# Patient Record
Sex: MALE | Race: WHITE | NOT HISPANIC OR LATINO | Employment: UNEMPLOYED | URBAN - NONMETROPOLITAN AREA
[De-identification: names, ages, dates, MRNs, and addresses within clinical notes are randomized per-mention and may not be internally consistent; named-entity substitution may affect disease eponyms.]

---

## 2020-10-13 ENCOUNTER — CONSULT (OUTPATIENT)
Dept: ONCOLOGY | Facility: CLINIC | Age: 65
End: 2020-10-13

## 2020-10-13 VITALS
HEIGHT: 69 IN | HEART RATE: 67 BPM | SYSTOLIC BLOOD PRESSURE: 127 MMHG | DIASTOLIC BLOOD PRESSURE: 62 MMHG | RESPIRATION RATE: 20 BRPM | TEMPERATURE: 98.3 F | BODY MASS INDEX: 28.88 KG/M2 | WEIGHT: 195 LBS

## 2020-10-13 DIAGNOSIS — C79.51 PROSTATE CANCER METASTATIC TO BONE (HCC): Primary | ICD-10-CM

## 2020-10-13 DIAGNOSIS — C61 PROSTATE CANCER METASTATIC TO BONE (HCC): Primary | ICD-10-CM

## 2020-10-13 PROCEDURE — 99205 OFFICE O/P NEW HI 60 MIN: CPT | Performed by: INTERNAL MEDICINE

## 2020-10-13 RX ORDER — SODIUM CHLORIDE 9 MG/ML
250 INJECTION, SOLUTION INTRAVENOUS ONCE
Status: CANCELLED | OUTPATIENT
Start: 2020-10-28

## 2020-10-13 RX ORDER — DIPHENHYDRAMINE HYDROCHLORIDE 50 MG/ML
50 INJECTION INTRAMUSCULAR; INTRAVENOUS AS NEEDED
Status: CANCELLED | OUTPATIENT
Start: 2020-11-04

## 2020-10-13 RX ORDER — FAMOTIDINE 10 MG/ML
20 INJECTION, SOLUTION INTRAVENOUS AS NEEDED
Status: CANCELLED | OUTPATIENT
Start: 2020-11-04

## 2020-10-13 RX ORDER — BICALUTAMIDE 50 MG/1
TABLET, FILM COATED ORAL
COMMUNITY
Start: 2020-09-30 | End: 2020-11-04

## 2020-10-13 RX ORDER — SODIUM CHLORIDE 9 MG/ML
250 INJECTION, SOLUTION INTRAVENOUS ONCE
Status: CANCELLED | OUTPATIENT
Start: 2020-11-04

## 2020-10-13 RX ORDER — TAMSULOSIN HYDROCHLORIDE 0.4 MG/1
CAPSULE ORAL
COMMUNITY
Start: 2020-10-01 | End: 2021-03-23

## 2020-10-13 NOTE — PROGRESS NOTES
CHIEF COMPLAINT: Metastatic prostate cancer to the bone    REASON FOR REFERRAL: Same      RECORDS OBTAINED  Records of the patients history including those obtained from Dr. Reynaga were reviewed and summarized in detail.    Oncology/Hematology History Overview Note   1.  Stage IVb grade group 4 metastatic prostate cancer with sclerotic bone lesions on CT and bone scan and history of prostatic hypertrophy  2.  Kidney stone  3.  Polyps    -9/30/2020 office note from Dr. Ronen Reynaga indicates patient with PSA 10.8.  Underwent TRUS prostate biopsy 9/15/2020.  Adenocarcinoma the prostate [place] 4+4 = 8 in 1 out of 12 cores and 4+3 = 7 and 10 out of 12 cores and 3+4 = 7 in 1 out of 12 cores.  Perineural invasion.  Extraprostatic extension into the fat seen on 2 biopsies of the right lateral mid and right apex.  9/24/2020 CT chest and whole-body bone scan showed T12, L1, left acetabular, right medial acetabular metastases with no lung metastases.  He started androgen deprivation therapy with Casodex with plans for Lupron to start in 1 to 2 weeks for clinical T3 N0 M1 metastatic prostate cancer.  Review of official report from [facility] 9/24/2020 bone scan mentions T12, L1, roof of left acetabulum and medial right acetabular osteoblastic metastases.  CT chest with contrast that same day showed mild splenomegaly 14.2 cm with stable periaortic nodes compared to CT December 2015 with no lung metastases.  Sclerotic abnormality within the T12 vertebral body, L1 vertebral body extending into the pedicle unchanged.  8/28/2020 CT abdomen pelvis report from [facility] reviewed and mentions normal spleen size.  Punctate nonobstructing kidney stone, no paulino enlargement, diffuse bladder wall thickening with mild perivesicular fat stranding, 2.1 cm sclerotic left iliac bone above the left acetabulum new compared to 2015 as well as 1.5 cm faintly sclerotic focus in the right posterior acetabulum stable compared  to prior CT 2015 as well as a 1.6 cm T12 and inferior vertebral body sclerotic lesion and a 1.9 cm left posterior lateral L1 vertebral body abnormality extending to the pedicle.  -10/13/2020 initial [facility] medical oncology consultation: With 1 [place] score 8, 4+4 lesion that would make him a grade group 4 with stage IVb disease given radiographic evidence of sclerotic bone metastasis on bone scan and CT.  Needs genetic testing given metastatic prostate cancer and this is also important as, were he to have mismatch repair mutation then we would have options for PDL 1 inhibitors and were he BRCA mutated would have options for PARP inhibitors in the future.  Systemic therapy for castration naïve prostate cancer or N1 disease should be with apalutamide or Abiraterone or enzalutamide all of which are category 1.  He does not have any visceral metastases.  According to his imaging he has T12, L1, left acetabular, right acetabular, and left iliac bony involvement.  That means he would have 4 or more bone metastases with at least one metastasis (i.e. the acetabular lesions bilaterally) beyond the pelvis/vertebral, for which this would be considered high-volume disease for which 6 cycles of docetaxel 75 mg/m² x 6 cycles followed by Zytiga and prednisone would be reasonable along with Zometa every 6 weeks for bone stabilization.  He will do chemo preparation visit with my nurse practitioner prior to start chemotherapy with Taxotere and Zometa in 2 weeks and he is already received Casodex in preparation for Lupron shot he received on 10/12/2020 with Dr. Reynaga.  We will get him to Dr. Alexander Gomez who has done his polypectomies in the past for port placement and we will get genetic counseling started.  Following the 6 courses of Taxotere per the Chaarted trial criteria, I would then give him an indefinite Zytiga and prednisone and Zometa until progression or toxicity dictates.     Prostate cancer metastatic to bone  (CMS/[facility])   10/13/2020 Initial Diagnosis    Prostate cancer metastatic to bone (CMS/[facility])     10/13/2020 Cancer Staged    Staging form: Bone - Appendicular Skeleton, Trunk, Skull, And Facial Bones, AJCC 8th Edition  - Clinical: Stage IVB (cT3, cN0, cM1b, G3) - Signed by Ishmael Rashid MD on 10/13/2020     10/28/2020 -  Chemotherapy    OP PROSTATE DOCEtaxel     10/28/2020 -  Chemotherapy    OP SUPPORTIVE Zoledronic Acid Q42d         HISTORY OF PRESENT ILLNESS:  The patient is a 65 y.o.  male, referred for metastatic prostate cancer to the bone.  Presenting with hematuria but now has bilateral hip pain.  Status post bicalutamide to prevent tumor flare for Lupron shot he received yesterday    REVIEW OF SYSTEMS:  A 14 point review of systems was performed and is negative except as noted above.    History reviewed. No pertinent past medical history.  Past Surgical History:   Procedure Laterality Date   • CHOLECYSTECTOMY     • CORONARY STENT PLACEMENT  206 & 2011   • HERNIA REPAIR  1986   • THORACIC DISCECTOMY  1994       Current Outpatient Medications on File Prior to Visit   Medication Sig Dispense Refill   • bicalutamide (CASODEX) 50 MG chemo tablet      • Pseudoephedrine-Naproxen Na (ALEVE-D SINUS & COLD PO)      • tamsulosin (FLOMAX) 0.4 MG capsule 24 hr capsule        No current facility-administered medications on file prior to visit.        No Known Allergies    Social History     Socioeconomic History   • Marital status:      Spouse name: Not on file   • Number of children: Not on file   • Years of education: Not on file   • Highest education level: Not on file   Tobacco Use   • Smoking status: Current Every Day Smoker     Packs/day: 1.00     Years: 50.00     Pack years: 50.00     Types: Cigarettes   Substance and Sexual Activity   • Alcohol use: Not Currently   • Drug use: Never       Family History   Problem Relation Age of Onset   • Ovarian cancer Sister    • Diabetes Brother    • Heart disease Brother   "  • Prostate cancer Brother        PHYSICAL EXAM:    /62   Pulse 67   Temp 98.3 °F (36.8 °C)   Resp 20   Ht 175.3 cm (69\")   Wt 88.5 kg (195 lb)   BMI 28.80 kg/m²     ECOG: (1) Restricted in Physically Strenuous Activity, Ambulatory & Able to Do Work of Light Nature  General: well appearing male in no acute distress  HEENT: sclera anicteric, oropharynx clear  Lymphatics: no cervical, supraclavicular, inguinal, or axillary adenopathy  Cardiovascular: regular rate and rhythm, no murmurs  Neck: Supple; No thyromegaly  Lungs: clear to auscultation bilaterally. No respiratory distress.   Abdomen: soft, nontender, nondistended.  No palpable organomegaly  Extremities: no cyanosis, clubbing, edema, or cords  Skin: no rashes, lesions, bruising, or petechiae  Neuro: Alert and oriented x 4; Moving all extremities.  Psych: No anxiety or depression    No results found for: HGB, HCT, MCV, PLT, WBC, NEUTROABS, LYMPHSABS, MONOSABS, EOSABS, BASOSABS  No results found for: GLUCOSE, BUN, CREATININE, NA, K, CL, CO2, CALCIUM, PROTEINTOT, ALBUMIN, BILITOT, ALKPHOS, AST, ALT        Assessment/Plan     1.  Stage IVb grade group 4 metastatic prostate cancer with sclerotic bone lesions on CT and bone scan and history of prostatic hypertrophy  2.  Kidney stone  3.  Polyps    -9/30/2020 office note from Dr. Ronen Reynaga indicates patient with PSA 10.8.  Underwent TRUS prostate biopsy 9/15/2020.  Adenocarcinoma the prostate [place] 4+4 = 8 in 1 out of 12 cores and 4+3 = 7 and 10 out of 12 cores and 3+4 = 7 in 1 out of 12 cores.  Perineural invasion.  Extraprostatic extension into the fat seen on 2 biopsies of the right lateral mid and right apex.  9/24/2020 CT chest and whole-body bone scan showed T12, L1, left acetabular, right medial acetabular metastases with no lung metastases.  He started androgen deprivation therapy with Casodex with plans for Lupron to start in 1 to 2 weeks for clinical T3 N0 M1 metastatic prostate " cancer.  Review of official report from [facility] 9/24/2020 bone scan mentions T12, L1, roof of left acetabulum and medial right acetabular osteoblastic metastases.  CT chest with contrast that same day showed mild splenomegaly 14.2 cm with stable periaortic nodes compared to CT December 2015 with no lung metastases.  Sclerotic abnormality within the T12 vertebral body, L1 vertebral body extending into the pedicle unchanged.  8/28/2020 CT abdomen pelvis report from [facility] reviewed and mentions normal spleen size.  Punctate nonobstructing kidney stone, no paulino enlargement, diffuse bladder wall thickening with mild perivesicular fat stranding, 2.1 cm sclerotic left iliac bone above the left acetabulum new compared to 2015 as well as 1.5 cm faintly sclerotic focus in the right posterior acetabulum stable compared to prior CT 2015 as well as a 1.6 cm T12 and inferior vertebral body sclerotic lesion and a 1.9 cm left posterior lateral L1 vertebral body abnormality extending to the pedicle.  -10/13/2020 initial [facility] medical oncology consultation: With 1 [place] score 8, 4+4 lesion that would make him a grade group 4 with stage IVb disease given radiographic evidence of sclerotic bone metastasis on bone scan and CT.  Needs genetic testing given metastatic prostate cancer and this is also important as, were he to have mismatch repair mutation then we would have options for PDL 1 inhibitors and were he BRCA mutated would have options for PARP inhibitors in the future.  Systemic therapy for castration naïve prostate cancer or N1 disease should be with apalutamide or Abiraterone or enzalutamide all of which are category 1.  He does not have any visceral metastases.  According to his imaging he has T12, L1, left acetabular, right acetabular, and left iliac bony involvement.  That means he would have 4 or more bone metastases with at least one metastasis (i.e. the acetabular lesions bilaterally) beyond  the pelvis/vertebral, for which this would be considered high-volume disease for which 6 cycles of docetaxel 75 mg/m² x 6 cycles followed by Zytiga and prednisone would be reasonable along with Zometa every 6 weeks for bone stabilization.  He will do chemo preparation visit with my nurse practitioner prior to start chemotherapy with Taxotere and Zometa in 2 weeks and he is already received Casodex in preparation for Lupron shot he received on 10/12/2020 with Dr. Reynaga.  We will get him to Dr. Alexander Gomez who has done his polypectomies in the past for port placement and we will get genetic counseling started.  Following the 6 courses of Taxotere per the Chaarted trial criteria, I would then give him an indefinite Zytiga and prednisone and Zometa until progression or toxicity dictates.  Discussed with patient face-to-face 85 minutes greater than 50% spent counseling regarding this complex plan        Ishmael Rashid MD    10/13/2020

## 2020-10-19 DIAGNOSIS — C61 PROSTATE CANCER METASTATIC TO BONE (HCC): Primary | ICD-10-CM

## 2020-10-19 DIAGNOSIS — C79.51 PROSTATE CANCER METASTATIC TO BONE (HCC): Primary | ICD-10-CM

## 2020-10-19 RX ORDER — DEXAMETHASONE 4 MG/1
TABLET ORAL
Qty: 12 TABLET | Refills: 5 | Status: SHIPPED | OUTPATIENT
Start: 2020-10-19 | End: 2021-01-05 | Stop reason: SDUPTHER

## 2020-10-19 RX ORDER — ONDANSETRON HYDROCHLORIDE 8 MG/1
8 TABLET, FILM COATED ORAL 3 TIMES DAILY PRN
Qty: 30 TABLET | Refills: 5 | Status: SHIPPED | OUTPATIENT
Start: 2020-10-19 | End: 2022-11-09

## 2020-10-22 ENCOUNTER — OFFICE VISIT (OUTPATIENT)
Dept: ONCOLOGY | Facility: CLINIC | Age: 65
End: 2020-10-22

## 2020-10-22 VITALS
DIASTOLIC BLOOD PRESSURE: 64 MMHG | SYSTOLIC BLOOD PRESSURE: 128 MMHG | HEART RATE: 74 BPM | RESPIRATION RATE: 18 BRPM | WEIGHT: 197 LBS | BODY MASS INDEX: 29.18 KG/M2 | TEMPERATURE: 98.7 F | HEIGHT: 69 IN

## 2020-10-22 DIAGNOSIS — C61 PROSTATE CANCER METASTATIC TO BONE (HCC): Primary | ICD-10-CM

## 2020-10-22 DIAGNOSIS — C79.51 PROSTATE CANCER METASTATIC TO BONE (HCC): Primary | ICD-10-CM

## 2020-10-22 PROCEDURE — 99215 OFFICE O/P EST HI 40 MIN: CPT | Performed by: NURSE PRACTITIONER

## 2020-10-22 RX ORDER — HYDROCODONE BITARTRATE AND ACETAMINOPHEN 5; 325 MG/1; MG/1
1 TABLET ORAL EVERY 6 HOURS PRN
Qty: 120 TABLET | Refills: 0 | Status: SHIPPED | OUTPATIENT
Start: 2020-10-22 | End: 2020-11-25

## 2020-10-22 RX ORDER — LIDOCAINE AND PRILOCAINE 25; 25 MG/G; MG/G
CREAM TOPICAL AS NEEDED
Qty: 30 G | Refills: 3 | Status: SHIPPED | OUTPATIENT
Start: 2020-10-22 | End: 2022-11-09

## 2020-10-22 NOTE — PROGRESS NOTES
CHEMOTHERAPY PREPARATION    Naveen Lugo  [id number]  1955    Chief Complaint: chemo education     History of present illness:  Naveen Lugo is a 65 y.o. year old male who is here today for chemotherapy preparation and needs assessment. The patient has been diagnosed with metastatic prostate cancer and is scheduled to begin treatment with DOCEtaxel.     Oncology History:    Oncology/Hematology History Overview Note   1.  Stage IVb grade group 4 metastatic prostate cancer with sclerotic bone lesions on CT and bone scan and history of prostatic hypertrophy  2.  Kidney stone  3.  Polyps    -9/30/2020 office note from Dr. Ronen Reynaga indicates patient with PSA 10.8.  Underwent TRUS prostate biopsy 9/15/2020.  Adenocarcinoma the prostate [place] 4+4 = 8 in 1 out of 12 cores and 4+3 = 7 and 10 out of 12 cores and 3+4 = 7 in 1 out of 12 cores.  Perineural invasion.  Extraprostatic extension into the fat seen on 2 biopsies of the right lateral mid and right apex.  9/24/2020 CT chest and whole-body bone scan showed T12, L1, left acetabular, right medial acetabular metastases with no lung metastases.  He started androgen deprivation therapy with Casodex with plans for Lupron to start in 1 to 2 weeks for clinical T3 N0 M1 metastatic prostate cancer.  Review of official report from [facility] 9/24/2020 bone scan mentions T12, L1, roof of left acetabulum and medial right acetabular osteoblastic metastases.  CT chest with contrast that same day showed mild splenomegaly 14.2 cm with stable periaortic nodes compared to CT December 2015 with no lung metastases.  Sclerotic abnormality within the T12 vertebral body, L1 vertebral body extending into the pedicle unchanged.  8/28/2020 CT abdomen pelvis report from [facility] reviewed and mentions normal spleen size.  Punctate nonobstructing kidney stone, no paulino enlargement, diffuse bladder wall thickening with mild perivesicular fat stranding, 2.1 cm sclerotic  left iliac bone above the left acetabulum new compared to 2015 as well as 1.5 cm faintly sclerotic focus in the right posterior acetabulum stable compared to prior CT 2015 as well as a 1.6 cm T12 and inferior vertebral body sclerotic lesion and a 1.9 cm left posterior lateral L1 vertebral body abnormality extending to the pedicle.  -10/13/2020 initial [facility] onc[facility] consultation: With 1 Sarika score 8, 4+4 lesion that would make him a grade group 4 with stage IVb disease given radiographic evidence of sclerotic bone metastasis on bone scan and CT.  Needs genetic testing given metastatic prostate cancer and this is also important as, were he to have mismatch repair mutation then we would have options for PDL 1 inhibitors and were he BRCA mutated would have options for PARP inhibitors in the future.  Systemic therapy for castration naïve prostate cancer or N1 disease should be with apalutamide or Abiraterone or enzalutamide all of which are category 1.  He does not have any visceral metastases.  According to his imaging he has T12, L1, left acetabular, right acetabular, and left iliac bony involvement.  That means he would have 4 or more bone metastases with at least one metastasis (i.e. the acetabular lesions bilaterally) beyond the pelvis/vertebral, for which this would be considered high-volume disease for which 6 cycles of docetaxel 75 mg/m² x 6 cycles followed by Zytiga and prednisone would be reasonable along with Zometa every 6 weeks for bone stabilization.  He will do chemo preparation visit with my nurse practitioner prior to start chemotherapy with Taxotere and Zometa in 2 weeks and he is already received Casodex in preparation for Lupron shot he received on 10/12/2020 with Dr. Ryenaga.  We will get him to Dr. Alexander Gomez who has done his polypectomies in the past for port placement and we will get genetic counseling started.  Following the 6 courses of Taxotere per the [place] trial criteria,  I would then give him an indefinite Zytiga and prednisone and Zometa until progression or toxicity dictates.     Prostate cancer metastatic to bone (CMS/HCC)   10/13/2020 Initial Diagnosis    Prostate cancer metastatic to bone (CMS/HCC)     10/13/2020 Cancer Staged    Staging form: Bone - Appendicular Skeleton, Trunk, Skull, And Facial Bones, AJCC 8th Edition  - Clinical: Stage IVB (cT3, cN0, cM1b, G3) - Signed by Ishmael Rashid MD on 10/13/2020     10/28/2020 -  Chemotherapy    OP SUPPORTIVE Zoledronic Acid Q42d     11/4/2020 -  Chemotherapy    OP PROSTATE DOCEtaxel         History reviewed. No pertinent past medical history.    Past Surgical History:   Procedure Laterality Date   • CHOLECYSTECTOMY     • CORONARY STENT PLACEMENT  206 & 2011   • HERNIA REPAIR  1986   • THORACIC DISCECTOMY  1994       Family History   Problem Relation Age of Onset   • Ovarian cancer Sister    • Diabetes Brother    • Heart disease Brother    • Prostate cancer Brother        Medications: The current medication list was reviewed and reconciled.     Allergies:  has No Known Allergies.    Review of Systems:    Review of Systems   Constitutional: Negative for appetite change, fatigue, fever and unexpected weight change.   HENT: Negative for mouth sores, sore throat and trouble swallowing.    Respiratory: Negative for cough, shortness of breath and wheezing.    Cardiovascular: Negative for chest pain, palpitations and leg swelling.   Gastrointestinal: Negative for abdominal distention, abdominal pain, constipation, diarrhea, nausea and vomiting.   Genitourinary: Negative for difficulty urinating, dysuria and frequency.   Musculoskeletal: Negative for arthralgias.        Bilateral hip pain   Skin: Negative for pallor, rash and wound.   Neurological: Negative for dizziness and weakness.   Hematological: Does not bruise/bleed easily.   Psychiatric/Behavioral: Negative for confusion and sleep disturbance. The patient is not nervous/anxious.         Physical Exam:    Vitals:    10/22/20 1049   BP: 128/64   Pulse: 74   Resp: 18   Temp: 98.7 °F (37.1 °C)     Vitals:    10/22/20 1049   PainSc:   8   PainLoc: Hip  Comment: right hip   [place] SHAN Lugo reports a pain score of 8.  Given his pain assessment as noted, treatment options were discussed and the following options were decided upon as a follow-up plan to address the patient's pain: prescription for opiod analgesics.         ECOG: (0) Fully Active - Able to Carry On All Pre-disease Performance Without Restriction    General: well appearing, in no acute distress  HEENT: sclera anicteric, oropharynx clear, neck is supple  Lymphatics: no cervical, supraclavicular, or axillary adenopathy  Cardiovascular: regular rate and rhythm, no murmurs, rubs or gallops  Lungs: clear to auscultation bilaterally  Abdomen: soft, nontender, nondistended.  No palpable organomegaly  Extremities: no lower extremity edema  Skin: no rashes, lesions, bruising, or petechiae  Msk:  Shows no weakness of the large muscle groups  Psych: Mood is stable            NEEDS ASSESSMENTS    Genetics  The patient's new diagnosis and family history have been reviewed for genetic counseling needs. A genetic referral is recommended.  Patient has appointment already scheduled.      Psychosocial  The patient has completed a PHQ-9 Depression Screening and the Distress Thermometer (DT) today.   PHQ-9 results show 1-4 (Minimal Depression). The patient scored their distress today as 2 on a scale of 0-10 with 0 being no distress and 10 being extreme distress.   Problems marked by the patient as being an issue for them within the last week include physical problems.   Results were reviewed along with psychosocial resources offered by our cancer center. Our oncology social worker will be flagged for a DT score of 4 or above, and a same day call will be made for a score of 9 or 10. A mental health referral is not recommended at this time. The patient is not  "accepting of a referral to [facility].   Copies of patient's questionnaires will be scanned into EMR for details and further reference.    Barriers to care  A barriers form was also completed by the patient today. We discussed services offered by our facility to help him have adequate access to care. The patient was given the name and card for our Oncology Social Worker, Dina Delaney. Based upon barriers assessment today, the patient will require a follow-up call from the  to further discuss needs.   A copy of the barriers form will also be scanned into EMR for details and further reference.     VAD Assessment  The patient and I discussed planned intervenous chemotherapy as well as other IV treatments that are often needed throughout the course of treatment. These may include, but are not limited to blood transfusions, antibiotics, and IV hydration. The vasculature does appear to be adequate for multiple peripheral IVs throughout their treatment course. Discussed risks and benefits of VADs. The patient would like to pursue Port-A-Cath insertion prior to initiation of treatment and is scheduled for placement on 11/2/2020.    Advanced Care Planning  The patient and I discussed advanced care planning, \"Conversations that Matter\".   This service was offered, free of charge, for development of advance directives with a certified ACP facilitator.  The patient does not have an up-to-date advanced directive.  The patient is not interested in an appointment with one of our facilitators to create or update their advanced directives.      Palliative Care  The patient and I discussed palliative care services. Palliative care is not the same as Hospice care. This is specialized medical care for people living with serious illness with the goal of improving quality of life for the patient and their family. [facility] has partnered with [facility] to offer our patients outpatient palliative care " early along with their treatment to assist in coordination of care, symptom management, pain management, and medical decision making.  Oncology criteria for palliative care referral is not met at this time. The patient is not interested in a palliative care consultation.     Additional Referral needs  none      CHEMOTHERAPY EDUCATION    Booklets Given: Chemotherapy and You [x]  Eating Hints [x]    Sexuality/Fertility Books []      Chemotherapy/Biotherapy Education Sheets: (list all that apply)  nausea management, acid reflux management, diarrhea management, Cancer resourse contacts information, skin and mouth care and vaccination information                                                                                                                                                                 Chemotherapy Regimen:   Treatment Plans     Name Type Plan Dates Plan Provider         Active    OP SUPPORTIVE Zoledronic Acid Q42d ONCOLOGY SUPPORTIVE CARE 1  10/28/2020 - Present Ishmael Rashid MD     OP PROSTATE DOCEtaxel ONCOLOGY TREATMENT  10/27/2020 - Present Ishmael Rashid MD                    TOPICS EDUCATION PROVIDED COMMENTS   ANEMIA:  role of RBC, cause, s/s, ways to manage, role of transfusion [x]    THROMBOCYTOPENIA:  role of platelet, cause, s/s, ways to prevent bleeding, things to avoid, when to seek help [x]    NEUTROPENIA:  role of WBC, cause, infection precautions, s/s of infection, when to call MD [x]    NUTRITION & APPETITE CHANGES:  importance of maintaining healthy diet & weight, ways to manage to improve intake, dietary consult, exercise regimen [x]    DIARRHEA:  causes, s/s of dehydration, ways to manage, dietary changes, when to call MD [x]    CONSTIPATION:  causes, ways to manage, dietary changes, when to call MD [x]    NAUSEA & VOMITING:  cause, use of antiemetics, dietary changes, when to call MD [x]    MOUTH SORES:  causes, oral care, ways to manage [x]    ALOPECIA:  cause, ways to manage,  resources [x]    INFERTILITY & SEXUALITY:  causes, fertility preservation options, sexuality changes, ways to manage, importance of birth control [x]    NERVOUS SYSTEM CHANGES:  causes, s/s, neuropathies, cognitive changes, ways to manage [x]    PAIN:  causes, ways to manage [x]    SKIN & NAIL CHANGES:  cause, s/s, ways to manage [x]    ORGAN TOXICITIES:  cause, s/s, need for diagnostic tests, labs, when to notify MD [x]    SURVIVORSHIP:  distress, distress assessment, secondary malignancies, early/late effects, follow-up, social issues, social support [x]    HOME CARE:  use of spill kits, storing of PO chemo, how to manage bodily fluids [x]    MISCELLANEOUS:  drug interactions, administration, vesicant, et [x]        Assessment and Plan:    Diagnoses and all orders for this visit:    1. Prostate cancer metastatic to bone (CMS/HCC) (Primary)        This was a 50 minute face-to-face visit with 45 minutes spent in  counseling and coordination of care as documented above.   The patient and I have reviewed their new cancer diagnosis and scheduled treatment plan. Needs assessment was completed including genetics, psychosocial needs, barriers to care, VAD evaluation, advanced care planning, and palliative care services. Referrals have been ordered as appropriate based upon our evaluation and patient desires.     Chemotherapy teaching was also completed today as documented above. Adequate time was given to answer all questions to his satisfaction. Patient and family are aware of their care team members and contact information if they have questions or problems throughout the treatment course. Needs assessments and education has been completed. The patient is adequately prepared to begin treatment as scheduled.     Reviewed with patient education regarding EMLA cream, dexamethasone and Zofran prescriptions sent to pharmacy.       I advised the patient that she can take Tylenol or Ibuprofen as needed for aches/pains related  to cancer/treatment. I also advised patient she could use Senakot or Miralax as needed for constipation or Imodium as needed for diarrhea.       I reviewed with the patient the care team members. I also reviewed the option of the urgent care clinic through our oncology office for evaluation and management of symptoms related to treatment.    Patient having significant bilateral hip pain secondary to bone metastasis.  He is rating his pain 8/10.  We will send in a prescription for [place] and may need referral to palliative care in the future.        Mirella Mccarty, APRN  10/22/2020

## 2020-10-29 ENCOUNTER — APPOINTMENT (OUTPATIENT)
Dept: GENETICS | Facility: HOSPITAL | Age: 65
End: 2020-10-29

## 2020-10-29 ENCOUNTER — LAB (OUTPATIENT)
Dept: ONCOLOGY | Facility: HOSPITAL | Age: 65
End: 2020-10-29

## 2020-10-29 VITALS
DIASTOLIC BLOOD PRESSURE: 79 MMHG | WEIGHT: 193 LBS | HEART RATE: 75 BPM | SYSTOLIC BLOOD PRESSURE: 169 MMHG | RESPIRATION RATE: 18 BRPM | BODY MASS INDEX: 28.5 KG/M2 | TEMPERATURE: 98.3 F

## 2020-10-29 DIAGNOSIS — C79.51 PROSTATE CANCER METASTATIC TO BONE (HCC): ICD-10-CM

## 2020-10-29 DIAGNOSIS — C61 PROSTATE CANCER METASTATIC TO BONE (HCC): ICD-10-CM

## 2020-10-29 PROCEDURE — G0463 HOSPITAL OUTPT CLINIC VISIT: HCPCS

## 2020-10-29 PROCEDURE — 36415 COLL VENOUS BLD VENIPUNCTURE: CPT

## 2020-10-30 LAB
ALBUMIN SERPL-MCNC: 4.5 G/DL (ref 3.5–5.2)
ALBUMIN/GLOB SERPL: 2 G/DL
ALP SERPL-CCNC: 113 U/L (ref 39–117)
ALT SERPL-CCNC: 16 U/L (ref 1–41)
AST SERPL-CCNC: 19 U/L (ref 1–40)
BASOPHILS # BLD AUTO: NORMAL 10*3/UL
BASOPHILS # BLD MANUAL: 0.12 10*3/MM3 (ref 0–0.2)
BASOPHILS NFR BLD MANUAL: 2 % (ref 0–1.5)
BILIRUB SERPL-MCNC: 0.2 MG/DL (ref 0–1.2)
BUN SERPL-MCNC: 6 MG/DL (ref 8–23)
BUN/CREAT SERPL: 7.2 (ref 7–25)
CALCIUM SERPL-MCNC: 9.2 MG/DL (ref 8.6–10.5)
CHLORIDE SERPL-SCNC: 106 MMOL/L (ref 98–107)
CO2 SERPL-SCNC: 26.7 MMOL/L (ref 22–29)
CREAT SERPL-MCNC: 0.83 MG/DL (ref 0.76–1.27)
DIFFERENTIAL COMMENT: ABNORMAL
EOSINOPHIL # BLD AUTO: NORMAL 10*3/UL
EOSINOPHIL # BLD MANUAL: 0.19 10*3/MM3 (ref 0–0.4)
EOSINOPHIL NFR BLD AUTO: NORMAL %
EOSINOPHIL NFR BLD MANUAL: 3 % (ref 0.3–6.2)
ERYTHROCYTE [DISTWIDTH] IN BLOOD BY AUTOMATED COUNT: 13.8 % (ref 12.3–15.4)
GLOBULIN SER CALC-MCNC: 2.3 GM/DL
GLUCOSE SERPL-MCNC: 107 MG/DL (ref 65–99)
HCT VFR BLD AUTO: 46.3 % (ref 37.5–51)
HGB BLD-MCNC: 15.3 G/DL (ref 13–17.7)
LYMPHOCYTES # BLD AUTO: NORMAL 10*3/UL
LYMPHOCYTES # BLD MANUAL: 2.11 10*3/MM3 (ref 0.7–3.1)
LYMPHOCYTES NFR BLD AUTO: NORMAL %
LYMPHOCYTES NFR BLD MANUAL: 34 % (ref 19.6–45.3)
MAGNESIUM SERPL-MCNC: 2 MG/DL (ref 1.6–2.4)
MCH RBC QN AUTO: 27.3 PG (ref 26.6–33)
MCHC RBC AUTO-ENTMCNC: 33 G/DL (ref 31.5–35.7)
MCV RBC AUTO: 82.7 FL (ref 79–97)
MONOCYTES # BLD MANUAL: 0.19 10*3/MM3 (ref 0.1–0.9)
MONOCYTES NFR BLD AUTO: NORMAL %
MONOCYTES NFR BLD MANUAL: 3 % (ref 5–12)
NEUTROPHILS # BLD MANUAL: 3.61 10*3/MM3 (ref 1.7–7)
NEUTROPHILS NFR BLD AUTO: NORMAL %
NEUTROPHILS NFR BLD MANUAL: 58 % (ref 42.7–76)
PHOSPHATE SERPL-MCNC: 2.6 MG/DL (ref 2.5–4.5)
PLATELET # BLD AUTO: 152 10*3/MM3 (ref 140–450)
PLATELET BLD QL SMEAR: ABNORMAL
POTASSIUM SERPL-SCNC: 3.8 MMOL/L (ref 3.5–5.2)
PROT SERPL-MCNC: 6.8 G/DL (ref 6–8.5)
PSA SERPL-MCNC: 1.37 NG/ML (ref 0–4)
RBC # BLD AUTO: 5.6 10*6/MM3 (ref 4.14–5.8)
RBC MORPH BLD: ABNORMAL
SODIUM SERPL-SCNC: 142 MMOL/L (ref 136–145)
WBC # BLD AUTO: 6.22 10*3/MM3 (ref 3.4–10.8)

## 2020-11-03 RX ORDER — SODIUM CHLORIDE 9 MG/ML
250 INJECTION, SOLUTION INTRAVENOUS ONCE
Status: COMPLETED | OUTPATIENT
Start: 2020-11-04 | End: 2020-11-04

## 2020-11-04 ENCOUNTER — INFUSION (OUTPATIENT)
Dept: ONCOLOGY | Facility: HOSPITAL | Age: 65
End: 2020-11-04

## 2020-11-04 ENCOUNTER — TELEPHONE (OUTPATIENT)
Dept: ONCOLOGY | Facility: CLINIC | Age: 65
End: 2020-11-04

## 2020-11-04 VITALS
DIASTOLIC BLOOD PRESSURE: 65 MMHG | RESPIRATION RATE: 18 BRPM | TEMPERATURE: 98 F | SYSTOLIC BLOOD PRESSURE: 147 MMHG | WEIGHT: 196.4 LBS | HEART RATE: 72 BPM | BODY MASS INDEX: 29 KG/M2

## 2020-11-04 DIAGNOSIS — Z45.2 ENCOUNTER FOR CARE RELATED TO PORT-A-CATH: ICD-10-CM

## 2020-11-04 DIAGNOSIS — C79.51 PROSTATE CANCER METASTATIC TO BONE (HCC): Primary | ICD-10-CM

## 2020-11-04 DIAGNOSIS — C61 PROSTATE CANCER METASTATIC TO BONE (HCC): Primary | ICD-10-CM

## 2020-11-04 PROCEDURE — 25010000002 DOCETAXEL 160 MG/16ML SOLUTION 16 ML VIAL: Performed by: INTERNAL MEDICINE

## 2020-11-04 PROCEDURE — 96375 TX/PRO/DX INJ NEW DRUG ADDON: CPT

## 2020-11-04 PROCEDURE — 25010000003 HEPARIN LOCK FLUSH PER 10 UNITS: Performed by: INTERNAL MEDICINE

## 2020-11-04 PROCEDURE — 96413 CHEMO IV INFUSION 1 HR: CPT

## 2020-11-04 PROCEDURE — 96417 CHEMO IV INFUS EACH ADDL SEQ: CPT

## 2020-11-04 PROCEDURE — 25010000002 ZOLEDRONIC ACID PER 1 MG: Performed by: INTERNAL MEDICINE

## 2020-11-04 RX ORDER — HEPARIN SODIUM (PORCINE) LOCK FLUSH IV SOLN 100 UNIT/ML 100 UNIT/ML
500 SOLUTION INTRAVENOUS AS NEEDED
Status: DISCONTINUED | OUTPATIENT
Start: 2020-11-04 | End: 2020-11-04 | Stop reason: HOSPADM

## 2020-11-04 RX ORDER — SODIUM CHLORIDE 9 MG/ML
250 INJECTION, SOLUTION INTRAVENOUS ONCE
Status: DISCONTINUED | OUTPATIENT
Start: 2020-11-04 | End: 2020-11-04 | Stop reason: HOSPADM

## 2020-11-04 RX ORDER — HEPARIN SODIUM (PORCINE) LOCK FLUSH IV SOLN 100 UNIT/ML 100 UNIT/ML
500 SOLUTION INTRAVENOUS AS NEEDED
Status: CANCELLED | OUTPATIENT
Start: 2020-11-04

## 2020-11-04 RX ADMIN — SODIUM CHLORIDE 250 ML: 9 INJECTION, SOLUTION INTRAVENOUS at 09:05

## 2020-11-04 RX ADMIN — DOCETAXEL ANHYDROUS 155 MG: 10 INJECTION, SOLUTION INTRAVENOUS at 09:21

## 2020-11-04 RX ADMIN — HEPARIN 500 UNITS: 100 SYRINGE at 10:22

## 2020-11-04 RX ADMIN — ZOLEDRONIC ACID 4 MG: 4 INJECTION, SOLUTION, CONCENTRATE INTRAVENOUS at 09:05

## 2020-11-04 NOTE — TELEPHONE ENCOUNTER
----- Message from Shantel Murray RN sent at 11/4/2020 11:17 AM EST -----  Regarding: Dr. Rashid - Medication clarification  Patient seen today for cycle 1 Taxotere. Casodex is listed in patient's medication list. Please call patient to clarify when he is to start this drug. Thanks, Shantel JOY ([place]).

## 2020-11-04 NOTE — TELEPHONE ENCOUNTER
Called patient to see if he had questions.  He states he doesn't know if he is on casodex.  He states he did take a medication for 1 month, but does not know the name.  I called Dr. Reynaga's office.  Patient was on casodex for one month with initiation of lupron and has completed course.  Patient notified and I will remove from his list.

## 2020-11-04 NOTE — PATIENT INSTRUCTIONS
Zoledronic Acid injection (Hypercalcemia, Oncology)  What is this medicine?  ZOLEDRONIC ACID (JOHNSON le gaspern ik AS id) lowers the amount of calcium loss from bone. It is used to treat too much calcium in your blood from cancer. It is also used to prevent complications of cancer that has spread to the bone.  This medicine may be used for other purposes; ask your health care provider or pharmacist if you have questions.  COMMON BRAND NAME(S): Zometa  What should I tell my health care provider before I take this medicine?  They need to know if you have any of these conditions:  · aspirin-sensitive asthma  · cancer, especially if you are receiving medicines used to treat cancer  · dental disease or wear dentures  · infection  · kidney disease  · receiving corticosteroids like dexamethasone or prednisone  · an unusual or allergic reaction to zoledronic acid, other medicines, foods, dyes, or preservatives  · pregnant or trying to get pregnant  · breast-feeding  How should I use this medicine?  This medicine is for infusion into a vein. It is given by a health care professional in a hospital or clinic setting.  Talk to your pediatrician regarding the use of this medicine in children. Special care may be needed.  Overdosage: If you think you have taken too much of this medicine contact a poison control center or emergency room at once.  NOTE: This medicine is only for you. Do not share this medicine with others.  What if I miss a dose?  It is important not to miss your dose. Call your doctor or health care professional if you are unable to keep an appointment.  What may interact with this medicine?  · certain antibiotics given by injection  · NSAIDs, medicines for pain and inflammation, like ibuprofen or naproxen  · some diuretics like bumetanide, furosemide  · teriparatide  · thalidomide  This list may not describe all possible interactions. Give your health care provider a list of all the medicines, herbs, non-prescription  drugs, or dietary supplements you use. Also tell them if you smoke, drink alcohol, or use illegal drugs. Some items may interact with your medicine.  What should I watch for while using this medicine?  Visit your doctor or health care professional for regular checkups. It may be some time before you see the benefit from this medicine. Do not stop taking your medicine unless your doctor tells you to. Your doctor may order blood tests or other tests to see how you are doing.  Women should inform their doctor if they wish to become pregnant or think they might be pregnant. There is a potential for serious side effects to an unborn child. Talk to your health care professional or pharmacist for more information.  You should make sure that you get enough calcium and vitamin D while you are taking this medicine. Discuss the foods you eat and the vitamins you take with your health care professional.  Some people who take this medicine have severe bone, joint, and/or muscle pain. This medicine may also increase your risk for jaw problems or a broken thigh bone. Tell your doctor right away if you have severe pain in your jaw, bones, joints, or muscles. Tell your doctor if you have any pain that does not go away or that gets worse.  Tell your dentist and dental surgeon that you are taking this medicine. You should not have major dental surgery while on this medicine. See your dentist to have a dental exam and fix any dental problems before starting this medicine. Take good care of your teeth while on this medicine. Make sure you see your dentist for regular follow-up appointments.  What side effects may I notice from receiving this medicine?  Side effects that you should report to your doctor or health care professional as soon as possible:  · allergic reactions like skin rash, itching or hives, swelling of the face, lips, or tongue  · anxiety, confusion, or depression  · breathing problems  · changes in vision  · eye  pain  · feeling faint or lightheaded, falls  · jaw pain, especially after dental work  · mouth sores  · muscle cramps, stiffness, or weakness  · redness, blistering, peeling or loosening of the skin, including inside the mouth  · trouble passing urine or change in the amount of urine  Side effects that usually do not require medical attention (report to your doctor or health care professional if they continue or are bothersome):  · bone, joint, or muscle pain  · constipation  · diarrhea  · fever  · hair loss  · irritation at site where injected  · loss of appetite  · nausea, vomiting  · stomach upset  · trouble sleeping  · trouble swallowing  · weak or tired  This list may not describe all possible side effects. Call your doctor for medical advice about side effects. You may report side effects to FDA at 2-858-HTT-4516.  Where should I keep my medicine?  This drug is given in a hospital or clinic and will not be stored at home.  NOTE: This sheet is a summary. It may not cover all possible information. If you have questions about this medicine, talk to your doctor, pharmacist, or health care provider.  © 2020 Elsevier/Gold Standard (2015-05-16 14:19:39)  Docetaxel injection  What is this medicine?  DOCETAXEL (youssef se TAX el) is a chemotherapy drug. It targets fast dividing cells, like cancer cells, and causes these cells to die. This medicine is used to treat many types of cancers like breast cancer, certain stomach cancers, head and neck cancer, lung cancer, and prostate cancer.  This medicine may be used for other purposes; ask your health care provider or pharmacist if you have questions.  COMMON BRAND NAME(S): Corey Hahn  What should I tell my health care provider before I take this medicine?  They need to know if you have any of these conditions:  · infection (especially a virus infection such as chickenpox, cold sores, or herpes)  · liver disease  · low blood counts, like low white cell, platelet, or red  cell counts  · an unusual or allergic reaction to docetaxel, polysorbate 80, other chemotherapy agents, other medicines, foods, dyes, or preservatives  · pregnant or trying to get pregnant  · breast-feeding  How should I use this medicine?  This drug is given as an infusion into a vein. It is administered in a hospital or clinic by a specially trained health care professional.  Talk to your pediatrician regarding the use of this medicine in children. Special care may be needed.  Overdosage: If you think you have taken too much of this medicine contact a poison control center or emergency room at once.  NOTE: This medicine is only for you. Do not share this medicine with others.  What if I miss a dose?  It is important not to miss your dose. Call your doctor or health care professional if you are unable to keep an appointment.  What may interact with this medicine?  · aprepitant  · certain antibiotics like erythromycin or clarithromycin  · certain antivirals for HIV or hepatitis  · certain medicines for fungal infections like fluconazole, itraconazole, ketoconazole, posaconazole, or voriconazole  · cimetidine  · ciprofloxacin  · conivaptan  · cyclosporine  · dronedarone  · fluvoxamine  · grapefruit juice  · imatinib  · verapamil  This list may not describe all possible interactions. Give your health care provider a list of all the medicines, herbs, non-prescription drugs, or dietary supplements you use. Also tell them if you smoke, drink alcohol, or use illegal drugs. Some items may interact with your medicine.  What should I watch for while using this medicine?  Your condition will be monitored carefully while you are receiving this medicine. You will need important blood work done while you are taking this medicine.  Call your doctor or health care professional for advice if you get a fever, chills or sore throat, or other symptoms of a cold or flu. Do not treat yourself. This drug decreases your body's ability to  fight infections. Try to avoid being around people who are sick.  Some products may contain alcohol. Ask your health care professional if this medicine contains alcohol. Be sure to tell all health care professionals you are taking this medicine. Certain medicines, like metronidazole and disulfiram, can cause an unpleasant reaction when taken with alcohol. The reaction includes flushing, headache, nausea, vomiting, sweating, and increased thirst. The reaction can last from 30 minutes to several hours.  You may get drowsy or dizzy. Do not drive, use machinery, or do anything that needs mental alertness until you know how this medicine affects you. Do not stand or sit up quickly, especially if you are an older patient. This reduces the risk of dizzy or fainting spells. Alcohol may interfere with the effect of this medicine.  Talk to your health care professional about your risk of cancer. You may be more at risk for certain types of cancer if you take this medicine.  Do not become pregnant while taking this medicine or for 6 months after stopping it. Women should inform their doctor if they wish to become pregnant or think they might be pregnant. There is a potential for serious side effects to an unborn child. Talk to your health care professional or pharmacist for more information. Do not breast-feed an infant while taking this medicine or for 1 week after stopping it.  Males who get this medicine must use a condom during sex with females who can get pregnant. If you get a woman pregnant, the baby could have birth defects. The baby could die before they are born. You will need to continue wearing a condom for 3 months after stopping the medicine. Tell your health care provider right away if your partner becomes pregnant while you are taking this medicine.  This may interfere with the ability to father a child. You should talk to your doctor or health care professional if you are concerned about your fertility.  What  side effects may I notice from receiving this medicine?  Side effects that you should report to your doctor or health care professional as soon as possible:  · allergic reactions like skin rash, itching or hives, swelling of the face, lips, or tongue  · blurred vision  · breathing problems  · changes in vision  · low blood counts - This drug may decrease the number of white blood cells, red blood cells and platelets. You may be at increased risk for infections and bleeding.  · nausea and vomiting  · pain, redness or irritation at site where injected  · pain, tingling, numbness in the hands or feet  · redness, blistering, peeling, or loosening of the skin, including inside the mouth  · signs of decreased platelets or bleeding - bruising, pinpoint red spots on the skin, black, tarry stools, nosebleeds  · signs of decreased red blood cells - unusually weak or tired, fainting spells, lightheadedness  · signs of infection - fever or chills, cough, sore throat, pain or difficulty passing urine  · swelling of the ankle, feet, hands  Side effects that usually do not require medical attention (report to your doctor or health care professional if they continue or are bothersome):  · constipation  · diarrhea  · fingernail or toenail changes  · hair loss  · loss of appetite  · mouth sores  · muscle pain  This list may not describe all possible side effects. Call your doctor for medical advice about side effects. You may report side effects to FDA at 2-334-FDA-0220.  Where should I keep my medicine?  This drug is given in a hospital or clinic and will not be stored at home.  NOTE: This sheet is a summary. It may not cover all possible information. If you have questions about this medicine, talk to your doctor, pharmacist, or health care provider.  © 2020 Elsevier/Gold Standard (2020-08-13 10:19:06)    Managing Chemotherapy Side Effects, Adult  Chemotherapy is a treatment that uses medicine to kill cancer cells. Chemotherapy  causes side effects. The specific side effects depend on the specific medicines used.  Most of the side effects of chemotherapy go away once treatment is finished. Until then, work closely with your health care providers and take an active role in managing your side effects.  What are common side effects?  · Tiredness (fatigue).  · Increased risk of infections, bruising, or bleeding.  · Nausea and vomiting.  · Constipation or diarrhea.  · Appetite loss.  · Hair loss.  · Mouth or throat sores.  · Tingling, pain, or numbness in the hands and feet.  · Dry, sensitive, itchy, or sore skin.  · Confusion, anxiety, or mood swings.  · Memory changes.  How can I help manage my side effects?  Medicines  · Take over-the-counter and prescription medicines only as told by your health care provider.  · Talk with your health care provider before taking vitamins, supplements, and over-the-counter medicines. Some of these can interfere with chemotherapy.  Activity    · Get plenty of rest.  · Get regular exercise by doing activities such as walking, gentle yoga, or byalee chi.  · Return to your normal activities as told by your health care provider. Ask your health care provider what activities are safe for you.  Eating and drinking  · Talk to a dietitian about what you should eat and drink during cancer treatment.  · Drink enough fluid to keep your urine pale yellow.  · If you have side effects that affect eating, these tips may help:  ? Eat smaller meals and snacks often.  ? Drink high-nutrition and high-calorie shakes or supplements.  ? Eat bland and soft foods that are easy to eat.  ? Do not eat foods that are hot, spicy, or hard to swallow.  · Do not eat raw or undercooked meat, eggs, or seafood.  · Always wash fresh fruits and vegetables well before eating them.  General instructions  · Learn as much as you can about your condition.  · Keep all follow-up visits as told by your health care provider. This is important.  · Use mouth  rinse only as told by your health care provider.  · Protect your skin from the sun by using sunscreen or wearing protective clothing and a hat.  · If you have sore or itchy skin:  ? Wear soft, comfortable clothing.  ? Apply creams and ointments to your skin as told by your health care provider.  · If you lose your hair, wear a wig, hat, or scarf to cover your head. You may want to have someone shave your head as you start to lose hair.  · Meet with a hair and  for makeup and skin care tips.  How can I prevent infection and bleeding?  Chemotherapy may lower your blood counts and put you at risk for infection and bleeding. Here are some ways to help prevent problems.  Vaccines  · Talk to your health care provider about vaccines. You should not get any live vaccines, such as the polio, MMR, chicken pox, and shingles vaccines. Do not be around people who have had live vaccines.  · Make sure you get a yearly flu shot. People who will be near you should also get a yearly flu shot.  Social activity  · Stay away from crowded places where you could be exposed to germs.  · Do not be around people who may be sick.  · Do not share food or utensils with other people.  · Wear a mask when outside the home if your blood counts are low.  Cleanliness    · Wash your hands often. Also make sure that other members of your household wash their hands often.  · Brush your teeth daily using a soft toothbrush.  General tips  · Take your temperature regularly, especially if you have chills or feel warm.  · Check with your health care provider before you:  ? Travel.  ? Have a dental procedure.  · If you get chemotherapy through an IV or port, check the site every day for signs of infection. Check for redness, swelling, pain, fluid, and warmth.  · Avoid activities that put you at risk for injury.  · Use an electric razor to shave instead of a blade.  Questions to ask your health care provider  · What are the most common side  effects of my treatment?  · How will they affect my daily life?  · What can I do to manage them?  · When can I expect them to end?  · What are some possible long-term side effects?  · What are possible complications?  · What support services are available?  · When should I contact my cancer care provider?  · What number can I call with questions or concerns?  Where to find support  Cancer affects the entire family. Find out what family support resources are available from your cancer treatment center. For more support, turn to:  · Your cancer care team.  · Friends and family.  · Your Christianity community.  · Other people with cancer.  · Online support groups.  Where to find more information  · [place] Cancer [place]: www.cancer.gov  · American Cancer Society: www.cancer.org  Contact a health care provider if:  · You bleed or bruise often.  · You have:  ? A skin rash or dry or itchy skin.  ? A headache or stiff neck.  ? Cold or flu symptoms.  ? A cough.  ? Nausea or vomiting.  ? Diarrhea.  ? Frequent urination, burning when passing urine, or foul-smelling urine.  ? Blood in your urine or stool.  · You cannot eat because of mouth or throat pain.  · You are sad, confused, anxious, or depressed.  Get help right away if:  · You have:  ? A fever.  ? Redness, swelling, pain, fluid, or warmth near an IV site.  ? Bleeding that you cannot stop.  ? A seizure.  · You cannot swallow.  · You have chest pain.  · You have trouble breathing.  Summary  · Chemotherapy is a treatment that uses medicine to kill cancer cells. Chemotherapy causes side effects. The specific side effects depend on the specific medicines used.  · Learn as much as you can about your condition. Ask about side effects to watch for and how to treat them.  · Seek out support and resources from others. Find out what family support resources are available from your cancer treatment center.  · Let your health care provider know if you notice any new or unusual  symptoms.  This information is not intended to replace advice given to you by your health care provider. Make sure you discuss any questions you have with your health care provider.  Document Released: 03/14/2019 Document Revised: 03/14/2019 Document Reviewed: 03/14/2019  Elsevier Patient Education © 2020 [employer] Inc[employer]

## 2020-11-09 ENCOUNTER — TELEPHONE (OUTPATIENT)
Dept: ONCOLOGY | Facility: CLINIC | Age: 65
End: 2020-11-09

## 2020-11-09 DIAGNOSIS — C79.51 PROSTATE CANCER METASTATIC TO BONE (HCC): Primary | ICD-10-CM

## 2020-11-09 DIAGNOSIS — C61 PROSTATE CANCER METASTATIC TO BONE (HCC): Primary | ICD-10-CM

## 2020-11-09 RX ORDER — HYDROCODONE BITARTRATE AND ACETAMINOPHEN 10; 325 MG/1; MG/1
1 TABLET ORAL EVERY 6 HOURS PRN
Qty: 120 TABLET | Refills: 0 | Status: SHIPPED | OUTPATIENT
Start: 2020-11-09 | End: 2020-11-30 | Stop reason: SDUPTHER

## 2020-11-09 NOTE — TELEPHONE ENCOUNTER
Patient wife calling to report that the patient is having lots of pain in his bones since his infusion last week.  She said it has gotten worse and feels like his bones are almost ready to break as they ache.  He is using norco 5/325 every 6 hours and ibuprofen 800mg in between norco dosing.  He states it has gotten worse over the past couple of days.  I talked with Dr. Rashid and Dr. Rashid had actually spoken with patient last evening and suggested an emergency room visit.  Dr. Rashid stated we could increase the norco to 10 ever 6 hours and I will send a rx for Rachid to sign off on.  I called the wife back and told her we were sending a new script in for norco 10/325 but she could go ahead and let her  take 2 of the 5mg tablets now to see if he can get any relief.  I educated about the need for stool softeners laxatives to keep bowels moving with the increase in narcotic pain meds and wife verbalized understanding.  I advised for them to call back if pain persists despite increase in pain med.

## 2020-11-10 ENCOUNTER — TELEPHONE (OUTPATIENT)
Dept: NUTRITION | Facility: HOSPITAL | Age: 65
End: 2020-11-10

## 2020-11-10 NOTE — PROGRESS NOTES
Oncology Nutrition Screening    Patient Name:  Naveen Lugo  YOB: 1955  MRN: [id number]  Date:  11/10/20  Physician:  Dr. Rashid    Type of Cancer Treatment:   Chemotherapy:  Taxotere - every 21 days x 6 + Zometa - every 42 days     Patient Active Problem List   Diagnosis   • Prostate cancer metastatic to bone (CMS/HCC)   • Encounter for care related to Port-a-Cath       Current Outpatient Medications   Medication Sig Dispense Refill   • dexamethasone (DECADRON) 4 MG tablet Take 2 tablets oral twice a day for 3 consecutive days beginning the day before chemotherapy and continue for 6 doses. 12 tablet 5   • HYDROcodone-acetaminophen (NORCO)  MG per tablet Take 1 tablet by mouth Every 6 (Six) Hours As Needed for Moderate Pain . 120 tablet 0   • HYDROcodone-acetaminophen (NORCO) 5-325 MG per tablet Take 1 tablet by mouth Every 6 (Six) Hours As Needed for Moderate Pain . 120 tablet 0   • lidocaine-prilocaine (EMLA) 2.5-2.5 % cream Apply  topically to the appropriate area as directed As Needed (45-60 minutes prior to port access.  Cover with saran/plastic wrap.). 30 g 3   • ondansetron (ZOFRAN) 8 MG tablet Take 1 tablet by mouth 3 (Three) Times a Day As Needed for Nausea or Vomiting. 30 tablet 5   • tamsulosin (FLOMAX) 0.4 MG capsule 24 hr capsule        No current facility-administered medications for this visit.        Glycemic Risk:   Steriods    Weight:   Height: 69 inches  Weight: 196 lbs. (11/4/2020)   BMI: 29  Overweight  Weight - no recent weight changes reported    Oral Food Intake:  Regular Diet - No Restrictions    Hydration Status:   How many 8 ounce glass of water of fluid do you drink per day?  Will assess at time of consult.    Enteral Feeding:   n/a    Nutrition Symptoms:   No nutritional impact symptoms reported    Activity:   Normal with no limitations     reports that he has been smoking cigarettes. He has a 50.00 pack-year smoking history. He does not have any smokeless tobacco  history on file. He reports previous alcohol use. He reports that he does not use drugs.    Evaluation of Nutritional Risk:   Patient is not at nutritional risk at time of screening.  Contacted patient via phone call for nutritional assessment / education, however he was busy at the time of the call and asked if I could call back.  Will attempt to contact patient to discuss nutrition during his treatment course.  RD available to assist prn.    Electronically signed by:  Cely Santiago RD  14:14 EST

## 2020-11-12 ENCOUNTER — TELEPHONE (OUTPATIENT)
Dept: SOCIAL WORK | Facility: HOSPITAL | Age: 65
End: 2020-11-12

## 2020-11-12 DIAGNOSIS — C61 PROSTATE CANCER METASTATIC TO BONE (HCC): Primary | ICD-10-CM

## 2020-11-12 DIAGNOSIS — K12.31 MUCOSITIS DUE TO ANTINEOPLASTIC THERAPY: ICD-10-CM

## 2020-11-12 DIAGNOSIS — C79.51 PROSTATE CANCER METASTATIC TO BONE (HCC): Primary | ICD-10-CM

## 2020-11-12 RX ORDER — DIPHENHYDRAMINE, LIDOCAINE, NYSTATIN
KIT ORAL
Qty: 240 ML | Refills: 3 | Status: SHIPPED | OUTPATIENT
Start: 2020-11-12 | End: 2022-11-09

## 2020-11-12 NOTE — TELEPHONE ENCOUNTER
SW called pt to address his recent distress screening.  LVM requesting a return phone call.  SW available for ongoing support and resource needs.

## 2020-11-12 NOTE — TELEPHONE ENCOUNTER
Patient called in to report that he has developed several mouth sores, denies any fevers.  He advised that he is not having any trouble eating or drinking yet.  Spoke with DHARMESH Deshpande who advised to call in MMW.  I did advised patient to watch for fevers and call us if they get worse and it does not help.  Patient verbalized understanding.

## 2020-11-17 ENCOUNTER — TELEPHONE (OUTPATIENT)
Dept: NUTRITION | Facility: HOSPITAL | Age: 65
End: 2020-11-17

## 2020-11-17 NOTE — PROGRESS NOTES
Onc Nutrition    Patient: Naveen Lugo  YOB: 1955    Diagnosis: metastatic prostate cancer  Chemotherapy: Taxotere - every 21 days (1/6 cycles) + Zometa - every 42 days     Attempted to contact patient for nutrition education, however he reports now is not a good time for him to talk on the phone.  Will try to reach patient at another time.  RD available to assist prn.    Cely Santiago RD  11/17/20

## 2020-11-19 ENCOUNTER — TELEPHONE (OUTPATIENT)
Dept: NUTRITION | Facility: HOSPITAL | Age: 65
End: 2020-11-19

## 2020-11-19 ENCOUNTER — TELEPHONE (OUTPATIENT)
Dept: ONCOLOGY | Facility: CLINIC | Age: 65
End: 2020-11-19

## 2020-11-19 ENCOUNTER — INFUSION (OUTPATIENT)
Dept: ONCOLOGY | Facility: HOSPITAL | Age: 65
End: 2020-11-19

## 2020-11-19 VITALS
HEART RATE: 91 BPM | RESPIRATION RATE: 18 BRPM | WEIGHT: 195.8 LBS | DIASTOLIC BLOOD PRESSURE: 76 MMHG | TEMPERATURE: 98.3 F | SYSTOLIC BLOOD PRESSURE: 156 MMHG | BODY MASS INDEX: 28.91 KG/M2

## 2020-11-19 DIAGNOSIS — C79.51 PROSTATE CANCER METASTATIC TO BONE (HCC): Primary | ICD-10-CM

## 2020-11-19 DIAGNOSIS — C61 PROSTATE CANCER METASTATIC TO BONE (HCC): Primary | ICD-10-CM

## 2020-11-19 DIAGNOSIS — Z45.2 ENCOUNTER FOR CARE RELATED TO PORT-A-CATH: ICD-10-CM

## 2020-11-19 PROCEDURE — 25010000003 HEPARIN LOCK FLUSH PER 10 UNITS: Performed by: INTERNAL MEDICINE

## 2020-11-19 PROCEDURE — 36591 DRAW BLOOD OFF VENOUS DEVICE: CPT

## 2020-11-19 RX ORDER — HEPARIN SODIUM (PORCINE) LOCK FLUSH IV SOLN 100 UNIT/ML 100 UNIT/ML
500 SOLUTION INTRAVENOUS AS NEEDED
Status: DISCONTINUED | OUTPATIENT
Start: 2020-11-19 | End: 2020-11-19 | Stop reason: HOSPADM

## 2020-11-19 RX ORDER — HEPARIN SODIUM (PORCINE) LOCK FLUSH IV SOLN 100 UNIT/ML 100 UNIT/ML
500 SOLUTION INTRAVENOUS AS NEEDED
Status: CANCELLED | OUTPATIENT
Start: 2020-11-19

## 2020-11-19 RX ORDER — GABAPENTIN 300 MG/1
300 CAPSULE ORAL 3 TIMES DAILY
Qty: 90 CAPSULE | Refills: 6 | Status: SHIPPED | OUTPATIENT
Start: 2020-11-19 | End: 2020-11-25

## 2020-11-19 RX ADMIN — HEPARIN 500 UNITS: 100 SYRINGE at 09:50

## 2020-11-19 NOTE — TELEPHONE ENCOUNTER
Patient notified.  States he is unable to take gabapentin.  He makes him very nauseated.  Dr. Rashid notified.  He is currently taking norco 10/325 mg 1 tab PO Q6 hrs prn.  Patient instructed to increase to 1-2 tabs Q4 hrs prn pain and let us know if not effective.  Verbalized understanding.

## 2020-11-19 NOTE — PROGRESS NOTES
Onc Nutrition    Patient: Naveen Lugo  YOB: 1955    Diagnosis: metastatic prostate cancer  Chemotherapy: Taxotere - every 21 days (1/6 cycles) + Zometa - every 42 days     3rd attempt to speak with patient regarding nutrition during his treatment course.  Each time patient has declined nutrition education saying that it was not a good time to talk.  Will mail written diet materials with RD's contact information.  Advised patient to call RD with nutritional questions or concerns at his convenience.  He voiced understanding and is in agreement with the above plan.  Will follow up as indicated.  RD available to assist prn.    Cely Santiago RD  11/19/20

## 2020-11-19 NOTE — TELEPHONE ENCOUNTER
----- Message from Ishmael Rashid MD sent at 11/19/2020  2:24 PM EST -----  Regarding: RE: pain management  Tell him I sent in gabapentin 300 mg 3 times a day to add to the Norco  ----- Message -----  From: Ciera Deluna, RN  Sent: 11/19/2020   1:37 PM EST  To: Ishmael Rashid MD  Subject: FW: pain management                                ----- Message -----  From: Lucy Pretty RN  Sent: 11/19/2020  12:16 PM EST  To: Ciera Deluna RN  Subject: pain management                                  Hey Mr. Lugo was here earlier today for blood work.  He sees Abena and has treatment next Wednesday.  He is still complaining of pain since he took the first tx of Taxotere and Zometa.  He said at one point it was so bad he couldn hardly walk and couldn't work.  He rated it an 8 in his back today and hips. There is a note that he had called about the pain and Dr. Rashid increased his Norco.  The patient said it doesn't help and wanted to know if there was something else.  I told him I would relay his message.    Thanks!    Fabiola

## 2020-11-20 LAB
ALBUMIN SERPL-MCNC: 3.7 G/DL (ref 3.8–4.8)
ALBUMIN/GLOB SERPL: 1.4 {RATIO} (ref 1.2–2.2)
ALP SERPL-CCNC: 103 IU/L (ref 39–117)
ALT SERPL-CCNC: 15 IU/L (ref 0–44)
AST SERPL-CCNC: 17 IU/L (ref 0–40)
BASOPHILS # BLD AUTO: 0.4 X10E3/UL (ref 0–0.2)
BASOPHILS NFR BLD AUTO: 3 %
BILIRUB SERPL-MCNC: <0.2 MG/DL (ref 0–1.2)
BUN SERPL-MCNC: 9 MG/DL (ref 8–27)
BUN/CREAT SERPL: 13 (ref 10–24)
CALCIUM SERPL-MCNC: 9 MG/DL (ref 8.6–10.2)
CHLORIDE SERPL-SCNC: 104 MMOL/L (ref 96–106)
CO2 SERPL-SCNC: 22 MMOL/L (ref 20–29)
CREAT SERPL-MCNC: 0.67 MG/DL (ref 0.76–1.27)
EOSINOPHIL # BLD AUTO: 0 X10E3/UL (ref 0–0.4)
EOSINOPHIL NFR BLD AUTO: 0 %
ERYTHROCYTE [DISTWIDTH] IN BLOOD BY AUTOMATED COUNT: 14 % (ref 11.6–15.4)
GLOBULIN SER CALC-MCNC: 2.6 G/DL (ref 1.5–4.5)
GLUCOSE SERPL-MCNC: 119 MG/DL (ref 65–99)
HCT VFR BLD AUTO: 40.4 % (ref 37.5–51)
HGB BLD-MCNC: 13.9 G/DL (ref 13–17.7)
IMMATURE CELLS: ABNORMAL
LYMPHOCYTES # BLD AUTO: 2.4 X10E3/UL (ref 0.7–3.1)
LYMPHOCYTES NFR BLD AUTO: 20 %
MCH RBC QN AUTO: 28.4 PG (ref 26.6–33)
MCHC RBC AUTO-ENTMCNC: 34.4 G/DL (ref 31.5–35.7)
MCV RBC AUTO: 83 FL (ref 79–97)
METAMYELOCYTES NFR BLD: 2 % (ref 0–0)
MONOCYTES # BLD AUTO: 0.4 X10E3/UL (ref 0.1–0.9)
MONOCYTES NFR BLD AUTO: 3 %
MORPHOLOGY BLD-IMP: ABNORMAL
MYELOCYTES NFR BLD: 6 % (ref 0–0)
NEUTROPHILS # BLD AUTO: 8 X10E3/UL (ref 1.4–7)
NEUTROPHILS NFR BLD AUTO: 66 %
PLATELET # BLD AUTO: 274 X10E3/UL (ref 150–450)
POTASSIUM SERPL-SCNC: 3.7 MMOL/L (ref 3.5–5.2)
PROT SERPL-MCNC: 6.3 G/DL (ref 6–8.5)
PSA SERPL-MCNC: 1.2 NG/ML (ref 0–4)
RBC # BLD AUTO: 4.89 X10E6/UL (ref 4.14–5.8)
SODIUM SERPL-SCNC: 140 MMOL/L (ref 134–144)
WBC # BLD AUTO: 12.1 X10E3/UL (ref 3.4–10.8)

## 2020-11-24 RX ORDER — SODIUM CHLORIDE 9 MG/ML
250 INJECTION, SOLUTION INTRAVENOUS ONCE
Status: COMPLETED | OUTPATIENT
Start: 2020-11-25 | End: 2020-11-25

## 2020-11-24 RX ORDER — SODIUM CHLORIDE 9 MG/ML
250 INJECTION, SOLUTION INTRAVENOUS ONCE
Status: CANCELLED | OUTPATIENT
Start: 2020-11-25

## 2020-11-24 RX ORDER — FAMOTIDINE 10 MG/ML
20 INJECTION, SOLUTION INTRAVENOUS AS NEEDED
Status: CANCELLED | OUTPATIENT
Start: 2020-11-25

## 2020-11-24 RX ORDER — DIPHENHYDRAMINE HYDROCHLORIDE 50 MG/ML
50 INJECTION INTRAMUSCULAR; INTRAVENOUS AS NEEDED
Status: CANCELLED | OUTPATIENT
Start: 2020-11-25

## 2020-11-25 ENCOUNTER — OFFICE VISIT (OUTPATIENT)
Dept: ONCOLOGY | Facility: CLINIC | Age: 65
End: 2020-11-25

## 2020-11-25 ENCOUNTER — INFUSION (OUTPATIENT)
Dept: ONCOLOGY | Facility: HOSPITAL | Age: 65
End: 2020-11-25

## 2020-11-25 VITALS
BODY MASS INDEX: 28.88 KG/M2 | DIASTOLIC BLOOD PRESSURE: 72 MMHG | WEIGHT: 195 LBS | TEMPERATURE: 98.9 F | HEART RATE: 106 BPM | SYSTOLIC BLOOD PRESSURE: 163 MMHG | RESPIRATION RATE: 18 BRPM | HEIGHT: 69 IN | OXYGEN SATURATION: 98 %

## 2020-11-25 DIAGNOSIS — Z45.2 ENCOUNTER FOR CARE RELATED TO PORT-A-CATH: ICD-10-CM

## 2020-11-25 DIAGNOSIS — C79.51 PROSTATE CANCER METASTATIC TO BONE (HCC): Primary | ICD-10-CM

## 2020-11-25 DIAGNOSIS — C61 PROSTATE CANCER METASTATIC TO BONE (HCC): Primary | ICD-10-CM

## 2020-11-25 PROCEDURE — 99213 OFFICE O/P EST LOW 20 MIN: CPT | Performed by: NURSE PRACTITIONER

## 2020-11-25 PROCEDURE — 25010000003 HEPARIN LOCK FLUSH PER 10 UNITS: Performed by: INTERNAL MEDICINE

## 2020-11-25 PROCEDURE — 96413 CHEMO IV INFUSION 1 HR: CPT

## 2020-11-25 PROCEDURE — 25010000002 DOCETAXEL 160 MG/16ML SOLUTION 16 ML VIAL: Performed by: NURSE PRACTITIONER

## 2020-11-25 RX ORDER — OMEPRAZOLE 20 MG/1
20 CAPSULE, DELAYED RELEASE ORAL DAILY
COMMUNITY
Start: 2020-09-12 | End: 2021-11-17 | Stop reason: DRUGHIGH

## 2020-11-25 RX ORDER — DIPHENHYDRAMINE HYDROCHLORIDE 50 MG/ML
50 INJECTION INTRAMUSCULAR; INTRAVENOUS AS NEEDED
Status: CANCELLED | OUTPATIENT
Start: 2020-12-16

## 2020-11-25 RX ORDER — SODIUM CHLORIDE 9 MG/ML
250 INJECTION, SOLUTION INTRAVENOUS ONCE
Status: CANCELLED | OUTPATIENT
Start: 2020-12-16

## 2020-11-25 RX ORDER — FAMOTIDINE 10 MG/ML
20 INJECTION, SOLUTION INTRAVENOUS AS NEEDED
Status: CANCELLED | OUTPATIENT
Start: 2020-12-16

## 2020-11-25 RX ORDER — SODIUM CHLORIDE 9 MG/ML
250 INJECTION, SOLUTION INTRAVENOUS ONCE
Status: CANCELLED | OUTPATIENT
Start: 2020-12-09

## 2020-11-25 RX ORDER — IBUPROFEN 800 MG/1
800 TABLET ORAL 3 TIMES DAILY
COMMUNITY
Start: 2020-10-13 | End: 2021-06-29

## 2020-11-25 RX ORDER — MELOXICAM 15 MG/1
TABLET ORAL
COMMUNITY
Start: 2020-09-23 | End: 2020-12-29

## 2020-11-25 RX ORDER — HEPARIN SODIUM (PORCINE) LOCK FLUSH IV SOLN 100 UNIT/ML 100 UNIT/ML
500 SOLUTION INTRAVENOUS AS NEEDED
Status: CANCELLED | OUTPATIENT
Start: 2020-11-25

## 2020-11-25 RX ORDER — HEPARIN SODIUM (PORCINE) LOCK FLUSH IV SOLN 100 UNIT/ML 100 UNIT/ML
500 SOLUTION INTRAVENOUS AS NEEDED
Status: DISCONTINUED | OUTPATIENT
Start: 2020-11-25 | End: 2020-11-25 | Stop reason: HOSPADM

## 2020-11-25 RX ADMIN — DOCETAXEL ANHYDROUS 155 MG: 10 INJECTION, SOLUTION INTRAVENOUS at 10:50

## 2020-11-25 RX ADMIN — SODIUM CHLORIDE 250 ML: 9 INJECTION, SOLUTION INTRAVENOUS at 10:50

## 2020-11-25 RX ADMIN — HEPARIN 500 UNITS: 100 SYRINGE at 11:50

## 2020-11-25 NOTE — PROGRESS NOTES
CHIEF COMPLAINT: 1.  Bone pain, worsened after last treatment 2.  Fatigue 3.  Metastatic prostate cancer    Problem List:  Oncology/Hematology History Overview Note   1.  Stage IVb grade group 4 metastatic prostate cancer with sclerotic bone lesions on CT and bone scan and history of prostatic hypertrophy  2.  Kidney stone  3.  Polyps    -9/30/2020 office note from Dr. Ronen Reynaga indicates patient with PSA 10.8.  Underwent TRUS prostate biopsy 9/15/2020.  Adenocarcinoma the prostate [place] 4+4 = 8 in 1 out of 12 cores and 4+3 = 7 and 10 out of 12 cores and 3+4 = 7 in 1 out of 12 cores.  Perineural invasion.  Extraprostatic extension into the fat seen on 2 biopsies of the right lateral mid and right apex.  9/24/2020 CT chest and whole-body bone scan showed T12, L1, left acetabular, right medial acetabular metastases with no lung metastases.  He started androgen deprivation therapy with Casodex with plans for Lupron to start in 1 to 2 weeks for clinical T3 N0 M1 metastatic prostate cancer.  Review of official report from [facility] 9/24/2020 bone scan mentions T12, L1, roof of left acetabulum and medial right acetabular osteoblastic metastases.  CT chest with contrast that same day showed mild splenomegaly 14.2 cm with stable periaortic nodes compared to CT December 2015 with no lung metastases.  Sclerotic abnormality within the T12 vertebral body, L1 vertebral body extending into the pedicle unchanged.  8/28/2020 CT abdomen pelvis report from [facility] reviewed and mentions normal spleen size.  Punctate nonobstructing kidney stone, no paulino enlargement, diffuse bladder wall thickening with mild perivesicular fat stranding, 2.1 cm sclerotic left iliac bone above the left acetabulum new compared to 2015 as well as 1.5 cm faintly sclerotic focus in the right posterior acetabulum stable compared to prior CT 2015 as well as a 1.6 cm T12 and inferior vertebral body sclerotic lesion and a 1.9 cm left  posterior lateral L1 vertebral body abnormality extending to the pedicle.  -10/13/2020 initial [facility] medical oncology consultation: With 1 [place] score 8, 4+4 lesion that would make him a grade group 4 with stage IVb disease given radiographic evidence of sclerotic bone metastasis on bone scan and CT.  Needs genetic testing given metastatic prostate cancer and this is also important as, were he to have mismatch repair mutation then we would have options for PDL 1 inhibitors and were he BRCA mutated would have options for PARP inhibitors in the future.  Systemic therapy for castration naïve prostate cancer or N1 disease should be with apalutamide or Abiraterone or enzalutamide all of which are category 1.  He does not have any visceral metastases.  According to his imaging he has T12, L1, left acetabular, right acetabular, and left iliac bony involvement.  That means he would have 4 or more bone metastases with at least one metastasis (i.e. the acetabular lesions bilaterally) beyond the pelvis/vertebral, for which this would be considered high-volume disease for which 6 cycles of docetaxel 75 mg/m² x 6 cycles followed by Zytiga and prednisone would be reasonable along with Zometa every 6 weeks for bone stabilization.  He will do chemo preparation visit with my nurse practitioner prior to start chemotherapy with Taxotere and Zometa in 2 weeks and he is already received Casodex in preparation for Lupron shot he received on 10/12/2020 with Dr. Reynaga.  We will get him to Dr. Alexander Gomez who has done his polypectomies in the past for port placement and we will get genetic counseling started.  Following the 6 courses of Taxotere per the Chaarted trial criteria, I would then give him an indefinite Zytiga and prednisone and Zometa until progression or toxicity dictates.     Prostate cancer metastatic to bone (CMS/HCC)   10/13/2020 Initial Diagnosis    Prostate cancer metastatic to bone (CMS/HCC)     10/13/2020 Cancer  Staged    Staging form: Bone - Appendicular Skeleton, Trunk, Skull, And Facial Bones, AJCC 8th Edition  - Clinical: Stage IVB (cT3, cN0, cM1b, G3) - Signed by Ishmael Rashid MD on 10/13/2020     11/3/2020 -  Chemotherapy    OP SUPPORTIVE Zoledronic Acid Q42d     11/3/2020 -  Chemotherapy    OP PROSTATE DOCEtaxel     11/4/2020 -  Chemotherapy    OP CENTRAL VENOUS ACCESS DEVICE ACCESS, CARE, AND MAINTENANCE (CVAD)         HISTORY OF PRESENT ILLNESS:  The patient is a 65 y.o. male, here for follow up on management of metastatic prostate cancer.  The patient reports after his first treatment with Taxotere and Zometa he had significant increase in his bone pain and fatigue that was almost debilitating, this lasted for up to 1-1/2 weeks.  Currently he is feeling much better.  Taking ibuprofen along with hydrocodone intermittently for his bone pain which has helped.  Denies constipation, is taking stool softener.  No fevers or chills, no new respiratory concerns.      History reviewed. No pertinent past medical history.  Past Surgical History:   Procedure Laterality Date   • CHOLECYSTECTOMY     • CORONARY STENT PLACEMENT  206 & 2011   • HERNIA REPAIR  1986   • THORACIC DISCECTOMY  1994       No Known Allergies    Family History and Social History reviewed and changed as necessary      REVIEW OF SYSTEM:   Review of Systems   Constitutional: Negative for appetite change, chills, diaphoresis, fever and unexpected weight change.  Positive for fatigue.  HENT:   Negative for mouth sores, sore throat and trouble swallowing.    Eyes: Negative for icterus.   Respiratory: Negative for cough, hemoptysis and shortness of breath.    Cardiovascular: Negative for chest pain, leg swelling and palpitations.   Gastrointestinal: Negative for abdominal distention, abdominal pain, blood in stool, constipation, diarrhea, nausea and vomiting.   Endocrine: Negative for hot flashes.   Genitourinary: Negative for bladder incontinence, difficulty  "urinating, dysuria, frequency and hematuria.    Musculoskeletal: Negative for gait problem, neck pain and neck stiffness.  Positive for bone pain, worse after last treatment.  Skin: Negative for rash.   Neurological: Negative for dizziness, gait problem, headaches, light-headedness and numbness.   Hematological: Negative for adenopathy. Does not bruise/bleed easily.   Psychiatric/Behavioral: Negative for depression. The patient is not nervous/anxious.    All other systems reviewed and are negative.       PHYSICAL EXAM    Vitals:    11/25/20 1002   BP: 163/72   Pulse: 106   Resp: 18   Temp: 98.9 °F (37.2 °C)   SpO2: 98%   Weight: 88.5 kg (195 lb)   Height: 175.3 cm (69\")     Vitals:    11/25/20 1002   PainSc: 0-No pain  Comment: No new pain. Still having old back pain.        Constitutional: Appears well-developed and well-nourished. No distress.   ECOG: (1) Restricted in Physically Strenuous Activity, Ambulatory & Able to Do Work of Light Nature  HENT:   Head: Normocephalic.   Mouth/Throat: Oropharynx is clear and moist.   Eyes: Conjunctivae are normal. Pupils are equal, round, and reactive to light. No scleral icterus.   Neck: Neck supple. No JVD present.   Cardiovascular: Normal rate, regular rhythm and normal heart sounds.    Pulmonary/Chest: Breath sounds normal. No respiratory distress.   Abdominal: Soft. Exhibits no distension. There is no tenderness.   Musculoskeletal:Exhibits no edema, tenderness or deformity.   Neurological: Alert and oriented to person, place, and time. Exhibits normal muscle tone.   Skin: No ecchymosis, no petechiae and no rash noted. Not diaphoretic. No cyanosis. Nails show no clubbing.   Psychiatric: Normal mood and affect.   Vitals reviewed.  Labs reviewed.    Lab Results   Component Value Date    HGB 13.9 11/19/2020    HCT 40.4 11/19/2020    MCV 83 11/19/2020     11/19/2020    WBC 12.1 (H) 11/19/2020    NEUTROABS 8.0 (H) 11/19/2020    LYMPHSABS 2.4 11/19/2020    MONOSABS 0.4 " 11/19/2020    EOSABS 0.0 11/19/2020    BASOSABS 0.4 (H) 11/19/2020       Lab Results   Component Value Date    BUN 9 11/19/2020    CREATININE 0.67 (L) 11/19/2020     11/19/2020    K 3.7 11/19/2020     11/19/2020    CO2 22 11/19/2020    CALCIUM 9.0 11/19/2020    ALBUMIN 3.7 (L) 11/19/2020    BILITOT <0.2 11/19/2020    ALKPHOS 103 11/19/2020    AST 17 11/19/2020    ALT 15 11/19/2020         ASSESSMENT & PLAN:    1.  Stage IVb grade group 4, metastatic prostate cancer with sclerotic bone lesions on CT and bone scan  2.  Cancer related pain    Discussion: After his first treatment with Taxotere and Zometa he had increase in his bone pain.  Finally got this under control with alternating ibuprofen and Norco 10/325.  Also had a fair amount of fatigue but that is improving.  We will continue with therapy unchanged today for cycle #2 of a planned 6 cycles of Taxotere.  He is receiving Zometa every 6 weeks, next dose due mid December.  I will have him try Claritin to see if this helps with the bone pain.  He receives Lupron with Dr. Reynaga.  Once he completes 6 courses of therapy plan would be to then go to Zytiga and prednisone along with Zometa every 6 weeks, Lupron indefinitely.  I will see him back in 3 weeks for follow-up for course #3, PSA 1.2 on 11/19/2020.    I spent a total of 15 minutes in direct patient care, greater than 11  minutes face to face, time was spent in coordination of care, and counseling the patient regarding symptom assessment, lab review and discussion of plan of care going forward.  Answered any questions patient had regarding medications and plan of care.     Abena Velasquez, APRN    11/25/2020

## 2020-11-30 DIAGNOSIS — C61 PROSTATE CANCER METASTATIC TO BONE (HCC): ICD-10-CM

## 2020-11-30 DIAGNOSIS — C79.51 PROSTATE CANCER METASTATIC TO BONE (HCC): ICD-10-CM

## 2020-11-30 RX ORDER — HYDROCODONE BITARTRATE AND ACETAMINOPHEN 10; 325 MG/1; MG/1
1 TABLET ORAL EVERY 4 HOURS PRN
Qty: 180 TABLET | Refills: 0 | Status: SHIPPED | OUTPATIENT
Start: 2020-11-30 | End: 2021-01-14

## 2020-11-30 NOTE — TELEPHONE ENCOUNTER
Patient's wife called and said this refill should have been called in, but the pharmacy doesn't have it and she said it should state every 4 hours instead of 6 hours. He needs this today.

## 2020-11-30 NOTE — TELEPHONE ENCOUNTER
PT notified and verbalized understanding that 1 month will be sent in and he will see Dr. Cary on [id number] @ 1300 for Pain Management.  [phone] [id number]  ~Shell

## 2020-12-10 ENCOUNTER — INFUSION (OUTPATIENT)
Dept: ONCOLOGY | Facility: HOSPITAL | Age: 65
End: 2020-12-10

## 2020-12-10 VITALS
SYSTOLIC BLOOD PRESSURE: 130 MMHG | TEMPERATURE: 97.7 F | DIASTOLIC BLOOD PRESSURE: 83 MMHG | BODY MASS INDEX: 29.21 KG/M2 | HEART RATE: 79 BPM | WEIGHT: 197.8 LBS | RESPIRATION RATE: 18 BRPM

## 2020-12-10 DIAGNOSIS — Z45.2 ENCOUNTER FOR CARE RELATED TO PORT-A-CATH: ICD-10-CM

## 2020-12-10 DIAGNOSIS — C79.51 PROSTATE CANCER METASTATIC TO BONE (HCC): Primary | ICD-10-CM

## 2020-12-10 DIAGNOSIS — C61 PROSTATE CANCER METASTATIC TO BONE (HCC): Primary | ICD-10-CM

## 2020-12-10 PROCEDURE — 36591 DRAW BLOOD OFF VENOUS DEVICE: CPT

## 2020-12-10 PROCEDURE — 25010000003 HEPARIN LOCK FLUSH PER 10 UNITS: Performed by: INTERNAL MEDICINE

## 2020-12-10 RX ORDER — HEPARIN SODIUM (PORCINE) LOCK FLUSH IV SOLN 100 UNIT/ML 100 UNIT/ML
500 SOLUTION INTRAVENOUS AS NEEDED
Status: DISCONTINUED | OUTPATIENT
Start: 2020-12-10 | End: 2020-12-10 | Stop reason: HOSPADM

## 2020-12-10 RX ORDER — HEPARIN SODIUM (PORCINE) LOCK FLUSH IV SOLN 100 UNIT/ML 100 UNIT/ML
500 SOLUTION INTRAVENOUS AS NEEDED
Status: CANCELLED | OUTPATIENT
Start: 2020-12-10

## 2020-12-10 RX ADMIN — HEPARIN 500 UNITS: 100 SYRINGE at 11:04

## 2020-12-11 LAB
ALBUMIN SERPL-MCNC: 3.9 G/DL (ref 3.8–4.8)
ALBUMIN/GLOB SERPL: 1.8 {RATIO} (ref 1.2–2.2)
ALP SERPL-CCNC: 91 IU/L (ref 39–117)
ALT SERPL-CCNC: 14 IU/L (ref 0–44)
AST SERPL-CCNC: 17 IU/L (ref 0–40)
BASOPHILS # BLD AUTO: 0.1 X10E3/UL (ref 0–0.2)
BASOPHILS NFR BLD AUTO: 2 %
BILIRUB SERPL-MCNC: 0.3 MG/DL (ref 0–1.2)
BUN SERPL-MCNC: 6 MG/DL (ref 8–27)
BUN/CREAT SERPL: 8 (ref 10–24)
CALCIUM SERPL-MCNC: 8.6 MG/DL (ref 8.6–10.2)
CHLORIDE SERPL-SCNC: 104 MMOL/L (ref 96–106)
CO2 SERPL-SCNC: 24 MMOL/L (ref 20–29)
CREAT SERPL-MCNC: 0.78 MG/DL (ref 0.76–1.27)
EOSINOPHIL # BLD AUTO: 0.1 X10E3/UL (ref 0–0.4)
EOSINOPHIL NFR BLD AUTO: 1 %
ERYTHROCYTE [DISTWIDTH] IN BLOOD BY AUTOMATED COUNT: 14.5 % (ref 11.6–15.4)
GLOBULIN SER CALC-MCNC: 2.2 G/DL (ref 1.5–4.5)
GLUCOSE SERPL-MCNC: 125 MG/DL (ref 65–99)
HCT VFR BLD AUTO: 40.7 % (ref 37.5–51)
HGB BLD-MCNC: 13.6 G/DL (ref 13–17.7)
IMM GRANULOCYTES # BLD AUTO: 0 X10E3/UL (ref 0–0.1)
IMM GRANULOCYTES NFR BLD AUTO: 1 %
LYMPHOCYTES # BLD AUTO: 2.3 X10E3/UL (ref 0.7–3.1)
LYMPHOCYTES NFR BLD AUTO: 56 %
MAGNESIUM SERPL-MCNC: 1.9 MG/DL (ref 1.6–2.3)
MCH RBC QN AUTO: 27.1 PG (ref 26.6–33)
MCHC RBC AUTO-ENTMCNC: 33.4 G/DL (ref 31.5–35.7)
MCV RBC AUTO: 81 FL (ref 79–97)
MONOCYTES # BLD AUTO: 0.5 X10E3/UL (ref 0.1–0.9)
MONOCYTES NFR BLD AUTO: 13 %
NEUTROPHILS # BLD AUTO: 1.1 X10E3/UL (ref 1.4–7)
NEUTROPHILS NFR BLD AUTO: 27 %
PHOSPHATE SERPL-MCNC: 2.8 MG/DL (ref 2.8–4.1)
PLATELET # BLD AUTO: 149 X10E3/UL (ref 150–450)
POTASSIUM SERPL-SCNC: 3.9 MMOL/L (ref 3.5–5.2)
PROT SERPL-MCNC: 6.1 G/DL (ref 6–8.5)
RBC # BLD AUTO: 5.01 X10E6/UL (ref 4.14–5.8)
SODIUM SERPL-SCNC: 141 MMOL/L (ref 134–144)
WBC # BLD AUTO: 4.1 X10E3/UL (ref 3.4–10.8)

## 2020-12-15 RX ORDER — SODIUM CHLORIDE 9 MG/ML
250 INJECTION, SOLUTION INTRAVENOUS ONCE
Status: DISCONTINUED | OUTPATIENT
Start: 2020-12-16 | End: 2021-04-29 | Stop reason: HOSPADM

## 2020-12-16 ENCOUNTER — INFUSION (OUTPATIENT)
Dept: ONCOLOGY | Facility: HOSPITAL | Age: 65
End: 2020-12-16

## 2020-12-16 ENCOUNTER — OFFICE VISIT (OUTPATIENT)
Dept: ONCOLOGY | Facility: CLINIC | Age: 65
End: 2020-12-16

## 2020-12-16 VITALS
HEART RATE: 102 BPM | WEIGHT: 203 LBS | BODY MASS INDEX: 30.07 KG/M2 | SYSTOLIC BLOOD PRESSURE: 142 MMHG | RESPIRATION RATE: 20 BRPM | HEIGHT: 69 IN | TEMPERATURE: 99 F | DIASTOLIC BLOOD PRESSURE: 67 MMHG

## 2020-12-16 DIAGNOSIS — C79.51 PROSTATE CANCER METASTATIC TO BONE (HCC): Primary | ICD-10-CM

## 2020-12-16 DIAGNOSIS — C61 PROSTATE CANCER METASTATIC TO BONE (HCC): Primary | ICD-10-CM

## 2020-12-16 LAB — SARS-COV-2 RNA RESP QL NAA+PROBE: NOT DETECTED

## 2020-12-16 PROCEDURE — 99215 OFFICE O/P EST HI 40 MIN: CPT | Performed by: NURSE PRACTITIONER

## 2020-12-16 RX ORDER — FAMOTIDINE 10 MG/ML
20 INJECTION, SOLUTION INTRAVENOUS AS NEEDED
Status: CANCELLED | OUTPATIENT
Start: 2021-01-06

## 2020-12-16 RX ORDER — DIPHENHYDRAMINE HYDROCHLORIDE 50 MG/ML
50 INJECTION INTRAMUSCULAR; INTRAVENOUS AS NEEDED
Status: CANCELLED | OUTPATIENT
Start: 2021-01-06

## 2020-12-16 RX ORDER — SODIUM CHLORIDE 9 MG/ML
250 INJECTION, SOLUTION INTRAVENOUS ONCE
Status: CANCELLED | OUTPATIENT
Start: 2021-01-06

## 2020-12-16 RX ORDER — AMOXICILLIN AND CLAVULANATE POTASSIUM 875; 125 MG/1; MG/1
1 TABLET, FILM COATED ORAL 2 TIMES DAILY
Qty: 20 TABLET | Refills: 0 | Status: SHIPPED | OUTPATIENT
Start: 2020-12-16 | End: 2020-12-26

## 2020-12-16 RX ORDER — SODIUM CHLORIDE 9 MG/ML
250 INJECTION, SOLUTION INTRAVENOUS ONCE
Status: CANCELLED | OUTPATIENT
Start: 2021-01-27

## 2020-12-16 NOTE — PROGRESS NOTES
CHIEF COMPLAINT: 1.  Cough and shortness of breath   2.  Metastatic prostate cancer    Problem List:  Oncology/Hematology History Overview Note   1.  Stage IVb grade group 4 metastatic prostate cancer with sclerotic bone lesions on CT and bone scan and history of prostatic hypertrophy  2.  Kidney stone  3.  Polyps    -9/30/2020 office note from Dr. Ronen Reynaga indicates patient with PSA 10.8.  Underwent TRUS prostate biopsy 9/15/2020.  Adenocarcinoma the prostate [place] 4+4 = 8 in 1 out of 12 cores and 4+3 = 7 and 10 out of 12 cores and 3+4 = 7 in 1 out of 12 cores.  Perineural invasion.  Extraprostatic extension into the fat seen on 2 biopsies of the right lateral mid and right apex.  9/24/2020 CT chest and whole-body bone scan showed T12, L1, left acetabular, right medial acetabular metastases with no lung metastases.  He started androgen deprivation therapy with Casodex with plans for Lupron to start in 1 to 2 weeks for clinical T3 N0 M1 metastatic prostate cancer.  Review of official report from [facility] 9/24/2020 bone scan mentions T12, L1, roof of left acetabulum and medial right acetabular osteoblastic metastases.  CT chest with contrast that same day showed mild splenomegaly 14.2 cm with stable periaortic nodes compared to CT December 2015 with no lung metastases.  Sclerotic abnormality within the T12 vertebral body, L1 vertebral body extending into the pedicle unchanged.  8/28/2020 CT abdomen pelvis report from [facility] reviewed and mentions normal spleen size.  Punctate nonobstructing kidney stone, no paulino enlargement, diffuse bladder wall thickening with mild perivesicular fat stranding, 2.1 cm sclerotic left iliac bone above the left acetabulum new compared to 2015 as well as 1.5 cm faintly sclerotic focus in the right posterior acetabulum stable compared to prior CT 2015 as well as a 1.6 cm T12 and inferior vertebral body sclerotic lesion and a 1.9 cm left posterior lateral L1  vertebral body abnormality extending to the pedicle.  -10/13/2020 initial [facility] oncology consultation: With 1 [place] score 8, 4+4 lesion that would make him a grade group 4 with stage IVb disease given radiographic evidence of sclerotic bone metastasis on bone scan and CT.  Needs genetic testing given metastatic prostate cancer and this is also important as, were he to have mismatch repair mutation then we would have options for PDL 1 inhibitors and were he BRCA mutated would have options for PARP inhibitors in the future.  Systemic therapy for castration naïve prostate cancer or N1 disease should be with apalutamide or Abiraterone or enzalutamide all of which are category 1.  He does not have any visceral metastases.  According to his imaging he has T12, L1, left acetabular, right acetabular, and left iliac bony involvement.  That means he would have 4 or more bone metastases with at least one metastasis (i.e. the acetabular lesions bilaterally) beyond the pelvis/vertebral, for which this would be considered high-volume disease for which 6 cycles of docetaxel 75 mg/m² x 6 cycles followed by Zytiga and prednisone would be reasonable along with Zometa every 6 weeks for bone stabilization.  He will do chemo preparation visit with my nurse practitioner prior to start chemotherapy with Taxotere and Zometa in 2 weeks and he is already received Casodex in preparation for Lupron shot he received on 10/12/2020 with Dr. Reynaga.  We will get him to Dr. Alexander Gomez who has done his polypectomies in the past for port placement and we will get genetic counseling started.  Following the 6 courses of Taxotere per the Chaarted trial criteria, I would then give him an indefinite Zytiga and prednisone and Zometa until progression or toxicity dictates.     Prostate cancer metastatic to bone (CMS/HCC)   8/30/2020 -  Other Event    PSA 10.8     9/15/2020 Initial Diagnosis    Prostate cancer metastatic to bone (CMS/HCC)    "  9/15/2020 Biopsy    Prostate needle core biopsy     9/24/2020 Imaging    Total body bone scan: 4 abnormal areas of increased activity consistent with osteoblastic metastasis, abnormal foci of activity seen in the T12 vertebral body, L1 vertebral body, roof of the left acetabulum, and acetabulum medially on the right.  CT chest: Stable sclerotic metastatic lesions within the T12 and L1 vertebrae.  No other evidence of metastatic disease to the chest.  Stable mild splenomegaly and subcentimeter periaortic retroperitoneal lymph nodes.  CT abdomen and pelvis: Diffuse bladder wall thickening with mild perivesicular fat stranding.  Interval development of several sclerotic lesions in the spine and left iliac bone.     10/13/2020 Cancer Staged    Staging form: Bone - Appendicular Skeleton, Trunk, Skull, And Facial Bones, AJCC 8th Edition  - Clinical: Stage IVB (cT3, cN0, cM1b, G3) - Signed by Ishmael Rashid MD on 10/13/2020     11/3/2020 -  Chemotherapy    OP SUPPORTIVE Zoledronic Acid Q42d     11/3/2020 -  Chemotherapy    OP PROSTATE DOCEtaxel         HISTORY OF PRESENT ILLNESS:  The patient is a 65 y.o. male, here for follow up on management of metastatic prostate cancer.  The patient reports he tolerated his last treatment without any significant side effects.  Seem to do better as he did not receive Zometa with his last treatment so therefore had less bone pains.  Reports having recent video visit with his primary care provider for upper respiratory concerns, states he has \"bronchitis\".  Was placed on amoxicillin that he has completed but is requesting another antibiotic as he feels he is not quite recovered.  Still has productive cough and shortness of breath.  Denies any fevers or chills.  Denies any loss of taste or smell.  No headaches.  Denies any exposure to COVID-19 positive persons.  States that he does not go out basically other than for doctor visits.  He does use an inhaler, has COPD.  Continues to have " "back and leg pain, fairly well controlled with ibuprofen and hydrocodone.  Has an appointment with palliative care later this month.      History reviewed. No pertinent past medical history.  Past Surgical History:   Procedure Laterality Date   • CHOLECYSTECTOMY     • CORONARY STENT PLACEMENT  206 & 2011   • HERNIA REPAIR  1986   • THORACIC DISCECTOMY  1994       No Known Allergies    Family History and Social History reviewed and changed as necessary      REVIEW OF SYSTEM:   Review of Systems   Constitutional: Negative for appetite change, chills, diaphoresis, fever and unexpected weight change.  Positive for fatigue.  HENT:   Negative for mouth sores, sore throat and trouble swallowing.    Eyes: Negative for icterus.   Respiratory: Positive for cough, hemoptysis and shortness of breath.    Cardiovascular: Negative for chest pain, leg swelling and palpitations.   Gastrointestinal: Negative for abdominal distention, abdominal pain, blood in stool, constipation, diarrhea, nausea and vomiting.   Endocrine: Negative for hot flashes.   Genitourinary: Negative for bladder incontinence, difficulty urinating, dysuria, frequency and hematuria.    Musculoskeletal: Negative for gait problem, neck pain and neck stiffness.  Positive for bone pain, worse after last treatment.  Skin: Negative for rash.   Neurological: Negative for dizziness, gait problem, headaches, light-headedness and numbness.   Hematological: Negative for adenopathy. Does not bruise/bleed easily.   Psychiatric/Behavioral: Negative for depression. The patient is not nervous/anxious.    All other systems reviewed and are negative.       PHYSICAL EXAM    Vitals:    12/16/20 0949   BP: 142/67   Pulse: 102   Resp: 20   Temp: 99 °F (37.2 °C)   Weight: 92.1 kg (203 lb)   Height: 175.3 cm (69\")     Vitals:    12/16/20 0949   PainSc:   6   PainLoc: Back  Comment: back and legs         Naveen Lugo reports a pain score of 6.  Given his pain assessment as noted, " treatment options were discussed and the following options were decided upon as a follow-up plan to address the patient's pain: continuation of current treatment plan for pain.      Constitutional: Appears well-developed and well-nourished. No distress.   ECOG: (1) Restricted in Physically Strenuous Activity, Ambulatory & Able to Do Work of Light Nature  HENT:   Head: Normocephalic.   Mouth/Throat: Oropharynx is clear and moist.   Eyes: Conjunctivae are normal. Pupils are equal, round, and reactive to light. No scleral icterus.   Neck: Neck supple. No JVD present.   Nodes: No palpable lymphadenopathy.  Cardiovascular: Normal rate, regular rhythm and normal heart sounds.    Pulmonary/Chest: Breath sounds decreased, mild expiratory wheeze throughout. No respiratory distress.   Abdominal: Soft. Exhibits no distension. There is no tenderness.   Musculoskeletal:Exhibits no edema, tenderness or deformity.   Neurological: Alert and oriented to person, place, and time. Exhibits normal muscle tone.   Skin: No ecchymosis, no petechiae and no rash noted. Not diaphoretic. No cyanosis. Nails show no clubbing.   Psychiatric: Normal mood and affect.   Vitals reviewed.  Labs reviewed.    Lab Results   Component Value Date    HGB 13.6 12/10/2020    HCT 40.7 12/10/2020    MCV 81 12/10/2020     (L) 12/10/2020    WBC 4.1 12/10/2020    NEUTROABS 1.1 (L) 12/10/2020    LYMPHSABS 2.3 12/10/2020    MONOSABS 0.5 12/10/2020    EOSABS 0.1 12/10/2020    BASOSABS 0.1 12/10/2020       Lab Results   Component Value Date    BUN 6 (L) 12/10/2020    CREATININE 0.78 12/10/2020     12/10/2020    K 3.9 12/10/2020     12/10/2020    CO2 24 12/10/2020    CALCIUM 8.6 12/10/2020    ALBUMIN 3.9 12/10/2020    BILITOT 0.3 12/10/2020    ALKPHOS 91 12/10/2020    AST 17 12/10/2020    ALT 14 12/10/2020         ASSESSMENT & PLAN:    1.  Stage IVb grade group 4, metastatic prostate cancer with sclerotic bone lesions on CT and bone scan  2.  Cancer  "related pain  3.  Recent antibiotic treatment for Bronchitis  4.  COPD  5.  Shortness of breath and cough    Discussion: Overall tolerating treatment with Taxotere and Zometa.  I am concerned due to his request for extended antibiotics for \"bronchitis\", he was recently treated by his PCP with amoxicillin.  He does not feel like he is fully recovered.  He has had no fevers, no loss of taste or smell or other symptoms worrisome for Covid however with his shortness of breath and cough I do want to have him tested for Covid before proceeding with therapy to be on the safe side.  I discussed this at length with the patient, he became quite angry and stated that he was not going to be Covid tested.  He states he was tested for Covid in November and it was negative.  He states that there is no way he could have Covid as he has not been around anyone.  I explained to the patient that we are just trying to be cautious and safe especially with our immunocompromise population including the patient himself and that it was in the best interest of all of us to make sure.  I also explained that testing negative in November does not impact anything other than that day he tested negative.  The patient then stated that he was going home, once again refusing to go and be tested, requesting care from another provider and he left our office.  Of note I did send in a prescription for Augmentin 875-125, 1 tablet twice daily for 10 days.    The patient called back an hour or so after leaving once again reiterating that there was no way he could have Covid.  I again respectfully explained the reasoning behind my request for testing.  He again stated that he would like to see another provider, I gave him the names of other oncologists in [place] and he requested referral to Dr. Maciel and I put in a referral.    I spent a total of 45 minutes in direct patient care, greater than 30  minutes face to face, time was spent in coordination of " care, and counseling the patient regarding symptom assessment, need for Covid testing in light of current symptoms, lab review and discussion of plan of care going forward.  Answered any questions patient had regarding medications and plan of care.     Abena Velasquez, APRN    12/16/2020      Addendum: The patient returned to our office during lunch stating that after speaking with his wife and son he had made the decision to go and have testing for Covid.  He did go for testing and called us back and states the results were negative, they are also going to fax us a copy of the results.  He requests to stay with Dr. Rashid and [facility] for ongoing cancer care.  We will get him rescheduled for treatment tomorrow.    He will follow-up with Dr. Rashid in 3 weeks with repeat restaging scans with CT chest, abdomen and pelvis along with total body bone scan and I have ordered those today.

## 2020-12-17 ENCOUNTER — INFUSION (OUTPATIENT)
Dept: ONCOLOGY | Facility: HOSPITAL | Age: 65
End: 2020-12-17

## 2020-12-17 VITALS
SYSTOLIC BLOOD PRESSURE: 140 MMHG | RESPIRATION RATE: 20 BRPM | WEIGHT: 202 LBS | BODY MASS INDEX: 29.83 KG/M2 | HEART RATE: 91 BPM | TEMPERATURE: 99.2 F | DIASTOLIC BLOOD PRESSURE: 54 MMHG

## 2020-12-17 DIAGNOSIS — Z45.2 ENCOUNTER FOR CARE RELATED TO PORT-A-CATH: ICD-10-CM

## 2020-12-17 DIAGNOSIS — C61 PROSTATE CANCER METASTATIC TO BONE (HCC): Primary | ICD-10-CM

## 2020-12-17 DIAGNOSIS — C79.51 PROSTATE CANCER METASTATIC TO BONE (HCC): Primary | ICD-10-CM

## 2020-12-17 PROCEDURE — 25010000002 DOCETAXEL 20 MG/ML SOLUTION 4 ML VIAL: Performed by: NURSE PRACTITIONER

## 2020-12-17 PROCEDURE — 25010000003 HEPARIN LOCK FLUSH PER 10 UNITS: Performed by: INTERNAL MEDICINE

## 2020-12-17 PROCEDURE — 25010000002 ZOLEDRONIC ACID PER 1 MG: Performed by: NURSE PRACTITIONER

## 2020-12-17 PROCEDURE — 96413 CHEMO IV INFUSION 1 HR: CPT

## 2020-12-17 PROCEDURE — 96375 TX/PRO/DX INJ NEW DRUG ADDON: CPT

## 2020-12-17 RX ORDER — HEPARIN SODIUM (PORCINE) LOCK FLUSH IV SOLN 100 UNIT/ML 100 UNIT/ML
500 SOLUTION INTRAVENOUS AS NEEDED
Status: CANCELLED | OUTPATIENT
Start: 2020-12-17

## 2020-12-17 RX ORDER — HEPARIN SODIUM (PORCINE) LOCK FLUSH IV SOLN 100 UNIT/ML 100 UNIT/ML
500 SOLUTION INTRAVENOUS AS NEEDED
Status: DISCONTINUED | OUTPATIENT
Start: 2020-12-17 | End: 2020-12-17 | Stop reason: HOSPADM

## 2020-12-17 RX ADMIN — ZOLEDRONIC ACID 4 MG: 4 INJECTION, SOLUTION, CONCENTRATE INTRAVENOUS at 10:48

## 2020-12-17 RX ADMIN — SODIUM CHLORIDE 155 MG: 9 INJECTION, SOLUTION INTRAVENOUS at 09:29

## 2020-12-17 RX ADMIN — HEPARIN 500 UNITS: 100 SYRINGE at 11:05

## 2020-12-28 DIAGNOSIS — Z51.81 THERAPEUTIC DRUG MONITORING: Primary | ICD-10-CM

## 2020-12-29 ENCOUNTER — LAB (OUTPATIENT)
Dept: LAB | Facility: HOSPITAL | Age: 65
End: 2020-12-29

## 2020-12-29 ENCOUNTER — OFFICE VISIT (OUTPATIENT)
Dept: PALLIATIVE CARE | Facility: CLINIC | Age: 65
End: 2020-12-29

## 2020-12-29 VITALS
WEIGHT: 202.3 LBS | BODY MASS INDEX: 29.87 KG/M2 | OXYGEN SATURATION: 96 % | SYSTOLIC BLOOD PRESSURE: 129 MMHG | DIASTOLIC BLOOD PRESSURE: 72 MMHG | HEART RATE: 106 BPM

## 2020-12-29 DIAGNOSIS — C79.51 PROSTATE CANCER METASTATIC TO BONE (HCC): ICD-10-CM

## 2020-12-29 DIAGNOSIS — F11.90 OPIOID USE DISORDER: ICD-10-CM

## 2020-12-29 DIAGNOSIS — C61 PROSTATE CANCER METASTATIC TO BONE (HCC): ICD-10-CM

## 2020-12-29 DIAGNOSIS — G89.3 PAIN, CANCER: Primary | ICD-10-CM

## 2020-12-29 DIAGNOSIS — Z51.81 THERAPEUTIC DRUG MONITORING: ICD-10-CM

## 2020-12-29 LAB
ALBUMIN SERPL-MCNC: 4 G/DL (ref 3.5–5.2)
ALBUMIN/GLOB SERPL: 1.4 G/DL
ALP SERPL-CCNC: 81 U/L (ref 39–117)
ALT SERPL W P-5'-P-CCNC: 19 U/L (ref 1–41)
AMPHET+METHAMPHET UR QL: NEGATIVE
AMPHETAMINES UR QL: NEGATIVE
ANION GAP SERPL CALCULATED.3IONS-SCNC: 9 MMOL/L (ref 5–15)
AST SERPL-CCNC: 21 U/L (ref 1–40)
BARBITURATES UR QL SCN: NEGATIVE
BENZODIAZ UR QL SCN: NEGATIVE
BILIRUB SERPL-MCNC: 0.3 MG/DL (ref 0–1.2)
BUN SERPL-MCNC: 7 MG/DL (ref 8–23)
BUN/CREAT SERPL: 9.2 (ref 7–25)
BUPRENORPHINE SERPL-MCNC: POSITIVE NG/ML
CALCIUM SPEC-SCNC: 8.7 MG/DL (ref 8.6–10.5)
CANNABINOIDS SERPL QL: NEGATIVE
CHLORIDE SERPL-SCNC: 104 MMOL/L (ref 98–107)
CO2 SERPL-SCNC: 28 MMOL/L (ref 22–29)
COCAINE UR QL: NEGATIVE
CREAT SERPL-MCNC: 0.76 MG/DL (ref 0.76–1.27)
ERYTHROCYTE [DISTWIDTH] IN BLOOD BY AUTOMATED COUNT: 16.8 % (ref 12.3–15.4)
GFR SERPL CREATININE-BSD FRML MDRD: 103 ML/MIN/1.73
GLOBULIN UR ELPH-MCNC: 2.9 GM/DL
GLUCOSE SERPL-MCNC: 123 MG/DL (ref 65–99)
HCT VFR BLD AUTO: 43.3 % (ref 37.5–51)
HGB BLD-MCNC: 14.2 G/DL (ref 13–17.7)
LYMPHOCYTES # BLD AUTO: 2.2 10*3/MM3 (ref 0.7–3.1)
LYMPHOCYTES NFR BLD AUTO: 69.7 % (ref 19.6–45.3)
MAGNESIUM SERPL-MCNC: 1.9 MG/DL (ref 1.6–2.4)
MCH RBC QN AUTO: 27.6 PG (ref 26.6–33)
MCHC RBC AUTO-ENTMCNC: 32.9 G/DL (ref 31.5–35.7)
MCV RBC AUTO: 83.9 FL (ref 79–97)
METHADONE UR QL SCN: NEGATIVE
MONOCYTES # BLD AUTO: 0.3 10*3/MM3 (ref 0.1–0.9)
MONOCYTES NFR BLD AUTO: 8.5 % (ref 5–12)
NEUTROPHILS NFR BLD AUTO: 0.7 10*3/MM3 (ref 1.7–7)
NEUTROPHILS NFR BLD AUTO: 21.8 % (ref 42.7–76)
OPIATES UR QL: POSITIVE
OXYCODONE UR QL SCN: NEGATIVE
PCP UR QL SCN: NEGATIVE
PHOSPHATE SERPL-MCNC: 2.8 MG/DL (ref 2.5–4.5)
PLATELET # BLD AUTO: 227 10*3/MM3 (ref 140–450)
PMV BLD AUTO: 7.1 FL (ref 6–12)
POTASSIUM SERPL-SCNC: 3.5 MMOL/L (ref 3.5–5.2)
PROPOXYPH UR QL: NEGATIVE
PROT SERPL-MCNC: 6.9 G/DL (ref 6–8.5)
PSA SERPL-MCNC: 0.28 NG/ML (ref 0–4)
RBC # BLD AUTO: 5.16 10*6/MM3 (ref 4.14–5.8)
SODIUM SERPL-SCNC: 141 MMOL/L (ref 136–145)
TRICYCLICS UR QL SCN: NEGATIVE
WBC # BLD AUTO: 3.1 10*3/MM3 (ref 3.4–10.8)

## 2020-12-29 PROCEDURE — 80053 COMPREHEN METABOLIC PANEL: CPT

## 2020-12-29 PROCEDURE — 80306 DRUG TEST PRSMV INSTRMNT: CPT

## 2020-12-29 PROCEDURE — 36415 COLL VENOUS BLD VENIPUNCTURE: CPT

## 2020-12-29 PROCEDURE — 85025 COMPLETE CBC W/AUTO DIFF WBC: CPT

## 2020-12-29 PROCEDURE — 99205 OFFICE O/P NEW HI 60 MIN: CPT | Performed by: INTERNAL MEDICINE

## 2020-12-29 PROCEDURE — 84100 ASSAY OF PHOSPHORUS: CPT

## 2020-12-29 PROCEDURE — 84153 ASSAY OF PSA TOTAL: CPT

## 2020-12-29 PROCEDURE — 83735 ASSAY OF MAGNESIUM: CPT

## 2020-12-29 RX ORDER — HYDROCODONE BITARTRATE AND ACETAMINOPHEN 10; 325 MG/1; MG/1
TABLET ORAL
Qty: 60 TABLET | Refills: 0 | Status: SHIPPED | OUTPATIENT
Start: 2020-12-29 | End: 2021-01-14

## 2020-12-29 RX ORDER — FENTANYL 25 UG/H
1 PATCH TRANSDERMAL
Qty: 5 PATCH | Refills: 0 | Status: SHIPPED | OUTPATIENT
Start: 2020-12-29 | End: 2021-01-07 | Stop reason: DRUGHIGH

## 2020-12-29 RX ORDER — AMOXICILLIN 250 MG
1 CAPSULE ORAL DAILY
Qty: 30 TABLET | Refills: 0 | Status: SHIPPED | OUTPATIENT
Start: 2020-12-29 | End: 2021-01-26

## 2020-12-29 RX ORDER — NALOXONE HYDROCHLORIDE 4 MG/.1ML
1 SPRAY NASAL AS NEEDED
Start: 2020-12-29

## 2020-12-31 RX ORDER — CLONIDINE HYDROCHLORIDE 0.1 MG/1
0.1 TABLET ORAL 2 TIMES DAILY PRN
Qty: 20 TABLET | Refills: 0 | Status: SHIPPED | OUTPATIENT
Start: 2020-12-31 | End: 2022-05-04 | Stop reason: SDUPTHER

## 2021-01-05 ENCOUNTER — OFFICE VISIT (OUTPATIENT)
Dept: ONCOLOGY | Facility: CLINIC | Age: 66
End: 2021-01-05

## 2021-01-05 ENCOUNTER — INFUSION (OUTPATIENT)
Dept: ONCOLOGY | Facility: HOSPITAL | Age: 66
End: 2021-01-05

## 2021-01-05 ENCOUNTER — TELEPHONE (OUTPATIENT)
Dept: PALLIATIVE CARE | Facility: CLINIC | Age: 66
End: 2021-01-05

## 2021-01-05 VITALS
RESPIRATION RATE: 20 BRPM | TEMPERATURE: 98.1 F | BODY MASS INDEX: 30.96 KG/M2 | SYSTOLIC BLOOD PRESSURE: 147 MMHG | HEART RATE: 90 BPM | DIASTOLIC BLOOD PRESSURE: 74 MMHG | WEIGHT: 209 LBS | HEIGHT: 69 IN

## 2021-01-05 DIAGNOSIS — Z45.2 ENCOUNTER FOR CARE RELATED TO PORT-A-CATH: Primary | ICD-10-CM

## 2021-01-05 DIAGNOSIS — C79.51 PROSTATE CANCER METASTATIC TO BONE (HCC): ICD-10-CM

## 2021-01-05 DIAGNOSIS — C61 PROSTATE CANCER METASTATIC TO BONE (HCC): ICD-10-CM

## 2021-01-05 DIAGNOSIS — C61 PROSTATE CANCER METASTATIC TO BONE (HCC): Primary | ICD-10-CM

## 2021-01-05 DIAGNOSIS — G89.3 PAIN, CANCER: Primary | ICD-10-CM

## 2021-01-05 DIAGNOSIS — C79.51 PROSTATE CANCER METASTATIC TO BONE (HCC): Primary | ICD-10-CM

## 2021-01-05 PROCEDURE — 36591 DRAW BLOOD OFF VENOUS DEVICE: CPT

## 2021-01-05 PROCEDURE — 25010000003 HEPARIN LOCK FLUSH PER 10 UNITS: Performed by: INTERNAL MEDICINE

## 2021-01-05 PROCEDURE — 99215 OFFICE O/P EST HI 40 MIN: CPT | Performed by: INTERNAL MEDICINE

## 2021-01-05 RX ORDER — HEPARIN SODIUM (PORCINE) LOCK FLUSH IV SOLN 100 UNIT/ML 100 UNIT/ML
500 SOLUTION INTRAVENOUS AS NEEDED
Status: CANCELLED | OUTPATIENT
Start: 2021-01-07

## 2021-01-05 RX ORDER — DIPHENHYDRAMINE HYDROCHLORIDE 50 MG/ML
50 INJECTION INTRAMUSCULAR; INTRAVENOUS AS NEEDED
Status: CANCELLED | OUTPATIENT
Start: 2021-01-27

## 2021-01-05 RX ORDER — HEPARIN SODIUM (PORCINE) LOCK FLUSH IV SOLN 100 UNIT/ML 100 UNIT/ML
500 SOLUTION INTRAVENOUS AS NEEDED
Status: DISCONTINUED | OUTPATIENT
Start: 2021-01-05 | End: 2021-01-05 | Stop reason: HOSPADM

## 2021-01-05 RX ORDER — FAMOTIDINE 10 MG/ML
20 INJECTION, SOLUTION INTRAVENOUS AS NEEDED
Status: CANCELLED | OUTPATIENT
Start: 2021-01-27

## 2021-01-05 RX ORDER — SODIUM CHLORIDE 9 MG/ML
250 INJECTION, SOLUTION INTRAVENOUS ONCE
Status: CANCELLED | OUTPATIENT
Start: 2021-01-27

## 2021-01-05 RX ORDER — DEXAMETHASONE 4 MG/1
TABLET ORAL
Qty: 12 TABLET | Refills: 5 | Status: SHIPPED | OUTPATIENT
Start: 2021-01-05

## 2021-01-05 RX ADMIN — HEPARIN 500 UNITS: 100 SYRINGE at 11:15

## 2021-01-05 NOTE — PROGRESS NOTES
CHIEF COMPLAINT: Metastatic prostate cancer to the bone    Problem List:  Oncology/Hematology History Overview Note   1.  Stage IVb grade group 4 metastatic prostate cancer with sclerotic bone lesions on CT and bone scan and history of prostatic hypertrophy  2.  Kidney stone  3.  Polyps    -9/30/2020 office note from Dr. Ronen Reynaga indicates patient with PSA 10.8.  Underwent TRUS prostate biopsy 9/15/2020.  Adenocarcinoma the prostate Sarika 4+4 = 8 in 1 out of 12 cores and 4+3 = 7 and 10 out of 12 cores and 3+4 = 7 in 1 out of 12 cores.  Perineural invasion.  Extraprostatic extension into the fat seen on 2 biopsies of the right lateral mid and right apex.  9/24/2020 CT chest and whole-body bone scan showed T12, L1, left acetabular, right medial acetabular metastases with no lung metastases.  He started androgen deprivation therapy with Casodex with plans for Lupron to start in 1 to 2 weeks for clinical T3 N0 M1 metastatic prostate cancer.  Review of official report from [facility] 9/24/2020 bone scan mentions T12, L1, roof of left acetabulum and medial right acetabular osteoblastic metastases.  CT chest with contrast that same day showed mild splenomegaly 14.2 cm with stable periaortic nodes compared to CT December 2015 with no lung metastases.  Sclerotic abnormality within the T12 vertebral body, L1 vertebral body extending into the pedicle unchanged.  8/28/2020 CT abdomen pelvis report from [facility] reviewed and mentions normal spleen size.  Punctate nonobstructing kidney stone, no paulino enlargement, diffuse bladder wall thickening with mild perivesicular fat stranding, 2.1 cm sclerotic left iliac bone above the left acetabulum new compared to 2015 as well as 1.5 cm faintly sclerotic focus in the right posterior acetabulum stable compared to prior CT 2015 as well as a 1.6 cm T12 and inferior vertebral body sclerotic lesion and a 1.9 cm left posterior lateral L1 vertebral body abnormality  extending to the pedicle.  -10/13/2020 initial [facility] medical oncology consultation: With 1 Sarika score 8, 4+4 lesion that would make him a grade group 4 with stage IVb disease given radiographic evidence of sclerotic bone metastasis on bone scan and CT.  Needs genetic testing given metastatic prostate cancer and this is also important as, were he to have mismatch repair mutation then we would have options for PDL 1 inhibitors and were he BRCA mutated would have options for PARP inhibitors in the future.  Systemic therapy for castration naïve prostate cancer or N1 disease should be with apalutamide or Abiraterone or enzalutamide all of which are category 1.  He does not have any visceral metastases.  According to his imaging he has T12, L1, left acetabular, right acetabular, and left iliac bony involvement.  That means he would have 4 or more bone metastases with at least one metastasis (i.e. the acetabular lesions bilaterally) beyond the pelvis/vertebral, for which this would be considered high-volume disease for which 6 cycles of docetaxel 75 mg/m² x 6 cycles followed by Zytiga and prednisone would be reasonable along with Zometa every 6 weeks for bone stabilization.  He will do chemo preparation visit with my nurse practitioner prior to start chemotherapy with Taxotere and Zometa in 2 weeks and he is already received Casodex in preparation for Lupron shot he received on 10/12/2020 with Dr. Reynaga.  We will get him to Dr. Alexander Gomez who has done his polypectomies in the past for port placement and we will get genetic counseling started.  Following the 6 courses of Taxotere per the Chaarted trial criteria, I would then give him an indefinite Zytiga and prednisone and Zometa until progression or toxicity dictates.    -12/16/2019 COVID-19 antigen test negative    -12/29/2020 PSA 0.275 with absolute neutrophil count 700 and creatinine 0.76 with normal liver enzymes and electrolytes.  Normal magnesium and  phosphorus and urine drug screen positive for buprenorphine and opiates follow-up with palliative care.    -1/4/2021 CT chest abdomen pelvis with contrast and whole-body bone scan shows improving less metabolically active bone metastases.  Stable sclerotic T12, L1, and L2 vertebral bodies and bilateral pelvic bones on bone windows with stable subcentimeter para-aortic lymph nodes and no definite progression in the chest abdomen or pelvis.     Prostate cancer metastatic to bone (CMS/HCC)   8/30/2020 -  Other Event    PSA 10.8     9/15/2020 Initial Diagnosis    Prostate cancer metastatic to bone (CMS/HCC)     9/15/2020 Biopsy    Prostate needle core biopsy     9/24/2020 Imaging    Total body bone scan: 4 abnormal areas of increased activity consistent with osteoblastic metastasis, abnormal foci of activity seen in the T12 vertebral body, L1 vertebral body, roof of the left acetabulum, and acetabulum medially on the right.  CT chest: Stable sclerotic metastatic lesions within the T12 and L1 vertebrae.  No other evidence of metastatic disease to the chest.  Stable mild splenomegaly and subcentimeter periaortic retroperitoneal lymph nodes.  CT abdomen and pelvis: Diffuse bladder wall thickening with mild perivesicular fat stranding.  Interval development of several sclerotic lesions in the spine and left iliac bone.     10/13/2020 Cancer Staged    Staging form: Bone - Appendicular Skeleton, Trunk, Skull, And Facial Bones, AJCC 8th Edition  - Clinical: Stage IVB (cT3, cN0, cM1b, G3) - Signed by Ishmael Rashid MD on 10/13/2020     11/3/2020 -  Chemotherapy    OP SUPPORTIVE Zoledronic Acid Q42d     11/3/2020 -  Chemotherapy    OP PROSTATE DOCEtaxel     1/4/2021 Imaging    CT chest abdomen pelvis with contrast and whole-body bone scan shows improving less metabolically active bone metastases.  Stable sclerotic T12, L1, and L2 vertebral bodies and bilateral pelvic bones on bone windows with stable subcentimeter para-aortic  "lymph nodes and no definite progression in the chest abdomen or pelvis.  PSA down to 0.275 after 3 courses of Taxotere.         HISTORY OF PRESENT ILLNESS:  The patient is a 65 y.o. male, here for follow up on management of metastatic prostate cancer to the bone.  No cough fever chills or other symptoms.  Tolerating Taxotere.      Past Medical History:   Diagnosis Date   • Nephrolithiasis    • Opioid use disorder, mild, on maintenance therapy (CMS/HCC)      Past Surgical History:   Procedure Laterality Date   • CHOLECYSTECTOMY     • CORONARY STENT PLACEMENT  206 & 2011   • HERNIA REPAIR  1986   • THORACIC DISCECTOMY  1994       Allergies   Allergen Reactions   • Gabapentin Nausea Only       Family History and Social History reviewed and changed as necessary      REVIEW OF SYSTEM:   Review of Systems   Constitutional: Negative for appetite change, chills, diaphoresis, fatigue, fever and unexpected weight change.   HENT:   Negative for mouth sores, sore throat and trouble swallowing.    Eyes: Negative for icterus.   Respiratory: Negative for cough, hemoptysis and shortness of breath.    Cardiovascular: Negative for chest pain, leg swelling and palpitations.   Gastrointestinal: Negative for abdominal distention, abdominal pain, blood in stool, constipation, diarrhea, nausea and vomiting.          PHYSICAL EXAM    Vitals:    01/05/21 1009   BP: 147/74   Pulse: 90   Resp: 20   Temp: 98.1 °F (36.7 °C)   Weight: 94.8 kg (209 lb)   Height: 175.3 cm (69\")     Vitals:    01/05/21 1009   PainSc:   8   PainLoc: Hip  Comment: BILATERAL HIPS AND BACK        Constitutional: Appears well-developed and well-nourished. No distress.   ECOG: (0) Fully Active - Able to Carry On All Pre-disease Performance Without Restriction  Lungs clear to auscultation  Vitals reviewed.      Lab Results   Component Value Date    HGB 14.2 12/29/2020    HCT 43.3 12/29/2020    MCV 83.9 12/29/2020     12/29/2020    WBC 3.10 (L) 12/29/2020    " NEUTROABS 0.70 (L) 12/29/2020    LYMPHSABS 2.20 12/29/2020    MONOSABS 0.30 12/29/2020    EOSABS 0.1 12/10/2020    BASOSABS 0.1 12/10/2020       Lab Results   Component Value Date    GLUCOSE 123 (H) 12/29/2020    BUN 7 (L) 12/29/2020    CREATININE 0.76 12/29/2020     12/29/2020    K 3.5 12/29/2020     12/29/2020    CO2 28.0 12/29/2020    CALCIUM 8.7 12/29/2020    PROTEINTOT 6.9 12/29/2020    ALBUMIN 4.00 12/29/2020    BILITOT 0.3 12/29/2020    ALKPHOS 81 12/29/2020    AST 21 12/29/2020    ALT 19 12/29/2020                   ASSESSMENT & PLAN:    1.  Stage IVb grade group 4 metastatic prostate cancer with sclerotic bone lesions on CT and bone scan and history of prostatic hypertrophy  2.  Kidney stone  3.  Polyps    -9/30/2020 office note from Dr. Ronen Reynaga indicates patient with PSA 10.8.  Underwent TRUS prostate biopsy 9/15/2020.  Adenocarcinoma the prostate Sarika 4+4 = 8 in 1 out of 12 cores and 4+3 = 7 and 10 out of 12 cores and 3+4 = 7 in 1 out of 12 cores.  Perineural invasion.  Extraprostatic extension into the fat seen on 2 biopsies of the right lateral mid and right apex.  9/24/2020 CT chest and whole-body bone scan showed T12, L1, left acetabular, right medial acetabular metastases with no lung metastases.  He started androgen deprivation therapy with Casodex with plans for Lupron to start in 1 to 2 weeks for clinical T3 N0 M1 metastatic prostate cancer.  Review of official report from [facility] 9/24/2020 bone scan mentions T12, L1, roof of left acetabulum and medial right acetabular osteoblastic metastases.  CT chest with contrast that same day showed mild splenomegaly 14.2 cm with stable periaortic nodes compared to CT December 2015 with no lung metastases.  Sclerotic abnormality within the T12 vertebral body, L1 vertebral body extending into the pedicle unchanged.  8/28/2020 CT abdomen pelvis report from [facility] reviewed and mentions normal spleen size.  Punctate  nonobstructing kidney stone, no paulino enlargement, diffuse bladder wall thickening with mild perivesicular fat stranding, 2.1 cm sclerotic left iliac bone above the left acetabulum new compared to 2015 as well as 1.5 cm faintly sclerotic focus in the right posterior acetabulum stable compared to prior CT 2015 as well as a 1.6 cm T12 and inferior vertebral body sclerotic lesion and a 1.9 cm left posterior lateral L1 vertebral body abnormality extending to the pedicle.  -10/13/2020 initial [facility], operated by [facility] medical oncology consultation: With 1 Sarika score 8, 4+4 lesion that would make him a grade group 4 with stage IVb disease given radiographic evidence of sclerotic bone metastasis on bone scan and CT.  Needs genetic testing given metastatic prostate cancer and this is also important as, were he to have mismatch repair mutation then we would have options for PDL 1 inhibitors and were he BRCA mutated would have options for PARP inhibitors in the future.  Systemic therapy for castration naïve prostate cancer or N1 disease should be with apalutamide or Abiraterone or enzalutamide all of which are category 1.  He does not have any visceral metastases.  According to his imaging he has T12, L1, left acetabular, right acetabular, and left iliac bony involvement.  That means he would have 4 or more bone metastases with at least one metastasis (i.e. the acetabular lesions bilaterally) beyond the pelvis/vertebral, for which this would be considered high-volume disease for which 6 cycles of docetaxel 75 mg/m² x 6 cycles followed by Zytiga and prednisone would be reasonable along with Zometa every 6 weeks for bone stabilization.  He will do chemo preparation visit with my nurse practitioner prior to start chemotherapy with Taxotere and Zometa in 2 weeks and he is already received Casodex in preparation for Lupron shot he received on 10/12/2020 with Dr. Reynaga.  We will get him to Dr. Alexander Gomez who has done his polypectomies in the  past for port placement and we will get genetic counseling started.  Following the 6 courses of Taxotere per the Chaarted trial criteria, I would then give him an indefinite Zytiga and prednisone and Zometa until progression or toxicity dictates.    -12/16/2019 COVID-19 antigen test negative    -12/29/2020 PSA 0.275 with absolute neutrophil count 700 and creatinine 0.76 with normal liver enzymes and electrolytes.  Normal magnesium and phosphorus and urine drug screen positive for buprenorphine and opiates follow-up with palliative care.    -1/4/2021 CT chest abdomen pelvis with contrast and whole-body bone scan shows improving less metabolically active bone metastases.  Stable sclerotic T12, L1, and L2 vertebral bodies and bilateral pelvic bones on bone windows with stable subcentimeter para-aortic lymph nodes and no definite progression in the chest abdomen or pelvis.    -1/5/2021 [facility] medical oncology follow-up visit: I reviewed the images and reports thereof of the above CT and bone scan which shows good response to Taxotere x3 courses with a PSA down to 0.275 from a baseline of 10.  Get another 3 courses of Taxotere, continue his Xgeva, and then following that we will switch to Zytiga and prednisone.  He is working with palliative care on his bone pain but on his current dose of fentanyl patch he says his pain is still not adequately controlled he will contact them.  Dexamethasone prescription was refilled.  We will see my nurse practitioner back in 3 weeks for course #5 of 6 total courses and then we will reimage with CT chest abdomen pelvis and bone scan before going to the Zytiga prednisone.    This is a chronic illness that poses a threat to his life and is on drug therapy requires intensive monitoring for toxicity and therefore warrants a level 5 MDM      Ishmael Rashid MD    01/05/2021

## 2021-01-06 LAB
ALBUMIN SERPL-MCNC: NORMAL G/DL
ALP SERPL-CCNC: NORMAL U/L
ALT SERPL-CCNC: NORMAL U/L
AST SERPL-CCNC: NORMAL U/L
BASOPHILS # BLD AUTO: 0.1 X10E3/UL (ref 0–0.2)
BASOPHILS NFR BLD AUTO: 1 %
BILIRUB SERPL-MCNC: NORMAL MG/DL
BUN SERPL-MCNC: NORMAL MG/DL
CALCIUM SERPL-MCNC: NORMAL MG/DL
CHLORIDE SERPL-SCNC: NORMAL MMOL/L
CO2 SERPL-SCNC: NORMAL MMOL/L
CREAT SERPL-MCNC: NORMAL MG/DL
EOSINOPHIL # BLD AUTO: 0 X10E3/UL (ref 0–0.4)
EOSINOPHIL NFR BLD AUTO: 1 %
ERYTHROCYTE [DISTWIDTH] IN BLOOD BY AUTOMATED COUNT: 15.7 % (ref 11.6–15.4)
GLUCOSE SERPL-MCNC: NORMAL MG/DL
HCT VFR BLD AUTO: 39.3 % (ref 37.5–51)
HGB BLD-MCNC: 13.1 G/DL (ref 13–17.7)
IMM GRANULOCYTES # BLD AUTO: 0 X10E3/UL (ref 0–0.1)
IMM GRANULOCYTES NFR BLD AUTO: 0 %
LYMPHOCYTES # BLD AUTO: 1.7 X10E3/UL (ref 0.7–3.1)
LYMPHOCYTES NFR BLD AUTO: 35 %
MCH RBC QN AUTO: 27.7 PG (ref 26.6–33)
MCHC RBC AUTO-ENTMCNC: 33.3 G/DL (ref 31.5–35.7)
MCV RBC AUTO: 83 FL (ref 79–97)
MONOCYTES # BLD AUTO: 0.4 X10E3/UL (ref 0.1–0.9)
MONOCYTES NFR BLD AUTO: 9 %
NEUTROPHILS # BLD AUTO: 2.7 X10E3/UL (ref 1.4–7)
NEUTROPHILS NFR BLD AUTO: 54 %
PLATELET # BLD AUTO: 150 X10E3/UL (ref 150–450)
POTASSIUM SERPL-SCNC: NORMAL MMOL/L
PROT SERPL-MCNC: NORMAL G/DL
PSA SERPL-MCNC: NORMAL NG/ML
RBC # BLD AUTO: 4.73 X10E6/UL (ref 4.14–5.8)
SODIUM SERPL-SCNC: NORMAL MMOL/L
SPECIMEN STATUS: NORMAL
WBC # BLD AUTO: 4.9 X10E3/UL (ref 3.4–10.8)

## 2021-01-06 RX ORDER — HEPARIN SODIUM (PORCINE) LOCK FLUSH IV SOLN 100 UNIT/ML 100 UNIT/ML
500 SOLUTION INTRAVENOUS AS NEEDED
Status: DISCONTINUED | OUTPATIENT
Start: 2021-01-07 | End: 2021-01-07 | Stop reason: HOSPADM

## 2021-01-06 RX ORDER — SODIUM CHLORIDE 9 MG/ML
250 INJECTION, SOLUTION INTRAVENOUS ONCE
Status: COMPLETED | OUTPATIENT
Start: 2021-01-07 | End: 2021-01-07

## 2021-01-06 RX ORDER — HEPARIN SODIUM (PORCINE) LOCK FLUSH IV SOLN 100 UNIT/ML 100 UNIT/ML
500 SOLUTION INTRAVENOUS AS NEEDED
Status: CANCELLED | OUTPATIENT
Start: 2021-01-06

## 2021-01-07 ENCOUNTER — INFUSION (OUTPATIENT)
Dept: ONCOLOGY | Facility: HOSPITAL | Age: 66
End: 2021-01-07

## 2021-01-07 VITALS
WEIGHT: 205.6 LBS | SYSTOLIC BLOOD PRESSURE: 179 MMHG | BODY MASS INDEX: 30.36 KG/M2 | TEMPERATURE: 98.6 F | HEART RATE: 106 BPM | DIASTOLIC BLOOD PRESSURE: 83 MMHG | RESPIRATION RATE: 18 BRPM

## 2021-01-07 DIAGNOSIS — C79.51 PROSTATE CANCER METASTATIC TO BONE (HCC): Primary | ICD-10-CM

## 2021-01-07 DIAGNOSIS — C61 PROSTATE CANCER METASTATIC TO BONE (HCC): Primary | ICD-10-CM

## 2021-01-07 DIAGNOSIS — Z45.2 ENCOUNTER FOR CARE RELATED TO PORT-A-CATH: ICD-10-CM

## 2021-01-07 PROCEDURE — 25010000003 HEPARIN LOCK FLUSH PER 10 UNITS: Performed by: INTERNAL MEDICINE

## 2021-01-07 PROCEDURE — 96413 CHEMO IV INFUSION 1 HR: CPT

## 2021-01-07 PROCEDURE — 25010000002 DOCETAXEL 20 MG/ML SOLUTION 8 ML VIAL: Performed by: NURSE PRACTITIONER

## 2021-01-07 RX ORDER — HYDROMORPHONE HYDROCHLORIDE 4 MG/1
4 TABLET ORAL EVERY 6 HOURS PRN
Qty: 30 TABLET | Refills: 0 | Status: SHIPPED | OUTPATIENT
Start: 2021-01-07 | End: 2021-01-14

## 2021-01-07 RX ORDER — FENTANYL 50 UG/H
1 PATCH TRANSDERMAL
Qty: 5 PATCH | Refills: 0 | Status: SHIPPED | OUTPATIENT
Start: 2021-01-08 | End: 2021-01-23

## 2021-01-07 RX ADMIN — SODIUM CHLORIDE 250 ML: 9 INJECTION, SOLUTION INTRAVENOUS at 10:38

## 2021-01-07 RX ADMIN — HEPARIN 500 UNITS: 100 SYRINGE at 11:43

## 2021-01-07 RX ADMIN — DOCETAXEL 155 MG: 20 INJECTION, SOLUTION, CONCENTRATE INTRAVENOUS at 10:38

## 2021-01-13 DIAGNOSIS — Z51.81 THERAPEUTIC DRUG MONITORING: Primary | ICD-10-CM

## 2021-01-14 ENCOUNTER — LAB (OUTPATIENT)
Dept: LAB | Facility: HOSPITAL | Age: 66
End: 2021-01-14

## 2021-01-14 ENCOUNTER — OFFICE VISIT (OUTPATIENT)
Dept: PALLIATIVE CARE | Facility: CLINIC | Age: 66
End: 2021-01-14

## 2021-01-14 VITALS
SYSTOLIC BLOOD PRESSURE: 152 MMHG | WEIGHT: 204.3 LBS | DIASTOLIC BLOOD PRESSURE: 75 MMHG | HEART RATE: 116 BPM | OXYGEN SATURATION: 96 % | TEMPERATURE: 97.1 F | BODY MASS INDEX: 30.17 KG/M2

## 2021-01-14 DIAGNOSIS — Z51.81 THERAPEUTIC DRUG MONITORING: ICD-10-CM

## 2021-01-14 DIAGNOSIS — G89.3 PAIN, CANCER: Primary | ICD-10-CM

## 2021-01-14 LAB
AMPHET+METHAMPHET UR QL: NEGATIVE
AMPHETAMINES UR QL: NEGATIVE
BARBITURATES UR QL SCN: NEGATIVE
BENZODIAZ UR QL SCN: NEGATIVE
BUPRENORPHINE SERPL-MCNC: NEGATIVE NG/ML
CANNABINOIDS SERPL QL: NEGATIVE
COCAINE UR QL: NEGATIVE
METHADONE UR QL SCN: NEGATIVE
OPIATES UR QL: POSITIVE
OXYCODONE UR QL SCN: NEGATIVE
PCP UR QL SCN: NEGATIVE
PROPOXYPH UR QL: NEGATIVE
TRICYCLICS UR QL SCN: NEGATIVE

## 2021-01-14 PROCEDURE — 99214 OFFICE O/P EST MOD 30 MIN: CPT | Performed by: INTERNAL MEDICINE

## 2021-01-14 PROCEDURE — 80306 DRUG TEST PRSMV INSTRMNT: CPT

## 2021-01-14 RX ORDER — FENTANYL 50 UG/H
1 PATCH TRANSDERMAL
Qty: 5 PATCH | Refills: 0 | Status: SHIPPED | OUTPATIENT
Start: 2021-01-18 | End: 2021-02-02

## 2021-01-14 RX ORDER — AMOXICILLIN 250 MG
2 CAPSULE ORAL DAILY
Qty: 180 TABLET | Refills: 0 | Status: SHIPPED | OUTPATIENT
Start: 2021-01-14 | End: 2021-04-08 | Stop reason: SDUPTHER

## 2021-01-14 RX ORDER — HYDROMORPHONE HYDROCHLORIDE 4 MG/1
4 TABLET ORAL EVERY 4 HOURS PRN
Qty: 70 TABLET | Refills: 0 | Status: SHIPPED | OUTPATIENT
Start: 2021-01-14 | End: 2021-01-28

## 2021-01-14 RX ORDER — DULOXETIN HYDROCHLORIDE 30 MG/1
CAPSULE, DELAYED RELEASE ORAL
Qty: 53 CAPSULE | Refills: 0 | Status: SHIPPED | OUTPATIENT
Start: 2021-01-14 | End: 2021-01-26 | Stop reason: DRUGHIGH

## 2021-01-14 NOTE — PATIENT INSTRUCTIONS
1.  Over the counter SalonPas Lidocaine and Menthol patches - place 3 along back and hips daily.  Follow the instructions on the box.    2.  Start Duloxetine for the chronic pain    ALWAYS bring ALL of your medications prescribed by this clinic to EVERY appointment.  If telemedicine appt, be prepared to give and show medication counts.  This assists us with managing your refill needs.  If you fail to bring in any remaining controlled medication (usually a pain or anxiety medication), you may not receive a refill or replacement prescription at that appointment.      Call [phone] Palliative RN triage line for questions regarding medications, refills, or palliative plan of care on Mondays - Fridays 9am to 4pm.  You may also send [employer] messages to Palliative, but NOT directly to Dr. Cary.  (Dr. Cary will NOT review these messages nor be signing scripts outside of clinic hours Tuesdays 9-4pm and on Thursdays 830-midday).      If you require same day communication (such as concern of your health status or new onset symptoms), please CALL your primary care office or appropriate specialist office, not palliative clinic.    You must notify us AT LEAST 5-7 BUSINESS DAYS in advance for any refill requests. Clinic days are Tuesday and Thursdays at this time.  Prescriptions for controlled medications will be signed on clinic days only, by the end of the clinic day.  Please be aware of additional insurance prior authorization processing time required for many of those medications.  Please do not call more than once during clinic days to check on status of your refill request, as this does negatively impact care for scheduled patients.  Please try to communicate with your pharmacy first to look at your profile for scripts signed in advance.    Call after hours and weekends only for new or acute (not chronic) symptom issues to speak to on-call physician or nurse practitioner.  Be advised that any requests for  prescriptions for controlled substances can NOT be honored after hours, including refill requests.    Call [phone] only for scheduling issues for the palliative clinic.

## 2021-01-14 NOTE — PROGRESS NOTES
Med Counts  Medication Filled # Filled Count Used  # days ROSE MARIE   Fentanyl 50 1/8/21 5 1 4 6 ?   Dilaudid 4mg 1/7/21 30 5 25 7 3.8
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
## 2021-01-26 ENCOUNTER — INFUSION (OUTPATIENT)
Dept: ONCOLOGY | Facility: HOSPITAL | Age: 66
End: 2021-01-26

## 2021-01-26 ENCOUNTER — OFFICE VISIT (OUTPATIENT)
Dept: PALLIATIVE CARE | Facility: CLINIC | Age: 66
End: 2021-01-26

## 2021-01-26 VITALS
WEIGHT: 198.2 LBS | HEART RATE: 98 BPM | TEMPERATURE: 97 F | DIASTOLIC BLOOD PRESSURE: 66 MMHG | BODY MASS INDEX: 29.27 KG/M2 | SYSTOLIC BLOOD PRESSURE: 125 MMHG | RESPIRATION RATE: 18 BRPM

## 2021-01-26 VITALS
WEIGHT: 200 LBS | SYSTOLIC BLOOD PRESSURE: 119 MMHG | BODY MASS INDEX: 29.53 KG/M2 | OXYGEN SATURATION: 98 % | DIASTOLIC BLOOD PRESSURE: 68 MMHG | HEART RATE: 90 BPM

## 2021-01-26 DIAGNOSIS — C61 PROSTATE CANCER METASTATIC TO BONE (HCC): ICD-10-CM

## 2021-01-26 DIAGNOSIS — G89.3 PAIN, CANCER: Primary | ICD-10-CM

## 2021-01-26 DIAGNOSIS — Z45.2 ENCOUNTER FOR CARE RELATED TO PORT-A-CATH: Primary | ICD-10-CM

## 2021-01-26 DIAGNOSIS — C79.51 PROSTATE CANCER METASTATIC TO BONE (HCC): ICD-10-CM

## 2021-01-26 DIAGNOSIS — F11.90 OPIOID USE DISORDER: ICD-10-CM

## 2021-01-26 PROCEDURE — 36591 DRAW BLOOD OFF VENOUS DEVICE: CPT

## 2021-01-26 PROCEDURE — 99214 OFFICE O/P EST MOD 30 MIN: CPT | Performed by: INTERNAL MEDICINE

## 2021-01-26 PROCEDURE — 25010000003 HEPARIN LOCK FLUSH PER 10 UNITS: Performed by: INTERNAL MEDICINE

## 2021-01-26 RX ORDER — FENTANYL 50 UG/H
1 PATCH TRANSDERMAL
Qty: 5 PATCH | Refills: 0 | Status: SHIPPED | OUTPATIENT
Start: 2021-02-03 | End: 2021-02-04 | Stop reason: SDUPTHER

## 2021-01-26 RX ORDER — DULOXETIN HYDROCHLORIDE 60 MG/1
60 CAPSULE, DELAYED RELEASE ORAL DAILY
Qty: 90 CAPSULE | Refills: 0 | Status: SHIPPED | OUTPATIENT
Start: 2021-01-26 | End: 2021-04-08 | Stop reason: SINTOL

## 2021-01-26 RX ORDER — HYDROMORPHONE HYDROCHLORIDE 4 MG/1
4 TABLET ORAL EVERY 6 HOURS PRN
Qty: 70 TABLET | Refills: 0 | Status: SHIPPED | OUTPATIENT
Start: 2021-01-29 | End: 2021-02-04 | Stop reason: SDUPTHER

## 2021-01-26 RX ORDER — HEPARIN SODIUM (PORCINE) LOCK FLUSH IV SOLN 100 UNIT/ML 100 UNIT/ML
500 SOLUTION INTRAVENOUS AS NEEDED
Status: CANCELLED | OUTPATIENT
Start: 2021-01-26

## 2021-01-26 RX ORDER — HEPARIN SODIUM (PORCINE) LOCK FLUSH IV SOLN 100 UNIT/ML 100 UNIT/ML
500 SOLUTION INTRAVENOUS AS NEEDED
Status: DISCONTINUED | OUTPATIENT
Start: 2021-01-26 | End: 2021-01-26 | Stop reason: HOSPADM

## 2021-01-26 RX ADMIN — HEPARIN SODIUM (PORCINE) LOCK FLUSH IV SOLN 100 UNIT/ML 500 UNITS: 100 SOLUTION at 09:50
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
## 2021-01-26 NOTE — PROGRESS NOTES
Med Counts  Medication Filled # Filled Count Used  # days ROSE MARIE   Dilaudid 4mg 1/14/21 70 20 50 12 4.2     Fentanyl 50 1/19/21 5 2 3 7 1/3 days                                                                    Office Visit Date Fill Date Fill Amt  # disposed # New Patch on pt Total patches   1/26/21 1/19/21 5 2 2 1 5                ** disposed of 4 used Fentanyl Patches. #3 - 50 mcg, #1 - 25mcg

## 2021-01-27 LAB
ALBUMIN SERPL-MCNC: 3.6 G/DL (ref 3.5–5.2)
ALBUMIN/GLOB SERPL: 1.5 G/DL
ALP SERPL-CCNC: 71 U/L (ref 39–117)
ALT SERPL-CCNC: 10 U/L (ref 1–41)
AST SERPL-CCNC: 17 U/L (ref 1–40)
BASOPHILS # BLD AUTO: 0.06 10*3/MM3 (ref 0–0.2)
BASOPHILS NFR BLD AUTO: 0.9 % (ref 0–1.5)
BILIRUB SERPL-MCNC: 0.4 MG/DL (ref 0–1.2)
BUN SERPL-MCNC: 5 MG/DL (ref 8–23)
BUN/CREAT SERPL: 7.8 (ref 7–25)
CALCIUM SERPL-MCNC: 8.2 MG/DL (ref 8.6–10.5)
CHLORIDE SERPL-SCNC: 102 MMOL/L (ref 98–107)
CO2 SERPL-SCNC: 25.7 MMOL/L (ref 22–29)
CREAT SERPL-MCNC: 0.64 MG/DL (ref 0.76–1.27)
EOSINOPHIL # BLD AUTO: 0.01 10*3/MM3 (ref 0–0.4)
EOSINOPHIL NFR BLD AUTO: 0.1 % (ref 0.3–6.2)
ERYTHROCYTE [DISTWIDTH] IN BLOOD BY AUTOMATED COUNT: 15.7 % (ref 12.3–15.4)
GLOBULIN SER CALC-MCNC: 2.4 GM/DL
GLUCOSE SERPL-MCNC: 103 MG/DL (ref 65–99)
HCT VFR BLD AUTO: 38.7 % (ref 37.5–51)
HGB BLD-MCNC: 12.5 G/DL (ref 13–17.7)
IMM GRANULOCYTES # BLD AUTO: 0.02 10*3/MM3 (ref 0–0.05)
IMM GRANULOCYTES NFR BLD AUTO: 0.3 % (ref 0–0.5)
LYMPHOCYTES # BLD AUTO: 1.43 10*3/MM3 (ref 0.7–3.1)
LYMPHOCYTES NFR BLD AUTO: 20.5 % (ref 19.6–45.3)
MCH RBC QN AUTO: 26.6 PG (ref 26.6–33)
MCHC RBC AUTO-ENTMCNC: 32.3 G/DL (ref 31.5–35.7)
MCV RBC AUTO: 82.3 FL (ref 79–97)
MONOCYTES # BLD AUTO: 0.8 10*3/MM3 (ref 0.1–0.9)
MONOCYTES NFR BLD AUTO: 11.5 % (ref 5–12)
NEUTROPHILS # BLD AUTO: 4.66 10*3/MM3 (ref 1.7–7)
NEUTROPHILS NFR BLD AUTO: 66.7 % (ref 42.7–76)
NRBC BLD AUTO-RTO: 0 /100 WBC (ref 0–0.2)
PLATELET # BLD AUTO: 211 10*3/MM3 (ref 140–450)
POTASSIUM SERPL-SCNC: 3.7 MMOL/L (ref 3.5–5.2)
PROT SERPL-MCNC: 6 G/DL (ref 6–8.5)
PSA SERPL-MCNC: 0.12 NG/ML (ref 0–4)
RBC # BLD AUTO: 4.7 10*6/MM3 (ref 4.14–5.8)
SODIUM SERPL-SCNC: 137 MMOL/L (ref 136–145)
WBC # BLD AUTO: 6.98 10*3/MM3 (ref 3.4–10.8)

## 2021-01-28 ENCOUNTER — INFUSION (OUTPATIENT)
Dept: ONCOLOGY | Facility: HOSPITAL | Age: 66
End: 2021-01-28

## 2021-01-28 ENCOUNTER — OFFICE VISIT (OUTPATIENT)
Dept: ONCOLOGY | Facility: CLINIC | Age: 66
End: 2021-01-28

## 2021-01-28 VITALS
HEART RATE: 92 BPM | WEIGHT: 199 LBS | TEMPERATURE: 97.3 F | RESPIRATION RATE: 18 BRPM | BODY MASS INDEX: 29.39 KG/M2 | SYSTOLIC BLOOD PRESSURE: 114 MMHG | DIASTOLIC BLOOD PRESSURE: 64 MMHG

## 2021-01-28 DIAGNOSIS — Z45.2 ENCOUNTER FOR CARE RELATED TO PORT-A-CATH: ICD-10-CM

## 2021-01-28 DIAGNOSIS — C79.51 PROSTATE CANCER METASTATIC TO BONE (HCC): Primary | ICD-10-CM

## 2021-01-28 DIAGNOSIS — C61 PROSTATE CANCER METASTATIC TO BONE (HCC): Primary | ICD-10-CM

## 2021-01-28 PROCEDURE — 25010000002 ZOLEDRONIC ACID PER 1 MG: Performed by: NURSE PRACTITIONER

## 2021-01-28 PROCEDURE — 96375 TX/PRO/DX INJ NEW DRUG ADDON: CPT

## 2021-01-28 PROCEDURE — 25010000002 DOCETAXEL 20 MG/ML SOLUTION 4 ML VIAL: Performed by: INTERNAL MEDICINE

## 2021-01-28 PROCEDURE — 96413 CHEMO IV INFUSION 1 HR: CPT

## 2021-01-28 PROCEDURE — 99214 OFFICE O/P EST MOD 30 MIN: CPT | Performed by: NURSE PRACTITIONER

## 2021-01-28 RX ORDER — PHENOL 1.4 %
1200 AEROSOL, SPRAY (ML) MUCOUS MEMBRANE DAILY
COMMUNITY
Start: 2021-01-28

## 2021-01-28 RX ORDER — SODIUM CHLORIDE 9 MG/ML
250 INJECTION, SOLUTION INTRAVENOUS ONCE
Status: DISCONTINUED | OUTPATIENT
Start: 2021-01-28 | End: 2021-01-28 | Stop reason: HOSPADM

## 2021-01-28 RX ORDER — SODIUM CHLORIDE 9 MG/ML
250 INJECTION, SOLUTION INTRAVENOUS ONCE
Status: COMPLETED | OUTPATIENT
Start: 2021-01-28 | End: 2021-01-28

## 2021-01-28 RX ORDER — HEPARIN SODIUM (PORCINE) LOCK FLUSH IV SOLN 100 UNIT/ML 100 UNIT/ML
500 SOLUTION INTRAVENOUS AS NEEDED
Status: DISCONTINUED | OUTPATIENT
Start: 2021-01-28 | End: 2021-01-28 | Stop reason: HOSPADM

## 2021-01-28 RX ORDER — HEPARIN SODIUM (PORCINE) LOCK FLUSH IV SOLN 100 UNIT/ML 100 UNIT/ML
500 SOLUTION INTRAVENOUS AS NEEDED
Status: CANCELLED | OUTPATIENT
Start: 2021-01-28

## 2021-01-28 RX ADMIN — DOCETAXEL 155 MG: 80 INJECTION, SOLUTION, CONCENTRATE INTRAVENOUS at 10:38

## 2021-01-28 RX ADMIN — ZOLEDRONIC ACID 4 MG: 4 INJECTION, SOLUTION, CONCENTRATE INTRAVENOUS at 11:47

## 2021-01-28 RX ADMIN — SODIUM CHLORIDE 250 ML: 9 INJECTION, SOLUTION INTRAVENOUS at 10:38

## 2021-01-28 NOTE — PROGRESS NOTES
CHIEF COMPLAINT: Metastatic prostate cancer    Problem List:  Oncology/Hematology History Overview Note   1.  Stage IVb grade group 4 metastatic prostate cancer with sclerotic bone lesions on CT and bone scan and history of prostatic hypertrophy  2.  Kidney stone  3.  Polyps    -9/30/2020 office note from Dr. Ronen Reynaga indicates patient with PSA 10.8.  Underwent TRUS prostate biopsy 9/15/2020.  Adenocarcinoma the prostate Sarika 4+4 = 8 in 1 out of 12 cores and 4+3 = 7 and 10 out of 12 cores and 3+4 = 7 in 1 out of 12 cores.  Perineural invasion.  Extraprostatic extension into the fat seen on 2 biopsies of the right lateral mid and right apex.  9/24/2020 CT chest and whole-body bone scan showed T12, L1, left acetabular, right medial acetabular metastases with no lung metastases.  He started androgen deprivation therapy with Casodex with plans for Lupron to start in 1 to 2 weeks for clinical T3 N0 M1 metastatic prostate cancer.  Review of official report from [facility] 9/24/2020 bone scan mentions T12, L1, roof of left acetabulum and medial right acetabular osteoblastic metastases.  CT chest with contrast that same day showed mild splenomegaly 14.2 cm with stable periaortic nodes compared to CT December 2015 with no lung metastases.  Sclerotic abnormality within the T12 vertebral body, L1 vertebral body extending into the pedicle unchanged.  8/28/2020 CT abdomen pelvis report from [facility] reviewed and mentions normal spleen size.  Punctate nonobstructing kidney stone, no paulino enlargement, diffuse bladder wall thickening with mild perivesicular fat stranding, 2.1 cm sclerotic left iliac bone above the left acetabulum new compared to 2015 as well as 1.5 cm faintly sclerotic focus in the right posterior acetabulum stable compared to prior CT 2015 as well as a 1.6 cm T12 and inferior vertebral body sclerotic lesion and a 1.9 cm left posterior lateral L1 vertebral body abnormality extending to  the pedicle.  -10/13/2020 initial [facility] medical oncology consultation: With 1 Sarika score 8, 4+4 lesion that would make him a grade group 4 with stage IVb disease given radiographic evidence of sclerotic bone metastasis on bone scan and CT.  Needs genetic testing given metastatic prostate cancer and this is also important as, were he to have mismatch repair mutation then we would have options for PDL 1 inhibitors and were he BRCA mutated would have options for PARP inhibitors in the future.  Systemic therapy for castration naïve prostate cancer or N1 disease should be with apalutamide or Abiraterone or enzalutamide all of which are category 1.  He does not have any visceral metastases.  According to his imaging he has T12, L1, left acetabular, right acetabular, and left iliac bony involvement.  That means he would have 4 or more bone metastases with at least one metastasis (i.e. the acetabular lesions bilaterally) beyond the pelvis/vertebral, for which this would be considered high-volume disease for which 6 cycles of docetaxel 75 mg/m² x 6 cycles followed by Zytiga and prednisone would be reasonable along with Zometa every 6 weeks for bone stabilization.  He will do chemo preparation visit with my nurse practitioner prior to start chemotherapy with Taxotere and Zometa in 2 weeks and he is already received Casodex in preparation for Lupron shot he received on 10/12/2020 with Dr. Reynaga.  We will get him to Dr. Alexander Gomez who has done his polypectomies in the past for port placement and we will get genetic counseling started.  Following the 6 courses of Taxotere per the Chaarted trial criteria, I would then give him an indefinite Zytiga and prednisone and Zometa until progression or toxicity dictates.    -12/16/2019 COVID-19 antigen test negative    -12/29/2020 PSA 0.275 with absolute neutrophil count 700 and creatinine 0.76 with normal liver enzymes and electrolytes.  Normal magnesium and phosphorus and  urine drug screen positive for buprenorphine and opiates follow-up with palliative care.    -1/4/2021 CT chest abdomen pelvis with contrast and whole-body bone scan shows improving less metabolically active bone metastases.  Stable sclerotic T12, L1, and L2 vertebral bodies and bilateral pelvic bones on bone windows with stable subcentimeter para-aortic lymph nodes and no definite progression in the chest abdomen or pelvis.    -1/5/2021 [facility] medical oncology follow-up visit: I reviewed the images and reports thereof of the above CT and bone scan which shows good response to Taxotere x3 courses with a PSA down to 0.275 from a baseline of 10.  Get another 3 courses of Taxotere, continue his Xgeva, and then following that we will switch to Zytiga and prednisone.  He is working with palliative care on his bone pain but on his current dose of fentanyl patch he says his pain is still not adequately controlled he will contact them.  Dexamethasone prescription was refilled.  We will see my nurse practitioner back in 3 weeks for course #5 of 6 total courses and then we will reimage with CT chest abdomen pelvis and bone scan before going to the Zytiga prednisone.     Prostate cancer metastatic to bone (CMS/HCC)   8/30/2020 -  Other Event    PSA 10.8     9/15/2020 Initial Diagnosis    Prostate cancer metastatic to bone (CMS/HCC)     9/15/2020 Biopsy    Prostate needle core biopsy     9/24/2020 Imaging    Total body bone scan: 4 abnormal areas of increased activity consistent with osteoblastic metastasis, abnormal foci of activity seen in the T12 vertebral body, L1 vertebral body, roof of the left acetabulum, and acetabulum medially on the right.  CT chest: Stable sclerotic metastatic lesions within the T12 and L1 vertebrae.  No other evidence of metastatic disease to the chest.  Stable mild splenomegaly and subcentimeter periaortic retroperitoneal lymph nodes.  CT abdomen and pelvis: Diffuse bladder wall thickening with  mild perivesicular fat stranding.  Interval development of several sclerotic lesions in the spine and left iliac bone.     10/13/2020 Cancer Staged    Staging form: Bone - Appendicular Skeleton, Trunk, Skull, And Facial Bones, AJCC 8th Edition  - Clinical: Stage IVB (cT3, cN0, cM1b, G3) - Signed by Ishmael Rashid MD on 10/13/2020     11/3/2020 -  Chemotherapy    OP SUPPORTIVE Zoledronic Acid Q42d     11/3/2020 -  Chemotherapy    OP PROSTATE DOCEtaxel     1/4/2021 Imaging    CT chest abdomen pelvis with contrast and whole-body bone scan shows improving less metabolically active bone metastases.  Stable sclerotic T12, L1, and L2 vertebral bodies and bilateral pelvic bones on bone windows with stable subcentimeter para-aortic lymph nodes and no definite progression in the chest abdomen or pelvis.  PSA down to 0.275 after 3 courses of Taxotere.         HISTORY OF PRESENT ILLNESS:  The patient is a 65 y.o. male, here for follow up on management of metastatic prostate cancer.  Mr. Lugo is doing well, tolerating therapy with Taxotere with no significant side effects.  He has seen palliative care and reports they are doing a good job in getting his pain under control.  He has no new pain.  No new respiratory concerns.  Has had no fevers or chills.  Denies neuropathy.    Past Medical History:   Diagnosis Date   • Nephrolithiasis    • Opioid use disorder, mild, on maintenance therapy (CMS/HCC)      Past Surgical History:   Procedure Laterality Date   • CHOLECYSTECTOMY     • CORONARY STENT PLACEMENT  206 & 2011   • HERNIA REPAIR  1986   • THORACIC DISCECTOMY  1994       Allergies   Allergen Reactions   • Gabapentin Nausea Only       Family History and Social History reviewed and changed as necessary    REVIEW OF SYSTEM:   Positive for mild fatigue.    PHYSICAL EXAM:  General: Well-developed, well-nourished gentleman in no distress.  Respiratory: Respirations are regular and unlabored, mild wheeze on the right otherwise lungs  are clear to auscultation.    Vitals:    01/28/21 0958   BP: 114/64   Pulse: 92   Resp: 18   Temp: 97.3 °F (36.3 °C)   Weight: 90.3 kg (199 lb)     Vitals:    01/28/21 0958   PainSc:   6   PainLoc: Hip  Comment: bilateral hips     Naveen SHAN Lugo reports a pain score of 6.  Given his pain assessment as noted, treatment options were discussed and the following options were decided upon as a follow-up plan to address the patient's pain: continuation of current treatment plan for pain and Medicines for cancer related pain are being managed by palliative care.    Labs reviewed.  Note from palliative care addressing management of symptoms and adjustment of pain medications from office visit dated 1/26/2021 also reviewed at time of visit.  Vitals reviewed.    ECOG: (1) Restricted in Physically Strenuous Activity, Ambulatory & Able to Do Work of Light Nature    Lab Results   Component Value Date    HGB 12.5 (L) 01/26/2021    HCT 38.7 01/26/2021    MCV 82.3 01/26/2021     01/26/2021    WBC 6.98 01/26/2021    NEUTROABS 4.66 01/26/2021    LYMPHSABS 1.43 01/26/2021    MONOSABS 0.80 01/26/2021    EOSABS 0.01 01/26/2021    BASOSABS 0.06 01/26/2021       Lab Results   Component Value Date    GLUCOSE 123 (H) 12/29/2020    BUN 5 (L) 01/26/2021    CREATININE 0.64 (L) 01/26/2021     01/26/2021    K 3.7 01/26/2021     01/26/2021    CO2 25.7 01/26/2021    CALCIUM 8.2 (L) 01/26/2021    PROTEINTOT 6.9 12/29/2020    ALBUMIN 3.60 01/26/2021    BILITOT 0.4 01/26/2021    ALKPHOS 71 01/26/2021    AST 17 01/26/2021    ALT 10 01/26/2021     PSA 1/26/2021 0.118        ASSESSMENT & PLAN:    1.  Stage IVb grade group 4 metastatic prostate cancer with sclerotic bone lesions on CT and bone scan and history of prostatic hypertrophy  2.  Cancer related pain    Discussion: Patient overall continues to do well with no significant side effects from Taxotere.  His PSA on 1/26/2021 is down to 0.118.  CBC was unremarkable, ANC 4.66, platelet  count normal at 211,000, WBC 6.98, mild anemia of 12.5 with normal hematocrit of 38.7%.  CMP with creatinine 0.64, liver enzymes are normal.  Serum calcium a little low at 8.2.  I reminded the patient to be sure and take calcium and vitamin D supplement while on Zometa.  He is due for Zometa today, we are doing this every 6 weeks.  We will continue with treatment today, today is cycle #5 of a planned 6 courses of Taxotere.  We will repeat restaging scans with CT chest, abdomen and pelvis and total body bone scan after his 6th course and then will switch to Zytiga and prednisone.  He will continue to follow with palliative for pain and symptom management, he has been pleased with their management and care.    Return to clinic in 3 weeks.    This was a level 4 MDM moderate visit with management of drug therapy requiring intensive monitoring for toxicity on chemotherapy with Taxotere, review of laboratory data to assess for ability to continue treatment and ordering of future labs.  Abena Velasquez, APRN    01/28/2021

## 2021-01-28 NOTE — PROGRESS NOTES
Educated patient on instructions per Abena BARROSO. Patient is to begin calcium supplement 1200 mg by mouth daily over the counter. Explained symptoms of hypocalcemia and significance of taking supplement to replace calcium. Educated patient on Zometa process. Patient expressed understanding of necessity in taking supplement daily. Previously discussed this with patient at prior visit when reviewing medication list. Patient stated he was picking up calcium supplement today at pharmacy. No further concerns. TERI CAMPBELL RN

## 2021-02-04 ENCOUNTER — TELEPHONE (OUTPATIENT)
Dept: ONCOLOGY | Facility: CLINIC | Age: 66
End: 2021-02-04

## 2021-02-04 DIAGNOSIS — G89.3 PAIN, CANCER: ICD-10-CM

## 2021-02-04 NOTE — TELEPHONE ENCOUNTER
Pt did not receive follow up after last visit.  Open follow up spots conflict with pts oncology apts. Apt given for 2/23 (due on 2/16) and new prescriptions sent in for medications to last til apt only.

## 2021-02-04 NOTE — TELEPHONE ENCOUNTER
Pt couldn't get a follow up appointment on 2/16/2021 so I had to schedule him on 2/23/2021  Pt will need refills before his appointment

## 2021-02-05 RX ORDER — HYDROMORPHONE HYDROCHLORIDE 4 MG/1
4 TABLET ORAL EVERY 6 HOURS PRN
Qty: 32 TABLET | Refills: 0 | Status: SHIPPED | OUTPATIENT
Start: 2021-02-16 | End: 2021-02-24

## 2021-02-05 RX ORDER — FENTANYL 50 UG/H
1 PATCH TRANSDERMAL
Qty: 5 PATCH | Refills: 0 | Status: SHIPPED | OUTPATIENT
Start: 2021-02-18 | End: 2021-03-05

## 2021-02-11 ENCOUNTER — TELEPHONE (OUTPATIENT)
Dept: PALLIATIVE CARE | Facility: CLINIC | Age: 66
End: 2021-02-11

## 2021-02-11 NOTE — TELEPHONE ENCOUNTER
----- Message from María Stewart sent at 2/11/2021  2:43 PM EST -----  Regarding: MED REFILL  PT called asking for RX REFILL. Did not state what the med name was.

## 2021-02-11 NOTE — TELEPHONE ENCOUNTER
"Pt calling for refills but they have already been sent to pharmacy when his apt was rescheduled. Pt stating that insurance is still not covering fentanyl patches. PA was completed at beginning of month and it was rejected \"due to approval already on file.\" Will follow up with insurance company about any issues.   "

## 2021-02-17 ENCOUNTER — INFUSION (OUTPATIENT)
Dept: ONCOLOGY | Facility: HOSPITAL | Age: 66
End: 2021-02-17

## 2021-02-17 ENCOUNTER — TELEPHONE (OUTPATIENT)
Dept: ONCOLOGY | Facility: CLINIC | Age: 66
End: 2021-02-17

## 2021-02-17 ENCOUNTER — OFFICE VISIT (OUTPATIENT)
Dept: ONCOLOGY | Facility: CLINIC | Age: 66
End: 2021-02-17

## 2021-02-17 VITALS
SYSTOLIC BLOOD PRESSURE: 123 MMHG | RESPIRATION RATE: 18 BRPM | TEMPERATURE: 97.7 F | BODY MASS INDEX: 30.13 KG/M2 | HEART RATE: 105 BPM | WEIGHT: 204 LBS | DIASTOLIC BLOOD PRESSURE: 66 MMHG

## 2021-02-17 DIAGNOSIS — Z45.2 ENCOUNTER FOR CARE RELATED TO PORT-A-CATH: ICD-10-CM

## 2021-02-17 DIAGNOSIS — C79.51 PROSTATE CANCER METASTATIC TO BONE (HCC): Primary | ICD-10-CM

## 2021-02-17 DIAGNOSIS — C61 PROSTATE CANCER METASTATIC TO BONE (HCC): Primary | ICD-10-CM

## 2021-02-17 PROCEDURE — 25010000003 HEPARIN LOCK FLUSH PER 10 UNITS: Performed by: INTERNAL MEDICINE

## 2021-02-17 PROCEDURE — 99214 OFFICE O/P EST MOD 30 MIN: CPT | Performed by: NURSE PRACTITIONER

## 2021-02-17 PROCEDURE — 36591 DRAW BLOOD OFF VENOUS DEVICE: CPT

## 2021-02-17 RX ORDER — HEPARIN SODIUM (PORCINE) LOCK FLUSH IV SOLN 100 UNIT/ML 100 UNIT/ML
500 SOLUTION INTRAVENOUS AS NEEDED
Status: DISCONTINUED | OUTPATIENT
Start: 2021-02-17 | End: 2021-02-17 | Stop reason: HOSPADM

## 2021-02-17 RX ORDER — SODIUM CHLORIDE 9 MG/ML
250 INJECTION, SOLUTION INTRAVENOUS ONCE
Status: CANCELLED | OUTPATIENT
Start: 2021-02-17

## 2021-02-17 RX ORDER — SODIUM CHLORIDE 9 MG/ML
250 INJECTION, SOLUTION INTRAVENOUS ONCE
Status: CANCELLED | OUTPATIENT
Start: 2021-03-09

## 2021-02-17 RX ORDER — DIPHENHYDRAMINE HYDROCHLORIDE 50 MG/ML
50 INJECTION INTRAMUSCULAR; INTRAVENOUS AS NEEDED
Status: CANCELLED | OUTPATIENT
Start: 2021-02-17

## 2021-02-17 RX ORDER — FAMOTIDINE 10 MG/ML
20 INJECTION, SOLUTION INTRAVENOUS AS NEEDED
Status: CANCELLED | OUTPATIENT
Start: 2021-02-17

## 2021-02-17 RX ORDER — HEPARIN SODIUM (PORCINE) LOCK FLUSH IV SOLN 100 UNIT/ML 100 UNIT/ML
500 SOLUTION INTRAVENOUS AS NEEDED
Status: CANCELLED | OUTPATIENT
Start: 2021-02-17

## 2021-02-17 RX ADMIN — HEPARIN SODIUM (PORCINE) LOCK FLUSH IV SOLN 100 UNIT/ML 500 UNITS: 100 SOLUTION at 11:50

## 2021-02-17 NOTE — PROGRESS NOTES
CHIEF COMPLAINT: Metastatic prostate cancer    Problem List:  Oncology/Hematology History Overview Note   1.  Stage IVb grade group 4 metastatic prostate cancer with sclerotic bone lesions on CT and bone scan and history of prostatic hypertrophy  2.  Kidney stone  3.  Polyps    -9/30/2020 office note from Dr. Ronen Reynaga indicates patient with PSA 10.8.  Underwent TRUS prostate biopsy 9/15/2020.  Adenocarcinoma the prostate Sarika 4+4 = 8 in 1 out of 12 cores and 4+3 = 7 and 10 out of 12 cores and 3+4 = 7 in 1 out of 12 cores.  Perineural invasion.  Extraprostatic extension into the fat seen on 2 biopsies of the right lateral mid and right apex.  9/24/2020 CT chest and whole-body bone scan showed T12, L1, left acetabular, right medial acetabular metastases with no lung metastases.  He started androgen deprivation therapy with Casodex with plans for Lupron to start in 1 to 2 weeks for clinical T3 N0 M1 metastatic prostate cancer.  Review of official report from [facility] 9/24/2020 bone scan mentions T12, L1, roof of left acetabulum and medial right acetabular osteoblastic metastases.  CT chest with contrast that same day showed mild splenomegaly 14.2 cm with stable periaortic nodes compared to CT December 2015 with no lung metastases.  Sclerotic abnormality within the T12 vertebral body, L1 vertebral body extending into the pedicle unchanged.  8/28/2020 CT abdomen pelvis report from [facility] reviewed and mentions normal spleen size.  Punctate nonobstructing kidney stone, no paulino enlargement, diffuse bladder wall thickening with mild perivesicular fat stranding, 2.1 cm sclerotic left iliac bone above the left acetabulum new compared to 2015 as well as 1.5 cm faintly sclerotic focus in the right posterior acetabulum stable compared to prior CT 2015 as well as a 1.6 cm T12 and inferior vertebral body sclerotic lesion and a 1.9 cm left posterior lateral L1 vertebral body abnormality extending to  the pedicle.  -10/13/2020 initial [facility], [place], operated by [facility] medical oncology consultation: With 1 Sarika score 8, 4+4 lesion that would make him a grade group 4 with stage IVb disease given radiographic evidence of sclerotic bone metastasis on bone scan and CT.  Needs genetic testing given metastatic prostate cancer and this is also important as, were he to have mismatch repair mutation then we would have options for PDL 1 inhibitors and were he BRCA mutated would have options for PARP inhibitors in the future.  Systemic therapy for castration naïve prostate cancer or N1 disease should be with apalutamide or Abiraterone or enzalutamide all of which are category 1.  He does not have any visceral metastases.  According to his imaging he has T12, L1, left acetabular, right acetabular, and left iliac bony involvement.  That means he would have 4 or more bone metastases with at least one metastasis (i.e. the acetabular lesions bilaterally) beyond the pelvis/vertebral, for which this would be considered high-volume disease for which 6 cycles of docetaxel 75 mg/m² x 6 cycles followed by Zytiga and prednisone would be reasonable along with Zometa every 6 weeks for bone stabilization.  He will do chemo preparation visit with my nurse practitioner prior to start chemotherapy with Taxotere and Zometa in 2 weeks and he is already received Casodex in preparation for Lupron shot he received on 10/12/2020 with Dr. Reynaga.  We will get him to Dr. Alexander Gomez who has done his polypectomies in the past for port placement and we will get genetic counseling started.  Following the 6 courses of Taxotere per the Chaarted trial criteria, I would then give him an indefinite Zytiga and prednisone and Zometa until progression or toxicity dictates.    -12/16/2019 COVID-19 antigen test negative    -12/29/2020 PSA 0.275 with absolute neutrophil count 700 and creatinine 0.76 with normal liver enzymes and electrolytes.  Normal magnesium and phosphorus and  urine drug screen positive for buprenorphine and opiates follow-up with palliative care.    -1/4/2021 CT chest abdomen pelvis with contrast and whole-body bone scan shows improving less metabolically active bone metastases.  Stable sclerotic T12, L1, and L2 vertebral bodies and bilateral pelvic bones on bone windows with stable subcentimeter para-aortic lymph nodes and no definite progression in the chest abdomen or pelvis.    -1/5/2021 [facility] medical oncology follow-up visit: I reviewed the images and reports thereof of the above CT and bone scan which shows good response to Taxotere x3 courses with a PSA down to 0.275 from a baseline of 10.  Get another 3 courses of Taxotere, continue his Xgeva, and then following that we will switch to Zytiga and prednisone.  He is working with palliative care on his bone pain but on his current dose of fentanyl patch he says his pain is still not adequately controlled he will contact them.  Dexamethasone prescription was refilled.  We will see my nurse practitioner back in 3 weeks for course #5 of 6 total courses and then we will reimage with CT chest abdomen pelvis and bone scan before going to the Zytiga prednisone.     Prostate cancer metastatic to bone (CMS/HCC)   8/30/2020 -  Other Event    PSA 10.8     9/15/2020 Initial Diagnosis    Prostate cancer metastatic to bone (CMS/HCC)     9/15/2020 Biopsy    Prostate needle core biopsy     9/24/2020 Imaging    Total body bone scan: 4 abnormal areas of increased activity consistent with osteoblastic metastasis, abnormal foci of activity seen in the T12 vertebral body, L1 vertebral body, roof of the left acetabulum, and acetabulum medially on the right.  CT chest: Stable sclerotic metastatic lesions within the T12 and L1 vertebrae.  No other evidence of metastatic disease to the chest.  Stable mild splenomegaly and subcentimeter periaortic retroperitoneal lymph nodes.  CT abdomen and pelvis: Diffuse bladder wall thickening with  mild perivesicular fat stranding.  Interval development of several sclerotic lesions in the spine and left iliac bone.     10/13/2020 Cancer Staged    Staging form: Bone - Appendicular Skeleton, Trunk, Skull, And Facial Bones, AJCC 8th Edition  - Clinical: Stage IVB (cT3, cN0, cM1b, G3) - Signed by Ishmael Rashid MD on 10/13/2020     11/3/2020 -  Chemotherapy    OP SUPPORTIVE Zoledronic Acid Q42d     11/3/2020 -  Chemotherapy    OP PROSTATE DOCEtaxel     1/4/2021 Imaging    CT chest abdomen pelvis with contrast and whole-body bone scan shows improving less metabolically active bone metastases.  Stable sclerotic T12, L1, and L2 vertebral bodies and bilateral pelvic bones on bone windows with stable subcentimeter para-aortic lymph nodes and no definite progression in the chest abdomen or pelvis.  PSA down to 0.275 after 3 courses of Taxotere.     3/9/2021 - 3/9/2021 Chemotherapy    OP SUPPORTIVE PROSTATE Relugolix         HISTORY OF PRESENT ILLNESS:  The patient is a 65 y.o. male, here for follow up on management of metastatic prostate cancer.  Mr. Lugo is doing well, tolerating therapy with Taxotere with no significant side effects.  He has no new pain.  No new respiratory concerns.  Has had no fevers or chills.  Denies neuropathy.  Reports that there apparently was some issue with his urologist office and his insurance and he may have offended one of the office personnel during his last visit therefore he did not get his Lupron shot, states it is due.  He is asking to receive Lupron here.    Past Medical History:   Diagnosis Date   • Nephrolithiasis    • Opioid use disorder, mild, on maintenance therapy (CMS/HCC)      Past Surgical History:   Procedure Laterality Date   • CHOLECYSTECTOMY     • CORONARY STENT PLACEMENT  206 & 2011   • HERNIA REPAIR  1986   • THORACIC DISCECTOMY  1994       Allergies   Allergen Reactions   • Gabapentin Nausea Only       Family History and Social History reviewed and changed as  necessary    REVIEW OF SYSTEM:   Positive for mild fatigue.    PHYSICAL EXAM:  General: Well-developed, well-nourished gentleman in no distress.  Respiratory: Respirations are regular and unlabored, mild expiratory wheeze in the bases otherwise lungs are clear to auscultation.    Vitals:    02/17/21 1123   BP: 123/66   Pulse: 105   Resp: 18   Temp: 97.7 °F (36.5 °C)   Weight: 92.5 kg (204 lb)     Vitals:    02/17/21 1123   PainSc:   6   PainLoc: Back  Comment: back, hips and neck     Calhoun SHAN Lugo reports a pain score of 6.  Given his pain assessment as noted, treatment options were discussed and the following options were decided upon as a follow-up plan to address the patient's pain: continuation of current treatment plan for pain and Medicines for cancer related pain are being managed by palliative care.    Labs reviewed.    Vitals reviewed.    ECOG: (1) Restricted in Physically Strenuous Activity, Ambulatory & Able to Do Work of Light Nature    Lab Results   Component Value Date    HGB 12.5 (L) 01/26/2021    HCT 38.7 01/26/2021    MCV 82.3 01/26/2021     01/26/2021    WBC 6.98 01/26/2021    NEUTROABS 4.66 01/26/2021    LYMPHSABS 1.43 01/26/2021    MONOSABS 0.80 01/26/2021    EOSABS 0.01 01/26/2021    BASOSABS 0.06 01/26/2021       Lab Results   Component Value Date    GLUCOSE 123 (H) 12/29/2020    BUN 5 (L) 01/26/2021    CREATININE 0.64 (L) 01/26/2021     01/26/2021    K 3.7 01/26/2021     01/26/2021    CO2 25.7 01/26/2021    CALCIUM 8.2 (L) 01/26/2021    PROTEINTOT 6.9 12/29/2020    ALBUMIN 3.60 01/26/2021    BILITOT 0.4 01/26/2021    ALKPHOS 71 01/26/2021    AST 17 01/26/2021    ALT 10 01/26/2021     PSA 1/26/2021 0.118        ASSESSMENT & PLAN:    1.  Stage IVb grade group 4 metastatic prostate cancer with sclerotic bone lesions on CT and bone scan and history of prostatic hypertrophy  2.  Need for Lupron    Discussion: Patient continues to do well on Taxotere with no significant side  effects.  His most recent PSA on 1/26/2021 was 0.118.  He is also on Zometa every 6 weeks, last received 1/26/2021.  We will get labs today and treat tomorrow if counts allow and weather permits.  This will be cycle #6 of a planned 6 courses of Taxotere.  We will repeat restaging scans with CT chest, abdomen and pelvis and total body bone scan after his 6th course (I have ordered those today) and then will switch to Zytiga and prednisone.  The patient reports today that he would like to receive his Lupron here.  I discussed with him there is an oral medication now available, Relugolix.  I will check with Dr. Rashid to see if he has a preference.  He is following with palliative for pain and symptom management, he has been pleased with their management and care.    Return to clinic in 3 weeks.    This was a level 4 MDM moderate visit with management of drug therapy requiring intensive monitoring for toxicity on chemotherapy with Taxotere, review of laboratory data to assess for ability to continue treatment and ordering of future labs and restaging CT chest, abdomen pelvis and total body bone scan, also in consultation with Dr. Rashid regarding medication change.  Abena Velasquez, APRN    02/17/2021    Addendum: I spoke with Dr. Rashid, we will try and get Relugolix for the patient to use, this is an oral nonpeptide gonadotropin releasing hormone receptor antagonist now approved for use in advanced prostate cancer.

## 2021-02-17 NOTE — TELEPHONE ENCOUNTER
Called patient and tt him about new oral medication for his prostate cancer per DHARMESH Mccormick. Told patient our pharmacist will be in contact with him soon concerning the medication. Patient voiced understanding of conversation.

## 2021-02-18 ENCOUNTER — INFUSION (OUTPATIENT)
Dept: ONCOLOGY | Facility: HOSPITAL | Age: 66
End: 2021-02-18

## 2021-02-18 ENCOUNTER — SPECIALTY PHARMACY (OUTPATIENT)
Dept: ONCOLOGY | Facility: HOSPITAL | Age: 66
End: 2021-02-18

## 2021-02-18 VITALS
SYSTOLIC BLOOD PRESSURE: 150 MMHG | BODY MASS INDEX: 29.92 KG/M2 | TEMPERATURE: 98.3 F | WEIGHT: 202.6 LBS | RESPIRATION RATE: 18 BRPM | DIASTOLIC BLOOD PRESSURE: 73 MMHG | HEART RATE: 82 BPM

## 2021-02-18 DIAGNOSIS — C79.51 PROSTATE CANCER METASTATIC TO BONE (HCC): Primary | ICD-10-CM

## 2021-02-18 DIAGNOSIS — C61 PROSTATE CANCER METASTATIC TO BONE (HCC): Primary | ICD-10-CM

## 2021-02-18 DIAGNOSIS — Z45.2 ENCOUNTER FOR CARE RELATED TO PORT-A-CATH: ICD-10-CM

## 2021-02-18 LAB
ALBUMIN SERPL-MCNC: 4 G/DL (ref 3.8–4.8)
ALBUMIN/GLOB SERPL: 2.1 {RATIO} (ref 1.2–2.2)
ALP SERPL-CCNC: 74 IU/L (ref 39–117)
ALT SERPL-CCNC: 10 IU/L (ref 0–44)
AST SERPL-CCNC: 16 IU/L (ref 0–40)
BASOPHILS # BLD AUTO: 0 X10E3/UL (ref 0–0.2)
BASOPHILS NFR BLD AUTO: 0 %
BILIRUB SERPL-MCNC: 0.2 MG/DL (ref 0–1.2)
BUN SERPL-MCNC: 5 MG/DL (ref 8–27)
BUN/CREAT SERPL: 6 (ref 10–24)
CALCIUM SERPL-MCNC: 8.6 MG/DL (ref 8.6–10.2)
CHLORIDE SERPL-SCNC: 108 MMOL/L (ref 96–106)
CO2 SERPL-SCNC: 20 MMOL/L (ref 20–29)
CREAT SERPL-MCNC: 0.83 MG/DL (ref 0.76–1.27)
EOSINOPHIL # BLD AUTO: 0 X10E3/UL (ref 0–0.4)
EOSINOPHIL NFR BLD AUTO: 0 %
ERYTHROCYTE [DISTWIDTH] IN BLOOD BY AUTOMATED COUNT: 15.7 % (ref 11.6–15.4)
GLOBULIN SER CALC-MCNC: 1.9 G/DL (ref 1.5–4.5)
GLUCOSE SERPL-MCNC: 194 MG/DL (ref 65–99)
HCT VFR BLD AUTO: 41.6 % (ref 37.5–51)
HGB BLD-MCNC: 13.7 G/DL (ref 13–17.7)
IMM GRANULOCYTES # BLD AUTO: 0 X10E3/UL (ref 0–0.1)
IMM GRANULOCYTES NFR BLD AUTO: 1 %
LYMPHOCYTES # BLD AUTO: 0.6 X10E3/UL (ref 0.7–3.1)
LYMPHOCYTES NFR BLD AUTO: 9 %
MCH RBC QN AUTO: 27.1 PG (ref 26.6–33)
MCHC RBC AUTO-ENTMCNC: 32.9 G/DL (ref 31.5–35.7)
MCV RBC AUTO: 82 FL (ref 79–97)
MONOCYTES # BLD AUTO: 0.1 X10E3/UL (ref 0.1–0.9)
MONOCYTES NFR BLD AUTO: 1 %
NEUTROPHILS # BLD AUTO: 6.2 X10E3/UL (ref 1.4–7)
NEUTROPHILS NFR BLD AUTO: 89 %
PLATELET # BLD AUTO: 164 X10E3/UL (ref 150–450)
POTASSIUM SERPL-SCNC: 4 MMOL/L (ref 3.5–5.2)
PROT SERPL-MCNC: 5.9 G/DL (ref 6–8.5)
PSA SERPL-MCNC: 0.1 NG/ML (ref 0–4)
RBC # BLD AUTO: 5.05 X10E6/UL (ref 4.14–5.8)
SODIUM SERPL-SCNC: 141 MMOL/L (ref 134–144)
WBC # BLD AUTO: 7 X10E3/UL (ref 3.4–10.8)

## 2021-02-18 PROCEDURE — 96413 CHEMO IV INFUSION 1 HR: CPT

## 2021-02-18 PROCEDURE — 25010000003 HEPARIN LOCK FLUSH PER 10 UNITS: Performed by: INTERNAL MEDICINE

## 2021-02-18 PROCEDURE — 25010000002 DOCETAXEL 20 MG/ML SOLUTION 8 ML VIAL: Performed by: NURSE PRACTITIONER

## 2021-02-18 RX ORDER — SODIUM CHLORIDE 9 MG/ML
250 INJECTION, SOLUTION INTRAVENOUS ONCE
Status: COMPLETED | OUTPATIENT
Start: 2021-02-18 | End: 2021-02-18

## 2021-02-18 RX ORDER — HEPARIN SODIUM (PORCINE) LOCK FLUSH IV SOLN 100 UNIT/ML 100 UNIT/ML
500 SOLUTION INTRAVENOUS AS NEEDED
Status: CANCELLED | OUTPATIENT
Start: 2021-02-18

## 2021-02-18 RX ORDER — HEPARIN SODIUM (PORCINE) LOCK FLUSH IV SOLN 100 UNIT/ML 100 UNIT/ML
500 SOLUTION INTRAVENOUS AS NEEDED
Status: DISCONTINUED | OUTPATIENT
Start: 2021-02-18 | End: 2021-02-18 | Stop reason: HOSPADM

## 2021-02-18 RX ADMIN — DOCETAXEL 155 MG: 20 INJECTION, SOLUTION, CONCENTRATE INTRAVENOUS at 09:48

## 2021-02-18 RX ADMIN — HEPARIN 500 UNITS: 100 SYRINGE at 10:52

## 2021-02-18 RX ADMIN — SODIUM CHLORIDE 250 ML: 9 INJECTION, SOLUTION INTRAVENOUS at 09:48

## 2021-02-18 NOTE — PROGRESS NOTES
Drug: Relugolix  Strength: 120mg tablet  Directions: Take 3 tablets (360mg) on Day 1 of Cycle 1 ONLY. Then take one tablet (120mg) PO once daily thereafter  QTY: 30  RF:3    Released to pharmacy: [facility]     Completed independent double check on medication order/RX.

## 2021-02-18 NOTE — PLAN OF CARE
Oral Chemotherapy Teaching      Patient Name/:  Naveen Lugo   1955  Oral Chemotherapy Regimen:  Orgovyx (Relugolix) 360mg loading dose on day one, followed by 120mg daily  Date Started Medication: Pending approval from insurance and medication mailed to patient    Initial Teaching Follow Up Comments      Safety      Storage instructions (away from children; away from heat/cold, sunlight, or moisture), handling - use of gloves (caregivers), washing hands after touching pills, managing waste       “How are you storing your medications?”, reminders on storage, proper handling (caregivers using gloves, washing hands, away from children, managing waste, etc.), disposal of medication with D/C or dosage change     Store medication safe away from children and pets, at room temp away from light.      Adherence       patient and/or caregiver on how to take medication, take with/without food, assess their adherence potential, stress importance of adherence, ways to manage adherence (pill boxes, phone reminders, calendars), what to do if miss a dose    “How are you taking your medication?” “How are you remembering to take your medication?”, “How many doses have you missed?”, determine reasons for non-adherence (not remembering, side effects, etc), ways to improve, overadherence? Remind patient of ways to improve/maintain adherence    Provided calendar to help improve adherence. Instructed patient on loading dose of 3, 120mg, tablets on the first day followed by one tablet every day, taking medication at the same time each day. Discussed patient can take within 12 hours if a dose is forgotten, but don't double up on doses. Will be mailing written materials and packet on medication to patient at verified address.       Side Effects/Adverse Reactions       patient on potential side effects, s/s, ways to manage, when to call MD/seek help       Determine if patient experiencing side effects, ways to manage   Counseled on home management and when to call MD on ADRs.  Discussed most common ADRs including but not limited to constipation/diarrhea, muscoskeletal pain, flushing, fatigue, chest pain, cardiac dysrythmias, CVA, MI      Miscellaneous      Food interactions, DDIs, financial issues Determine if patient started any new medications since being placed on oral chemo (analyze for DDI) Reviewed patient medication list for potential drug drug interactions. Instructed patient to discuss with pharmacist or physician prior to starting any new medications.      Additional Notes:  Discussed aforementioned material with patient over the phone. All questions and concerns addressed.Will provide pharmacist's contact information in mail with instructions to call should additional questions arise. Will mail personalized treatment calendar and written educational material to patient at verified address.     Thanks,     Miguel Hamilton, PharmD, [facility]   Pharmacy Resident   2/18/2021 10:48 EST

## 2021-02-18 NOTE — PROGRESS NOTES
Oral chemotherapy clinic received referral from Dr. Rashid regarding the initiation of relugolix. Reviewed patient chart. Released script for relugolix 120mg take 3 tablets PO for Cycle 1 Day 1 only then take 1 tablet PO daily thereafter, #30 with 3 RF to [facility] to begin processing. Pt will be educated on the phone for oral chemotherapy education and financial navigation  Will obtain CCA at that time.

## 2021-02-23 ENCOUNTER — TELEPHONE (OUTPATIENT)
Dept: ONCOLOGY | Facility: CLINIC | Age: 66
End: 2021-02-23

## 2021-02-23 ENCOUNTER — OFFICE VISIT (OUTPATIENT)
Dept: PALLIATIVE CARE | Facility: CLINIC | Age: 66
End: 2021-02-23

## 2021-02-23 VITALS
BODY MASS INDEX: 28.99 KG/M2 | SYSTOLIC BLOOD PRESSURE: 132 MMHG | HEART RATE: 109 BPM | OXYGEN SATURATION: 98 % | DIASTOLIC BLOOD PRESSURE: 72 MMHG | WEIGHT: 196.3 LBS

## 2021-02-23 DIAGNOSIS — F11.90 OPIOID USE DISORDER: ICD-10-CM

## 2021-02-23 DIAGNOSIS — B37.2 YEAST DERMATITIS: ICD-10-CM

## 2021-02-23 DIAGNOSIS — G89.3 PAIN, CANCER: Primary | ICD-10-CM

## 2021-02-23 PROCEDURE — 99214 OFFICE O/P EST MOD 30 MIN: CPT | Performed by: INTERNAL MEDICINE

## 2021-02-23 RX ORDER — FENTANYL 50 UG/H
1 PATCH TRANSDERMAL
Qty: 10 PATCH | Refills: 0 | Status: SHIPPED | OUTPATIENT
Start: 2021-03-05 | End: 2021-03-24

## 2021-02-23 RX ORDER — AMOXICILLIN AND CLAVULANATE POTASSIUM 875; 125 MG/1; MG/1
1 TABLET, FILM COATED ORAL 2 TIMES DAILY
COMMUNITY
Start: 2021-02-19 | End: 2021-03-23

## 2021-02-23 RX ORDER — NYSTATIN 100000 U/G
CREAM TOPICAL AS NEEDED
Qty: 30 G | Refills: 2 | Status: SHIPPED | OUTPATIENT
Start: 2021-02-23 | End: 2021-03-09

## 2021-02-23 RX ORDER — HYDROMORPHONE HYDROCHLORIDE 4 MG/1
4 TABLET ORAL EVERY 4 HOURS PRN
Qty: 112 TABLET | Refills: 0 | Status: SHIPPED | OUTPATIENT
Start: 2021-02-23 | End: 2021-03-23

## 2021-02-23 NOTE — PROGRESS NOTES
Med Counts  Medication Filled # Filled Count Used  # days ROSE MARIE   Fentanyl 50 2/22/21 5 4 1 1 1/3 days?   Dilaudid 4mg 2/16/21 32 0 32 7 4.6                                                                    Office Visit Date Fill Date Fill Amt  # disposed # New Patch on pt Total patches   2/23/21 2/6/21 5 5 0 0 5   2/23/21 2/22/21 5 0 4 1 5     *Disposed of 8 used fentanyl patches. 3 from previous fill.

## 2021-02-23 NOTE — TELEPHONE ENCOUNTER
Caller: cover my meds    Best call back number: [phone]     [employer] Med[employer] states that an error has come thru for a prior auth for the med- relugolix (ORGOVYX) 120 MG tablet tablet- and would like to speak with someone who can help clarify the error.
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
## 2021-02-25 PROBLEM — B37.2 YEAST DERMATITIS: Status: ACTIVE | Noted: 2021-02-25

## 2021-03-04 ENCOUNTER — TELEPHONE (OUTPATIENT)
Dept: ONCOLOGY | Facility: CLINIC | Age: 66
End: 2021-03-04

## 2021-03-04 NOTE — TELEPHONE ENCOUNTER
PT: AYDE URENA FROM [facility]   CALLING TO GET PSA FROM 1/26/21.     PHONE #: [phone]

## 2021-03-09 ENCOUNTER — OFFICE VISIT (OUTPATIENT)
Dept: ONCOLOGY | Facility: CLINIC | Age: 66
End: 2021-03-09

## 2021-03-09 ENCOUNTER — TELEPHONE (OUTPATIENT)
Dept: PALLIATIVE CARE | Facility: CLINIC | Age: 66
End: 2021-03-09

## 2021-03-09 ENCOUNTER — SPECIALTY PHARMACY (OUTPATIENT)
Dept: ONCOLOGY | Facility: HOSPITAL | Age: 66
End: 2021-03-09

## 2021-03-09 ENCOUNTER — INFUSION (OUTPATIENT)
Dept: ONCOLOGY | Facility: HOSPITAL | Age: 66
End: 2021-03-09

## 2021-03-09 VITALS
BODY MASS INDEX: 29.39 KG/M2 | RESPIRATION RATE: 18 BRPM | TEMPERATURE: 98 F | DIASTOLIC BLOOD PRESSURE: 66 MMHG | WEIGHT: 199 LBS | SYSTOLIC BLOOD PRESSURE: 127 MMHG | HEART RATE: 109 BPM

## 2021-03-09 DIAGNOSIS — C61 PROSTATE CANCER METASTATIC TO BONE (HCC): Primary | ICD-10-CM

## 2021-03-09 DIAGNOSIS — C79.51 PROSTATE CANCER METASTATIC TO BONE (HCC): Primary | ICD-10-CM

## 2021-03-09 DIAGNOSIS — G89.3 CANCER RELATED PAIN: Primary | ICD-10-CM

## 2021-03-09 DIAGNOSIS — C61 PROSTATE CANCER METASTATIC TO BONE (HCC): ICD-10-CM

## 2021-03-09 DIAGNOSIS — Z45.2 ENCOUNTER FOR CARE RELATED TO PORT-A-CATH: Primary | ICD-10-CM

## 2021-03-09 DIAGNOSIS — C79.51 PROSTATE CANCER METASTATIC TO BONE (HCC): ICD-10-CM

## 2021-03-09 PROCEDURE — 25010000003 HEPARIN LOCK FLUSH PER 10 UNITS: Performed by: INTERNAL MEDICINE

## 2021-03-09 PROCEDURE — 36591 DRAW BLOOD OFF VENOUS DEVICE: CPT

## 2021-03-09 PROCEDURE — 99215 OFFICE O/P EST HI 40 MIN: CPT | Performed by: INTERNAL MEDICINE

## 2021-03-09 RX ORDER — PREDNISONE 1 MG/1
5 TABLET ORAL DAILY
Qty: 30 TABLET | Refills: 11 | Status: SHIPPED | OUTPATIENT
Start: 2021-03-09 | End: 2022-02-11 | Stop reason: SDUPTHER

## 2021-03-09 RX ORDER — FENTANYL 12 UG/H
1 PATCH TRANSDERMAL
Qty: 5 PATCH | Refills: 0 | Status: SHIPPED | OUTPATIENT
Start: 2021-03-09 | End: 2021-03-24

## 2021-03-09 RX ORDER — HEPARIN SODIUM (PORCINE) LOCK FLUSH IV SOLN 100 UNIT/ML 100 UNIT/ML
500 SOLUTION INTRAVENOUS AS NEEDED
Status: CANCELLED | OUTPATIENT
Start: 2021-03-09

## 2021-03-09 RX ORDER — ABIRATERONE 500 MG/1
1000 TABLET ORAL DAILY
Qty: 60 TABLET | Refills: 3 | Status: SHIPPED | OUTPATIENT
Start: 2021-03-09 | End: 2021-06-11 | Stop reason: SDUPTHER

## 2021-03-09 RX ORDER — HEPARIN SODIUM (PORCINE) LOCK FLUSH IV SOLN 100 UNIT/ML 100 UNIT/ML
500 SOLUTION INTRAVENOUS AS NEEDED
Status: DISCONTINUED | OUTPATIENT
Start: 2021-03-09 | End: 2021-03-09 | Stop reason: HOSPADM

## 2021-03-09 RX ORDER — SODIUM CHLORIDE 9 MG/ML
250 INJECTION, SOLUTION INTRAVENOUS ONCE
Status: CANCELLED | OUTPATIENT
Start: 2021-04-20

## 2021-03-09 RX ADMIN — HEPARIN 500 UNITS: 100 SYRINGE at 10:05

## 2021-03-09 NOTE — PROGRESS NOTES
Drug: Zytiga  Strength: 500mg tablet  Directions: Take two tablets by mouth once daily  QTY: 60  RF:3    Released to pharmacy: [facility]     Completed independent double check on medication order/RX.

## 2021-03-09 NOTE — TELEPHONE ENCOUNTER
Called and relayed instructions to pt to add new patch and not to increase dilaudid. Pt v/u. Also verified with pharmacy that PA was not needed and medication was approved

## 2021-03-09 NOTE — TELEPHONE ENCOUNTER
Called pt to see if he could move his apt due to other pt needs and he is agreeable as it moves it up. Pt asked if he could get more pain medicine. Pt stated that he started new oral hormone pill for prostate cancer and it is making his whole body hurt. He also has his Zometa infusion next week that causes him lots of pain. Pt states he had a scan last week but do not see any reports in chart. Will request from [facility] as he does imaging there. Pt is taking fent 50, dailudid 4 q6, APAP 500mg q6hr, and ibuprofren 800 q6.

## 2021-03-09 NOTE — TELEPHONE ENCOUNTER
Reviewed last scan results - bone scan 3/4/21:  No new suspicious foci.  Pt c/o generalized pain.  Noted possible muscle pain side effect with Zytiga, a new medication for him.     He has f/u in 2 week scheduled.   Pt was e-scribed 2/23/21 for #112 Dilaudid 4mg tabs, max 4/day, or OME 64mg.  Unknown patient's current medication count.  Self escalation of dose is unsafe, and RN will provide re-education for patient to not do this.    Will add Fentanyl 12mcg patch in addition to Fentanyl 50mcg he currently has, to account for his current 4/day Dilaudid 4mg.      No increase in Dilaudid 4mg 4/day PRN regimen.  No early refill for Dilaudid.    Will plan for patient to f/u in palliative clinic every 2 weeks for a few visits given report of unmanaged pain.

## 2021-03-09 NOTE — PROGRESS NOTES
CHIEF COMPLAINT: Metastatic prostate cancer to the bone    Problem List:  Oncology/Hematology History Overview Note   1.  Stage IVb grade group 4 metastatic prostate cancer with sclerotic bone lesions on CT and bone scan and history of prostatic hypertrophy  2.  Kidney stone  3.  Polyps    -9/30/2020 office note from Dr. Ronen Reynaga indicates patient with PSA 10.8.  Underwent TRUS prostate biopsy 9/15/2020.  Adenocarcinoma the prostate Sarika 4+4 = 8 in 1 out of 12 cores and 4+3 = 7 and 10 out of 12 cores and 3+4 = 7 in 1 out of 12 cores.  Perineural invasion.  Extraprostatic extension into the fat seen on 2 biopsies of the right lateral mid and right apex.  9/24/2020 CT chest and whole-body bone scan showed T12, L1, left acetabular, right medial acetabular metastases with no lung metastases.  He started androgen deprivation therapy with Casodex with plans for Lupron to start in 1 to 2 weeks for clinical T3 N0 M1 metastatic prostate cancer.  Review of official report from [facility] 9/24/2020 bone scan mentions T12, L1, roof of left acetabulum and medial right acetabular osteoblastic metastases.  CT chest with contrast that same day showed mild splenomegaly 14.2 cm with stable periaortic nodes compared to CT December 2015 with no lung metastases.  Sclerotic abnormality within the T12 vertebral body, L1 vertebral body extending into the pedicle unchanged.  8/28/2020 CT abdomen pelvis report from [facility] reviewed and mentions normal spleen size.  Punctate nonobstructing kidney stone, no paulino enlargement, diffuse bladder wall thickening with mild perivesicular fat stranding, 2.1 cm sclerotic left iliac bone above the left acetabulum new compared to 2015 as well as 1.5 cm faintly sclerotic focus in the right posterior acetabulum stable compared to prior CT 2015 as well as a 1.6 cm T12 and inferior vertebral body sclerotic lesion and a 1.9 cm left posterior lateral L1 vertebral body abnormality  extending to the pedicle.  -10/13/2020 initial [facility], [place], operated by [facility] medical oncology consultation: With 1 Sarika score 8, 4+4 lesion that would make him a grade group 4 with stage IVb disease given radiographic evidence of sclerotic bone metastasis on bone scan and CT.  Needs genetic testing given metastatic prostate cancer and this is also important as, were he to have mismatch repair mutation then we would have options for PDL 1 inhibitors and were he BRCA mutated would have options for PARP inhibitors in the future.  Systemic therapy for castration naïve prostate cancer or N1 disease should be with apalutamide or Abiraterone or enzalutamide all of which are category 1.  He does not have any visceral metastases.  According to his imaging he has T12, L1, left acetabular, right acetabular, and left iliac bony involvement.  That means he would have 4 or more bone metastases with at least one metastasis (i.e. the acetabular lesions bilaterally) beyond the pelvis/vertebral, for which this would be considered high-volume disease for which 6 cycles of docetaxel 75 mg/m² x 6 cycles followed by Zytiga and prednisone would be reasonable along with Zometa every 6 weeks for bone stabilization.  He will do chemo preparation visit with my nurse practitioner prior to start chemotherapy with Taxotere and Zometa in 2 weeks and he is already received Casodex in preparation for Lupron shot he received on 10/12/2020 with Dr. Reynaga.  We will get him to Dr. Alexander Gomez who has done his polypectomies in the past for port placement and we will get genetic counseling started.  Following the 6 courses of Taxotere per the Chaarted trial criteria, I would then give him an indefinite Zytiga and prednisone and Zometa until progression or toxicity dictates.    -12/16/2019 COVID-19 antigen test negative    -12/29/2020 PSA 0.275 with absolute neutrophil count 700 and creatinine 0.76 with normal liver enzymes and electrolytes.  Normal magnesium and  phosphorus and urine drug screen positive for buprenorphine and opiates follow-up with palliative care.    -1/4/2021 CT chest abdomen pelvis with contrast and whole-body bone scan shows improving less metabolically active bone metastases.  Stable sclerotic T12, L1, and L2 vertebral bodies and bilateral pelvic bones on bone windows with stable subcentimeter para-aortic lymph nodes and no definite progression in the chest abdomen or pelvis.    -1/5/2021 [facility] medical oncology follow-up visit: I reviewed the images and reports thereof of the above CT and bone scan which shows good response to Taxotere x3 courses with a PSA down to 0.275 from a baseline of 10.  Get another 3 courses of Taxotere, continue his Xgeva, and then following that we will switch to Zytiga and prednisone.  He is working with palliative care on his bone pain but on his current dose of fentanyl patch he says his pain is still not adequately controlled he will contact them.  Dexamethasone prescription was refilled.  We will see my nurse practitioner back in 3 weeks for course #5 of 6 total courses and then we will reimage with CT chest abdomen pelvis and bone scan before going to the Zytiga prednisone.    -3/4/2021 CT chest abdomen pelvis with contrast is negative save for stable sclerotic bone lesions and the bone scan shows near resolution of prior bony abnormalities associated with the known sclerotic lesions in the thoracolumbar spine and bony pelvis.  2/17/2021 PSA down to 0.1 from 1.37 October 2020.     Prostate cancer metastatic to bone (CMS/HCC)   8/30/2020 -  Other Event    PSA 10.8     9/15/2020 Initial Diagnosis    Prostate cancer metastatic to bone (CMS/HCC)     9/15/2020 Biopsy    Prostate needle core biopsy     9/24/2020 Imaging    Total body bone scan: 4 abnormal areas of increased activity consistent with osteoblastic metastasis, abnormal foci of activity seen in the T12 vertebral body, L1 vertebral body, roof of the left  acetabulum, and acetabulum medially on the right.  CT chest: Stable sclerotic metastatic lesions within the T12 and L1 vertebrae.  No other evidence of metastatic disease to the chest.  Stable mild splenomegaly and subcentimeter periaortic retroperitoneal lymph nodes.  CT abdomen and pelvis: Diffuse bladder wall thickening with mild perivesicular fat stranding.  Interval development of several sclerotic lesions in the spine and left iliac bone.     10/13/2020 Cancer Staged    Staging form: Bone - Appendicular Skeleton, Trunk, Skull, And Facial Bones, AJCC 8th Edition  - Clinical: Stage IVB (cT3, cN0, cM1b, G3) - Signed by Ishmael Rashid MD on 10/13/2020     11/3/2020 -  Chemotherapy    OP SUPPORTIVE Zoledronic Acid Q42d     11/3/2020 -  Chemotherapy    OP PROSTATE DOCEtaxel     1/4/2021 Imaging    CT chest abdomen pelvis with contrast and whole-body bone scan shows improving less metabolically active bone metastases.  Stable sclerotic T12, L1, and L2 vertebral bodies and bilateral pelvic bones on bone windows with stable subcentimeter para-aortic lymph nodes and no definite progression in the chest abdomen or pelvis.  PSA down to 0.275 after 3 courses of Taxotere.     3/4/2021 Imaging    CT chest, abdomen and pelvis stable, no evidence of disease progression. Total body bone scan with decreased/near resolution of abnormal increased radiotracer uptake associated with the known sclerotic lesions in the thoracolumbar spine and bony pelvis.  No evidence of new osteoblastic metastases.     3/4/2021 Imaging    CT chest abdomen pelvis with contrast is negative save for stable sclerotic bone lesions and the bone scan shows near resolution of prior bony abnormalities associated with the known sclerotic lesions in the thoracolumbar spine and bony pelvis.  2/17/2021 PSA down to 0.1 from 1.37 October 2020.     3/9/2021 - 3/9/2021 Chemotherapy    OP SUPPORTIVE PROSTATE Relugolix     3/9/2021 -  Chemotherapy    OP PROSTATE  Abiraterone / PredniSONE           HISTORY OF PRESENT ILLNESS:  The patient is a 65 y.o. male, here for follow up on management of metastatic prostate cancer to the bone with bone pain exacerbated by Relugolix temporally.    Past Medical History:   Diagnosis Date   • Nephrolithiasis    • Opioid use disorder, mild, on maintenance therapy (CMS/HCC)      Past Surgical History:   Procedure Laterality Date   • CHOLECYSTECTOMY     • CORONARY STENT PLACEMENT  206 & 2011   • HERNIA REPAIR  1986   • THORACIC DISCECTOMY  1994       Allergies   Allergen Reactions   • Gabapentin Nausea Only       Family History and Social History reviewed and changed as necessary    REVIEW OF SYSTEM:   Other than for bone pain for which palliative care is helping, he has no new complaints        PHYSICAL EXAM:  Lungs clear heart regular rate rhythm.  Abdomen no mass.  Extremities no cords.  Neurologically no focal motor or sensory deficits      Vitals:    03/09/21 0904   BP: 127/66   Pulse: 109   Resp: 18   Temp: 98 °F (36.7 °C)   Weight: 90.3 kg (199 lb)     Vitals:    03/09/21 0904   PainSc:   8   PainLoc: Hip  Comment: bilateral hips and legs                      Vitals reviewed.    ECOG: (1) Restricted in Physically Strenuous Activity, Ambulatory & Able to Do Work of Light Nature    Lab Results   Component Value Date    HGB 13.7 02/17/2021    HCT 41.6 02/17/2021    MCV 82 02/17/2021     02/17/2021    WBC 7.0 02/17/2021    NEUTROABS 6.2 02/17/2021    LYMPHSABS 0.6 (L) 02/17/2021    MONOSABS 0.1 02/17/2021    EOSABS 0.0 02/17/2021    BASOSABS 0.0 02/17/2021       Lab Results   Component Value Date    GLUCOSE 123 (H) 12/29/2020    BUN 5 (L) 02/17/2021    CREATININE 0.83 02/17/2021     02/17/2021    K 4.0 02/17/2021     (H) 02/17/2021    CO2 20 02/17/2021    CALCIUM 8.6 02/17/2021    PROTEINTOT 6.9 12/29/2020    ALBUMIN 4.0 02/17/2021    BILITOT 0.2 02/17/2021    ALKPHOS 74 02/17/2021    AST 16 02/17/2021    ALT 10 02/17/2021              ASSESSMENT & PLAN:  1.  Prostate cancer metastatic to bone  2.  Bone pain    Discussion: I reviewed images and reports of the following:  CT chest abdomen pelvis with contrast is negative save for stable sclerotic bone lesions and the bone scan shows near resolution of prior bony abnormalities associated with the known sclerotic lesions in the thoracolumbar spine and bony pelvis.  2/17/2021 PSA down to 0.1 from 1.37 October 2020.    Hence, he has had an excellent response to the Taxotere.  Bone pain is exacerbatedand Zometa.  We will continue this and he will work with palliative care I will start his Zytiga prednisone until progression.  He will follow up with my nurse practitioner back to make sure he is tolerating this his pain is being managed by palliative care and we will reimage him with CT chest abdomen pelvis and bone scan in about 3 months.    This visit addresses a chronic illness that poses a threat to life on drug therapy requiring intensive monitoring for toxicity and warrants a level 5 MDM.  For the purposes of planning a coding, this is a metastatic prostate cancer which by definition is a chronic life-threatening illness drug therapy including chemotherapy requiring intensive monitoring of toxicities including marrow suppression and Zytiga prednisone will require intensive laboratory following at least monthly if not more often for adrenal insufficiency and Zometa for which we watch his calcium and other parameters each month.  Hopefully that will clarify for the coders the abundantly obvious nature of his level 5 MDM.    Ishmael Rashid MD    03/09/2021

## 2021-03-09 NOTE — PROGRESS NOTES
Specialty Pharmacy Assessment    Abiraterone (Zytiga)  Dose: 1000 mg  Frequency: Once daily  Indication: Prostate cancer  Goals of Therapy: Palliative  New Start: Yes  Prescription submitted to: [facility]  Counseling: Prednisone is recommended to be used in combination with abiraterone. Recommend taking corticosteroid with a meal.  Additional notes: Oral chemotherapy clinic received referral from Dr. Rashid regarding the initiation of abiraterone and prednisone. Reviewed patient chart. Released script for abiraterone 1000mg PO daily, #60 with 3 RF and prednisone 5mg PO daily, #30 with 11 RF to [facility] to begin processing. Pt scheduled for oral chemotherapy education and financial navigation appt on 3/10/21.  Will obtain consent and CCA at that time.      Patient has metastatic castration sensitive prostate cancer.

## 2021-03-10 ENCOUNTER — INFUSION (OUTPATIENT)
Dept: ONCOLOGY | Facility: HOSPITAL | Age: 66
End: 2021-03-10

## 2021-03-10 ENCOUNTER — SPECIALTY PHARMACY (OUTPATIENT)
Dept: ONCOLOGY | Facility: HOSPITAL | Age: 66
End: 2021-03-10

## 2021-03-10 VITALS
HEART RATE: 95 BPM | DIASTOLIC BLOOD PRESSURE: 67 MMHG | RESPIRATION RATE: 18 BRPM | BODY MASS INDEX: 29.53 KG/M2 | WEIGHT: 200 LBS | TEMPERATURE: 98.4 F | SYSTOLIC BLOOD PRESSURE: 134 MMHG

## 2021-03-10 DIAGNOSIS — Z45.2 ENCOUNTER FOR CARE RELATED TO PORT-A-CATH: Primary | ICD-10-CM

## 2021-03-10 DIAGNOSIS — C61 PROSTATE CANCER METASTATIC TO BONE (HCC): ICD-10-CM

## 2021-03-10 DIAGNOSIS — C79.51 PROSTATE CANCER METASTATIC TO BONE (HCC): ICD-10-CM

## 2021-03-10 LAB
ALBUMIN SERPL-MCNC: 3.6 G/DL (ref 3.8–4.8)
ALBUMIN/GLOB SERPL: 1.3 {RATIO} (ref 1.2–2.2)
ALP SERPL-CCNC: 88 IU/L (ref 39–117)
ALT SERPL-CCNC: 11 IU/L (ref 0–44)
AST SERPL-CCNC: 18 IU/L (ref 0–40)
BASOPHILS # BLD AUTO: 0.1 X10E3/UL (ref 0–0.2)
BASOPHILS NFR BLD AUTO: 1 %
BILIRUB SERPL-MCNC: <0.2 MG/DL (ref 0–1.2)
BUN SERPL-MCNC: 8 MG/DL (ref 8–27)
BUN/CREAT SERPL: 11 (ref 10–24)
CALCIUM SERPL-MCNC: 8.9 MG/DL (ref 8.6–10.2)
CHLORIDE SERPL-SCNC: 105 MMOL/L (ref 96–106)
CO2 SERPL-SCNC: 22 MMOL/L (ref 20–29)
CREAT SERPL-MCNC: 0.76 MG/DL (ref 0.76–1.27)
EOSINOPHIL # BLD AUTO: 0 X10E3/UL (ref 0–0.4)
EOSINOPHIL NFR BLD AUTO: 0 %
ERYTHROCYTE [DISTWIDTH] IN BLOOD BY AUTOMATED COUNT: 15.6 % (ref 11.6–15.4)
GLOBULIN SER CALC-MCNC: 2.7 G/DL (ref 1.5–4.5)
GLUCOSE SERPL-MCNC: 116 MG/DL (ref 65–99)
HCT VFR BLD AUTO: 45.1 % (ref 37.5–51)
HGB BLD-MCNC: 14.4 G/DL (ref 13–17.7)
IMM GRANULOCYTES # BLD AUTO: 0 X10E3/UL (ref 0–0.1)
IMM GRANULOCYTES NFR BLD AUTO: 0 %
LYMPHOCYTES # BLD AUTO: 1.9 X10E3/UL (ref 0.7–3.1)
LYMPHOCYTES NFR BLD AUTO: 20 %
MAGNESIUM SERPL-MCNC: 2.1 MG/DL (ref 1.6–2.3)
MCH RBC QN AUTO: 26 PG (ref 26.6–33)
MCHC RBC AUTO-ENTMCNC: 31.9 G/DL (ref 31.5–35.7)
MCV RBC AUTO: 82 FL (ref 79–97)
MONOCYTES # BLD AUTO: 0.7 X10E3/UL (ref 0.1–0.9)
MONOCYTES NFR BLD AUTO: 7 %
NEUTROPHILS # BLD AUTO: 6.8 X10E3/UL (ref 1.4–7)
NEUTROPHILS NFR BLD AUTO: 72 %
PHOSPHATE SERPL-MCNC: 2.1 MG/DL (ref 2.8–4.1)
PLATELET # BLD AUTO: 289 X10E3/UL (ref 150–450)
POTASSIUM SERPL-SCNC: 3.7 MMOL/L (ref 3.5–5.2)
PROT SERPL-MCNC: 6.3 G/DL (ref 6–8.5)
PSA SERPL-MCNC: 0.1 NG/ML (ref 0–4)
RBC # BLD AUTO: 5.53 X10E6/UL (ref 4.14–5.8)
SODIUM SERPL-SCNC: 142 MMOL/L (ref 134–144)
WBC # BLD AUTO: 9.5 X10E3/UL (ref 3.4–10.8)

## 2021-03-10 PROCEDURE — 25010000003 HEPARIN LOCK FLUSH PER 10 UNITS: Performed by: INTERNAL MEDICINE

## 2021-03-10 PROCEDURE — 96374 THER/PROPH/DIAG INJ IV PUSH: CPT

## 2021-03-10 PROCEDURE — 25010000002 ZOLEDRONIC ACID PER 1 MG: Performed by: NURSE PRACTITIONER

## 2021-03-10 RX ORDER — HEPARIN SODIUM (PORCINE) LOCK FLUSH IV SOLN 100 UNIT/ML 100 UNIT/ML
500 SOLUTION INTRAVENOUS AS NEEDED
Status: DISCONTINUED | OUTPATIENT
Start: 2021-03-10 | End: 2021-03-10 | Stop reason: HOSPADM

## 2021-03-10 RX ORDER — HEPARIN SODIUM (PORCINE) LOCK FLUSH IV SOLN 100 UNIT/ML 100 UNIT/ML
500 SOLUTION INTRAVENOUS AS NEEDED
Status: CANCELLED | OUTPATIENT
Start: 2021-03-10

## 2021-03-10 RX ORDER — SODIUM CHLORIDE 9 MG/ML
250 INJECTION, SOLUTION INTRAVENOUS ONCE
Status: DISCONTINUED | OUTPATIENT
Start: 2021-03-10 | End: 2021-03-10 | Stop reason: HOSPADM

## 2021-03-10 RX ADMIN — HEPARIN 500 UNITS: 100 SYRINGE at 11:37

## 2021-03-10 RX ADMIN — ZOLEDRONIC ACID 4 MG: 4 INJECTION, SOLUTION, CONCENTRATE INTRAVENOUS at 11:16

## 2021-03-10 NOTE — PROGRESS NOTES
Specialty Pharmacy Assessment    Abiraterone (Zytiga)  Dose: 1000 mg  Frequency: Once daily  Indication: Prostate cancer  Goals of Therapy: Palliative  New Start: Yes  Prescription submitted to: [facility]  Supportive Care Medication - Corticosteroid: Yes  Counseling: Take orally once daily at the same time each day, Take on an empty stomach, at least one hour before or at least two hours after a meal., Swallow capsules whole. Do not chew, dissolve, or open the capsules, Prednisone is recommended to be used in combination with abiraterone. Recommend taking corticosteroid with a meal., It should not be handled by people other than patient or caregivers, and especially not by females who are or may become pregnant or are breastfeeding, Store medication at room temperature in a tightly closed container out of the reach of children, Most common side effects - weakness or feeling more tired than usual, increased blood sugar, increased sodium levels, increased triglycerides, changes in liver function, and low blood counts, Serious side effects - Fluid retention, Fractures, Irregular heartbeat, and High blood pressure  Additional notes: Spoke with patient via phone on 3/10/21 to provide initial counseling for abiraterone and prednisone therapy. Discussed indication, dose, frequency, and specific administration instructions as above. Reviewed the importance of adherence and aids that may be helpful in remembering to take medications. Reviewed safe handling of oral medications and how to dispose if therapy is changed in the future. Reviewed avoidance of grapefruit and grapefruit juice. Discussed importance of labs in order to monitor for organ toxicities and lab abnormalities, reviewed frequency as day 1 and 15 for 3 cycles and monthly thereafter. Discussed possible adverse reactions and appropriate prevention and management strategies as appropriate. Patient does report persistent bone pain and is followed by palliative  medicine for management. Answered all of the patients questions and addressed any concerns. Will mail packet containing CCA and consent to obtain signature, as well as educational materials for relugolix, abiraterone, prednisone, Zometa, and adherence guide. Patient requested that this information as well as his abiraterone and prednisone be mailed to him, verified address [facility]: [address], [place] [facility] [address].          Drug Interactions    Drug interactions evaluated: yes  Clinically relevant drug interactions identified: no  Provided the patient with educational material regarding drug interactions: not applicable        Patient Counseling    Counseled the patient on the following: doses and administration discussed, safe handling, storage, and disposal discussed, possible adverse effects and management discussed, possible drug and OTC drug and food interactions discussed, lab monitoring and follow-up discussed, therapeutic rationale discussed, adherence and missed doses discussed, pharmacy contact information discussed        Discussed aforementioned material with patient via phone 3/10/21. Answered all of the patients questions and addressed any concerns. Will mail pharmacist contact information and instructed patient to call should additional questions arise. CCA and consent forms will be mailed to patient who is aware to mail back once complete. Patient to speak with financial navigator. Patient requested that information as well as his abiraterone and prednisone be mailed to him, verified address in [facility]: [address], [place] [address].    Travis Wan, Niko  Pharmacy Resident  3/10/2021  08:50 EST

## 2021-03-12 ENCOUNTER — TELEPHONE (OUTPATIENT)
Dept: ONCOLOGY | Facility: CLINIC | Age: 66
End: 2021-03-12

## 2021-03-12 NOTE — TELEPHONE ENCOUNTER
Called patient back and gave him an appt for 3/25 to have Day 15 labs checked. He verbalized understanding of appt date and time.

## 2021-03-12 NOTE — TELEPHONE ENCOUNTER
PT: AYDE STOCKTON    PT IS CALLING TO LET ELIAS KNOW THAT HE STARTED TAKING HIS ZYTIGA 500MG 3/12/21 FIRST THING THIS MORNING.    PT #: [phone]

## 2021-03-22 DIAGNOSIS — Z51.81 THERAPEUTIC DRUG MONITORING: Primary | ICD-10-CM

## 2021-03-23 ENCOUNTER — OFFICE VISIT (OUTPATIENT)
Dept: PALLIATIVE CARE | Facility: CLINIC | Age: 66
End: 2021-03-23

## 2021-03-23 ENCOUNTER — LAB (OUTPATIENT)
Dept: LAB | Facility: HOSPITAL | Age: 66
End: 2021-03-23

## 2021-03-23 ENCOUNTER — SPECIALTY PHARMACY (OUTPATIENT)
Dept: ONCOLOGY | Facility: HOSPITAL | Age: 66
End: 2021-03-23

## 2021-03-23 VITALS
BODY MASS INDEX: 30.08 KG/M2 | HEART RATE: 114 BPM | WEIGHT: 203.7 LBS | SYSTOLIC BLOOD PRESSURE: 111 MMHG | OXYGEN SATURATION: 96 % | DIASTOLIC BLOOD PRESSURE: 71 MMHG

## 2021-03-23 DIAGNOSIS — Z51.81 THERAPEUTIC DRUG MONITORING: ICD-10-CM

## 2021-03-23 DIAGNOSIS — G89.3 PAIN, CANCER: ICD-10-CM

## 2021-03-23 DIAGNOSIS — C79.51 PROSTATE CANCER METASTATIC TO BONE (HCC): Primary | ICD-10-CM

## 2021-03-23 DIAGNOSIS — C61 PROSTATE CANCER METASTATIC TO BONE (HCC): Primary | ICD-10-CM

## 2021-03-23 PROCEDURE — 80306 DRUG TEST PRSMV INSTRMNT: CPT

## 2021-03-23 PROCEDURE — 99215 OFFICE O/P EST HI 40 MIN: CPT | Performed by: INTERNAL MEDICINE

## 2021-03-23 RX ORDER — METHADONE HYDROCHLORIDE 5 MG/1
5 TABLET ORAL 2 TIMES DAILY
Qty: 16 TABLET | Refills: 0 | Status: CANCELLED | OUTPATIENT
Start: 2021-03-31 | End: 2021-04-08

## 2021-03-23 RX ORDER — METHADONE HYDROCHLORIDE 5 MG/1
5 TABLET ORAL DAILY
Qty: 5 TABLET | Refills: 0 | Status: CANCELLED | OUTPATIENT
Start: 2021-03-26 | End: 2021-03-31

## 2021-03-23 NOTE — PROGRESS NOTES
Walked with pt to [facility] and witnessed pt wast 7# Fentanyl 50mcg patches and 1# Fentanyl 12mcg patch in green disposal box in pharmacy.

## 2021-03-23 NOTE — PROGRESS NOTES
Reached out to patient for refill assessment/toxicity check. Pt confirms compliance to relugolix. Reports tolerating treatment with no new concerns reported during encounter. Requesting refill of relugolix to be processed at [facility]. Patient would like this mailed- confirmed address of [address]. Will alert [facility] to prep for dispense.

## 2021-03-23 NOTE — PROGRESS NOTES
Med Counts  Medication Filled # Filled Count Used  # days ROSE MARIE   Dilaudid 4mg 2/23/21 112 6 106 28 3.8   Fentanyl 50 3/8/21 10 7 3 15 1/5   Fentanyl 12 3/9/21 5 1 4 14 1/3                                                                            **Fentanyl 50mcg patch   Office Visit Date Fill Date Fill Amt  # disposed # disposed previously # New Patch on pt Total patches   2/23/21 2/6/21 5 5  0 0 5   2/23/21 2/22/21 5 0  4 1 5   3/23/21 2/22/21 5 5 0   5   3/23/21 3/8/21 10 2  7 1 10     **Fentanyl 12mcg patch   Office Visit Date Fill Date Fill Amt  # disposed # New Patch on pt Total patches   3/23/21 3/9/21 5 3 1 1 5
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
## 2021-03-23 NOTE — PATIENT INSTRUCTIONS
1.  EKG at [facility] tomorrow.  If safe, we will start methadone for your pain.  If not safe, then we will e-scribe Fentanyl 75mcg patches and refill the Hydromorphone.    2.  Keep up with your laxatives - goal is to have large BM daily    ALWAYS bring ALL of your medications prescribed by this clinic to EVERY appointment.  If telemedicine appt, be prepared to give and show medication counts.  This assists us with managing your refill needs.  If you fail to bring in any remaining controlled medication (usually a pain or anxiety medication), you may not receive a refill or replacement prescription at that appointment.      Call [phone] Palliative RN triage line for questions regarding medications, refills, or palliative plan of care on Mondays - Fridays 9am to 4pm.  You may also send Zazom messages to Palliative, but NOT directly to Dr. Cary.  (Dr. Cary will NOT review these messages nor be signing scripts outside of clinic hours Tuesdays 9-4pm and on Thursdays 830-midday).      If you require same day communication (such as concern of your health status or new onset symptoms), please CALL your primary care office or appropriate specialist office, not palliative clinic.    You must notify us AT LEAST 5-7 BUSINESS DAYS in advance for any refill requests. Clinic days are Tuesday and Thursdays at this time.  Prescriptions for controlled medications will be signed on clinic days only, by the end of the clinic day.  Please be aware of additional insurance prior authorization processing time required for many of those medications.  Please do not call more than once during clinic days to check on status of your refill request, as this does negatively impact care for scheduled patients.  Please try to communicate with your pharmacy first to look at your profile for scripts signed in advance.    Call after hours and weekends only for new or acute (not chronic) symptom issues to speak to on-call physician or  nurse practitioner.  Be advised that any requests for prescriptions for controlled substances can NOT be honored after hours, including refill requests.    Call [phone] only for scheduling issues for the palliative clinic.

## 2021-03-24 ENCOUNTER — TELEPHONE (OUTPATIENT)
Dept: CARDIOLOGY | Facility: CLINIC | Age: 66
End: 2021-03-24

## 2021-03-24 ENCOUNTER — LAB (OUTPATIENT)
Dept: ONCOLOGY | Facility: HOSPITAL | Age: 66
End: 2021-03-24

## 2021-03-24 VITALS
TEMPERATURE: 98.9 F | HEART RATE: 91 BPM | WEIGHT: 202 LBS | SYSTOLIC BLOOD PRESSURE: 151 MMHG | DIASTOLIC BLOOD PRESSURE: 77 MMHG | RESPIRATION RATE: 18 BRPM | BODY MASS INDEX: 29.83 KG/M2

## 2021-03-24 DIAGNOSIS — G89.3 PAIN, CANCER: Primary | ICD-10-CM

## 2021-03-24 DIAGNOSIS — Z51.81 THERAPEUTIC DRUG MONITORING: Primary | ICD-10-CM

## 2021-03-24 DIAGNOSIS — C61 PROSTATE CANCER METASTATIC TO BONE (HCC): Primary | ICD-10-CM

## 2021-03-24 DIAGNOSIS — C79.51 PROSTATE CANCER METASTATIC TO BONE (HCC): Primary | ICD-10-CM

## 2021-03-24 DIAGNOSIS — Z45.2 ENCOUNTER FOR CARE RELATED TO PORT-A-CATH: ICD-10-CM

## 2021-03-24 PROCEDURE — 25010000003 HEPARIN LOCK FLUSH PER 10 UNITS: Performed by: INTERNAL MEDICINE

## 2021-03-24 PROCEDURE — 36591 DRAW BLOOD OFF VENOUS DEVICE: CPT

## 2021-03-24 PROCEDURE — 93000 ELECTROCARDIOGRAM COMPLETE: CPT | Performed by: INTERNAL MEDICINE

## 2021-03-24 RX ORDER — HEPARIN SODIUM (PORCINE) LOCK FLUSH IV SOLN 100 UNIT/ML 100 UNIT/ML
500 SOLUTION INTRAVENOUS AS NEEDED
Status: DISCONTINUED | OUTPATIENT
Start: 2021-03-24 | End: 2021-03-24 | Stop reason: HOSPADM

## 2021-03-24 RX ORDER — HYDROMORPHONE HYDROCHLORIDE 4 MG/1
4 TABLET ORAL EVERY 4 HOURS PRN
Qty: 60 TABLET | Refills: 0 | Status: SHIPPED | OUTPATIENT
Start: 2021-03-24 | End: 2021-04-08

## 2021-03-24 RX ORDER — HEPARIN SODIUM (PORCINE) LOCK FLUSH IV SOLN 100 UNIT/ML 100 UNIT/ML
500 SOLUTION INTRAVENOUS AS NEEDED
Status: CANCELLED | OUTPATIENT
Start: 2021-03-24

## 2021-03-24 RX ORDER — FENTANYL 75 UG/H
1 PATCH TRANSDERMAL
Qty: 5 PATCH | Refills: 0 | Status: SHIPPED | OUTPATIENT
Start: 2021-03-26 | End: 2021-04-10

## 2021-03-24 RX ADMIN — HEPARIN SODIUM (PORCINE) LOCK FLUSH IV SOLN 100 UNIT/ML 500 UNITS: 100 SOLUTION at 10:55

## 2021-03-24 NOTE — TELEPHONE ENCOUNTER
Called and notified pt that he was not a candiate for Methadone due to his qtc. Pt v/u. Instructed script has been sent in for increased fentanyl and to continue Dilaudid as needed with it. Pt has f/u in two weeks but if pain is not tolerable in the interim for him to reach out.

## 2021-03-24 NOTE — TELEPHONE ENCOUNTER
ECG reviewed:  QTc 462msec and incomplete RBBB.  This would require very close ECG monitoring if methadone initiated.    Will therefore proceed with increase of Fentanyl dose.  Will consider methadone only as adjuvant in future and to decrease need to increase opioid otherwise.      Current opioid:  Fentanyl 62mcg/hr, due to change Friday 3/26  Dilaudid 4mg four times daily  OME = 188mg    Plan:  Increase Fentanyl patch to 75mcg/hr = 150mg OME (25% reduction for cross tolerance in context of severe pain).    Continue Dilaudid 4mg four times daily    Reassess in 2 weeks.

## 2021-03-24 NOTE — TELEPHONE ENCOUNTER
I did an ekg on pt per order. Thank you for the chance to do it. Pt knows that some one from your office will contact him.

## 2021-03-25 ENCOUNTER — APPOINTMENT (OUTPATIENT)
Dept: ONCOLOGY | Facility: HOSPITAL | Age: 66
End: 2021-03-25

## 2021-03-25 LAB
ALBUMIN SERPL-MCNC: 3.7 G/DL (ref 3.8–4.8)
ALBUMIN/GLOB SERPL: 1.5 {RATIO} (ref 1.2–2.2)
ALP SERPL-CCNC: 74 IU/L (ref 39–117)
ALT SERPL-CCNC: 9 IU/L (ref 0–44)
AST SERPL-CCNC: 18 IU/L (ref 0–40)
BASOPHILS # BLD AUTO: 0.1 X10E3/UL (ref 0–0.2)
BASOPHILS NFR BLD AUTO: 1 %
BILIRUB SERPL-MCNC: <0.2 MG/DL (ref 0–1.2)
BUN SERPL-MCNC: 9 MG/DL (ref 8–27)
BUN/CREAT SERPL: 13 (ref 10–24)
CALCIUM SERPL-MCNC: 8.9 MG/DL (ref 8.6–10.2)
CHLORIDE SERPL-SCNC: 104 MMOL/L (ref 96–106)
CO2 SERPL-SCNC: 22 MMOL/L (ref 20–29)
CREAT SERPL-MCNC: 0.7 MG/DL (ref 0.76–1.27)
EOSINOPHIL # BLD AUTO: 0.2 X10E3/UL (ref 0–0.4)
EOSINOPHIL NFR BLD AUTO: 4 %
ERYTHROCYTE [DISTWIDTH] IN BLOOD BY AUTOMATED COUNT: 17.4 % (ref 11.6–15.4)
GLOBULIN SER CALC-MCNC: 2.4 G/DL (ref 1.5–4.5)
GLUCOSE SERPL-MCNC: 102 MG/DL (ref 65–99)
HCT VFR BLD AUTO: 42.2 % (ref 37.5–51)
HGB BLD-MCNC: 13.5 G/DL (ref 13–17.7)
IMM GRANULOCYTES # BLD AUTO: 0 X10E3/UL (ref 0–0.1)
IMM GRANULOCYTES NFR BLD AUTO: 0 %
LYMPHOCYTES # BLD AUTO: 1.8 X10E3/UL (ref 0.7–3.1)
LYMPHOCYTES NFR BLD AUTO: 30 %
MCH RBC QN AUTO: 26.6 PG (ref 26.6–33)
MCHC RBC AUTO-ENTMCNC: 32 G/DL (ref 31.5–35.7)
MCV RBC AUTO: 83 FL (ref 79–97)
MONOCYTES # BLD AUTO: 0.4 X10E3/UL (ref 0.1–0.9)
MONOCYTES NFR BLD AUTO: 7 %
NEUTROPHILS # BLD AUTO: 3.5 X10E3/UL (ref 1.4–7)
NEUTROPHILS NFR BLD AUTO: 58 %
PLATELET # BLD AUTO: 146 X10E3/UL (ref 150–450)
POTASSIUM SERPL-SCNC: 3.9 MMOL/L (ref 3.5–5.2)
PROT SERPL-MCNC: 6.1 G/DL (ref 6–8.5)
RBC # BLD AUTO: 5.08 X10E6/UL (ref 4.14–5.8)
SODIUM SERPL-SCNC: 142 MMOL/L (ref 134–144)
WBC # BLD AUTO: 6 X10E3/UL (ref 3.4–10.8)

## 2021-04-01 ENCOUNTER — SPECIALTY PHARMACY (OUTPATIENT)
Dept: ONCOLOGY | Facility: HOSPITAL | Age: 66
End: 2021-04-01

## 2021-04-01 NOTE — PLAN OF CARE
Specialty Pharmacy Assessment    Abiraterone (Zytiga)  Dose: 1000 mg  Indication: Prostate cancer  Follow-Up: Yes  Dispensing pharmacy: [facility]  Refill status: Current  Dispense status: Mail  Side effect assessment: Other  Other side effects: Muscle aches  Intervention: No  Compliance: Patient reports taking capsules whole once daily, Patient reports taking around the same time every day  Additional notes: Last labs: 3/9   Next appt: 4/8  Prednisone: patient reports face swelling, would like a refill of this medication mailed with Abiraterone        Medication Adherence    Any gaps in refill history greater than 2 weeks in the last 3 months: no  Demonstrates understanding of importance of adherence: yes  Reliability of informant: reliable  Estimated medication adherence level: %  Reasons for non-adherence: no problems identified  Adherence tools used: watch   Other adherence tools: Clock - takes at same time each day, 7:45am        Drug Interactions    Drug interactions evaluated: yes  Clinically relevant drug interactions identified: no  Provided the patient with educational material regarding drug interactions: not applicable       Patient Counseling    Counseled the patient on the following: doses and administration discussed, possible adverse effects and management discussed, lab monitoring and follow-up discussed, adherence and missed doses discussed, pharmacy contact information discussed       Reached out to patient for refill assessment/toxicity check. Pt confirms compliance to Abiraterone and Prednisone.Reports tolerating treatment with no new concerns reported during encounter. Requesting refill of Abiraterone and Prednisone to be processed at [facility] retail. Patient would like medications mailed to address on file at: [address] [place] [id number]. Will alert [facility] to [facility] for dispense and mail.    Discussed aforementioned material with patient in clinic/over the phone. All questions and  concerns addressed. Provided patient with my contact information and instructions to call should additional questions arise.    Thank you,  Rylee Raya  PharmD Candidate 2021 4/1/2021 13:52 EDT

## 2021-04-07 DIAGNOSIS — Z51.81 THERAPEUTIC DRUG MONITORING: Primary | ICD-10-CM

## 2021-04-08 ENCOUNTER — OFFICE VISIT (OUTPATIENT)
Dept: PALLIATIVE CARE | Facility: CLINIC | Age: 66
End: 2021-04-08

## 2021-04-08 VITALS
BODY MASS INDEX: 29.98 KG/M2 | SYSTOLIC BLOOD PRESSURE: 128 MMHG | TEMPERATURE: 97.5 F | HEART RATE: 96 BPM | WEIGHT: 203 LBS | DIASTOLIC BLOOD PRESSURE: 67 MMHG

## 2021-04-08 DIAGNOSIS — Z51.81 THERAPEUTIC DRUG MONITORING: ICD-10-CM

## 2021-04-08 DIAGNOSIS — C79.51 PROSTATE CANCER METASTATIC TO BONE (HCC): Primary | ICD-10-CM

## 2021-04-08 DIAGNOSIS — G89.3 PAIN, CANCER: ICD-10-CM

## 2021-04-08 DIAGNOSIS — C61 PROSTATE CANCER METASTATIC TO BONE (HCC): Primary | ICD-10-CM

## 2021-04-08 PROCEDURE — 99214 OFFICE O/P EST MOD 30 MIN: CPT | Performed by: INTERNAL MEDICINE

## 2021-04-08 RX ORDER — HYDROMORPHONE HYDROCHLORIDE 4 MG/1
4 TABLET ORAL EVERY 4 HOURS PRN
Qty: 60 TABLET | Refills: 0 | Status: SHIPPED | OUTPATIENT
Start: 2021-04-08 | End: 2021-04-23

## 2021-04-08 RX ORDER — FENTANYL 75 UG/H
1 PATCH TRANSDERMAL
Qty: 5 PATCH | Refills: 0 | Status: SHIPPED | OUTPATIENT
Start: 2021-04-08 | End: 2021-04-23

## 2021-04-08 RX ORDER — METHADONE HYDROCHLORIDE 5 MG/1
5 TABLET ORAL DAILY
Qty: 10 TABLET | Refills: 0 | Status: SHIPPED | OUTPATIENT
Start: 2021-04-08 | End: 2021-04-15 | Stop reason: SDUPTHER

## 2021-04-08 RX ORDER — AMOXICILLIN 250 MG
2 CAPSULE ORAL 2 TIMES DAILY
Qty: 360 TABLET | Refills: 0 | Status: SHIPPED | OUTPATIENT
Start: 2021-04-08 | End: 2021-07-07
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
## 2021-04-08 NOTE — PROGRESS NOTES
medication Date Filled #filled Count used #days ROSE MARIE   Dilaudid 4mg 3/24 60 1 59 15 3.9   Fentanyl 75 3/25 5 1 4 16                                 Fentanyl 75mcg used aptch x 3 disposed  Fentanyl 50 and 12.5 disposed.    Follow up appointment today for medication monitoring and symptom control.  Reports little improvement with increased Fentanyl dosing.   Reports tires easily with mild dyspnea.  No new symptoms reported.

## 2021-04-08 NOTE — PATIENT INSTRUCTIONS
ALWAYS bring ALL of your medications prescribed by this clinic to EVERY appointment.  If telemedicine appt, be prepared to give and show medication counts.  This assists us with managing your refill needs.  If you fail to bring in any remaining controlled medication (usually a pain or anxiety medication), you may not receive a refill or replacement prescription at that appointment.      Call [phone] Palliative RN triage line for questions regarding medications, refills, or palliative plan of care on Mondays - Fridays 9am to 4pm.  You may also send Zumobi messages to Palliative, but NOT directly to Dr. Cary.  (Dr. Cary will NOT review these messages nor be signing scripts outside of clinic hours Tuesdays 9-4pm and on Thursdays 830-midday).      If you require same day communication (such as concern of your health status or new onset symptoms), please CALL your primary care office or appropriate specialist office, not palliative clinic.    You must notify us AT LEAST 5-7 BUSINESS DAYS in advance for any refill requests. Clinic days are Tuesday and Thursdays at this time.  Prescriptions for controlled medications will be signed on clinic days only, by the end of the clinic day.  Please be aware of additional insurance prior authorization processing time required for many of those medications.  Please do not call more than once during clinic days to check on status of your refill request, as this does negatively impact care for scheduled patients.  Please try to communicate with your pharmacy first to look at your profile for scripts signed in advance.    Call after hours and weekends only for new or acute (not chronic) symptom issues to speak to on-call physician or nurse practitioner.  Be advised that any requests for prescriptions for controlled substances can NOT be honored after hours, including refill requests.    Call [phone] only for scheduling issues for the palliative clinic.

## 2021-04-15 ENCOUNTER — SPECIALTY PHARMACY (OUTPATIENT)
Dept: ONCOLOGY | Facility: HOSPITAL | Age: 66
End: 2021-04-15

## 2021-04-15 DIAGNOSIS — G89.3 PAIN, CANCER: ICD-10-CM

## 2021-04-15 RX ORDER — METHADONE HYDROCHLORIDE 5 MG/1
5 TABLET ORAL DAILY
Qty: 4 TABLET | Refills: 0 | Status: SHIPPED | OUTPATIENT
Start: 2021-04-18 | End: 2021-04-22

## 2021-04-15 RX ORDER — METHADONE HYDROCHLORIDE 5 MG/1
5 TABLET ORAL DAILY
Qty: 10 TABLET | Refills: 0 | Status: SHIPPED | OUTPATIENT
Start: 2021-04-18 | End: 2021-04-15 | Stop reason: SDUPTHER

## 2021-04-15 NOTE — PLAN OF CARE
Specialty Pharmacy Assessment    Abiraterone (Zytiga)  Dose: 1000 mg  Frequency: Once daily  Indication: Prostate cancer  Follow-Up: Yes  Dispensing pharmacy: [facility]  Refill status: Current  Dispense status: Mail  Side effect assessment: Other  Other side effects: Muscle aches  Intervention: Yes  Interventions: Talking to provider at follow up on 04/21/2021 (Tito), sees pain management and says it helps   Compliance: Patient reports taking capsules whole once daily, Patient reports taking around the same time every day  Follow-up: 4/21/21  Additional notes: Relugolix 120mg once daily: patient reports adherence and side effects of hot flashes and muscle pain. Patient will discuss side effects with provider at 04/21/2021 follow up.   Prednisone 5mg once daily: patient reports adherence and denies side effects.   Patient would like a refill of all 3 medications.   Last labs: 03/24/2021       Medication Adherence    Any gaps in refill history greater than 2 weeks in the last 3 months: no  Demonstrates understanding of importance of adherence: yes  Reliability of informant: reliable  Estimated medication adherence level: %  Reasons for non-adherence: no problems identified  Adherence tools used: watch   Other adherence tools: Clock - takes at same time each day, 7:45am   Support network for adherence: family member       Drug Interactions    Drug interactions evaluated: yes  Clinically relevant drug interactions identified: no  Provided the patient with educational material regarding drug interactions: not applicable       Patient Counseling    Counseled the patient on the following: doses and administration discussed, possible adverse effects and management discussed, possible drug and prescription drug interactions discussed, lab monitoring and follow-up discussed, adherence and missed doses discussed, pharmacy contact information discussed       Reached out to patient for refill assessment/toxicity  check. Pt confirms compliance to Regugolix, Abiraterone, and Prednisone. Reports tolerating treatment with no new concerns reported during encounter, but still reports muscle ache side effect. Patient follows with pain management provider and says that this helps. Requesting refills of all 3 medications to be processed at [facility] retail. Patient would like medications mailed to address on file at: [address] [facility] [place] [address]. Will alert [facility] to [facility] for dispense and mail.     Discussed aforementioned material with patient over the phone. All questions and concerns addressed. Provided patient with my contact information and instructions to call should additional questions arise.     Thank you,  Rylee Raya  PharmD Candidate 2021  4/15/2021 09:40 EDT

## 2021-04-15 NOTE — TELEPHONE ENCOUNTER
Pt called this morning to report that Methadone addition has helped manage his pain. Pt wishing to continue. Asked pt if he was still taking dilaudid every 4 hours  And he said he was. Pt routine was to take methadone and dilaudid at 6am then take a dilaudid at 10, 2, and 6.  Pt then did not take anything else til next morning. Stated he was having pain throughout the night and only sleeping about 3-4 hours.  Instructed the pt to try and spread out the dialudid to every 6 hours or to save the fourth dose to take at bedtime to see if that would help his pain at night. Pt agreeable and stated that he would try it. Pt has f/u apt next Tuesday at 9:30. Worked with onc and scheduling to get his labs/port flush completed on [facility] for his convenience.

## 2021-04-20 ENCOUNTER — OFFICE VISIT (OUTPATIENT)
Dept: PALLIATIVE CARE | Facility: CLINIC | Age: 66
End: 2021-04-20

## 2021-04-20 ENCOUNTER — HOSPITAL ENCOUNTER (OUTPATIENT)
Dept: ONCOLOGY | Facility: HOSPITAL | Age: 66
Setting detail: INFUSION SERIES
Discharge: HOME | End: 2021-04-20

## 2021-04-20 VITALS
BODY MASS INDEX: 29.52 KG/M2 | WEIGHT: 199.9 LBS | HEART RATE: 87 BPM | SYSTOLIC BLOOD PRESSURE: 125 MMHG | OXYGEN SATURATION: 96 % | DIASTOLIC BLOOD PRESSURE: 73 MMHG

## 2021-04-20 DIAGNOSIS — Z51.81 THERAPEUTIC DRUG MONITORING: ICD-10-CM

## 2021-04-20 DIAGNOSIS — Z45.2 ENCOUNTER FOR CARE RELATED TO PORT-A-CATH: Primary | ICD-10-CM

## 2021-04-20 DIAGNOSIS — C79.51 PROSTATE CANCER METASTATIC TO BONE (HCC): ICD-10-CM

## 2021-04-20 DIAGNOSIS — C61 PROSTATE CANCER METASTATIC TO BONE (HCC): ICD-10-CM

## 2021-04-20 DIAGNOSIS — G89.3 PAIN, CANCER: Primary | ICD-10-CM

## 2021-04-20 LAB
ALBUMIN SERPL-MCNC: 4.1 G/DL (ref 3.5–5.2)
ALBUMIN/GLOB SERPL: 1.6 G/DL
ALP SERPL-CCNC: 104 U/L (ref 39–117)
ALT SERPL W P-5'-P-CCNC: 18 U/L (ref 1–41)
ANION GAP SERPL CALCULATED.3IONS-SCNC: 5 MMOL/L (ref 5–15)
AST SERPL-CCNC: 25 U/L (ref 1–40)
BILIRUB SERPL-MCNC: 0.3 MG/DL (ref 0–1.2)
BUN SERPL-MCNC: 11 MG/DL (ref 8–23)
BUN/CREAT SERPL: 15.7 (ref 7–25)
CALCIUM SPEC-SCNC: 9 MG/DL (ref 8.6–10.5)
CHLORIDE SERPL-SCNC: 104 MMOL/L (ref 98–107)
CO2 SERPL-SCNC: 30 MMOL/L (ref 22–29)
CREAT SERPL-MCNC: 0.7 MG/DL (ref 0.76–1.27)
ERYTHROCYTE [DISTWIDTH] IN BLOOD BY AUTOMATED COUNT: 18.8 % (ref 12.3–15.4)
GFR SERPL CREATININE-BSD FRML MDRD: 113 ML/MIN/1.73
GLOBULIN UR ELPH-MCNC: 2.5 GM/DL
GLUCOSE SERPL-MCNC: 102 MG/DL (ref 65–99)
HCT VFR BLD AUTO: 40.3 % (ref 37.5–51)
HGB BLD-MCNC: 13.2 G/DL (ref 13–17.7)
LYMPHOCYTES # BLD AUTO: 1.3 10*3/MM3 (ref 0.7–3.1)
LYMPHOCYTES NFR BLD AUTO: 18 % (ref 19.6–45.3)
MAGNESIUM SERPL-MCNC: 2 MG/DL (ref 1.6–2.4)
MCH RBC QN AUTO: 26.8 PG (ref 26.6–33)
MCHC RBC AUTO-ENTMCNC: 32.8 G/DL (ref 31.5–35.7)
MCV RBC AUTO: 81.7 FL (ref 79–97)
MONOCYTES # BLD AUTO: 0.3 10*3/MM3 (ref 0.1–0.9)
MONOCYTES NFR BLD AUTO: 4.6 % (ref 5–12)
NEUTROPHILS NFR BLD AUTO: 5.7 10*3/MM3 (ref 1.7–7)
NEUTROPHILS NFR BLD AUTO: 77.4 % (ref 42.7–76)
PHOSPHATE SERPL-MCNC: 2.6 MG/DL (ref 2.5–4.5)
PLATELET # BLD AUTO: 151 10*3/MM3 (ref 140–450)
PMV BLD AUTO: 8.2 FL (ref 6–12)
POTASSIUM SERPL-SCNC: 4 MMOL/L (ref 3.5–5.2)
PROT SERPL-MCNC: 6.6 G/DL (ref 6–8.5)
RBC # BLD AUTO: 4.93 10*6/MM3 (ref 4.14–5.8)
SODIUM SERPL-SCNC: 139 MMOL/L (ref 136–145)
WBC # BLD AUTO: 7.4 10*3/MM3 (ref 3.4–10.8)

## 2021-04-20 PROCEDURE — 85025 COMPLETE CBC W/AUTO DIFF WBC: CPT | Performed by: INTERNAL MEDICINE

## 2021-04-20 PROCEDURE — 36591 DRAW BLOOD OFF VENOUS DEVICE: CPT

## 2021-04-20 PROCEDURE — 99214 OFFICE O/P EST MOD 30 MIN: CPT | Performed by: INTERNAL MEDICINE

## 2021-04-20 PROCEDURE — 83735 ASSAY OF MAGNESIUM: CPT | Performed by: INTERNAL MEDICINE

## 2021-04-20 PROCEDURE — 84100 ASSAY OF PHOSPHORUS: CPT | Performed by: INTERNAL MEDICINE

## 2021-04-20 PROCEDURE — 25010000002 HEPARIN LOCK FLUSH PER 10 UNITS: Performed by: INTERNAL MEDICINE

## 2021-04-20 PROCEDURE — 84153 ASSAY OF PSA TOTAL: CPT | Performed by: NURSE PRACTITIONER

## 2021-04-20 PROCEDURE — 80053 COMPREHEN METABOLIC PANEL: CPT | Performed by: INTERNAL MEDICINE

## 2021-04-20 RX ORDER — HYDROMORPHONE HYDROCHLORIDE 4 MG/1
4 TABLET ORAL EVERY 4 HOURS PRN
Qty: 21 TABLET | Refills: 0 | Status: SHIPPED | OUTPATIENT
Start: 2021-04-20 | End: 2021-04-27

## 2021-04-20 RX ORDER — HEPARIN SODIUM (PORCINE) LOCK FLUSH IV SOLN 100 UNIT/ML 100 UNIT/ML
500 SOLUTION INTRAVENOUS AS NEEDED
Status: DISCONTINUED | OUTPATIENT
Start: 2021-04-20 | End: 2021-04-21 | Stop reason: HOSPADM

## 2021-04-20 RX ORDER — METHADONE HYDROCHLORIDE 5 MG/1
5 TABLET ORAL 2 TIMES DAILY
Qty: 10 TABLET | Refills: 0 | Status: SHIPPED | OUTPATIENT
Start: 2021-04-24 | End: 2021-04-29

## 2021-04-20 RX ORDER — METHADONE HYDROCHLORIDE 5 MG/1
5 TABLET ORAL 3 TIMES DAILY
Qty: 21 TABLET | Refills: 0 | Status: SHIPPED | OUTPATIENT
Start: 2021-04-29 | End: 2021-05-06

## 2021-04-20 RX ORDER — HEPARIN SODIUM (PORCINE) LOCK FLUSH IV SOLN 100 UNIT/ML 100 UNIT/ML
500 SOLUTION INTRAVENOUS AS NEEDED
Status: CANCELLED | OUTPATIENT
Start: 2021-04-20

## 2021-04-20 RX ADMIN — HEPARIN 500 UNITS: 100 SYRINGE at 10:23

## 2021-04-20 NOTE — PROGRESS NOTES
Med Counts  Medication Filled # Filled Count Used  # days ROSE MARIE   Fentanyl 75mcg 4/14/21 5 3 2 12 1/3 days   Dilaudid 4mg 4/8/21 60 9 51 12 4.25   Methadone 5mg 4/19/21 10 7.5 2.5 2 1.3   Pt fentanyl patch box states fill date of 4/8 however pt states they had to order and he did not  til 4/12 and started 4/14. At last visit on 4/8 pt had 1 patch on and 1 new patch that would have lasted til start date of 4/14. Counts are accurate.     Office Visit Date Fill Date Fill Amt  # disposed today # disposed prev. # New Patch on pt Total patches   4/8/21 3/25/21 5 3 0 1 1 5   4/20/21 3/25/21 5 2 3 0 0 5   4/2021 4/8/21 5 1 0 3 1 5
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
## 2021-04-20 NOTE — PATIENT INSTRUCTIONS
1.  Methadone - take 1 full tablet twice a day  2.  Use hydromorphone sparingly.  Will refill 1 week at a time  3.  Will not refill Fentanyl patch - you will take off last patch on 4/29/21.  4.  On 4/29/21, will start Methadone 1 full tablet three times a day (morning, midday, and evening).  5.  ECG at next appt in 2 weeks.    If you require same day communication (such as concern of your health status, new onset symptoms, etc.), please CALL your primary care office or appropriate specialist office, do NOT call the palliative clinic.    The [facility] days are Tuesday and Thursday only.  The [facility] is staffed in collaboration with [facility].    IMPORTANT REFILL REQUEST UPDATE:  Call [phone] to leave a message for refill request Mondays - Fridays 9am to 4pm.  You may also send Samares messages to Palliative Pool, but NOT directly to Dr. Cary.  You will NOT be routed to speak directly to the palliative nurse nor physician during clinic hours, so that we may provide the best care and attention to patients scheduled that day.      You must notify us AT LEAST 3-5 BUSINESS DAYS in advance for any routine refill requests.  Prescriptions for controlled medications will be signed on clinic days only, by the end of the next clinic day.  Please be aware of additional insurance prior authorization processing time required for many medications.  Please do not call more than once during clinic days to check on status of your refill request, as this does negatively impact care for scheduled patients.  Please try to communicate with your pharmacy first to look at your profile for scripts signed in advance.    Patient Refill Visit Expectations:  ALWAYS bring ALL of your medications prescribed by this clinic to EVERY appointment.  If telemedicine appt, be prepared to give and show medication counts.  This assists us with managing your  refill needs.  If you fail to bring in any remaining controlled medication (usually a pain or anxiety medication), you may not receive a refill or replacement prescription at that appointment.      After Hours and Weekends:  Call after hours and weekends only for new or acute (not chronic) symptom issues ONLY AFTER you have spoken to your primary care provider or appropriate specialist and they have instructed you to call palliative on-call physician or nurse practitioner.  Be advised that any requests for prescriptions for controlled substances can NOT be honored after hours or on non-clinic days, including refill requests.  [facility] providers can be reached at [phone].      Advance Care Planning:  If you would like assistance with Living Will Directive, please contact the [facility] [place] Social Worker (if you are a patient of the [facility] [place]) or request a referral for the [facility] [place] Advance Care Planning facilitators to contact you.    Scheduling:  Call [phone] for clinic appointments

## 2021-04-21 ENCOUNTER — INFUSION (OUTPATIENT)
Dept: ONCOLOGY | Facility: HOSPITAL | Age: 66
End: 2021-04-21

## 2021-04-21 ENCOUNTER — OFFICE VISIT (OUTPATIENT)
Dept: ONCOLOGY | Facility: CLINIC | Age: 66
End: 2021-04-21

## 2021-04-21 VITALS
SYSTOLIC BLOOD PRESSURE: 134 MMHG | BODY MASS INDEX: 29.83 KG/M2 | RESPIRATION RATE: 18 BRPM | TEMPERATURE: 97.3 F | HEART RATE: 80 BPM | DIASTOLIC BLOOD PRESSURE: 63 MMHG | WEIGHT: 202 LBS

## 2021-04-21 DIAGNOSIS — C79.51 PROSTATE CANCER METASTATIC TO BONE (HCC): Primary | ICD-10-CM

## 2021-04-21 DIAGNOSIS — C61 PROSTATE CANCER METASTATIC TO BONE (HCC): Primary | ICD-10-CM

## 2021-04-21 DIAGNOSIS — Z45.2 ENCOUNTER FOR CARE RELATED TO PORT-A-CATH: ICD-10-CM

## 2021-04-21 LAB — PSA SERPL-MCNC: 0.03 NG/ML (ref 0–4)

## 2021-04-21 PROCEDURE — 25010000002 ZOLEDRONIC ACID PER 1 MG: Performed by: NURSE PRACTITIONER

## 2021-04-21 PROCEDURE — 96374 THER/PROPH/DIAG INJ IV PUSH: CPT

## 2021-04-21 PROCEDURE — 99214 OFFICE O/P EST MOD 30 MIN: CPT | Performed by: NURSE PRACTITIONER

## 2021-04-21 PROCEDURE — 25010000002 HEPARIN LOCK FLUSH PER 10 UNITS: Performed by: INTERNAL MEDICINE

## 2021-04-21 RX ORDER — HEPARIN SODIUM (PORCINE) LOCK FLUSH IV SOLN 100 UNIT/ML 100 UNIT/ML
500 SOLUTION INTRAVENOUS AS NEEDED
Status: DISCONTINUED | OUTPATIENT
Start: 2021-04-21 | End: 2021-04-21 | Stop reason: HOSPADM

## 2021-04-21 RX ORDER — HEPARIN SODIUM (PORCINE) LOCK FLUSH IV SOLN 100 UNIT/ML 100 UNIT/ML
500 SOLUTION INTRAVENOUS AS NEEDED
Status: CANCELLED | OUTPATIENT
Start: 2021-04-21

## 2021-04-21 RX ORDER — SODIUM CHLORIDE 9 MG/ML
250 INJECTION, SOLUTION INTRAVENOUS ONCE
Status: CANCELLED | OUTPATIENT
Start: 2021-04-21

## 2021-04-21 RX ADMIN — HEPARIN 500 UNITS: 100 SYRINGE at 11:18

## 2021-04-21 RX ADMIN — ZOLEDRONIC ACID 4 MG: 4 INJECTION, SOLUTION, CONCENTRATE INTRAVENOUS at 10:54

## 2021-04-21 NOTE — ADDENDUM NOTE
Encounter addended by: Libby Rose on: 4/21/2021 10:58 AM   Actions taken: Child order released for a procedure order
lab results/radiology results/return to ED if symptoms worsen, persist or questions arise/need for outpatient follow-up

## 2021-04-21 NOTE — PROGRESS NOTES
CHIEF COMPLAINT:   Hot flashes   2.  Metastatic prostate cancer to the bone    Problem List:  Oncology/Hematology History Overview Note   1.  Stage IVb grade group 4 metastatic prostate cancer with sclerotic bone lesions on CT and bone scan and history of prostatic hypertrophy  2.  Kidney stone  3.  Polyps    -9/30/2020 office note from Dr. Ronen Reynaga indicates patient with PSA 10.8.  Underwent TRUS prostate biopsy 9/15/2020.  Adenocarcinoma the prostate Sarika 4+4 = 8 in 1 out of 12 cores and 4+3 = 7 and 10 out of 12 cores and 3+4 = 7 in 1 out of 12 cores.  Perineural invasion.  Extraprostatic extension into the fat seen on 2 biopsies of the right lateral mid and right apex.  9/24/2020 CT chest and whole-body bone scan showed T12, L1, left acetabular, right medial acetabular metastases with no lung metastases.  He started androgen deprivation therapy with Casodex with plans for Lupron to start in 1 to 2 weeks for clinical T3 N0 M1 metastatic prostate cancer.  Review of official report from [facility] 9/24/2020 bone scan mentions T12, L1, roof of left acetabulum and medial right acetabular osteoblastic metastases.  CT chest with contrast that same day showed mild splenomegaly 14.2 cm with stable periaortic nodes compared to CT December 2015 with no lung metastases.  Sclerotic abnormality within the T12 vertebral body, L1 vertebral body extending into the pedicle unchanged.  8/28/2020 CT abdomen pelvis report from [facility] reviewed and mentions normal spleen size.  Punctate nonobstructing kidney stone, no paulino enlargement, diffuse bladder wall thickening with mild perivesicular fat stranding, 2.1 cm sclerotic left iliac bone above the left acetabulum new compared to 2015 as well as 1.5 cm faintly sclerotic focus in the right posterior acetabulum stable compared to prior CT 2015 as well as a 1.6 cm T12 and inferior vertebral body sclerotic lesion and a 1.9 cm left posterior lateral L1  vertebral body abnormality extending to the pedicle.  -10/13/2020 initial [facility] medical oncology consultation: With 1 Concord score 8, 4+4 lesion that would make him a grade group 4 with stage IVb disease given radiographic evidence of sclerotic bone metastasis on bone scan and CT.  Needs genetic testing given metastatic prostate cancer and this is also important as, were he to have mismatch repair mutation then we would have options for PDL 1 inhibitors and were he BRCA mutated would have options for PARP inhibitors in the future.  Systemic therapy for castration naïve prostate cancer or N1 disease should be with apalutamide or Abiraterone or enzalutamide all of which are category 1.  He does not have any visceral metastases.  According to his imaging he has T12, L1, left acetabular, right acetabular, and left iliac bony involvement.  That means he would have 4 or more bone metastases with at least one metastasis (i.e. the acetabular lesions bilaterally) beyond the pelvis/vertebral, for which this would be considered high-volume disease for which 6 cycles of docetaxel 75 mg/m² x 6 cycles followed by Zytiga and prednisone would be reasonable along with Zometa every 6 weeks for bone stabilization.  He will do chemo preparation visit with my nurse practitioner prior to start chemotherapy with Taxotere and Zometa in 2 weeks and he is already received Casodex in preparation for Lupron shot he received on 10/12/2020 with Dr. Reynaga.  We will get him to Dr. Alexander Gomez who has done his polypectomies in the past for port placement and we will get genetic counseling started.  Following the 6 courses of Taxotere per the Chaarted trial criteria, I would then give him an indefinite Zytiga and prednisone and Zometa until progression or toxicity dictates.    -12/16/2019 COVID-19 antigen test negative    -12/29/2020 PSA 0.275 with absolute neutrophil count 700 and creatinine 0.76 with normal liver enzymes and electrolytes.   Normal magnesium and phosphorus and urine drug screen positive for buprenorphine and opiates follow-up with palliative care.    -1/4/2021 CT chest abdomen pelvis with contrast and whole-body bone scan shows improving less metabolically active bone metastases.  Stable sclerotic T12, L1, and L2 vertebral bodies and bilateral pelvic bones on bone windows with stable subcentimeter para-aortic lymph nodes and no definite progression in the chest abdomen or pelvis.    -1/5/2021 [facility] medical oncology follow-up visit: I reviewed the images and reports thereof of the above CT and bone scan which shows good response to Taxotere x3 courses with a PSA down to 0.275 from a baseline of 10.  Get another 3 courses of Taxotere, continue his Xgeva, and then following that we will switch to Zytiga and prednisone.  He is working with palliative care on his bone pain but on his current dose of fentanyl patch he says his pain is still not adequately controlled he will contact them.  Dexamethasone prescription was refilled.  We will see my nurse practitioner back in 3 weeks for course #5 of 6 total courses and then we will reimage with CT chest abdomen pelvis and bone scan before going to the Zytiga prednisone.    -3/4/2021 CT chest abdomen pelvis with contrast is negative save for stable sclerotic bone lesions and the bone scan shows near resolution of prior bony abnormalities associated with the known sclerotic lesions in the thoracolumbar spine and bony pelvis.    2/17/2021 PSA down to 0.1 from 1.37 October 2020.  3/9/2021 PSA 0.1     Prostate cancer metastatic to bone (CMS/HCC)   8/30/2020 -  Other Event    PSA 10.8     9/15/2020 Initial Diagnosis    Prostate cancer metastatic to bone (CMS/HCC)     9/15/2020 Biopsy    Prostate needle core biopsy     9/24/2020 Imaging    Total body bone scan: 4 abnormal areas of increased activity consistent with osteoblastic metastasis, abnormal foci of activity seen in the T12 vertebral body, L1  vertebral body, roof of the left acetabulum, and acetabulum medially on the right.  CT chest: Stable sclerotic metastatic lesions within the T12 and L1 vertebrae.  No other evidence of metastatic disease to the chest.  Stable mild splenomegaly and subcentimeter periaortic retroperitoneal lymph nodes.  CT abdomen and pelvis: Diffuse bladder wall thickening with mild perivesicular fat stranding.  Interval development of several sclerotic lesions in the spine and left iliac bone.     10/13/2020 Cancer Staged    Staging form: Bone - Appendicular Skeleton, Trunk, Skull, And Facial Bones, AJCC 8th Edition  - Clinical: Stage IVB (cT3, cN0, cM1b, G3) - Signed by Ishmael Rashid MD on 10/13/2020     11/3/2020 -  Chemotherapy    OP SUPPORTIVE Zoledronic Acid Q42d     11/3/2020 - 3/9/2021 Chemotherapy    OP PROSTATE DOCEtaxel     1/4/2021 Imaging    CT chest abdomen pelvis with contrast and whole-body bone scan shows improving less metabolically active bone metastases.  Stable sclerotic T12, L1, and L2 vertebral bodies and bilateral pelvic bones on bone windows with stable subcentimeter para-aortic lymph nodes and no definite progression in the chest abdomen or pelvis.  PSA down to 0.275 after 3 courses of Taxotere.     3/4/2021 Imaging    CT chest, abdomen and pelvis stable, no evidence of disease progression. Total body bone scan with decreased/near resolution of abnormal increased radiotracer uptake associated with the known sclerotic lesions in the thoracolumbar spine and bony pelvis.  No evidence of new osteoblastic metastases.     3/4/2021 Imaging    CT chest abdomen pelvis with contrast is negative save for stable sclerotic bone lesions and the bone scan shows near resolution of prior bony abnormalities associated with the known sclerotic lesions in the thoracolumbar spine and bony pelvis.  2/17/2021 PSA down to 0.1 from 1.37 October 2020.     3/9/2021 - 3/9/2021 Chemotherapy    OP SUPPORTIVE PROSTATE Relugolix      3/9/2021 -  Chemotherapy    OP PROSTATE Abiraterone / PredniSONE       3/10/2021 -  Chemotherapy    OP PROSTATE Abiraterone / PredniSONE         HISTORY OF PRESENT ILLNESS:  The patient is a 65 y.o. male, here for follow up on management of metastatic prostate cancer to the bone currently on therapy with Zytiga, prednisone, Zometa and Relugolix.  Overall tolerating therapy quite well.  Does have hot flashes, worse at night.  Pain in both hips seems a little worse, working with palliative care and has been very pleased with their services.      Past Medical History:   Diagnosis Date   • Nephrolithiasis    • Opioid use disorder, mild, on maintenance therapy (CMS/HCC)      Past Surgical History:   Procedure Laterality Date   • CHOLECYSTECTOMY     • CORONARY STENT PLACEMENT  206 & 2011   • HERNIA REPAIR  1986   • THORACIC DISCECTOMY  1994       Allergies   Allergen Reactions   • Cymbalta [Duloxetine Hcl] Dizziness   • Gabapentin Nausea Only   • Lyrica [Pregabalin] Nausea And Vomiting and Dizziness       Family History and Social History reviewed and changed as necessary    REVIEW OF SYSTEM:   Other than for bone pain for which palliative care is helping, he has no new complaints    PHYSICAL EXAM:  Lungs clear   Heart regular rate rhythm.    Abdomen soft.        Vitals:    04/21/21 0957   BP: 134/63   Pulse: 80   Resp: 18   Temp: 97.3 °F (36.3 °C)   Weight: 91.6 kg (202 lb)     Vitals:    04/21/21 0957   PainSc:   6   PainLoc: Hip  Comment: bilateral hips and lower back       Labs reviewed.  Vitals reviewed.    ECOG: (1) Restricted in Physically Strenuous Activity, Ambulatory & Able to Do Work of Light Nature    Lab Results   Component Value Date    HGB 13.2 04/20/2021    HCT 40.3 04/20/2021    MCV 81.7 04/20/2021     04/20/2021    WBC 7.40 04/20/2021    NEUTROABS 5.70 04/20/2021    LYMPHSABS 1.30 04/20/2021    MONOSABS 0.30 04/20/2021    EOSABS 0.2 03/24/2021    BASOSABS 0.1 03/24/2021       Lab Results    Component Value Date    GLUCOSE 102 (H) 04/20/2021    BUN 11 04/20/2021    CREATININE 0.70 (L) 04/20/2021     04/20/2021    K 4.0 04/20/2021     04/20/2021    CO2 30.0 (H) 04/20/2021    CALCIUM 9.0 04/20/2021    PROTEINTOT 6.6 04/20/2021    ALBUMIN 4.10 04/20/2021    BILITOT 0.3 04/20/2021    ALKPHOS 104 04/20/2021    AST 25 04/20/2021    ALT 18 04/20/2021             ASSESSMENT & PLAN:  1.  Prostate cancer metastatic to bone  2.  Bone pain    Discussion: Overall continues to tolerate therapy with Zytiga, prednisone, Zometa and Relugolix.  Has bone pain that is getting a little worse in his hips, he continues to work with palliative care for his pain and symptom management.  He is quite pleased with their services.  Labs have been unremarkable, PSA last checked in March stable at 0.1.  PSA drawn yesterday pending.  Zometa is scheduled for every 6 weeks, will get dose today.    He will continue therapy unchanged for now, we will repeat CT chest abdomen pelvis and bone scan late May for a 3 month follow up and I have ordered those today.    This was a level 4, moderate MDM visit with 1 or more chronic illnesses, management of side effects of treatment, drug therapy requiring intensive monitoring for toxicity, review of labs and ordering of future labs and restaging scans.  Abena Velasquez, APRN    04/21/2021

## 2021-04-29 DIAGNOSIS — G89.3 PAIN, CANCER: Primary | ICD-10-CM

## 2021-04-29 RX ORDER — HYDROMORPHONE HYDROCHLORIDE 4 MG/1
4 TABLET ORAL EVERY 4 HOURS PRN
COMMUNITY
End: 2021-04-29 | Stop reason: SDUPTHER

## 2021-04-29 RX ORDER — HYDROMORPHONE HYDROCHLORIDE 4 MG/1
4 TABLET ORAL EVERY 4 HOURS PRN
Qty: 15 TABLET | Refills: 0 | Status: SHIPPED | OUTPATIENT
Start: 2021-04-29 | End: 2021-05-04

## 2021-04-29 NOTE — TELEPHONE ENCOUNTER
Pt called asking if he was going to get any refills of Dilaudid stating that he has not gotten any since his apt. Pt states he took his last on on the 9th. Reviewed office note and it shows that dilaudid should be used sparingly and will be refilled weekly. Again clarified with pt that he had 9 tablets at his apt and a script was sent into pharmacy that day on 4/20 for #21 tabs (1 week worth). Pt stating that he did not get that he has only been getting the methadone at the pharmacy.      Called pharmacy and spoke with Duane, pharmacist. He stated that their records show that he picked up the Dilaudid prescription on 4/20/21 @ 11:59 by the pt himself.      Pt did seem to have some confusion when discussing the situation about when he ran out and how many tablets he had used, etc. Refill was due on Tuesday 4/27 with prescription picked up.  Will route refill request to MD with enough to get to f/u on 5/4.

## 2021-05-04 ENCOUNTER — HOSPITAL ENCOUNTER (OUTPATIENT)
Dept: CARDIOLOGY | Facility: HOSPITAL | Age: 66
Discharge: HOME | End: 2021-05-04

## 2021-05-04 ENCOUNTER — LAB (OUTPATIENT)
Dept: LAB | Facility: HOSPITAL | Age: 66
End: 2021-05-04

## 2021-05-04 ENCOUNTER — OFFICE VISIT (OUTPATIENT)
Dept: PALLIATIVE CARE | Facility: CLINIC | Age: 66
End: 2021-05-04

## 2021-05-04 VITALS
DIASTOLIC BLOOD PRESSURE: 71 MMHG | TEMPERATURE: 96.8 F | BODY MASS INDEX: 29.09 KG/M2 | HEART RATE: 87 BPM | WEIGHT: 197 LBS | SYSTOLIC BLOOD PRESSURE: 135 MMHG

## 2021-05-04 DIAGNOSIS — G89.3 PAIN, CANCER: Primary | ICD-10-CM

## 2021-05-04 DIAGNOSIS — Z51.81 THERAPEUTIC DRUG MONITORING: ICD-10-CM

## 2021-05-04 LAB
AMPHET+METHAMPHET UR QL: NEGATIVE
AMPHETAMINES UR QL: NEGATIVE
BARBITURATES UR QL SCN: NEGATIVE
BENZODIAZ UR QL SCN: NEGATIVE
BUPRENORPHINE SERPL-MCNC: NEGATIVE NG/ML
CANNABINOIDS SERPL QL: NEGATIVE
COCAINE UR QL: NEGATIVE
METHADONE UR QL SCN: POSITIVE
OPIATES UR QL: POSITIVE
OXYCODONE UR QL SCN: NEGATIVE
PCP UR QL SCN: NEGATIVE
PROPOXYPH UR QL: NEGATIVE
QT INTERVAL: 404 MS
QTC INTERVAL: 436 MS
TRICYCLICS UR QL SCN: NEGATIVE

## 2021-05-04 PROCEDURE — 99214 OFFICE O/P EST MOD 30 MIN: CPT | Performed by: INTERNAL MEDICINE

## 2021-05-04 PROCEDURE — 93005 ELECTROCARDIOGRAM TRACING: CPT | Performed by: INTERNAL MEDICINE

## 2021-05-04 PROCEDURE — 93010 ELECTROCARDIOGRAM REPORT: CPT | Performed by: INTERNAL MEDICINE

## 2021-05-04 PROCEDURE — 80306 DRUG TEST PRSMV INSTRMNT: CPT

## 2021-05-04 RX ORDER — HYDROMORPHONE HYDROCHLORIDE 4 MG/1
4 TABLET ORAL EVERY 4 HOURS PRN
Qty: 21 TABLET | Refills: 0 | Status: SHIPPED | OUTPATIENT
Start: 2021-05-19 | End: 2021-05-26

## 2021-05-04 RX ORDER — HYDROMORPHONE HYDROCHLORIDE 4 MG/1
4 TABLET ORAL EVERY 4 HOURS PRN
Qty: 21 TABLET | Refills: 0 | Status: SHIPPED | OUTPATIENT
Start: 2021-05-12 | End: 2021-05-19

## 2021-05-04 RX ORDER — HYDROMORPHONE HYDROCHLORIDE 4 MG/1
4 TABLET ORAL EVERY 4 HOURS PRN
Qty: 21 TABLET | Refills: 0 | Status: SHIPPED | OUTPATIENT
Start: 2021-05-26 | End: 2021-06-02

## 2021-05-04 RX ORDER — METHADONE HYDROCHLORIDE 5 MG/1
5 TABLET ORAL 2 TIMES DAILY
Qty: 14 TABLET | Refills: 0 | Status: SHIPPED | OUTPATIENT
Start: 2021-05-26 | End: 2021-06-01 | Stop reason: DRUGHIGH

## 2021-05-04 RX ORDER — HYDROMORPHONE HYDROCHLORIDE 4 MG/1
4 TABLET ORAL EVERY 4 HOURS PRN
Qty: 24 TABLET | Refills: 0 | Status: SHIPPED | OUTPATIENT
Start: 2021-05-04 | End: 2021-05-12

## 2021-05-04 RX ORDER — METHADONE HYDROCHLORIDE 5 MG/1
5 TABLET ORAL 2 TIMES DAILY
Qty: 14 TABLET | Refills: 0 | Status: SHIPPED | OUTPATIENT
Start: 2021-05-19 | End: 2021-05-26

## 2021-05-04 RX ORDER — METHADONE HYDROCHLORIDE 5 MG/1
5 TABLET ORAL 2 TIMES DAILY
Qty: 14 TABLET | Refills: 0 | Status: SHIPPED | OUTPATIENT
Start: 2021-05-12 | End: 2021-05-19

## 2021-05-04 RX ORDER — METHADONE HYDROCHLORIDE 5 MG/1
5 TABLET ORAL 2 TIMES DAILY
Qty: 6 TABLET | Refills: 0 | Status: SHIPPED | OUTPATIENT
Start: 2021-05-09 | End: 2021-05-12

## 2021-05-04 NOTE — PROGRESS NOTES
Med Counts  Medication Filled # Filled Count Used  # days ROSE MARIE   Methadone 5 4/29/21 21 9 12 6 2   Dilaudid 4mg 4/29/21 15 3 12 6 2
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
## 2021-05-04 NOTE — PATIENT INSTRUCTIONS
If you require same day communication (such as concern of your health status, new onset symptoms, etc.), please CALL your primary care office or appropriate specialist office, do NOT call the palliative clinic.    The [facility] days are Tuesday and Thursday only.  The [facility] is staffed in collaboration with [facility].    IMPORTANT REFILL REQUEST UPDATE:  Call [phone] to leave a message for refill request Mondays - Fridays 9am to 4pm.  You may also send [employer] messages to Palliative Pool, but NOT directly to Dr. Cary.  You will NOT be routed to speak directly to the palliative nurse nor physician during clinic hours, so that we may provide the best care and attention to patients scheduled that day.      You must notify us AT LEAST 3-5 BUSINESS DAYS in advance for any routine refill requests.  Prescriptions for controlled medications will be signed on clinic days only, by the end of the next clinic day.  Please be aware of additional insurance prior authorization processing time required for many medications.  Please do not call more than once during clinic days to check on status of your refill request, as this does negatively impact care for scheduled patients.  Please try to communicate with your pharmacy first to look at your profile for scripts signed in advance.    Patient Refill Visit Expectations:  ALWAYS bring ALL of your medications prescribed by this clinic to EVERY appointment.  If telemedicine appt, be prepared to give and show medication counts.  This assists us with managing your refill needs.  If you fail to bring in any remaining controlled medication (usually a pain or anxiety medication), you may not receive a refill or replacement prescription at that appointment.      After Hours and Weekends:  Call after hours and weekends only for new or acute (not chronic) symptom issues ONLY AFTER you have spoken  to your primary care provider or appropriate specialist and they have instructed you to call palliative on-call physician or nurse practitioner.  Be advised that any requests for prescriptions for controlled substances can NOT be honored after hours or on non-clinic days, including refill requests.  [facility] Nav[facility]ators Palliative Care providers can be reached at [phone].      Advance Care Planning:  If you would like assistance with Living Will Directive, please contact the [facility] Oncology Social Worker (if you are a patient of the [facility]) or request a referral for the [facility] Advance Care Planning facilitators to contact you.    Scheduling:  Call [phone] for clinic appointments

## 2021-05-18 ENCOUNTER — SPECIALTY PHARMACY (OUTPATIENT)
Dept: ONCOLOGY | Facility: HOSPITAL | Age: 66
End: 2021-05-18

## 2021-05-18 NOTE — PLAN OF CARE
Specialty Pharmacy Assessment    Dose: 1000 mg  Frequency: Once daily  Indication: Prostate cancer  Follow-Up: Yes  Dispensing pharmacy: [facility]  Refill status: Current  Dispense status: Mail  Side effect assessment: No or Minimal Side Effects  Intervention: No  Compliance: Patient reports taking capsules whole once daily, Patient reports taking around the same time every day  Additional notes: Reached out to patient for refill assessment/toxicity check. Pt confirms compliance to Relugolix 120mg PO daily. Reports tolerating treatment with no new concerns reported during encounter. Patient received his Abiraterone and Prednisone on 5/4 and does not need a refill at this time. Patient reports taking his medication each day as prescribed, no side effects and no missed doses. Requesting refill of Relugolix to be processed at [facility]. Patient would like his medication to be delivered on 5/20/21 to [address] [facility] [address]. Will alert [facility] to prep for dispense.          Medication Adherence    Any gaps in refill history greater than 2 weeks in the last 3 months: no  Demonstrates understanding of importance of adherence: yes  Informant: patient  Estimated medication adherence level: %  Reasons for non-adherence: no problems identified  Adherence tools used: watch   Other adherence tools: Clock - takes at same time each day, 7:45am   Support network for adherence: family member           Drug Interactions    Clinically relevant drug interactions identified: no           Patient stated he is scheduled to have labs drawn on 5/26. Requesting refill of Relugolix to be processed at [facility]. Patient would like his medication to be delivered on 5/20/21 to [address] [facility] [address]. Will alert [facility] to prep for dispense.      Thank you,   Sheri Maxwell  PharmD Candidate 2022  09:16 EDT  5/18/2021

## 2021-05-19 ENCOUNTER — TELEPHONE (OUTPATIENT)
Dept: PALLIATIVE CARE | Facility: CLINIC | Age: 66
End: 2021-05-19

## 2021-05-19 DIAGNOSIS — G89.3 PAIN, CANCER: Primary | ICD-10-CM

## 2021-05-19 NOTE — TELEPHONE ENCOUNTER
"Placed call to pt to see how new pain regimen was working for patient and if he had any refills picking up his medicine today. Pt stated he was able to get it today but the problem is that he runs out on Tuesday night and gets up at 5 in the morning but can't pickup his medicine from pharmacy til after 10am. So it leaves him with a long period of pain.      Secondly, pt states his pain is not as controlled with the decrease in Dilaudid for BTP to 3/day from the 4/day. He states by the end of the day his pain is \"pretty bad.\" Pt reports being compliant on current regimen of Methadone bid and Dilaudid tid.     Confirmed apt for June 1st (2 weeks). Informed would relay this information to MD and call back by end of week if she decided to make any changes. Pt v/u.   "

## 2021-05-20 RX ORDER — METHADONE HYDROCHLORIDE 5 MG/1
5 TABLET ORAL 3 TIMES DAILY
Qty: 27 TABLET | Refills: 0 | Status: SHIPPED | OUTPATIENT
Start: 2021-05-24 | End: 2021-06-02

## 2021-05-20 NOTE — TELEPHONE ENCOUNTER
Refill encounter reviewed as well as EHR.  Noted pt's last ECG was 4/19/21, QTc 415msec.  Pt did not titrate methadone to 5mg TID from BID as instructed but reported pain was managed at appt 5/4/21 on this regimen.    Plan:  1.  Increase methadone from 5mg BID to 5mg TID. He should have #11 tabs left after this morning dose, so will take last dose evening 5/23/21.  Will send refill for 9 days, to start 5/24/21 and can fill 5/23/21.  Next refill start date 6/2/21   2.  No change to Hydromorphone 4mg TID PRN.  He should have #17 tabs left after yesterday and this morning's doses.  3.  Will send next refills to be picked up 5/25/21 x 7 days, so that next refill start date 6/2/21    Pt has f/u appt 6/1/21.

## 2021-05-20 NOTE — TELEPHONE ENCOUNTER
Called pt and reviewed medication adjustments with pt. Pt v/u to all instructions. Will f/u next week on med adjustments.

## 2021-05-28 ENCOUNTER — SPECIALTY PHARMACY (OUTPATIENT)
Dept: ONCOLOGY | Facility: HOSPITAL | Age: 66
End: 2021-05-28

## 2021-05-28 NOTE — PROGRESS NOTES
Reviewed hepatic dose adjustments with DHARMESH Andino.  Patient's AST and ALT have slightly increased and bili remains the same.    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

## 2021-05-31 DIAGNOSIS — Z51.81 THERAPEUTIC DRUG MONITORING: Primary | ICD-10-CM

## 2021-06-01 ENCOUNTER — LAB (OUTPATIENT)
Dept: LAB | Facility: HOSPITAL | Age: 66
End: 2021-06-01

## 2021-06-01 ENCOUNTER — HOSPITAL ENCOUNTER (OUTPATIENT)
Dept: CARDIOLOGY | Facility: HOSPITAL | Age: 66
Discharge: HOME | End: 2021-06-01

## 2021-06-01 ENCOUNTER — APPOINTMENT (OUTPATIENT)
Dept: ONCOLOGY | Facility: HOSPITAL | Age: 66
End: 2021-06-01

## 2021-06-01 ENCOUNTER — OFFICE VISIT (OUTPATIENT)
Dept: ONCOLOGY | Facility: CLINIC | Age: 66
End: 2021-06-01

## 2021-06-01 ENCOUNTER — OFFICE VISIT (OUTPATIENT)
Dept: PALLIATIVE CARE | Facility: CLINIC | Age: 66
End: 2021-06-01

## 2021-06-01 VITALS
BODY MASS INDEX: 28.5 KG/M2 | SYSTOLIC BLOOD PRESSURE: 146 MMHG | WEIGHT: 193 LBS | DIASTOLIC BLOOD PRESSURE: 78 MMHG | HEART RATE: 87 BPM

## 2021-06-01 VITALS
HEIGHT: 69 IN | RESPIRATION RATE: 18 BRPM | SYSTOLIC BLOOD PRESSURE: 144 MMHG | BODY MASS INDEX: 28.73 KG/M2 | HEART RATE: 81 BPM | WEIGHT: 194 LBS | TEMPERATURE: 97.5 F | DIASTOLIC BLOOD PRESSURE: 67 MMHG

## 2021-06-01 DIAGNOSIS — C79.51 PROSTATE CANCER METASTATIC TO BONE (HCC): Primary | ICD-10-CM

## 2021-06-01 DIAGNOSIS — G89.3 CANCER RELATED PAIN: ICD-10-CM

## 2021-06-01 DIAGNOSIS — Z51.81 THERAPEUTIC DRUG MONITORING: Primary | ICD-10-CM

## 2021-06-01 DIAGNOSIS — C61 PROSTATE CANCER METASTATIC TO BONE (HCC): Primary | ICD-10-CM

## 2021-06-01 DIAGNOSIS — Z51.81 THERAPEUTIC DRUG MONITORING: ICD-10-CM

## 2021-06-01 DIAGNOSIS — G89.3 CHRONIC PAIN DUE TO MALIGNANT NEOPLASTIC DISEASE: ICD-10-CM

## 2021-06-01 LAB
AMPHET+METHAMPHET UR QL: NEGATIVE
AMPHETAMINES UR QL: NEGATIVE
BARBITURATES UR QL SCN: NEGATIVE
BENZODIAZ UR QL SCN: NEGATIVE
BUPRENORPHINE SERPL-MCNC: NEGATIVE NG/ML
CANNABINOIDS SERPL QL: NEGATIVE
COCAINE UR QL: NEGATIVE
METHADONE UR QL SCN: POSITIVE
OPIATES UR QL: POSITIVE
OXYCODONE UR QL SCN: NEGATIVE
PCP UR QL SCN: NEGATIVE
PROPOXYPH UR QL: NEGATIVE
QT INTERVAL: 394 MS
QTC INTERVAL: 468 MS
TRICYCLICS UR QL SCN: NEGATIVE

## 2021-06-01 PROCEDURE — 93005 ELECTROCARDIOGRAM TRACING: CPT | Performed by: INTERNAL MEDICINE

## 2021-06-01 PROCEDURE — 99215 OFFICE O/P EST HI 40 MIN: CPT | Performed by: INTERNAL MEDICINE

## 2021-06-01 PROCEDURE — 93010 ELECTROCARDIOGRAM REPORT: CPT | Performed by: INTERNAL MEDICINE

## 2021-06-01 PROCEDURE — 80306 DRUG TEST PRSMV INSTRMNT: CPT

## 2021-06-01 PROCEDURE — 99213 OFFICE O/P EST LOW 20 MIN: CPT | Performed by: INTERNAL MEDICINE

## 2021-06-01 RX ORDER — METHADONE HYDROCHLORIDE 5 MG/1
5 TABLET ORAL 3 TIMES DAILY
Qty: 21 TABLET | Refills: 0 | Status: SHIPPED | OUTPATIENT
Start: 2021-06-09 | End: 2021-06-16

## 2021-06-01 RX ORDER — METHADONE HYDROCHLORIDE 5 MG/1
5 TABLET ORAL 3 TIMES DAILY
Qty: 21 TABLET | Refills: 0 | Status: SHIPPED | OUTPATIENT
Start: 2021-06-23 | End: 2021-06-29 | Stop reason: SDUPTHER

## 2021-06-01 RX ORDER — HYDROMORPHONE HYDROCHLORIDE 4 MG/1
4 TABLET ORAL EVERY 4 HOURS PRN
Qty: 21 TABLET | Refills: 0 | Status: SHIPPED | OUTPATIENT
Start: 2021-06-09 | End: 2021-06-16

## 2021-06-01 RX ORDER — HYDROMORPHONE HYDROCHLORIDE 4 MG/1
4 TABLET ORAL EVERY 4 HOURS PRN
Qty: 21 TABLET | Refills: 0 | Status: SHIPPED | OUTPATIENT
Start: 2021-06-16 | End: 2021-06-23

## 2021-06-01 RX ORDER — METHADONE HYDROCHLORIDE 5 MG/1
5 TABLET ORAL 3 TIMES DAILY
Qty: 21 TABLET | Refills: 0 | Status: SHIPPED | OUTPATIENT
Start: 2021-06-16 | End: 2021-06-23

## 2021-06-01 RX ORDER — METHADONE HYDROCHLORIDE 5 MG/1
5 TABLET ORAL 3 TIMES DAILY
Qty: 21 TABLET | Refills: 0 | Status: SHIPPED | OUTPATIENT
Start: 2021-06-02 | End: 2021-06-09

## 2021-06-01 RX ORDER — HYDROMORPHONE HYDROCHLORIDE 4 MG/1
4 TABLET ORAL EVERY 4 HOURS PRN
Qty: 21 TABLET | Refills: 0 | Status: SHIPPED | OUTPATIENT
Start: 2021-06-23 | End: 2021-06-29 | Stop reason: SDUPTHER

## 2021-06-01 RX ORDER — SODIUM CHLORIDE 9 MG/ML
250 INJECTION, SOLUTION INTRAVENOUS ONCE
Status: CANCELLED | OUTPATIENT
Start: 2021-06-02

## 2021-06-01 RX ORDER — HYDROMORPHONE HYDROCHLORIDE 4 MG/1
4 TABLET ORAL EVERY 4 HOURS PRN
Qty: 21 TABLET | Refills: 0 | Status: SHIPPED | OUTPATIENT
Start: 2021-06-02 | End: 2021-06-09
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
## 2021-06-01 NOTE — PROGRESS NOTES
Med Counts  Medication Filled # Filled Count Used  # days ROSE MARIE   Methadone 5 5/24/21 27 4 23 8 2.9   Dilaudid 4mg 5/26/21 21 2 19 6 3.2

## 2021-06-01 NOTE — PATIENT INSTRUCTIONS
If you require same day communication (such as concern of your health status, new onset symptoms, etc.), please CALL your primary care office or appropriate specialist office, do NOT call the palliative clinic.    The [facility] days are Tuesday and Thursday only.  The [facility] is staffed in collaboration with [facility] until August 31, 2021.  Thereafter, the [facility] will be staffed by a [facility] employed provider.      IMPORTANT REFILL REQUEST UPDATE:  Call [phone] to leave a message for refill request Mondays - Fridays 9am to 4pm.  You may also send [employer] messages to Palliative Pool, but NOT directly to Dr. Cary.  You will NOT be routed to speak directly to the palliative nurse nor physician during clinic hours, so that we may provide the best care and attention to patients scheduled that day.      You must notify us AT LEAST 3-5 BUSINESS DAYS in advance for any routine refill requests.  Prescriptions for controlled medications will be signed on clinic days only, by the end of the next clinic day.  Please be aware of additional insurance prior authorization processing time required for many medications.  Please do not call more than once during clinic days to check on status of your refill request, as this does negatively impact care for scheduled patients.  Please try to communicate with your pharmacy first to look at your profile for scripts signed in advance.    Patient Refill Visit Expectations:  ALWAYS bring ALL of your medications prescribed by this clinic to EVERY appointment.  If telemedicine appt, be prepared to give and show medication counts.  This assists us with managing your refill needs.  If you fail to bring in any remaining controlled medication (usually a pain or anxiety medication), you may not receive a refill or replacement prescription at that appointment.      After Hours  and Weekends:  Call after hours and weekends only for new or acute (not chronic) symptom issues ONLY AFTER you have spoken to your primary care provider or appropriate specialist and they have instructed you to call palliative on-call physician or nurse practitioner.  Be advised that any requests for prescriptions for controlled substances can NOT be honored after hours or on non-clinic days, including refill requests.  [facility] Navigators Palliative Care providers can be reached at [phone].  Please note that after August 31, 2021 all palliative clinical questions are to be directed to [facility] medical group.    Advance Care Planning:  If you would like assistance with Living Will Directive, please contact the [facility] Oncology Social Worker (if you are a patient of the [facility]) or request a referral for the [facility] Advance Care Planning facilitators to contact you.    Scheduling:  Call [phone] for clinic appointments

## 2021-06-01 NOTE — PROGRESS NOTES
CHIEF COMPLAINT: Stage IV metastatic prostate cancer    Problem List:  Oncology/Hematology History Overview Note   1.  Stage IVb grade group 4 metastatic prostate cancer with sclerotic bone lesions on CT and bone scan and history of prostatic hypertrophy  2.  Kidney stone  3.  Polyps  4.  Elevated liver enzymes    -9/30/2020 office note from Dr. Ronen Reynaga indicates patient with PSA 10.8.  Underwent TRUS prostate biopsy 9/15/2020.  Adenocarcinoma the prostate [place] 4+4 = 8 in 1 out of 12 cores and 4+3 = 7 and 10 out of 12 cores and 3+4 = 7 in 1 out of 12 cores.  Perineural invasion.  Extraprostatic extension into the fat seen on 2 biopsies of the right lateral mid and right apex.  9/24/2020 CT chest and whole-body bone scan showed T12, L1, left acetabular, right medial acetabular metastases with no lung metastases.  He started androgen deprivation therapy with Casodex with plans for Lupron to start in 1 to 2 weeks for clinical T3 N0 M1 metastatic prostate cancer.  Review of official report from [facility] 9/24/2020 bone scan mentions T12, L1, roof of left acetabulum and medial right acetabular osteoblastic metastases.  CT chest with contrast that same day showed mild splenomegaly 14.2 cm with stable periaortic nodes compared to CT December 2015 with no lung metastases.  Sclerotic abnormality within the T12 vertebral body, L1 vertebral body extending into the pedicle unchanged.  8/28/2020 CT abdomen pelvis report from [facility] reviewed and mentions normal spleen size.  Punctate nonobstructing kidney stone, no paulino enlargement, diffuse bladder wall thickening with mild perivesicular fat stranding, 2.1 cm sclerotic left iliac bone above the left acetabulum new compared to 2015 as well as 1.5 cm faintly sclerotic focus in the right posterior acetabulum stable compared to prior CT 2015 as well as a 1.6 cm T12 and inferior vertebral body sclerotic lesion and a 1.9 cm left posterior lateral L1  vertebral body abnormality extending to the pedicle.  -10/13/2020 initial [facility] medical oncology consultation: With 1 Lincoln score 8, 4+4 lesion that would make him a grade group 4 with stage IVb disease given radiographic evidence of sclerotic bone metastasis on bone scan and CT.  Needs genetic testing given metastatic prostate cancer and this is also important as, were he to have mismatch repair mutation then we would have options for PDL 1 inhibitors and were he BRCA mutated would have options for PARP inhibitors in the future.  Systemic therapy for castration naïve prostate cancer or N1 disease should be with apalutamide or Abiraterone or enzalutamide all of which are category 1.  He does not have any visceral metastases.  According to his imaging he has T12, L1, left acetabular, right acetabular, and left iliac bony involvement.  That means he would have 4 or more bone metastases with at least one metastasis (i.e. the acetabular lesions bilaterally) beyond the pelvis/vertebral, for which this would be considered high-volume disease for which 6 cycles of docetaxel 75 mg/m² x 6 cycles followed by Zytiga and prednisone would be reasonable along with Zometa every 6 weeks for bone stabilization.  He will do chemo preparation visit with my nurse practitioner prior to start chemotherapy with Taxotere and Zometa in 2 weeks and he is already received Casodex in preparation for Lupron shot he received on 10/12/2020 with Dr. Reynaga.  We will get him to Dr. Alexander Gomez who has done his polypectomies in the past for port placement and we will get genetic counseling started.  Following the 6 courses of Taxotere per the Chaarted trial criteria, I would then give him an indefinite Zytiga and prednisone and Zometa until progression or toxicity dictates.    -12/16/2019 COVID-19 antigen test negative    -12/29/2020 PSA 0.275 with absolute neutrophil count 700 and creatinine 0.76 with normal liver enzymes and electrolytes.   Normal magnesium and phosphorus and urine drug screen positive for buprenorphine and opiates follow-up with palliative care.    -1/4/2021 CT chest abdomen pelvis with contrast and whole-body bone scan shows improving less metabolically active bone metastases.  Stable sclerotic T12, L1, and L2 vertebral bodies and bilateral pelvic bones on bone windows with stable subcentimeter para-aortic lymph nodes and no definite progression in the chest abdomen or pelvis.    -1/5/2021 [facility] medical oncology follow-up visit: I reviewed the images and reports thereof of the above CT and bone scan which shows good response to Taxotere x3 courses with a PSA down to 0.275 from a baseline of 10.  Get another 3 courses of Taxotere, continue his Xgeva, and then following that we will switch to Zytiga and prednisone.  He is working with palliative care on his bone pain but on his current dose of fentanyl patch he says his pain is still not adequately controlled he will contact them.  Dexamethasone prescription was refilled.  We will see my nurse practitioner back in 3 weeks for course #5 of 6 total courses and then we will reimage with CT chest abdomen pelvis and bone scan before going to the Zytiga prednisone.    -3/4/2021 CT chest abdomen pelvis with contrast is negative save for stable sclerotic bone lesions and the bone scan shows near resolution of prior bony abnormalities associated with the known sclerotic lesions in the thoracolumbar spine and bony pelvis.    2/17/2021 PSA down to 0.1 from 1.37 October 2020.  3/9/2021 PSA 0.1    -5/26/2021 CT chest abdomen pelvis with contrast shows stable to slightly underlying increase low-density left para-aortic node.  Bone lesions stable.  Whole-body bone scan stable to decrease activity of known thoracolumbar and bony pelvic involvement.  PSA less than 0.1  , AST 74, bilirubin 0.5.       -6/1/2021 [facility] medical oncology follow-up visit: I reviewed the above data with him and  images thereof.  Bones are stable.  No significant adenopathy.  PSA immeasurably low.     upper limit of normal 69 and AST 74 upper limit of normal 46.  Hence, neither is more than double the upper limit of normal with no ascites and no encephalopathy and normal albumin and bilirubin, despite the elevation of liver enzymes, he has child Abrams score A.  Package insert for Abiraterone says that for ALT and/or AST greater than 5 times upper limit of normal or total bilirubin greater than 3 times the upper limit of normal to withhold treatment until liver function tests returned to baseline or ALT and AST less than or equal to 2.5 times the upper limit of normal and total bilirubin less than or equal to 1.5 times the upper limit of normal and then reinitiate at 750 mg daily dose of Zytiga.  For recurrent hepatotoxicity on 750 mg daily Zytiga, withhold treatment until liver functions returned to baseline or ALT and AST less than 3-2.5 times upper limit of normal and total bilirubin less than or equal to 1.5 times the upper limit of normal then reinitiate at 500 mg daily Zytiga dose.  If has recurrent hepatotoxicity on 500 mg dose, then discontinue treatment.  If has ALT greater than 3 times the upper limit of normal along with total bilirubin greater than 2 times the upper limit of normal in the absence of other contributing causes or biliary obstruction, permanently discontinue Zytiga.  Based on these recommendations, I would continue current doses but we will watch with serial labs monthly and repeat his imaging again in 3 months but clinically he is doing wonderfully with excellent response to Taxotere and now on Zytiga prednisone plus Zometa along with Relugolix.     Prostate cancer metastatic to bone (CMS/HCC)   8/30/2020 -  Other Event    PSA 10.8     9/15/2020 Initial Diagnosis    Prostate cancer metastatic to bone (CMS/HCC)     9/15/2020 Biopsy    Prostate needle core biopsy     9/24/2020 Imaging    Total  body bone scan: 4 abnormal areas of increased activity consistent with osteoblastic metastasis, abnormal foci of activity seen in the T12 vertebral body, L1 vertebral body, roof of the left acetabulum, and acetabulum medially on the right.  CT chest: Stable sclerotic metastatic lesions within the T12 and L1 vertebrae.  No other evidence of metastatic disease to the chest.  Stable mild splenomegaly and subcentimeter periaortic retroperitoneal lymph nodes.  CT abdomen and pelvis: Diffuse bladder wall thickening with mild perivesicular fat stranding.  Interval development of several sclerotic lesions in the spine and left iliac bone.     10/13/2020 Cancer Staged    Staging form: Bone - Appendicular Skeleton, Trunk, Skull, And Facial Bones, AJCC 8th Edition  - Clinical: Stage IVB (cT3, cN0, cM1b, G3) - Signed by Ishmael Rashid MD on 10/13/2020     11/3/2020 -  Chemotherapy    OP SUPPORTIVE Zoledronic Acid Q42d     11/3/2020 - 3/9/2021 Chemotherapy    OP PROSTATE DOCEtaxel     1/4/2021 Imaging    CT chest abdomen pelvis with contrast and whole-body bone scan shows improving less metabolically active bone metastases.  Stable sclerotic T12, L1, and L2 vertebral bodies and bilateral pelvic bones on bone windows with stable subcentimeter para-aortic lymph nodes and no definite progression in the chest abdomen or pelvis.  PSA down to 0.275 after 3 courses of Taxotere.     3/4/2021 Imaging    CT chest, abdomen and pelvis stable, no evidence of disease progression. Total body bone scan with decreased/near resolution of abnormal increased radiotracer uptake associated with the known sclerotic lesions in the thoracolumbar spine and bony pelvis.  No evidence of new osteoblastic metastases.     3/4/2021 Imaging    CT chest abdomen pelvis with contrast is negative save for stable sclerotic bone lesions and the bone scan shows near resolution of prior bony abnormalities associated with the known sclerotic lesions in the thoracolumbar  "spine and bony pelvis.  2/17/2021 PSA down to 0.1 from 1.37 October 2020.     3/9/2021 - 3/9/2021 Chemotherapy    OP SUPPORTIVE PROSTATE Relugolix     3/9/2021 -  Chemotherapy    OP PROSTATE Abiraterone / PredniSONE       3/10/2021 -  Chemotherapy    OP PROSTATE Abiraterone / PredniSONE         HISTORY OF PRESENT ILLNESS:  The patient is a 66 y.o. male, here for follow up on management of stage IV metastatic prostate cancer now with slight rise in liver enzymes    Past Medical History:   Diagnosis Date   • Nephrolithiasis    • Opioid use disorder, mild, on maintenance therapy (CMS/HCC)      Past Surgical History:   Procedure Laterality Date   • CHOLECYSTECTOMY     • CORONARY STENT PLACEMENT  206 & 2011   • HERNIA REPAIR  1986   • THORACIC DISCECTOMY  1994       Allergies   Allergen Reactions   • Cymbalta [Duloxetine Hcl] Dizziness   • Gabapentin Nausea Only   • Lyrica [Pregabalin] Nausea And Vomiting and Dizziness       Family History and Social History reviewed and changed as necessary    REVIEW OF SYSTEM:   No somatic complaints    PHYSICAL EXAM:  Lungs clear  Heart regular rate and rhythm      Vitals:    06/01/21 0749   BP: 144/67   Pulse: 81   Resp: 18   Temp: 97.5 °F (36.4 °C)   Weight: 88 kg (194 lb)   Height: 175.3 cm (69\")     Vitals:    06/01/21 0749   PainSc:   6   PainLoc: Hip  Comment: bilateral          ECOG score: 1     Karnofsky score: 90     Vitals reviewed.        Lab Results   Component Value Date    HGB 13.2 04/20/2021    HCT 40.3 04/20/2021    MCV 81.7 04/20/2021     04/20/2021    WBC 7.40 04/20/2021    NEUTROABS 5.70 04/20/2021    LYMPHSABS 1.30 04/20/2021    MONOSABS 0.30 04/20/2021    EOSABS 0.2 03/24/2021    BASOSABS 0.1 03/24/2021       Lab Results   Component Value Date    GLUCOSE 102 (H) 04/20/2021    BUN 11 04/20/2021    CREATININE 0.70 (L) 04/20/2021     04/20/2021    K 4.0 04/20/2021     04/20/2021    CO2 30.0 (H) 04/20/2021    CALCIUM 9.0 04/20/2021    PROTEINTOT 6.6 " 04/20/2021    ALBUMIN 4.10 04/20/2021    BILITOT 0.3 04/20/2021    ALKPHOS 104 04/20/2021    AST 25 04/20/2021    ALT 18 04/20/2021             ASSESSMENT & PLAN:  1.  Stage IVb grade group 4 metastatic prostate cancer with sclerotic bone lesions on CT and bone scan and history of prostatic hypertrophy  2.  Kidney stone  3.  Polyps  4.  Elevated liver enzymes    -9/30/2020 office note from Dr. Ronen Reynaga indicates patient with PSA 10.8.  Underwent TRUS prostate biopsy 9/15/2020.  Adenocarcinoma the prostate [place] 4+4 = 8 in 1 out of 12 cores and 4+3 = 7 and 10 out of 12 cores and 3+4 = 7 in 1 out of 12 cores.  Perineural invasion.  Extraprostatic extension into the fat seen on 2 biopsies of the right lateral mid and right apex.  9/24/2020 CT chest and whole-body bone scan showed T12, L1, left acetabular, right medial acetabular metastases with no lung metastases.  He started androgen deprivation therapy with Casodex with plans for Lupron to start in 1 to 2 weeks for clinical T3 N0 M1 metastatic prostate cancer.  Review of official report from [facility] 9/24/2020 bone scan mentions T12, L1, roof of left acetabulum and medial right acetabular osteoblastic metastases.  CT chest with contrast that same day showed mild splenomegaly 14.2 cm with stable periaortic nodes compared to CT December 2015 with no lung metastases.  Sclerotic abnormality within the T12 vertebral body, L1 vertebral body extending into the pedicle unchanged.  8/28/2020 CT abdomen pelvis report from [facility] reviewed and mentions normal spleen size.  Punctate nonobstructing kidney stone, no paulino enlargement, diffuse bladder wall thickening with mild perivesicular fat stranding, 2.1 cm sclerotic left iliac bone above the left acetabulum new compared to 2015 as well as 1.5 cm faintly sclerotic focus in the right posterior acetabulum stable compared to prior CT 2015 as well as a 1.6 cm T12 and inferior vertebral body sclerotic  lesion and a 1.9 cm left posterior lateral L1 vertebral body abnormality extending to the pedicle.  -10/13/2020 initial [facility] medical oncology consultation: With 1 [place] score 8, 4+4 lesion that would make him a grade group 4 with stage IVb disease given radiographic evidence of sclerotic bone metastasis on bone scan and CT.  Needs genetic testing given metastatic prostate cancer and this is also important as, were he to have mismatch repair mutation then we would have options for PDL 1 inhibitors and were he BRCA mutated would have options for PARP inhibitors in the future.  Systemic therapy for castration naïve prostate cancer or N1 disease should be with apalutamide or Abiraterone or enzalutamide all of which are category 1.  He does not have any visceral metastases.  According to his imaging he has T12, L1, left acetabular, right acetabular, and left iliac bony involvement.  That means he would have 4 or more bone metastases with at least one metastasis (i.e. the acetabular lesions bilaterally) beyond the pelvis/vertebral, for which this would be considered high-volume disease for which 6 cycles of docetaxel 75 mg/m² x 6 cycles followed by Zytiga and prednisone would be reasonable along with Zometa every 6 weeks for bone stabilization.  He will do chemo preparation visit with my nurse practitioner prior to start chemotherapy with Taxotere and Zometa in 2 weeks and he is already received Casodex in preparation for Lupron shot he received on 10/12/2020 with Dr. Reynaga.  We will get him to Dr. Alexander Gomez who has done his polypectomies in the past for port placement and we will get genetic counseling started.  Following the 6 courses of Taxotere per the Chaarted trial criteria, I would then give him an indefinite Zytiga and prednisone and Zometa until progression or toxicity dictates.    -12/16/2019 COVID-19 antigen test negative    -12/29/2020 PSA 0.275 with absolute neutrophil count 700 and creatinine 0.76  with normal liver enzymes and electrolytes.  Normal magnesium and phosphorus and urine drug screen positive for buprenorphine and opiates follow-up with palliative care.    -1/4/2021 CT chest abdomen pelvis with contrast and whole-body bone scan shows improving less metabolically active bone metastases.  Stable sclerotic T12, L1, and L2 vertebral bodies and bilateral pelvic bones on bone windows with stable subcentimeter para-aortic lymph nodes and no definite progression in the chest abdomen or pelvis.    -1/5/2021 [facility] [place], operated by [facility] medical oncology follow-up visit: I reviewed the images and reports thereof of the above CT and bone scan which shows good response to Taxotere x3 courses with a PSA down to 0.275 from a baseline of 10.  Get another 3 courses of Taxotere, continue his Xgeva, and then following that we will switch to Zytiga and prednisone.  He is working with palliative care on his bone pain but on his current dose of fentanyl patch he says his pain is still not adequately controlled he will contact them.  Dexamethasone prescription was refilled.  We will see my nurse practitioner back in 3 weeks for course #5 of 6 total courses and then we will reimage with CT chest abdomen pelvis and bone scan before going to the Zytiga prednisone.    -3/4/2021 CT chest abdomen pelvis with contrast is negative save for stable sclerotic bone lesions and the bone scan shows near resolution of prior bony abnormalities associated with the known sclerotic lesions in the thoracolumbar spine and bony pelvis.    2/17/2021 PSA down to 0.1 from 1.37 October 2020.  3/9/2021 PSA 0.1    -5/26/2021 CT chest abdomen pelvis with contrast shows stable to slightly underlying increase low-density left para-aortic node.  Bone lesions stable.  Whole-body bone scan stable to decrease activity of known thoracolumbar and bony pelvic involvement.  PSA less than 0.1  , AST 74, bilirubin 0.5.       -6/1/2021 [facility] [place], operated by [facility] medical oncology follow-up  visit: I reviewed the above data with him and images thereof.  Bones are stable.  No significant adenopathy.  PSA immeasurably low.     upper limit of normal 69 and AST 74 upper limit of normal 46.  Hence, neither is more than double the upper limit of normal with no ascites and no encephalopathy and normal albumin and bilirubin, despite the elevation of liver enzymes, he has child Abrams score A.  Package insert for Abiraterone says that for ALT and/or AST greater than 5 times upper limit of normal or total bilirubin greater than 3 times the upper limit of normal to withhold treatment until liver function tests returned to baseline or ALT and AST less than or equal to 2.5 times the upper limit of normal and total bilirubin less than or equal to 1.5 times the upper limit of normal and then reinitiate at 750 mg daily dose of Zytiga.  For recurrent hepatotoxicity on 750 mg daily Zytiga, withhold treatment until liver functions returned to baseline or ALT and AST less than 3-2.5 times upper limit of normal and total bilirubin less than or equal to 1.5 times the upper limit of normal then reinitiate at 500 mg daily Zytiga dose.  If has recurrent hepatotoxicity on 500 mg dose, then discontinue treatment.  If has ALT greater than 3 times the upper limit of normal along with total bilirubin greater than 2 times the upper limit of normal in the absence of other contributing causes or biliary obstruction, permanently discontinue Zytiga.  Based on these recommendations, I would continue current doses but we will watch with serial labs monthly and repeat his imaging again in 3 months but clinically he is doing wonderfully with excellent response to Taxotere and now on Zytiga prednisone plus Zometa along with Relugolix.    Total time of care today inclusive of time spent prior to visit reviewing the above scan results and images and labs and during visit  Translating this information to him and after visit executing the  plan as outlined above took 45 minutes of total patient care time throughout the day today  06/01/2021

## 2021-06-02 ENCOUNTER — INFUSION (OUTPATIENT)
Dept: ONCOLOGY | Facility: HOSPITAL | Age: 66
End: 2021-06-02

## 2021-06-02 VITALS
RESPIRATION RATE: 16 BRPM | BODY MASS INDEX: 28.53 KG/M2 | WEIGHT: 193.2 LBS | TEMPERATURE: 97.8 F | HEART RATE: 85 BPM | SYSTOLIC BLOOD PRESSURE: 143 MMHG | DIASTOLIC BLOOD PRESSURE: 63 MMHG

## 2021-06-02 DIAGNOSIS — C61 PROSTATE CANCER METASTATIC TO BONE (HCC): Primary | ICD-10-CM

## 2021-06-02 DIAGNOSIS — Z45.2 ENCOUNTER FOR CARE RELATED TO PORT-A-CATH: ICD-10-CM

## 2021-06-02 DIAGNOSIS — C79.51 PROSTATE CANCER METASTATIC TO BONE (HCC): Primary | ICD-10-CM

## 2021-06-02 PROCEDURE — 25010000002 HEPARIN LOCK FLUSH PER 10 UNITS: Performed by: INTERNAL MEDICINE

## 2021-06-02 PROCEDURE — 96375 TX/PRO/DX INJ NEW DRUG ADDON: CPT

## 2021-06-02 PROCEDURE — 25010000002 ZOLEDRONIC ACID PER 1 MG: Performed by: INTERNAL MEDICINE

## 2021-06-02 PROCEDURE — 96374 THER/PROPH/DIAG INJ IV PUSH: CPT

## 2021-06-02 RX ORDER — HEPARIN SODIUM (PORCINE) LOCK FLUSH IV SOLN 100 UNIT/ML 100 UNIT/ML
500 SOLUTION INTRAVENOUS AS NEEDED
Status: DISPENSED | OUTPATIENT
Start: 2021-06-02

## 2021-06-02 RX ORDER — HEPARIN SODIUM (PORCINE) LOCK FLUSH IV SOLN 100 UNIT/ML 100 UNIT/ML
500 SOLUTION INTRAVENOUS AS NEEDED
Status: CANCELLED | OUTPATIENT
Start: 2021-06-02

## 2021-06-02 RX ADMIN — HEPARIN 500 UNITS: 100 SYRINGE at 09:00

## 2021-06-02 RX ADMIN — ZOLEDRONIC ACID 4 MG: 4 INJECTION INTRAVENOUS at 08:39

## 2021-06-11 ENCOUNTER — SPECIALTY PHARMACY (OUTPATIENT)
Dept: ONCOLOGY | Facility: HOSPITAL | Age: 66
End: 2021-06-11

## 2021-06-11 DIAGNOSIS — C79.51 PROSTATE CANCER METASTATIC TO BONE (HCC): ICD-10-CM

## 2021-06-11 DIAGNOSIS — C61 PROSTATE CANCER METASTATIC TO BONE (HCC): ICD-10-CM

## 2021-06-11 RX ORDER — ABIRATERONE 500 MG/1
1000 TABLET ORAL DAILY
Qty: 60 TABLET | Refills: 3 | Status: SHIPPED | OUTPATIENT
Start: 2021-06-11 | End: 2021-10-25 | Stop reason: SDUPTHER

## 2021-06-11 NOTE — PROGRESS NOTES
Specialty Pharmacy Assessment    Abiraterone (Zytiga)  Dose: 1000 mg  Frequency: Once daily  Indication: Prostate cancer  Follow-Up: Yes  Dispensing pharmacy: [facility]  Refill status: Current  Dispense status: Mail  Side effect assessment: No or Minimal Side Effects  Intervention: No  Compliance: Patient reports taking capsules whole once daily, Patient reports taking around the same time every day  Additional notes: Reached out to patient for refill assessment/toxicity check. Patient confirms compliance to Abiraterone and Relugolix. Reports tolerating treatment with no new concerns reported during encounter. Patient last had labs drawn on 6/1. Patient is requesting refill of Relugolix and Abiraterone to be processed at [facility] retail. Patient would like medication mailed to address on file at: [address] [place] [id number]. Please alert [facility] to [facility] for dispense and mail.         Medication Adherence    Any gaps in refill history greater than 2 weeks in the last 3 months: no  Demonstrates understanding of importance of adherence: yes  Informant: patient  Reliability of informant: reliable  Estimated medication adherence level: %  Adherence tools used: watch   Other adherence tools: Clock - takes at same time each day, 7:45am   Support network for adherence: family member           Drug Interactions    Clinically relevant drug interactions identified: no           Thank you,   Sheri Maxwell  PharmD Candidate 2022  14:40 EDT  6/11/2021

## 2021-06-18 ENCOUNTER — TELEPHONE (OUTPATIENT)
Dept: ONCOLOGY | Facility: CLINIC | Age: 66
End: 2021-06-18

## 2021-06-18 NOTE — TELEPHONE ENCOUNTER
Caller: AYDE STOCKTON    Relationship: SELF    Best call back number: [phone]    PT IS CALLING TO GET THE NAME OF THE CHEMO HE WAS RECEIVING DURING INFUSIONS. PLEASE CALL BACK WHEN POSSIBLE

## 2021-06-22 ENCOUNTER — INFUSION (OUTPATIENT)
Dept: ONCOLOGY | Facility: HOSPITAL | Age: 66
End: 2021-06-22

## 2021-06-22 VITALS
WEIGHT: 196.4 LBS | BODY MASS INDEX: 29 KG/M2 | RESPIRATION RATE: 16 BRPM | SYSTOLIC BLOOD PRESSURE: 154 MMHG | TEMPERATURE: 96.4 F | DIASTOLIC BLOOD PRESSURE: 76 MMHG | HEART RATE: 83 BPM

## 2021-06-22 DIAGNOSIS — Z45.2 ENCOUNTER FOR CARE RELATED TO PORT-A-CATH: ICD-10-CM

## 2021-06-22 DIAGNOSIS — C61 PROSTATE CANCER METASTATIC TO BONE (HCC): Primary | ICD-10-CM

## 2021-06-22 DIAGNOSIS — C79.51 PROSTATE CANCER METASTATIC TO BONE (HCC): Primary | ICD-10-CM

## 2021-06-22 PROCEDURE — 36591 DRAW BLOOD OFF VENOUS DEVICE: CPT

## 2021-06-22 PROCEDURE — 25010000002 HEPARIN LOCK FLUSH PER 10 UNITS: Performed by: INTERNAL MEDICINE

## 2021-06-22 RX ORDER — HEPARIN SODIUM (PORCINE) LOCK FLUSH IV SOLN 100 UNIT/ML 100 UNIT/ML
500 SOLUTION INTRAVENOUS AS NEEDED
Status: DISCONTINUED | OUTPATIENT
Start: 2021-06-22 | End: 2021-06-22 | Stop reason: HOSPADM

## 2021-06-22 RX ORDER — HEPARIN SODIUM (PORCINE) LOCK FLUSH IV SOLN 100 UNIT/ML 100 UNIT/ML
500 SOLUTION INTRAVENOUS AS NEEDED
Status: CANCELLED | OUTPATIENT
Start: 2021-06-22

## 2021-06-22 RX ADMIN — HEPARIN 500 UNITS: 100 SYRINGE at 12:50

## 2021-06-23 ENCOUNTER — OFFICE VISIT (OUTPATIENT)
Dept: ONCOLOGY | Facility: CLINIC | Age: 66
End: 2021-06-23

## 2021-06-23 VITALS
HEART RATE: 78 BPM | WEIGHT: 196 LBS | DIASTOLIC BLOOD PRESSURE: 62 MMHG | TEMPERATURE: 97.1 F | RESPIRATION RATE: 18 BRPM | BODY MASS INDEX: 29.03 KG/M2 | HEIGHT: 69 IN | SYSTOLIC BLOOD PRESSURE: 129 MMHG

## 2021-06-23 DIAGNOSIS — M25.551 ACUTE RIGHT HIP PAIN: ICD-10-CM

## 2021-06-23 DIAGNOSIS — C61 PROSTATE CANCER METASTATIC TO BONE (HCC): Primary | ICD-10-CM

## 2021-06-23 DIAGNOSIS — C79.51 PROSTATE CANCER METASTATIC TO BONE (HCC): Primary | ICD-10-CM

## 2021-06-23 LAB
ALBUMIN SERPL-MCNC: 4.1 G/DL (ref 3.8–4.8)
ALBUMIN/GLOB SERPL: 1.5 {RATIO} (ref 1.2–2.2)
ALP SERPL-CCNC: 83 IU/L (ref 48–121)
ALT SERPL-CCNC: 87 IU/L (ref 0–44)
AST SERPL-CCNC: 55 IU/L (ref 0–40)
BASOPHILS # BLD AUTO: 0 X10E3/UL (ref 0–0.2)
BASOPHILS NFR BLD AUTO: 1 %
BILIRUB SERPL-MCNC: 0.3 MG/DL (ref 0–1.2)
BUN SERPL-MCNC: 10 MG/DL (ref 8–27)
BUN/CREAT SERPL: 13 (ref 10–24)
CALCIUM SERPL-MCNC: 9.2 MG/DL (ref 8.6–10.2)
CHLORIDE SERPL-SCNC: 105 MMOL/L (ref 96–106)
CO2 SERPL-SCNC: 20 MMOL/L (ref 20–29)
CREAT SERPL-MCNC: 0.8 MG/DL (ref 0.76–1.27)
EOSINOPHIL # BLD AUTO: 0.1 X10E3/UL (ref 0–0.4)
EOSINOPHIL NFR BLD AUTO: 2 %
ERYTHROCYTE [DISTWIDTH] IN BLOOD BY AUTOMATED COUNT: 15.5 % (ref 11.6–15.4)
GLOBULIN SER CALC-MCNC: 2.7 G/DL (ref 1.5–4.5)
GLUCOSE SERPL-MCNC: ABNORMAL MG/DL
HCT VFR BLD AUTO: 45 % (ref 37.5–51)
HGB BLD-MCNC: 14.9 G/DL (ref 13–17.7)
IMM GRANULOCYTES # BLD AUTO: 0 X10E3/UL (ref 0–0.1)
IMM GRANULOCYTES NFR BLD AUTO: 0 %
LYMPHOCYTES # BLD AUTO: 1.6 X10E3/UL (ref 0.7–3.1)
LYMPHOCYTES NFR BLD AUTO: 21 %
MAGNESIUM SERPL-MCNC: 2.1 MG/DL (ref 1.6–2.3)
MCH RBC QN AUTO: 27.7 PG (ref 26.6–33)
MCHC RBC AUTO-ENTMCNC: 33.1 G/DL (ref 31.5–35.7)
MCV RBC AUTO: 84 FL (ref 79–97)
MONOCYTES # BLD AUTO: 0.5 X10E3/UL (ref 0.1–0.9)
MONOCYTES NFR BLD AUTO: 6 %
NEUTROPHILS # BLD AUTO: 5.4 X10E3/UL (ref 1.4–7)
NEUTROPHILS NFR BLD AUTO: 70 %
PHOSPHATE SERPL-MCNC: NORMAL MG/DL
PLATELET # BLD AUTO: 126 X10E3/UL (ref 150–450)
POTASSIUM SERPL-SCNC: ABNORMAL MMOL/L
PROT SERPL-MCNC: 6.8 G/DL (ref 6–8.5)
PSA SERPL-MCNC: <0.1 NG/ML (ref 0–4)
RBC # BLD AUTO: 5.37 X10E6/UL (ref 4.14–5.8)
SODIUM SERPL-SCNC: 144 MMOL/L (ref 134–144)
WBC # BLD AUTO: 7.6 X10E3/UL (ref 3.4–10.8)

## 2021-06-23 PROCEDURE — 99214 OFFICE O/P EST MOD 30 MIN: CPT | Performed by: NURSE PRACTITIONER

## 2021-06-23 RX ORDER — SODIUM CHLORIDE 9 MG/ML
250 INJECTION, SOLUTION INTRAVENOUS ONCE
Status: CANCELLED | OUTPATIENT
Start: 2021-07-21

## 2021-06-24 NOTE — PROGRESS NOTES
CHIEF COMPLAINT: 1.  Stage IV metastatic prostate cancer  2.  Left hip pain    Problem List:  Oncology/Hematology History Overview Note   1.  Stage IVb grade group 4 metastatic prostate cancer with sclerotic bone lesions on CT and bone scan and history of prostatic hypertrophy  2.  Kidney stone  3.  Polyps  4.  Elevated liver enzymes    -9/30/2020 office note from Dr. Ronen Reynaga indicates patient with PSA 10.8.  Underwent TRUS prostate biopsy 9/15/2020.  Adenocarcinoma the prostate Sarika 4+4 = 8 in 1 out of 12 cores and 4+3 = 7 and 10 out of 12 cores and 3+4 = 7 in 1 out of 12 cores.  Perineural invasion.  Extraprostatic extension into the fat seen on 2 biopsies of the right lateral mid and right apex.  9/24/2020 CT chest and whole-body bone scan showed T12, L1, left acetabular, right medial acetabular metastases with no lung metastases.  He started androgen deprivation therapy with Casodex with plans for Lupron to start in 1 to 2 weeks for clinical T3 N0 M1 metastatic prostate cancer.  Review of official report from [facility] 9/24/2020 bone scan mentions T12, L1, roof of left acetabulum and medial right acetabular osteoblastic metastases.  CT chest with contrast that same day showed mild splenomegaly 14.2 cm with stable periaortic nodes compared to CT December 2015 with no lung metastases.  Sclerotic abnormality within the T12 vertebral body, L1 vertebral body extending into the pedicle unchanged.  8/28/2020 CT abdomen pelvis report from [facility] reviewed and mentions normal spleen size.  Punctate nonobstructing kidney stone, no paulino enlargement, diffuse bladder wall thickening with mild perivesicular fat stranding, 2.1 cm sclerotic left iliac bone above the left acetabulum new compared to 2015 as well as 1.5 cm faintly sclerotic focus in the right posterior acetabulum stable compared to prior CT 2015 as well as a 1.6 cm T12 and inferior vertebral body sclerotic lesion and a 1.9 cm left  posterior lateral L1 vertebral body abnormality extending to the pedicle.  -10/13/2020 initial [facility] medical oncology consultation: With 1 [place] score 8, 4+4 lesion that would make him a grade group 4 with stage IVb disease given radiographic evidence of sclerotic bone metastasis on bone scan and CT.  Needs genetic testing given metastatic prostate cancer and this is also important as, were he to have mismatch repair mutation then we would have options for PDL 1 inhibitors and were he BRCA mutated would have options for PARP inhibitors in the future.  Systemic therapy for castration naïve prostate cancer or N1 disease should be with apalutamide or Abiraterone or enzalutamide all of which are category 1.  He does not have any visceral metastases.  According to his imaging he has T12, L1, left acetabular, right acetabular, and left iliac bony involvement.  That means he would have 4 or more bone metastases with at least one metastasis (i.e. the acetabular lesions bilaterally) beyond the pelvis/vertebral, for which this would be considered high-volume disease for which 6 cycles of docetaxel 75 mg/m² x 6 cycles followed by Zytiga and prednisone would be reasonable along with Zometa every 6 weeks for bone stabilization.  He will do chemo preparation visit with my nurse practitioner prior to start chemotherapy with Taxotere and Zometa in 2 weeks and he is already received Casodex in preparation for Lupron shot he received on 10/12/2020 with Dr. Reynaga.  We will get him to Dr. Alexander Gomez who has done his polypectomies in the past for port placement and we will get genetic counseling started.  Following the 6 courses of Taxotere per the Chaarted trial criteria, I would then give him an indefinite Zytiga and prednisone and Zometa until progression or toxicity dictates.    -12/16/2019 COVID-19 antigen test negative    -12/29/2020 PSA 0.275 with absolute neutrophil count 700 and creatinine 0.76 with normal liver  enzymes and electrolytes.  Normal magnesium and phosphorus and urine drug screen positive for buprenorphine and opiates follow-up with palliative care.    -1/4/2021 CT chest abdomen pelvis with contrast and whole-body bone scan shows improving less metabolically active bone metastases.  Stable sclerotic T12, L1, and L2 vertebral bodies and bilateral pelvic bones on bone windows with stable subcentimeter para-aortic lymph nodes and no definite progression in the chest abdomen or pelvis.    -1/5/2021 [facility] medical oncology follow-up visit: I reviewed the images and reports thereof of the above CT and bone scan which shows good response to Taxotere x3 courses with a PSA down to 0.275 from a baseline of 10.  Get another 3 courses of Taxotere, continue his Xgeva, and then following that we will switch to Zytiga and prednisone.  He is working with palliative care on his bone pain but on his current dose of fentanyl patch he says his pain is still not adequately controlled he will contact them.  Dexamethasone prescription was refilled.  We will see my nurse practitioner back in 3 weeks for course #5 of 6 total courses and then we will reimage with CT chest abdomen pelvis and bone scan before going to the Zytiga prednisone.    -3/4/2021 CT chest abdomen pelvis with contrast is negative save for stable sclerotic bone lesions and the bone scan shows near resolution of prior bony abnormalities associated with the known sclerotic lesions in the thoracolumbar spine and bony pelvis.    2/17/2021 PSA down to 0.1 from 1.37 October 2020.  3/9/2021 PSA 0.1    -5/26/2021 CT chest abdomen pelvis with contrast shows stable to slightly underlying increase low-density left para-aortic node.  Bone lesions stable.  Whole-body bone scan stable to decrease activity of known thoracolumbar and bony pelvic involvement.  PSA less than 0.1  , AST 74, bilirubin 0.5.       -6/1/2021 [facility] medical oncology follow-up visit: I reviewed the  above data with him and images thereof.  Bones are stable.  No significant adenopathy.  PSA immeasurably low.     upper limit of normal 69 and AST 74 upper limit of normal 46.  Hence, neither is more than double the upper limit of normal with no ascites and no encephalopathy and normal albumin and bilirubin, despite the elevation of liver enzymes, he has child Abrams score A.  Package insert for Abiraterone says that for ALT and/or AST greater than 5 times upper limit of normal or total bilirubin greater than 3 times the upper limit of normal to withhold treatment until liver function tests returned to baseline or ALT and AST less than or equal to 2.5 times the upper limit of normal and total bilirubin less than or equal to 1.5 times the upper limit of normal and then reinitiate at 750 mg daily dose of Zytiga.  For recurrent hepatotoxicity on 750 mg daily Zytiga, withhold treatment until liver functions returned to baseline or ALT and AST less than 3-2.5 times upper limit of normal and total bilirubin less than or equal to 1.5 times the upper limit of normal then reinitiate at 500 mg daily Zytiga dose.  If has recurrent hepatotoxicity on 500 mg dose, then discontinue treatment.  If has ALT greater than 3 times the upper limit of normal along with total bilirubin greater than 2 times the upper limit of normal in the absence of other contributing causes or biliary obstruction, permanently discontinue Zytiga.  Based on these recommendations, I would continue current doses but we will watch with serial labs monthly and repeat his imaging again in 3 months but clinically he is doing wonderfully with excellent response to Taxotere and now on Zytiga prednisone plus Zometa along with Relugolix.     Prostate cancer metastatic to bone (CMS/HCC)   8/30/2020 -  Other Event    PSA 10.8     9/15/2020 Initial Diagnosis    Prostate cancer metastatic to bone (CMS/HCC)     9/15/2020 Biopsy    Prostate needle core biopsy      9/24/2020 Imaging    Total body bone scan: 4 abnormal areas of increased activity consistent with osteoblastic metastasis, abnormal foci of activity seen in the T12 vertebral body, L1 vertebral body, roof of the left acetabulum, and acetabulum medially on the right.  CT chest: Stable sclerotic metastatic lesions within the T12 and L1 vertebrae.  No other evidence of metastatic disease to the chest.  Stable mild splenomegaly and subcentimeter periaortic retroperitoneal lymph nodes.  CT abdomen and pelvis: Diffuse bladder wall thickening with mild perivesicular fat stranding.  Interval development of several sclerotic lesions in the spine and left iliac bone.     10/13/2020 Cancer Staged    Staging form: Bone - Appendicular Skeleton, Trunk, Skull, And Facial Bones, AJCC 8th Edition  - Clinical: Stage IVB (cT3, cN0, cM1b, G3) - Signed by Ishmael Rashid MD on 10/13/2020     11/3/2020 -  Chemotherapy    OP SUPPORTIVE Zoledronic Acid Q42d     11/3/2020 - 3/9/2021 Chemotherapy    OP PROSTATE DOCEtaxel     1/4/2021 Imaging    CT chest abdomen pelvis with contrast and whole-body bone scan shows improving less metabolically active bone metastases.  Stable sclerotic T12, L1, and L2 vertebral bodies and bilateral pelvic bones on bone windows with stable subcentimeter para-aortic lymph nodes and no definite progression in the chest abdomen or pelvis.  PSA down to 0.275 after 3 courses of Taxotere.     3/4/2021 Imaging    CT chest, abdomen and pelvis stable, no evidence of disease progression. Total body bone scan with decreased/near resolution of abnormal increased radiotracer uptake associated with the known sclerotic lesions in the thoracolumbar spine and bony pelvis.  No evidence of new osteoblastic metastases.     3/4/2021 Imaging    CT chest abdomen pelvis with contrast is negative save for stable sclerotic bone lesions and the bone scan shows near resolution of prior bony abnormalities associated with the known sclerotic  "lesions in the thoracolumbar spine and bony pelvis.  2/17/2021 PSA down to 0.1 from 1.37 October 2020.     3/9/2021 - 3/9/2021 Chemotherapy    OP SUPPORTIVE PROSTATE Relugolix     3/9/2021 -  Chemotherapy    OP PROSTATE Abiraterone / PredniSONE       3/10/2021 -  Chemotherapy    OP PROSTATE Abiraterone / PredniSONE         HISTORY OF PRESENT ILLNESS:  The patient is a 66 y.o. male, here for follow up on management of stage IV metastatic prostate cancer.  Mr. Lugo reports that he has been having persistent and increasing pain above his left hip.  It is worse with certain positions, also worse when he walks any distance.  The pain does not radiate.  It is a sharp/burning pain above his left hip.  His pain medication only gives him minimal relief.    Past Medical History:   Diagnosis Date   • Nephrolithiasis    • Opioid use disorder, mild, on maintenance therapy (CMS/HCC)      Past Surgical History:   Procedure Laterality Date   • CHOLECYSTECTOMY     • CORONARY STENT PLACEMENT  206 & 2011   • HERNIA REPAIR  1986   • THORACIC DISCECTOMY  1994       Allergies   Allergen Reactions   • Cymbalta [Duloxetine Hcl] Dizziness   • Gabapentin Nausea Only   • Lyrica [Pregabalin] Nausea And Vomiting and Dizziness       Family History and Social History reviewed and changed as necessary    REVIEW OF SYSTEM:   Positive for pain above the left hip    PHYSICAL EXAM:  Lungs clear  Heart regular rate and rhythm  Gait steady but he does favor that left hip when he walks, no palpable abnormalities on exam.      Vitals:    06/23/21 0923   BP: 129/62   Pulse: 78   Resp: 18   Temp: 97.1 °F (36.2 °C)   Weight: 88.9 kg (196 lb)   Height: 175.3 cm (69\")     Vitals:    06/23/21 0923   PainSc:   6   PainLoc: Back  Comment: back and both hips          ECOG score: 1       Vitals reviewed.  Labs reviewed.      Lab Results   Component Value Date    HGB 14.9 06/22/2021    HCT 45.0 06/22/2021    MCV 84 06/22/2021     (L) 06/22/2021    WBC 7.6 " 06/22/2021    NEUTROABS 5.4 06/22/2021    LYMPHSABS 1.6 06/22/2021    MONOSABS 0.5 06/22/2021    EOSABS 0.1 06/22/2021    BASOSABS 0.0 06/22/2021       Lab Results   Component Value Date    GLUCOSE 102 (H) 04/20/2021    BUN 10 06/22/2021    CREATININE 0.80 06/22/2021     06/22/2021    K CANCELED 06/22/2021     06/22/2021    CO2 20 06/22/2021    CALCIUM 9.2 06/22/2021    PROTEINTOT 6.6 04/20/2021    ALBUMIN 4.1 06/22/2021    BILITOT 0.3 06/22/2021    ALKPHOS 83 06/22/2021    AST 55 (H) 06/22/2021    ALT 87 (H) 06/22/2021 6/22/2021 PSA <0.1        ASSESSMENT & PLAN:  1.  Stage IVb grade group 4 metastatic prostate cancer with sclerotic bone lesions on CT and bone scan and history of prostatic hypertrophy  2.  Kidney stone  3.  Polyps  4.  Elevated liver enzymes  5.  Left hip pain    -9/30/2020 office note from Dr. Ronen Reynaga indicates patient with PSA 10.8.  Underwent TRUS prostate biopsy 9/15/2020.  Adenocarcinoma the prostate Sarika 4+4 = 8 in 1 out of 12 cores and 4+3 = 7 and 10 out of 12 cores and 3+4 = 7 in 1 out of 12 cores.  Perineural invasion.  Extraprostatic extension into the fat seen on 2 biopsies of the right lateral mid and right apex.  9/24/2020 CT chest and whole-body bone scan showed T12, L1, left acetabular, right medial acetabular metastases with no lung metastases.  He started androgen deprivation therapy with Casodex with plans for Lupron to start in 1 to 2 weeks for clinical T3 N0 M1 metastatic prostate cancer.  Review of official report from [facility] 9/24/2020 bone scan mentions T12, L1, roof of left acetabulum and medial right acetabular osteoblastic metastases.  CT chest with contrast that same day showed mild splenomegaly 14.2 cm with stable periaortic nodes compared to CT December 2015 with no lung metastases.  Sclerotic abnormality within the T12 vertebral body, L1 vertebral body extending into the pedicle unchanged.  8/28/2020 CT abdomen pelvis report from  [facility] reviewed and mentions normal spleen size.  Punctate nonobstructing kidney stone, no paulino enlargement, diffuse bladder wall thickening with mild perivesicular fat stranding, 2.1 cm sclerotic left iliac bone above the left acetabulum new compared to 2015 as well as 1.5 cm faintly sclerotic focus in the right posterior acetabulum stable compared to prior CT 2015 as well as a 1.6 cm T12 and inferior vertebral body sclerotic lesion and a 1.9 cm left posterior lateral L1 vertebral body abnormality extending to the pedicle.  -10/13/2020 initial [facility] medical oncology consultation: With 1 [place] score 8, 4+4 lesion that would make him a grade group 4 with stage IVb disease given radiographic evidence of sclerotic bone metastasis on bone scan and CT.  Needs genetic testing given metastatic prostate cancer and this is also important as, were he to have mismatch repair mutation then we would have options for PDL 1 inhibitors and were he BRCA mutated would have options for PARP inhibitors in the future.  Systemic therapy for castration naïve prostate cancer or N1 disease should be with apalutamide or Abiraterone or enzalutamide all of which are category 1.  He does not have any visceral metastases.  According to his imaging he has T12, L1, left acetabular, right acetabular, and left iliac bony involvement.  That means he would have 4 or more bone metastases with at least one metastasis (i.e. the acetabular lesions bilaterally) beyond the pelvis/vertebral, for which this would be considered high-volume disease for which 6 cycles of docetaxel 75 mg/m² x 6 cycles followed by Zytiga and prednisone would be reasonable along with Zometa every 6 weeks for bone stabilization.  He will do chemo preparation visit with my nurse practitioner prior to start chemotherapy with Taxotere and Zometa in 2 weeks and he is already received Casodex in preparation for Lupron shot he received on 10/12/2020 with Dr. Reynaga.  We  will get him to Dr. Alexander Gomez who has done his polypectomies in the past for port placement and we will get genetic counseling started.  Following the 6 courses of Taxotere per the Chaarted trial criteria, I would then give him an indefinite Zytiga and prednisone and Zometa until progression or toxicity dictates.    -12/16/2019 COVID-19 antigen test negative    -12/29/2020 PSA 0.275 with absolute neutrophil count 700 and creatinine 0.76 with normal liver enzymes and electrolytes.  Normal magnesium and phosphorus and urine drug screen positive for buprenorphine and opiates follow-up with palliative care.    -1/4/2021 CT chest abdomen pelvis with contrast and whole-body bone scan shows improving less metabolically active bone metastases.  Stable sclerotic T12, L1, and L2 vertebral bodies and bilateral pelvic bones on bone windows with stable subcentimeter para-aortic lymph nodes and no definite progression in the chest abdomen or pelvis.    -1/5/2021 [facility] medical oncology follow-up visit: I reviewed the images and reports thereof of the above CT and bone scan which shows good response to Taxotere x3 courses with a PSA down to 0.275 from a baseline of 10.  Get another 3 courses of Taxotere, continue his Xgeva, and then following that we will switch to Zytiga and prednisone.  He is working with palliative care on his bone pain but on his current dose of fentanyl patch he says his pain is still not adequately controlled he will contact them.  Dexamethasone prescription was refilled.  We will see my nurse practitioner back in 3 weeks for course #5 of 6 total courses and then we will reimage with CT chest abdomen pelvis and bone scan before going to the Zytiga prednisone.    -3/4/2021 CT chest abdomen pelvis with contrast is negative save for stable sclerotic bone lesions and the bone scan shows near resolution of prior bony abnormalities associated with the known sclerotic lesions in the thoracolumbar spine and bony  pelvis.    2/17/2021 PSA down to 0.1 from 1.37 October 2020.  3/9/2021 PSA 0.1    -5/26/2021 CT chest abdomen pelvis with contrast shows stable to slightly underlying increase low-density left para-aortic node.  Bone lesions stable.  Whole-body bone scan stable to decrease activity of known thoracolumbar and bony pelvic involvement.  PSA less than 0.1  , AST 74, bilirubin 0.5.       -6/1/2021 [facility] oncology follow-up visit: I reviewed the above data with him and images thereof.  Bones are stable.  No significant adenopathy.  PSA immeasurably low.     upper limit of normal 69 and AST 74 upper limit of normal 46.  Hence, neither is more than double the upper limit of normal with no ascites and no encephalopathy and normal albumin and bilirubin, despite the elevation of liver enzymes, he has child [place] score A.  Package insert for Abiraterone says that for ALT and/or AST greater than 5 times upper limit of normal or total bilirubin greater than 3 times the upper limit of normal to withhold treatment until liver function tests returned to baseline or ALT and AST less than or equal to 2.5 times the upper limit of normal and total bilirubin less than or equal to 1.5 times the upper limit of normal and then reinitiate at 750 mg daily dose of Zytiga.  For recurrent hepatotoxicity on 750 mg daily Zytiga, withhold treatment until liver functions returned to baseline or ALT and AST less than 3-2.5 times upper limit of normal and total bilirubin less than or equal to 1.5 times the upper limit of normal then reinitiate at 500 mg daily Zytiga dose.  If has recurrent hepatotoxicity on 500 mg dose, then discontinue treatment.  If has ALT greater than 3 times the upper limit of normal along with total bilirubin greater than 2 times the upper limit of normal in the absence of other contributing causes or biliary obstruction, permanently discontinue Zytiga.  Based on these recommendations, I would continue  current doses but we will watch with serial labs monthly and repeat his imaging again in 3 months but clinically he is doing wonderfully with excellent response to Taxotere and now on Zytiga prednisone plus Zometa along with Relugolix.    -6/23/2020 for [facility] [place] oncology clinic follow-up: Having persistent and worsening pain above his left hip.  I will get an MRI of the left hip for further evaluation.  Otherwise we will continue therapy unchanged with Zytiga, prednisone and Zometa along with Relugolix.  PSA remains immeasurably low at <0.1.  His liver enzymes are mildly elevated, no indication to hold or adjust treatment currently, will continue to monitor.  See note dated 6/1/2021 for parameters.  I will see him back in 1 month for follow-up.  I will call him with the results of the MRI.  He continues to follow along with palliative care for pain management.    This was a level 4, moderate MDM visit with chronic illness with management of side effects of treatment with elevated liver enzymes, 1 undiagnosed new problem with uncertain prognosis with left hip pain and ordering of MRI, management of drug therapy requiring intensive monitoring for toxicity, review of labs and ordering of future labs.  Abena Velasquez, APRN  06/23/2021

## 2021-06-28 DIAGNOSIS — Z51.81 THERAPEUTIC DRUG MONITORING: Primary | ICD-10-CM

## 2021-06-29 ENCOUNTER — LAB (OUTPATIENT)
Dept: LAB | Facility: HOSPITAL | Age: 66
End: 2021-06-29

## 2021-06-29 ENCOUNTER — TELEPHONE (OUTPATIENT)
Dept: ONCOLOGY | Facility: HOSPITAL | Age: 66
End: 2021-06-29

## 2021-06-29 ENCOUNTER — OFFICE VISIT (OUTPATIENT)
Dept: PALLIATIVE CARE | Facility: CLINIC | Age: 66
End: 2021-06-29

## 2021-06-29 VITALS
DIASTOLIC BLOOD PRESSURE: 68 MMHG | SYSTOLIC BLOOD PRESSURE: 119 MMHG | TEMPERATURE: 97.8 F | WEIGHT: 194.4 LBS | OXYGEN SATURATION: 97 % | BODY MASS INDEX: 28.71 KG/M2 | HEART RATE: 84 BPM

## 2021-06-29 DIAGNOSIS — C61 PROSTATE CANCER METASTATIC TO BONE (HCC): Primary | ICD-10-CM

## 2021-06-29 DIAGNOSIS — Z51.81 THERAPEUTIC DRUG MONITORING: ICD-10-CM

## 2021-06-29 DIAGNOSIS — G89.3 CHRONIC PAIN DUE TO MALIGNANT NEOPLASTIC DISEASE: ICD-10-CM

## 2021-06-29 DIAGNOSIS — F11.90 OPIOID USE DISORDER: ICD-10-CM

## 2021-06-29 DIAGNOSIS — C79.51 PROSTATE CANCER METASTATIC TO BONE (HCC): Primary | ICD-10-CM

## 2021-06-29 LAB
AMPHET+METHAMPHET UR QL: NEGATIVE
AMPHETAMINES UR QL: NEGATIVE
BARBITURATES UR QL SCN: NEGATIVE
BENZODIAZ UR QL SCN: NEGATIVE
BUPRENORPHINE SERPL-MCNC: NEGATIVE NG/ML
CANNABINOIDS SERPL QL: NEGATIVE
COCAINE UR QL: NEGATIVE
METHADONE UR QL SCN: POSITIVE
OPIATES UR QL: POSITIVE
OXYCODONE UR QL SCN: NEGATIVE
PCP UR QL SCN: NEGATIVE
PROPOXYPH UR QL: NEGATIVE
TRICYCLICS UR QL SCN: NEGATIVE

## 2021-06-29 PROCEDURE — 80306 DRUG TEST PRSMV INSTRMNT: CPT

## 2021-06-29 PROCEDURE — 99214 OFFICE O/P EST MOD 30 MIN: CPT | Performed by: INTERNAL MEDICINE

## 2021-06-29 RX ORDER — METHADONE HYDROCHLORIDE 5 MG/1
5 TABLET ORAL 3 TIMES DAILY
Qty: 21 TABLET | Refills: 0 | Status: SHIPPED | OUTPATIENT
Start: 2021-06-30 | End: 2021-07-07

## 2021-06-29 RX ORDER — HYDROMORPHONE HYDROCHLORIDE 4 MG/1
4 TABLET ORAL EVERY 4 HOURS PRN
Qty: 21 TABLET | Refills: 0 | Status: SHIPPED | OUTPATIENT
Start: 2021-07-07 | End: 2021-07-08 | Stop reason: DRUGHIGH

## 2021-06-29 RX ORDER — METHADONE HYDROCHLORIDE 5 MG/1
5 TABLET ORAL EVERY 8 HOURS
Qty: 21 TABLET | Refills: 0 | Status: SHIPPED | OUTPATIENT
Start: 2021-07-07 | End: 2021-07-14

## 2021-06-29 RX ORDER — HYDROMORPHONE HYDROCHLORIDE 4 MG/1
4 TABLET ORAL EVERY 4 HOURS PRN
Qty: 21 TABLET | Refills: 0 | Status: SHIPPED | OUTPATIENT
Start: 2021-07-14 | End: 2021-07-08 | Stop reason: DRUGHIGH

## 2021-06-29 RX ORDER — METHADONE HYDROCHLORIDE 5 MG/1
5 TABLET ORAL EVERY 8 HOURS
Qty: 21 TABLET | Refills: 0 | Status: SHIPPED | OUTPATIENT
Start: 2021-07-21 | End: 2021-07-28 | Stop reason: SDUPTHER

## 2021-06-29 RX ORDER — HYDROMORPHONE HYDROCHLORIDE 4 MG/1
4 TABLET ORAL EVERY 4 HOURS PRN
Qty: 21 TABLET | Refills: 0 | Status: SHIPPED | OUTPATIENT
Start: 2021-06-30 | End: 2021-07-07

## 2021-06-29 RX ORDER — METHADONE HYDROCHLORIDE 5 MG/1
5 TABLET ORAL EVERY 8 HOURS
Qty: 21 TABLET | Refills: 0 | Status: SHIPPED | OUTPATIENT
Start: 2021-07-14 | End: 2021-07-21

## 2021-06-29 RX ORDER — HYDROMORPHONE HYDROCHLORIDE 4 MG/1
4 TABLET ORAL EVERY 4 HOURS PRN
Qty: 21 TABLET | Refills: 0 | Status: SHIPPED | OUTPATIENT
Start: 2021-07-21 | End: 2021-07-08 | Stop reason: DRUGHIGH
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
## 2021-06-29 NOTE — PROGRESS NOTES
Med Counts  Medication Filled # Filled Count Used  # days ROSE MARIE   Dilaudid 4mg 6/22/21 21 1 20 7 2.9   Methadone 5mg 6/22/21 21 1 20 7 2.9

## 2021-06-29 NOTE — TELEPHONE ENCOUNTER
[facility] Refill Outreach    Called regarding refill of medication: Zytiga 1000mg po QD and predisone 5mg po QD    Spoke with patient.    Assessment  • It looks like it is almost time for your refill, how many doses do you have left? 10 days remaining  • How are you doing on the medication, are you experiencing any side effects? Patient denies side effects   • How many doses have you missed since you last filled your prescription? 0 doses missed.    Shipment   • We can go ahead and coordinate your next refill, would you like to pick this up at the pharmacy/[facility], or have this delivered to you vs. the clinic? Patient plans to  at [facility] today.   • Do you have any questions for the pharmacist? No.  • Ensured patient has clinic contact information for questions. .    Follow up: Plan to reach out to patient on/around 7/27/2021 for zytiga and prednisone  Follow up: Plan to reach out to patient on 7/6/21 for refill on orgovyx    Sushma Muro, Pharmacy Technician  6/29/2021  09:21 EDT

## 2021-07-02 DIAGNOSIS — M25.552 ACUTE HIP PAIN, LEFT: ICD-10-CM

## 2021-07-02 DIAGNOSIS — R93.7 ABNORMAL MRI, LUMBAR SPINE: ICD-10-CM

## 2021-07-02 DIAGNOSIS — C61 PROSTATE CANCER METASTATIC TO BONE (HCC): Primary | ICD-10-CM

## 2021-07-02 DIAGNOSIS — C79.51 PROSTATE CANCER METASTATIC TO BONE (HCC): Primary | ICD-10-CM

## 2021-07-02 NOTE — PROGRESS NOTES
MRI of the hips reviewed with Dr. Rashid, patient with 2.3 cm heterogenous mass in the left aspect of the L5 vertebral body, also left supra-acetabular sclerotic lesion.  We will get him to Dr. North, neurosurgery, for evaluation to see if the mass in the left aspect of the L5 vertebral body could be causing him this pain.  I called Mr. Lugo and discussed the results with him and also asked him to get a copy of his disc of the MRI from [facility] to take with him to his appointment and he stated understanding and agreement with plan.

## 2021-07-06 ENCOUNTER — TELEPHONE (OUTPATIENT)
Dept: ONCOLOGY | Facility: CLINIC | Age: 66
End: 2021-07-06

## 2021-07-06 ENCOUNTER — TELEPHONE (OUTPATIENT)
Dept: ONCOLOGY | Facility: HOSPITAL | Age: 66
End: 2021-07-06

## 2021-07-06 NOTE — TELEPHONE ENCOUNTER
[facility] Refill Outreach    Called regarding refill of medication: Relugolix 120mg PO QD  Spoke with patient.    Assessment  • It looks like it is almost time for your refill, how many doses do you have left? 6 tablets left  • How are you doing on the medication, are you experiencing any side effects? Patient denies side effects   • How many doses have you missed since you last filled your prescription? 0 doses missed.    Shipment   • We can go ahead and coordinate your next refill, [facility], or have this delivered to you vs. the clinic?   o If government plan (Medicaid and Medicare B), delivery will require signature -> identify a day the patient will be home for delivery set up (note the package should arrive prior to noon as pharmacy will ship priority overnight)  • Patient was advised medication will be mailed to home address on 7/8/2021 for delivery via fedex on 7/9/21.   • Shipping address confirmed:[address] [place] [id number]  • Patient is aware and willing to pay copay of $0, CCOF.   • Do you have any questions for the pharmacist? No.  • Ensured patient has clinic contact information for questions.     Follow up: plan to follow up with patient on/around 8/3/2021      Sushma Muro, Pharmacy Technician  7/6/2021  08:33 EDT

## 2021-07-06 NOTE — TELEPHONE ENCOUNTER
Called Rosaura back and she questions regarding if the spot that was found on the MRI was cancer and other questions that she needs to ask Abena.  Will ask Abena to call her back at [phone].

## 2021-07-06 NOTE — TELEPHONE ENCOUNTER
Caller: HIRAL STOCKTON    Relationship: WIFE    Best call back number: [phone]    What is the best time to reach you:  ANY    Who are you requesting to speak with: SANDRA YAÑEZ    Do you know the name of the person who called: HIRAL    What was the call regarding: STATED THEY HAVE QUESTIONS FOR SANDRA.    Do you require a callback: YES, PLEASE

## 2021-07-07 NOTE — TELEPHONE ENCOUNTER
I called and spoke with Rosaura, I answered her questions regarding his MRI. We are still waiting on an appointment with Dr. North.

## 2021-07-08 DIAGNOSIS — C79.51 PROSTATE CANCER METASTATIC TO BONE (HCC): ICD-10-CM

## 2021-07-08 DIAGNOSIS — C61 PROSTATE CANCER METASTATIC TO BONE (HCC): ICD-10-CM

## 2021-07-08 RX ORDER — HYDROMORPHONE HYDROCHLORIDE 4 MG/1
4 TABLET ORAL EVERY 4 HOURS PRN
Qty: 72 TABLET | Refills: 0 | Status: SHIPPED | OUTPATIENT
Start: 2021-07-09 | End: 2021-07-21

## 2021-07-08 RX ORDER — HYDROMORPHONE HYDROCHLORIDE 4 MG/1
4 TABLET ORAL EVERY 4 HOURS PRN
Qty: 42 TABLET | Refills: 0 | Status: SHIPPED | OUTPATIENT
Start: 2021-07-21 | End: 2021-07-27 | Stop reason: SDUPTHER

## 2021-07-08 NOTE — TELEPHONE ENCOUNTER
Returned call to pt and he stated that he is having increasing pain. MRI was completed 6/23 and multiple bony mets. States he is waiting to hear about an apt with neurosurgery to see if surgery vs radiation is best option. Pt c/o increasing pain in back and around to hip.  Currently taking methadone 5 tid and dilaudid 4mg q4hr prn mdd3. Did clarify with pt that dilaudid does help the pain it just doesn't last which is causing periods of uncontrolled pain.

## 2021-07-08 NOTE — TELEPHONE ENCOUNTER
Called and spoke with pharmacy and had them void all dilaudid scripts on file as dose will be increased.

## 2021-07-19 ENCOUNTER — TELEPHONE (OUTPATIENT)
Dept: ONCOLOGY | Facility: CLINIC | Age: 66
End: 2021-07-19

## 2021-07-19 NOTE — TELEPHONE ENCOUNTER
Caller: HIRAL STOCKTON    Relationship to patient: Emergency Contact    Best call back number: [phone]    Chief complaint: PATIENT HAS PORT FLUSH ON 7/20/21 AND FU/INFUSION ON 7/21/21 AND NEEDS TO RESCHEDULE    Requested date: NEXT WEEK. PATIENT WILL BE OUT OF TOWN THIS WEEK.    PLEASE CONTACT SPOUSE TO UPDATE APPT. DATE/TIMES    THANKS

## 2021-07-20 ENCOUNTER — APPOINTMENT (OUTPATIENT)
Dept: ONCOLOGY | Facility: HOSPITAL | Age: 66
End: 2021-07-20

## 2021-07-21 ENCOUNTER — APPOINTMENT (OUTPATIENT)
Dept: ONCOLOGY | Facility: HOSPITAL | Age: 66
End: 2021-07-21

## 2021-07-26 ENCOUNTER — TELEPHONE (OUTPATIENT)
Dept: PALLIATIVE CARE | Facility: CLINIC | Age: 66
End: 2021-07-26

## 2021-07-26 NOTE — TELEPHONE ENCOUNTER
Pt called and stated that he was in terrible pain and asking if we can add anything else to his regimen. Asked pt to confirm what all he was taking currently and stated he was just doing Dilaudid q4hr prn. Pt went on to explain Methadone had not been filled by pharmacy since 7/7 script.  Reviewed chart and scripts were confirmed by pharmacy for 7/14 and 7/21. Pt stated he has not received anything except two dilaudid fills.  Called pharmacy and spoke with Shani. She confirmed they had scripts on profile but could not give any reasoning for why they were not filled. Instructed her to fill script today and void the second one,     Called MD and confirmed ok for pt to restart dose after being off for two weeks. Pt Last EKG was on 6/1 on current dose of Methadone 5mg tid. MD ok to restart. Called and instructed pt to go and pickup prescription to start today. Instructed pt should he ever not be given all his medication from pharmacy and we had not informed him of it stopping, then to please call for clarification. Pt v/u. Pt has f/u apt Thursday morning at 8:30.

## 2021-07-27 ENCOUNTER — INFUSION (OUTPATIENT)
Dept: ONCOLOGY | Facility: HOSPITAL | Age: 66
End: 2021-07-27

## 2021-07-27 VITALS
RESPIRATION RATE: 18 BRPM | HEART RATE: 89 BPM | SYSTOLIC BLOOD PRESSURE: 162 MMHG | TEMPERATURE: 97.7 F | DIASTOLIC BLOOD PRESSURE: 83 MMHG | WEIGHT: 193.4 LBS | BODY MASS INDEX: 28.56 KG/M2

## 2021-07-27 DIAGNOSIS — Z45.2 ENCOUNTER FOR CARE RELATED TO PORT-A-CATH: ICD-10-CM

## 2021-07-27 DIAGNOSIS — C61 PROSTATE CANCER METASTATIC TO BONE (HCC): Primary | ICD-10-CM

## 2021-07-27 DIAGNOSIS — C79.51 PROSTATE CANCER METASTATIC TO BONE (HCC): Primary | ICD-10-CM

## 2021-07-27 DIAGNOSIS — C79.51 PROSTATE CANCER METASTATIC TO BONE (HCC): ICD-10-CM

## 2021-07-27 DIAGNOSIS — C61 PROSTATE CANCER METASTATIC TO BONE (HCC): ICD-10-CM

## 2021-07-27 PROCEDURE — 36591 DRAW BLOOD OFF VENOUS DEVICE: CPT

## 2021-07-27 PROCEDURE — 25010000002 HEPARIN LOCK FLUSH PER 10 UNITS: Performed by: INTERNAL MEDICINE

## 2021-07-27 RX ORDER — HEPARIN SODIUM (PORCINE) LOCK FLUSH IV SOLN 100 UNIT/ML 100 UNIT/ML
500 SOLUTION INTRAVENOUS AS NEEDED
Status: CANCELLED | OUTPATIENT
Start: 2021-07-27

## 2021-07-27 RX ORDER — HYDROMORPHONE HYDROCHLORIDE 4 MG/1
4 TABLET ORAL EVERY 4 HOURS PRN
Qty: 42 TABLET | Refills: 0 | Status: SHIPPED | OUTPATIENT
Start: 2021-07-28 | End: 2021-08-03 | Stop reason: SDUPTHER

## 2021-07-27 RX ORDER — HEPARIN SODIUM (PORCINE) LOCK FLUSH IV SOLN 100 UNIT/ML 100 UNIT/ML
500 SOLUTION INTRAVENOUS AS NEEDED
Status: DISCONTINUED | OUTPATIENT
Start: 2021-07-27 | End: 2021-07-27 | Stop reason: HOSPADM

## 2021-07-27 RX ADMIN — HEPARIN 500 UNITS: 100 SYRINGE at 09:35

## 2021-07-28 ENCOUNTER — TELEPHONE (OUTPATIENT)
Dept: ONCOLOGY | Facility: CLINIC | Age: 66
End: 2021-07-28

## 2021-07-28 ENCOUNTER — OFFICE VISIT (OUTPATIENT)
Dept: ONCOLOGY | Facility: CLINIC | Age: 66
End: 2021-07-28

## 2021-07-28 ENCOUNTER — INFUSION (OUTPATIENT)
Dept: ONCOLOGY | Facility: HOSPITAL | Age: 66
End: 2021-07-28

## 2021-07-28 VITALS
OXYGEN SATURATION: 98 % | HEART RATE: 87 BPM | TEMPERATURE: 97.3 F | DIASTOLIC BLOOD PRESSURE: 69 MMHG | WEIGHT: 195 LBS | RESPIRATION RATE: 20 BRPM | SYSTOLIC BLOOD PRESSURE: 123 MMHG | BODY MASS INDEX: 28.88 KG/M2 | HEIGHT: 69 IN

## 2021-07-28 DIAGNOSIS — R41.3 MEMORY DEFICIT: ICD-10-CM

## 2021-07-28 DIAGNOSIS — C61 PROSTATE CANCER METASTATIC TO BONE (HCC): Primary | ICD-10-CM

## 2021-07-28 DIAGNOSIS — Z95.5 HISTORY OF HEART ARTERY STENT: ICD-10-CM

## 2021-07-28 DIAGNOSIS — R13.19 ESOPHAGEAL DYSPHAGIA: ICD-10-CM

## 2021-07-28 DIAGNOSIS — C61 PROSTATE CANCER METASTATIC TO BONE (HCC): ICD-10-CM

## 2021-07-28 DIAGNOSIS — E83.39 HYPOPHOSPHATEMIA: ICD-10-CM

## 2021-07-28 DIAGNOSIS — R07.9 CHEST PAIN AT REST: ICD-10-CM

## 2021-07-28 DIAGNOSIS — C79.51 PROSTATE CANCER METASTATIC TO BONE (HCC): Primary | ICD-10-CM

## 2021-07-28 DIAGNOSIS — I25.119 CORONARY ARTERY DISEASE INVOLVING NATIVE HEART WITH ANGINA PECTORIS, UNSPECIFIED VESSEL OR LESION TYPE (HCC): ICD-10-CM

## 2021-07-28 DIAGNOSIS — Z45.2 ENCOUNTER FOR CARE RELATED TO PORT-A-CATH: ICD-10-CM

## 2021-07-28 DIAGNOSIS — C79.51 PROSTATE CANCER METASTATIC TO BONE (HCC): ICD-10-CM

## 2021-07-28 DIAGNOSIS — R42 DIZZINESS: ICD-10-CM

## 2021-07-28 LAB
ALBUMIN SERPL-MCNC: 3.9 G/DL (ref 3.5–5.2)
ALBUMIN/GLOB SERPL: 1.8 G/DL
ALP SERPL-CCNC: 79 U/L (ref 39–117)
ALT SERPL-CCNC: 70 U/L (ref 1–41)
AST SERPL-CCNC: 48 U/L (ref 1–40)
BASOPHILS # BLD AUTO: 0.06 10*3/MM3 (ref 0–0.2)
BASOPHILS NFR BLD AUTO: 0.8 % (ref 0–1.5)
BILIRUB SERPL-MCNC: 0.5 MG/DL (ref 0–1.2)
BUN SERPL-MCNC: 11 MG/DL (ref 8–23)
BUN/CREAT SERPL: 15.3 (ref 7–25)
CALCIUM SERPL-MCNC: 9.3 MG/DL (ref 8.6–10.5)
CHLORIDE SERPL-SCNC: 104 MMOL/L (ref 98–107)
CO2 SERPL-SCNC: 24.9 MMOL/L (ref 22–29)
CREAT SERPL-MCNC: 0.72 MG/DL (ref 0.76–1.27)
EOSINOPHIL # BLD AUTO: 0.15 10*3/MM3 (ref 0–0.4)
EOSINOPHIL NFR BLD AUTO: 2 % (ref 0.3–6.2)
ERYTHROCYTE [DISTWIDTH] IN BLOOD BY AUTOMATED COUNT: 13.9 % (ref 12.3–15.4)
GLOBULIN SER CALC-MCNC: 2.2 GM/DL
GLUCOSE SERPL-MCNC: 102 MG/DL (ref 65–99)
HCT VFR BLD AUTO: 44.8 % (ref 37.5–51)
HGB BLD-MCNC: 15 G/DL (ref 13–17.7)
IMM GRANULOCYTES # BLD AUTO: 0.02 10*3/MM3 (ref 0–0.05)
IMM GRANULOCYTES NFR BLD AUTO: 0.3 % (ref 0–0.5)
LYMPHOCYTES # BLD AUTO: 1.39 10*3/MM3 (ref 0.7–3.1)
LYMPHOCYTES NFR BLD AUTO: 19 % (ref 19.6–45.3)
MAGNESIUM SERPL-MCNC: 1.7 MG/DL (ref 1.6–2.4)
MCH RBC QN AUTO: 28.7 PG (ref 26.6–33)
MCHC RBC AUTO-ENTMCNC: 33.5 G/DL (ref 31.5–35.7)
MCV RBC AUTO: 85.7 FL (ref 79–97)
MONOCYTES # BLD AUTO: 0.36 10*3/MM3 (ref 0.1–0.9)
MONOCYTES NFR BLD AUTO: 4.9 % (ref 5–12)
NEUTROPHILS # BLD AUTO: 5.35 10*3/MM3 (ref 1.7–7)
NEUTROPHILS NFR BLD AUTO: 73 % (ref 42.7–76)
NRBC BLD AUTO-RTO: 0 /100 WBC (ref 0–0.2)
PHOSPHATE SERPL-MCNC: 1.5 MG/DL (ref 2.5–4.5)
PLATELET # BLD AUTO: 130 10*3/MM3 (ref 140–450)
POTASSIUM SERPL-SCNC: 3.5 MMOL/L (ref 3.5–5.2)
PROT SERPL-MCNC: 6.1 G/DL (ref 6–8.5)
PSA SERPL-MCNC: <0.014 NG/ML (ref 0–4)
RBC # BLD AUTO: 5.23 10*6/MM3 (ref 4.14–5.8)
SODIUM SERPL-SCNC: 142 MMOL/L (ref 136–145)
WBC # BLD AUTO: 7.33 10*3/MM3 (ref 3.4–10.8)

## 2021-07-28 PROCEDURE — 99215 OFFICE O/P EST HI 40 MIN: CPT | Performed by: NURSE PRACTITIONER

## 2021-07-28 PROCEDURE — 25010000002 HEPARIN LOCK FLUSH PER 10 UNITS: Performed by: INTERNAL MEDICINE

## 2021-07-28 PROCEDURE — 36591 DRAW BLOOD OFF VENOUS DEVICE: CPT

## 2021-07-28 RX ORDER — HEPARIN SODIUM (PORCINE) LOCK FLUSH IV SOLN 100 UNIT/ML 100 UNIT/ML
500 SOLUTION INTRAVENOUS AS NEEDED
Status: CANCELLED | OUTPATIENT
Start: 2021-07-28

## 2021-07-28 RX ORDER — HEPARIN SODIUM (PORCINE) LOCK FLUSH IV SOLN 100 UNIT/ML 100 UNIT/ML
500 SOLUTION INTRAVENOUS AS NEEDED
Status: DISCONTINUED | OUTPATIENT
Start: 2021-07-28 | End: 2021-07-28 | Stop reason: HOSPADM

## 2021-07-28 RX ADMIN — HEPARIN 500 UNITS: 100 SYRINGE at 12:10

## 2021-07-28 NOTE — PROGRESS NOTES
CHIEF COMPLAINT: 1.  Stage IV metastatic prostate cancer  2.  Left hip pain    Problem List:  Oncology/Hematology History Overview Note   1.  Stage IVb grade group 4 metastatic prostate cancer with sclerotic bone lesions on CT and bone scan and history of prostatic hypertrophy  2.  Kidney stone  3.  Polyps  4.  Elevated liver enzymes    -9/30/2020 office note from Dr. Ronen Reynaga indicates patient with PSA 10.8.  Underwent TRUS prostate biopsy 9/15/2020.  Adenocarcinoma the prostate [place] 4+4 = 8 in 1 out of 12 cores and 4+3 = 7 and 10 out of 12 cores and 3+4 = 7 in 1 out of 12 cores.  Perineural invasion.  Extraprostatic extension into the fat seen on 2 biopsies of the right lateral mid and right apex.  9/24/2020 CT chest and whole-body bone scan showed T12, L1, left acetabular, right medial acetabular metastases with no lung metastases.  He started androgen deprivation therapy with Casodex with plans for Lupron to start in 1 to 2 weeks for clinical T3 N0 M1 metastatic prostate cancer.  Review of official report from [facility] 9/24/2020 bone scan mentions T12, L1, roof of left acetabulum and medial right acetabular osteoblastic metastases.  CT chest with contrast that same day showed mild splenomegaly 14.2 cm with stable periaortic nodes compared to CT December 2015 with no lung metastases.  Sclerotic abnormality within the T12 vertebral body, L1 vertebral body extending into the pedicle unchanged.  8/28/2020 CT abdomen pelvis report from [facility] reviewed and mentions normal spleen size.  Punctate nonobstructing kidney stone, no paulino enlargement, diffuse bladder wall thickening with mild perivesicular fat stranding, 2.1 cm sclerotic left iliac bone above the left acetabulum new compared to 2015 as well as 1.5 cm faintly sclerotic focus in the right posterior acetabulum stable compared to prior CT 2015 as well as a 1.6 cm T12 and inferior vertebral body sclerotic lesion and a 1.9 cm left  posterior lateral L1 vertebral body abnormality extending to the pedicle.  -10/13/2020 initial [facility] medical oncology consultation: With 1 Edmond score 8, 4+4 lesion that would make him a grade group 4 with stage IVb disease given radiographic evidence of sclerotic bone metastasis on bone scan and CT.  Needs genetic testing given metastatic prostate cancer and this is also important as, were he to have mismatch repair mutation then we would have options for PDL 1 inhibitors and were he BRCA mutated would have options for PARP inhibitors in the future.  Systemic therapy for castration naïve prostate cancer or N1 disease should be with apalutamide or Abiraterone or enzalutamide all of which are category 1.  He does not have any visceral metastases.  According to his imaging he has T12, L1, left acetabular, right acetabular, and left iliac bony involvement.  That means he would have 4 or more bone metastases with at least one metastasis (i.e. the acetabular lesions bilaterally) beyond the pelvis/vertebral, for which this would be considered high-volume disease for which 6 cycles of docetaxel 75 mg/m² x 6 cycles followed by Zytiga and prednisone would be reasonable along with Zometa every 6 weeks for bone stabilization.  He will do chemo preparation visit with my nurse practitioner prior to start chemotherapy with Taxotere and Zometa in 2 weeks and he is already received Casodex in preparation for Lupron shot he received on 10/12/2020 with Dr. Reynaga.  We will get him to Dr. Alexander Gomez who has done his polypectomies in the past for port placement and we will get genetic counseling started.  Following the 6 courses of Taxotere per the Chaarted trial criteria, I would then give him an indefinite Zytiga and prednisone and Zometa until progression or toxicity dictates.    -12/16/2019 COVID-19 antigen test negative    -12/29/2020 PSA 0.275 with absolute neutrophil count 700 and creatinine 0.76 with normal liver  enzymes and electrolytes.  Normal magnesium and phosphorus and urine drug screen positive for buprenorphine and opiates follow-up with palliative care.    -1/4/2021 CT chest abdomen pelvis with contrast and whole-body bone scan shows improving less metabolically active bone metastases.  Stable sclerotic T12, L1, and L2 vertebral bodies and bilateral pelvic bones on bone windows with stable subcentimeter para-aortic lymph nodes and no definite progression in the chest abdomen or pelvis.    -1/5/2021 [facility] medical oncology follow-up visit: I reviewed the images and reports thereof of the above CT and bone scan which shows good response to Taxotere x3 courses with a PSA down to 0.275 from a baseline of 10.  Get another 3 courses of Taxotere, continue his Xgeva, and then following that we will switch to Zytiga and prednisone.  He is working with palliative care on his bone pain but on his current dose of fentanyl patch he says his pain is still not adequately controlled he will contact them.  Dexamethasone prescription was refilled.  We will see my nurse practitioner back in 3 weeks for course #5 of 6 total courses and then we will reimage with CT chest abdomen pelvis and bone scan before going to the Zytiga prednisone.    -3/4/2021 CT chest abdomen pelvis with contrast is negative save for stable sclerotic bone lesions and the bone scan shows near resolution of prior bony abnormalities associated with the known sclerotic lesions in the thoracolumbar spine and bony pelvis.    2/17/2021 PSA down to 0.1 from 1.37 October 2020.  3/9/2021 PSA 0.1    -5/26/2021 CT chest abdomen pelvis with contrast shows stable to slightly underlying increase low-density left para-aortic node.  Bone lesions stable.  Whole-body bone scan stable to decrease activity of known thoracolumbar and bony pelvic involvement.  PSA less than 0.1  , AST 74, bilirubin 0.5.       -6/1/2021 [facility] medical oncology follow-up visit: I reviewed the  above data with him and images thereof.  Bones are stable.  No significant adenopathy.  PSA immeasurably low.     upper limit of normal 69 and AST 74 upper limit of normal 46.  Hence, neither is more than double the upper limit of normal with no ascites and no encephalopathy and normal albumin and bilirubin, despite the elevation of liver enzymes, he has child Abrams score A.  Package insert for Abiraterone says that for ALT and/or AST greater than 5 times upper limit of normal or total bilirubin greater than 3 times the upper limit of normal to withhold treatment until liver function tests returned to baseline or ALT and AST less than or equal to 2.5 times the upper limit of normal and total bilirubin less than or equal to 1.5 times the upper limit of normal and then reinitiate at 750 mg daily dose of Zytiga.  For recurrent hepatotoxicity on 750 mg daily Zytiga, withhold treatment until liver functions returned to baseline or ALT and AST less than 3-2.5 times upper limit of normal and total bilirubin less than or equal to 1.5 times the upper limit of normal then reinitiate at 500 mg daily Zytiga dose.  If has recurrent hepatotoxicity on 500 mg dose, then discontinue treatment.  If has ALT greater than 3 times the upper limit of normal along with total bilirubin greater than 2 times the upper limit of normal in the absence of other contributing causes or biliary obstruction, permanently discontinue Zytiga.  Based on these recommendations, I would continue current doses but we will watch with serial labs monthly and repeat his imaging again in 3 months but clinically he is doing wonderfully with excellent response to Taxotere and now on Zytiga prednisone plus Zometa along with Relugolix.    -6/23/2020 for [facility] clinic follow-up: Having persistent and worsening pain above his left hip.  I will get an MRI of the left hip for further evaluation.  Otherwise we will continue therapy unchanged with Zytiga,  prednisone and Zometa along with Relugolix.    -7/28/2021 [facility] oncology clinic follow-up: Patient with multiple somatic complaints including episodes of confusion, dizziness, decreased memory, agitation.  He reports ongoing episodes with difficulty swallowing.  Also most concerning for episodes of angina and chest pain for which he basically refused to seek emergency medical attention.  He has a history of coronary artery disease and stent placement.  He has not followed up with cardiology for many years.  I will get an MRI of the brain and light of his confusion, dizziness and agitation.  I will get him to gastroenterology for EGD in light of his dysphagia.  I have also put in a referral for cardiology.  I reemphasized to the patient, his wife who is with him today and his son who was on the telephone during our visit the importance of seeking out prompt medical attention when he is having chest pain or neurological concerns so that he can be evaluated.  The patient states that he basically refuses to go to the emergency room and if his family calls an ambulance he would not agree to go to the hospital.  For the time being, I have asked him to hold his Zytiga and prednisone until we can sort this out as Zytiga can cause some cardiovascular risk.  There is likely no treatment for prostate cancer that does not carry some form of cardiovascular risk.  His PSA remains low.  He will continue relugolix for now.    Of note, some of these symptoms could also be due to his hypophosphatemia, phosphate level from yesterday was 1.5, I have sent in a prescription for K-Phos 3 times a day before meals for 10 days.  We will hold further Zometa until this is corrected.  I have also put in an order to check his vitamin D level.  He takes calcium and vitamin D daily.  Some of his symptoms also could be due to drug withdrawal as there was some confusion and difficulty getting his last prescription for methadone therefore he was  "without methadone for several days, he now has his prescription and is back on his pain medication.  He has an appointment with neurosurgery tomorrow in light of his ongoing back pain, MRI of the hip recently showed multiple bony lesions largest at the L5 vertebral body and left supra-acetabular region.  If no neurosurgical intervention recommended he may benefit from spot radiation as this is where a lot of his pain is coming from.  His wife had questions today regarding his staging and extent of disease.  We reviewed previous scans.  I did clarify with her as she used the words \"cancer free\" as she thought that is what she was told after his CT scans once he completed Taxotere in February.  I explained to her that even though his scans showed he had an excellent response with no clear areas of metastasis that did not mean he was cancer free, I explained to her that when you have metastatic cancer the treatment intent is palliative in nature and to control the disease but not to cure the disease.  She stated understanding.  We will see him back in 2 weeks for follow-up to sort through this and make further plan of care, again, I have asked him to hold Zytiga and prednisone until he sees us back, he will continue on his other medications including relugolix.     Prostate cancer metastatic to bone (CMS/HCC)   8/30/2020 -  Other Event    PSA 10.8     9/15/2020 Initial Diagnosis    Prostate cancer metastatic to bone (CMS/HCC)     9/15/2020 Biopsy    Prostate needle core biopsy     9/24/2020 Imaging    Total body bone scan: 4 abnormal areas of increased activity consistent with osteoblastic metastasis, abnormal foci of activity seen in the T12 vertebral body, L1 vertebral body, roof of the left acetabulum, and acetabulum medially on the right.  CT chest: Stable sclerotic metastatic lesions within the T12 and L1 vertebrae.  No other evidence of metastatic disease to the chest.  Stable mild splenomegaly and subcentimeter " periaortic retroperitoneal lymph nodes.  CT abdomen and pelvis: Diffuse bladder wall thickening with mild perivesicular fat stranding.  Interval development of several sclerotic lesions in the spine and left iliac bone.     10/13/2020 Cancer Staged    Staging form: Bone - Appendicular Skeleton, Trunk, Skull, And Facial Bones, AJCC 8th Edition  - Clinical: Stage IVB (cT3, cN0, cM1b, G3) - Signed by Ishmael Rashid MD on 10/13/2020     11/3/2020 -  Chemotherapy    OP SUPPORTIVE Zoledronic Acid Q42d     11/3/2020 - 2/18/2021 Chemotherapy    OP PROSTATE DOCEtaxel       1/4/2021 Imaging    CT chest abdomen pelvis with contrast and whole-body bone scan shows improving less metabolically active bone metastases.  Stable sclerotic T12, L1, and L2 vertebral bodies and bilateral pelvic bones on bone windows with stable subcentimeter para-aortic lymph nodes and no definite progression in the chest abdomen or pelvis.  PSA down to 0.275 after 3 courses of Taxotere.     3/4/2021 Imaging    CT chest, abdomen and pelvis stable, no evidence of disease progression. Total body bone scan with decreased/near resolution of abnormal increased radiotracer uptake associated with the known sclerotic lesions in the thoracolumbar spine and bony pelvis.  No evidence of new osteoblastic metastases.     3/4/2021 Imaging    CT chest abdomen pelvis with contrast is negative save for stable sclerotic bone lesions and the bone scan shows near resolution of prior bony abnormalities associated with the known sclerotic lesions in the thoracolumbar spine and bony pelvis.  2/17/2021 PSA down to 0.1 from 1.37 October 2020.     3/9/2021 - 3/9/2021 Chemotherapy    OP SUPPORTIVE PROSTATE Relugolix     3/9/2021 -  Chemotherapy    OP PROSTATE Abiraterone / PredniSONE       3/10/2021 -  Chemotherapy    OP PROSTATE Abiraterone / PredniSONE         HISTORY OF PRESENT ILLNESS:  The patient is a 66 y.o. male, here for follow up on management of stage IV metastatic  "prostate cancer.  Mr. Lugo reports that he has been having episodes of feeling confused, he feels his memory and concentration have diminished, he also reports agitation at times.  He also reports episodes of chest pain, he reports that on Saturday he had left-sided chest pain that lasted for longer than an hour and \"was pretty bad\".  His wife and son encouraged and begged him to go to the emergency room however he refused.  He states the pain was sharp, it was nonradiating, he had no associated pressure, no nausea or vomiting.  He does have a history of coronary artery disease and has 2 stents, reports that he has not followed up with a cardiologist for several years.  He also reports trouble swallowing, he feels that food gets stuck when he is eating, this happens more often than not.  He denies any reflux, he reports taking omeprazole daily.  No abdominal pain.  No nausea or vomiting.        Past Medical History:   Diagnosis Date   • Nephrolithiasis    • Opioid use disorder, mild, on maintenance therapy (CMS/[facility])      Past Surgical History:   Procedure Laterality Date   • CHOLECYSTECTOMY     • CORONARY STENT PLACEMENT  206 & 2011   • HERNIA REPAIR  1986   • THORACIC DISCECTOMY  1994       Allergies   Allergen Reactions   • Cymbalta [Duloxetine Hcl] Dizziness   • Gabapentin Nausea Only   • Lyrica [Pregabalin] Nausea And Vomiting and Dizziness       Family History and Social History reviewed and changed as necessary    REVIEW OF SYSTEM:   Positive for sacral pain    PHYSICAL EXAM:  Respiratory: Lungs clear  Cardiovascular: Heart regular rate and rhythm  Neurologic: He is alert and oriented today, conversation is appropriate, affect is normal.      Vitals:    07/28/21 1053   BP: 123/69   Pulse: 87   Resp: 20   Temp: 97.3 °F (36.3 °C)   SpO2: 98%   Weight: 88.5 kg (195 lb)   Height: 175.3 cm (69\")     Vitals:    07/28/21 1053   PainSc:   8   PainLoc: Back  Comment: upper and lower back          ECOG score: 1 "       Vitals reviewed.  Labs reviewed.      Lab Results   Component Value Date    HGB 15.0 07/27/2021    HCT 44.8 07/27/2021    MCV 85.7 07/27/2021     (L) 07/27/2021    WBC 7.33 07/27/2021    NEUTROABS 5.35 07/27/2021    LYMPHSABS 1.39 07/27/2021    MONOSABS 0.36 07/27/2021    EOSABS 0.15 07/27/2021    BASOSABS 0.06 07/27/2021       Lab Results   Component Value Date    GLUCOSE 102 (H) 04/20/2021    BUN 11 07/27/2021    CREATININE 0.72 (L) 07/27/2021     07/27/2021    K 3.5 07/27/2021     07/27/2021    CO2 24.9 07/27/2021    CALCIUM 9.3 07/27/2021    PROTEINTOT 6.6 04/20/2021    ALBUMIN 3.90 07/27/2021    BILITOT 0.5 07/27/2021    ALKPHOS 79 07/27/2021    AST 48 (H) 07/27/2021    ALT 70 (H) 07/27/2021 6/22/2021 PSA <0.1        ASSESSMENT & PLAN:  1.  Stage IVb grade group 4 metastatic prostate cancer with sclerotic bone lesions on CT and bone scan and history of prostatic hypertrophy  2.  Angina  3.  Confusion and decreased memory  4.  Low back pain  5.  Dysphagia  6.  Hypophosphatemia    -9/30/2020 office note from Dr. Ronen Reynaga indicates patient with PSA 10.8.  Underwent TRUS prostate biopsy 9/15/2020.  Adenocarcinoma the prostate [place] 4+4 = 8 in 1 out of 12 cores and 4+3 = 7 and 10 out of 12 cores and 3+4 = 7 in 1 out of 12 cores.  Perineural invasion.  Extraprostatic extension into the fat seen on 2 biopsies of the right lateral mid and right apex.  9/24/2020 CT chest and whole-body bone scan showed T12, L1, left acetabular, right medial acetabular metastases with no lung metastases.  He started androgen deprivation therapy with Casodex with plans for Lupron to start in 1 to 2 weeks for clinical T3 N0 M1 metastatic prostate cancer.  Review of official report from [facility] 9/24/2020 bone scan mentions T12, L1, roof of left acetabulum and medial right acetabular osteoblastic metastases.  CT chest with contrast that same day showed mild splenomegaly 14.2 cm with stable  periaortic nodes compared to CT December 2015 with no lung metastases.  Sclerotic abnormality within the T12 vertebral body, L1 vertebral body extending into the pedicle unchanged.  8/28/2020 CT abdomen pelvis report from [facility] reviewed and mentions normal spleen size.  Punctate nonobstructing kidney stone, no paulino enlargement, diffuse bladder wall thickening with mild perivesicular fat stranding, 2.1 cm sclerotic left iliac bone above the left acetabulum new compared to 2015 as well as 1.5 cm faintly sclerotic focus in the right posterior acetabulum stable compared to prior CT 2015 as well as a 1.6 cm T12 and inferior vertebral body sclerotic lesion and a 1.9 cm left posterior lateral L1 vertebral body abnormality extending to the pedicle.  -10/13/2020 initial [facility] medical oncology consultation: With 1 Sarika score 8, 4+4 lesion that would make him a grade group 4 with stage IVb disease given radiographic evidence of sclerotic bone metastasis on bone scan and CT.  Needs genetic testing given metastatic prostate cancer and this is also important as, were he to have mismatch repair mutation then we would have options for PDL 1 inhibitors and were he BRCA mutated would have options for PARP inhibitors in the future.  Systemic therapy for castration naïve prostate cancer or N1 disease should be with apalutamide or Abiraterone or enzalutamide all of which are category 1.  He does not have any visceral metastases.  According to his imaging he has T12, L1, left acetabular, right acetabular, and left iliac bony involvement.  That means he would have 4 or more bone metastases with at least one metastasis (i.e. the acetabular lesions bilaterally) beyond the pelvis/vertebral, for which this would be considered high-volume disease for which 6 cycles of docetaxel 75 mg/m² x 6 cycles followed by Zytiga and prednisone would be reasonable along with Zometa every 6 weeks for bone stabilization.  He will do chemo  preparation visit with my nurse practitioner prior to start chemotherapy with Taxotere and Zometa in 2 weeks and he is already received Casodex in preparation for Lupron shot he received on 10/12/2020 with Dr. Reynaga.  We will get him to Dr. Alexander Gomez who has done his polypectomies in the past for port placement and we will get genetic counseling started.  Following the 6 courses of Taxotere per the Chaarted trial criteria, I would then give him an indefinite Zytiga and prednisone and Zometa until progression or toxicity dictates.    -12/16/2019 COVID-19 antigen test negative    -12/29/2020 PSA 0.275 with absolute neutrophil count 700 and creatinine 0.76 with normal liver enzymes and electrolytes.  Normal magnesium and phosphorus and urine drug screen positive for buprenorphine and opiates follow-up with palliative care.    -1/4/2021 CT chest abdomen pelvis with contrast and whole-body bone scan shows improving less metabolically active bone metastases.  Stable sclerotic T12, L1, and L2 vertebral bodies and bilateral pelvic bones on bone windows with stable subcentimeter para-aortic lymph nodes and no definite progression in the chest abdomen or pelvis.    -1/5/2021 [facility] medical oncology follow-up visit: I reviewed the images and reports thereof of the above CT and bone scan which shows good response to Taxotere x3 courses with a PSA down to 0.275 from a baseline of 10.  Get another 3 courses of Taxotere, continue his Xgeva, and then following that we will switch to Zytiga and prednisone.  He is working with palliative care on his bone pain but on his current dose of fentanyl patch he says his pain is still not adequately controlled he will contact them.  Dexamethasone prescription was refilled.  We will see my nurse practitioner back in 3 weeks for course #5 of 6 total courses and then we will reimage with CT chest abdomen pelvis and bone scan before going to the Zytiga prednisone.    -3/4/2021 CT chest  abdomen pelvis with contrast is negative save for stable sclerotic bone lesions and the bone scan shows near resolution of prior bony abnormalities associated with the known sclerotic lesions in the thoracolumbar spine and bony pelvis.    2/17/2021 PSA down to 0.1 from 1.37 October 2020.  3/9/2021 PSA 0.1    -5/26/2021 CT chest abdomen pelvis with contrast shows stable to slightly underlying increase low-density left para-aortic node.  Bone lesions stable.  Whole-body bone scan stable to decrease activity of known thoracolumbar and bony pelvic involvement.  PSA less than 0.1  , AST 74, bilirubin 0.5.       -6/1/2021 [facility] oncology follow-up visit: I reviewed the above data with him and images thereof.  Bones are stable.  No significant adenopathy.  PSA immeasurably low.     upper limit of normal 69 and AST 74 upper limit of normal 46.  Hence, neither is more than double the upper limit of normal with no ascites and no encephalopathy and normal albumin and bilirubin, despite the elevation of liver enzymes, he has child Abrams score A.  Package insert for Abiraterone says that for ALT and/or AST greater than 5 times upper limit of normal or total bilirubin greater than 3 times the upper limit of normal to withhold treatment until liver function tests returned to baseline or ALT and AST less than or equal to 2.5 times the upper limit of normal and total bilirubin less than or equal to 1.5 times the upper limit of normal and then reinitiate at 750 mg daily dose of Zytiga.  For recurrent hepatotoxicity on 750 mg daily Zytiga, withhold treatment until liver functions returned to baseline or ALT and AST less than 3-2.5 times upper limit of normal and total bilirubin less than or equal to 1.5 times the upper limit of normal then reinitiate at 500 mg daily Zytiga dose.  If has recurrent hepatotoxicity on 500 mg dose, then discontinue treatment.  If has ALT greater than 3 times the upper limit of normal  along with total bilirubin greater than 2 times the upper limit of normal in the absence of other contributing causes or biliary obstruction, permanently discontinue Zytiga.  Based on these recommendations, I would continue current doses but we will watch with serial labs monthly and repeat his imaging again in 3 months but clinically he is doing wonderfully with excellent response to Taxotere and now on Zytiga prednisone plus Zometa along with Relugolix.    -6/23/2020 for [facility] follow-up: Having persistent and worsening pain above his left hip.  I will get an MRI of the left hip for further evaluation.  Otherwise we will continue therapy unchanged with Zytiga, prednisone and Zometa along with Relugolix.  PSA remains immeasurably low at <0.1.  His liver enzymes are mildly elevated, no indication to hold or adjust treatment currently, will continue to monitor.  See note dated 6/1/2021 for parameters.  I will see him back in 1 month for follow-up.  I will call him with the results of the MRI.  He continues to follow along with palliative care for pain management.    -7/28/2021 [facility] follow-up: Patient with multiple somatic complaints including episodes of confusion, dizziness, decreased memory, agitation.  He reports ongoing episodes with difficulty swallowing.  Also most concerning for episodes of angina and chest pain for which he basically has refused to seek emergency medical attention.  He has a history of coronary artery disease and stent placement.  He has not followed up with cardiology for many years.  I will get an MRI of the brain in light of his confusion, dizziness and agitation.  I will get him to gastroenterology for EGD in light of his dysphagia.  I have also put in a referral for cardiology.  I reemphasized to the patient, his wife who is with him today and his son who was on the telephone during our visit the importance of seeking out prompt medical attention when  "he is having chest pain or neurological concerns so that he can be evaluated.  The patient states that he basically refuses to go to the emergency room and if his family calls an ambulance he would not agree to go to the hospital.  For the time being, I have asked him to hold his Zytiga and prednisone until we can sort this out as Zytiga does have some cardiovascular risk.  There is likely no treatment for prostate cancer that does not carry some form of cardiovascular risk.  His PSA remains low at ,0.014 .  He will continue relugolix for now.      Of note, some of these symptoms could also be due to his hypophosphatemia, phosphate level from yesterday was 1.5, I have sent in a prescription for K-Phos 3 times a day before meals for 10 days.  We will hold further Zometa until this is corrected.  I have also put in an order to check his vitamin D level.  He takes calcium and vitamin D daily.    Some of his symptoms also could be due to drug withdrawal as there was some confusion and difficulty getting his last prescription for methadone therefore he was without methadone for several days, he now has his prescription and is back on his pain medication.  He has an appointment with neurosurgery tomorrow in light of his ongoing back pain, MRI of the hip recently showed multiple bony lesions largest at the L5 vertebral body and left supra-acetabular region.  If no neurosurgical intervention recommended he may benefit from spot radiation as this is where a lot of his pain is coming from.    His wife had questions today regarding his staging and extent of disease.  We reviewed previous scans.  I did clarify with her as she used the words \"cancer free\" as she thought that is what she was told after his CT scans once he completed Taxotere in February.  I explained to her that even though his scans showed he had an excellent response with no clear areas of metastasis that did not mean he was cancer free, I explained to her that " when you have metastatic cancer the treatment intent is palliative in nature and to control the disease but not to cure the disease.  She stated understanding.    We will see him back in 2 weeks for follow-up to sort through this and make further plan of care, again, I have asked him to hold Zytiga and prednisone until he sees us back, he will continue on his other medications including relugolix.      I spent 50 minutes caring for Naveen on this date of service. This time includes time spent by me in the following activities: preparing for the visit, reviewing tests, performing a medically appropriate examination and/or evaluation, counseling and educating the patient/family/caregiver, ordering medications, tests, or procedures, referring and communicating with other health care professionals and documenting information in the medical record.  I discussed plan of care also today with Dr. Nava Gongora and Dr. Rachid gamez.    Abena Velasquez, APRN    07/28/2021

## 2021-07-28 NOTE — TELEPHONE ENCOUNTER
Critical Test Results      MD: Abena Velasquez, APRN    Date: 7/28/21     Critical test result: Phosphorus 1.5    Time results received:0972

## 2021-07-28 NOTE — TELEPHONE ENCOUNTER
Pt picked up 7 day supply of methadone on Monday. Pt asking for later apt on Thursday due to other apt times. Non available but offered to move to Tuesday.  Rescheduled pt and routing MD refill for 2 days of methadone to cover pt til apt.

## 2021-07-28 NOTE — TELEPHONE ENCOUNTER
Name of Physician notified: [id number]    Time Physician Notified: DHARMESH Andino      []  Orders received    []  Protocol/Standing orders followed    [x]  No new orders    Patient is being seen today, provider will address at appt.

## 2021-07-29 ENCOUNTER — OFFICE VISIT (OUTPATIENT)
Dept: NEUROSURGERY | Facility: CLINIC | Age: 66
End: 2021-07-29

## 2021-07-29 ENCOUNTER — TELEPHONE (OUTPATIENT)
Dept: ONCOLOGY | Facility: CLINIC | Age: 66
End: 2021-07-29

## 2021-07-29 ENCOUNTER — TELEPHONE (OUTPATIENT)
Dept: ONCOLOGY | Facility: HOSPITAL | Age: 66
End: 2021-07-29

## 2021-07-29 DIAGNOSIS — R93.7 ABNORMAL MRI, LUMBAR SPINE: Primary | ICD-10-CM

## 2021-07-29 LAB — 25(OH)D3+25(OH)D2 SERPL-MCNC: 37.8 NG/ML (ref 30–100)

## 2021-07-29 PROCEDURE — 99204 OFFICE O/P NEW MOD 45 MIN: CPT | Performed by: PHYSICIAN ASSISTANT

## 2021-07-29 RX ORDER — METHADONE HYDROCHLORIDE 5 MG/1
5 TABLET ORAL EVERY 8 HOURS
Qty: 6 TABLET | Refills: 0 | Status: SHIPPED | OUTPATIENT
Start: 2021-08-02 | End: 2021-08-03 | Stop reason: DRUGHIGH

## 2021-07-29 NOTE — PROGRESS NOTES
Patient: Naveen Lugo  : 1955  Gender: male    Primary Care Provider: Steffany Us MD    Requesting Provider:  DHARMESH Andino    Chief Complaint:     History of Present Illness:  Michi Buckner is a 66-year-old gentleman with a history of metastatic prostate cancer.  Of note he has a history of remote cervical intervention with Dr. Elizabeth.  He has a history of known metastatic lesions of T12, L1, left and right acetabulum, and left iliac bone involvement.  More recently he developed pain in his low back.  He has had some ongoing hip pain for several months.  He denies radicular symptoms or signs alteration in his lower extremities.  He denies gait disturbance, bowel or bladder dysfunction.  Recent imaging with MRI hips demonstrate involvement of the L5 vertebral body.  He was referred to our office for further evaluation.      Past Medical and Surgical History:  Past Medical History:   Diagnosis Date   • Nephrolithiasis    • Opioid use disorder, mild, on maintenance therapy (CMS/HCC)      Past Surgical History:   Procedure Laterality Date   • CHOLECYSTECTOMY     • CORONARY STENT PLACEMENT   &    • HERNIA REPAIR     • THORACIC DISCECTOMY         Current Medications:    Current Outpatient Medications:   •  abiraterone acetate (ZYTIGA) 500 MG chemo tablet, Take 2 tablets by mouth Daily., Disp: 60 tablet, Rfl: 3  •  calcium carbonate (OS-RONNY) 600 MG tablet, Take 1,200 mg by mouth Daily. Patient to take 1200 mg daily for hypocalcemia., Disp: , Rfl:   •  cloNIDine (CATAPRES) 0.1 MG tablet, Take 1 tablet by mouth 2 (Two) Times a Day As Needed (night sweats) for up to 10 days., Disp: 20 tablet, Rfl: 0  •  dexamethasone (DECADRON) 4 MG tablet, Take 2 tablets oral twice a day for 3 consecutive days beginning the day before chemotherapy and continue for 6 doses., Disp: 12 tablet, Rfl: 5  •  Fluticasone-Umeclidin-Vilant (Trelegy Ellipta) 100-62.5-25 MCG/INH inhaler, Inhale 1 puff Daily., Disp: , Rfl:   •   HYDROmorphone (DILAUDID) 4 MG tablet, Take 1 tablet by mouth Every 4 (Four) Hours As Needed for Severe Pain  for up to 7 days., Disp: 42 tablet, Rfl: 0  •  lidocaine-prilocaine (EMLA) 2.5-2.5 % cream, Apply  topically to the appropriate area as directed As Needed (45-60 minutes prior to port access.  Cover with saran/plastic wrap.)., Disp: 30 g, Rfl: 3  •  lidocaine, Apply 1 application topically to the appropriate area as directed 4 (Four) Times a Day., Disp: 1 container, Rfl: 5  •  lidocaine, Apply 1 application topically to the appropriate area as directed 4 (Four) Times a Day., Disp: 1 container, Rfl: 5  •  [START ON 8/2/2021] methadone (DOLOPHINE) 5 MG tablet, Take 1 tablet by mouth Every 8 (Eight) Hours for 2 days., Disp: 6 tablet, Rfl: 0  •  naloxone (NARCAN) 4 MG/0.1ML nasal spray, 1 spray into the nostril(s) as directed by provider As Needed (unresponsiveness)., Disp: , Rfl:   •  nystatin susp + lidocaine viscous (MAGIC MOUTHWASH) oral suspension, 5-10 ml swish and spit or swallow QID prn, Disp: 240 mL, Rfl: 3  •  omeprazole (priLOSEC) 20 MG capsule, Take 20 mg by mouth Daily., Disp: , Rfl:   •  ondansetron (ZOFRAN) 8 MG tablet, Take 1 tablet by mouth 3 (Three) Times a Day As Needed for Nausea or Vomiting., Disp: 30 tablet, Rfl: 5  •  potassium & sodium phosphates (PHOS-NAK) 280-160-250 MG pack packet, Take 1 packet by mouth 3 (Three) Times a Day Before Meals for 10 days., Disp: 30 packet, Rfl: 0  •  predniSONE (DELTASONE) 5 MG tablet, Take 1 tablet by mouth Daily., Disp: 30 tablet, Rfl: 11  •  relugolix (ORGOVYX) 120 MG tablet tablet, Take 1 tablet by mouth Daily., Disp: 30 tablet, Rfl: 3  No current facility-administered medications for this visit.    Facility-Administered Medications Ordered in Other Visits:   •  heparin injection 500 Units, 500 Units, Intravenous, PRN, Ishmael Rashid MD, 500 Units at 06/02/21 0900    Allergies:  Allergies   Allergen Reactions   • Cymbalta [Duloxetine Hcl] Dizziness   •  "Gabapentin Nausea Only   • Lyrica [Pregabalin] Nausea And Vomiting and Dizziness         Review of Systems   Constitutional: Positive for activity change, appetite change, chills and fatigue.   HENT: Positive for trouble swallowing.    Respiratory: Positive for choking.    Cardiovascular: Positive for chest pain.   Gastrointestinal: Positive for diarrhea.   Musculoskeletal: Positive for back pain, joint swelling, neck pain and neck stiffness.   Neurological: Positive for dizziness, speech difficulty, light-headedness and headache.   All other systems reviewed and are negative.        Physical Exam  Constitutional:       Appearance: Normal appearance.   Musculoskeletal:         General: Normal range of motion.      Cervical back: Normal range of motion and neck supple.      Comments: Tenderness with palpation midline lower lumbar region   Skin:     General: Skin is warm and dry.   Neurological:      Mental Status: He is alert and oriented to person, place, and time.      Sensory: Sensation is intact.      Motor: Motor function is intact.      Coordination: Coordination is intact.      Gait: Gait is intact.      Deep Tendon Reflexes:      Reflex Scores:       Bicep reflexes are 1+ on the right side and 1+ on the left side.       Brachioradialis reflexes are 1+ on the right side and 1+ on the left side.       Patellar reflexes are 1+ on the right side and 1+ on the left side.       Achilles reflexes are 1+ on the right side and 1+ on the left side.  Psychiatric:         Mood and Affect: Mood normal.         Behavior: Behavior normal.           Vitals:    07/30/21 0928   BP: 106/62   Weight: 88.9 kg (196 lb)   Height: 175.3 cm (69\")       Patient's Body mass index is 28.94 kg/m². indicating that he is overweight (BMI 25-29.9).     Independent Review of Diagnostic Imaging:  MRI of the right lower joint demonstrates multiple bony lesions involving the left iliac bone, right posterior acetabulum, posterior left acetabulum " as well as in the left aspect of the L5 vertebral body.    Assessment:  1.  Metastatic prostate cancer    Plan:  This is a 66-year-old gentleman with a history of prostate cancer with known bony metastasis who was found to have an L5 lesion on imaging of his right lower extremity.  He does note some back pain however no history of trauma.  His pain is more so in his hips bilaterally.  He denies radicular symptoms today.  I ordered a lumbar MRI with and without contrast for further evaluation of the L5 lesion.  Further recommendations pending this study.        Fabiola Cline PA-C

## 2021-07-29 NOTE — TELEPHONE ENCOUNTER
----- Message from DHARMESH Weeks sent at 7/29/2021 10:20 AM EDT -----  Regarding: call pt  Please let Mr. Lugo know his Vit D level was okay.  Thank you.  Abena

## 2021-07-29 NOTE — TELEPHONE ENCOUNTER
[facility] Refill Outreach    Called regarding refill of medication: Relugolix 120mg PO QD, Abiraterone 1000mg PO QD, Prednisone 5mg PO QD  Spoke with patient.    Assessment  • It looks like it is almost time for your refill, how many doses do you have left? 5 days left  • How are you doing on the medication, are you experiencing any side effects? Patient denies side effects   • How many doses have you missed since you last filled your prescription? 0 doses missed.    Shipment   • We can go ahead and coordinate your next refill, would you like to pick this up at the pharmacy/curbside, or have this delivered to you vs. the clinic? Patient request medications be mailed to home address. Verified in Epic  o If government plan (Medicaid and Medicare B), delivery will require signature -> identify a day the patient will be home for delivery set up (note the package should arrive prior to noon as pharmacy will ship priority overnight)  • Patient was advised medication will be shipped via [employer] (standard overnight) on 7/29/21 with expected delivery on 7/30/21. Shipping comments patient is requesting of [employer] : .. .   • Shipping address confirmed: [address] [facility] [id number]  • Patient is aware and willing to pay copay of $0.   • Do you have any questions for the pharmacist? No.  • Ensured patient has clinic contact information for questions.     Follow up: plan to reach out to patient on/around 8/26/21    Sushma Muro, Pharmacy Technician  7/29/2021  08:22 EDT

## 2021-07-30 VITALS
WEIGHT: 196 LBS | BODY MASS INDEX: 29.03 KG/M2 | SYSTOLIC BLOOD PRESSURE: 106 MMHG | HEIGHT: 69 IN | DIASTOLIC BLOOD PRESSURE: 62 MMHG

## 2021-08-02 ENCOUNTER — OFFICE VISIT (OUTPATIENT)
Dept: NEUROSURGERY | Facility: CLINIC | Age: 66
End: 2021-08-02

## 2021-08-02 ENCOUNTER — TELEPHONE (OUTPATIENT)
Dept: ONCOLOGY | Facility: CLINIC | Age: 66
End: 2021-08-02

## 2021-08-02 VITALS
TEMPERATURE: 97.3 F | HEART RATE: 69 BPM | HEIGHT: 69 IN | BODY MASS INDEX: 28.88 KG/M2 | OXYGEN SATURATION: 98 % | DIASTOLIC BLOOD PRESSURE: 70 MMHG | SYSTOLIC BLOOD PRESSURE: 130 MMHG | WEIGHT: 195 LBS

## 2021-08-02 DIAGNOSIS — C79.51 PROSTATE CANCER METASTATIC TO BONE (HCC): Primary | ICD-10-CM

## 2021-08-02 DIAGNOSIS — C61 PROSTATE CANCER METASTATIC TO BONE (HCC): Primary | ICD-10-CM

## 2021-08-02 DIAGNOSIS — Z51.81 THERAPEUTIC DRUG MONITORING: Primary | ICD-10-CM

## 2021-08-02 PROCEDURE — 99214 OFFICE O/P EST MOD 30 MIN: CPT | Performed by: PHYSICIAN ASSISTANT

## 2021-08-02 NOTE — TELEPHONE ENCOUNTER
Patient called he saw Dr. North today, and he said he didn't think he needed surgery is referring him to Dr. [facility] he wanted Dr. Rashid to be aware of this and see if this is the right plan? Please call.

## 2021-08-02 NOTE — PROGRESS NOTES
Patient: Naveen Lugo  : 1955  Gender: male    Primary Care Provider: Steffany Us MD    Chief Complaint: Back pain    History of Present Illness:  Naveen Lugo is a 66-year-old gentleman with a history of metastatic prostate cancer.  Of note he has a history of remote cervical intervention with Dr. Elizabeth.  He has a history of known metastatic lesions of T12, L1, left and right acetabulum, and left iliac bone involvement.  More recently he developed pain in his low back.  He has had some ongoing hip pain for several months.  He denies radicular symptoms or sensory alteration in his lower extremities.  He denies gait disturbance, bowel or bladder dysfunction.  Recent imaging of MRI RLE demonstrated metstatic involvement of the L5 vertebral body. He presents today with a lumbar MRI for further evaluation.         Past Medical and Surgical History:  Past Medical History:   Diagnosis Date   • Nephrolithiasis    • Opioid use disorder, mild, on maintenance therapy (CMS/HCC)      Past Surgical History:   Procedure Laterality Date   • CHOLECYSTECTOMY     • CORONARY STENT PLACEMENT   &    • HERNIA REPAIR     • THORACIC DISCECTOMY         Current Medications:    Current Outpatient Medications:   •  abiraterone acetate (ZYTIGA) 500 MG chemo tablet, Take 2 tablets by mouth Daily., Disp: 60 tablet, Rfl: 3  •  calcium carbonate (OS-RONNY) 600 MG tablet, Take 1,200 mg by mouth Daily. Patient to take 1200 mg daily for hypocalcemia., Disp: , Rfl:   •  dexamethasone (DECADRON) 4 MG tablet, Take 2 tablets oral twice a day for 3 consecutive days beginning the day before chemotherapy and continue for 6 doses., Disp: 12 tablet, Rfl: 5  •  Fluticasone-Umeclidin-Vilant (Trelegy Ellipta) 100-62.5-25 MCG/INH inhaler, Inhale 1 puff Daily., Disp: , Rfl:   •  HYDROmorphone (DILAUDID) 4 MG tablet, Take 1 tablet by mouth Every 4 (Four) Hours As Needed for Severe Pain  for up to 7 days., Disp: 42 tablet, Rfl: 0  •   lidocaine-prilocaine (EMLA) 2.5-2.5 % cream, Apply  topically to the appropriate area as directed As Needed (45-60 minutes prior to port access.  Cover with saran/plastic wrap.)., Disp: 30 g, Rfl: 3  •  lidocaine, Apply 1 application topically to the appropriate area as directed 4 (Four) Times a Day., Disp: 1 container, Rfl: 5  •  lidocaine, Apply 1 application topically to the appropriate area as directed 4 (Four) Times a Day., Disp: 1 container, Rfl: 5  •  methadone (DOLOPHINE) 5 MG tablet, Take 1 tablet by mouth Every 8 (Eight) Hours for 2 days., Disp: 6 tablet, Rfl: 0  •  naloxone (NARCAN) 4 MG/0.1ML nasal spray, 1 spray into the nostril(s) as directed by provider As Needed (unresponsiveness)., Disp: , Rfl:   •  nystatin susp + lidocaine viscous (MAGIC MOUTHWASH) oral suspension, 5-10 ml swish and spit or swallow QID prn, Disp: 240 mL, Rfl: 3  •  omeprazole (priLOSEC) 20 MG capsule, Take 20 mg by mouth Daily., Disp: , Rfl:   •  ondansetron (ZOFRAN) 8 MG tablet, Take 1 tablet by mouth 3 (Three) Times a Day As Needed for Nausea or Vomiting., Disp: 30 tablet, Rfl: 5  •  potassium & sodium phosphates (PHOS-NAK) 280-160-250 MG pack packet, Take 1 packet by mouth 3 (Three) Times a Day Before Meals for 10 days., Disp: 30 packet, Rfl: 0  •  predniSONE (DELTASONE) 5 MG tablet, Take 1 tablet by mouth Daily., Disp: 30 tablet, Rfl: 11  •  relugolix (ORGOVYX) 120 MG tablet tablet, Take 1 tablet by mouth Daily., Disp: 30 tablet, Rfl: 3  •  cloNIDine (CATAPRES) 0.1 MG tablet, Take 1 tablet by mouth 2 (Two) Times a Day As Needed (night sweats) for up to 10 days., Disp: 20 tablet, Rfl: 0  No current facility-administered medications for this visit.    Facility-Administered Medications Ordered in Other Visits:   •  heparin injection 500 Units, 500 Units, Intravenous, PRN, Ishmael Rashid MD, 500 Units at 06/02/21 0900    Allergies:  Allergies   Allergen Reactions   • Cymbalta [Duloxetine Hcl] Dizziness   • Gabapentin Nausea Only  "  • Lyrica [Pregabalin] Nausea And Vomiting and Dizziness         Review of Systems   Constitutional: Positive for activity change, appetite change, chills and fatigue.   HENT: Positive for trouble swallowing.    Respiratory: Positive for choking.    Cardiovascular: Positive for chest pain.   Gastrointestinal: Positive for diarrhea.   Musculoskeletal: Positive for back pain, joint swelling, neck pain and neck stiffness.   Neurological: Positive for dizziness, speech difficulty, light-headedness and headache.   All other systems reviewed and are negative.        Physical Exam  Constitutional:       Appearance: Normal appearance.   Musculoskeletal:         General: Normal range of motion.      Cervical back: Normal range of motion and neck supple.   Skin:     General: Skin is warm and dry.   Neurological:      Mental Status: He is alert and oriented to person, place, and time.      Sensory: Sensation is intact.      Motor: Motor function is intact.      Coordination: Coordination is intact.      Gait: Gait is intact.   Psychiatric:         Mood and Affect: Mood normal.         Behavior: Behavior normal.           Vitals:    08/02/21 1010   BP: 130/70   BP Location: Right arm   Patient Position: Sitting   Cuff Size: Adult   Pulse: 69   Temp: 97.3 °F (36.3 °C)   SpO2: 98%   Weight: 88.5 kg (195 lb)   Height: 175.3 cm (69.02\")       Patient's Body mass index is 28.78 kg/m². indicating that he is overweight (BMI 25-29.9).     Independent Review of Diagnostic Imaging:  MRI of the lumbar spine performed 8/2/2021 demonstrates known hyperdense focus in the T12 and L1 vertebral bodies consistent with known sclerotic metastatic disease.  New lesion within the left L5 vertebral body per comparison from previous MRI reports.  There is no evidence of compression deformity at any of these levels.  No evidence of instability.  MRI brain demonstrates no evidence of metastatic lesions.    Assessment:  1.  Metastatic prostate " cancer    Plan:  Mr. Lugo is seen today in follow-up with a lumbar MRI.  I reviewed with Dr. North which confirms the metastatic lesion at L5 with known metastatic involvement of the T12 and L1 vertebral bodies.  There is no evidence of compression in these locations.  Additionally there is no evidence of metastatic involvement within the joints or spinal cord. MRI brain was negative for metastatic lesions. As such no surgical intervention is recommended.  Patient is scheduled to see radiation oncology on 8/4/2021.  He may benefit from CyberKnife treatment to these lesions. We will happy to see patient back if he develops progressive back pain or lower extremity symptoms.        Fabiola Cline PA-C

## 2021-08-02 NOTE — TELEPHONE ENCOUNTER
Caller: Naveen Lugo    Relationship: Self    Best call back number: [phone]    What was the call regarding: NAVEEN WAS RETURNING A CALL. HE IS NOT SURE WHO CALLED OR WHAT IT WAS PERTAINING TO.    Do you require a callback: YES

## 2021-08-03 ENCOUNTER — OFFICE VISIT (OUTPATIENT)
Dept: PALLIATIVE CARE | Facility: CLINIC | Age: 66
End: 2021-08-03

## 2021-08-03 VITALS
WEIGHT: 193.8 LBS | HEART RATE: 91 BPM | SYSTOLIC BLOOD PRESSURE: 147 MMHG | DIASTOLIC BLOOD PRESSURE: 81 MMHG | BODY MASS INDEX: 28.61 KG/M2 | OXYGEN SATURATION: 97 %

## 2021-08-03 DIAGNOSIS — G89.3 PAIN, CANCER: ICD-10-CM

## 2021-08-03 DIAGNOSIS — R93.7 ABNORMAL MRI, LUMBAR SPINE: ICD-10-CM

## 2021-08-03 DIAGNOSIS — M25.552 ACUTE HIP PAIN, LEFT: ICD-10-CM

## 2021-08-03 DIAGNOSIS — C61 PROSTATE CANCER METASTATIC TO BONE (HCC): Primary | ICD-10-CM

## 2021-08-03 DIAGNOSIS — C79.51 PROSTATE CANCER METASTATIC TO BONE (HCC): Primary | ICD-10-CM

## 2021-08-03 DIAGNOSIS — Z51.81 THERAPEUTIC DRUG MONITORING: ICD-10-CM

## 2021-08-03 PROCEDURE — 93000 ELECTROCARDIOGRAM COMPLETE: CPT | Performed by: INTERNAL MEDICINE

## 2021-08-03 PROCEDURE — 99214 OFFICE O/P EST MOD 30 MIN: CPT | Performed by: INTERNAL MEDICINE

## 2021-08-03 RX ORDER — HYDROMORPHONE HYDROCHLORIDE 4 MG/1
4 TABLET ORAL EVERY 4 HOURS PRN
Qty: 42 TABLET | Refills: 0 | Status: SHIPPED | OUTPATIENT
Start: 2021-08-10 | End: 2021-08-11 | Stop reason: ALTCHOICE

## 2021-08-03 RX ORDER — METHADONE HYDROCHLORIDE 5 MG/1
TABLET ORAL
Qty: 28 TABLET | Refills: 0 | Status: SHIPPED | OUTPATIENT
Start: 2021-08-17 | End: 2021-08-11 | Stop reason: ALTCHOICE

## 2021-08-03 RX ORDER — METHADONE HYDROCHLORIDE 5 MG/1
TABLET ORAL
Qty: 28 TABLET | Refills: 0 | Status: SHIPPED | OUTPATIENT
Start: 2021-08-03 | End: 2021-08-24 | Stop reason: DRUGHIGH

## 2021-08-03 RX ORDER — METHADONE HYDROCHLORIDE 5 MG/1
TABLET ORAL
Qty: 28 TABLET | Refills: 0 | Status: SHIPPED | OUTPATIENT
Start: 2021-08-10 | End: 2021-08-11 | Stop reason: ALTCHOICE

## 2021-08-03 RX ORDER — HYDROMORPHONE HYDROCHLORIDE 4 MG/1
4 TABLET ORAL EVERY 4 HOURS PRN
Qty: 42 TABLET | Refills: 0 | Status: SHIPPED | OUTPATIENT
Start: 2021-08-03 | End: 2021-08-10

## 2021-08-03 RX ORDER — HYDROMORPHONE HYDROCHLORIDE 4 MG/1
4 TABLET ORAL EVERY 4 HOURS PRN
Qty: 42 TABLET | Refills: 0 | Status: SHIPPED | OUTPATIENT
Start: 2021-08-17 | End: 2021-08-24 | Stop reason: SDUPTHER

## 2021-08-03 NOTE — PROGRESS NOTES
Med Counts  Medication Filled # Filled Count Used  # days ROSE MARIE   Methadone 5 7/26/21 27 0 27 8 3.4   Dilaudid 4 7/27/21 42 0 42 7 6

## 2021-08-03 NOTE — PATIENT INSTRUCTIONS
1. Take extra dose (total of 10mg in AM), continue with 5mg for remaining two doses, spaced every 8 hours daily  2. Will see you in 3 weeks
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
## 2021-08-04 ENCOUNTER — HOSPITAL ENCOUNTER (OUTPATIENT)
Dept: RADIATION ONCOLOGY | Facility: HOSPITAL | Age: 66
Setting detail: RADIATION/ONCOLOGY SERIES
Discharge: HOME | End: 2021-08-04

## 2021-08-04 ENCOUNTER — HOSPITAL ENCOUNTER (OUTPATIENT)
Dept: RADIATION ONCOLOGY | Facility: HOSPITAL | Age: 66
Discharge: HOME | End: 2021-08-04

## 2021-08-04 ENCOUNTER — OFFICE VISIT (OUTPATIENT)
Dept: RADIATION ONCOLOGY | Facility: HOSPITAL | Age: 66
End: 2021-08-04

## 2021-08-04 VITALS
WEIGHT: 196.5 LBS | OXYGEN SATURATION: 94 % | TEMPERATURE: 97.3 F | DIASTOLIC BLOOD PRESSURE: 66 MMHG | HEART RATE: 75 BPM | BODY MASS INDEX: 29 KG/M2 | SYSTOLIC BLOOD PRESSURE: 137 MMHG

## 2021-08-04 DIAGNOSIS — C79.51 PROSTATE CANCER METASTATIC TO BONE (HCC): Primary | ICD-10-CM

## 2021-08-04 DIAGNOSIS — C61 PROSTATE CANCER METASTATIC TO BONE (HCC): Primary | ICD-10-CM

## 2021-08-04 PROCEDURE — 77290 THER RAD SIMULAJ FIELD CPLX: CPT | Performed by: RADIOLOGY

## 2021-08-04 NOTE — PROGRESS NOTES
CONSULTATION NOTE    NAME:      Naveen Lugo  :                                                          1955  DATE OF CONSULTATION:                       21  REQUESTING PHYSICIAN:                   Jon North MD  REASON FOR CONSULTATION:           Further evaluation management of the patient's painful metastatic bony lesions from prostate cancer for consideration of palliative radiotherapy  Prostate cancer metastatic to bone (CMS/HCC)  Staging form: Bone - Appendicular Skeleton, Trunk, Skull, And Facial Bones, AJCC 8th Edition  - Clinical: Stage IVB (cT3, cN0, cM1b, G3) - Signed by Ishmael Rashid MD on 10/13/2020           BRIEF HISTORY:  Naveen Lugo  is a very pleasant 66 y.o. male  with a history of metastatic prostate cancer status post chemotherapy and on second line androgen deprivation with abiraterone and now relugolix.  The patient had been doing fairly well but then began to develop increasing pain in his back and hips.  The patient was sent for an MRI scan of the brain and lumbar spine that showed worsening disease in L5 and his hips.  The patient was referred to Dr. North for evaluation.  It was felt that the patient was not a good candidate for surgery and the patient was subsequently for to my department today for consideration of indicated palliative radiotherapy treatments.    The patient reports he follows with Dr. Ángel haas for pain.  He report that he has several painful bony areas at this point including his mid thoracic spine and his lower lumbar spine and hips.  He reports that the pain does not limit him from performing some daily tasks that he would otherwise do.        BMI:  Body mass index is 29 kg/m².      Social History     Substance and Sexual Activity   Alcohol Use Not Currently       Allergies   Allergen Reactions   • Cymbalta [Duloxetine Hcl] Dizziness   • Gabapentin Nausea Only   • Lyrica [Pregabalin] Nausea And Vomiting and Dizziness     Current  Outpatient Medications:   •  abiraterone acetate (ZYTIGA) 500 MG chemo tablet, Take 2 tablets by mouth Daily., Disp: 60 tablet, Rfl: 3  •  calcium carbonate (OS-RONNY) 600 MG tablet, Take 1,200 mg by mouth Daily. Patient to take 1200 mg daily for hypocalcemia., Disp: , Rfl:   •  dexamethasone (DECADRON) 4 MG tablet, Take 2 tablets oral twice a day for 3 consecutive days beginning the day before chemotherapy and continue for 6 doses., Disp: 12 tablet, Rfl: 5  •  Fluticasone-Umeclidin-Vilant (Trelegy Ellipta) 100-62.5-25 MCG/INH inhaler, Inhale 1 puff Daily., Disp: , Rfl:   •  HYDROmorphone (DILAUDID) 4 MG tablet, Take 1 tablet by mouth Every 4 (Four) Hours As Needed for Severe Pain  for up to 7 days., Disp: 42 tablet, Rfl: 0  •  [START ON 8/10/2021] HYDROmorphone (DILAUDID) 4 MG tablet, Take 1 tablet by mouth Every 4 (Four) Hours As Needed for Severe Pain  for up to 7 days., Disp: 42 tablet, Rfl: 0  •  [START ON 8/17/2021] HYDROmorphone (DILAUDID) 4 MG tablet, Take 1 tablet by mouth Every 4 (Four) Hours As Needed for Severe Pain  for up to 7 days., Disp: 42 tablet, Rfl: 0  •  lidocaine-prilocaine (EMLA) 2.5-2.5 % cream, Apply  topically to the appropriate area as directed As Needed (45-60 minutes prior to port access.  Cover with saran/plastic wrap.)., Disp: 30 g, Rfl: 3  •  lidocaine, Apply 1 application topically to the appropriate area as directed 4 (Four) Times a Day., Disp: 1 container, Rfl: 5  •  lidocaine, Apply 1 application topically to the appropriate area as directed 4 (Four) Times a Day., Disp: 1 container, Rfl: 5  •  methadone (DOLOPHINE) 5 MG tablet, Take 2 tabs in the AM. 1 tab in afternoon and 1 tab at bedtime., Disp: 28 tablet, Rfl: 0  •  [START ON 8/10/2021] methadone (DOLOPHINE) 5 MG tablet, Take 2 tabs in the AM. 1 tab in afternoon and 1 tab at bedtime., Disp: 28 tablet, Rfl: 0  •  [START ON 8/17/2021] methadone (DOLOPHINE) 5 MG tablet, Take 2 tabs in the AM. 1 tab in afternoon and 1 tab at  bedtime., Disp: 28 tablet, Rfl: 0  •  naloxone (NARCAN) 4 MG/0.1ML nasal spray, 1 spray into the nostril(s) as directed by provider As Needed (unresponsiveness)., Disp: , Rfl:   •  nystatin susp + lidocaine viscous (MAGIC MOUTHWASH) oral suspension, 5-10 ml swish and spit or swallow QID prn, Disp: 240 mL, Rfl: 3  •  omeprazole (priLOSEC) 20 MG capsule, Take 20 mg by mouth Daily., Disp: , Rfl:   •  ondansetron (ZOFRAN) 8 MG tablet, Take 1 tablet by mouth 3 (Three) Times a Day As Needed for Nausea or Vomiting., Disp: 30 tablet, Rfl: 5  •  potassium & sodium phosphates (PHOS-NAK) 280-160-250 MG pack packet, Take 1 packet by mouth 3 (Three) Times a Day Before Meals for 10 days., Disp: 30 packet, Rfl: 0  •  predniSONE (DELTASONE) 5 MG tablet, Take 1 tablet by mouth Daily., Disp: 30 tablet, Rfl: 11  •  relugolix (ORGOVYX) 120 MG tablet tablet, Take 1 tablet by mouth Daily., Disp: 30 tablet, Rfl: 3  •  cloNIDine (CATAPRES) 0.1 MG tablet, Take 1 tablet by mouth 2 (Two) Times a Day As Needed (night sweats) for up to 10 days., Disp: 20 tablet, Rfl: 0  No current facility-administered medications for this visit.    Facility-Administered Medications Ordered in Other Visits:   •  heparin injection 500 Units, 500 Units, Intravenous, PRN, Ishmael Rashid MD, 500 Units at 06/02/21 0900    Social History     Tobacco Use   • Smoking status: Current Every Day Smoker     Packs/day: 1.00     Years: 50.00     Pack years: 50.00     Types: Cigarettes   • Smokeless tobacco: Never Used   Vaping Use   • Vaping Use: Never used   Substance Use Topics   • Alcohol use: Not Currently   • Drug use: Never         Past Medical History:   Diagnosis Date   • Bone cancer (CMS/HCC)    • Nephrolithiasis    • Opioid use disorder, mild, on maintenance therapy (CMS/HCC)    • Prostate cancer (CMS/HCC)        family history includes Diabetes in his brother; Heart disease in his brother; Ovarian cancer in his sister; Prostate cancer in his brother.     Past  Surgical History:   Procedure Laterality Date   • CHOLECYSTECTOMY     • CORONARY STENT PLACEMENT  206 & 2011   • HERNIA REPAIR  1986   • THORACIC DISCECTOMY  1994        Review of Systems   Musculoskeletal:        Both hips, back and neck   Neurological: Positive for dizziness and light-headedness.    A full 14 point review of systems was performed and was negative except as noted in the HPI.         Objective   VITAL SIGNS:   Vitals:    08/04/21 1438   BP: 137/66   Pulse: 75   Temp: 97.3 °F (36.3 °C)   SpO2: 94%   Weight: 89.1 kg (196 lb 8 oz)   PainSc:   8        KPS       80%    Physical Exam  Constitutional:       General: He is not in acute distress.     Appearance: He is well-developed.   HENT:      Head: Normocephalic and atraumatic.   Eyes:      Conjunctiva/sclera: Conjunctivae normal.      Pupils: Pupils are equal, round, and reactive to light.   Neck:      Trachea: No tracheal deviation.   Pulmonary:      Effort: Pulmonary effort is normal. No respiratory distress.      Breath sounds: No wheezing.   Abdominal:      General: There is no distension.      Palpations: Abdomen is soft.   Musculoskeletal:         General: Normal range of motion.      Cervical back: Normal range of motion.      Comments: Pain with palpation of the mid thoracic spine and lower L-spine and bilateral SI joints   Skin:     General: Skin is warm and dry.      Comments: Lipoma under right axilla   Neurological:      General: No focal deficit present.      Mental Status: He is alert.      Cranial Nerves: No cranial nerve deficit.      Coordination: Coordination normal.   Psychiatric:         Behavior: Behavior normal.         Judgment: Judgment normal.              The following portions of the patient's history were reviewed and updated as appropriate: allergies, current medications, past family history, past medical history, past social history, past surgical history and problem list.  I have personally requested reviewed and  interpreted the patient's images and radiology reports and pathology reports listed below:  The patient's MRI scan of the brain showing no evidence of disease in the calvarium or brain parenchyma was reviewed.  The patient's MRI of the L-spine was reviewed showing several cystic-appearing lesions in the upper L-spine without any evidence of contrast-enhancement.  The patient has diffuse contrast enhancement of the upper sacrum and more discrete lesion at L5 on the postcontrast enhancing series.  PSA 7/27/2021 undetectable  Assessment      IMPRESSION:     The patient is a very pleasant 66-year-old gentleman with metastatic prostate cancer status post chemotherapy and now on abiraterone and relugolix with an undetectable PSA but some painful bony lesions in the mid thoracic spine and lower L-spine/sacrum.  Seems the patient may have 2 populations of metastatic disease 1 which does not secrete PSA in 1 that is.  It does appear that the patient's disease which is not secreting PSA may be experiencing some progression at the bony sites and patient benefit from palliative radiotherapy.  This time I recommend dose between 20 Gray in 5 fractions and 30 Gray in 10 fractions.  We discussed salvage options of his pain does not respond including CyberKnife.  I discussed the case with Dr. North and Genet Velasquez today.  I offered the patient referral to [place] but he was interested in staying in [place] for these treatments.  Patient is interested in undergoing treatments and signed consents today.  In effort to expedite his care given the distance of where he lives he was taken for simulation today.    Greater than 1 hour was spent preparing for and coordinating this visit. >50% of the time was spent in direct face to face conversation with the patient teaching, answering question, and providing explanations regarding the patient's case.  The decision to treat the patient with radiation is a complex one and carries the  risk of long-term side effects and complications.  The patient's malignancy represents a complicated life threatening condition that requires complex multidisciplinary management for treatment and followup.    RECOMMENDATIONS:    Metastatic prostate cancer with painful bony lesions  -Recommend palliative radiotherapy   -Rec recommend 20 Gray in 5 fractions with 30 Maxwell in 10 fractions  -Recommend continuing on hormonal ablation  -Patient status post chemotherapy  -Recommend continue to follow with pain management/palliative and Dr. Hubert Quintanilla MD      Errors in dictation may reflect use of voice recognition software and not all errors in transcription may have been detected prior to signing.

## 2021-08-05 PROCEDURE — 77300 RADIATION THERAPY DOSE PLAN: CPT | Performed by: RADIOLOGY

## 2021-08-05 PROCEDURE — 77295 3-D RADIOTHERAPY PLAN: CPT | Performed by: RADIOLOGY

## 2021-08-05 PROCEDURE — 77334 RADIATION TREATMENT AID(S): CPT | Performed by: RADIOLOGY

## 2021-08-09 ENCOUNTER — HOSPITAL ENCOUNTER (OUTPATIENT)
Dept: RADIATION ONCOLOGY | Facility: HOSPITAL | Age: 66
Discharge: HOME | End: 2021-08-09

## 2021-08-09 PROBLEM — I25.708 CORONARY ARTERY DISEASE OF BYPASS GRAFT OF NATIVE HEART WITH STABLE ANGINA PECTORIS (HCC): Status: ACTIVE | Noted: 2021-08-09

## 2021-08-09 PROBLEM — I10 ESSENTIAL HYPERTENSION: Status: ACTIVE | Noted: 2021-08-09

## 2021-08-09 PROBLEM — R07.9 CHEST PAIN: Status: ACTIVE | Noted: 2021-08-09

## 2021-08-09 PROBLEM — E78.5 HYPERLIPIDEMIA LDL GOAL <70: Status: ACTIVE | Noted: 2021-08-09

## 2021-08-09 PROCEDURE — 77280 THER RAD SIMULAJ FIELD SMPL: CPT | Performed by: RADIOLOGY

## 2021-08-09 NOTE — PROGRESS NOTES
OFFICE VISIT  NOTE  [facility] CARDIOLOGY      Name: Naveen Lugo    Date: 2021  MRN:  [id number]  :  1955      REFERRING/PRIMARY PROVIDER:  Steffany Us MD    Chief Complaint   Patient presents with   • Chest Pain   • Coronary Artery Disease       HPI: Naveen Lugo is a 66 y.o. male who presents today for new consultation for chest pain with a history of CAD.  History of metastatic prostate cancer with metastases to the bones, new lesion in the lumbar spine started radiation treatment, history of chemotherapy as well.  History of PCI of the LAD and RCA 2018 by Dr. Winston at [facility], with known history of a  of the OM, LAD stress test 2018 showed ischemia in the inferior lateral zone consistent with the .  He had an episode of chest pain 2 weekends ago, left-sided, focal, sharp and dull, lasting 2 days constant, he did not think it was from his heart and did not seek immediate medical attention.  No chest pain since then or before.  He is under a lot of stress due to worsening cancer and new treatments.  Smokes 1 pack/day, quit in the past, he would like to quit now but is difficult.    Past Medical History:   Diagnosis Date   • Bone cancer (CMS/HCC)    • Nephrolithiasis    • Opioid use disorder, mild, on maintenance therapy (CMS/HCC)    • Prostate cancer (CMS/HCC)        Past Surgical History:   Procedure Laterality Date   • CHOLECYSTECTOMY     • CORONARY STENT PLACEMENT   &    • HERNIA REPAIR     • THORACIC DISCECTOMY         Social History     Socioeconomic History   • Marital status:      Spouse name: Not on file   • Number of children: Not on file   • Years of education: Not on file   • Highest education level: Not on file   Tobacco Use   • Smoking status: Current Every Day Smoker     Packs/day: 1.00     Years: 50.00     Pack years: 50.00     Types: Cigarettes   • Smokeless tobacco: Never Used   Vaping Use   • Vaping Use: Never used   Substance and  Sexual Activity   • Alcohol use: Not Currently   • Drug use: Never   • Sexual activity: Defer       Family History   Problem Relation Age of Onset   • Ovarian cancer Sister    • Diabetes Brother    • Heart disease Brother    • Prostate cancer Brother         ROS:   Constitutional no fever,  no weight loss   Skin no rash, no subcutaneous nodules   Otolaryngeal no difficulty swallowing   Cardiovascular See HPI   Pulmonary no cough, no sputum production   Gastrointestinal no constipation, no diarrhea   Genitourinary no dysuria, no hematuria   Hematologic no easy bruisability, no abnormal bleeding   Musculoskeletal no muscle pain   Neurologic no dizziness, no falls         Allergies   Allergen Reactions   • Cymbalta [Duloxetine Hcl] Dizziness   • Gabapentin Nausea Only   • Lyrica [Pregabalin] Nausea And Vomiting and Dizziness         Current Outpatient Medications:   •  abiraterone acetate (ZYTIGA) 500 MG chemo tablet, Take 2 tablets by mouth Daily., Disp: 60 tablet, Rfl: 3  •  calcium carbonate (OS-RONNY) 600 MG tablet, Take 1,200 mg by mouth Daily. Patient to take 1200 mg daily for hypocalcemia., Disp: , Rfl:   •  cloNIDine (CATAPRES) 0.1 MG tablet, Take 1 tablet by mouth 2 (Two) Times a Day As Needed (night sweats) for up to 10 days., Disp: 20 tablet, Rfl: 0  •  dexamethasone (DECADRON) 4 MG tablet, Take 2 tablets oral twice a day for 3 consecutive days beginning the day before chemotherapy and continue for 6 doses., Disp: 12 tablet, Rfl: 5  •  Fluticasone-Umeclidin-Vilant (Trelegy Ellipta) 100-62.5-25 MCG/INH inhaler, Inhale 1 puff Daily., Disp: , Rfl:   •  [START ON 8/17/2021] HYDROmorphone (DILAUDID) 4 MG tablet, Take 1 tablet by mouth Every 4 (Four) Hours As Needed for Severe Pain  for up to 7 days., Disp: 42 tablet, Rfl: 0  •  lidocaine-prilocaine (EMLA) 2.5-2.5 % cream, Apply  topically to the appropriate area as directed As Needed (45-60 minutes prior to port access.  Cover with saran/plastic wrap.)., Disp: 30  "g, Rfl: 3  •  lidocaine, Apply 1 application topically to the appropriate area as directed 4 (Four) Times a Day., Disp: 1 container, Rfl: 5  •  methadone (DOLOPHINE) 5 MG tablet, Take 2 tabs in the AM. 1 tab in afternoon and 1 tab at bedtime., Disp: 28 tablet, Rfl: 0  •  naloxone (NARCAN) 4 MG/0.1ML nasal spray, 1 spray into the nostril(s) as directed by provider As Needed (unresponsiveness)., Disp: , Rfl:   •  nystatin susp + lidocaine viscous (MAGIC MOUTHWASH) oral suspension, 5-10 ml swish and spit or swallow QID prn, Disp: 240 mL, Rfl: 3  •  omeprazole (priLOSEC) 20 MG capsule, Take 20 mg by mouth Daily., Disp: , Rfl:   •  ondansetron (ZOFRAN) 8 MG tablet, Take 1 tablet by mouth 3 (Three) Times a Day As Needed for Nausea or Vomiting., Disp: 30 tablet, Rfl: 5  •  predniSONE (DELTASONE) 5 MG tablet, Take 1 tablet by mouth Daily., Disp: 30 tablet, Rfl: 11  •  relugolix (ORGOVYX) 120 MG tablet tablet, Take 1 tablet by mouth Daily., Disp: 30 tablet, Rfl: 3  •  aspirin (aspirin) 81 MG EC tablet, Take 1 tablet by mouth Daily., Disp: , Rfl:   No current facility-administered medications for this visit.    Facility-Administered Medications Ordered in Other Visits:   •  heparin injection 500 Units, 500 Units, Intravenous, PRN, Ishmael Rashid MD, 500 Units at 06/02/21 0900    Vitals:    08/11/21 0957   BP: 114/62   BP Location: Left arm   Patient Position: Sitting   Pulse: 74   Weight: 87.5 kg (193 lb)   Height: 175.3 cm (69\")     Body mass index is 28.5 kg/m².    PHYSICAL EXAM:    General Appearance:   · well developed  · well nourished  HENT:   · oropharynx moist  · lips not cyanotic  Neck:  · thyroid not enlarged  · supple  Respiratory:  · no respiratory distress  · normal breath sounds  · no rales  Cardiovascular:  · no jugular venous distention  · regular rhythm  · apical impulse normal  · S1 normal, S2 normal  · no S3, no S4   · no murmur  · no rub, no thrill  · carotid pulses normal; no bruit  · pedal pulses " normal  · lower extremity edema: none    Gastrointestinal:   · bowel sounds normal  · non-tender  · no hepatomegaly, no splenomegaly  Musculoskeletal:  · no clubbing of fingers.   · normocephalic, head atraumatic  Skin:   · warm, dry  Psychiatric:  · judgement and insight appropriate  · normal mood and affect    RESULTS:     ECG 12 Lead    Date/Time: 8/11/2021 10:23 AM  Performed by: Joni Kahn MD  Authorized by: Joni Kahn MD   Comparison: compared with previous ECG from 6/1/2021  Similar to previous ECG  Rhythm: sinus rhythm  Rate: normal  Conduction: right bundle branch block    Clinical impression: abnormal EKG  Comments: Inferior infarct                Labs:  Lab Results   Component Value Date    AST 21 08/10/2021    ALT 32 08/10/2021     No results found for: HGBA1C  No components found for: CREATINININE  eGFR Non  Am   Date Value Ref Range Status   08/10/2021 94 >59 mL/min/1.73 Final   07/27/2021 109 >60 mL/min/1.73 Final     Comment:     GFR Normal >60  Chronic Kidney Disease <60  Kidney Failure <15     06/22/2021 93 >59 mL/min/1.73 Final     eGFR Non  Amer   Date Value Ref Range Status   04/20/2021 113 >60 mL/min/1.73 Final   12/29/2020 103 >60 mL/min/1.73 Final       Most recent PCP note, imaging tests, and labs reviewed.    ASSESSMENT:  Problem List Items Addressed This Visit        Cardiac and Vasculature    Coronary artery disease of native artery of native heart with stable angina pectoris (CMS/HCC) - Primary    Overview     2/2018: PCI of the LAD 3.0 x 16 mm Synergy mid LAD, 3.0 x 20 mm Synergy MITZI to the distal RCA by Dr. Navarrete at [facility] left circumflex mild luminal irregularities.  Known  of OM.         Hyperlipidemia LDL goal <70    Essential hypertension    Chest pain          PLAN:    1.  Chest pain:  History of CAD with stents of the LAD and the RCA, nuclear stress test 4/2018 showed inferolateral ischemia, likely due to OM .    Lexiscan nuclear stress test and  echo ordered for further evaluation.  Start aspirin 81 mg daily  Start sublingual nitroglycerin as needed  The patient was advised to call 911 if chest pain worsens or persist despite nitroglycerin or rest.    Patient ideally should be on a statin however he had recent increased LFTs, therefore will hold off for now.    2.  Tobacco abuse:  Discussed importance of complete tobacco cessation   Assistance offered.    3.  CAD:  Continue aspirin 81 mg daily  Ideally should be on statin therapy will wait for LFTs to be stable for starting.  Smoking cessation  Sublingual nitroglycerin      Return to clinic in 6 months, or sooner as needed.    Thank you for the opportunity to share in the care of your patient; please do not hesitate to call me with any questions.     Joni Kahn MD, [facility]  Office: [phone] [address] [place] [address]    08/11/21

## 2021-08-10 ENCOUNTER — INFUSION (OUTPATIENT)
Dept: ONCOLOGY | Facility: HOSPITAL | Age: 66
End: 2021-08-10

## 2021-08-10 ENCOUNTER — OFFICE VISIT (OUTPATIENT)
Dept: ONCOLOGY | Facility: CLINIC | Age: 66
End: 2021-08-10

## 2021-08-10 ENCOUNTER — HOSPITAL ENCOUNTER (OUTPATIENT)
Dept: RADIATION ONCOLOGY | Facility: HOSPITAL | Age: 66
Discharge: HOME | End: 2021-08-10

## 2021-08-10 VITALS
HEIGHT: 69 IN | BODY MASS INDEX: 28.58 KG/M2 | SYSTOLIC BLOOD PRESSURE: 153 MMHG | WEIGHT: 193 LBS | RESPIRATION RATE: 20 BRPM | HEART RATE: 76 BPM | DIASTOLIC BLOOD PRESSURE: 69 MMHG | TEMPERATURE: 97.7 F

## 2021-08-10 VITALS — BODY MASS INDEX: 29.06 KG/M2 | WEIGHT: 196.8 LBS

## 2021-08-10 DIAGNOSIS — C61 PROSTATE CANCER METASTATIC TO BONE (HCC): Primary | ICD-10-CM

## 2021-08-10 DIAGNOSIS — C79.51 PROSTATE CANCER METASTATIC TO BONE (HCC): Primary | ICD-10-CM

## 2021-08-10 DIAGNOSIS — Z45.2 ENCOUNTER FOR CARE RELATED TO PORT-A-CATH: ICD-10-CM

## 2021-08-10 PROCEDURE — 36591 DRAW BLOOD OFF VENOUS DEVICE: CPT

## 2021-08-10 PROCEDURE — 77412 RADIATION TX DELIVERY LVL 3: CPT | Performed by: RADIOLOGY

## 2021-08-10 PROCEDURE — 25010000002 HEPARIN LOCK FLUSH PER 10 UNITS: Performed by: INTERNAL MEDICINE

## 2021-08-10 PROCEDURE — 99215 OFFICE O/P EST HI 40 MIN: CPT | Performed by: INTERNAL MEDICINE

## 2021-08-10 RX ORDER — HEPARIN SODIUM (PORCINE) LOCK FLUSH IV SOLN 100 UNIT/ML 100 UNIT/ML
500 SOLUTION INTRAVENOUS AS NEEDED
Status: CANCELLED | OUTPATIENT
Start: 2021-08-10

## 2021-08-10 RX ORDER — HEPARIN SODIUM (PORCINE) LOCK FLUSH IV SOLN 100 UNIT/ML 100 UNIT/ML
500 SOLUTION INTRAVENOUS AS NEEDED
Status: DISCONTINUED | OUTPATIENT
Start: 2021-08-10 | End: 2021-08-10 | Stop reason: HOSPADM

## 2021-08-10 RX ADMIN — HEPARIN 500 UNITS: 100 SYRINGE at 11:10

## 2021-08-10 NOTE — PROGRESS NOTES
CHIEF COMPLAINT: Metastatic prostate cancer    Problem List:  Oncology/Hematology History Overview Note   1.  Stage IVb grade group 4 metastatic prostate cancer with sclerotic bone lesions on CT and bone scan and history of prostatic hypertrophy  2.  Kidney stone  3.  Polyps  4.  Elevated liver enzymes    -9/30/2020 office note from Dr. Ronen Reynaga indicates patient with PSA 10.8.  Underwent TRUS prostate biopsy 9/15/2020.  Adenocarcinoma the prostate Sarika 4+4 = 8 in 1 out of 12 cores and 4+3 = 7 and 10 out of 12 cores and 3+4 = 7 in 1 out of 12 cores.  Perineural invasion.  Extraprostatic extension into the fat seen on 2 biopsies of the right lateral mid and right apex.  9/24/2020 CT chest and whole-body bone scan showed T12, L1, left acetabular, right medial acetabular metastases with no lung metastases.  He started androgen deprivation therapy with Casodex with plans for Lupron to start in 1 to 2 weeks for clinical T3 N0 M1 metastatic prostate cancer.  Review of official report from [facility] 9/24/2020 bone scan mentions T12, L1, roof of left acetabulum and medial right acetabular osteoblastic metastases.  CT chest with contrast that same day showed mild splenomegaly 14.2 cm with stable periaortic nodes compared to CT December 2015 with no lung metastases.  Sclerotic abnormality within the T12 vertebral body, L1 vertebral body extending into the pedicle unchanged.  8/28/2020 CT abdomen pelvis report from [facility] reviewed and mentions normal spleen size.  Punctate nonobstructing kidney stone, no paulino enlargement, diffuse bladder wall thickening with mild perivesicular fat stranding, 2.1 cm sclerotic left iliac bone above the left acetabulum new compared to 2015 as well as 1.5 cm faintly sclerotic focus in the right posterior acetabulum stable compared to prior CT 2015 as well as a 1.6 cm T12 and inferior vertebral body sclerotic lesion and a 1.9 cm left posterior lateral L1 vertebral  body abnormality extending to the pedicle.  -10/13/2020 initial [facility] medical oncology consultation: With 1 Sarika score 8, 4+4 lesion that would make him a grade group 4 with stage IVb disease given radiographic evidence of sclerotic bone metastasis on bone scan and CT.  Needs genetic testing given metastatic prostate cancer and this is also important as, were he to have mismatch repair mutation then we would have options for PDL 1 inhibitors and were he BRCA mutated would have options for PARP inhibitors in the future.  Systemic therapy for castration naïve prostate cancer or N1 disease should be with apalutamide or Abiraterone or enzalutamide all of which are category 1.  He does not have any visceral metastases.  According to his imaging he has T12, L1, left acetabular, right acetabular, and left iliac bony involvement.  That means he would have 4 or more bone metastases with at least one metastasis (i.e. the acetabular lesions bilaterally) beyond the pelvis/vertebral, for which this would be considered high-volume disease for which 6 cycles of docetaxel 75 mg/m² x 6 cycles followed by Zytiga and prednisone would be reasonable along with Zometa every 6 weeks for bone stabilization.  He will do chemo preparation visit with my nurse practitioner prior to start chemotherapy with Taxotere and Zometa in 2 weeks and he is already received Casodex in preparation for Lupron shot he received on 10/12/2020 with Dr. Reynaga.  We will get him to Dr. Alexander Gomez who has done his polypectomies in the past for port placement and we will get genetic counseling started.  Following the 6 courses of Taxotere per the Chaarted trial criteria, I would then give him an indefinite Zytiga and prednisone and Zometa until progression or toxicity dictates.    -12/16/2019 COVID-19 antigen test negative    -12/29/2020 PSA 0.275 with absolute neutrophil count 700 and creatinine 0.76 with normal liver enzymes and electrolytes.  Normal  magnesium and phosphorus and urine drug screen positive for buprenorphine and opiates follow-up with palliative care.    -1/4/2021 CT chest abdomen pelvis with contrast and whole-body bone scan shows improving less metabolically active bone metastases.  Stable sclerotic T12, L1, and L2 vertebral bodies and bilateral pelvic bones on bone windows with stable subcentimeter para-aortic lymph nodes and no definite progression in the chest abdomen or pelvis.    -1/5/2021 [facility] medical oncology follow-up visit: I reviewed the images and reports thereof of the above CT and bone scan which shows good response to Taxotere x3 courses with a PSA down to 0.275 from a baseline of 10.  Get another 3 courses of Taxotere, continue his Xgeva, and then following that we will switch to Zytiga and prednisone.  He is working with palliative care on his bone pain but on his current dose of fentanyl patch he says his pain is still not adequately controlled he will contact them.  Dexamethasone prescription was refilled.  We will see my nurse practitioner back in 3 weeks for course #5 of 6 total courses and then we will reimage with CT chest abdomen pelvis and bone scan before going to the Zytiga prednisone.    -3/4/2021 CT chest abdomen pelvis with contrast is negative save for stable sclerotic bone lesions and the bone scan shows near resolution of prior bony abnormalities associated with the known sclerotic lesions in the thoracolumbar spine and bony pelvis.    2/17/2021 PSA down to 0.1 from 1.37 October 2020.  3/9/2021 PSA 0.1    -5/26/2021 CT chest abdomen pelvis with contrast shows stable to slightly underlying increase low-density left para-aortic node.  Bone lesions stable.  Whole-body bone scan stable to decrease activity of known thoracolumbar and bony pelvic involvement.  PSA less than 0.1  , AST 74, bilirubin 0.5.       -6/1/2021 [facility] medical oncology follow-up visit: I reviewed the above data with him and images  thereof.  Bones are stable.  No significant adenopathy.  PSA immeasurably low.     upper limit of normal 69 and AST 74 upper limit of normal 46.  Hence, neither is more than double the upper limit of normal with no ascites and no encephalopathy and normal albumin and bilirubin, despite the elevation of liver enzymes, he has child Abrams score A.  Package insert for Abiraterone says that for ALT and/or AST greater than 5 times upper limit of normal or total bilirubin greater than 3 times the upper limit of normal to withhold treatment until liver function tests returned to baseline or ALT and AST less than or equal to 2.5 times the upper limit of normal and total bilirubin less than or equal to 1.5 times the upper limit of normal and then reinitiate at 750 mg daily dose of Zytiga.  For recurrent hepatotoxicity on 750 mg daily Zytiga, withhold treatment until liver functions returned to baseline or ALT and AST less than 3-2.5 times upper limit of normal and total bilirubin less than or equal to 1.5 times the upper limit of normal then reinitiate at 500 mg daily Zytiga dose.  If has recurrent hepatotoxicity on 500 mg dose, then discontinue treatment.  If has ALT greater than 3 times the upper limit of normal along with total bilirubin greater than 2 times the upper limit of normal in the absence of other contributing causes or biliary obstruction, permanently discontinue Zytiga.  Based on these recommendations, I would continue current doses but we will watch with serial labs monthly and repeat his imaging again in 3 months but clinically he is doing wonderfully with excellent response to Taxotere and now on Zytiga prednisone plus Zometa along with Relugolix.    -6/23/2020 for [facility] oncology clinic follow-up: Having persistent and worsening pain above his left hip.  I will get an MRI of the left hip for further evaluation.  Otherwise we will continue therapy unchanged with Zytiga, prednisone and Zometa along  with Relugolix.    -7/28/2021 [facility] oncology clinic follow-up: Patient with multiple somatic complaints including episodes of confusion, dizziness, decreased memory, agitation.  He reports ongoing episodes with difficulty swallowing.  Also most concerning for episodes of angina and chest pain for which he basically refused to seek emergency medical attention.  He has a history of coronary artery disease and stent placement.  He has not followed up with cardiology for many years.  I will get an MRI of the brain in light of his confusion, dizziness and agitation.  I will get him to gastroenterology for EGD in light of his dysphagia.  I have also put in a referral for cardiology.  I reemphasized to the patient, his wife who is with him today and his son who was on the telephone during our visit the importance of seeking out prompt medical attention when he is having chest pain or neurological concerns so that he can be evaluated.  The patient states that he basically refuses to go to the emergency room and if his family calls an ambulance he would not agree to go to the hospital.  For the time being, I have asked him to hold his Zytiga and prednisone until we can sort this out as Zytiga does have some cardiovascular risk.  There is likely no treatment for prostate cancer that does not carry some form of cardiovascular risk.  His PSA remains low at 0.014 yesterday.  He will continue relugolix for now.    Of note, some of these symptoms could also be due to his hypophosphatemia, phosphate level from yesterday was 1.5, I have sent in a prescription for K-Phos 3 times a day before meals for 10 days.  We will hold further Zometa until this is corrected.  I have also put in an order to check his vitamin D level.  He takes calcium and vitamin D daily.  Some of his symptoms also could be due to drug withdrawal as there was some confusion and difficulty getting his last prescription for methadone therefore he was without  "methadone for several days, he now has his prescription and is back on his pain medication.  He has an appointment with neurosurgery tomorrow in light of his ongoing back pain, MRI of the hip recently showed multiple bony lesions largest at the L5 vertebral body and left supra-acetabular region.  If no neurosurgical intervention recommended he may benefit from spot radiation as this is where a lot of his pain is coming from.  His wife had questions today regarding his staging and extent of disease.  We reviewed previous scans.  I did clarify with her as she used the words \"cancer free\" as she thought that is what she was told after his CT scans once he completed Taxotere in February.  I explained to her that even though his scans showed he had an excellent response with no clear areas of metastasis that did not mean he was cancer free, I explained to her that when you have metastatic cancer the treatment intent is palliative in nature and to control the disease but not to cure the disease.  She stated understanding.  We will see him back in 2 weeks for follow-up to sort through this and make further plan of care, again, I have asked him to hold Zytiga and prednisone until he sees us back, he will continue on his other medications including relugolix.    -7/30/2021 neurosurgery PA consultation.  MRI with and without contrast L5 ordered.    -8/3/2021 neurosurgery follow-up showed known sclerotic disease with new L5 abnormality without compression deformity or instability.  MRI brain negative for metastasis.    -8/4/2021 office visit Dr. Fco Quintanilla radiation oncology coordinating with Dr. Can forde for palliative radiation for MRI evidence of L5 and left supra acetabular painful lesions.  States that the patient's disease which is not secreting PSA is experiencing some progression and would benefit from 20 Gy in five fractions and other lesion 30 Gy in ten fractions. Patient offered to go to [place] for " radiation but desired treatment in [place].    -8/10/2021 [facility] medical oncology follow-up visit: No chest pains at this junction.  Reviewed MRI brain and other MRIs reviewed by Dr. North and Dwight.  Due for EGD on 19th.  We will repeat his phosphorus along with his other labs continue Relugolix, Zytiga, prednisone, and he will continue radiation palliatively to the painful bony lesions with Dr. Quintanilla/Can.  He will see my nurse practitioner back in a few weeks to see how he is tolerating this and to follow-up on the phosphorus off of Zometa and she will order repeat CT chest abdomen pelvis with contrast and total body bone scan the end of September.I will see him back after that.     Prostate cancer metastatic to bone (CMS/HCC)   8/30/2020 -  Other Event    PSA 10.8     9/15/2020 Initial Diagnosis    Prostate cancer metastatic to bone (CMS/HCC)     9/15/2020 Biopsy    Prostate needle core biopsy     9/24/2020 Imaging    Total body bone scan: 4 abnormal areas of increased activity consistent with osteoblastic metastasis, abnormal foci of activity seen in the T12 vertebral body, L1 vertebral body, roof of the left acetabulum, and acetabulum medially on the right.  CT chest: Stable sclerotic metastatic lesions within the T12 and L1 vertebrae.  No other evidence of metastatic disease to the chest.  Stable mild splenomegaly and subcentimeter periaortic retroperitoneal lymph nodes.  CT abdomen and pelvis: Diffuse bladder wall thickening with mild perivesicular fat stranding.  Interval development of several sclerotic lesions in the spine and left iliac bone.     10/13/2020 Cancer Staged    Staging form: Bone - Appendicular Skeleton, Trunk, Skull, And Facial Bones, AJCC 8th Edition  - Clinical: Stage IVB (cT3, cN0, cM1b, G3) - Signed by Ishmael Rashid MD on 10/13/2020     11/3/2020 -  Chemotherapy    OP SUPPORTIVE Zoledronic Acid Q42d     11/3/2020 - 2/18/2021 Chemotherapy    OP PROSTATE DOCEtaxel       1/4/2021  Imaging    CT chest abdomen pelvis with contrast and whole-body bone scan shows improving less metabolically active bone metastases.  Stable sclerotic T12, L1, and L2 vertebral bodies and bilateral pelvic bones on bone windows with stable subcentimeter para-aortic lymph nodes and no definite progression in the chest abdomen or pelvis.  PSA down to 0.275 after 3 courses of Taxotere.     3/4/2021 Imaging    CT chest, abdomen and pelvis stable, no evidence of disease progression. Total body bone scan with decreased/near resolution of abnormal increased radiotracer uptake associated with the known sclerotic lesions in the thoracolumbar spine and bony pelvis.  No evidence of new osteoblastic metastases.     3/4/2021 Imaging    CT chest abdomen pelvis with contrast is negative save for stable sclerotic bone lesions and the bone scan shows near resolution of prior bony abnormalities associated with the known sclerotic lesions in the thoracolumbar spine and bony pelvis.  2/17/2021 PSA down to 0.1 from 1.37 October 2020.     3/9/2021 - 3/9/2021 Chemotherapy    OP SUPPORTIVE PROSTATE Relugolix     3/9/2021 -  Chemotherapy    OP PROSTATE Abiraterone / PredniSONE       3/10/2021 -  Chemotherapy    OP PROSTATE Abiraterone / PredniSONE         HISTORY OF PRESENT ILLNESS:  The patient is a 66 y.o. male, here for follow up on management of metastatic prostate cancer.  Denies chest pain presently.  Bone pain present but getting radiation and pain medications adequate    Past Medical History:   Diagnosis Date   • Bone cancer (CMS/HCC)    • Nephrolithiasis    • Opioid use disorder, mild, on maintenance therapy (CMS/HCC)    • Prostate cancer (CMS/HCC)      Past Surgical History:   Procedure Laterality Date   • CHOLECYSTECTOMY     • CORONARY STENT PLACEMENT  206 & 2011   • HERNIA REPAIR  1986   • THORACIC DISCECTOMY  1994       Allergies   Allergen Reactions   • Cymbalta [Duloxetine Hcl] Dizziness   • Gabapentin Nausea Only   • Lyrica  "[Pregabalin] Nausea And Vomiting and Dizziness       Family History and Social History reviewed and changed as necessary    REVIEW OF SYSTEM:   No new somatic complaints    PHYSICAL EXAM:  Lungs clear.  Heart regular rate and rhythm.    Vitals:    08/10/21 0918   BP: 153/69   Pulse: 76   Resp: 20   Temp: 97.7 °F (36.5 °C)   Weight: 87.5 kg (193 lb)   Height: 175.3 cm (69\")     Vitals:    08/10/21 0918   PainSc:   8   PainLoc: Back  Comment: back and hips          ECOG score: 1     Karnofsky score: 90     Vitals reviewed.      Lab Results   Component Value Date    HGB 15.0 07/27/2021    HCT 44.8 07/27/2021    MCV 85.7 07/27/2021     (L) 07/27/2021    WBC 7.33 07/27/2021    NEUTROABS 5.35 07/27/2021    LYMPHSABS 1.39 07/27/2021    MONOSABS 0.36 07/27/2021    EOSABS 0.15 07/27/2021    BASOSABS 0.06 07/27/2021       Lab Results   Component Value Date    GLUCOSE 102 (H) 04/20/2021    BUN 11 07/27/2021    CREATININE 0.72 (L) 07/27/2021     07/27/2021    K 3.5 07/27/2021     07/27/2021    CO2 24.9 07/27/2021    CALCIUM 9.3 07/27/2021    PROTEINTOT 6.6 04/20/2021    ALBUMIN 3.90 07/27/2021    BILITOT 0.5 07/27/2021    ALKPHOS 79 07/27/2021    AST 48 (H) 07/27/2021    ALT 70 (H) 07/27/2021             ASSESSMENT & PLAN:  1.  Stage IVb grade group 4 metastatic prostate cancer with sclerotic bone lesions on CT and bone scan and history of prostatic hypertrophy  2.  Kidney stone  3.  Polyps  4.  Elevated liver enzymes    -9/30/2020 office note from Dr. Ronen Reynaga indicates patient with PSA 10.8.  Underwent TRUS prostate biopsy 9/15/2020.  Adenocarcinoma the prostate Amonate 4+4 = 8 in 1 out of 12 cores and 4+3 = 7 and 10 out of 12 cores and 3+4 = 7 in 1 out of 12 cores.  Perineural invasion.  Extraprostatic extension into the fat seen on 2 biopsies of the right lateral mid and right apex.  9/24/2020 CT chest and whole-body bone scan showed T12, L1, left acetabular, right medial acetabular metastases " with no lung metastases.  He started androgen deprivation therapy with Casodex with plans for Lupron to start in 1 to 2 weeks for clinical T3 N0 M1 metastatic prostate cancer.  Review of official report from [facility] 9/24/2020 bone scan mentions T12, L1, roof of left acetabulum and medial right acetabular osteoblastic metastases.  CT chest with contrast that same day showed mild splenomegaly 14.2 cm with stable periaortic nodes compared to CT December 2015 with no lung metastases.  Sclerotic abnormality within the T12 vertebral body, L1 vertebral body extending into the pedicle unchanged.  8/28/2020 CT abdomen pelvis report from [facility] reviewed and mentions normal spleen size.  Punctate nonobstructing kidney stone, no paulino enlargement, diffuse bladder wall thickening with mild perivesicular fat stranding, 2.1 cm sclerotic left iliac bone above the left acetabulum new compared to 2015 as well as 1.5 cm faintly sclerotic focus in the right posterior acetabulum stable compared to prior CT 2015 as well as a 1.6 cm T12 and inferior vertebral body sclerotic lesion and a 1.9 cm left posterior lateral L1 vertebral body abnormality extending to the pedicle.  -10/13/2020 initial [facility] medical oncology consultation: With 1 [place] score 8, 4+4 lesion that would make him a grade group 4 with stage IVb disease given radiographic evidence of sclerotic bone metastasis on bone scan and CT.  Needs genetic testing given metastatic prostate cancer and this is also important as, were he to have mismatch repair mutation then we would have options for PDL 1 inhibitors and were he BRCA mutated would have options for PARP inhibitors in the future.  Systemic therapy for castration naïve prostate cancer or N1 disease should be with apalutamide or Abiraterone or enzalutamide all of which are category 1.  He does not have any visceral metastases.  According to his imaging he has T12, L1, left acetabular, right  acetabular, and left iliac bony involvement.  That means he would have 4 or more bone metastases with at least one metastasis (i.e. the acetabular lesions bilaterally) beyond the pelvis/vertebral, for which this would be considered high-volume disease for which 6 cycles of docetaxel 75 mg/m² x 6 cycles followed by Zytiga and prednisone would be reasonable along with Zometa every 6 weeks for bone stabilization.  He will do chemo preparation visit with my nurse practitioner prior to start chemotherapy with Taxotere and Zometa in 2 weeks and he is already received Casodex in preparation for Lupron shot he received on 10/12/2020 with Dr. Reynaga.  We will get him to Dr. Alexander Gomez who has done his polypectomies in the past for port placement and we will get genetic counseling started.  Following the 6 courses of Taxotere per the Chaarted trial criteria, I would then give him an indefinite Zytiga and prednisone and Zometa until progression or toxicity dictates.    -12/16/2019 COVID-19 antigen test negative    -12/29/2020 PSA 0.275 with absolute neutrophil count 700 and creatinine 0.76 with normal liver enzymes and electrolytes.  Normal magnesium and phosphorus and urine drug screen positive for buprenorphine and opiates follow-up with palliative care.    -1/4/2021 CT chest abdomen pelvis with contrast and whole-body bone scan shows improving less metabolically active bone metastases.  Stable sclerotic T12, L1, and L2 vertebral bodies and bilateral pelvic bones on bone windows with stable subcentimeter para-aortic lymph nodes and no definite progression in the chest abdomen or pelvis.    -1/5/2021 [facility] medical oncology follow-up visit: I reviewed the images and reports thereof of the above CT and bone scan which shows good response to Taxotere x3 courses with a PSA down to 0.275 from a baseline of 10.  Get another 3 courses of Taxotere, continue his Xgeva, and then following that we will switch to Zytiga and  prednisone.  He is working with palliative care on his bone pain but on his current dose of fentanyl patch he says his pain is still not adequately controlled he will contact them.  Dexamethasone prescription was refilled.  We will see my nurse practitioner back in 3 weeks for course #5 of 6 total courses and then we will reimage with CT chest abdomen pelvis and bone scan before going to the Zytiga prednisone.    -3/4/2021 CT chest abdomen pelvis with contrast is negative save for stable sclerotic bone lesions and the bone scan shows near resolution of prior bony abnormalities associated with the known sclerotic lesions in the thoracolumbar spine and bony pelvis.    2/17/2021 PSA down to 0.1 from 1.37 October 2020.  3/9/2021 PSA 0.1    -5/26/2021 CT chest abdomen pelvis with contrast shows stable to slightly underlying increase low-density left para-aortic node.  Bone lesions stable.  Whole-body bone scan stable to decrease activity of known thoracolumbar and bony pelvic involvement.  PSA less than 0.1  , AST 74, bilirubin 0.5.       -6/1/2021 [facility] medical oncology follow-up visit: I reviewed the above data with him and images thereof.  Bones are stable.  No significant adenopathy.  PSA immeasurably low.     upper limit of normal 69 and AST 74 upper limit of normal 46.  Hence, neither is more than double the upper limit of normal with no ascites and no encephalopathy and normal albumin and bilirubin, despite the elevation of liver enzymes, he has child Abrams score A.  Package insert for Abiraterone says that for ALT and/or AST greater than 5 times upper limit of normal or total bilirubin greater than 3 times the upper limit of normal to withhold treatment until liver function tests returned to baseline or ALT and AST less than or equal to 2.5 times the upper limit of normal and total bilirubin less than or equal to 1.5 times the upper limit of normal and then reinitiate at 750 mg daily dose of  Zytiga.  For recurrent hepatotoxicity on 750 mg daily Zytiga, withhold treatment until liver functions returned to baseline or ALT and AST less than 3-2.5 times upper limit of normal and total bilirubin less than or equal to 1.5 times the upper limit of normal then reinitiate at 500 mg daily Zytiga dose.  If has recurrent hepatotoxicity on 500 mg dose, then discontinue treatment.  If has ALT greater than 3 times the upper limit of normal along with total bilirubin greater than 2 times the upper limit of normal in the absence of other contributing causes or biliary obstruction, permanently discontinue Zytiga.  Based on these recommendations, I would continue current doses but we will watch with serial labs monthly and repeat his imaging again in 3 months but clinically he is doing wonderfully with excellent response to Taxotere and now on Zytiga prednisone plus Zometa along with Relugolix.    -[facility]/23/[facility]: Having persistent and worsening pain above his left hip.  I will get an MRI of the left hip for further evaluation.  Otherwise we will continue therapy unchanged with Zytiga, prednisone and Zometa along with Relugolix.    -7/28/2021 [facility] follow-[facility]: Patient with multiple somatic complaints including episodes of confusion, dizziness, decreased memory, agitation.  He reports ongoing episodes with difficulty swallowing.  Also most concerning for episodes of angina and chest pain for which he basically refused to seek emergency medical attention.  He has a history of coronary artery disease and stent placement.  He has not followed up with cardiology for many years.  I will get an MRI of the brain in light of his confusion, dizziness and agitation.  I will get him to gastroenterology for EGD in light of his dysphagia.  I have also put in a referral for cardiology.  I reemphasized to the patient, his wife who is with him today and his son who was on the telephone  "during our visit the importance of seeking out prompt medical attention when he is having chest pain or neurological concerns so that he can be evaluated.  The patient states that he basically refuses to go to the emergency room and if his family calls an ambulance he would not agree to go to the hospital.  For the time being, I have asked him to hold his Zytiga and prednisone until we can sort this out as Zytiga does have some cardiovascular risk.  There is likely no treatment for prostate cancer that does not carry some form of cardiovascular risk.  His PSA remains low at 0.014 yesterday.  He will continue relugolix for now.    Of note, some of these symptoms could also be due to his hypophosphatemia, phosphate level from yesterday was 1.5, I have sent in a prescription for K-Phos 3 times a day before meals for 10 days.  We will hold further Zometa until this is corrected.  I have also put in an order to check his vitamin D level.  He takes calcium and vitamin D daily.  Some of his symptoms also could be due to drug withdrawal as there was some confusion and difficulty getting his last prescription for methadone therefore he was without methadone for several days, he now has his prescription and is back on his pain medication.  He has an appointment with neurosurgery tomorrow in light of his ongoing back pain, MRI of the hip recently showed multiple bony lesions largest at the L5 vertebral body and left supra-acetabular region.  If no neurosurgical intervention recommended he may benefit from spot radiation as this is where a lot of his pain is coming from.  His wife had questions today regarding his staging and extent of disease.  We reviewed previous scans.  I did clarify with her as she used the words \"cancer free\" as she thought that is what she was told after his CT scans once he completed Taxotere in February.  I explained to her that even though his scans showed he had an excellent response with no clear " areas of metastasis that did not mean he was cancer free, I explained to her that when you have metastatic cancer the treatment intent is palliative in nature and to control the disease but not to cure the disease.  She stated understanding.  We will see him back in 2 weeks for follow-up to sort through this and make further plan of care, again, I have asked him to hold Zytiga and prednisone until he sees us back, he will continue on his other medications including relugolix.    -7/30/2021 neurosurgery PA consultation.  MRI with and without contrast L5 ordered.    -8/3/2021 neurosurgery follow-up showed known sclerotic disease with new L5 abnormality without compression deformity or instability.  MRI brain negative for metastasis.    -8/4/2021 office visit Dr. Fco Quintanilla radiation oncology coordinating with Dr. North neurosurgery for palliative radiation for MRI evidence of L5 and left supra acetabular painful lesions.  States that the patient's disease which is not secreting PSA is experiencing some progression and would benefit from 20 Gy in five fractions and other lesion 30 Gy in ten fractions. Patient offered to go to [place] for radiation but desired treatment in [place].    -8/10/2021 [facility] medical oncology follow-up visit: No chest pains at this junction.  Reviewed MRI brain and other MRIs reviewed by Dr. North and Dwight.  Due for EGD on 19th.  We will repeat his phosphorus along with his other labs continue Relugolix, Zytiga, prednisone, and he will continue radiation palliatively to the painful bony lesions with Dr. Quintanilla/Can.  He will see my nurse practitioner back in a few weeks to see how he is tolerating this and to follow-up on the phosphorus off of Zometa and she will order repeat CT chest abdomen pelvis with contrast and total body bone scan the end of September.I will see him back after that.    Total time of care today prior to his arrival reviewing notes from radiation oncology,  neurosurgery my nurse practitioner, images and reports of recent scans, and going over this complex decision treatment during his visit today translating all this information and the plan as outlined above and after visit putting forth the plan as outlined took 45 minutes of patient care time throughout the day today.  Ishmael Rashid MD    08/10/2021

## 2021-08-11 ENCOUNTER — OFFICE VISIT (OUTPATIENT)
Dept: CARDIOLOGY | Facility: CLINIC | Age: 66
End: 2021-08-11

## 2021-08-11 ENCOUNTER — HOSPITAL ENCOUNTER (OUTPATIENT)
Dept: RADIATION ONCOLOGY | Facility: HOSPITAL | Age: 66
Discharge: HOME | End: 2021-08-11

## 2021-08-11 ENCOUNTER — TELEPHONE (OUTPATIENT)
Dept: SOCIAL WORK | Facility: HOSPITAL | Age: 66
End: 2021-08-11

## 2021-08-11 VITALS
SYSTOLIC BLOOD PRESSURE: 114 MMHG | BODY MASS INDEX: 28.58 KG/M2 | DIASTOLIC BLOOD PRESSURE: 62 MMHG | HEART RATE: 74 BPM | HEIGHT: 69 IN | WEIGHT: 193 LBS

## 2021-08-11 DIAGNOSIS — R07.9 CHEST PAIN, UNSPECIFIED TYPE: ICD-10-CM

## 2021-08-11 DIAGNOSIS — I25.118 CORONARY ARTERY DISEASE OF NATIVE ARTERY OF NATIVE HEART WITH STABLE ANGINA PECTORIS (HCC): Primary | ICD-10-CM

## 2021-08-11 DIAGNOSIS — I10 ESSENTIAL HYPERTENSION: ICD-10-CM

## 2021-08-11 DIAGNOSIS — E78.5 HYPERLIPIDEMIA LDL GOAL <70: ICD-10-CM

## 2021-08-11 LAB
ALBUMIN SERPL-MCNC: 4.1 G/DL (ref 3.8–4.8)
ALBUMIN/GLOB SERPL: 1.9 {RATIO} (ref 1.2–2.2)
ALP SERPL-CCNC: 79 IU/L (ref 48–121)
ALT SERPL-CCNC: 32 IU/L (ref 0–44)
AST SERPL-CCNC: 21 IU/L (ref 0–40)
BASOPHILS # BLD AUTO: 0 X10E3/UL (ref 0–0.2)
BASOPHILS NFR BLD AUTO: 1 %
BILIRUB SERPL-MCNC: 0.4 MG/DL (ref 0–1.2)
BUN SERPL-MCNC: 6 MG/DL (ref 8–27)
BUN/CREAT SERPL: 8 (ref 10–24)
CALCIUM SERPL-MCNC: 8.8 MG/DL (ref 8.6–10.2)
CHLORIDE SERPL-SCNC: 103 MMOL/L (ref 96–106)
CO2 SERPL-SCNC: 23 MMOL/L (ref 20–29)
CREAT SERPL-MCNC: 0.78 MG/DL (ref 0.76–1.27)
EOSINOPHIL # BLD AUTO: 0.1 X10E3/UL (ref 0–0.4)
EOSINOPHIL NFR BLD AUTO: 2 %
ERYTHROCYTE [DISTWIDTH] IN BLOOD BY AUTOMATED COUNT: 14 % (ref 11.6–15.4)
GLOBULIN SER CALC-MCNC: 2.2 G/DL (ref 1.5–4.5)
GLUCOSE SERPL-MCNC: 78 MG/DL (ref 65–99)
HCT VFR BLD AUTO: 44 % (ref 37.5–51)
HGB BLD-MCNC: 15 G/DL (ref 13–17.7)
IMM GRANULOCYTES # BLD AUTO: 0 X10E3/UL (ref 0–0.1)
IMM GRANULOCYTES NFR BLD AUTO: 0 %
LYMPHOCYTES # BLD AUTO: 1.9 X10E3/UL (ref 0.7–3.1)
LYMPHOCYTES NFR BLD AUTO: 24 %
MCH RBC QN AUTO: 29.5 PG (ref 26.6–33)
MCHC RBC AUTO-ENTMCNC: 34.1 G/DL (ref 31.5–35.7)
MCV RBC AUTO: 86 FL (ref 79–97)
MONOCYTES # BLD AUTO: 0.5 X10E3/UL (ref 0.1–0.9)
MONOCYTES NFR BLD AUTO: 6 %
NEUTROPHILS # BLD AUTO: 5.3 X10E3/UL (ref 1.4–7)
NEUTROPHILS NFR BLD AUTO: 67 %
PHOSPHATE SERPL-MCNC: 2.8 MG/DL (ref 2.8–4.1)
PLATELET # BLD AUTO: 152 X10E3/UL (ref 150–450)
POTASSIUM SERPL-SCNC: 3.9 MMOL/L (ref 3.5–5.2)
PROT SERPL-MCNC: 6.3 G/DL (ref 6–8.5)
PSA SERPL-MCNC: <0.1 NG/ML (ref 0–4)
RBC # BLD AUTO: 5.09 X10E6/UL (ref 4.14–5.8)
SODIUM SERPL-SCNC: 139 MMOL/L (ref 134–144)
WBC # BLD AUTO: 7.8 X10E3/UL (ref 3.4–10.8)

## 2021-08-11 PROCEDURE — 99204 OFFICE O/P NEW MOD 45 MIN: CPT | Performed by: INTERNAL MEDICINE

## 2021-08-11 PROCEDURE — 93000 ELECTROCARDIOGRAM COMPLETE: CPT | Performed by: INTERNAL MEDICINE

## 2021-08-11 PROCEDURE — 77412 RADIATION TX DELIVERY LVL 3: CPT | Performed by: RADIOLOGY

## 2021-08-11 RX ORDER — NITROGLYCERIN 0.4 MG/1
TABLET SUBLINGUAL
Qty: 25 TABLET | Refills: 11 | Status: SHIPPED | OUTPATIENT
Start: 2021-08-11

## 2021-08-11 RX ORDER — ASPIRIN 81 MG/1
81 TABLET ORAL DAILY
Start: 2021-08-11

## 2021-08-11 NOTE — TELEPHONE ENCOUNTER
SW called pt to address his recent distress screening.  LVM requesting a call back.  SW available for ongoing support and resource needs.

## 2021-08-12 ENCOUNTER — TELEPHONE (OUTPATIENT)
Dept: PALLIATIVE CARE | Facility: CLINIC | Age: 66
End: 2021-08-12

## 2021-08-12 ENCOUNTER — HOSPITAL ENCOUNTER (OUTPATIENT)
Dept: RADIATION ONCOLOGY | Facility: HOSPITAL | Age: 66
Discharge: HOME | End: 2021-08-12

## 2021-08-12 PROCEDURE — 77412 RADIATION TX DELIVERY LVL 3: CPT | Performed by: RADIOLOGY

## 2021-08-12 NOTE — TELEPHONE ENCOUNTER
Returned call to pt regarding increased pain. Pt said that pain has increased since starting radiation and laying on the table. States since first treatment he know has increased pain that shoots down his back any time he gets up from laying position. Pt agreeable this is a new pain that he was not having before starting treatment. Current med regimen is not helping. Pt waking up in pain at night. Pt reports using ibuprofen, heat, and ice with no improvement. Went over case with MD and suggested to pt to continue to use those things but also speak with Rad Onc MD as pt may have had type of injury related to his treatment and they may need to order imaging. If that is ruled out and pt cannot tolerate pain he should go to ER to be evaluated. Finally we could move apt up to Thursday 8/19 (pt apt scheduled on 9/24) if he felt he needed it as we would not adjust regimen for a new pain without a physical evaluation. He did not at this time. Pt v/u to all instructions. Will route call to Dr. Quintanilla's RN to make aware of situation.

## 2021-08-13 ENCOUNTER — HOSPITAL ENCOUNTER (OUTPATIENT)
Dept: RADIATION ONCOLOGY | Facility: HOSPITAL | Age: 66
Discharge: HOME | End: 2021-08-13

## 2021-08-13 PROCEDURE — 77336 RADIATION PHYSICS CONSULT: CPT | Performed by: RADIOLOGY

## 2021-08-13 PROCEDURE — 77412 RADIATION TX DELIVERY LVL 3: CPT | Performed by: RADIOLOGY

## 2021-08-16 ENCOUNTER — HOSPITAL ENCOUNTER (OUTPATIENT)
Dept: RADIATION ONCOLOGY | Facility: HOSPITAL | Age: 66
Discharge: HOME | End: 2021-08-16

## 2021-08-16 PROCEDURE — 77412 RADIATION TX DELIVERY LVL 3: CPT | Performed by: RADIOLOGY

## 2021-08-17 ENCOUNTER — TELEPHONE (OUTPATIENT)
Dept: RADIATION ONCOLOGY | Facility: HOSPITAL | Age: 66
End: 2021-08-17

## 2021-08-17 RX ORDER — DEXAMETHASONE 4 MG/1
2 TABLET ORAL 2 TIMES DAILY WITH MEALS
Qty: 6 TABLET | Refills: 0 | Status: SHIPPED | OUTPATIENT
Start: 2021-08-17 | End: 2021-08-20

## 2021-08-17 NOTE — TELEPHONE ENCOUNTER
Discussed the patient's pain, and it is consistent with his presenting pain. Recommend shrt course of steroids.    Thank you for allowing me to be involved in the care of the patient. If you have any questions or concerns, please feel free to call or contact me at your earliest convenience.     Daryn Quintanilla  Radiation Oncology

## 2021-08-20 ENCOUNTER — TELEPHONE (OUTPATIENT)
Dept: CARDIOLOGY | Facility: CLINIC | Age: 66
End: 2021-08-20

## 2021-08-20 NOTE — RADIATION COMPLETION NOTES
DATE OF COMPLETION: 8/16/2021  DIAGNOSIS: Metastatic prostate cancer    REFERRING: Gen[facility] Velasquez        Dear [facility]quez[facility]ish Omer F completed radiation therapy today.      BACKGROUND:  Naveen Lugo  is a very pleasant 66 y.o. male  with a history of metastatic prostate cancer status post chemotherapy and on second line androgen deprivation with abiraterone and now relugolix.  The patient had been doing fairly well but then began to develop increasing pain in his back and hips.  The patient was sent for an MRI scan of the brain and lumbar spine that showed worsening disease in L5 and his hips.  The patient was referred to Dr. North for evaluation.  It was felt that the patient was not a good candidate for surgery and the patient was subsequently for to my department today for consideration of indicated palliative radiotherapy treatments.  He completed radiation as below    Treatment Summary     Dates of Therapy: 8/10/2021-8/16/2021  Treatment Site: L4 and sacrum, left acetabulum, right pelvis  Dose: 20 Gy in 5 fractions of 4 Gy each  Technique: The patient received individual treatments and beam configurations to each location with 3D conformal radiotherapy    Treatment Course and Tolerance: Patient tolerated his treatment well.  He did have some continued pain following his treatments.    The initial follow up visit will be in 1 month.    Naveen Lugo knows to call if any problems or concerns develop in the meantime.     Electronically signed by: Daryn Quintanilla MD                    Cc: Steffany Us MD

## 2021-08-20 NOTE — TELEPHONE ENCOUNTER
----- Message from Joni Kahn MD sent at 8/20/2021  1:12 PM EDT -----  Please inform the patient of their test results. Thank you.

## 2021-08-23 ENCOUNTER — TELEPHONE (OUTPATIENT)
Dept: CARDIOLOGY | Facility: CLINIC | Age: 66
End: 2021-08-23

## 2021-08-23 RX ORDER — RANOLAZINE 500 MG/1
500 TABLET, EXTENDED RELEASE ORAL 2 TIMES DAILY
Qty: 180 TABLET | Refills: 3 | Status: SHIPPED | OUTPATIENT
Start: 2021-08-23

## 2021-08-23 NOTE — TELEPHONE ENCOUNTER
----- Message from Joni Kahn MD sent at 8/23/2021  9:53 AM EDT -----  Please inform the patient of their test results.  If patient still having chest pain, start Ranexa 500 mg twice daily.  Findings are essentially unchanged from his previous stress test and due to a 100% old blockage that cannot be opened easily.  Thank you.

## 2021-08-23 NOTE — TELEPHONE ENCOUNTER
Spoke with pt regarding stress test and Ranexa. He is amendable to see if Ranexa is affordable. If not, can we try Imdur 30 mg? Pt reports BP is WNL and he does have PRN nitro at home

## 2021-08-24 ENCOUNTER — TELEPHONE (OUTPATIENT)
Dept: CARDIOLOGY | Facility: CLINIC | Age: 66
End: 2021-08-24

## 2021-08-24 ENCOUNTER — LAB (OUTPATIENT)
Dept: LAB | Facility: HOSPITAL | Age: 66
End: 2021-08-24

## 2021-08-24 ENCOUNTER — OFFICE VISIT (OUTPATIENT)
Dept: PALLIATIVE CARE | Facility: CLINIC | Age: 66
End: 2021-08-24

## 2021-08-24 VITALS
WEIGHT: 194.9 LBS | DIASTOLIC BLOOD PRESSURE: 77 MMHG | BODY MASS INDEX: 28.77 KG/M2 | SYSTOLIC BLOOD PRESSURE: 136 MMHG | OXYGEN SATURATION: 96 % | HEART RATE: 74 BPM

## 2021-08-24 DIAGNOSIS — C79.51 PROSTATE CANCER METASTATIC TO BONE (HCC): Primary | ICD-10-CM

## 2021-08-24 DIAGNOSIS — F11.90 OPIOID USE DISORDER: ICD-10-CM

## 2021-08-24 DIAGNOSIS — Z51.81 THERAPEUTIC DRUG MONITORING: ICD-10-CM

## 2021-08-24 DIAGNOSIS — C61 PROSTATE CANCER METASTATIC TO BONE (HCC): Primary | ICD-10-CM

## 2021-08-24 DIAGNOSIS — G89.3 PAIN, CANCER: ICD-10-CM

## 2021-08-24 PROCEDURE — 99215 OFFICE O/P EST HI 40 MIN: CPT | Performed by: INTERNAL MEDICINE

## 2021-08-24 PROCEDURE — 80306 DRUG TEST PRSMV INSTRMNT: CPT

## 2021-08-24 RX ORDER — HYDROMORPHONE HYDROCHLORIDE 4 MG/1
4 TABLET ORAL EVERY 4 HOURS PRN
Qty: 42 TABLET | Refills: 0 | Status: SHIPPED | OUTPATIENT
Start: 2021-08-31 | End: 2021-09-07 | Stop reason: SDUPTHER

## 2021-08-24 RX ORDER — METHADONE HYDROCHLORIDE 10 MG/1
10 TABLET ORAL 3 TIMES DAILY
Qty: 21 TABLET | Refills: 0 | Status: SHIPPED | OUTPATIENT
Start: 2021-08-31 | End: 2021-09-07 | Stop reason: SDUPTHER

## 2021-08-24 RX ORDER — HYDROMORPHONE HYDROCHLORIDE 4 MG/1
4 TABLET ORAL EVERY 4 HOURS PRN
Qty: 42 TABLET | Refills: 0 | Status: SHIPPED | OUTPATIENT
Start: 2021-08-24 | End: 2021-08-31

## 2021-08-24 RX ORDER — METHADONE HYDROCHLORIDE 10 MG/1
10 TABLET ORAL 3 TIMES DAILY
Qty: 21 TABLET | Refills: 0 | Status: SHIPPED | OUTPATIENT
Start: 2021-08-24 | End: 2021-08-31

## 2021-08-24 NOTE — PROGRESS NOTES
Referring provider: No ref. provider found     08/24/2021    Subjective   Naveen SHAN Lugo is a 66 y.o. male with medical history of Stage IVb grade group 4 metastatic prostate adenocarcinoma dx'ed 9/15/2020. Pt has sclerotic T12, L1, left acetabular, and right medial acetabular osseous metastases. On Abiraterone / PredniSONE.  Presents to the palliative clinic for follow up of pain and symptom mangement.     [facility] on 7/28/2021.  Because of confusion dizziness MRI brain was ordered which showed microvascular disease patient also with chest pain during visit, referral placed to cardiology -stress test was normal.  Concerned that Zometa may be causing hypophosphatemia which may be causing symptoms this is on hold at this time.       Pain: MRI hip showing new lesions in hips and L spine -this is where his pain is predominantly located. Pain has been worsening since last appointment, he has now completed radiation treatments with Dr. Quintanilla. He has been taking methadone 10 mg in the morning and 2 additional doses of 5 mg spaced every 8 hours  as prescribed he also continues with Dilaudid 4 mg which he is taking an average of 6 times a day and pain remains uncontrolled in back and hips. It is reduced his functional status this past month, he is no longer able to work works as a .  Also struggles to do recreational activities with his grandson including fishing, has to take multiple breaks while working, and has not been fishing in the last couple weeks.     Opiate-induced constipation: Well-controlled at this time on current bowel regimen    The following portions of the patient's history were reviewed and updated as appropriate: allergies, current medications, past family history, past medical history, past social history, past surgical history and problem list.    ECOG: (1) Restricted in physically strenuous activity, ambulatory and able to do work of light nature    Palliative  Performance Scale Score: 70%          Objective   /77   Pulse 74   Wt 88.4 kg (194 lb 14.4 oz)   SpO2 96%   BMI 28.77 kg/m²   Physical Exam  Constitutional:       Comments: Chronically ill-appearing elderly male sitting up on exam table in no apparent distress   HENT:      Right Ear: External ear normal.      Left Ear: External ear normal.      Nose: Nose normal.      Mouth/Throat:      Mouth: Mucous membranes are dry.   Eyes:      Extraocular Movements: Extraocular movements intact.   Cardiovascular:      Rate and Rhythm: Normal rate.      Pulses: Normal pulses.   Pulmonary:      Effort: Pulmonary effort is normal. No respiratory distress.   Musculoskeletal:      Comments: Appears to be in discomfort when walking, transitioning from sitting to standing   Skin:     General: Skin is warm and dry.   Neurological:      General: No focal deficit present.      Mental Status: He is alert and oriented to person, place, and time.   Psychiatric:         Mood and Affect: Mood normal.         Behavior: Behavior normal.         Thought Content: Thought content normal.         Judgment: Judgment normal.         Medication Counts:  Reviewed. See RN note. Brought medication.  No overuse or misuse evident.  Dilaudid 4 mg last filled 8/17 #42 filled, count 2, ROSE MARIE:5.7 Methadone 8/17 #28, count 2  ROSE MARIE 3.7  AGUSTIN:  Reviewed.  No concerns.  Consistent with history.  Prescribers identified as members of care team.   UDS: Reviewed.       Assessment/Plan   1. Prostate cancer metastatic to bone (CMS/HCC)  Patient with worsening back pain, hip pain.  Seems consistent with known lesions from prostate cancer.  Received no relief from recent radiation therapy, prednisone course.  Limited other options for adjuvants.  Plan for PET scan as well as bone scan in the coming month.    Discussed options at length for treating pain, he did have some relief from methadone 10 mg in the morning, but has 5 mg all for other 2 doses throughout the  day.  Discussed that we will increase to 10 mg 3 times daily and perform an ECG in 2 weeks.  We will hopefully have more data/information about etiology of pain with upcoming scans.  Please note that I have greatly escalated his opiates almost double what they were previously for in the past 3 months.  Rather than further escalation, may need rotation given concern for hyperalgesia if organic cause not seen on scans/no progression.  May also benefit from NSAID trial, PT trial as well as following closely with talk therapy.  Would also be helpful if we can restart Zometa at some point.    - HYDROmorphone (DILAUDID) 4 MG tablet; Take 1 tablet by mouth Every 4 (Four) Hours As Needed for Severe Pain  for up to 7 days.  Dispense: 42 tablet; Refill: 0  - HYDROmorphone (DILAUDID) 4 MG tablet; Take 1 tablet by mouth Every 4 (Four) Hours As Needed for Severe Pain  for up to 7 days.  Dispense: 42 tablet; Refill: 0  - methadone (DOLOPHINE) 10 MG tablet; Take 1 tablet by mouth 3 (Three) Times a Day for 7 days,  Dispense: 21 tablet; Refill: 0  - methadone (DOLOPHINE) 10 MG tablet; Take 1 tablet by mouth 3 (Three) Times a Day for 7 days,  Dispense: 21 tablet; Refill: 0    2. Opioid use disorder (CMS/HCC)  Seems stable, see above    3. Pain, cancer  As above  - methadone (DOLOPHINE) 10 MG tablet; Take 1 tablet by mouth 3 (Three) Times a Day for 7 days,  Dispense: 21 tablet; Refill: 0  - methadone (DOLOPHINE) 10 MG tablet; Take 1 tablet by mouth 3 (Three) Times a Day for 7 days,  Dispense: 21 tablet; Refill: 0         I spent 45 minutes caring for Gordon on this date of service. This time includes time spent by me in the following activities: preparing for the visit, reviewing tests, obtaining and/or reviewing a separately obtained history, performing a medically appropriate examination and/or evaluation , counseling and educating the patient/family/caregiver, ordering medications, tests, or procedures, referring and communicating  with other health care professionals , documenting information in the medical record, independently interpreting results and communicating that information with the patient/family/caregiver and care coordination    Return in about 2 weeks (around 9/7/2021) for Office Visit.    Medical Complexity Decision Making:    High risk prescription medication requiring monitoring for adverse effects including PDMP review, UDT trend review, medication counts  Serious illness with >2 symptom burden addressed.

## 2021-08-25 ENCOUNTER — SPECIALTY PHARMACY (OUTPATIENT)
Dept: ONCOLOGY | Facility: HOSPITAL | Age: 66
End: 2021-08-25

## 2021-08-25 NOTE — PLAN OF CARE
Specialty Pharmacy Assessment    Additional notes: Pt is taking relugolix 120mg po daily for prostate cancer, goal of treatment is palliation. Pt is not experiencing any adverse reactions, and has not missed any doses. Pt is also taking abiraterone 1000mg po daily + prednisone 5mg po daily + receiving zoledronic acid injections. Discussed aforementioned material. Reminded patient of the pharmacist's contact information and instructions to call should additional questions arise. All questions and concerns addressed. Completed a medication reconciliation. Patient to continue on treatment with no disease progression or unacceptable toxicity at this time       Medication Adherence    Any gaps in refill history greater than 2 weeks in the last 3 months: no  Demonstrates understanding of importance of adherence: yes  Informant: patient  Reliability of informant: reliable  Estimated medication adherence level: %  Reasons for non-adherence: no problems identified  Adherence tools used: watch   Other adherence tools: Clock - takes at same time each day, 7:45am   Support network for adherence: family member       Drug Interactions    Drug interactions evaluated: yes  Clinically relevant drug interactions identified: no  Provided the patient with educational material regarding drug interactions: not applicable       Patient Counseling    Counseled the patient on the following: doses and administration discussed, safe handling, storage, and disposal discussed, possible adverse effects and management discussed, lab monitoring and follow-up discussed, therapeutic rationale discussed, cost of medications and cost implications discussed, adherence and missed doses discussed, pharmacy contact information discussed         Thank you,   Lisa Maxwell  PharmD Candidate 2022 8/25/21 @ 15:12

## 2021-08-26 ENCOUNTER — SPECIALTY PHARMACY (OUTPATIENT)
Dept: ONCOLOGY | Facility: HOSPITAL | Age: 66
End: 2021-08-26

## 2021-08-26 ENCOUNTER — TELEPHONE (OUTPATIENT)
Dept: ONCOLOGY | Facility: HOSPITAL | Age: 66
End: 2021-08-26

## 2021-08-26 NOTE — TELEPHONE ENCOUNTER
[facility] Refill Outreach    Called regarding refill of medication: Abiraterone 1000mg daily, Relugolix 120mg daily, and Prednisone 5mg daily.  Spoke with patient.    Assessment  • It looks like it is almost time for your refill, how many doses do you have left? 10   • How are you doing on the medication, are you experiencing any side effects? Patient denies side effects   • How many doses have you missed since you last filled your prescription? 0 doses missed.  • Have you stopped or started any medications since we last spoke? Patient has had no medication changes since last month.     Shipment   • We can go ahead and coordinate your next refill, would you like to pick this up at the pharmacy/curbside, or have this delivered to you vs. the clinic? Patient requested medication be mailed to home address.  o If government plan (Medicaid and Medicare B), delivery will require signature -> identify a day the patient will be home for delivery set up (note the package should arrive prior to noon as pharmacy will ship priority overnight)  • Patient was advised medication will be shipped via [employer] (standard overnight) on 8/30 with expected delivery on 8/31. Shipping comments patient is requesting of [employer] : None. .   • Shipping address confirmed: [address] [place] [id number]  • Patient is aware and willing to pay copay of $0.00.   • Do you have any questions for the pharmacist? No.  • Ensured patient has clinic contact information for questions.     Follow up: Plan to reach out to patient around 9/24 for refill.    Elena Morgan, Pharmacy Technician  8/26/2021  11:09 EDT

## 2021-09-02 ENCOUNTER — INFUSION (OUTPATIENT)
Dept: ONCOLOGY | Facility: HOSPITAL | Age: 66
End: 2021-09-02

## 2021-09-02 VITALS
RESPIRATION RATE: 18 BRPM | SYSTOLIC BLOOD PRESSURE: 125 MMHG | DIASTOLIC BLOOD PRESSURE: 70 MMHG | HEART RATE: 78 BPM | TEMPERATURE: 97 F | BODY MASS INDEX: 28.9 KG/M2 | WEIGHT: 195.8 LBS

## 2021-09-02 DIAGNOSIS — C79.51 PROSTATE CANCER METASTATIC TO BONE (HCC): ICD-10-CM

## 2021-09-02 DIAGNOSIS — C61 PROSTATE CANCER METASTATIC TO BONE (HCC): ICD-10-CM

## 2021-09-02 DIAGNOSIS — Z45.2 ENCOUNTER FOR CARE RELATED TO PORT-A-CATH: Primary | ICD-10-CM

## 2021-09-02 PROCEDURE — 25010000002 HEPARIN LOCK FLUSH PER 10 UNITS: Performed by: INTERNAL MEDICINE

## 2021-09-02 PROCEDURE — 36591 DRAW BLOOD OFF VENOUS DEVICE: CPT

## 2021-09-02 RX ORDER — HEPARIN SODIUM (PORCINE) LOCK FLUSH IV SOLN 100 UNIT/ML 100 UNIT/ML
500 SOLUTION INTRAVENOUS AS NEEDED
Status: DISCONTINUED | OUTPATIENT
Start: 2021-09-02 | End: 2021-09-02 | Stop reason: HOSPADM

## 2021-09-02 RX ORDER — HEPARIN SODIUM (PORCINE) LOCK FLUSH IV SOLN 100 UNIT/ML 100 UNIT/ML
500 SOLUTION INTRAVENOUS AS NEEDED
Status: CANCELLED | OUTPATIENT
Start: 2021-09-02

## 2021-09-02 RX ADMIN — HEPARIN 500 UNITS: 100 SYRINGE at 09:55

## 2021-09-03 DIAGNOSIS — Z51.81 THERAPEUTIC DRUG MONITORING: Primary | ICD-10-CM

## 2021-09-03 LAB
ALBUMIN SERPL-MCNC: 4.1 G/DL (ref 3.8–4.8)
ALBUMIN/GLOB SERPL: 1.9 {RATIO} (ref 1.2–2.2)
ALP SERPL-CCNC: 77 IU/L (ref 48–121)
ALT SERPL-CCNC: 77 IU/L (ref 0–44)
AST SERPL-CCNC: 42 IU/L (ref 0–40)
BASOPHILS # BLD AUTO: 0 X10E3/UL (ref 0–0.2)
BASOPHILS NFR BLD AUTO: 0 %
BILIRUB SERPL-MCNC: 0.5 MG/DL (ref 0–1.2)
BUN SERPL-MCNC: 9 MG/DL (ref 8–27)
BUN/CREAT SERPL: 11 (ref 10–24)
CALCIUM SERPL-MCNC: 9.1 MG/DL (ref 8.6–10.2)
CHLORIDE SERPL-SCNC: 103 MMOL/L (ref 96–106)
CO2 SERPL-SCNC: 24 MMOL/L (ref 20–29)
CREAT SERPL-MCNC: 0.8 MG/DL (ref 0.76–1.27)
EOSINOPHIL # BLD AUTO: 0.1 X10E3/UL (ref 0–0.4)
EOSINOPHIL NFR BLD AUTO: 1 %
ERYTHROCYTE [DISTWIDTH] IN BLOOD BY AUTOMATED COUNT: 14.2 % (ref 11.6–15.4)
GLOBULIN SER CALC-MCNC: 2.2 G/DL (ref 1.5–4.5)
GLUCOSE SERPL-MCNC: 103 MG/DL (ref 65–99)
HCT VFR BLD AUTO: 42.3 % (ref 37.5–51)
HGB BLD-MCNC: 14.3 G/DL (ref 13–17.7)
IMM GRANULOCYTES # BLD AUTO: 0 X10E3/UL (ref 0–0.1)
IMM GRANULOCYTES NFR BLD AUTO: 0 %
LYMPHOCYTES # BLD AUTO: 0.6 X10E3/UL (ref 0.7–3.1)
LYMPHOCYTES NFR BLD AUTO: 14 %
MCH RBC QN AUTO: 30.2 PG (ref 26.6–33)
MCHC RBC AUTO-ENTMCNC: 33.8 G/DL (ref 31.5–35.7)
MCV RBC AUTO: 89 FL (ref 79–97)
MONOCYTES # BLD AUTO: 0.3 X10E3/UL (ref 0.1–0.9)
MONOCYTES NFR BLD AUTO: 7 %
NEUTROPHILS # BLD AUTO: 3.7 X10E3/UL (ref 1.4–7)
NEUTROPHILS NFR BLD AUTO: 78 %
PHOSPHATE SERPL-MCNC: 2.5 MG/DL (ref 2.8–4.1)
PLATELET # BLD AUTO: 105 X10E3/UL (ref 150–450)
POTASSIUM SERPL-SCNC: 3.8 MMOL/L (ref 3.5–5.2)
PROT SERPL-MCNC: 6.3 G/DL (ref 6–8.5)
PSA SERPL-MCNC: <0.1 NG/ML (ref 0–4)
RBC # BLD AUTO: 4.74 X10E6/UL (ref 4.14–5.8)
SODIUM SERPL-SCNC: 141 MMOL/L (ref 134–144)
WBC # BLD AUTO: 4.8 X10E3/UL (ref 3.4–10.8)

## 2021-09-07 ENCOUNTER — OFFICE VISIT (OUTPATIENT)
Dept: PALLIATIVE CARE | Facility: CLINIC | Age: 66
End: 2021-09-07

## 2021-09-07 VITALS
HEART RATE: 86 BPM | OXYGEN SATURATION: 96 % | DIASTOLIC BLOOD PRESSURE: 73 MMHG | BODY MASS INDEX: 28.49 KG/M2 | SYSTOLIC BLOOD PRESSURE: 134 MMHG | WEIGHT: 193 LBS

## 2021-09-07 DIAGNOSIS — F32.A DEPRESSION, UNSPECIFIED DEPRESSION TYPE: ICD-10-CM

## 2021-09-07 DIAGNOSIS — C79.51 PROSTATE CANCER METASTATIC TO BONE (HCC): Primary | ICD-10-CM

## 2021-09-07 DIAGNOSIS — Z51.81 THERAPEUTIC DRUG MONITORING: ICD-10-CM

## 2021-09-07 DIAGNOSIS — C61 PROSTATE CANCER METASTATIC TO BONE (HCC): Primary | ICD-10-CM

## 2021-09-07 DIAGNOSIS — G89.3 PAIN, CANCER: ICD-10-CM

## 2021-09-07 DIAGNOSIS — C61 PROSTATE CANCER METASTATIC TO BONE (HCC): ICD-10-CM

## 2021-09-07 DIAGNOSIS — C79.51 PROSTATE CANCER METASTATIC TO BONE (HCC): ICD-10-CM

## 2021-09-07 PROCEDURE — 99215 OFFICE O/P EST HI 40 MIN: CPT | Performed by: PHYSICIAN ASSISTANT

## 2021-09-07 RX ORDER — SENNOSIDES 8.6 MG
8.6 CAPSULE ORAL 3 TIMES DAILY
Qty: 84 EACH | Refills: 0 | Status: SHIPPED | OUTPATIENT
Start: 2021-09-07 | End: 2021-10-21 | Stop reason: SDUPTHER

## 2021-09-07 RX ORDER — IBUPROFEN 600 MG/1
600 TABLET ORAL EVERY 6 HOURS PRN
Qty: 56 TABLET | Refills: 0 | Status: SHIPPED | OUTPATIENT
Start: 2021-09-07 | End: 2021-09-21

## 2021-09-07 NOTE — PROGRESS NOTES
Palliative Clinic Note      Name: Naveen Lugo  Age: 66 y.o.  Sex: male  : 1955  MRN: [id number]  Date of Service: 21  Referring Physician: Dr. Rashid    Subjective:    Chief Complaint: Left-sided back/hip pain    History of Present Illness: Naveen Lugo is a 66 y.o. male with past medical history significant for coronary artery disease, hypertension, hyperlipidemia, opioid use disorder, and metastatic prostate cancer who presents to the palliative clinic today to follow up for pain and symptom management.     Treatment summary: Diagnosed with prostate adenocarcinoma with bone metastases on 9/15/20. Currently on Abiraterone / Prednisone. Recently completed radiation with Dr. Quintanilla and follow-up and scans scheduled for 2021.     Symptoms: Patient complains of left-sided back and hip pain that radiates down into his left knee.  He describes it as a sharp or burning sensation pain.  It is constant and he rates it an 8-10 /10.  He states the pain started shortly after radiation to the area 2 weeks ago.  The increased him to methadone 10 mg 3 times daily.  However he has not noticed any improvement in his pain.  Patient is also taking the hydromorphone 4 mg every 4 hours around-the-clock.  He admits to worsening depression in addition to the pain.  He has had a good appetite and denies nausea, vomiting, diarrhea or constipation.    Pyschosocial: Endorses strong family support.      Goals: Focused on improving pain so he can participate in recreational activities with his grandson.    The following portions of the patient's history were reviewed and updated as appropriate: allergies, current medications, past family history, past medical history, past social history, past surgical history and problem list.    Decisional capacity: Full  ECOG: (2) Ambulatory and capable of self care, unable to carry out work activity, up and about > 50% or waking hours   Palliative Performance Scale Score: 70%      Objective:    /73   Pulse 86   Wt 87.5 kg (193 lb)   SpO2 96%   BMI 28.49 kg/m²     Constitutional: Awake, alert, chronically ill-appearing, slightly restless sitting in chair  Eyes: PERRLA, EOMS intact  HENT: NCAT, face symmetric  Neck: Supple, trachea midline  Respiratory: Bilateral expiratory wheezing, nonlabored respirations  Cardiovascular: RRR, no murmurs appreciated  Gastrointestinal: Positive bowel sounds, soft, nontender, no guarding  Musculoskeletal: No bilateral ankle edema, moves all extremities   Psychiatric: Appropriate affect, cooperative  Neurologic: Oriented x 3, Cranial Nerves grossly intact to confrontation, speech clear  Skin: Cool dry, no rashes or wounds appreciated     Medication Counts: Reviewed. See bottom of note for details. Brought medication.  No overuse or misuse evident.  AGUSTIN:  #[id number]. Reviewed. All providers on care team.  UDS: Last 8/24/21. Reviewed. Appropriate.  ECG: Ordered today.    Assessment & Plan:    1. Prostate cancer metastatic to bone (CMS/HCC)  -Currently on Abiraterone / Prednisone. Follows closely with Dr. Rashid. Recently completed radiation with Dr. Quintanilla and follow-up and scans scheduled for 9/22/2021.     2. Pain, cancer  -Patient complaining of 8/10 left-sided back/hip pain.  Good kidney function on most recent labs.  Will try adding ibuprofen 600 mg every 6 hours as needed. Continue methadone 10 mg 3 times daily and hydromorphone 4 mg every 4 hours as needed.  Waiting on upcoming scans for more information.    3. Therapeutic drug monitoring  -Instructed patient to obtain an ECG at his earliest convenience due to recent methadone increase. Last ECG 8/1/2021 with QTc of 457.    4. Depression, unspecified depression type  -This is likely contributing to worsening pain.  Patient declines a referral to behavioral health and does not want to start any new medications at this time.  He denies suicidal ideation.  Will continue to follow  closely.    Code status: Full code  Medical interventions: Full  Advanced directives: Will continue to discuss    Return in about 2 weeks (around 9/21/2021).    I spent 45 minutes caring for Reji on this date of service. This time includes time spent by me in the following activities: preparing for the visit, reviewing tests, obtaining and/or reviewing a separately obtained history, performing a medically appropriate examination and/or evaluation , counseling and educating the patient/family/caregiver, ordering medications, tests, or procedures, documenting information in the medical record, independently interpreting results and communicating that information with the patient/family/caregiver and care coordination    Anna Ayers PA-C  09/07/2021    Medication Filled # Filled Count Used  # days ROSE MARIE   Dilaudid  9/1/21 42 3 39 6 6-7   Methadone  9/1/21 21 2 19 6 3-4

## 2021-09-07 NOTE — PATIENT INSTRUCTIONS
1. Continue dilaudid 4 mg ever 4 hours as needed  2. Continue methadone 10 mg three times daily   3. Start Ibuprofen 600 mg every 6 hours as needed  4. Follow up in 2 weeks.  5. Call the Palliative clinic for any questions or concerns

## 2021-09-08 ENCOUNTER — INFUSION (OUTPATIENT)
Dept: ONCOLOGY | Facility: HOSPITAL | Age: 66
End: 2021-09-08

## 2021-09-08 ENCOUNTER — OFFICE VISIT (OUTPATIENT)
Dept: ONCOLOGY | Facility: CLINIC | Age: 66
End: 2021-09-08

## 2021-09-08 VITALS
RESPIRATION RATE: 20 BRPM | BODY MASS INDEX: 28.73 KG/M2 | OXYGEN SATURATION: 95 % | HEART RATE: 84 BPM | HEIGHT: 69 IN | TEMPERATURE: 97.8 F | DIASTOLIC BLOOD PRESSURE: 59 MMHG | SYSTOLIC BLOOD PRESSURE: 103 MMHG | WEIGHT: 194 LBS

## 2021-09-08 DIAGNOSIS — C79.51 PROSTATE CANCER METASTATIC TO BONE (HCC): Primary | ICD-10-CM

## 2021-09-08 DIAGNOSIS — C61 PROSTATE CANCER METASTATIC TO BONE (HCC): Primary | ICD-10-CM

## 2021-09-08 DIAGNOSIS — Z45.2 ENCOUNTER FOR CARE RELATED TO PORT-A-CATH: ICD-10-CM

## 2021-09-08 PROCEDURE — 25010000002 ZOLEDRONIC ACID PER 1 MG: Performed by: NURSE PRACTITIONER

## 2021-09-08 PROCEDURE — 96374 THER/PROPH/DIAG INJ IV PUSH: CPT

## 2021-09-08 PROCEDURE — 25010000002 HEPARIN LOCK FLUSH PER 10 UNITS: Performed by: INTERNAL MEDICINE

## 2021-09-08 PROCEDURE — 96365 THER/PROPH/DIAG IV INF INIT: CPT

## 2021-09-08 PROCEDURE — 99214 OFFICE O/P EST MOD 30 MIN: CPT | Performed by: NURSE PRACTITIONER

## 2021-09-08 RX ORDER — HEPARIN SODIUM (PORCINE) LOCK FLUSH IV SOLN 100 UNIT/ML 100 UNIT/ML
500 SOLUTION INTRAVENOUS AS NEEDED
Status: CANCELLED | OUTPATIENT
Start: 2021-09-08

## 2021-09-08 RX ORDER — SODIUM CHLORIDE 9 MG/ML
250 INJECTION, SOLUTION INTRAVENOUS ONCE
Status: CANCELLED | OUTPATIENT
Start: 2021-09-08

## 2021-09-08 RX ORDER — HEPARIN SODIUM (PORCINE) LOCK FLUSH IV SOLN 100 UNIT/ML 100 UNIT/ML
500 SOLUTION INTRAVENOUS AS NEEDED
Status: DISCONTINUED | OUTPATIENT
Start: 2021-09-08 | End: 2021-09-08 | Stop reason: HOSPADM

## 2021-09-08 RX ORDER — HYDROMORPHONE HYDROCHLORIDE 4 MG/1
4 TABLET ORAL EVERY 4 HOURS PRN
Qty: 84 TABLET | Refills: 0 | Status: SHIPPED | OUTPATIENT
Start: 2021-09-08 | End: 2021-09-21 | Stop reason: SDUPTHER

## 2021-09-08 RX ORDER — SODIUM CHLORIDE 9 MG/ML
250 INJECTION, SOLUTION INTRAVENOUS ONCE
Status: COMPLETED | OUTPATIENT
Start: 2021-09-08 | End: 2021-09-08

## 2021-09-08 RX ORDER — METHADONE HYDROCHLORIDE 10 MG/1
10 TABLET ORAL 3 TIMES DAILY
Qty: 42 TABLET | Refills: 0 | Status: SHIPPED | OUTPATIENT
Start: 2021-09-08 | End: 2021-09-21 | Stop reason: SDUPTHER

## 2021-09-08 RX ADMIN — HEPARIN 500 UNITS: 100 SYRINGE at 12:31

## 2021-09-08 RX ADMIN — SODIUM CHLORIDE 250 ML: 9 INJECTION, SOLUTION INTRAVENOUS at 12:16

## 2021-09-08 RX ADMIN — ZOLEDRONIC ACID 4 MG: 4 INJECTION INTRAVENOUS at 12:16

## 2021-09-08 NOTE — PROGRESS NOTES
CHIEF COMPLAINT:  1.  Metastatic prostate cancer  2.  Cancer related pain    Problem List:  Oncology/Hematology History Overview Note   1.  Stage IVb grade group 4 metastatic prostate cancer with sclerotic bone lesions on CT and bone scan and history of prostatic hypertrophy  2.  Kidney stone  3.  Polyps  4.  Elevated liver enzymes    -9/30/2020 office note from Dr. Ronen Reynaga indicates patient with PSA 10.8.  Underwent TRUS prostate biopsy 9/15/2020.  Adenocarcinoma the prostate Sarika 4+4 = 8 in 1 out of 12 cores and 4+3 = 7 and 10 out of 12 cores and 3+4 = 7 in 1 out of 12 cores.  Perineural invasion.  Extraprostatic extension into the fat seen on 2 biopsies of the right lateral mid and right apex.  9/24/2020 CT chest and whole-body bone scan showed T12, L1, left acetabular, right medial acetabular metastases with no lung metastases.  He started androgen deprivation therapy with Casodex with plans for Lupron to start in 1 to 2 weeks for clinical T3 N0 M1 metastatic prostate cancer.  Review of official report from [facility] 9/24/2020 bone scan mentions T12, L1, roof of left acetabulum and medial right acetabular osteoblastic metastases.  CT chest with contrast that same day showed mild splenomegaly 14.2 cm with stable periaortic nodes compared to CT December 2015 with no lung metastases.  Sclerotic abnormality within the T12 vertebral body, L1 vertebral body extending into the pedicle unchanged.  8/28/2020 CT abdomen pelvis report from [facility] reviewed and mentions normal spleen size.  Punctate nonobstructing kidney stone, no paulino enlargement, diffuse bladder wall thickening with mild perivesicular fat stranding, 2.1 cm sclerotic left iliac bone above the left acetabulum new compared to 2015 as well as 1.5 cm faintly sclerotic focus in the right posterior acetabulum stable compared to prior CT 2015 as well as a 1.6 cm T12 and inferior vertebral body sclerotic lesion and a 1.9 cm left  posterior lateral L1 vertebral body abnormality extending to the pedicle.  -10/13/2020 initial [facility], [place], operated by [facility] medical oncology consultation: With 1 [place] score 8, 4+4 lesion that would make him a grade group 4 with stage IVb disease given radiographic evidence of sclerotic bone metastasis on bone scan and CT.  Needs genetic testing given metastatic prostate cancer and this is also important as, were he to have mismatch repair mutation then we would have options for PDL 1 inhibitors and were he BRCA mutated would have options for PARP inhibitors in the future.  Systemic therapy for castration naïve prostate cancer or N1 disease should be with apalutamide or Abiraterone or enzalutamide all of which are category 1.  He does not have any visceral metastases.  According to his imaging he has T12, L1, left acetabular, right acetabular, and left iliac bony involvement.  That means he would have 4 or more bone metastases with at least one metastasis (i.e. the acetabular lesions bilaterally) beyond the pelvis/vertebral, for which this would be considered high-volume disease for which 6 cycles of docetaxel 75 mg/m² x 6 cycles followed by Zytiga and prednisone would be reasonable along with Zometa every 6 weeks for bone stabilization.  He will do chemo preparation visit with my nurse practitioner prior to start chemotherapy with Taxotere and Zometa in 2 weeks and he is already received Casodex in preparation for Lupron shot he received on 10/12/2020 with Dr. Reynaga.  We will get him to Dr. Alexander Gomez who has done his polypectomies in the past for port placement and we will get genetic counseling started.  Following the 6 courses of Taxotere per the Chaarted trial criteria, I would then give him an indefinite Zytiga and prednisone and Zometa until progression or toxicity dictates.    -12/16/2019 COVID-19 antigen test negative    -12/29/2020 PSA 0.275 with absolute neutrophil count 700 and creatinine 0.76 with normal liver  enzymes and electrolytes.  Normal magnesium and phosphorus and urine drug screen positive for buprenorphine and opiates follow-up with palliative care.    -1/4/2021 CT chest abdomen pelvis with contrast and whole-body bone scan shows improving less metabolically active bone metastases.  Stable sclerotic T12, L1, and L2 vertebral bodies and bilateral pelvic bones on bone windows with stable subcentimeter para-aortic lymph nodes and no definite progression in the chest abdomen or pelvis.    -1/5/2021 [facility] medical oncology follow-up visit: I reviewed the images and reports thereof of the above CT and bone scan which shows good response to Taxotere x3 courses with a PSA down to 0.275 from a baseline of 10.  Get another 3 courses of Taxotere, continue his Xgeva, and then following that we will switch to Zytiga and prednisone.  He is working with palliative care on his bone pain but on his current dose of fentanyl patch he says his pain is still not adequately controlled he will contact them.  Dexamethasone prescription was refilled.  We will see my nurse practitioner back in 3 weeks for course #5 of 6 total courses and then we will reimage with CT chest abdomen pelvis and bone scan before going to the Zytiga prednisone.    -3/4/2021 CT chest abdomen pelvis with contrast is negative save for stable sclerotic bone lesions and the bone scan shows near resolution of prior bony abnormalities associated with the known sclerotic lesions in the thoracolumbar spine and bony pelvis.    2/17/2021 PSA down to 0.1 from 1.37 October 2020.  3/9/2021 PSA 0.1    -5/26/2021 CT chest abdomen pelvis with contrast shows stable to slightly underlying increase low-density left para-aortic node.  Bone lesions stable.  Whole-body bone scan stable to decrease activity of known thoracolumbar and bony pelvic involvement.  PSA less than 0.1  , AST 74, bilirubin 0.5.       -6/1/2021 [facility] medical oncology follow-up visit: I reviewed the  above data with him and images thereof.  Bones are stable.  No significant adenopathy.  PSA immeasurably low.     upper limit of normal 69 and AST 74 upper limit of normal 46.  Hence, neither is more than double the upper limit of normal with no ascites and no encephalopathy and normal albumin and bilirubin, despite the elevation of liver enzymes, he has child Abrams score A.  Package insert for Abiraterone says that for ALT and/or AST greater than 5 times upper limit of normal or total bilirubin greater than 3 times the upper limit of normal to withhold treatment until liver function tests returned to baseline or ALT and AST less than or equal to 2.5 times the upper limit of normal and total bilirubin less than or equal to 1.5 times the upper limit of normal and then reinitiate at 750 mg daily dose of Zytiga.  For recurrent hepatotoxicity on 750 mg daily Zytiga, withhold treatment until liver functions returned to baseline or ALT and AST less than 3-2.5 times upper limit of normal and total bilirubin less than or equal to 1.5 times the upper limit of normal then reinitiate at 500 mg daily Zytiga dose.  If has recurrent hepatotoxicity on 500 mg dose, then discontinue treatment.  If has ALT greater than 3 times the upper limit of normal along with total bilirubin greater than 2 times the upper limit of normal in the absence of other contributing causes or biliary obstruction, permanently discontinue Zytiga.  Based on these recommendations, I would continue current doses but we will watch with serial labs monthly and repeat his imaging again in 3 months but clinically he is doing wonderfully with excellent response to Taxotere and now on Zytiga prednisone plus Zometa along with Relugolix.    -6/23/2020 for [facility] oncology clinic follow-up: Having persistent and worsening pain above his left hip.  I will get an MRI of the left hip for further evaluation.  Otherwise we will continue therapy unchanged with Zytiga,  prednisone and Zometa along with Relugolix.    -7/28/2021 [facility] oncology clinic follow-up: Patient with multiple somatic complaints including episodes of confusion, dizziness, decreased memory, agitation.  He reports ongoing episodes with difficulty swallowing.  Also most concerning for episodes of angina and chest pain for which he basically refused to seek emergency medical attention.  He has a history of coronary artery disease and stent placement.  He has not followed up with cardiology for many years.  I will get an MRI of the brain in light of his confusion, dizziness and agitation.  I will get him to gastroenterology for EGD in light of his dysphagia.  I have also put in a referral for cardiology.  I reemphasized to the patient, his wife who is with him today and his son who was on the telephone during our visit the importance of seeking out prompt medical attention when he is having chest pain or neurological concerns so that he can be evaluated.  The patient states that he basically refuses to go to the emergency room and if his family calls an ambulance he would not agree to go to the hospital.  For the time being, I have asked him to hold his Zytiga and prednisone until we can sort this out as Zytiga does have some cardiovascular risk.  There is likely no treatment for prostate cancer that does not carry some form of cardiovascular risk.  His PSA remains low at 0.014 yesterday.  He will continue relugolix for now.    Of note, some of these symptoms could also be due to his hypophosphatemia, phosphate level from yesterday was 1.5, I have sent in a prescription for K-Phos 3 times a day before meals for 10 days.  We will hold further Zometa until this is corrected.  I have also put in an order to check his vitamin D level.  He takes calcium and vitamin D daily.  Some of his symptoms also could be due to drug withdrawal as there was some confusion and difficulty getting his last prescription for methadone  "therefore he was without methadone for several days, he now has his prescription and is back on his pain medication.  He has an appointment with neurosurgery tomorrow in light of his ongoing back pain, MRI of the hip recently showed multiple bony lesions largest at the L5 vertebral body and left supra-acetabular region.  If no neurosurgical intervention recommended he may benefit from spot radiation as this is where a lot of his pain is coming from.  His wife had questions today regarding his staging and extent of disease.  We reviewed previous scans.  I did clarify with her as she used the words \"cancer free\" as she thought that is what she was told after his CT scans once he completed Taxotere in February.  I explained to her that even though his scans showed he had an excellent response with no clear areas of metastasis that did not mean he was cancer free, I explained to her that when you have metastatic cancer the treatment intent is palliative in nature and to control the disease but not to cure the disease.  She stated understanding.  We will see him back in 2 weeks for follow-up to sort through this and make further plan of care, again, I have asked him to hold Zytiga and prednisone until he sees us back, he will continue on his other medications including relugolix.    -7/30/2021 neurosurgery PA consultation.  MRI with and without contrast L5 ordered.    -8/3/2021 neurosurgery follow-up showed known sclerotic disease with new L5 abnormality without compression deformity or instability.  MRI brain negative for metastasis.    -8/4/2021 office visit Dr. Fco Quintanilla radiation oncology coordinating with Dr. Can forde for palliative radiation for MRI evidence of L5 and left supra acetabular painful lesions.  States that the patient's disease which is not secreting PSA is experiencing some progression and would benefit from 20 Gy in five fractions and other lesion 30 Gy in ten fractions. Patient offered " to go to [place] [facility] for radiation but desired treatment in [place].    -8/10/2021 [facility] medical oncology follow-up visit: No chest pains at this junction.  Reviewed MRI brain and other MRIs reviewed by Dr. North and Dwight.  Due for EGD on 19th.  We will repeat his phosphorus along with his other labs continue Relugolix, Zytiga, prednisone, and he will continue radiation palliatively to the painful bony lesions with Dr. Quintanilla/Can.  He will see my nurse practitioner back in a few weeks to see how he is tolerating this and to follow-up on the phosphorus off of Zometa and she will order repeat CT chest abdomen pelvis with contrast and total body bone scan the end of September.I will see him back after that.    -9/8/2021 Zometa resumed.  PSA <0.10     Prostate cancer metastatic to bone (CMS/HCC)   8/30/2020 -  Other Event    PSA 10.8     9/15/2020 Initial Diagnosis    Prostate cancer metastatic to bone (CMS/HCC)     9/15/2020 Biopsy    Prostate needle core biopsy     9/24/2020 Imaging    Total body bone scan: 4 abnormal areas of increased activity consistent with osteoblastic metastasis, abnormal foci of activity seen in the T12 vertebral body, L1 vertebral body, roof of the left acetabulum, and acetabulum medially on the right.  CT chest: Stable sclerotic metastatic lesions within the T12 and L1 vertebrae.  No other evidence of metastatic disease to the chest.  Stable mild splenomegaly and subcentimeter periaortic retroperitoneal lymph nodes.  CT abdomen and pelvis: Diffuse bladder wall thickening with mild perivesicular fat stranding.  Interval development of several sclerotic lesions in the spine and left iliac bone.     10/13/2020 Cancer Staged    Staging form: Bone - Appendicular Skeleton, Trunk, Skull, And Facial Bones, AJCC 8th Edition  - Clinical: Stage IVB (cT3, cN0, cM1b, G3) - Signed by Ishmael Rashid MD on 10/13/2020     11/3/2020 -  Chemotherapy    OP SUPPORTIVE Zoledronic Acid Q42d     11/3/2020 -  2/18/2021 Chemotherapy    OP PROSTATE DOCEtaxel       1/4/2021 Imaging    CT chest abdomen pelvis with contrast and whole-body bone scan shows improving less metabolically active bone metastases.  Stable sclerotic T12, L1, and L2 vertebral bodies and bilateral pelvic bones on bone windows with stable subcentimeter para-aortic lymph nodes and no definite progression in the chest abdomen or pelvis.  PSA down to 0.275 after 3 courses of Taxotere.     3/4/2021 Imaging    CT chest, abdomen and pelvis stable, no evidence of disease progression. Total body bone scan with decreased/near resolution of abnormal increased radiotracer uptake associated with the known sclerotic lesions in the thoracolumbar spine and bony pelvis.  No evidence of new osteoblastic metastases.     3/4/2021 Imaging    CT chest abdomen pelvis with contrast is negative save for stable sclerotic bone lesions and the bone scan shows near resolution of prior bony abnormalities associated with the known sclerotic lesions in the thoracolumbar spine and bony pelvis.  2/17/2021 PSA down to 0.1 from 1.37 October 2020.     3/9/2021 - 3/9/2021 Chemotherapy    OP SUPPORTIVE PROSTATE Relugolix     3/9/2021 -  Chemotherapy    OP PROSTATE Abiraterone / PredniSONE       3/10/2021 -  Chemotherapy    OP PROSTATE Abiraterone / PredniSONE     8/10/2021 - 8/16/2021 Radiation    Radiation OncologyTreatment Course:  Naveen Lugo received 2000 cGy in 5 fractions to L4-sacrum, left acetabulum and right pelvis via External Beam Radiation - EBRT.         HISTORY OF PRESENT ILLNESS:  The patient is a 66 y.o. male, here for follow up on management of metastatic prostate cancer.  No further episodes of chest pain.  Continues to have left sided back/hip pain.  Completed radiation 8/16/2021.  Follows with palliative for pain/symptom management.      Past Medical History:   Diagnosis Date   • Bone cancer (CMS/HCC)    • Nephrolithiasis    • Opioid use disorder, mild, on maintenance  "therapy (CMS/[facility])    • Prostate cancer (CMS/[facility])      Past Surgical History:   Procedure Laterality Date   • CHOLECYSTECTOMY     • CORONARY STENT PLACEMENT  206 & 2011   • HERNIA REPAIR  1986   • THORACIC DISCECTOMY  1994       Allergies   Allergen Reactions   • Cymbalta [Duloxetine Hcl] Dizziness   • Gabapentin Nausea Only   • Lyrica [Pregabalin] Nausea And Vomiting and Dizziness       Family History and Social History reviewed and changed as necessary    REVIEW OF SYSTEM:   Left back/hip pain, no new somatic complaints    PHYSICAL EXAM:  Respiratory:  Lungs clear.    Cardiac:  Heart regular rate and rhythm.    Vitals:    09/08/21 1126   BP: 103/59   Pulse: 84   Resp: 20   Temp: 97.8 °F (36.6 °C)   SpO2: 95%   Weight: 88 kg (194 lb)   Height: 175.3 cm (69\")     Vitals:    09/08/21 1126   PainSc:   8   PainLoc: Leg  Comment: left hip and leg          ECOG score: 1     Karnofsky score: 90     Vitals reviewed.  Labs reviewed.    Recent Results (from the past 336 hour(s))   Phosphorus    Collection Time: 09/02/21 12:00 AM    Specimen: Blood    BLOOD  RELEASE TO HIREN   Result Value Ref Range    Phosphorus 2.5 (L) 2.8 - 4.1 mg/dL   PSA DIAGNOSTIC    Collection Time: 09/02/21 12:00 AM    Specimen: Blood    BLOOD  RELEASE TO HIREN   Result Value Ref Range    PSA <0.1 0.0 - 4.0 ng/mL   CBC and Differential    Collection Time: 09/02/21 12:00 AM    Specimen: Blood    BLOOD  RELEASE TO HIREN   Result Value Ref Range    WBC 4.8 3.4 - 10.8 x10E3/uL    RBC 4.74 4.14 - 5.80 x10E6/uL    Hemoglobin 14.3 13.0 - 17.7 g/dL    Hematocrit 42.3 37.5 - 51.0 %    MCV 89 79 - 97 fL    MCH 30.2 26.6 - 33.0 pg    MCHC 33.8 31.5 - 35.7 g/dL    RDW 14.2 11.6 - 15.4 %    Platelets 105 (L) 150 - 450 x10E3/uL    Neutrophil Rel % 78 Not Estab. %    Lymphocyte Rel % 14 Not Estab. %    Monocyte Rel % 7 Not Estab. %    Eosinophil Rel % 1 Not Estab. %    Basophil Rel % 0 Not Estab. %    Neutrophils Absolute 3.7 1.4 - 7.0 x10E3/uL    Lymphocytes Absolute " 0.6 (L) 0.7 - 3.1 x10E3/uL    Monocytes Absolute 0.3 0.1 - 0.9 x10E3/uL    Eosinophils Absolute 0.1 0.0 - 0.4 x10E3/uL    Basophils Absolute 0.0 0.0 - 0.2 x10E3/uL    Immature Granulocyte Rel % 0 Not Estab. %    Immature Grans Absolute 0.0 0.0 - 0.1 x10E3/uL   Comprehensive metabolic panel    Collection Time: 09/02/21 12:00 AM    Specimen: Blood    BLOOD  RELEASE TO HIREN   Result Value Ref Range    Glucose 103 (H) 65 - 99 mg/dL    BUN 9 8 - 27 mg/dL    Creatinine 0.80 0.76 - 1.27 mg/dL    eGFR Non African Am 93 >59 mL/min/1.73    eGFR African Am 108 >59 mL/min/1.73    BUN/Creatinine Ratio 11 10 - 24    Sodium 141 134 - 144 mmol/L    Potassium 3.8 3.5 - 5.2 mmol/L    Chloride 103 96 - 106 mmol/L    Total CO2 24 20 - 29 mmol/L    Calcium 9.1 8.6 - 10.2 mg/dL    Total Protein 6.3 6.0 - 8.5 g/dL    Albumin 4.1 3.8 - 4.8 g/dL    Globulin 2.2 1.5 - 4.5 g/dL    A/G Ratio 1.9 1.2 - 2.2    Total Bilirubin 0.5 0.0 - 1.2 mg/dL    Alkaline Phosphatase 77 48 - 121 IU/L    AST (SGOT) 42 (H) 0 - 40 IU/L    ALT (SGPT) 77 (H) 0 - 44 IU/L           ASSESSMENT & PLAN:  1.  Stage IVb grade group 4 metastatic prostate cancer with sclerotic bone lesions on CT and bone scan and history of prostatic hypertrophy  2.  Kidney stone  3.  Polyps  4.  Elevated liver enzymes    -9/30/2020 office note from Dr. Ronen Reynaga indicates patient with PSA 10.8.  Underwent TRUS prostate biopsy 9/15/2020.  Adenocarcinoma the prostate Sarika 4+4 = 8 in 1 out of 12 cores and 4+3 = 7 and 10 out of 12 cores and 3+4 = 7 in 1 out of 12 cores.  Perineural invasion.  Extraprostatic extension into the fat seen on 2 biopsies of the right lateral mid and right apex.  9/24/2020 CT chest and whole-body bone scan showed T12, L1, left acetabular, right medial acetabular metastases with no lung metastases.  He started androgen deprivation therapy with Casodex with plans for Lupron to start in 1 to 2 weeks for clinical T3 N0 M1 metastatic prostate cancer.  Review of  official report from [facility] 9/24/2020 bone scan mentions T12, L1, roof of left acetabulum and medial right acetabular osteoblastic metastases.  CT chest with contrast that same day showed mild splenomegaly 14.2 cm with stable periaortic nodes compared to CT December 2015 with no lung metastases.  Sclerotic abnormality within the T12 vertebral body, L1 vertebral body extending into the pedicle unchanged.  8/28/2020 CT abdomen pelvis report from [facility] reviewed and mentions normal spleen size.  Punctate nonobstructing kidney stone, no paulino enlargement, diffuse bladder wall thickening with mild perivesicular fat stranding, 2.1 cm sclerotic left iliac bone above the left acetabulum new compared to 2015 as well as 1.5 cm faintly sclerotic focus in the right posterior acetabulum stable compared to prior CT 2015 as well as a 1.6 cm T12 and inferior vertebral body sclerotic lesion and a 1.9 cm left posterior lateral L1 vertebral body abnormality extending to the pedicle.  -10/13/2020 initial [facility] medical oncology consultation: With 1 [place] score 8, 4+4 lesion that would make him a grade group 4 with stage IVb disease given radiographic evidence of sclerotic bone metastasis on bone scan and CT.  Needs genetic testing given metastatic prostate cancer and this is also important as, were he to have mismatch repair mutation then we would have options for PDL 1 inhibitors and were he BRCA mutated would have options for PARP inhibitors in the future.  Systemic therapy for castration naïve prostate cancer or N1 disease should be with apalutamide or Abiraterone or enzalutamide all of which are category 1.  He does not have any visceral metastases.  According to his imaging he has T12, L1, left acetabular, right acetabular, and left iliac bony involvement.  That means he would have 4 or more bone metastases with at least one metastasis (i.e. the acetabular lesions bilaterally) beyond the  pelvis/vertebral, for which this would be considered high-volume disease for which 6 cycles of docetaxel 75 mg/m² x 6 cycles followed by Zytiga and prednisone would be reasonable along with Zometa every 6 weeks for bone stabilization.  He will do chemo preparation visit with my nurse practitioner prior to start chemotherapy with Taxotere and Zometa in 2 weeks and he is already received Casodex in preparation for Lupron shot he received on 10/12/2020 with Dr. Reynaga.  We will get him to Dr. Alexander Gomez who has done his polypectomies in the past for port placement and we will get genetic counseling started.  Following the 6 courses of Taxotere per the Chaarted trial criteria, I would then give him an indefinite Zytiga and prednisone and Zometa until progression or toxicity dictates.    -12/16/2019 COVID-19 antigen test negative    -12/29/2020 PSA 0.275 with absolute neutrophil count 700 and creatinine 0.76 with normal liver enzymes and electrolytes.  Normal magnesium and phosphorus and urine drug screen positive for buprenorphine and opiates follow-up with palliative care.    -1/4/2021 CT chest abdomen pelvis with contrast and whole-body bone scan shows improving less metabolically active bone metastases.  Stable sclerotic T12, L1, and L2 vertebral bodies and bilateral pelvic bones on bone windows with stable subcentimeter para-aortic lymph nodes and no definite progression in the chest abdomen or pelvis.    -1/5/2021 [facility] medical oncology follow-up visit: I reviewed the images and reports thereof of the above CT and bone scan which shows good response to Taxotere x3 courses with a PSA down to 0.275 from a baseline of 10.  Get another 3 courses of Taxotere, continue his Xgeva, and then following that we will switch to Zytiga and prednisone.  He is working with palliative care on his bone pain but on his current dose of fentanyl patch he says his pain is still not adequately controlled he will contact them.   Dexamethasone prescription was refilled.  We will see my nurse practitioner back in 3 weeks for course #5 of 6 total courses and then we will reimage with CT chest abdomen pelvis and bone scan before going to the Zytiga prednisone.    -3/4/2021 CT chest abdomen pelvis with contrast is negative save for stable sclerotic bone lesions and the bone scan shows near resolution of prior bony abnormalities associated with the known sclerotic lesions in the thoracolumbar spine and bony pelvis.    2/17/2021 PSA down to 0.1 from 1.37 October 2020.  3/9/2021 PSA 0.1    -5/26/2021 CT chest abdomen pelvis with contrast shows stable to slightly underlying increase low-density left para-aortic node.  Bone lesions stable.  Whole-body bone scan stable to decrease activity of known thoracolumbar and bony pelvic involvement.  PSA less than 0.1  , AST 74, bilirubin 0.5.       -6/1/2021 [facility] medical oncology follow-up visit: I reviewed the above data with him and images thereof.  Bones are stable.  No significant adenopathy.  PSA immeasurably low.     upper limit of normal 69 and AST 74 upper limit of normal 46.  Hence, neither is more than double the upper limit of normal with no ascites and no encephalopathy and normal albumin and bilirubin, despite the elevation of liver enzymes, he has child Abrams score A.  Package insert for Abiraterone says that for ALT and/or AST greater than 5 times upper limit of normal or total bilirubin greater than 3 times the upper limit of normal to withhold treatment until liver function tests returned to baseline or ALT and AST less than or equal to 2.5 times the upper limit of normal and total bilirubin less than or equal to 1.5 times the upper limit of normal and then reinitiate at 750 mg daily dose of Zytiga.  For recurrent hepatotoxicity on 750 mg daily Zytiga, withhold treatment until liver functions returned to baseline or ALT and AST less than 3-2.5 times upper limit of normal and  total bilirubin less than or equal to 1.5 times the upper limit of normal then reinitiate at 500 mg daily Zytiga dose.  If has recurrent hepatotoxicity on 500 mg dose, then discontinue treatment.  If has ALT greater than 3 times the upper limit of normal along with total bilirubin greater than 2 times the upper limit of normal in the absence of other contributing causes or biliary obstruction, permanently discontinue Zytiga.  Based on these recommendations, I would continue current doses but we will watch with serial labs monthly and repeat his imaging again in 3 months but clinically he is doing wonderfully with excellent response to Taxotere and now on Zytiga prednisone plus Zometa along with Relugolix.    -6/23/2020 for [facility] onc[facility] clinic follow-up: Having persistent and worsening pain above his left hip.  I will get an MRI of the left hip for further evaluation.  Otherwise we will continue therapy unchanged with Zytiga, prednisone and Zometa along with Relugolix.    -7/28/2021 [facility] clinic follow-up: Patient with multiple somatic complaints including episodes of confusion, dizziness, decreased memory, agitation.  He reports ongoing episodes with difficulty swallowing.  Also most concerning for episodes of angina and chest pain for which he basically refused to seek emergency medical attention.  He has a history of coronary artery disease and stent placement.  He has not followed up with cardiology for many years.  I will get an MRI of the brain in light of his confusion, dizziness and agitation.  I will get him to gastroenterology for EGD in light of his dysphagia.  I have also put in a referral for cardiology.  I reemphasized to the patient, his wife who is with him today and his son who was on the telephone during our visit the importance of seeking out prompt medical attention when he is having chest pain or neurological concerns so that he can be evaluated.  The patient states that he  "basically refuses to go to the emergency room and if his family calls an ambulance he would not agree to go to the hospital.  For the time being, I have asked him to hold his Zytiga and prednisone until we can sort this out as Zytiga does have some cardiovascular risk.  There is likely no treatment for prostate cancer that does not carry some form of cardiovascular risk.  His PSA remains low at 0.014 yesterday.  He will continue relugolix for now.    Of note, some of these symptoms could also be due to his hypophosphatemia, phosphate level from yesterday was 1.5, I have sent in a prescription for K-Phos 3 times a day before meals for 10 days.  We will hold further Zometa until this is corrected.  I have also put in an order to check his vitamin D level.  He takes calcium and vitamin D daily.  Some of his symptoms also could be due to drug withdrawal as there was some confusion and difficulty getting his last prescription for methadone therefore he was without methadone for several days, he now has his prescription and is back on his pain medication.  He has an appointment with neurosurgery tomorrow in light of his ongoing back pain, MRI of the hip recently showed multiple bony lesions largest at the L5 vertebral body and left supra-acetabular region.  If no neurosurgical intervention recommended he may benefit from spot radiation as this is where a lot of his pain is coming from.  His wife had questions today regarding his staging and extent of disease.  We reviewed previous scans.  I did clarify with her as she used the words \"cancer free\" as she thought that is what she was told after his CT scans once he completed Taxotere in February.  I explained to her that even though his scans showed he had an excellent response with no clear areas of metastasis that did not mean he was cancer free, I explained to her that when you have metastatic cancer the treatment intent is palliative in nature and to control the disease " but not to cure the disease.  She stated understanding.  We will see him back in 2 weeks for follow-up to sort through this and make further plan of care, again, I have asked him to hold Zytiga and prednisone until he sees us back, he will continue on his other medications including relugolix.    -7/30/2021 neurosurgery PA consultation.  MRI with and without contrast L5 ordered.    -8/3/2021 neurosurgery follow-up showed known sclerotic disease with new L5 abnormality without compression deformity or instability.  MRI brain negative for metastasis.    -8/4/2021 office visit Dr. Fco Quintanilla radiation oncology coordinating with Dr. North neurosurgery for palliative radiation for MRI evidence of L5 and left supra acetabular painful lesions.  States that the patient's disease which is not secreting PSA is experiencing some progression and would benefit from 20 Gy in five fractions and other lesion 30 Gy in ten fractions. Patient offered to go to [place] for radiation but desired treatment in [place].    -8/10/2021 [facility] medical oncology follow-up visit: No chest pains at this junction.  Reviewed MRI brain and other MRIs reviewed by Dr. North and Dwight.  Due for EGD on 19th.  We will repeat his phosphorus along with his other labs continue Relugolix, Zytiga, prednisone, and he will continue radiation palliatively to the painful bony lesions with Dr. Quintanilla/Can.  He will see my nurse practitioner back in a few weeks to see how he is tolerating this and to follow-up on the phosphorus off of Zometa and she will order repeat CT chest abdomen pelvis with contrast and total body bone scan the end of September.I will see him back after that.    -9/8/2021 [facility] clinic follow-up:  Labs from 9/2/2021 reviewed, phosphorus improved.  Will resume Zometa.  PSA remains low at <0.10.  We will repeat restaging scans with CT chest, abdomen and pelvis and total body bone scan prior to return and I have ordered those  today.  He continues to see palliative for pain and symptom management.  Hasn't had any further episodes of chest pain, follows with Dr. Kahn.  Stress test results and echocardiogram from 8/20/2021 reviewed.  We will continue therapy with Zytiga, prednisone, Zometa and relugolix unchanged.  Liver enzymes slightly elevated with AST 42, ALT 77, no adjustment needed in Zytiga.  Will continue to monitor.      Return to clinic in 1 month for follow up.    This was a level 4, moderate MDM visit with 2 or more chronic illnesses and management of drug therapy requiring intensive monitoring for toxicity.    Abena Velasquez, APRN    09/08/2021

## 2021-09-20 ENCOUNTER — CLINICAL SUPPORT (OUTPATIENT)
Dept: CARDIOLOGY | Facility: CLINIC | Age: 66
End: 2021-09-20

## 2021-09-20 DIAGNOSIS — I25.118 CORONARY ARTERY DISEASE OF NATIVE ARTERY OF NATIVE HEART WITH STABLE ANGINA PECTORIS (HCC): Primary | ICD-10-CM

## 2021-09-20 PROCEDURE — 93000 ELECTROCARDIOGRAM COMPLETE: CPT | Performed by: INTERNAL MEDICINE

## 2021-09-20 NOTE — PROGRESS NOTES
Palliative Clinic Note      Name: Naveen Lugo  Age: 66 y.o.  Sex: male  : 1955  MRN: [id number]  Date of Service: 21  Referring Physician: Dr. Rashid    Subjective:    Chief Complaint: Irritability    History of Present Illness: Naveen Lugo is a 66 y.o. male with past medical history significant for coronary artery disease, hypertension, hyperlipidemia, opioid use disorder, and metastatic prostate cancer who presents to the palliative clinic today as a follow up for pain and symptom management.     Treatment summary: He was diagnosed with prostate adenocarcinoma metastasized to the bone on 9/15/20. Currently on Abiraterone / Prednisone. Recently completed radiation with Dr. Quintanilla.  Follow-up and scans with radonc scheduled for 2021.     Symptoms: The patient's main complaint today is irritability.  He states he has had a shorter fuse over the last several weeks.  His wife has complained that he is more agitated and gets upset quicker.  The patient denies feeling down or hopeless.  He denies lack of energy, poor appetite or trouble concentrating.  He does not currently take any medications for his mood.  Of note, the patient admits to cutting back on Mountain Dew consumption over the past week.  He states he is gone from drinking 8 a day to only 2-3.  The patient continues to have left-sided back and hip pain radiating down into his left knee.  However after trying the ibuprofen he reports doing a little better.  He was able to tolerate more activity.  He denies constipation, nausea, or vomiting.    Pyschosocial: Endorses strong family support.      Goals: Focused on improving pain so he can participate in recreational activities with his grandson.    The following portions of the patient's history were reviewed and updated as appropriate: allergies, current medications, past family history, past medical history, past social history, past surgical history and problem list.    Decisional  capacity: Full  ECOG: (1) Restricted in physically strenuous activity, ambulatory and able to do work of light nature   Palliative Performance Scale Score: 70%   PHQ-9: 1 Minimal depression     Objective:    /65   Pulse 83   Wt 86.6 kg (191 lb)   SpO2 97%   BMI 28.21 kg/m²     Constitutional: Awake, alert, sitting up in the exam chair  Eyes: PERRLA, EOMS intact  HENT: NCAT, face symmetric  Neck: Supple, trachea midline  Respiratory: Clear to auscultation bilaterally, nonlabored respirations  Cardiovascular: RRR, no murmurs appreciated  Gastrointestinal: Positive bowel sounds, soft, nontender, no guarding  Musculoskeletal: No bilateral ankle edema, moves all extremities   Psychiatric: Appropriate affect, cooperative  Neurologic: Oriented x 3, Cranial Nerves grossly intact to confrontation, speech clear  Skin: Cool dry, no rashes or wounds appreciated     Medication Counts: Reviewed. See bottom of note for details. Brought medication.  No overuse or misuse evident.  AGUSTIN:  #[id number]. Reviewed. All providers are part of the care team.  UDS (): Last 8/24/21. Reviewed. Appropriate.   ECG: Completed 9/20/21, results pending.    Assessment & Plan:    1. Prostate cancer metastatic to bone (CMS/HCC)  -Currently on Abiraterone / Prednisone. Follows closely with Dr. Rashid. Recently completed radiation with Dr. Quintanilla and follow-up and scans scheduled for tomorrow 9/22/21.     2. Pain, cancer  -Patient admits to some improvement in pain with the ibuprofen.  Will stop the ibuprofen for now and try 2 lidocaine patches.  Continue methadone 20 mg in the morning and 10 mg in the afternoon and night.  As well as the hydromorphone 4 mg every 4 hours as needed.  EKG completed yesterday in [place].  Waiting to be read by cardiologist.    3. Depression, unspecified depression type  -Patient scored a 1 or minimal depression on the PHQ-9. Offered referral to DHARM[facility] Martines with behavioral health but again the patient  declined.  I suspect the decrease in caffeine intake may be playing a part in his increased irritability. We will hold off on trying an antidepressant at this time, but may want to consider in the future if symptoms worsen. Suggested meditation each morning.    Code status: Full code  Medical interventions: Full  Advanced directives: Will continue to discuss    Return in about 1 month (around 10/21/2021) for Office Visit.    I spent 50 minutes caring for Reji on this date of service. This time includes time spent by me in the following activities: preparing for the visit, reviewing tests, obtaining and/or reviewing a separately obtained history, performing a medically appropriate examination and/or evaluation , counseling and educating the patient/family/caregiver, ordering medications, tests, or procedures, referring and communicating with other health care professionals , documenting information in the medical record, independently interpreting results and communicating that information with the patient/family/caregiver and care coordination    Anna Ayers PA-C  09/21/2021    Medication Date Filled # Filled Count Used # Days  ROSE MARIE   Hydromorphone 4 9/8/21 84 4 80 13 6   Methadone 10  9/8/21 42 2 40 13 3

## 2021-09-20 NOTE — PROGRESS NOTES
ECG 12 Lead    Date/Time: 9/20/2021 2:05 PM  Performed by: Pipe Faulkner MD  Authorized by: Pipe Faulkner MD   Comparison: compared with previous ECG   Rhythm: sinus rhythm  Rate: normal  BPM: 92  Conduction: right bundle branch block  QRS axis: right    Clinical impression: abnormal EKG

## 2021-09-20 NOTE — PATIENT INSTRUCTIONS
Continue hydromorphone 4 mg every 4 hours as needed for pain. Continue methadone 20 mg in the morning and 10 in the afternoon and night. Start using 2 Lidocaine patches.    Scheduled to follow up in 4 weeks.     Always bring your medications prescribed by the clinic to every appointment. If telemedicine appointment, be prepared to give and show medication counts. This assists us with managing your refill needs.     Call the Palliative Clinic for any questions or concerns at [phone]. Or reach out to us on [facility] via the Palliative Pool.     Please give us 2-3 business days in advance for routine refill requests. Prescriptions for controlled medications will take at least 24 hours to be sent to your pharmacy. Be aware of additional insurance prior authorization processing time required for some medications.

## 2021-09-21 ENCOUNTER — OFFICE VISIT (OUTPATIENT)
Dept: PALLIATIVE CARE | Facility: CLINIC | Age: 66
End: 2021-09-21

## 2021-09-21 ENCOUNTER — OFFICE VISIT (OUTPATIENT)
Dept: RADIATION ONCOLOGY | Facility: HOSPITAL | Age: 66
End: 2021-09-21

## 2021-09-21 ENCOUNTER — HOSPITAL ENCOUNTER (OUTPATIENT)
Dept: RADIATION ONCOLOGY | Facility: HOSPITAL | Age: 66
Setting detail: RADIATION/ONCOLOGY SERIES
Discharge: HOME | End: 2021-09-21

## 2021-09-21 VITALS
BODY MASS INDEX: 28.21 KG/M2 | HEART RATE: 83 BPM | SYSTOLIC BLOOD PRESSURE: 123 MMHG | OXYGEN SATURATION: 97 % | DIASTOLIC BLOOD PRESSURE: 65 MMHG | WEIGHT: 191 LBS

## 2021-09-21 VITALS
BODY MASS INDEX: 28.22 KG/M2 | TEMPERATURE: 97.8 F | SYSTOLIC BLOOD PRESSURE: 125 MMHG | HEART RATE: 81 BPM | DIASTOLIC BLOOD PRESSURE: 66 MMHG | HEIGHT: 69 IN | OXYGEN SATURATION: 97 % | WEIGHT: 190.5 LBS | RESPIRATION RATE: 16 BRPM

## 2021-09-21 DIAGNOSIS — C79.51 PROSTATE CANCER METASTATIC TO BONE (HCC): Primary | ICD-10-CM

## 2021-09-21 DIAGNOSIS — C79.51 PROSTATE CANCER METASTATIC TO BONE (HCC): ICD-10-CM

## 2021-09-21 DIAGNOSIS — G89.3 PAIN, CANCER: ICD-10-CM

## 2021-09-21 DIAGNOSIS — F32.A DEPRESSION, UNSPECIFIED DEPRESSION TYPE: ICD-10-CM

## 2021-09-21 DIAGNOSIS — C61 PROSTATE CANCER METASTATIC TO BONE (HCC): Primary | ICD-10-CM

## 2021-09-21 DIAGNOSIS — C61 PROSTATE CANCER METASTATIC TO BONE (HCC): ICD-10-CM

## 2021-09-21 PROCEDURE — G0463 HOSPITAL OUTPT CLINIC VISIT: HCPCS

## 2021-09-21 PROCEDURE — 99215 OFFICE O/P EST HI 40 MIN: CPT | Performed by: PHYSICIAN ASSISTANT

## 2021-09-21 RX ORDER — HYDROMORPHONE HYDROCHLORIDE 4 MG/1
4 TABLET ORAL EVERY 4 HOURS PRN
Qty: 84 TABLET | Refills: 0 | Status: SHIPPED | OUTPATIENT
Start: 2021-09-21 | End: 2021-10-05

## 2021-09-21 RX ORDER — HYDROMORPHONE HYDROCHLORIDE 4 MG/1
4 TABLET ORAL EVERY 4 HOURS PRN
Qty: 102 TABLET | Refills: 0 | Status: SHIPPED | OUTPATIENT
Start: 2021-10-05 | End: 2021-10-21 | Stop reason: SDUPTHER

## 2021-09-21 RX ORDER — LIDOCAINE 50 MG/G
2 PATCH TOPICAL EVERY 24 HOURS
Qty: 28 PATCH | Refills: 0 | Status: SHIPPED | OUTPATIENT
Start: 2021-09-21 | End: 2021-10-05

## 2021-09-21 RX ORDER — METHADONE HYDROCHLORIDE 10 MG/1
TABLET ORAL
Qty: 68 TABLET | Refills: 0 | Status: SHIPPED | OUTPATIENT
Start: 2021-10-05 | End: 2021-11-22

## 2021-09-21 RX ORDER — METHADONE HYDROCHLORIDE 10 MG/1
TABLET ORAL
Qty: 56 TABLET | Refills: 0 | Status: SHIPPED | OUTPATIENT
Start: 2021-09-21 | End: 2021-10-21 | Stop reason: SDUPTHER

## 2021-09-21 NOTE — TELEPHONE ENCOUNTER
Refilled Dilaudid 4 mg every 4 hours PRN and methadone 10 mg 4x daily 9/21/21-10/4/21.    And sent another prescription for 10/5/21-10/22/21.

## 2021-09-21 NOTE — PROGRESS NOTES
FOLLOW UP NOTE    PATIENT:                                                      Naveen Lugo  MEDICAL RECORD #:                        [id number]  :                                                          1955  COMPLETION DATE:   2021  DIAGNOSIS:     Prostate cancer metastatic to bone (CMS/HCC)  - Stage IVB (cT3, cN0, cM1b, G3)      BRIEF HISTORY:   Naveen Lugo is a very pleasant 66 y.o. male with a history of metastatic prostate cancer status post chemotherapy and on second line androgen deprivation with abiraterone and relugolix.  The patient had been doing fairly well but then began to develop increasing pain in his back and hips.  The patient was sent for an MRI of the lumbar spine that showed several cystic-appearing lesions in the upper lumbar spine consistent with his known sclerotic metastatic disease, as well as diffuse contrast enhancement of the upper sacrum and more discrete lesion at L5 on the postcontrast enhancing series.  The patient was referred to Dr. North for evaluation.  It was felt that the patient was not a good candidate for surgery.  The patient underwent a course of palliative radiotherapy consisting of 20 Gy in 5 fractions delivered to the left acetabulum, right pelvis, and L4 through sacrum.  He tolerated treatment well.  He did not develop significant fatigue beyond baseline.  Since completion of radiation, he reports pain within the left hip has intensified.  He reports pain is an average of 7/10 in that area despite current medication regimen of methadone 10 mg TID, and consistent dosing of prn hydromorphone and ibuprofen.  He describes pain as worse with movement and radiating medially within his left thigh.  He continues with complaints of lumbosacral pain which are effectively unchanged.  He does report pain within the right hip is about 75% improved.  He notes his pain limits his ability and enjoyment of activities and this makes him irritable.  He otherwise denies  new sites of pain, focal weakness or paresthesias, shortness of air, or additional concerns today.   Recent PSA 9/2/2021 remains undetectable.        MEDICATIONS: Medication reconciliation for the patient was reviewed and confirmed in the electronic medical record.    KPS 80%    Review of Systems   Constitutional: Positive for fatigue.   Musculoskeletal: Positive for arthralgias, back pain and neck pain.   Neurological: Positive for headaches and light-headedness.   All other systems reviewed and are negative.            Physical Exam  Vitals and nursing note reviewed.   Constitutional:       General: He is not in acute distress.     Appearance: Normal appearance. He is well-developed.   HENT:      Head: Normocephalic and atraumatic.   Eyes:      Conjunctiva/sclera: Conjunctivae normal.      Pupils: Pupils are equal, round, and reactive to light.   Cardiovascular:      Rate and Rhythm: Normal rate and regular rhythm.      Heart sounds: No murmur heard.   No friction rub.   Pulmonary:      Effort: Pulmonary effort is normal.      Breath sounds: Normal breath sounds. No wheezing.   Musculoskeletal:         General: Normal range of motion.      Cervical back: Normal range of motion and neck supple.      Comments: Pain with palpation of the mid thoracic spine, lumbosacral spine, left SI joint/acetabulum.   No pain with palpation of right SI joint.   Lymphadenopathy:      Cervical: No cervical adenopathy.   Skin:     General: Skin is warm and dry.      Comments: Lipoma under right axilla   Neurological:      General: No focal deficit present.      Mental Status: He is alert and oriented to person, place, and time.      Cranial Nerves: No cranial nerve deficit.      Sensory: No sensory deficit.      Motor: No weakness.      Gait: Gait normal.      Comments: No focal or sensory deficits   Psychiatric:         Behavior: Behavior normal.         Thought Content: Thought content normal.         Judgment: Judgment normal.  "        VITAL SIGNS:   Vitals:    09/21/21 1357   BP: 125/66   Pulse: 81   Resp: 16   Temp: 97.8 °F (36.6 °C)   TempSrc: Infrared   SpO2: 97%   Weight: 86.4 kg (190 lb 8 oz)   Height: 175.3 cm (69\")   PainSc:   7   PainLoc: Hip  Comment: Left     LABORATORY:  PSA 9/2/2021: <0.1 ng/ml    The following portions of the patient's history were reviewed and updated as appropriate: allergies, current medications, past family history, past medical history, past social history, past surgical history and problem list.         Diagnoses and all orders for this visit:    1. Prostate cancer metastatic to bone (CMS/HCC) (Primary)         IMPRESSION:   The patient is a very pleasant 66-year-old gentleman with metastatic prostate cancer status post chemotherapy and now on regimen of relugolix, abiraterone, prednisone, and zometa with an undetectable PSA but some painful bony lesions in the mid thoracic spine, lower L-spine/sacrum, and hips.  It seems the patient may have 2 populations of metastatic disease; one which does not secrete PSA in one that is.  It does appear that the patient's disease which is not secreting PSA may be experiencing some progression at the bony sites and therefore the patient was treated with a palliative course of radiotherapy administered to the L4 through sacrum, left acetabulum, and right pelvis which he completed 1 month ago.  He tolerated treatment well.  He did not develop significant acute radiation-related toxicities.  He experienced partial improvement in pain involving the right hip.  He reports lumbosacral pain is unchanged and left hip pain is in fact worse.  The patient is scheduled for repeat imaging, including bone scan and CT scans of the chest, abdomen, and pelvis on 9/29/2021.  I will have the patient return to our clinic at that time to review findings and discuss potential of additional radiation if indicated, including salvage options if his pain does not respond.  He otherwise will " continue on current medication regimen, with recent addition of lidocaine patches per palliative care recommendations.  We discussed follow-up intervals, surveillance imaging, and expectations for pain response following palliative radiotherapy.      RECOMMENDATIONS:  Metastatic prostate cancer with painful bony lesions  -Patient status post chemotherapy  -Symptomatic L5, left acetabulum, right pelvis lesions   -Status post palliative radiotherapy 20 Gy in 5 fractions completed 8/16/2021              -partial clinical response with improvement in right hip  -Recommend continuing on hormonal ablation  -PSA 9/2/2021 <0.1 ng/ml  -Recommend continuing to follow with pain management/palliative and Dr. Gaspar  -f/u Dr. Quintanilla after upcoming total body bone scan and CT chest, abdomen and pelvis      Return in about 13 days (around 10/4/2021) for Office Visit.    Sundar Piedra, APRN

## 2021-09-22 ENCOUNTER — HOSPITAL ENCOUNTER (OUTPATIENT)
Dept: RADIATION ONCOLOGY | Facility: HOSPITAL | Age: 66
Setting detail: RADIATION/ONCOLOGY SERIES
Discharge: HOME | End: 2021-09-22

## 2021-09-24 ENCOUNTER — TELEPHONE (OUTPATIENT)
Dept: ONCOLOGY | Facility: HOSPITAL | Age: 66
End: 2021-09-24

## 2021-09-24 NOTE — TELEPHONE ENCOUNTER
[facility] Refill Outreach    Called regarding refill of medication: Relugolix 120mg daily, Abiraterone 1000mg twice daily and Prednisone 5mg daily.  Spoke with patient.    Assessment  • It looks like it is almost time for your refill, how many doses do you have left? 10 days left  • How are you doing on the medication, are you experiencing any side effects? Patient denies side effects   • How many doses have you missed since you last filled your prescription? 0 doses missed.  • Have you stopped or started any medications since we last spoke? Patient has had no medication changes since last month.     Shipment   • We can go ahead and coordinate your next refill, would you like to pick this up at the pharmacy/curbside, or have this delivered to you vs. the clinic? Patient request medication be mailed to home address.  o If government plan (Medicaid and Medicare B), delivery will require signature -> identify a day the patient will be home for delivery set up (note the package should arrive prior to noon as pharmacy will ship priority overnight)  • Patient was advised medication will be shipped via [employer] (standard overnight) on 9/27 with expected delivery on 9/28. Shipping comments patient is requesting of [employer] : None. .   • Shipping address confirmed: [address] [place], [place] [address]  • Patient is aware and willing to pay copay of $0.00.   • Do you have any questions for the pharmacist? No.  • Ensured patient has clinic contact information for questions.     Follow up: Plan to reach out to patient around 10/25 for refill.    Elena Morgan, Pharmacy Technician  9/24/2021  13:35 EDT

## 2021-10-04 ENCOUNTER — HOSPITAL ENCOUNTER (OUTPATIENT)
Dept: RADIATION ONCOLOGY | Facility: HOSPITAL | Age: 66
Setting detail: RADIATION/ONCOLOGY SERIES
Discharge: HOME | End: 2021-10-04

## 2021-10-04 ENCOUNTER — OFFICE VISIT (OUTPATIENT)
Dept: RADIATION ONCOLOGY | Facility: HOSPITAL | Age: 66
End: 2021-10-04

## 2021-10-04 VITALS
RESPIRATION RATE: 18 BRPM | BODY MASS INDEX: 28.66 KG/M2 | TEMPERATURE: 97.3 F | SYSTOLIC BLOOD PRESSURE: 132 MMHG | DIASTOLIC BLOOD PRESSURE: 73 MMHG | HEART RATE: 73 BPM | WEIGHT: 194.1 LBS | OXYGEN SATURATION: 95 %

## 2021-10-04 DIAGNOSIS — C79.51 PROSTATE CANCER METASTATIC TO BONE (HCC): ICD-10-CM

## 2021-10-04 DIAGNOSIS — C61 PROSTATE CANCER METASTATIC TO BONE (HCC): ICD-10-CM

## 2021-10-04 PROCEDURE — G0463 HOSPITAL OUTPT CLINIC VISIT: HCPCS

## 2021-10-04 PROCEDURE — G0463 HOSPITAL OUTPT CLINIC VISIT: HCPCS | Performed by: RADIOLOGY

## 2021-10-04 NOTE — PROGRESS NOTES
FOLLOW UP NOTE    PATIENT:                                                      Naveen Lugo  MEDICAL RECORD #:                        [id number]  :                                                          1955  COMPLETION DATE:       2021  DIAGNOSIS:     Prostate cancer metastatic to bone (HCC)  - Stage IVB (cT3, cN0, cM1b, G3)    This visit is in addition to his regular follow-ups for his treated cancer specifically for consideration of retreatment of the hip.    BRIEF HISTORY:    Naveen Lugo is a very pleasant 66 y.o. male with a history of metastatic prostate cancer status post chemotherapy and on second line androgen deprivation with abiraterone and relugolix.  The patient had been doing fairly well but then began to develop increasing pain in his back and hips.  The patient was sent for an MRI of the lumbar spine that showed several cystic-appearing lesions in the upper lumbar spine consistent with his known sclerotic metastatic disease, as well as diffuse contrast enhancement of the upper sacrum and more discrete lesion at L5 on the postcontrast enhancing series.  The patient was referred to Dr. North for evaluation.  It was felt that the patient was not a good candidate for surgery.  The patient underwent a course of palliative radiotherapy consisting of 20 Gy in 5 fractions delivered to the left acetabulum, right pelvis, and L4 through sacrum.  He tolerated treatment well.  He did not develop significant fatigue beyond baseline.  The patient had interval restaging and returns today to discuss potential treatments.    The patient continues to complain of pain in his left hip and back.  He is treated with multiple pain medications for this.  Reports that he has received his Covid booster.      MEDICATIONS: Medication reconciliation for the patient was reviewed and confirmed in the electronic medical record.    Review of Systems:   Review of Systems   HENT:   Positive for trouble swallowing.     Musculoskeletal:        Left leg      A full 14 point review of systems was performed and was negative except as noted in the HPI.    Physical Exam:   Physical Exam  Constitutional:       General: He is not in acute distress.     Appearance: He is well-developed.   HENT:      Head: Normocephalic and atraumatic.   Eyes:      Conjunctiva/sclera: Conjunctivae normal.      Pupils: Pupils are equal, round, and reactive to light.   Neck:      Trachea: No tracheal deviation.   Pulmonary:      Effort: Pulmonary effort is normal. No respiratory distress.      Breath sounds: No wheezing.   Abdominal:      General: There is no distension.      Palpations: Abdomen is soft.   Musculoskeletal:         General: Normal range of motion.      Cervical back: Normal range of motion.   Skin:     General: Skin is warm and dry.   Neurological:      Mental Status: He is alert.      Cranial Nerves: No cranial nerve deficit.      Coordination: Coordination normal.   Psychiatric:         Behavior: Behavior normal.         Judgment: Judgment normal.         VITAL SIGNS:   Vitals:    10/04/21 1436   BP: 132/73   Pulse: 73   Resp: 18   Temp: 97.3 °F (36.3 °C)   SpO2: 95%   Weight: 88 kg (194 lb 1.6 oz)   PainSc:   8           Kps 80           The following portions of the patient's history were reviewed and updated as appropriate: allergies, current medications, past family history, past medical history, past social history, past surgical history and problem list.  I have personally requested reviewed and interpreted the patient's images and radiology reports and pathology reports listed below:  I reviewed the patient's images personally.  The patient has no obvious progression on his CT scans.  The patient has some minimal residual activity in the left bony pelvis consistent with treatment response versus minimal residual tumor.  PSA    PSA 7/27/21 8/10/21 9/2/21   PSA <0.014 <0.1 <0.1      Comments are available for some flowsheets but are  not being displayed.                     IMPRESSION:   Mr. Lugo is a very pleasant 66-year-old gentleman with metastatic prostate cancer.  He was treated with palliative radiotherapy to several potentially concerning lesions on his scans.  His PSA continues to be undetectable.  His pain is not really improved following the radiotherapy treatments.  There is a possibility that he is having some pain flare which could be associated with radiotherapy where in treated lesions have some inflammation that ultimately resolves within a couple weeks of radiotherapy treatments.  Seems unlikely at this point that that represents what is occurring.  Patient completed his radiotherapy 8/16/2021.  I would not actually recommend additional radiotherapy at this time point is is still relatively soon after completing his treatment and his uptake on the bone scan is very mild.  I would like to follow-up with the patient in 3 months to reevaluate, but potentially consider treatments if his pain does not improve in 8 weeks or so.    RECOMMENDATIONS:      Metastatic prostate cancer with painful bony lesions  -Patient status post chemotherapy  -Symptomatic L5, left acetabulum, right pelvis lesions              -Status post palliative radiotherapy 20 Gy in 5 fractions completed 8/16/2021              -partial clinical response with improvement in right hip  -Recommend continuing on hormonal ablation follows with Dr. Rashid in [place]  -PSA 9/2/2021 <0.1 ng/ml  -Minimal uptake on bone scan at this time  -Residual pain could potentially represent pain flare but this usually would have resolved by now.  Patient has very mild lesions that do not appear to be active.  His pain may be unrelated to radiotherapy treatments or even cancer.  -Would reevaluate in 12 weeks  -Could consider retreatment in 8 weeks however if he is still having considerable pain in the area.    Daryn Quintanilla MD    Errors in dictation may reflect use of voice  recognition software and not all errors in transcription may have been detected prior to signing.

## 2021-10-05 ENCOUNTER — OFFICE VISIT (OUTPATIENT)
Dept: ONCOLOGY | Facility: CLINIC | Age: 66
End: 2021-10-05

## 2021-10-05 ENCOUNTER — INFUSION (OUTPATIENT)
Dept: ONCOLOGY | Facility: HOSPITAL | Age: 66
End: 2021-10-05

## 2021-10-05 VITALS
RESPIRATION RATE: 20 BRPM | HEIGHT: 69 IN | DIASTOLIC BLOOD PRESSURE: 71 MMHG | SYSTOLIC BLOOD PRESSURE: 149 MMHG | HEART RATE: 74 BPM | BODY MASS INDEX: 28.88 KG/M2 | TEMPERATURE: 97.4 F | WEIGHT: 195 LBS | OXYGEN SATURATION: 95 %

## 2021-10-05 DIAGNOSIS — C61 PROSTATE CANCER METASTATIC TO BONE (HCC): Primary | ICD-10-CM

## 2021-10-05 DIAGNOSIS — C79.51 PROSTATE CANCER METASTATIC TO BONE (HCC): Primary | ICD-10-CM

## 2021-10-05 DIAGNOSIS — Z45.2 ENCOUNTER FOR CARE RELATED TO PORT-A-CATH: ICD-10-CM

## 2021-10-05 PROCEDURE — 25010000002 ZOLEDRONIC ACID PER 1 MG: Performed by: INTERNAL MEDICINE

## 2021-10-05 PROCEDURE — 99215 OFFICE O/P EST HI 40 MIN: CPT | Performed by: INTERNAL MEDICINE

## 2021-10-05 PROCEDURE — 25010000002 HEPARIN LOCK FLUSH PER 10 UNITS: Performed by: INTERNAL MEDICINE

## 2021-10-05 PROCEDURE — 96374 THER/PROPH/DIAG INJ IV PUSH: CPT

## 2021-10-05 RX ORDER — SODIUM CHLORIDE 9 MG/ML
250 INJECTION, SOLUTION INTRAVENOUS ONCE
Status: CANCELLED | OUTPATIENT
Start: 2021-10-05

## 2021-10-05 RX ORDER — HEPARIN SODIUM (PORCINE) LOCK FLUSH IV SOLN 100 UNIT/ML 100 UNIT/ML
500 SOLUTION INTRAVENOUS AS NEEDED
Status: DISCONTINUED | OUTPATIENT
Start: 2021-10-05 | End: 2021-10-05 | Stop reason: HOSPADM

## 2021-10-05 RX ORDER — SODIUM CHLORIDE 9 MG/ML
250 INJECTION, SOLUTION INTRAVENOUS ONCE
Status: DISCONTINUED | OUTPATIENT
Start: 2021-10-05 | End: 2021-10-05 | Stop reason: HOSPADM

## 2021-10-05 RX ORDER — HEPARIN SODIUM (PORCINE) LOCK FLUSH IV SOLN 100 UNIT/ML 100 UNIT/ML
500 SOLUTION INTRAVENOUS AS NEEDED
Status: CANCELLED | OUTPATIENT
Start: 2021-10-05

## 2021-10-05 RX ADMIN — HEPARIN 500 UNITS: 100 SYRINGE at 10:36

## 2021-10-05 RX ADMIN — ZOLEDRONIC ACID 4 MG: 4 INJECTION INTRAVENOUS at 10:20

## 2021-10-05 NOTE — PROGRESS NOTES
CHIEF COMPLAINT: Follow-up metastatic prostate cancer    Problem List:  Oncology/Hematology History Overview Note   1.  Stage IVb grade group 4 metastatic prostate cancer with sclerotic bone lesions on CT and bone scan and history of prostatic hypertrophy  2.  Kidney stone  3.  Polyps  4.  Elevated liver enzymes    -9/30/2020 office note from Dr. Ronen Reynaga indicates patient with PSA 10.8.  Underwent TRUS prostate biopsy 9/15/2020.  Adenocarcinoma the prostate [place] 4+4 = 8 in 1 out of 12 cores and 4+3 = 7 and 10 out of 12 cores and 3+4 = 7 in 1 out of 12 cores.  Perineural invasion.  Extraprostatic extension into the fat seen on 2 biopsies of the right lateral mid and right apex.  9/24/2020 CT chest and whole-body bone scan showed T12, L1, left acetabular, right medial acetabular metastases with no lung metastases.  He started androgen deprivation therapy with Casodex with plans for Lupron to start in 1 to 2 weeks for clinical T3 N0 M1 metastatic prostate cancer.  Review of official report from [facility] 9/24/2020 bone scan mentions T12, L1, roof of left acetabulum and medial right acetabular osteoblastic metastases.  CT chest with contrast that same day showed mild splenomegaly 14.2 cm with stable periaortic nodes compared to CT December 2015 with no lung metastases.  Sclerotic abnormality within the T12 vertebral body, L1 vertebral body extending into the pedicle unchanged.  8/28/2020 CT abdomen pelvis report from [facility] reviewed and mentions normal spleen size.  Punctate nonobstructing kidney stone, no paulino enlargement, diffuse bladder wall thickening with mild perivesicular fat stranding, 2.1 cm sclerotic left iliac bone above the left acetabulum new compared to 2015 as well as 1.5 cm faintly sclerotic focus in the right posterior acetabulum stable compared to prior CT 2015 as well as a 1.6 cm T12 and inferior vertebral body sclerotic lesion and a 1.9 cm left posterior lateral L1  vertebral body abnormality extending to the pedicle.  -10/13/2020 initial [facility] medical oncology consultation: With 1 Herron score 8, 4+4 lesion that would make him a grade group 4 with stage IVb disease given radiographic evidence of sclerotic bone metastasis on bone scan and CT.  Needs genetic testing given metastatic prostate cancer and this is also important as, were he to have mismatch repair mutation then we would have options for PDL 1 inhibitors and were he BRCA mutated would have options for PARP inhibitors in the future.  Systemic therapy for castration naïve prostate cancer or N1 disease should be with apalutamide or Abiraterone or enzalutamide all of which are category 1.  He does not have any visceral metastases.  According to his imaging he has T12, L1, left acetabular, right acetabular, and left iliac bony involvement.  That means he would have 4 or more bone metastases with at least one metastasis (i.e. the acetabular lesions bilaterally) beyond the pelvis/vertebral, for which this would be considered high-volume disease for which 6 cycles of docetaxel 75 mg/m² x 6 cycles followed by Zytiga and prednisone would be reasonable along with Zometa every 6 weeks for bone stabilization.  He will do chemo preparation visit with my nurse practitioner prior to start chemotherapy with Taxotere and Zometa in 2 weeks and he is already received Casodex in preparation for Lupron shot he received on 10/12/2020 with Dr. Reynaga.  We will get him to Dr. Alexander Gomez who has done his polypectomies in the past for port placement and we will get genetic counseling started.  Following the 6 courses of Taxotere per the Chaarted trial criteria, I would then give him an indefinite Zytiga and prednisone and Zometa until progression or toxicity dictates.    -12/16/2019 COVID-19 antigen test negative    -12/29/2020 PSA 0.275 with absolute neutrophil count 700 and creatinine 0.76 with normal liver enzymes and electrolytes.   Normal magnesium and phosphorus and urine drug screen positive for buprenorphine and opiates follow-up with palliative care.    -1/4/2021 CT chest abdomen pelvis with contrast and whole-body bone scan shows improving less metabolically active bone metastases.  Stable sclerotic T12, L1, and L2 vertebral bodies and bilateral pelvic bones on bone windows with stable subcentimeter para-aortic lymph nodes and no definite progression in the chest abdomen or pelvis.    -1/5/2021 [facility] medical oncology follow-up visit: I reviewed the images and reports thereof of the above CT and bone scan which shows good response to Taxotere x3 courses with a PSA down to 0.275 from a baseline of 10.  Get another 3 courses of Taxotere, continue his Xgeva, and then following that we will switch to Zytiga and prednisone.  He is working with palliative care on his bone pain but on his current dose of fentanyl patch he says his pain is still not adequately controlled he will contact them.  Dexamethasone prescription was refilled.  We will see my nurse practitioner back in 3 weeks for course #5 of 6 total courses and then we will reimage with CT chest abdomen pelvis and bone scan before going to the Zytiga prednisone.    -3/4/2021 CT chest abdomen pelvis with contrast is negative save for stable sclerotic bone lesions and the bone scan shows near resolution of prior bony abnormalities associated with the known sclerotic lesions in the thoracolumbar spine and bony pelvis.    2/17/2021 PSA down to 0.1 from 1.37 October 2020.  3/9/2021 PSA 0.1    -5/26/2021 CT chest abdomen pelvis with contrast shows stable to slightly underlying increase low-density left para-aortic node.  Bone lesions stable.  Whole-body bone scan stable to decrease activity of known thoracolumbar and bony pelvic involvement.  PSA less than 0.1  , AST 74, bilirubin 0.5.       -6/1/2021 [facility] medical oncology follow-up visit: I reviewed the above data with him and  images thereof.  Bones are stable.  No significant adenopathy.  PSA immeasurably low.     upper limit of normal 69 and AST 74 upper limit of normal 46.  Hence, neither is more than double the upper limit of normal with no ascites and no encephalopathy and normal albumin and bilirubin, despite the elevation of liver enzymes, he has child Abrams score A.  Package insert for Abiraterone says that for ALT and/or AST greater than 5 times upper limit of normal or total bilirubin greater than 3 times the upper limit of normal to withhold treatment until liver function tests returned to baseline or ALT and AST less than or equal to 2.5 times the upper limit of normal and total bilirubin less than or equal to 1.5 times the upper limit of normal and then reinitiate at 750 mg daily dose of Zytiga.  For recurrent hepatotoxicity on 750 mg daily Zytiga, withhold treatment until liver functions returned to baseline or ALT and AST less than 3-2.5 times upper limit of normal and total bilirubin less than or equal to 1.5 times the upper limit of normal then reinitiate at 500 mg daily Zytiga dose.  If has recurrent hepatotoxicity on 500 mg dose, then discontinue treatment.  If has ALT greater than 3 times the upper limit of normal along with total bilirubin greater than 2 times the upper limit of normal in the absence of other contributing causes or biliary obstruction, permanently discontinue Zytiga.  Based on these recommendations, I would continue current doses but we will watch with serial labs monthly and repeat his imaging again in 3 months but clinically he is doing wonderfully with excellent response to Taxotere and now on Zytiga prednisone plus Zometa along with Relugolix.    -6/23/2020 for [facility] onc[facility] clinic follow-up: Having persistent and worsening pain above his left hip.  I will get an MRI of the left hip for further evaluation.  Otherwise we will continue therapy unchanged with Zytiga, prednisone and Zometa  along with Relugolix.    -7/28/2021 [facility] oncology clinic follow-up: Patient with multiple somatic complaints including episodes of confusion, dizziness, decreased memory, agitation.  He reports ongoing episodes with difficulty swallowing.  Also most concerning for episodes of angina and chest pain for which he basically refused to seek emergency medical attention.  He has a history of coronary artery disease and stent placement.  He has not followed up with cardiology for many years.  I will get an MRI of the brain in light of his confusion, dizziness and agitation.  I will get him to gastroenterology for EGD in light of his dysphagia.  I have also put in a referral for cardiology.  I reemphasized to the patient, his wife who is with him today and his son who was on the telephone during our visit the importance of seeking out prompt medical attention when he is having chest pain or neurological concerns so that he can be evaluated.  The patient states that he basically refuses to go to the emergency room and if his family calls an ambulance he would not agree to go to the hospital.  For the time being, I have asked him to hold his Zytiga and prednisone until we can sort this out as Zytiga does have some cardiovascular risk.  There is likely no treatment for prostate cancer that does not carry some form of cardiovascular risk.  His PSA remains low at 0.014 yesterday.  He will continue relugolix for now.    Of note, some of these symptoms could also be due to his hypophosphatemia, phosphate level from yesterday was 1.5, I have sent in a prescription for K-Phos 3 times a day before meals for 10 days.  We will hold further Zometa until this is corrected.  I have also put in an order to check his vitamin D level.  He takes calcium and vitamin D daily.  Some of his symptoms also could be due to drug withdrawal as there was some confusion and difficulty getting his last prescription for methadone therefore he was  "without methadone for several days, he now has his prescription and is back on his pain medication.  He has an appointment with neurosurgery tomorrow in light of his ongoing back pain, MRI of the hip recently showed multiple bony lesions largest at the L5 vertebral body and left supra-acetabular region.  If no neurosurgical intervention recommended he may benefit from spot radiation as this is where a lot of his pain is coming from.  His wife had questions today regarding his staging and extent of disease.  We reviewed previous scans.  I did clarify with her as she used the words \"cancer free\" as she thought that is what she was told after his CT scans once he completed Taxotere in February.  I explained to her that even though his scans showed he had an excellent response with no clear areas of metastasis that did not mean he was cancer free, I explained to her that when you have metastatic cancer the treatment intent is palliative in nature and to control the disease but not to cure the disease.  She stated understanding.  We will see him back in 2 weeks for follow-up to sort through this and make further plan of care, again, I have asked him to hold Zytiga and prednisone until he sees us back, he will continue on his other medications including relugolix.    -7/30/2021 neurosurgery PA consultation.  MRI with and without contrast L5 ordered.    -8/3/2021 neurosurgery follow-up showed known sclerotic disease with new L5 abnormality without compression deformity or instability.  MRI brain negative for metastasis.    -8/4/2021 office visit Dr. Fco Quintanilla radiation oncology coordinating with Dr. Can forde for palliative radiation for MRI evidence of L5 and left supra acetabular painful lesions.  States that the patient's disease which is not secreting PSA is experiencing some progression and would benefit from 20 Gy in five fractions and other lesion 30 Gy in ten fractions. Patient offered to go to [place] " for radiation but desired treatment in [place].    -8/10/2021 [facility] medical oncology follow-up visit: No chest pains at this junction.  Reviewed MRI brain and other MRIs reviewed by Dr. North and Dwight.  Due for EGD on 19th.  We will repeat his phosphorus along with his other labs continue Relugolix, Zytiga, prednisone, and he will continue radiation palliatively to the painful bony lesions with Dr. Quintanilla/Can.  He will see my nurse practitioner back in a few weeks to see how he is tolerating this and to follow-up on the phosphorus off of Zometa and she will order repeat CT chest abdomen pelvis with contrast and total body bone scan the end of September.I will see him back after that.    -9/8/2021 Zometa resumed.  PSA <0.10    -10/5/2021 [facility] medical oncology follow-up visit: followed-up with radiation oncology 9/21/2021.  Status post symptomatic L5 radiation 5 fractions 20 [place] completed 8/16/2021 with improvement in right hip pain.  9/2/2021 PSA less than 0.1.  9/29/2021 total body bone scan shows increased uptake left iliac bone slightly superior group of the left acetabulum with no additional foci of suspicious abnormality is unlikely treatment related with no new sites of active osseous metastasis.  CT chest abdomen pelvis with contrast shows stable borderline enlarged left periaortic nodes and dating sclerotic bone lesions.Continue Zytiga, prednisone, Relugolix, Zometa, repeat CT chest abdomen pelvis with contrast and total body bone scan the end of the year.  We will follow PSA serially.     Prostate cancer metastatic to bone (HCC)   8/30/2020 -  Other Event    PSA 10.8     9/15/2020 Initial Diagnosis    Prostate cancer metastatic to bone (CMS/HCC)     9/15/2020 Biopsy    Prostate needle core biopsy     9/24/2020 Imaging    Total body bone scan: 4 abnormal areas of increased activity consistent with osteoblastic metastasis, abnormal foci of activity seen in the T12 vertebral body, L1 vertebral  body, roof of the left acetabulum, and acetabulum medially on the right.  CT chest: Stable sclerotic metastatic lesions within the T12 and L1 vertebrae.  No other evidence of metastatic disease to the chest.  Stable mild splenomegaly and subcentimeter periaortic retroperitoneal lymph nodes.  CT abdomen and pelvis: Diffuse bladder wall thickening with mild perivesicular fat stranding.  Interval development of several sclerotic lesions in the spine and left iliac bone.     10/13/2020 Cancer Staged    Staging form: Bone - Appendicular Skeleton, Trunk, Skull, And Facial Bones, AJCC 8th Edition  - Clinical: Stage IVB (cT3, cN0, cM1b, G3) - Signed by Ishmael Rashid MD on 10/13/2020     11/3/2020 -  Chemotherapy    OP SUPPORTIVE Zoledronic Acid Q42d     11/3/2020 - 2/18/2021 Chemotherapy    OP PROSTATE DOCEtaxel       1/4/2021 Imaging    CT chest abdomen pelvis with contrast and whole-body bone scan shows improving less metabolically active bone metastases.  Stable sclerotic T12, L1, and L2 vertebral bodies and bilateral pelvic bones on bone windows with stable subcentimeter para-aortic lymph nodes and no definite progression in the chest abdomen or pelvis.  PSA down to 0.275 after 3 courses of Taxotere.     3/4/2021 Imaging    CT chest, abdomen and pelvis stable, no evidence of disease progression. Total body bone scan with decreased/near resolution of abnormal increased radiotracer uptake associated with the known sclerotic lesions in the thoracolumbar spine and bony pelvis.  No evidence of new osteoblastic metastases.     3/4/2021 Imaging    CT chest abdomen pelvis with contrast is negative save for stable sclerotic bone lesions and the bone scan shows near resolution of prior bony abnormalities associated with the known sclerotic lesions in the thoracolumbar spine and bony pelvis.  2/17/2021 PSA down to 0.1 from 1.37 October 2020.     3/9/2021 - 3/9/2021 Chemotherapy    OP SUPPORTIVE PROSTATE Relugolix     3/9/2021 -   "Chemotherapy    OP PROSTATE Abiraterone / PredniSONE       3/10/2021 -  Chemotherapy    OP PROSTATE Abiraterone / PredniSONE     8/10/2021 - 8/16/2021 Radiation    Radiation OncologyTreatment Course:  Naveen Lugo received 2000 cGy in 5 fractions to L4-sacrum, left acetabulum and right pelvis via External Beam Radiation - EBRT.         HISTORY OF PRESENT ILLNESS:  The patient is a 66 y.o. male, here for follow up on management of metastatic prostate cancer.  No new complaints    Past Medical History:   Diagnosis Date   • Bone cancer (HCC)    • History of radiation therapy 08/16/2021    L4 through sacrum, left acetabulum, right pelvis   • Nephrolithiasis    • Opioid use disorder, mild, on maintenance therapy (HCC)    • Prostate cancer (HCC)      Past Surgical History:   Procedure Laterality Date   • CHOLECYSTECTOMY     • CORONARY STENT PLACEMENT  206 & 2011   • HERNIA REPAIR  1986   • THORACIC DISCECTOMY  1994       Allergies   Allergen Reactions   • Cymbalta [Duloxetine Hcl] Dizziness   • Gabapentin Nausea Only   • Lyrica [Pregabalin] Nausea And Vomiting and Dizziness       Family History and Social History reviewed and changed as necessary    REVIEW OF SYSTEM:   Metastatic prostate cancer.  No new complaints    PHYSICAL EXAM:  Lungs clear heart regular rate and rhythm    Vitals:    10/05/21 0927   BP: 149/71   Pulse: 74   Resp: 20   Temp: 97.4 °F (36.3 °C)   SpO2: 95%   Weight: 88.5 kg (195 lb)   Height: 175.3 cm (69\")     Vitals:    10/05/21 0927   PainSc:   8   PainLoc: Hip  Comment: BILATERAL          ECOG score: 1     Karnofsky score: 90     Vitals reviewed.        Lab Results   Component Value Date    HGB 14.3 09/02/2021    HCT 42.3 09/02/2021    MCV 89 09/02/2021     (L) 09/02/2021    WBC 4.8 09/02/2021    NEUTROABS 3.7 09/02/2021    LYMPHSABS 0.6 (L) 09/02/2021    MONOSABS 0.3 09/02/2021    EOSABS 0.1 09/02/2021    BASOSABS 0.0 09/02/2021       Lab Results   Component Value Date    GLUCOSE 102 (H) " 04/20/2021    BUN 9 09/02/2021    CREATININE 0.80 09/02/2021     09/02/2021    K 3.8 09/02/2021     09/02/2021    CO2 24 09/02/2021    CALCIUM 9.1 09/02/2021    PROTEINTOT 6.6 04/20/2021    ALBUMIN 4.1 09/02/2021    BILITOT 0.5 09/02/2021    ALKPHOS 77 09/02/2021    AST 42 (H) 09/02/2021    ALT 77 (H) 09/02/2021             ASSESSMENT & PLAN:  1.  Stage IVb grade group 4 metastatic prostate cancer with sclerotic bone lesions on CT and bone scan and history of prostatic hypertrophy  2.  Kidney stone  3.  Polyps  4.  Elevated liver enzymes    -9/30/2020 office note from Dr. Ronen Reynaga indicates patient with PSA 10.8.  Underwent TRUS prostate biopsy 9/15/2020.  Adenocarcinoma the prostate [place] 4+4 = 8 in 1 out of 12 cores and 4+3 = 7 and 10 out of 12 cores and 3+4 = 7 in 1 out of 12 cores.  Perineural invasion.  Extraprostatic extension into the fat seen on 2 biopsies of the right lateral mid and right apex.  9/24/2020 CT chest and whole-body bone scan showed T12, L1, left acetabular, right medial acetabular metastases with no lung metastases.  He started androgen deprivation therapy with Casodex with plans for Lupron to start in 1 to 2 weeks for clinical T3 N0 M1 metastatic prostate cancer.  Review of official report from [facility] 9/24/2020 bone scan mentions T12, L1, roof of left acetabulum and medial right acetabular osteoblastic metastases.  CT chest with contrast that same day showed mild splenomegaly 14.2 cm with stable periaortic nodes compared to CT December 2015 with no lung metastases.  Sclerotic abnormality within the T12 vertebral body, L1 vertebral body extending into the pedicle unchanged.  8/28/2020 CT abdomen pelvis report from [facility] reviewed and mentions normal spleen size.  Punctate nonobstructing kidney stone, no paulino enlargement, diffuse bladder wall thickening with mild perivesicular fat stranding, 2.1 cm sclerotic left iliac bone above the left  acetabulum new compared to 2015 as well as 1.5 cm faintly sclerotic focus in the right posterior acetabulum stable compared to prior CT 2015 as well as a 1.6 cm T12 and inferior vertebral body sclerotic lesion and a 1.9 cm left posterior lateral L1 vertebral body abnormality extending to the pedicle.  -10/13/2020 initial [facility] medical oncology consultation: With 1 [place] score 8, 4+4 lesion that would make him a grade group 4 with stage IVb disease given radiographic evidence of sclerotic bone metastasis on bone scan and CT.  Needs genetic testing given metastatic prostate cancer and this is also important as, were he to have mismatch repair mutation then we would have options for PDL 1 inhibitors and were he BRCA mutated would have options for PARP inhibitors in the future.  Systemic therapy for castration naïve prostate cancer or N1 disease should be with apalutamide or Abiraterone or enzalutamide all of which are category 1.  He does not have any visceral metastases.  According to his imaging he has T12, L1, left acetabular, right acetabular, and left iliac bony involvement.  That means he would have 4 or more bone metastases with at least one metastasis (i.e. the acetabular lesions bilaterally) beyond the pelvis/vertebral, for which this would be considered high-volume disease for which 6 cycles of docetaxel 75 mg/m² x 6 cycles followed by Zytiga and prednisone would be reasonable along with Zometa every 6 weeks for bone stabilization.  He will do chemo preparation visit with my nurse practitioner prior to start chemotherapy with Taxotere and Zometa in 2 weeks and he is already received Casodex in preparation for Lupron shot he received on 10/12/2020 with Dr. Reynaga.  We will get him to Dr. Alexander Gomez who has done his polypectomies in the past for port placement and we will get genetic counseling started.  Following the 6 courses of Taxotere per the Chaarted trial criteria, I would then give him an  indefinite Zytiga and prednisone and Zometa until progression or toxicity dictates.    -12/16/2019 COVID-19 antigen test negative    -12/29/2020 PSA 0.275 with absolute neutrophil count 700 and creatinine 0.76 with normal liver enzymes and electrolytes.  Normal magnesium and phosphorus and urine drug screen positive for buprenorphine and opiates follow-up with palliative care.    -1/4/2021 CT chest abdomen pelvis with contrast and whole-body bone scan shows improving less metabolically active bone metastases.  Stable sclerotic T12, L1, and L2 vertebral bodies and bilateral pelvic bones on bone windows with stable subcentimeter para-aortic lymph nodes and no definite progression in the chest abdomen or pelvis.    -1/5/2021 [facility] [place] medical oncology follow-up visit: I reviewed the images and reports thereof of the above CT and bone scan which shows good response to Taxotere x3 courses with a PSA down to 0.275 from a baseline of 10.  Get another 3 courses of Taxotere, continue his Xgeva, and then following that we will switch to Zytiga and prednisone.  He is working with palliative care on his bone pain but on his current dose of fentanyl patch he says his pain is still not adequately controlled he will contact them.  Dexamethasone prescription was refilled.  We will see my nurse practitioner back in 3 weeks for course #5 of 6 total courses and then we will reimage with CT chest abdomen pelvis and bone scan before going to the Zytiga prednisone.    -3/4/2021 CT chest abdomen pelvis with contrast is negative save for stable sclerotic bone lesions and the bone scan shows near resolution of prior bony abnormalities associated with the known sclerotic lesions in the thoracolumbar spine and bony pelvis.    2/17/2021 PSA down to 0.1 from 1.37 October 2020.  3/9/2021 PSA 0.1    -5/26/2021 CT chest abdomen pelvis with contrast shows stable to slightly underlying increase low-density left para-aortic node.  Bone lesions stable.   Whole-body bone scan stable to decrease activity of known thoracolumbar and bony pelvic involvement.  PSA less than 0.1  , AST 74, bilirubin 0.5.       -6/1/2021 [facility] oncology follow-up visit: I reviewed the above data with him and images thereof.  Bones are stable.  No significant adenopathy.  PSA immeasurably low.     upper limit of normal 69 and AST 74 upper limit of normal 46.  Hence, neither is more than double the upper limit of normal with no ascites and no encephalopathy and normal albumin and bilirubin, despite the elevation of liver enzymes, he has child Abrams score A.  Package insert for Abiraterone says that for ALT and/or AST greater than 5 times upper limit of normal or total bilirubin greater than 3 times the upper limit of normal to withhold treatment until liver function tests returned to baseline or ALT and AST less than or equal to 2.5 times the upper limit of normal and total bilirubin less than or equal to 1.5 times the upper limit of normal and then reinitiate at 750 mg daily dose of Zytiga.  For recurrent hepatotoxicity on 750 mg daily Zytiga, withhold treatment until liver functions returned to baseline or ALT and AST less than 3-2.5 times upper limit of normal and total bilirubin less than or equal to 1.5 times the upper limit of normal then reinitiate at 500 mg daily Zytiga dose.  If has recurrent hepatotoxicity on 500 mg dose, then discontinue treatment.  If has ALT greater than 3 times the upper limit of normal along with total bilirubin greater than 2 times the upper limit of normal in the absence of other contributing causes or biliary obstruction, permanently discontinue Zytiga.  Based on these recommendations, I would continue current doses but we will watch with serial labs monthly and repeat his imaging again in 3 months but clinically he is doing wonderfully with excellent response to Taxotere and now on Zytiga prednisone plus Zometa along with  Relugolix.    -6/23/2020 for [facility] follow-up: Having persistent and worsening pain above his left hip.  I will get an MRI of the left hip for further evaluation.  Otherwise we will continue therapy unchanged with Zytiga, prednisone and Zometa along with Relugolix.    -7/28/2021 [facility] follow-up: Patient with multiple somatic complaints including episodes of confusion, dizziness, decreased memory, agitation.  He reports ongoing episodes with difficulty swallowing.  Also most concerning for episodes of angina and chest pain for which he basically refused to seek emergency medical attention.  He has a history of coronary artery disease and stent placement.  He has not followed up with cardiology for many years.  I will get an MRI of the brain in light of his confusion, dizziness and agitation.  I will get him to gastroenterology for EGD in light of his dysphagia.  I have also put in a referral for cardiology.  I reemphasized to the patient, his wife who is with him today and his son who was on the telephone during our visit the importance of seeking out prompt medical attention when he is having chest pain or neurological concerns so that he can be evaluated.  The patient states that he basically refuses to go to the emergency room and if his family calls an ambulance he would not agree to go to the hospital.  For the time being, I have asked him to hold his Zytiga and prednisone until we can sort this out as Zytiga does have some cardiovascular risk.  There is likely no treatment for prostate cancer that does not carry some form of cardiovascular risk.  His PSA remains low at 0.014 yesterday.  He will continue relugolix for now.    Of note, some of these symptoms could also be due to his hypophosphatemia, phosphate level from yesterday was 1.5, I have sent in a prescription for K-Phos 3 times a day before meals for 10 days.  We will hold further Zometa until this is corrected.  I  "have also put in an order to check his vitamin D level.  He takes calcium and vitamin D daily.  Some of his symptoms also could be due to drug withdrawal as there was some confusion and difficulty getting his last prescription for methadone therefore he was without methadone for several days, he now has his prescription and is back on his pain medication.  He has an appointment with neurosurgery tomorrow in light of his ongoing back pain, MRI of the hip recently showed multiple bony lesions largest at the L5 vertebral body and left supra-acetabular region.  If no neurosurgical intervention recommended he may benefit from spot radiation as this is where a lot of his pain is coming from.  His wife had questions today regarding his staging and extent of disease.  We reviewed previous scans.  I did clarify with her as she used the words \"cancer free\" as she thought that is what she was told after his CT scans once he completed Taxotere in February.  I explained to her that even though his scans showed he had an excellent response with no clear areas of metastasis that did not mean he was cancer free, I explained to her that when you have metastatic cancer the treatment intent is palliative in nature and to control the disease but not to cure the disease.  She stated understanding.  We will see him back in 2 weeks for follow-up to sort through this and make further plan of care, again, I have asked him to hold Zytiga and prednisone until he sees us back, he will continue on his other medications including relugolix.    -7/30/2021 neurosurgery PA consultation.  MRI with and without contrast L5 ordered.    -8/3/2021 neurosurgery follow-up showed known sclerotic disease with new L5 abnormality without compression deformity or instability.  MRI brain negative for metastasis.    -8/4/2021 office visit Dr. Fco Quintanilla radiation oncology coordinating with Dr. Can forde for palliative radiation for MRI evidence of L5 " and left supra acetabular painful lesions.  States that the patient's disease which is not secreting PSA is experiencing some progression and would benefit from 20 Gy in five fractions and other lesion 30 Gy in ten fractions. Patient offered to go to [place] for radiation but desired treatment in [place].    -8/10/2021 [facility] medical oncology follow-up visit: No chest pains at this junction.  Reviewed MRI brain and other MRIs reviewed by Dr. North and Dwight.  Due for EGD on 19th.  We will repeat his phosphorus along with his other labs continue Relugolix, Zytiga, prednisone, and he will continue radiation palliatively to the painful bony lesions with Dr. Quintanilla/Can.  He will see my nurse practitioner back in a few weeks to see how he is tolerating this and to follow-up on the phosphorus off of Zometa and she will order repeat CT chest abdomen pelvis with contrast and total body bone scan the end of September.I will see him back after that.    -9/8/2021 Zometa resumed.  PSA <0.10    -10/5/2021 [facility] medical oncology follow-up visit: followed-up with radiation oncology 9/21/2021.  Status post symptomatic L5 radiation 5 fractions 20 Maxwell completed 8/16/2021 with improvement in right hip pain.  9/2/2021 PSA less than 0.1.  9/29/2021 total body bone scan shows increased uptake left iliac bone slightly superior group of the left acetabulum with no additional foci of suspicious abnormality is unlikely treatment related with no new sites of active osseous metastasis.  CT chest abdomen pelvis with contrast shows stable borderline enlarged left periaortic nodes and dating sclerotic bone lesions.Continue Zytiga, prednisone, Relugolix, Zometa, repeat CT chest abdomen pelvis with contrast and total body bone scan the end of the year.  We will follow PSA serially.    Total time of care today inclusive of time spent today prior to his arrival reviewing interval notes from radiation oncology as well as my nurse  practitioner as well as reviewing images and reports of CTs and bone scan and serial labs and during visit translating this information to him and putting forth the plan as outlined above and after visit instituting this plan took 40 minutes of patient care time throughout the day today.  Ishmael Rashid MD    10/05/2021

## 2021-10-20 NOTE — PATIENT INSTRUCTIONS
1. Continue current pain regimen.  2. Scheduled to follow up in 1 month  3. Always bring your medications prescribed by the clinic to every appointment. If telemedicine appointment, be prepared to give and show medication counts. This assists us with managing your refill needs.   4. Call the Palliative Clinic for any questions or concerns at [phone] or reach out to us on [employer] via the Palliative Pool.   5. Please give us 2-3 business days in advance for routine refill requests. Prescriptions for controlled medications will take at least 24 hours to be sent to your pharmacy. Be aware of additional insurance prior authorization processing time required for some medications.

## 2021-10-20 NOTE — PROGRESS NOTES
Palliative Clinic Note      Name: Naveen Lugo  Age: 66 y.o.  Sex: male  : 1955  MRN: [id number]  Date of Service: 10/21/21  Referring Physician: Dr. Rashid    Subjective:    Chief Complaint: Hip and back pain    History of Present Illness: Naveen Lugo is a 66 y.o. male with past medical history significant for coronary artery disease, hypertension, hyperlipidemia, opioid use disorder, and metastatic prostate cancer  who presents to the palliative clinic today as a follow up for pain and symptom management.     Treatment summary: He was diagnosed with prostate adenocarcinoma metastasized to the bone on 9/15/20. Currently on Abiraterone, prednisone, Relugolix and Zometa. Completed radiation with Dr. Quintanilla in 2021 and follow up on 10/4/21 for increased hip/back pain. Dr. Quintanilla felt his increased pain is unlikely due to past radiotherapy treatments and based on recent scans does not recommend additional radiotherapy at this time. May reconsider in 8+ weeks if pain persists. Repeat scans at the end of the year.     Symptoms: Patient reports increased pain after a colonoscopy on 10/7/2021.  He admits to taking extra hydromorphone tablets for several days after the procedure.  He continues to rate his hip and back pain an 8 out of 10.  Patient is interested in trying different pain medication.  He feels that he had better pain control prior to starting methadone.  He has been on methadone for 6-7 months.  His activities have been limited due to pain for the past 6 months.  He reports trying massage therapy and physical therapy in the past with little improvement in his pain.  He reports a good appetite.  No nausea or vomiting.  Bowel movements are regular on senna.    Pyschosocial: Endorses strong family support.  History of opioid use disorder.  Patient completed Suboxone taper in the past and was on no pain medication prior to cancer diagnosis.  Patient states his mood has improved and he has had less  irritability.    Goals: Focused on improving pain so he can participate in recreational activities with his grandson.    The following portions of the patient's history were reviewed and updated as appropriate: allergies, current medications, past family history, past medical history, past social history, past surgical history and problem list.    Decisional capacity: Full  ECOG: (2) Ambulatory and capable of self care, unable to carry out work activity, up and about > 50% or waking hours   Palliative Performance Scale Score: 70%     Objective:    /68   Pulse 87   Wt 87.5 kg (193 lb)   SpO2 98%   BMI 28.50 kg/m²     Constitutional: Awake, alert, sitting up in the exam chair  Eyes: PERRLA, EOMS intact  HENT: NCAT, face symmetric  Neck: Supple, trachea midline  Respiratory: Clear to auscultation bilaterally, nonlabored respirations  Cardiovascular: RRR, no murmurs appreciated  Gastrointestinal: Positive bowel sounds, soft, nontender, no guarding  Musculoskeletal: No bilateral ankle edema, moves all extremities   Psychiatric: Appropriate affect, cooperative  Neurologic: Oriented x 3, Cranial Nerves grossly intact to confrontation, speech clear  Skin: Cool dry, no rashes or wounds appreciated     Medication Counts: Reviewed. See bottom of note for details. Brought medication.  Overuse or misuse evident Patient reports taking extra Dilaudid due to colonoscopy.  AGUSTIN:  #[id number]. Reviewed. All providers are part of the care team.  UDS(3M): Last 8/21/21. Reviewed. Appropriate. Repeat in November.  ECG: Completed 9/20/21. QTc 418.    Assessment & Plan:    1. Prostate cancer metastatic to bone (HCC)  -Currently on Abiraterone, prednisone, Relugolix and Zometa. Follows closely with Dr. Rashid. Completed radiation with Dr. Quintanilla in 8/2021. Dr. Quintanilla felt his increased pain is unlikely due to past radiotherapy treatments and based on recent scans does not recommend additional radiotherapy at this time. Repeat  scans planned for the end of the year.     2. Pain, cancer  3. Opioid use disorder  -Patient reports completing a Suboxone taper in the past.  He reports being off opioids prior to cancer diagnosis.  Patient is currently taking hydromorphone 4 mg every 4 hours around-the-clock and methadone 10 mg 2 tablets in the morning, 1 in the afternoon and 1 at night.  He continues to rate his pain an 8/10 and reports significant activity limitations.  Patient would like to switch to a different long-acting pain medication.  We will plan for slow taper and transition at next appointment.  Will also refer patient to addiction medicine.    4. Drug-induced constipation  -Will refill senna.    Code status: Full code  Medical interventions: Full  Advanced directives: Will continue to discuss    Return in about 1 month (around 11/21/2021) for Office Visit.    I spent 45 minutes caring for Naveen Lugo on this date of service. This time includes time spent by me in the following activities: preparing for the visit, reviewing tests, obtaining and/or reviewing a separately obtained history, performing a medically appropriate examination and/or evaluation , counseling and educating the patient/family/caregiver, ordering medications, tests, or procedures, documenting information in the medical record, independently interpreting results and communicating that information with the patient/family/caregiver, and care coordination    Anna Ayers PA-C  10/21/2021    Medication Date Filled # Filled Count Used # Days  ROSE MARIE   Hydromorphone 4 10/5/21 102 1 101 16 6-7   Methadone 10 10/5/21 68 2 66 16 4

## 2021-10-21 ENCOUNTER — OFFICE VISIT (OUTPATIENT)
Dept: PALLIATIVE CARE | Facility: CLINIC | Age: 66
End: 2021-10-21

## 2021-10-21 VITALS
DIASTOLIC BLOOD PRESSURE: 68 MMHG | WEIGHT: 193 LBS | OXYGEN SATURATION: 98 % | SYSTOLIC BLOOD PRESSURE: 151 MMHG | HEART RATE: 87 BPM | BODY MASS INDEX: 28.5 KG/M2

## 2021-10-21 DIAGNOSIS — C79.51 PROSTATE CANCER METASTATIC TO BONE (HCC): Primary | ICD-10-CM

## 2021-10-21 DIAGNOSIS — G89.3 PAIN, CANCER: ICD-10-CM

## 2021-10-21 DIAGNOSIS — C61 PROSTATE CANCER METASTATIC TO BONE (HCC): Primary | ICD-10-CM

## 2021-10-21 DIAGNOSIS — F11.90 OPIOID USE DISORDER: ICD-10-CM

## 2021-10-21 DIAGNOSIS — C61 PROSTATE CANCER METASTATIC TO BONE (HCC): ICD-10-CM

## 2021-10-21 DIAGNOSIS — K59.03 DRUG-INDUCED CONSTIPATION: ICD-10-CM

## 2021-10-21 DIAGNOSIS — C79.51 PROSTATE CANCER METASTATIC TO BONE (HCC): ICD-10-CM

## 2021-10-21 PROBLEM — K59.00 CONSTIPATION: Status: ACTIVE | Noted: 2021-10-21

## 2021-10-21 PROCEDURE — 99215 OFFICE O/P EST HI 40 MIN: CPT | Performed by: PHYSICIAN ASSISTANT

## 2021-10-21 RX ORDER — HYDROMORPHONE HYDROCHLORIDE 4 MG/1
4 TABLET ORAL EVERY 4 HOURS PRN
Qty: 126 TABLET | Refills: 0 | Status: SHIPPED | OUTPATIENT
Start: 2021-11-04 | End: 2021-11-22 | Stop reason: DRUGHIGH

## 2021-10-21 RX ORDER — METHADONE HYDROCHLORIDE 10 MG/1
TABLET ORAL
Qty: 56 TABLET | Refills: 0 | Status: SHIPPED | OUTPATIENT
Start: 2021-10-21 | End: 2021-11-04

## 2021-10-21 RX ORDER — SENNOSIDES 8.6 MG
8.6 CAPSULE ORAL 3 TIMES DAILY
Qty: 84 EACH | Refills: 0 | Status: SHIPPED | OUTPATIENT
Start: 2021-10-21 | End: 2022-03-02 | Stop reason: SDUPTHER

## 2021-10-21 RX ORDER — HYDROMORPHONE HYDROCHLORIDE 4 MG/1
4 TABLET ORAL EVERY 4 HOURS PRN
Qty: 84 TABLET | Refills: 0 | Status: SHIPPED | OUTPATIENT
Start: 2021-10-21 | End: 2021-11-04

## 2021-10-21 RX ORDER — METHADONE HYDROCHLORIDE 10 MG/1
TABLET ORAL
Qty: 84 TABLET | Refills: 0 | Status: SHIPPED | OUTPATIENT
Start: 2021-11-04 | End: 2021-11-22

## 2021-10-21 NOTE — TELEPHONE ENCOUNTER
Martínez # [id number] appropriate. Patient short on several pills. He reports taking a few extra after a colonoscopy earlier this month. I will refill the hydromorphone 4 every 4 hours PRN for 14 days #84 (10/21-11/4) and a future refill for hydromorphone 4 mg q4h PRN for 21 days #126 (11/4-11/25). Also will refill methadone 10 mg 4x daily for 14 days #56 (10/21-11/4) and a future refill for methadone 10 mg 4x daily for 21 days #84 (11/4-11/25).    The patient is scheduled to follow up on 11/22/21.

## 2021-10-25 ENCOUNTER — SPECIALTY PHARMACY (OUTPATIENT)
Dept: ONCOLOGY | Facility: HOSPITAL | Age: 66
End: 2021-10-25

## 2021-10-25 DIAGNOSIS — C79.51 PROSTATE CANCER METASTATIC TO BONE (HCC): ICD-10-CM

## 2021-10-25 DIAGNOSIS — C61 PROSTATE CANCER METASTATIC TO BONE (HCC): ICD-10-CM

## 2021-10-25 RX ORDER — ABIRATERONE 500 MG/1
1000 TABLET ORAL DAILY
Qty: 60 TABLET | Refills: 3 | Status: SHIPPED | OUTPATIENT
Start: 2021-10-25 | End: 2022-02-11 | Stop reason: SDUPTHER

## 2021-11-01 ENCOUNTER — SPECIALTY PHARMACY (OUTPATIENT)
Dept: ONCOLOGY | Facility: HOSPITAL | Age: 66
End: 2021-11-01

## 2021-11-02 ENCOUNTER — SPECIALTY PHARMACY (OUTPATIENT)
Dept: ONCOLOGY | Facility: HOSPITAL | Age: 66
End: 2021-11-02

## 2021-11-02 ENCOUNTER — TELEPHONE (OUTPATIENT)
Dept: ONCOLOGY | Facility: CLINIC | Age: 66
End: 2021-11-02

## 2021-11-02 NOTE — TELEPHONE ENCOUNTER
Discussed with DHARMESH Kraft okay for patient to only come in on 11/16 and cancel the lab draw for 11/3.  Tried to call patient to notify him but got no answer and no voicemail.

## 2021-11-02 NOTE — TELEPHONE ENCOUNTER
Called patient and he wants to know if he needs to come on 11/3 for labs since he is getting labs drawn on 11/16.  Abena, is it okay for him to just come on 11/16 and get labs drawn for his Zytiga and the Zometa or does he need to come twice?

## 2021-11-02 NOTE — TELEPHONE ENCOUNTER
Caller: Naveen Lugo    Relationship: Self    Best call back number: [phone]    What is the best time to reach you: ASAP    Who are you requesting to speak with (clinical staff, provider,  specific staff member): FLACO    Do you know the name of the person who called:     What was the call regarding: PT HAS QUESTIONS ABOUT UPCOMING PORT FLUSHES    Do you require a callback: YES

## 2021-11-02 NOTE — TELEPHONE ENCOUNTER
Called patient and got no answer and he has no VM setup will cancel port flush/lab draw tomorrow.

## 2021-11-08 ENCOUNTER — SPECIALTY PHARMACY (OUTPATIENT)
Dept: ONCOLOGY | Facility: HOSPITAL | Age: 66
End: 2021-11-08

## 2021-11-08 NOTE — PROGRESS NOTES
Specialty Pharmacy Refill Coordination Note     Naveen is a 66 y.o. male contacted today regarding refills of  zytiga 1000mg PO QD specialty medication(s).    Reviewed and verified with patient:      Specialty medication(s) and dose(s) confirmed: yes    Refill Questions      Most Recent Value   Changes to allergies? No   Changes to medications? No   New conditions since last clinic visit No   Unplanned office visit, urgent care, ED, or hospital admission in the last 4 weeks  No   How does patient/caregiver feel medication is working? Very good   Financial problems or insurance changes  No   How many doses of your specialty medications were missed in the last 4 weeks? 0                Medication Adherence    Adherence tools used: watch   Other adherence tool: Clock - takes at same time each day, 7:45am   Support network for adherence: family member          Follow-up: 28 day(s)     Sushma Muro, Pharmacy Technician  Specialty Pharmacy Technician

## 2021-11-16 ENCOUNTER — INFUSION (OUTPATIENT)
Dept: ONCOLOGY | Facility: HOSPITAL | Age: 66
End: 2021-11-16

## 2021-11-16 VITALS
DIASTOLIC BLOOD PRESSURE: 68 MMHG | SYSTOLIC BLOOD PRESSURE: 144 MMHG | RESPIRATION RATE: 17 BRPM | WEIGHT: 193.8 LBS | BODY MASS INDEX: 28.62 KG/M2 | HEART RATE: 78 BPM | TEMPERATURE: 96.3 F

## 2021-11-16 DIAGNOSIS — Z45.2 ENCOUNTER FOR CARE RELATED TO PORT-A-CATH: ICD-10-CM

## 2021-11-16 DIAGNOSIS — C79.51 PROSTATE CANCER METASTATIC TO BONE (HCC): Primary | ICD-10-CM

## 2021-11-16 DIAGNOSIS — C61 PROSTATE CANCER METASTATIC TO BONE (HCC): Primary | ICD-10-CM

## 2021-11-16 PROCEDURE — 25010000002 HEPARIN LOCK FLUSH PER 10 UNITS: Performed by: INTERNAL MEDICINE

## 2021-11-16 PROCEDURE — 36591 DRAW BLOOD OFF VENOUS DEVICE: CPT

## 2021-11-16 RX ORDER — SODIUM CHLORIDE 0.9 % (FLUSH) 0.9 %
10 SYRINGE (ML) INJECTION AS NEEDED
Status: CANCELLED | OUTPATIENT
Start: 2021-11-16

## 2021-11-16 RX ORDER — HEPARIN SODIUM (PORCINE) LOCK FLUSH IV SOLN 100 UNIT/ML 100 UNIT/ML
500 SOLUTION INTRAVENOUS AS NEEDED
Status: CANCELLED | OUTPATIENT
Start: 2021-11-16

## 2021-11-16 RX ORDER — HEPARIN SODIUM (PORCINE) LOCK FLUSH IV SOLN 100 UNIT/ML 100 UNIT/ML
500 SOLUTION INTRAVENOUS AS NEEDED
Status: DISCONTINUED | OUTPATIENT
Start: 2021-11-16 | End: 2021-11-16 | Stop reason: HOSPADM

## 2021-11-16 RX ADMIN — HEPARIN 500 UNITS: 100 SYRINGE at 10:15

## 2021-11-17 ENCOUNTER — OFFICE VISIT (OUTPATIENT)
Dept: ONCOLOGY | Facility: CLINIC | Age: 66
End: 2021-11-17

## 2021-11-17 VITALS
SYSTOLIC BLOOD PRESSURE: 145 MMHG | WEIGHT: 195 LBS | RESPIRATION RATE: 18 BRPM | DIASTOLIC BLOOD PRESSURE: 67 MMHG | BODY MASS INDEX: 28.88 KG/M2 | OXYGEN SATURATION: 96 % | HEART RATE: 87 BPM | HEIGHT: 69 IN | TEMPERATURE: 98.2 F

## 2021-11-17 DIAGNOSIS — C79.51 PROSTATE CANCER METASTATIC TO BONE (HCC): Primary | ICD-10-CM

## 2021-11-17 DIAGNOSIS — E83.39 HYPOPHOSPHATEMIA: ICD-10-CM

## 2021-11-17 DIAGNOSIS — C61 PROSTATE CANCER METASTATIC TO BONE (HCC): Primary | ICD-10-CM

## 2021-11-17 LAB
ALBUMIN SERPL-MCNC: 4 G/DL (ref 3.8–4.8)
ALBUMIN/GLOB SERPL: 1.8 {RATIO} (ref 1.2–2.2)
ALP SERPL-CCNC: 69 IU/L (ref 44–121)
ALT SERPL-CCNC: 17 IU/L (ref 0–44)
AST SERPL-CCNC: 22 IU/L (ref 0–40)
BASOPHILS # BLD AUTO: 0 X10E3/UL (ref 0–0.2)
BASOPHILS NFR BLD AUTO: 0 %
BILIRUB SERPL-MCNC: 0.4 MG/DL (ref 0–1.2)
BUN SERPL-MCNC: 5 MG/DL (ref 8–27)
BUN/CREAT SERPL: 6 (ref 10–24)
CALCIUM SERPL-MCNC: 8.9 MG/DL (ref 8.6–10.2)
CHLORIDE SERPL-SCNC: 103 MMOL/L (ref 96–106)
CO2 SERPL-SCNC: 25 MMOL/L (ref 20–29)
CREAT SERPL-MCNC: 0.79 MG/DL (ref 0.76–1.27)
EOSINOPHIL # BLD AUTO: 0.1 X10E3/UL (ref 0–0.4)
EOSINOPHIL NFR BLD AUTO: 2 %
ERYTHROCYTE [DISTWIDTH] IN BLOOD BY AUTOMATED COUNT: 12.9 % (ref 11.6–15.4)
GLOBULIN SER CALC-MCNC: 2.2 G/DL (ref 1.5–4.5)
GLUCOSE SERPL-MCNC: 107 MG/DL (ref 65–99)
HCT VFR BLD AUTO: 42.9 % (ref 37.5–51)
HGB BLD-MCNC: 14.8 G/DL (ref 13–17.7)
IMM GRANULOCYTES # BLD AUTO: 0 X10E3/UL (ref 0–0.1)
IMM GRANULOCYTES NFR BLD AUTO: 0 %
LYMPHOCYTES # BLD AUTO: 0.9 X10E3/UL (ref 0.7–3.1)
LYMPHOCYTES NFR BLD AUTO: 15 %
MAGNESIUM SERPL-MCNC: 1.9 MG/DL (ref 1.6–2.3)
MCH RBC QN AUTO: 30.8 PG (ref 26.6–33)
MCHC RBC AUTO-ENTMCNC: 34.5 G/DL (ref 31.5–35.7)
MCV RBC AUTO: 89 FL (ref 79–97)
MONOCYTES # BLD AUTO: 0.4 X10E3/UL (ref 0.1–0.9)
MONOCYTES NFR BLD AUTO: 6 %
NEUTROPHILS # BLD AUTO: 4.4 X10E3/UL (ref 1.4–7)
NEUTROPHILS NFR BLD AUTO: 77 %
PHOSPHATE SERPL-MCNC: 1.7 MG/DL (ref 2.8–4.1)
PLATELET # BLD AUTO: 124 X10E3/UL (ref 150–450)
POTASSIUM SERPL-SCNC: 3.4 MMOL/L (ref 3.5–5.2)
PROT SERPL-MCNC: 6.2 G/DL (ref 6–8.5)
PSA SERPL-MCNC: <0.1 NG/ML (ref 0–4)
RBC # BLD AUTO: 4.8 X10E6/UL (ref 4.14–5.8)
SODIUM SERPL-SCNC: 141 MMOL/L (ref 134–144)
WBC # BLD AUTO: 5.8 X10E3/UL (ref 3.4–10.8)

## 2021-11-17 PROCEDURE — 99214 OFFICE O/P EST MOD 30 MIN: CPT | Performed by: NURSE PRACTITIONER

## 2021-11-17 RX ORDER — OMEPRAZOLE 40 MG/1
40 CAPSULE, DELAYED RELEASE ORAL DAILY
COMMUNITY
Start: 2021-09-10

## 2021-11-17 RX ORDER — SODIUM PHOSPHATE, DIBASIC, ANHYDROUS, POTASSIUM PHOSPHATE, MONOBASIC, AND SODIUM PHOSPHATE, MONOBASIC, MONOHYDRATE 852; 155; 130 MG/1; MG/1; MG/1
2 TABLET, COATED ORAL 4 TIMES DAILY
Qty: 8 EACH | Refills: 0 | Status: SHIPPED | OUTPATIENT
Start: 2021-11-17 | End: 2021-11-18

## 2021-11-17 NOTE — PROGRESS NOTES
CHIEF COMPLAINT: Follow-up metastatic prostate cancer    Problem List:  Oncology/Hematology History Overview Note   1.  Stage IVb grade group 4 metastatic prostate cancer with sclerotic bone lesions on CT and bone scan and history of prostatic hypertrophy  2.  Kidney stone  3.  Polyps  4.  Elevated liver enzymes    -9/30/2020 office note from Dr. Ronen Reynaga indicates patient with PSA 10.8.  Underwent TRUS prostate biopsy 9/15/2020.  Adenocarcinoma the prostate [place] 4+4 = 8 in 1 out of 12 cores and 4+3 = 7 and 10 out of 12 cores and 3+4 = 7 in 1 out of 12 cores.  Perineural invasion.  Extraprostatic extension into the fat seen on 2 biopsies of the right lateral mid and right apex.  9/24/2020 CT chest and whole-body bone scan showed T12, L1, left acetabular, right medial acetabular metastases with no lung metastases.  He started androgen deprivation therapy with Casodex with plans for Lupron to start in 1 to 2 weeks for clinical T3 N0 M1 metastatic prostate cancer.  Review of official report from [facility] 9/24/2020 bone scan mentions T12, L1, roof of left acetabulum and medial right acetabular osteoblastic metastases.  CT chest with contrast that same day showed mild splenomegaly 14.2 cm with stable periaortic nodes compared to CT December 2015 with no lung metastases.  Sclerotic abnormality within the T12 vertebral body, L1 vertebral body extending into the pedicle unchanged.  8/28/2020 CT abdomen pelvis report from [facility] reviewed and mentions normal spleen size.  Punctate nonobstructing kidney stone, no paulino enlargement, diffuse bladder wall thickening with mild perivesicular fat stranding, 2.1 cm sclerotic left iliac bone above the left acetabulum new compared to 2015 as well as 1.5 cm faintly sclerotic focus in the right posterior acetabulum stable compared to prior CT 2015 as well as a 1.6 cm T12 and inferior vertebral body sclerotic lesion and a 1.9 cm left posterior lateral L1  vertebral body abnormality extending to the pedicle.  -10/13/2020 initial [facility] medical oncology consultation: With 1 [place] score 8, 4+4 lesion that would make him a grade group 4 with stage IVb disease given radiographic evidence of sclerotic bone metastasis on bone scan and CT.  Needs genetic testing given metastatic prostate cancer and this is also important as, were he to have mismatch repair mutation then we would have options for PDL 1 inhibitors and were he BRCA mutated would have options for PARP inhibitors in the future.  Systemic therapy for castration naïve prostate cancer or N1 disease should be with apalutamide or Abiraterone or enzalutamide all of which are category 1.  He does not have any visceral metastases.  According to his imaging he has T12, L1, left acetabular, right acetabular, and left iliac bony involvement.  That means he would have 4 or more bone metastases with at least one metastasis (i.e. the acetabular lesions bilaterally) beyond the pelvis/vertebral, for which this would be considered high-volume disease for which 6 cycles of docetaxel 75 mg/m² x 6 cycles followed by Zytiga and prednisone would be reasonable along with Zometa every 6 weeks for bone stabilization.  He will do chemo preparation visit with my nurse practitioner prior to start chemotherapy with Taxotere and Zometa in 2 weeks and he is already received Casodex in preparation for Lupron shot he received on 10/12/2020 with Dr. Reynaga.  We will get him to Dr. Alexander Gomez who has done his polypectomies in the past for port placement and we will get genetic counseling started.  Following the 6 courses of Taxotere per the Chaarted trial criteria, I would then give him an indefinite Zytiga and prednisone and Zometa until progression or toxicity dictates.    -12/16/2019 COVID-19 antigen test negative    -12/29/2020 PSA 0.275 with absolute neutrophil count 700 and creatinine 0.76 with normal liver enzymes and electrolytes.   Normal magnesium and phosphorus and urine drug screen positive for buprenorphine and opiates follow-up with palliative care.    -1/4/2021 CT chest abdomen pelvis with contrast and whole-body bone scan shows improving less metabolically active bone metastases.  Stable sclerotic T12, L1, and L2 vertebral bodies and bilateral pelvic bones on bone windows with stable subcentimeter para-aortic lymph nodes and no definite progression in the chest abdomen or pelvis.    -1/5/2021 [facility] medical oncology follow-up visit: I reviewed the images and reports thereof of the above CT and bone scan which shows good response to Taxotere x3 courses with a PSA down to 0.275 from a baseline of 10.  Get another 3 courses of Taxotere, continue his Xgeva, and then following that we will switch to Zytiga and prednisone.  He is working with palliative care on his bone pain but on his current dose of fentanyl patch he says his pain is still not adequately controlled he will contact them.  Dexamethasone prescription was refilled.  We will see my nurse practitioner back in 3 weeks for course #5 of 6 total courses and then we will reimage with CT chest abdomen pelvis and bone scan before going to the Zytiga prednisone.    -3/4/2021 CT chest abdomen pelvis with contrast is negative save for stable sclerotic bone lesions and the bone scan shows near resolution of prior bony abnormalities associated with the known sclerotic lesions in the thoracolumbar spine and bony pelvis.    2/17/2021 PSA down to 0.1 from 1.37 October 2020.  3/9/2021 PSA 0.1    -5/26/2021 CT chest abdomen pelvis with contrast shows stable to slightly underlying increase low-density left para-aortic node.  Bone lesions stable.  Whole-body bone scan stable to decrease activity of known thoracolumbar and bony pelvic involvement.  PSA less than 0.1  , AST 74, bilirubin 0.5.       -6/1/2021 [facility] medical oncology follow-up visit: I reviewed the above data with him and  images thereof.  Bones are stable.  No significant adenopathy.  PSA immeasurably low.     upper limit of normal 69 and AST 74 upper limit of normal 46.  Hence, neither is more than double the upper limit of normal with no ascites and no encephalopathy and normal albumin and bilirubin, despite the elevation of liver enzymes, he has child Abrams score A.  Package insert for Abiraterone says that for ALT and/or AST greater than 5 times upper limit of normal or total bilirubin greater than 3 times the upper limit of normal to withhold treatment until liver function tests returned to baseline or ALT and AST less than or equal to 2.5 times the upper limit of normal and total bilirubin less than or equal to 1.5 times the upper limit of normal and then reinitiate at 750 mg daily dose of Zytiga.  For recurrent hepatotoxicity on 750 mg daily Zytiga, withhold treatment until liver functions returned to baseline or ALT and AST less than 3-2.5 times upper limit of normal and total bilirubin less than or equal to 1.5 times the upper limit of normal then reinitiate at 500 mg daily Zytiga dose.  If has recurrent hepatotoxicity on 500 mg dose, then discontinue treatment.  If has ALT greater than 3 times the upper limit of normal along with total bilirubin greater than 2 times the upper limit of normal in the absence of other contributing causes or biliary obstruction, permanently discontinue Zytiga.  Based on these recommendations, I would continue current doses but we will watch with serial labs monthly and repeat his imaging again in 3 months but clinically he is doing wonderfully with excellent response to Taxotere and now on Zytiga prednisone plus Zometa along with Relugolix.    -6/23/2020 for [facility] oncology clinic follow-up: Having persistent and worsening pain above his left hip.  I will get an MRI of the left hip for further evaluation.  Otherwise we will continue therapy unchanged with Zytiga, prednisone and Zometa  along with Relugolix.    -7/28/2021 [facility] oncology clinic follow-up: Patient with multiple somatic complaints including episodes of confusion, dizziness, decreased memory, agitation.  He reports ongoing episodes with difficulty swallowing.  Also most concerning for episodes of angina and chest pain for which he basically refused to seek emergency medical attention.  He has a history of coronary artery disease and stent placement.  He has not followed up with cardiology for many years.  I will get an MRI of the brain in light of his confusion, dizziness and agitation.  I will get him to gastroenterology for EGD in light of his dysphagia.  I have also put in a referral for cardiology.  I reemphasized to the patient, his wife who is with him today and his son who was on the telephone during our visit the importance of seeking out prompt medical attention when he is having chest pain or neurological concerns so that he can be evaluated.  The patient states that he basically refuses to go to the emergency room and if his family calls an ambulance he would not agree to go to the hospital.  For the time being, I have asked him to hold his Zytiga and prednisone until we can sort this out as Zytiga does have some cardiovascular risk.  There is likely no treatment for prostate cancer that does not carry some form of cardiovascular risk.  His PSA remains low at 0.014 yesterday.  He will continue relugolix for now.    Of note, some of these symptoms could also be due to his hypophosphatemia, phosphate level from yesterday was 1.5, I have sent in a prescription for K-Phos 3 times a day before meals for 10 days.  We will hold further Zometa until this is corrected.  I have also put in an order to check his vitamin D level.  He takes calcium and vitamin D daily.  Some of his symptoms also could be due to drug withdrawal as there was some confusion and difficulty getting his last prescription for methadone therefore he was  "without methadone for several days, he now has his prescription and is back on his pain medication.  He has an appointment with neurosurgery tomorrow in light of his ongoing back pain, MRI of the hip recently showed multiple bony lesions largest at the L5 vertebral body and left supra-acetabular region.  If no neurosurgical intervention recommended he may benefit from spot radiation as this is where a lot of his pain is coming from.  His wife had questions today regarding his staging and extent of disease.  We reviewed previous scans.  I did clarify with her as she used the words \"cancer free\" as she thought that is what she was told after his CT scans once he completed Taxotere in February.  I explained to her that even though his scans showed he had an excellent response with no clear areas of metastasis that did not mean he was cancer free, I explained to her that when you have metastatic cancer the treatment intent is palliative in nature and to control the disease but not to cure the disease.  She stated understanding.  We will see him back in 2 weeks for follow-up to sort through this and make further plan of care, again, I have asked him to hold Zytiga and prednisone until he sees us back, he will continue on his other medications including relugolix.    -7/30/2021 neurosurgery PA consultation.  MRI with and without contrast L5 ordered.    -8/3/2021 neurosurgery follow-up showed known sclerotic disease with new L5 abnormality without compression deformity or instability.  MRI brain negative for metastasis.    -8/4/2021 office visit Dr. Fco Quintanilla radiation oncology coordinating with Dr. Can forde for palliative radiation for MRI evidence of L5 and left supra acetabular painful lesions.  States that the patient's disease which is not secreting PSA is experiencing some progression and would benefit from 20 Gy in five fractions and other lesion 30 Gy in ten fractions. Patient offered to go to [place] " for radiation but desired treatment in [place].    -8/10/2021 [facility] medical oncology follow-up visit: No chest pains at this junction.  Reviewed MRI brain and other MRIs reviewed by Dr. North and Dwight.  Due for EGD on 19th.  We will repeat his phosphorus along with his other labs continue Relugolix, Zytiga, prednisone, and he will continue radiation palliatively to the painful bony lesions with Dr. Quintanilla/Can.  He will see my nurse practitioner back in a few weeks to see how he is tolerating this and to follow-up on the phosphorus off of Zometa and she will order repeat CT chest abdomen pelvis with contrast and total body bone scan the end of September.I will see him back after that.    -9/8/2021 Zometa resumed.  PSA <0.10    -10/5/2021 [facility] medical oncology follow-up visit: followed-up with radiation oncology 9/21/2021.  Status post symptomatic L5 radiation 5 fractions 20 [place] completed 8/16/2021 with improvement in right hip pain.  9/2/2021 PSA less than 0.1.  9/29/2021 total body bone scan shows increased uptake left iliac bone slightly superior group of the left acetabulum with no additional foci of suspicious abnormality is unlikely treatment related with no new sites of active osseous metastasis.  CT chest abdomen pelvis with contrast shows stable borderline enlarged left periaortic nodes and dating sclerotic bone lesions.Continue Zytiga, prednisone, Relugolix, Zometa, repeat CT chest abdomen pelvis with contrast and total body bone scan the end of the year.  We will follow PSA serially.     Prostate cancer metastatic to bone (HCC)   8/30/2020 -  Other Event    PSA 10.8     9/15/2020 Initial Diagnosis    Prostate cancer metastatic to bone (CMS/HCC)     9/15/2020 Biopsy    Prostate needle core biopsy     9/24/2020 Imaging    Total body bone scan: 4 abnormal areas of increased activity consistent with osteoblastic metastasis, abnormal foci of activity seen in the T12 vertebral body, L1 vertebral  body, roof of the left acetabulum, and acetabulum medially on the right.  CT chest: Stable sclerotic metastatic lesions within the T12 and L1 vertebrae.  No other evidence of metastatic disease to the chest.  Stable mild splenomegaly and subcentimeter periaortic retroperitoneal lymph nodes.  CT abdomen and pelvis: Diffuse bladder wall thickening with mild perivesicular fat stranding.  Interval development of several sclerotic lesions in the spine and left iliac bone.     10/13/2020 Cancer Staged    Staging form: Bone - Appendicular Skeleton, Trunk, Skull, And Facial Bones, AJCC 8th Edition  - Clinical: Stage IVB (cT3, cN0, cM1b, G3) - Signed by Ishmael Rashid MD on 10/13/2020     11/3/2020 -  Chemotherapy    OP SUPPORTIVE Zoledronic Acid Q42d     11/3/2020 - 2/18/2021 Chemotherapy    OP PROSTATE DOCEtaxel       1/4/2021 Imaging    CT chest abdomen pelvis with contrast and whole-body bone scan shows improving less metabolically active bone metastases.  Stable sclerotic T12, L1, and L2 vertebral bodies and bilateral pelvic bones on bone windows with stable subcentimeter para-aortic lymph nodes and no definite progression in the chest abdomen or pelvis.  PSA down to 0.275 after 3 courses of Taxotere.     3/4/2021 Imaging    CT chest, abdomen and pelvis stable, no evidence of disease progression. Total body bone scan with decreased/near resolution of abnormal increased radiotracer uptake associated with the known sclerotic lesions in the thoracolumbar spine and bony pelvis.  No evidence of new osteoblastic metastases.     3/4/2021 Imaging    CT chest abdomen pelvis with contrast is negative save for stable sclerotic bone lesions and the bone scan shows near resolution of prior bony abnormalities associated with the known sclerotic lesions in the thoracolumbar spine and bony pelvis.  2/17/2021 PSA down to 0.1 from 1.37 October 2020.     3/9/2021 - 3/9/2021 Chemotherapy    OP SUPPORTIVE PROSTATE Relugolix     3/9/2021 -   Chemotherapy    OP PROSTATE Abiraterone / PredniSONE       3/10/2021 -  Chemotherapy    OP PROSTATE Abiraterone / PredniSONE     8/10/2021 - 8/16/2021 Radiation    Radiation OncologyTreatment Course:  Naveen Lugo received 2000 cGy in 5 fractions to L4-sacrum, left acetabulum and right pelvis via External Beam Radiation - EBRT.     11/16/2021 -  Chemotherapy    OP CENTRAL VENOUS ACCESS DEVICE ACCESS, CARE, AND MAINTENANCE (CVAD)         HISTORY OF PRESENT ILLNESS:  The patient is a 66 y.o. male, here for follow up on management of metastatic prostate cancer.  No new complaints.  He reports that he still has pain in his hips, follows with palliative care.  Also has occasional pain in his upper back and stiffness in his neck at times.  He feels his current pain regimen is working well.  He did have some palliative radiation to L5, left acetabulum and right pelvis lesions and completed radiation in August but unfortunately he still has residual pain.  He has no musculoskeletal weakness.  No change in his bowel or bladder habits.  Appetite is normal.  He actually remains quite active.      Past Medical History:   Diagnosis Date   • Bone cancer (HCC)    • History of radiation therapy 08/16/2021    L4 through sacrum, left acetabulum, right pelvis   • Nephrolithiasis    • Opioid use disorder, mild, on maintenance therapy (HCC)    • Prostate cancer (HCC)      Past Surgical History:   Procedure Laterality Date   • CHOLECYSTECTOMY     • CORONARY STENT PLACEMENT  206 & 2011   • HERNIA REPAIR  1986   • THORACIC DISCECTOMY  1994       Allergies   Allergen Reactions   • Cymbalta [Duloxetine Hcl] Dizziness   • Gabapentin Nausea Only   • Lyrica [Pregabalin] Nausea And Vomiting and Dizziness       Family History and Social History reviewed and changed as necessary    REVIEW OF SYSTEM:   Metastatic prostate cancer.  No new complaints    PHYSICAL EXAM:  Lungs clear heart regular rate and rhythm    Vitals:    11/17/21 0816   BP: 145/67  "  Pulse: 87   Resp: 18   Temp: 98.2 °F (36.8 °C)   SpO2: 96%   Weight: 88.5 kg (195 lb)   Height: 175.3 cm (69\")     Vitals:    11/17/21 0816   PainSc: 0-No pain          ECOG score: 1           Vitals reviewed.  Labs reviewed.    Recent Results (from the past 168 hour(s))   CBC and Differential    Collection Time: 11/16/21 10:15 AM    Specimen: Blood    Blood  Manual Differen   Result Value Ref Range    WBC 5.8 3.4 - 10.8 x10E3/uL    RBC 4.80 4.14 - 5.80 x10E6/uL    Hemoglobin 14.8 13.0 - 17.7 g/dL    Hematocrit 42.9 37.5 - 51.0 %    MCV 89 79 - 97 fL    MCH 30.8 26.6 - 33.0 pg    MCHC 34.5 31.5 - 35.7 g/dL    RDW 12.9 11.6 - 15.4 %    Platelets 124 (L) 150 - 450 x10E3/uL    Neutrophil Rel % 77 Not Estab. %    Lymphocyte Rel % 15 Not Estab. %    Monocyte Rel % 6 Not Estab. %    Eosinophil Rel % 2 Not Estab. %    Basophil Rel % 0 Not Estab. %    Neutrophils Absolute 4.4 1.4 - 7.0 x10E3/uL    Lymphocytes Absolute 0.9 0.7 - 3.1 x10E3/uL    Monocytes Absolute 0.4 0.1 - 0.9 x10E3/uL    Eosinophils Absolute 0.1 0.0 - 0.4 x10E3/uL    Basophils Absolute 0.0 0.0 - 0.2 x10E3/uL    Immature Granulocyte Rel % 0 Not Estab. %    Immature Grans Absolute 0.0 0.0 - 0.1 x10E3/uL   Comprehensive metabolic panel    Collection Time: 11/16/21 10:15 AM    Specimen: Blood    Blood  Manual Differen   Result Value Ref Range    Glucose 107 (H) 65 - 99 mg/dL    BUN 5 (L) 8 - 27 mg/dL    Creatinine 0.79 0.76 - 1.27 mg/dL    eGFR Non African Am 94 >59 mL/min/1.73    eGFR African Am 108 >59 mL/min/1.73    BUN/Creatinine Ratio 6 (L) 10 - 24    Sodium 141 134 - 144 mmol/L    Potassium 3.4 (L) 3.5 - 5.2 mmol/L    Chloride 103 96 - 106 mmol/L    Total CO2 25 20 - 29 mmol/L    Calcium 8.9 8.6 - 10.2 mg/dL    Total Protein 6.2 6.0 - 8.5 g/dL    Albumin 4.0 3.8 - 4.8 g/dL    Globulin 2.2 1.5 - 4.5 g/dL    A/G Ratio 1.8 1.2 - 2.2    Total Bilirubin 0.4 0.0 - 1.2 mg/dL    Alkaline Phosphatase 69 44 - 121 IU/L    AST (SGOT) 22 0 - 40 IU/L    ALT (SGPT) 17 " 0 - 44 IU/L   Magnesium    Collection Time: 11/16/21 10:15 AM    Specimen: Blood    Blood  Manual Differen   Result Value Ref Range    Magnesium 1.9 1.6 - 2.3 mg/dL   Phosphorus    Collection Time: 11/16/21 10:15 AM    Specimen: Blood    Blood  Manual Differen   Result Value Ref Range    Phosphorus 1.7 (L) 2.8 - 4.1 mg/dL   PSA DIAGNOSTIC    Collection Time: 11/16/21 10:15 AM    Blood  Manual Differen   Result Value Ref Range    PSA <0.1 0.0 - 4.0 ng/mL           ASSESSMENT & PLAN:  1.  Stage IVb grade group 4 metastatic prostate cancer with sclerotic bone lesions on CT and bone scan and history of prostatic hypertrophy  2.  Kidney stone  3.  Polyps  4.  Elevated liver enzymes, resolved  5.  Hypophosphatemia    -9/30/2020 office note from Dr. Ronen Reynaga indicates patient with PSA 10.8.  Underwent TRUS prostate biopsy 9/15/2020.  Adenocarcinoma the prostate [place] 4+4 = 8 in 1 out of 12 cores and 4+3 = 7 and 10 out of 12 cores and 3+4 = 7 in 1 out of 12 cores.  Perineural invasion.  Extraprostatic extension into the fat seen on 2 biopsies of the right lateral mid and right apex.  9/24/2020 CT chest and whole-body bone scan showed T12, L1, left acetabular, right medial acetabular metastases with no lung metastases.  He started androgen deprivation therapy with Casodex with plans for Lupron to start in 1 to 2 weeks for clinical T3 N0 M1 metastatic prostate cancer.  Review of official report from [facility] 9/24/2020 bone scan mentions T12, L1, roof of left acetabulum and medial right acetabular osteoblastic metastases.  CT chest with contrast that same day showed mild splenomegaly 14.2 cm with stable periaortic nodes compared to CT December 2015 with no lung metastases.  Sclerotic abnormality within the T12 vertebral body, L1 vertebral body extending into the pedicle unchanged.  8/28/2020 CT abdomen pelvis report from [facility] reviewed and mentions normal spleen size.  Punctate nonobstructing  kidney stone, no paulino enlargement, diffuse bladder wall thickening with mild perivesicular fat stranding, 2.1 cm sclerotic left iliac bone above the left acetabulum new compared to 2015 as well as 1.5 cm faintly sclerotic focus in the right posterior acetabulum stable compared to prior CT 2015 as well as a 1.6 cm T12 and inferior vertebral body sclerotic lesion and a 1.9 cm left posterior lateral L1 vertebral body abnormality extending to the pedicle.  -10/13/2020 initial [facility] medical oncology consultation: With 1 [place] score 8, 4+4 lesion that would make him a grade group 4 with stage IVb disease given radiographic evidence of sclerotic bone metastasis on bone scan and CT.  Needs genetic testing given metastatic prostate cancer and this is also important as, were he to have mismatch repair mutation then we would have options for PDL 1 inhibitors and were he BRCA mutated would have options for PARP inhibitors in the future.  Systemic therapy for castration naïve prostate cancer or N1 disease should be with apalutamide or Abiraterone or enzalutamide all of which are category 1.  He does not have any visceral metastases.  According to his imaging he has T12, L1, left acetabular, right acetabular, and left iliac bony involvement.  That means he would have 4 or more bone metastases with at least one metastasis (i.e. the acetabular lesions bilaterally) beyond the pelvis/vertebral, for which this would be considered high-volume disease for which 6 cycles of docetaxel 75 mg/m² x 6 cycles followed by Zytiga and prednisone would be reasonable along with Zometa every 6 weeks for bone stabilization.  He will do chemo preparation visit with my nurse practitioner prior to start chemotherapy with Taxotere and Zometa in 2 weeks and he is already received Casodex in preparation for Lupron shot he received on 10/12/2020 with Dr. Reynaga.  We will get him to Dr. Alexander Gomez who has done his polypectomies in the past for port  placement and we will get genetic counseling started.  Following the 6 courses of Taxotere per the Chaarted trial criteria, I would then give him an indefinite Zytiga and prednisone and Zometa until progression or toxicity dictates.    -12/16/2019 COVID-19 antigen test negative    -12/29/2020 PSA 0.275 with absolute neutrophil count 700 and creatinine 0.76 with normal liver enzymes and electrolytes.  Normal magnesium and phosphorus and urine drug screen positive for buprenorphine and opiates follow-up with palliative care.    -1/4/2021 CT chest abdomen pelvis with contrast and whole-body bone scan shows improving less metabolically active bone metastases.  Stable sclerotic T12, L1, and L2 vertebral bodies and bilateral pelvic bones on bone windows with stable subcentimeter para-aortic lymph nodes and no definite progression in the chest abdomen or pelvis.    -1/5/2021 [facility] medical oncology follow-up visit: I reviewed the images and reports thereof of the above CT and bone scan which shows good response to Taxotere x3 courses with a PSA down to 0.275 from a baseline of 10.  Get another 3 courses of Taxotere, continue his Xgeva, and then following that we will switch to Zytiga and prednisone.  He is working with palliative care on his bone pain but on his current dose of fentanyl patch he says his pain is still not adequately controlled he will contact them.  Dexamethasone prescription was refilled.  We will see my nurse practitioner back in 3 weeks for course #5 of 6 total courses and then we will reimage with CT chest abdomen pelvis and bone scan before going to the Zytiga prednisone.    -3/4/2021 CT chest abdomen pelvis with contrast is negative save for stable sclerotic bone lesions and the bone scan shows near resolution of prior bony abnormalities associated with the known sclerotic lesions in the thoracolumbar spine and bony pelvis.    2/17/2021 PSA down to 0.1 from 1.37 October 2020.  3/9/2021 PSA  0.1    -5/26/2021 CT chest abdomen pelvis with contrast shows stable to slightly underlying increase low-density left para-aortic node.  Bone lesions stable.  Whole-body bone scan stable to decrease activity of known thoracolumbar and bony pelvic involvement.  PSA less than 0.1  , AST 74, bilirubin 0.5.       -6/1/2021 [facility] oncology follow-up visit: I reviewed the above data with him and images thereof.  Bones are stable.  No significant adenopathy.  PSA immeasurably low.     upper limit of normal 69 and AST 74 upper limit of normal 46.  Hence, neither is more than double the upper limit of normal with no ascites and no encephalopathy and normal albumin and bilirubin, despite the elevation of liver enzymes, he has child Abrams score A.  Package insert for Abiraterone says that for ALT and/or AST greater than 5 times upper limit of normal or total bilirubin greater than 3 times the upper limit of normal to withhold treatment until liver function tests returned to baseline or ALT and AST less than or equal to 2.5 times the upper limit of normal and total bilirubin less than or equal to 1.5 times the upper limit of normal and then reinitiate at 750 mg daily dose of Zytiga.  For recurrent hepatotoxicity on 750 mg daily Zytiga, withhold treatment until liver functions returned to baseline or ALT and AST less than 3-2.5 times upper limit of normal and total bilirubin less than or equal to 1.5 times the upper limit of normal then reinitiate at 500 mg daily Zytiga dose.  If has recurrent hepatotoxicity on 500 mg dose, then discontinue treatment.  If has ALT greater than 3 times the upper limit of normal along with total bilirubin greater than 2 times the upper limit of normal in the absence of other contributing causes or biliary obstruction, permanently discontinue Zytiga.  Based on these recommendations, I would continue current doses but we will watch with serial labs monthly and repeat his imaging  again in 3 months but clinically he is doing wonderfully with excellent response to Taxotere and now on Zytiga prednisone plus Zometa along with Relugolix.    -6/23/2020 for [facility] follow-up: Having persistent and worsening pain above his left hip.  I will get an MRI of the left hip for further evaluation.  Otherwise we will continue therapy unchanged with Zytiga, prednisone and Zometa along with Relugolix.    -7/28/2021 [facility] clinic follow-up: Patient with multiple somatic complaints including episodes of confusion, dizziness, decreased memory, agitation.  He reports ongoing episodes with difficulty swallowing.  Also most concerning for episodes of angina and chest pain for which he basically refused to seek emergency medical attention.  He has a history of coronary artery disease and stent placement.  He has not followed up with cardiology for many years.  I will get an MRI of the brain in light of his confusion, dizziness and agitation.  I will get him to gastroenterology for EGD in light of his dysphagia.  I have also put in a referral for cardiology.  I reemphasized to the patient, his wife who is with him today and his son who was on the telephone during our visit the importance of seeking out prompt medical attention when he is having chest pain or neurological concerns so that he can be evaluated.  The patient states that he basically refuses to go to the emergency room and if his family calls an ambulance he would not agree to go to the hospital.  For the time being, I have asked him to hold his Zytiga and prednisone until we can sort this out as Zytiga does have some cardiovascular risk.  There is likely no treatment for prostate cancer that does not carry some form of cardiovascular risk.  His PSA remains low at 0.014 yesterday.  He will continue relugolix for now.    Of note, some of these symptoms could also be due to his hypophosphatemia, phosphate level from yesterday was  "1.5, I have sent in a prescription for K-Phos 3 times a day before meals for 10 days.  We will hold further Zometa until this is corrected.  I have also put in an order to check his vitamin D level.  He takes calcium and vitamin D daily.  Some of his symptoms also could be due to drug withdrawal as there was some confusion and difficulty getting his last prescription for methadone therefore he was without methadone for several days, he now has his prescription and is back on his pain medication.  He has an appointment with neurosurgery tomorrow in light of his ongoing back pain, MRI of the hip recently showed multiple bony lesions largest at the L5 vertebral body and left supra-acetabular region.  If no neurosurgical intervention recommended he may benefit from spot radiation as this is where a lot of his pain is coming from.  His wife had questions today regarding his staging and extent of disease.  We reviewed previous scans.  I did clarify with her as she used the words \"cancer free\" as she thought that is what she was told after his CT scans once he completed Taxotere in February.  I explained to her that even though his scans showed he had an excellent response with no clear areas of metastasis that did not mean he was cancer free, I explained to her that when you have metastatic cancer the treatment intent is palliative in nature and to control the disease but not to cure the disease.  She stated understanding.  We will see him back in 2 weeks for follow-up to sort through this and make further plan of care, again, I have asked him to hold Zytiga and prednisone until he sees us back, he will continue on his other medications including relugolix.    -7/30/2021 neurosurgery PA consultation.  MRI with and without contrast L5 ordered.    -8/3/2021 neurosurgery follow-up showed known sclerotic disease with new L5 abnormality without compression deformity or instability.  MRI brain negative for " metastasis.    -8/4/2021 office visit Dr. Fco Quintanilla radiation oncology coordinating with Dr. North neurosurgery for palliative radiation for MRI evidence of L5 and left supra acetabular painful lesions.  States that the patient's disease which is not secreting PSA is experiencing some progression and would benefit from 20 Gy in five fractions and other lesion 30 Gy in ten fractions. Patient offered to go to [place] for radiation but desired treatment in [place].    -8/10/2021 [facility] medical oncology follow-up visit: No chest pains at this junction.  Reviewed MRI brain and other MRIs reviewed by Dr. North and Dwight.  Due for EGD on 19th.  We will repeat his phosphorus along with his other labs continue Relugolix, Zytiga, prednisone, and he will continue radiation palliatively to the painful bony lesions with Dr. Quintanilla/Can.  He will see my nurse practitioner back in a few weeks to see how he is tolerating this and to follow-up on the phosphorus off of Zometa and she will order repeat CT chest abdomen pelvis with contrast and total body bone scan the end of September.I will see him back after that.    -9/8/2021 Zometa resumed.  PSA <0.10    -10/5/2021 [facility] medical oncology follow-up visit: followed-up with radiation oncology 9/21/2021.  Status post symptomatic L5 radiation 5 fractions 20 Maxwell completed 8/16/2021 with improvement in right hip pain.  9/2/2021 PSA less than 0.1.  9/29/2021 total body bone scan shows increased uptake left iliac bone slightly superior group of the left acetabulum with no additional foci of suspicious abnormality is unlikely treatment related with no new sites of active osseous metastasis.  CT chest abdomen pelvis with contrast shows stable borderline enlarged left periaortic nodes and dating sclerotic bone lesions.Continue Zytiga, prednisone, Relugolix, Zometa, repeat CT chest abdomen pelvis with contrast and total body bone scan the end of the year.  We will follow PSA  serially.     -11/17/2021 [facility] Oncology clinic follow-up:  Mr. Lugo overall is doing well.  He is tolerating therapy with Zytiga, prednisone and Relugolix along with Zometa which is given every 6 weeks.  PSA remains low at <0.1.  Has occasional low phosphorus, currently low at 1.7.  Serum calcium is normal at 8.9, normal creatinine 0.79 and normal CBC other than for platelets of 124.  I will hold Zometa for 1 month, I did send in a prescription for phosphorus replacement today.  He takes calcium and vitamin D.  I will see him back in 1 month for follow-up with repeat labs and will give Zometa that day if labs allow.  We will repeat restaging scans after that visit.    This was a level 4, moderate MDM visit with management of sided fax of therapy, management of drug therapy requiring intensive monitoring for toxicity and review of labs.    Abena Velasquez, APRN    11/17/2021

## 2021-11-18 ENCOUNTER — SPECIALTY PHARMACY (OUTPATIENT)
Dept: ONCOLOGY | Facility: HOSPITAL | Age: 66
End: 2021-11-18

## 2021-11-22 ENCOUNTER — DOCUMENTATION (OUTPATIENT)
Dept: SOCIAL WORK | Facility: HOSPITAL | Age: 66
End: 2021-11-22

## 2021-11-22 ENCOUNTER — LAB (OUTPATIENT)
Dept: LAB | Facility: HOSPITAL | Age: 66
End: 2021-11-22

## 2021-11-22 ENCOUNTER — OFFICE VISIT (OUTPATIENT)
Dept: PALLIATIVE CARE | Facility: CLINIC | Age: 66
End: 2021-11-22

## 2021-11-22 VITALS
WEIGHT: 196 LBS | SYSTOLIC BLOOD PRESSURE: 148 MMHG | HEART RATE: 106 BPM | OXYGEN SATURATION: 97 % | DIASTOLIC BLOOD PRESSURE: 84 MMHG | BODY MASS INDEX: 28.94 KG/M2

## 2021-11-22 DIAGNOSIS — Z51.81 THERAPEUTIC DRUG MONITORING: Primary | ICD-10-CM

## 2021-11-22 DIAGNOSIS — C61 PROSTATE CANCER METASTATIC TO BONE (HCC): ICD-10-CM

## 2021-11-22 DIAGNOSIS — G89.3 PAIN, CANCER: ICD-10-CM

## 2021-11-22 DIAGNOSIS — C79.51 PROSTATE CANCER METASTATIC TO BONE (HCC): ICD-10-CM

## 2021-11-22 DIAGNOSIS — F11.90 OPIOID USE DISORDER: ICD-10-CM

## 2021-11-22 DIAGNOSIS — G89.3 PAIN, CANCER: Primary | ICD-10-CM

## 2021-11-22 PROCEDURE — 99215 OFFICE O/P EST HI 40 MIN: CPT | Performed by: PHYSICIAN ASSISTANT

## 2021-11-22 PROCEDURE — 80306 DRUG TEST PRSMV INSTRMNT: CPT | Performed by: PHYSICIAN ASSISTANT

## 2021-11-22 RX ORDER — FENTANYL 12 UG/H
1 PATCH TRANSDERMAL
Qty: 5 PATCH | Refills: 0 | Status: CANCELLED | OUTPATIENT
Start: 2021-11-26 | End: 2021-12-11

## 2021-11-22 RX ORDER — HYDROMORPHONE HYDROCHLORIDE 4 MG/1
6 TABLET ORAL EVERY 4 HOURS PRN
Qty: 126 TABLET | Refills: 0 | Status: SHIPPED | OUTPATIENT
Start: 2021-11-22 | End: 2021-12-06

## 2021-11-22 RX ORDER — HYDROMORPHONE HYDROCHLORIDE 4 MG/1
6 TABLET ORAL EVERY 4 HOURS PRN
Qty: 126 TABLET | Refills: 0 | Status: SHIPPED | OUTPATIENT
Start: 2021-12-06 | End: 2021-12-15 | Stop reason: DRUGHIGH

## 2021-11-22 RX ORDER — MORPHINE SULFATE 15 MG/1
15 TABLET, FILM COATED, EXTENDED RELEASE ORAL 2 TIMES DAILY
Qty: 60 TABLET | Refills: 0 | Status: SHIPPED | OUTPATIENT
Start: 2021-11-25 | End: 2021-12-15 | Stop reason: DRUGHIGH

## 2021-11-22 NOTE — PROGRESS NOTES
Palliative Clinic Note      Name: Naveen Lugo  Age: 66 y.o.  Sex: male  : 1955  MRN: [id number]  Date of Service: 21  Referring Physician: Dr. Rashid    Subjective:    Chief Complaint: Hip and back pain    History of Present Illness: Naveen Lugo is a 66 y.o. male with past medical history significant for artery disease, hypertension, hyperlipidemia, opioid use disorder, and metastatic prostate cancer who presents to the palliative clinic today as a follow up for pain and symptom management.     Treatment summary: He was diagnosed with prostate adenocarcinoma metastasized to the bone on 9/15/20. Currently on Abiraterone, prednisone, Relugolix and Zometa. Completed radiation with Dr. Quintanilla in 2021 and follow up on 10/4/21 for increased hip/back pain. Dr. Quintanilla felt his increased pain is unlikely due to past radiotherapy treatments and based on recent scans does not recommend additional radiotherapy at this time. May reconsider in the near future. Repeat scans at the end of the year. Patient reports stopping treatments temporarily due to hypophosphatemia.     Symptoms: The patient continues to suffer from severe lower back and bilateral hip pain. He rates his pain an 8/10. He expresses concerns that methadone is not helping his pain.  And he has stopped taking the 20 mg of methadone in the morning and is taking 10 mg due to significant drowsiness. He does get some relief from Dilaudid. He admits to taking an extra dose of Dilaudid for a total of 8 mg before bed. The patient's activity is significantly limited by the pain. He is getting about 5-6 consecutive hours of sleep but sometimes wakes up in the middle of the night in pain and takes a dose of dilaudid.  His appetite has been good.  No nausea or vomiting.  Bowel movements every other day.    Pyschosocial: History of opioid use disorder.  Patient completed Suboxone taper in the past and was on no pain medication prior to cancer diagnosis.  Endorses strong family support. atient states his mood has improved and he has had less irritability.     Goals: Focused on improving pain so he can participate in recreational activities with his grandson.    The following portions of the patient's history were reviewed and updated as appropriate: allergies, current medications, past family history, past medical history, past social history, past surgical history and problem list.    ORT-R: High Risk   Decisional capacity: Full  ECOG: (2) Ambulatory and capable of self care, unable to carry out work activity, up and about > 50% or waking hours   Palliative Performance Scale Score: 60%     Objective:    /84   Pulse 106   Wt 88.9 kg (196 lb)   SpO2 97%   BMI 28.94 kg/m²     Constitutional: Awake, alert, sitting up in the exam chair, analgesic gait  Eyes: PERRLA, EOMS intact  HENT: NCAT, face symmetric  Neck: Supple, trachea midline  Respiratory: Clear to auscultation bilaterally, nonlabored respirations  Cardiovascular: RRR, no murmurs appreciated  Gastrointestinal: Positive bowel sounds, soft, nontender, no guarding  Musculoskeletal: No bilateral ankle edema, moves all extremities   Psychiatric: Appropriate affect, cooperative  Neurologic: Oriented x 3, Cranial Nerves grossly intact to confrontation, speech clear  Skin: Cool dry, no rashes or wounds appreciated     Medication Counts: Reviewed. See bottom of note for details. Brought medication.  Overuse or misuse evident Patient decreased the methadone and took more of the Dilaudid.   AGUSTIN:  #[id number]. Reviewed. All providers are part of the care team.  UDS(3M): Last 8/21/21. Reviewed. Appropriate. Repeat today.  ECG (3M): Completed 9/20/21. QTc 418.    Assessment & Plan:    1. Prostate cancer metastatic to bone (HCC)  -Treatment on hold due to low phosphorous. Follows closely with Dr. Rashid. Completed radiation with Dr. Quintanilla in 8/2021 and additional radiotherapy not recommended at this time.  Repeat  scans planned for the end of the year.     2. Pain, cancer  4. Opioid use disorder  -Discussed patient with addiction specialist, Marian Hampton and pain specialist, Dr. Danny Avalos.  Inadequate pain control on current regimen of methadone 10 mg 3x daily and hydromorphone 4 mg every 4 hours. History is complicated by OUD, several failed therapies and medication intolerances. Plan to discontinue the methadone. Will increase the Dilaudid to 6 mg every 4 hours as needed and start him on fentanyl 12 mcg/hr in 72 hours. I am starting him at the lowest dose of fentanyl in order to account for methadone's long half-life and  incomplete cross tolerance.     -Dr. Avalos has agreed to see the patient in the office next week. They will explore adjuvant therapies to address his pain.     4. Therapeutic drug monitoring  -ORT-R high risk. Urine drug screen every 3 months. Will collect at next appointment.      Code status: Full code  Medical interventions: Full  Advanced directives: Will continue to discuss    Return in about 1 month (around 12/22/2021) for Office Visit.    I spent 50 minutes caring for Naveen Lugo on this date of service. This time includes time spent by me in the following activities: preparing for the visit, reviewing tests, obtaining and/or reviewing a separately obtained history, performing a medically appropriate examination and/or evaluation , counseling and educating the patient/family/caregiver, ordering medications, tests, or procedures, documenting information in the medical record, independently interpreting results and communicating that information with the patient/family/caregiver, and care coordination    nAna Ayers PA-C  11/22/2021    Medication Date Filled # Filled Count Used # Days  ROSE MARIE   Hydromorphone 4 11/4/21 126 0 126 18 7   Methadone 10 11/4/21 84 16.5 67.5 18 3-4

## 2021-11-22 NOTE — PROGRESS NOTES
Met with patient today in Palliative Care Clinic.  Pt has been taking his pain medications as prescribed, with the exception of the Methadone, he has been taking less of this medication, due to reports of increasing somnolence.    He also reports near constant pain in his back, and hips.  He states that the pain is severe and unrelenting, and he c/o intense burning in his hips, bilaterally.  The pain is significantly impacting his motor function and quality of life.  He has a previous dx of OUD, but per report from medical team, he is not exhibiting aberrant behaviors.  SABA Ayers PA-C, asked me to weigh in on this case.  After much discussion with the patient and SABA Ayers PA-C, I felt it appropriate that the patient be referred to pain management.  With the patient's permission, SABA Ayers PA-C and I had a phone consultation with Dr. BARB Avalos.  It has been decided that SABA Ayers PA-C will be making some medication adjustments and that I will be referring the patient Dr. BARB Avalos for pain management services.    A referral has been faxed to Dr. Avalos' office, and they informed me that Mirella (from his office) will be reaching out the patient to setup an appointment soon.    The patient has been notified and will be awaiting a call from Dr. Avalos' office.    Thank you very much for involving addiction services in the care of this pleasant gentleman.     Dr. BARB Avalos-  Address:[address] [place] [address]  Phone Number:  [phone]

## 2021-11-22 NOTE — TELEPHONE ENCOUNTER
Martínez #[id number] appropriate. See progress note from today for more details. Discontinued methadone. Will increase hydromorphone to 6 mg every 4 hours as needed. Plan to start morphine 15 mg twice daily on Thursday 11/25/21 morning. The patient will establish care with Dr. Avalos next week and see me in the office on 12/22/21. Spoke with the patient over the phone and discussed the plan. He verbalized understanding. Confirmed prescription of Narcan at home.

## 2021-11-22 NOTE — PATIENT INSTRUCTIONS
1. Decrease Methadone to 10 mg three times daily. Take 6 mg of Dilaudid in the morning.   2. Scheduled to follow up in 1 month.   3. Always bring your medications prescribed by the clinic to every appointment. If telemedicine appointment, be prepared to give and show medication counts. This assists us with managing your refill needs.   4. Call the Palliative Clinic for any questions or concerns at [phone] or reach out to us on [facility] via the Palliative Pool.   5. Please give us 2-3 business days in advance for routine refill requests. Prescriptions for controlled medications will take at least 24 hours to be sent to your pharmacy. Be aware of additional insurance prior authorization processing time required for some medications.

## 2021-11-23 ENCOUNTER — TELEPHONE (OUTPATIENT)
Dept: PALLIATIVE CARE | Facility: CLINIC | Age: 66
End: 2021-11-23

## 2021-11-23 NOTE — TELEPHONE ENCOUNTER
PATIENT CALLED AND STATED THAT HE SPOKE W/ HIS PHARMACY AND THEY WILL BE CLOSED 11/25 THE DAY HIS MORPHINE SCRIPT IS DUE TO BE FILLED. PATIENT WANTED TO KNOW IF THIS COULD BE CALLED IN 11/24 INSTEAD OR IF THERE IS ANOTHER OPTION FOR HIM TO GET THE SCRIPT WHEN IT'S DUE. PLEASE ADVISE.

## 2021-11-24 NOTE — TELEPHONE ENCOUNTER
Spoke with [facility] pharmacy and they have agree to fill the prescription early due to the holidays.     Called the patient and let him know it will be ready today. He verbalized understanding.

## 2021-12-06 ENCOUNTER — SPECIALTY PHARMACY (OUTPATIENT)
Dept: ONCOLOGY | Facility: HOSPITAL | Age: 66
End: 2021-12-06

## 2021-12-06 NOTE — PROGRESS NOTES
Specialty Pharmacy Refill Coordination Note     Naveen is a 66 y.o. male contacted today regarding refills of  abiraterone 1000mg PO  QD, relugolix 120mg PO QD  and prednisone 5mg PO QD specialty medication(s).    Reviewed and verified with patient:      Specialty medication(s) and dose(s) confirmed: yes    Refill Questions      Most Recent Value   Changes to allergies? No   Changes to medications? No   New conditions since last clinic visit No   Unplanned office visit, urgent care, ED, or hospital admission in the last 4 weeks  No   How does patient/caregiver feel medication is working? Very good   Financial problems or insurance changes  No   How many doses of your specialty medications were missed in the last 4 weeks? 0          Delivery Questions      Most Recent Value   Delivery method [employer]   Delivery address correct? Yes   Delivery phone number [phone]   Preferred delivery time? Anytime   Number of medications in delivery 2   Medication being filled and delivered zytiga and prednisone   Doses left of specialty medications 10 days left   Is there any medication that is due not being filled? No   Cooler needed? No   Do any medications need mixed or dated? No   Additional comments no copay   Questions or concerns for the pharmacist? No   Are any medications first time fills? No            Medication Adherence    Adherence tools used: watch   Other adherence tool: Clock - takes at same time each day, 7:45am   Support network for adherence: family member          Follow-up: 28 day(s)     Susham Muro, Pharmacy Technician  Specialty Pharmacy Technician

## 2021-12-14 ENCOUNTER — INFUSION (OUTPATIENT)
Dept: ONCOLOGY | Facility: HOSPITAL | Age: 66
End: 2021-12-14

## 2021-12-14 VITALS
HEART RATE: 82 BPM | WEIGHT: 193 LBS | BODY MASS INDEX: 28.5 KG/M2 | TEMPERATURE: 98.5 F | SYSTOLIC BLOOD PRESSURE: 151 MMHG | DIASTOLIC BLOOD PRESSURE: 80 MMHG | RESPIRATION RATE: 18 BRPM

## 2021-12-14 DIAGNOSIS — Z45.2 ENCOUNTER FOR CARE RELATED TO PORT-A-CATH: ICD-10-CM

## 2021-12-14 DIAGNOSIS — C79.51 PROSTATE CANCER METASTATIC TO BONE (HCC): Primary | ICD-10-CM

## 2021-12-14 DIAGNOSIS — C61 PROSTATE CANCER METASTATIC TO BONE (HCC): Primary | ICD-10-CM

## 2021-12-14 PROCEDURE — 25010000002 HEPARIN LOCK FLUSH PER 10 UNITS: Performed by: INTERNAL MEDICINE

## 2021-12-14 PROCEDURE — 36591 DRAW BLOOD OFF VENOUS DEVICE: CPT

## 2021-12-14 RX ORDER — HEPARIN SODIUM (PORCINE) LOCK FLUSH IV SOLN 100 UNIT/ML 100 UNIT/ML
500 SOLUTION INTRAVENOUS AS NEEDED
Status: DISCONTINUED | OUTPATIENT
Start: 2021-12-14 | End: 2021-12-14 | Stop reason: HOSPADM

## 2021-12-14 RX ORDER — SODIUM CHLORIDE 0.9 % (FLUSH) 0.9 %
10 SYRINGE (ML) INJECTION AS NEEDED
Status: DISCONTINUED | OUTPATIENT
Start: 2021-12-14 | End: 2021-12-14 | Stop reason: HOSPADM

## 2021-12-14 RX ORDER — HEPARIN SODIUM (PORCINE) LOCK FLUSH IV SOLN 100 UNIT/ML 100 UNIT/ML
500 SOLUTION INTRAVENOUS AS NEEDED
Status: CANCELLED | OUTPATIENT
Start: 2021-12-14

## 2021-12-14 RX ORDER — SODIUM CHLORIDE 0.9 % (FLUSH) 0.9 %
10 SYRINGE (ML) INJECTION AS NEEDED
Status: CANCELLED | OUTPATIENT
Start: 2021-12-14

## 2021-12-14 RX ADMIN — HEPARIN 500 UNITS: 100 SYRINGE at 08:05

## 2021-12-15 ENCOUNTER — OFFICE VISIT (OUTPATIENT)
Dept: ONCOLOGY | Facility: CLINIC | Age: 66
End: 2021-12-15

## 2021-12-15 VITALS
BODY MASS INDEX: 28.58 KG/M2 | SYSTOLIC BLOOD PRESSURE: 153 MMHG | DIASTOLIC BLOOD PRESSURE: 79 MMHG | HEIGHT: 69 IN | RESPIRATION RATE: 20 BRPM | HEART RATE: 93 BPM | OXYGEN SATURATION: 92 % | WEIGHT: 193 LBS | TEMPERATURE: 96.9 F

## 2021-12-15 DIAGNOSIS — C79.51 PROSTATE CANCER METASTATIC TO BONE (HCC): Primary | ICD-10-CM

## 2021-12-15 DIAGNOSIS — C61 PROSTATE CANCER METASTATIC TO BONE (HCC): Primary | ICD-10-CM

## 2021-12-15 LAB
ALBUMIN SERPL-MCNC: 3.8 G/DL (ref 3.8–4.8)
ALBUMIN/GLOB SERPL: 1.8 {RATIO} (ref 1.2–2.2)
ALP SERPL-CCNC: 71 IU/L (ref 44–121)
ALT SERPL-CCNC: 16 IU/L (ref 0–44)
AST SERPL-CCNC: 21 IU/L (ref 0–40)
BASOPHILS # BLD AUTO: 0 X10E3/UL (ref 0–0.2)
BASOPHILS NFR BLD AUTO: 1 %
BILIRUB SERPL-MCNC: 0.3 MG/DL (ref 0–1.2)
BUN SERPL-MCNC: 7 MG/DL (ref 8–27)
BUN/CREAT SERPL: 8 (ref 10–24)
CALCIUM SERPL-MCNC: 8.8 MG/DL (ref 8.6–10.2)
CHLORIDE SERPL-SCNC: 104 MMOL/L (ref 96–106)
CO2 SERPL-SCNC: 25 MMOL/L (ref 20–29)
CREAT SERPL-MCNC: 0.83 MG/DL (ref 0.76–1.27)
EOSINOPHIL # BLD AUTO: 0.1 X10E3/UL (ref 0–0.4)
EOSINOPHIL NFR BLD AUTO: 2 %
ERYTHROCYTE [DISTWIDTH] IN BLOOD BY AUTOMATED COUNT: 12.6 % (ref 11.6–15.4)
GLOBULIN SER CALC-MCNC: 2.1 G/DL (ref 1.5–4.5)
GLUCOSE SERPL-MCNC: 129 MG/DL (ref 65–99)
HCT VFR BLD AUTO: 41.8 % (ref 37.5–51)
HGB BLD-MCNC: 14.2 G/DL (ref 13–17.7)
IMM GRANULOCYTES # BLD AUTO: 0 X10E3/UL (ref 0–0.1)
IMM GRANULOCYTES NFR BLD AUTO: 0 %
LYMPHOCYTES # BLD AUTO: 1.5 X10E3/UL (ref 0.7–3.1)
LYMPHOCYTES NFR BLD AUTO: 28 %
MAGNESIUM SERPL-MCNC: 2.1 MG/DL (ref 1.6–2.3)
MCH RBC QN AUTO: 30.2 PG (ref 26.6–33)
MCHC RBC AUTO-ENTMCNC: 34 G/DL (ref 31.5–35.7)
MCV RBC AUTO: 89 FL (ref 79–97)
MONOCYTES # BLD AUTO: 0.3 X10E3/UL (ref 0.1–0.9)
MONOCYTES NFR BLD AUTO: 6 %
NEUTROPHILS # BLD AUTO: 3.4 X10E3/UL (ref 1.4–7)
NEUTROPHILS NFR BLD AUTO: 63 %
PHOSPHATE SERPL-MCNC: 2.2 MG/DL (ref 2.8–4.1)
PLATELET # BLD AUTO: 137 X10E3/UL (ref 150–450)
POTASSIUM SERPL-SCNC: 3.8 MMOL/L (ref 3.5–5.2)
PROT SERPL-MCNC: 5.9 G/DL (ref 6–8.5)
PSA SERPL-MCNC: <0.1 NG/ML (ref 0–4)
RBC # BLD AUTO: 4.7 X10E6/UL (ref 4.14–5.8)
SODIUM SERPL-SCNC: 142 MMOL/L (ref 134–144)
WBC # BLD AUTO: 5.3 X10E3/UL (ref 3.4–10.8)

## 2021-12-15 PROCEDURE — 99214 OFFICE O/P EST MOD 30 MIN: CPT | Performed by: NURSE PRACTITIONER

## 2021-12-15 RX ORDER — HYDROMORPHONE HYDROCHLORIDE 8 MG/1
TABLET ORAL
COMMUNITY
Start: 2021-12-07 | End: 2022-08-10 | Stop reason: SDUPTHER

## 2021-12-15 RX ORDER — MORPHINE SULFATE 30 MG/1
TABLET, FILM COATED, EXTENDED RELEASE ORAL
COMMUNITY
Start: 2021-12-07 | End: 2022-08-10

## 2021-12-15 NOTE — PROGRESS NOTES
CHIEF COMPLAINT: Follow-up metastatic prostate cancer    Problem List:  Oncology/Hematology History Overview Note   1.  Stage IVb grade group 4 metastatic prostate cancer with sclerotic bone lesions on CT and bone scan and history of prostatic hypertrophy  2.  Kidney stone  3.  Polyps      -9/30/2020 office note from Dr. Ronen Reynaga indicates patient with PSA 10.8.  Underwent TRUS prostate biopsy 9/15/2020.  Adenocarcinoma the prostate Sarika 4+4 = 8 in 1 out of 12 cores and 4+3 = 7 and 10 out of 12 cores and 3+4 = 7 in 1 out of 12 cores.  Perineural invasion.  Extraprostatic extension into the fat seen on 2 biopsies of the right lateral mid and right apex.  9/24/2020 CT chest and whole-body bone scan showed T12, L1, left acetabular, right medial acetabular metastases with no lung metastases.  He started androgen deprivation therapy with Casodex with plans for Lupron to start in 1 to 2 weeks for clinical T3 N0 M1 metastatic prostate cancer.  Review of official report from [facility] 9/24/2020 bone scan mentions T12, L1, roof of left acetabulum and medial right acetabular osteoblastic metastases.  CT chest with contrast that same day showed mild splenomegaly 14.2 cm with stable periaortic nodes compared to CT December 2015 with no lung metastases.  Sclerotic abnormality within the T12 vertebral body, L1 vertebral body extending into the pedicle unchanged.  8/28/2020 CT abdomen pelvis report from [facility] reviewed and mentions normal spleen size.  Punctate nonobstructing kidney stone, no paulino enlargement, diffuse bladder wall thickening with mild perivesicular fat stranding, 2.1 cm sclerotic left iliac bone above the left acetabulum new compared to 2015 as well as 1.5 cm faintly sclerotic focus in the right posterior acetabulum stable compared to prior CT 2015 as well as a 1.6 cm T12 and inferior vertebral body sclerotic lesion and a 1.9 cm left posterior lateral L1 vertebral body abnormality  extending to the pedicle.  -10/13/2020 initial [facility] medical oncology consultation: With 1 Sarika score 8, 4+4 lesion that would make him a grade group 4 with stage IVb disease given radiographic evidence of sclerotic bone metastasis on bone scan and CT.  Needs genetic testing given metastatic prostate cancer and this is also important as, were he to have mismatch repair mutation then we would have options for PDL 1 inhibitors and were he BRCA mutated would have options for PARP inhibitors in the future.  Systemic therapy for castration naïve prostate cancer or N1 disease should be with apalutamide or Abiraterone or enzalutamide all of which are category 1.  He does not have any visceral metastases.  According to his imaging he has T12, L1, left acetabular, right acetabular, and left iliac bony involvement.  That means he would have 4 or more bone metastases with at least one metastasis (i.e. the acetabular lesions bilaterally) beyond the pelvis/vertebral, for which this would be considered high-volume disease for which 6 cycles of docetaxel 75 mg/m² x 6 cycles followed by Zytiga and prednisone would be reasonable along with Zometa every 6 weeks for bone stabilization.  He will do chemo preparation visit with my nurse practitioner prior to start chemotherapy with Taxotere and Zometa in 2 weeks and he is already received Casodex in preparation for Lupron shot he received on 10/12/2020 with Dr. Reynaga.  We will get him to Dr. Alexander Gomez who has done his polypectomies in the past for port placement and we will get genetic counseling started.  Following the 6 courses of Taxotere per the Chaarted trial criteria, I would then give him an indefinite Zytiga and prednisone and Zometa until progression or toxicity dictates.    -12/16/2019 COVID-19 antigen test negative    -12/29/2020 PSA 0.275 with absolute neutrophil count 700 and creatinine 0.76 with normal liver enzymes and electrolytes.  Normal magnesium and  phosphorus and urine drug screen positive for buprenorphine and opiates follow-up with palliative care.    -1/4/2021 CT chest abdomen pelvis with contrast and whole-body bone scan shows improving less metabolically active bone metastases.  Stable sclerotic T12, L1, and L2 vertebral bodies and bilateral pelvic bones on bone windows with stable subcentimeter para-aortic lymph nodes and no definite progression in the chest abdomen or pelvis.    -1/5/2021 [facility] medical oncology follow-up visit: I reviewed the images and reports thereof of the above CT and bone scan which shows good response to Taxotere x3 courses with a PSA down to 0.275 from a baseline of 10.  Get another 3 courses of Taxotere, continue his Xgeva, and then following that we will switch to Zytiga and prednisone.  He is working with palliative care on his bone pain but on his current dose of fentanyl patch he says his pain is still not adequately controlled he will contact them.  Dexamethasone prescription was refilled.  We will see my nurse practitioner back in 3 weeks for course #5 of 6 total courses and then we will reimage with CT chest abdomen pelvis and bone scan before going to the Zytiga prednisone.    -3/4/2021 CT chest abdomen pelvis with contrast is negative save for stable sclerotic bone lesions and the bone scan shows near resolution of prior bony abnormalities associated with the known sclerotic lesions in the thoracolumbar spine and bony pelvis.    2/17/2021 PSA down to 0.1 from 1.37 October 2020.  3/9/2021 PSA 0.1    -5/26/2021 CT chest abdomen pelvis with contrast shows stable to slightly underlying increase low-density left para-aortic node.  Bone lesions stable.  Whole-body bone scan stable to decrease activity of known thoracolumbar and bony pelvic involvement.  PSA less than 0.1  , AST 74, bilirubin 0.5.       -6/1/2021 [facility] medical oncology follow-up visit: I reviewed the above data with him and images thereof.  Bones  are stable.  No significant adenopathy.  PSA immeasurably low.     upper limit of normal 69 and AST 74 upper limit of normal 46.  Hence, neither is more than double the upper limit of normal with no ascites and no encephalopathy and normal albumin and bilirubin, despite the elevation of liver enzymes, he has child Abrams score A.  Package insert for Abiraterone says that for ALT and/or AST greater than 5 times upper limit of normal or total bilirubin greater than 3 times the upper limit of normal to withhold treatment until liver function tests returned to baseline or ALT and AST less than or equal to 2.5 times the upper limit of normal and total bilirubin less than or equal to 1.5 times the upper limit of normal and then reinitiate at 750 mg daily dose of Zytiga.  For recurrent hepatotoxicity on 750 mg daily Zytiga, withhold treatment until liver functions returned to baseline or ALT and AST less than 3-2.5 times upper limit of normal and total bilirubin less than or equal to 1.5 times the upper limit of normal then reinitiate at 500 mg daily Zytiga dose.  If has recurrent hepatotoxicity on 500 mg dose, then discontinue treatment.  If has ALT greater than 3 times the upper limit of normal along with total bilirubin greater than 2 times the upper limit of normal in the absence of other contributing causes or biliary obstruction, permanently discontinue Zytiga.  Based on these recommendations, I would continue current doses but we will watch with serial labs monthly and repeat his imaging again in 3 months but clinically he is doing wonderfully with excellent response to Taxotere and now on Zytiga prednisone plus Zometa along with Relugolix.    -6/23/2020 for [facility] follow[facility]: Having persistent and worsening pain above his left hip.  I will get an MRI of the left hip for further evaluation.  Otherwise we will continue therapy unchanged with Zytiga, prednisone and Zometa along with  Relugolix.    -7/28/2021 [facility] oncology clinic follow-up: Patient with multiple somatic complaints including episodes of confusion, dizziness, decreased memory, agitation.  He reports ongoing episodes with difficulty swallowing.  Also most concerning for episodes of angina and chest pain for which he basically refused to seek emergency medical attention.  He has a history of coronary artery disease and stent placement.  He has not followed up with cardiology for many years.  I will get an MRI of the brain in light of his confusion, dizziness and agitation.  I will get him to gastroenterology for EGD in light of his dysphagia.  I have also put in a referral for cardiology.  I reemphasized to the patient, his wife who is with him today and his son who was on the telephone during our visit the importance of seeking out prompt medical attention when he is having chest pain or neurological concerns so that he can be evaluated.  The patient states that he basically refuses to go to the emergency room and if his family calls an ambulance he would not agree to go to the hospital.  For the time being, I have asked him to hold his Zytiga and prednisone until we can sort this out as Zytiga does have some cardiovascular risk.  There is likely no treatment for prostate cancer that does not carry some form of cardiovascular risk.  His PSA remains low at 0.014 yesterday.  He will continue relugolix for now.    Of note, some of these symptoms could also be due to his hypophosphatemia, phosphate level from yesterday was 1.5, I have sent in a prescription for K-Phos 3 times a day before meals for 10 days.  We will hold further Zometa until this is corrected.  I have also put in an order to check his vitamin D level.  He takes calcium and vitamin D daily.  Some of his symptoms also could be due to drug withdrawal as there was some confusion and difficulty getting his last prescription for methadone therefore he was without methadone  "for several days, he now has his prescription and is back on his pain medication.  He has an appointment with neurosurgery tomorrow in light of his ongoing back pain, MRI of the hip recently showed multiple bony lesions largest at the L5 vertebral body and left supra-acetabular region.  If no neurosurgical intervention recommended he may benefit from spot radiation as this is where a lot of his pain is coming from.  His wife had questions today regarding his staging and extent of disease.  We reviewed previous scans.  I did clarify with her as she used the words \"cancer free\" as she thought that is what she was told after his CT scans once he completed Taxotere in February.  I explained to her that even though his scans showed he had an excellent response with no clear areas of metastasis that did not mean he was cancer free, I explained to her that when you have metastatic cancer the treatment intent is palliative in nature and to control the disease but not to cure the disease.  She stated understanding.  We will see him back in 2 weeks for follow-up to sort through this and make further plan of care, again, I have asked him to hold Zytiga and prednisone until he sees us back, he will continue on his other medications including relugolix.    -7/30/2021 neurosurgery PA consultation.  MRI with and without contrast L5 ordered.    -8/3/2021 neurosurgery follow-up showed known sclerotic disease with new L5 abnormality without compression deformity or instability.  MRI brain negative for metastasis.    -8/4/2021 office visit Dr. Fco Quintanilla radiation oncology coordinating with Dr. Can forde for palliative radiation for MRI evidence of L5 and left supra acetabular painful lesions.  States that the patient's disease which is not secreting PSA is experiencing some progression and would benefit from 20 Gy in five fractions and other lesion 30 Gy in ten fractions. Patient offered to go to [place] for radiation but " desired treatment in [place].    -8/10/2021 [facility] follow-up visit: No chest pains at this junction.  Reviewed MRI brain and other MRIs reviewed by Dr. North and Dwight.  Due for EGD on 19th.  We will repeat his phosphorus along with his other labs continue Relugolix, Zytiga, prednisone, and he will continue radiation palliatively to the painful bony lesions with Dr. Quintanilla/Can.  He will see my nurse practitioner back in a few weeks to see how he is tolerating this and to follow-up on the phosphorus off of Zometa and she will order repeat CT chest abdomen pelvis with contrast and total body bone scan the end of September.I will see him back after that.    -9/8/2021 Zometa resumed.  PSA <0.10    -10/5/2021 [facility] follow-up visit: followed-up with radiation oncology 9/21/2021.  Status post symptomatic L5 radiation 5 fractions 20 Maxwell completed 8/16/2021 with improvement in right hip pain.  9/2/2021 PSA less than 0.1.  9/29/2021 total body bone scan shows increased uptake left iliac bone slightly superior group of the left acetabulum with no additional foci of suspicious abnormality is unlikely treatment related with no new sites of active osseous metastasis.  CT chest abdomen pelvis with contrast shows stable borderline enlarged left periaortic nodes and dating sclerotic bone lesions.Continue Zytiga, prednisone, Relugolix, Zometa, repeat CT chest abdomen pelvis with contrast and total body bone scan the end of the year.  We will follow PSA serially.    -11/17/2021 [facility] follow-up:  Mr. Lugo overall is doing well.  He is tolerating therapy with Zytiga, prednisone and Relugolix along with Zometa which is given every 6 weeks.  PSA remains low at <0.1.  Has occasional low phosphorus, currently low at 1.7.  Serum calcium is normal at 8.9, normal creatinine 0.79 and normal CBC other than for platelets of 124.  I will hold Zometa for 1 month, I did send in a  prescription for phosphorus replacement today.  He takes calcium and vitamin D.  I will see him back in 1 month for follow-up.  We will repeat restaging scans after that visit.    -12/15/2021 [facility] Oncology clinic follow-up: Mr. Lugo continues to do well on therapy with Zytiga, prednisone and Relugolix, his PSA remains low at <0.1.  He is continuing to have left lower back pain, he is working with palliative care and they have referred him also to pain management.  Pain management has talked to him about putting in a pain pump however he is leery of this, I told him that this was a safe and effective pain management technique and to certainly give consideration to their recommendations.  His phosphorus is improving however is still a little low, I will hold off on Zometa until we see him back in 1 month, we may end up only giving it every 12 weeks.  We will repeat restaging scans with CT chest, abdomen and pelvis along with total body bone scan prior to return and I have ordered those today.     Prostate cancer metastatic to bone (HCC)   8/30/2020 -  Other Event    PSA 10.8     9/15/2020 Initial Diagnosis    Prostate cancer metastatic to bone (CMS/HCC)     9/15/2020 Biopsy    Prostate needle core biopsy     9/24/2020 Imaging    Total body bone scan: 4 abnormal areas of increased activity consistent with osteoblastic metastasis, abnormal foci of activity seen in the T12 vertebral body, L1 vertebral body, roof of the left acetabulum, and acetabulum medially on the right.  CT chest: Stable sclerotic metastatic lesions within the T12 and L1 vertebrae.  No other evidence of metastatic disease to the chest.  Stable mild splenomegaly and subcentimeter periaortic retroperitoneal lymph nodes.  CT abdomen and pelvis: Diffuse bladder wall thickening with mild perivesicular fat stranding.  Interval development of several sclerotic lesions in the spine and left iliac bone.     10/13/2020 Cancer Staged    Staging form: Bone -  Appendicular Skeleton, Trunk, Skull, And Facial Bones, AJCC 8th Edition  - Clinical: Stage IVB (cT3, cN0, cM1b, G3) - Signed by Ishmael Rashid MD on 10/13/2020     11/3/2020 -  Chemotherapy    OP SUPPORTIVE Zoledronic Acid Q42d     11/3/2020 - 2/18/2021 Chemotherapy    OP PROSTATE DOCEtaxel       1/4/2021 Imaging    CT chest abdomen pelvis with contrast and whole-body bone scan shows improving less metabolically active bone metastases.  Stable sclerotic T12, L1, and L2 vertebral bodies and bilateral pelvic bones on bone windows with stable subcentimeter para-aortic lymph nodes and no definite progression in the chest abdomen or pelvis.  PSA down to 0.275 after 3 courses of Taxotere.     3/4/2021 Imaging    CT chest, abdomen and pelvis stable, no evidence of disease progression. Total body bone scan with decreased/near resolution of abnormal increased radiotracer uptake associated with the known sclerotic lesions in the thoracolumbar spine and bony pelvis.  No evidence of new osteoblastic metastases.     3/4/2021 Imaging    CT chest abdomen pelvis with contrast is negative save for stable sclerotic bone lesions and the bone scan shows near resolution of prior bony abnormalities associated with the known sclerotic lesions in the thoracolumbar spine and bony pelvis.  2/17/2021 PSA down to 0.1 from 1.37 October 2020.     3/9/2021 - 3/9/2021 Chemotherapy    OP SUPPORTIVE PROSTATE Relugolix     3/9/2021 -  Chemotherapy    OP PROSTATE Abiraterone / PredniSONE       3/10/2021 -  Chemotherapy    OP PROSTATE Abiraterone / PredniSONE     8/10/2021 - 8/16/2021 Radiation    Radiation OncologyTreatment Course:  Naveen Lugo received 2000 cGy in 5 fractions to L4-sacrum, left acetabulum and right pelvis via External Beam Radiation - EBRT.     11/16/2021 -  Chemotherapy    OP CENTRAL VENOUS ACCESS DEVICE ACCESS, CARE, AND MAINTENANCE (CVAD)         HISTORY OF PRESENT ILLNESS:  The patient is a 66 y.o. male, here for follow up on  "management of metastatic prostate cancer.  No new complaints but he continues to have pain in his left hip/low back.  He reports that palliative care has referred him to pain management to see if they could help get his pain under control.  They want to put in a pain pump by so far he has declined as he reports he \"is afraid of those\".   He had palliative radiation to L5, left acetabulum and right pelvis lesions and completed radiation in August but unfortunately he still has residual pain.  He has no musculoskeletal weakness.  No change in his bowel or bladder habits.  Appetite is normal.  He remains quite active.      Past Medical History:   Diagnosis Date   • Bone cancer (HCC)    • History of radiation therapy 08/16/2021    L4 through sacrum, left acetabulum, right pelvis   • Nephrolithiasis    • Opioid use disorder, mild, on maintenance therapy (HCC)    • Prostate cancer (HCC)      Past Surgical History:   Procedure Laterality Date   • CHOLECYSTECTOMY     • CORONARY STENT PLACEMENT  206 & 2011   • HERNIA REPAIR  1986   • THORACIC DISCECTOMY  1994       Allergies   Allergen Reactions   • Cymbalta [Duloxetine Hcl] Dizziness   • Gabapentin Nausea Only   • Lyrica [Pregabalin] Nausea And Vomiting and Dizziness       Family History and Social History reviewed and changed as necessary    REVIEW OF SYSTEM:   Low back/left hip pain    PHYSICAL EXAM:  Lungs clear heart regular rate and rhythm    Vitals:    12/15/21 1050   BP: 153/79   Pulse: 93   Resp: 20   Temp: 96.9 °F (36.1 °C)   SpO2: 92%   Weight: 87.5 kg (193 lb)   Height: 175.3 cm (69\")     Vitals:    12/15/21 1050   PainSc:   8   PainLoc: Back  Comment: all over          ECOG score: 1           Vitals reviewed.  Labs reviewed.    Recent Results (from the past 168 hour(s))   Phosphorus    Collection Time: 12/14/21  8:15 AM    Specimen: Blood    Blood  Release to celestino   Result Value Ref Range    Phosphorus 2.2 (L) 2.8 - 4.1 mg/dL   PSA DIAGNOSTIC    Collection Time: " 12/14/21  8:15 AM    Specimen: Blood    Blood  Release to celestino   Result Value Ref Range    PSA <0.1 0.0 - 4.0 ng/mL   CBC and Differential    Collection Time: 12/14/21  8:15 AM    Specimen: Blood    Blood  Release to celestino   Result Value Ref Range    WBC 5.3 3.4 - 10.8 x10E3/uL    RBC 4.70 4.14 - 5.80 x10E6/uL    Hemoglobin 14.2 13.0 - 17.7 g/dL    Hematocrit 41.8 37.5 - 51.0 %    MCV 89 79 - 97 fL    MCH 30.2 26.6 - 33.0 pg    MCHC 34.0 31.5 - 35.7 g/dL    RDW 12.6 11.6 - 15.4 %    Platelets 137 (L) 150 - 450 x10E3/uL    Neutrophil Rel % 63 Not Estab. %    Lymphocyte Rel % 28 Not Estab. %    Monocyte Rel % 6 Not Estab. %    Eosinophil Rel % 2 Not Estab. %    Basophil Rel % 1 Not Estab. %    Neutrophils Absolute 3.4 1.4 - 7.0 x10E3/uL    Lymphocytes Absolute 1.5 0.7 - 3.1 x10E3/uL    Monocytes Absolute 0.3 0.1 - 0.9 x10E3/uL    Eosinophils Absolute 0.1 0.0 - 0.4 x10E3/uL    Basophils Absolute 0.0 0.0 - 0.2 x10E3/uL    Immature Granulocyte Rel % 0 Not Estab. %    Immature Grans Absolute 0.0 0.0 - 0.1 x10E3/uL   Comprehensive metabolic panel    Collection Time: 12/14/21  8:15 AM    Specimen: Blood    Blood  Release to celestino   Result Value Ref Range    Glucose 129 (H) 65 - 99 mg/dL    BUN 7 (L) 8 - 27 mg/dL    Creatinine 0.83 0.76 - 1.27 mg/dL    eGFR Non African Am 92 >59 mL/min/1.73    eGFR African Am 106 >59 mL/min/1.73    BUN/Creatinine Ratio 8 (L) 10 - 24    Sodium 142 134 - 144 mmol/L    Potassium 3.8 3.5 - 5.2 mmol/L    Chloride 104 96 - 106 mmol/L    Total CO2 25 20 - 29 mmol/L    Calcium 8.8 8.6 - 10.2 mg/dL    Total Protein 5.9 (L) 6.0 - 8.5 g/dL    Albumin 3.8 3.8 - 4.8 g/dL    Globulin 2.1 1.5 - 4.5 g/dL    A/G Ratio 1.8 1.2 - 2.2    Total Bilirubin 0.3 0.0 - 1.2 mg/dL    Alkaline Phosphatase 71 44 - 121 IU/L    AST (SGOT) 21 0 - 40 IU/L    ALT (SGPT) 16 0 - 44 IU/L   Magnesium    Collection Time: 12/14/21  8:15 AM    Blood  Release to celestino   Result Value Ref Range    Magnesium 2.1 1.6 - 2.3 mg/dL            ASSESSMENT & PLAN:  1.  Stage IVb grade group 4 metastatic prostate cancer with sclerotic bone lesions on CT and bone scan and history of prostatic hypertrophy  2.  Kidney stone  3.  Polyps  4.  Elevated liver enzymes, resolved  5.  Hypophosphatemia  6.  Cancer related pain    -9/30/2020 office note from Dr. Ronen Reynaga indicates patient with PSA 10.8.  Underwent TRUS prostate biopsy 9/15/2020.  Adenocarcinoma the prostate Sarika 4+4 = 8 in 1 out of 12 cores and 4+3 = 7 and 10 out of 12 cores and 3+4 = 7 in 1 out of 12 cores.  Perineural invasion.  Extraprostatic extension into the fat seen on 2 biopsies of the right lateral mid and right apex.  9/24/2020 CT chest and whole-body bone scan showed T12, L1, left acetabular, right medial acetabular metastases with no lung metastases.  He started androgen deprivation therapy with Casodex with plans for Lupron to start in 1 to 2 weeks for clinical T3 N0 M1 metastatic prostate cancer.  Review of official report from [facility] 9/24/2020 bone scan mentions T12, L1, roof of left acetabulum and medial right acetabular osteoblastic metastases.  CT chest with contrast that same day showed mild splenomegaly 14.2 cm with stable periaortic nodes compared to CT December 2015 with no lung metastases.  Sclerotic abnormality within the T12 vertebral body, L1 vertebral body extending into the pedicle unchanged.  8/28/2020 CT abdomen pelvis report from [facility] reviewed and mentions normal spleen size.  Punctate nonobstructing kidney stone, no paulino enlargement, diffuse bladder wall thickening with mild perivesicular fat stranding, 2.1 cm sclerotic left iliac bone above the left acetabulum new compared to 2015 as well as 1.5 cm faintly sclerotic focus in the right posterior acetabulum stable compared to prior CT 2015 as well as a 1.6 cm T12 and inferior vertebral body sclerotic lesion and a 1.9 cm left posterior lateral L1 vertebral body abnormality  extending to the pedicle.  -10/13/2020 initial [facility] medical oncology consultation: With 1 Sarika score 8, 4+4 lesion that would make him a grade group 4 with stage IVb disease given radiographic evidence of sclerotic bone metastasis on bone scan and CT.  Needs genetic testing given metastatic prostate cancer and this is also important as, were he to have mismatch repair mutation then we would have options for PDL 1 inhibitors and were he BRCA mutated would have options for PARP inhibitors in the future.  Systemic therapy for castration naïve prostate cancer or N1 disease should be with apalutamide or Abiraterone or enzalutamide all of which are category 1.  He does not have any visceral metastases.  According to his imaging he has T12, L1, left acetabular, right acetabular, and left iliac bony involvement.  That means he would have 4 or more bone metastases with at least one metastasis (i.e. the acetabular lesions bilaterally) beyond the pelvis/vertebral, for which this would be considered high-volume disease for which 6 cycles of docetaxel 75 mg/m² x 6 cycles followed by Zytiga and prednisone would be reasonable along with Zometa every 6 weeks for bone stabilization.  He will do chemo preparation visit with my nurse practitioner prior to start chemotherapy with Taxotere and Zometa in 2 weeks and he is already received Casodex in preparation for Lupron shot he received on 10/12/2020 with Dr. Reynaga.  We will get him to Dr. Alexander Gomez who has done his polypectomies in the past for port placement and we will get genetic counseling started.  Following the 6 courses of Taxotere per the [place]arted trial criteria, I would then give him an indefinite Zytiga and prednisone and Zometa until progression or toxicity dictates.    -12/16/2019 COVID-19 antigen test negative    -12/29/2020 PSA 0.275 with absolute neutrophil count 700 and creatinine 0.76 with normal liver enzymes and electrolytes.  Normal magnesium and  phosphorus and urine drug screen positive for buprenorphine and opiates follow-up with palliative care.    -1/4/2021 CT chest abdomen pelvis with contrast and whole-body bone scan shows improving less metabolically active bone metastases.  Stable sclerotic T12, L1, and L2 vertebral bodies and bilateral pelvic bones on bone windows with stable subcentimeter para-aortic lymph nodes and no definite progression in the chest abdomen or pelvis.    -1/5/2021 [facility] medical oncology follow-up visit: I reviewed the images and reports thereof of the above CT and bone scan which shows good response to Taxotere x3 courses with a PSA down to 0.275 from a baseline of 10.  Get another 3 courses of Taxotere, continue his Xgeva, and then following that we will switch to Zytiga and prednisone.  He is working with palliative care on his bone pain but on his current dose of fentanyl patch he says his pain is still not adequately controlled he will contact them.  Dexamethasone prescription was refilled.  We will see my nurse practitioner back in 3 weeks for course #5 of 6 total courses and then we will reimage with CT chest abdomen pelvis and bone scan before going to the Zytiga prednisone.    -3/4/2021 CT chest abdomen pelvis with contrast is negative save for stable sclerotic bone lesions and the bone scan shows near resolution of prior bony abnormalities associated with the known sclerotic lesions in the thoracolumbar spine and bony pelvis.    2/17/2021 PSA down to 0.1 from 1.37 October 2020.  3/9/2021 PSA 0.1    -5/26/2021 CT chest abdomen pelvis with contrast shows stable to slightly underlying increase low-density left para-aortic node.  Bone lesions stable.  Whole-body bone scan stable to decrease activity of known thoracolumbar and bony pelvic involvement.  PSA less than 0.1  , AST 74, bilirubin 0.5.       -6/1/2021 [facility] medical oncology follow-up visit: I reviewed the above data with him and images thereof.  Bones  are stable.  No significant adenopathy.  PSA immeasurably low.     upper limit of normal 69 and AST 74 upper limit of normal 46.  Hence, neither is more than double the upper limit of normal with no ascites and no encephalopathy and normal albumin and bilirubin, despite the elevation of liver enzymes, he has child Abrams score A.  Package insert for Abiraterone says that for ALT and/or AST greater than 5 times upper limit of normal or total bilirubin greater than 3 times the upper limit of normal to withhold treatment until liver function tests returned to baseline or ALT and AST less than or equal to 2.5 times the upper limit of normal and total bilirubin less than or equal to 1.5 times the upper limit of normal and then reinitiate at 750 mg daily dose of Zytiga.  For recurrent hepatotoxicity on 750 mg daily Zytiga, withhold treatment until liver functions returned to baseline or ALT and AST less than 3-2.5 times upper limit of normal and total bilirubin less than or equal to 1.5 times the upper limit of normal then reinitiate at 500 mg daily Zytiga dose.  If has recurrent hepatotoxicity on 500 mg dose, then discontinue treatment.  If has ALT greater than 3 times the upper limit of normal along with total bilirubin greater than 2 times the upper limit of normal in the absence of other contributing causes or biliary obstruction, permanently discontinue Zytiga.  Based on these recommendations, I would continue current doses but we will watch with serial labs monthly and repeat his imaging again in 3 months but clinically he is doing wonderfully with excellent response to Taxotere and now on Zytiga prednisone plus Zometa along with Relugolix.    -6/23/2020 for [facility] clinic follow-up: Having persistent and worsening pain above his left hip.  I will get an MRI of the left hip for further evaluation.  Otherwise we will continue therapy unchanged with Zytiga, prednisone and Zometa along with  Relugolix.    -7/28/2021 [facility] oncology clinic follow-up: Patient with multiple somatic complaints including episodes of confusion, dizziness, decreased memory, agitation.  He reports ongoing episodes with difficulty swallowing.  Also most concerning for episodes of angina and chest pain for which he basically refused to seek emergency medical attention.  He has a history of coronary artery disease and stent placement.  He has not followed up with cardiology for many years.  I will get an MRI of the brain in light of his confusion, dizziness and agitation.  I will get him to gastroenterology for EGD in light of his dysphagia.  I have also put in a referral for cardiology.  I reemphasized to the patient, his wife who is with him today and his son who was on the telephone during our visit the importance of seeking out prompt medical attention when he is having chest pain or neurological concerns so that he can be evaluated.  The patient states that he basically refuses to go to the emergency room and if his family calls an ambulance he would not agree to go to the hospital.  For the time being, I have asked him to hold his Zytiga and prednisone until we can sort this out as Zytiga does have some cardiovascular risk.  There is likely no treatment for prostate cancer that does not carry some form of cardiovascular risk.  His PSA remains low at 0.014 yesterday.  He will continue relugolix for now.    Of note, some of these symptoms could also be due to his hypophosphatemia, phosphate level from yesterday was 1.5, I have sent in a prescription for K-Phos 3 times a day before meals for 10 days.  We will hold further Zometa until this is corrected.  I have also put in an order to check his vitamin D level.  He takes calcium and vitamin D daily.  Some of his symptoms also could be due to drug withdrawal as there was some confusion and difficulty getting his last prescription for methadone therefore he was without methadone  "for several days, he now has his prescription and is back on his pain medication.  He has an appointment with neurosurgery tomorrow in light of his ongoing back pain, MRI of the hip recently showed multiple bony lesions largest at the L5 vertebral body and left supra-acetabular region.  If no neurosurgical intervention recommended he may benefit from spot radiation as this is where a lot of his pain is coming from.  His wife had questions today regarding his staging and extent of disease.  We reviewed previous scans.  I did clarify with her as she used the words \"cancer free\" as she thought that is what she was told after his CT scans once he completed Taxotere in February.  I explained to her that even though his scans showed he had an excellent response with no clear areas of metastasis that did not mean he was cancer free, I explained to her that when you have metastatic cancer the treatment intent is palliative in nature and to control the disease but not to cure the disease.  She stated understanding.  We will see him back in 2 weeks for follow-up to sort through this and make further plan of care, again, I have asked him to hold Zytiga and prednisone until he sees us back, he will continue on his other medications including relugolix.    -7/30/2021 neurosurgery PA consultation.  MRI with and without contrast L5 ordered.    -8/3/2021 neurosurgery follow-up showed known sclerotic disease with new L5 abnormality without compression deformity or instability.  MRI brain negative for metastasis.    -8/4/2021 office visit Dr. Fco Quintanilla radiation oncology coordinating with Dr. Can forde for palliative radiation for MRI evidence of L5 and left supra acetabular painful lesions.  States that the patient's disease which is not secreting PSA is experiencing some progression and would benefit from 20 Gy in five fractions and other lesion 30 Gy in ten fractions. Patient offered to go to [place] for radiation but " desired treatment in [place].    -8/10/2021 [facility]ology follow-up visit: No chest pains at this junction.  Reviewed MRI brain and other MRIs reviewed by Dr. North and Dwight.  Due for EGD on 19th.  We will repeat his phosphorus along with his other labs continue Relugolix, Zytiga, prednisone, and he will continue radiation palliatively to the painful bony lesions with Dr. Quintanilla/Can.  He will see my nurse practitioner back in a few weeks to see how he is tolerating this and to follow-up on the phosphorus off of Zometa and she will order repeat CT chest abdomen pelvis with contrast and total body bone scan the end of September.I will see him back after that.    -9/8/2021 Zometa resumed.  PSA <0.10    -10/5/2021 [facility] medical oncology follow-up visit: followed-up with radiation oncology 9/21/2021.  Status post symptomatic L5 radiation 5 fractions 20 Maxwell completed 8/16/2021 with improvement in right hip pain.  9/2/2021 PSA less than 0.1.  9/29/2021 total body bone scan shows increased uptake left iliac bone slightly superior group of the left acetabulum with no additional foci of suspicious abnormality is unlikely treatment related with no new sites of active osseous metastasis.  CT chest abdomen pelvis with contrast shows stable borderline enlarged left periaortic nodes and dating sclerotic bone lesions.Continue Zytiga, prednisone, Relugolix, Zometa, repeat CT chest abdomen pelvis with contrast and total body bone scan the end of the year.  We will follow PSA serially.     -11/17/2021 [facility] follow-up:  Mr. Lugo overall is doing well.  He is tolerating therapy with Zytiga, prednisone and Relugolix along with Zometa which is given every 6 weeks.  PSA remains low at <0.1.  Has occasional low phosphorus, currently low at 1.7.  Serum calcium is normal at 8.9, normal creatinine 0.79 and normal CBC other than for platelets of 124.  I will hold Zometa for 1 month, I did send in a  prescription for phosphorus replacement today.  He takes calcium and vitamin D.  I will see him back in 1 month for follow-up with repeat labs and will give Zometa that day if labs allow.  We will repeat restaging scans after that visit.    -12/15/2021 [facility] Oncology clinic follow-up: Mr. Lugo continues to do well on therapy with Zytiga, prednisone and Relugolix, his PSA remains low at <0.1.  He is continuing to have left lower back pain, he is working with palliative care and they have referred him also to pain management.  Pain management has talked to him about putting in a pain pump however he is leery of this, I told him that this was a safe and effective pain management technique and to certainly give consideration to their recommendations.  His phosphorus is improving however is still a little low, I will hold off on Zometa until we see him back in 1 month, we may end up only giving it every 12 weeks.  We will repeat restaging scans with CT chest, abdomen and pelvis along with total body bone scan prior to return and I have ordered those today.    This was a level 4, moderate MDM visit with management of side effects of therapy, management of drug therapy requiring intensive monitoring for toxicity and review of labs, treatment decisions and ordering of restaging scans.    Abena Velasquez, APRN    12/15/2021           in a private house with 5 entry steps equipped with bilateral hand rail . Once inside, pt has to negotiate a flight of stairs to access her apartment on the second floor The bathroom has a tub/shower combination and standard toilet./spouse

## 2021-12-21 NOTE — PATIENT INSTRUCTIONS
1. Continue current regimen. Changes per pain management.   2. Scheduled to follow up in 1 month.  3. Always bring your medications prescribed by the clinic to every appointment. If telemedicine appointment, be prepared to give and show medication counts. This assists us with managing your refill needs.   4. Call the Palliative Clinic for any questions or concerns at [phone] or reach out to us on [facility] via the Palliative Pool.   5. Please give us 3-4 business days in advance for routine refill requests. Prescriptions for controlled medications will take at least 24 hours to be sent to your pharmacy. Be aware of additional insurance prior authorization processing time required for some medications.

## 2021-12-21 NOTE — PROGRESS NOTES
Palliative Clinic Note      Name: Naveen Lugo  Age: 66 y.o.  Sex: male  : 1955  MRN: [id number]  Date of Service: 21  Referring Physician: Dr. Rashid    Subjective:    Chief Complaint: Back/hip pain    History of Present Illness: Naveen Lugo is a 66 y.o. male with past medical history significant for artery disease, hypertension, hyperlipidemia, opioid use disorder, and metastatic prostate cancer who presents to the palliative clinic today as a follow up for pain and symptom management.     Treatment summary: He was diagnosed with prostate adenocarcinoma metastasized to the bone on 9/15/20.  Completed radiation with Dr. Quintanilla in 2021 and follow up on 10/4/21 for increased hip/back pain. Dr. Quintanilla felt his increased pain is unlikely due to past radiotherapy treatments and based on recent scans does not recommend additional radiotherapy at this time. May reconsider in the future. Currently on Zytiga, prednisone, and Relugolix. Zometa held due to low phosphorous. Upcoming repeat scans.     History of opioid use disorder.  Patient completed Suboxone taper in the past and was on no pain medication prior to cancer diagnosis. He was start on Methadone by the previous palliative provider. He began complaining of increased somnolence and inadequate pain control on this regimen. We switched him to MS Contin and referred the patient to pain specialist, Dr. BARB Avalos.     Symptoms: The patient complains of significant lower back and hip pain. He established care with pain specialist Dr. BARB Avalos 1 month ago. Unfortunately, the patient reports no improvement in his pain. He shares an unpleasant experience he had with a practitioner at his last visit. He was decreased to MS Contin 15 mg twice daily with dilaudid 8 mg every 6 hours as needed. Dr. Avalos recommended a pain pump at his initial visit however the patient is reluctant due to risks of surgery. The pain is limiting his daily activities and  "disrupting his sleep. Aside from the pain, he is doing well and tolerating the chemotherapy. No nausea or vomiting. Regular bowel movements every 2 days.     Pyschosocial:  Endorses strong family support. Mood affected by pain.      Goals: Focused on improving pain so he can participate in recreational activities with his grandson.    The following portions of the patient's history were reviewed and updated as appropriate: allergies, current medications, past family history, past medical history, past social history, past surgical history and problem list.    ORT-R: High risk  Decisional capacity: Full  ECOG: (2) Ambulatory and capable of self care, unable to carry out work activity, up and about > 50% or waking hours   Palliative Performance Scale Score: 60%     Objective:    /65   Pulse 73   Temp 97.1 °F (36.2 °C) (Temporal)   Resp 16   Ht 175.3 cm (69.02\")   Wt 89.2 kg (196 lb 9.6 oz)   SpO2 95%   BMI 29.02 kg/m²     Constitutional: Awake, alert, sitting up in the exam chair, analgesic gait  Eyes: PERRLA, EOMS intact  HENT: NCAT, face symmetric  Neck: Supple, trachea midline  Respiratory: Clear to auscultation bilaterally, nonlabored respirations  Cardiovascular: RRR, no murmurs appreciated  Gastrointestinal: Positive bowel sounds, soft, nontender, no guarding  Musculoskeletal: No bilateral ankle edema, moves all extremities   Psychiatric: Appropriate affect, cooperative  Neurologic: Oriented x 3, Cranial Nerves grossly intact to confrontation, speech clear  Skin: Cool dry, no rashes or wounds appreciated     Medication Counts: Reviewed. See bottom of note for details. Did not bring medication to appointment  AGUSTIN:  #[id number]. Reviewed. All providers are part of the care team.  UDS(3M): Last 11/22/21. Defer future UDS as patient follows with pain management.    Assessment & Plan:    1. Prostate cancer metastatic to bone (HCC)  -- Currently on Zytiga, prednisone, and Relugolix. Zometa held due to " low phosphorous. Upcoming repeat scans. Follows with Dr. Rashid and Abena Velasquez. Radiation oncology appointment coming up the first of the year.    2. Pain, cancer  -- Pain medication per pain management. Encouraged patient to consider a pain pump. Explained that escalating the oral opioid therapy may not improve his pain and we should explore other options. Patient verbalized understanding. Discussed the patient's uncontrolled pain with Dr. BARB Avalos over the telephone after his appointment. Dr. Avalos will have his office call to schedule the patient a follow up as soon as possible for medication adjustments. Patient is aware of this and will reach out to us if he has any issues.     Code status: Full code  Medical interventions: Full  Advanced directives: None  Healthcare surrogate: Rosaura Lugo    Follow up in the office in 1 month.     I spent 45 minutes caring for Naveen Lugo on this date of service. This time includes time spent by me in the following activities: preparing for the visit, reviewing tests, obtaining and/or reviewing a separately obtained history, performing a medically appropriate examination and/or evaluation , counseling and educating the patient/family/caregiver, ordering medications, tests, or procedures, documenting information in the medical record, independently interpreting results and communicating that information with the patient/family/caregiver, and care coordination    Anna Ayers PA-C  12/22/2021    Medication Date Filled # Filled Count Used # Days  ROSE MARIE   Hydromorphone 8 12/7/21 60 NA NA NA 4   Morphine 15 11/24/21 60 NA NA NA 2

## 2021-12-22 ENCOUNTER — OFFICE VISIT (OUTPATIENT)
Dept: PALLIATIVE CARE | Facility: CLINIC | Age: 66
End: 2021-12-22

## 2021-12-22 VITALS
SYSTOLIC BLOOD PRESSURE: 119 MMHG | HEIGHT: 69 IN | RESPIRATION RATE: 16 BRPM | DIASTOLIC BLOOD PRESSURE: 65 MMHG | HEART RATE: 73 BPM | WEIGHT: 196.6 LBS | BODY MASS INDEX: 29.12 KG/M2 | TEMPERATURE: 97.1 F | OXYGEN SATURATION: 95 %

## 2021-12-22 DIAGNOSIS — G89.3 PAIN, CANCER: ICD-10-CM

## 2021-12-22 DIAGNOSIS — C79.51 PROSTATE CANCER METASTATIC TO BONE (HCC): Primary | ICD-10-CM

## 2021-12-22 DIAGNOSIS — C61 PROSTATE CANCER METASTATIC TO BONE (HCC): Primary | ICD-10-CM

## 2021-12-22 PROCEDURE — 99215 OFFICE O/P EST HI 40 MIN: CPT | Performed by: PHYSICIAN ASSISTANT

## 2021-12-22 RX ORDER — MORPHINE SULFATE 15 MG/1
TABLET, FILM COATED, EXTENDED RELEASE ORAL
COMMUNITY
Start: 2021-12-20 | End: 2022-08-10

## 2022-01-07 ENCOUNTER — HOSPITAL ENCOUNTER (OUTPATIENT)
Dept: RADIATION ONCOLOGY | Facility: HOSPITAL | Age: 67
Setting detail: RADIATION/ONCOLOGY SERIES
Discharge: HOME | End: 2022-01-07

## 2022-01-11 ENCOUNTER — INFUSION (OUTPATIENT)
Dept: ONCOLOGY | Facility: HOSPITAL | Age: 67
End: 2022-01-11

## 2022-01-11 ENCOUNTER — OFFICE VISIT (OUTPATIENT)
Dept: ONCOLOGY | Facility: CLINIC | Age: 67
End: 2022-01-11

## 2022-01-11 VITALS
OXYGEN SATURATION: 93 % | SYSTOLIC BLOOD PRESSURE: 160 MMHG | TEMPERATURE: 96.8 F | HEART RATE: 87 BPM | HEIGHT: 69 IN | BODY MASS INDEX: 29.03 KG/M2 | WEIGHT: 196 LBS | RESPIRATION RATE: 20 BRPM | DIASTOLIC BLOOD PRESSURE: 81 MMHG

## 2022-01-11 DIAGNOSIS — C79.51 PROSTATE CANCER METASTATIC TO BONE: Primary | ICD-10-CM

## 2022-01-11 DIAGNOSIS — Z45.2 ENCOUNTER FOR CARE RELATED TO PORT-A-CATH: ICD-10-CM

## 2022-01-11 DIAGNOSIS — C61 PROSTATE CANCER METASTATIC TO BONE: Primary | ICD-10-CM

## 2022-01-11 PROCEDURE — 36591 DRAW BLOOD OFF VENOUS DEVICE: CPT

## 2022-01-11 PROCEDURE — 99215 OFFICE O/P EST HI 40 MIN: CPT | Performed by: INTERNAL MEDICINE

## 2022-01-11 PROCEDURE — 25010000002 HEPARIN LOCK FLUSH PER 10 UNITS: Performed by: INTERNAL MEDICINE

## 2022-01-11 RX ORDER — SODIUM CHLORIDE 9 MG/ML
250 INJECTION, SOLUTION INTRAVENOUS ONCE
Status: CANCELLED | OUTPATIENT
Start: 2022-01-13

## 2022-01-11 RX ORDER — HEPARIN SODIUM (PORCINE) LOCK FLUSH IV SOLN 100 UNIT/ML 100 UNIT/ML
500 SOLUTION INTRAVENOUS AS NEEDED
Status: DISCONTINUED | OUTPATIENT
Start: 2022-01-11 | End: 2022-01-11 | Stop reason: HOSPADM

## 2022-01-11 RX ORDER — HEPARIN SODIUM (PORCINE) LOCK FLUSH IV SOLN 100 UNIT/ML 100 UNIT/ML
500 SOLUTION INTRAVENOUS AS NEEDED
Status: CANCELLED | OUTPATIENT
Start: 2022-01-11

## 2022-01-11 RX ORDER — SODIUM CHLORIDE 0.9 % (FLUSH) 0.9 %
10 SYRINGE (ML) INJECTION AS NEEDED
Status: CANCELLED | OUTPATIENT
Start: 2022-01-11

## 2022-01-11 RX ADMIN — HEPARIN 500 UNITS: 100 SYRINGE at 09:30

## 2022-01-11 NOTE — PROGRESS NOTES
CHIEF COMPLAINT: Generalized bone aches and severe fatigue disproportional to his prostate cancer or blood counts    Problem List:  Oncology/Hematology History Overview Note   1.  Stage IVb grade group 4 metastatic prostate cancer with sclerotic bone lesions on CT and bone scan and history of prostatic hypertrophy  2.  Kidney stone  3.  Polyps  4.  Probable drug-induced hypophosphatemia from Zometa      -9/30/2020 office note from Dr. Ronen Reynaga indicates patient with PSA 10.8.  Underwent TRUS prostate biopsy 9/15/2020.  Adenocarcinoma the prostate Sarika 4+4 = 8 in 1 out of 12 cores and 4+3 = 7 and 10 out of 12 cores and 3+4 = 7 in 1 out of 12 cores.  Perineural invasion.  Extraprostatic extension into the fat seen on 2 biopsies of the right lateral mid and right apex.  9/24/2020 CT chest and whole-body bone scan showed T12, L1, left acetabular, right medial acetabular metastases with no lung metastases.  He started androgen deprivation therapy with Casodex with plans for Lupron to start in 1 to 2 weeks for clinical T3 N0 M1 metastatic prostate cancer.  Review of official report from [facility] 9/24/2020 bone scan mentions T12, L1, roof of left acetabulum and medial right acetabular osteoblastic metastases.  CT chest with contrast that same day showed mild splenomegaly 14.2 cm with stable periaortic nodes compared to CT December 2015 with no lung metastases.  Sclerotic abnormality within the T12 vertebral body, L1 vertebral body extending into the pedicle unchanged.  8/28/2020 CT abdomen pelvis report from [facility] reviewed and mentions normal spleen size.  Punctate nonobstructing kidney stone, no paulino enlargement, diffuse bladder wall thickening with mild perivesicular fat stranding, 2.1 cm sclerotic left iliac bone above the left acetabulum new compared to 2015 as well as 1.5 cm faintly sclerotic focus in the right posterior acetabulum stable compared to prior CT 2015 as well as a 1.6 cm  T12 and inferior vertebral body sclerotic lesion and a 1.9 cm left posterior lateral L1 vertebral body abnormality extending to the pedicle.  -10/13/2020 initial [facility] medical oncology consultation: With 1 Sarika score 8, 4+4 lesion that would make him a grade group 4 with stage IVb disease given radiographic evidence of sclerotic bone metastasis on bone scan and CT.  Needs genetic testing given metastatic prostate cancer and this is also important as, were he to have mismatch repair mutation then we would have options for PDL 1 inhibitors and were he BRCA mutated would have options for PARP inhibitors in the future.  Systemic therapy for castration naïve prostate cancer or N1 disease should be with apalutamide or Abiraterone or enzalutamide all of which are category 1.  He does not have any visceral metastases.  According to his imaging he has T12, L1, left acetabular, right acetabular, and left iliac bony involvement.  That means he would have 4 or more bone metastases with at least one metastasis (i.e. the acetabular lesions bilaterally) beyond the pelvis/vertebral, for which this would be considered high-volume disease for which 6 cycles of docetaxel 75 mg/m² x 6 cycles followed by Zytiga and prednisone would be reasonable along with Zometa every 6 weeks for bone stabilization.  He will do chemo preparation visit with my nurse practitioner prior to start chemotherapy with Taxotere and Zometa in 2 weeks and he is already received Casodex in preparation for Lupron shot he received on 10/12/2020 with Dr. Reynaga.  We will get him to Dr. Alexander Gomez who has done his polypectomies in the past for port placement and we will get genetic counseling started.  Following the 6 courses of Taxotere per the [place]arted trial criteria, I would then give him an indefinite Zytiga and prednisone and Zometa until progression or toxicity dictates.    -12/16/2019 COVID-19 antigen test negative    -12/29/2020 PSA 0.275 with  absolute neutrophil count 700 and creatinine 0.76 with normal liver enzymes and electrolytes.  Normal magnesium and phosphorus and urine drug screen positive for buprenorphine and opiates follow-up with palliative care.    -1/4/2021 CT chest abdomen pelvis with contrast and whole-body bone scan shows improving less metabolically active bone metastases.  Stable sclerotic T12, L1, and L2 vertebral bodies and bilateral pelvic bones on bone windows with stable subcentimeter para-aortic lymph nodes and no definite progression in the chest abdomen or pelvis.    -1/5/2021 [facility] medical oncology follow-up visit: I reviewed the images and reports thereof of the above CT and bone scan which shows good response to Taxotere x3 courses with a PSA down to 0.275 from a baseline of 10.  Get another 3 courses of Taxotere, continue his Xgeva, and then following that we will switch to Zytiga and prednisone.  He is working with palliative care on his bone pain but on his current dose of fentanyl patch he says his pain is still not adequately controlled he will contact them.  Dexamethasone prescription was refilled.  We will see my nurse practitioner back in 3 weeks for course #5 of 6 total courses and then we will reimage with CT chest abdomen pelvis and bone scan before going to the Zytiga prednisone.    -3/4/2021 CT chest abdomen pelvis with contrast is negative save for stable sclerotic bone lesions and the bone scan shows near resolution of prior bony abnormalities associated with the known sclerotic lesions in the thoracolumbar spine and bony pelvis.    2/17/2021 PSA down to 0.1 from 1.37 October 2020.  3/9/2021 PSA 0.1    -5/26/2021 CT chest abdomen pelvis with contrast shows stable to slightly underlying increase low-density left para-aortic node.  Bone lesions stable.  Whole-body bone scan stable to decrease activity of known thoracolumbar and bony pelvic involvement.  PSA less than 0.1  , AST 74, bilirubin 0.5.        -6/1/2021 [facility] medical oncology follow-up visit: I reviewed the above data with him and images thereof.  Bones are stable.  No significant adenopathy.  PSA immeasurably low.     upper limit of normal 69 and AST 74 upper limit of normal 46.  Hence, neither is more than double the upper limit of normal with no ascites and no encephalopathy and normal albumin and bilirubin, despite the elevation of liver enzymes, he has child Abrams score A.  Package insert for Abiraterone says that for ALT and/or AST greater than 5 times upper limit of normal or total bilirubin greater than 3 times the upper limit of normal to withhold treatment until liver function tests returned to baseline or ALT and AST less than or equal to 2.5 times the upper limit of normal and total bilirubin less than or equal to 1.5 times the upper limit of normal and then reinitiate at 750 mg daily dose of Zytiga.  For recurrent hepatotoxicity on 750 mg daily Zytiga, withhold treatment until liver functions returned to baseline or ALT and AST less than 3-2.5 times upper limit of normal and total bilirubin less than or equal to 1.5 times the upper limit of normal then reinitiate at 500 mg daily Zytiga dose.  If has recurrent hepatotoxicity on 500 mg dose, then discontinue treatment.  If has ALT greater than 3 times the upper limit of normal along with total bilirubin greater than 2 times the upper limit of normal in the absence of other contributing causes or biliary obstruction, permanently discontinue Zytiga.  Based on these recommendations, I would continue current doses but we will watch with serial labs monthly and repeat his imaging again in 3 months but clinically he is doing wonderfully with excellent response to Taxotere and now on Zytiga prednisone plus Zometa along with Relugolix.    -6/23/2020 for [facility] oncology clinic follow-up: Having persistent and worsening pain above his left hip.  I will get an MRI of the left hip for  further evaluation.  Otherwise we will continue therapy unchanged with Zytiga, prednisone and Zometa along with Relugolix.    -7/28/2021 [facility] oncology clinic follow-up: Patient with multiple somatic complaints including episodes of confusion, dizziness, decreased memory, agitation.  He reports ongoing episodes with difficulty swallowing.  Also most concerning for episodes of angina and chest pain for which he basically refused to seek emergency medical attention.  He has a history of coronary artery disease and stent placement.  He has not followed up with cardiology for many years.  I will get an MRI of the brain in light of his confusion, dizziness and agitation.  I will get him to gastroenterology for EGD in light of his dysphagia.  I have also put in a referral for cardiology.  I reemphasized to the patient, his wife who is with him today and his son who was on the telephone during our visit the importance of seeking out prompt medical attention when he is having chest pain or neurological concerns so that he can be evaluated.  The patient states that he basically refuses to go to the emergency room and if his family calls an ambulance he would not agree to go to the hospital.  For the time being, I have asked him to hold his Zytiga and prednisone until we can sort this out as Zytiga does have some cardiovascular risk.  There is likely no treatment for prostate cancer that does not carry some form of cardiovascular risk.  His PSA remains low at 0.014 yesterday.  He will continue relugolix for now.    Of note, some of these symptoms could also be due to his hypophosphatemia, phosphate level from yesterday was 1.5, I have sent in a prescription for K-Phos 3 times a day before meals for 10 days.  We will hold further Zometa until this is corrected.  I have also put in an order to check his vitamin D level.  He takes calcium and vitamin D daily.  Some of his symptoms also could be due to drug withdrawal as there  "was some confusion and difficulty getting his last prescription for methadone therefore he was without methadone for several days, he now has his prescription and is back on his pain medication.  He has an appointment with neurosurgery tomorrow in light of his ongoing back pain, MRI of the hip recently showed multiple bony lesions largest at the L5 vertebral body and left supra-acetabular region.  If no neurosurgical intervention recommended he may benefit from spot radiation as this is where a lot of his pain is coming from.  His wife had questions today regarding his staging and extent of disease.  We reviewed previous scans.  I did clarify with her as she used the words \"cancer free\" as she thought that is what she was told after his CT scans once he completed Taxotere in February.  I explained to her that even though his scans showed he had an excellent response with no clear areas of metastasis that did not mean he was cancer free, I explained to her that when you have metastatic cancer the treatment intent is palliative in nature and to control the disease but not to cure the disease.  She stated understanding.  We will see him back in 2 weeks for follow-up to sort through this and make further plan of care, again, I have asked him to hold Zytiga and prednisone until he sees us back, he will continue on his other medications including relugolix.    -7/30/2021 neurosurgery PA consultation.  MRI with and without contrast L5 ordered.    -8/3/2021 neurosurgery follow-up showed known sclerotic disease with new L5 abnormality without compression deformity or instability.  MRI brain negative for metastasis.    -8/4/2021 office visit Dr. Fco Quintanilla radiation oncology coordinating with Dr. Can forde for palliative radiation for MRI evidence of L5 and left supra acetabular painful lesions.  States that the patient's disease which is not secreting PSA is experiencing some progression and would benefit from 20 " Gy in five fractions and other lesion 30 Gy in ten fractions. Patient offered to go to [place] for radiation but desired treatment in [place].    -8/10/2021 [facility] medical oncology follow-up visit: No chest pains at this junction.  Reviewed MRI brain and other MRIs reviewed by Dr. North and Dwight.  Due for EGD on 19th.  We will repeat his phosphorus along with his other labs continue Relugolix, Zytiga, prednisone, and he will continue radiation palliatively to the painful bony lesions with Dr. Quintanilla/Can.  He will see my nurse practitioner back in a few weeks to see how he is tolerating this and to follow-up on the phosphorus off of Zometa and she will order repeat CT chest abdomen pelvis with contrast and total body bone scan the end of September.I will see him back after that.    -9/8/2021 Zometa resumed.  PSA <0.10    -10/5/2021 [facility] medical oncology follow-up visit: followed-up with radiation oncology 9/21/2021.  Status post symptomatic L5 radiation 5 fractions 20 [place] completed 8/16/2021 with improvement in right hip pain.  9/2/2021 PSA less than 0.1.  9/29/2021 total body bone scan shows increased uptake left iliac bone slightly superior group of the left acetabulum with no additional foci of suspicious abnormality is unlikely treatment related with no new sites of active osseous metastasis.  CT chest abdomen pelvis with contrast shows stable borderline enlarged left periaortic nodes and dating sclerotic bone lesions.Continue Zytiga, prednisone, Relugolix, Zometa, repeat CT chest abdomen pelvis with contrast and total body bone scan the end of the year.  We will follow PSA serially.    -11/17/2021 [facility] Oncology clinic follow-up:  Mr. Lugo overall is doing well.  He is tolerating therapy with Zytiga, prednisone and Relugolix along with Zometa which is given every 6 weeks.  PSA remains low at <0.1.  Has occasional low phosphorus, currently low at 1.7.  Serum calcium is normal at 8.9, normal  creatinine 0.79 and normal CBC other than for platelets of 124.  I will hold Zometa for 1 month, I did send in a prescription for phosphorus replacement today.  He takes calcium and vitamin D.  I will see him back in 1 month for follow-up.  We will repeat restaging scans after that visit.    -12/15/2021 [facility] Oncology clinic follow-up: Mr. Lugo continues to do well on therapy with Zytiga, prednisone and Relugolix, his PSA remains low at <0.1.  He is continuing to have left lower back pain, he is working with palliative care and they have referred him also to pain management.  Pain management has talked to him about putting in a pain pump however he is leery of this, I told him that this was a safe and effective pain management technique and to certainly give consideration to their recommendations.  His phosphorus is improving however is still a little low, I will hold off on Zometa until we see him back in 1 month, we may end up only giving it every 12 weeks.  We will repeat restaging scans with CT chest, abdomen and pelvis along with total body bone scan prior to return and I have ordered those today.      -1/5/2022 CT chest abdomen pelvis with contrast [facility] showing stable left periaortic adenopathy and unchanged sclerotic thoracic, lumbar spine and pelvis lesions with no new or progressive disease in the chest abdomen or pelvis.  Total body bone scan shows no significant change and no new suspicious foci.  Stable posterior right acetabulum and left superior acetabulum and degenerative changes in the cervical spine, shoulders, acromioclavicular joints, sternoclavicular joints, elbows, wrists, hands, thoracic spine, lumbosacral spine, sacroiliac joints, hips, knees, ankles, feet.    -1/11/2022 [facility] medical oncology hematology follow-up visit: I reviewed images and reports from his 1/5/2022 scans.  No active disease and PSA immmeasurably low on Zytiga, prednisone, Relugolix.  However, he has  struggled with hypophosphatemia and is significantly fatigued with this.  Given that the bones are doing well and given that his phosphorus continues to remain consistently low at 2.2 in mid December, 1.7 mid November and 2.5 mid-September and as low as 1.5 back to July and the likelihood that this is related to his Zometa, given that his bones are stable overall, he will increase from 1200 mg up to 1600 mg of calcium and phosphate a day and we will hold his Zometa for the foreseeable future.  He will see my nurse practitioner back in a month to continue to monitor his phosphorus along with his calcium and other labs and will repeat his scans again in 3 months.  In addition, given his fatigue we will check his ACTH and cortisol though he is taking his prednisone with his Zytiga.  Though his electrolytes are normal, I will keep an eye on the cortisol and ACTH and have these labs not only drawn today but again just prior to return to my nurse practitioner on February 10.     Prostate cancer metastatic to bone (HCC)   8/30/2020 -  Other Event    PSA 10.8     9/15/2020 Initial Diagnosis    Prostate cancer metastatic to bone (CMS/HCC)     9/15/2020 Biopsy    Prostate needle core biopsy     9/24/2020 Imaging    Total body bone scan: 4 abnormal areas of increased activity consistent with osteoblastic metastasis, abnormal foci of activity seen in the T12 vertebral body, L1 vertebral body, roof of the left acetabulum, and acetabulum medially on the right.  CT chest: Stable sclerotic metastatic lesions within the T12 and L1 vertebrae.  No other evidence of metastatic disease to the chest.  Stable mild splenomegaly and subcentimeter periaortic retroperitoneal lymph nodes.  CT abdomen and pelvis: Diffuse bladder wall thickening with mild perivesicular fat stranding.  Interval development of several sclerotic lesions in the spine and left iliac bone.     10/13/2020 Cancer Staged    Staging form: Bone - Appendicular Skeleton,  Trunk, Skull, And Facial Bones, AJCC 8th Edition  - Clinical: Stage IVB (cT3, cN0, cM1b, G3) - Signed by Ishmael Rashid MD on 10/13/2020     11/3/2020 -  Chemotherapy    OP SUPPORTIVE Zoledronic Acid Q42d     11/3/2020 - 2/18/2021 Chemotherapy    OP PROSTATE DOCEtaxel       1/4/2021 Imaging    CT chest abdomen pelvis with contrast and whole-body bone scan shows improving less metabolically active bone metastases.  Stable sclerotic T12, L1, and L2 vertebral bodies and bilateral pelvic bones on bone windows with stable subcentimeter para-aortic lymph nodes and no definite progression in the chest abdomen or pelvis.  PSA down to 0.275 after 3 courses of Taxotere.     3/4/2021 Imaging    CT chest, abdomen and pelvis stable, no evidence of disease progression. Total body bone scan with decreased/near resolution of abnormal increased radiotracer uptake associated with the known sclerotic lesions in the thoracolumbar spine and bony pelvis.  No evidence of new osteoblastic metastases.     3/4/2021 Imaging    CT chest abdomen pelvis with contrast is negative save for stable sclerotic bone lesions and the bone scan shows near resolution of prior bony abnormalities associated with the known sclerotic lesions in the thoracolumbar spine and bony pelvis.  2/17/2021 PSA down to 0.1 from 1.37 October 2020.     3/9/2021 - 3/9/2021 Chemotherapy    OP SUPPORTIVE PROSTATE Relugolix     3/9/2021 -  Chemotherapy    OP PROSTATE Abiraterone / PredniSONE       3/10/2021 -  Chemotherapy    OP PROSTATE Abiraterone / PredniSONE     8/10/2021 - 8/16/2021 Radiation    Radiation OncologyTreatment Course:  Naveen Lugo received 2000 cGy in 5 fractions to L4-sacrum, left acetabulum and right pelvis via External Beam Radiation - EBRT.     11/16/2021 -  Chemotherapy    OP CENTRAL VENOUS ACCESS DEVICE ACCESS, CARE, AND MAINTENANCE (CVAD)     1/5/2022 Imaging    -1/5/2022 CT chest abdomen pelvis with contrast [facility] showing stable left  "periaortic adenopathy and unchanged sclerotic thoracic, lumbar spine and pelvis lesions with no new or progressive disease in the chest abdomen or pelvis.  Total body bone scan shows no significant change and no new suspicious foci.  Stable posterior right acetabulum and left superior acetabulum and degenerative changes in the cervical spine, shoulders, acromioclavicular joints, sternoclavicular joints, elbows, wrists, hands, thoracic spine, lumbosacral spine, sacroiliac joints, hips, knees, ankles, feet.         HISTORY OF PRESENT ILLNESS:  The patient is a 66 y.o. male, here for follow up on management of generalized bone aches with fatigue that is disproportional to his blood counts or degree of prostate cancer presently    Past Medical History:   Diagnosis Date   • Bone cancer (HCC)    • History of radiation therapy 08/16/2021    L4 through sacrum, left acetabulum, right pelvis   • Nephrolithiasis    • Opioid use disorder, mild, on maintenance therapy (HCC)    • Prostate cancer (HCC)      Past Surgical History:   Procedure Laterality Date   • CHOLECYSTECTOMY     • CORONARY STENT PLACEMENT  206 & 2011   • HERNIA REPAIR  1986   • THORACIC DISCECTOMY  1994       Allergies   Allergen Reactions   • Cymbalta [Duloxetine Hcl] Dizziness   • Gabapentin Nausea Only   • Lyrica [Pregabalin] Nausea And Vomiting and Dizziness       Family History and Social History reviewed and changed as necessary    REVIEW OF SYSTEM:   Significant chronic low back pain not new being managed by pain management but also significant fatigue    PHYSICAL EXAM:  No orthopnea.  No orthostasis.  Lungs clear heart regular rate and rhythm.    Vitals:    01/11/22 0846   BP: 160/81   Pulse: 87   Resp: 20   Temp: 96.8 °F (36 °C)   SpO2: 93%   Weight: 88.9 kg (196 lb)   Height: 175.3 cm (69\")     Vitals:    01/11/22 0846   PainSc:   6   PainLoc: Hip  Comment: hips and lower back          ECOG score: 0           Vitals reviewed.      Lab Results "   Component Value Date    HGB 14.2 12/14/2021    HCT 41.8 12/14/2021    MCV 89 12/14/2021     (L) 12/14/2021    WBC 5.3 12/14/2021    NEUTROABS 3.4 12/14/2021    LYMPHSABS 1.5 12/14/2021    MONOSABS 0.3 12/14/2021    EOSABS 0.1 12/14/2021    BASOSABS 0.0 12/14/2021       Lab Results   Component Value Date    GLUCOSE 129 (H) 12/14/2021    BUN 7 (L) 12/14/2021    CREATININE 0.83 12/14/2021     12/14/2021    K 3.8 12/14/2021     12/14/2021    CO2 25 12/14/2021    CALCIUM 8.8 12/14/2021    PROTEINTOT 6.6 04/20/2021    ALBUMIN 3.8 12/14/2021    BILITOT 0.3 12/14/2021    ALKPHOS 71 12/14/2021    AST 21 12/14/2021    ALT 16 12/14/2021             ASSESSMENT & PLAN:  1.  Stage IVb grade group 4 metastatic prostate cancer with sclerotic bone lesions on CT and bone scan and history of prostatic hypertrophy  2.  Kidney stone  3.  Polyps  4.  Probable drug-induced hypophosphatemia from Zometa      -9/30/2020 office note from Dr. Ronen Reynaga indicates patient with PSA 10.8.  Underwent TRUS prostate biopsy 9/15/2020.  Adenocarcinoma the prostate [place] 4+4 = 8 in 1 out of 12 cores and 4+3 = 7 and 10 out of 12 cores and 3+4 = 7 in 1 out of 12 cores.  Perineural invasion.  Extraprostatic extension into the fat seen on 2 biopsies of the right lateral mid and right apex.  9/24/2020 CT chest and whole-body bone scan showed T12, L1, left acetabular, right medial acetabular metastases with no lung metastases.  He started androgen deprivation therapy with Casodex with plans for Lupron to start in 1 to 2 weeks for clinical T3 N0 M1 metastatic prostate cancer.  Review of official report from [facility] 9/24/2020 bone scan mentions T12, L1, roof of left acetabulum and medial right acetabular osteoblastic metastases.  CT chest with contrast that same day showed mild splenomegaly 14.2 cm with stable periaortic nodes compared to CT December 2015 with no lung metastases.  Sclerotic abnormality within the T12  vertebral body, L1 vertebral body extending into the pedicle unchanged.  8/28/2020 CT abdomen pelvis report from [facility] reviewed and mentions normal spleen size.  Punctate nonobstructing kidney stone, no paulino enlargement, diffuse bladder wall thickening with mild perivesicular fat stranding, 2.1 cm sclerotic left iliac bone above the left acetabulum new compared to 2015 as well as 1.5 cm faintly sclerotic focus in the right posterior acetabulum stable compared to prior CT 2015 as well as a 1.6 cm T12 and inferior vertebral body sclerotic lesion and a 1.9 cm left posterior lateral L1 vertebral body abnormality extending to the pedicle.  -10/13/2020 initial [facility] medical oncology consultation: With 1 Arcadia score 8, 4+4 lesion that would make him a grade group 4 with stage IVb disease given radiographic evidence of sclerotic bone metastasis on bone scan and CT.  Needs genetic testing given metastatic prostate cancer and this is also important as, were he to have mismatch repair mutation then we would have options for PDL 1 inhibitors and were he BRCA mutated would have options for PARP inhibitors in the future.  Systemic therapy for castration naïve prostate cancer or N1 disease should be with apalutamide or Abiraterone or enzalutamide all of which are category 1.  He does not have any visceral metastases.  According to his imaging he has T12, L1, left acetabular, right acetabular, and left iliac bony involvement.  That means he would have 4 or more bone metastases with at least one metastasis (i.e. the acetabular lesions bilaterally) beyond the pelvis/vertebral, for which this would be considered high-volume disease for which 6 cycles of docetaxel 75 mg/m² x 6 cycles followed by Zytiga and prednisone would be reasonable along with Zometa every 6 weeks for bone stabilization.  He will do chemo preparation visit with my nurse practitioner prior to start chemotherapy with Taxotere and Zometa in 2 weeks  and he is already received Casodex in preparation for Lupron shot he received on 10/12/2020 with Dr. Reynaga.  We will get him to Dr. Alexander Gomez who has done his polypectomies in the past for port placement and we will get genetic counseling started.  Following the 6 courses of Taxotere per the [place]arted trial criteria, I would then give him an indefinite Zytiga and prednisone and Zometa until progression or toxicity dictates.    -12/16/2019 COVID-19 antigen test negative    -12/29/2020 PSA 0.275 with absolute neutrophil count 700 and creatinine 0.76 with normal liver enzymes and electrolytes.  Normal magnesium and phosphorus and urine drug screen positive for buprenorphine and opiates follow-up with palliative care.    -1/4/2021 CT chest abdomen pelvis with contrast and whole-body bone scan shows improving less metabolically active bone metastases.  Stable sclerotic T12, L1, and L2 vertebral bodies and bilateral pelvic bones on bone windows with stable subcentimeter para-aortic lymph nodes and no definite progression in the chest abdomen or pelvis.    -1/5/2021 [facility] medical oncology follow-up visit: I reviewed the images and reports thereof of the above CT and bone scan which shows good response to Taxotere x3 courses with a PSA down to 0.275 from a baseline of 10.  Get another 3 courses of Taxotere, continue his Xgeva, and then following that we will switch to Zytiga and prednisone.  He is working with palliative care on his bone pain but on his current dose of fentanyl patch he says his pain is still not adequately controlled he will contact them.  Dexamethasone prescription was refilled.  We will see my nurse practitioner back in 3 weeks for course #5 of 6 total courses and then we will reimage with CT chest abdomen pelvis and bone scan before going to the Zytiga prednisone.    -3/4/2021 CT chest abdomen pelvis with contrast is negative save for stable sclerotic bone lesions and the bone scan shows near  resolution of prior bony abnormalities associated with the known sclerotic lesions in the thoracolumbar spine and bony pelvis.    2/17/2021 PSA down to 0.1 from 1.37 October 2020.  3/9/2021 PSA 0.1    -5/26/2021 CT chest abdomen pelvis with contrast shows stable to slightly underlying increase low-density left para-aortic node.  Bone lesions stable.  Whole-body bone scan stable to decrease activity of known thoracolumbar and bony pelvic involvement.  PSA less than 0.1  , AST 74, bilirubin 0.5.       -6/1/2021 [facility] oncology follow-up visit: I reviewed the above data with him and images thereof.  Bones are stable.  No significant adenopathy.  PSA immeasurably low.     upper limit of normal 69 and AST 74 upper limit of normal 46.  Hence, neither is more than double the upper limit of normal with no ascites and no encephalopathy and normal albumin and bilirubin, despite the elevation of liver enzymes, he has child Abrams score A.  Package insert for Abiraterone says that for ALT and/or AST greater than 5 times upper limit of normal or total bilirubin greater than 3 times the upper limit of normal to withhold treatment until liver function tests returned to baseline or ALT and AST less than or equal to 2.5 times the upper limit of normal and total bilirubin less than or equal to 1.5 times the upper limit of normal and then reinitiate at 750 mg daily dose of Zytiga.  For recurrent hepatotoxicity on 750 mg daily Zytiga, withhold treatment until liver functions returned to baseline or ALT and AST less than 3-2.5 times upper limit of normal and total bilirubin less than or equal to 1.5 times the upper limit of normal then reinitiate at 500 mg daily Zytiga dose.  If has recurrent hepatotoxicity on 500 mg dose, then discontinue treatment.  If has ALT greater than 3 times the upper limit of normal along with total bilirubin greater than 2 times the upper limit of normal in the absence of other  contributing causes or biliary obstruction, permanently discontinue Zytiga.  Based on these recommendations, I would continue current doses but we will watch with serial labs monthly and repeat his imaging again in 3 months but clinically he is doing wonderfully with excellent response to Taxotere and now on Zytiga prednisone plus Zometa along with Relugolix.    -[facility]23/[facility]: Having persistent and worsening pain above his left hip.  I will get an MRI of the left hip for further evaluation.  Otherwise we will continue therapy unchanged with Zytiga, prednisone and Zometa along with Relugolix.    -7/28/2021 [facility] follow-up: Patient with multiple somatic complaints including episodes of confusion, dizziness, decreased memory, agitation.  He reports ongoing episodes with difficulty swallowing.  Also most concerning for episodes of angina and chest pain for which he basically refused to seek emergency medical attention.  He has a history of coronary artery disease and stent placement.  He has not followed up with cardiology for many years.  I will get an MRI of the brain in light of his confusion, dizziness and agitation.  I will get him to gastroenterology for EGD in light of his dysphagia.  I have also put in a referral for cardiology.  I reemphasized to the patient, his wife who is with him today and his son who was on the telephone during our visit the importance of seeking out prompt medical attention when he is having chest pain or neurological concerns so that he can be evaluated.  The patient states that he basically refuses to go to the emergency room and if his family calls an ambulance he would not agree to go to the hospital.  For the time being, I have asked him to hold his Zytiga and prednisone until we can sort this out as Zytiga does have some cardiovascular risk.  There is likely no treatment for prostate cancer that does not carry some form of  "cardiovascular risk.  His PSA remains low at 0.014 yesterday.  He will continue relugolix for now.    Of note, some of these symptoms could also be due to his hypophosphatemia, phosphate level from yesterday was 1.5, I have sent in a prescription for K-Phos 3 times a day before meals for 10 days.  We will hold further Zometa until this is corrected.  I have also put in an order to check his vitamin D level.  He takes calcium and vitamin D daily.  Some of his symptoms also could be due to drug withdrawal as there was some confusion and difficulty getting his last prescription for methadone therefore he was without methadone for several days, he now has his prescription and is back on his pain medication.  He has an appointment with neurosurgery tomorrow in light of his ongoing back pain, MRI of the hip recently showed multiple bony lesions largest at the L5 vertebral body and left supra-acetabular region.  If no neurosurgical intervention recommended he may benefit from spot radiation as this is where a lot of his pain is coming from.  His wife had questions today regarding his staging and extent of disease.  We reviewed previous scans.  I did clarify with her as she used the words \"cancer free\" as she thought that is what she was told after his CT scans once he completed Taxotere in February.  I explained to her that even though his scans showed he had an excellent response with no clear areas of metastasis that did not mean he was cancer free, I explained to her that when you have metastatic cancer the treatment intent is palliative in nature and to control the disease but not to cure the disease.  She stated understanding.  We will see him back in 2 weeks for follow-up to sort through this and make further plan of care, again, I have asked him to hold Zytiga and prednisone until he sees us back, he will continue on his other medications including relugolix.    -7/30/2021 neurosurgery PA consultation.  MRI with and " without contrast L5 ordered.    -8/3/2021 neurosurgery follow-up showed known sclerotic disease with new L5 abnormality without compression deformity or instability.  MRI brain negative for metastasis.    -8/4/2021 office visit Dr. Fco Quintanilla radiation oncology coordinating with Dr. North neurosurgery for palliative radiation for MRI evidence of L5 and left supra acetabular painful lesions.  States that the patient's disease which is not secreting PSA is experiencing some progression and would benefit from 20 Gy in five fractions and other lesion 30 Gy in ten fractions. Patient offered to go to [place] for radiation but desired treatment in [place].    -8/10/2021 [facility] medical oncology follow-up visit: No chest pains at this junction.  Reviewed MRI brain and other MRIs reviewed by Dr. North and Dwight.  Due for EGD on 19th.  We will repeat his phosphorus along with his other labs continue Relugolix, Zytiga, prednisone, and he will continue radiation palliatively to the painful bony lesions with Dr. Quintanilla/Can.  He will see my nurse practitioner back in a few weeks to see how he is tolerating this and to follow-up on the phosphorus off of Zometa and she will order repeat CT chest abdomen pelvis with contrast and total body bone scan the end of September.I will see him back after that.    -9/8/2021 Zometa resumed.  PSA <0.10    -10/5/2021 [facility] medical oncology follow-up visit: followed-up with radiation oncology 9/21/2021.  Status post symptomatic L5 radiation 5 fractions 20 [place] completed 8/16/2021 with improvement in right hip pain.  9/2/2021 PSA less than 0.1.  9/29/2021 total body bone scan shows increased uptake left iliac bone slightly superior group of the left acetabulum with no additional foci of suspicious abnormality is unlikely treatment related with no new sites of active osseous metastasis.  CT chest abdomen pelvis with contrast shows stable borderline enlarged left periaortic nodes and  dating sclerotic bone lesions.Continue Zytiga, prednisone, Relugolix, Zometa, repeat CT chest abdomen pelvis with contrast and total body bone scan the end of the year.  We will follow PSA serially.    -11/17/2021 [facility] Oncology clinic follow-up:  Mr. Lugo overall is doing well.  He is tolerating therapy with Zytiga, prednisone and Relugolix along with Zometa which is given every 6 weeks.  PSA remains low at <0.1.  Has occasional low phosphorus, currently low at 1.7.  Serum calcium is normal at 8.9, normal creatinine 0.79 and normal CBC other than for platelets of 124.  I will hold Zometa for 1 month, I did send in a prescription for phosphorus replacement today.  He takes calcium and vitamin D.  I will see him back in 1 month for follow-up.  We will repeat restaging scans after that visit.    -12/15/2021 [facility] Oncology clinic follow-up: Mr. Lugo continues to do well on therapy with Zytiga, prednisone and Relugolix, his PSA remains low at <0.1.  He is continuing to have left lower back pain, he is working with palliative care and they have referred him also to pain management.  Pain management has talked to him about putting in a pain pump however he is leery of this, I told him that this was a safe and effective pain management technique and to certainly give consideration to their recommendations.  His phosphorus is improving however is still a little low, I will hold off on Zometa until we see him back in 1 month, we may end up only giving it every 12 weeks.  We will repeat restaging scans with CT chest, abdomen and pelvis along with total body bone scan prior to return and I have ordered those today.      -1/5/2022 CT chest abdomen pelvis with contrast [facility] showing stable left periaortic adenopathy and unchanged sclerotic thoracic, lumbar spine and pelvis lesions with no new or progressive disease in the chest abdomen or pelvis.  Total body bone scan shows no significant change and no new  suspicious foci.  Stable posterior right acetabulum and left superior acetabulum and degenerative changes in the cervical spine, shoulders, acromioclavicular joints, sternoclavicular joints, elbows, wrists, hands, thoracic spine, lumbosacral spine, sacroiliac joints, hips, knees, ankles, feet.    -1/11/2022 [facility] oncology hematology follow-up visit: I reviewed images and reports from his 1/5/2022 scans.  No active disease and PSA immmeasurably low on Zytiga, prednisone, Relugolix.  However, he has struggled with hypophosphatemia and is significantly fatigued with this.  Given that the bones are doing well and given that his phosphorus continues to remain consistently low at 2.2 in mid December, 1.7 mid November and 2.5 mid-September and as low as 1.5 back to July and the likelihood that this is related to his Zometa, given that his bones are stable overall, he will increase from 1200 mg up to 1600 mg of calcium and phosphate a day and we will hold his Zometa for the foreseeable future.  He will see my nurse practitioner back in a month to continue to monitor his phosphorus along with his calcium and other labs and will repeat his scans again in 3 months.  In addition, given his fatigue we will check his ACTH and cortisol though he is taking his prednisone with his Zytiga.  Though his electrolytes are normal, I will keep an eye on the cortisol and ACTH and have these labs not only drawn today but again just prior to return to my nurse practitioner on February 10.      Total time of care today inclusive of time spent today prior to patient's arrival reviewing interval data for my nurse practitioner as well as images and reports of recent scans and during visit translating that information to him and after visit putting forth a plan including cessation of Zometa and to follow-up with my nurse practitioner on February 9 for next set of labs and to make sure he is tolerating this and to set up next set of scans  of CT chest abdomen pelvis and bone scan in approximately 3 months and after visit instituting this plan and altering the Zometa treatments accordingly took 40 minutes of patient care time throughout the day today  Ishmael Rashid MD    01/11/2022

## 2022-01-12 LAB
ALBUMIN SERPL-MCNC: 3.9 G/DL (ref 3.8–4.8)
ALBUMIN/GLOB SERPL: 1.8 {RATIO} (ref 1.2–2.2)
ALP SERPL-CCNC: 72 IU/L (ref 44–121)
ALT SERPL-CCNC: 17 IU/L (ref 0–44)
AST SERPL-CCNC: 22 IU/L (ref 0–40)
BASOPHILS # BLD AUTO: 0 X10E3/UL (ref 0–0.2)
BASOPHILS NFR BLD AUTO: 1 %
BILIRUB SERPL-MCNC: 0.2 MG/DL (ref 0–1.2)
BUN SERPL-MCNC: 5 MG/DL (ref 8–27)
BUN/CREAT SERPL: 6 (ref 10–24)
CALCIUM SERPL-MCNC: 9 MG/DL (ref 8.6–10.2)
CHLORIDE SERPL-SCNC: 103 MMOL/L (ref 96–106)
CO2 SERPL-SCNC: 24 MMOL/L (ref 20–29)
CORTIS AM PEAK SERPL-MCNC: 1.7 UG/DL (ref 6.2–19.4)
CREAT SERPL-MCNC: 0.78 MG/DL (ref 0.76–1.27)
EOSINOPHIL # BLD AUTO: 0.2 X10E3/UL (ref 0–0.4)
EOSINOPHIL NFR BLD AUTO: 3 %
ERYTHROCYTE [DISTWIDTH] IN BLOOD BY AUTOMATED COUNT: 12.7 % (ref 11.6–15.4)
GLOBULIN SER CALC-MCNC: 2.2 G/DL (ref 1.5–4.5)
GLUCOSE SERPL-MCNC: 152 MG/DL (ref 65–99)
HCT VFR BLD AUTO: 40.6 % (ref 37.5–51)
HGB BLD-MCNC: 13.7 G/DL (ref 13–17.7)
IMM GRANULOCYTES # BLD AUTO: 0 X10E3/UL (ref 0–0.1)
IMM GRANULOCYTES NFR BLD AUTO: 0 %
LYMPHOCYTES # BLD AUTO: 1.3 X10E3/UL (ref 0.7–3.1)
LYMPHOCYTES NFR BLD AUTO: 24 %
MCH RBC QN AUTO: 29.8 PG (ref 26.6–33)
MCHC RBC AUTO-ENTMCNC: 33.7 G/DL (ref 31.5–35.7)
MCV RBC AUTO: 89 FL (ref 79–97)
MONOCYTES # BLD AUTO: 0.4 X10E3/UL (ref 0.1–0.9)
MONOCYTES NFR BLD AUTO: 7 %
NEUTROPHILS # BLD AUTO: 3.3 X10E3/UL (ref 1.4–7)
NEUTROPHILS NFR BLD AUTO: 65 %
PHOSPHATE SERPL-MCNC: 2.9 MG/DL (ref 2.8–4.1)
PLATELET # BLD AUTO: 129 X10E3/UL (ref 150–450)
POTASSIUM SERPL-SCNC: 3.2 MMOL/L (ref 3.5–5.2)
PROT SERPL-MCNC: 6.1 G/DL (ref 6–8.5)
PSA SERPL-MCNC: <0.1 NG/ML (ref 0–4)
RBC # BLD AUTO: 4.59 X10E6/UL (ref 4.14–5.8)
SODIUM SERPL-SCNC: 142 MMOL/L (ref 134–144)
WBC # BLD AUTO: 5.2 X10E3/UL (ref 3.4–10.8)

## 2022-01-18 ENCOUNTER — SPECIALTY PHARMACY (OUTPATIENT)
Dept: ONCOLOGY | Facility: HOSPITAL | Age: 67
End: 2022-01-18

## 2022-01-18 NOTE — PROGRESS NOTES
Specialty Pharmacy Refill Coordination Note     Naveen is a 66 y.o. male contacted today regarding refills of  zytiga 1000mg PO QD, prednisone 5mg PO QD and orgpvyx 120mg PO QD  specialty medication(s).      Specialty medication(s) and dose(s) confirmed: yes    Refill Questions      Most Recent Value   Changes to allergies? No   Changes to medications? No   New conditions since last clinic visit No   Unplanned office visit, urgent care, ED, or hospital admission in the last 4 weeks  No   How does patient/caregiver feel medication is working? Fair   Financial problems or insurance changes  No   If yes, describe changes in insurance or financial issues. n/a   How many doses of your specialty medications were missed in the last 4 weeks? 0   Why were doses missed? n/a   Does this patient require a clinical escalation to a pharmacist? No          Delivery Questions      Most Recent Value   Delivery method [employer]   Delivery address correct? Yes   Delivery phone number [phone]   Preferred delivery time? Anytime   Number of medications in delivery 3   Medication being filled and delivered zytiga, prednison, orgovyx   Doses left of specialty medications 5 days left of zytiga and orgovyx,  out of prednisone   Is there any medication that is due not being filled? No   Supplies needed? No supplies needed   Cooler needed? No   Do any medications need mixed or dated? No   Copay form of payment Credit card on file   Additional comments 0.95   Questions or concerns for the pharmacist? No   Explain any questions or concerns for the pharmacist n/a   Are any medications first time fills? No            Medication Adherence    Adherence tools used: watch   Other adherence tool: Clock - takes at same time each day, 7:45am   Support network for adherence: family member          Follow-up: 28 day(s)     Sushma Muro, Pharmacy Technician  Specialty Pharmacy Technician

## 2022-01-24 ENCOUNTER — TELEPHONE (OUTPATIENT)
Dept: RADIATION ONCOLOGY | Facility: HOSPITAL | Age: 67
End: 2022-01-24

## 2022-01-24 NOTE — TELEPHONE ENCOUNTER
Called and reminded pt to bring copy of discs when he comes to his appointment on Fri.  Pt verbalized understanding.

## 2022-02-10 ENCOUNTER — INFUSION (OUTPATIENT)
Dept: ONCOLOGY | Facility: HOSPITAL | Age: 67
End: 2022-02-10

## 2022-02-10 ENCOUNTER — OFFICE VISIT (OUTPATIENT)
Dept: ONCOLOGY | Facility: CLINIC | Age: 67
End: 2022-02-10

## 2022-02-10 VITALS
SYSTOLIC BLOOD PRESSURE: 131 MMHG | DIASTOLIC BLOOD PRESSURE: 68 MMHG | BODY MASS INDEX: 28.73 KG/M2 | HEIGHT: 69 IN | RESPIRATION RATE: 20 BRPM | HEART RATE: 114 BPM | WEIGHT: 194 LBS | TEMPERATURE: 98.6 F | OXYGEN SATURATION: 97 %

## 2022-02-10 DIAGNOSIS — C61 PROSTATE CANCER METASTATIC TO BONE: ICD-10-CM

## 2022-02-10 DIAGNOSIS — C79.51 PROSTATE CANCER METASTATIC TO BONE: ICD-10-CM

## 2022-02-10 DIAGNOSIS — C61 PROSTATE CANCER METASTATIC TO BONE: Primary | ICD-10-CM

## 2022-02-10 DIAGNOSIS — R53.83 FATIGUE, UNSPECIFIED TYPE: ICD-10-CM

## 2022-02-10 DIAGNOSIS — Z45.2 ENCOUNTER FOR CARE RELATED TO PORT-A-CATH: Primary | ICD-10-CM

## 2022-02-10 DIAGNOSIS — C79.51 PROSTATE CANCER METASTATIC TO BONE: Primary | ICD-10-CM

## 2022-02-10 DIAGNOSIS — R79.89 LOW SERUM CORTISOL LEVEL: ICD-10-CM

## 2022-02-10 PROCEDURE — 25010000002 HEPARIN LOCK FLUSH PER 10 UNITS: Performed by: INTERNAL MEDICINE

## 2022-02-10 PROCEDURE — 36591 DRAW BLOOD OFF VENOUS DEVICE: CPT

## 2022-02-10 PROCEDURE — 99214 OFFICE O/P EST MOD 30 MIN: CPT | Performed by: NURSE PRACTITIONER

## 2022-02-10 PROCEDURE — 96523 IRRIG DRUG DELIVERY DEVICE: CPT

## 2022-02-10 RX ORDER — HEPARIN SODIUM (PORCINE) LOCK FLUSH IV SOLN 100 UNIT/ML 100 UNIT/ML
500 SOLUTION INTRAVENOUS AS NEEDED
Status: DISCONTINUED | OUTPATIENT
Start: 2022-02-10 | End: 2022-02-10 | Stop reason: HOSPADM

## 2022-02-10 RX ORDER — SODIUM CHLORIDE 0.9 % (FLUSH) 0.9 %
10 SYRINGE (ML) INJECTION AS NEEDED
Status: CANCELLED | OUTPATIENT
Start: 2022-02-10

## 2022-02-10 RX ORDER — HEPARIN SODIUM (PORCINE) LOCK FLUSH IV SOLN 100 UNIT/ML 100 UNIT/ML
500 SOLUTION INTRAVENOUS AS NEEDED
Status: CANCELLED | OUTPATIENT
Start: 2022-02-10

## 2022-02-10 RX ADMIN — HEPARIN SODIUM (PORCINE) LOCK FLUSH IV SOLN 100 UNIT/ML 500 UNITS: 100 SOLUTION at 11:45

## 2022-02-10 NOTE — PROGRESS NOTES
CHIEF COMPLAINT:   1.  Fatigue disproportional to his prostate cancer or blood counts   2.  Metastatic prostate cancer      Problem List:  Oncology/Hematology History Overview Note   1.  Stage IVb grade group 4 metastatic prostate cancer with sclerotic bone lesions on CT and bone scan and history of prostatic hypertrophy  2.  Kidney stone  3.  Polyps  4.  Probable drug-induced hypophosphatemia from Zometa      -9/30/2020 office note from Dr. Ronen Reynaga indicates patient with PSA 10.8.  Underwent TRUS prostate biopsy 9/15/2020.  Adenocarcinoma the prostate Sarika 4+4 = 8 in 1 out of 12 cores and 4+3 = 7 and 10 out of 12 cores and 3+4 = 7 in 1 out of 12 cores.  Perineural invasion.  Extraprostatic extension into the fat seen on 2 biopsies of the right lateral mid and right apex.  9/24/2020 CT chest and whole-body bone scan showed T12, L1, left acetabular, right medial acetabular metastases with no lung metastases.  He started androgen deprivation therapy with Casodex with plans for Lupron to start in 1 to 2 weeks for clinical T3 N0 M1 metastatic prostate cancer.  Review of official report from [facility] 9/24/2020 bone scan mentions T12, L1, roof of left acetabulum and medial right acetabular osteoblastic metastases.  CT chest with contrast that same day showed mild splenomegaly 14.2 cm with stable periaortic nodes compared to CT December 2015 with no lung metastases.  Sclerotic abnormality within the T12 vertebral body, L1 vertebral body extending into the pedicle unchanged.  8/28/2020 CT abdomen pelvis report from [facility] reviewed and mentions normal spleen size.  Punctate nonobstructing kidney stone, no paulino enlargement, diffuse bladder wall thickening with mild perivesicular fat stranding, 2.1 cm sclerotic left iliac bone above the left acetabulum new compared to 2015 as well as 1.5 cm faintly sclerotic focus in the right posterior acetabulum stable compared to prior CT 2015 as well as a  1.6 cm T12 and inferior vertebral body sclerotic lesion and a 1.9 cm left posterior lateral L1 vertebral body abnormality extending to the pedicle.  -10/13/2020 initial [facility] medical oncology consultation: With 1 O[place]on score 8, 4+4 lesion that would make him a grade group 4 with stage IVb disease given radiographic evidence of sclerotic bone metastasis on bone scan and CT.  Needs genetic testing given metastatic prostate cancer and this is also important as, were he to have mismatch repair mutation then we would have options for PDL 1 inhibitors and were he BRCA mutated would have options for PARP inhibitors in the future.  Systemic therapy for castration naïve prostate cancer or N1 disease should be with apalutamide or Abiraterone or enzalutamide all of which are category 1.  He does not have any visceral metastases.  According to his imaging he has T12, L1, left acetabular, right acetabular, and left iliac bony involvement.  That means he would have 4 or more bone metastases with at least one metastasis (i.e. the acetabular lesions bilaterally) beyond the pelvis/vertebral, for which this would be considered high-volume disease for which 6 cycles of docetaxel 75 mg/m² x 6 cycles followed by Zytiga and prednisone would be reasonable along with Zometa every 6 weeks for bone stabilization.  He will do chemo preparation visit with my nurse practitioner prior to start chemotherapy with Taxotere and Zometa in 2 weeks and he is already received Casodex in preparation for Lupron shot he received on 10/12/2020 with Dr. Reynaga.  We will get him to Dr. Alexander Gomez who has done his polypectomies in the past for port placement and we will get genetic counseling started.  Following the 6 courses of Taxotere per the Chaarted trial criteria, I would then give him an indefinite Zytiga and prednisone and Zometa until progression or toxicity dictates.    -12/16/2019 COVID-19 antigen test negative    -12/29/2020 PSA 0.275 with  absolute neutrophil count 700 and creatinine 0.76 with normal liver enzymes and electrolytes.  Normal magnesium and phosphorus and urine drug screen positive for buprenorphine and opiates follow-up with palliative care.    -1/4/2021 CT chest abdomen pelvis with contrast and whole-body bone scan shows improving less metabolically active bone metastases.  Stable sclerotic T12, L1, and L2 vertebral bodies and bilateral pelvic bones on bone windows with stable subcentimeter para-aortic lymph nodes and no definite progression in the chest abdomen or pelvis.    -1/5/2021 [facility] medical oncology follow-up visit: I reviewed the images and reports thereof of the above CT and bone scan which shows good response to Taxotere x3 courses with a PSA down to 0.275 from a baseline of 10.  Get another 3 courses of Taxotere, continue his Xgeva, and then following that we will switch to Zytiga and prednisone.  He is working with palliative care on his bone pain but on his current dose of fentanyl patch he says his pain is still not adequately controlled he will contact them.  Dexamethasone prescription was refilled.  We will see my nurse practitioner back in 3 weeks for course #5 of 6 total courses and then we will reimage with CT chest abdomen pelvis and bone scan before going to the Zytiga prednisone.    -3/4/2021 CT chest abdomen pelvis with contrast is negative save for stable sclerotic bone lesions and the bone scan shows near resolution of prior bony abnormalities associated with the known sclerotic lesions in the thoracolumbar spine and bony pelvis.    2/17/2021 PSA down to 0.1 from 1.37 October 2020.  3/9/2021 PSA 0.1    -5/26/2021 CT chest abdomen pelvis with contrast shows stable to slightly underlying increase low-density left para-aortic node.  Bone lesions stable.  Whole-body bone scan stable to decrease activity of known thoracolumbar and bony pelvic involvement.  PSA less than 0.1  , AST 74, bilirubin 0.5.        -6/1/2021 [facility] oncology follow-up visit: I reviewed the above data with him and images thereof.  Bones are stable.  No significant adenopathy.  PSA immeasurably low.     upper limit of normal 69 and AST 74 upper limit of normal 46.  Hence, neither is more than double the upper limit of normal with no ascites and no encephalopathy and normal albumin and bilirubin, despite the elevation of liver enzymes, he has child Abrams score A.  Package insert for Abiraterone says that for ALT and/or AST greater than 5 times upper limit of normal or total bilirubin greater than 3 times the upper limit of normal to withhold treatment until liver function tests returned to baseline or ALT and AST less than or equal to 2.5 times the upper limit of normal and total bilirubin less than or equal to 1.5 times the upper limit of normal and then reinitiate at 750 mg daily dose of Zytiga.  For recurrent hepatotoxicity on 750 mg daily Zytiga, withhold treatment until liver functions returned to baseline or ALT and AST less than 3-2.5 times upper limit of normal and total bilirubin less than or equal to 1.5 times the upper limit of normal then reinitiate at 500 mg daily Zytiga dose.  If has recurrent hepatotoxicity on 500 mg dose, then discontinue treatment.  If has ALT greater than 3 times the upper limit of normal along with total bilirubin greater than 2 times the upper limit of normal in the absence of other contributing causes or biliary obstruction, permanently discontinue Zytiga.  Based on these recommendations, I would continue current doses but we will watch with serial labs monthly and repeat his imaging again in 3 months but clinically he is doing wonderfully with excellent response to Taxotere and now on Zytiga prednisone plus Zometa along with Relugolix.    -6/23/2020 for [facility] clinic follow-up: Having persistent and worsening pain above his left hip.  I will get an MRI of the left hip for  further evaluation.  Otherwise we will continue therapy unchanged with Zytiga, prednisone and Zometa along with Relugolix.    -7/28/2021 [facility] oncology clinic follow-up: Patient with multiple somatic complaints including episodes of confusion, dizziness, decreased memory, agitation.  He reports ongoing episodes with difficulty swallowing.  Also most concerning for episodes of angina and chest pain for which he basically refused to seek emergency medical attention.  He has a history of coronary artery disease and stent placement.  He has not followed up with cardiology for many years.  I will get an MRI of the brain in light of his confusion, dizziness and agitation.  I will get him to gastroenterology for EGD in light of his dysphagia.  I have also put in a referral for cardiology.  I reemphasized to the patient, his wife who is with him today and his son who was on the telephone during our visit the importance of seeking out prompt medical attention when he is having chest pain or neurological concerns so that he can be evaluated.  The patient states that he basically refuses to go to the emergency room and if his family calls an ambulance he would not agree to go to the hospital.  For the time being, I have asked him to hold his Zytiga and prednisone until we can sort this out as Zytiga does have some cardiovascular risk.  There is likely no treatment for prostate cancer that does not carry some form of cardiovascular risk.  His PSA remains low at 0.014 yesterday.  He will continue relugolix for now.    Of note, some of these symptoms could also be due to his hypophosphatemia, phosphate level from yesterday was 1.5, I have sent in a prescription for K-Phos 3 times a day before meals for 10 days.  We will hold further Zometa until this is corrected.  I have also put in an order to check his vitamin D level.  He takes calcium and vitamin D daily.  Some of his symptoms also could be due to drug withdrawal as there  "was some confusion and difficulty getting his last prescription for methadone therefore he was without methadone for several days, he now has his prescription and is back on his pain medication.  He has an appointment with neurosurgery tomorrow in light of his ongoing back pain, MRI of the hip recently showed multiple bony lesions largest at the L5 vertebral body and left supra-acetabular region.  If no neurosurgical intervention recommended he may benefit from spot radiation as this is where a lot of his pain is coming from.  His wife had questions today regarding his staging and extent of disease.  We reviewed previous scans.  I did clarify with her as she used the words \"cancer free\" as she thought that is what she was told after his CT scans once he completed Taxotere in February.  I explained to her that even though his scans showed he had an excellent response with no clear areas of metastasis that did not mean he was cancer free, I explained to her that when you have metastatic cancer the treatment intent is palliative in nature and to control the disease but not to cure the disease.  She stated understanding.  We will see him back in 2 weeks for follow-up to sort through this and make further plan of care, again, I have asked him to hold Zytiga and prednisone until he sees us back, he will continue on his other medications including relugolix.    -7/30/2021 neurosurgery PA consultation.  MRI with and without contrast L5 ordered.    -8/3/2021 neurosurgery follow-up showed known sclerotic disease with new L5 abnormality without compression deformity or instability.  MRI brain negative for metastasis.    -8/4/2021 office visit Dr. Fco Quintanilla radiation oncology coordinating with Dr. Can forde for palliative radiation for MRI evidence of L5 and left supra acetabular painful lesions.  States that the patient's disease which is not secreting PSA is experiencing some progression and would benefit from 20 " Gy in five fractions and other lesion 30 Gy in ten fractions. Patient offered to go to [place] for radiation but desired treatment in [place].    -8/10/2021 [facility] follow-up visit: No chest pains at this junction.  Reviewed MRI brain and other MRIs reviewed by Dr. North and Dwight.  Due for EGD on 19th.  We will repeat his phosphorus along with his other labs continue Relugolix, Zytiga, prednisone, and he will continue radiation palliatively to the painful bony lesions with Dr. Quintanilla/Can.  He will see my nurse practitioner back in a few weeks to see how he is tolerating this and to follow-up on the phosphorus off of Zometa and she will order repeat CT chest abdomen pelvis with contrast and total body bone scan the end of September.I will see him back after that.    -9/8/2021 Zometa resumed.  PSA <0.10    -10/5/2021 [facility] follow-up visit: followed-up with radiation oncology 9/21/2021.  Status post symptomatic L5 radiation 5 fractions 20 [place] completed 8/16/2021 with improvement in right hip pain.  9/2/2021 PSA less than 0.1.  9/29/2021 total body bone scan shows increased uptake left iliac bone slightly superior group of the left acetabulum with no additional foci of suspicious abnormality is unlikely treatment related with no new sites of active osseous metastasis.  CT chest abdomen pelvis with contrast shows stable borderline enlarged left periaortic nodes and dating sclerotic bone lesions.Continue Zytiga, prednisone, Relugolix, Zometa, repeat CT chest abdomen pelvis with contrast and total body bone scan the end of the year.  We will follow PSA serially.    -11/17/2021 [facility] follow-up:  Mr. Lugo overall is doing well.  He is tolerating therapy with Zytiga, prednisone and Relugolix along with Zometa which is given every 6 weeks.  PSA remains low at <0.1.  Has occasional low phosphorus, currently low at 1.7.  Serum calcium is normal at 8.9, normal  creatinine 0.79 and normal CBC other than for platelets of 124.  I will hold Zometa for 1 month, I did send in a prescription for phosphorus replacement today.  He takes calcium and vitamin D.  I will see him back in 1 month for follow-up.  We will repeat restaging scans after that visit.    -12/15/2021 [facility] [place] Oncology clinic follow-up: Mr. Lugo continues to do well on therapy with Zytiga, prednisone and Relugolix, his PSA remains low at <0.1.  He is continuing to have left lower back pain, he is working with palliative care and they have referred him also to pain management.  Pain management has talked to him about putting in a pain pump however he is leery of this, I told him that this was a safe and effective pain management technique and to certainly give consideration to their recommendations.  His phosphorus is improving however is still a little low, I will hold off on Zometa until we see him back in 1 month, we may end up only giving it every 12 weeks.  We will repeat restaging scans with CT chest, abdomen and pelvis along with total body bone scan prior to return and I have ordered those today.      -1/5/2022 CT chest abdomen pelvis with contrast [facility] showing stable left periaortic adenopathy and unchanged sclerotic thoracic, lumbar spine and pelvis lesions with no new or progressive disease in the chest abdomen or pelvis.  Total body bone scan shows no significant change and no new suspicious foci.  Stable posterior right acetabulum and left superior acetabulum and degenerative changes in the cervical spine, shoulders, acromioclavicular joints, sternoclavicular joints, elbows, wrists, hands, thoracic spine, lumbosacral spine, sacroiliac joints, hips, knees, ankles, feet.    -1/11/2022 [facility] [place] medical oncology hematology follow-up visit: I reviewed images and reports from his 1/5/2022 scans.  No active disease and PSA immmeasurably low on Zytiga, prednisone, Relugolix.  However, he has  struggled with hypophosphatemia and is significantly fatigued with this.  Given that the bones are doing well and given that his phosphorus continues to remain consistently low at 2.2 in mid December, 1.7 mid November and 2.5 mid-September and as low as 1.5 back to July and the likelihood that this is related to his Zometa, given that his bones are stable overall, he will increase from 1200 mg up to 1600 mg of calcium and phosphate a day and we will hold his Zometa for the foreseeable future.  He will see my nurse practitioner back in a month to continue to monitor his phosphorus along with his calcium and other labs and will repeat his scans again in 3 months.  In addition, given his fatigue we will check his ACTH and cortisol though he is taking his prednisone with his Zytiga.  Though his electrolytes are normal, I will keep an eye on the cortisol and ACTH and have these labs not only drawn today but again just prior to return to my nurse practitioner on February 10.     Prostate cancer metastatic to bone (HCC)   8/30/2020 -  Other Event    PSA 10.8     9/15/2020 Initial Diagnosis    Prostate cancer metastatic to bone (CMS/HCC)     9/15/2020 Biopsy    Prostate needle core biopsy     9/24/2020 Imaging    Total body bone scan: 4 abnormal areas of increased activity consistent with osteoblastic metastasis, abnormal foci of activity seen in the T12 vertebral body, L1 vertebral body, roof of the left acetabulum, and acetabulum medially on the right.  CT chest: Stable sclerotic metastatic lesions within the T12 and L1 vertebrae.  No other evidence of metastatic disease to the chest.  Stable mild splenomegaly and subcentimeter periaortic retroperitoneal lymph nodes.  CT abdomen and pelvis: Diffuse bladder wall thickening with mild perivesicular fat stranding.  Interval development of several sclerotic lesions in the spine and left iliac bone.     10/13/2020 Cancer Staged    Staging form: Bone - Appendicular Skeleton,  Trunk, Skull, And Facial Bones, AJCC 8th Edition  - Clinical: Stage IVB (cT3, cN0, cM1b, G3) - Signed by Ishmael Rashid MD on 10/13/2020     11/3/2020 -  Chemotherapy    OP SUPPORTIVE Zoledronic Acid Q42d     11/3/2020 - 2/18/2021 Chemotherapy    OP PROSTATE DOCEtaxel       1/4/2021 Imaging    CT chest abdomen pelvis with contrast and whole-body bone scan shows improving less metabolically active bone metastases.  Stable sclerotic T12, L1, and L2 vertebral bodies and bilateral pelvic bones on bone windows with stable subcentimeter para-aortic lymph nodes and no definite progression in the chest abdomen or pelvis.  PSA down to 0.275 after 3 courses of Taxotere.     3/4/2021 Imaging    CT chest, abdomen and pelvis stable, no evidence of disease progression. Total body bone scan with decreased/near resolution of abnormal increased radiotracer uptake associated with the known sclerotic lesions in the thoracolumbar spine and bony pelvis.  No evidence of new osteoblastic metastases.     3/4/2021 Imaging    CT chest abdomen pelvis with contrast is negative save for stable sclerotic bone lesions and the bone scan shows near resolution of prior bony abnormalities associated with the known sclerotic lesions in the thoracolumbar spine and bony pelvis.  2/17/2021 PSA down to 0.1 from 1.37 October 2020.     3/9/2021 - 3/9/2021 Chemotherapy    OP SUPPORTIVE PROSTATE Relugolix     3/9/2021 -  Chemotherapy    OP PROSTATE Abiraterone / PredniSONE       3/10/2021 -  Chemotherapy    OP PROSTATE Abiraterone / PredniSONE     8/10/2021 - 8/16/2021 Radiation    Radiation OncologyTreatment Course:  Naveen Lugo received 2000 cGy in 5 fractions to L4-sacrum, left acetabulum and right pelvis via External Beam Radiation - EBRT.     11/16/2021 -  Chemotherapy    OP CENTRAL VENOUS ACCESS DEVICE ACCESS, CARE, AND MAINTENANCE (CVAD)     1/5/2022 Imaging    -1/5/2022 CT chest abdomen pelvis with contrast [facility] showing stable left  periaortic adenopathy and unchanged sclerotic thoracic, lumbar spine and pelvis lesions with no new or progressive disease in the chest abdomen or pelvis.  Total body bone scan shows no significant change and no new suspicious foci.  Stable posterior right acetabulum and left superior acetabulum and degenerative changes in the cervical spine, shoulders, acromioclavicular joints, sternoclavicular joints, elbows, wrists, hands, thoracic spine, lumbosacral spine, sacroiliac joints, hips, knees, ankles, feet.         HISTORY OF PRESENT ILLNESS:  The patient is a 66 y.o. male, here for follow up on management of metastatic prostate cancer.  Mr. Lugo reports that he feels about the same as he did 1 month ago.  Continues to have fatigue.  Continues to have lower back pain related to his cancer, he is being followed by palliative care and now seeing pain management.  He continues on Zytiga, prednisone and relugolix.  We have been holding his Zometa due to previous hypophosphatemia.      Past Medical History:   Diagnosis Date   • Bone cancer (HCC)    • History of radiation therapy 08/16/2021    L4 through sacrum, left acetabulum, right pelvis   • Nephrolithiasis    • Opioid use disorder, mild, on maintenance therapy (HCC)    • Prostate cancer (HCC)      Past Surgical History:   Procedure Laterality Date   • CHOLECYSTECTOMY     • CORONARY STENT PLACEMENT  206 & 2011   • HERNIA REPAIR  1986   • THORACIC DISCECTOMY  1994       Allergies   Allergen Reactions   • Cymbalta [Duloxetine Hcl] Dizziness   • Gabapentin Nausea Only   • Lyrica [Pregabalin] Nausea And Vomiting and Dizziness       Family History and Social History reviewed and changed as necessary    REVIEW OF SYSTEM:   Significant chronic low back pain not new being managed by pain management but also significant fatigue    PHYSICAL EXAM:  No orthopnea.  No orthostasis.  Lungs clear heart regular rate and rhythm.    Vitals:    02/10/22 1112   BP: 131/68   Pulse: 114  "  Resp: 20   Temp: 98.6 °F (37 °C)   SpO2: 97%   Weight: 88 kg (194 lb)   Height: 175.3 cm (69\")     Vitals:    02/10/22 1112   PainSc:   6   PainLoc: Hip  Comment: bilat hips and lower back          ECOG score: 0           Vitals reviewed.  Labs reviewed.  2/8/2022 CMP normal with creatinine 0.7, BUN 11, calcium 10.1, sodium 140, glucose 98, potassium 4.0, AST 29, ALT 19, total protein 7.3, albumin 4.4, alkaline phosphatase 68.  Phosphorus 3.2.  PSA <0.1.  Cortisol 3.08, ACTH 2.8.  CBC within normal limits, WBC 5.7, hemoglobin 16.3, hematocrit 47%, platelet count 125,000.      ASSESSMENT & PLAN:  1.  Stage IVb grade group 4 metastatic prostate cancer with sclerotic bone lesions on CT and bone scan and history of prostatic hypertrophy  2.  Kidney stone  3.  Polyps  4.  Probable drug-induced hypophosphatemia from Zometa  5.  Cancer related pain  6.  Fatigue      -9/30/2020 office note from Dr. Ronen Reynaga indicates patient with PSA 10.8.  Underwent TRUS prostate biopsy 9/15/2020.  Adenocarcinoma the prostate Sarika 4+4 = 8 in 1 out of 12 cores and 4+3 = 7 and 10 out of 12 cores and 3+4 = 7 in 1 out of 12 cores.  Perineural invasion.  Extraprostatic extension into the fat seen on 2 biopsies of the right lateral mid and right apex.  9/24/2020 CT chest and whole-body bone scan showed T12, L1, left acetabular, right medial acetabular metastases with no lung metastases.  He started androgen deprivation therapy with Casodex with plans for Lupron to start in 1 to 2 weeks for clinical T3 N0 M1 metastatic prostate cancer.  Review of official report from [facility] 9/24/2020 bone scan mentions T12, L1, roof of left acetabulum and medial right acetabular osteoblastic metastases.  CT chest with contrast that same day showed mild splenomegaly 14.2 cm with stable periaortic nodes compared to CT December 2015 with no lung metastases.  Sclerotic abnormality within the T12 vertebral body, L1 vertebral body extending " into the pedicle unchanged.  8/28/2020 CT abdomen pelvis report from [facility] reviewed and mentions normal spleen size.  Punctate nonobstructing kidney stone, no paulino enlargement, diffuse bladder wall thickening with mild perivesicular fat stranding, 2.1 cm sclerotic left iliac bone above the left acetabulum new compared to 2015 as well as 1.5 cm faintly sclerotic focus in the right posterior acetabulum stable compared to prior CT 2015 as well as a 1.6 cm T12 and inferior vertebral body sclerotic lesion and a 1.9 cm left posterior lateral L1 vertebral body abnormality extending to the pedicle.  -10/13/2020 initial [facility] medical oncology consultation: With 1 Sarika score 8, 4+4 lesion that would make him a grade group 4 with stage IVb disease given radiographic evidence of sclerotic bone metastasis on bone scan and CT.  Needs genetic testing given metastatic prostate cancer and this is also important as, were he to have mismatch repair mutation then we would have options for PDL 1 inhibitors and were he BRCA mutated would have options for PARP inhibitors in the future.  Systemic therapy for castration naïve prostate cancer or N1 disease should be with apalutamide or Abiraterone or enzalutamide all of which are category 1.  He does not have any visceral metastases.  According to his imaging he has T12, L1, left acetabular, right acetabular, and left iliac bony involvement.  That means he would have 4 or more bone metastases with at least one metastasis (i.e. the acetabular lesions bilaterally) beyond the pelvis/vertebral, for which this would be considered high-volume disease for which 6 cycles of docetaxel 75 mg/m² x 6 cycles followed by Zytiga and prednisone would be reasonable along with Zometa every 6 weeks for bone stabilization.  He will do chemo preparation visit with my nurse practitioner prior to start chemotherapy with Taxotere and Zometa in 2 weeks and he is already received Casodex in  preparation for Lupron shot he received on 10/12/2020 with Dr. Reynaga.  We will get him to Dr. Alexander Gomez who has done his polypectomies in the past for port placement and we will get genetic counseling started.  Following the 6 courses of Taxotere per the [place]arted trial criteria, I would then give him an indefinite Zytiga and prednisone and Zometa until progression or toxicity dictates.    -12/16/2019 COVID-19 antigen test negative    -12/29/2020 PSA 0.275 with absolute neutrophil count 700 and creatinine 0.76 with normal liver enzymes and electrolytes.  Normal magnesium and phosphorus and urine drug screen positive for buprenorphine and opiates follow-up with palliative care.    -1/4/2021 CT chest abdomen pelvis with contrast and whole-body bone scan shows improving less metabolically active bone metastases.  Stable sclerotic T12, L1, and L2 vertebral bodies and bilateral pelvic bones on bone windows with stable subcentimeter para-aortic lymph nodes and no definite progression in the chest abdomen or pelvis.    -1/5/2021 [facility] medical oncology follow-up visit: I reviewed the images and reports thereof of the above CT and bone scan which shows good response to Taxotere x3 courses with a PSA down to 0.275 from a baseline of 10.  Get another 3 courses of Taxotere, continue his Xgeva, and then following that we will switch to Zytiga and prednisone.  He is working with palliative care on his bone pain but on his current dose of fentanyl patch he says his pain is still not adequately controlled he will contact them.  Dexamethasone prescription was refilled.  We will see my nurse practitioner back in 3 weeks for course #5 of 6 total courses and then we will reimage with CT chest abdomen pelvis and bone scan before going to the Zytiga prednisone.    -3/4/2021 CT chest abdomen pelvis with contrast is negative save for stable sclerotic bone lesions and the bone scan shows near resolution of prior bony abnormalities  associated with the known sclerotic lesions in the thoracolumbar spine and bony pelvis.    2/17/2021 PSA down to 0.1 from 1.37 October 2020.  3/9/2021 PSA 0.1    -5/26/2021 CT chest abdomen pelvis with contrast shows stable to slightly underlying increase low-density left para-aortic node.  Bone lesions stable.  Whole-body bone scan stable to decrease activity of known thoracolumbar and bony pelvic involvement.  PSA less than 0.1  , AST 74, bilirubin 0.5.       -6/1/2021 [facility] medical oncology follow-up visit: I reviewed the above data with him and images thereof.  Bones are stable.  No significant adenopathy.  PSA immeasurably low.     upper limit of normal 69 and AST 74 upper limit of normal 46.  Hence, neither is more than double the upper limit of normal with no ascites and no encephalopathy and normal albumin and bilirubin, despite the elevation of liver enzymes, he has child Abrams score A.  Package insert for Abiraterone says that for ALT and/or AST greater than 5 times upper limit of normal or total bilirubin greater than 3 times the upper limit of normal to withhold treatment until liver function tests returned to baseline or ALT and AST less than or equal to 2.5 times the upper limit of normal and total bilirubin less than or equal to 1.5 times the upper limit of normal and then reinitiate at 750 mg daily dose of Zytiga.  For recurrent hepatotoxicity on 750 mg daily Zytiga, withhold treatment until liver functions returned to baseline or ALT and AST less than 3-2.5 times upper limit of normal and total bilirubin less than or equal to 1.5 times the upper limit of normal then reinitiate at 500 mg daily Zytiga dose.  If has recurrent hepatotoxicity on 500 mg dose, then discontinue treatment.  If has ALT greater than 3 times the upper limit of normal along with total bilirubin greater than 2 times the upper limit of normal in the absence of other contributing causes or biliary obstruction,  permanently discontinue Zytiga.  Based on these recommendations, I would continue current doses but we will watch with serial labs monthly and repeat his imaging again in 3 months but clinically he is doing wonderfully with excellent response to Taxotere and now on Zytiga prednisone plus Zometa along with Relugolix.    -6/23/2020 for [facility] oncology clinic follow-up: Having persistent and worsening pain above his left hip.  I will get an MRI of the left hip for further evaluation.  Otherwise we will continue therapy unchanged with Zytiga, prednisone and Zometa along with Relugolix.    -7/28/2021 [facility] oncology clinic follow-up: Patient with multiple somatic complaints including episodes of confusion, dizziness, decreased memory, agitation.  He reports ongoing episodes with difficulty swallowing.  Also most concerning for episodes of angina and chest pain for which he basically refused to seek emergency medical attention.  He has a history of coronary artery disease and stent placement.  He has not followed up with cardiology for many years.  I will get an MRI of the brain in light of his confusion, dizziness and agitation.  I will get him to gastroenterology for EGD in light of his dysphagia.  I have also put in a referral for cardiology.  I reemphasized to the patient, his wife who is with him today and his son who was on the telephone during our visit the importance of seeking out prompt medical attention when he is having chest pain or neurological concerns so that he can be evaluated.  The patient states that he basically refuses to go to the emergency room and if his family calls an ambulance he would not agree to go to the hospital.  For the time being, I have asked him to hold his Zytiga and prednisone until we can sort this out as Zytiga does have some cardiovascular risk.  There is likely no treatment for prostate cancer that does not carry some form of cardiovascular risk.  His PSA remains low at 0.014  "yesterday.  He will continue relugolix for now.    Of note, some of these symptoms could also be due to his hypophosphatemia, phosphate level from yesterday was 1.5, I have sent in a prescription for K-Phos 3 times a day before meals for 10 days.  We will hold further Zometa until this is corrected.  I have also put in an order to check his vitamin D level.  He takes calcium and vitamin D daily.  Some of his symptoms also could be due to drug withdrawal as there was some confusion and difficulty getting his last prescription for methadone therefore he was without methadone for several days, he now has his prescription and is back on his pain medication.  He has an appointment with neurosurgery tomorrow in light of his ongoing back pain, MRI of the hip recently showed multiple bony lesions largest at the L5 vertebral body and left supra-acetabular region.  If no neurosurgical intervention recommended he may benefit from spot radiation as this is where a lot of his pain is coming from.  His wife had questions today regarding his staging and extent of disease.  We reviewed previous scans.  I did clarify with her as she used the words \"cancer free\" as she thought that is what she was told after his CT scans once he completed Taxotere in February.  I explained to her that even though his scans showed he had an excellent response with no clear areas of metastasis that did not mean he was cancer free, I explained to her that when you have metastatic cancer the treatment intent is palliative in nature and to control the disease but not to cure the disease.  She stated understanding.  We will see him back in 2 weeks for follow-up to sort through this and make further plan of care, again, I have asked him to hold Zytiga and prednisone until he sees us back, he will continue on his other medications including relugolix.    -7/30/2021 neurosurgery PA consultation.  MRI with and without contrast L5 ordered.    -8/3/2021 " neurosurgery follow-up showed known sclerotic disease with new L5 abnormality without compression deformity or instability.  MRI brain negative for metastasis.    -8/4/2021 office visit Dr. Fco Quintanilla radiation oncology coordinating with Dr. North neurosurgery for palliative radiation for MRI evidence of L5 and left supra acetabular painful lesions.  States that the patient's disease which is not secreting PSA is experiencing some progression and would benefit from 20 Gy in five fractions and other lesion 30 Gy in ten fractions. Patient offered to go to [place] for radiation but desired treatment in [place].    -8/10/2021 [facility] medical oncology follow-up visit: No chest pains at this junction.  Reviewed MRI brain and other MRIs reviewed by Dr. North and Dwight.  Due for EGD on 19th.  We will repeat his phosphorus along with his other labs continue Relugolix, Zytiga, prednisone, and he will continue radiation palliatively to the painful bony lesions with Dr. Quintanilla/Can.  He will see my nurse practitioner back in a few weeks to see how he is tolerating this and to follow-up on the phosphorus off of Zometa and she will order repeat CT chest abdomen pelvis with contrast and total body bone scan the end of September.I will see him back after that.    -9/8/2021 Zometa resumed.  PSA <0.10    -10/5/2021 [facility] medical oncology follow-up visit: followed-up with radiation oncology 9/21/2021.  Status post symptomatic L5 radiation 5 fractions 20 [place] completed 8/16/2021 with improvement in right hip pain.  9/2/2021 PSA less than 0.1.  9/29/2021 total body bone scan shows increased uptake left iliac bone slightly superior group of the left acetabulum with no additional foci of suspicious abnormality is unlikely treatment related with no new sites of active osseous metastasis.  CT chest abdomen pelvis with contrast shows stable borderline enlarged left periaortic nodes and dating sclerotic bone lesions.Continue  Zytiga, prednisone, Relugolix, Zometa, repeat CT chest abdomen pelvis with contrast and total body bone scan the end of the year.  We will follow PSA serially.    -11/17/2021 [facility] Oncology clinic follow-up:  Mr. Lugo overall is doing well.  He is tolerating therapy with Zytiga, prednisone and Relugolix along with Zometa which is given every 6 weeks.  PSA remains low at <0.1.  Has occasional low phosphorus, currently low at 1.7.  Serum calcium is normal at 8.9, normal creatinine 0.79 and normal CBC other than for platelets of 124.  I will hold Zometa for 1 month, I did send in a prescription for phosphorus replacement today.  He takes calcium and vitamin D.  I will see him back in 1 month for follow-up.  We will repeat restaging scans after that visit.    -12/15/2021 [facility] Oncology clinic follow-up: Mr. Lugo continues to do well on therapy with Zytiga, prednisone and Relugolix, his PSA remains low at <0.1.  He is continuing to have left lower back pain, he is working with palliative care and they have referred him also to pain management.  Pain management has talked to him about putting in a pain pump however he is leery of this, I told him that this was a safe and effective pain management technique and to certainly give consideration to their recommendations.  His phosphorus is improving however is still a little low, I will hold off on Zometa until we see him back in 1 month, we may end up only giving it every 12 weeks.  We will repeat restaging scans with CT chest, abdomen and pelvis along with total body bone scan prior to return and I have ordered those today.      -1/5/2022 CT chest abdomen pelvis with contrast [place] [facility] showing stable left periaortic adenopathy and unchanged sclerotic thoracic, lumbar spine and pelvis lesions with no new or progressive disease in the chest abdomen or pelvis.  Total body bone scan shows no significant change and no new suspicious foci.  Stable posterior right  acetabulum and left superior acetabulum and degenerative changes in the cervical spine, shoulders, acromioclavicular joints, sternoclavicular joints, elbows, wrists, hands, thoracic spine, lumbosacral spine, sacroiliac joints, hips, knees, ankles, feet.    -1/11/2022 [facility] medical oncology hematology follow-up visit: I reviewed images and reports from his 1/5/2022 scans.  No active disease and PSA immmeasurably low on Zytiga, prednisone, Relugolix.  However, he has struggled with hypophosphatemia and is significantly fatigued with this.  Given that the bones are doing well and given that his phosphorus continues to remain consistently low at 2.2 in mid December, 1.7 mid November and 2.5 mid-September and as low as 1.5 back to July and the likelihood that this is related to his Zometa, given that his bones are stable overall, he will increase from 1200 mg up to 1600 mg of calcium and phosphate a day and we will hold his Zometa for the foreseeable future.  He will see my nurse practitioner back in a month to continue to monitor his phosphorus along with his calcium and other labs and will repeat his scans again in 3 months.  In addition, given his fatigue we will check his ACTH and cortisol though he is taking his prednisone with his Zytiga.  Though his electrolytes are normal, I will keep an eye on the cortisol and ACTH and have these labs not only drawn today but again just prior to return to my nurse practitioner on February 10.    -2/10/2022 [facility] Oncology clinic follow-up: Mr. Lugo overall is stable, no change in his health this past month.  I have reviewed his labs from 2/8/2022 and they are unremarkable, his phosphorus is now normal at 3.2. We are continuing to hold his Zometa, he last received Zometa on 10/5/2021.  He continues therapy with Zytiga, prednisone and Relugolix.  Dr. Rashid had ordered cortisol level and ACTH which are low, currently his ACTH is 2.8 and cortisol 3.08 however these are both done  in the face of ongoing prednisone that he takes with his Zytiga.  We will get him to endocrinology for further evaluation for possible adrenal insufficiency but will not interrupt his treatment in the interim.  He will need restaging scans again in April for a 3-month follow-up.  He will continue to follow with palliative care and pain management for his cancer related pain.  His PSA remains immeasurably low at <0.1 on 2/8/22.    Return to clinic in 1 month.    This was a level 4, moderate MDM visit with at least 2 stable chronic illnesses, management of drug therapy requiring intensive monitoring for toxicity, endocrinology referral and review of labs.    Abena Velasquez, APRN    02/10/2022

## 2022-02-11 ENCOUNTER — SPECIALTY PHARMACY (OUTPATIENT)
Dept: ONCOLOGY | Facility: HOSPITAL | Age: 67
End: 2022-02-11

## 2022-02-11 DIAGNOSIS — C79.51 PROSTATE CANCER METASTATIC TO BONE: ICD-10-CM

## 2022-02-11 DIAGNOSIS — C61 PROSTATE CANCER METASTATIC TO BONE: ICD-10-CM

## 2022-02-11 RX ORDER — ABIRATERONE 500 MG/1
1000 TABLET ORAL DAILY
Qty: 60 TABLET | Refills: 5 | Status: SHIPPED | OUTPATIENT
Start: 2022-02-11 | End: 2022-07-22 | Stop reason: SDUPTHER

## 2022-02-11 RX ORDER — PREDNISONE 1 MG/1
5 TABLET ORAL DAILY
Qty: 30 TABLET | Refills: 11 | Status: SHIPPED | OUTPATIENT
Start: 2022-02-11 | End: 2022-12-22 | Stop reason: SDUPTHER

## 2022-02-11 NOTE — PROGRESS NOTES
Specialty Pharmacy Refill Coordination Note     Naveen is a 66 y.o. male contacted today regarding refills of  zytiga 1000mg PO QD, prednisone 5mg PO QD and orgovyx 120mg PO QD specialty medication(s).      Specialty medication(s) and dose(s) confirmed: yes    Refill Questions      Most Recent Value   Changes to allergies? No   Changes to medications? No   New conditions since last clinic visit No   Unplanned office visit, urgent care, ED, or hospital admission in the last 4 weeks  No   How does patient/caregiver feel medication is working? Good   Financial problems or insurance changes  No   If yes, describe changes in insurance or financial issues. n/a   How many doses of your specialty medications were missed in the last 4 weeks? 0   Why were doses missed? n/a   Does this patient require a clinical escalation to a pharmacist? No          Delivery Questions      Most Recent Value   Delivery method [employer]   Delivery address correct? Yes   Delivery phone number [phone]   Preferred delivery time? Anytime   Number of medications in delivery 3   Medication being filled and delivered zytiga, prednisone, orgovyx   Doses left of specialty medications 6 days left   Is there any medication that is due not being filled? No   Supplies needed? No supplies needed   Cooler needed? No   Do any medications need mixed or dated? No   Additional comments no copay   Questions or concerns for the pharmacist? No   Explain any questions or concerns for the pharmacist n/a   Are any medications first time fills? No            Medication Adherence    Adherence tools used: watch   Other adherence tool: Clock - takes at same time each day, 7:45am   Support network for adherence: family member          Follow-up: 28 day(s)     Sushma Muro, Pharmacy Technician  Specialty Pharmacy Technician

## 2022-02-14 ENCOUNTER — APPOINTMENT (OUTPATIENT)
Dept: GENERAL RADIOLOGY | Facility: HOSPITAL | Age: 67
End: 2022-02-14

## 2022-02-14 ENCOUNTER — HOSPITAL ENCOUNTER (EMERGENCY)
Facility: HOSPITAL | Age: 67
Discharge: HOME | End: 2022-02-15
Attending: STUDENT IN AN ORGANIZED HEALTH CARE EDUCATION/TRAINING PROGRAM | Admitting: STUDENT IN AN ORGANIZED HEALTH CARE EDUCATION/TRAINING PROGRAM

## 2022-02-14 DIAGNOSIS — R53.1 GENERALIZED WEAKNESS: ICD-10-CM

## 2022-02-14 DIAGNOSIS — U07.1 COVID-19: Primary | ICD-10-CM

## 2022-02-14 DIAGNOSIS — E86.0 DEHYDRATION: ICD-10-CM

## 2022-02-14 LAB
ALBUMIN SERPL-MCNC: 4.2 G/DL (ref 3.5–5.2)
ALBUMIN/GLOB SERPL: 1.5 G/DL
ALP SERPL-CCNC: 81 U/L (ref 39–117)
ALT SERPL W P-5'-P-CCNC: 15 U/L (ref 1–41)
ANION GAP SERPL CALCULATED.3IONS-SCNC: 10 MMOL/L (ref 5–15)
AST SERPL-CCNC: 18 U/L (ref 1–40)
BASOPHILS # BLD AUTO: 0.02 10*3/MM3 (ref 0–0.2)
BASOPHILS NFR BLD AUTO: 0.3 % (ref 0–1.5)
BILIRUB SERPL-MCNC: 0.3 MG/DL (ref 0–1.2)
BUN SERPL-MCNC: 6 MG/DL (ref 8–23)
BUN/CREAT SERPL: 7.6 (ref 7–25)
CALCIUM SPEC-SCNC: 9.5 MG/DL (ref 8.6–10.5)
CHLORIDE SERPL-SCNC: 102 MMOL/L (ref 98–107)
CO2 SERPL-SCNC: 27 MMOL/L (ref 22–29)
CREAT SERPL-MCNC: 0.79 MG/DL (ref 0.76–1.27)
DEPRECATED RDW RBC AUTO: 41.2 FL (ref 37–54)
EOSINOPHIL # BLD AUTO: 0.06 10*3/MM3 (ref 0–0.4)
EOSINOPHIL NFR BLD AUTO: 0.9 % (ref 0.3–6.2)
ERYTHROCYTE [DISTWIDTH] IN BLOOD BY AUTOMATED COUNT: 13.1 % (ref 12.3–15.4)
FLUAV RNA RESP QL NAA+PROBE: NOT DETECTED
FLUBV RNA RESP QL NAA+PROBE: NOT DETECTED
GFR SERPL CREATININE-BSD FRML MDRD: 98 ML/MIN/1.73
GLOBULIN UR ELPH-MCNC: 2.8 GM/DL
GLUCOSE SERPL-MCNC: 119 MG/DL (ref 65–99)
HCT VFR BLD AUTO: 47.5 % (ref 37.5–51)
HGB BLD-MCNC: 16.2 G/DL (ref 13–17.7)
HOLD SPECIMEN: NORMAL
HOLD SPECIMEN: NORMAL
IMM GRANULOCYTES # BLD AUTO: 0.02 10*3/MM3 (ref 0–0.05)
IMM GRANULOCYTES NFR BLD AUTO: 0.3 % (ref 0–0.5)
LYMPHOCYTES # BLD AUTO: 1.6 10*3/MM3 (ref 0.7–3.1)
LYMPHOCYTES NFR BLD AUTO: 23.8 % (ref 19.6–45.3)
MAGNESIUM SERPL-MCNC: 1.8 MG/DL (ref 1.6–2.4)
MCH RBC QN AUTO: 29.8 PG (ref 26.6–33)
MCHC RBC AUTO-ENTMCNC: 34.1 G/DL (ref 31.5–35.7)
MCV RBC AUTO: 87.3 FL (ref 79–97)
MONOCYTES # BLD AUTO: 0.3 10*3/MM3 (ref 0.1–0.9)
MONOCYTES NFR BLD AUTO: 4.5 % (ref 5–12)
NEUTROPHILS NFR BLD AUTO: 4.71 10*3/MM3 (ref 1.7–7)
NEUTROPHILS NFR BLD AUTO: 70.2 % (ref 42.7–76)
NRBC BLD AUTO-RTO: 0 /100 WBC (ref 0–0.2)
PHOSPHATE SERPL-MCNC: 3.3 MG/DL (ref 2.5–4.5)
PLATELET # BLD AUTO: 136 10*3/MM3 (ref 140–450)
PMV BLD AUTO: 10.5 FL (ref 6–12)
POTASSIUM SERPL-SCNC: 3.7 MMOL/L (ref 3.5–5.2)
PROT SERPL-MCNC: 7 G/DL (ref 6–8.5)
RBC # BLD AUTO: 5.44 10*6/MM3 (ref 4.14–5.8)
SARS-COV-2 RNA RESP QL NAA+PROBE: DETECTED
SODIUM SERPL-SCNC: 139 MMOL/L (ref 136–145)
TROPONIN T SERPL-MCNC: <0.01 NG/ML (ref 0–0.03)
WBC NRBC COR # BLD: 6.71 10*3/MM3 (ref 3.4–10.8)
WHOLE BLOOD HOLD SPECIMEN: NORMAL
WHOLE BLOOD HOLD SPECIMEN: NORMAL

## 2022-02-14 PROCEDURE — 25010000002 ONDANSETRON PER 1 MG: Performed by: STUDENT IN AN ORGANIZED HEALTH CARE EDUCATION/TRAINING PROGRAM

## 2022-02-14 PROCEDURE — 93005 ELECTROCARDIOGRAM TRACING: CPT

## 2022-02-14 PROCEDURE — 80053 COMPREHEN METABOLIC PANEL: CPT

## 2022-02-14 PROCEDURE — 85025 COMPLETE CBC W/AUTO DIFF WBC: CPT

## 2022-02-14 PROCEDURE — 71045 X-RAY EXAM CHEST 1 VIEW: CPT

## 2022-02-14 PROCEDURE — 96374 THER/PROPH/DIAG INJ IV PUSH: CPT

## 2022-02-14 PROCEDURE — 96375 TX/PRO/DX INJ NEW DRUG ADDON: CPT

## 2022-02-14 PROCEDURE — 99283 EMERGENCY DEPT VISIT LOW MDM: CPT

## 2022-02-14 PROCEDURE — 87636 SARSCOV2 & INF A&B AMP PRB: CPT

## 2022-02-14 PROCEDURE — 84100 ASSAY OF PHOSPHORUS: CPT | Performed by: STUDENT IN AN ORGANIZED HEALTH CARE EDUCATION/TRAINING PROGRAM

## 2022-02-14 PROCEDURE — 93005 ELECTROCARDIOGRAM TRACING: CPT | Performed by: STUDENT IN AN ORGANIZED HEALTH CARE EDUCATION/TRAINING PROGRAM

## 2022-02-14 PROCEDURE — 84484 ASSAY OF TROPONIN QUANT: CPT

## 2022-02-14 PROCEDURE — 83735 ASSAY OF MAGNESIUM: CPT

## 2022-02-14 RX ORDER — ONDANSETRON 2 MG/ML
4 INJECTION INTRAMUSCULAR; INTRAVENOUS ONCE
Status: COMPLETED | OUTPATIENT
Start: 2022-02-14 | End: 2022-02-14

## 2022-02-14 RX ORDER — SODIUM CHLORIDE 0.9 % (FLUSH) 0.9 %
10 SYRINGE (ML) INJECTION AS NEEDED
Status: DISCONTINUED | OUTPATIENT
Start: 2022-02-14 | End: 2022-02-15 | Stop reason: HOSPADM

## 2022-02-14 RX ORDER — LABETALOL HYDROCHLORIDE 5 MG/ML
5 INJECTION, SOLUTION INTRAVENOUS ONCE
Status: COMPLETED | OUTPATIENT
Start: 2022-02-14 | End: 2022-02-14

## 2022-02-14 RX ADMIN — ONDANSETRON 4 MG: 2 INJECTION INTRAMUSCULAR; INTRAVENOUS at 23:47

## 2022-02-14 RX ADMIN — SODIUM CHLORIDE, POTASSIUM CHLORIDE, SODIUM LACTATE AND CALCIUM CHLORIDE 500 ML: 600; 310; 30; 20 INJECTION, SOLUTION INTRAVENOUS at 23:48

## 2022-02-14 RX ADMIN — LABETALOL 20 MG/4 ML (5 MG/ML) INTRAVENOUS SYRINGE 5 MG: at 23:48

## 2022-02-15 VITALS
BODY MASS INDEX: 29.4 KG/M2 | OXYGEN SATURATION: 97 % | SYSTOLIC BLOOD PRESSURE: 151 MMHG | TEMPERATURE: 97.6 F | WEIGHT: 194 LBS | HEIGHT: 68 IN | HEART RATE: 86 BPM | RESPIRATION RATE: 18 BRPM | DIASTOLIC BLOOD PRESSURE: 94 MMHG

## 2022-02-15 LAB
BILIRUB UR QL STRIP: NEGATIVE
CLARITY UR: CLEAR
COLOR UR: YELLOW
GLUCOSE UR STRIP-MCNC: NEGATIVE MG/DL
HGB UR QL STRIP.AUTO: NEGATIVE
HOLD SPECIMEN: NORMAL
KETONES UR QL STRIP: NEGATIVE
LEUKOCYTE ESTERASE UR QL STRIP.AUTO: NEGATIVE
NITRITE UR QL STRIP: NEGATIVE
PH UR STRIP.AUTO: 6 [PH] (ref 5–8)
PROT UR QL STRIP: NEGATIVE
SP GR UR STRIP: 1.01 (ref 1–1.03)
UROBILINOGEN UR QL STRIP: NORMAL

## 2022-02-15 PROCEDURE — 96361 HYDRATE IV INFUSION ADD-ON: CPT

## 2022-02-15 PROCEDURE — 81003 URINALYSIS AUTO W/O SCOPE: CPT | Performed by: STUDENT IN AN ORGANIZED HEALTH CARE EDUCATION/TRAINING PROGRAM

## 2022-02-15 RX ORDER — ONDANSETRON 4 MG/1
4 TABLET, ORALLY DISINTEGRATING ORAL 4 TIMES DAILY PRN
Qty: 12 TABLET | Refills: 0 | Status: SHIPPED | OUTPATIENT
Start: 2022-02-15 | End: 2022-11-09 | Stop reason: SDUPTHER

## 2022-02-15 NOTE — DISCHARGE INSTRUCTIONS
Use the provided nausea medicine to help with maintaining good oral hydration and should your symptoms worsen in any way please return to the ED or seek other medical care.  Continue to monitor your home pulse ox and if it is consistently 94 or less at rest you should return to the ED or seek other medical care.

## 2022-02-15 NOTE — ED PROVIDER NOTES
EMERGENCY DEPARTMENT ENCOUNTER    Pt Name: Naveen Lugo  MRN: [id number]  Pt :   1955  Room Number:    Date of encounter:  2022  PCP: Steffany Us MD  ED Provider: Teddy Lisa MD    Historian: Patient      HPI:  Chief Complaint: COVID-19, weakness, myalgias        Context: Naveen Lugo is a 66-year-old man who presented the emergency department for evaluation of generalized weakness, poor appetite, diffuse myalgias in the setting of known COVID-19 infection.  He says he tested positive last Friday and does not think he was sick much before that.  He has been complaining of generalized malaise, and does not think he has been eating and drinking as well.  He has history of prostate cancer and history of hypophosphatemia and thinks his phosphorus may be low.  He denies any other complaints at this time.    PAST MEDICAL HISTORY  Past Medical History:   Diagnosis Date   • Bone cancer (HCC)    • History of radiation therapy 2021    L4 through sacrum, left acetabulum, right pelvis   • Nephrolithiasis    • Opioid use disorder, mild, on maintenance therapy (HCC)    • Prostate cancer (HCC)          PAST SURGICAL HISTORY  Past Surgical History:   Procedure Laterality Date   • CHOLECYSTECTOMY     • CORONARY STENT PLACEMENT   &    • HERNIA REPAIR     • THORACIC DISCECTOMY           FAMILY HISTORY  Family History   Problem Relation Age of Onset   • Ovarian cancer Sister    • Diabetes Brother    • Heart disease Brother    • Prostate cancer Brother          SOCIAL HISTORY  Social History     Socioeconomic History   • Marital status:    Tobacco Use   • Smoking status: Current Every Day Smoker     Packs/day: 1.00     Years: 50.00     Pack years: 50.00     Types: Cigarettes   • Smokeless tobacco: Never Used   Vaping Use   • Vaping Use: Never used   Substance and Sexual Activity   • Alcohol use: Not Currently   • Drug use: Never   • Sexual activity: Defer          ALLERGIES  Cymbalta [duloxetine hcl], Gabapentin, and Lyrica [pregabalin]        REVIEW OF SYSTEMS  Review of Systems       All systems reviewed and negative except for those discussed in HPI.       PHYSICAL EXAM    I have reviewed the triage vital signs and nursing notes.    ED Triage Vitals [02/14/22 2055]   Temp Heart Rate Resp BP SpO2   97.6 °F (36.4 °C) 91 18 (!) 155/101 100 %      Temp src Heart Rate Source Patient Position BP Location FiO2 (%)   Oral Monitor Sitting Left arm --       Physical Exam  GENERAL:   Appears ill but in no acute distress  HENT: Nares patent.  Dry mucous membranes  EYES: No scleral icterus.  Extraocular movements intact  CV: Regular rhythm, regular rate.  RESPIRATORY: Normal effort.  No audible wheezes, rales or rhonchi.  ABDOMEN: Soft, nontender  MUSCULOSKELETAL: No deformities.   NEURO: Alert, moves all extremities, follows commands.  SKIN: Warm, dry, no rash visualized.        LAB RESULTS  Recent Results (from the past 24 hour(s))   Gray Top    Collection Time: 02/14/22  9:05 PM   Result Value Ref Range    Extra Tube Hold for add-ons.    COVID-19 and FLU A/B PCR - Swab, Nasopharynx    Collection Time: 02/14/22  9:05 PM    Specimen: Nasopharynx; Swab   Result Value Ref Range    COVID19 Detected (C) Not Detected - Ref. Range    Influenza A PCR Not Detected Not Detected    Influenza B PCR Not Detected Not Detected   Comprehensive Metabolic Panel    Collection Time: 02/14/22  9:09 PM    Specimen: Blood   Result Value Ref Range    Glucose 119 (H) 65 - 99 mg/dL    BUN 6 (L) 8 - 23 mg/dL    Creatinine 0.79 0.76 - 1.27 mg/dL    Sodium 139 136 - 145 mmol/L    Potassium 3.7 3.5 - 5.2 mmol/L    Chloride 102 98 - 107 mmol/L    CO2 27.0 22.0 - 29.0 mmol/L    Calcium 9.5 8.6 - 10.5 mg/dL    Total Protein 7.0 6.0 - 8.5 g/dL    Albumin 4.20 3.50 - 5.20 g/dL    ALT (SGPT) 15 1 - 41 U/L    AST (SGOT) 18 1 - 40 U/L    Alkaline Phosphatase 81 39 - 117 U/L    Total Bilirubin 0.3 0.0 - 1.2 mg/dL     eGFR Non African Amer 98 >60 mL/min/1.73    Globulin 2.8 gm/dL    A/G Ratio 1.5 g/dL    BUN/Creatinine Ratio 7.6 7.0 - 25.0    Anion Gap 10.0 5.0 - 15.0 mmol/L   Troponin    Collection Time: 02/14/22  9:09 PM    Specimen: Blood   Result Value Ref Range    Troponin T <0.010 0.000 - 0.030 ng/mL   Magnesium    Collection Time: 02/14/22  9:09 PM    Specimen: Blood   Result Value Ref Range    Magnesium 1.8 1.6 - 2.4 mg/dL   Green Top (Gel)    Collection Time: 02/14/22  9:09 PM   Result Value Ref Range    Extra Tube Hold for add-ons.    Lavender Top    Collection Time: 02/14/22  9:09 PM   Result Value Ref Range    Extra Tube hold for add-on    Gold Top - SST    Collection Time: 02/14/22  9:09 PM   Result Value Ref Range    Extra Tube Hold for add-ons.    Light Blue Top    Collection Time: 02/14/22  9:09 PM   Result Value Ref Range    Extra Tube hold for add-on    CBC Auto Differential    Collection Time: 02/14/22  9:09 PM    Specimen: Blood   Result Value Ref Range    WBC 6.71 3.40 - 10.80 10*3/mm3    RBC 5.44 4.14 - 5.80 10*6/mm3    Hemoglobin 16.2 13.0 - 17.7 g/dL    Hematocrit 47.5 37.5 - 51.0 %    MCV 87.3 79.0 - 97.0 fL    MCH 29.8 26.6 - 33.0 pg    MCHC 34.1 31.5 - 35.7 g/dL    RDW 13.1 12.3 - 15.4 %    RDW-SD 41.2 37.0 - 54.0 fl    MPV 10.5 6.0 - 12.0 fL    Platelets 136 (L) 140 - 450 10*3/mm3    Neutrophil % 70.2 42.7 - 76.0 %    Lymphocyte % 23.8 19.6 - 45.3 %    Monocyte % 4.5 (L) 5.0 - 12.0 %    Eosinophil % 0.9 0.3 - 6.2 %    Basophil % 0.3 0.0 - 1.5 %    Immature Grans % 0.3 0.0 - 0.5 %    Neutrophils, Absolute 4.71 1.70 - 7.00 10*3/mm3    Lymphocytes, Absolute 1.60 0.70 - 3.10 10*3/mm3    Monocytes, Absolute 0.30 0.10 - 0.90 10*3/mm3    Eosinophils, Absolute 0.06 0.00 - 0.40 10*3/mm3    Basophils, Absolute 0.02 0.00 - 0.20 10*3/mm3    Immature Grans, Absolute 0.02 0.00 - 0.05 10*3/mm3    nRBC 0.0 0.0 - 0.2 /100 WBC   Phosphorus    Collection Time: 02/14/22  9:09 PM    Specimen: Blood   Result Value Ref  Range    Phosphorus 3.3 2.5 - 4.5 mg/dL   Urinalysis With Microscopic If Indicated (No Culture) - Urine, Clean Catch    Collection Time: 02/15/22 12:56 AM    Specimen: Urine, Clean Catch   Result Value Ref Range    Color, UA Yellow Yellow, Straw    Appearance, UA Clear Clear    pH, UA 6.0 5.0 - 8.0    Specific Gravity, UA 1.013 1.001 - 1.030    Glucose, UA Negative Negative    Ketones, UA Negative Negative    Bilirubin, UA Negative Negative    Blood, UA Negative Negative    Protein, UA Negative Negative    Leuk Esterase, UA Negative Negative    Nitrite, UA Negative Negative    Urobilinogen, UA 0.2 E.U./dL 0.2 - 1.0 E.U./dL       If labs were ordered, I independently reviewed the results.        RADIOLOGY  XR Chest 1 View    Result Date: 2/14/2022  XR CHEST 1 VW-  Date of Exam: 2/14/2022 9:42 PM  Indication: Weak/Dizzy/AMS triage protocol.  Comparison:?None available.  Technique:?A single view of the chest was obtained.  FINDINGS:  ?There is a left subclavian Port-A-Cath in place the tip projecting over the superior vena cava.  Heart size and pulmonary vessels are within normal limits.  Lungs are clear bilaterally.  No pleural effusions are seen.         1. No acute cardiopulmonary disease.   This report was finalized on 2/14/2022 10:12 PM by Dony López MD.        I ordered and reviewed the above noted radiographic studies.      I viewed images of chest x-ray which did not reveal any acute cardiopulmonary findings that I can appreciate.    See radiologist's dictation for official interpretation.        PROCEDURES    Procedures    ECG 12 Lead             MEDICATIONS GIVEN IN ER    Medications   labetalol (NORMODYNE,TRANDATE) injection 5 mg (5 mg Intravenous Given 2/14/22 9290)   lactated ringers bolus 500 mL (0 mL Intravenous Stopped 2/15/22 0055)   ondansetron (ZOFRAN) injection 4 mg (4 mg Intravenous Given 2/14/22 8857)         PROGRESS, DATA ANALYSIS, CONSULTS, AND MEDICAL DECISION MAKING    All labs have been  independently reviewed by me.  All radiology studies have been reviewed by me and the radiologist dictating the report.   EKG's have been independently viewed and interpreted by me.      Differential diagnoses: COVID-19, dehydration, bacterial pneumonia, pneumothorax, MI, anemia, electrolyte abnormality, urinary tract infection      ED Course as of 02/15/22 1724   Tue Feb 15, 2022   0153 In summary this is a 66-year-old man who presented the emergency department for evaluation of generalized weakness, poor appetite, diffuse myalgias in the setting of known COVID-19 infection.  He says he tested positive last Friday and does not think he was sick much before that.  He has been complaining of generalized malaise, and does not think he has been eating and drinking as well.  He has history of prostate cancer and history of hypophosphatemia and thinks his phosphorus may be low.  He denies any other complaints at this time. [CC]      ED Course User Index  [CC] Teddy Lisa MD       Patient arrives satting well on room air mildly hypertensive but otherwise vitals within normal limits.  He has dry mucous membranes so in the setting of known COVID-19 historian judicious crystalloid resuscitation with 500 cc bolus.  Chest x-ray is clear.  EKG reveals incomplete right bundle.  Covid test is positive.  Very mild thrombocytopenia at 136 but otherwise no actionable items on CBC.  No elevation in troponin.  Very mild hyperglycemia but otherwise electrolytes including phosphorus are within normal limits.  No abnormal findings on urinalysis.  After several hours in the emergency department continues to sat in the high 90s on room air.  Discussed the findings with him and think he can safely discharge but offered hospital admission in the setting of his complex medical history.  He prefers to discharge with strict return precautions.  Discharged in stable condition.  AS OF 17:24 EST VITALS:    BP - 151/94  HR - 86  TEMP -  97.6 °F (36.4 °C) (Oral)  O2 SATS - 97%                  DIAGNOSIS  Final diagnoses:   COVID-19   Dehydration   Generalized weakness         DISPOSITION  DISCHARGE    Patient discharged in stable condition.    Reviewed implications of results, diagnosis, meds, responsibility to follow up, warning signs and symptoms of possible worsening, potential complications and reasons to return to ER.    Patient/Family voiced understanding of above instructions.    Discussed plan for discharge, as there is no emergent indication for admission.  Pt/family is agreeable and understands need for follow up and possible repeat testing.  Pt/family is aware that discharge does not mean that nothing is wrong but that it indicates no emergency is currently present that requires admission and they must continue care with follow-up as given below or with a physician of their choice.     FOLLOW-UP  Steffany Us MD  [address] [facility]   [place] [id number] [phone]    Call            Medication List      New Prescriptions    ondansetron ODT 4 MG disintegrating tablet  Commonly known as: ZOFRAN-ODT  Place 1 tablet on the tongue 4 (Four) Times a Day As Needed for Nausea or Vomiting.           Where to Get Your Medications      These medications were sent to [facility] [address]  [facility] - [phone] [facility] [phone]   [address]   [place] [id number]    Phone: [phone]   · ondansetron ODT 4 MG disintegrating tablet                    Teddy Lisa MD  02/15/22 6615

## 2022-02-16 ENCOUNTER — READMISSION MANAGEMENT (OUTPATIENT)
Dept: CALL CENTER | Facility: HOSPITAL | Age: 67
End: 2022-02-16

## 2022-02-16 LAB
QT INTERVAL: 384 MS
QTC INTERVAL: 448 MS

## 2022-02-16 NOTE — OUTREACH NOTE
COVID-19 Week 1 Survey      Responses   [facility] patient discharged from? [place]   Does the patient have one of the following disease processes/diagnoses(primary or secondary)? COVID-19   COVID-19 underlying condition? None   Call Number Call 1   Week 1 Call successful? Yes   Call start time [phone]   Call end time 1335   General alerts for this patient ED visit-sent home with pulse oximeter   Discharge diagnosis COVID-19   Meds reviewed with patient/caregiver? Yes   Comments regarding appointments rescheduled for after quarantine    What DME was ordered? home with pulse oximeter   What is the patient's perception of their health status since discharge? Improving  [weak and cough]   Does the patient have any of the following symptoms? Cough   Pulse Ox monitoring Intermittent   Pulse Ox device source Patient   O2 Sat comments 95-98% RA   O2 Sat: education provided Sat levels,  Monitoring frequency,  When to seek care   Is the patient/caregiver able to teach back steps to recovery at home? Rest and rebuild strength, gradually increase activity,  Set small, achievable goals for return to baseline health   COVID-19 call completed? Yes          Roya Arreola RN

## 2022-02-16 NOTE — OUTREACH NOTE
Prep Survey      Responses   [facility] facility patient discharged from? [place]   Is LACE score < 7 ? No   Emergency Room discharge w/ pulse ox? Yes   Eligibility Readm Mgmt   Discharge diagnosis COVID-19   Does the patient have one of the following disease processes/diagnoses(primary or secondary)? COVID-19   Does the patient have Home health ordered? No   Is there a DME ordered? Yes   What DME was ordered? home with pulse oximeter   General alerts for this patient ED visit-sent home with pulse oximeter   Prep survey completed? Yes          Nora Douglass RN

## 2022-02-17 ENCOUNTER — READMISSION MANAGEMENT (OUTPATIENT)
Dept: CALL CENTER | Facility: HOSPITAL | Age: 67
End: 2022-02-17

## 2022-02-17 ENCOUNTER — PATIENT OUTREACH (OUTPATIENT)
Dept: CASE MANAGEMENT | Facility: OTHER | Age: 67
End: 2022-02-17

## 2022-02-17 NOTE — OUTREACH NOTE
Ambulatory Case Management Note    Patient Outreach    RN-ACM patient outreach.  Role of ACM explained to patient, agreeable to service.  [facility] ED visit 2/14/2022. Impression: Covid 19, Dehydration, Generalized weakness.  Patient states he is feeling some better.  Denies shortness of breath, has productive cough with greenish/yellow sputum.  Denies fever.  Oxygen saturations ranging for 94-98%.  Patient states he is eating and hydrating adequately.  Patient states he is steady on his feet.  Patient reports continued back and hip pain, administering pain medications as prescribed, states he has appointment with pain management and will discuss increasing medication at that time.  Pain management plan of care implemented, education provided.    Care Evaluation    Questions/Answers      Most Recent Value   Other Patient Education/Resources  24/7 [facility] Nurse Call Line   24/7 Nurse Call Line Education Method Verbal   Medication Adherence Medications understood   Healthy Lifestyle (Self-Efficacy) recognizes when to stop activity,  recognizes when to contact medical assistance,  self-reports important symptoms to medical professional        General & Health Literacy Assessment    Questions/Answers      Most Recent Value   Assessment Completed With Patient   Living Arrangement Spouse   Type of Residence Private Residence   Home Care Services No   Communication Device Yes   Health Literacy Good        Care Plan: Optimal cancer pain management with minimal interference with ADLs   Updates made since 2/17/2022 12:00 AM      Problem: Rate and track pain       Goal: Quantify pain       Task: Track and record pain incidence using scale of 1-10    Responsible User: Jaylene Pelaez RN      Task: Note time when pain is worst/best and activities involved    Responsible User: Jaylene Pelaez RN      Task: Note any activities that increase pain    Responsible User: Jaylene Pelaez RN      Task: Rate pain at regular  intervals    Responsible User: Jaylene Pelaez RN      Goal: Adhere to pain medication plan       Task: Administer long acting pain medications at regular timed intervals    Responsible User: Jaylnee Pelaez RN      Task: Note time and dose of breakthrough medication administration    Responsible User: Jaylene Pelaez RN      Task: Proactivly manager side effect of pain medication (constipation, nausea..)    Responsible User: Jaylene Pelaez RN      Task: Comply with safety instructions and limitations related to pain medication administration    Responsible User: Jaylene Pelaez RN Cathy Ollom, RN  Ambulatory Case Management    2/17/2022, 11:21 EST

## 2022-02-17 NOTE — OUTREACH NOTE
COVID-19 Week 1 Survey      Responses   [facility] patient discharged from? [place]   Does the patient have one of the following disease processes/diagnoses(primary or secondary)? COVID-19   COVID-19 underlying condition? None   Call Number Call 2   Week 1 Call successful? Yes   Call start time 1127   Call end time 1130   General alerts for this patient ED visit-sent home with pulse oximeter   Discharge diagnosis COVID-19   Meds reviewed with patient/caregiver? Yes   Is the patient having any side effects they believe may be caused by any medication additions or changes? No   Does the patient have all medications ordered at discharge? Yes   Is the patient taking all medications as directed (includes completed medication regime)? Yes   Does the patient have a primary care provider?  Yes   Does the patient have an appointment with their PCP or specialist within 7 days of discharge? No   What is preventing the patient from scheduling follow up appointments within 7 days of discharge? Haven't had time   Nursing Interventions Advised patient to make appointment   Has the patient kept scheduled appointments due by today? N/A   What DME was ordered? home with pulse oximeter   Has all DME been delivered? Yes   Psychosocial issues? No   Did the patient receive a copy of their discharge instructions? Yes   Did the patient receive a copy of COVID-19 specific instructions? Yes   Nursing interventions Reviewed instructions with patient   What is the patient's perception of their health status since discharge? Improving   Does the patient have any of the following symptoms? Cough,  Shortness of breath  [weak]   Nursing Interventions Nurse provided patient education   Pulse Ox monitoring Intermittent   O2 Sat comments 97% on RA    Is the patient/caregiver able to teach back steps to recovery at home? Rest and rebuild strength, gradually increase activity,  Eat a well-balance diet   If the patient is a current smoker, are  they able to teach back resources for cessation? Smoking cessation medications   Is the patient/caregiver able to teach back the hierarchy of who to call/visit for symptoms/problems? PCP, Specialist, Home health nurse, Urgent Care, ED, 911 Yes   COVID-19 call completed? Yes          Linnette Tomlin RN

## 2022-02-18 ENCOUNTER — READMISSION MANAGEMENT (OUTPATIENT)
Dept: CALL CENTER | Facility: HOSPITAL | Age: 67
End: 2022-02-18

## 2022-02-18 NOTE — OUTREACH NOTE
COVID-19 Week 1 Survey      Responses   [facility] patient discharged from? [place]   Does the patient have one of the following disease processes/diagnoses(primary or secondary)? COVID-19   COVID-19 underlying condition? None   Call Number Call 3   Week 1 Call successful? Yes   Call start time 1135   Call end time 1138   General alerts for this patient ED visit-sent home with pulse oximeter   Discharge diagnosis COVID-19   Is patient permission given to speak with other caregiver? Yes   List who call center can speak with wife   Does the patient have an appointment with their PCP or specialist within 7 days of discharge? Greater than 7 days   What is preventing the patient from scheduling follow up appointments within 7 days of discharge? Earlier appointment not available   Psychosocial issues? No   Comments Pt at baseline has body aches r/t his bone cancer.   What is the patient's perception of their health status since discharge? Same   Does the patient have any of the following symptoms? Cough,  Shortness of breath  [+diarrhea]   Nursing Interventions Nurse provided patient education   Pulse Ox monitoring Intermittent   Pulse Ox device source Patient   O2 Sat comments 97% on RA    Is the patient/caregiver able to teach back steps to recovery at home? Set small, achievable goals for return to baseline health   Is the patient/caregiver able to teach back the hierarchy of who to call/visit for symptoms/problems? PCP, Specialist, Home health nurse, Urgent Care, ED, 911 Yes   COVID-19 call completed? Yes   Revoked No further contact(revokes)-requires comment   Is the patient interested in additional calls from an ambulatory ?  NOTE:  applies to high risk patients requiring additional follow-up. No   Graduated/Revoked comments 3rd ER call          Ruthie Martin RN

## 2022-03-01 NOTE — PROGRESS NOTES
Palliative Clinic Note      Name: Naveen Lugo  Age: 66 y.o.  Sex: male  : 1955  MRN: [id number]  Date of Service: 22  Medical Oncologist: Dr. Rashid    Subjective:    Chief Complaint: Fatigue    History of Present Illness: Naveen Lugo is a 66 y.o. male with past medical history significant for coronary artery disease, hypertension, hyperlipidemia, opioid use disorder, and metastatic prostate cancer who presents to the palliative clinic today as a follow up for pain and symptom management.     Treatment summary: He was diagnosed with prostate adenocarcinoma metastasized to the bone on 9/15/20.  Completed radiation with Dr. Quintanilla in 2021 and follow up on 10/4/21 for increased hip/back pain. Dr. Quintanilla felt his increased pain is unlikely due to past radiotherapy treatments and based on recent scans does not recommend additional radiotherapy at this time. May reconsider in the future. Currently on Zytiga, prednisone, and Relugolix. Zometa held due to low phosphorous. Imaging in January showed no active disease. PSA low. Low ACTH and cortisol. Patient referred to endocrinology for possible adrenal insufficiency. Next scans in April. ED visit on 2/15/22 for weakness and myalgias due to COVID-10 infection. Provided symptomatic treatment and sent home.     Pain history: History of opioid use disorder. Patient completed Suboxone taper in the past and was on no pain medication prior to cancer diagnosis.  Inadequate pain control and increased somnolence on methadone 10 mg 3x daily. History complicated by OUD, several failed therapies and medication intolerances. Patient referred to pain specialist, Dr. BARB Avalos.      Symptoms: The patient admits to persistent fatigue. He is currently getting worked up for adrenal insufficiency and is scheduled to see an endocrinologist on 22. Overall, the patient states he is doing well. He has a few complaints about the pain clinic he follows with but reports  "improvement in his pain on current regimen. He is taking hydromorphone 8 mg 4x a day and morphine 15 mg extended release 4 x a day. The patient recently recovered from COVID pneumonia. He denies dyspnea or chest pain. He denies nausea or vomiting. He requests a refill for Senna. He takes 2 per day for regular bowel movements. He is sleeping through the night.      Pyschosocial: Endorses strong family support. Patient states his mood has improved and he has had less irritability. Patient had a good weekend with his grandson.      Goals: Focused on improving pain so he can participate in recreational activities with his grandson.    The following portions of the patient's history were reviewed and updated as appropriate: allergies, current medications, past family history, past medical history, past social history, past surgical history and problem list.    ORT-R: High risk   Aberrant behavior: None  Decisional capacity: Full  ECOG: (1) Restricted in physically strenuous activity, ambulatory and able to do work of light nature   Palliative Performance Scale Score: 60%     Objective:    /89   Pulse 98   Temp 97.4 °F (36.3 °C) (Temporal)   Resp 14   Ht 172.7 cm (67.99\")   Wt 90.5 kg (199 lb 9.6 oz)   SpO2 100%   BMI 30.36 kg/m²     Constitutional: Awake, alert, sitting up in the exam chair, analgesic gait  Eyes: PERRLA, EOMS intact  HENT: NCAT, face symmetric  Neck: Supple, trachea midline  Respiratory: Clear to auscultation bilaterally, nonlabored respirations  Cardiovascular: RRR, no murmurs appreciated  Gastrointestinal: Positive bowel sounds, soft, nontender, no guarding  Musculoskeletal: No bilateral ankle edema, moves all extremities   Psychiatric: Appropriate affect, cooperative  Neurologic: Oriented x 3, Cranial Nerves grossly intact to confrontation, speech clear  Skin: Cool dry, no rashes or wounds appreciated     [facility]:  #[id number]. Reviewed. All providers are part of the care " team.    Assessment & Plan:    1. Prostate cancer metastatic to bone (HCC)  -- Currently on Zytiga, prednisone, and Relugolix. Zometa held due to low phosphorous. Imaging in January showed no active disease. PSA low. Next scans in April. Patient follows closely with Dr. Rashid and Abena Velasquez.     2. Pain, cancer  -- Pain management per Dr. BARB Avalos. Patient reports adequate pain control on current regimen. He would like to follow-up with palliative care on an as-needed basis.     3. Drug-induced constipation  -- Refill for Sennosides (Senna) 8.6 MG capsule per patient request.     4. Other fatigue  -- Patient undergoing work up for adrenal insufficiency. Scheduled to see endocrinologist next month.     Code status: Full code  Medical interventions: Full  Advanced directives: None  Healthcare surrogate: Rosaura Parish    No follow-ups on file.    I spent 30 minutes caring for Naveen Lugo on this date of service. This time includes time spent by me in the following activities: preparing for the visit, reviewing tests, obtaining and/or reviewing a separately obtained history, performing a medically appropriate examination and/or evaluation , counseling and educating the patient/family/caregiver, ordering medications, tests, or procedures, documenting information in the medical record, independently interpreting results and communicating that information with the patient/family/caregiver, and care coordination    Anna Ayers PA-C  03/02/2022    Medication Date Filled # Filled Count Used # Days  ROSE MARIE   Hydromorphone 8         Morphine 15

## 2022-03-01 NOTE — PATIENT INSTRUCTIONS
Call and schedule a follow-up as needed in the future. [phone].     Refill for Senna sent to pharmacy today.

## 2022-03-02 ENCOUNTER — OFFICE VISIT (OUTPATIENT)
Dept: PALLIATIVE CARE | Facility: CLINIC | Age: 67
End: 2022-03-02

## 2022-03-02 VITALS
TEMPERATURE: 97.4 F | HEART RATE: 98 BPM | SYSTOLIC BLOOD PRESSURE: 146 MMHG | BODY MASS INDEX: 30.25 KG/M2 | WEIGHT: 199.6 LBS | OXYGEN SATURATION: 100 % | RESPIRATION RATE: 14 BRPM | DIASTOLIC BLOOD PRESSURE: 89 MMHG | HEIGHT: 68 IN

## 2022-03-02 DIAGNOSIS — C61 PROSTATE CANCER METASTATIC TO BONE: Primary | ICD-10-CM

## 2022-03-02 DIAGNOSIS — K59.03 DRUG-INDUCED CONSTIPATION: ICD-10-CM

## 2022-03-02 DIAGNOSIS — G89.3 PAIN, CANCER: ICD-10-CM

## 2022-03-02 DIAGNOSIS — C79.51 PROSTATE CANCER METASTATIC TO BONE: Primary | ICD-10-CM

## 2022-03-02 DIAGNOSIS — R53.83 OTHER FATIGUE: ICD-10-CM

## 2022-03-02 PROCEDURE — 99214 OFFICE O/P EST MOD 30 MIN: CPT | Performed by: PHYSICIAN ASSISTANT

## 2022-03-02 RX ORDER — SENNOSIDES 8.6 MG
8.6 CAPSULE ORAL 3 TIMES DAILY
Qty: 84 EACH | Refills: 2 | Status: SHIPPED | OUTPATIENT
Start: 2022-03-02 | End: 2022-11-28 | Stop reason: SDUPTHER

## 2022-03-07 NOTE — PROGRESS NOTES
Nela Mares was seen in the Allergy Clinic at [facility]. The following are my recommendations regarding her Allergic Rhinitis Due to Animals, Allergic Rhinitis Due to Pollen, Allergic Rhinitis Due to Dust Mites and Allergic Rhinitis Due to Mold    1. Continue cluster immunotherapy per protocol  2. Pre-medicate with antihistamines as directed prior to injection appointments      Nela Mares is a 58 year old American female who is seen today for cluster immunotherapy. She did well after last week's visit and has not had any recent illness. Sanam pre-medicated with antihistamines as directed prior to today's visit.      Past Medical History:   Diagnosis Date     Allergic rhinitis, cause unspecified      Cervical high risk HPV (human papillomavirus) test positive 03/21/2017    3/21/17 NIL pap/+ HR HPV (not 16 or 18).      Depressive disorder      Depressive disorder, not elsewhere classified     seasonal     Hypertension      Mild persistent asthma      Obstructive sleep apnea (adult) (pediatric)     uses CPAP     Scalp mass 7/2014       REVIEW OF SYSTEMS:  General: negative for weight gain. negative for weight loss. negative for changes in sleep.   Eyes: negative for itching. negative for redness. negative for tearing/watering. negative for vision changes  Ears: negative for fullness. negative for hearing loss. negative for dizziness.   Nose: negative for snoring.negative for changes in smell. negative for drainage.   Throat: negative for hoarseness. negative for sore throat. negative for trouble swallowing.   Lungs: negative for cough. negative for shortness of breath.negative for wheezing. negative for sputum production.   Cardiovascular: negative for chest pain. negative for swelling of ankles. negative for fast or irregular heartbeat.   Gastrointestinal: negative for nausea. negative for heartburn. negative for acid reflux.   Musculoskeletal: negative for joint pain. negative for joint  OFFICE VISIT  NOTE  [facility] CARDIOLOGY      Name: Naveen Lugo    Date: 3/9/2022  MRN:  [id number]  :  1955      REFERRING/PRIMARY PROVIDER:  Steffany Us MD    Chief Complaint   Patient presents with   • Coronary Artery Disease       HPI: Naveen Lugo is a 66 y.o. male who presents today for follow-up for CAD.  History of metastatic prostate cancer with metastases to the bones, new lesion in the lumbar spine started radiation treatment, history of chemotherapy as well.  History of PCI of the LAD and RCA 2018 by Dr. Winston at [facility], with known history of a  of the OM, LAD stress test 2018 showed ischemia in the inferior lateral zone consistent with the .  Doing well recently, no chest pain, Ranexa helped tremendously.  Doing well on aspirin as well.  LFTs normal 2022, prostate cancer stable, now on oral therapy followed by Dr. Rashid.    Past Medical History:   Diagnosis Date   • Bone cancer (HCC)    • History of radiation therapy 2021    L4 through sacrum, left acetabulum, right pelvis   • Nephrolithiasis    • Opioid use disorder, mild, on maintenance therapy (HCC)    • Prostate cancer (HCC)        Past Surgical History:   Procedure Laterality Date   • CHOLECYSTECTOMY     • CORONARY STENT PLACEMENT   &    • HERNIA REPAIR     • THORACIC DISCECTOMY         Social History     Socioeconomic History   • Marital status:    Tobacco Use   • Smoking status: Current Every Day Smoker     Packs/day: 1.00     Years: 50.00     Pack years: 50.00     Types: Cigarettes   • Smokeless tobacco: Never Used   Vaping Use   • Vaping Use: Never used   Substance and Sexual Activity   • Alcohol use: Not Currently   • Drug use: Never   • Sexual activity: Defer       Family History   Problem Relation Age of Onset   • Ovarian cancer Sister    • Diabetes Brother    • Heart disease Brother    • Prostate cancer Brother         ROS:   Constitutional no fever,  no weight  loss   Skin no rash, no subcutaneous nodules   Otolaryngeal no difficulty swallowing   Cardiovascular See HPI   Pulmonary no cough, no sputum production   Gastrointestinal no constipation, no diarrhea   Genitourinary no dysuria, no hematuria   Hematologic no easy bruisability, no abnormal bleeding   Musculoskeletal no muscle pain   Neurologic no dizziness, no falls         Allergies   Allergen Reactions   • Cymbalta [Duloxetine Hcl] Dizziness   • Gabapentin Nausea Only   • Lyrica [Pregabalin] Nausea And Vomiting and Dizziness         Current Outpatient Medications:   •  abiraterone acetate (ZYTIGA) 500 MG chemo tablet, Take 2 tablets by mouth Daily., Disp: 60 tablet, Rfl: 5  •  aspirin (aspirin) 81 MG EC tablet, Take 1 tablet by mouth Daily., Disp: , Rfl:   •  calcium carbonate (OS-RONNY) 600 MG tablet, Take 1,200 mg by mouth Daily. Patient to take 1200 mg daily for hypocalcemia., Disp: , Rfl:   •  cloNIDine (CATAPRES) 0.1 MG tablet, Take 1 tablet by mouth 2 (Two) Times a Day As Needed (night sweats) for up to 10 days., Disp: 20 tablet, Rfl: 0  •  dexamethasone (DECADRON) 4 MG tablet, Take 2 tablets oral twice a day for 3 consecutive days beginning the day before chemotherapy and continue for 6 doses., Disp: 12 tablet, Rfl: 5  •  Fluticasone-Umeclidin-Vilant (Trelegy Ellipta) 100-62.5-25 MCG/INH inhaler, Inhale 1 puff Daily., Disp: , Rfl:   •  HYDROmorphone (DILAUDID) 8 MG tablet, , Disp: , Rfl:   •  lidocaine-prilocaine (EMLA) 2.5-2.5 % cream, Apply  topically to the appropriate area as directed As Needed (45-60 minutes prior to port access.  Cover with saran/plastic wrap.)., Disp: 30 g, Rfl: 3  •  lidocaine, Apply 1 application topically to the appropriate area as directed 4 (Four) Times a Day., Disp: 1 container, Rfl: 5  •  Morphine (MS CONTIN) 15 MG 12 hr tablet, , Disp: , Rfl:   •  Morphine (MS CONTIN) 30 MG 12 hr tablet, , Disp: , Rfl:   •  naloxone (NARCAN) 4 MG/0.1ML nasal spray, 1 spray into the nostril(s) as  stiffness. negative for joint swelling.   Neurologic: negative for seizures. negative for fainting. negative for weakness.   Psychiatric: negative for changes in mood. negative for anxiety.   Endocrine: negative for cold intolerance. negative for heat intolerance. negative for tremors.   Hematologic: negative for easy bruising. negative for easy bleeding.  Integumentary: negative for rash. negative for scaling. negative for nail changes.       Current Outpatient Prescriptions:      cetirizine (ZYRTEC) 10 MG tablet, Take 10 mg by mouth daily, Disp: , Rfl:      fluticasone (FLONASE) 50 MCG/ACT spray, Spray 1-2 sprays into both nostrils daily, Disp: 32 g, Rfl: 3     fluticasone-salmeterol (ADVAIR DISKUS) 500-50 MCG/DOSE diskus inhaler, Inhale 1 puff into the lungs 2 times daily (Patient taking differently: Inhale 1 puff into the lungs daily ), Disp: 3 Inhaler, Rfl: 3     hydrochlorothiazide (MICROZIDE) 12.5 MG capsule, Take 1 capsule (12.5 mg) by mouth daily, Disp: 90 capsule, Rfl: 3     lisinopril (PRINIVIL/ZESTRIL) 40 MG tablet, Take 1 tablet (40 mg) by mouth daily, Disp: 90 tablet, Rfl: 3     montelukast (SINGULAIR) 10 MG tablet, Take 1 tablet (10 mg) by mouth At Bedtime, Disp: 30 tablet, Rfl: 1     ORDER FOR ALLERGEN IMMUNOTHERAPY, Name of Mix: Mix #1  Dust Mite, Cat, Dog Cat Hair, Standardized 10,000 BAU/mL, ALK  2.0 ml Dog Hair-Dander, A. P.  1:100 w/v, HS  1.0 ml Dust Mites DP. 30,000 AU/mL, HS  0.6 ml  Diluent: HSA qs to 5ml, Disp: 5 mL, Rfl: PRN     ORDER FOR ALLERGEN IMMUNOTHERAPY, Name of Mix: Mix #2  Tree , Weeds Birch Mix PRW 1:20 w/v, HS  0.5 ml Hickory, Shagbark 1:20 w/v, HS  0.5 ml [place] Mix RW 1:20 w/v, HS 0.5 ml Oak Mix RVW 1:20 w/v, HS 0.5 ml Kochia 1:20 w/v, HS 0.5 ml Nettle 1:20 w/v, HS 0.5 ml Ragweed Mixed 1:20 w/v ALK  0.5 ml Sagebrush, Mugwort 1:20 w/v, HS 0.5 ml Diluent: HSA qs to 5ml, Disp: 5 mL, Rfl: PRN     ORDER FOR ALLERGEN IMMUNOTHERAPY, Name of Mix: Mix #3  Mold Alternaria Tenuis 1:10  "directed by provider As Needed (unresponsiveness)., Disp: , Rfl:   •  nitroglycerin (NITROSTAT) 0.4 MG SL tablet, 1 under the tongue as needed for angina, may repeat q5mins for up three doses, Disp: 25 tablet, Rfl: 11  •  nystatin susp + lidocaine viscous (MAGIC MOUTHWASH) oral suspension, 5-10 ml swish and spit or swallow QID prn, Disp: 240 mL, Rfl: 3  •  omeprazole (priLOSEC) 40 MG capsule, , Disp: , Rfl:   •  ondansetron (ZOFRAN) 8 MG tablet, Take 1 tablet by mouth 3 (Three) Times a Day As Needed for Nausea or Vomiting., Disp: 30 tablet, Rfl: 5  •  ondansetron ODT (ZOFRAN-ODT) 4 MG disintegrating tablet, Place 1 tablet on the tongue 4 (Four) Times a Day As Needed for Nausea or Vomiting., Disp: 12 tablet, Rfl: 0  •  predniSONE (DELTASONE) 5 MG tablet, Take 1 tablet by mouth Daily., Disp: 30 tablet, Rfl: 11  •  ranolazine (Ranexa) 500 MG 12 hr tablet, Take 1 tablet by mouth 2 (Two) Times a Day., Disp: 180 tablet, Rfl: 3  •  relugolix (ORGOVYX) 120 MG tablet tablet, Take 1 tablet by mouth Daily., Disp: 30 tablet, Rfl: 5  •  Sennosides (Senna) 8.6 MG capsule, Take 8.6 mg by mouth 3 (Three) Times a Day., Disp: 84 each, Rfl: 2  •  Trelegy Ellipta 200-62.5-25 MCG/INH inhaler, , Disp: , Rfl:   •  atorvastatin (LIPITOR) 20 MG tablet, Take 1 tablet by mouth Every Night., Disp: 90 tablet, Rfl: 3  No current facility-administered medications for this visit.    Facility-Administered Medications Ordered in Other Visits:   •  heparin injection 500 Units, 500 Units, Intravenous, PRN, Ishmael Rashid MD, 500 Units at 06/02/21 0900    Vitals:    03/09/22 0926   BP: 140/78   BP Location: Left arm   Patient Position: Sitting   Pulse: 88   SpO2: 92%   Weight: 90.9 kg (200 lb 6.4 oz)   Height: 175.3 cm (69\")     Body mass index is 29.59 kg/m².    PHYSICAL EXAM:    General Appearance:   · well developed  · well nourished  HENT:   · oropharynx moist  · lips not cyanotic  Neck:  · thyroid not enlarged  · supple  Respiratory:  · no " w/v, HS  0.5 ml Aspergillus Fumigatus 1:10 w/v, HS  0.5 ml Curvularia Spicifera 1:10 w/v, HS  0.5 ml Epicoccum Nigrum 1:10 w/v, HS 0.5 ml Hormodendrum Cladosporioides 1:10 w/v, HS 0.5 ml Penicillium Mix 1:10 w/v, HS  0.5 ml Phoma Herbarum 1:10 w/v, HS  0.5 ml Diluent: HSA qs to 5ml, Disp: 5 mL, Rfl: PRN     venlafaxine (EFFEXOR-XR) 75 MG 24 hr capsule, Take 1 capsule (75 mg) by mouth daily, Disp: 90 capsule, Rfl: 3     albuterol (PROAIR HFA/PROVENTIL HFA/VENTOLIN HFA) 108 (90 Base) MCG/ACT Inhaler, Inhale 2 puffs into the lungs every 4 hours as needed for shortness of breath / dyspnea or wheezing (Patient not taking: Reported on 11/13/2018), Disp: 3 Inhaler, Rfl: 3     EPINEPHrine (AUVI-Q) 0.3 MG/0.3ML injection 2-pack, Inject 0.3 mLs (0.3 mg) into the muscle as needed for anaphylaxis (Patient not taking: Reported on 11/13/2018), Disp: 2 mL, Rfl: 2    EXAM:   /88 (BP Location: Left arm, Patient Position: Sitting, Cuff Size: Adult Large)  Pulse 61  SpO2 97%  GENERAL APPEARANCE: alert, cooperative and not in distress  SKIN: no rashes, no lesions  HEAD: atraumatic, normocephalic  ENT: no scars or lesions, tongue midline and normal, soft palate, uvula, and tonsils normal  NECK: no asymmetry, masses, or scars, supple without significant adenopathy  LUNGS: unlabored respirations, no intercostal retractions or accessory muscle use, clear to auscultation without rales or wheezes  HEART: regular rate and rhythm without murmurs and normal S1 and S2  MUSCULOSKELETAL: no musculoskeletal defects are noted  NEURO: no focal deficits noted  PSYCH: does not appear depressed or anxious      WORKUP:  Cluster Immunotherapy    Cluster Allergen Immunotherapy:    After explaining risks and benefits and obtaining consent, we proceeded with cluster immunotherapy.      Injection given: 14:10  Blue 1:100   Cat, Dog, Dust Mite    0.2 mL  Blue 1:100   Trees, Weeds     0.2 mL  Blue 1:100   Molds      0.2 mL    Injection given:  14:45  Blue 1:100   Cat, Dog, Dust Mite    0.4 mL  Blue 1:100   Trees, Weeds     0.4 mL  Blue 1:100   Molds      0.4 mL    Injection given: 15:25  Yellow 1:10   Cat, Dog, Dust Mite    0.05 mL  Yellow 1:10   Trees, Weeds     0.05 mL  Yellow 1:10   Molds      0.05 mL      Start Time: 14:10  End Time: 15:55      ASSESSMENT/PLAN:  Nela Mares is a 58 year old female here for cluster immunotherapy. She tolerated the procedure well without developing any signs or symptoms of an allergic reaction.     1. Continue cluster immunotherapy per protocol  2. Pre-medicate with antihistamines as directed prior to injection appointments      Karen Clark MD  Allergy/Immunology  [facility] and [place]      Chart documentation done in part with Dragon Voice Recognition Software. Although reviewed after completion, some word and grammatical errors may remain.   respiratory distress  · normal breath sounds  · no rales  Cardiovascular:  · no jugular venous distention  · regular rhythm  · apical impulse normal  · S1 normal, S2 normal  · no S3, no S4   · no murmur  · no rub, no thrill  · carotid pulses normal; no bruit  · pedal pulses normal  · lower extremity edema: none    Gastrointestinal:   · bowel sounds normal  · non-tender  · no hepatomegaly, no splenomegaly  Musculoskeletal:  · no clubbing of fingers.   · normocephalic, head atraumatic  Skin:   · warm, dry  Psychiatric:  · judgement and insight appropriate  · normal mood and affect    RESULTS:   Procedures    Results for orders placed in visit on 08/20/21    Adult Transthoracic Echo Complete W/ Cont if Necessary Per Protocol    Interpretation Summary  · Left ventricular ejection fraction appears to be 61 - 65%. Left ventricular systolic function is normal.  · Left ventricular wall thickness is consistent with mild concentric hypertrophy.  · Estimated right ventricular systolic pressure from tricuspid regurgitation is normal (<35 mmHg).  · Left ventricular diastolic function was normal.      Labs:  Lab Results   Component Value Date    AST 18 02/14/2022    ALT 15 02/14/2022     No results found for: HGBA1C  No components found for: CREATINININE  eGFR Non  Am   Date Value Ref Range Status   01/11/2022 94 >59 mL/min/1.73 Final   12/14/2021 92 >59 mL/min/1.73 Final   11/16/2021 94 >59 mL/min/1.73 Final     eGFR Non  Amer   Date Value Ref Range Status   02/14/2022 98 >60 mL/min/1.73 Final   04/20/2021 113 >60 mL/min/1.73 Final   12/29/2020 103 >60 mL/min/1.73 Final       Most recent PCP note, imaging tests, and labs reviewed.    ASSESSMENT:  Problem List Items Addressed This Visit        Cardiac and Vasculature    Coronary artery disease of native artery of native heart with stable angina pectoris (HCC) - Primary    Overview     8/20/21 Stress test: Myocardial perfusion imaging indicates a medium-sized infarct  located in the lateral wall and inferior wall with mild-to-moderate nolberto-infarct ischemia. Findings consistent with previously known  of the OM. No new areas of ischemia noted from previous stress test in 2019.  8/20/21 Echo: EF 61-65%, structurally normal  2/2018: PCI of the LAD 3.0 x 16 mm Synergy mid LAD, 3.0 x 20 mm Synergy MITZI to the distal RCA by Dr. Navarrete at [facility] left circumflex mild luminal irregularities.  Known  of OM.           Relevant Orders    Comprehensive Metabolic Panel    Lipid Panel    Hyperlipidemia LDL goal <70    Relevant Medications    atorvastatin (LIPITOR) 20 MG tablet       Symptoms and Signs    Fatigue          PLAN:    1.  Stable angina:  History of CAD with stents of the LAD and the RCA, nuclear stress test 4/2018 showed inferolateral ischemia, likely due to OM .    Lexiscan nuclear stress test 8/20/21 consistent with previously known  of OM, no new areas of ischemia.   Echo 8/2021 EF 61-65%, structurally normal  Continue aspirin 81 mg daily  Continue sublingual nitroglycerin as needed  Continue Ranexa for stable angina    The patient was advised to call 911 if chest pain worsens or persist despite nitroglycerin or rest.    Start atorvastatin 20 mg daily with CMP and lipids in 6 weeks    2.  Tobacco abuse:  Discussed importance of complete tobacco cessation   Assistance offered.  He smokes 1 pack/day    3.  CAD:  Continue aspirin 81 mg daily  Start atorvastatin 20 mg daily  Smoking cessation  Sublingual nitroglycerin      Return to clinic in 9 months, or sooner as needed.    Thank you for the opportunity to share in the care of your patient; please do not hesitate to call me with any questions.     Joni Kahn MD, [facility]  Office: [phone] [address] [place] [address]    03/09/22

## 2022-03-08 ENCOUNTER — SPECIALTY PHARMACY (OUTPATIENT)
Dept: PHARMACY | Facility: HOSPITAL | Age: 67
End: 2022-03-08

## 2022-03-08 ENCOUNTER — INFUSION (OUTPATIENT)
Dept: ONCOLOGY | Facility: HOSPITAL | Age: 67
End: 2022-03-08

## 2022-03-08 ENCOUNTER — OFFICE VISIT (OUTPATIENT)
Dept: ONCOLOGY | Facility: CLINIC | Age: 67
End: 2022-03-08

## 2022-03-08 VITALS
HEART RATE: 90 BPM | TEMPERATURE: 97.4 F | OXYGEN SATURATION: 96 % | WEIGHT: 193 LBS | RESPIRATION RATE: 20 BRPM | SYSTOLIC BLOOD PRESSURE: 130 MMHG | HEIGHT: 68 IN | BODY MASS INDEX: 29.25 KG/M2 | DIASTOLIC BLOOD PRESSURE: 76 MMHG

## 2022-03-08 DIAGNOSIS — C79.51 PROSTATE CANCER METASTATIC TO BONE: ICD-10-CM

## 2022-03-08 DIAGNOSIS — C79.51 PROSTATE CANCER METASTATIC TO BONE: Primary | ICD-10-CM

## 2022-03-08 DIAGNOSIS — Z45.2 ENCOUNTER FOR CARE RELATED TO PORT-A-CATH: Primary | ICD-10-CM

## 2022-03-08 DIAGNOSIS — C61 PROSTATE CANCER METASTATIC TO BONE: ICD-10-CM

## 2022-03-08 DIAGNOSIS — C61 PROSTATE CANCER METASTATIC TO BONE: Primary | ICD-10-CM

## 2022-03-08 PROCEDURE — 25010000002 HEPARIN LOCK FLUSH PER 10 UNITS: Performed by: INTERNAL MEDICINE

## 2022-03-08 PROCEDURE — 36591 DRAW BLOOD OFF VENOUS DEVICE: CPT

## 2022-03-08 PROCEDURE — 99215 OFFICE O/P EST HI 40 MIN: CPT | Performed by: INTERNAL MEDICINE

## 2022-03-08 RX ORDER — SODIUM CHLORIDE 0.9 % (FLUSH) 0.9 %
10 SYRINGE (ML) INJECTION AS NEEDED
Status: CANCELLED | OUTPATIENT
Start: 2022-03-08

## 2022-03-08 RX ORDER — HEPARIN SODIUM (PORCINE) LOCK FLUSH IV SOLN 100 UNIT/ML 100 UNIT/ML
500 SOLUTION INTRAVENOUS AS NEEDED
Status: CANCELLED | OUTPATIENT
Start: 2022-03-08

## 2022-03-08 RX ORDER — HEPARIN SODIUM (PORCINE) LOCK FLUSH IV SOLN 100 UNIT/ML 100 UNIT/ML
500 SOLUTION INTRAVENOUS AS NEEDED
Status: DISCONTINUED | OUTPATIENT
Start: 2022-03-08 | End: 2022-03-08 | Stop reason: HOSPADM

## 2022-03-08 RX ADMIN — HEPARIN 500 UNITS: 100 SYRINGE at 10:25

## 2022-03-08 NOTE — PROGRESS NOTES
CHIEF COMPLAINT: General fatigue    Problem List:  Oncology/Hematology History Overview Note   1.  Stage IVb grade group 4 metastatic prostate cancer with sclerotic bone lesions on CT and bone scan and history of prostatic hypertrophy  2.  Kidney stone  3.  Polyps  4.  Probable drug-induced hypophosphatemia from Zometa, drug stopped after 10/5/2021 dose  5.  Cancer related pain  6.  Fatigue  7.  Covid 2/2022    -9/30/2020 office note from Dr. Ronen Reynaga indicates patient with PSA 10.8.  Underwent TRUS prostate biopsy 9/15/2020.  Adenocarcinoma the prostate Sarika 4+4 = 8 in 1 out of 12 cores and 4+3 = 7 and 10 out of 12 cores and 3+4 = 7 in 1 out of 12 cores.  Perineural invasion.  Extraprostatic extension into the fat seen on 2 biopsies of the right lateral mid and right apex.  9/24/2020 CT chest and whole-body bone scan showed T12, L1, left acetabular, right medial acetabular metastases with no lung metastases.  He started androgen deprivation therapy with Casodex with plans for Lupron to start in 1 to 2 weeks for clinical T3 N0 M1 metastatic prostate cancer.  Review of official report from [facility] 9/24/2020 bone scan mentions T12, L1, roof of left acetabulum and medial right acetabular osteoblastic metastases.  CT chest with contrast that same day showed mild splenomegaly 14.2 cm with stable periaortic nodes compared to CT December 2015 with no lung metastases.  Sclerotic abnormality within the T12 vertebral body, L1 vertebral body extending into the pedicle unchanged.  8/28/2020 CT abdomen pelvis report from [facility] reviewed and mentions normal spleen size.  Punctate nonobstructing kidney stone, no paulino enlargement, diffuse bladder wall thickening with mild perivesicular fat stranding, 2.1 cm sclerotic left iliac bone above the left acetabulum new compared to 2015 as well as 1.5 cm faintly sclerotic focus in the right posterior acetabulum stable compared to prior CT 2015 as well as  a 1.6 cm T12 and inferior vertebral body sclerotic lesion and a 1.9 cm left posterior lateral L1 vertebral body abnormality extending to the pedicle.  -10/13/2020 initial [facility], [place], operated by [facility] medical oncology consultation: With 1 Sarika score 8, 4+4 lesion that would make him a grade group 4 with stage IVb disease given radiographic evidence of sclerotic bone metastasis on bone scan and CT.  Needs genetic testing given metastatic prostate cancer and this is also important as, were he to have mismatch repair mutation then we would have options for PDL 1 inhibitors and were he BRCA mutated would have options for PARP inhibitors in the future.  Systemic therapy for castration naïve prostate cancer or N1 disease should be with apalutamide or Abiraterone or enzalutamide all of which are category 1.  He does not have any visceral metastases.  According to his imaging he has T12, L1, left acetabular, right acetabular, and left iliac bony involvement.  That means he would have 4 or more bone metastases with at least one metastasis (i.e. the acetabular lesions bilaterally) beyond the pelvis/vertebral, for which this would be considered high-volume disease for which 6 cycles of docetaxel 75 mg/m² x 6 cycles followed by Zytiga and prednisone would be reasonable along with Zometa every 6 weeks for bone stabilization.  He will do chemo preparation visit with my nurse practitioner prior to start chemotherapy with Taxotere and Zometa in 2 weeks and he is already received Casodex in preparation for Lupron shot he received on 10/12/2020 with Dr. Reynaga.  We will get him to Dr. Alexander Gomez who has done his polypectomies in the past for port placement and we will get genetic counseling started.  Following the 6 courses of Taxotere per the Chaarted trial criteria, I would then give him an indefinite Zytiga and prednisone and Zometa until progression or toxicity dictates.    -12/16/2019 COVID-19 antigen test negative    -12/29/2020 PSA 0.275  with absolute neutrophil count 700 and creatinine 0.76 with normal liver enzymes and electrolytes.  Normal magnesium and phosphorus and urine drug screen positive for buprenorphine and opiates follow-up with palliative care.    -1/4/2021 CT chest abdomen pelvis with contrast and whole-body bone scan shows improving less metabolically active bone metastases.  Stable sclerotic T12, L1, and L2 vertebral bodies and bilateral pelvic bones on bone windows with stable subcentimeter para-aortic lymph nodes and no definite progression in the chest abdomen or pelvis.    -1/5/2021 [facility] medical oncology follow-up visit: I reviewed the images and reports thereof of the above CT and bone scan which shows good response to Taxotere x3 courses with a PSA down to 0.275 from a baseline of 10.  Get another 3 courses of Taxotere, continue his Xgeva, and then following that we will switch to Zytiga and prednisone.  He is working with palliative care on his bone pain but on his current dose of fentanyl patch he says his pain is still not adequately controlled he will contact them.  Dexamethasone prescription was refilled.  We will see my nurse practitioner back in 3 weeks for course #5 of 6 total courses and then we will reimage with CT chest abdomen pelvis and bone scan before going to the Zytiga prednisone.    -3/4/2021 CT chest abdomen pelvis with contrast is negative save for stable sclerotic bone lesions and the bone scan shows near resolution of prior bony abnormalities associated with the known sclerotic lesions in the thoracolumbar spine and bony pelvis.    2/17/2021 PSA down to 0.1 from 1.37 October 2020.  3/9/2021 PSA 0.1    -5/26/2021 CT chest abdomen pelvis with contrast shows stable to slightly underlying increase low-density left para-aortic node.  Bone lesions stable.  Whole-body bone scan stable to decrease activity of known thoracolumbar and bony pelvic involvement.  PSA less than 0.1  , AST 74, bilirubin 0.5.        -6/1/2021 [facility] medical oncology follow-up visit: I reviewed the above data with him and images thereof.  Bones are stable.  No significant adenopathy.  PSA immeasurably low.     upper limit of normal 69 and AST 74 upper limit of normal 46.  Hence, neither is more than double the upper limit of normal with no ascites and no encephalopathy and normal albumin and bilirubin, despite the elevation of liver enzymes, he has child Abrams score A.  Package insert for Abiraterone says that for ALT and/or AST greater than 5 times upper limit of normal or total bilirubin greater than 3 times the upper limit of normal to withhold treatment until liver function tests returned to baseline or ALT and AST less than or equal to 2.5 times the upper limit of normal and total bilirubin less than or equal to 1.5 times the upper limit of normal and then reinitiate at 750 mg daily dose of Zytiga.  For recurrent hepatotoxicity on 750 mg daily Zytiga, withhold treatment until liver functions returned to baseline or ALT and AST less than 3-2.5 times upper limit of normal and total bilirubin less than or equal to 1.5 times the upper limit of normal then reinitiate at 500 mg daily Zytiga dose.  If has recurrent hepatotoxicity on 500 mg dose, then discontinue treatment.  If has ALT greater than 3 times the upper limit of normal along with total bilirubin greater than 2 times the upper limit of normal in the absence of other contributing causes or biliary obstruction, permanently discontinue Zytiga.  Based on these recommendations, I would continue current doses but we will watch with serial labs monthly and repeat his imaging again in 3 months but clinically he is doing wonderfully with excellent response to Taxotere and now on Zytiga prednisone plus Zometa along with Relugolix.    -6/23/2020 for [facility] oncology clinic follow-up: Having persistent and worsening pain above his left hip.  I will get an MRI of the left hip for  further evaluation.  Otherwise we will continue therapy unchanged with Zytiga, prednisone and Zometa along with Relugolix.    -7/28/2021 [facility] oncology clinic follow-up: Patient with multiple somatic complaints including episodes of confusion, dizziness, decreased memory, agitation.  He reports ongoing episodes with difficulty swallowing.  Also most concerning for episodes of angina and chest pain for which he basically refused to seek emergency medical attention.  He has a history of coronary artery disease and stent placement.  He has not followed up with cardiology for many years.  I will get an MRI of the brain in light of his confusion, dizziness and agitation.  I will get him to gastroenterology for EGD in light of his dysphagia.  I have also put in a referral for cardiology.  I reemphasized to the patient, his wife who is with him today and his son who was on the telephone during our visit the importance of seeking out prompt medical attention when he is having chest pain or neurological concerns so that he can be evaluated.  The patient states that he basically refuses to go to the emergency room and if his family calls an ambulance he would not agree to go to the hospital.  For the time being, I have asked him to hold his Zytiga and prednisone until we can sort this out as Zytiga does have some cardiovascular risk.  There is likely no treatment for prostate cancer that does not carry some form of cardiovascular risk.  His PSA remains low at 0.014 yesterday.  He will continue relugolix for now.    Of note, some of these symptoms could also be due to his hypophosphatemia, phosphate level from yesterday was 1.5, I have sent in a prescription for K-Phos 3 times a day before meals for 10 days.  We will hold further Zometa until this is corrected.  I have also put in an order to check his vitamin D level.  He takes calcium and vitamin D daily.  Some of his symptoms also could be due to drug withdrawal as there  "was some confusion and difficulty getting his last prescription for methadone therefore he was without methadone for several days, he now has his prescription and is back on his pain medication.  He has an appointment with neurosurgery tomorrow in light of his ongoing back pain, MRI of the hip recently showed multiple bony lesions largest at the L5 vertebral body and left supra-acetabular region.  If no neurosurgical intervention recommended he may benefit from spot radiation as this is where a lot of his pain is coming from.  His wife had questions today regarding his staging and extent of disease.  We reviewed previous scans.  I did clarify with her as she used the words \"cancer free\" as she thought that is what she was told after his CT scans once he completed Taxotere in February.  I explained to her that even though his scans showed he had an excellent response with no clear areas of metastasis that did not mean he was cancer free, I explained to her that when you have metastatic cancer the treatment intent is palliative in nature and to control the disease but not to cure the disease.  She stated understanding.  We will see him back in 2 weeks for follow-up to sort through this and make further plan of care, again, I have asked him to hold Zytiga and prednisone until he sees us back, he will continue on his other medications including relugolix.    -7/30/2021 neurosurgery PA consultation.  MRI with and without contrast L5 ordered.    -8/3/2021 neurosurgery follow-up showed known sclerotic disease with new L5 abnormality without compression deformity or instability.  MRI brain negative for metastasis.    -8/4/2021 office visit Dr. Fco Quintanilla radiation oncology coordinating with Dr. Can forde for palliative radiation for MRI evidence of L5 and left supra acetabular painful lesions.  States that the patient's disease which is not secreting PSA is experiencing some progression and would benefit from 20 " Gy in five fractions and other lesion 30 Gy in ten fractions. Patient offered to go to [place] for radiation but desired treatment in [place].    -8/10/2021 [facility] follow-up visit: No chest pains at this junction.  Reviewed MRI brain and other MRIs reviewed by Dr. North and Dwight.  Due for EGD on 19th.  We will repeat his phosphorus along with his other labs continue Relugolix, Zytiga, prednisone, and he will continue radiation palliatively to the painful bony lesions with Dr. Quintanilla/Can.  He will see my nurse practitioner back in a few weeks to see how he is tolerating this and to follow-up on the phosphorus off of Zometa and she will order repeat CT chest abdomen pelvis with contrast and total body bone scan the end of September.I will see him back after that.    -9/8/2021 Zometa resumed.  PSA <0.10    -10/5/2021 [facility] follow-up visit: followed-up with radiation oncology 9/21/2021.  Status post symptomatic L5 radiation 5 fractions 20 [place] completed 8/16/2021 with improvement in right hip pain.  9/2/2021 PSA less than 0.1.  9/29/2021 total body bone scan shows increased uptake left iliac bone slightly superior group of the left acetabulum with no additional foci of suspicious abnormality is unlikely treatment related with no new sites of active osseous metastasis.  CT chest abdomen pelvis with contrast shows stable borderline enlarged left periaortic nodes and dating sclerotic bone lesions.Continue Zytiga, prednisone, Relugolix, Zometa, repeat CT chest abdomen pelvis with contrast and total body bone scan the end of the year.  We will follow PSA serially.    -11/17/2021 [facility] follow-up:  Mr. Lugo overall is doing well.  He is tolerating therapy with Zytiga, prednisone and Relugolix along with Zometa which is given every 6 weeks.  PSA remains low at <0.1.  Has occasional low phosphorus, currently low at 1.7.  Serum calcium is normal at 8.9, normal  creatinine 0.79 and normal CBC other than for platelets of 124.  I will hold Zometa for 1 month, I did send in a prescription for phosphorus replacement today.  He takes calcium and vitamin D.  I will see him back in 1 month for follow-up.  We will repeat restaging scans after that visit.    -12/15/2021 [facility] Oncology clinic follow-up: Mr. Lugo continues to do well on therapy with Zytiga, prednisone and Relugolix, his PSA remains low at <0.1.  He is continuing to have left lower back pain, he is working with palliative care and they have referred him also to pain management.  Pain management has talked to him about putting in a pain pump however he is leery of this, I told him that this was a safe and effective pain management technique and to certainly give consideration to their recommendations.  His phosphorus is improving however is still a little low, I will hold off on Zometa until we see him back in 1 month, we may end up only giving it every 12 weeks.  We will repeat restaging scans with CT chest, abdomen and pelvis along with total body bone scan prior to return and I have ordered those today.      -1/5/2022 CT chest abdomen pelvis with contrast [facility] showing stable left periaortic adenopathy and unchanged sclerotic thoracic, lumbar spine and pelvis lesions with no new or progressive disease in the chest abdomen or pelvis.  Total body bone scan shows no significant change and no new suspicious foci.  Stable posterior right acetabulum and left superior acetabulum and degenerative changes in the cervical spine, shoulders, acromioclavicular joints, sternoclavicular joints, elbows, wrists, hands, thoracic spine, lumbosacral spine, sacroiliac joints, hips, knees, ankles, feet.    -1/11/2022 [facility] medical oncology hematology follow-up visit: I reviewed images and reports from his 1/5/2022 scans.  No active disease and PSA immmeasurably low on Zytiga, prednisone, Relugolix.  However, he has  struggled with hypophosphatemia and is significantly fatigued with this.  Given that the bones are doing well and given that his phosphorus continues to remain consistently low at 2.2 in mid December, 1.7 mid November and 2.5 mid-September and as low as 1.5 back to July and the likelihood that this is related to his Zometa, given that his bones are stable overall, he will increase from 1200 mg up to 1600 mg of calcium and phosphate a day and we will hold his Zometa for the foreseeable future.  He will see my nurse practitioner back in a month to continue to monitor his phosphorus along with his calcium and other labs and will repeat his scans again in 3 months.  In addition, given his fatigue we will check his ACTH and cortisol though he is taking his prednisone with his Zytiga.  Though his electrolytes are normal, I will keep an eye on the cortisol and ACTH and have these labs not only drawn today but again just prior to return to my nurse practitioner on February 10.    -2/10/2022 [facility] Oncology clinic follow-up: Mr. Lugo overall is stable, no change in his health this past month.  I have reviewed his labs from 2/8/2022 and they are unremarkable, his phosphorus is now normal at 3.2. We are continuing to hold his Zometa, he last received Zometa on 10/5/2021.  He continues therapy with Zytiga, prednisone and Relugolix.  Dr. Rashid had ordered cortisol level and ACTH which are low, currently his ACTH is 2.8 and cortisol 3.08 however these are both done in the face of ongoing prednisone that he takes with his Zytiga.  We will get him to endocrinology for further evaluation for possible adrenal insufficiency but will not interrupt his treatment in the interim.  He will need restaging scans again in April for a 3-month follow-up.  He will continue to follow with palliative care and pain management for his cancer related pain.  His PSA remains immeasurably low at <0.1 on 2/8/22.    -2/15/2022 went to emergency room with  weakness, decreased appetite, myalgias in the setting of known COVID-19 testing positive a few days prior to this visit.  Phosphorus had been low in the past but was normal in the emergency room with unremarkable CBC and CMP.  Chest x-ray unremarkable saturating well on room air mildly hypertensive with dry mucosa.  Given 500 cc crystalloid.  Covid test positive.  Mild thrombocytopenia 136,000 and otherwise unremarkable.  Discharged home.    -3/3/2022 data:  PSA less than 0.1  CMP unremarkable  Cortisol 3.96 normal  ACTH 2.8 lower limit of normal 7.2  Phosphorus normal 2.6    -3/8/2022 [facility], [place], operated by [facility] medical oncology follow-up: Suspect big chunk of his fatigue was Covid.  Another part of the fatigue likely related to lack of testosterone.  Not hypophosphatemic off of Zometa.  ACTH running low while on prednisone not surprising but with normal cortisol and I doubt adrenal insufficient which is why he is on prednisone to avoid such while taking Zytiga.  Overall doing fairly well and with immeasurably low PSA, I will have labs done on him early April, see my nurse practitioner early May, and she will order repeat bone scan and CTs the end of May and I will see him back in June.  We will continue to hold the Zometa unless bone lesions progress given symptomatic prior hypophosphatemia on Zometa.  He will keep his appointment April 28 with Dr. Mir Duffy just to be sure that my take on his pituitary/adrenal axis assessment is accurate.     Prostate cancer metastatic to bone (HCC)   8/30/2020 -  Other Event    PSA 10.8     9/15/2020 Initial Diagnosis    Prostate cancer metastatic to bone (CMS/HCC)     9/15/2020 Biopsy    Prostate needle core biopsy     9/24/2020 Imaging    Total body bone scan: 4 abnormal areas of increased activity consistent with osteoblastic metastasis, abnormal foci of activity seen in the T12 vertebral body, L1 vertebral body, roof of the left acetabulum, and acetabulum medially on the right.  CT chest:  Stable sclerotic metastatic lesions within the T12 and L1 vertebrae.  No other evidence of metastatic disease to the chest.  Stable mild splenomegaly and subcentimeter periaortic retroperitoneal lymph nodes.  CT abdomen and pelvis: Diffuse bladder wall thickening with mild perivesicular fat stranding.  Interval development of several sclerotic lesions in the spine and left iliac bone.     10/13/2020 Cancer Staged    Staging form: Bone - Appendicular Skeleton, Trunk, Skull, And Facial Bones, AJCC 8th Edition  - Clinical: Stage IVB (cT3, cN0, cM1b, G3) - Signed by Ishmael Rashid MD on 10/13/2020     11/3/2020 -  Chemotherapy    OP SUPPORTIVE Zoledronic Acid Q42d     11/3/2020 - 2/18/2021 Chemotherapy    OP PROSTATE DOCEtaxel       1/4/2021 Imaging    CT chest abdomen pelvis with contrast and whole-body bone scan shows improving less metabolically active bone metastases.  Stable sclerotic T12, L1, and L2 vertebral bodies and bilateral pelvic bones on bone windows with stable subcentimeter para-aortic lymph nodes and no definite progression in the chest abdomen or pelvis.  PSA down to 0.275 after 3 courses of Taxotere.     3/4/2021 Imaging    CT chest, abdomen and pelvis stable, no evidence of disease progression. Total body bone scan with decreased/near resolution of abnormal increased radiotracer uptake associated with the known sclerotic lesions in the thoracolumbar spine and bony pelvis.  No evidence of new osteoblastic metastases.     3/4/2021 Imaging    CT chest abdomen pelvis with contrast is negative save for stable sclerotic bone lesions and the bone scan shows near resolution of prior bony abnormalities associated with the known sclerotic lesions in the thoracolumbar spine and bony pelvis.  2/17/2021 PSA down to 0.1 from 1.37 October 2020.     3/9/2021 - 3/9/2021 Chemotherapy    OP SUPPORTIVE PROSTATE Relugolix     3/9/2021 -  Chemotherapy    OP PROSTATE Abiraterone / PredniSONE       3/10/2021 -  Chemotherapy     OP PROSTATE Abiraterone / PredniSONE     8/10/2021 - 8/16/2021 Radiation    Radiation OncologyTreatment Course:  Naveen Lugo received 2000 cGy in 5 fractions to L4-sacrum, left acetabulum and right pelvis via External Beam Radiation - EBRT.     11/16/2021 -  Chemotherapy    OP CENTRAL VENOUS ACCESS DEVICE ACCESS, CARE, AND MAINTENANCE (CVAD)     1/5/2022 Imaging    -1/5/2022 CT chest abdomen pelvis with contrast [facility] showing stable left periaortic adenopathy and unchanged sclerotic thoracic, lumbar spine and pelvis lesions with no new or progressive disease in the chest abdomen or pelvis.  Total body bone scan shows no significant change and no new suspicious foci.  Stable posterior right acetabulum and left superior acetabulum and degenerative changes in the cervical spine, shoulders, acromioclavicular joints, sternoclavicular joints, elbows, wrists, hands, thoracic spine, lumbosacral spine, sacroiliac joints, hips, knees, ankles, feet.         HISTORY OF PRESENT ILLNESS:  The patient is a 66 y.o. male, here for follow up on management of metastatic prostate cancer with general fatigue and Covid 19 February 2022    Past Medical History:   Diagnosis Date   • Bone cancer (HCC)    • History of radiation therapy 08/16/2021    L4 through sacrum, left acetabulum, right pelvis   • Nephrolithiasis    • Opioid use disorder, mild, on maintenance therapy (HCC)    • Prostate cancer (HCC)      Past Surgical History:   Procedure Laterality Date   • CHOLECYSTECTOMY     • CORONARY STENT PLACEMENT  206 & 2011   • HERNIA REPAIR  1986   • THORACIC DISCECTOMY  1994       Allergies   Allergen Reactions   • Cymbalta [Duloxetine Hcl] Dizziness   • Gabapentin Nausea Only   • Lyrica [Pregabalin] Nausea And Vomiting and Dizziness       Family History and Social History reviewed and changed as necessary    REVIEW OF SYSTEM:   Generalized fatigue    PHYSICAL EXAM:  No respiratory distress.    Vitals:    03/08/22 0935   BP:  "130/76   Pulse: 90   Resp: 20   Temp: 97.4 °F (36.3 °C)   SpO2: 96%   Weight: 87.5 kg (193 lb)   Height: 172.7 cm (68\")     Vitals:    03/08/22 0935   PainSc:   6   PainLoc: Hip  Comment: hips and back          ECOG score: 1           Vitals reviewed.        Lab Results   Component Value Date    HGB 16.2 02/14/2022    HCT 47.5 02/14/2022    MCV 87.3 02/14/2022     (L) 02/14/2022    WBC 6.71 02/14/2022    NEUTROABS 4.71 02/14/2022    LYMPHSABS 1.60 02/14/2022    MONOSABS 0.30 02/14/2022    EOSABS 0.06 02/14/2022    BASOSABS 0.02 02/14/2022       Lab Results   Component Value Date    GLUCOSE 119 (H) 02/14/2022    BUN 6 (L) 02/14/2022    CREATININE 0.79 02/14/2022     02/14/2022    K 3.7 02/14/2022     02/14/2022    CO2 27.0 02/14/2022    CALCIUM 9.5 02/14/2022    PROTEINTOT 7.0 02/14/2022    ALBUMIN 4.20 02/14/2022    BILITOT 0.3 02/14/2022    ALKPHOS 81 02/14/2022    AST 18 02/14/2022    ALT 15 02/14/2022             ASSESSMENT & PLAN:  1.  Stage IVb grade group 4 metastatic prostate cancer with sclerotic bone lesions on CT and bone scan and history of prostatic hypertrophy  2.  Kidney stone  3.  Polyps  4.  Probable drug-induced hypophosphatemia from Zometa, drug stopped after 10/5/2021 dose  5.  Cancer related pain  6.  Fatigue  7.  Covid 2/2022    -9/30/2020 office note from Dr. Ronen Reynaga indicates patient with PSA 10.8.  Underwent TRUS prostate biopsy 9/15/2020.  Adenocarcinoma the prostate [place] 4+4 = 8 in 1 out of 12 cores and 4+3 = 7 and 10 out of 12 cores and 3+4 = 7 in 1 out of 12 cores.  Perineural invasion.  Extraprostatic extension into the fat seen on 2 biopsies of the right lateral mid and right apex.  9/24/2020 CT chest and whole-body bone scan showed T12, L1, left acetabular, right medial acetabular metastases with no lung metastases.  He started androgen deprivation therapy with Casodex with plans for Lupron to start in 1 to 2 weeks for clinical T3 N0 M1 metastatic " prostate cancer.  Review of official report from [facility] 9/24/2020 bone scan mentions T12, L1, roof of left acetabulum and medial right acetabular osteoblastic metastases.  CT chest with contrast that same day showed mild splenomegaly 14.2 cm with stable periaortic nodes compared to CT December 2015 with no lung metastases.  Sclerotic abnormality within the T12 vertebral body, L1 vertebral body extending into the pedicle unchanged.  8/28/2020 CT abdomen pelvis report from [facility] reviewed and mentions normal spleen size.  Punctate nonobstructing kidney stone, no paulino enlargement, diffuse bladder wall thickening with mild perivesicular fat stranding, 2.1 cm sclerotic left iliac bone above the left acetabulum new compared to 2015 as well as 1.5 cm faintly sclerotic focus in the right posterior acetabulum stable compared to prior CT 2015 as well as a 1.6 cm T12 and inferior vertebral body sclerotic lesion and a 1.9 cm left posterior lateral L1 vertebral body abnormality extending to the pedicle.  -10/13/2020 initial [facility] medical oncology consultation: With 1 Sarika score 8, 4+4 lesion that would make him a grade group 4 with stage IVb disease given radiographic evidence of sclerotic bone metastasis on bone scan and CT.  Needs genetic testing given metastatic prostate cancer and this is also important as, were he to have mismatch repair mutation then we would have options for PDL 1 inhibitors and were he BRCA mutated would have options for PARP inhibitors in the future.  Systemic therapy for castration naïve prostate cancer or N1 disease should be with apalutamide or Abiraterone or enzalutamide all of which are category 1.  He does not have any visceral metastases.  According to his imaging he has T12, L1, left acetabular, right acetabular, and left iliac bony involvement.  That means he would have 4 or more bone metastases with at least one metastasis (i.e. the acetabular lesions bilaterally)  beyond the pelvis/vertebral, for which this would be considered high-volume disease for which 6 cycles of docetaxel 75 mg/m² x 6 cycles followed by Zytiga and prednisone would be reasonable along with Zometa every 6 weeks for bone stabilization.  He will do chemo preparation visit with my nurse practitioner prior to start chemotherapy with Taxotere and Zometa in 2 weeks and he is already received Casodex in preparation for Lupron shot he received on 10/12/2020 with Dr. Reynaga.  We will get him to Dr. Alexander Gomez who has done his polypectomies in the past for port placement and we will get genetic counseling started.  Following the 6 courses of Taxotere per the [place]arted trial criteria, I would then give him an indefinite Zytiga and prednisone and Zometa until progression or toxicity dictates.    -12/16/2019 COVID-19 antigen test negative    -12/29/2020 PSA 0.275 with absolute neutrophil count 700 and creatinine 0.76 with normal liver enzymes and electrolytes.  Normal magnesium and phosphorus and urine drug screen positive for buprenorphine and opiates follow-up with palliative care.    -1/4/2021 CT chest abdomen pelvis with contrast and whole-body bone scan shows improving less metabolically active bone metastases.  Stable sclerotic T12, L1, and L2 vertebral bodies and bilateral pelvic bones on bone windows with stable subcentimeter para-aortic lymph nodes and no definite progression in the chest abdomen or pelvis.    -1/5/2021 [facility] medical oncology follow-up visit: I reviewed the images and reports thereof of the above CT and bone scan which shows good response to Taxotere x3 courses with a PSA down to 0.275 from a baseline of 10.  Get another 3 courses of Taxotere, continue his Xgeva, and then following that we will switch to Zytiga and prednisone.  He is working with palliative care on his bone pain but on his current dose of fentanyl patch he says his pain is still not adequately controlled he will contact  them.  Dexamethasone prescription was refilled.  We will see my nurse practitioner back in 3 weeks for course #5 of 6 total courses and then we will reimage with CT chest abdomen pelvis and bone scan before going to the Zytiga prednisone.    -3/4/2021 CT chest abdomen pelvis with contrast is negative save for stable sclerotic bone lesions and the bone scan shows near resolution of prior bony abnormalities associated with the known sclerotic lesions in the thoracolumbar spine and bony pelvis.    2/17/2021 PSA down to 0.1 from 1.37 October 2020.  3/9/2021 PSA 0.1    -5/26/2021 CT chest abdomen pelvis with contrast shows stable to slightly underlying increase low-density left para-aortic node.  Bone lesions stable.  Whole-body bone scan stable to decrease activity of known thoracolumbar and bony pelvic involvement.  PSA less than 0.1  , AST 74, bilirubin 0.5.       -6/1/2021 [facility] medical oncology follow-up visit: I reviewed the above data with him and images thereof.  Bones are stable.  No significant adenopathy.  PSA immeasurably low.     upper limit of normal 69 and AST 74 upper limit of normal 46.  Hence, neither is more than double the upper limit of normal with no ascites and no encephalopathy and normal albumin and bilirubin, despite the elevation of liver enzymes, he has child Abrams score A.  Package insert for Abiraterone says that for ALT and/or AST greater than 5 times upper limit of normal or total bilirubin greater than 3 times the upper limit of normal to withhold treatment until liver function tests returned to baseline or ALT and AST less than or equal to 2.5 times the upper limit of normal and total bilirubin less than or equal to 1.5 times the upper limit of normal and then reinitiate at 750 mg daily dose of Zytiga.  For recurrent hepatotoxicity on 750 mg daily Zytiga, withhold treatment until liver functions returned to baseline or ALT and AST less than 3-2.5 times upper limit of  normal and total bilirubin less than or equal to 1.5 times the upper limit of normal then reinitiate at 500 mg daily Zytiga dose.  If has recurrent hepatotoxicity on 500 mg dose, then discontinue treatment.  If has ALT greater than 3 times the upper limit of normal along with total bilirubin greater than 2 times the upper limit of normal in the absence of other contributing causes or biliary obstruction, permanently discontinue Zytiga.  Based on these recommendations, I would continue current doses but we will watch with serial labs monthly and repeat his imaging again in 3 months but clinically he is doing wonderfully with excellent response to Taxotere and now on Zytiga prednisone plus Zometa along with Relugolix.    -6/23/2020 for [facility] oncology clinic follow-up: Having persistent and worsening pain above his left hip.  I will get an MRI of the left hip for further evaluation.  Otherwise we will continue therapy unchanged with Zytiga, prednisone and Zometa along with Relugolix.    -7/28/2021 [facility] oncology clinic follow-up: Patient with multiple somatic complaints including episodes of confusion, dizziness, decreased memory, agitation.  He reports ongoing episodes with difficulty swallowing.  Also most concerning for episodes of angina and chest pain for which he basically refused to seek emergency medical attention.  He has a history of coronary artery disease and stent placement.  He has not followed up with cardiology for many years.  I will get an MRI of the brain in light of his confusion, dizziness and agitation.  I will get him to gastroenterology for EGD in light of his dysphagia.  I have also put in a referral for cardiology.  I reemphasized to the patient, his wife who is with him today and his son who was on the telephone during our visit the importance of seeking out prompt medical attention when he is having chest pain or neurological concerns so that he can be evaluated.  The patient states that  "he basically refuses to go to the emergency room and if his family calls an ambulance he would not agree to go to the hospital.  For the time being, I have asked him to hold his Zytiga and prednisone until we can sort this out as Zytiga does have some cardiovascular risk.  There is likely no treatment for prostate cancer that does not carry some form of cardiovascular risk.  His PSA remains low at 0.014 yesterday.  He will continue relugolix for now.    Of note, some of these symptoms could also be due to his hypophosphatemia, phosphate level from yesterday was 1.5, I have sent in a prescription for K-Phos 3 times a day before meals for 10 days.  We will hold further Zometa until this is corrected.  I have also put in an order to check his vitamin D level.  He takes calcium and vitamin D daily.  Some of his symptoms also could be due to drug withdrawal as there was some confusion and difficulty getting his last prescription for methadone therefore he was without methadone for several days, he now has his prescription and is back on his pain medication.  He has an appointment with neurosurgery tomorrow in light of his ongoing back pain, MRI of the hip recently showed multiple bony lesions largest at the L5 vertebral body and left supra-acetabular region.  If no neurosurgical intervention recommended he may benefit from spot radiation as this is where a lot of his pain is coming from.  His wife had questions today regarding his staging and extent of disease.  We reviewed previous scans.  I did clarify with her as she used the words \"cancer free\" as she thought that is what she was told after his CT scans once he completed Taxotere in February.  I explained to her that even though his scans showed he had an excellent response with no clear areas of metastasis that did not mean he was cancer free, I explained to her that when you have metastatic cancer the treatment intent is palliative in nature and to control the " disease but not to cure the disease.  She stated understanding.  We will see him back in 2 weeks for follow-up to sort through this and make further plan of care, again, I have asked him to hold Zytiga and prednisone until he sees us back, he will continue on his other medications including relugolix.    -7/30/2021 neurosurgery PA consultation.  MRI with and without contrast L5 ordered.    -8/3/2021 neurosurgery follow-up showed known sclerotic disease with new L5 abnormality without compression deformity or instability.  MRI brain negative for metastasis.    -8/4/2021 office visit Dr. Fco Quintanilla radiation oncology coordinating with Dr. North neurosurgery for palliative radiation for MRI evidence of L5 and left supra acetabular painful lesions.  States that the patient's disease which is not secreting PSA is experiencing some progression and would benefit from 20 Gy in five fractions and other lesion 30 Gy in ten fractions. Patient offered to go to [place] for radiation but desired treatment in [place].    -8/10/2021 [facility] medical oncology follow-up visit: No chest pains at this junction.  Reviewed MRI brain and other MRIs reviewed by Dr. North and Dwight.  Due for EGD on 19th.  We will repeat his phosphorus along with his other labs continue Relugolix, Zytiga, prednisone, and he will continue radiation palliatively to the painful bony lesions with Dr. Quintanilla/Can.  He will see my nurse practitioner back in a few weeks to see how he is tolerating this and to follow-up on the phosphorus off of Zometa and she will order repeat CT chest abdomen pelvis with contrast and total body bone scan the end of September.I will see him back after that.    -9/8/2021 Zometa resumed.  PSA <0.10    -10/5/2021 [facility] medical oncology follow-up visit: followed-up with radiation oncology 9/21/2021.  Status post symptomatic L5 radiation 5 fractions 20 [place] completed 8/16/2021 with improvement in right hip pain.  9/2/2021 PSA  less than 0.1.  9/29/2021 total body bone scan shows increased uptake left iliac bone slightly superior group of the left acetabulum with no additional foci of suspicious abnormality is unlikely treatment related with no new sites of active osseous metastasis.  CT chest abdomen pelvis with contrast shows stable borderline enlarged left periaortic nodes and dating sclerotic bone lesions.Continue Zytiga, prednisone, Relugolix, Zometa, repeat CT chest abdomen pelvis with contrast and total body bone scan the end of the year.  We will follow PSA serially.    -11/17/2021 [facility] Oncology clinic follow-up:  Mr. Lugo overall is doing well.  He is tolerating therapy with Zytiga, prednisone and Relugolix along with Zometa which is given every 6 weeks.  PSA remains low at <0.1.  Has occasional low phosphorus, currently low at 1.7.  Serum calcium is normal at 8.9, normal creatinine 0.79 and normal CBC other than for platelets of 124.  I will hold Zometa for 1 month, I did send in a prescription for phosphorus replacement today.  He takes calcium and vitamin D.  I will see him back in 1 month for follow-up.  We will repeat restaging scans after that visit.    -12/15/2021 [facility] Oncology clinic follow-up: Mr. Lugo continues to do well on therapy with Zytiga, prednisone and Relugolix, his PSA remains low at <0.1.  He is continuing to have left lower back pain, he is working with palliative care and they have referred him also to pain management.  Pain management has talked to him about putting in a pain pump however he is leery of this, I told him that this was a safe and effective pain management technique and to certainly give consideration to their recommendations.  His phosphorus is improving however is still a little low, I will hold off on Zometa until we see him back in 1 month, we may end up only giving it every 12 weeks.  We will repeat restaging scans with CT chest, abdomen and pelvis along with total body bone scan  prior to return and I have ordered those today.      -1/5/2022 CT chest abdomen pelvis with contrast [facility] showing stable left periaortic adenopathy and unchanged sclerotic thoracic, lumbar spine and pelvis lesions with no new or progressive disease in the chest abdomen or pelvis.  Total body bone scan shows no significant change and no new suspicious foci.  Stable posterior right acetabulum and left superior acetabulum and degenerative changes in the cervical spine, shoulders, acromioclavicular joints, sternoclavicular joints, elbows, wrists, hands, thoracic spine, lumbosacral spine, sacroiliac joints, hips, knees, ankles, feet.    -1/11/2022 [facility] medical oncology hematology follow-up visit: I reviewed images and reports from his 1/5/2022 scans.  No active disease and PSA immmeasurably low on Zytiga, prednisone, Relugolix.  However, he has struggled with hypophosphatemia and is significantly fatigued with this.  Given that the bones are doing well and given that his phosphorus continues to remain consistently low at 2.2 in mid December, 1.7 mid November and 2.5 mid-September and as low as 1.5 back to July and the likelihood that this is related to his Zometa, given that his bones are stable overall, he will increase from 1200 mg up to 1600 mg of calcium and phosphate a day and we will hold his Zometa for the foreseeable future.  He will see my nurse practitioner back in a month to continue to monitor his phosphorus along with his calcium and other labs and will repeat his scans again in 3 months.  In addition, given his fatigue we will check his ACTH and cortisol though he is taking his prednisone with his Zytiga.  Though his electrolytes are normal, I will keep an eye on the cortisol and ACTH and have these labs not only drawn today but again just prior to return to my nurse practitioner on February 10.    -2/10/2022 [facility]cology clinic follow-up: Mr. Lugo overall is stable, no change in his  health this past month.  I have reviewed his labs from 2/8/2022 and they are unremarkable, his phosphorus is now normal at 3.2. We are continuing to hold his Zometa, he last received Zometa on 10/5/2021.  He continues therapy with Zytiga, prednisone and Relugolix.  Dr. Rashid had ordered cortisol level and ACTH which are low, currently his ACTH is 2.8 and cortisol 3.08 however these are both done in the face of ongoing prednisone that he takes with his Zytiga.  We will get him to endocrinology for further evaluation for possible adrenal insufficiency but will not interrupt his treatment in the interim.  He will need restaging scans again in April for a 3-month follow-up.  He will continue to follow with palliative care and pain management for his cancer related pain.  His PSA remains immeasurably low at <0.1 on 2/8/22.    -2/15/2022 went to emergency room with weakness, decreased appetite, myalgias in the setting of known COVID-19 testing positive a few days prior to this visit.  Phosphorus had been low in the past but was normal in the emergency room with unremarkable CBC and CMP.  Chest x-ray unremarkable saturating well on room air mildly hypertensive with dry mucosa.  Given 500 cc crystalloid.  Covid test positive.  Mild thrombocytopenia 136,000 and otherwise unremarkable.  Discharged home.    -3/3/2022 data:  PSA less than 0.1  CMP unremarkable  Cortisol 3.96 normal  ACTH 2.8 lower limit of normal 7.2  Phosphorus normal 2.6    -3/8/2022 [facility] medical oncology follow-up: Suspect big chunk of his fatigue was Covid.  Another part of the fatigue likely related to lack of testosterone.  Not hypophosphatemic off of Zometa.  ACTH running low while on prednisone not surprising but with normal cortisol and I doubt adrenal insufficient which is why he is on prednisone to avoid such while taking Zytiga.  Overall doing fairly well and with immeasurably low PSA, I will have labs done on him early April, see my nurse  practitioner early May, and she will order repeat bone scan and CTs the end of May and I will see him back in June.  We will continue to hold the Zometa unless bone lesions progress given symptomatic prior hypophosphatemia on Zometa.  He will keep his appointment April 28 with Dr. Mir Duffy just to be sure that my take on his pituitary/adrenal axis assessment is accurate.    Total time of care today inclusive of time spent today prior to his arrival reviewing interval notes from the emergency room and multiple data sets as outlined above and the Covid 19 and during visit translating the above information and the plan as outlined and putting into force the plan as outlined took 40 minutes of patient care time throughout the day today.  Ishmael Rashid MD    03/08/2022

## 2022-03-08 NOTE — PROGRESS NOTES
Specialty Pharmacy Refill Coordination Note     Naveen is a 66 y.o. male contacted today regarding refills of Abiraterone 1000mg PO QD, Relugolix 120mg PO QD and Prednisone 5mg PO QD specialty medication(s).    Specialty medication(s) and dose(s) confirmed: yes    Refill Questions    Flowsheet Row Most Recent Value   Changes to allergies? No   Changes to medications? No   New conditions since last clinic visit No   Unplanned office visit, urgent care, ED, or hospital admission in the last 4 weeks  No   How does patient/caregiver feel medication is working? Very good   Financial problems or insurance changes  No   If yes, describe changes in insurance or financial issues. n/a   How many doses of your specialty medications were missed in the last 4 weeks? 0   Why were doses missed? n/a   Does this patient require a clinical escalation to a pharmacist? No          Delivery Questions    Flowsheet Row Most Recent Value   Delivery method [employer]   Delivery address correct? Yes   Delivery phone number [phone]   Preferred delivery time? Anytime   Number of medications in delivery 3   Medication being filled and delivered zytiga, relugolix and prednisone   Doses left of specialty medications 10 days left   Is there any medication that is due not being filled? No   Supplies needed? No supplies needed   Cooler needed? No   Do any medications need mixed or dated? No   Additional comments no copay   Questions or concerns for the pharmacist? No   Explain any questions or concerns for the pharmacist n/a   Are any medications first time fills? No            Medication Adherence    Adherence tools used: watch   Other adherence tool: Clock - takes at same time each day, 7:45am   Support network for adherence: family member          Follow-up: 28 day(s)     Sushma Muro, Pharmacy Technician  Specialty Pharmacy Technician

## 2022-03-09 ENCOUNTER — OFFICE VISIT (OUTPATIENT)
Dept: CARDIOLOGY | Facility: CLINIC | Age: 67
End: 2022-03-09

## 2022-03-09 VITALS
DIASTOLIC BLOOD PRESSURE: 78 MMHG | SYSTOLIC BLOOD PRESSURE: 140 MMHG | HEART RATE: 88 BPM | HEIGHT: 69 IN | OXYGEN SATURATION: 92 % | WEIGHT: 200.4 LBS | BODY MASS INDEX: 29.68 KG/M2

## 2022-03-09 DIAGNOSIS — I25.118 CORONARY ARTERY DISEASE OF NATIVE ARTERY OF NATIVE HEART WITH STABLE ANGINA PECTORIS: Primary | ICD-10-CM

## 2022-03-09 DIAGNOSIS — R53.83 OTHER FATIGUE: ICD-10-CM

## 2022-03-09 DIAGNOSIS — E78.5 HYPERLIPIDEMIA LDL GOAL <70: ICD-10-CM

## 2022-03-09 PROCEDURE — 99214 OFFICE O/P EST MOD 30 MIN: CPT | Performed by: INTERNAL MEDICINE

## 2022-03-09 RX ORDER — ATORVASTATIN CALCIUM 20 MG/1
20 TABLET, FILM COATED ORAL NIGHTLY
Qty: 90 TABLET | Refills: 3 | Status: SHIPPED | OUTPATIENT
Start: 2022-03-09 | End: 2023-02-24

## 2022-03-15 ENCOUNTER — PATIENT OUTREACH (OUTPATIENT)
Dept: CASE MANAGEMENT | Facility: OTHER | Age: 67
End: 2022-03-15

## 2022-03-15 NOTE — OUTREACH NOTE
AMBULATORY CASE MANAGEMENT NOTE    Name and Relationship of Patient/Support Person: Naveen Lugo F -     Patient Outreach    Patient reports pain status at 5 or 6 with reduction of 50% with administration of  MS Contin 15 mg tablets every six hours and hydromorphone 8 mg every six for breakthrough. Constipation managed with senna.  Reports fatigue improved, eating and hydrating well. Denies shortness of breath or cough. Pain status message left with to Dr Delgado's nurse line.    JUHI GRIFFIN  Ambulatory Case Management    3/15/2022, 14:31 EDT

## 2022-04-04 ENCOUNTER — SPECIALTY PHARMACY (OUTPATIENT)
Dept: ONCOLOGY | Facility: HOSPITAL | Age: 67
End: 2022-04-04

## 2022-04-04 NOTE — PROGRESS NOTES
Specialty Pharmacy Refill Coordination Note     Naveen is a 66 y.o. male contacted today regarding refills of  Zytiga 1000mg PO QD, relugolix 120mg PO QD and prednisone 5mg PO QD  specialty medication(s).      Specialty medication(s) and dose(s) confirmed: yes    Refill Questions    Flowsheet Row Most Recent Value   Changes to allergies? No   Changes to medications? No   New conditions since last clinic visit No   Unplanned office visit, urgent care, ED, or hospital admission in the last 4 weeks  No   How does patient/caregiver feel medication is working? Good   Financial problems or insurance changes  No   If yes, describe changes in insurance or financial issues. n/a   How many doses of your specialty medications were missed in the last 4 weeks? 0   Why were doses missed? n/a   Does this patient require a clinical escalation to a pharmacist? No          Delivery Questions    Flowsheet Row Most Recent Value   Delivery method [employer]   Delivery address correct? Yes   Delivery phone number [phone]   Preferred delivery time? Anytime   Number of medications in delivery 3   Medication being filled and delivered zytiga, prednisone and orgovyx   Doses left of specialty medications 7 days left   Is there any medication that is due not being filled? No   Supplies needed? No supplies needed   Cooler needed? No   Do any medications need mixed or dated? No   Additional comments no copay   Questions or concerns for the pharmacist? No   Explain any questions or concerns for the pharmacist n/a   Are any medications first time fills? No            Medication Adherence    Adherence tools used: watch   Other adherence tool: Clock - takes at same time each day, 7:45am   Support network for adherence: family member          Follow-up: 28 day(s)     Sushma Muro, Pharmacy Technician  Specialty Pharmacy Technician

## 2022-04-19 ENCOUNTER — PATIENT OUTREACH (OUTPATIENT)
Dept: CASE MANAGEMENT | Facility: OTHER | Age: 67
End: 2022-04-19

## 2022-04-19 NOTE — OUTREACH NOTE
AMBULATORY CASE MANAGEMENT NOTE    Name and Relationship of Patient/Support Person: Naveen Lugo F - Self    Patient Outreach    Patient reports pain in back and hips unchanged, taking pain medications as prescribed.  Patient also reports that he remains fatigued, taking frequent rest periods.  States he is eating and hydrating well. Denies SDOH.      JUHI GRIFFIN  Ambulatory Case Management    4/19/2022, 11:17 EDT

## 2022-04-28 ENCOUNTER — OFFICE VISIT (OUTPATIENT)
Dept: ENDOCRINOLOGY | Facility: CLINIC | Age: 67
End: 2022-04-28

## 2022-04-28 VITALS
DIASTOLIC BLOOD PRESSURE: 66 MMHG | BODY MASS INDEX: 29.7 KG/M2 | SYSTOLIC BLOOD PRESSURE: 138 MMHG | WEIGHT: 196 LBS | HEART RATE: 81 BPM | HEIGHT: 68 IN

## 2022-04-28 DIAGNOSIS — E27.40 ADRENAL INSUFFICIENCY: Primary | ICD-10-CM

## 2022-04-28 PROCEDURE — 99203 OFFICE O/P NEW LOW 30 MIN: CPT | Performed by: INTERNAL MEDICINE

## 2022-04-28 RX ORDER — POTASSIUM CHLORIDE 750 MG/1
10 TABLET, EXTENDED RELEASE ORAL DAILY
Qty: 30 TABLET | Refills: 11 | Status: SHIPPED | OUTPATIENT
Start: 2022-04-28 | End: 2022-05-31

## 2022-04-28 NOTE — ASSESSMENT & PLAN NOTE
Caused by zytiga and treated with prednisone. The low acth and low serum cortisol would be typical for this situation.   The adequacy of the prednisone dose cannot be assessed by measuring acth or cortisol but is rather assessed by the overall gestalt- weight change, bp, blood sugar, potassium, overall sense of how the patient is doing etc. As his bs tends to run a little high and K a little low, I don't think we can increase his prednisone dose. I think we ought to put him on a little potassium - it might help him feel better to have a consistently normal potassium level.

## 2022-04-28 NOTE — PROGRESS NOTES
"     Office Note      Date: 2022  Patient Name: Naveen Lugo  MRN: [id number]  : 1955    Chief Complaint   Patient presents with   • Abnormal Lab       History of Present Illness:   Naveen Lugo is a 66 y.o. male who presents for Abnormal Lab  He is seen as a new patient today  -------------------------------------------  This man is undergoing treatment for metastatic prostate cancer. The treatment includes ZYTIGA and daily prednisone (5 mg per day)  He was referred due to low ACTH and low CORTISOL LEVELS.   Primary symptom is FATIGUE. WEIGHT IS STABLE. HE NOTES FACE IS MORE ROUND. BP IS MORE LABILE. SKIN IS MORE FRAGILE.   review of labs show periodica hypokalemia and mild hyperglycemia.  ------------------------------------------     Subjective          Review of Systems:   Review of Systems   Constitutional: Positive for activity change, appetite change, chills, diaphoresis and fatigue.   Musculoskeletal: Positive for arthralgias and back pain.   Neurological: Positive for weakness.   Hematological: Bruises/bleeds easily.       The following portions of the patient's history were reviewed and updated as appropriate: allergies, current medications, past family history, past medical history, past social history, past surgical history and problem list.    Objective     Visit Vitals  /66   Pulse 81   Ht 172.7 cm (68\")   Wt 88.9 kg (196 lb)   BMI 29.80 kg/m²       Labs:    CBC w/DIFF  Lab Results   Component Value Date    WBC 6.71 2022    RBC 5.44 2022    HGB 16.2 2022    HCT 47.5 2022    MCV 87.3 2022    MCH 29.8 2022    MCHC 34.1 2022    RDW 13.1 2022    RDWSD 41.2 2022    MPV 10.5 2022     (L) 2022    NEUTRORELPCT 70.2 2022    LYMPHORELPCT 23.8 2022    MONORELPCT 4.5 (L) 2022    EOSRELPCT 0.9 2022    BASORELPCT 0.3 2022    AUTOIGPER 0.3 2022    NEUTROABS 4.71 2022    LYMPHSABS 1.60 " 02/14/2022    MONOSABS 0.30 02/14/2022    EOSABS 0.06 02/14/2022    BASOSABS 0.02 02/14/2022    AUTOIGNUM 0.02 02/14/2022    NRBC 0.0 02/14/2022       T4  No results found for: FREET4    TSH  No results found for: TSHBASE     Physical Exam:  Physical Exam  Vitals reviewed.   Constitutional:       General: He is not in acute distress.     Appearance: Normal appearance. He is not ill-appearing, toxic-appearing or diaphoretic.      Comments: Cushingoid face   HENT:      Head: Normocephalic and atraumatic.   Cardiovascular:      Rate and Rhythm: Normal rate and regular rhythm.   Pulmonary:      Effort: Pulmonary effort is normal. No respiratory distress.   Neurological:      Mental Status: He is alert.   Psychiatric:         Mood and Affect: Mood normal.         Behavior: Behavior normal.         Thought Content: Thought content normal.         Judgment: Judgment normal.         Assessment / Plan      Assessment & Plan:  Problem List Items Addressed This Visit        Other    Adrenal insufficiency (HCC) - Primary    Current Assessment & Plan     Caused by zytiga and treated with prednisone. The low acth and low serum cortisol would be typical for this situation.   The adequacy of the prednisone dose cannot be assessed by measuring acth or cortisol but is rather assessed by the overall gestalt- weight change, bp, blood sugar, potassium, overall sense of how the patient is doing etc. As his bs tends to run a little high and K a little low, I don't think we can increase his prednisone dose. I think we ought to put him on a little potassium - it might help him feel better to have a consistently normal potassium level.                   Mir Duffy MD   04/28/2022

## 2022-05-04 ENCOUNTER — INFUSION (OUTPATIENT)
Dept: ONCOLOGY | Facility: HOSPITAL | Age: 67
End: 2022-05-04

## 2022-05-04 ENCOUNTER — SPECIALTY PHARMACY (OUTPATIENT)
Dept: ONCOLOGY | Facility: HOSPITAL | Age: 67
End: 2022-05-04

## 2022-05-04 ENCOUNTER — OFFICE VISIT (OUTPATIENT)
Dept: ONCOLOGY | Facility: CLINIC | Age: 67
End: 2022-05-04

## 2022-05-04 VITALS
SYSTOLIC BLOOD PRESSURE: 117 MMHG | OXYGEN SATURATION: 95 % | HEIGHT: 69 IN | TEMPERATURE: 97.3 F | HEART RATE: 80 BPM | WEIGHT: 195 LBS | DIASTOLIC BLOOD PRESSURE: 71 MMHG | BODY MASS INDEX: 28.88 KG/M2 | RESPIRATION RATE: 20 BRPM

## 2022-05-04 DIAGNOSIS — K30 INDIGESTION: ICD-10-CM

## 2022-05-04 DIAGNOSIS — C79.51 PROSTATE CANCER METASTATIC TO BONE: ICD-10-CM

## 2022-05-04 DIAGNOSIS — C79.51 PROSTATE CANCER METASTATIC TO BONE: Primary | ICD-10-CM

## 2022-05-04 DIAGNOSIS — C61 PROSTATE CANCER METASTATIC TO BONE: Primary | ICD-10-CM

## 2022-05-04 DIAGNOSIS — Z45.2 ENCOUNTER FOR CARE RELATED TO PORT-A-CATH: Primary | ICD-10-CM

## 2022-05-04 DIAGNOSIS — R61 NIGHT SWEATS: ICD-10-CM

## 2022-05-04 DIAGNOSIS — C61 PROSTATE CANCER METASTATIC TO BONE: ICD-10-CM

## 2022-05-04 PROCEDURE — 99214 OFFICE O/P EST MOD 30 MIN: CPT | Performed by: NURSE PRACTITIONER

## 2022-05-04 PROCEDURE — 25010000002 HEPARIN LOCK FLUSH PER 10 UNITS: Performed by: INTERNAL MEDICINE

## 2022-05-04 PROCEDURE — 96523 IRRIG DRUG DELIVERY DEVICE: CPT

## 2022-05-04 RX ORDER — HEPARIN SODIUM (PORCINE) LOCK FLUSH IV SOLN 100 UNIT/ML 100 UNIT/ML
500 SOLUTION INTRAVENOUS AS NEEDED
Status: CANCELLED | OUTPATIENT
Start: 2022-05-04

## 2022-05-04 RX ORDER — CLONIDINE HYDROCHLORIDE 0.1 MG/1
0.1 TABLET ORAL 2 TIMES DAILY PRN
Qty: 20 TABLET | Refills: 0 | Status: SHIPPED | OUTPATIENT
Start: 2022-05-04 | End: 2022-12-19

## 2022-05-04 RX ORDER — FAMOTIDINE 20 MG/1
20 TABLET, FILM COATED ORAL DAILY PRN
Qty: 30 TABLET | Refills: 0 | Status: SHIPPED | OUTPATIENT
Start: 2022-05-04 | End: 2022-06-03

## 2022-05-04 RX ORDER — SODIUM CHLORIDE 0.9 % (FLUSH) 0.9 %
10 SYRINGE (ML) INJECTION AS NEEDED
Status: CANCELLED | OUTPATIENT
Start: 2022-05-04

## 2022-05-04 RX ORDER — HEPARIN SODIUM (PORCINE) LOCK FLUSH IV SOLN 100 UNIT/ML 100 UNIT/ML
500 SOLUTION INTRAVENOUS AS NEEDED
Status: DISCONTINUED | OUTPATIENT
Start: 2022-05-04 | End: 2022-05-04 | Stop reason: HOSPADM

## 2022-05-04 RX ADMIN — Medication 500 UNITS: at 11:00

## 2022-05-04 NOTE — PROGRESS NOTES
CHIEF COMPLAINT:    1.  Night sweats    2.  Indigestion   3.  Metastatic prostate cancer    Problem List:  Oncology/Hematology History Overview Note   1.  Stage IVb grade group 4 metastatic prostate cancer with sclerotic bone lesions on CT and bone scan and history of prostatic hypertrophy  2.  Kidney stone  3.  Polyps  4.  Probable drug-induced hypophosphatemia from Zometa, drug stopped after 10/5/2021 dose  5.  Cancer related pain  6.  Fatigue  7.  Covid 2/2022    -9/30/2020 office note from Dr. Ronen Reynaga indicates patient with PSA 10.8.  Underwent TRUS prostate biopsy 9/15/2020.  Adenocarcinoma the prostate [place] 4+4 = 8 in 1 out of 12 cores and 4+3 = 7 and 10 out of 12 cores and 3+4 = 7 in 1 out of 12 cores.  Perineural invasion.  Extraprostatic extension into the fat seen on 2 biopsies of the right lateral mid and right apex.  9/24/2020 CT chest and whole-body bone scan showed T12, L1, left acetabular, right medial acetabular metastases with no lung metastases.  He started androgen deprivation therapy with Casodex with plans for Lupron to start in 1 to 2 weeks for clinical T3 N0 M1 metastatic prostate cancer.  Review of official report from [facility] 9/24/2020 bone scan mentions T12, L1, roof of left acetabulum and medial right acetabular osteoblastic metastases.  CT chest with contrast that same day showed mild splenomegaly 14.2 cm with stable periaortic nodes compared to CT December 2015 with no lung metastases.  Sclerotic abnormality within the T12 vertebral body, L1 vertebral body extending into the pedicle unchanged.  8/28/2020 CT abdomen pelvis report from [facility] reviewed and mentions normal spleen size.  Punctate nonobstructing kidney stone, no paulino enlargement, diffuse bladder wall thickening with mild perivesicular fat stranding, 2.1 cm sclerotic left iliac bone above the left acetabulum new compared to 2015 as well as 1.5 cm faintly sclerotic focus in the right posterior  acetabulum stable compared to prior CT 2015 as well as a 1.6 cm T12 and inferior vertebral body sclerotic lesion and a 1.9 cm left posterior lateral L1 vertebral body abnormality extending to the pedicle.  -10/13/2020 initial [facility] medical oncology consultation: With 1 Sarika score 8, 4+4 lesion that would make him a grade group 4 with stage IVb disease given radiographic evidence of sclerotic bone metastasis on bone scan and CT.  Needs genetic testing given metastatic prostate cancer and this is also important as, were he to have mismatch repair mutation then we would have options for PDL 1 inhibitors and were he BRCA mutated would have options for PARP inhibitors in the future.  Systemic therapy for castration naïve prostate cancer or N1 disease should be with apalutamide or Abiraterone or enzalutamide all of which are category 1.  He does not have any visceral metastases.  According to his imaging he has T12, L1, left acetabular, right acetabular, and left iliac bony involvement.  That means he would have 4 or more bone metastases with at least one metastasis (i.e. the acetabular lesions bilaterally) beyond the pelvis/vertebral, for which this would be considered high-volume disease for which 6 cycles of docetaxel 75 mg/m² x 6 cycles followed by Zytiga and prednisone would be reasonable along with Zometa every 6 weeks for bone stabilization.  He will do chemo preparation visit with my nurse practitioner prior to start chemotherapy with Taxotere and Zometa in 2 weeks and he is already received Casodex in preparation for Lupron shot he received on 10/12/2020 with Dr. Reynaga.  We will get him to Dr. Alexander Gomez who has done his polypectomies in the past for port placement and we will get genetic counseling started.  Following the 6 courses of Taxotere per the [place]arted trial criteria, I would then give him an indefinite Zytiga and prednisone and Zometa until progression or toxicity dictates.    -12/16/2019  COVID-19 antigen test negative    -12/29/2020 PSA 0.275 with absolute neutrophil count 700 and creatinine 0.76 with normal liver enzymes and electrolytes.  Normal magnesium and phosphorus and urine drug screen positive for buprenorphine and opiates follow-up with palliative care.    -1/4/2021 CT chest abdomen pelvis with contrast and whole-body bone scan shows improving less metabolically active bone metastases.  Stable sclerotic T12, L1, and L2 vertebral bodies and bilateral pelvic bones on bone windows with stable subcentimeter para-aortic lymph nodes and no definite progression in the chest abdomen or pelvis.    -1/5/2021 [facility] medical oncology follow-up visit: I reviewed the images and reports thereof of the above CT and bone scan which shows good response to Taxotere x3 courses with a PSA down to 0.275 from a baseline of 10.  Get another 3 courses of Taxotere, continue his Xgeva, and then following that we will switch to Zytiga and prednisone.  He is working with palliative care on his bone pain but on his current dose of fentanyl patch he says his pain is still not adequately controlled he will contact them.  Dexamethasone prescription was refilled.  We will see my nurse practitioner back in 3 weeks for course #5 of 6 total courses and then we will reimage with CT chest abdomen pelvis and bone scan before going to the Zytiga prednisone.    -3/4/2021 CT chest abdomen pelvis with contrast is negative save for stable sclerotic bone lesions and the bone scan shows near resolution of prior bony abnormalities associated with the known sclerotic lesions in the thoracolumbar spine and bony pelvis.    2/17/2021 PSA down to 0.1 from 1.37 October 2020.  3/9/2021 PSA 0.1    -5/26/2021 CT chest abdomen pelvis with contrast shows stable to slightly underlying increase low-density left para-aortic node.  Bone lesions stable.  Whole-body bone scan stable to decrease activity of known thoracolumbar and bony pelvic  involvement.  PSA less than 0.1  , AST 74, bilirubin 0.5.       -6/1/2021 [facility] medical oncology follow-up visit: I reviewed the above data with him and images thereof.  Bones are stable.  No significant adenopathy.  PSA immeasurably low.     upper limit of normal 69 and AST 74 upper limit of normal 46.  Hence, neither is more than double the upper limit of normal with no ascites and no encephalopathy and normal albumin and bilirubin, despite the elevation of liver enzymes, he has child Abrams score A.  Package insert for Abiraterone says that for ALT and/or AST greater than 5 times upper limit of normal or total bilirubin greater than 3 times the upper limit of normal to withhold treatment until liver function tests returned to baseline or ALT and AST less than or equal to 2.5 times the upper limit of normal and total bilirubin less than or equal to 1.5 times the upper limit of normal and then reinitiate at 750 mg daily dose of Zytiga.  For recurrent hepatotoxicity on 750 mg daily Zytiga, withhold treatment until liver functions returned to baseline or ALT and AST less than 3-2.5 times upper limit of normal and total bilirubin less than or equal to 1.5 times the upper limit of normal then reinitiate at 500 mg daily Zytiga dose.  If has recurrent hepatotoxicity on 500 mg dose, then discontinue treatment.  If has ALT greater than 3 times the upper limit of normal along with total bilirubin greater than 2 times the upper limit of normal in the absence of other contributing causes or biliary obstruction, permanently discontinue Zytiga.  Based on these recommendations, I would continue current doses but we will watch with serial labs monthly and repeat his imaging again in 3 months but clinically he is doing wonderfully with excellent response to Taxotere and now on Zytiga prednisone plus Zometa along with Relugolix.    -6/23/2020 for [facility] oncology clinic follow-up: Having persistent and worsening pain  above his left hip.  I will get an MRI of the left hip for further evaluation.  Otherwise we will continue therapy unchanged with Zytiga, prednisone and Zometa along with Relugolix.    -7/28/2021 [facility] oncology clinic follow-up: Patient with multiple somatic complaints including episodes of confusion, dizziness, decreased memory, agitation.  He reports ongoing episodes with difficulty swallowing.  Also most concerning for episodes of angina and chest pain for which he basically refused to seek emergency medical attention.  He has a history of coronary artery disease and stent placement.  He has not followed up with cardiology for many years.  I will get an MRI of the brain in light of his confusion, dizziness and agitation.  I will get him to gastroenterology for EGD in light of his dysphagia.  I have also put in a referral for cardiology.  I reemphasized to the patient, his wife who is with him today and his son who was on the telephone during our visit the importance of seeking out prompt medical attention when he is having chest pain or neurological concerns so that he can be evaluated.  The patient states that he basically refuses to go to the emergency room and if his family calls an ambulance he would not agree to go to the hospital.  For the time being, I have asked him to hold his Zytiga and prednisone until we can sort this out as Zytiga does have some cardiovascular risk.  There is likely no treatment for prostate cancer that does not carry some form of cardiovascular risk.  His PSA remains low at 0.014 yesterday.  He will continue relugolix for now.    Of note, some of these symptoms could also be due to his hypophosphatemia, phosphate level from yesterday was 1.5, I have sent in a prescription for K-Phos 3 times a day before meals for 10 days.  We will hold further Zometa until this is corrected.  I have also put in an order to check his vitamin D level.  He takes calcium and vitamin D daily.  Some of  "his symptoms also could be due to drug withdrawal as there was some confusion and difficulty getting his last prescription for methadone therefore he was without methadone for several days, he now has his prescription and is back on his pain medication.  He has an appointment with neurosurgery tomorrow in light of his ongoing back pain, MRI of the hip recently showed multiple bony lesions largest at the L5 vertebral body and left supra-acetabular region.  If no neurosurgical intervention recommended he may benefit from spot radiation as this is where a lot of his pain is coming from.  His wife had questions today regarding his staging and extent of disease.  We reviewed previous scans.  I did clarify with her as she used the words \"cancer free\" as she thought that is what she was told after his CT scans once he completed Taxotere in February.  I explained to her that even though his scans showed he had an excellent response with no clear areas of metastasis that did not mean he was cancer free, I explained to her that when you have metastatic cancer the treatment intent is palliative in nature and to control the disease but not to cure the disease.  She stated understanding.  We will see him back in 2 weeks for follow-up to sort through this and make further plan of care, again, I have asked him to hold Zytiga and prednisone until he sees us back, he will continue on his other medications including relugolix.    -7/30/2021 neurosurgery PA consultation.  MRI with and without contrast L5 ordered.    -8/3/2021 neurosurgery follow-up showed known sclerotic disease with new L5 abnormality without compression deformity or instability.  MRI brain negative for metastasis.    -8/4/2021 office visit Dr. Fco Quintanilla radiation oncology coordinating with Dr. Can forde for palliative radiation for MRI evidence of L5 and left supra acetabular painful lesions.  States that the patient's disease which is not secreting PSA " is experiencing some progression and would benefit from 20 Gy in five fractions and other lesion 30 Gy in ten fractions. Patient offered to go to [place] for radiation but desired treatment in [place].    -8/10/2021 [facility] medical oncology follow-up visit: No chest pains at this junction.  Reviewed MRI brain and other MRIs reviewed by Dr. North and Dwight.  Due for EGD on 19th.  We will repeat his phosphorus along with his other labs continue Relugolix, Zytiga, prednisone, and he will continue radiation palliatively to the painful bony lesions with Dr. Quintanilla/Can.  He will see my nurse practitioner back in a few weeks to see how he is tolerating this and to follow-up on the phosphorus off of Zometa and she will order repeat CT chest abdomen pelvis with contrast and total body bone scan the end of September.I will see him back after that.    -9/8/2021 Zometa resumed.  PSA <0.10    -10/5/2021 [facility] medical oncology follow-up visit: followed-up with radiation oncology 9/21/2021.  Status post symptomatic L5 radiation 5 fractions 20 [place] completed 8/16/2021 with improvement in right hip pain.  9/2/2021 PSA less than 0.1.  9/29/2021 total body bone scan shows increased uptake left iliac bone slightly superior group of the left acetabulum with no additional foci of suspicious abnormality is unlikely treatment related with no new sites of active osseous metastasis.  CT chest abdomen pelvis with contrast shows stable borderline enlarged left periaortic nodes and dating sclerotic bone lesions.Continue Zytiga, prednisone, Relugolix, Zometa, repeat CT chest abdomen pelvis with contrast and total body bone scan the end of the year.  We will follow PSA serially.    -11/17/2021 [facility] clinic follow-up:  Mr. Lugo overall is doing well.  He is tolerating therapy with Zytiga, prednisone and Relugolix along with Zometa which is given every 6 weeks.  PSA remains low at <0.1.  Has occasional low phosphorus,  currently low at 1.7.  Serum calcium is normal at 8.9, normal creatinine 0.79 and normal CBC other than for platelets of 124.  I will hold Zometa for 1 month, I did send in a prescription for phosphorus replacement today.  He takes calcium and vitamin D.  I will see him back in 1 month for follow-up.  We will repeat restaging scans after that visit.    -12/15/2021 [facility] Oncology clinic follow-up: Mr. Lugo continues to do well on therapy with Zytiga, prednisone and Relugolix, his PSA remains low at <0.1.  He is continuing to have left lower back pain, he is working with palliative care and they have referred him also to pain management.  Pain management has talked to him about putting in a pain pump however he is leery of this, I told him that this was a safe and effective pain management technique and to certainly give consideration to their recommendations.  His phosphorus is improving however is still a little low, I will hold off on Zometa until we see him back in 1 month, we may end up only giving it every 12 weeks.  We will repeat restaging scans with CT chest, abdomen and pelvis along with total body bone scan prior to return and I have ordered those today.      -1/5/2022 CT chest abdomen pelvis with contrast [place] [facility] showing stable left periaortic adenopathy and unchanged sclerotic thoracic, lumbar spine and pelvis lesions with no new or progressive disease in the chest abdomen or pelvis.  Total body bone scan shows no significant change and no new suspicious foci.  Stable posterior right acetabulum and left superior acetabulum and degenerative changes in the cervical spine, shoulders, acromioclavicular joints, sternoclavicular joints, elbows, wrists, hands, thoracic spine, lumbosacral spine, sacroiliac joints, hips, knees, ankles, feet.    -1/11/2022 [facility] medical oncology hematology follow-up visit: I reviewed images and reports from his 1/5/2022 scans.  No active disease and PSA immmeasurably  low on Zytiga, prednisone, Relugolix.  However, he has struggled with hypophosphatemia and is significantly fatigued with this.  Given that the bones are doing well and given that his phosphorus continues to remain consistently low at 2.2 in mid December, 1.7 mid November and 2.5 mid-September and as low as 1.5 back to July and the likelihood that this is related to his Zometa, given that his bones are stable overall, he will increase from 1200 mg up to 1600 mg of calcium and phosphate a day and we will hold his Zometa for the foreseeable future.  He will see my nurse practitioner back in a month to continue to monitor his phosphorus along with his calcium and other labs and will repeat his scans again in 3 months.  In addition, given his fatigue we will check his ACTH and cortisol though he is taking his prednisone with his Zytiga.  Though his electrolytes are normal, I will keep an eye on the cortisol and ACTH and have these labs not only drawn today but again just prior to return to my nurse practitioner on February 10.    -2/10/2022 [facility] follow-up: Mr. Lugo overall is stable, no change in his health this past month.  I have reviewed his labs from 2/8/2022 and they are unremarkable, his phosphorus is now normal at 3.2. We are continuing to hold his Zometa, he last received Zometa on 10/5/2021.  He continues therapy with Zytiga, prednisone and Relugolix.  Dr. Rashid had ordered cortisol level and ACTH which are low, currently his ACTH is 2.8 and cortisol 3.08 however these are both done in the face of ongoing prednisone that he takes with his Zytiga.  We will get him to endocrinology for further evaluation for possible adrenal insufficiency but will not interrupt his treatment in the interim.  He will need restaging scans again in April for a 3-month follow-up.  He will continue to follow with palliative care and pain management for his cancer related pain.  His PSA remains immeasurably low at  <0.1 on 2/8/22.    -2/15/2022 went to emergency room with weakness, decreased appetite, myalgias in the setting of known COVID-19 testing positive a few days prior to this visit.  Phosphorus had been low in the past but was normal in the emergency room with unremarkable CBC and CMP.  Chest x-ray unremarkable saturating well on room air mildly hypertensive with dry mucosa.  Given 500 cc crystalloid.  Covid test positive.  Mild thrombocytopenia 136,000 and otherwise unremarkable.  Discharged home.    -3/3/2022 data:  PSA less than 0.1  CMP unremarkable  Cortisol 3.96 normal  ACTH 2.8 lower limit of normal 7.2  Phosphorus normal 2.6    -3/8/2022 [facility] medical oncology follow-up: Suspect big chunk of his fatigue was Covid.  Another part of the fatigue likely related to lack of testosterone.  Not hypophosphatemic off of Zometa.  ACTH running low while on prednisone not surprising but with normal cortisol and I doubt adrenal insufficient which is why he is on prednisone to avoid such while taking Zytiga.  Overall doing fairly well and with immeasurably low PSA, I will have labs done on him early April, see my nurse practitioner early May, and she will order repeat bone scan and CTs the end of May and I will see him back in June.  We will continue to hold the Zometa unless bone lesions progress given symptomatic prior hypophosphatemia on Zometa.  He will keep his appointment April 28 with Dr. Mir Duffy just to be sure that my take on his pituitary/adrenal axis assessment is accurate.     Prostate cancer metastatic to bone (HCC)   8/30/2020 -  Other Event    PSA 10.8     9/15/2020 Initial Diagnosis    Prostate cancer metastatic to bone (CMS/HCC)     9/15/2020 Biopsy    Prostate needle core biopsy     9/24/2020 Imaging    Total body bone scan: 4 abnormal areas of increased activity consistent with osteoblastic metastasis, abnormal foci of activity seen in the T12 vertebral body, L1 vertebral body, roof of the left  acetabulum, and acetabulum medially on the right.  CT chest: Stable sclerotic metastatic lesions within the T12 and L1 vertebrae.  No other evidence of metastatic disease to the chest.  Stable mild splenomegaly and subcentimeter periaortic retroperitoneal lymph nodes.  CT abdomen and pelvis: Diffuse bladder wall thickening with mild perivesicular fat stranding.  Interval development of several sclerotic lesions in the spine and left iliac bone.     10/13/2020 Cancer Staged    Staging form: Bone - Appendicular Skeleton, Trunk, Skull, And Facial Bones, AJCC 8th Edition  - Clinical: Stage IVB (cT3, cN0, cM1b, G3) - Signed by Ishmael Rashid MD on 10/13/2020     11/3/2020 -  Chemotherapy    OP SUPPORTIVE Zoledronic Acid Q42d     11/3/2020 - 2/18/2021 Chemotherapy    OP PROSTATE DOCEtaxel       1/4/2021 Imaging    CT chest abdomen pelvis with contrast and whole-body bone scan shows improving less metabolically active bone metastases.  Stable sclerotic T12, L1, and L2 vertebral bodies and bilateral pelvic bones on bone windows with stable subcentimeter para-aortic lymph nodes and no definite progression in the chest abdomen or pelvis.  PSA down to 0.275 after 3 courses of Taxotere.     3/4/2021 Imaging    CT chest, abdomen and pelvis stable, no evidence of disease progression. Total body bone scan with decreased/near resolution of abnormal increased radiotracer uptake associated with the known sclerotic lesions in the thoracolumbar spine and bony pelvis.  No evidence of new osteoblastic metastases.     3/4/2021 Imaging    CT chest abdomen pelvis with contrast is negative save for stable sclerotic bone lesions and the bone scan shows near resolution of prior bony abnormalities associated with the known sclerotic lesions in the thoracolumbar spine and bony pelvis.  2/17/2021 PSA down to 0.1 from 1.37 October 2020.     3/9/2021 - 3/9/2021 Chemotherapy    OP SUPPORTIVE PROSTATE Relugolix     3/9/2021 -  Chemotherapy    OP  PROSTATE Abiraterone / PredniSONE       3/10/2021 -  Chemotherapy    OP PROSTATE Abiraterone / PredniSONE     8/10/2021 - 8/16/2021 Radiation    Radiation OncologyTreatment Course:  Naveen Lugo received 2000 cGy in 5 fractions to L4-sacrum, left acetabulum and right pelvis via External Beam Radiation - EBRT.     11/16/2021 -  Chemotherapy    OP CENTRAL VENOUS ACCESS DEVICE ACCESS, CARE, AND MAINTENANCE (CVAD)     1/5/2022 Imaging    -1/5/2022 CT chest abdomen pelvis with contrast [place] [facility] showing stable left periaortic adenopathy and unchanged sclerotic thoracic, lumbar spine and pelvis lesions with no new or progressive disease in the chest abdomen or pelvis.  Total body bone scan shows no significant change and no new suspicious foci.  Stable posterior right acetabulum and left superior acetabulum and degenerative changes in the cervical spine, shoulders, acromioclavicular joints, sternoclavicular joints, elbows, wrists, hands, thoracic spine, lumbosacral spine, sacroiliac joints, hips, knees, ankles, feet.         HISTORY OF PRESENT ILLNESS:  The patient is a 66 y.o. male, here for follow up on management of metastatic prostate cancer.  Mr. Lugo reports that he has been having increasing night sweats these past few weeks.  He had taken clonidine previously and that seemed to help.  He also reports more indigestion.  He takes omeprazole twice daily.  No new pain, he has chronic pain in the left hip and low back.  Following with pain management.  He saw Dr. Mir Duffy for evaluation of adrenal insufficiency on 4/28/2022.    Past Medical History:   Diagnosis Date   • Bone cancer (HCC)    • History of radiation therapy 08/16/2021    L4 through sacrum, left acetabulum, right pelvis   • Nephrolithiasis    • Opioid use disorder, mild, on maintenance therapy (HCC)    • Prostate cancer (HCC)      Past Surgical History:   Procedure Laterality Date   • CHOLECYSTECTOMY     • CORONARY STENT PLACEMENT  206 & 2011   •  "HERNIA REPAIR  1986   • THORACIC DISCECTOMY  1994       Allergies   Allergen Reactions   • Cymbalta [Duloxetine Hcl] Dizziness   • Gabapentin Nausea Only   • Lyrica [Pregabalin] Nausea And Vomiting and Dizziness       Family History and Social History reviewed and changed as necessary    REVIEW OF SYSTEM:   Positive for night sweats  Positive for indigestion    PHYSICAL EXAM:  Well-developed, well-nourished male in no distress  Respirations regular and unlabored, lungs clear    Vitals:    05/04/22 1024   BP: 117/71   Pulse: 80   Resp: 20   Temp: 97.3 °F (36.3 °C)   SpO2: 95%   Weight: 88.5 kg (195 lb)   Height: 175.3 cm (69\")     Vitals:    05/04/22 1024   PainSc:   6   PainLoc: Hip  Comment: bilateral hips and back     Naveen Lugo reports a pain score of 6.  Given his pain assessment as noted, treatment options were discussed and the following options were decided upon as a follow-up plan to address the patient's pain: continuation of current treatment plan for pain and managed by pain management.       Vitals reviewed.  Labs reviewed.  Labs from earlier today with CBC, WBC 5500, hemoglobin 14.5, hematocrit 43.6%, platelet count 125,000.  CMP sodium 144, potassium 4.2, chloride 105, BUN 7, creatinine 0.7, glucose 107, calcium 9.2, phosphorus 3.2, total bilirubin 0.3, AST 25, ALT 17, albumin 3.9, alkaline phosphatase 68.  PSA pending.  ACTH pending.  Cortisol 5.61.        ASSESSMENT & PLAN:  1.  Stage IVb grade group 4 metastatic prostate cancer with sclerotic bone lesions on CT and bone scan and history of prostatic hypertrophy  2.  Kidney stone  3.  Polyps  4.  Probable drug-induced hypophosphatemia from Zometa, drug stopped after 10/5/2021 dose  5.  Cancer related pain  6.  Fatigue  7.  Covid 2/2022  8.  Night sweats  9.  Indigestion    -9/30/2020 office note from Dr. Ronen Reynaga indicates patient with PSA 10.8.  Underwent TRUS prostate biopsy 9/15/2020.  Adenocarcinoma the prostate Sarika 4+4 = 8 in 1 out " of 12 cores and 4+3 = 7 and 10 out of 12 cores and 3+4 = 7 in 1 out of 12 cores.  Perineural invasion.  Extraprostatic extension into the fat seen on 2 biopsies of the right lateral mid and right apex.  9/24/2020 CT chest and whole-body bone scan showed T12, L1, left acetabular, right medial acetabular metastases with no lung metastases.  He started androgen deprivation therapy with Casodex with plans for Lupron to start in 1 to 2 weeks for clinical T3 N0 M1 metastatic prostate cancer.  Review of official report from [facility] 9/24/2020 bone scan mentions T12, L1, roof of left acetabulum and medial right acetabular osteoblastic metastases.  CT chest with contrast that same day showed mild splenomegaly 14.2 cm with stable periaortic nodes compared to CT December 2015 with no lung metastases.  Sclerotic abnormality within the T12 vertebral body, L1 vertebral body extending into the pedicle unchanged.  8/28/2020 CT abdomen pelvis report from [facility] reviewed and mentions normal spleen size.  Punctate nonobstructing kidney stone, no paulino enlargement, diffuse bladder wall thickening with mild perivesicular fat stranding, 2.1 cm sclerotic left iliac bone above the left acetabulum new compared to 2015 as well as 1.5 cm faintly sclerotic focus in the right posterior acetabulum stable compared to prior CT 2015 as well as a 1.6 cm T12 and inferior vertebral body sclerotic lesion and a 1.9 cm left posterior lateral L1 vertebral body abnormality extending to the pedicle.  -10/13/2020 initial [facility] consultation: With 1 Sarika score 8, 4+4 lesion that would make him a grade group 4 with stage IVb disease given radiographic evidence of sclerotic bone metastasis on bone scan and CT.  Needs genetic testing given metastatic prostate cancer and this is also important as, were he to have mismatch repair mutation then we would have options for PDL 1 inhibitors and were he BRCA mutated would have  options for PARP inhibitors in the future.  Systemic therapy for castration naïve prostate cancer or N1 disease should be with apalutamide or Abiraterone or enzalutamide all of which are category 1.  He does not have any visceral metastases.  According to his imaging he has T12, L1, left acetabular, right acetabular, and left iliac bony involvement.  That means he would have 4 or more bone metastases with at least one metastasis (i.e. the acetabular lesions bilaterally) beyond the pelvis/vertebral, for which this would be considered high-volume disease for which 6 cycles of docetaxel 75 mg/m² x 6 cycles followed by Zytiga and prednisone would be reasonable along with Zometa every 6 weeks for bone stabilization.  He will do chemo preparation visit with my nurse practitioner prior to start chemotherapy with Taxotere and Zometa in 2 weeks and he is already received Casodex in preparation for Lupron shot he received on 10/12/2020 with Dr. Reynaga.  We will get him to Dr. Alexander Gomez who has done his polypectomies in the past for port placement and we will get genetic counseling started.  Following the 6 courses of Taxotere per the Chaarted trial criteria, I would then give him an indefinite Zytiga and prednisone and Zometa until progression or toxicity dictates.    -12/16/2019 COVID-19 antigen test negative    -12/29/2020 PSA 0.275 with absolute neutrophil count 700 and creatinine 0.76 with normal liver enzymes and electrolytes.  Normal magnesium and phosphorus and urine drug screen positive for buprenorphine and opiates follow-up with palliative care.    -1/4/2021 CT chest abdomen pelvis with contrast and whole-body bone scan shows improving less metabolically active bone metastases.  Stable sclerotic T12, L1, and L2 vertebral bodies and bilateral pelvic bones on bone windows with stable subcentimeter para-aortic lymph nodes and no definite progression in the chest abdomen or pelvis.    -1/5/2021 [facility]  oncology follow-up visit: I reviewed the images and reports thereof of the above CT and bone scan which shows good response to Taxotere x3 courses with a PSA down to 0.275 from a baseline of 10.  Get another 3 courses of Taxotere, continue his Xgeva, and then following that we will switch to Zytiga and prednisone.  He is working with palliative care on his bone pain but on his current dose of fentanyl patch he says his pain is still not adequately controlled he will contact them.  Dexamethasone prescription was refilled.  We will see my nurse practitioner back in 3 weeks for course #5 of 6 total courses and then we will reimage with CT chest abdomen pelvis and bone scan before going to the Zytiga prednisone.    -3/4/2021 CT chest abdomen pelvis with contrast is negative save for stable sclerotic bone lesions and the bone scan shows near resolution of prior bony abnormalities associated with the known sclerotic lesions in the thoracolumbar spine and bony pelvis.    2/17/2021 PSA down to 0.1 from 1.37 October 2020.  3/9/2021 PSA 0.1    -5/26/2021 CT chest abdomen pelvis with contrast shows stable to slightly underlying increase low-density left para-aortic node.  Bone lesions stable.  Whole-body bone scan stable to decrease activity of known thoracolumbar and bony pelvic involvement.  PSA less than 0.1  , AST 74, bilirubin 0.5.       -6/1/2021 [facility] medical oncology follow-up visit: I reviewed the above data with him and images thereof.  Bones are stable.  No significant adenopathy.  PSA immeasurably low.     upper limit of normal 69 and AST 74 upper limit of normal 46.  Hence, neither is more than double the upper limit of normal with no ascites and no encephalopathy and normal albumin and bilirubin, despite the elevation of liver enzymes, he has child Abrams score A.  Package insert for Abiraterone says that for ALT and/or AST greater than 5 times upper limit of normal or total bilirubin greater than 3  times the upper limit of normal to withhold treatment until liver function tests returned to baseline or ALT and AST less than or equal to 2.5 times the upper limit of normal and total bilirubin less than or equal to 1.5 times the upper limit of normal and then reinitiate at 750 mg daily dose of Zytiga.  For recurrent hepatotoxicity on 750 mg daily Zytiga, withhold treatment until liver functions returned to baseline or ALT and AST less than 3-2.5 times upper limit of normal and total bilirubin less than or equal to 1.5 times the upper limit of normal then reinitiate at 500 mg daily Zytiga dose.  If has recurrent hepatotoxicity on 500 mg dose, then discontinue treatment.  If has ALT greater than 3 times the upper limit of normal along with total bilirubin greater than 2 times the upper limit of normal in the absence of other contributing causes or biliary obstruction, permanently discontinue Zytiga.  Based on these recommendations, I would continue current doses but we will watch with serial labs monthly and repeat his imaging again in 3 months but clinically he is doing wonderfully with excellent response to Taxotere and now on Zytiga prednisone plus Zometa along with Relugolix.    -6/23/2020 for [facility] follow-up: Having persistent and worsening pain above his left hip.  I will get an MRI of the left hip for further evaluation.  Otherwise we will continue therapy unchanged with Zytiga, prednisone and Zometa along with Relugolix.    -7/28/2021 [facility] follow-up: Patient with multiple somatic complaints including episodes of confusion, dizziness, decreased memory, agitation.  He reports ongoing episodes with difficulty swallowing.  Also most concerning for episodes of angina and chest pain for which he basically refused to seek emergency medical attention.  He has a history of coronary artery disease and stent placement.  He has not followed up with cardiology for many years.  I will  get an MRI of the brain in light of his confusion, dizziness and agitation.  I will get him to gastroenterology for EGD in light of his dysphagia.  I have also put in a referral for cardiology.  I reemphasized to the patient, his wife who is with him today and his son who was on the telephone during our visit the importance of seeking out prompt medical attention when he is having chest pain or neurological concerns so that he can be evaluated.  The patient states that he basically refuses to go to the emergency room and if his family calls an ambulance he would not agree to go to the hospital.  For the time being, I have asked him to hold his Zytiga and prednisone until we can sort this out as Zytiga does have some cardiovascular risk.  There is likely no treatment for prostate cancer that does not carry some form of cardiovascular risk.  His PSA remains low at 0.014 yesterday.  He will continue relugolix for now.    Of note, some of these symptoms could also be due to his hypophosphatemia, phosphate level from yesterday was 1.5, I have sent in a prescription for K-Phos 3 times a day before meals for 10 days.  We will hold further Zometa until this is corrected.  I have also put in an order to check his vitamin D level.  He takes calcium and vitamin D daily.  Some of his symptoms also could be due to drug withdrawal as there was some confusion and difficulty getting his last prescription for methadone therefore he was without methadone for several days, he now has his prescription and is back on his pain medication.  He has an appointment with neurosurgery tomorrow in light of his ongoing back pain, MRI of the hip recently showed multiple bony lesions largest at the L5 vertebral body and left supra-acetabular region.  If no neurosurgical intervention recommended he may benefit from spot radiation as this is where a lot of his pain is coming from.  His wife had questions today regarding his staging and extent of  "disease.  We reviewed previous scans.  I did clarify with her as she used the words \"cancer free\" as she thought that is what she was told after his CT scans once he completed Taxotere in February.  I explained to her that even though his scans showed he had an excellent response with no clear areas of metastasis that did not mean he was cancer free, I explained to her that when you have metastatic cancer the treatment intent is palliative in nature and to control the disease but not to cure the disease.  She stated understanding.  We will see him back in 2 weeks for follow-up to sort through this and make further plan of care, again, I have asked him to hold Zytiga and prednisone until he sees us back, he will continue on his other medications including relugolix.    -7/30/2021 neurosurgery PA consultation.  MRI with and without contrast L5 ordered.    -8/3/2021 neurosurgery follow-up showed known sclerotic disease with new L5 abnormality without compression deformity or instability.  MRI brain negative for metastasis.    -8/4/2021 office visit Dr. Fco Quintanilla radiation oncology coordinating with Dr. North neurosurgery for palliative radiation for MRI evidence of L5 and left supra acetabular painful lesions.  States that the patient's disease which is not secreting PSA is experiencing some progression and would benefit from 20 Gy in five fractions and other lesion 30 Gy in ten fractions. Patient offered to go to [place] for radiation but desired treatment in [place].    -8/10/2021 [facility] medical oncology follow-up visit: No chest pains at this junction.  Reviewed MRI brain and other MRIs reviewed by Dr. North and Dwight.  Due for EGD on 19th.  We will repeat his phosphorus along with his other labs continue Relugolix, Zytiga, prednisone, and he will continue radiation palliatively to the painful bony lesions with Dr. Quintanilla/Can.  He will see my nurse practitioner back in a few weeks to see how he is " tolerating this and to follow-up on the phosphorus off of Zometa and she will order repeat CT chest abdomen pelvis with contrast and total body bone scan the end of September.I will see him back after that.    -9/8/2021 Zometa resumed.  PSA <0.10    -10/5/2021 [facility] medical oncology follow-up visit: followed-up with radiation oncology 9/21/2021.  Status post symptomatic L5 radiation 5 fractions 20 [place] completed 8/16/2021 with improvement in right hip pain.  9/2/2021 PSA less than 0.1.  9/29/2021 total body bone scan shows increased uptake left iliac bone slightly superior group of the left acetabulum with no additional foci of suspicious abnormality is unlikely treatment related with no new sites of active osseous metastasis.  CT chest abdomen pelvis with contrast shows stable borderline enlarged left periaortic nodes and dating sclerotic bone lesions.Continue Zytiga, prednisone, Relugolix, Zometa, repeat CT chest abdomen pelvis with contrast and total body bone scan the end of the year.  We will follow PSA serially.    -11/17/2021 [facility] Oncology clinic follow-up:  Mr. Lugo overall is doing well.  He is tolerating therapy with Zytiga, prednisone and Relugolix along with Zometa which is given every 6 weeks.  PSA remains low at <0.1.  Has occasional low phosphorus, currently low at 1.7.  Serum calcium is normal at 8.9, normal creatinine 0.79 and normal CBC other than for platelets of 124.  I will hold Zometa for 1 month, I did send in a prescription for phosphorus replacement today.  He takes calcium and vitamin D.  I will see him back in 1 month for follow-up.  We will repeat restaging scans after that visit.    -12/15/2021 [facility] Oncology clinic follow-up: Mr. Lugo continues to do well on therapy with Zytiga, prednisone and Relugolix, his PSA remains low at <0.1.  He is continuing to have left lower back pain, he is working with palliative care and they have referred him also to pain management.  Pain  management has talked to him about putting in a pain pump however he is leery of this, I told him that this was a safe and effective pain management technique and to certainly give consideration to their recommendations.  His phosphorus is improving however is still a little low, I will hold off on Zometa until we see him back in 1 month, we may end up only giving it every 12 weeks.  We will repeat restaging scans with CT chest, abdomen and pelvis along with total body bone scan prior to return and I have ordered those today.      -1/5/2022 CT chest abdomen pelvis with contrast [facility] showing stable left periaortic adenopathy and unchanged sclerotic thoracic, lumbar spine and pelvis lesions with no new or progressive disease in the chest abdomen or pelvis.  Total body bone scan shows no significant change and no new suspicious foci.  Stable posterior right acetabulum and left superior acetabulum and degenerative changes in the cervical spine, shoulders, acromioclavicular joints, sternoclavicular joints, elbows, wrists, hands, thoracic spine, lumbosacral spine, sacroiliac joints, hips, knees, ankles, feet.    -1/11/2022 [facility] oncology hematology follow-up visit: I reviewed images and reports from his 1/5/2022 scans.  No active disease and PSA immmeasurably low on Zytiga, prednisone, Relugolix.  However, he has struggled with hypophosphatemia and is significantly fatigued with this.  Given that the bones are doing well and given that his phosphorus continues to remain consistently low at 2.2 in mid December, 1.7 mid November and 2.5 mid-September and as low as 1.5 back to July and the likelihood that this is related to his Zometa, given that his bones are stable overall, he will increase from 1200 mg up to 1600 mg of calcium and phosphate a day and we will hold his Zometa for the foreseeable future.  He will see my nurse practitioner back in a month to continue to monitor his phosphorus along  with his calcium and other labs and will repeat his scans again in 3 months.  In addition, given his fatigue we will check his ACTH and cortisol though he is taking his prednisone with his Zytiga.  Though his electrolytes are normal, I will keep an eye on the cortisol and ACTH and have these labs not only drawn today but again just prior to return to my nurse practitioner on February 10.    -2/10/2022 [facility] Oncology clinic follow-up: Mr. Lugo overall is stable, no change in his health this past month.  I have reviewed his labs from 2/8/2022 and they are unremarkable, his phosphorus is now normal at 3.2. We are continuing to hold his Zometa, he last received Zometa on 10/5/2021.  He continues therapy with Zytiga, prednisone and Relugolix.  Dr. Rashid had ordered cortisol level and ACTH which are low, currently his ACTH is 2.8 and cortisol 3.08 however these are both done in the face of ongoing prednisone that he takes with his Zytiga.  We will get him to endocrinology for further evaluation for possible adrenal insufficiency but will not interrupt his treatment in the interim.  He will need restaging scans again in April for a 3-month follow-up.  He will continue to follow with palliative care and pain management for his cancer related pain.  His PSA remains immeasurably low at <0.1 on 2/8/22.    -2/15/2022 went to emergency room with weakness, decreased appetite, myalgias in the setting of known COVID-19 testing positive a few days prior to this visit.  Phosphorus had been low in the past but was normal in the emergency room with unremarkable CBC and CMP.  Chest x-ray unremarkable saturating well on room air mildly hypertensive with dry mucosa.  Given 500 cc crystalloid.  Covid test positive.  Mild thrombocytopenia 136,000 and otherwise unremarkable.  Discharged home.    -3/3/2022 data:  PSA less than 0.1  CMP unremarkable  Cortisol 3.96 normal  ACTH 2.8 lower limit of normal 7.2  Phosphorus normal 2.6    -3/8/2022  [facility] follow-up: Suspect big chunk of his fatigue was Covid.  Another part of the fatigue likely related to lack of testosterone.  Not hypophosphatemic off of Zometa.  ACTH running low while on prednisone not surprising but with normal cortisol and I doubt adrenal insufficient which is why he is on prednisone to avoid such while taking Zytiga.  Overall doing fairly well and with immeasurably low PSA, I will have labs done on him early April, see my nurse practitioner early May, and she will order repeat bone scan and CTs the end of May and I will see him back in June.  We will continue to hold the Zometa unless bone lesions progress given symptomatic prior hypophosphatemia on Zometa.  He will keep his appointment April 28 with Dr. Mir Duffy just to be sure that my take on his pituitary/adrenal axis assessment is accurate.    -5/4/2022 [facility] clinic follow-up: Mr. Lugo continues on therapy with Zytiga and prednisone along with Relugolix.  His Zometa has been on hold due to previous hypophosphatemia.  There is some concern about potential adrenal insufficiency. He saw endocrinology, Dr. Mir Duffy on 4/28/2022.  I did review his office note and he stated that the low ACTH and low serum cortisol would be typical for Mr. Lugo's situation.  He further stated that the adequacy of prednisone dose cannot be assessed by measuring ACTH or cortisol but is rather assessed by the overall just altered how the patient is feeling-weight change, blood pressure, blood sugar, potassium, overall sense of wellbeing.  His potassium had been running a little low therefore they put him on potassium 10 mill equivalents daily. Of note, I do not see a future follow-up scheduled with endocrinology.  He is having night sweats, I am not sure if this is related.  His glucose was normal this morning at 107.  His weight is stable.  He will continue current treatment for his prostate cancer unchanged, we will continue  to hold his Zometa.  Current phosphorus and magnesium are normal.  CBC and CMP from today are unremarkable, cortisol is normal.  PSA and ACTH are pending  In regards to his night sweats, he had taken clonidine previously 0.1 mg twice daily as needed, I did send in a short supply again to try and see if this helps.  He also is having indigestion despite being on omeprazole twice daily, I sent a prescription for Pepcid.  He had an EGD August 2021.  We will repeat restaging scans prior to return and I have ordered those today.     Return to clinic in 1 month for follow-up    This was a level 4 moderate MDM visit with management of side effects of therapy, drug therapy requiring intensive monitoring for toxicity, review of outside MD note, review of labs and ordering of restaging scans.    Abena Velasquez, APRN    05/04/2022

## 2022-05-04 NOTE — PROGRESS NOTES
Specialty Pharmacy Refill Coordination Note     Naveen is a 66 y.o. male contacted today regarding refills of abiraterone 1000mg PO QD,   relugolix 120mg PO QD and prednisone 5mg PO QD specialty medication(s).        Specialty medication(s) and dose(s) confirmed: yes    Refill Questions    Flowsheet Row Most Recent Value   Changes to allergies? No   Changes to medications? No   New conditions since last clinic visit No   Unplanned office visit, urgent care, ED, or hospital admission in the last 4 weeks  No   How does patient/caregiver feel medication is working? Good   Financial problems or insurance changes  No   If yes, describe changes in insurance or financial issues. n/a   How many doses of your specialty medications were missed in the last 4 weeks? 0   Why were doses missed? n/a   Does this patient require a clinical escalation to a pharmacist? No          Delivery Questions    Flowsheet Row Most Recent Value   Delivery method [employer]   Delivery address correct? Yes   Delivery phone number [phone]   Preferred delivery time? Anytime   Number of medications in delivery 2   Medication being filled and delivered orgovyx and prednisone   Doses left of specialty medications 15 days left   Is there any medication that is due not being filled? No   Supplies needed? No supplies needed   Cooler needed? No   Do any medications need mixed or dated? No   Copay form of payment Credit card on file   Additional comments $6.31   Questions or concerns for the pharmacist? No   Explain any questions or concerns for the pharmacist n/a   Are any medications first time fills? No            Medication Adherence    Adherence tools used: watch   Other adherence tool: Clock - takes at same time each day, 7:45am   Support network for adherence: family member          Follow-up: 28 day(s)     Sushma Muro, Pharmacy Technician  Specialty Pharmacy Technician

## 2022-05-25 ENCOUNTER — INFUSION (OUTPATIENT)
Dept: ONCOLOGY | Facility: HOSPITAL | Age: 67
End: 2022-05-25

## 2022-05-25 VITALS
WEIGHT: 196.2 LBS | HEART RATE: 88 BPM | DIASTOLIC BLOOD PRESSURE: 88 MMHG | SYSTOLIC BLOOD PRESSURE: 122 MMHG | TEMPERATURE: 98 F | BODY MASS INDEX: 28.97 KG/M2 | RESPIRATION RATE: 18 BRPM

## 2022-05-25 DIAGNOSIS — C79.51 PROSTATE CANCER METASTATIC TO BONE: ICD-10-CM

## 2022-05-25 DIAGNOSIS — C61 PROSTATE CANCER METASTATIC TO BONE: ICD-10-CM

## 2022-05-25 DIAGNOSIS — Z45.2 ENCOUNTER FOR CARE RELATED TO PORT-A-CATH: Primary | ICD-10-CM

## 2022-05-25 PROCEDURE — 25010000002 HEPARIN LOCK FLUSH PER 10 UNITS: Performed by: INTERNAL MEDICINE

## 2022-05-25 PROCEDURE — 36591 DRAW BLOOD OFF VENOUS DEVICE: CPT

## 2022-05-25 RX ORDER — SODIUM CHLORIDE 0.9 % (FLUSH) 0.9 %
10 SYRINGE (ML) INJECTION AS NEEDED
Status: CANCELLED | OUTPATIENT
Start: 2022-05-25

## 2022-05-25 RX ORDER — HEPARIN SODIUM (PORCINE) LOCK FLUSH IV SOLN 100 UNIT/ML 100 UNIT/ML
500 SOLUTION INTRAVENOUS AS NEEDED
Status: DISCONTINUED | OUTPATIENT
Start: 2022-05-25 | End: 2022-05-25 | Stop reason: HOSPADM

## 2022-05-25 RX ORDER — HEPARIN SODIUM (PORCINE) LOCK FLUSH IV SOLN 100 UNIT/ML 100 UNIT/ML
500 SOLUTION INTRAVENOUS AS NEEDED
Status: CANCELLED | OUTPATIENT
Start: 2022-05-25

## 2022-05-25 RX ADMIN — Medication 500 UNITS: at 09:20

## 2022-05-26 LAB
ALBUMIN SERPL-MCNC: 3.9 G/DL (ref 3.8–4.8)
ALBUMIN/GLOB SERPL: 2 {RATIO} (ref 1.2–2.2)
ALP SERPL-CCNC: 85 IU/L (ref 44–121)
ALT SERPL-CCNC: 15 IU/L (ref 0–44)
AST SERPL-CCNC: 16 IU/L (ref 0–40)
BASOPHILS # BLD AUTO: 0 X10E3/UL (ref 0–0.2)
BASOPHILS NFR BLD AUTO: 1 %
BILIRUB SERPL-MCNC: 0.3 MG/DL (ref 0–1.2)
BUN SERPL-MCNC: 6 MG/DL (ref 8–27)
BUN/CREAT SERPL: 8 (ref 10–24)
CALCIUM SERPL-MCNC: 8.9 MG/DL (ref 8.6–10.2)
CHLORIDE SERPL-SCNC: 104 MMOL/L (ref 96–106)
CO2 SERPL-SCNC: 24 MMOL/L (ref 20–29)
CORTIS AM PEAK SERPL-MCNC: 1.2 UG/DL (ref 6.2–19.4)
CREAT SERPL-MCNC: 0.78 MG/DL (ref 0.76–1.27)
EGFRCR SERPLBLD CKD-EPI 2021: 98 ML/MIN/1.73
EOSINOPHIL # BLD AUTO: 0.1 X10E3/UL (ref 0–0.4)
EOSINOPHIL NFR BLD AUTO: 2 %
ERYTHROCYTE [DISTWIDTH] IN BLOOD BY AUTOMATED COUNT: 12.6 % (ref 11.6–15.4)
GLOBULIN SER CALC-MCNC: 2 G/DL (ref 1.5–4.5)
GLUCOSE SERPL-MCNC: 111 MG/DL (ref 65–99)
HCT VFR BLD AUTO: 41.7 % (ref 37.5–51)
HGB BLD-MCNC: 14 G/DL (ref 13–17.7)
IMM GRANULOCYTES # BLD AUTO: 0 X10E3/UL (ref 0–0.1)
IMM GRANULOCYTES NFR BLD AUTO: 0 %
LYMPHOCYTES # BLD AUTO: 1 X10E3/UL (ref 0.7–3.1)
LYMPHOCYTES NFR BLD AUTO: 15 %
MCH RBC QN AUTO: 30.2 PG (ref 26.6–33)
MCHC RBC AUTO-ENTMCNC: 33.6 G/DL (ref 31.5–35.7)
MCV RBC AUTO: 90 FL (ref 79–97)
MONOCYTES # BLD AUTO: 0.4 X10E3/UL (ref 0.1–0.9)
MONOCYTES NFR BLD AUTO: 6 %
NEUTROPHILS # BLD AUTO: 5.2 X10E3/UL (ref 1.4–7)
NEUTROPHILS NFR BLD AUTO: 76 %
PHOSPHATE SERPL-MCNC: 2.3 MG/DL (ref 2.8–4.1)
PLATELET # BLD AUTO: 130 X10E3/UL (ref 150–450)
POTASSIUM SERPL-SCNC: 3.6 MMOL/L (ref 3.5–5.2)
PROT SERPL-MCNC: 5.9 G/DL (ref 6–8.5)
PSA SERPL-MCNC: <0.1 NG/ML (ref 0–4)
RBC # BLD AUTO: 4.64 X10E6/UL (ref 4.14–5.8)
SODIUM SERPL-SCNC: 143 MMOL/L (ref 134–144)
WBC # BLD AUTO: 6.8 X10E3/UL (ref 3.4–10.8)

## 2022-05-31 ENCOUNTER — TELEPHONE (OUTPATIENT)
Dept: ONCOLOGY | Facility: CLINIC | Age: 67
End: 2022-05-31

## 2022-05-31 ENCOUNTER — OFFICE VISIT (OUTPATIENT)
Dept: ONCOLOGY | Facility: CLINIC | Age: 67
End: 2022-05-31

## 2022-05-31 ENCOUNTER — SPECIALTY PHARMACY (OUTPATIENT)
Dept: ONCOLOGY | Facility: HOSPITAL | Age: 67
End: 2022-05-31

## 2022-05-31 VITALS
HEART RATE: 81 BPM | RESPIRATION RATE: 18 BRPM | TEMPERATURE: 98 F | SYSTOLIC BLOOD PRESSURE: 142 MMHG | HEIGHT: 69 IN | WEIGHT: 193 LBS | DIASTOLIC BLOOD PRESSURE: 71 MMHG | BODY MASS INDEX: 28.58 KG/M2 | OXYGEN SATURATION: 93 %

## 2022-05-31 DIAGNOSIS — C79.51 PROSTATE CANCER METASTATIC TO BONE: Primary | ICD-10-CM

## 2022-05-31 DIAGNOSIS — C61 PROSTATE CANCER METASTATIC TO BONE: Primary | ICD-10-CM

## 2022-05-31 DIAGNOSIS — D69.6 PLATELETS DECREASED: ICD-10-CM

## 2022-05-31 DIAGNOSIS — F11.99 OPIOID USE, UNSPECIFIED WITH UNSPECIFIED OPIOID-INDUCED DISORDER: ICD-10-CM

## 2022-05-31 PROCEDURE — 99215 OFFICE O/P EST HI 40 MIN: CPT | Performed by: INTERNAL MEDICINE

## 2022-05-31 RX ORDER — POTASSIUM PHOSPHATE, MONOBASIC 500 MG/1
500 TABLET, SOLUBLE ORAL 2 TIMES DAILY
Qty: 120 TABLET | Refills: 11 | Status: SHIPPED | OUTPATIENT
Start: 2022-05-31 | End: 2022-05-31

## 2022-05-31 RX ORDER — POTASSIUM PHOSPHATE, MONOBASIC 500 MG/1
TABLET, SOLUBLE ORAL
Qty: 120 TABLET | Refills: 11 | Status: SHIPPED | OUTPATIENT
Start: 2022-05-31

## 2022-05-31 NOTE — PROGRESS NOTES
CHIEF COMPLAINT: Follow-up prostate cancer metastasis with drug-induced hypophosphatemia and possible drug-induced adrenal insufficiency    Problem List:  Oncology/Hematology History Overview Note   1.  Stage IVb grade group 4 metastatic prostate cancer with sclerotic bone lesions on CT and bone scan and history of prostatic hypertrophy  2.  Kidney stone  3.  Polyps  4.  Probable drug-induced hypophosphatemia from Zometa, drug stopped after 10/5/2021 dose  5.  Cancer related pain  6.  Fatigue  7.  Covid 2/2022    -9/30/2020 office note from Dr. Ronen Reynaga indicates patient with PSA 10.8.  Underwent TRUS prostate biopsy 9/15/2020.  Adenocarcinoma the prostate Sarika 4+4 = 8 in 1 out of 12 cores and 4+3 = 7 and 10 out of 12 cores and 3+4 = 7 in 1 out of 12 cores.  Perineural invasion.  Extraprostatic extension into the fat seen on 2 biopsies of the right lateral mid and right apex.  9/24/2020 CT chest and whole-body bone scan showed T12, L1, left acetabular, right medial acetabular metastases with no lung metastases.  He started androgen deprivation therapy with Casodex with plans for Lupron to start in 1 to 2 weeks for clinical T3 N0 M1 metastatic prostate cancer.  Review of official report from [facility] 9/24/2020 bone scan mentions T12, L1, roof of left acetabulum and medial right acetabular osteoblastic metastases.  CT chest with contrast that same day showed mild splenomegaly 14.2 cm with stable periaortic nodes compared to CT December 2015 with no lung metastases.  Sclerotic abnormality within the T12 vertebral body, L1 vertebral body extending into the pedicle unchanged.  8/28/2020 CT abdomen pelvis report from [facility] reviewed and mentions normal spleen size.  Punctate nonobstructing kidney stone, no paulino enlargement, diffuse bladder wall thickening with mild perivesicular fat stranding, 2.1 cm sclerotic left iliac bone above the left acetabulum new compared to 2015 as well as 1.5  cm faintly sclerotic focus in the right posterior acetabulum stable compared to prior CT 2015 as well as a 1.6 cm T12 and inferior vertebral body sclerotic lesion and a 1.9 cm left posterior lateral L1 vertebral body abnormality extending to the pedicle.  -10/13/2020 initial [facility] medical oncology consultation: With 1 Pablo score 8, 4+4 lesion that would make him a grade group 4 with stage IVb disease given radiographic evidence of sclerotic bone metastasis on bone scan and CT.  Needs genetic testing given metastatic prostate cancer and this is also important as, were he to have mismatch repair mutation then we would have options for PDL 1 inhibitors and were he BRCA mutated would have options for PARP inhibitors in the future.  Systemic therapy for castration naïve prostate cancer or N1 disease should be with apalutamide or Abiraterone or enzalutamide all of which are category 1.  He does not have any visceral metastases.  According to his imaging he has T12, L1, left acetabular, right acetabular, and left iliac bony involvement.  That means he would have 4 or more bone metastases with at least one metastasis (i.e. the acetabular lesions bilaterally) beyond the pelvis/vertebral, for which this would be considered high-volume disease for which 6 cycles of docetaxel 75 mg/m² x 6 cycles followed by Zytiga and prednisone would be reasonable along with Zometa every 6 weeks for bone stabilization.  He will do chemo preparation visit with my nurse practitioner prior to start chemotherapy with Taxotere and Zometa in 2 weeks and he is already received Casodex in preparation for Lupron shot he received on 10/12/2020 with Dr. Reynaga.  We will get him to Dr. Alexander Gomez who has done his polypectomies in the past for port placement and we will get genetic counseling started.  Following the 6 courses of Taxotere per the Chaarted trial criteria, I would then give him an indefinite Zytiga and prednisone and Zometa until  progression or toxicity dictates.    -12/16/2019 COVID-19 antigen test negative    -12/29/2020 PSA 0.275 with absolute neutrophil count 700 and creatinine 0.76 with normal liver enzymes and electrolytes.  Normal magnesium and phosphorus and urine drug screen positive for buprenorphine and opiates follow-up with palliative care.    -1/4/2021 CT chest abdomen pelvis with contrast and whole-body bone scan shows improving less metabolically active bone metastases.  Stable sclerotic T12, L1, and L2 vertebral bodies and bilateral pelvic bones on bone windows with stable subcentimeter para-aortic lymph nodes and no definite progression in the chest abdomen or pelvis.    -1/5/2021 [facility] medical oncology follow-up visit: I reviewed the images and reports thereof of the above CT and bone scan which shows good response to Taxotere x3 courses with a PSA down to 0.275 from a baseline of 10.  Get another 3 courses of Taxotere, continue his Xgeva, and then following that we will switch to Zytiga and prednisone.  He is working with palliative care on his bone pain but on his current dose of fentanyl patch he says his pain is still not adequately controlled he will contact them.  Dexamethasone prescription was refilled.  We will see my nurse practitioner back in 3 weeks for course #5 of 6 total courses and then we will reimage with CT chest abdomen pelvis and bone scan before going to the Zytiga prednisone.    -3/4/2021 CT chest abdomen pelvis with contrast is negative save for stable sclerotic bone lesions and the bone scan shows near resolution of prior bony abnormalities associated with the known sclerotic lesions in the thoracolumbar spine and bony pelvis.    2/17/2021 PSA down to 0.1 from 1.37 October 2020.  3/9/2021 PSA 0.1    -5/26/2021 CT chest abdomen pelvis with contrast shows stable to slightly underlying increase low-density left para-aortic node.  Bone lesions stable.  Whole-body bone scan stable to decrease activity  of known thoracolumbar and bony pelvic involvement.  PSA less than 0.1  , AST 74, bilirubin 0.5.       -6/1/2021 [facility] medical oncology follow-up visit: I reviewed the above data with him and images thereof.  Bones are stable.  No significant adenopathy.  PSA immeasurably low.     upper limit of normal 69 and AST 74 upper limit of normal 46.  Hence, neither is more than double the upper limit of normal with no ascites and no encephalopathy and normal albumin and bilirubin, despite the elevation of liver enzymes, he has child Abrams score A.  Package insert for Abiraterone says that for ALT and/or AST greater than 5 times upper limit of normal or total bilirubin greater than 3 times the upper limit of normal to withhold treatment until liver function tests returned to baseline or ALT and AST less than or equal to 2.5 times the upper limit of normal and total bilirubin less than or equal to 1.5 times the upper limit of normal and then reinitiate at 750 mg daily dose of Zytiga.  For recurrent hepatotoxicity on 750 mg daily Zytiga, withhold treatment until liver functions returned to baseline or ALT and AST less than 3-2.5 times upper limit of normal and total bilirubin less than or equal to 1.5 times the upper limit of normal then reinitiate at 500 mg daily Zytiga dose.  If has recurrent hepatotoxicity on 500 mg dose, then discontinue treatment.  If has ALT greater than 3 times the upper limit of normal along with total bilirubin greater than 2 times the upper limit of normal in the absence of other contributing causes or biliary obstruction, permanently discontinue Zytiga.  Based on these recommendations, I would continue current doses but we will watch with serial labs monthly and repeat his imaging again in 3 months but clinically he is doing wonderfully with excellent response to Taxotere and now on Zytiga prednisone plus Zometa along with Relugolix.    -6/23/2020 for [facility] oncology clinic  follow-up: Having persistent and worsening pain above his left hip.  I will get an MRI of the left hip for further evaluation.  Otherwise we will continue therapy unchanged with Zytiga, prednisone and Zometa along with Relugolix.    -7/28/2021 [facility] oncology clinic follow-up: Patient with multiple somatic complaints including episodes of confusion, dizziness, decreased memory, agitation.  He reports ongoing episodes with difficulty swallowing.  Also most concerning for episodes of angina and chest pain for which he basically refused to seek emergency medical attention.  He has a history of coronary artery disease and stent placement.  He has not followed up with cardiology for many years.  I will get an MRI of the brain in light of his confusion, dizziness and agitation.  I will get him to gastroenterology for EGD in light of his dysphagia.  I have also put in a referral for cardiology.  I reemphasized to the patient, his wife who is with him today and his son who was on the telephone during our visit the importance of seeking out prompt medical attention when he is having chest pain or neurological concerns so that he can be evaluated.  The patient states that he basically refuses to go to the emergency room and if his family calls an ambulance he would not agree to go to the hospital.  For the time being, I have asked him to hold his Zytiga and prednisone until we can sort this out as Zytiga does have some cardiovascular risk.  There is likely no treatment for prostate cancer that does not carry some form of cardiovascular risk.  His PSA remains low at 0.014 yesterday.  He will continue relugolix for now.    Of note, some of these symptoms could also be due to his hypophosphatemia, phosphate level from yesterday was 1.5, I have sent in a prescription for K-Phos 3 times a day before meals for 10 days.  We will hold further Zometa until this is corrected.  I have also put in an order to check his vitamin D level.   "He takes calcium and vitamin D daily.  Some of his symptoms also could be due to drug withdrawal as there was some confusion and difficulty getting his last prescription for methadone therefore he was without methadone for several days, he now has his prescription and is back on his pain medication.  He has an appointment with neurosurgery tomorrow in light of his ongoing back pain, MRI of the hip recently showed multiple bony lesions largest at the L5 vertebral body and left supra-acetabular region.  If no neurosurgical intervention recommended he may benefit from spot radiation as this is where a lot of his pain is coming from.  His wife had questions today regarding his staging and extent of disease.  We reviewed previous scans.  I did clarify with her as she used the words \"cancer free\" as she thought that is what she was told after his CT scans once he completed Taxotere in February.  I explained to her that even though his scans showed he had an excellent response with no clear areas of metastasis that did not mean he was cancer free, I explained to her that when you have metastatic cancer the treatment intent is palliative in nature and to control the disease but not to cure the disease.  She stated understanding.  We will see him back in 2 weeks for follow-up to sort through this and make further plan of care, again, I have asked him to hold Zytiga and prednisone until he sees us back, he will continue on his other medications including relugolix.    -7/30/2021 neurosurgery PA consultation.  MRI with and without contrast L5 ordered.    -8/3/2021 neurosurgery follow-up showed known sclerotic disease with new L5 abnormality without compression deformity or instability.  MRI brain negative for metastasis.    -8/4/2021 office visit Dr. Fco Quintanilla radiation oncology coordinating with Dr. Can forde for palliative radiation for MRI evidence of L5 and left supra acetabular painful lesions.  States that the " patient's disease which is not secreting PSA is experiencing some progression and would benefit from 20 Gy in five fractions and other lesion 30 Gy in ten fractions. Patient offered to go to [place] for radiation but desired treatment in [place].    -8/10/2021 [facility] medical oncology follow-up visit: No chest pains at this junction.  Reviewed MRI brain and other MRIs reviewed by Dr. North and Dwight.  Due for EGD on 19th.  We will repeat his phosphorus along with his other labs continue Relugolix, Zytiga, prednisone, and he will continue radiation palliatively to the painful bony lesions with Dr. Quintanilla/Can.  He will see my nurse practitioner back in a few weeks to see how he is tolerating this and to follow-up on the phosphorus off of Zometa and she will order repeat CT chest abdomen pelvis with contrast and total body bone scan the end of September.I will see him back after that.    -9/8/2021 Zometa resumed.  PSA <0.10    -10/5/2021 [facility] medical oncology follow-up visit: followed-up with radiation oncology 9/21/2021.  Status post symptomatic L5 radiation 5 fractions 20 Maxwell completed 8/16/2021 with improvement in right hip pain.  9/2/2021 PSA less than 0.1.  9/29/2021 total body bone scan shows increased uptake left iliac bone slightly superior group of the left acetabulum with no additional foci of suspicious abnormality is unlikely treatment related with no new sites of active osseous metastasis.  CT chest abdomen pelvis with contrast shows stable borderline enlarged left periaortic nodes and dating sclerotic bone lesions.Continue Zytiga, prednisone, Relugolix, Zometa, repeat CT chest abdomen pelvis with contrast and total body bone scan the end of the year.  We will follow PSA serially.    -11/17/2021 [facility] follow-up:  Mr. Lugo overall is doing well.  He is tolerating therapy with Zytiga, prednisone and Relugolix along with Zometa which is given every 6 weeks.  PSA remains low at  <0.1.  Has occasional low phosphorus, currently low at 1.7.  Serum calcium is normal at 8.9, normal creatinine 0.79 and normal CBC other than for platelets of 124.  I will hold Zometa for 1 month, I did send in a prescription for phosphorus replacement today.  He takes calcium and vitamin D.  I will see him back in 1 month for follow-up.  We will repeat restaging scans after that visit.    -12/15/2021 [facility] Oncology clinic follow-up: Mr. Lugo continues to do well on therapy with Zytiga, prednisone and Relugolix, his PSA remains low at <0.1.  He is continuing to have left lower back pain, he is working with palliative care and they have referred him also to pain management.  Pain management has talked to him about putting in a pain pump however he is leery of this, I told him that this was a safe and effective pain management technique and to certainly give consideration to their recommendations.  His phosphorus is improving however is still a little low, I will hold off on Zometa until we see him back in 1 month, we may end up only giving it every 12 weeks.  We will repeat restaging scans with CT chest, abdomen and pelvis along with total body bone scan prior to return and I have ordered those today.      -1/5/2022 CT chest abdomen pelvis with contrast [facility] showing stable left periaortic adenopathy and unchanged sclerotic thoracic, lumbar spine and pelvis lesions with no new or progressive disease in the chest abdomen or pelvis.  Total body bone scan shows no significant change and no new suspicious foci.  Stable posterior right acetabulum and left superior acetabulum and degenerative changes in the cervical spine, shoulders, acromioclavicular joints, sternoclavicular joints, elbows, wrists, hands, thoracic spine, lumbosacral spine, sacroiliac joints, hips, knees, ankles, feet.    -1/11/2022 [facility] medical oncology hematology follow-up visit: I reviewed images and reports from his 1/5/2022 scans.   No active disease and PSA immmeasurably low on Zytiga, prednisone, Relugolix.  However, he has struggled with hypophosphatemia and is significantly fatigued with this.  Given that the bones are doing well and given that his phosphorus continues to remain consistently low at 2.2 in mid December, 1.7 mid November and 2.5 mid-September and as low as 1.5 back to July and the likelihood that this is related to his Zometa, given that his bones are stable overall, he will increase from 1200 mg up to 1600 mg of calcium and phosphate a day and we will hold his Zometa for the foreseeable future.  He will see my nurse practitioner back in a month to continue to monitor his phosphorus along with his calcium and other labs and will repeat his scans again in 3 months.  In addition, given his fatigue we will check his ACTH and cortisol though he is taking his prednisone with his Zytiga.  Though his electrolytes are normal, I will keep an eye on the cortisol and ACTH and have these labs not only drawn today but again just prior to return to my nurse practitioner on February 10.    -2/10/2022 [facility] follow-up: Mr. Lugo overall is stable, no change in his health this past month.  I have reviewed his labs from 2/8/2022 and they are unremarkable, his phosphorus is now normal at 3.2. We are continuing to hold his Zometa, he last received Zometa on 10/5/2021.  He continues therapy with Zytiga, prednisone and Relugolix.  Dr. Rashid had ordered cortisol level and ACTH which are low, currently his ACTH is 2.8 and cortisol 3.08 however these are both done in the face of ongoing prednisone that he takes with his Zytiga.  We will get him to endocrinology for further evaluation for possible adrenal insufficiency but will not interrupt his treatment in the interim.  He will need restaging scans again in April for a 3-month follow-up.  He will continue to follow with palliative care and pain management for his cancer related pain.   His PSA remains immeasurably low at <0.1 on 2/8/22.    -2/15/2022 went to emergency room with weakness, decreased appetite, myalgias in the setting of known COVID-19 testing positive a few days prior to this visit.  Phosphorus had been low in the past but was normal in the emergency room with unremarkable CBC and CMP.  Chest x-ray unremarkable saturating well on room air mildly hypertensive with dry mucosa.  Given 500 cc crystalloid.  Covid test positive.  Mild thrombocytopenia 136,000 and otherwise unremarkable.  Discharged home.    -3/3/2022 data:  PSA less than 0.1  CMP unremarkable  Cortisol 3.96 normal  ACTH 2.8 lower limit of normal 7.2  Phosphorus normal 2.6    -3/8/2022 [facility] medical oncology follow-up: Suspect big chunk of his fatigue was Covid.  Another part of the fatigue likely related to lack of testosterone.  Not hypophosphatemic off of Zometa.  ACTH running low while on prednisone not surprising but with normal cortisol and I doubt adrenal insufficient which is why he is on prednisone to avoid such while taking Zytiga.  Overall doing fairly well and with immeasurably low PSA, I will have labs done on him early April, see my nurse practitioner early May, and she will order repeat bone scan and CTs the end of May and I will see him back in June.  We will continue to hold the Zometa unless bone lesions progress given symptomatic prior hypophosphatemia on Zometa.  He will keep his appointment April 28 with Dr. Mir Duffy just to be sure that my take on his pituitary/adrenal axis assessment is accurate.    -4/28/2022 endocrinology consult Dr. Mir Duffy.  With low ACTH and cortisol on prednisone with Zytiga, the adequacy of prednisone cannot be assessed by measuring ACTH or cortisol but is assessed by weight changes, blood pressure, blood sugar, potassium, overall sense of how the patient is doing.  Primary adrenal insufficiency with low ACTH and low cortisol would be typical for the situation as his  blood sugar tends to run a little high and potassium a little low, he did not think increasing prednisone was necessary.  He put him on some potassium.  -5/4/2022 [facility] Oncology clinic follow-up: Mr. Lugo continues on therapy with Zytiga and prednisone along with Relugolix.  His Zometa has been on hold due to previous hypophosphatemia.  There is some concern about potential adrenal insufficiency. He saw endocrinology, Dr. Mir Duffy on 4/28/2022.  I did review his office note and he stated that the low ACTH and low serum cortisol would be typical for Mr. Lugo's situation.  He further stated that the adequacy of prednisone dose cannot be assessed by measuring ACTH or cortisol but is rather assessed by the overall just altered how the patient is feeling-weight change, blood pressure, blood sugar, potassium, overall sense of wellbeing.  His potassium had been running a little low therefore they put him on potassium 10 mill equivalents daily. Of note, I do not see a future follow-up scheduled with endocrinology.  He is having night sweats, I am not sure if this is related.  His glucose was normal this morning at 107.  His weight is stable.  He will continue current treatment for his prostate cancer unchanged, we will continue to hold his Zometa.  Current phosphorus and magnesium are normal.  CBC and CMP from today are unremarkable, cortisol is normal.  PSA and ACTH are pending  In regards to his night sweats, he had taken clonidine previously 0.1 mg twice daily as needed, I did send in a short supply again to try and see if this helps.  He also is having indigestion despite being on omeprazole twice daily, I sent a prescription for Pepcid.  He had an EGD August 2021.  We will repeat restaging scans prior to return and I have ordered those today.     -5/24/2022 CT chest abdomen pelvis with contrast[place] negative.  Bone scan shows stable bone metastases.    - 5/25/2022 PSA less than 0.1, platelets 130,000, otherwise  unremarkable CBC and CMP.  A.m. cortisol running low 1.2 with phosphorus low 2.3.    -5/31/2022 [facility] medical oncology follow-up: On Zytiga, prednisone, Relugolix, PSA remaining immeasurably low with stable bone scan and no visceral/paulino metastases.  Phosphorus still running low.  I have discontinued his potassium chloride and started potassium phosphate 500 mg 4 times a day.  Unless PSA rising, we will plan on repeating CTs and bone scan in 6 months and will follow with my nurse practitioner in the interim and if symptoms dictate or labs, will get back to Dr. Duffy for management of potential adrenal insufficiency but presently we will just see how he does with potassium phosphate and hopeful improvement in the phosphorus level with time.  We will continue to follow with palliative care for pain management       Prostate cancer metastatic to bone (HCC)   8/30/2020 -  Other Event    PSA 10.8     9/15/2020 Initial Diagnosis    Prostate cancer metastatic to bone (CMS/HCC)     9/15/2020 Biopsy    Prostate needle core biopsy     9/24/2020 Imaging    Total body bone scan: 4 abnormal areas of increased activity consistent with osteoblastic metastasis, abnormal foci of activity seen in the T12 vertebral body, L1 vertebral body, roof of the left acetabulum, and acetabulum medially on the right.  CT chest: Stable sclerotic metastatic lesions within the T12 and L1 vertebrae.  No other evidence of metastatic disease to the chest.  Stable mild splenomegaly and subcentimeter periaortic retroperitoneal lymph nodes.  CT abdomen and pelvis: Diffuse bladder wall thickening with mild perivesicular fat stranding.  Interval development of several sclerotic lesions in the spine and left iliac bone.     10/13/2020 Cancer Staged    Staging form: Bone - Appendicular Skeleton, Trunk, Skull, And Facial Bones, AJCC 8th Edition  - Clinical: Stage IVB (cT3, cN0, cM1b, G3) - Signed by Ishmale Rashid MD on 10/13/2020     11/3/2020 -   Chemotherapy    OP SUPPORTIVE Zoledronic Acid Q42d     11/3/2020 - 2/18/2021 Chemotherapy    OP PROSTATE DOCEtaxel       1/4/2021 Imaging    CT chest abdomen pelvis with contrast and whole-body bone scan shows improving less metabolically active bone metastases.  Stable sclerotic T12, L1, and L2 vertebral bodies and bilateral pelvic bones on bone windows with stable subcentimeter para-aortic lymph nodes and no definite progression in the chest abdomen or pelvis.  PSA down to 0.275 after 3 courses of Taxotere.     3/4/2021 Imaging    CT chest, abdomen and pelvis stable, no evidence of disease progression. Total body bone scan with decreased/near resolution of abnormal increased radiotracer uptake associated with the known sclerotic lesions in the thoracolumbar spine and bony pelvis.  No evidence of new osteoblastic metastases.     3/4/2021 Imaging    CT chest abdomen pelvis with contrast is negative save for stable sclerotic bone lesions and the bone scan shows near resolution of prior bony abnormalities associated with the known sclerotic lesions in the thoracolumbar spine and bony pelvis.  2/17/2021 PSA down to 0.1 from 1.37 October 2020.     3/9/2021 - 3/9/2021 Chemotherapy    OP SUPPORTIVE PROSTATE Relugolix     3/9/2021 -  Chemotherapy    OP PROSTATE Abiraterone / PredniSONE       3/10/2021 -  Chemotherapy    OP PROSTATE Abiraterone / PredniSONE     8/10/2021 - 8/16/2021 Radiation    Radiation OncologyTreatment Course:  Naveen Lugo received 2000 cGy in 5 fractions to L4-sacrum, left acetabulum and right pelvis via External Beam Radiation - EBRT.     11/16/2021 -  Chemotherapy    OP CENTRAL VENOUS ACCESS DEVICE ACCESS, CARE, AND MAINTENANCE (CVAD)     1/5/2022 Imaging    -1/5/2022 CT chest abdomen pelvis with contrast [place] [facility] showing stable left periaortic adenopathy and unchanged sclerotic thoracic, lumbar spine and pelvis lesions with no new or progressive disease in the chest abdomen or pelvis.   "Total body bone scan shows no significant change and no new suspicious foci.  Stable posterior right acetabulum and left superior acetabulum and degenerative changes in the cervical spine, shoulders, acromioclavicular joints, sternoclavicular joints, elbows, wrists, hands, thoracic spine, lumbosacral spine, sacroiliac joints, hips, knees, ankles, feet.     5/24/2022 Imaging    CT chest abdomen pelvis with contrast[place] negative.  Bone scan shows stable bone metastases.         HISTORY OF PRESENT ILLNESS:  The patient is a 67 y.o. male, here for follow up on management of  Follow-up prostate cancer metastasis with drug-induced hypophosphatemia and possible drug-induced adrenal insufficiency.  Hot flashes.    Past Medical History:   Diagnosis Date   • Bone cancer (HCC)    • History of radiation therapy 08/16/2021    L4 through sacrum, left acetabulum, right pelvis   • Nephrolithiasis    • Opioid use disorder, mild, on maintenance therapy (HCC)    • Prostate cancer (HCC)      Past Surgical History:   Procedure Laterality Date   • CHOLECYSTECTOMY     • CORONARY STENT PLACEMENT  206 & 2011   • HERNIA REPAIR  1986   • THORACIC DISCECTOMY  1994       Allergies   Allergen Reactions   • Cymbalta [Duloxetine Hcl] Dizziness   • Gabapentin Nausea Only   • Lyrica [Pregabalin] Nausea And Vomiting and Dizziness       Family History and Social History reviewed and changed as necessary    REVIEW OF SYSTEM:   Some fatigue.  Chronic bone pain stable.    PHYSICAL EXAM:  Lungs clear.  Heart regular rate and rhythm.    Vitals:    05/31/22 0723   BP: 142/71   Pulse: 81   Resp: 18   Temp: 98 °F (36.7 °C)   SpO2: 93%   Weight: 87.5 kg (193 lb)   Height: 175.3 cm (69\")     Vitals:    05/31/22 0723   PainSc:   6   PainLoc: Back  Comment: lower back, bilat hips and neck          ECOG score: 0           Vitals reviewed.    ECOG: (0) Fully Active - Able to Carry On All Pre-disease Performance Without Restriction    Lab Results   Component " Value Date    HGB 14.0 05/25/2022    HCT 41.7 05/25/2022    MCV 90 05/25/2022     (L) 05/25/2022    WBC 6.8 05/25/2022    NEUTROABS 5.2 05/25/2022    LYMPHSABS 1.0 05/25/2022    MONOSABS 0.4 05/25/2022    EOSABS 0.1 05/25/2022    BASOSABS 0.0 05/25/2022       Lab Results   Component Value Date    GLUCOSE 111 (H) 05/25/2022    BUN 6 (L) 05/25/2022    CREATININE 0.78 05/25/2022     05/25/2022    K 3.6 05/25/2022     05/25/2022    CO2 24 05/25/2022    CALCIUM 8.9 05/25/2022    PROTEINTOT 7.0 02/14/2022    ALBUMIN 3.9 05/25/2022    BILITOT 0.3 05/25/2022    ALKPHOS 85 05/25/2022    AST 16 05/25/2022    ALT 15 05/25/2022             ASSESSMENT & PLAN:  1.  Stage IVb grade group 4 metastatic prostate cancer with sclerotic bone lesions on CT and bone scan and history of prostatic hypertrophy  2.  Kidney stone  3.  Polyps  4.  Probable drug-induced hypophosphatemia from Zometa, drug stopped after 10/5/2021 dose  5.  Cancer related pain  6.  Fatigue  7.  Covid 2/2022    -9/30/2020 office note from Dr. Ronen Reynaga indicates patient with PSA 10.8.  Underwent TRUS prostate biopsy 9/15/2020.  Adenocarcinoma the prostate [place] 4+4 = 8 in 1 out of 12 cores and 4+3 = 7 and 10 out of 12 cores and 3+4 = 7 in 1 out of 12 cores.  Perineural invasion.  Extraprostatic extension into the fat seen on 2 biopsies of the right lateral mid and right apex.  9/24/2020 CT chest and whole-body bone scan showed T12, L1, left acetabular, right medial acetabular metastases with no lung metastases.  He started androgen deprivation therapy with Casodex with plans for Lupron to start in 1 to 2 weeks for clinical T3 N0 M1 metastatic prostate cancer.  Review of official report from [facility] 9/24/2020 bone scan mentions T12, L1, roof of left acetabulum and medial right acetabular osteoblastic metastases.  CT chest with contrast that same day showed mild splenomegaly 14.2 cm with stable periaortic nodes compared to CT  December 2015 with no lung metastases.  Sclerotic abnormality within the T12 vertebral body, L1 vertebral body extending into the pedicle unchanged.  8/28/2020 CT abdomen pelvis report from [facility] reviewed and mentions normal spleen size.  Punctate nonobstructing kidney stone, no paulino enlargement, diffuse bladder wall thickening with mild perivesicular fat stranding, 2.1 cm sclerotic left iliac bone above the left acetabulum new compared to 2015 as well as 1.5 cm faintly sclerotic focus in the right posterior acetabulum stable compared to prior CT 2015 as well as a 1.6 cm T12 and inferior vertebral body sclerotic lesion and a 1.9 cm left posterior lateral L1 vertebral body abnormality extending to the pedicle.  -10/13/2020 initial [facility] medical oncology consultation: With 1 Sarika score 8, 4+4 lesion that would make him a grade group 4 with stage IVb disease given radiographic evidence of sclerotic bone metastasis on bone scan and CT.  Needs genetic testing given metastatic prostate cancer and this is also important as, were he to have mismatch repair mutation then we would have options for PDL 1 inhibitors and were he BRCA mutated would have options for PARP inhibitors in the future.  Systemic therapy for castration naïve prostate cancer or N1 disease should be with apalutamide or Abiraterone or enzalutamide all of which are category 1.  He does not have any visceral metastases.  According to his imaging he has T12, L1, left acetabular, right acetabular, and left iliac bony involvement.  That means he would have 4 or more bone metastases with at least one metastasis (i.e. the acetabular lesions bilaterally) beyond the pelvis/vertebral, for which this would be considered high-volume disease for which 6 cycles of docetaxel 75 mg/m² x 6 cycles followed by Zytiga and prednisone would be reasonable along with Zometa every 6 weeks for bone stabilization.  He will do chemo preparation visit with my nurse  practitioner prior to start chemotherapy with Taxotere and Zometa in 2 weeks and he is already received Casodex in preparation for Lupron shot he received on 10/12/2020 with Dr. Reynaga.  We will get him to Dr. Alexander Gomez who has done his polypectomies in the past for port placement and we will get genetic counseling started.  Following the 6 courses of Taxotere per the [place] trial criteria, I would then give him an indefinite Zytiga and prednisone and Zometa until progression or toxicity dictates.    -12/16/2019 COVID-19 antigen test negative    -12/29/2020 PSA 0.275 with absolute neutrophil count 700 and creatinine 0.76 with normal liver enzymes and electrolytes.  Normal magnesium and phosphorus and urine drug screen positive for buprenorphine and opiates follow-up with palliative care.    -1/4/2021 CT chest abdomen pelvis with contrast and whole-body bone scan shows improving less metabolically active bone metastases.  Stable sclerotic T12, L1, and L2 vertebral bodies and bilateral pelvic bones on bone windows with stable subcentimeter para-aortic lymph nodes and no definite progression in the chest abdomen or pelvis.    -1/5/2021 [facility] medical oncology follow-up visit: I reviewed the images and reports thereof of the above CT and bone scan which shows good response to Taxotere x3 courses with a PSA down to 0.275 from a baseline of 10.  Get another 3 courses of Taxotere, continue his Xgeva, and then following that we will switch to Zytiga and prednisone.  He is working with palliative care on his bone pain but on his current dose of fentanyl patch he says his pain is still not adequately controlled he will contact them.  Dexamethasone prescription was refilled.  We will see my nurse practitioner back in 3 weeks for course #5 of 6 total courses and then we will reimage with CT chest abdomen pelvis and bone scan before going to the Zytiga prednisone.    -3/4/2021 CT chest abdomen pelvis with contrast is  negative save for stable sclerotic bone lesions and the bone scan shows near resolution of prior bony abnormalities associated with the known sclerotic lesions in the thoracolumbar spine and bony pelvis.    2/17/2021 PSA down to 0.1 from 1.37 October 2020.  3/9/2021 PSA 0.1    -5/26/2021 CT chest abdomen pelvis with contrast shows stable to slightly underlying increase low-density left para-aortic node.  Bone lesions stable.  Whole-body bone scan stable to decrease activity of known thoracolumbar and bony pelvic involvement.  PSA less than 0.1  , AST 74, bilirubin 0.5.       -6/1/2021 [facility] oncology follow-up visit: I reviewed the above data with him and images thereof.  Bones are stable.  No significant adenopathy.  PSA immeasurably low.     upper limit of normal 69 and AST 74 upper limit of normal 46.  Hence, neither is more than double the upper limit of normal with no ascites and no encephalopathy and normal albumin and bilirubin, despite the elevation of liver enzymes, he has child Abrams score A.  Package insert for Abiraterone says that for ALT and/or AST greater than 5 times upper limit of normal or total bilirubin greater than 3 times the upper limit of normal to withhold treatment until liver function tests returned to baseline or ALT and AST less than or equal to 2.5 times the upper limit of normal and total bilirubin less than or equal to 1.5 times the upper limit of normal and then reinitiate at 750 mg daily dose of Zytiga.  For recurrent hepatotoxicity on 750 mg daily Zytiga, withhold treatment until liver functions returned to baseline or ALT and AST less than 3-2.5 times upper limit of normal and total bilirubin less than or equal to 1.5 times the upper limit of normal then reinitiate at 500 mg daily Zytiga dose.  If has recurrent hepatotoxicity on 500 mg dose, then discontinue treatment.  If has ALT greater than 3 times the upper limit of normal along with total bilirubin  greater than 2 times the upper limit of normal in the absence of other contributing causes or biliary obstruction, permanently discontinue Zytiga.  Based on these recommendations, I would continue current doses but we will watch with serial labs monthly and repeat his imaging again in 3 months but clinically he is doing wonderfully with excellent response to Taxotere and now on Zytiga prednisone plus Zometa along with Relugolix.    -6/23/2020 for [facility] oncology clinic follow-up: Having persistent and worsening pain above his left hip.  I will get an MRI of the left hip for further evaluation.  Otherwise we will continue therapy unchanged with Zytiga, prednisone and Zometa along with Relugolix.    -7/28/2021 [facility] oncology clinic follow-up: Patient with multiple somatic complaints including episodes of confusion, dizziness, decreased memory, agitation.  He reports ongoing episodes with difficulty swallowing.  Also most concerning for episodes of angina and chest pain for which he basically refused to seek emergency medical attention.  He has a history of coronary artery disease and stent placement.  He has not followed up with cardiology for many years.  I will get an MRI of the brain in light of his confusion, dizziness and agitation.  I will get him to gastroenterology for EGD in light of his dysphagia.  I have also put in a referral for cardiology.  I reemphasized to the patient, his wife who is with him today and his son who was on the telephone during our visit the importance of seeking out prompt medical attention when he is having chest pain or neurological concerns so that he can be evaluated.  The patient states that he basically refuses to go to the emergency room and if his family calls an ambulance he would not agree to go to the hospital.  For the time being, I have asked him to hold his Zytiga and prednisone until we can sort this out as Zytiga does have some cardiovascular risk.  There is likely no  "treatment for prostate cancer that does not carry some form of cardiovascular risk.  His PSA remains low at 0.014 yesterday.  He will continue relugolix for now.    Of note, some of these symptoms could also be due to his hypophosphatemia, phosphate level from yesterday was 1.5, I have sent in a prescription for K-Phos 3 times a day before meals for 10 days.  We will hold further Zometa until this is corrected.  I have also put in an order to check his vitamin D level.  He takes calcium and vitamin D daily.  Some of his symptoms also could be due to drug withdrawal as there was some confusion and difficulty getting his last prescription for methadone therefore he was without methadone for several days, he now has his prescription and is back on his pain medication.  He has an appointment with neurosurgery tomorrow in light of his ongoing back pain, MRI of the hip recently showed multiple bony lesions largest at the L5 vertebral body and left supra-acetabular region.  If no neurosurgical intervention recommended he may benefit from spot radiation as this is where a lot of his pain is coming from.  His wife had questions today regarding his staging and extent of disease.  We reviewed previous scans.  I did clarify with her as she used the words \"cancer free\" as she thought that is what she was told after his CT scans once he completed Taxotere in February.  I explained to her that even though his scans showed he had an excellent response with no clear areas of metastasis that did not mean he was cancer free, I explained to her that when you have metastatic cancer the treatment intent is palliative in nature and to control the disease but not to cure the disease.  She stated understanding.  We will see him back in 2 weeks for follow-up to sort through this and make further plan of care, again, I have asked him to hold Zytiga and prednisone until he sees us back, he will continue on his other medications including " relugolix.    -7/30/2021 neurosurgery PA consultation.  MRI with and without contrast L5 ordered.    -8/3/2021 neurosurgery follow-up showed known sclerotic disease with new L5 abnormality without compression deformity or instability.  MRI brain negative for metastasis.    -8/4/2021 office visit Dr. Fco Quintanilla radiation oncology coordinating with Dr. North neurosurgery for palliative radiation for MRI evidence of L5 and left supra acetabular painful lesions.  States that the patient's disease which is not secreting PSA is experiencing some progression and would benefit from 20 Gy in five fractions and other lesion 30 Gy in ten fractions. Patient offered to go to [place] for radiation but desired treatment in [place].    -8/10/2021 [facility] medical oncology follow-up visit: No chest pains at this junction.  Reviewed MRI brain and other MRIs reviewed by Dr. North and Dwight.  Due for EGD on 19th.  We will repeat his phosphorus along with his other labs continue Relugolix, Zytiga, prednisone, and he will continue radiation palliatively to the painful bony lesions with Dr. Quintanilla/Can.  He will see my nurse practitioner back in a few weeks to see how he is tolerating this and to follow-up on the phosphorus off of Zometa and she will order repeat CT chest abdomen pelvis with contrast and total body bone scan the end of September.I will see him back after that.    -9/8/2021 Zometa resumed.  PSA <0.10    -10/5/2021 [facility] medical oncology follow-up visit: followed-up with radiation oncology 9/21/2021.  Status post symptomatic L5 radiation 5 fractions 20 [place] completed 8/16/2021 with improvement in right hip pain.  9/2/2021 PSA less than 0.1.  9/29/2021 total body bone scan shows increased uptake left iliac bone slightly superior group of the left acetabulum with no additional foci of suspicious abnormality is unlikely treatment related with no new sites of active osseous metastasis.  CT chest abdomen pelvis with  contrast shows stable borderline enlarged left periaortic nodes and dating sclerotic bone lesions.Continue Zytiga, prednisone, Relugolix, Zometa, repeat CT chest abdomen pelvis with contrast and total body bone scan the end of the year.  We will follow PSA serially.    -11/17/2021 [facility] Oncology clinic follow-up:  Mr. Lugo overall is doing well.  He is tolerating therapy with Zytiga, prednisone and Relugolix along with Zometa which is given every 6 weeks.  PSA remains low at <0.1.  Has occasional low phosphorus, currently low at 1.7.  Serum calcium is normal at 8.9, normal creatinine 0.79 and normal CBC other than for platelets of 124.  I will hold Zometa for 1 month, I did send in a prescription for phosphorus replacement today.  He takes calcium and vitamin D.  I will see him back in 1 month for follow-up.  We will repeat restaging scans after that visit.    -12/15/2021 [facility] Oncology clinic follow-up: Mr. Lugo continues to do well on therapy with Zytiga, prednisone and Relugolix, his PSA remains low at <0.1.  He is continuing to have left lower back pain, he is working with palliative care and they have referred him also to pain management.  Pain management has talked to him about putting in a pain pump however he is leery of this, I told him that this was a safe and effective pain management technique and to certainly give consideration to their recommendations.  His phosphorus is improving however is still a little low, I will hold off on Zometa until we see him back in 1 month, we may end up only giving it every 12 weeks.  We will repeat restaging scans with CT chest, abdomen and pelvis along with total body bone scan prior to return and I have ordered those today.      -1/5/2022 CT chest abdomen pelvis with contrast [place] [facility] showing stable left periaortic adenopathy and unchanged sclerotic thoracic, lumbar spine and pelvis lesions with no new or progressive disease in the chest abdomen or  pelvis.  Total body bone scan shows no significant change and no new suspicious foci.  Stable posterior right acetabulum and left superior acetabulum and degenerative changes in the cervical spine, shoulders, acromioclavicular joints, sternoclavicular joints, elbows, wrists, hands, thoracic spine, lumbosacral spine, sacroiliac joints, hips, knees, ankles, feet.    -1/11/2022 [facility] medical oncology hematology follow-up visit: I reviewed images and reports from his 1/5/2022 scans.  No active disease and PSA immmeasurably low on Zytiga, prednisone, Relugolix.  However, he has struggled with hypophosphatemia and is significantly fatigued with this.  Given that the bones are doing well and given that his phosphorus continues to remain consistently low at 2.2 in mid December, 1.7 mid November and 2.5 mid-September and as low as 1.5 back to July and the likelihood that this is related to his Zometa, given that his bones are stable overall, he will increase from 1200 mg up to 1600 mg of calcium and phosphate a day and we will hold his Zometa for the foreseeable future.  He will see my nurse practitioner back in a month to continue to monitor his phosphorus along with his calcium and other labs and will repeat his scans again in 3 months.  In addition, given his fatigue we will check his ACTH and cortisol though he is taking his prednisone with his Zytiga.  Though his electrolytes are normal, I will keep an eye on the cortisol and ACTH and have these labs not only drawn today but again just prior to return to my nurse practitioner on February 10.    -2/10/2022 [facility] Oncology clinic follow-up: Mr. Lugo overall is stable, no change in his health this past month.  I have reviewed his labs from 2/8/2022 and they are unremarkable, his phosphorus is now normal at 3.2. We are continuing to hold his Zometa, he last received Zometa on 10/5/2021.  He continues therapy with Zytiga, prednisone and Relugolix.  Dr. Rashid had ordered  cortisol level and ACTH which are low, currently his ACTH is 2.8 and cortisol 3.08 however these are both done in the face of ongoing prednisone that he takes with his Zytiga.  We will get him to endocrinology for further evaluation for possible adrenal insufficiency but will not interrupt his treatment in the interim.  He will need restaging scans again in April for a 3-month follow-up.  He will continue to follow with palliative care and pain management for his cancer related pain.  His PSA remains immeasurably low at <0.1 on 2/8/22.    -2/15/2022 went to emergency room with weakness, decreased appetite, myalgias in the setting of known COVID-19 testing positive a few days prior to this visit.  Phosphorus had been low in the past but was normal in the emergency room with unremarkable CBC and CMP.  Chest x-ray unremarkable saturating well on room air mildly hypertensive with dry mucosa.  Given 500 cc crystalloid.  Covid test positive.  Mild thrombocytopenia 136,000 and otherwise unremarkable.  Discharged home.    -3/3/2022 data:  PSA less than 0.1  CMP unremarkable  Cortisol 3.96 normal  ACTH 2.8 lower limit of normal 7.2  Phosphorus normal 2.6    -3/8/2022 [facility] medical oncology follow-up: Suspect big chunk of his fatigue was Covid.  Another part of the fatigue likely related to lack of testosterone.  Not hypophosphatemic off of Zometa.  ACTH running low while on prednisone not surprising but with normal cortisol and I doubt adrenal insufficient which is why he is on prednisone to avoid such while taking Zytiga.  Overall doing fairly well and with immeasurably low PSA, I will have labs done on him early April, see my nurse practitioner early May, and she will order repeat bone scan and CTs the end of May and I will see him back in June.  We will continue to hold the Zometa unless bone lesions progress given symptomatic prior hypophosphatemia on Zometa.  He will keep his appointment April 28 with Dr. Mir Duffy  just to be sure that my take on his pituitary/adrenal axis assessment is accurate.    -4/28/2022 endocrinology consult Dr. Mir Duffy.  With low ACTH and cortisol on prednisone with Zytiga, the adequacy of prednisone cannot be assessed by measuring ACTH or cortisol but is assessed by weight changes, blood pressure, blood sugar, potassium, overall sense of how the patient is doing.  Primary adrenal insufficiency with low ACTH and low cortisol would be typical for the situation as his blood sugar tends to run a little high and potassium a little low, he did not think increasing prednisone was necessary.  He put him on some potassium.    -5/4/2022 [facility] Oncology clinic follow-up: Mr. Lugo continues on therapy with Zytiga and prednisone along with Relugolix.  His Zometa has been on hold due to previous hypophosphatemia.  There is some concern about potential adrenal insufficiency. He saw endocrinology, Dr. Mir Duffy on 4/28/2022.  I did review his office note and he stated that the low ACTH and low serum cortisol would be typical for Mr. Lugo's situation.  He further stated that the adequacy of prednisone dose cannot be assessed by measuring ACTH or cortisol but is rather assessed by the overall just altered how the patient is feeling-weight change, blood pressure, blood sugar, potassium, overall sense of wellbeing.  His potassium had been running a little low therefore they put him on potassium 10 mill equivalents daily. Of note, I do not see a future follow-up scheduled with endocrinology.  He is having night sweats, I am not sure if this is related.  His glucose was normal this morning at 107.  His weight is stable.  He will continue current treatment for his prostate cancer unchanged, we will continue to hold his Zometa.  Current phosphorus and magnesium are normal.  CBC and CMP from today are unremarkable, cortisol is normal.  PSA and ACTH are pending  In regards to his night sweats, he had taken clonidine  previously 0.1 mg twice daily as needed, I did send in a short supply again to try and see if this helps.  He also is having indigestion despite being on omeprazole twice daily, I sent a prescription for Pepcid.  He had an EGD August 2021.  We will repeat restaging scans prior to return and I have ordered those today.     -5/24/2022 CT chest abdomen pelvis with contrastFrankfort negative.  Bone scan shows stable bone metastases.    - 5/25/2022 PSA less than 0.1, platelets 130,000, otherwise unremarkable CBC and CMP.  A.m. cortisol running low 1.2 with phosphorus low 2.3.    -5/31/2022 [facility] oncology follow-up: On Zytiga, prednisone, Relugolix, PSA remaining immeasurably low with stable bone scan and no visceral/paulino metastases.  Phosphorus still running low.  I have discontinued his potassium chloride and started potassium phosphate 500 mg 4 times a day.  Unless PSA rising, we will plan on repeating CTs and bone scan in 6 months and will follow with my nurse practitioner in the interim and if symptoms dictate or labs, will get back to Dr. Duffy for management of potential adrenal insufficiency but presently we will just see how he does with potassium phosphate and hopeful improvement in the phosphorus level with time.  We will continue to follow with palliative care for pain management    Total time of care today inclusive of time spent today prior to patient's arrival reviewing interval notes from endocrinology, my nurse practitioner's notes, CT and bone scans and images thereof and interval data as outlined above and during visit translating all this to the patient and putting forth the plan as outlined above and communicating the need to continue current therapy to our team and after visit signing orders for such took 40 minutes of patient care time throughout the day today.      Ishmael Rashid MD    05/31/2022

## 2022-05-31 NOTE — TELEPHONE ENCOUNTER
Caller: [facility] - [facility] [id number]   [facility] [phone] [facility] [phone] FX    Relationship: Pharmacy    Best call back number: [phone]      Who are you requesting to speak with (clinical staff, provider,  specific staff member): CLINICAL    PHARMACY CALLED TO ADVISE THE POTASSIUM PHOSPHATE IS NOT COVERED WITH PATIENTS INSURANCE, PHARMACY WANTED TO KNOW IF THE POTASSIUM CHLORIDE INSTEAD      Do you require a callback: YES

## 2022-05-31 NOTE — TELEPHONE ENCOUNTER
Returned call to patient's pharmacy. At this time discussing with pharmacist that per Dr. Rashid patient need Potassium phospho related to patient's phosphorus being low. Pharmacy stating patient will need to have PA completed. Asking that pharmacy fax over.

## 2022-06-02 ENCOUNTER — SPECIALTY PHARMACY (OUTPATIENT)
Dept: ONCOLOGY | Facility: HOSPITAL | Age: 67
End: 2022-06-02

## 2022-06-02 NOTE — PROGRESS NOTES
Specialty Pharmacy Refill Coordination Note     Naveen is a 67 y.o. male contacted today regarding refills of  abiraterone 1000mg PO QD, prednisone 5mg PO QD and relugolix 120mg PO QD  specialty medication(s).      Specialty medication(s) and dose(s) confirmed: yes    Refill Questions    Flowsheet Row Most Recent Value   Changes to allergies? No   Changes to medications? No   New conditions since last clinic visit No   Unplanned office visit, urgent care, ED, or hospital admission in the last 4 weeks  No   How does patient/caregiver feel medication is working? Very good   Financial problems or insurance changes  No   Since the previous refill, were any specialty medication doses or scheduled injections missed or delayed?  No   Does this patient require a clinical escalation to a pharmacist? No          Delivery Questions    Flowsheet Row Most Recent Value   Delivery method [employer]   Delivery phone number [phone]   Preferred delivery time? Anytime   Number of medications in delivery 3   Medication being filled and delivered relugolix, abiraterone and prednisone   Doses left of specialty medications 10 days left   Is there any medication that is due not being filled? No   Supplies needed? No supplies needed   Cooler needed? No   Do any medications need mixed or dated? No   Additional comments no copays   Questions or concerns for the pharmacist? No   Explain any questions or concerns for the pharmacist n/a   Are any medications first time fills? No            Medication Adherence    Adherence tools used: watch   Other adherence tool: Clock - takes at same time each day, 7:45am   Support network for adherence: family member          Follow-up: 28 day(s)     Sushma Muro, Pharmacy Technician  Specialty Pharmacy Technician

## 2022-06-03 ENCOUNTER — SPECIALTY PHARMACY (OUTPATIENT)
Dept: ONCOLOGY | Facility: HOSPITAL | Age: 67
End: 2022-06-03

## 2022-06-03 DIAGNOSIS — K30 INDIGESTION: ICD-10-CM

## 2022-06-03 RX ORDER — FAMOTIDINE 20 MG/1
TABLET, FILM COATED ORAL
Qty: 30 TABLET | Refills: 3 | Status: SHIPPED | OUTPATIENT
Start: 2022-06-03

## 2022-06-03 NOTE — PROGRESS NOTES
Specialty Pharmacy Patient Management Program  Oncology 6-Month Clinical Assessment       Naveen Lugo is a 67 y.o. male with prostate cancer seen today to assess adherence and side effects.    Regimen: abiraterone 1000mg PO daily + prednisone 5mg PO daily + relugolix 120mg PO daily    Reason for Outreach: Routine medication check-in .       Problem list reviewed by Maria Esther Le PharmD on 6/3/2022 at  1:02 PM  Medicines reviewed by Maria Esther Le PharmD on 6/3/2022 at  1:02 PM  Allergies reviewed by Maria Esther Le PharmD on 6/3/2022 at  1:02 PM    Quality of Life Assessment  Quality of Life Improvement Scale: Somewhat better     Goals     •  Keep Pain Under Control     •  Specialty Pharmacy General Goal (pt-stated)       Patient-identified goal of therapy: Patient will adhere to medication regimen  Clinical goal/therapeutic target: Patient will adhere to medication regimen                Medication Adherence    Patient reported X missed doses in the last 7 days: 0  Any gaps in refill history greater than 2 weeks in the last 3 months: no  Demonstrates understanding of importance of adherence: yes  Informant: patient  Reliability of informant: reliable  Estimated medication adherence level: %  Adherence estimation source: claims history  Reasons for non-adherence: no problems identified  Adherence tools used: watch   Other adherence tools: Clock - takes at same time each day, 7:45am   Support network for adherence: family member         Assessments:  • Medication Assessment  o Medication Tolerability: Patient denies side effects. .  o Therapeutically Appropriate: Yes  o Administration: Patient is taking every day at the same time  and as prescribed .   o Patient can self administer medications: yes  o Medication Follow-Up Plan: routine follow-up  • Drug-drug interactions  o Completed medication reconciliation today to assess for drug interactions. Patient denies starting or stopping any medications.    o Assessed  medication list for interactions, no significant drug interactions noted.   o Advised patient to call the clinic if any new medications are started so we can assess for drug-drug interactions.  • Vaccines are coordinated by the patient's oncologist and primary care provider.  • Quality of Life: 7/10  • Adherence:  o Missed doses: Patient reports missing 0 doses in the last 30 days..  o Reasons for missed doses: no barriers  o Compliance with refill history: 6/2, 5/10, 4/4, 3/8  • Medication availability/affordability: Patient has had no issues obtaining medication from pharmacy.  • Questions/concerns about medications: no further concerns    All questions addressed and patient had no additional concerns. Patient has pharmacy contact information    Maria Esther Le PharmD, [facility]  Oncology Clinical Pharmacist   [phone]

## 2022-06-27 ENCOUNTER — SPECIALTY PHARMACY (OUTPATIENT)
Dept: ONCOLOGY | Facility: HOSPITAL | Age: 67
End: 2022-06-27

## 2022-06-27 NOTE — PLAN OF CARE
Bladder scan done showed >657. Straight cath attempted, was unsuccessful. Report passed on to midnight nurse to attempt again to straight cath her. Patient got up on the bed side commode and voided 500 mL, she states she feels so much relief after voiding. Physician notified.    Specialty Pharmacy Assessment    Abiraterone (Zytiga)  Dose: 1000 mg  Frequency: Once daily  Indication: Prostate cancer  Goals of Therapy: Palliative  Follow-Up: Yes  Dispensing pharmacy: [facility]  Side effect assessment: No or Minimal Side Effects  Compliance: Patient reports taking capsules whole once daily, Patient reports taking around the same time every day  Additional notes: Pt is also taking relugolix 120mg po daily and prednisone 5mg po daily and is receiving zoledronic acid injections. Discussed aforementioned material. Reminded patient of the pharmacist's contact information and instructions to call should additional questions arise. All questions and concerns addressed. Completed a medication reconciliation. Patient to continue on treatment with no disease progression or unacceptable toxicity at this time.        Medication Adherence    Any gaps in refill history greater than 2 weeks in the last 3 months: no  Demonstrates understanding of importance of adherence: yes  Informant: patient  Reliability of informant: reliable  Estimated medication adherence level: %  Reasons for non-adherence: no problems identified  Adherence tools used: watch   Other adherence tools: Clock - takes at same time each day, 7:45am   Support network for adherence: family member       Drug Interactions    Drug interactions evaluated: yes  Clinically relevant drug interactions identified: no  Provided the patient with educational material regarding drug interactions: not applicable       Patient Counseling    Counseled the patient on the following: doses and administration discussed, safe handling, storage, and disposal discussed, possible adverse effects and management discussed, lab monitoring and follow-up discussed, therapeutic rationale discussed, cost of medications and cost implications discussed, adherence and missed doses discussed, pharmacy contact information discussed         Thank you,   Lisa  Hans  PharmD Candidate 2022 8/25/21 @ 15:12

## 2022-06-27 NOTE — PROGRESS NOTES
Specialty Pharmacy Refill Coordination Note     Naveen is a 67 y.o. male contacted today regarding refills of  abiraterone 1000mg PO QD, prednisone 5mg PO QD and relugolix 120mg PO QD  specialty medication(s).      Specialty medication(s) and dose(s) confirmed: yes    Refill Questions    Flowsheet Row Most Recent Value   Changes to allergies? No   Changes to medications? No   New conditions since last clinic visit No   Unplanned office visit, urgent care, ED, or hospital admission in the last 4 weeks  No   How does patient/caregiver feel medication is working? Good   Financial problems or insurance changes  No   Since the previous refill, were any specialty medication doses or scheduled injections missed or delayed?  No   Does this patient require a clinical escalation to a pharmacist? No          Delivery Questions    Flowsheet Row Most Recent Value   Delivery method [employer]   Delivery address correct? Yes   Delivery phone number [phone]   Preferred delivery time? Anytime   Number of medications in delivery 3   Medication being filled and delivered relugolix, abiraterone and prednisone   Doses left of specialty medications 10 days left   Is there any medication that is due not being filled? No   Supplies needed? No supplies needed   Do any medications need mixed or dated? No   Additional comments no copay   Questions or concerns for the pharmacist? No   Explain any questions or concerns for the pharmacist n/a   Are any medications first time fills? No            Medication Adherence    Adherence tools used: watch   Other adherence tool: Clock - takes at same time each day, 7:45am   Support network for adherence: family member          Follow-up: 28 day(s)     Sushma Muro, Pharmacy Technician  Specialty Pharmacy Technician

## 2022-07-06 ENCOUNTER — INFUSION (OUTPATIENT)
Dept: ONCOLOGY | Facility: HOSPITAL | Age: 67
End: 2022-07-06

## 2022-07-06 ENCOUNTER — OFFICE VISIT (OUTPATIENT)
Dept: ONCOLOGY | Facility: CLINIC | Age: 67
End: 2022-07-06

## 2022-07-06 VITALS
OXYGEN SATURATION: 96 % | HEIGHT: 69 IN | SYSTOLIC BLOOD PRESSURE: 148 MMHG | WEIGHT: 197 LBS | DIASTOLIC BLOOD PRESSURE: 61 MMHG | TEMPERATURE: 97.7 F | BODY MASS INDEX: 29.18 KG/M2 | HEART RATE: 90 BPM | RESPIRATION RATE: 20 BRPM

## 2022-07-06 DIAGNOSIS — C61 PROSTATE CANCER METASTATIC TO BONE: Primary | ICD-10-CM

## 2022-07-06 DIAGNOSIS — Z51.81 THERAPEUTIC DRUG MONITORING: ICD-10-CM

## 2022-07-06 DIAGNOSIS — C79.51 PROSTATE CANCER METASTATIC TO BONE: Primary | ICD-10-CM

## 2022-07-06 DIAGNOSIS — Z45.2 ENCOUNTER FOR CARE RELATED TO PORT-A-CATH: ICD-10-CM

## 2022-07-06 PROCEDURE — 96523 IRRIG DRUG DELIVERY DEVICE: CPT

## 2022-07-06 PROCEDURE — 99215 OFFICE O/P EST HI 40 MIN: CPT | Performed by: NURSE PRACTITIONER

## 2022-07-06 PROCEDURE — 25010000002 HEPARIN LOCK FLUSH PER 10 UNITS: Performed by: INTERNAL MEDICINE

## 2022-07-06 RX ORDER — HEPARIN SODIUM (PORCINE) LOCK FLUSH IV SOLN 100 UNIT/ML 100 UNIT/ML
500 SOLUTION INTRAVENOUS AS NEEDED
Status: DISCONTINUED | OUTPATIENT
Start: 2022-07-06 | End: 2022-07-06 | Stop reason: HOSPADM

## 2022-07-06 RX ORDER — SODIUM CHLORIDE 0.9 % (FLUSH) 0.9 %
10 SYRINGE (ML) INJECTION AS NEEDED
Status: CANCELLED | OUTPATIENT
Start: 2022-07-06

## 2022-07-06 RX ORDER — HEPARIN SODIUM (PORCINE) LOCK FLUSH IV SOLN 100 UNIT/ML 100 UNIT/ML
500 SOLUTION INTRAVENOUS AS NEEDED
Status: CANCELLED | OUTPATIENT
Start: 2022-07-06

## 2022-07-06 RX ADMIN — HEPARIN 500 UNITS: 100 SYRINGE at 11:00

## 2022-07-06 NOTE — PROGRESS NOTES
CHIEF COMPLAINT:  1. Metastatic prostate cancer with drug-induced hypophosphatemia and possible drug-induced adrenal insufficiency  2. Fatigue    Problem List:  Oncology/Hematology History Overview Note   1.  Stage IVb grade group 4 metastatic prostate cancer with sclerotic bone lesions on CT and bone scan and history of prostatic hypertrophy  2.  Kidney stone  3.  Polyps  4.  Probable drug-induced hypophosphatemia from Zometa, drug stopped after 10/5/2021 dose  5.  Cancer related pain  6.  Fatigue  7.  Covid 2/2022    -9/30/2020 office note from Dr. Ronen Reynaga indicates patient with PSA 10.8.  Underwent TRUS prostate biopsy 9/15/2020.  Adenocarcinoma the prostate [place] 4+4 = 8 in 1 out of 12 cores and 4+3 = 7 and 10 out of 12 cores and 3+4 = 7 in 1 out of 12 cores.  Perineural invasion.  Extraprostatic extension into the fat seen on 2 biopsies of the right lateral mid and right apex.  9/24/2020 CT chest and whole-body bone scan showed T12, L1, left acetabular, right medial acetabular metastases with no lung metastases.  He started androgen deprivation therapy with Casodex with plans for Lupron to start in 1 to 2 weeks for clinical T3 N0 M1 metastatic prostate cancer.  Review of official report from [facility] 9/24/2020 bone scan mentions T12, L1, roof of left acetabulum and medial right acetabular osteoblastic metastases.  CT chest with contrast that same day showed mild splenomegaly 14.2 cm with stable periaortic nodes compared to CT December 2015 with no lung metastases.  Sclerotic abnormality within the T12 vertebral body, L1 vertebral body extending into the pedicle unchanged.  8/28/2020 CT abdomen pelvis report from [facility] reviewed and mentions normal spleen size.  Punctate nonobstructing kidney stone, no paulino enlargement, diffuse bladder wall thickening with mild perivesicular fat stranding, 2.1 cm sclerotic left iliac bone above the left acetabulum new compared to 2015 as well as  1.5 cm faintly sclerotic focus in the right posterior acetabulum stable compared to prior CT 2015 as well as a 1.6 cm T12 and inferior vertebral body sclerotic lesion and a 1.9 cm left posterior lateral L1 vertebral body abnormality extending to the pedicle.  -10/13/2020 initial [facility] medical oncology consultation: With 1 Sarika score 8, 4+4 lesion that would make him a grade group 4 with stage IVb disease given radiographic evidence of sclerotic bone metastasis on bone scan and CT.  Needs genetic testing given metastatic prostate cancer and this is also important as, were he to have mismatch repair mutation then we would have options for PDL 1 inhibitors and were he BRCA mutated would have options for PARP inhibitors in the future.  Systemic therapy for castration naïve prostate cancer or N1 disease should be with apalutamide or Abiraterone or enzalutamide all of which are category 1.  He does not have any visceral metastases.  According to his imaging he has T12, L1, left acetabular, right acetabular, and left iliac bony involvement.  That means he would have 4 or more bone metastases with at least one metastasis (i.e. the acetabular lesions bilaterally) beyond the pelvis/vertebral, for which this would be considered high-volume disease for which 6 cycles of docetaxel 75 mg/m² x 6 cycles followed by Zytiga and prednisone would be reasonable along with Zometa every 6 weeks for bone stabilization.  He will do chemo preparation visit with my nurse practitioner prior to start chemotherapy with Taxotere and Zometa in 2 weeks and he is already received Casodex in preparation for Lupron shot he received on 10/12/2020 with Dr. Reynaga.  We will get him to Dr. Alexander Gomez who has done his polypectomies in the past for port placement and we will get genetic counseling started.  Following the 6 courses of Taxotere per the [place]ed trial criteria, I would then give him an indefinite Zytiga and prednisone and Zometa until  progression or toxicity dictates.    -12/16/2019 COVID-19 antigen test negative    -12/29/2020 PSA 0.275 with absolute neutrophil count 700 and creatinine 0.76 with normal liver enzymes and electrolytes.  Normal magnesium and phosphorus and urine drug screen positive for buprenorphine and opiates follow-up with palliative care.    -1/4/2021 CT chest abdomen pelvis with contrast and whole-body bone scan shows improving less metabolically active bone metastases.  Stable sclerotic T12, L1, and L2 vertebral bodies and bilateral pelvic bones on bone windows with stable subcentimeter para-aortic lymph nodes and no definite progression in the chest abdomen or pelvis.    -1/5/2021 [facility] medical oncology follow-up visit: I reviewed the images and reports thereof of the above CT and bone scan which shows good response to Taxotere x3 courses with a PSA down to 0.275 from a baseline of 10.  Get another 3 courses of Taxotere, continue his Xgeva, and then following that we will switch to Zytiga and prednisone.  He is working with palliative care on his bone pain but on his current dose of fentanyl patch he says his pain is still not adequately controlled he will contact them.  Dexamethasone prescription was refilled.  We will see my nurse practitioner back in 3 weeks for course #5 of 6 total courses and then we will reimage with CT chest abdomen pelvis and bone scan before going to the Zytiga prednisone.    -3/4/2021 CT chest abdomen pelvis with contrast is negative save for stable sclerotic bone lesions and the bone scan shows near resolution of prior bony abnormalities associated with the known sclerotic lesions in the thoracolumbar spine and bony pelvis.    2/17/2021 PSA down to 0.1 from 1.37 October 2020.  3/9/2021 PSA 0.1    -5/26/2021 CT chest abdomen pelvis with contrast shows stable to slightly underlying increase low-density left para-aortic node.  Bone lesions stable.  Whole-body bone scan stable to decrease activity  of known thoracolumbar and bony pelvic involvement.  PSA less than 0.1  , AST 74, bilirubin 0.5.       -6/1/2021 [facility] medical oncology follow-up visit: I reviewed the above data with him and images thereof.  Bones are stable.  No significant adenopathy.  PSA immeasurably low.     upper limit of normal 69 and AST 74 upper limit of normal 46.  Hence, neither is more than double the upper limit of normal with no ascites and no encephalopathy and normal albumin and bilirubin, despite the elevation of liver enzymes, he has child Abrams score A.  Package insert for Abiraterone says that for ALT and/or AST greater than 5 times upper limit of normal or total bilirubin greater than 3 times the upper limit of normal to withhold treatment until liver function tests returned to baseline or ALT and AST less than or equal to 2.5 times the upper limit of normal and total bilirubin less than or equal to 1.5 times the upper limit of normal and then reinitiate at 750 mg daily dose of Zytiga.  For recurrent hepatotoxicity on 750 mg daily Zytiga, withhold treatment until liver functions returned to baseline or ALT and AST less than 3-2.5 times upper limit of normal and total bilirubin less than or equal to 1.5 times the upper limit of normal then reinitiate at 500 mg daily Zytiga dose.  If has recurrent hepatotoxicity on 500 mg dose, then discontinue treatment.  If has ALT greater than 3 times the upper limit of normal along with total bilirubin greater than 2 times the upper limit of normal in the absence of other contributing causes or biliary obstruction, permanently discontinue Zytiga.  Based on these recommendations, I would continue current doses but we will watch with serial labs monthly and repeat his imaging again in 3 months but clinically he is doing wonderfully with excellent response to Taxotere and now on Zytiga prednisone plus Zometa along with Relugolix.    -6/23/2020 for [facility] oncology clinic  follow-up: Having persistent and worsening pain above his left hip.  I will get an MRI of the left hip for further evaluation.  Otherwise we will continue therapy unchanged with Zytiga, prednisone and Zometa along with Relugolix.    -7/28/2021 [facility] oncology clinic follow-up: Patient with multiple somatic complaints including episodes of confusion, dizziness, decreased memory, agitation.  He reports ongoing episodes with difficulty swallowing.  Also most concerning for episodes of angina and chest pain for which he basically refused to seek emergency medical attention.  He has a history of coronary artery disease and stent placement.  He has not followed up with cardiology for many years.  I will get an MRI of the brain in light of his confusion, dizziness and agitation.  I will get him to gastroenterology for EGD in light of his dysphagia.  I have also put in a referral for cardiology.  I reemphasized to the patient, his wife who is with him today and his son who was on the telephone during our visit the importance of seeking out prompt medical attention when he is having chest pain or neurological concerns so that he can be evaluated.  The patient states that he basically refuses to go to the emergency room and if his family calls an ambulance he would not agree to go to the hospital.  For the time being, I have asked him to hold his Zytiga and prednisone until we can sort this out as Zytiga does have some cardiovascular risk.  There is likely no treatment for prostate cancer that does not carry some form of cardiovascular risk.  His PSA remains low at 0.014 yesterday.  He will continue relugolix for now.    Of note, some of these symptoms could also be due to his hypophosphatemia, phosphate level from yesterday was 1.5, I have sent in a prescription for K-Phos 3 times a day before meals for 10 days.  We will hold further Zometa until this is corrected.  I have also put in an order to check his vitamin D level.   "He takes calcium and vitamin D daily.  Some of his symptoms also could be due to drug withdrawal as there was some confusion and difficulty getting his last prescription for methadone therefore he was without methadone for several days, he now has his prescription and is back on his pain medication.  He has an appointment with neurosurgery tomorrow in light of his ongoing back pain, MRI of the hip recently showed multiple bony lesions largest at the L5 vertebral body and left supra-acetabular region.  If no neurosurgical intervention recommended he may benefit from spot radiation as this is where a lot of his pain is coming from.  His wife had questions today regarding his staging and extent of disease.  We reviewed previous scans.  I did clarify with her as she used the words \"cancer free\" as she thought that is what she was told after his CT scans once he completed Taxotere in February.  I explained to her that even though his scans showed he had an excellent response with no clear areas of metastasis that did not mean he was cancer free, I explained to her that when you have metastatic cancer the treatment intent is palliative in nature and to control the disease but not to cure the disease.  She stated understanding.  We will see him back in 2 weeks for follow-up to sort through this and make further plan of care, again, I have asked him to hold Zytiga and prednisone until he sees us back, he will continue on his other medications including relugolix.    -7/30/2021 neurosurgery PA consultation.  MRI with and without contrast L5 ordered.    -8/3/2021 neurosurgery follow-up showed known sclerotic disease with new L5 abnormality without compression deformity or instability.  MRI brain negative for metastasis.    -8/4/2021 office visit Dr. Fco Quintanilla radiation oncology coordinating with Dr. Can forde for palliative radiation for MRI evidence of L5 and left supra acetabular painful lesions.  States that the " patient's disease which is not secreting PSA is experiencing some progression and would benefit from 20 Gy in five fractions and other lesion 30 Gy in ten fractions. Patient offered to go to [place] for radiation but desired treatment in [place].    -8/10/2021 [facility] follow-up visit: No chest pains at this junction.  Reviewed MRI brain and other MRIs reviewed by Dr. North and Dwight.  Due for EGD on 19th.  We will repeat his phosphorus along with his other labs continue Relugolix, Zytiga, prednisone, and he will continue radiation palliatively to the painful bony lesions with Dr. Quintanilla/Can.  He will see my nurse practitioner back in a few weeks to see how he is tolerating this and to follow-up on the phosphorus off of Zometa and she will order repeat CT chest abdomen pelvis with contrast and total body bone scan the end of September.I will see him back after that.    -9/8/2021 Zometa resumed.  PSA <0.10    -10/5/2021 [facility] follow-up visit: followed-up with radiation oncology 9/21/2021.  Status post symptomatic L5 radiation 5 fractions 20 [place] completed 8/16/2021 with improvement in right hip pain.  9/2/2021 PSA less than 0.1.  9/29/2021 total body bone scan shows increased uptake left iliac bone slightly superior group of the left acetabulum with no additional foci of suspicious abnormality is unlikely treatment related with no new sites of active osseous metastasis.  CT chest abdomen pelvis with contrast shows stable borderline enlarged left periaortic nodes and dating sclerotic bone lesions.Continue Zytiga, prednisone, Relugolix, Zometa, repeat CT chest abdomen pelvis with contrast and total body bone scan the end of the year.  We will follow PSA serially.    -11/17/2021 [facility] follow-up:  Mr. Lugo overall is doing well.  He is tolerating therapy with Zytiga, prednisone and Relugolix along with Zometa which is given every 6 weeks.  PSA remains low at  <0.1.  Has occasional low phosphorus, currently low at 1.7.  Serum calcium is normal at 8.9, normal creatinine 0.79 and normal CBC other than for platelets of 124.  I will hold Zometa for 1 month, I did send in a prescription for phosphorus replacement today.  He takes calcium and vitamin D.  I will see him back in 1 month for follow-up.  We will repeat restaging scans after that visit.    -12/15/2021 [facility] Oncology clinic follow-up: Mr. Lugo continues to do well on therapy with Zytiga, prednisone and Relugolix, his PSA remains low at <0.1.  He is continuing to have left lower back pain, he is working with palliative care and they have referred him also to pain management.  Pain management has talked to him about putting in a pain pump however he is leery of this, I told him that this was a safe and effective pain management technique and to certainly give consideration to their recommendations.  His phosphorus is improving however is still a little low, I will hold off on Zometa until we see him back in 1 month, we may end up only giving it every 12 weeks.  We will repeat restaging scans with CT chest, abdomen and pelvis along with total body bone scan prior to return and I have ordered those today.      -1/5/2022 CT chest abdomen pelvis with contrast [facility] showing stable left periaortic adenopathy and unchanged sclerotic thoracic, lumbar spine and pelvis lesions with no new or progressive disease in the chest abdomen or pelvis.  Total body bone scan shows no significant change and no new suspicious foci.  Stable posterior right acetabulum and left superior acetabulum and degenerative changes in the cervical spine, shoulders, acromioclavicular joints, sternoclavicular joints, elbows, wrists, hands, thoracic spine, lumbosacral spine, sacroiliac joints, hips, knees, ankles, feet.    -1/11/2022 [facility] medical oncology hematology follow-up visit: I reviewed images and reports from his 1/5/2022 scans.   No active disease and PSA immmeasurably low on Zytiga, prednisone, Relugolix.  However, he has struggled with hypophosphatemia and is significantly fatigued with this.  Given that the bones are doing well and given that his phosphorus continues to remain consistently low at 2.2 in mid December, 1.7 mid November and 2.5 mid-September and as low as 1.5 back to July and the likelihood that this is related to his Zometa, given that his bones are stable overall, he will increase from 1200 mg up to 1600 mg of calcium and phosphate a day and we will hold his Zometa for the foreseeable future.  He will see my nurse practitioner back in a month to continue to monitor his phosphorus along with his calcium and other labs and will repeat his scans again in 3 months.  In addition, given his fatigue we will check his ACTH and cortisol though he is taking his prednisone with his Zytiga.  Though his electrolytes are normal, I will keep an eye on the cortisol and ACTH and have these labs not only drawn today but again just prior to return to my nurse practitioner on February 10.    -2/10/2022 [facility] follow-up: Mr. Lugo overall is stable, no change in his health this past month.  I have reviewed his labs from 2/8/2022 and they are unremarkable, his phosphorus is now normal at 3.2. We are continuing to hold his Zometa, he last received Zometa on 10/5/2021.  He continues therapy with Zytiga, prednisone and Relugolix.  Dr. Rashid had ordered cortisol level and ACTH which are low, currently his ACTH is 2.8 and cortisol 3.08 however these are both done in the face of ongoing prednisone that he takes with his Zytiga.  We will get him to endocrinology for further evaluation for possible adrenal insufficiency but will not interrupt his treatment in the interim.  He will need restaging scans again in April for a 3-month follow-up.  He will continue to follow with palliative care and pain management for his cancer related pain.   His PSA remains immeasurably low at <0.1 on 2/8/22.    -2/15/2022 went to emergency room with weakness, decreased appetite, myalgias in the setting of known COVID-19 testing positive a few days prior to this visit.  Phosphorus had been low in the past but was normal in the emergency room with unremarkable CBC and CMP.  Chest x-ray unremarkable saturating well on room air mildly hypertensive with dry mucosa.  Given 500 cc crystalloid.  Covid test positive.  Mild thrombocytopenia 136,000 and otherwise unremarkable.  Discharged home.    -3/3/2022 data:  PSA less than 0.1  CMP unremarkable  Cortisol 3.96 normal  ACTH 2.8 lower limit of normal 7.2  Phosphorus normal 2.6    -3/8/2022 [facility] medical oncology follow-up: Suspect big chunk of his fatigue was Covid.  Another part of the fatigue likely related to lack of testosterone.  Not hypophosphatemic off of Zometa.  ACTH running low while on prednisone not surprising but with normal cortisol and I doubt adrenal insufficient which is why he is on prednisone to avoid such while taking Zytiga.  Overall doing fairly well and with immeasurably low PSA, I will have labs done on him early April, see my nurse practitioner early May, and she will order repeat bone scan and CTs the end of May and I will see him back in June.  We will continue to hold the Zometa unless bone lesions progress given symptomatic prior hypophosphatemia on Zometa.  He will keep his appointment April 28 with Dr. Mir Duffy just to be sure that my take on his pituitary/adrenal axis assessment is accurate.    -4/28/2022 endocrinology consult Dr. Mir Duffy.  With low ACTH and cortisol on prednisone with Zytiga, the adequacy of prednisone cannot be assessed by measuring ACTH or cortisol but is assessed by weight changes, blood pressure, blood sugar, potassium, overall sense of how the patient is doing.  Primary adrenal insufficiency with low ACTH and low cortisol would be typical for the situation as his  blood sugar tends to run a little high and potassium a little low, he did not think increasing prednisone was necessary.  He put him on some potassium.  -5/4/2022 [facility] Oncology clinic follow-up: Mr. Lugo continues on therapy with Zytiga and prednisone along with Relugolix.  His Zometa has been on hold due to previous hypophosphatemia.  There is some concern about potential adrenal insufficiency. He saw endocrinology, Dr. Mir Duffy on 4/28/2022.  I did review his office note and he stated that the low ACTH and low serum cortisol would be typical for Mr. Lugo's situation.  He further stated that the adequacy of prednisone dose cannot be assessed by measuring ACTH or cortisol but is rather assessed by the overall just altered how the patient is feeling-weight change, blood pressure, blood sugar, potassium, overall sense of wellbeing.  His potassium had been running a little low therefore they put him on potassium 10 mill equivalents daily. Of note, I do not see a future follow-up scheduled with endocrinology.  He is having night sweats, I am not sure if this is related.  His glucose was normal this morning at 107.  His weight is stable.  He will continue current treatment for his prostate cancer unchanged, we will continue to hold his Zometa.  Current phosphorus and magnesium are normal.  CBC and CMP from today are unremarkable, cortisol is normal.  PSA and ACTH are pending  In regards to his night sweats, he had taken clonidine previously 0.1 mg twice daily as needed, I did send in a short supply again to try and see if this helps.  He also is having indigestion despite being on omeprazole twice daily, I sent a prescription for Pepcid.  He had an EGD August 2021.  We will repeat restaging scans prior to return and I have ordered those today.     -5/24/2022 CT chest abdomen pelvis with contrast[place] negative.  Bone scan shows stable bone metastases.    - 5/25/2022 PSA less than 0.1, platelets 130,000, otherwise  unremarkable CBC and CMP.  A.m. cortisol running low 1.2 with phosphorus low 2.3.    -5/31/2022 [facility] medical oncology follow-up: On Zytiga, prednisone, Relugolix, PSA remaining immeasurably low with stable bone scan and no visceral/paulino metastases.  Phosphorus still running low.  I have discontinued his potassium chloride and started potassium phosphate 500 mg 4 times a day.  Unless PSA rising, we will plan on repeating CTs and bone scan in 6 months and will follow with my nurse practitioner in the interim and if symptoms dictate or labs, will get back to Dr. Duffy for management of potential adrenal insufficiency but presently we will just see how he does with potassium phosphate and hopeful improvement in the phosphorus level with time.  We will continue to follow with palliative care for pain management       Prostate cancer metastatic to bone (HCC)   8/30/2020 -  Other Event    PSA 10.8     9/15/2020 Initial Diagnosis    Prostate cancer metastatic to bone (CMS/HCC)     9/15/2020 Biopsy    Prostate needle core biopsy     9/24/2020 Imaging    Total body bone scan: 4 abnormal areas of increased activity consistent with osteoblastic metastasis, abnormal foci of activity seen in the T12 vertebral body, L1 vertebral body, roof of the left acetabulum, and acetabulum medially on the right.  CT chest: Stable sclerotic metastatic lesions within the T12 and L1 vertebrae.  No other evidence of metastatic disease to the chest.  Stable mild splenomegaly and subcentimeter periaortic retroperitoneal lymph nodes.  CT abdomen and pelvis: Diffuse bladder wall thickening with mild perivesicular fat stranding.  Interval development of several sclerotic lesions in the spine and left iliac bone.     10/13/2020 Cancer Staged    Staging form: Bone - Appendicular Skeleton, Trunk, Skull, And Facial Bones, AJCC 8th Edition  - Clinical: Stage IVB (cT3, cN0, cM1b, G3) - Signed by Ishmael Rashid MD on 10/13/2020     11/3/2020 -   Chemotherapy    OP SUPPORTIVE Zoledronic Acid Q42d     11/3/2020 - 2/18/2021 Chemotherapy    OP PROSTATE DOCEtaxel       1/4/2021 Imaging    CT chest abdomen pelvis with contrast and whole-body bone scan shows improving less metabolically active bone metastases.  Stable sclerotic T12, L1, and L2 vertebral bodies and bilateral pelvic bones on bone windows with stable subcentimeter para-aortic lymph nodes and no definite progression in the chest abdomen or pelvis.  PSA down to 0.275 after 3 courses of Taxotere.     3/4/2021 Imaging    CT chest, abdomen and pelvis stable, no evidence of disease progression. Total body bone scan with decreased/near resolution of abnormal increased radiotracer uptake associated with the known sclerotic lesions in the thoracolumbar spine and bony pelvis.  No evidence of new osteoblastic metastases.     3/4/2021 Imaging    CT chest abdomen pelvis with contrast is negative save for stable sclerotic bone lesions and the bone scan shows near resolution of prior bony abnormalities associated with the known sclerotic lesions in the thoracolumbar spine and bony pelvis.  2/17/2021 PSA down to 0.1 from 1.37 October 2020.     3/9/2021 - 3/9/2021 Chemotherapy    OP SUPPORTIVE PROSTATE Relugolix     3/9/2021 -  Chemotherapy    OP PROSTATE Abiraterone / PredniSONE       3/10/2021 -  Chemotherapy    OP PROSTATE Abiraterone / PredniSONE     8/10/2021 - 8/16/2021 Radiation    Radiation OncologyTreatment Course:  Naveen Lugo received 2000 cGy in 5 fractions to L4-sacrum, left acetabulum and right pelvis via External Beam Radiation - EBRT.     11/16/2021 -  Chemotherapy    OP CENTRAL VENOUS ACCESS DEVICE ACCESS, CARE, AND MAINTENANCE (CVAD)     1/5/2022 Imaging    -1/5/2022 CT chest abdomen pelvis with contrast [facility] showing stable left periaortic adenopathy and unchanged sclerotic thoracic, lumbar spine and pelvis lesions with no new or progressive disease in the chest abdomen or pelvis.   Total body bone scan shows no significant change and no new suspicious foci.  Stable posterior right acetabulum and left superior acetabulum and degenerative changes in the cervical spine, shoulders, acromioclavicular joints, sternoclavicular joints, elbows, wrists, hands, thoracic spine, lumbosacral spine, sacroiliac joints, hips, knees, ankles, feet.     5/24/2022 Imaging    CT chest abdomen pelvis with contrast[place] negative.  Bone scan shows stable bone metastases.         HISTORY OF PRESENT ILLNESS:  The patient is a 67 y.o. male, here for follow up on management of metastatic prostate cancer with drug-induced hypophosphatemia and possible drug-induced adrenal insufficiency.  Mr. Lugo reports ongoing fatigue and hot flashes.  He has been back on phosphorus for over a month now and feels it has added to his symptoms feeling a little more fatigue on it.  Otherwise no change in his health or new concerns since we saw him last.  His wife is with him at today's appointment and they are asking if any of his medications can be stopped or adjusted to help him feel better.  Pain under fairly good control, follows with Dr. Danny Avalos.    Past Medical History:   Diagnosis Date   • Bone cancer (HCC)    • History of radiation therapy 08/16/2021    L4 through sacrum, left acetabulum, right pelvis   • Nephrolithiasis    • Opioid use disorder, mild, on maintenance therapy (HCC)    • Prostate cancer (HCC)      Past Surgical History:   Procedure Laterality Date   • CHOLECYSTECTOMY     • CORONARY STENT PLACEMENT  206 & 2011   • HERNIA REPAIR  1986   • THORACIC DISCECTOMY  1994       Allergies   Allergen Reactions   • Cymbalta [Duloxetine Hcl] Dizziness   • Gabapentin Nausea Only   • Lyrica [Pregabalin] Nausea And Vomiting and Dizziness       Family History and Social History reviewed and changed as necessary    REVIEW OF SYSTEM:   Fatigue  Hot flashes  Chronic bone (low back and hips) pain stable.    PHYSICAL EXAM:  Well  "developed, well nourished, healthy appearing male in no distress, here with his wife today.  Lungs clear.    Heart regular rate and rhythm.    Vitals:    07/06/22 0956   BP: 148/61   Pulse: 90   Resp: 20   Temp: 97.7 °F (36.5 °C)   SpO2: 96%   Weight: 89.4 kg (197 lb)   Height: 175.3 cm (69\")     Vitals:    07/06/22 0956   PainSc:   8   PainLoc: Back  Comment: back and hips          ECOG score: 0           Vitals reviewed.  Labs reviewed. 6/27/2022 labs from [facility] reviewed at time of visit, CMP unremarkable glucose 136, sodium 138, potassium 3.5, BUN 6, creatinine 0.6, calcium 9.1, total bilirubin 0.5, AST 23, ALT 17, alkaline phosphatase 84.  Phosphorus normal at 2.7.  CBC normal with WBC 6.9, hemoglobin 15.2, hematocrit 44.6%, platelet count 123,000.  PSA was not drawn.    ECOG: (0) Fully Active - Able to Carry On All Pre-disease Performance Without Restriction      ASSESSMENT & PLAN:  1.  Stage IVb grade group 4 metastatic prostate cancer with sclerotic bone lesions on CT and bone scan and history of prostatic hypertrophy  2.  Kidney stone  3.  Polyps  4.  Probable drug-induced hypophosphatemia from Zometa, drug stopped after 10/5/2021 dose  5.  Cancer related pain  6.  Fatigue  7.  Covid 2/2022  8.  Potential adrenal insufficiency secondary to Zytiga and prednisone    -9/30/2020 office note from Dr. Ronen Reynaga indicates patient with PSA 10.8.  Underwent TRUS prostate biopsy 9/15/2020.  Adenocarcinoma the prostate [place] 4+4 = 8 in 1 out of 12 cores and 4+3 = 7 and 10 out of 12 cores and 3+4 = 7 in 1 out of 12 cores.  Perineural invasion.  Extraprostatic extension into the fat seen on 2 biopsies of the right lateral mid and right apex.  9/24/2020 CT chest and whole-body bone scan showed T12, L1, left acetabular, right medial acetabular metastases with no lung metastases.  He started androgen deprivation therapy with Casodex with plans for Lupron to start in 1 to 2 weeks for " clinical T3 N0 M1 metastatic prostate cancer.  Review of official report from [facility] 9/24/2020 bone scan mentions T12, L1, roof of left acetabulum and medial right acetabular osteoblastic metastases.  CT chest with contrast that same day showed mild splenomegaly 14.2 cm with stable periaortic nodes compared to CT December 2015 with no lung metastases.  Sclerotic abnormality within the T12 vertebral body, L1 vertebral body extending into the pedicle unchanged.  8/28/2020 CT abdomen pelvis report from [facility] reviewed and mentions normal spleen size.  Punctate nonobstructing kidney stone, no paulino enlargement, diffuse bladder wall thickening with mild perivesicular fat stranding, 2.1 cm sclerotic left iliac bone above the left acetabulum new compared to 2015 as well as 1.5 cm faintly sclerotic focus in the right posterior acetabulum stable compared to prior CT 2015 as well as a 1.6 cm T12 and inferior vertebral body sclerotic lesion and a 1.9 cm left posterior lateral L1 vertebral body abnormality extending to the pedicle.  -10/13/2020 initial [facility] medical oncology consultation: With 1 [place] score 8, 4+4 lesion that would make him a grade group 4 with stage IVb disease given radiographic evidence of sclerotic bone metastasis on bone scan and CT.  Needs genetic testing given metastatic prostate cancer and this is also important as, were he to have mismatch repair mutation then we would have options for PDL 1 inhibitors and were he BRCA mutated would have options for PARP inhibitors in the future.  Systemic therapy for castration naïve prostate cancer or N1 disease should be with apalutamide or Abiraterone or enzalutamide all of which are category 1.  He does not have any visceral metastases.  According to his imaging he has T12, L1, left acetabular, right acetabular, and left iliac bony involvement.  That means he would have 4 or more bone metastases with at least one metastasis (i.e. the  acetabular lesions bilaterally) beyond the pelvis/vertebral, for which this would be considered high-volume disease for which 6 cycles of docetaxel 75 mg/m² x 6 cycles followed by Zytiga and prednisone would be reasonable along with Zometa every 6 weeks for bone stabilization.  He will do chemo preparation visit with my nurse practitioner prior to start chemotherapy with Taxotere and Zometa in 2 weeks and he is already received Casodex in preparation for Lupron shot he received on 10/12/2020 with Dr. Reynaga.  We will get him to Dr. Alexander Gomez who has done his polypectomies in the past for port placement and we will get genetic counseling started.  Following the 6 courses of Taxotere per the [place] trial criteria, I would then give him an indefinite Zytiga and prednisone and Zometa until progression or toxicity dictates.    -12/16/2019 COVID-19 antigen test negative    -12/29/2020 PSA 0.275 with absolute neutrophil count 700 and creatinine 0.76 with normal liver enzymes and electrolytes.  Normal magnesium and phosphorus and urine drug screen positive for buprenorphine and opiates follow-up with palliative care.    -1/4/2021 CT chest abdomen pelvis with contrast and whole-body bone scan shows improving less metabolically active bone metastases.  Stable sclerotic T12, L1, and L2 vertebral bodies and bilateral pelvic bones on bone windows with stable subcentimeter para-aortic lymph nodes and no definite progression in the chest abdomen or pelvis.    -1/5/2021 [facility] medical oncology follow-up visit: I reviewed the images and reports thereof of the above CT and bone scan which shows good response to Taxotere x3 courses with a PSA down to 0.275 from a baseline of 10.  Get another 3 courses of Taxotere, continue his Xgeva, and then following that we will switch to Zytiga and prednisone.  He is working with palliative care on his bone pain but on his current dose of fentanyl patch he says his pain is still not  adequately controlled he will contact them.  Dexamethasone prescription was refilled.  We will see my nurse practitioner back in 3 weeks for course #5 of 6 total courses and then we will reimage with CT chest abdomen pelvis and bone scan before going to the Zytiga prednisone.    -3/4/2021 CT chest abdomen pelvis with contrast is negative save for stable sclerotic bone lesions and the bone scan shows near resolution of prior bony abnormalities associated with the known sclerotic lesions in the thoracolumbar spine and bony pelvis.    2/17/2021 PSA down to 0.1 from 1.37 October 2020.  3/9/2021 PSA 0.1    -5/26/2021 CT chest abdomen pelvis with contrast shows stable to slightly underlying increase low-density left para-aortic node.  Bone lesions stable.  Whole-body bone scan stable to decrease activity of known thoracolumbar and bony pelvic involvement.  PSA less than 0.1  , AST 74, bilirubin 0.5.       -6/1/2021 [facility] medical oncology follow-up visit: I reviewed the above data with him and images thereof.  Bones are stable.  No significant adenopathy.  PSA immeasurably low.     upper limit of normal 69 and AST 74 upper limit of normal 46.  Hence, neither is more than double the upper limit of normal with no ascites and no encephalopathy and normal albumin and bilirubin, despite the elevation of liver enzymes, he has child Abrams score A.  Package insert for Abiraterone says that for ALT and/or AST greater than 5 times upper limit of normal or total bilirubin greater than 3 times the upper limit of normal to withhold treatment until liver function tests returned to baseline or ALT and AST less than or equal to 2.5 times the upper limit of normal and total bilirubin less than or equal to 1.5 times the upper limit of normal and then reinitiate at 750 mg daily dose of Zytiga.  For recurrent hepatotoxicity on 750 mg daily Zytiga, withhold treatment until liver functions returned to baseline or ALT and AST less  than 3-2.5 times upper limit of normal and total bilirubin less than or equal to 1.5 times the upper limit of normal then reinitiate at 500 mg daily Zytiga dose.  If has recurrent hepatotoxicity on 500 mg dose, then discontinue treatment.  If has ALT greater than 3 times the upper limit of normal along with total bilirubin greater than 2 times the upper limit of normal in the absence of other contributing causes or biliary obstruction, permanently discontinue Zytiga.  Based on these recommendations, I would continue current doses but we will watch with serial labs monthly and repeat his imaging again in 3 months but clinically he is doing wonderfully with excellent response to Taxotere and now on Zytiga prednisone plus Zometa along with Relugolix.    -6/23/2020 for [facility] clinic follow-up: Having persistent and worsening pain above his left hip.  I will get an MRI of the left hip for further evaluation.  Otherwise we will continue therapy unchanged with Zytiga, prednisone and Zometa along with Relugolix.    -7/28/2021 [facility] follow-up: Patient with multiple somatic complaints including episodes of confusion, dizziness, decreased memory, agitation.  He reports ongoing episodes with difficulty swallowing.  Also most concerning for episodes of angina and chest pain for which he basically refused to seek emergency medical attention.  He has a history of coronary artery disease and stent placement.  He has not followed up with cardiology for many years.  I will get an MRI of the brain in light of his confusion, dizziness and agitation.  I will get him to gastroenterology for EGD in light of his dysphagia.  I have also put in a referral for cardiology.  I reemphasized to the patient, his wife who is with him today and his son who was on the telephone during our visit the importance of seeking out prompt medical attention when he is having chest pain or neurological concerns so that he can be  "evaluated.  The patient states that he basically refuses to go to the emergency room and if his family calls an ambulance he would not agree to go to the hospital.  For the time being, I have asked him to hold his Zytiga and prednisone until we can sort this out as Zytiga does have some cardiovascular risk.  There is likely no treatment for prostate cancer that does not carry some form of cardiovascular risk.  His PSA remains low at 0.014 yesterday.  He will continue relugolix for now.    Of note, some of these symptoms could also be due to his hypophosphatemia, phosphate level from yesterday was 1.5, I have sent in a prescription for K-Phos 3 times a day before meals for 10 days.  We will hold further Zometa until this is corrected.  I have also put in an order to check his vitamin D level.  He takes calcium and vitamin D daily.  Some of his symptoms also could be due to drug withdrawal as there was some confusion and difficulty getting his last prescription for methadone therefore he was without methadone for several days, he now has his prescription and is back on his pain medication.  He has an appointment with neurosurgery tomorrow in light of his ongoing back pain, MRI of the hip recently showed multiple bony lesions largest at the L5 vertebral body and left supra-acetabular region.  If no neurosurgical intervention recommended he may benefit from spot radiation as this is where a lot of his pain is coming from.  His wife had questions today regarding his staging and extent of disease.  We reviewed previous scans.  I did clarify with her as she used the words \"cancer free\" as she thought that is what she was told after his CT scans once he completed Taxotere in February.  I explained to her that even though his scans showed he had an excellent response with no clear areas of metastasis that did not mean he was cancer free, I explained to her that when you have metastatic cancer the treatment intent is " palliative in nature and to control the disease but not to cure the disease.  She stated understanding.  We will see him back in 2 weeks for follow-up to sort through this and make further plan of care, again, I have asked him to hold Zytiga and prednisone until he sees us back, he will continue on his other medications including relugolix.    -7/30/2021 neurosurgery PA consultation.  MRI with and without contrast L5 ordered.    -8/3/2021 neurosurgery follow-up showed known sclerotic disease with new L5 abnormality without compression deformity or instability.  MRI brain negative for metastasis.    -8/4/2021 office visit Dr. Fco Quintanilla radiation oncology coordinating with Dr. North neurosurgery for palliative radiation for MRI evidence of L5 and left supra acetabular painful lesions.  States that the patient's disease which is not secreting PSA is experiencing some progression and would benefit from 20 Gy in five fractions and other lesion 30 Gy in ten fractions. Patient offered to go to [place] for radiation but desired treatment in [place].    -8/10/2021 [facility] medical oncology follow-up visit: No chest pains at this junction.  Reviewed MRI brain and other MRIs reviewed by Dr. North and [place].  Due for EGD on 19th.  We will repeat his phosphorus along with his other labs continue Relugolix, Zytiga, prednisone, and he will continue radiation palliatively to the painful bony lesions with Dr. Quintanilla/Can.  He will see my nurse practitioner back in a few weeks to see how he is tolerating this and to follow-up on the phosphorus off of Zometa and she will order repeat CT chest abdomen pelvis with contrast and total body bone scan the end of September.I will see him back after that.    -9/8/2021 Zometa resumed.  PSA <0.10    -10/5/2021 [facility] medical oncology follow-up visit: followed-up with radiation oncology 9/21/2021.  Status post symptomatic L5 radiation 5 fractions 20 [place] completed 8/16/2021 with  improvement in right hip pain.  9/2/2021 PSA less than 0.1.  9/29/2021 total body bone scan shows increased uptake left iliac bone slightly superior group of the left acetabulum with no additional foci of suspicious abnormality is unlikely treatment related with no new sites of active osseous metastasis.  CT chest abdomen pelvis with contrast shows stable borderline enlarged left periaortic nodes and dating sclerotic bone lesions.Continue Zytiga, prednisone, Relugolix, Zometa, repeat CT chest abdomen pelvis with contrast and total body bone scan the end of the year.  We will follow PSA serially.    -11/17/2021 [facility] Oncology clinic follow-up:  Mr. Lugo overall is doing well.  He is tolerating therapy with Zytiga, prednisone and Relugolix along with Zometa which is given every 6 weeks.  PSA remains low at <0.1.  Has occasional low phosphorus, currently low at 1.7.  Serum calcium is normal at 8.9, normal creatinine 0.79 and normal CBC other than for platelets of 124.  I will hold Zometa for 1 month, I did send in a prescription for phosphorus replacement today.  He takes calcium and vitamin D.  I will see him back in 1 month for follow-up.  We will repeat restaging scans after that visit.    -12/15/2021 [facility] Oncology clinic follow-up: Mr. Lugo continues to do well on therapy with Zytiga, prednisone and Relugolix, his PSA remains low at <0.1.  He is continuing to have left lower back pain, he is working with palliative care and they have referred him also to pain management.  Pain management has talked to him about putting in a pain pump however he is leery of this, I told him that this was a safe and effective pain management technique and to certainly give consideration to their recommendations.  His phosphorus is improving however is still a little low, I will hold off on Zometa until we see him back in 1 month, we may end up only giving it every 12 weeks.  We will repeat restaging scans with CT chest, abdomen  and pelvis along with total body bone scan prior to return and I have ordered those today.      -1/5/2022 CT chest abdomen pelvis with contrast [facility] showing stable left periaortic adenopathy and unchanged sclerotic thoracic, lumbar spine and pelvis lesions with no new or progressive disease in the chest abdomen or pelvis.  Total body bone scan shows no significant change and no new suspicious foci.  Stable posterior right acetabulum and left superior acetabulum and degenerative changes in the cervical spine, shoulders, acromioclavicular joints, sternoclavicular joints, elbows, wrists, hands, thoracic spine, lumbosacral spine, sacroiliac joints, hips, knees, ankles, feet.    -1/11/2022 [facility] medical oncology hematology follow-up visit: I reviewed images and reports from his 1/5/2022 scans.  No active disease and PSA immmeasurably low on Zytiga, prednisone, Relugolix.  However, he has struggled with hypophosphatemia and is significantly fatigued with this.  Given that the bones are doing well and given that his phosphorus continues to remain consistently low at 2.2 in mid December, 1.7 mid November and 2.5 mid-September and as low as 1.5 back to July and the likelihood that this is related to his Zometa, given that his bones are stable overall, he will increase from 1200 mg up to 1600 mg of calcium and phosphate a day and we will hold his Zometa for the foreseeable future.  He will see my nurse practitioner back in a month to continue to monitor his phosphorus along with his calcium and other labs and will repeat his scans again in 3 months.  In addition, given his fatigue we will check his ACTH and cortisol though he is taking his prednisone with his Zytiga.  Though his electrolytes are normal, I will keep an eye on the cortisol and ACTH and have these labs not only drawn today but again just prior to return to my nurse practitioner on February 10.    -2/10/2022 [facility] Oncology clinic follow-up:   Parish overall is stable, no change in his health this past month.  I have reviewed his labs from 2/8/2022 and they are unremarkable, his phosphorus is now normal at 3.2. We are continuing to hold his Zometa, he last received Zometa on 10/5/2021.  He continues therapy with Zytiga, prednisone and Relugolix.  Dr. Rashid had ordered cortisol level and ACTH which are low, currently his ACTH is 2.8 and cortisol 3.08 however these are both done in the face of ongoing prednisone that he takes with his Zytiga.  We will get him to endocrinology for further evaluation for possible adrenal insufficiency but will not interrupt his treatment in the interim.  He will need restaging scans again in April for a 3-month follow-up.  He will continue to follow with palliative care and pain management for his cancer related pain.  His PSA remains immeasurably low at <0.1 on 2/8/22.    -2/15/2022 went to emergency room with weakness, decreased appetite, myalgias in the setting of known COVID-19 testing positive a few days prior to this visit.  Phosphorus had been low in the past but was normal in the emergency room with unremarkable CBC and CMP.  Chest x-ray unremarkable saturating well on room air mildly hypertensive with dry mucosa.  Given 500 cc crystalloid.  Covid test positive.  Mild thrombocytopenia 136,000 and otherwise unremarkable.  Discharged home.    -3/3/2022 data:  PSA less than 0.1  CMP unremarkable  Cortisol 3.96 normal  ACTH 2.8 lower limit of normal 7.2  Phosphorus normal 2.6    -3/8/2022 [facility] medical oncology follow-up: Suspect big chunk of his fatigue was Covid.  Another part of the fatigue likely related to lack of testosterone.  Not hypophosphatemic off of Zometa.  ACTH running low while on prednisone not surprising but with normal cortisol and I doubt adrenal insufficient which is why he is on prednisone to avoid such while taking Zytiga.  Overall doing fairly well and with immeasurably low PSA, I will have labs  done on him early April, see my nurse practitioner early May, and she will order repeat bone scan and CTs the end of May and I will see him back in June.  We will continue to hold the Zometa unless bone lesions progress given symptomatic prior hypophosphatemia on Zometa.  He will keep his appointment April 28 with Dr. Mir Duffy just to be sure that my take on his pituitary/adrenal axis assessment is accurate.    -4/28/2022 endocrinology consult Dr. Mir Duffy.  With low ACTH and cortisol on prednisone with Zytiga, the adequacy of prednisone cannot be assessed by measuring ACTH or cortisol but is assessed by weight changes, blood pressure, blood sugar, potassium, overall sense of how the patient is doing.  Primary adrenal insufficiency with low ACTH and low cortisol would be typical for the situation as his blood sugar tends to run a little high and potassium a little low, he did not think increasing prednisone was necessary.  He put him on some potassium.    -5/4/2022 [facility] follow-up: Mr. Lugo continues on therapy with Zytiga and prednisone along with Relugolix.  His Zometa has been on hold due to previous hypophosphatemia.  There is some concern about potential adrenal insufficiency. He saw endocrinology, Dr. Mir Duffy on 4/28/2022.  I did review his office note and he stated that the low ACTH and low serum cortisol would be typical for Mr. Lugo's situation.  He further stated that the adequacy of prednisone dose cannot be assessed by measuring ACTH or cortisol but is rather assessed by the overall just altered how the patient is feeling-weight change, blood pressure, blood sugar, potassium, overall sense of wellbeing.  His potassium had been running a little low therefore they put him on potassium 10 mill equivalents daily. Of note, I do not see a future follow-up scheduled with endocrinology.  He is having night sweats, I am not sure if this is related.  His glucose was normal this morning at  107.  His weight is stable.  He will continue current treatment for his prostate cancer unchanged, we will continue to hold his Zometa.  Current phosphorus and magnesium are normal.  CBC and CMP from today are unremarkable, cortisol is normal.  PSA and ACTH are pending  In regards to his night sweats, he had taken clonidine previously 0.1 mg twice daily as needed, I did send in a short supply again to try and see if this helps.  He also is having indigestion despite being on omeprazole twice daily, I sent a prescription for Pepcid.  He had an EGD August 2021.  We will repeat restaging scans prior to return and I have ordered those today.     -5/24/2022 CT chest abdomen pelvis with contrastFrankfort negative.  Bone scan shows stable bone metastases.    - 5/25/2022 PSA less than 0.1, platelets 130,000, otherwise unremarkable CBC and CMP.  A.m. cortisol running low 1.2 with phosphorus low 2.3.    -5/31/2022 [facility] follow-up: On Zytiga, prednisone, Relugolix, PSA remaining immeasurably low with stable bone scan and no visceral/paulino metastases.  Phosphorus still running low.  I have discontinued his potassium chloride and started potassium phosphate 500 mg 4 times a day.  Unless PSA rising, we will plan on repeating CTs and bone scan in 6 months and will follow with my nurse practitioner in the interim and if symptoms dictate or labs, will get back to Dr. Duffy for management of potential adrenal insufficiency but presently we will just see how he does with potassium phosphate and hopeful improvement in the phosphorus level with time.  We will continue to follow with palliative care for pain management    -7/6/2022 [facility] follow-up: Mr. Lugo overall is doing well but continues to be troubled with fatigue and hot flashes.  For the most part he states he is able to do the things he wants to do, he continues to work but does have to take more rest periods.  I had a long discussion with  him and his wife after reviewing his medications with them as they wanted to see if there was anything he could stop or adjust.  I discussed with him the need to continue on treatment for his prostate cancer even though his PSA remains immeasurably low and his scans look good but that if his medication is stopped the cancer would progress and over time it still has the potential to progress on therapy but would continue it as long as possible to keep his disease under control.  I explained this is like treating someone with hypertension, you do not stop the antihypertensive just because the blood pressure normalizes.  They stated understanding.  There is no dose adjustment of Zytiga or prednisone that would minimize his symptoms, he is on the current standard recommended therapy.  Discussed with him that sometimes during times of stress we may need to increase his prednisone dose but for now would not change anything.  His phosphorus level is normal, we continue to hold his Zometa.  I did give him the option of holding his phosphorus for the next month and we will see what happens, if it drops we will start him back on it but may be at a lower dose rather than 4 times a day maybe twice a day.  For now he will continue therapy unchanged.  We will continue monitoring labs monthly.  No PSA was drawn this month but that is okay as his PSA has been running immeasurably low at <0.1, we will recheck next month with lab draw.  We will stop checking his ACTH and cortisol level every month, if he develops worsening symptoms I will repeat those.  He has not gotten a follow-up with endocrinology as of yet.    Return to clinic in 1 month for follow-up    I spent 40 minutes caring for Naveen on this date of service. This time includes time spent by me in the following activities: preparing for the visit, reviewing tests, obtaining and/or reviewing a separately obtained history, performing a medically appropriate examination and/or  evaluation, counseling and educating the patient/family/caregiver, ordering medications, tests, or procedures, documenting information in the medical record and independently interpreting results and communicating that information with the patient/family/caregiver.     Abena Velasquez, APRN    07/06/2022

## 2022-07-22 ENCOUNTER — SPECIALTY PHARMACY (OUTPATIENT)
Dept: ONCOLOGY | Facility: HOSPITAL | Age: 67
End: 2022-07-22

## 2022-07-22 DIAGNOSIS — C79.51 PROSTATE CANCER METASTATIC TO BONE: ICD-10-CM

## 2022-07-22 DIAGNOSIS — C61 PROSTATE CANCER METASTATIC TO BONE: ICD-10-CM

## 2022-07-22 RX ORDER — ABIRATERONE 500 MG/1
1000 TABLET ORAL DAILY
Qty: 60 TABLET | Refills: 5 | Status: SHIPPED | OUTPATIENT
Start: 2022-07-22 | End: 2022-12-22 | Stop reason: SDUPTHER

## 2022-07-22 NOTE — PROGRESS NOTES
Specialty Pharmacy Refill Coordination Note     Naveen is a 67 y.o. male contacted today regarding refills of  relugolix 120mg PO QD, abiraterone 1000mg PO QD and prednisone 5mg PO QD specialty medication(s).      Specialty medication(s) and dose(s) confirmed: yes    Refill Questions    Flowsheet Row Most Recent Value   Changes to allergies? No   Changes to medications? No   New conditions since last clinic visit No   Unplanned office visit, urgent care, ED, or hospital admission in the last 4 weeks  No   How does patient/caregiver feel medication is working? Very good   Financial problems or insurance changes  No   Since the previous refill, were any specialty medication doses or scheduled injections missed or delayed?  No   Does this patient require a clinical escalation to a pharmacist? No          Delivery Questions    Flowsheet Row Most Recent Value   Delivery method [employer]   Delivery address correct? Yes   Delivery phone number [phone]   Preferred delivery time? Anytime   Number of medications in delivery 3   Medication being filled and delivered relugolix, zytiga and prednisone   Doses left of specialty medications 8 days left   Is there any medication that is due not being filled? No   Supplies needed? No supplies needed   Cooler needed? No   Do any medications need mixed or dated? No   Additional comments no copay   Questions or concerns for the pharmacist? No   Explain any questions or concerns for the pharmacist n/a   Are any medications first time fills? No            Medication Adherence    Adherence tools used: watch   Other adherence tool: Clock - takes at same time each day, 7:45am   Support network for adherence: family member          Follow-up: 28 day(s)     Sushma Muro, Pharmacy Technician  Specialty Pharmacy Technician

## 2022-08-02 ENCOUNTER — INFUSION (OUTPATIENT)
Dept: ONCOLOGY | Facility: HOSPITAL | Age: 67
End: 2022-08-02

## 2022-08-02 VITALS
RESPIRATION RATE: 17 BRPM | SYSTOLIC BLOOD PRESSURE: 165 MMHG | DIASTOLIC BLOOD PRESSURE: 74 MMHG | WEIGHT: 195.4 LBS | TEMPERATURE: 98 F | BODY MASS INDEX: 28.86 KG/M2 | HEART RATE: 82 BPM

## 2022-08-02 DIAGNOSIS — C79.51 PROSTATE CANCER METASTATIC TO BONE: Primary | ICD-10-CM

## 2022-08-02 DIAGNOSIS — Z45.2 ENCOUNTER FOR CARE RELATED TO PORT-A-CATH: ICD-10-CM

## 2022-08-02 DIAGNOSIS — Z51.81 THERAPEUTIC DRUG MONITORING: ICD-10-CM

## 2022-08-02 DIAGNOSIS — C61 PROSTATE CANCER METASTATIC TO BONE: Primary | ICD-10-CM

## 2022-08-02 PROCEDURE — 36591 DRAW BLOOD OFF VENOUS DEVICE: CPT

## 2022-08-02 PROCEDURE — 25010000002 HEPARIN LOCK FLUSH PER 10 UNITS: Performed by: INTERNAL MEDICINE

## 2022-08-02 RX ORDER — HEPARIN SODIUM (PORCINE) LOCK FLUSH IV SOLN 100 UNIT/ML 100 UNIT/ML
500 SOLUTION INTRAVENOUS AS NEEDED
Status: DISCONTINUED | OUTPATIENT
Start: 2022-08-02 | End: 2022-08-02 | Stop reason: HOSPADM

## 2022-08-02 RX ORDER — HEPARIN SODIUM (PORCINE) LOCK FLUSH IV SOLN 100 UNIT/ML 100 UNIT/ML
500 SOLUTION INTRAVENOUS AS NEEDED
Status: CANCELLED | OUTPATIENT
Start: 2022-08-02

## 2022-08-02 RX ORDER — SODIUM CHLORIDE 0.9 % (FLUSH) 0.9 %
10 SYRINGE (ML) INJECTION AS NEEDED
Status: CANCELLED | OUTPATIENT
Start: 2022-08-02

## 2022-08-02 RX ADMIN — HEPARIN 500 UNITS: 100 SYRINGE at 09:50

## 2022-08-03 ENCOUNTER — OFFICE VISIT (OUTPATIENT)
Dept: ONCOLOGY | Facility: CLINIC | Age: 67
End: 2022-08-03

## 2022-08-03 ENCOUNTER — APPOINTMENT (OUTPATIENT)
Dept: ONCOLOGY | Facility: HOSPITAL | Age: 67
End: 2022-08-03

## 2022-08-03 VITALS
TEMPERATURE: 96.9 F | SYSTOLIC BLOOD PRESSURE: 143 MMHG | HEIGHT: 69 IN | WEIGHT: 197 LBS | BODY MASS INDEX: 29.18 KG/M2 | OXYGEN SATURATION: 97 % | HEART RATE: 80 BPM | RESPIRATION RATE: 20 BRPM | DIASTOLIC BLOOD PRESSURE: 68 MMHG

## 2022-08-03 DIAGNOSIS — C61 PROSTATE CANCER METASTATIC TO BONE: Primary | ICD-10-CM

## 2022-08-03 DIAGNOSIS — C79.51 PROSTATE CANCER METASTATIC TO BONE: Primary | ICD-10-CM

## 2022-08-03 DIAGNOSIS — G89.3 PAIN, CANCER: ICD-10-CM

## 2022-08-03 LAB
ALBUMIN SERPL-MCNC: 3.8 G/DL (ref 3.8–4.8)
ALBUMIN/GLOB SERPL: 1.9 {RATIO} (ref 1.2–2.2)
ALP SERPL-CCNC: 80 IU/L (ref 44–121)
ALT SERPL-CCNC: 13 IU/L (ref 0–44)
AST SERPL-CCNC: 15 IU/L (ref 0–40)
BASOPHILS # BLD AUTO: 0 X10E3/UL (ref 0–0.2)
BASOPHILS NFR BLD AUTO: 0 %
BILIRUB SERPL-MCNC: 0.3 MG/DL (ref 0–1.2)
BUN SERPL-MCNC: 7 MG/DL (ref 8–27)
BUN/CREAT SERPL: 9 (ref 10–24)
CALCIUM SERPL-MCNC: 8.7 MG/DL (ref 8.6–10.2)
CHLORIDE SERPL-SCNC: 104 MMOL/L (ref 96–106)
CO2 SERPL-SCNC: 25 MMOL/L (ref 20–29)
CREAT SERPL-MCNC: 0.82 MG/DL (ref 0.76–1.27)
EGFRCR SERPLBLD CKD-EPI 2021: 96 ML/MIN/1.73
EOSINOPHIL # BLD AUTO: 0.1 X10E3/UL (ref 0–0.4)
EOSINOPHIL NFR BLD AUTO: 2 %
ERYTHROCYTE [DISTWIDTH] IN BLOOD BY AUTOMATED COUNT: 12.8 % (ref 11.6–15.4)
GLOBULIN SER CALC-MCNC: 2 G/DL (ref 1.5–4.5)
GLUCOSE SERPL-MCNC: 104 MG/DL (ref 65–99)
HCT VFR BLD AUTO: 38.9 % (ref 37.5–51)
HGB BLD-MCNC: 12.6 G/DL (ref 13–17.7)
IMM GRANULOCYTES # BLD AUTO: 0 X10E3/UL (ref 0–0.1)
IMM GRANULOCYTES NFR BLD AUTO: 0 %
LYMPHOCYTES # BLD AUTO: 1.2 X10E3/UL (ref 0.7–3.1)
LYMPHOCYTES NFR BLD AUTO: 23 %
MCH RBC QN AUTO: 28.6 PG (ref 26.6–33)
MCHC RBC AUTO-ENTMCNC: 32.4 G/DL (ref 31.5–35.7)
MCV RBC AUTO: 88 FL (ref 79–97)
MONOCYTES # BLD AUTO: 0.3 X10E3/UL (ref 0.1–0.9)
MONOCYTES NFR BLD AUTO: 6 %
NEUTROPHILS # BLD AUTO: 3.3 X10E3/UL (ref 1.4–7)
NEUTROPHILS NFR BLD AUTO: 69 %
PHOSPHATE SERPL-MCNC: 2.7 MG/DL (ref 2.8–4.1)
PLATELET # BLD AUTO: 141 X10E3/UL (ref 150–450)
POTASSIUM SERPL-SCNC: 3.6 MMOL/L (ref 3.5–5.2)
PROT SERPL-MCNC: 5.8 G/DL (ref 6–8.5)
PSA SERPL-MCNC: <0.1 NG/ML (ref 0–4)
PSA SERPL-MCNC: <0.1 NG/ML (ref 0–4)
RBC # BLD AUTO: 4.4 X10E6/UL (ref 4.14–5.8)
SODIUM SERPL-SCNC: 142 MMOL/L (ref 134–144)
WBC # BLD AUTO: 4.9 X10E3/UL (ref 3.4–10.8)

## 2022-08-03 PROCEDURE — 99214 OFFICE O/P EST MOD 30 MIN: CPT | Performed by: NURSE PRACTITIONER

## 2022-08-03 NOTE — PROGRESS NOTES
CHIEF COMPLAINT:  1. Metastatic prostate cancer with drug-induced hypophosphatemia and possible drug-induced adrenal insufficiency  2. Pain    Problem List:  Oncology/Hematology History Overview Note   1.  Stage IVb grade group 4 metastatic prostate cancer with sclerotic bone lesions on CT and bone scan and history of prostatic hypertrophy  2.  Kidney stone  3.  Polyps  4.  Probable drug-induced hypophosphatemia from Zometa, drug stopped after 10/5/2021 dose  5.  Cancer related pain  6.  Fatigue  7.  Covid 2/2022    -9/30/2020 office note from Dr. Ronen Reynaga indicates patient with PSA 10.8.  Underwent TRUS prostate biopsy 9/15/2020.  Adenocarcinoma the prostate Gerber 4+4 = 8 in 1 out of 12 cores and 4+3 = 7 and 10 out of 12 cores and 3+4 = 7 in 1 out of 12 cores.  Perineural invasion.  Extraprostatic extension into the fat seen on 2 biopsies of the right lateral mid and right apex.  9/24/2020 CT chest and whole-body bone scan showed T12, L1, left acetabular, right medial acetabular metastases with no lung metastases.  He started androgen deprivation therapy with Casodex with plans for Lupron to start in 1 to 2 weeks for clinical T3 N0 M1 metastatic prostate cancer.  Review of official report from [facility] 9/24/2020 bone scan mentions T12, L1, roof of left acetabulum and medial right acetabular osteoblastic metastases.  CT chest with contrast that same day showed mild splenomegaly 14.2 cm with stable periaortic nodes compared to CT December 2015 with no lung metastases.  Sclerotic abnormality within the T12 vertebral body, L1 vertebral body extending into the pedicle unchanged.  8/28/2020 CT abdomen pelvis report from [facility] reviewed and mentions normal spleen size.  Punctate nonobstructing kidney stone, no paulino enlargement, diffuse bladder wall thickening with mild perivesicular fat stranding, 2.1 cm sclerotic left iliac bone above the left acetabulum new compared to 2015 as well as  1.5 cm faintly sclerotic focus in the right posterior acetabulum stable compared to prior CT 2015 as well as a 1.6 cm T12 and inferior vertebral body sclerotic lesion and a 1.9 cm left posterior lateral L1 vertebral body abnormality extending to the pedicle.  -10/13/2020 initial [facility] medical oncology consultation: With 1 Sarika score 8, 4+4 lesion that would make him a grade group 4 with stage IVb disease given radiographic evidence of sclerotic bone metastasis on bone scan and CT.  Needs genetic testing given metastatic prostate cancer and this is also important as, were he to have mismatch repair mutation then we would have options for PDL 1 inhibitors and were he BRCA mutated would have options for PARP inhibitors in the future.  Systemic therapy for castration naïve prostate cancer or N1 disease should be with apalutamide or Abiraterone or enzalutamide all of which are category 1.  He does not have any visceral metastases.  According to his imaging he has T12, L1, left acetabular, right acetabular, and left iliac bony involvement.  That means he would have 4 or more bone metastases with at least one metastasis (i.e. the acetabular lesions bilaterally) beyond the pelvis/vertebral, for which this would be considered high-volume disease for which 6 cycles of docetaxel 75 mg/m² x 6 cycles followed by Zytiga and prednisone would be reasonable along with Zometa every 6 weeks for bone stabilization.  He will do chemo preparation visit with my nurse practitioner prior to start chemotherapy with Taxotere and Zometa in 2 weeks and he is already received Casodex in preparation for Lupron shot he received on 10/12/2020 with Dr. Reynaga.  We will get him to Dr. Alexander Gomez who has done his polypectomies in the past for port placement and we will get genetic counseling started.  Following the 6 courses of Taxotere per the Chaarted trial criteria, I would then give him an indefinite Zytiga and prednisone and Zometa until  progression or toxicity dictates.    -12/16/2019 COVID-19 antigen test negative    -12/29/2020 PSA 0.275 with absolute neutrophil count 700 and creatinine 0.76 with normal liver enzymes and electrolytes.  Normal magnesium and phosphorus and urine drug screen positive for buprenorphine and opiates follow-up with palliative care.    -1/4/2021 CT chest abdomen pelvis with contrast and whole-body bone scan shows improving less metabolically active bone metastases.  Stable sclerotic T12, L1, and L2 vertebral bodies and bilateral pelvic bones on bone windows with stable subcentimeter para-aortic lymph nodes and no definite progression in the chest abdomen or pelvis.    -1/5/2021 [facility] medical oncology follow-up visit: I reviewed the images and reports thereof of the above CT and bone scan which shows good response to Taxotere x3 courses with a PSA down to 0.275 from a baseline of 10.  Get another 3 courses of Taxotere, continue his Xgeva, and then following that we will switch to Zytiga and prednisone.  He is working with palliative care on his bone pain but on his current dose of fentanyl patch he says his pain is still not adequately controlled he will contact them.  Dexamethasone prescription was refilled.  We will see my nurse practitioner back in 3 weeks for course #5 of 6 total courses and then we will reimage with CT chest abdomen pelvis and bone scan before going to the Zytiga prednisone.    -3/4/2021 CT chest abdomen pelvis with contrast is negative save for stable sclerotic bone lesions and the bone scan shows near resolution of prior bony abnormalities associated with the known sclerotic lesions in the thoracolumbar spine and bony pelvis.    2/17/2021 PSA down to 0.1 from 1.37 October 2020.  3/9/2021 PSA 0.1    -5/26/2021 CT chest abdomen pelvis with contrast shows stable to slightly underlying increase low-density left para-aortic node.  Bone lesions stable.  Whole-body bone scan stable to decrease activity  of known thoracolumbar and bony pelvic involvement.  PSA less than 0.1  , AST 74, bilirubin 0.5.       -6/1/2021 [facility] medical oncology follow-up visit: I reviewed the above data with him and images thereof.  Bones are stable.  No significant adenopathy.  PSA immeasurably low.     upper limit of normal 69 and AST 74 upper limit of normal 46.  Hence, neither is more than double the upper limit of normal with no ascites and no encephalopathy and normal albumin and bilirubin, despite the elevation of liver enzymes, he has child Abrams score A.  Package insert for Abiraterone says that for ALT and/or AST greater than 5 times upper limit of normal or total bilirubin greater than 3 times the upper limit of normal to withhold treatment until liver function tests returned to baseline or ALT and AST less than or equal to 2.5 times the upper limit of normal and total bilirubin less than or equal to 1.5 times the upper limit of normal and then reinitiate at 750 mg daily dose of Zytiga.  For recurrent hepatotoxicity on 750 mg daily Zytiga, withhold treatment until liver functions returned to baseline or ALT and AST less than 3-2.5 times upper limit of normal and total bilirubin less than or equal to 1.5 times the upper limit of normal then reinitiate at 500 mg daily Zytiga dose.  If has recurrent hepatotoxicity on 500 mg dose, then discontinue treatment.  If has ALT greater than 3 times the upper limit of normal along with total bilirubin greater than 2 times the upper limit of normal in the absence of other contributing causes or biliary obstruction, permanently discontinue Zytiga.  Based on these recommendations, I would continue current doses but we will watch with serial labs monthly and repeat his imaging again in 3 months but clinically he is doing wonderfully with excellent response to Taxotere and now on Zytiga prednisone plus Zometa along with Relugolix.    -6/23/2020 for [facility] oncology clinic  follow-up: Having persistent and worsening pain above his left hip.  I will get an MRI of the left hip for further evaluation.  Otherwise we will continue therapy unchanged with Zytiga, prednisone and Zometa along with Relugolix.    -7/28/2021 [facility] oncology clinic follow-up: Patient with multiple somatic complaints including episodes of confusion, dizziness, decreased memory, agitation.  He reports ongoing episodes with difficulty swallowing.  Also most concerning for episodes of angina and chest pain for which he basically refused to seek emergency medical attention.  He has a history of coronary artery disease and stent placement.  He has not followed up with cardiology for many years.  I will get an MRI of the brain in light of his confusion, dizziness and agitation.  I will get him to gastroenterology for EGD in light of his dysphagia.  I have also put in a referral for cardiology.  I reemphasized to the patient, his wife who is with him today and his son who was on the telephone during our visit the importance of seeking out prompt medical attention when he is having chest pain or neurological concerns so that he can be evaluated.  The patient states that he basically refuses to go to the emergency room and if his family calls an ambulance he would not agree to go to the hospital.  For the time being, I have asked him to hold his Zytiga and prednisone until we can sort this out as Zytiga does have some cardiovascular risk.  There is likely no treatment for prostate cancer that does not carry some form of cardiovascular risk.  His PSA remains low at 0.014 yesterday.  He will continue relugolix for now.    Of note, some of these symptoms could also be due to his hypophosphatemia, phosphate level from yesterday was 1.5, I have sent in a prescription for K-Phos 3 times a day before meals for 10 days.  We will hold further Zometa until this is corrected.  I have also put in an order to check his vitamin D level.   "He takes calcium and vitamin D daily.  Some of his symptoms also could be due to drug withdrawal as there was some confusion and difficulty getting his last prescription for methadone therefore he was without methadone for several days, he now has his prescription and is back on his pain medication.  He has an appointment with neurosurgery tomorrow in light of his ongoing back pain, MRI of the hip recently showed multiple bony lesions largest at the L5 vertebral body and left supra-acetabular region.  If no neurosurgical intervention recommended he may benefit from spot radiation as this is where a lot of his pain is coming from.  His wife had questions today regarding his staging and extent of disease.  We reviewed previous scans.  I did clarify with her as she used the words \"cancer free\" as she thought that is what she was told after his CT scans once he completed Taxotere in February.  I explained to her that even though his scans showed he had an excellent response with no clear areas of metastasis that did not mean he was cancer free, I explained to her that when you have metastatic cancer the treatment intent is palliative in nature and to control the disease but not to cure the disease.  She stated understanding.  We will see him back in 2 weeks for follow-up to sort through this and make further plan of care, again, I have asked him to hold Zytiga and prednisone until he sees us back, he will continue on his other medications including relugolix.    -7/30/2021 neurosurgery PA consultation.  MRI with and without contrast L5 ordered.    -8/3/2021 neurosurgery follow-up showed known sclerotic disease with new L5 abnormality without compression deformity or instability.  MRI brain negative for metastasis.    -8/4/2021 office visit Dr. Fco Quintanilla radiation oncology coordinating with Dr. Can forde for palliative radiation for MRI evidence of L5 and left supra acetabular painful lesions.  States that the " patient's disease which is not secreting PSA is experiencing some progression and would benefit from 20 Gy in five fractions and other lesion 30 Gy in ten fractions. Patient offered to go to [place] for radiation but desired treatment in [place].    -8/10/2021 [facility] medical oncology follow-up visit: No chest pains at this junction.  Reviewed MRI brain and other MRIs reviewed by Dr. North and Dwight.  Due for EGD on 19th.  We will repeat his phosphorus along with his other labs continue Relugolix, Zytiga, prednisone, and he will continue radiation palliatively to the painful bony lesions with Dr. Quintanilla/Can.  He will see my nurse practitioner back in a few weeks to see how he is tolerating this and to follow-up on the phosphorus off of Zometa and she will order repeat CT chest abdomen pelvis with contrast and total body bone scan the end of September.I will see him back after that.    -9/8/2021 Zometa resumed.  PSA <0.10    -10/5/2021 [facility] medical oncology follow-up visit: followed-up with radiation oncology 9/21/2021.  Status post symptomatic L5 radiation 5 fractions 20 [place] completed 8/16/2021 with improvement in right hip pain.  9/2/2021 PSA less than 0.1.  9/29/2021 total body bone scan shows increased uptake left iliac bone slightly superior group of the left acetabulum with no additional foci of suspicious abnormality is unlikely treatment related with no new sites of active osseous metastasis.  CT chest abdomen pelvis with contrast shows stable borderline enlarged left periaortic nodes and dating sclerotic bone lesions.Continue Zytiga, prednisone, Relugolix, Zometa, repeat CT chest abdomen pelvis with contrast and total body bone scan the end of the year.  We will follow PSA serially.    -11/17/2021 [facility] clinic follow-up:  Mr. Lugo overall is doing well.  He is tolerating therapy with Zytiga, prednisone and Relugolix along with Zometa which is given every 6 weeks.  PSA remains low at  <0.1.  Has occasional low phosphorus, currently low at 1.7.  Serum calcium is normal at 8.9, normal creatinine 0.79 and normal CBC other than for platelets of 124.  I will hold Zometa for 1 month, I did send in a prescription for phosphorus replacement today.  He takes calcium and vitamin D.  I will see him back in 1 month for follow-up.  We will repeat restaging scans after that visit.    -12/15/2021 [facility] Oncology clinic follow-up: Mr. Lugo continues to do well on therapy with Zytiga, prednisone and Relugolix, his PSA remains low at <0.1.  He is continuing to have left lower back pain, he is working with palliative care and they have referred him also to pain management.  Pain management has talked to him about putting in a pain pump however he is leery of this, I told him that this was a safe and effective pain management technique and to certainly give consideration to their recommendations.  His phosphorus is improving however is still a little low, I will hold off on Zometa until we see him back in 1 month, we may end up only giving it every 12 weeks.  We will repeat restaging scans with CT chest, abdomen and pelvis along with total body bone scan prior to return and I have ordered those today.      -1/5/2022 CT chest abdomen pelvis with contrast [facility] showing stable left periaortic adenopathy and unchanged sclerotic thoracic, lumbar spine and pelvis lesions with no new or progressive disease in the chest abdomen or pelvis.  Total body bone scan shows no significant change and no new suspicious foci.  Stable posterior right acetabulum and left superior acetabulum and degenerative changes in the cervical spine, shoulders, acromioclavicular joints, sternoclavicular joints, elbows, wrists, hands, thoracic spine, lumbosacral spine, sacroiliac joints, hips, knees, ankles, feet.    -1/11/2022 [facility] medical oncology hematology follow-up visit: I reviewed images and reports from his 1/5/2022 scans.   No active disease and PSA immmeasurably low on Zytiga, prednisone, Relugolix.  However, he has struggled with hypophosphatemia and is significantly fatigued with this.  Given that the bones are doing well and given that his phosphorus continues to remain consistently low at 2.2 in mid December, 1.7 mid November and 2.5 mid-September and as low as 1.5 back to July and the likelihood that this is related to his Zometa, given that his bones are stable overall, he will increase from 1200 mg up to 1600 mg of calcium and phosphate a day and we will hold his Zometa for the foreseeable future.  He will see my nurse practitioner back in a month to continue to monitor his phosphorus along with his calcium and other labs and will repeat his scans again in 3 months.  In addition, given his fatigue we will check his ACTH and cortisol though he is taking his prednisone with his Zytiga.  Though his electrolytes are normal, I will keep an eye on the cortisol and ACTH and have these labs not only drawn today but again just prior to return to my nurse practitioner on February 10.    -2/10/2022 [facility] follow-up: Mr. Lugo overall is stable, no change in his health this past month.  I have reviewed his labs from 2/8/2022 and they are unremarkable, his phosphorus is now normal at 3.2. We are continuing to hold his Zometa, he last received Zometa on 10/5/2021.  He continues therapy with Zytiga, prednisone and Relugolix.  Dr. Rashid had ordered cortisol level and ACTH which are low, currently his ACTH is 2.8 and cortisol 3.08 however these are both done in the face of ongoing prednisone that he takes with his Zytiga.  We will get him to endocrinology for further evaluation for possible adrenal insufficiency but will not interrupt his treatment in the interim.  He will need restaging scans again in April for a 3-month follow-up.  He will continue to follow with palliative care and pain management for his cancer related pain.   His PSA remains immeasurably low at <0.1 on 2/8/22.    -2/15/2022 went to emergency room with weakness, decreased appetite, myalgias in the setting of known COVID-19 testing positive a few days prior to this visit.  Phosphorus had been low in the past but was normal in the emergency room with unremarkable CBC and CMP.  Chest x-ray unremarkable saturating well on room air mildly hypertensive with dry mucosa.  Given 500 cc crystalloid.  Covid test positive.  Mild thrombocytopenia 136,000 and otherwise unremarkable.  Discharged home.    -3/3/2022 data:  PSA less than 0.1  CMP unremarkable  Cortisol 3.96 normal  ACTH 2.8 lower limit of normal 7.2  Phosphorus normal 2.6    -3/8/2022 [facility] medical oncology follow-up: Suspect big chunk of his fatigue was Covid.  Another part of the fatigue likely related to lack of testosterone.  Not hypophosphatemic off of Zometa.  ACTH running low while on prednisone not surprising but with normal cortisol and I doubt adrenal insufficient which is why he is on prednisone to avoid such while taking Zytiga.  Overall doing fairly well and with immeasurably low PSA, I will have labs done on him early April, see my nurse practitioner early May, and she will order repeat bone scan and CTs the end of May and I will see him back in June.  We will continue to hold the Zometa unless bone lesions progress given symptomatic prior hypophosphatemia on Zometa.  He will keep his appointment April 28 with Dr. Mir Duffy just to be sure that my take on his pituitary/adrenal axis assessment is accurate.    -4/28/2022 endocrinology consult Dr. Mir Duffy.  With low ACTH and cortisol on prednisone with Zytiga, the adequacy of prednisone cannot be assessed by measuring ACTH or cortisol but is assessed by weight changes, blood pressure, blood sugar, potassium, overall sense of how the patient is doing.  Primary adrenal insufficiency with low ACTH and low cortisol would be typical for the situation as his  blood sugar tends to run a little high and potassium a little low, he did not think increasing prednisone was necessary.  He put him on some potassium.  No follow-up scheduled, will see as needed.    -5/4/2022 [facility] Oncology clinic follow-up: Mr. Lugo continues on therapy with Zytiga and prednisone along with Relugolix.  His Zometa has been on hold due to previous hypophosphatemia.  There is some concern about potential adrenal insufficiency. He saw endocrinology, Dr. Mir Duffy on 4/28/2022.  I did review his office note and he stated that the low ACTH and low serum cortisol would be typical for Mr. Lugo's situation.  He further stated that the adequacy of prednisone dose cannot be assessed by measuring ACTH or cortisol but is rather assessed by the overall just altered how the patient is feeling-weight change, blood pressure, blood sugar, potassium, overall sense of wellbeing.  His potassium had been running a little low therefore they put him on potassium 10 mill equivalents daily. Of note, I do not see a future follow-up scheduled with endocrinology.  He is having night sweats, I am not sure if this is related.  His glucose was normal this morning at 107.  His weight is stable.  He will continue current treatment for his prostate cancer unchanged, we will continue to hold his Zometa.  Current phosphorus and magnesium are normal.  CBC and CMP from today are unremarkable, cortisol is normal.  PSA and ACTH are pending  In regards to his night sweats, he had taken clonidine previously 0.1 mg twice daily as needed, I did send in a short supply again to try and see if this helps.  He also is having indigestion despite being on omeprazole twice daily, I sent a prescription for Pepcid.  He had an EGD August 2021.  We will repeat restaging scans prior to return and I have ordered those today.     -5/24/2022 CT chest abdomen pelvis with contrast[place] negative.  Bone scan shows stable bone metastases.    - 5/25/2022  PSA less than 0.1, platelets 130,000, otherwise unremarkable CBC and CMP.  A.m. cortisol running low 1.2 with phosphorus low 2.3.    -5/31/2022 [facility] [place] medical oncology follow-up: On Zytiga, prednisone, Relugolix, PSA remaining immeasurably low with stable bone scan and no visceral/paulino metastases.  Phosphorus still running low.  I have discontinued his potassium chloride and started potassium phosphate 500 mg 4 times a day.  Unless PSA rising, we will plan on repeating CTs and bone scan in 6 months and will follow with my nurse practitioner in the interim and if symptoms dictate or labs, will get back to Dr. Duffy for management of potential adrenal insufficiency but presently we will just see how he does with potassium phosphate and hopeful improvement in the phosphorus level with time.  We will continue to follow with palliative care for pain management    -7/6/2022 [facility] [place] Oncology clinic follow-up: Mr. Lugo overall is doing well but continues to be troubled with fatigue and hot flashes.  For the most part he states he is able to do the things he wants to do, he continues to work but does have to take more rest periods.  I had a long discussion with him and his wife after reviewing his medications with them as they wanted to see if there was anything he could stop or adjust.  I discussed with him the need to continue on treatment for his prostate cancer even though his PSA remains immeasurably low and his scans look good but that if his medication is stopped the cancer would progress and over time it still has the potential to progress on therapy but would continue it as long as possible to keep his disease under control.  I explained this is like treating someone with hypertension, you do not stop the antihypertensive just because the blood pressure normalizes.  They stated understanding.  There is no dose adjustment of Zytiga or prednisone that would minimize his symptoms, he is on the current standard  recommended therapy.  Discussed with him that sometimes during times of stress we may need to increase his prednisone dose but for now would not change anything.  His phosphorus level is normal, we continue to hold his Zometa.  I did give him the option of holding his phosphorus for the next month and we will see what happens, if it drops we will start him back on it but may be at a lower dose rather than 4 times a day maybe twice a day.  For now he will continue therapy unchanged.  We will continue monitoring labs monthly.  No PSA was drawn this month but that is okay as his PSA has been running immeasurably low at <0.1, we will recheck next month with lab draw.  We will stop checking his ACTH and cortisol level every month, if he develops worsening symptoms I will repeat those.  He has not gotten a follow-up with endocrinology as of yet.     Prostate cancer metastatic to bone (HCC)   8/30/2020 -  Other Event    PSA 10.8     9/15/2020 Initial Diagnosis    Prostate cancer metastatic to bone (CMS/HCC)     9/15/2020 Biopsy    Prostate needle core biopsy     9/24/2020 Imaging    Total body bone scan: 4 abnormal areas of increased activity consistent with osteoblastic metastasis, abnormal foci of activity seen in the T12 vertebral body, L1 vertebral body, roof of the left acetabulum, and acetabulum medially on the right.  CT chest: Stable sclerotic metastatic lesions within the T12 and L1 vertebrae.  No other evidence of metastatic disease to the chest.  Stable mild splenomegaly and subcentimeter periaortic retroperitoneal lymph nodes.  CT abdomen and pelvis: Diffuse bladder wall thickening with mild perivesicular fat stranding.  Interval development of several sclerotic lesions in the spine and left iliac bone.     10/13/2020 Cancer Staged    Staging form: Bone - Appendicular Skeleton, Trunk, Skull, And Facial Bones, AJCC 8th Edition  - Clinical: Stage IVB (cT3, cN0, cM1b, G3) - Signed by Ishmael Rashid MD on  10/13/2020     11/3/2020 -  Chemotherapy    OP SUPPORTIVE Zoledronic Acid Q42d     11/3/2020 - 2/18/2021 Chemotherapy    OP PROSTATE DOCEtaxel       1/4/2021 Imaging    CT chest abdomen pelvis with contrast and whole-body bone scan shows improving less metabolically active bone metastases.  Stable sclerotic T12, L1, and L2 vertebral bodies and bilateral pelvic bones on bone windows with stable subcentimeter para-aortic lymph nodes and no definite progression in the chest abdomen or pelvis.  PSA down to 0.275 after 3 courses of Taxotere.     3/4/2021 Imaging    CT chest, abdomen and pelvis stable, no evidence of disease progression. Total body bone scan with decreased/near resolution of abnormal increased radiotracer uptake associated with the known sclerotic lesions in the thoracolumbar spine and bony pelvis.  No evidence of new osteoblastic metastases.     3/4/2021 Imaging    CT chest abdomen pelvis with contrast is negative save for stable sclerotic bone lesions and the bone scan shows near resolution of prior bony abnormalities associated with the known sclerotic lesions in the thoracolumbar spine and bony pelvis.  2/17/2021 PSA down to 0.1 from 1.37 October 2020.     3/9/2021 - 3/9/2021 Chemotherapy    OP SUPPORTIVE PROSTATE Relugolix     3/9/2021 -  Chemotherapy    OP PROSTATE Abiraterone / PredniSONE       3/10/2021 -  Chemotherapy    OP PROSTATE Abiraterone / PredniSONE     8/10/2021 - 8/16/2021 Radiation    Radiation OncologyTreatment Course:  Naveen Lugo received 2000 cGy in 5 fractions to L4-sacrum, left acetabulum and right pelvis via External Beam Radiation - EBRT.     11/16/2021 -  Chemotherapy    OP CENTRAL VENOUS ACCESS DEVICE ACCESS, CARE, AND MAINTENANCE (CVAD)     1/5/2022 Imaging    -1/5/2022 CT chest abdomen pelvis with contrast [place] [facility] showing stable left periaortic adenopathy and unchanged sclerotic thoracic, lumbar spine and pelvis lesions with no new or progressive disease in the  "chest abdomen or pelvis.  Total body bone scan shows no significant change and no new suspicious foci.  Stable posterior right acetabulum and left superior acetabulum and degenerative changes in the cervical spine, shoulders, acromioclavicular joints, sternoclavicular joints, elbows, wrists, hands, thoracic spine, lumbosacral spine, sacroiliac joints, hips, knees, ankles, feet.     5/24/2022 Imaging    CT chest abdomen pelvis with contrast[place]fort negative.  Bone scan shows stable bone metastases.         HISTORY OF PRESENT ILLNESS:  The patient is a 67 y.o. male, here for follow up on management of metastatic prostate cancer with drug-induced hypophosphatemia and possible drug-induced adrenal insufficiency.  Mr. Lugo reports ongoing bone pain and back pain interfering with his quality of life.  Follows with Dr. Danny Avalos for pain management but is not satisfied with current pain control and would like to get back in with Palliative care.  Rates his pain today as a \"10\".  Currently is unable to work due to his pain, would like a work excuse until his next pain management appointment.   Has been off of phosphorus for the past 4 weeks.  No other new concerns.    Past Medical History:   Diagnosis Date   • Bone cancer (HCC)    • History of radiation therapy 08/16/2021    L4 through sacrum, left acetabulum, right pelvis   • Nephrolithiasis    • Opioid use disorder, mild, on maintenance therapy (HCC)    • Prostate cancer (HCC)      Past Surgical History:   Procedure Laterality Date   • CHOLECYSTECTOMY     • CORONARY STENT PLACEMENT  206 & 2011   • HERNIA REPAIR  1986   • THORACIC DISCECTOMY  1994       Allergies   Allergen Reactions   • Cymbalta [Duloxetine Hcl] Dizziness   • Gabapentin Nausea Only   • Lyrica [Pregabalin] Nausea And Vomiting and Dizziness       Family History and Social History reviewed and changed as necessary    REVIEW OF SYSTEM:   Fatigue  Chronic bone (low back and hips) pain currently " "flaring    PHYSICAL EXAM:  Well developed, well nourished, healthy appearing male in no distress.  Lungs clear.    Heart regular rate and rhythm.      Vitals:    08/03/22 1021   BP: 143/68   Pulse: 80   Resp: 20   Temp: 96.9 °F (36.1 °C)   SpO2: 97%   Weight: 89.4 kg (197 lb)   Height: 175.3 cm (69\")     Vitals:    08/03/22 1021   PainSc: 10-Worst pain ever   PainLoc: Back  Comment: all over          ECOG score: 2           Vitals reviewed.  Labs reviewed.   Recent Results (from the past 168 hour(s))   PSA DIAGNOSTIC    Collection Time: 08/02/22  9:20 AM    Specimen: Blood    Blood   Result Value Ref Range    PSA <0.1 0.0 - 4.0 ng/mL   CBC & Differential    Collection Time: 08/02/22  9:20 AM    Specimen: Blood    Blood  Manual Differen   Result Value Ref Range    WBC 4.9 3.4 - 10.8 x10E3/uL    RBC 4.40 4.14 - 5.80 x10E6/uL    Hemoglobin 12.6 (L) 13.0 - 17.7 g/dL    Hematocrit 38.9 37.5 - 51.0 %    MCV 88 79 - 97 fL    MCH 28.6 26.6 - 33.0 pg    MCHC 32.4 31.5 - 35.7 g/dL    RDW 12.8 11.6 - 15.4 %    Platelets 141 (L) 150 - 450 x10E3/uL    Neutrophil Rel % 69 Not Estab. %    Lymphocyte Rel % 23 Not Estab. %    Monocyte Rel % 6 Not Estab. %    Eosinophil Rel % 2 Not Estab. %    Basophil Rel % 0 Not Estab. %    Neutrophils Absolute 3.3 1.4 - 7.0 x10E3/uL    Lymphocytes Absolute 1.2 0.7 - 3.1 x10E3/uL    Monocytes Absolute 0.3 0.1 - 0.9 x10E3/uL    Eosinophils Absolute 0.1 0.0 - 0.4 x10E3/uL    Basophils Absolute 0.0 0.0 - 0.2 x10E3/uL    Immature Granulocyte Rel % 0 Not Estab. %    Immature Grans Absolute 0.0 0.0 - 0.1 x10E3/uL   Comprehensive Metabolic Panel    Collection Time: 08/02/22  9:20 AM    Specimen: Blood    Blood  Manual Differen   Result Value Ref Range    Glucose 104 (H) 65 - 99 mg/dL    BUN 7 (L) 8 - 27 mg/dL    Creatinine 0.82 0.76 - 1.27 mg/dL    EGFR Result 96 >59 mL/min/1.73    BUN/Creatinine Ratio 9 (L) 10 - 24    Sodium 142 134 - 144 mmol/L    Potassium 3.6 3.5 - 5.2 mmol/L    Chloride 104 96 - 106 " mmol/L    Total CO2 25 20 - 29 mmol/L    Calcium 8.7 8.6 - 10.2 mg/dL    Total Protein 5.8 (L) 6.0 - 8.5 g/dL    Albumin 3.8 3.8 - 4.8 g/dL    Globulin 2.0 1.5 - 4.5 g/dL    A/G Ratio 1.9 1.2 - 2.2    Total Bilirubin 0.3 0.0 - 1.2 mg/dL    Alkaline Phosphatase 80 44 - 121 IU/L    AST (SGOT) 15 0 - 40 IU/L    ALT (SGPT) 13 0 - 44 IU/L   Phosphorus    Collection Time: 22  9:20 AM    Specimen: Blood    Blood  Manual Differen   Result Value Ref Range    Phosphorus 2.7 (L) 2.8 - 4.1 mg/dL   PSA Diagnostic    Collection Time: 22  9:20 AM    Specimen: Blood    Blood  Manual Differen   Result Value Ref Range    PSA <0.1 0.0 - 4.0 ng/mL         ECO       ASSESSMENT & PLAN:  1.  Stage IVb grade group 4 metastatic prostate cancer with sclerotic bone lesions on CT and bone scan and history of prostatic hypertrophy  2.  Kidney stone  3.  Polyps  4.  Probable drug-induced hypophosphatemia from Zometa, drug stopped after 10/5/2021 dose  5.  Pain related to cancer and also chronic pain related to arthritic/degenerative disease  6.  Fatigue  7.  Covid 2022  8.  Potential adrenal insufficiency secondary to Zytiga and prednisone    -2020 office note from Dr. Ronen Reynaga indicates patient with PSA 10.8.  Underwent TRUS prostate biopsy 9/15/2020.  Adenocarcinoma the prostate Hardin 4+4 = 8 in 1 out of 12 cores and 4+3 = 7 and 10 out of 12 cores and 3+4 = 7 in 1 out of 12 cores.  Perineural invasion.  Extraprostatic extension into the fat seen on 2 biopsies of the right lateral mid and right apex.  2020 CT chest and whole-body bone scan showed T12, L1, left acetabular, right medial acetabular metastases with no lung metastases.  He started androgen deprivation therapy with Casodex with plans for Lupron to start in 1 to 2 weeks for clinical T3 N0 M1 metastatic prostate cancer.  Review of official report from [facility] 2020 bone scan mentions T12, L1, roof of left acetabulum and medial right  acetabular osteoblastic metastases.  CT chest with contrast that same day showed mild splenomegaly 14.2 cm with stable periaortic nodes compared to CT December 2015 with no lung metastases.  Sclerotic abnormality within the T12 vertebral body, L1 vertebral body extending into the pedicle unchanged.  8/28/2020 CT abdomen pelvis report from [facility] reviewed and mentions normal spleen size.  Punctate nonobstructing kidney stone, no paulino enlargement, diffuse bladder wall thickening with mild perivesicular fat stranding, 2.1 cm sclerotic left iliac bone above the left acetabulum new compared to 2015 as well as 1.5 cm faintly sclerotic focus in the right posterior acetabulum stable compared to prior CT 2015 as well as a 1.6 cm T12 and inferior vertebral body sclerotic lesion and a 1.9 cm left posterior lateral L1 vertebral body abnormality extending to the pedicle.  -10/13/2020 initial [facility] medical oncology consultation: With 1 Sarika score 8, 4+4 lesion that would make him a grade group 4 with stage IVb disease given radiographic evidence of sclerotic bone metastasis on bone scan and CT.  Needs genetic testing given metastatic prostate cancer and this is also important as, were he to have mismatch repair mutation then we would have options for PDL 1 inhibitors and were he BRCA mutated would have options for PARP inhibitors in the future.  Systemic therapy for castration naïve prostate cancer or N1 disease should be with apalutamide or Abiraterone or enzalutamide all of which are category 1.  He does not have any visceral metastases.  According to his imaging he has T12, L1, left acetabular, right acetabular, and left iliac bony involvement.  That means he would have 4 or more bone metastases with at least one metastasis (i.e. the acetabular lesions bilaterally) beyond the pelvis/vertebral, for which this would be considered high-volume disease for which 6 cycles of docetaxel 75 mg/m² x 6 cycles followed  by Zytiga and prednisone would be reasonable along with Zometa every 6 weeks for bone stabilization.  He will do chemo preparation visit with my nurse practitioner prior to start chemotherapy with Taxotere and Zometa in 2 weeks and he is already received Casodex in preparation for Lupron shot he received on 10/12/2020 with Dr. Reynaga.  We will get him to Dr. Alexander Gomez who has done his polypectomies in the past for port placement and we will get genetic counseling started.  Following the 6 courses of Taxotere per the [place] trial criteria, I would then give him an indefinite Zytiga and prednisone and Zometa until progression or toxicity dictates.    -12/16/2019 COVID-19 antigen test negative    -12/29/2020 PSA 0.275 with absolute neutrophil count 700 and creatinine 0.76 with normal liver enzymes and electrolytes.  Normal magnesium and phosphorus and urine drug screen positive for buprenorphine and opiates follow-up with palliative care.    -1/4/2021 CT chest abdomen pelvis with contrast and whole-body bone scan shows improving less metabolically active bone metastases.  Stable sclerotic T12, L1, and L2 vertebral bodies and bilateral pelvic bones on bone windows with stable subcentimeter para-aortic lymph nodes and no definite progression in the chest abdomen or pelvis.    -1/5/2021 [facility] medical oncology follow-up visit: I reviewed the images and reports thereof of the above CT and bone scan which shows good response to Taxotere x3 courses with a PSA down to 0.275 from a baseline of 10.  Get another 3 courses of Taxotere, continue his Xgeva, and then following that we will switch to Zytiga and prednisone.  He is working with palliative care on his bone pain but on his current dose of fentanyl patch he says his pain is still not adequately controlled he will contact them.  Dexamethasone prescription was refilled.  We will see my nurse practitioner back in 3 weeks for course #5 of 6 total courses and then we  will reimage with CT chest abdomen pelvis and bone scan before going to the Zytiga prednisone.    -3/4/2021 CT chest abdomen pelvis with contrast is negative save for stable sclerotic bone lesions and the bone scan shows near resolution of prior bony abnormalities associated with the known sclerotic lesions in the thoracolumbar spine and bony pelvis.    2/17/2021 PSA down to 0.1 from 1.37 October 2020.  3/9/2021 PSA 0.1    -5/26/2021 CT chest abdomen pelvis with contrast shows stable to slightly underlying increase low-density left para-aortic node.  Bone lesions stable.  Whole-body bone scan stable to decrease activity of known thoracolumbar and bony pelvic involvement.  PSA less than 0.1  , AST 74, bilirubin 0.5.       -6/1/2021 [facility] medical oncology follow-up visit: I reviewed the above data with him and images thereof.  Bones are stable.  No significant adenopathy.  PSA immeasurably low.     upper limit of normal 69 and AST 74 upper limit of normal 46.  Hence, neither is more than double the upper limit of normal with no ascites and no encephalopathy and normal albumin and bilirubin, despite the elevation of liver enzymes, he has child Abrams score A.  Package insert for Abiraterone says that for ALT and/or AST greater than 5 times upper limit of normal or total bilirubin greater than 3 times the upper limit of normal to withhold treatment until liver function tests returned to baseline or ALT and AST less than or equal to 2.5 times the upper limit of normal and total bilirubin less than or equal to 1.5 times the upper limit of normal and then reinitiate at 750 mg daily dose of Zytiga.  For recurrent hepatotoxicity on 750 mg daily Zytiga, withhold treatment until liver functions returned to baseline or ALT and AST less than 3-2.5 times upper limit of normal and total bilirubin less than or equal to 1.5 times the upper limit of normal then reinitiate at 500 mg daily Zytiga dose.  If has recurrent  hepatotoxicity on 500 mg dose, then discontinue treatment.  If has ALT greater than 3 times the upper limit of normal along with total bilirubin greater than 2 times the upper limit of normal in the absence of other contributing causes or biliary obstruction, permanently discontinue Zytiga.  Based on these recommendations, I would continue current doses but we will watch with serial labs monthly and repeat his imaging again in 3 months but clinically he is doing wonderfully with excellent response to Taxotere and now on Zytiga prednisone plus Zometa along with Relugolix.    -6/23/2020 for [facility] follow-up: Having persistent and worsening pain above his left hip.  I will get an MRI of the left hip for further evaluation.  Otherwise we will continue therapy unchanged with Zytiga, prednisone and Zometa along with Relugolix.    -7/28/2021 [facility] follow-up: Patient with multiple somatic complaints including episodes of confusion, dizziness, decreased memory, agitation.  He reports ongoing episodes with difficulty swallowing.  Also most concerning for episodes of angina and chest pain for which he basically refused to seek emergency medical attention.  He has a history of coronary artery disease and stent placement.  He has not followed up with cardiology for many years.  I will get an MRI of the brain in light of his confusion, dizziness and agitation.  I will get him to gastroenterology for EGD in light of his dysphagia.  I have also put in a referral for cardiology.  I reemphasized to the patient, his wife who is with him today and his son who was on the telephone during our visit the importance of seeking out prompt medical attention when he is having chest pain or neurological concerns so that he can be evaluated.  The patient states that he basically refuses to go to the emergency room and if his family calls an ambulance he would not agree to go to the hospital.  For the time  "being, I have asked him to hold his Zytiga and prednisone until we can sort this out as Zytiga does have some cardiovascular risk.  There is likely no treatment for prostate cancer that does not carry some form of cardiovascular risk.  His PSA remains low at 0.014 yesterday.  He will continue relugolix for now.    Of note, some of these symptoms could also be due to his hypophosphatemia, phosphate level from yesterday was 1.5, I have sent in a prescription for K-Phos 3 times a day before meals for 10 days.  We will hold further Zometa until this is corrected.  I have also put in an order to check his vitamin D level.  He takes calcium and vitamin D daily.  Some of his symptoms also could be due to drug withdrawal as there was some confusion and difficulty getting his last prescription for methadone therefore he was without methadone for several days, he now has his prescription and is back on his pain medication.  He has an appointment with neurosurgery tomorrow in light of his ongoing back pain, MRI of the hip recently showed multiple bony lesions largest at the L5 vertebral body and left supra-acetabular region.  If no neurosurgical intervention recommended he may benefit from spot radiation as this is where a lot of his pain is coming from.  His wife had questions today regarding his staging and extent of disease.  We reviewed previous scans.  I did clarify with her as she used the words \"cancer free\" as she thought that is what she was told after his CT scans once he completed Taxotere in February.  I explained to her that even though his scans showed he had an excellent response with no clear areas of metastasis that did not mean he was cancer free, I explained to her that when you have metastatic cancer the treatment intent is palliative in nature and to control the disease but not to cure the disease.  She stated understanding.  We will see him back in 2 weeks for follow-up to sort through this and make " further plan of care, again, I have asked him to hold Zytiga and prednisone until he sees us back, he will continue on his other medications including relugolix.    -7/30/2021 neurosurgery PA consultation.  MRI with and without contrast L5 ordered.    -8/3/2021 neurosurgery follow-up showed known sclerotic disease with new L5 abnormality without compression deformity or instability.  MRI brain negative for metastasis.    -8/4/2021 office visit Dr. Fco Quintanilla radiation oncology coordinating with Dr. North neurosurgery for palliative radiation for MRI evidence of L5 and left supra acetabular painful lesions.  States that the patient's disease which is not secreting PSA is experiencing some progression and would benefit from 20 Gy in five fractions and other lesion 30 Gy in ten fractions. Patient offered to go to [place] for radiation but desired treatment in [place].    -8/10/2021 [facility] follow-up visit: No chest pains at this junction.  Reviewed MRI brain and other MRIs reviewed by Dr. North and [place].  Due for EGD on 19th.  We will repeat his phosphorus along with his other labs continue Relugolix, Zytiga, prednisone, and he will continue radiation palliatively to the painful bony lesions with Dr. Quintanilla/Can.  He will see my nurse practitioner back in a few weeks to see how he is tolerating this and to follow-up on the phosphorus off of Zometa and she will order repeat CT chest abdomen pelvis with contrast and total body bone scan the end of September.I will see him back after that.    -9/8/2021 Zometa resumed.  PSA <0.10    -10/5/2021 [facility] medical oncology follow-up visit: followed-up with radiation oncology 9/21/2021.  Status post symptomatic L5 radiation 5 fractions 20 [place] completed 8/16/2021 with improvement in right hip pain.  9/2/2021 PSA less than 0.1.  9/29/2021 total body bone scan shows increased uptake left iliac bone slightly superior group of the left acetabulum with no  additional foci of suspicious abnormality is unlikely treatment related with no new sites of active osseous metastasis.  CT chest abdomen pelvis with contrast shows stable borderline enlarged left periaortic nodes and dating sclerotic bone lesions.Continue Zytiga, prednisone, Relugolix, Zometa, repeat CT chest abdomen pelvis with contrast and total body bone scan the end of the year.  We will follow PSA serially.    -11/17/2021 [facility] clinic follow-up:  Mr. Lugo overall is doing well.  He is tolerating therapy with Zytiga, prednisone and Relugolix along with Zometa which is given every 6 weeks.  PSA remains low at <0.1.  Has occasional low phosphorus, currently low at 1.7.  Serum calcium is normal at 8.9, normal creatinine 0.79 and normal CBC other than for platelets of 124.  I will hold Zometa for 1 month, I did send in a prescription for phosphorus replacement today.  He takes calcium and vitamin D.  I will see him back in 1 month for follow-up.  We will repeat restaging scans after that visit.    -12/15/2021 [facility] Oncology clinic follow-up: Mr. Lugo continues to do well on therapy with Zytiga, prednisone and Relugolix, his PSA remains low at <0.1.  He is continuing to have left lower back pain, he is working with palliative care and they have referred him also to pain management.  Pain management has talked to him about putting in a pain pump however he is leery of this, I told him that this was a safe and effective pain management technique and to certainly give consideration to their recommendations.  His phosphorus is improving however is still a little low, I will hold off on Zometa until we see him back in 1 month, we may end up only giving it every 12 weeks.  We will repeat restaging scans with CT chest, abdomen and pelvis along with total body bone scan prior to return and I have ordered those today.      -1/5/2022 CT chest abdomen pelvis with contrast [facility] showing stable left  periaortic adenopathy and unchanged sclerotic thoracic, lumbar spine and pelvis lesions with no new or progressive disease in the chest abdomen or pelvis.  Total body bone scan shows no significant change and no new suspicious foci.  Stable posterior right acetabulum and left superior acetabulum and degenerative changes in the cervical spine, shoulders, acromioclavicular joints, sternoclavicular joints, elbows, wrists, hands, thoracic spine, lumbosacral spine, sacroiliac joints, hips, knees, ankles, feet.    -1/11/2022 [facility] medical oncology hematology follow-up visit: I reviewed images and reports from his 1/5/2022 scans.  No active disease and PSA immmeasurably low on Zytiga, prednisone, Relugolix.  However, he has struggled with hypophosphatemia and is significantly fatigued with this.  Given that the bones are doing well and given that his phosphorus continues to remain consistently low at 2.2 in mid December, 1.7 mid November and 2.5 mid-September and as low as 1.5 back to July and the likelihood that this is related to his Zometa, given that his bones are stable overall, he will increase from 1200 mg up to 1600 mg of calcium and phosphate a day and we will hold his Zometa for the foreseeable future.  He will see my nurse practitioner back in a month to continue to monitor his phosphorus along with his calcium and other labs and will repeat his scans again in 3 months.  In addition, given his fatigue we will check his ACTH and cortisol though he is taking his prednisone with his Zytiga.  Though his electrolytes are normal, I will keep an eye on the cortisol and ACTH and have these labs not only drawn today but again just prior to return to my nurse practitioner on February 10.    -2/10/2022 [facility] Oncology clinic follow-up: Mr. Lugo overall is stable, no change in his health this past month.  I have reviewed his labs from 2/8/2022 and they are unremarkable, his phosphorus is now normal at 3.2. We are  continuing to hold his Zometa, he last received Zometa on 10/5/2021.  He continues therapy with Zytiga, prednisone and Relugolix.  Dr. Rashid had ordered cortisol level and ACTH which are low, currently his ACTH is 2.8 and cortisol 3.08 however these are both done in the face of ongoing prednisone that he takes with his Zytiga.  We will get him to endocrinology for further evaluation for possible adrenal insufficiency but will not interrupt his treatment in the interim.  He will need restaging scans again in April for a 3-month follow-up.  He will continue to follow with palliative care and pain management for his cancer related pain.  His PSA remains immeasurably low at <0.1 on 2/8/22.    -2/15/2022 went to emergency room with weakness, decreased appetite, myalgias in the setting of known COVID-19 testing positive a few days prior to this visit.  Phosphorus had been low in the past but was normal in the emergency room with unremarkable CBC and CMP.  Chest x-ray unremarkable saturating well on room air mildly hypertensive with dry mucosa.  Given 500 cc crystalloid.  Covid test positive.  Mild thrombocytopenia 136,000 and otherwise unremarkable.  Discharged home.    -3/3/2022 data:  PSA less than 0.1  CMP unremarkable  Cortisol 3.96 normal  ACTH 2.8 lower limit of normal 7.2  Phosphorus normal 2.6    -3/8/2022 [facility] medical oncology follow-up: Suspect big chunk of his fatigue was Covid.  Another part of the fatigue likely related to lack of testosterone.  Not hypophosphatemic off of Zometa.  ACTH running low while on prednisone not surprising but with normal cortisol and I doubt adrenal insufficient which is why he is on prednisone to avoid such while taking Zytiga.  Overall doing fairly well and with immeasurably low PSA, I will have labs done on him early April, see my nurse practitioner early May, and she will order repeat bone scan and CTs the end of May and I will see him back in June.  We will continue to hold  the Zometa unless bone lesions progress given symptomatic prior hypophosphatemia on Zometa.  He will keep his appointment April 28 with Dr. Mir Duffy just to be sure that my take on his pituitary/adrenal axis assessment is accurate.    -4/28/2022 endocrinology consult Dr. Mir Duffy.  With low ACTH and cortisol on prednisone with Zytiga, the adequacy of prednisone cannot be assessed by measuring ACTH or cortisol but is assessed by weight changes, blood pressure, blood sugar, potassium, overall sense of how the patient is doing.  Primary adrenal insufficiency with low ACTH and low cortisol would be typical for the situation as his blood sugar tends to run a little high and potassium a little low, he did not think increasing prednisone was necessary.  He put him on some potassium.    -5/4/2022 [facility] follow-up: Mr. Lugo continues on therapy with Zytiga and prednisone along with Relugolix.  His Zometa has been on hold due to previous hypophosphatemia.  There is some concern about potential adrenal insufficiency. He saw endocrinology, Dr. Mir Duffy on 4/28/2022.  I did review his office note and he stated that the low ACTH and low serum cortisol would be typical for Mr. Lugo's situation.  He further stated that the adequacy of prednisone dose cannot be assessed by measuring ACTH or cortisol but is rather assessed by the overall just altered how the patient is feeling-weight change, blood pressure, blood sugar, potassium, overall sense of wellbeing.  His potassium had been running a little low therefore they put him on potassium 10 mill equivalents daily. Of note, I do not see a future follow-up scheduled with endocrinology.  He is having night sweats, I am not sure if this is related.  His glucose was normal this morning at 107.  His weight is stable.  He will continue current treatment for his prostate cancer unchanged, we will continue to hold his Zometa.  Current phosphorus and magnesium are  normal.  CBC and CMP from today are unremarkable, cortisol is normal.  PSA and ACTH are pending  In regards to his night sweats, he had taken clonidine previously 0.1 mg twice daily as needed, I did send in a short supply again to try and see if this helps.  He also is having indigestion despite being on omeprazole twice daily, I sent a prescription for Pepcid.  He had an EGD August 2021.  We will repeat restaging scans prior to return and I have ordered those today.     -5/24/2022 CT chest abdomen pelvis with contrastFrankfort negative.  Bone scan shows stable bone metastases.    - 5/25/2022 PSA less than 0.1, platelets 130,000, otherwise unremarkable CBC and CMP.  A.m. cortisol running low 1.2 with phosphorus low 2.3.    -5/31/2022 [facility] medical oncology follow-up: On Zytiga, prednisone, Relugolix, PSA remaining immeasurably low with stable bone scan and no visceral/paulino metastases.  Phosphorus still running low.  I have discontinued his potassium chloride and started potassium phosphate 500 mg 4 times a day.  Unless PSA rising, we will plan on repeating CTs and bone scan in 6 months and will follow with my nurse practitioner in the interim and if symptoms dictate or labs, will get back to Dr. Duffy for management of potential adrenal insufficiency but presently we will just see how he does with potassium phosphate and hopeful improvement in the phosphorus level with time.  We will continue to follow with palliative care for pain management    -7/6/2022 [facility] Oncology clinic follow-up: Mr. Lugo overall is doing well but continues to be troubled with fatigue and hot flashes.  For the most part he states he is able to do the things he wants to do, he continues to work but does have to take more rest periods.  I had a long discussion with him and his wife after reviewing his medications with them as they wanted to see if there was anything he could stop or adjust.  I discussed with him the need to continue on  treatment for his prostate cancer even though his PSA remains immeasurably low and his scans look good but that if his medication is stopped the cancer would progress and over time it still has the potential to progress on therapy but would continue it as long as possible to keep his disease under control.  I explained this is like treating someone with hypertension, you do not stop the antihypertensive just because the blood pressure normalizes.  They stated understanding.  There is no dose adjustment of Zytiga or prednisone that would minimize his symptoms, he is on the current standard recommended therapy.  Discussed with him that sometimes during times of stress we may need to increase his prednisone dose but for now would not change anything.  His phosphorus level is normal, we continue to hold his Zometa.  I did give him the option of holding his phosphorus for the next month and we will see what happens, if it drops we will start him back on it but may be at a lower dose rather than 4 times a day maybe twice a day.  For now he will continue therapy unchanged.  We will continue monitoring labs monthly.  No PSA was drawn this month but that is okay as his PSA has been running immeasurably low at <0.1, we will recheck next month with lab draw.  We will stop checking his ACTH and cortisol level every month, if he develops worsening symptoms I will repeat those.  He has not gotten a follow-up with endocrinology as of yet.    -8/3/2022 [facility] Oncology clinic follow-up:  Mr. Lugo is doing well as far as his prostate cancer goes with continued immeasurably low PSA of <0.1.  He continues on therapy with Zytiga and prednisone along with Relugolix.  His phosphorus is stable at 2.7 which was just slightly low by our reference range however was 2.71-month ago also which was normal on another labs reference range.  He has not taken his phosphorus for this past month as he thought it was making him more fatigued.  He  really can tell no difference whether he was taking it or not.  I have asked him to try to go back on phosphorus twice a day rather than 4 times a day and see if this is tolerated and if we can keep his phosphorus level from dropping.  His biggest complaint today is flareup of his arthralgias and back pain.  He is following with pain management, Dr. Danny Avalos but is requesting a referral back to palliative medicine as he feels that no one is currently listening to him and they are just prescribing the same medications that are not effective according to his report.  He does have a follow-up with Dr. Avalos on 8/12/2022 but due to his current pain is not able to work until after he is seen and hopefully can get some better relief.  I have given him a work release until 15 August.  I have also put in a referral back to palliative care.  Also encouraged him to work with Dr. Avalos for help in resolving his issue.  Apparently they have recommended some type of a pump however he has been hesitant to that but likely would be helpful.  We will continue therapy unchanged.  We will continue monthly labs including phosphorus, we are not currently checking ACTH and cortisol monthly as Dr. Mir Duffy previously stated in his assessment on 4/28/2022 that the adequacy of prednisone cannot be assessed by measuring ACTH or cortisol but would be assessed by symptoms, see note from 4/28/2022.  He made no further follow-up appointments.  Fatigue is not worsening currently.  His glucose and potassium are  normal.    Return to clinic in 2 months for follow-up    This was a level 4, moderate MDM visit with 2 or more stable chronic illnesses, management of drug therapy requiring intensive monitoring for toxicity, review of labs and referral back to palliative care.    Abena Velasquez, APRN    08/03/2022

## 2022-08-10 ENCOUNTER — OFFICE VISIT (OUTPATIENT)
Dept: PALLIATIVE CARE | Facility: CLINIC | Age: 67
End: 2022-08-10

## 2022-08-10 VITALS
RESPIRATION RATE: 18 BRPM | WEIGHT: 195.8 LBS | BODY MASS INDEX: 29 KG/M2 | HEIGHT: 69 IN | SYSTOLIC BLOOD PRESSURE: 161 MMHG | OXYGEN SATURATION: 99 % | TEMPERATURE: 97.7 F | HEART RATE: 86 BPM | DIASTOLIC BLOOD PRESSURE: 91 MMHG

## 2022-08-10 DIAGNOSIS — G89.3 PAIN, CANCER: ICD-10-CM

## 2022-08-10 DIAGNOSIS — G89.3 PAIN, CANCER: Primary | ICD-10-CM

## 2022-08-10 DIAGNOSIS — G89.29 OTHER CHRONIC PAIN: ICD-10-CM

## 2022-08-10 DIAGNOSIS — C79.51 PROSTATE CANCER METASTATIC TO BONE: Primary | ICD-10-CM

## 2022-08-10 DIAGNOSIS — C61 PROSTATE CANCER METASTATIC TO BONE: Primary | ICD-10-CM

## 2022-08-10 PROCEDURE — 99214 OFFICE O/P EST MOD 30 MIN: CPT | Performed by: PHYSICIAN ASSISTANT

## 2022-08-10 RX ORDER — HYDROMORPHONE HYDROCHLORIDE 8 MG/1
8 TABLET ORAL EVERY 4 HOURS PRN
Qty: 180 TABLET | Refills: 0 | Status: SHIPPED | OUTPATIENT
Start: 2022-08-10 | End: 2022-09-07 | Stop reason: SDUPTHER

## 2022-08-10 NOTE — TELEPHONE ENCOUNTER
I have reviewed patient's AGUSTIN report prior to prescribing Schedule II, III, and IV medications. Request # [id number]. Script for hydromorphone 8 mg tab q4h PRN sent to pharmacy. Patient will follow-up in 1 month.

## 2022-08-10 NOTE — PROGRESS NOTES
Palliative Clinic Note      Name: Naveen Lugo  Age: 67 y.o.  Sex: male  : 1955  MRN: [id number]  Date of Service: 08/10/22  Medical Oncologist: Rachid    Subjective:    Chief Complaint: Hip and back pain    History of Present Illness: Naveen Lugo is a 67 y.o. male with past medical history significant for coronary artery disease, hypertension, hyperlipidemia, opioid use disorder, and metastatic prostate cancer who presents to the palliative clinic today as a follow up for pain and symptom management.     Treatment summary: He was diagnosed with prostate adenocarcinoma metastasized to the bone on 9/15/20. Patient completed radiation with Dr. Quintanilla in 2021. Currently on Zytiga, prednisone, and Relugolix. Zometa held due to low phosphorous. No active disease on imaging and PSA remains low.      Pain: History of opioid use disorder. Patient completed Suboxone taper in the past and was on no pain medication prior to cancer diagnosis. History complicated by OUD, several failed therapies including methadone, MS contin and fentanyl. Patient referred to pain specialist, Dr. BARB Avalos in 3/2022. Patient returns to the palliative clinic today with complaints of intractable hip and back pain. He is not happy with his care at Dr. Avalos's office and would like to transfer back. He states he has not seen a provider in several months. His current regimen includes hydromorphone 8 mg every 6 hours alternated with MS Contin 15 mg every 6 hours. He notes minimal efficacy from the morphine and discontinued this several days ago. Upon further questioning, the patient has not discussed surgical interventions for his pain with Dr. Avalos.      Symptoms: The patient has no other complaints today. He denies nausea, vomiting, diarrhea or constipation. He endorses a good appetite. His sleep is disrupted by pain some nights. Patient's fatigue continues to wax and wane.      Pyschosocial: Endorses strong family support. No  "personal history of mental illness. He denies anxiety or depression. Patient enjoys spending times outdoors.     Goals: Focused on improving pain so he can participate in recreational activities with his grandson.    The following portions of the patient's history were reviewed and updated as appropriate: allergies, current medications, past family history, past medical history, past social history, past surgical history and problem list.    ORT-R: High risk   Aberrant behavior: None  Decisional capacity: Full  ECOG: (1) Restricted in physically strenuous activity, ambulatory and able to do work of light nature   Palliative Performance Scale Score: 80%     Objective:    /91   Pulse 86   Temp 97.7 °F (36.5 °C) (Temporal)   Resp 18   Ht 175.3 cm (69\")   Wt 88.8 kg (195 lb 12.8 oz)   SpO2 99%   BMI 28.91 kg/m²     Constitutional: Awake, alert, analgesic gait, sitting up in exam chair,appears uncomfortable  Eyes: PERRLA, EOMS intact  HENT: NCAT, face symmetric  Neck: Supple, trachea midline  Respiratory: Nonlabored respirations  Cardiovascular: RRR, no edema observed  Gastrointestinal: Soft, no guarding  Musculoskeletal: Moves all extremities   Psychiatric: Appropriate affect, cooperative  Neurologic: Oriented x 3, Cranial Nerves grossly intact to confrontation, speech clear  Skin: Cool dry, no rashes or wounds appreciated     Medication Counts: Reviewed. See bottom of note for details. Did not bring medication to appointment  AGUSTIN: #[id number]. Reviewed    Assessment & Plan:    1. Prostate cancer metastatic to bone (HCC)  - Currently on Zytiga, prednisone, and Relugolix. No active disease on imaging and PSA remains low.     2. Pain, cancer  3. Other chronic pain  - Side effects of the medication discussed at every visit. Patient presents today for acute on chronic back/hip pain. Given patient's negative scans and low PSA, I believe this pain is largely due to arthritis / past injuries.  Patient referred " to pain specialist, Dr. BARB Avalos in 3/2022 and now returns requesting a transfer of his pain management to palliative care. Patient has tried and failed several long-acting opioids, including methadone, morphine, fentanyl and unfortunately oxycodone ER is typically expensive. Patient would like to increase frequency of the hydromorphone 8 mg every 4 hours as needed and stop the long-acting opioid. Reviewed clinic policies and instructed patient to take medication as prescribed to avoid running out early. Recommend patient start acetaminophen 1000 mg TID and or Naproxen 500 mg BID in addition to the opioid therapy. Encouraged patient to keep his follow-up appointment with Dr. Avalos later this month to discuss surgical interventions for his pain. Patient should also consider steroid injections if appropriate. Had svetlana discussion with about his limited options as far as oral pain medications.     Code status: Full code  Medical interventions: Full  Advanced directives: None  Healthcare surrogate: Rosaura Lugo    Return in about 1 month (around 9/10/2022) for Office Visit.    I spent 30 minutes caring for Naveen uLgo on this date of service. This time includes time spent by me in the following activities: preparing for the visit, reviewing tests, obtaining and/or reviewing a separately obtained history, performing a medically appropriate examination and/or evaluation , counseling and educating the patient/family/caregiver, ordering medications, tests, or procedures, documenting information in the medical record, independently interpreting results and communicating that information with the patient/family/caregiver, and care coordination    Anna Ayers PA-C  08/10/2022

## 2022-08-16 ENCOUNTER — SPECIALTY PHARMACY (OUTPATIENT)
Dept: ONCOLOGY | Facility: HOSPITAL | Age: 67
End: 2022-08-16

## 2022-08-16 NOTE — PROGRESS NOTES
Specialty Pharmacy Refill Coordination Note     Naveen is a 67 y.o. male contacted today regarding refills of  Orgovyx 120mg PO QD, Prednisone 5mg PO QD and zytiga 1000mg PO QD  specialty medication(s).    Specialty medication(s) and dose(s) confirmed: yes    Refill Questions    Flowsheet Row Most Recent Value   Changes to allergies? No   Changes to medications? No   New conditions since last clinic visit No   Unplanned office visit, urgent care, ED, or hospital admission in the last 4 weeks  No   How does patient/caregiver feel medication is working? Very good   Financial problems or insurance changes  No   Since the previous refill, were any specialty medication doses or scheduled injections missed or delayed?  No   Does this patient require a clinical escalation to a pharmacist? No          Delivery Questions    Flowsheet Row Most Recent Value   Delivery method [employer]   Delivery address correct? Yes   Delivery phone number [phone]   Preferred delivery time? Anytime   Number of medications in delivery 3   Medication being filled and delivered zytiga, orgovyx, prednisone   Doses left of specialty medications 5 days left   Is there any medication that is due not being filled? No   Supplies needed? No supplies needed   Cooler needed? No   Do any medications need mixed or dated? No   Additional comments no copay   Questions or concerns for the pharmacist? No   Explain any questions or concerns for the pharmacist n/a   Are any medications first time fills? No            Medication Adherence    Adherence tools used: watch   Other adherence tool: Clock - takes at same time each day, 7:45am   Support network for adherence: family member          Follow-up: 28 day(s)     Sushma Muro, Pharmacy Technician  Specialty Pharmacy Technician

## 2022-09-01 ENCOUNTER — LAB (OUTPATIENT)
Dept: ONCOLOGY | Facility: HOSPITAL | Age: 67
End: 2022-09-01

## 2022-09-01 VITALS — SYSTOLIC BLOOD PRESSURE: 151 MMHG | TEMPERATURE: 98.3 F | DIASTOLIC BLOOD PRESSURE: 74 MMHG | HEART RATE: 82 BPM

## 2022-09-01 DIAGNOSIS — C61 PROSTATE CANCER METASTATIC TO BONE: Primary | ICD-10-CM

## 2022-09-01 DIAGNOSIS — Z51.81 THERAPEUTIC DRUG MONITORING: ICD-10-CM

## 2022-09-01 DIAGNOSIS — Z45.2 ENCOUNTER FOR CARE RELATED TO PORT-A-CATH: ICD-10-CM

## 2022-09-01 DIAGNOSIS — C79.51 PROSTATE CANCER METASTATIC TO BONE: Primary | ICD-10-CM

## 2022-09-01 PROCEDURE — 25010000002 HEPARIN LOCK FLUSH PER 10 UNITS: Performed by: INTERNAL MEDICINE

## 2022-09-01 PROCEDURE — 36591 DRAW BLOOD OFF VENOUS DEVICE: CPT

## 2022-09-01 RX ORDER — SODIUM CHLORIDE 0.9 % (FLUSH) 0.9 %
10 SYRINGE (ML) INJECTION AS NEEDED
Status: CANCELLED | OUTPATIENT
Start: 2022-09-01

## 2022-09-01 RX ORDER — HEPARIN SODIUM (PORCINE) LOCK FLUSH IV SOLN 100 UNIT/ML 100 UNIT/ML
500 SOLUTION INTRAVENOUS AS NEEDED
Status: DISCONTINUED | OUTPATIENT
Start: 2022-09-01 | End: 2022-09-01 | Stop reason: HOSPADM

## 2022-09-01 RX ORDER — HEPARIN SODIUM (PORCINE) LOCK FLUSH IV SOLN 100 UNIT/ML 100 UNIT/ML
500 SOLUTION INTRAVENOUS AS NEEDED
Status: CANCELLED | OUTPATIENT
Start: 2022-09-01

## 2022-09-01 RX ADMIN — HEPARIN 500 UNITS: 100 SYRINGE at 10:05

## 2022-09-02 LAB
ALBUMIN SERPL-MCNC: 4 G/DL (ref 3.8–4.8)
ALBUMIN/GLOB SERPL: 2.1 {RATIO} (ref 1.2–2.2)
ALP SERPL-CCNC: 80 IU/L (ref 44–121)
ALT SERPL-CCNC: 12 IU/L (ref 0–44)
AST SERPL-CCNC: 15 IU/L (ref 0–40)
BASOPHILS # BLD AUTO: 0 X10E3/UL (ref 0–0.2)
BASOPHILS NFR BLD AUTO: 0 %
BILIRUB SERPL-MCNC: 0.3 MG/DL (ref 0–1.2)
BUN SERPL-MCNC: 7 MG/DL (ref 8–27)
BUN/CREAT SERPL: 9 (ref 10–24)
CALCIUM SERPL-MCNC: 8.8 MG/DL (ref 8.6–10.2)
CHLORIDE SERPL-SCNC: 103 MMOL/L (ref 96–106)
CO2 SERPL-SCNC: 24 MMOL/L (ref 20–29)
CREAT SERPL-MCNC: 0.78 MG/DL (ref 0.76–1.27)
EGFRCR-CYS SERPLBLD CKD-EPI 2021: 98 ML/MIN/1.73
EOSINOPHIL # BLD AUTO: 0.1 X10E3/UL (ref 0–0.4)
EOSINOPHIL NFR BLD AUTO: 2 %
ERYTHROCYTE [DISTWIDTH] IN BLOOD BY AUTOMATED COUNT: 13.1 % (ref 11.6–15.4)
GLOBULIN SER CALC-MCNC: 1.9 G/DL (ref 1.5–4.5)
GLUCOSE SERPL-MCNC: 118 MG/DL (ref 65–99)
HCT VFR BLD AUTO: 38.9 % (ref 37.5–51)
HGB BLD-MCNC: 13.2 G/DL (ref 13–17.7)
IMM GRANULOCYTES # BLD AUTO: 0 X10E3/UL (ref 0–0.1)
IMM GRANULOCYTES NFR BLD AUTO: 0 %
LYMPHOCYTES # BLD AUTO: 0.9 X10E3/UL (ref 0.7–3.1)
LYMPHOCYTES NFR BLD AUTO: 19 %
MCH RBC QN AUTO: 29.1 PG (ref 26.6–33)
MCHC RBC AUTO-ENTMCNC: 33.9 G/DL (ref 31.5–35.7)
MCV RBC AUTO: 86 FL (ref 79–97)
MONOCYTES # BLD AUTO: 0.3 X10E3/UL (ref 0.1–0.9)
MONOCYTES NFR BLD AUTO: 6 %
NEUTROPHILS # BLD AUTO: 3.4 X10E3/UL (ref 1.4–7)
NEUTROPHILS NFR BLD AUTO: 73 %
PHOSPHATE SERPL-MCNC: 2.3 MG/DL (ref 2.8–4.1)
PLATELET # BLD AUTO: 118 X10E3/UL (ref 150–450)
POTASSIUM SERPL-SCNC: 3.3 MMOL/L (ref 3.5–5.2)
PROT SERPL-MCNC: 5.9 G/DL (ref 6–8.5)
PSA SERPL-MCNC: <0.1 NG/ML (ref 0–4)
RBC # BLD AUTO: 4.53 X10E6/UL (ref 4.14–5.8)
SODIUM SERPL-SCNC: 141 MMOL/L (ref 134–144)
WBC # BLD AUTO: 4.7 X10E3/UL (ref 3.4–10.8)

## 2022-09-06 NOTE — PROGRESS NOTES
Palliative Clinic Note      Name: Naveen Lugo  Age: 67 y.o.  Sex: male  : 1955  MRN: [id number]  Date of Service: 22Medical Oncologist: Rachid     Subjective:    Chief Complaint: Urine odor     History of Present Illness: Naveen Lugo is a 67 y.o. male with past medical history significant for coronary artery disease, hypertension, hyperlipidemia, opioid use disorder, and metastatic prostate cancer who presents to the palliative clinic today as a follow up for pain and symptom management.     Treatment summary: He was diagnosed with prostate adenocarcinoma metastasized to the bone on 9/15/20. Patient completed radiation with Dr. Quintanilla in 2021. Currently on Zytiga, prednisone, and Relugolix. Zometa held due to low phosphorous. No active disease on imaging and PSA remains low.      Pain: History of opioid use disorder. Patient completed Suboxone taper in the past and was on no pain medication prior to cancer diagnosis. History complicated by OUD and several failed therapies including methadone, MS contin and fentanyl. Patient was referred to pain specialist, Dr. BARB Avalos in 3/2022. Patient transferred care back to the palliative clinic for uncontrolled hip and back pain in 2022. Pt was unhappy with his care at Dr. Avalos's office. He is currently taking hydromorphone 8 mg every 4 hours. We started him on oxycodone ER 9 mg BID but patient was unable to  the prescription from the pharmacy.     Symptoms: The patient complains about worsened urine odor. He first noticed malodorous urine with the start of chemotherapy. He denies additional urinary changes.  No issues with appetite. No nausea or vomiting. Regular bowel movements. Patient gets up several times during the night to use the bathroom but is able to fall back asleep easily. Patient's fatigue continues to wax and wane since starting back at work.      Pyschosocial: Endorses strong family support. He enjoyed taking his grandson  "to a history museum last weekend. No personal history of mental illness. He denies anxiety or depression. Patient enjoys spending times outdoors.     Goals: Focused on improving pain so he can participate in recreational activities with his grandson.    The following portions of the patient's history were reviewed and updated as appropriate: allergies, current medications, past family history, past medical history, past social history, past surgical history and problem list.    ORT-R: High risk  Aberrant behavior: None  Decisional capacity: Full  ECOG: (1) Restricted in physically strenuous activity, ambulatory and able to do work of light nature   Palliative Performance Scale Score: 80%     Objective:    /80   Pulse 86   Temp 96.8 °F (36 °C) (Temporal)   Resp 18   Ht 175.3 cm (69.02\")   Wt 87.7 kg (193 lb 4.8 oz)   SpO2 100%   BMI 28.53 kg/m²     Constitutional: Awake, alert, analgesic gait, sitting up in exam chair, in no acute distress  Eyes: PERRLA, EOMS intact  HENT: NCAT, face symmetric  Neck: Supple, trachea midline  Respiratory: Nonlabored respirations  Cardiovascular: RRR, no edema observed  Gastrointestinal: Soft, no guarding  Musculoskeletal: Moves all extremities   Psychiatric: Appropriate affect, cooperative  Neurologic: Oriented x 3, Cranial Nerves grossly intact to confrontation, speech clear  Skin: Cool dry, no rashes or wounds appreciated     Medication Counts: Reviewed. See bottom of note for details. Did not bring medication to appointment  I have reviewed the patient's [place] PDMP. AGUSTIN Req #[id number].   UDS(6M): Last 11/2021. Repeat today.    Assessment & Plan:    1. Abnormal urine odor  - Appears a chronic issue and likely secondary to treatment. No other new urinary complaints. Will obtain urinalysis. Encouraged patient to increase water intake.     2. Prostate cancer metastatic to bone (HCC)  - Currently on Zytiga, prednisone, and Relugolix. No active disease on imaging and PSA " remains low.     3. Pain, cancer  4. Other chronic pain  - Side effects of the medication discussed at every visit. Patient is appropriate for opioid therapy due to cancer related pain. Daily function improved with hydromorphone 8 mg q4h however patient reports uncontrolled pain while at work. Will re-send script for oxycodone ER 9 mg q12h. PA approved.     5. Therapeutic drug monitoring  - Due for 6 month UDS. Patient sent to lab after visit to complete. Patient reminded of clinic policy regarding pill counts.     Code status: Full code  Medical interventions: Full  Advanced directives: None  Healthcare surrogate: Rosaura Lugo    Return in about 1 month (around 10/7/2022) for Office Visit.    I spent 30 minutes caring for Naveen Lugo on this date of service. This time includes time spent by me in the following activities: preparing for the visit, reviewing tests, obtaining and/or reviewing a separately obtained history, performing a medically appropriate examination and/or evaluation , counseling and educating the patient/family/caregiver, ordering medications, tests, or procedures, documenting information in the medical record, independently interpreting results and communicating that information with the patient/family/caregiver, and care coordination    Anna Ayers PA-C  09/07/2022    Medication Date Filled # Filled Count Used # Days  ROSE MARIE   Hydromorphone 8 8/10/22 180 -- -- -- 6   Xtampza ER 9 mg 8/26/22 0 -- -- -- 0

## 2022-09-06 NOTE — PATIENT INSTRUCTIONS
Check-out instructions:  Continue hydromorphone 8 mg every 4 hours as needed.  Start Xtampza 9 mg every 12 hours.   Scheduled to follow up in 1 month.    Medication Policy: We ask that you bring all of the medications prescribed by this clinic to every appointment. For telemedicine appointments, be prepared to give medication counts. This will assist us with managing your refill needs.      Refill Policy: You must notify us at least 3-5 business days in advance for routine refill requests. Call [phone] or send [employer] message to the Palliative Pool. Some prescriptions will need to be signed by the physician and will take longer to be sent to the pharmacy. Please also be aware of additional insurance prior authorization processing time required for many medications. Try to communicate with your pharmacy first to look for scripts signed in advance.     Communication: [facility] days are Monday-Friday 8:30-4:30 PM. Call [phone] or send [employer] message to the Palliative Pool. You will not be routed to speak directly to the palliative provider during clinic hours, so that we may provide the best care and attention to our patients in the office. If you require immediate communication, please also consider contacting your primary care office or appropriate specialist office.

## 2022-09-07 ENCOUNTER — LAB (OUTPATIENT)
Dept: LAB | Facility: HOSPITAL | Age: 67
End: 2022-09-07

## 2022-09-07 ENCOUNTER — TELEPHONE (OUTPATIENT)
Dept: PALLIATIVE CARE | Facility: CLINIC | Age: 67
End: 2022-09-07

## 2022-09-07 ENCOUNTER — OFFICE VISIT (OUTPATIENT)
Dept: PALLIATIVE CARE | Facility: CLINIC | Age: 67
End: 2022-09-07

## 2022-09-07 VITALS
TEMPERATURE: 96.8 F | OXYGEN SATURATION: 100 % | HEIGHT: 69 IN | BODY MASS INDEX: 28.63 KG/M2 | DIASTOLIC BLOOD PRESSURE: 80 MMHG | HEART RATE: 86 BPM | WEIGHT: 193.3 LBS | SYSTOLIC BLOOD PRESSURE: 160 MMHG | RESPIRATION RATE: 18 BRPM

## 2022-09-07 DIAGNOSIS — G89.3 PAIN, CANCER: ICD-10-CM

## 2022-09-07 DIAGNOSIS — C79.51 PROSTATE CANCER METASTATIC TO BONE: ICD-10-CM

## 2022-09-07 DIAGNOSIS — R82.90 ABNORMAL URINE ODOR: ICD-10-CM

## 2022-09-07 DIAGNOSIS — C61 PROSTATE CANCER METASTATIC TO BONE: ICD-10-CM

## 2022-09-07 DIAGNOSIS — G89.29 OTHER CHRONIC PAIN: ICD-10-CM

## 2022-09-07 DIAGNOSIS — Z51.81 THERAPEUTIC DRUG MONITORING: Primary | ICD-10-CM

## 2022-09-07 LAB
AMPHET+METHAMPHET UR QL: NEGATIVE
AMPHETAMINES UR QL: NEGATIVE
BARBITURATES UR QL SCN: NEGATIVE
BENZODIAZ UR QL SCN: NEGATIVE
BUPRENORPHINE SERPL-MCNC: POSITIVE NG/ML
CANNABINOIDS SERPL QL: NEGATIVE
COCAINE UR QL: NEGATIVE
METHADONE UR QL SCN: NEGATIVE
OPIATES UR QL: POSITIVE
OXYCODONE UR QL SCN: POSITIVE
PCP UR QL SCN: NEGATIVE
PROPOXYPH UR QL: NEGATIVE
TRICYCLICS UR QL SCN: NEGATIVE

## 2022-09-07 PROCEDURE — 99214 OFFICE O/P EST MOD 30 MIN: CPT | Performed by: PHYSICIAN ASSISTANT

## 2022-09-07 PROCEDURE — 80306 DRUG TEST PRSMV INSTRMNT: CPT | Performed by: PHYSICIAN ASSISTANT

## 2022-09-07 RX ORDER — HYDROMORPHONE HYDROCHLORIDE 8 MG/1
8 TABLET ORAL EVERY 4 HOURS PRN
Qty: 180 TABLET | Refills: 0 | Status: SHIPPED | OUTPATIENT
Start: 2022-09-09 | End: 2022-10-05 | Stop reason: SDUPTHER

## 2022-09-07 RX ORDER — OXYCODONE 9 MG/1
9 CAPSULE, EXTENDED RELEASE ORAL EVERY 12 HOURS
Qty: 60 EACH | Refills: 0 | Status: SHIPPED | OUTPATIENT
Start: 2022-09-07 | End: 2022-10-05 | Stop reason: SDUPTHER

## 2022-09-07 NOTE — TELEPHONE ENCOUNTER
Patient was called to discuss his UDS from today. He states that he has not been taking anything from anybody outside of what he gets here at  [facility].  He states that he saw Dr. Delgado last month and at that appointment Naveen told him that Anna would be the only provider and that he wouldn't be seeing Dr. Delgado in the future.  I went over with him office policy, the need for a new UDS in 2 weeks and the importance of knowing what medication he is taking at all times.  We went over being sure any medication he takes, that he is sure it is what he thinks it is. Patient states he has no idea how he would test for this and assures me he has not taken anything other than what he is prescribed.  He will get a re-test in 2 weeks.

## 2022-09-07 NOTE — TELEPHONE ENCOUNTER
I have reviewed patient's AGUSTIN report prior to prescribing Schedule II, III, and IV medications. Request # [id number]. Script for oxycodone ER 9 mg BID and hydromorphone 8 mg q4h PRN sent to pharmacy. Patient will follow up in 1 month.

## 2022-09-07 NOTE — TELEPHONE ENCOUNTER
----- Message from Anna Ayers PA-C sent at 9/7/2022 11:20 AM EDT -----  Regarding: UDS  Please call patient and inform him that his urine drug screen today was positive for buprenorphine. This medication is not on his medication list and is not prescribed by Anna Ayers PA-C. Per clinic policy, all controlled substances must come from the palliative clinic provider. Taking any controlled substances without a prescription is illegal and will lead to termination of the provider-patient relationship. Ask if and when the last time patient took buprenorphine. Instruct patient to stop using this medication and repeat the UDS at a [facility] in the next 2 weeks. The order has been placed.

## 2022-09-07 NOTE — TELEPHONE ENCOUNTER
Addendum Note by Jasvir Christopher MD at 10/15/2018  8:41 AM     Author: Jasvir Christopher MD Service: -- Author Type: Physician    Filed: 10/15/2018  8:41 AM Encounter Date: 10/15/2018 Status: Signed    : Jasvir Christopher MD (Physician)    Addended by: JASVIR CHRISTOPHER on: 10/15/2018 08:41 AM        Modules accepted: Orders         Hub staff attempted to follow warm transfer process and was unsuccessful     Caller: Naveen Lugo    Relationship to patient: Self    Best call back number: [phone]    Patient is needing: PATIENT THOUGHT WAS GIVEN HYDROMORPHINE INSTEAD OF DILAUDID.  PLEASE CALL CONFIRM THIS FOR HIM.      THANK YOU

## 2022-09-08 ENCOUNTER — SPECIALTY PHARMACY (OUTPATIENT)
Dept: ONCOLOGY | Facility: HOSPITAL | Age: 67
End: 2022-09-08

## 2022-09-08 NOTE — PROGRESS NOTES
Specialty Pharmacy Refill Coordination Note     Naveen is a 67 y.o. male contacted today regarding refills of  abiraterone 1000mg PO QD, relugolix 120mg PO QD and prednisone 5mg PO QD specialty medication(s).    Specialty medication(s) and dose(s) confirmed: yes    Refill Questions    Flowsheet Row Most Recent Value   Changes to allergies? No   Changes to medications? No   New conditions since last clinic visit No   Unplanned office visit, urgent care, ED, or hospital admission in the last 4 weeks  No   How does patient/caregiver feel medication is working? Very good   Financial problems or insurance changes  No   Since the previous refill, were any specialty medication doses or scheduled injections missed or delayed?  No   Does this patient require a clinical escalation to a pharmacist? No          Delivery Questions    Flowsheet Row Most Recent Value   Delivery method [employer]   Delivery address correct? Yes   Delivery phone number [phone]   Preferred delivery time? Anytime   Number of medications in delivery 3   Medication being filled and delivered abiraterone, relugolix and prednisone   Doses left of specialty medications 8 days left of each   Is there any medication that is due not being filled? No   Supplies needed? No supplies needed   Cooler needed? No   Do any medications need mixed or dated? No   Additional comments no copay   Questions or concerns for the pharmacist? No   Explain any questions or concerns for the pharmacist n/a   Are any medications first time fills? No            Medication Adherence    Adherence tools used: watch   Other adherence tool: Clock - takes at same time each day, 7:45am   Support network for adherence: family member          Follow-up: 28 day(s)     Sushma Muro, Pharmacy Technician  Specialty Pharmacy Technician

## 2022-09-20 ENCOUNTER — LAB (OUTPATIENT)
Dept: LAB | Facility: HOSPITAL | Age: 67
End: 2022-09-20

## 2022-09-20 LAB
BACTERIA UR QL AUTO: NORMAL /HPF
BILIRUB UR QL STRIP: NEGATIVE
CLARITY UR: CLEAR
COLOR UR: YELLOW
GLUCOSE UR STRIP-MCNC: NEGATIVE MG/DL
HGB UR QL STRIP.AUTO: NEGATIVE
HYALINE CASTS UR QL AUTO: NORMAL /LPF
KETONES UR QL STRIP: NEGATIVE
LEUKOCYTE ESTERASE UR QL STRIP.AUTO: NEGATIVE
NITRITE UR QL STRIP: NEGATIVE
PH UR STRIP.AUTO: 6 [PH] (ref 5–8)
PROT UR QL STRIP: NEGATIVE
RBC # UR STRIP: NORMAL /HPF
REF LAB TEST METHOD: NORMAL
SP GR UR STRIP: 1.02 (ref 1–1.03)
SQUAMOUS #/AREA URNS HPF: NORMAL /HPF
UROBILINOGEN UR QL STRIP: NORMAL
WBC # UR STRIP: NORMAL /HPF

## 2022-09-20 PROCEDURE — 81001 URINALYSIS AUTO W/SCOPE: CPT | Performed by: PHYSICIAN ASSISTANT

## 2022-10-03 ENCOUNTER — SPECIALTY PHARMACY (OUTPATIENT)
Dept: ONCOLOGY | Facility: HOSPITAL | Age: 67
End: 2022-10-03

## 2022-10-03 NOTE — PROGRESS NOTES
Specialty Pharmacy Refill Coordination Note     Naveen is a 67 y.o. male contacted today regarding refills of abiraterone 1000mg PO QD, relugolix 120mg PO QD and prednisone 5mg po QD   specialty medication(s).    Scheduled refills for abirateron, relugolix and prednisone for 10/10/22.     Specialty medication(s) and dose(s) confirmed: yes    Refill Questions    Flowsheet Row Most Recent Value   Changes to allergies? No   Changes to medications? No   New conditions since last clinic visit No   Unplanned office visit, urgent care, ED, or hospital admission in the last 4 weeks  No   How does patient/caregiver feel medication is working? Good   Financial problems or insurance changes  No   Since the previous refill, were any specialty medication doses or scheduled injections missed or delayed?  No   Does this patient require a clinical escalation to a pharmacist? No          Delivery Questions    Flowsheet Row Most Recent Value   Delivery method [employer]   Delivery address correct? Yes   Delivery phone number [phone]   Preferred delivery time? Anytime   Number of medications in delivery 3   Medication being filled and delivered abiraterone, relugolix and prednisone   Doses left of specialty medications 15 days left   Is there any medication that is due not being filled? No   Supplies needed? No supplies needed   Cooler needed? No   Do any medications need mixed or dated? No   Additional comments no copay   Questions or concerns for the pharmacist? No   Explain any questions or concerns for the pharmacist n/a   Are any medications first time fills? No            Medication Adherence    Adherence tools used: watch   Other adherence tool: Clock - takes at same time each day, 7:45am   Support network for adherence: family member          Follow-up: 28 day(s)     Sushma Muro, Pharmacy Technician  Specialty Pharmacy Technician

## 2022-10-05 ENCOUNTER — LAB (OUTPATIENT)
Dept: LAB | Facility: HOSPITAL | Age: 67
End: 2022-10-05

## 2022-10-05 ENCOUNTER — OFFICE VISIT (OUTPATIENT)
Dept: PALLIATIVE CARE | Facility: CLINIC | Age: 67
End: 2022-10-05

## 2022-10-05 VITALS
TEMPERATURE: 95.9 F | OXYGEN SATURATION: 97 % | DIASTOLIC BLOOD PRESSURE: 84 MMHG | SYSTOLIC BLOOD PRESSURE: 163 MMHG | BODY MASS INDEX: 28.9 KG/M2 | HEIGHT: 69 IN | HEART RATE: 97 BPM | WEIGHT: 195.1 LBS | RESPIRATION RATE: 18 BRPM

## 2022-10-05 DIAGNOSIS — G89.3 PAIN, CANCER: ICD-10-CM

## 2022-10-05 DIAGNOSIS — C79.51 PROSTATE CANCER METASTATIC TO BONE: ICD-10-CM

## 2022-10-05 DIAGNOSIS — C61 PROSTATE CANCER METASTATIC TO BONE: ICD-10-CM

## 2022-10-05 DIAGNOSIS — Z51.81 THERAPEUTIC DRUG MONITORING: Primary | ICD-10-CM

## 2022-10-05 LAB
AMPHET+METHAMPHET UR QL: NEGATIVE
AMPHETAMINES UR QL: NEGATIVE
BARBITURATES UR QL SCN: NEGATIVE
BENZODIAZ UR QL SCN: NEGATIVE
BUPRENORPHINE SERPL-MCNC: NEGATIVE NG/ML
CANNABINOIDS SERPL QL: NEGATIVE
COCAINE UR QL: NEGATIVE
METHADONE UR QL SCN: NEGATIVE
OPIATES UR QL: POSITIVE
OXYCODONE UR QL SCN: POSITIVE
PCP UR QL SCN: NEGATIVE
PROPOXYPH UR QL: NEGATIVE
TRICYCLICS UR QL SCN: NEGATIVE

## 2022-10-05 PROCEDURE — 80306 DRUG TEST PRSMV INSTRMNT: CPT | Performed by: PHYSICIAN ASSISTANT

## 2022-10-05 PROCEDURE — 99214 OFFICE O/P EST MOD 30 MIN: CPT | Performed by: PHYSICIAN ASSISTANT

## 2022-10-05 RX ORDER — HYDROMORPHONE HYDROCHLORIDE 8 MG/1
8 TABLET ORAL EVERY 4 HOURS PRN
Qty: 180 TABLET | Refills: 0 | Status: SHIPPED | OUTPATIENT
Start: 2022-10-08 | End: 2022-11-02 | Stop reason: SDUPTHER

## 2022-10-05 RX ORDER — OXYCODONE 9 MG/1
9 CAPSULE, EXTENDED RELEASE ORAL EVERY 12 HOURS
Qty: 60 EACH | Refills: 0 | Status: SHIPPED | OUTPATIENT
Start: 2022-10-07 | End: 2022-11-02 | Stop reason: SDUPTHER

## 2022-10-05 NOTE — PATIENT INSTRUCTIONS
Check-out instructions:  Continue current regimen.   Scheduled to follow up in 3 months.     Medication Policy: We ask that you bring all of the medications prescribed by this clinic to every appointment. For telemedicine appointments, be prepared to give medication counts. This will assist us with managing your refill needs.      Refill Policy: You must notify us at least 3-5 business days in advance for routine refill requests. Call [phone] or send [employer] message to the Palliative Pool. Some prescriptions will need to be signed by the physician and will take longer to be sent to the pharmacy. Please also be aware of additional insurance prior authorization processing time required for many medications. Try to communicate with your pharmacy first to look for scripts signed in advance.     Communication: The [facility] days are Monday-Friday 8:30-4:30 PM. Call [phone] or send [employer] message to the Palliative Pool. You will not be routed to speak directly to the palliative provider during clinic hours, so that we may provide the best care and attention to our patients in the office. If you require immediate communication, please also consider contacting your primary care office or appropriate specialist office.

## 2022-10-05 NOTE — TELEPHONE ENCOUNTER
I have reviewed patient's AGUSTIN report prior to prescribing Schedule II, III, and IV medications. Request # [id number]. Refill for oxycodone ER 9 mg BID #60 and hydromorphone 8 mg q4h PRN #180 sent to the pharmacy. The patient will f/u in 3 months or sooner if UDS is abnormal.

## 2022-10-05 NOTE — PROGRESS NOTES
Palliative Clinic Note      Name: Naveen Lugo  Age: 67 y.o.  Sex: male  : 1955  MRN: [id number]  Date of Service: 10/05/22  Medical Oncologist: Rachid     Subjective:    Chief Complaint: Follow-up for symptom management    History of Present Illness: Naveen Lugo is a 67 y.o. male with past medical history significant for coronary artery disease, hypertension, hyperlipidemia, opioid use disorder, and metastatic prostate cancer who presents to the palliative clinic today as a follow up for pain and symptom management.     Treatment summary: He was diagnosed with prostate adenocarcinoma metastasized to the bone on 9/15/20. Patient completed radiation with Dr. Quintanilla in 2021. Currently on Zytiga, prednisone, and Relugolix. Zometa held due to low phosphorous. No active disease on imaging and PSA remains low.      Pain: History of opioid use disorder. Patient completed Suboxone taper in the past and was on no pain medication prior to cancer diagnosis. History complicated by OUD and several failed therapies including methadone, MS contin and fentanyl. Patient was referred to pain specialist, Dr. BARB Avalos in 3/2022. Patient transferred care back to the palliative clinic for uncontrolled hip and back pain in 2022. Pt was unhappy with his care at Dr. Avalos's office. Patient reports adeaquate pain control with current regimen of oxycodone ER 9 mg BID and hydromorphone 8 mg every 4 hours. Patient is able to work and complete all ADLs/IADLs.      Symptoms: The patient has no complaints today He endorses a good appetite. He denies nausea or vomiting. Regular bowel movements on Senna. No issues with sleep.     Pyschosocial: Endorses strong family support. He enjoyed taking his grandson to a history museum last weekend. No personal history of mental illness. He denies anxiety or depression. Patient enjoys spending times outdoors.     Goals: Focused on improving pain so he can continue to work.    The following  "portions of the patient's history were reviewed and updated as appropriate: allergies, current medications, past family history, past medical history, past social history, past surgical history and problem list.    ORT-R: High risk   Aberrant behavior: abnormal UDS 9/7/22  Decisional capacity: Full  ECOG: (1) Restricted in physically strenuous activity, ambulatory and able to do work of light nature   Palliative Performance Scale Score: 80%     Objective:    /84   Pulse 97   Temp 95.9 °F (35.5 °C)   Resp 18   Ht 175.3 cm (69.02\")   Wt 88.5 kg (195 lb 1.6 oz)   SpO2 97%   BMI 28.80 kg/m²     Constitutional: Awake, alert, analgesic gait, sitting up in exam chair, in no acute distress  Eyes: PERRLA, EOMS intact  HENT: NCAT, face symmetric  Neck: Supple, trachea midline  Respiratory: Nonlabored respirations  Cardiovascular: RRR, no edema observed  Gastrointestinal: Soft, no guarding  Musculoskeletal: Moves all extremities   Psychiatric: Appropriate affect, cooperative  Neurologic: Oriented x 3, Cranial Nerves grossly intact to confrontation, speech clear  Skin: Cool dry, no rashes or wounds appreciated     Medication Counts: Reviewed. See bottom of note for details. Did not bring medication to appointment  I have reviewed the patient's [place] PDMP. AGUSTIN Req #[id number]   UDS(3M): Last 9/7/22. Positive for buprenorphine. Repeat today.      Assessment & Plan:    1. Therapeutic drug monitoring  - Abnormal UDS 9/7/22. Patient denies buprenorphine use. Will repeat today.     2. Prostate cancer metastatic to bone (HCC)  - Currently on Zytiga, prednisone, and Relugolix. Zometa held due to low phosphorous. No active disease on imaging and PSA remains low.     3. Pain, cancer  - Side effects of the medication discussed at every visit. Patient was encouraged to continue bowel regimen of daily stool softeners, prn laxatives, and diet modifications. Patient is appropriate for opioid therapy due to cancer related pain. " Daily function and quality of life improved with current regimen.     - Continue oxycodone ER 9 mg BID and hydromorphone 8 mg q4h PRN. Refills sent to the pharmacy.     Code status: Full code  Medical interventions: Full    Return in about 3 months (around 1/5/2023) for Office Visit.    I spent 30 minutes caring for Naveen Lugo on this date of service. This time includes time spent by me in the following activities: preparing for the visit, reviewing tests, obtaining and/or reviewing a separately obtained history, performing a medically appropriate examination and/or evaluation , counseling and educating the patient/family/caregiver, ordering medications, tests, or procedures, documenting information in the medical record, independently interpreting results and communicating that information with the patient/family/caregiver, and care coordination    Anna Ayers PA-C  10/05/2022    Medication Date Filled # Filled Count Used # Days  RO[facility]IE   Hydromorphone 8 9/8/22 180 -- -- -- 6   Xtampza 9 9/7/22 60 -- -- -- 2

## 2022-10-06 ENCOUNTER — TELEPHONE (OUTPATIENT)
Dept: ONCOLOGY | Facility: CLINIC | Age: 67
End: 2022-10-06

## 2022-10-06 ENCOUNTER — APPOINTMENT (OUTPATIENT)
Dept: ONCOLOGY | Facility: HOSPITAL | Age: 67
End: 2022-10-06

## 2022-10-06 NOTE — TELEPHONE ENCOUNTER
Caller: Naveen Lugo    Relationship: Self    Best call back number: [phone]    What is the best time to reach you: ANYTIME    Who are you requesting to speak with (clinical staff, provider,  specific staff member): SCHEDULING    What was the call regarding: PT CALLING TO R/S HIS 10/6 APPT FOR TODAY - HE ISN'T FEELING WELL.     PLEASE CALL TO R/S - TRIED TO W/T TO OFFICE BUT WAS UNSUCCESSFUL.     Do you require a callback: YES

## 2022-10-13 ENCOUNTER — LAB (OUTPATIENT)
Dept: ONCOLOGY | Facility: HOSPITAL | Age: 67
End: 2022-10-13

## 2022-10-13 ENCOUNTER — OFFICE VISIT (OUTPATIENT)
Dept: ONCOLOGY | Facility: CLINIC | Age: 67
End: 2022-10-13

## 2022-10-13 VITALS
BODY MASS INDEX: 28.88 KG/M2 | OXYGEN SATURATION: 94 % | HEIGHT: 69 IN | HEART RATE: 88 BPM | TEMPERATURE: 97.7 F | DIASTOLIC BLOOD PRESSURE: 92 MMHG | RESPIRATION RATE: 20 BRPM | SYSTOLIC BLOOD PRESSURE: 170 MMHG | WEIGHT: 195 LBS

## 2022-10-13 DIAGNOSIS — C61 PROSTATE CANCER METASTATIC TO BONE: Primary | ICD-10-CM

## 2022-10-13 DIAGNOSIS — C79.51 PROSTATE CANCER METASTATIC TO BONE: Primary | ICD-10-CM

## 2022-10-13 DIAGNOSIS — Z45.2 ENCOUNTER FOR CARE RELATED TO PORT-A-CATH: ICD-10-CM

## 2022-10-13 PROCEDURE — 36591 DRAW BLOOD OFF VENOUS DEVICE: CPT

## 2022-10-13 PROCEDURE — 25010000002 HEPARIN LOCK FLUSH PER 10 UNITS: Performed by: INTERNAL MEDICINE

## 2022-10-13 PROCEDURE — 99214 OFFICE O/P EST MOD 30 MIN: CPT | Performed by: NURSE PRACTITIONER

## 2022-10-13 RX ORDER — HEPARIN SODIUM (PORCINE) LOCK FLUSH IV SOLN 100 UNIT/ML 100 UNIT/ML
500 SOLUTION INTRAVENOUS AS NEEDED
Status: DISCONTINUED | OUTPATIENT
Start: 2022-10-13 | End: 2022-10-13 | Stop reason: HOSPADM

## 2022-10-13 RX ORDER — HEPARIN SODIUM (PORCINE) LOCK FLUSH IV SOLN 100 UNIT/ML 100 UNIT/ML
500 SOLUTION INTRAVENOUS AS NEEDED
Status: CANCELLED | OUTPATIENT
Start: 2022-10-13

## 2022-10-13 RX ORDER — SODIUM CHLORIDE 0.9 % (FLUSH) 0.9 %
10 SYRINGE (ML) INJECTION AS NEEDED
Status: CANCELLED | OUTPATIENT
Start: 2022-10-13

## 2022-10-13 RX ADMIN — HEPARIN 500 UNITS: 100 SYRINGE at 08:50

## 2022-10-13 NOTE — PROGRESS NOTES
CHIEF COMPLAINT:  1. Metastatic prostate cancer with drug-induced hypophosphatemia and possible drug-induced adrenal insufficiency  2. Pain    Problem List:  Oncology/Hematology History Overview Note   1.  Stage IVb grade group 4 metastatic prostate cancer with sclerotic bone lesions on CT and bone scan and history of prostatic hypertrophy  2.  Kidney stone  3.  Polyps  4.  Probable drug-induced hypophosphatemia from Zometa, drug stopped after 10/5/2021 dose  5.  Cancer related pain  6.  Fatigue  7.  Covid 2/2022    -9/30/2020 office note from Dr. Ronen Reynaga indicates patient with PSA 10.8.  Underwent TRUS prostate biopsy 9/15/2020.  Adenocarcinoma the prostate [place] 4+4 = 8 in 1 out of 12 cores and 4+3 = 7 and 10 out of 12 cores and 3+4 = 7 in 1 out of 12 cores.  Perineural invasion.  Extraprostatic extension into the fat seen on 2 biopsies of the right lateral mid and right apex.  9/24/2020 CT chest and whole-body bone scan showed T12, L1, left acetabular, right medial acetabular metastases with no lung metastases.  He started androgen deprivation therapy with Casodex with plans for Lupron to start in 1 to 2 weeks for clinical T3 N0 M1 metastatic prostate cancer.  Review of official report from [facility] 9/24/2020 bone scan mentions T12, L1, roof of left acetabulum and medial right acetabular osteoblastic metastases.  CT chest with contrast that same day showed mild splenomegaly 14.2 cm with stable periaortic nodes compared to CT December 2015 with no lung metastases.  Sclerotic abnormality within the T12 vertebral body, L1 vertebral body extending into the pedicle unchanged.  8/28/2020 CT abdomen pelvis report from [facility] reviewed and mentions normal spleen size.  Punctate nonobstructing kidney stone, no paulino enlargement, diffuse bladder wall thickening with mild perivesicular fat stranding, 2.1 cm sclerotic left iliac bone above the left acetabulum new compared to 2015 as well as  1.5 cm faintly sclerotic focus in the right posterior acetabulum stable compared to prior CT 2015 as well as a 1.6 cm T12 and inferior vertebral body sclerotic lesion and a 1.9 cm left posterior lateral L1 vertebral body abnormality extending to the pedicle.  -10/13/2020 initial [facility] medical oncology consultation: With 1 Sarika score 8, 4+4 lesion that would make him a grade group 4 with stage IVb disease given radiographic evidence of sclerotic bone metastasis on bone scan and CT.  Needs genetic testing given metastatic prostate cancer and this is also important as, were he to have mismatch repair mutation then we would have options for PDL 1 inhibitors and were he BRCA mutated would have options for PARP inhibitors in the future.  Systemic therapy for castration naïve prostate cancer or N1 disease should be with apalutamide or Abiraterone or enzalutamide all of which are category 1.  He does not have any visceral metastases.  According to his imaging he has T12, L1, left acetabular, right acetabular, and left iliac bony involvement.  That means he would have 4 or more bone metastases with at least one metastasis (i.e. the acetabular lesions bilaterally) beyond the pelvis/vertebral, for which this would be considered high-volume disease for which 6 cycles of docetaxel 75 mg/m² x 6 cycles followed by Zytiga and prednisone would be reasonable along with Zometa every 6 weeks for bone stabilization.  He will do chemo preparation visit with my nurse practitioner prior to start chemotherapy with Taxotere and Zometa in 2 weeks and he is already received Casodex in preparation for Lupron shot he received on 10/12/2020 with Dr. Reynaga.  We will get him to Dr. Alexander Gomez who has done his polypectomies in the past for port placement and we will get genetic counseling started.  Following the 6 courses of Taxotere per the [place]arted trial criteria, I would then give him an indefinite Zytiga and prednisone and Zometa until  progression or toxicity dictates.    -12/16/2019 COVID-19 antigen test negative    -12/29/2020 PSA 0.275 with absolute neutrophil count 700 and creatinine 0.76 with normal liver enzymes and electrolytes.  Normal magnesium and phosphorus and urine drug screen positive for buprenorphine and opiates follow-up with palliative care.    -1/4/2021 CT chest abdomen pelvis with contrast and whole-body bone scan shows improving less metabolically active bone metastases.  Stable sclerotic T12, L1, and L2 vertebral bodies and bilateral pelvic bones on bone windows with stable subcentimeter para-aortic lymph nodes and no definite progression in the chest abdomen or pelvis.    -1/5/2021 [facility] medical oncology follow-up visit: I reviewed the images and reports thereof of the above CT and bone scan which shows good response to Taxotere x3 courses with a PSA down to 0.275 from a baseline of 10.  Get another 3 courses of Taxotere, continue his Xgeva, and then following that we will switch to Zytiga and prednisone.  He is working with palliative care on his bone pain but on his current dose of fentanyl patch he says his pain is still not adequately controlled he will contact them.  Dexamethasone prescription was refilled.  We will see my nurse practitioner back in 3 weeks for course #5 of 6 total courses and then we will reimage with CT chest abdomen pelvis and bone scan before going to the Zytiga prednisone.    -3/4/2021 CT chest abdomen pelvis with contrast is negative save for stable sclerotic bone lesions and the bone scan shows near resolution of prior bony abnormalities associated with the known sclerotic lesions in the thoracolumbar spine and bony pelvis.    2/17/2021 PSA down to 0.1 from 1.37 October 2020.  3/9/2021 PSA 0.1    -5/26/2021 CT chest abdomen pelvis with contrast shows stable to slightly underlying increase low-density left para-aortic node.  Bone lesions stable.  Whole-body bone scan stable to decrease activity  of known thoracolumbar and bony pelvic involvement.  PSA less than 0.1  , AST 74, bilirubin 0.5.       -6/1/2021 [facility] medical oncology follow-up visit: I reviewed the above data with him and images thereof.  Bones are stable.  No significant adenopathy.  PSA immeasurably low.     upper limit of normal 69 and AST 74 upper limit of normal 46.  Hence, neither is more than double the upper limit of normal with no ascites and no encephalopathy and normal albumin and bilirubin, despite the elevation of liver enzymes, he has child Abrams score A.  Package insert for Abiraterone says that for ALT and/or AST greater than 5 times upper limit of normal or total bilirubin greater than 3 times the upper limit of normal to withhold treatment until liver function tests returned to baseline or ALT and AST less than or equal to 2.5 times the upper limit of normal and total bilirubin less than or equal to 1.5 times the upper limit of normal and then reinitiate at 750 mg daily dose of Zytiga.  For recurrent hepatotoxicity on 750 mg daily Zytiga, withhold treatment until liver functions returned to baseline or ALT and AST less than 3-2.5 times upper limit of normal and total bilirubin less than or equal to 1.5 times the upper limit of normal then reinitiate at 500 mg daily Zytiga dose.  If has recurrent hepatotoxicity on 500 mg dose, then discontinue treatment.  If has ALT greater than 3 times the upper limit of normal along with total bilirubin greater than 2 times the upper limit of normal in the absence of other contributing causes or biliary obstruction, permanently discontinue Zytiga.  Based on these recommendations, I would continue current doses but we will watch with serial labs monthly and repeat his imaging again in 3 months but clinically he is doing wonderfully with excellent response to Taxotere and now on Zytiga prednisone plus Zometa along with Relugolix.    -6/23/2020 for [facility] oncology clinic  follow-up: Having persistent and worsening pain above his left hip.  I will get an MRI of the left hip for further evaluation.  Otherwise we will continue therapy unchanged with Zytiga, prednisone and Zometa along with Relugolix.    -7/28/2021 [facility] oncology clinic follow-up: Patient with multiple somatic complaints including episodes of confusion, dizziness, decreased memory, agitation.  He reports ongoing episodes with difficulty swallowing.  Also most concerning for episodes of angina and chest pain for which he basically refused to seek emergency medical attention.  He has a history of coronary artery disease and stent placement.  He has not followed up with cardiology for many years.  I will get an MRI of the brain in light of his confusion, dizziness and agitation.  I will get him to gastroenterology for EGD in light of his dysphagia.  I have also put in a referral for cardiology.  I reemphasized to the patient, his wife who is with him today and his son who was on the telephone during our visit the importance of seeking out prompt medical attention when he is having chest pain or neurological concerns so that he can be evaluated.  The patient states that he basically refuses to go to the emergency room and if his family calls an ambulance he would not agree to go to the hospital.  For the time being, I have asked him to hold his Zytiga and prednisone until we can sort this out as Zytiga does have some cardiovascular risk.  There is likely no treatment for prostate cancer that does not carry some form of cardiovascular risk.  His PSA remains low at 0.014 yesterday.  He will continue relugolix for now.    Of note, some of these symptoms could also be due to his hypophosphatemia, phosphate level from yesterday was 1.5, I have sent in a prescription for K-Phos 3 times a day before meals for 10 days.  We will hold further Zometa until this is corrected.  I have also put in an order to check his vitamin D level.   "He takes calcium and vitamin D daily.  Some of his symptoms also could be due to drug withdrawal as there was some confusion and difficulty getting his last prescription for methadone therefore he was without methadone for several days, he now has his prescription and is back on his pain medication.  He has an appointment with neurosurgery tomorrow in light of his ongoing back pain, MRI of the hip recently showed multiple bony lesions largest at the L5 vertebral body and left supra-acetabular region.  If no neurosurgical intervention recommended he may benefit from spot radiation as this is where a lot of his pain is coming from.  His wife had questions today regarding his staging and extent of disease.  We reviewed previous scans.  I did clarify with her as she used the words \"cancer free\" as she thought that is what she was told after his CT scans once he completed Taxotere in February.  I explained to her that even though his scans showed he had an excellent response with no clear areas of metastasis that did not mean he was cancer free, I explained to her that when you have metastatic cancer the treatment intent is palliative in nature and to control the disease but not to cure the disease.  She stated understanding.  We will see him back in 2 weeks for follow-up to sort through this and make further plan of care, again, I have asked him to hold Zytiga and prednisone until he sees us back, he will continue on his other medications including relugolix.    -7/30/2021 neurosurgery PA consultation.  MRI with and without contrast L5 ordered.    -8/3/2021 neurosurgery follow-up showed known sclerotic disease with new L5 abnormality without compression deformity or instability.  MRI brain negative for metastasis.    -8/4/2021 office visit Dr. Fco Quintanilla radiation oncology coordinating with Dr. Can forde for palliative radiation for MRI evidence of L5 and left supra acetabular painful lesions.  States that the " patient's disease which is not secreting PSA is experiencing some progression and would benefit from 20 Gy in five fractions and other lesion 30 Gy in ten fractions. Patient offered to go to [place] for radiation but desired treatment in [place].    -8/10/2021 [facility] medical oncology follow-up visit: No chest pains at this junction.  Reviewed MRI brain and other MRIs reviewed by Dr. North and Dwight.  Due for EGD on 19th.  We will repeat his phosphorus along with his other labs continue Relugolix, Zytiga, prednisone, and he will continue radiation palliatively to the painful bony lesions with Dr. Quintanilla/Can.  He will see my nurse practitioner back in a few weeks to see how he is tolerating this and to follow-up on the phosphorus off of Zometa and she will order repeat CT chest abdomen pelvis with contrast and total body bone scan the end of September.I will see him back after that.    -9/8/2021 Zometa resumed.  PSA <0.10    -10/5/2021 [facility] medical oncology follow-up visit: followed-up with radiation oncology 9/21/2021.  Status post symptomatic L5 radiation 5 fractions 20 [place] completed 8/16/2021 with improvement in right hip pain.  9/2/2021 PSA less than 0.1.  9/29/2021 total body bone scan shows increased uptake left iliac bone slightly superior group of the left acetabulum with no additional foci of suspicious abnormality is unlikely treatment related with no new sites of active osseous metastasis.  CT chest abdomen pelvis with contrast shows stable borderline enlarged left periaortic nodes and dating sclerotic bone lesions.Continue Zytiga, prednisone, Relugolix, Zometa, repeat CT chest abdomen pelvis with contrast and total body bone scan the end of the year.  We will follow PSA serially.    -11/17/2021 [facility] clinic follow-up:  Mr. Lugo overall is doing well.  He is tolerating therapy with Zytiga, prednisone and Relugolix along with Zometa which is given every 6 weeks.  PSA remains low at  <0.1.  Has occasional low phosphorus, currently low at 1.7.  Serum calcium is normal at 8.9, normal creatinine 0.79 and normal CBC other than for platelets of 124.  I will hold Zometa for 1 month, I did send in a prescription for phosphorus replacement today.  He takes calcium and vitamin D.  I will see him back in 1 month for follow-up.  We will repeat restaging scans after that visit.    -12/15/2021 [facility] Oncology clinic follow-up: Mr. Lugo continues to do well on therapy with Zytiga, prednisone and Relugolix, his PSA remains low at <0.1.  He is continuing to have left lower back pain, he is working with palliative care and they have referred him also to pain management.  Pain management has talked to him about putting in a pain pump however he is leery of this, I told him that this was a safe and effective pain management technique and to certainly give consideration to their recommendations.  His phosphorus is improving however is still a little low, I will hold off on Zometa until we see him back in 1 month, we may end up only giving it every 12 weeks.  We will repeat restaging scans with CT chest, abdomen and pelvis along with total body bone scan prior to return and I have ordered those today.      -1/5/2022 CT chest abdomen pelvis with contrast [facility] showing stable left periaortic adenopathy and unchanged sclerotic thoracic, lumbar spine and pelvis lesions with no new or progressive disease in the chest abdomen or pelvis.  Total body bone scan shows no significant change and no new suspicious foci.  Stable posterior right acetabulum and left superior acetabulum and degenerative changes in the cervical spine, shoulders, acromioclavicular joints, sternoclavicular joints, elbows, wrists, hands, thoracic spine, lumbosacral spine, sacroiliac joints, hips, knees, ankles, feet.    -1/11/2022 [facility] medical oncology hematology follow-up visit: I reviewed images and reports from his 1/5/2022 scans.   No active disease and PSA immmeasurably low on Zytiga, prednisone, Relugolix.  However, he has struggled with hypophosphatemia and is significantly fatigued with this.  Given that the bones are doing well and given that his phosphorus continues to remain consistently low at 2.2 in mid December, 1.7 mid November and 2.5 mid-September and as low as 1.5 back to July and the likelihood that this is related to his Zometa, given that his bones are stable overall, he will increase from 1200 mg up to 1600 mg of calcium and phosphate a day and we will hold his Zometa for the foreseeable future.  He will see my nurse practitioner back in a month to continue to monitor his phosphorus along with his calcium and other labs and will repeat his scans again in 3 months.  In addition, given his fatigue we will check his ACTH and cortisol though he is taking his prednisone with his Zytiga.  Though his electrolytes are normal, I will keep an eye on the cortisol and ACTH and have these labs not only drawn today but again just prior to return to my nurse practitioner on February 10.    -2/10/2022 [facility] clinic follow-up: Mr. Lugo overall is stable, no change in his health this past month.  I have reviewed his labs from 2/8/2022 and they are unremarkable, his phosphorus is now normal at 3.2. We are continuing to hold his Zometa, he last received Zometa on 10/5/2021.  He continues therapy with Zytiga, prednisone and Relugolix.  Dr. Rashid had ordered cortisol level and ACTH which are low, currently his ACTH is 2.8 and cortisol 3.08 however these are both done in the face of ongoing prednisone that he takes with his Zytiga.  We will get him to endocrinology for further evaluation for possible adrenal insufficiency but will not interrupt his treatment in the interim.  He will need restaging scans again in April for a 3-month follow-up.  He will continue to follow with palliative care and pain management for his cancer related pain.   His PSA remains immeasurably low at <0.1 on 2/8/22.    -2/15/2022 went to emergency room with weakness, decreased appetite, myalgias in the setting of known COVID-19 testing positive a few days prior to this visit.  Phosphorus had been low in the past but was normal in the emergency room with unremarkable CBC and CMP.  Chest x-ray unremarkable saturating well on room air mildly hypertensive with dry mucosa.  Given 500 cc crystalloid.  Covid test positive.  Mild thrombocytopenia 136,000 and otherwise unremarkable.  Discharged home.    -3/3/2022 data:  PSA less than 0.1  CMP unremarkable  Cortisol 3.96 normal  ACTH 2.8 lower limit of normal 7.2  Phosphorus normal 2.6    -3/8/2022 [facility] medical oncology follow-up: Suspect big chunk of his fatigue was Covid.  Another part of the fatigue likely related to lack of testosterone.  Not hypophosphatemic off of Zometa.  ACTH running low while on prednisone not surprising but with normal cortisol and I doubt adrenal insufficient which is why he is on prednisone to avoid such while taking Zytiga.  Overall doing fairly well and with immeasurably low PSA, I will have labs done on him early April, see my nurse practitioner early May, and she will order repeat bone scan and CTs the end of May and I will see him back in June.  We will continue to hold the Zometa unless bone lesions progress given symptomatic prior hypophosphatemia on Zometa.  He will keep his appointment April 28 with Dr. Mir Duffy just to be sure that my take on his pituitary/adrenal axis assessment is accurate.    -4/28/2022 endocrinology consult Dr. Mir Duffy.  With low ACTH and cortisol on prednisone with Zytiga, the adequacy of prednisone cannot be assessed by measuring ACTH or cortisol but is assessed by weight changes, blood pressure, blood sugar, potassium, overall sense of how the patient is doing.  Primary adrenal insufficiency with low ACTH and low cortisol would be typical for the situation as his  blood sugar tends to run a little high and potassium a little low, he did not think increasing prednisone was necessary.  He put him on some potassium.  No follow-up scheduled, will see as needed.    -5/4/2022 [facility] Oncology clinic follow-up: Mr. Lugo continues on therapy with Zytiga and prednisone along with Relugolix.  His Zometa has been on hold due to previous hypophosphatemia.  There is some concern about potential adrenal insufficiency. He saw endocrinology, Dr. Mir Duffy on 4/28/2022.  I did review his office note and he stated that the low ACTH and low serum cortisol would be typical for Mr. Lugo's situation.  He further stated that the adequacy of prednisone dose cannot be assessed by measuring ACTH or cortisol but is rather assessed by the overall just altered how the patient is feeling-weight change, blood pressure, blood sugar, potassium, overall sense of wellbeing.  His potassium had been running a little low therefore they put him on potassium 10 mill equivalents daily. Of note, I do not see a future follow-up scheduled with endocrinology.  He is having night sweats, I am not sure if this is related.  His glucose was normal this morning at 107.  His weight is stable.  He will continue current treatment for his prostate cancer unchanged, we will continue to hold his Zometa.  Current phosphorus and magnesium are normal.  CBC and CMP from today are unremarkable, cortisol is normal.  PSA and ACTH are pending  In regards to his night sweats, he had taken clonidine previously 0.1 mg twice daily as needed, I did send in a short supply again to try and see if this helps.  He also is having indigestion despite being on omeprazole twice daily, I sent a prescription for Pepcid.  He had an EGD August 2021.  We will repeat restaging scans prior to return and I have ordered those today.     -5/24/2022 CT chest abdomen pelvis with contrast[place] negative.  Bone scan shows stable bone metastases.    - 5/25/2022  PSA less than 0.1, platelets 130,000, otherwise unremarkable CBC and CMP.  A.m. cortisol running low 1.2 with phosphorus low 2.3.    -5/31/2022 [facility] medical oncology follow-up: On Zytiga, prednisone, Relugolix, PSA remaining immeasurably low with stable bone scan and no visceral/paulino metastases.  Phosphorus still running low.  I have discontinued his potassium chloride and started potassium phosphate 500 mg 4 times a day.  Unless PSA rising, we will plan on repeating CTs and bone scan in 6 months and will follow with my nurse practitioner in the interim and if symptoms dictate or labs, will get back to Dr. Duffy for management of potential adrenal insufficiency but presently we will just see how he does with potassium phosphate and hopeful improvement in the phosphorus level with time.  We will continue to follow with palliative care for pain management    -7/6/2022 [facility] Oncology clinic follow-up: Mr. Lugo overall is doing well but continues to be troubled with fatigue and hot flashes.  For the most part he states he is able to do the things he wants to do, he continues to work but does have to take more rest periods.  I had a long discussion with him and his wife after reviewing his medications with them as they wanted to see if there was anything he could stop or adjust.  I discussed with him the need to continue on treatment for his prostate cancer even though his PSA remains immeasurably low and his scans look good but that if his medication is stopped the cancer would progress and over time it still has the potential to progress on therapy but would continue it as long as possible to keep his disease under control.  I explained this is like treating someone with hypertension, you do not stop the antihypertensive just because the blood pressure normalizes.  They stated understanding.  There is no dose adjustment of Zytiga or prednisone that would minimize his symptoms, he is on the current standard  recommended therapy.  Discussed with him that sometimes during times of stress we may need to increase his prednisone dose but for now would not change anything.  His phosphorus level is normal, we continue to hold his Zometa.  I did give him the option of holding his phosphorus for the next month and we will see what happens, if it drops we will start him back on it but may be at a lower dose rather than 4 times a day maybe twice a day.  For now he will continue therapy unchanged.  We will continue monitoring labs monthly.  No PSA was drawn this month but that is okay as his PSA has been running immeasurably low at <0.1, we will recheck next month with lab draw.  We will stop checking his ACTH and cortisol level every month, if he develops worsening symptoms I will repeat those.  He has not gotten a follow-up with endocrinology as of yet.     Prostate cancer metastatic to bone (HCC)   8/30/2020 -  Other Event    PSA 10.8     9/15/2020 Initial Diagnosis    Prostate cancer metastatic to bone (CMS/HCC)     9/15/2020 Biopsy    Prostate needle core biopsy     9/24/2020 Imaging    Total body bone scan: 4 abnormal areas of increased activity consistent with osteoblastic metastasis, abnormal foci of activity seen in the T12 vertebral body, L1 vertebral body, roof of the left acetabulum, and acetabulum medially on the right.  CT chest: Stable sclerotic metastatic lesions within the T12 and L1 vertebrae.  No other evidence of metastatic disease to the chest.  Stable mild splenomegaly and subcentimeter periaortic retroperitoneal lymph nodes.  CT abdomen and pelvis: Diffuse bladder wall thickening with mild perivesicular fat stranding.  Interval development of several sclerotic lesions in the spine and left iliac bone.     10/13/2020 Cancer Staged    Staging form: Bone - Appendicular Skeleton, Trunk, Skull, And Facial Bones, AJCC 8th Edition  - Clinical: Stage IVB (cT3, cN0, cM1b, G3) - Signed by Ishmael Rashid MD on  10/13/2020     11/3/2020 -  Chemotherapy    OP SUPPORTIVE Zoledronic Acid Q42d     11/3/2020 - 2/18/2021 Chemotherapy    OP PROSTATE DOCEtaxel       1/4/2021 Imaging    CT chest abdomen pelvis with contrast and whole-body bone scan shows improving less metabolically active bone metastases.  Stable sclerotic T12, L1, and L2 vertebral bodies and bilateral pelvic bones on bone windows with stable subcentimeter para-aortic lymph nodes and no definite progression in the chest abdomen or pelvis.  PSA down to 0.275 after 3 courses of Taxotere.     3/4/2021 Imaging    CT chest, abdomen and pelvis stable, no evidence of disease progression. Total body bone scan with decreased/near resolution of abnormal increased radiotracer uptake associated with the known sclerotic lesions in the thoracolumbar spine and bony pelvis.  No evidence of new osteoblastic metastases.     3/4/2021 Imaging    CT chest abdomen pelvis with contrast is negative save for stable sclerotic bone lesions and the bone scan shows near resolution of prior bony abnormalities associated with the known sclerotic lesions in the thoracolumbar spine and bony pelvis.  2/17/2021 PSA down to 0.1 from 1.37 October 2020.     3/9/2021 - 3/9/2021 Chemotherapy    OP SUPPORTIVE PROSTATE Relugolix     3/9/2021 -  Chemotherapy    OP PROSTATE Abiraterone / PredniSONE       3/10/2021 -  Chemotherapy    OP PROSTATE Abiraterone / PredniSONE     8/10/2021 - 8/16/2021 Radiation    Radiation OncologyTreatment Course:  Naveen Lugo received 2000 cGy in 5 fractions to L4-sacrum, left acetabulum and right pelvis via External Beam Radiation - EBRT.     11/16/2021 -  Chemotherapy    OP CENTRAL VENOUS ACCESS DEVICE ACCESS, CARE, AND MAINTENANCE (CVAD)     1/5/2022 Imaging    -1/5/2022 CT chest abdomen pelvis with contrast [facility] showing stable left periaortic adenopathy and unchanged sclerotic thoracic, lumbar spine and pelvis lesions with no new or progressive disease in the  chest abdomen or pelvis.  Total body bone scan shows no significant change and no new suspicious foci.  Stable posterior right acetabulum and left superior acetabulum and degenerative changes in the cervical spine, shoulders, acromioclavicular joints, sternoclavicular joints, elbows, wrists, hands, thoracic spine, lumbosacral spine, sacroiliac joints, hips, knees, ankles, feet.     5/24/2022 Imaging    CT chest abdomen pelvis with contrastFrankfort negative.  Bone scan shows stable bone metastases.         HISTORY OF PRESENT ILLNESS:  The patient is a 67 y.o. male, here for follow up on management of metastatic prostate cancer with drug-induced hypophosphatemia and possible drug-induced adrenal insufficiency.  He has reestablished care with [facility].  He reports pain is controlled on current regimen.  He is tolerating treatment with Zytiga relugolix prednisone without any unusual side effects.  Eating and drinking well.  Denies nausea or vomiting.  Having normal bowel movements.  Blood pressure slightly elevated today in clinic.  Denies chest pain, shortness of breath, headache, or vision problems.    Past Medical History:   Diagnosis Date   • Bone cancer (HCC)    • History of radiation therapy 08/16/2021    L4 through sacrum, left acetabulum, right pelvis   • Nephrolithiasis    • Opioid use disorder, mild, on maintenance therapy (HCC)    • Prostate cancer (HCC)      Past Surgical History:   Procedure Laterality Date   • CHOLECYSTECTOMY     • CORONARY STENT PLACEMENT  206 & 2011   • HERNIA REPAIR  1986   • THORACIC DISCECTOMY  1994       Allergies   Allergen Reactions   • Cymbalta [Duloxetine Hcl] Dizziness   • Gabapentin Nausea Only   • Lyrica [Pregabalin] Nausea And Vomiting and Dizziness       Family History and Social History reviewed and changed as necessary    REVIEW OF SYSTEM:   Fatigue  Chronic back and hip pain    PHYSICAL EXAM:  Well developed, well nourished, healthy appearing male  "in no distress.  Lungs clear.    Heart regular rate and rhythm.      Vitals:    10/13/22 0828   BP: 170/92   Pulse: 88   Resp: 20   Temp: 97.7 °F (36.5 °C)   SpO2: 94%   Weight: 88.5 kg (195 lb)   Height: 175.3 cm (69\")     Vitals:    10/13/22 0828   PainSc:   6   PainLoc: Back          ECOG score: 0         Vitals reviewed.  Labs reviewed.   Lab Results   Component Value Date    HGB 13.2 09/01/2022    HCT 38.9 09/01/2022    MCV 86 09/01/2022     (L) 09/01/2022    WBC 4.7 09/01/2022    NEUTROABS 3.4 09/01/2022    LYMPHSABS 0.9 09/01/2022    MONOSABS 0.3 09/01/2022    EOSABS 0.1 09/01/2022    BASOSABS 0.0 09/01/2022     Lab Results   Component Value Date    GLUCOSE 118 (H) 09/01/2022    BUN 7 (L) 09/01/2022    CREATININE 0.78 09/01/2022     09/01/2022    K 3.3 (L) 09/01/2022     09/01/2022    CO2 24 09/01/2022    CALCIUM 8.8 09/01/2022    PROTEINTOT 7.0 02/14/2022    ALBUMIN 4.0 09/01/2022    BILITOT 0.3 09/01/2022    ALKPHOS 80 09/01/2022    AST 15 09/01/2022    ALT 12 09/01/2022       Lab Results   Component Value Date    PSA <0.1 09/01/2022    PSA <0.1 08/02/2022    PSA <0.1 08/02/2022    PSA <0.1 05/25/2022    PSA <0.1 01/11/2022         ASSESSMENT & PLAN:  1.  Stage IVb grade group 4 metastatic prostate cancer with sclerotic bone lesions on CT and bone scan and history of prostatic hypertrophy  2.  Kidney stone  3.  Polyps  4.  Probable drug-induced hypophosphatemia from Zometa, drug stopped after 10/5/2021 dose  5.  Pain related to cancer and also chronic pain related to arthritic/degenerative disease  6.  Fatigue  7.  Covid 2/2022  8.  Potential adrenal insufficiency secondary to Zytiga and prednisone    -9/30/2020 office note from Dr. Ronen Reynaga indicates patient with PSA 10.8.  Underwent TRUS prostate biopsy 9/15/2020.  Adenocarcinoma the prostate Sarika 4+4 = 8 in 1 out of 12 cores and 4+3 = 7 and 10 out of 12 cores and 3+4 = 7 in 1 out of 12 cores.  Perineural invasion.  " Extraprostatic extension into the fat seen on 2 biopsies of the right lateral mid and right apex.  9/24/2020 CT chest and whole-body bone scan showed T12, L1, left acetabular, right medial acetabular metastases with no lung metastases.  He started androgen deprivation therapy with Casodex with plans for Lupron to start in 1 to 2 weeks for clinical T3 N0 M1 metastatic prostate cancer.  Review of official report from [facility] 9/24/2020 bone scan mentions T12, L1, roof of left acetabulum and medial right acetabular osteoblastic metastases.  CT chest with contrast that same day showed mild splenomegaly 14.2 cm with stable periaortic nodes compared to CT December 2015 with no lung metastases.  Sclerotic abnormality within the T12 vertebral body, L1 vertebral body extending into the pedicle unchanged.  8/28/2020 CT abdomen pelvis report from [facility] reviewed and mentions normal spleen size.  Punctate nonobstructing kidney stone, no paulino enlargement, diffuse bladder wall thickening with mild perivesicular fat stranding, 2.1 cm sclerotic left iliac bone above the left acetabulum new compared to 2015 as well as 1.5 cm faintly sclerotic focus in the right posterior acetabulum stable compared to prior CT 2015 as well as a 1.6 cm T12 and inferior vertebral body sclerotic lesion and a 1.9 cm left posterior lateral L1 vertebral body abnormality extending to the pedicle.  -10/13/2020 initial [facility] medical oncology consultation: With 1 Sarika score 8, 4+4 lesion that would make him a grade group 4 with stage IVb disease given radiographic evidence of sclerotic bone metastasis on bone scan and CT.  Needs genetic testing given metastatic prostate cancer and this is also important as, were he to have mismatch repair mutation then we would have options for PDL 1 inhibitors and were he BRCA mutated would have options for PARP inhibitors in the future.  Systemic therapy for castration naïve prostate cancer or N1  disease should be with apalutamide or Abiraterone or enzalutamide all of which are category 1.  He does not have any visceral metastases.  According to his imaging he has T12, L1, left acetabular, right acetabular, and left iliac bony involvement.  That means he would have 4 or more bone metastases with at least one metastasis (i.e. the acetabular lesions bilaterally) beyond the pelvis/vertebral, for which this would be considered high-volume disease for which 6 cycles of docetaxel 75 mg/m² x 6 cycles followed by Zytiga and prednisone would be reasonable along with Zometa every 6 weeks for bone stabilization.  He will do chemo preparation visit with my nurse practitioner prior to start chemotherapy with Taxotere and Zometa in 2 weeks and he is already received Casodex in preparation for Lupron shot he received on 10/12/2020 with Dr. Reynaga.  We will get him to Dr. Alexander Gomez who has done his polypectomies in the past for port placement and we will get genetic counseling started.  Following the 6 courses of Taxotere per the [place] trial criteria, I would then give him an indefinite Zytiga and prednisone and Zometa until progression or toxicity dictates.    -12/16/2019 COVID-19 antigen test negative    -12/29/2020 PSA 0.275 with absolute neutrophil count 700 and creatinine 0.76 with normal liver enzymes and electrolytes.  Normal magnesium and phosphorus and urine drug screen positive for buprenorphine and opiates follow-up with palliative care.    -1/4/2021 CT chest abdomen pelvis with contrast and whole-body bone scan shows improving less metabolically active bone metastases.  Stable sclerotic T12, L1, and L2 vertebral bodies and bilateral pelvic bones on bone windows with stable subcentimeter para-aortic lymph nodes and no definite progression in the chest abdomen or pelvis.    -1/5/2021 [facility] medical oncology follow-up visit: I reviewed the images and reports thereof of the above CT and bone scan which  shows good response to Taxotere x3 courses with a PSA down to 0.275 from a baseline of 10.  Get another 3 courses of Taxotere, continue his Xgeva, and then following that we will switch to Zytiga and prednisone.  He is working with palliative care on his bone pain but on his current dose of fentanyl patch he says his pain is still not adequately controlled he will contact them.  Dexamethasone prescription was refilled.  We will see my nurse practitioner back in 3 weeks for course #5 of 6 total courses and then we will reimage with CT chest abdomen pelvis and bone scan before going to the Zytiga prednisone.    -3/4/2021 CT chest abdomen pelvis with contrast is negative save for stable sclerotic bone lesions and the bone scan shows near resolution of prior bony abnormalities associated with the known sclerotic lesions in the thoracolumbar spine and bony pelvis.    2/17/2021 PSA down to 0.1 from 1.37 October 2020.  3/9/2021 PSA 0.1    -5/26/2021 CT chest abdomen pelvis with contrast shows stable to slightly underlying increase low-density left para-aortic node.  Bone lesions stable.  Whole-body bone scan stable to decrease activity of known thoracolumbar and bony pelvic involvement.  PSA less than 0.1  , AST 74, bilirubin 0.5.       -6/1/2021 [facility] medical oncology follow-up visit: I reviewed the above data with him and images thereof.  Bones are stable.  No significant adenopathy.  PSA immeasurably low.     upper limit of normal 69 and AST 74 upper limit of normal 46.  Hence, neither is more than double the upper limit of normal with no ascites and no encephalopathy and normal albumin and bilirubin, despite the elevation of liver enzymes, he has child Abrams score A.  Package insert for Abiraterone says that for ALT and/or AST greater than 5 times upper limit of normal or total bilirubin greater than 3 times the upper limit of normal to withhold treatment until liver function tests returned to baseline  or ALT and AST less than or equal to 2.5 times the upper limit of normal and total bilirubin less than or equal to 1.5 times the upper limit of normal and then reinitiate at 750 mg daily dose of Zytiga.  For recurrent hepatotoxicity on 750 mg daily Zytiga, withhold treatment until liver functions returned to baseline or ALT and AST less than 3-2.5 times upper limit of normal and total bilirubin less than or equal to 1.5 times the upper limit of normal then reinitiate at 500 mg daily Zytiga dose.  If has recurrent hepatotoxicity on 500 mg dose, then discontinue treatment.  If has ALT greater than 3 times the upper limit of normal along with total bilirubin greater than 2 times the upper limit of normal in the absence of other contributing causes or biliary obstruction, permanently discontinue Zytiga.  Based on these recommendations, I would continue current doses but we will watch with serial labs monthly and repeat his imaging again in 3 months but clinically he is doing wonderfully with excellent response to Taxotere and now on Zytiga prednisone plus Zometa along with Relugolix.    -6/23/2020 for [facility] oncology clinic follow-up: Having persistent and worsening pain above his left hip.  I will get an MRI of the left hip for further evaluation.  Otherwise we will continue therapy unchanged with Zytiga, prednisone and Zometa along with Relugolix.    -7/28/2021 [facility] oncology clinic follow-up: Patient with multiple somatic complaints including episodes of confusion, dizziness, decreased memory, agitation.  He reports ongoing episodes with difficulty swallowing.  Also most concerning for episodes of angina and chest pain for which he basically refused to seek emergency medical attention.  He has a history of coronary artery disease and stent placement.  He has not followed up with cardiology for many years.  I will get an MRI of the brain in light of his confusion, dizziness and agitation.  I will get him to  "gastroenterology for EGD in light of his dysphagia.  I have also put in a referral for cardiology.  I reemphasized to the patient, his wife who is with him today and his son who was on the telephone during our visit the importance of seeking out prompt medical attention when he is having chest pain or neurological concerns so that he can be evaluated.  The patient states that he basically refuses to go to the emergency room and if his family calls an ambulance he would not agree to go to the hospital.  For the time being, I have asked him to hold his Zytiga and prednisone until we can sort this out as Zytiga does have some cardiovascular risk.  There is likely no treatment for prostate cancer that does not carry some form of cardiovascular risk.  His PSA remains low at 0.014 yesterday.  He will continue relugolix for now.    Of note, some of these symptoms could also be due to his hypophosphatemia, phosphate level from yesterday was 1.5, I have sent in a prescription for K-Phos 3 times a day before meals for 10 days.  We will hold further Zometa until this is corrected.  I have also put in an order to check his vitamin D level.  He takes calcium and vitamin D daily.  Some of his symptoms also could be due to drug withdrawal as there was some confusion and difficulty getting his last prescription for methadone therefore he was without methadone for several days, he now has his prescription and is back on his pain medication.  He has an appointment with neurosurgery tomorrow in light of his ongoing back pain, MRI of the hip recently showed multiple bony lesions largest at the L5 vertebral body and left supra-acetabular region.  If no neurosurgical intervention recommended he may benefit from spot radiation as this is where a lot of his pain is coming from.  His wife had questions today regarding his staging and extent of disease.  We reviewed previous scans.  I did clarify with her as she used the words \"cancer free\" " as she thought that is what she was told after his CT scans once he completed Taxotere in February.  I explained to her that even though his scans showed he had an excellent response with no clear areas of metastasis that did not mean he was cancer free, I explained to her that when you have metastatic cancer the treatment intent is palliative in nature and to control the disease but not to cure the disease.  She stated understanding.  We will see him back in 2 weeks for follow-up to sort through this and make further plan of care, again, I have asked him to hold Zytiga and prednisone until he sees us back, he will continue on his other medications including relugolix.    -7/30/2021 neurosurgery PA consultation.  MRI with and without contrast L5 ordered.    -8/3/2021 neurosurgery follow-up showed known sclerotic disease with new L5 abnormality without compression deformity or instability.  MRI brain negative for metastasis.    -8/4/2021 office visit Dr. Fco Quintanilla radiation oncology coordinating with Dr. North neurosurgery for palliative radiation for MRI evidence of L5 and left supra acetabular painful lesions.  States that the patient's disease which is not secreting PSA is experiencing some progression and would benefit from 20 Gy in five fractions and other lesion 30 Gy in ten fractions. Patient offered to go to [place] for radiation but desired treatment in [place].    -8/10/2021 [facility] medical oncology follow-up visit: No chest pains at this junction.  Reviewed MRI brain and other MRIs reviewed by Dr. North and [place].  Due for EGD on 19th.  We will repeat his phosphorus along with his other labs continue Relugolix, Zytiga, prednisone, and he will continue radiation palliatively to the painful bony lesions with Dr. Quintanilla/Can.  He will see my nurse practitioner back in a few weeks to see how he is tolerating this and to follow-up on the phosphorus off of Zometa and she will order repeat CT chest  abdomen pelvis with contrast and total body bone scan the end of September.I will see him back after that.    -9/8/2021 Zometa resumed.  PSA <0.10    -10/5/2021 [facility] medical oncology follow-up visit: followed-up with radiation oncology 9/21/2021.  Status post symptomatic L5 radiation 5 fractions 20 [place] completed 8/16/2021 with improvement in right hip pain.  9/2/2021 PSA less than 0.1.  9/29/2021 total body bone scan shows increased uptake left iliac bone slightly superior group of the left acetabulum with no additional foci of suspicious abnormality is unlikely treatment related with no new sites of active osseous metastasis.  CT chest abdomen pelvis with contrast shows stable borderline enlarged left periaortic nodes and dating sclerotic bone lesions.Continue Zytiga, prednisone, Relugolix, Zometa, repeat CT chest abdomen pelvis with contrast and total body bone scan the end of the year.  We will follow PSA serially.    -11/17/2021 [facility] Oncology clinic follow-up:  Mr. Lugo overall is doing well.  He is tolerating therapy with Zytiga, prednisone and Relugolix along with Zometa which is given every 6 weeks.  PSA remains low at <0.1.  Has occasional low phosphorus, currently low at 1.7.  Serum calcium is normal at 8.9, normal creatinine 0.79 and normal CBC other than for platelets of 124.  I will hold Zometa for 1 month, I did send in a prescription for phosphorus replacement today.  He takes calcium and vitamin D.  I will see him back in 1 month for follow-up.  We will repeat restaging scans after that visit.    -12/15/2021 [facility] Oncology clinic follow-up: Mr. Lugo continues to do well on therapy with Zytiga, prednisone and Relugolix, his PSA remains low at <0.1.  He is continuing to have left lower back pain, he is working with palliative care and they have referred him also to pain management.  Pain management has talked to him about putting in a pain pump however he is leery of this, I told him that this  was a safe and effective pain management technique and to certainly give consideration to their recommendations.  His phosphorus is improving however is still a little low, I will hold off on Zometa until we see him back in 1 month, we may end up only giving it every 12 weeks.  We will repeat restaging scans with CT chest, abdomen and pelvis along with total body bone scan prior to return and I have ordered those today.      -1/5/2022 CT chest abdomen pelvis with contrast [place] [facility] showing stable left periaortic adenopathy and unchanged sclerotic thoracic, lumbar spine and pelvis lesions with no new or progressive disease in the chest abdomen or pelvis.  Total body bone scan shows no significant change and no new suspicious foci.  Stable posterior right acetabulum and left superior acetabulum and degenerative changes in the cervical spine, shoulders, acromioclavicular joints, sternoclavicular joints, elbows, wrists, hands, thoracic spine, lumbosacral spine, sacroiliac joints, hips, knees, ankles, feet.    -1/11/2022 [facility] oncology hematology follow-up visit: I reviewed images and reports from his 1/5/2022 scans.  No active disease and PSA immmeasurably low on Zytiga, prednisone, Relugolix.  However, he has struggled with hypophosphatemia and is significantly fatigued with this.  Given that the bones are doing well and given that his phosphorus continues to remain consistently low at 2.2 in mid December, 1.7 mid November and 2.5 mid-September and as low as 1.5 back to July and the likelihood that this is related to his Zometa, given that his bones are stable overall, he will increase from 1200 mg up to 1600 mg of calcium and phosphate a day and we will hold his Zometa for the foreseeable future.  He will see my nurse practitioner back in a month to continue to monitor his phosphorus along with his calcium and other labs and will repeat his scans again in 3 months.  In addition, given his fatigue we  will check his ACTH and cortisol though he is taking his prednisone with his Zytiga.  Though his electrolytes are normal, I will keep an eye on the cortisol and ACTH and have these labs not only drawn today but again just prior to return to my nurse practitioner on February 10.    -2/10/2022 [facility] clinic follow-up: Mr. Lugo overall is stable, no change in his health this past month.  I have reviewed his labs from 2/8/2022 and they are unremarkable, his phosphorus is now normal at 3.2. We are continuing to hold his Zometa, he last received Zometa on 10/5/2021.  He continues therapy with Zytiga, prednisone and Relugolix.  Dr. Rahsid had ordered cortisol level and ACTH which are low, currently his ACTH is 2.8 and cortisol 3.08 however these are both done in the face of ongoing prednisone that he takes with his Zytiga.  We will get him to endocrinology for further evaluation for possible adrenal insufficiency but will not interrupt his treatment in the interim.  He will need restaging scans again in April for a 3-month follow-up.  He will continue to follow with palliative care and pain management for his cancer related pain.  His PSA remains immeasurably low at <0.1 on 2/8/22.    -2/15/2022 went to emergency room with weakness, decreased appetite, myalgias in the setting of known COVID-19 testing positive a few days prior to this visit.  Phosphorus had been low in the past but was normal in the emergency room with unremarkable CBC and CMP.  Chest x-ray unremarkable saturating well on room air mildly hypertensive with dry mucosa.  Given 500 cc crystalloid.  Covid test positive.  Mild thrombocytopenia 136,000 and otherwise unremarkable.  Discharged home.    -3/3/2022 data:  PSA less than 0.1  CMP unremarkable  Cortisol 3.96 normal  ACTH 2.8 lower limit of normal 7.2  Phosphorus normal 2.6    -3/8/2022 [facility] follow-up: Suspect big chunk of his fatigue was Covid.  Another part of the fatigue  likely related to lack of testosterone.  Not hypophosphatemic off of Zometa.  ACTH running low while on prednisone not surprising but with normal cortisol and I doubt adrenal insufficient which is why he is on prednisone to avoid such while taking Zytiga.  Overall doing fairly well and with immeasurably low PSA, I will have labs done on him early April, see my nurse practitioner early May, and she will order repeat bone scan and CTs the end of May and I will see him back in June.  We will continue to hold the Zometa unless bone lesions progress given symptomatic prior hypophosphatemia on Zometa.  He will keep his appointment April 28 with Dr. Mir Duffy just to be sure that my take on his pituitary/adrenal axis assessment is accurate.    -4/28/2022 endocrinology consult Dr. Mir Duffy.  With low ACTH and cortisol on prednisone with Zytiga, the adequacy of prednisone cannot be assessed by measuring ACTH or cortisol but is assessed by weight changes, blood pressure, blood sugar, potassium, overall sense of how the patient is doing.  Primary adrenal insufficiency with low ACTH and low cortisol would be typical for the situation as his blood sugar tends to run a little high and potassium a little low, he did not think increasing prednisone was necessary.  He put him on some potassium.    -5/4/2022 [facility] follow-up: Mr. Lugo continues on therapy with Zytiga and prednisone along with Relugolix.  His Zometa has been on hold due to previous hypophosphatemia.  There is some concern about potential adrenal insufficiency. He saw endocrinology, Dr. Mir Duffy on 4/28/2022.  I did review his office note and he stated that the low ACTH and low serum cortisol would be typical for Mr. Lugo's situation.  He further stated that the adequacy of prednisone dose cannot be assessed by measuring ACTH or cortisol but is rather assessed by the overall just altered how the patient is feeling-weight change, blood pressure,  blood sugar, potassium, overall sense of wellbeing.  His potassium had been running a little low therefore they put him on potassium 10 mill equivalents daily. Of note, I do not see a future follow-up scheduled with endocrinology.  He is having night sweats, I am not sure if this is related.  His glucose was normal this morning at 107.  His weight is stable.  He will continue current treatment for his prostate cancer unchanged, we will continue to hold his Zometa.  Current phosphorus and magnesium are normal.  CBC and CMP from today are unremarkable, cortisol is normal.  PSA and ACTH are pending  In regards to his night sweats, he had taken clonidine previously 0.1 mg twice daily as needed, I did send in a short supply again to try and see if this helps.  He also is having indigestion despite being on omeprazole twice daily, I sent a prescription for Pepcid.  He had an EGD August 2021.  We will repeat restaging scans prior to return and I have ordered those today.     -5/24/2022 CT chest abdomen pelvis with contrastFrankfort negative.  Bone scan shows stable bone metastases.    - 5/25/2022 PSA less than 0.1, platelets 130,000, otherwise unremarkable CBC and CMP.  A.m. cortisol running low 1.2 with phosphorus low 2.3.    -5/31/2022 [facility] follow-up: On Zytiga, prednisone, Relugolix, PSA remaining immeasurably low with stable bone scan and no visceral/paulino metastases.  Phosphorus still running low.  I have discontinued his potassium chloride and started potassium phosphate 500 mg 4 times a day.  Unless PSA rising, we will plan on repeating CTs and bone scan in 6 months and will follow with my nurse practitioner in the interim and if symptoms dictate or labs, will get back to Dr. Duffy for management of potential adrenal insufficiency but presently we will just see how he does with potassium phosphate and hopeful improvement in the phosphorus level with time.  We will continue to follow with  palliative care for pain management    -7/6/2022 [facility] follow-up: Mr. Lugo overall is doing well but continues to be troubled with fatigue and hot flashes.  For the most part he states he is able to do the things he wants to do, he continues to work but does have to take more rest periods.  I had a long discussion with him and his wife after reviewing his medications with them as they wanted to see if there was anything he could stop or adjust.  I discussed with him the need to continue on treatment for his prostate cancer even though his PSA remains immeasurably low and his scans look good but that if his medication is stopped the cancer would progress and over time it still has the potential to progress on therapy but would continue it as long as possible to keep his disease under control.  I explained this is like treating someone with hypertension, you do not stop the antihypertensive just because the blood pressure normalizes.  They stated understanding.  There is no dose adjustment of Zytiga or prednisone that would minimize his symptoms, he is on the current standard recommended therapy.  Discussed with him that sometimes during times of stress we may need to increase his prednisone dose but for now would not change anything.  His phosphorus level is normal, we continue to hold his Zometa.  I did give him the option of holding his phosphorus for the next month and we will see what happens, if it drops we will start him back on it but may be at a lower dose rather than 4 times a day maybe twice a day.  For now he will continue therapy unchanged.  We will continue monitoring labs monthly.  No PSA was drawn this month but that is okay as his PSA has been running immeasurably low at <0.1, we will recheck next month with lab draw.  We will stop checking his ACTH and cortisol level every month, if he develops worsening symptoms I will repeat those.  He has not gotten a follow-up with endocrinology  as of yet.    -8/3/2022 [facility] clinic follow-up:  Mr. Lugo is doing well as far as his prostate cancer goes with continued immeasurably low PSA of <0.1.  He continues on therapy with Zytiga and prednisone along with Relugolix.  His phosphorus is stable at 2.7 which was just slightly low by our reference range however was 2.71-month ago also which was normal on another labs reference range.  He has not taken his phosphorus for this past month as he thought it was making him more fatigued.  He really can tell no difference whether he was taking it or not.  I have asked him to try to go back on phosphorus twice a day rather than 4 times a day and see if this is tolerated and if we can keep his phosphorus level from dropping.  His biggest complaint today is flareup of his arthralgias and back pain.  He is following with pain management, Dr. Danny Avalos but is requesting a referral back to palliative medicine as he feels that no one is currently listening to him and they are just prescribing the same medications that are not effective according to his report.  He does have a follow-up with Dr. Avalos on 8/12/2022 but due to his current pain is not able to work until after he is seen and hopefully can get some better relief.  I have given him a work release until 15 August.  I have also put in a referral back to palliative care.  Also encouraged him to work with Dr. Avalos for help in resolving his issue.  Apparently they have recommended some type of a pump however he has been hesitant to that but likely would be helpful.  We will continue therapy unchanged.  We will continue monthly labs including phosphorus, we are not currently checking ACTH and cortisol monthly as Dr. Mir Duffy previously stated in his assessment on 4/28/2022 that the adequacy of prednisone cannot be assessed by measuring ACTH or cortisol but would be assessed by symptoms, see note from 4/28/2022.  He made no further follow-up appointments.   Fatigue is not worsening currently.  His glucose and potassium are  Normal.    -10/13/2022 [facility] oncology follow-up: Mr. Lugo is doing well.  He is tolerating treatment with Zytiga and prednisone along with Relugolix without any unusual side effects.  His PSA has remained immeasurably low at <0.1.  His phosphorus was slightly low at last visit.  He had been instructed to go back on phosphorus twice a day but he misunderstood and has not been taking it.  We will check labs today.  I will follow-up with results and notify him if he needs to restart the phosphorus.  His pain is better controlled since reestablishing with [facility].  We will repeat scans prior to next follow-up.  I have ordered CT chest abdomen pelvis along with bone scan.  We will see him back in 1 month.    This was a level 4, moderate MDM visit with 2 or more stable chronic illnesses, management of drug therapy requiring intensive monitoring for toxicity, review of labs and referral back to palliative care.    Mirella Mccarty, APRN  10/13/2022

## 2022-10-14 ENCOUNTER — TELEPHONE (OUTPATIENT)
Dept: ONCOLOGY | Facility: CLINIC | Age: 67
End: 2022-10-14

## 2022-10-14 LAB
ALBUMIN SERPL-MCNC: 3.8 G/DL (ref 3.5–5.2)
ALBUMIN/GLOB SERPL: 2.1 G/DL
ALP SERPL-CCNC: 74 U/L (ref 39–117)
ALT SERPL-CCNC: 10 U/L (ref 1–41)
AST SERPL-CCNC: 13 U/L (ref 1–40)
BASOPHILS # BLD AUTO: 0.03 10*3/MM3 (ref 0–0.2)
BASOPHILS NFR BLD AUTO: 0.6 % (ref 0–1.5)
BILIRUB SERPL-MCNC: 0.2 MG/DL (ref 0–1.2)
BUN SERPL-MCNC: 7 MG/DL (ref 8–23)
BUN/CREAT SERPL: 9.1 (ref 7–25)
CALCIUM SERPL-MCNC: 9 MG/DL (ref 8.6–10.5)
CHLORIDE SERPL-SCNC: 104 MMOL/L (ref 98–107)
CO2 SERPL-SCNC: 26.8 MMOL/L (ref 22–29)
CREAT SERPL-MCNC: 0.77 MG/DL (ref 0.76–1.27)
EGFRCR SERPLBLD CKD-EPI 2021: 98.1 ML/MIN/1.73
EOSINOPHIL # BLD AUTO: 0.11 10*3/MM3 (ref 0–0.4)
EOSINOPHIL NFR BLD AUTO: 2.2 % (ref 0.3–6.2)
ERYTHROCYTE [DISTWIDTH] IN BLOOD BY AUTOMATED COUNT: 13.5 % (ref 12.3–15.4)
GLOBULIN SER CALC-MCNC: 1.8 GM/DL
GLUCOSE SERPL-MCNC: 95 MG/DL (ref 65–99)
HCT VFR BLD AUTO: 38.3 % (ref 37.5–51)
HGB BLD-MCNC: 12.4 G/DL (ref 13–17.7)
IMM GRANULOCYTES # BLD AUTO: 0.01 10*3/MM3 (ref 0–0.05)
IMM GRANULOCYTES NFR BLD AUTO: 0.2 % (ref 0–0.5)
LYMPHOCYTES # BLD AUTO: 1.19 10*3/MM3 (ref 0.7–3.1)
LYMPHOCYTES NFR BLD AUTO: 23.5 % (ref 19.6–45.3)
MCH RBC QN AUTO: 28.4 PG (ref 26.6–33)
MCHC RBC AUTO-ENTMCNC: 32.4 G/DL (ref 31.5–35.7)
MCV RBC AUTO: 87.6 FL (ref 79–97)
MONOCYTES # BLD AUTO: 0.37 10*3/MM3 (ref 0.1–0.9)
MONOCYTES NFR BLD AUTO: 7.3 % (ref 5–12)
NEUTROPHILS # BLD AUTO: 3.35 10*3/MM3 (ref 1.7–7)
NEUTROPHILS NFR BLD AUTO: 66.2 % (ref 42.7–76)
NRBC BLD AUTO-RTO: 0 /100 WBC (ref 0–0.2)
PHOSPHATE SERPL-MCNC: 2.3 MG/DL (ref 2.5–4.5)
PLATELET # BLD AUTO: 130 10*3/MM3 (ref 140–450)
POTASSIUM SERPL-SCNC: 3.6 MMOL/L (ref 3.5–5.2)
PROT SERPL-MCNC: 5.6 G/DL (ref 6–8.5)
PSA SERPL-MCNC: <0.014 NG/ML (ref 0–4)
RBC # BLD AUTO: 4.37 10*6/MM3 (ref 4.14–5.8)
SODIUM SERPL-SCNC: 141 MMOL/L (ref 136–145)
WBC # BLD AUTO: 5.06 10*3/MM3 (ref 3.4–10.8)

## 2022-10-14 NOTE — TELEPHONE ENCOUNTER
Discussed lab results with DHARMESH Vu and she would like patient to start on phosphorus BID to see if he can tolerate that. Called patient and she verbalized understanding, he states he does not need a prescription at this time.

## 2022-11-02 DIAGNOSIS — G89.3 PAIN, CANCER: ICD-10-CM

## 2022-11-02 RX ORDER — OXYCODONE 9 MG/1
9 CAPSULE, EXTENDED RELEASE ORAL EVERY 12 HOURS
Qty: 60 EACH | Refills: 0 | Status: SHIPPED | OUTPATIENT
Start: 2022-11-02 | End: 2022-11-28 | Stop reason: SDUPTHER

## 2022-11-02 RX ORDER — HYDROMORPHONE HYDROCHLORIDE 8 MG/1
8 TABLET ORAL EVERY 4 HOURS PRN
Qty: 180 TABLET | Refills: 0 | Status: SHIPPED | OUTPATIENT
Start: 2022-11-02 | End: 2022-11-28 | Stop reason: SDUPTHER
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
## 2022-11-03 ENCOUNTER — SPECIALTY PHARMACY (OUTPATIENT)
Dept: ONCOLOGY | Facility: HOSPITAL | Age: 67
End: 2022-11-03

## 2022-11-03 NOTE — PROGRESS NOTES
Specialty Pharmacy Refill Coordination Note     Naveen is a 67 y.o. male contacted today regarding refills of  relugolix 120mg PO QD, abiraterone 1000mg PO QD and prednisone 5mg PO QD  specialty medication(s).      Specialty medication(s) and dose(s) confirmed: yes    Refill Questions    Flowsheet Row Most Recent Value   Changes to allergies? No   Changes to medications? No   New conditions since last clinic visit No   Unplanned office visit, urgent care, ED, or hospital admission in the last 4 weeks  No   How does patient/caregiver feel medication is working? Good   Financial problems or insurance changes  No   Since the previous refill, were any specialty medication doses or scheduled injections missed or delayed?  No   Does this patient require a clinical escalation to a pharmacist? No          Delivery Questions    Flowsheet Row Most Recent Value   Delivery method [employer]   Delivery address correct? Yes   Delivery phone number [phone]   Preferred delivery time? Anytime   Number of medications in delivery 3   Medication being filled and delivered abireateron, relugolix, prednisone   Doses left of specialty medications 8 days left   Is there any medication that is due not being filled? No   Supplies needed? No supplies needed   Cooler needed? No   Do any medications need mixed or dated? No   Additional comments no copay   Questions or concerns for the pharmacist? No   Explain any questions or concerns for the pharmacist n/a   Are any medications first time fills? No            Medication Adherence    Adherence tools used: watch   Other adherence tool: Clock - takes at same time each day, 7:45am   Support network for adherence: family member          Follow-up: 28 day(s)     Sushma Muro, Pharmacy Technician  Specialty Pharmacy Technician

## 2022-11-08 ENCOUNTER — TELEPHONE (OUTPATIENT)
Dept: ONCOLOGY | Facility: CLINIC | Age: 67
End: 2022-11-08

## 2022-11-08 ENCOUNTER — TELEMEDICINE (OUTPATIENT)
Dept: ONCOLOGY | Facility: CLINIC | Age: 67
End: 2022-11-08

## 2022-11-08 VITALS — BODY MASS INDEX: 28.8 KG/M2 | WEIGHT: 195 LBS

## 2022-11-08 DIAGNOSIS — C79.51 PROSTATE CANCER METASTATIC TO BONE: ICD-10-CM

## 2022-11-08 DIAGNOSIS — D69.6 PLATELETS DECREASED: ICD-10-CM

## 2022-11-08 DIAGNOSIS — C61 PROSTATE CANCER METASTATIC TO BONE: ICD-10-CM

## 2022-11-08 DIAGNOSIS — C79.51 PROSTATE CANCER METASTATIC TO BONE: Primary | ICD-10-CM

## 2022-11-08 DIAGNOSIS — F11.99 OPIOID USE, UNSPECIFIED WITH UNSPECIFIED OPIOID-INDUCED DISORDER: Primary | ICD-10-CM

## 2022-11-08 DIAGNOSIS — C61 PROSTATE CANCER METASTATIC TO BONE: Primary | ICD-10-CM

## 2022-11-08 PROCEDURE — 99214 OFFICE O/P EST MOD 30 MIN: CPT | Performed by: INTERNAL MEDICINE

## 2022-11-08 NOTE — PROGRESS NOTES
This was an audio and video enabled telemedicine encounter.  Done for COVID-19 risk reduction and given febrile symptoms.  Verbal consent given.  Successfully accomplished by [employer]  CHIEF COMPLAINT: Cough fevers and aches    Problem List:  Oncology/Hematology History Overview Note   1.  Stage IVb grade group 4 metastatic prostate cancer with sclerotic bone lesions on CT and bone scan and history of prostatic hypertrophy.  Good response to Taxotere ending 2/18/2021 Relugolix, followed by Zytiga prednisone with L4/sacrum, left acetabulum, and right pelvis external beam radiation August 2021.  Hypophosphatemia on Zometa.  Zometa held as of October 2021.  2.  Kidney stone  3.  Polyps  4.  Probable drug-induced hypophosphatemia from Zometa, drug stopped after 10/5/2021 dose  5.  Cancer related pain  6.  Fatigue  7.  Covid 2/2022    -9/30/2020 office note from Dr. Ronen Reynaga indicates patient with PSA 10.8.  Underwent TRUS prostate biopsy 9/15/2020.  Adenocarcinoma the prostate Sarika 4+4 = 8 in 1 out of 12 cores and 4+3 = 7 and 10 out of 12 cores and 3+4 = 7 in 1 out of 12 cores.  Perineural invasion.  Extraprostatic extension into the fat seen on 2 biopsies of the right lateral mid and right apex.  9/24/2020 CT chest and whole-body bone scan showed T12, L1, left acetabular, right medial acetabular metastases with no lung metastases.  He started androgen deprivation therapy with Casodex with plans for Lupron to start in 1 to 2 weeks for clinical T3 N0 M1 metastatic prostate cancer.  Review of official report from [facility] 9/24/2020 bone scan mentions T12, L1, roof of left acetabulum and medial right acetabular osteoblastic metastases.  CT chest with contrast that same day showed mild splenomegaly 14.2 cm with stable periaortic nodes compared to CT December 2015 with no lung metastases.  Sclerotic abnormality within the T12 vertebral body, L1 vertebral body extending into the pedicle unchanged.   8/28/2020 CT abdomen pelvis report from [facility] reviewed and mentions normal spleen size.  Punctate nonobstructing kidney stone, no paulino enlargement, diffuse bladder wall thickening with mild perivesicular fat stranding, 2.1 cm sclerotic left iliac bone above the left acetabulum new compared to 2015 as well as 1.5 cm faintly sclerotic focus in the right posterior acetabulum stable compared to prior CT 2015 as well as a 1.6 cm T12 and inferior vertebral body sclerotic lesion and a 1.9 cm left posterior lateral L1 vertebral body abnormality extending to the pedicle.  -10/13/2020 initial [facility] medical oncology consultation: With 1 Sarika score 8, 4+4 lesion that would make him a grade group 4 with stage IVb disease given radiographic evidence of sclerotic bone metastasis on bone scan and CT.  Needs genetic testing given metastatic prostate cancer and this is also important as, were he to have mismatch repair mutation then we would have options for PDL 1 inhibitors and were he BRCA mutated would have options for PARP inhibitors in the future.  Systemic therapy for castration naïve prostate cancer or N1 disease should be with apalutamide or Abiraterone or enzalutamide all of which are category 1.  He does not have any visceral metastases.  According to his imaging he has T12, L1, left acetabular, right acetabular, and left iliac bony involvement.  That means he would have 4 or more bone metastases with at least one metastasis (i.e. the acetabular lesions bilaterally) beyond the pelvis/vertebral, for which this would be considered high-volume disease for which 6 cycles of docetaxel 75 mg/m² x 6 cycles followed by Zytiga and prednisone would be reasonable along with Zometa every 6 weeks for bone stabilization.  He will do chemo preparation visit with my nurse practitioner prior to start chemotherapy with Taxotere and Zometa in 2 weeks and he is already received Casodex in preparation for Lupron shot he  received on 10/12/2020 with Dr. Reynaga.  We will get him to Dr. Alexander Gomez who has done his polypectomies in the past for port placement and we will get genetic counseling started.  Following the 6 courses of Taxotere per the Chaarted trial criteria, I would then give him an indefinite Zytiga and prednisone and Zometa until progression or toxicity dictates.    -12/16/2019 COVID-19 antigen test negative    -12/29/2020 PSA 0.275 with absolute neutrophil count 700 and creatinine 0.76 with normal liver enzymes and electrolytes.  Normal magnesium and phosphorus and urine drug screen positive for buprenorphine and opiates follow-up with palliative care.    -1/4/2021 CT chest abdomen pelvis with contrast and whole-body bone scan shows improving less metabolically active bone metastases.  Stable sclerotic T12, L1, and L2 vertebral bodies and bilateral pelvic bones on bone windows with stable subcentimeter para-aortic lymph nodes and no definite progression in the chest abdomen or pelvis.    -1/5/2021 [facility] medical oncology follow-up visit: I reviewed the images and reports thereof of the above CT and bone scan which shows good response to Taxotere x3 courses with a PSA down to 0.275 from a baseline of 10.  Get another 3 courses of Taxotere, continue his Xgeva, and then following that we will switch to Zytiga and prednisone.  He is working with palliative care on his bone pain but on his current dose of fentanyl patch he says his pain is still not adequately controlled he will contact them.  Dexamethasone prescription was refilled.  We will see my nurse practitioner back in 3 weeks for course #5 of 6 total courses and then we will reimage with CT chest abdomen pelvis and bone scan before going to the Zytiga prednisone.    -3/4/2021 CT chest abdomen pelvis with contrast is negative save for stable sclerotic bone lesions and the bone scan shows near resolution of prior bony abnormalities associated with the known  sclerotic lesions in the thoracolumbar spine and bony pelvis.    2/17/2021 PSA down to 0.1 from 1.37 October 2020.  3/9/2021 PSA 0.1    -5/26/2021 CT chest abdomen pelvis with contrast shows stable to slightly underlying increase low-density left para-aortic node.  Bone lesions stable.  Whole-body bone scan stable to decrease activity of known thoracolumbar and bony pelvic involvement.  PSA less than 0.1  , AST 74, bilirubin 0.5.       -6/1/2021 [facility] oncology follow-up visit: I reviewed the above data with him and images thereof.  Bones are stable.  No significant adenopathy.  PSA immeasurably low.     upper limit of normal 69 and AST 74 upper limit of normal 46.  Hence, neither is more than double the upper limit of normal with no ascites and no encephalopathy and normal albumin and bilirubin, despite the elevation of liver enzymes, he has child Abrams score A.  Package insert for Abiraterone says that for ALT and/or AST greater than 5 times upper limit of normal or total bilirubin greater than 3 times the upper limit of normal to withhold treatment until liver function tests returned to baseline or ALT and AST less than or equal to 2.5 times the upper limit of normal and total bilirubin less than or equal to 1.5 times the upper limit of normal and then reinitiate at 750 mg daily dose of Zytiga.  For recurrent hepatotoxicity on 750 mg daily Zytiga, withhold treatment until liver functions returned to baseline or ALT and AST less than 3-2.5 times upper limit of normal and total bilirubin less than or equal to 1.5 times the upper limit of normal then reinitiate at 500 mg daily Zytiga dose.  If has recurrent hepatotoxicity on 500 mg dose, then discontinue treatment.  If has ALT greater than 3 times the upper limit of normal along with total bilirubin greater than 2 times the upper limit of normal in the absence of other contributing causes or biliary obstruction, permanently discontinue Zytiga.   Based on these recommendations, I would continue current doses but we will watch with serial labs monthly and repeat his imaging again in 3 months but clinically he is doing wonderfully with excellent response to Taxotere and now on Zytiga prednisone plus Zometa along with Relugolix.    -6/23/2020 for [facility] clinic follow-up: Having persistent and worsening pain above his left hip.  I will get an MRI of the left hip for further evaluation.  Otherwise we will continue therapy unchanged with Zytiga, prednisone and Zometa along with Relugolix.    -7/28/2021 [facility] clinic follow-up: Patient with multiple somatic complaints including episodes of confusion, dizziness, decreased memory, agitation.  He reports ongoing episodes with difficulty swallowing.  Also most concerning for episodes of angina and chest pain for which he basically refused to seek emergency medical attention.  He has a history of coronary artery disease and stent placement.  He has not followed up with cardiology for many years.  I will get an MRI of the brain in light of his confusion, dizziness and agitation.  I will get him to gastroenterology for EGD in light of his dysphagia.  I have also put in a referral for cardiology.  I reemphasized to the patient, his wife who is with him today and his son who was on the telephone during our visit the importance of seeking out prompt medical attention when he is having chest pain or neurological concerns so that he can be evaluated.  The patient states that he basically refuses to go to the emergency room and if his family calls an ambulance he would not agree to go to the hospital.  For the time being, I have asked him to hold his Zytiga and prednisone until we can sort this out as Zytiga does have some cardiovascular risk.  There is likely no treatment for prostate cancer that does not carry some form of cardiovascular risk.  His PSA remains low at 0.014 yesterday.  He will continue  "relugolix for now.    Of note, some of these symptoms could also be due to his hypophosphatemia, phosphate level from yesterday was 1.5, I have sent in a prescription for K-Phos 3 times a day before meals for 10 days.  We will hold further Zometa until this is corrected.  I have also put in an order to check his vitamin D level.  He takes calcium and vitamin D daily.  Some of his symptoms also could be due to drug withdrawal as there was some confusion and difficulty getting his last prescription for methadone therefore he was without methadone for several days, he now has his prescription and is back on his pain medication.  He has an appointment with neurosurgery tomorrow in light of his ongoing back pain, MRI of the hip recently showed multiple bony lesions largest at the L5 vertebral body and left supra-acetabular region.  If no neurosurgical intervention recommended he may benefit from spot radiation as this is where a lot of his pain is coming from.  His wife had questions today regarding his staging and extent of disease.  We reviewed previous scans.  I did clarify with her as she used the words \"cancer free\" as she thought that is what she was told after his CT scans once he completed Taxotere in February.  I explained to her that even though his scans showed he had an excellent response with no clear areas of metastasis that did not mean he was cancer free, I explained to her that when you have metastatic cancer the treatment intent is palliative in nature and to control the disease but not to cure the disease.  She stated understanding.  We will see him back in 2 weeks for follow-up to sort through this and make further plan of care, again, I have asked him to hold Zytiga and prednisone until he sees us back, he will continue on his other medications including relugolix.    -7/30/2021 neurosurgery PA consultation.  MRI with and without contrast L5 ordered.    -8/3/2021 neurosurgery follow-up showed known " sclerotic disease with new L5 abnormality without compression deformity or instability.  MRI brain negative for metastasis.    -8/4/2021 office visit Dr. Fco Quintanilla radiation oncology coordinating with Dr. North neurosurgery for palliative radiation for MRI evidence of L5 and left supra acetabular painful lesions.  States that the patient's disease which is not secreting PSA is experiencing some progression and would benefit from 20 Gy in five fractions and other lesion 30 Gy in ten fractions. Patient offered to go to [place] for radiation but desired treatment in [place].    -8/10/2021 [facility] follow-up visit: No chest pains at this junction.  Reviewed MRI brain and other MRIs reviewed by Dr. North and Dwight.  Due for EGD on 19th.  We will repeat his phosphorus along with his other labs continue Relugolix, Zytiga, prednisone, and he will continue radiation palliatively to the painful bony lesions with Dr. Quintanilla/Can.  He will see my nurse practitioner back in a few weeks to see how he is tolerating this and to follow-up on the phosphorus off of Zometa and she will order repeat CT chest abdomen pelvis with contrast and total body bone scan the end of September.I will see him back after that.    -9/8/2021 Zometa resumed.  PSA <0.10    -10/5/2021 [facility] follow-up visit: followed-up with radiation oncology 9/21/2021.  Status post symptomatic L5 radiation 5 fractions 20 Maxwell completed 8/16/2021 with improvement in right hip pain.  9/2/2021 PSA less than 0.1.  9/29/2021 total body bone scan shows increased uptake left iliac bone slightly superior group of the left acetabulum with no additional foci of suspicious abnormality is unlikely treatment related with no new sites of active osseous metastasis.  CT chest abdomen pelvis with contrast shows stable borderline enlarged left periaortic nodes and dating sclerotic bone lesions.Continue Zytiga, prednisone, Relugolix, Zometa,  repeat CT chest abdomen pelvis with contrast and total body bone scan the end of the year.  We will follow PSA serially.    -11/17/2021 [facility] Oncology clinic follow-up:  Mr. Lugo overall is doing well.  He is tolerating therapy with Zytiga, prednisone and Relugolix along with Zometa which is given every 6 weeks.  PSA remains low at <0.1.  Has occasional low phosphorus, currently low at 1.7.  Serum calcium is normal at 8.9, normal creatinine 0.79 and normal CBC other than for platelets of 124.  I will hold Zometa for 1 month, I did send in a prescription for phosphorus replacement today.  He takes calcium and vitamin D.  I will see him back in 1 month for follow-up.  We will repeat restaging scans after that visit.    -12/15/2021 [facility] Oncology clinic follow-up: Mr. Lugo continues to do well on therapy with Zytiga, prednisone and Relugolix, his PSA remains low at <0.1.  He is continuing to have left lower back pain, he is working with palliative care and they have referred him also to pain management.  Pain management has talked to him about putting in a pain pump however he is leery of this, I told him that this was a safe and effective pain management technique and to certainly give consideration to their recommendations.  His phosphorus is improving however is still a little low, I will hold off on Zometa until we see him back in 1 month, we may end up only giving it every 12 weeks.  We will repeat restaging scans with CT chest, abdomen and pelvis along with total body bone scan prior to return and I have ordered those today.      -1/5/2022 CT chest abdomen pelvis with contrast [facility] showing stable left periaortic adenopathy and unchanged sclerotic thoracic, lumbar spine and pelvis lesions with no new or progressive disease in the chest abdomen or pelvis.  Total body bone scan shows no significant change and no new suspicious foci.  Stable posterior right acetabulum and left superior acetabulum  and degenerative changes in the cervical spine, shoulders, acromioclavicular joints, sternoclavicular joints, elbows, wrists, hands, thoracic spine, lumbosacral spine, sacroiliac joints, hips, knees, ankles, feet.    -1/11/2022 [facility] medical oncology hematology follow-up visit: I reviewed images and reports from his 1/5/2022 scans.  No active disease and PSA immmeasurably low on Zytiga, prednisone, Relugolix.  However, he has struggled with hypophosphatemia and is significantly fatigued with this.  Given that the bones are doing well and given that his phosphorus continues to remain consistently low at 2.2 in mid December, 1.7 mid November and 2.5 mid-September and as low as 1.5 back to July and the likelihood that this is related to his Zometa, given that his bones are stable overall, he will increase from 1200 mg up to 1600 mg of calcium and phosphate a day and we will hold his Zometa for the foreseeable future.  He will see my nurse practitioner back in a month to continue to monitor his phosphorus along with his calcium and other labs and will repeat his scans again in 3 months.  In addition, given his fatigue we will check his ACTH and cortisol though he is taking his prednisone with his Zytiga.  Though his electrolytes are normal, I will keep an eye on the cortisol and ACTH and have these labs not only drawn today but again just prior to return to my nurse practitioner on February 10.    -2/10/2022 [facility] Oncology clinic follow-up: Mr. Lugo overall is stable, no change in his health this past month.  I have reviewed his labs from 2/8/2022 and they are unremarkable, his phosphorus is now normal at 3.2. We are continuing to hold his Zometa, he last received Zometa on 10/5/2021.  He continues therapy with Zytiga, prednisone and Relugolix.  Dr. Rashid had ordered cortisol level and ACTH which are low, currently his ACTH is 2.8 and cortisol 3.08 however these are both done in the face of ongoing prednisone that  he takes with his Zytiga.  We will get him to endocrinology for further evaluation for possible adrenal insufficiency but will not interrupt his treatment in the interim.  He will need restaging scans again in April for a 3-month follow-up.  He will continue to follow with palliative care and pain management for his cancer related pain.  His PSA remains immeasurably low at <0.1 on 2/8/22.    -2/15/2022 went to emergency room with weakness, decreased appetite, myalgias in the setting of known COVID-19 testing positive a few days prior to this visit.  Phosphorus had been low in the past but was normal in the emergency room with unremarkable CBC and CMP.  Chest x-ray unremarkable saturating well on room air mildly hypertensive with dry mucosa.  Given 500 cc crystalloid.  Covid test positive.  Mild thrombocytopenia 136,000 and otherwise unremarkable.  Discharged home.    -3/3/2022 data:  PSA less than 0.1  CMP unremarkable  Cortisol 3.96 normal  ACTH 2.8 lower limit of normal 7.2  Phosphorus normal 2.6    -3/8/2022 [facility] medical oncology follow-up: Suspect big chunk of his fatigue was Covid.  Another part of the fatigue likely related to lack of testosterone.  Not hypophosphatemic off of Zometa.  ACTH running low while on prednisone not surprising but with normal cortisol and I doubt adrenal insufficient which is why he is on prednisone to avoid such while taking Zytiga.  Overall doing fairly well and with immeasurably low PSA, I will have labs done on him early April, see my nurse practitioner early May, and she will order repeat bone scan and CTs the end of May and I will see him back in June.  We will continue to hold the Zometa unless bone lesions progress given symptomatic prior hypophosphatemia on Zometa.  He will keep his appointment April 28 with Dr. Mir Duffy just to be sure that my take on his pituitary/adrenal axis assessment is accurate.    -4/28/2022 endocrinology consult Dr. Mir Duffy.  With low ACTH  and cortisol on prednisone with Zytiga, the adequacy of prednisone cannot be assessed by measuring ACTH or cortisol but is assessed by weight changes, blood pressure, blood sugar, potassium, overall sense of how the patient is doing.  Primary adrenal insufficiency with low ACTH and low cortisol would be typical for the situation as his blood sugar tends to run a little high and potassium a little low, he did not think increasing prednisone was necessary.  He put him on some potassium.  No follow-up scheduled, will see as needed.    -5/4/2022 [facility] Oncology clinic follow-up: Mr. Lugo continues on therapy with Zytiga and prednisone along with Relugolix.  His Zometa has been on hold due to previous hypophosphatemia.  There is some concern about potential adrenal insufficiency. He saw endocrinology, Dr. Mir Duffy on 4/28/2022.  I did review his office note and he stated that the low ACTH and low serum cortisol would be typical for Mr. Lugo's situation.  He further stated that the adequacy of prednisone dose cannot be assessed by measuring ACTH or cortisol but is rather assessed by the overall just altered how the patient is feeling-weight change, blood pressure, blood sugar, potassium, overall sense of wellbeing.  His potassium had been running a little low therefore they put him on potassium 10 mill equivalents daily. Of note, I do not see a future follow-up scheduled with endocrinology.  He is having night sweats, I am not sure if this is related.  His glucose was normal this morning at 107.  His weight is stable.  He will continue current treatment for his prostate cancer unchanged, we will continue to hold his Zometa.  Current phosphorus and magnesium are normal.  CBC and CMP from today are unremarkable, cortisol is normal.  PSA and ACTH are pending  In regards to his night sweats, he had taken clonidine previously 0.1 mg twice daily as needed, I did send in a short supply again to try and see if this helps.  He  also is having indigestion despite being on omeprazole twice daily, I sent a prescription for Pepcid.  He had an EGD August 2021.  We will repeat restaging scans prior to return and I have ordered those today.     -5/24/2022 CT chest abdomen pelvis with contrast[place]fort negative.  Bone scan shows stable bone metastases.    - 5/25/2022 PSA less than 0.1, platelets 130,000, otherwise unremarkable CBC and CMP.  A.m. cortisol running low 1.2 with phosphorus low 2.3.    -5/31/2022 [facility], [place], operated by [facility] medical oncology follow-up: On Zytiga, prednisone, Relugolix, PSA remaining immeasurably low with stable bone scan and no visceral/paulino metastases.  Phosphorus still running low.  I have discontinued his potassium chloride and started potassium phosphate 500 mg 4 times a day.  Unless PSA rising, we will plan on repeating CTs and bone scan in 6 months and will follow with my nurse practitioner in the interim and if symptoms dictate or labs, will get back to Dr. Duffy for management of potential adrenal insufficiency but presently we will just see how he does with potassium phosphate and hopeful improvement in the phosphorus level with time.  We will continue to follow with palliative care for pain management    -7/6/2022 [facility] [place] Oncology clinic follow-up: Mr. Lugo overall is doing well but continues to be troubled with fatigue and hot flashes.  For the most part he states he is able to do the things he wants to do, he continues to work but does have to take more rest periods.  I had a long discussion with him and his wife after reviewing his medications with them as they wanted to see if there was anything he could stop or adjust.  I discussed with him the need to continue on treatment for his prostate cancer even though his PSA remains immeasurably low and his scans look good but that if his medication is stopped the cancer would progress and over time it still has the potential to progress on therapy but would continue it as long as  possible to keep his disease under control.  I explained this is like treating someone with hypertension, you do not stop the antihypertensive just because the blood pressure normalizes.  They stated understanding.  There is no dose adjustment of Zytiga or prednisone that would minimize his symptoms, he is on the current standard recommended therapy.  Discussed with him that sometimes during times of stress we may need to increase his prednisone dose but for now would not change anything.  His phosphorus level is normal, we continue to hold his Zometa.  I did give him the option of holding his phosphorus for the next month and we will see what happens, if it drops we will start him back on it but may be at a lower dose rather than 4 times a day maybe twice a day.  For now he will continue therapy unchanged.  We will continue monitoring labs monthly.  No PSA was drawn this month but that is okay as his PSA has been running immeasurably low at <0.1, we will recheck next month with lab draw.  We will stop checking his ACTH and cortisol level every month, if he develops worsening symptoms I will repeat those.  He has not gotten a follow-up with endocrinology as of yet.    -8/3/2022 [facility] [place] Oncology clinic follow-up:  Mr. Lugo is doing well as far as his prostate cancer goes with continued immeasurably low PSA of <0.1.  He continues on therapy with Zytiga and prednisone along with Relugolix.  His phosphorus is stable at 2.7 which was just slightly low by our reference range however was 2.71-month ago also which was normal on another labs reference range.  He has not taken his phosphorus for this past month as he thought it was making him more fatigued.  He really can tell no difference whether he was taking it or not.  I have asked him to try to go back on phosphorus twice a day rather than 4 times a day and see if this is tolerated and if we can keep his phosphorus level from dropping.  His biggest complaint today is  flareup of his arthralgias and back pain.  He is following with pain management, Dr. Danny Avalos but is requesting a referral back to palliative medicine as he feels that no one is currently listening to him and they are just prescribing the same medications that are not effective according to his report.  He does have a follow-up with Dr. Avalos on 8/12/2022 but due to his current pain is not able to work until after he is seen and hopefully can get some better relief.  I have given him a work release until 15 August.  I have also put in a referral back to palliative care.  Also encouraged him to work with Dr. Avalos for help in resolving his issue.  Apparently they have recommended some type of a pump however he has been hesitant to that but likely would be helpful.  We will continue therapy unchanged.  We will continue monthly labs including phosphorus, we are not currently checking ACTH and cortisol monthly as Dr. Mir Duffy previously stated in his assessment on 4/28/2022 that the adequacy of prednisone cannot be assessed by measuring ACTH or cortisol but would be assessed by symptoms, see note from 4/28/2022.  He made no further follow-up appointments.  Fatigue is not worsening currently.  His glucose and potassium are  Normal.    -10/13/2022 [facility] medical oncology follow-up: Mr. Lugo is doing well.  He is tolerating treatment with Zytiga and prednisone along with Relugolix without any unusual side effects.  His PSA has remained immeasurably low at <0.1.  His phosphorus was slightly low at last visit.  He had been instructed to go back on phosphorus twice a day but he misunderstood and has not been taking it.  We will check labs today.  I will follow-up with results and notify him if he needs to restart the phosphorus.  His pain is better controlled since reestablishing with [facility].  We will repeat scans prior to next follow-up.  I have ordered CT chest abdomen pelvis along with bone  scan.  We will see him back in 1 month.    -10/13/2022 PSA less than 0.014.With unremarkable CBC and CMP.  Phosphorus still a little low 2.3.    -11/4/2022 CT abdomen pelvis chest showed no evidence of disease progression with stable sclerotic bone lesions.  Bone scan stable right greater than left acetabular metastasis.  Stable bone metastases.    -11/8/2022 [facility] oncology telemedicine follow-up: Patient states that he feels achy all over with low-grade temps and a cough mildly productive.  Has an appointment later today to see his primary care for testing for flu and COVID.  Suggested that if he were positive for COVID that he get Paxlovid and that if he were positive for flu that he get Tamiflu and to discuss this with his primary care.  From the prostate cancer side, his PSA is immeasurably low with stable bone metastases and no evidence of progression.  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  We will continue the Relugolix, Zytiga, prednisone and he will see my nurse practitioner 12/7/2022.  Would repeat CTs and bone scan in May.     Prostate cancer metastatic to bone (HCC)   8/30/2020 -  Other Event    PSA 10.8     9/15/2020 Initial Diagnosis    Prostate cancer metastatic to bone (CMS/HCC)     9/15/2020 Biopsy    Prostate needle core biopsy     9/24/2020 Imaging    Total body bone scan: 4 abnormal areas of increased activity consistent with osteoblastic metastasis, abnormal foci of activity seen in the T12 vertebral body, L1 vertebral body, roof of the left acetabulum, and acetabulum medially on the right.  CT chest: Stable sclerotic metastatic lesions within the T12 and L1 vertebrae.  No other evidence of metastatic disease to the chest.  Stable mild splenomegaly and subcentimeter periaortic retroperitoneal lymph nodes.  CT abdomen and pelvis: Diffuse bladder wall thickening with mild perivesicular fat stranding.  Interval development of several sclerotic  lesions in the spine and left iliac bone.     10/13/2020 Cancer Staged    Staging form: Bone - Appendicular Skeleton, Trunk, Skull, And Facial Bones, AJCC 8th Edition  - Clinical: Stage IVB (cT3, cN0, cM1b, G3) - Signed by Ishmael Rashid MD on 10/13/2020     11/3/2020 - 10/5/2021 Chemotherapy    OP SUPPORTIVE Zoledronic Acid Q42d     11/3/2020 - 2/18/2021 Chemotherapy    OP PROSTATE DOCEtaxel       1/4/2021 Imaging    CT chest abdomen pelvis with contrast and whole-body bone scan shows improving less metabolically active bone metastases.  Stable sclerotic T12, L1, and L2 vertebral bodies and bilateral pelvic bones on bone windows with stable subcentimeter para-aortic lymph nodes and no definite progression in the chest abdomen or pelvis.  PSA down to 0.275 after 3 courses of Taxotere.     3/4/2021 Imaging    CT chest, abdomen and pelvis stable, no evidence of disease progression. Total body bone scan with decreased/near resolution of abnormal increased radiotracer uptake associated with the known sclerotic lesions in the thoracolumbar spine and bony pelvis.  No evidence of new osteoblastic metastases.     3/4/2021 Imaging    CT chest abdomen pelvis with contrast is negative save for stable sclerotic bone lesions and the bone scan shows near resolution of prior bony abnormalities associated with the known sclerotic lesions in the thoracolumbar spine and bony pelvis.  2/17/2021 PSA down to 0.1 from 1.37 October 2020.     3/9/2021 - 3/9/2021 Chemotherapy    OP SUPPORTIVE PROSTATE Relugolix     3/9/2021 -  Chemotherapy    OP PROSTATE Abiraterone / PredniSONE       3/10/2021 -  Chemotherapy    OP PROSTATE Abiraterone / PredniSONE     8/10/2021 - 8/16/2021 Radiation    Radiation OncologyTreatment Course:  Naveen Lugo received 2000 cGy in 5 fractions to L4-sacrum, left acetabulum and right pelvis via External Beam Radiation - EBRT.     11/16/2021 -  Chemotherapy    OP CENTRAL VENOUS ACCESS DEVICE ACCESS, CARE, AND  MAINTENANCE (CVAD)     1/5/2022 Imaging    -1/5/2022 CT chest abdomen pelvis with contrast [facility] showing stable left periaortic adenopathy and unchanged sclerotic thoracic, lumbar spine and pelvis lesions with no new or progressive disease in the chest abdomen or pelvis.  Total body bone scan shows no significant change and no new suspicious foci.  Stable posterior right acetabulum and left superior acetabulum and degenerative changes in the cervical spine, shoulders, acromioclavicular joints, sternoclavicular joints, elbows, wrists, hands, thoracic spine, lumbosacral spine, sacroiliac joints, hips, knees, ankles, feet.     5/24/2022 Imaging    CT chest abdomen pelvis with contrastFrankfort negative.  Bone scan shows stable bone metastases.     11/4/2022 Imaging    CT abdomen pelvis chest showed no evidence of disease progression with stable sclerotic bone lesions.  Bone scan stable right greater than left acetabular metastasis.  Stable bone metastases.         HISTORY OF PRESENT ILLNESS:  The patient is a 67 y.o. male, here for follow up on management of metastatic prostate cancer.  Doing well from a prostate cancer standpoint but for the past day he has had generalized aches, low-grade fever, and mildly productive cough.    Past Medical History:   Diagnosis Date   • Bone cancer (HCC)    • History of radiation therapy 08/16/2021    L4 through sacrum, left acetabulum, right pelvis   • Nephrolithiasis    • Opioid use disorder, mild, on maintenance therapy (HCC)    • Prostate cancer (HCC)      Past Surgical History:   Procedure Laterality Date   • CHOLECYSTECTOMY     • CORONARY STENT PLACEMENT  206 & 2011   • HERNIA REPAIR  1986   • THORACIC DISCECTOMY  1994       Allergies   Allergen Reactions   • Cymbalta [Duloxetine Hcl] Dizziness   • Gabapentin Nausea Only   • Lyrica [Pregabalin] Nausea And Vomiting and Dizziness       Family History and Social History reviewed and changed as necessary    REVIEW OF  SYSTEM:   Other than for aches and fevers and productive cough no other new somatic complaints    PHYSICAL EXAM:  No respiratory distress visibly and speaking in complete sentences walking around the house as he talked.  No wheezes.    Vitals:    11/08/22 0915   Weight: 88.5 kg (195 lb)     Vitals:    11/08/22 0915   PainSc:   6   PainLoc: Hip  Comment: hips and lower back          ECOG score: 1           Lab Results   Component Value Date    HGB 12.4 (L) 10/13/2022    HCT 38.3 10/13/2022    MCV 87.6 10/13/2022     (L) 10/13/2022    WBC 5.06 10/13/2022    NEUTROABS 3.35 10/13/2022    LYMPHSABS 1.19 10/13/2022    MONOSABS 0.37 10/13/2022    EOSABS 0.11 10/13/2022    BASOSABS 0.03 10/13/2022       Lab Results   Component Value Date    GLUCOSE 95 10/13/2022    BUN 7 (L) 10/13/2022    CREATININE 0.77 10/13/2022     10/13/2022    K 3.6 10/13/2022     10/13/2022    CO2 26.8 10/13/2022    CALCIUM 9.0 10/13/2022    PROTEINTOT 7.0 02/14/2022    ALBUMIN 3.80 10/13/2022    BILITOT 0.2 10/13/2022    ALKPHOS 74 10/13/2022    AST 13 10/13/2022    ALT 10 10/13/2022             ASSESSMENT & PLAN:  1.  Stage IVb grade group 4 metastatic prostate cancer with sclerotic bone lesions on CT and bone scan and history of prostatic hypertrophy.  Good response to Taxotere ending 2/18/2021 Relugolix, followed by Zytiga prednisone with L4/sacrum, left acetabulum, and right pelvis external beam radiation August 2021.  Hypophosphatemia on Zometa.  Zometa held as of October 2021.  2.  Kidney stone  3.  Polyps  4.  Probable drug-induced hypophosphatemia from Zometa, drug stopped after 10/5/2021 dose  5.  Cancer related pain  6.  Fatigue  7.  Covid 2/2022    -9/30/2020 office note from Dr. Ronen Reynaga indicates patient with PSA 10.8.  Underwent TRUS prostate biopsy 9/15/2020.  Adenocarcinoma the prostate Willard 4+4 = 8 in 1 out of 12 cores and 4+3 = 7 and 10 out of 12 cores and 3+4 = 7 in 1 out of 12 cores.  Perineural  invasion.  Extraprostatic extension into the fat seen on 2 biopsies of the right lateral mid and right apex.  9/24/2020 CT chest and whole-body bone scan showed T12, L1, left acetabular, right medial acetabular metastases with no lung metastases.  He started androgen deprivation therapy with Casodex with plans for Lupron to start in 1 to 2 weeks for clinical T3 N0 M1 metastatic prostate cancer.  Review of official report from [facility] 9/24/2020 bone scan mentions T12, L1, roof of left acetabulum and medial right acetabular osteoblastic metastases.  CT chest with contrast that same day showed mild splenomegaly 14.2 cm with stable periaortic nodes compared to CT December 2015 with no lung metastases.  Sclerotic abnormality within the T12 vertebral body, L1 vertebral body extending into the pedicle unchanged.  8/28/2020 CT abdomen pelvis report from [facility] reviewed and mentions normal spleen size.  Punctate nonobstructing kidney stone, no paulino enlargement, diffuse bladder wall thickening with mild perivesicular fat stranding, 2.1 cm sclerotic left iliac bone above the left acetabulum new compared to 2015 as well as 1.5 cm faintly sclerotic focus in the right posterior acetabulum stable compared to prior CT 2015 as well as a 1.6 cm T12 and inferior vertebral body sclerotic lesion and a 1.9 cm left posterior lateral L1 vertebral body abnormality extending to the pedicle.  -10/13/2020 initial [facility] medical oncology consultation: With 1 [place] score 8, 4+4 lesion that would make him a grade group 4 with stage IVb disease given radiographic evidence of sclerotic bone metastasis on bone scan and CT.  Needs genetic testing given metastatic prostate cancer and this is also important as, were he to have mismatch repair mutation then we would have options for PDL 1 inhibitors and were he BRCA mutated would have options for PARP inhibitors in the future.  Systemic therapy for castration naïve prostate  cancer or N1 disease should be with apalutamide or Abiraterone or enzalutamide all of which are category 1.  He does not have any visceral metastases.  According to his imaging he has T12, L1, left acetabular, right acetabular, and left iliac bony involvement.  That means he would have 4 or more bone metastases with at least one metastasis (i.e. the acetabular lesions bilaterally) beyond the pelvis/vertebral, for which this would be considered high-volume disease for which 6 cycles of docetaxel 75 mg/m² x 6 cycles followed by Zytiga and prednisone would be reasonable along with Zometa every 6 weeks for bone stabilization.  He will do chemo preparation visit with my nurse practitioner prior to start chemotherapy with Taxotere and Zometa in 2 weeks and he is already received Casodex in preparation for Lupron shot he received on 10/12/2020 with Dr. Reynaga.  We will get him to Dr. Alexander Gomez who has done his polypectomies in the past for port placement and we will get genetic counseling started.  Following the 6 courses of Taxotere per the [place] trial criteria, I would then give him an indefinite Zytiga and prednisone and Zometa until progression or toxicity dictates.    -12/16/2019 COVID-19 antigen test negative    -12/29/2020 PSA 0.275 with absolute neutrophil count 700 and creatinine 0.76 with normal liver enzymes and electrolytes.  Normal magnesium and phosphorus and urine drug screen positive for buprenorphine and opiates follow-up with palliative care.    -1/4/2021 CT chest abdomen pelvis with contrast and whole-body bone scan shows improving less metabolically active bone metastases.  Stable sclerotic T12, L1, and L2 vertebral bodies and bilateral pelvic bones on bone windows with stable subcentimeter para-aortic lymph nodes and no definite progression in the chest abdomen or pelvis.    -1/5/2021 [facility] medical oncology follow-up visit: I reviewed the images and reports thereof of the above CT and bone  scan which shows good response to Taxotere x3 courses with a PSA down to 0.275 from a baseline of 10.  Get another 3 courses of Taxotere, continue his Xgeva, and then following that we will switch to Zytiga and prednisone.  He is working with palliative care on his bone pain but on his current dose of fentanyl patch he says his pain is still not adequately controlled he will contact them.  Dexamethasone prescription was refilled.  We will see my nurse practitioner back in 3 weeks for course #5 of 6 total courses and then we will reimage with CT chest abdomen pelvis and bone scan before going to the Zytiga prednisone.    -3/4/2021 CT chest abdomen pelvis with contrast is negative save for stable sclerotic bone lesions and the bone scan shows near resolution of prior bony abnormalities associated with the known sclerotic lesions in the thoracolumbar spine and bony pelvis.    2/17/2021 PSA down to 0.1 from 1.37 October 2020.  3/9/2021 PSA 0.1    -5/26/2021 CT chest abdomen pelvis with contrast shows stable to slightly underlying increase low-density left para-aortic node.  Bone lesions stable.  Whole-body bone scan stable to decrease activity of known thoracolumbar and bony pelvic involvement.  PSA less than 0.1  , AST 74, bilirubin 0.5.       -6/1/2021 [facility] medical oncology follow-up visit: I reviewed the above data with him and images thereof.  Bones are stable.  No significant adenopathy.  PSA immeasurably low.     upper limit of normal 69 and AST 74 upper limit of normal 46.  Hence, neither is more than double the upper limit of normal with no ascites and no encephalopathy and normal albumin and bilirubin, despite the elevation of liver enzymes, he has child Abrams score A.  Package insert for Abiraterone says that for ALT and/or AST greater than 5 times upper limit of normal or total bilirubin greater than 3 times the upper limit of normal to withhold treatment until liver function tests returned to  baseline or ALT and AST less than or equal to 2.5 times the upper limit of normal and total bilirubin less than or equal to 1.5 times the upper limit of normal and then reinitiate at 750 mg daily dose of Zytiga.  For recurrent hepatotoxicity on 750 mg daily Zytiga, withhold treatment until liver functions returned to baseline or ALT and AST less than 3-2.5 times upper limit of normal and total bilirubin less than or equal to 1.5 times the upper limit of normal then reinitiate at 500 mg daily Zytiga dose.  If has recurrent hepatotoxicity on 500 mg dose, then discontinue treatment.  If has ALT greater than 3 times the upper limit of normal along with total bilirubin greater than 2 times the upper limit of normal in the absence of other contributing causes or biliary obstruction, permanently discontinue Zytiga.  Based on these recommendations, I would continue current doses but we will watch with serial labs monthly and repeat his imaging again in 3 months but clinically he is doing wonderfully with excellent response to Taxotere and now on Zytiga prednisone plus Zometa along with Relugolix.    -6/23/2020 for [facility] follow-up: Having persistent and worsening pain above his left hip.  I will get an MRI of the left hip for further evaluation.  Otherwise we will continue therapy unchanged with Zytiga, prednisone and Zometa along with Relugolix.    -7/28/2021 [facility] clinic follow-up: Patient with multiple somatic complaints including episodes of confusion, dizziness, decreased memory, agitation.  He reports ongoing episodes with difficulty swallowing.  Also most concerning for episodes of angina and chest pain for which he basically refused to seek emergency medical attention.  He has a history of coronary artery disease and stent placement.  He has not followed up with cardiology for many years.  I will get an MRI of the brain in light of his confusion, dizziness and agitation.  I will get him  "to gastroenterology for EGD in light of his dysphagia.  I have also put in a referral for cardiology.  I reemphasized to the patient, his wife who is with him today and his son who was on the telephone during our visit the importance of seeking out prompt medical attention when he is having chest pain or neurological concerns so that he can be evaluated.  The patient states that he basically refuses to go to the emergency room and if his family calls an ambulance he would not agree to go to the hospital.  For the time being, I have asked him to hold his Zytiga and prednisone until we can sort this out as Zytiga does have some cardiovascular risk.  There is likely no treatment for prostate cancer that does not carry some form of cardiovascular risk.  His PSA remains low at 0.014 yesterday.  He will continue relugolix for now.    Of note, some of these symptoms could also be due to his hypophosphatemia, phosphate level from yesterday was 1.5, I have sent in a prescription for K-Phos 3 times a day before meals for 10 days.  We will hold further Zometa until this is corrected.  I have also put in an order to check his vitamin D level.  He takes calcium and vitamin D daily.  Some of his symptoms also could be due to drug withdrawal as there was some confusion and difficulty getting his last prescription for methadone therefore he was without methadone for several days, he now has his prescription and is back on his pain medication.  He has an appointment with neurosurgery tomorrow in light of his ongoing back pain, MRI of the hip recently showed multiple bony lesions largest at the L5 vertebral body and left supra-acetabular region.  If no neurosurgical intervention recommended he may benefit from spot radiation as this is where a lot of his pain is coming from.  His wife had questions today regarding his staging and extent of disease.  We reviewed previous scans.  I did clarify with her as she used the words \"cancer " "free\" as she thought that is what she was told after his CT scans once he completed Taxotere in February.  I explained to her that even though his scans showed he had an excellent response with no clear areas of metastasis that did not mean he was cancer free, I explained to her that when you have metastatic cancer the treatment intent is palliative in nature and to control the disease but not to cure the disease.  She stated understanding.  We will see him back in 2 weeks for follow-up to sort through this and make further plan of care, again, I have asked him to hold Zytiga and prednisone until he sees us back, he will continue on his other medications including relugolix.    -7/30/2021 neurosurgery PA consultation.  MRI with and without contrast L5 ordered.    -8/3/2021 neurosurgery follow-up showed known sclerotic disease with new L5 abnormality without compression deformity or instability.  MRI brain negative for metastasis.    -8/4/2021 office visit Dr. Fco Quintanilla radiation oncology coordinating with Dr. North neurosurgery for palliative radiation for MRI evidence of L5 and left supra acetabular painful lesions.  States that the patient's disease which is not secreting PSA is experiencing some progression and would benefit from 20 Gy in five fractions and other lesion 30 Gy in ten fractions. Patient offered to go to [place] for radiation but desired treatment in [place].    -8/10/2021 [facility] follow-up visit: No chest pains at this junction.  Reviewed MRI brain and other MRIs reviewed by Dr. North and Dwight.  Due for EGD on 19th.  We will repeat his phosphorus along with his other labs continue Relugolix, Zytiga, prednisone, and he will continue radiation palliatively to the painful bony lesions with Dr. Quintanilla/Can.  He will see my nurse practitioner back in a few weeks to see how he is tolerating this and to follow-up on the phosphorus off of Zometa and she will order repeat CT " chest abdomen pelvis with contrast and total body bone scan the end of September.I will see him back after that.    -9/8/2021 Zometa resumed.  PSA <0.10    -10/5/2021 [facility] medical oncology follow-up visit: followed-up with radiation oncology 9/21/2021.  Status post symptomatic L5 radiation 5 fractions 20 [place] completed 8/16/2021 with improvement in right hip pain.  9/2/2021 PSA less than 0.1.  9/29/2021 total body bone scan shows increased uptake left iliac bone slightly superior group of the left acetabulum with no additional foci of suspicious abnormality is unlikely treatment related with no new sites of active osseous metastasis.  CT chest abdomen pelvis with contrast shows stable borderline enlarged left periaortic nodes and dating sclerotic bone lesions.Continue Zytiga, prednisone, Relugolix, Zometa, repeat CT chest abdomen pelvis with contrast and total body bone scan the end of the year.  We will follow PSA serially.    -11/17/2021 [facility] Oncology clinic follow-up:  Mr. Lugo overall is doing well.  He is tolerating therapy with Zytiga, prednisone and Relugolix along with Zometa which is given every 6 weeks.  PSA remains low at <0.1.  Has occasional low phosphorus, currently low at 1.7.  Serum calcium is normal at 8.9, normal creatinine 0.79 and normal CBC other than for platelets of 124.  I will hold Zometa for 1 month, I did send in a prescription for phosphorus replacement today.  He takes calcium and vitamin D.  I will see him back in 1 month for follow-up.  We will repeat restaging scans after that visit.    -12/15/2021 [facility] Oncology clinic follow-up: Mr. Lugo continues to do well on therapy with Zytiga, prednisone and Relugolix, his PSA remains low at <0.1.  He is continuing to have left lower back pain, he is working with palliative care and they have referred him also to pain management.  Pain management has talked to him about putting in a pain pump however he is leery of this, I told him that  this was a safe and effective pain management technique and to certainly give consideration to their recommendations.  His phosphorus is improving however is still a little low, I will hold off on Zometa until we see him back in 1 month, we may end up only giving it every 12 weeks.  We will repeat restaging scans with CT chest, abdomen and pelvis along with total body bone scan prior to return and I have ordered those today.      -1/5/2022 CT chest abdomen pelvis with contrast [facility] showing stable left periaortic adenopathy and unchanged sclerotic thoracic, lumbar spine and pelvis lesions with no new or progressive disease in the chest abdomen or pelvis.  Total body bone scan shows no significant change and no new suspicious foci.  Stable posterior right acetabulum and left superior acetabulum and degenerative changes in the cervical spine, shoulders, acromioclavicular joints, sternoclavicular joints, elbows, wrists, hands, thoracic spine, lumbosacral spine, sacroiliac joints, hips, knees, ankles, feet.    -1/11/2022 [facility] oncology hematology follow-up visit: I reviewed images and reports from his 1/5/2022 scans.  No active disease and PSA immmeasurably low on Zytiga, prednisone, Relugolix.  However, he has struggled with hypophosphatemia and is significantly fatigued with this.  Given that the bones are doing well and given that his phosphorus continues to remain consistently low at 2.2 in mid December, 1.7 mid November and 2.5 mid-September and as low as 1.5 back to July and the likelihood that this is related to his Zometa, given that his bones are stable overall, he will increase from 1200 mg up to 1600 mg of calcium and phosphate a day and we will hold his Zometa for the foreseeable future.  He will see my nurse practitioner back in a month to continue to monitor his phosphorus along with his calcium and other labs and will repeat his scans again in 3 months.  In addition, given his  fatigue we will check his ACTH and cortisol though he is taking his prednisone with his Zytiga.  Though his electrolytes are normal, I will keep an eye on the cortisol and ACTH and have these labs not only drawn today but again just prior to return to my nurse practitioner on February 10.    -2/10/2022 [facility] clinic follow-up: Mr. Lugo overall is stable, no change in his health this past month.  I have reviewed his labs from 2/8/2022 and they are unremarkable, his phosphorus is now normal at 3.2. We are continuing to hold his Zometa, he last received Zometa on 10/5/2021.  He continues therapy with Zytiga, prednisone and Relugolix.  Dr. Rashid had ordered cortisol level and ACTH which are low, currently his ACTH is 2.8 and cortisol 3.08 however these are both done in the face of ongoing prednisone that he takes with his Zytiga.  We will get him to endocrinology for further evaluation for possible adrenal insufficiency but will not interrupt his treatment in the interim.  He will need restaging scans again in April for a 3-month follow-up.  He will continue to follow with palliative care and pain management for his cancer related pain.  His PSA remains immeasurably low at <0.1 on 2/8/22.    -2/15/2022 went to emergency room with weakness, decreased appetite, myalgias in the setting of known COVID-19 testing positive a few days prior to this visit.  Phosphorus had been low in the past but was normal in the emergency room with unremarkable CBC and CMP.  Chest x-ray unremarkable saturating well on room air mildly hypertensive with dry mucosa.  Given 500 cc crystalloid.  Covid test positive.  Mild thrombocytopenia 136,000 and otherwise unremarkable.  Discharged home.    -3/3/2022 data:  PSA less than 0.1  CMP unremarkable  Cortisol 3.96 normal  ACTH 2.8 lower limit of normal 7.2  Phosphorus normal 2.6    -3/8/2022 [facility] oncology follow-up: Suspect big chunk of his fatigue was Covid.  Another part of the  fatigue likely related to lack of testosterone.  Not hypophosphatemic off of Zometa.  ACTH running low while on prednisone not surprising but with normal cortisol and I doubt adrenal insufficient which is why he is on prednisone to avoid such while taking Zytiga.  Overall doing fairly well and with immeasurably low PSA, I will have labs done on him early April, see my nurse practitioner early May, and she will order repeat bone scan and CTs the end of May and I will see him back in June.  We will continue to hold the Zometa unless bone lesions progress given symptomatic prior hypophosphatemia on Zometa.  He will keep his appointment April 28 with Dr. Mir Duffy just to be sure that my take on his pituitary/adrenal axis assessment is accurate.    -4/28/2022 endocrinology consult Dr. Mir Duffy.  With low ACTH and cortisol on prednisone with Zytiga, the adequacy of prednisone cannot be assessed by measuring ACTH or cortisol but is assessed by weight changes, blood pressure, blood sugar, potassium, overall sense of how the patient is doing.  Primary adrenal insufficiency with low ACTH and low cortisol would be typical for the situation as his blood sugar tends to run a little high and potassium a little low, he did not think increasing prednisone was necessary.  He put him on some potassium.  No follow-up scheduled, will see as needed.    -5/4/2022 [facility] clinic follow-up: Mr. Lugo continues on therapy with Zytiga and prednisone along with Relugolix.  His Zometa has been on hold due to previous hypophosphatemia.  There is some concern about potential adrenal insufficiency. He saw endocrinology, Dr. Mir Duffy on 4/28/2022.  I did review his office note and he stated that the low ACTH and low serum cortisol would be typical for Mr. Lugo's situation.  He further stated that the adequacy of prednisone dose cannot be assessed by measuring ACTH or cortisol but is rather assessed by the overall just altered how the  patient is feeling-weight change, blood pressure, blood sugar, potassium, overall sense of wellbeing.  His potassium had been running a little low therefore they put him on potassium 10 mill equivalents daily. Of note, I do not see a future follow-up scheduled with endocrinology.  He is having night sweats, I am not sure if this is related.  His glucose was normal this morning at 107.  His weight is stable.  He will continue current treatment for his prostate cancer unchanged, we will continue to hold his Zometa.  Current phosphorus and magnesium are normal.  CBC and CMP from today are unremarkable, cortisol is normal.  PSA and ACTH are pending  In regards to his night sweats, he had taken clonidine previously 0.1 mg twice daily as needed, I did send in a short supply again to try and see if this helps.  He also is having indigestion despite being on omeprazole twice daily, I sent a prescription for Pepcid.  He had an EGD August 2021.  We will repeat restaging scans prior to return and I have ordered those today.     -5/24/2022 CT chest abdomen pelvis with [place] negative.  Bone scan shows stable bone metastases.    - 5/25/2022 PSA less than 0.1, platelets 130,000, otherwise unremarkable CBC and CMP.  A.m. cortisol running low 1.2 with phosphorus low 2.3.    -5/31/2022 [facility] follow-up: On Zytiga, prednisone, Relugolix, PSA remaining immeasurably low with stable bone scan and no visceral/paulino metastases.  Phosphorus still running low.  I have discontinued his potassium chloride and started potassium phosphate 500 mg 4 times a day.  Unless PSA rising, we will plan on repeating CTs and bone scan in 6 months and will follow with my nurse practitioner in the interim and if symptoms dictate or labs, will get back to Dr. Duffy for management of potential adrenal insufficiency but presently we will just see how he does with potassium phosphate and hopeful improvement in the phosphorus level  with time.  We will continue to follow with palliative care for pain management    -7/6/2022 [facility] Oncology clinic follow-up: Mr. Lugo overall is doing well but continues to be troubled with fatigue and hot flashes.  For the most part he states he is able to do the things he wants to do, he continues to work but does have to take more rest periods.  I had a long discussion with him and his wife after reviewing his medications with them as they wanted to see if there was anything he could stop or adjust.  I discussed with him the need to continue on treatment for his prostate cancer even though his PSA remains immeasurably low and his scans look good but that if his medication is stopped the cancer would progress and over time it still has the potential to progress on therapy but would continue it as long as possible to keep his disease under control.  I explained this is like treating someone with hypertension, you do not stop the antihypertensive just because the blood pressure normalizes.  They stated understanding.  There is no dose adjustment of Zytiga or prednisone that would minimize his symptoms, he is on the current standard recommended therapy.  Discussed with him that sometimes during times of stress we may need to increase his prednisone dose but for now would not change anything.  His phosphorus level is normal, we continue to hold his Zometa.  I did give him the option of holding his phosphorus for the next month and we will see what happens, if it drops we will start him back on it but may be at a lower dose rather than 4 times a day maybe twice a day.  For now he will continue therapy unchanged.  We will continue monitoring labs monthly.  No PSA was drawn this month but that is okay as his PSA has been running immeasurably low at <0.1, we will recheck next month with lab draw.  We will stop checking his ACTH and cortisol level every month, if he develops worsening symptoms I will repeat those.  He  has not gotten a follow-up with endocrinology as of yet.    -8/3/2022 [facility] Oncology clinic follow-up:  Mr. Lugo is doing well as far as his prostate cancer goes with continued immeasurably low PSA of <0.1.  He continues on therapy with Zytiga and prednisone along with Relugolix.  His phosphorus is stable at 2.7 which was just slightly low by our reference range however was 2.71-month ago also which was normal on another labs reference range.  He has not taken his phosphorus for this past month as he thought it was making him more fatigued.  He really can tell no difference whether he was taking it or not.  I have asked him to try to go back on phosphorus twice a day rather than 4 times a day and see if this is tolerated and if we can keep his phosphorus level from dropping.  His biggest complaint today is flareup of his arthralgias and back pain.  He is following with pain management, Dr. Danny Avalos but is requesting a referral back to palliative medicine as he feels that no one is currently listening to him and they are just prescribing the same medications that are not effective according to his report.  He does have a follow-up with Dr. Avalos on 8/12/2022 but due to his current pain is not able to work until after he is seen and hopefully can get some better relief.  I have given him a work release until 15 August.  I have also put in a referral back to palliative care.  Also encouraged him to work with Dr. Avalos for help in resolving his issue.  Apparently they have recommended some type of a pump however he has been hesitant to that but likely would be helpful.  We will continue therapy unchanged.  We will continue monthly labs including phosphorus, we are not currently checking ACTH and cortisol monthly as Dr. Mir Duffy previously stated in his assessment on 4/28/2022 that the adequacy of prednisone cannot be assessed by measuring ACTH or cortisol but would be assessed by symptoms, see note from  4/28/2022.  He made no further follow-up appointments.  Fatigue is not worsening currently.  His glucose and potassium are  Normal.    -10/13/2022 [facility] medical oncology follow-up: Mr. Lugo is doing well.  He is tolerating treatment with Zytiga and prednisone along with Relugolix without any unusual side effects.  His PSA has remained immeasurably low at <0.1.  His phosphorus was slightly low at last visit.  He had been instructed to go back on phosphorus twice a day but he misunderstood and has not been taking it.  We will check labs today.  I will follow-up with results and notify him if he needs to restart the phosphorus.  His pain is better controlled since reestablishing with [facility].  We will repeat scans prior to next follow-up.  I have ordered CT chest abdomen pelvis along with bone scan.  We will see him back in 1 month.    -10/13/2022 PSA less than 0.014.With unremarkable CBC and CMP.  Phosphorus still a little low 2.3.    -11/4/2022 CT abdomen pelvis chest showed no evidence of disease progression with stable sclerotic bone lesions.  Bone scan stable right greater than left acetabular metastasis.  Stable bone metastases.    -11/8/2022 [facility] medical oncology telemedicine follow-up: Patient states that he feels achy all over with low-grade temps and a cough mildly productive.  Has an appointment later today to see his primary care for testing for flu and COVID.  Suggested that if he were positive for COVID that he get Paxlovid and that if he were positive for flu that he get Tamiflu and to discuss this with his primary care.  From the prostate cancer side, his PSA is immeasurably low with stable bone metastases and no evidence of progression.  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  We will continue the Relugolix, Zytiga, prednisone and he will see my nurse practitioner 12/7/2022.  Would repeat CTs and bone scan in May.    Total time of  care today inclusive of time spent today prior to his visit reviewing interval notes from my nurse practitioner as well as interval labs and CTs and bone scan and during visit translating that information to him and interviewing him as to current symptoms of cough fevers and aches and management thereof as outlined and after visit instituting the plan as outlined took 30 minutes of patient care time throughout the day today.  Ishmael Rashid MD    11/08/2022

## 2022-11-09 ENCOUNTER — OFFICE VISIT (OUTPATIENT)
Dept: FAMILY MEDICINE CLINIC | Facility: CLINIC | Age: 67
End: 2022-11-09

## 2022-11-09 VITALS
DIASTOLIC BLOOD PRESSURE: 85 MMHG | HEIGHT: 69 IN | BODY MASS INDEX: 29.38 KG/M2 | WEIGHT: 198.4 LBS | OXYGEN SATURATION: 98 % | SYSTOLIC BLOOD PRESSURE: 160 MMHG | HEART RATE: 77 BPM

## 2022-11-09 DIAGNOSIS — J06.9 ACUTE URI: Primary | ICD-10-CM

## 2022-11-09 DIAGNOSIS — E87.6 HYPOKALEMIA: ICD-10-CM

## 2022-11-09 PROBLEM — C61 MALIGNANT TUMOR OF PROSTATE: Status: ACTIVE | Noted: 2020-09-23

## 2022-11-09 LAB
EXPIRATION DATE: ABNORMAL
FLUAV AG UPPER RESP QL IA.RAPID: NOT DETECTED
FLUBV AG UPPER RESP QL IA.RAPID: NOT DETECTED
INTERNAL CONTROL: ABNORMAL
Lab: ABNORMAL
SARS-COV-2 AG UPPER RESP QL IA.RAPID: NOT DETECTED

## 2022-11-09 PROCEDURE — 87428 SARSCOV & INF VIR A&B AG IA: CPT | Performed by: PHYSICIAN ASSISTANT

## 2022-11-09 PROCEDURE — 36415 COLL VENOUS BLD VENIPUNCTURE: CPT | Performed by: PHYSICIAN ASSISTANT

## 2022-11-09 PROCEDURE — 99213 OFFICE O/P EST LOW 20 MIN: CPT | Performed by: PHYSICIAN ASSISTANT

## 2022-11-09 RX ORDER — DEXTROMETHORPHAN HYDROBROMIDE AND PROMETHAZINE HYDROCHLORIDE 15; 6.25 MG/5ML; MG/5ML
5 SOLUTION ORAL 4 TIMES DAILY PRN
Qty: 150 ML | Refills: 0 | Status: SHIPPED | OUTPATIENT
Start: 2022-11-09

## 2022-11-09 RX ORDER — ONDANSETRON 4 MG/1
4 TABLET, FILM COATED ORAL EVERY 8 HOURS PRN
Qty: 12 TABLET | Refills: 0 | Status: SHIPPED | OUTPATIENT
Start: 2022-11-09

## 2022-11-09 RX ORDER — AMOXICILLIN AND CLAVULANATE POTASSIUM 875; 125 MG/1; MG/1
1 TABLET, FILM COATED ORAL 2 TIMES DAILY
Qty: 20 TABLET | Refills: 0 | Status: SHIPPED | OUTPATIENT
Start: 2022-11-09 | End: 2022-11-19

## 2022-11-09 NOTE — PROGRESS NOTES
"Chief Complaint  Cough and URI    Subjective          Naveentiffanie Lugo presents to [facility]  History of Present Illness patient is here for a productive cough and congestion that is progressively worsening.  He is concerned because he is a prostate cancer patient and his oncologist Dr. Rashid told him he needed to be seen for this.  Dr. Rashid also mentioned that he would like for us to repeat a BMP to check his potassium which was recently low.    Objective   Vital Signs:   /85   Pulse 77   Ht 175.3 cm (69\")   Wt 90 kg (198 lb 6.4 oz)   SpO2 98%   BMI 29.30 kg/m²     Body mass index is 29.3 kg/m².    Review of Systems   Constitutional: Negative for fatigue.   HENT: Positive for congestion, postnasal drip and sinus pressure. Negative for sore throat.    Eyes: Negative for blurred vision.   Respiratory: Positive for cough, chest tightness and wheezing. Negative for shortness of breath.    Cardiovascular: Negative for chest pain, palpitations and leg swelling.   Gastrointestinal: Negative for abdominal pain, constipation, diarrhea, nausea and vomiting.   Neurological: Negative for dizziness, tremors and headache.   Psychiatric/Behavioral: Negative for depressed mood. The patient is not nervous/anxious.        Past History:  Medical History: has a past medical history of Bone cancer (HCC), History of radiation therapy (08/16/2021), Nephrolithiasis, Opioid use disorder, mild, on maintenance therapy (HCC), and Prostate cancer (HCC).   Surgical History: has a past surgical history that includes Cholecystectomy; Coronary stent placement (206 & 2011); Thoracic discectomy (1994); and Hernia repair (1986).   Family History: family history includes Diabetes in his brother; Heart disease in his brother; Ovarian cancer in his sister; Prostate cancer in his brother.   Social History: reports that he has been smoking cigarettes. He has a 50.00 pack-year smoking history. He has never used " smokeless tobacco. He reports that he does not currently use alcohol. He reports that he does not use drugs.      Current Outpatient Medications:   •  abiraterone acetate (ZYTIGA) 500 MG chemo tablet, Take 2 tablets by mouth Daily., Disp: 60 tablet, Rfl: 5  •  aspirin (aspirin) 81 MG EC tablet, Take 1 tablet by mouth Daily., Disp: , Rfl:   •  atorvastatin (LIPITOR) 20 MG tablet, Take 1 tablet by mouth Every Night., Disp: 90 tablet, Rfl: 3  •  calcium carbonate (OS-RONNY) 600 MG tablet, Take 1,200 mg by mouth Daily. Patient to take 1200 mg daily for hypocalcemia., Disp: , Rfl:   •  dexamethasone (DECADRON) 4 MG tablet, Take 2 tablets oral twice a day for 3 consecutive days beginning the day before chemotherapy and continue for 6 doses., Disp: 12 tablet, Rfl: 5  •  famotidine (PEPCID) 20 MG tablet, TAKE ONE TABLET BY MOUTH EVERY DAY AS NEEDED FOR INDIGESTION OR HEARTBURN, Disp: 30 tablet, Rfl: 3  •  HYDROmorphone (DILAUDID) 8 MG tablet, Take 1 tablet by mouth Every 4 (Four) Hours As Needed for Moderate Pain for up to 30 days., Disp: 180 tablet, Rfl: 0  •  naloxone (NARCAN) 4 MG/0.1ML nasal spray, 1 spray into the nostril(s) as directed by provider As Needed (unresponsiveness)., Disp: , Rfl:   •  nitroglycerin (NITROSTAT) 0.4 MG SL tablet, 1 under the tongue as needed for angina, may repeat q5mins for up three doses, Disp: 25 tablet, Rfl: 11  •  omeprazole (priLOSEC) 40 MG capsule, , Disp: , Rfl:   •  oxyCODONE ER (Xtampza ER) 9 MG capsule extended-release 12 hour , Take 1 capsule by mouth Every 12 (Twelve) Hours for 30 days., Disp: 60 each, Rfl: 0  •  potassium phosphate, monobasic, (K-Phos) 500 MG tablet, Take 1 tablet by mouth 4 times a day., Disp: 120 tablet, Rfl: 11  •  predniSONE (DELTASONE) 5 MG tablet, Take 1 tablet by mouth Daily., Disp: 30 tablet, Rfl: 11  •  ranolazine (Ranexa) 500 MG 12 hr tablet, Take 1 tablet by mouth 2 (Two) Times a Day., Disp: 180 tablet, Rfl: 3  •  relugolix (ORGOVYX) 120 MG tablet  tablet, Take 1 tablet by mouth Daily., Disp: 30 tablet, Rfl: 5  •  Sennosides (Senna) 8.6 MG capsule, Take 8.6 mg by mouth 3 (Three) Times a Day., Disp: 84 each, Rfl: 2  •  Trelegy Ellipta 200-62.5-25 MCG/INH inhaler, , Disp: , Rfl:   •  amoxicillin-clavulanate (Augmentin) 875-125 MG per tablet, Take 1 tablet by mouth 2 (Two) Times a Day for 10 days., Disp: 20 tablet, Rfl: 0  •  cloNIDine (CATAPRES) 0.1 MG tablet, Take 1 tablet by mouth 2 (Two) Times a Day As Needed (night sweats) for up to 10 days., Disp: 20 tablet, Rfl: 0  •  ondansetron (Zofran) 4 MG tablet, Take 1 tablet by mouth Every 8 (Eight) Hours As Needed for Nausea or Vomiting., Disp: 12 tablet, Rfl: 0  •  promethazine-dextromethorphan (PROMETHAZINE-DM) 6.25-15 MG/5ML solution, Take 5 mL by mouth 4 (Four) Times a Day As Needed for Cough., Disp: 150 mL, Rfl: 0  No current facility-administered medications for this visit.    Facility-Administered Medications Ordered in Other Visits:   •  heparin injection 500 Units, 500 Units, Intravenous, PRN, Ishmael Rashid MD, 500 Units at 06/02/21 0900    Allergies: Cymbalta [duloxetine hcl], Gabapentin, and Lyrica [pregabalin]    Physical Exam  Vitals reviewed.   Constitutional:       Appearance: Normal appearance.   HENT:      Right Ear: Tympanic membrane normal.      Left Ear: Tympanic membrane normal.      Nose: Congestion present.   Cardiovascular:      Rate and Rhythm: Normal rate and regular rhythm.      Heart sounds: Normal heart sounds.   Pulmonary:      Effort: Pulmonary effort is normal. No respiratory distress.      Breath sounds: Wheezing and rhonchi present. No rales.   Musculoskeletal:      Cervical back: Neck supple.   Lymphadenopathy:      Cervical: No cervical adenopathy.   Neurological:      Mental Status: He is alert and oriented to person, place, and time.   Psychiatric:         Mood and Affect: Mood normal.          Result Review :                   Assessment and Plan    Diagnoses and all orders  for this visit:    1. Acute URI (Primary)  Assessment & Plan:  Patient's COVID and flu screens were negative and I suspect this is an acute bronchitis and I am going to treat him with Augmentin, Promethazine DM and I did give him some Zofran because he was complaining of an upset stomach from all the drainage.  He is already taking steroids for his prostate cancer.    Orders:  -     POCT SARS-CoV-2 Antigen JON + Flu    2. Hypokalemia  Assessment & Plan:  We will get the BMP to check his potassium level as per Dr. Rashid request.    Orders:  -     Basic Metabolic Panel; Future  -     Basic Metabolic Panel    Other orders  -     amoxicillin-clavulanate (Augmentin) 875-125 MG per tablet; Take 1 tablet by mouth 2 (Two) Times a Day for 10 days.  Dispense: 20 tablet; Refill: 0  -     promethazine-dextromethorphan (PROMETHAZINE-DM) 6.25-15 MG/5ML solution; Take 5 mL by mouth 4 (Four) Times a Day As Needed for Cough.  Dispense: 150 mL; Refill: 0  -     ondansetron (Zofran) 4 MG tablet; Take 1 tablet by mouth Every 8 (Eight) Hours As Needed for Nausea or Vomiting.  Dispense: 12 tablet; Refill: 0      Follow Up   Return in about 3 months (around 2/9/2023) for Annual physical.  Patient was given instructions and counseling regarding his condition or for health maintenance advice. Please see specific information pulled into the AVS if appropriate.     Cielo Dodd PA-C

## 2022-11-10 PROBLEM — E87.6 HYPOKALEMIA: Status: ACTIVE | Noted: 2022-11-10

## 2022-11-10 LAB
BUN SERPL-MCNC: 5 MG/DL (ref 8–27)
BUN/CREAT SERPL: 6 (ref 10–24)
CALCIUM SERPL-MCNC: 9.4 MG/DL (ref 8.6–10.2)
CHLORIDE SERPL-SCNC: 102 MMOL/L (ref 96–106)
CO2 SERPL-SCNC: 23 MMOL/L (ref 20–29)
CREAT SERPL-MCNC: 0.77 MG/DL (ref 0.76–1.27)
EGFRCR SERPLBLD CKD-EPI 2021: 98 ML/MIN/1.73
GLUCOSE SERPL-MCNC: 105 MG/DL (ref 70–99)
POTASSIUM SERPL-SCNC: 4 MMOL/L (ref 3.5–5.2)
SODIUM SERPL-SCNC: 141 MMOL/L (ref 134–144)

## 2022-11-10 NOTE — ASSESSMENT & PLAN NOTE
Patient's COVID and flu screens were negative and I suspect this is an acute bronchitis and I am going to treat him with Augmentin, Promethazine DM and I did give him some Zofran because he was complaining of an upset stomach from all the drainage.  He is already taking steroids for his prostate cancer.

## 2022-11-11 ENCOUNTER — PATIENT ROUNDING (BHMG ONLY) (OUTPATIENT)
Dept: FAMILY MEDICINE CLINIC | Facility: CLINIC | Age: 67
End: 2022-11-11

## 2022-11-11 NOTE — PROGRESS NOTES
November 11, 2022    Hello, may I speak with Naveen Lugo?    My name is Winifred    I am  with [facility]  [address] [place] [address] [address] [place] [address] [phone].    Before we get started may I verify your date of birth? 1955    I am calling to officially welcome you to our practice and ask about your recent visit. Is this a good time to talk? yes    Tell me about your visit with us. What things went well?    Visit was great and she will be his family dr from now on      We're always looking for ways to make our patients' experiences even better. Do you have recommendations on ways we may improve?      Everything was fine     Overall were you satisfied with your first visit to our practice?     Satisfied with his visit        I appreciate you taking the time to speak with me today. Is there anything else I can do for you?     Nothing doing well     Thank you, and have a great day.

## 2022-11-17 ENCOUNTER — SPECIALTY PHARMACY (OUTPATIENT)
Dept: ONCOLOGY | Facility: HOSPITAL | Age: 67
End: 2022-11-17

## 2022-11-17 NOTE — PROGRESS NOTES
Specialty Pharmacy Patient Management Program  Oncology 6-Month Clinical Assessment       Naveen Lugo is a 67 y.o. male with prostate cancer seen today to assess adherence and side effects.    Regimen: abiraterone 1000mg PO daily + relugolix 120mg PO daily + prednisone 5mg PO daily    Reason for Outreach: Routine medication check-in .       Problem list reviewed by Maria Esther Le, PharmD on 11/17/2022 at 11:44 AM  Medicines reviewed by Maria Esther Le PharmD on 11/17/2022 at 11:43 AM  Allergies reviewed by Maria Esther Le PharmD on 11/17/2022 at 11:43 AM     Goals     •  Keep Pain Under Control     •  Specialty Pharmacy General Goal (pt-stated)       Patient-identified goal of therapy: Patient will adhere to medication regimen by %  Clinical goal/therapeutic target: Patient will adhere to medication regimen by %              Medication Assessment:  • Medication Assessment  o Follow Up Clinical Assessment  o Medication(s) assessed: abiraterone, relugolix, prednisone  o Therapeutic appropriateness: Appropriate  o Medication tolerability: Tolerating with no to minimal ADRs  o Medication plan: Continue therapy with normal follow-up  o Quality of Life Improvement Scale: No change (Patient states he is currently a 5 due to getting over bronchitis and a sinus infection.)  o Administration: as prescribed .   o Patient can self administer medications: yes  o Medication Follow-Up Plan: routine follow-up  • Drug-drug interactions  o Completed medication reconciliation today to assess for drug interactions. Patient denies starting or stopping any medications.  Patient was started on an antibiotic which will end in 2 days.  o Assessed medication list for interactions, no significant drug interactions noted.   o Advised patient to call the clinic if any new medications are started so we can assess for drug-drug interactions.  • Discussed drug-food interactions, as relevant  • Vaccines are coordinated by the patient's  oncologist and primary care provider.    Adherence Assessment:  Adherence Questions  Medication(s) assessed: abiraterone, relugolix, prednisone  On average, how many doses/injections does the patient miss per month?: 0  What are the identified reasons for non-adherence or missed doses? : no problems identfied  What is the estimated medication adherence level?: % (PDC = 96%)  Based on the patient/caregiver response and refill history, does this patient require an MTP to track adherence improvements?: no    Quality of Life Assessment:  Quality of Life Assessment  Quality of Life Improvement Scale: No change (Patient states he is currently a 5 due to getting over bronchitis and a sinus infection.)  -- Quality of Life: 5/10    Financial Assessment:  Medication availability/affordability: Patient has had no issues obtaining medication from pharmacy.      All questions addressed and patient had no additional concerns. Patient has pharmacy contact information.    Maria Esther Le, PharmD, [facility]  Oncology Clinical Pharmacist   [phone]

## 2022-11-28 ENCOUNTER — TELEPHONE (OUTPATIENT)
Dept: GASTROENTEROLOGY | Facility: CLINIC | Age: 67
End: 2022-11-28

## 2022-11-28 ENCOUNTER — SPECIALTY PHARMACY (OUTPATIENT)
Dept: ONCOLOGY | Facility: HOSPITAL | Age: 67
End: 2022-11-28

## 2022-11-28 DIAGNOSIS — K59.03 DRUG-INDUCED CONSTIPATION: ICD-10-CM

## 2022-11-28 DIAGNOSIS — G89.3 PAIN, CANCER: ICD-10-CM

## 2022-11-28 RX ORDER — SENNOSIDES 8.6 MG
8.6 CAPSULE ORAL 3 TIMES DAILY
Qty: 84 EACH | Refills: 2 | Status: SHIPPED | OUTPATIENT
Start: 2022-11-28

## 2022-11-28 RX ORDER — HYDROMORPHONE HYDROCHLORIDE 8 MG/1
8 TABLET ORAL EVERY 4 HOURS PRN
Qty: 180 TABLET | Refills: 0 | Status: SHIPPED | OUTPATIENT
Start: 2022-12-02 | End: 2023-01-01

## 2022-11-28 RX ORDER — HYDROMORPHONE HYDROCHLORIDE 8 MG/1
8 TABLET ORAL EVERY 4 HOURS PRN
Qty: 180 TABLET | Refills: 0 | Status: CANCELLED | OUTPATIENT
Start: 2022-12-02 | End: 2023-01-01

## 2022-11-28 RX ORDER — OXYCODONE 9 MG/1
9 CAPSULE, EXTENDED RELEASE ORAL EVERY 12 HOURS
Qty: 60 EACH | Refills: 0 | Status: CANCELLED | OUTPATIENT
Start: 2022-12-02 | End: 2023-01-01

## 2022-11-28 RX ORDER — OXYCODONE 9 MG/1
9 CAPSULE, EXTENDED RELEASE ORAL EVERY 12 HOURS
Qty: 60 EACH | Refills: 0 | Status: SHIPPED | OUTPATIENT
Start: 2022-12-02 | End: 2023-01-01

## 2022-11-28 NOTE — TELEPHONE ENCOUNTER
Caller: Naveen Lugo    Relationship: Self    Best call back number: [phone]    Requested Prescriptions:   Requested Prescriptions      No prescriptions requested or ordered in this encounter    HYDROmorphone (DILAUDID) 8 MG tablet    XTAMPZAER 9MG CAPSULE    Sennosides (Senna) 8.6 MG capsule      Pharmacy where request should be sent:    [place] [facility]      Does the patient have less than a 3 day supply:  [x] Yes  [] No    María Mcarthur Rep   11/28/22 08:06 EST

## 2022-11-28 NOTE — TELEPHONE ENCOUNTER
3 medication request    [facility] #: [id number]    Medication requested: Dilaudid 8 mg, Xtampza ER 9 mg, Senna 8.6 mg    Last fill date: 11/02/2022 for Dilaudid and Xtampza    Last appointment: 10/05/2022    Next appointment: 01/05/2023

## 2022-11-28 NOTE — PROGRESS NOTES
Specialty Pharmacy Refill Coordination Note     Naveen is a 67 y.o. male contacted today regarding refills of abiraterone 1000mg PO QD, relugolix 120mg PO QD and prednisone 5mg PO QD specialty medication(s).    Specialty medication(s) and dose(s) confirmed: yes    Refill Questions    Flowsheet Row Most Recent Value   Changes to allergies? No   Changes to medications? No   New conditions since last clinic visit No   Unplanned office visit, urgent care, ED, or hospital admission in the last 4 weeks  No   How does patient/caregiver feel medication is working? Good   Financial problems or insurance changes  No   Since the previous refill, were any specialty medication doses or scheduled injections missed or delayed?  No   Does this patient require a clinical escalation to a pharmacist? No          Delivery Questions    Flowsheet Row Most Recent Value   Delivery method FedEx   Delivery address correct? Yes   Delivery phone number [phone]   Preferred delivery time? Anytime   Number of medications in delivery 3   Medication being filled and delivered abiraterone, relugolix and prednisone   Doses left of specialty medications 10 days of each left   Is there any medication that is due not being filled? No   Supplies needed? No supplies needed   Cooler needed? No   Do any medications need mixed or dated? No   Additional comments no copay   Questions or concerns for the pharmacist? No   Explain any questions or concerns for the pharmacist n/a   Are any medications first time fills? No            Medication Adherence    Adherence tools used: watch   Other adherence tool: Clock - takes at same time each day, 7:45am   Support network for adherence: family member          Follow-up: 28 day(s)     Sushma Muro, Pharmacy Technician  Specialty Pharmacy Technician

## 2022-12-10 NOTE — TELEPHONE ENCOUNTER
Discussed low potassium with Dr. Rashid, he would like patient to have his PCP recheck his potassium level today while he is there and manage as appropriate. Called patients wife and relayed message and she verbalized understanding.   
Attending with

## 2022-12-19 ENCOUNTER — OFFICE VISIT (OUTPATIENT)
Dept: CARDIOLOGY | Facility: CLINIC | Age: 67
End: 2022-12-19

## 2022-12-19 VITALS
HEIGHT: 69 IN | DIASTOLIC BLOOD PRESSURE: 82 MMHG | BODY MASS INDEX: 28.68 KG/M2 | HEART RATE: 87 BPM | OXYGEN SATURATION: 98 % | SYSTOLIC BLOOD PRESSURE: 132 MMHG | WEIGHT: 193.6 LBS

## 2022-12-19 DIAGNOSIS — I10 ESSENTIAL HYPERTENSION: ICD-10-CM

## 2022-12-19 DIAGNOSIS — I25.118 CORONARY ARTERY DISEASE OF NATIVE ARTERY OF NATIVE HEART WITH STABLE ANGINA PECTORIS: Primary | ICD-10-CM

## 2022-12-19 DIAGNOSIS — E78.5 HYPERLIPIDEMIA LDL GOAL <70: ICD-10-CM

## 2022-12-19 PROCEDURE — 99214 OFFICE O/P EST MOD 30 MIN: CPT | Performed by: INTERNAL MEDICINE

## 2022-12-22 ENCOUNTER — SPECIALTY PHARMACY (OUTPATIENT)
Dept: ONCOLOGY | Facility: HOSPITAL | Age: 67
End: 2022-12-22

## 2022-12-22 DIAGNOSIS — C79.51 PROSTATE CANCER METASTATIC TO BONE: ICD-10-CM

## 2022-12-22 DIAGNOSIS — C61 PROSTATE CANCER METASTATIC TO BONE: ICD-10-CM

## 2022-12-22 RX ORDER — PREDNISONE 1 MG/1
5 TABLET ORAL DAILY
Qty: 30 TABLET | Refills: 11 | Status: SHIPPED | OUTPATIENT
Start: 2022-12-22

## 2022-12-22 RX ORDER — ABIRATERONE 500 MG/1
1000 TABLET ORAL DAILY
Qty: 60 TABLET | Refills: 11 | Status: SHIPPED | OUTPATIENT
Start: 2022-12-22

## 2022-12-23 ENCOUNTER — SPECIALTY PHARMACY (OUTPATIENT)
Dept: ONCOLOGY | Facility: HOSPITAL | Age: 67
End: 2022-12-23

## 2022-12-23 NOTE — PROGRESS NOTES
Specialty Pharmacy Refill Coordination Note     Naveen is a 67 y.o. male contacted today regarding refills of  abiraterone 1000mg PO QD, relugolix 120mg PO QD and prednisone 5mg PO QD  specialty medication(s).    Processed refills to be mailed out on 12/27/22. Fedex shipping is delayed because of the holiday.    Specialty medication(s) and dose(s) confirmed: yes    Refill Questions    Flowsheet Row Most Recent Value   Changes to allergies? No   Changes to medications? No   New conditions since last clinic visit No   Unplanned office visit, urgent care, ED, or hospital admission in the last 4 weeks  No   How does patient/caregiver feel medication is working? Very good   Financial problems or insurance changes  No   Since the previous refill, were any specialty medication doses or scheduled injections missed or delayed?  No   Does this patient require a clinical escalation to a pharmacist? No          Delivery Questions    Flowsheet Row Most Recent Value   Delivery method FedEx   Delivery address correct? Yes   Delivery phone number [phone]   Preferred delivery time? Anytime   Number of medications in delivery 3   Medication being filled and delivered abiraterone, relugolix and  prednisone   Doses left of specialty medications 10 days left of all   Is there any medication that is due not being filled? No   Supplies needed? No supplies needed   Cooler needed? No   Do any medications need mixed or dated? No   Additional comments no copay   Questions or concerns for the pharmacist? No   Explain any questions or concerns for the pharmacist n/a   Are any medications first time fills? No            Medication Adherence    Adherence tools used: watch   Other adherence tool: Clock - takes at same time each day, 7:45am   Support network for adherence: family member          Follow-up: 28 day(s)     Sushma Muro, Pharmacy Technician  Specialty Pharmacy Technician

## 2023-01-03 ENCOUNTER — LAB (OUTPATIENT)
Dept: LAB | Facility: HOSPITAL | Age: 68
End: 2023-01-03
Payer: MEDICARE

## 2023-01-03 ENCOUNTER — OFFICE VISIT (OUTPATIENT)
Dept: PALLIATIVE CARE | Facility: CLINIC | Age: 68
End: 2023-01-03
Payer: MEDICARE

## 2023-01-03 VITALS
DIASTOLIC BLOOD PRESSURE: 85 MMHG | OXYGEN SATURATION: 98 % | HEART RATE: 91 BPM | BODY MASS INDEX: 28.55 KG/M2 | TEMPERATURE: 97.7 F | WEIGHT: 193.3 LBS | SYSTOLIC BLOOD PRESSURE: 166 MMHG

## 2023-01-03 DIAGNOSIS — G89.3 CANCER RELATED PAIN: ICD-10-CM

## 2023-01-03 DIAGNOSIS — C61 PROSTATE CANCER METASTATIC TO BONE: ICD-10-CM

## 2023-01-03 DIAGNOSIS — C79.51 PROSTATE CANCER METASTATIC TO BONE: ICD-10-CM

## 2023-01-03 DIAGNOSIS — G89.3 PAIN, CANCER: Primary | ICD-10-CM

## 2023-01-03 DIAGNOSIS — Z51.81 THERAPEUTIC DRUG MONITORING: Primary | ICD-10-CM

## 2023-01-03 PROCEDURE — 1125F AMNT PAIN NOTED PAIN PRSNT: CPT | Performed by: PHYSICIAN ASSISTANT

## 2023-01-03 PROCEDURE — 80306 DRUG TEST PRSMV INSTRMNT: CPT | Performed by: PHYSICIAN ASSISTANT

## 2023-01-03 PROCEDURE — 1160F RVW MEDS BY RX/DR IN RCRD: CPT | Performed by: PHYSICIAN ASSISTANT

## 2023-01-03 PROCEDURE — 1159F MED LIST DOCD IN RCRD: CPT | Performed by: PHYSICIAN ASSISTANT

## 2023-01-03 PROCEDURE — 99214 OFFICE O/P EST MOD 30 MIN: CPT | Performed by: PHYSICIAN ASSISTANT

## 2023-01-03 RX ORDER — OXYCODONE 9 MG/1
9 CAPSULE, EXTENDED RELEASE ORAL EVERY 12 HOURS
Qty: 60 EACH | Refills: 0 | Status: SHIPPED | OUTPATIENT
Start: 2023-01-03 | End: 2023-01-26 | Stop reason: SDUPTHER

## 2023-01-03 RX ORDER — HYDROMORPHONE HYDROCHLORIDE 8 MG/1
8 TABLET ORAL EVERY 4 HOURS PRN
Qty: 180 TABLET | Refills: 0 | Status: SHIPPED | OUTPATIENT
Start: 2023-01-03 | End: 2023-01-26 | Stop reason: SDUPTHER

## 2023-01-03 NOTE — PATIENT INSTRUCTIONS
Check-out instructions:  Continue current regimen.   Scheduled to follow up in 3 months.     Medication Policy: We ask that you bring all of the medications prescribed by this clinic to every appointment. For telemedicine appointments, be prepared to give medication counts. This will assist us with managing your refill needs.      Refill Policy: You must notify us at least 3-5 business days in advance for routine refill requests. Call [phone] or send Image[employer] message to the Palliative Pool. Some prescriptions will need to be signed by the physician and will take longer to be sent to the pharmacy. Please also be aware of additional insurance prior authorization processing time required for many medications. Try to communicate with your pharmacy first to look for scripts signed in advance.     Communication: [facility] days are Monday-Friday 8:30-4:30 PM. Call [phone] or send Image[employer] message to the Palliative Pool. You will not be routed to speak directly to the palliative provider during clinic hours, so that we may provide the best care and attention to our patients in the office. If you require immediate communication, please also consider contacting your primary care office or appropriate specialist office.

## 2023-01-03 NOTE — PROGRESS NOTES
Palliative Clinic Note      Name: Naveen Lugo  Age: 67 y.o.  Sex: male  : 1955  MRN: [id number]  Date of Service: 2023   Medical Oncologist: Rachid    Subjective:    Chief Complaint: Follow-up for symptom management    History of Present Illness: Naveen Lugo is a 67 y.o. male with past medical history significant for coronary artery disease, hypertension, hyperlipidemia, opioid use disorder, and metastatic prostate cancer who presents to the palliative clinic today as a follow up for pain and symptom management.     Treatment summary: He was diagnosed with prostate adenocarcinoma metastasized to the bone in 2020. Patient completed radiation with Dr. Quintanilla in 2021. He is currently on Zytiga, prednisone, and Relugolix. Holding Zometa due to low phosphorous. PSA remains low and no active disease on imaging.      Pain: History of opioid use disorder. Patient completed Suboxone taper in the past and was on no pain medication prior to cancer diagnosis. History complicated by OUD and several failed therapies including methadone, MS contin and fentanyl. Patient was referred to pain specialist, Dr. BARB Avalos in 3/2022. Patient transferred care back to the palliative clinic for uncontrolled hip and back pain in 2022. Patient reports adeaquate pain control with current regimen of oxycodone ER 9 mg BID and hydromorphone 8 mg every 4 hours. Patient is able to work and complete all ADLs/IADLs. Unfortunately, the patient ran out of his pain medication on 23 and was unable to refill his prescriptions due to Holiday closures. Today, he reports increased pain secondary to this.      Symptoms: The patient has no other complaints today . He endorses a good appetite and denies nausea or vomiting. He reports regular bowel movements on Senna. No issues with sleep. Overall, patient states he has been doing very well.      Pyschosocial: Endorses strong family support. He enjoyed the Holidays with his family. No  personal history of mental illness. He denies anxiety or depression. Patient enjoys spending times outdoors.     Goals: Focused on improving pain so he can continue to work.    The following portions of the patient's history were reviewed and updated as appropriate: allergies, current medications, past family history, past medical history, past social history, past surgical history and problem list.    ORT-R: High risk   Aberrant behavior: abnormal UDS 9/7/22  Decisional capacity: Full  ECOG: (1) Restricted in physically strenuous activity, ambulatory and able to do work of light nature   Palliative Performance Scale Score: 80%     Objective:    /85   Pulse 91   Temp 97.7 °F (36.5 °C) (Temporal)   Wt 87.7 kg (193 lb 4.8 oz)   SpO2 98%   BMI 28.55 kg/m²     Constitutional: Awake, alert, analgesic gait, sitting up in exam chair, in no acute distress  Eyes: PERRLA, EOMS intact  HENT: NCAT, face symmetric  Neck: Supple, trachea midline  Respiratory: Nonlabored respirations  Cardiovascular: RRR, no edema observed  Gastrointestinal: Soft, no guarding  Musculoskeletal: Moves all extremities   Psychiatric: Appropriate affect, cooperative  Neurologic: Oriented x 3, Cranial Nerves grossly intact to confrontation, speech clear  Skin: Cool dry, no rashes or wounds appreciated     Medication Counts: Reviewed. See bottom of note for details. Did not bring medication to appointment  I have reviewed the patient's [place] PDMP. AGUSTIN Req #[id number].   UDS(3M): Last 10/5/22. Results were appropriate.     Assessment & Plan:    1. Prostate cancer metastatic to bone (HCC)  - He is currently on Zytiga, prednisone, and Relugolix. Holding Zometa due to low phosphorous. PSA remains low and no active disease on imaging.     2. Cancer related pain  - Patient is appropriate for opioid therapy due to cancer related pain. Daily function and quality of life improved with current regimen.  Continue oxycodone ER 9 mg twice daily and  hydromorphone 8 mg every 4 hours as needed.  Refills sent to the pharmacy.  Side effects of the medication discussed at every visit. Patient was encouraged to continue bowel regimen of daily stool softeners, prn laxatives, and diet modifications.    3. Therapeutic drug monitoring  - Repeat UDS every 3 months due to ORT-R high risk and h/o aberrant behavior.     Code status: Full code  Medical interventions: Full    Return in about 3 months (around 4/3/2023) for Office Visit.    I spent 30 minutes caring for Naveen Lugo on this date of service. This time includes time spent by me in the following activities: preparing for the visit, reviewing tests, obtaining and/or reviewing a separately obtained history, performing a medically appropriate examination and/or evaluation , counseling and educating the patient/family/caregiver, ordering medications, tests, or procedures, documenting information in the medical record, independently interpreting results and communicating that information with the patient/family/caregiver, and care coordination    Anna Ayers PA-C  01/03/2023    Medication Date Filled # Filled Count Used # Days  ROSE MARIE   Xtampza ER 9 12/2/22 60 0   2   Hydromorphone 8 12/2/22 180 0   6

## 2023-01-03 NOTE — TELEPHONE ENCOUNTER
I have reviewed patient's AGUSTIN report prior to prescribing Schedule II, III, and IV medications. Request # [id number]. Next refills for oxycodone ER 9 mg BID and hydromorphone 8 mg  q4h PRN were sent to the pharmacy. The patient is scheduled to follow-up in 3 months.

## 2023-01-17 ENCOUNTER — SPECIALTY PHARMACY (OUTPATIENT)
Dept: ONCOLOGY | Facility: HOSPITAL | Age: 68
End: 2023-01-17
Payer: MEDICARE

## 2023-01-17 NOTE — PROGRESS NOTES
Specialty Pharmacy Refill Coordination Note     Naveen is a 67 y.o. male contacted today regarding refills of  abiraterone 1000mg PO QD, relugolix 120mg PO QD and prednisone 5mg PO QD specialty medication(s).      Specialty medication(s) and dose(s) confirmed: yes    Refill Questions    Flowsheet Row Most Recent Value   Changes to allergies? No   Changes to medications? No   New conditions since last clinic visit No   Unplanned office visit, urgent care, ED, or hospital admission in the last 4 weeks  No   How does patient/caregiver feel medication is working? Very good   Financial problems or insurance changes  No   Since the previous refill, were any specialty medication doses or scheduled injections missed or delayed?  No   Does this patient require a clinical escalation to a pharmacist? No          Delivery Questions    Flowsheet Row Most Recent Value   Delivery method FedEx   Delivery address correct? Yes   Delivery phone number [phone]   Preferred delivery time? Anytime   Number of medications in delivery 3   Medication being filled and delivered abiraterone, relugolix and prednisone   Doses left of specialty medications 10 days left   Is there any medication that is due not being filled? No   Supplies needed? No supplies needed   Cooler needed? No   Do any medications need mixed or dated? No   Additional comments no copay   Questions or concerns for the pharmacist? No   Explain any questions or concerns for the pharmacist n/a   Are any medications first time fills? No            Medication Adherence    Adherence tools used: watch   Other adherence tool: Clock - takes at same time each day, 7:45am   Support network for adherence: family member          Follow-up: 28 day(s)     Sushma Muro, Pharmacy Technician  Specialty Pharmacy Technician

## 2023-01-19 ENCOUNTER — TELEPHONE (OUTPATIENT)
Dept: ONCOLOGY | Facility: CLINIC | Age: 68
End: 2023-01-19
Payer: MEDICARE

## 2023-01-19 NOTE — TELEPHONE ENCOUNTER
Caller: Naveen Lugo    Relationship: Self    Best call back number: [phone]    What is the best time to reach you: ANYTIME     Who are you requesting to speak with (clinical staff, provider,  specific staff member): SCHEDULING     What was the call regarding: PT NEEDS TO SCHEDULE A F/U WITH DR MILLER     Do you require a callback: YES

## 2023-01-24 ENCOUNTER — LAB (OUTPATIENT)
Dept: FAMILY MEDICINE CLINIC | Facility: CLINIC | Age: 68
End: 2023-01-24
Payer: MEDICARE

## 2023-01-24 DIAGNOSIS — C61 MALIGNANT NEOPLASM OF PROSTATE: Primary | ICD-10-CM

## 2023-01-24 DIAGNOSIS — C79.51 SECONDARY MALIGNANT NEOPLASM OF BONE AND BONE MARROW: ICD-10-CM

## 2023-01-24 DIAGNOSIS — C79.52 SECONDARY MALIGNANT NEOPLASM OF BONE AND BONE MARROW: ICD-10-CM

## 2023-01-24 PROCEDURE — 36415 COLL VENOUS BLD VENIPUNCTURE: CPT | Performed by: PHYSICIAN ASSISTANT

## 2023-01-25 ENCOUNTER — OFFICE VISIT (OUTPATIENT)
Dept: ONCOLOGY | Facility: CLINIC | Age: 68
End: 2023-01-25
Payer: MEDICARE

## 2023-01-25 ENCOUNTER — INFUSION (OUTPATIENT)
Dept: ONCOLOGY | Facility: HOSPITAL | Age: 68
End: 2023-01-25
Payer: MEDICARE

## 2023-01-25 VITALS
RESPIRATION RATE: 20 BRPM | BODY MASS INDEX: 28.58 KG/M2 | SYSTOLIC BLOOD PRESSURE: 141 MMHG | WEIGHT: 193 LBS | TEMPERATURE: 97.7 F | HEART RATE: 93 BPM | DIASTOLIC BLOOD PRESSURE: 74 MMHG | HEIGHT: 69 IN | OXYGEN SATURATION: 96 %

## 2023-01-25 DIAGNOSIS — C79.51 PROSTATE CANCER METASTATIC TO BONE: ICD-10-CM

## 2023-01-25 DIAGNOSIS — C79.51 PROSTATE CANCER METASTATIC TO BONE: Primary | ICD-10-CM

## 2023-01-25 DIAGNOSIS — C61 PROSTATE CANCER METASTATIC TO BONE: ICD-10-CM

## 2023-01-25 DIAGNOSIS — E27.40 ADRENAL INSUFFICIENCY: ICD-10-CM

## 2023-01-25 DIAGNOSIS — C61 PROSTATE CANCER METASTATIC TO BONE: Primary | ICD-10-CM

## 2023-01-25 DIAGNOSIS — Z45.2 ENCOUNTER FOR CARE RELATED TO PORT-A-CATH: Primary | ICD-10-CM

## 2023-01-25 LAB
ALBUMIN SERPL-MCNC: 4.4 G/DL (ref 3.8–4.8)
ALBUMIN/GLOB SERPL: 1.8 {RATIO} (ref 1.2–2.2)
ALP SERPL-CCNC: 97 IU/L (ref 44–121)
ALT SERPL-CCNC: 5 IU/L (ref 0–44)
AST SERPL-CCNC: 13 IU/L (ref 0–40)
BASOPHILS # BLD AUTO: 0.1 X10E3/UL (ref 0–0.2)
BASOPHILS NFR BLD AUTO: 1 %
BILIRUB SERPL-MCNC: <0.2 MG/DL (ref 0–1.2)
BUN SERPL-MCNC: 10 MG/DL (ref 8–27)
BUN/CREAT SERPL: 13 (ref 10–24)
CALCIUM SERPL-MCNC: 9.3 MG/DL (ref 8.6–10.2)
CHLORIDE SERPL-SCNC: 102 MMOL/L (ref 96–106)
CO2 SERPL-SCNC: 25 MMOL/L (ref 20–29)
CREAT SERPL-MCNC: 0.76 MG/DL (ref 0.76–1.27)
EGFRCR SERPLBLD CKD-EPI 2021: 99 ML/MIN/1.73
EOSINOPHIL # BLD AUTO: 0.2 X10E3/UL (ref 0–0.4)
EOSINOPHIL NFR BLD AUTO: 3 %
ERYTHROCYTE [DISTWIDTH] IN BLOOD BY AUTOMATED COUNT: 13.1 % (ref 11.6–15.4)
GLOBULIN SER CALC-MCNC: 2.5 G/DL (ref 1.5–4.5)
GLUCOSE SERPL-MCNC: 112 MG/DL (ref 70–99)
HCT VFR BLD AUTO: 43.5 % (ref 37.5–51)
HGB BLD-MCNC: 14 G/DL (ref 13–17.7)
IMM GRANULOCYTES # BLD AUTO: 0 X10E3/UL (ref 0–0.1)
IMM GRANULOCYTES NFR BLD AUTO: 0 %
LYMPHOCYTES # BLD AUTO: 1.6 X10E3/UL (ref 0.7–3.1)
LYMPHOCYTES NFR BLD AUTO: 26 %
MCH RBC QN AUTO: 26.6 PG (ref 26.6–33)
MCHC RBC AUTO-ENTMCNC: 32.2 G/DL (ref 31.5–35.7)
MCV RBC AUTO: 83 FL (ref 79–97)
MONOCYTES # BLD AUTO: 0.5 X10E3/UL (ref 0.1–0.9)
MONOCYTES NFR BLD AUTO: 9 %
NEUTROPHILS # BLD AUTO: 3.9 X10E3/UL (ref 1.4–7)
NEUTROPHILS NFR BLD AUTO: 61 %
PHOSPHATE SERPL-MCNC: 2.9 MG/DL (ref 2.8–4.1)
PLATELET # BLD AUTO: 189 X10E3/UL (ref 150–450)
POTASSIUM SERPL-SCNC: 3.5 MMOL/L (ref 3.5–5.2)
PROT SERPL-MCNC: 6.9 G/DL (ref 6–8.5)
PSA SERPL-MCNC: <0.1 NG/ML (ref 0–4)
RBC # BLD AUTO: 5.27 X10E6/UL (ref 4.14–5.8)
SODIUM SERPL-SCNC: 139 MMOL/L (ref 134–144)
WBC # BLD AUTO: 6.2 X10E3/UL (ref 3.4–10.8)

## 2023-01-25 PROCEDURE — 25010000002 HEPARIN LOCK FLUSH PER 10 UNITS: Performed by: NURSE PRACTITIONER

## 2023-01-25 PROCEDURE — 99214 OFFICE O/P EST MOD 30 MIN: CPT | Performed by: NURSE PRACTITIONER

## 2023-01-25 PROCEDURE — 96523 IRRIG DRUG DELIVERY DEVICE: CPT

## 2023-01-25 RX ORDER — BROMPHENIRAMINE MALEATE, PSEUDOEPHEDRINE HYDROCHLORIDE, AND DEXTROMETHORPHAN HYDROBROMIDE 2; 30; 10 MG/5ML; MG/5ML; MG/5ML
SYRUP ORAL
COMMUNITY
Start: 2023-01-19 | End: 2023-02-22

## 2023-01-25 RX ORDER — HEPARIN SODIUM (PORCINE) LOCK FLUSH IV SOLN 100 UNIT/ML 100 UNIT/ML
500 SOLUTION INTRAVENOUS AS NEEDED
Status: DISCONTINUED | OUTPATIENT
Start: 2023-01-25 | End: 2023-01-25 | Stop reason: HOSPADM

## 2023-01-25 RX ORDER — CETIRIZINE HYDROCHLORIDE 10 MG/1
TABLET ORAL
COMMUNITY
Start: 2023-01-19

## 2023-01-25 RX ORDER — AMOXICILLIN AND CLAVULANATE POTASSIUM 875; 125 MG/1; MG/1
TABLET, FILM COATED ORAL
COMMUNITY
Start: 2023-01-19 | End: 2023-02-22

## 2023-01-25 RX ADMIN — HEPARIN 500 UNITS: 100 SYRINGE at 11:20

## 2023-01-25 NOTE — PROGRESS NOTES
CHIEF COMPLAINT: Fatigue    Problem List:  Oncology/Hematology History Overview Note   1.  Stage IVb grade group 4 metastatic prostate cancer with sclerotic bone lesions on CT and bone scan and history of prostatic hypertrophy.  Good response to Taxotere ending 2/18/2021 Relugolix, followed by Zytiga prednisone with L4/sacrum, left acetabulum, and right pelvis external beam radiation August 2021.  Hypophosphatemia on Zometa.  Zometa held as of October 2021.  2.  Kidney stone  3.  Polyps  4.  Probable drug-induced hypophosphatemia from Zometa, drug stopped after 10/5/2021 dose  5.  Cancer related pain  6.  Fatigue  7.  Covid 2/2022    -9/30/2020 office note from Dr. Ronen Reynaga indicates patient with PSA 10.8.  Underwent TRUS prostate biopsy 9/15/2020.  Adenocarcinoma the prostate Joaquin 4+4 = 8 in 1 out of 12 cores and 4+3 = 7 and 10 out of 12 cores and 3+4 = 7 in 1 out of 12 cores.  Perineural invasion.  Extraprostatic extension into the fat seen on 2 biopsies of the right lateral mid and right apex.  9/24/2020 CT chest and whole-body bone scan showed T12, L1, left acetabular, right medial acetabular metastases with no lung metastases.  He started androgen deprivation therapy with Casodex with plans for Lupron to start in 1 to 2 weeks for clinical T3 N0 M1 metastatic prostate cancer.  Review of official report from [facility] 9/24/2020 bone scan mentions T12, L1, roof of left acetabulum and medial right acetabular osteoblastic metastases.  CT chest with contrast that same day showed mild splenomegaly 14.2 cm with stable periaortic nodes compared to CT December 2015 with no lung metastases.  Sclerotic abnormality within the T12 vertebral body, L1 vertebral body extending into the pedicle unchanged.  8/28/2020 CT abdomen pelvis report from [facility] reviewed and mentions normal spleen size.  Punctate nonobstructing kidney stone, no paulino enlargement, diffuse bladder wall thickening with mild  perivesicular fat stranding, 2.1 cm sclerotic left iliac bone above the left acetabulum new compared to 2015 as well as 1.5 cm faintly sclerotic focus in the right posterior acetabulum stable compared to prior CT 2015 as well as a 1.6 cm T12 and inferior vertebral body sclerotic lesion and a 1.9 cm left posterior lateral L1 vertebral body abnormality extending to the pedicle.  -10/13/2020 initial [facility] medical oncology consultation: With 1 Cotati score 8, 4+4 lesion that would make him a grade group 4 with stage IVb disease given radiographic evidence of sclerotic bone metastasis on bone scan and CT.  Needs genetic testing given metastatic prostate cancer and this is also important as, were he to have mismatch repair mutation then we would have options for PDL 1 inhibitors and were he BRCA mutated would have options for PARP inhibitors in the future.  Systemic therapy for castration naïve prostate cancer or N1 disease should be with apalutamide or Abiraterone or enzalutamide all of which are category 1.  He does not have any visceral metastases.  According to his imaging he has T12, L1, left acetabular, right acetabular, and left iliac bony involvement.  That means he would have 4 or more bone metastases with at least one metastasis (i.e. the acetabular lesions bilaterally) beyond the pelvis/vertebral, for which this would be considered high-volume disease for which 6 cycles of docetaxel 75 mg/m² x 6 cycles followed by Zytiga and prednisone would be reasonable along with Zometa every 6 weeks for bone stabilization.  He will do chemo preparation visit with my nurse practitioner prior to start chemotherapy with Taxotere and Zometa in 2 weeks and he is already received Casodex in preparation for Lupron shot he received on 10/12/2020 with Dr. Reynaga.  We will get him to Dr. Alexander Gomez who has done his polypectomies in the past for port placement and we will get genetic counseling started.  Following the 6 courses  of Taxotere per the [place]arted trial criteria, I would then give him an indefinite Zytiga and prednisone and Zometa until progression or toxicity dictates.    -12/16/2019 COVID-19 antigen test negative    -12/29/2020 PSA 0.275 with absolute neutrophil count 700 and creatinine 0.76 with normal liver enzymes and electrolytes.  Normal magnesium and phosphorus and urine drug screen positive for buprenorphine and opiates follow-up with palliative care.    -1/4/2021 CT chest abdomen pelvis with contrast and whole-body bone scan shows improving less metabolically active bone metastases.  Stable sclerotic T12, L1, and L2 vertebral bodies and bilateral pelvic bones on bone windows with stable subcentimeter para-aortic lymph nodes and no definite progression in the chest abdomen or pelvis.    -1/5/2021 [facility] [place] medical oncology follow-up visit: I reviewed the images and reports thereof of the above CT and bone scan which shows good response to Taxotere x3 courses with a PSA down to 0.275 from a baseline of 10.  Get another 3 courses of Taxotere, continue his Xgeva, and then following that we will switch to Zytiga and prednisone.  He is working with palliative care on his bone pain but on his current dose of fentanyl patch he says his pain is still not adequately controlled he will contact them.  Dexamethasone prescription was refilled.  We will see my nurse practitioner back in 3 weeks for course #5 of 6 total courses and then we will reimage with CT chest abdomen pelvis and bone scan before going to the Zytiga prednisone.    -3/4/2021 CT chest abdomen pelvis with contrast is negative save for stable sclerotic bone lesions and the bone scan shows near resolution of prior bony abnormalities associated with the known sclerotic lesions in the thoracolumbar spine and bony pelvis.    2/17/2021 PSA down to 0.1 from 1.37 October 2020.  3/9/2021 PSA 0.1    -5/26/2021 CT chest abdomen pelvis with contrast shows stable to slightly  underlying increase low-density left para-aortic node.  Bone lesions stable.  Whole-body bone scan stable to decrease activity of known thoracolumbar and bony pelvic involvement.  PSA less than 0.1  , AST 74, bilirubin 0.5.       -6/1/2021 [facility] medical oncology follow-up visit: I reviewed the above data with him and images thereof.  Bones are stable.  No significant adenopathy.  PSA immeasurably low.     upper limit of normal 69 and AST 74 upper limit of normal 46.  Hence, neither is more than double the upper limit of normal with no ascites and no encephalopathy and normal albumin and bilirubin, despite the elevation of liver enzymes, he has child Abrams score A.  Package insert for Abiraterone says that for ALT and/or AST greater than 5 times upper limit of normal or total bilirubin greater than 3 times the upper limit of normal to withhold treatment until liver function tests returned to baseline or ALT and AST less than or equal to 2.5 times the upper limit of normal and total bilirubin less than or equal to 1.5 times the upper limit of normal and then reinitiate at 750 mg daily dose of Zytiga.  For recurrent hepatotoxicity on 750 mg daily Zytiga, withhold treatment until liver functions returned to baseline or ALT and AST less than 3-2.5 times upper limit of normal and total bilirubin less than or equal to 1.5 times the upper limit of normal then reinitiate at 500 mg daily Zytiga dose.  If has recurrent hepatotoxicity on 500 mg dose, then discontinue treatment.  If has ALT greater than 3 times the upper limit of normal along with total bilirubin greater than 2 times the upper limit of normal in the absence of other contributing causes or biliary obstruction, permanently discontinue Zytiga.  Based on these recommendations, I would continue current doses but we will watch with serial labs monthly and repeat his imaging again in 3 months but clinically he is doing wonderfully with excellent response  to Taxotere and now on Zytiga prednisone plus Zometa along with Relugolix.    -6/23/2020 for [facility] oncology clinic follow-up: Having persistent and worsening pain above his left hip.  I will get an MRI of the left hip for further evaluation.  Otherwise we will continue therapy unchanged with Zytiga, prednisone and Zometa along with Relugolix.    -7/28/2021 [facility] oncology clinic follow-up: Patient with multiple somatic complaints including episodes of confusion, dizziness, decreased memory, agitation.  He reports ongoing episodes with difficulty swallowing.  Also most concerning for episodes of angina and chest pain for which he basically refused to seek emergency medical attention.  He has a history of coronary artery disease and stent placement.  He has not followed up with cardiology for many years.  I will get an MRI of the brain in light of his confusion, dizziness and agitation.  I will get him to gastroenterology for EGD in light of his dysphagia.  I have also put in a referral for cardiology.  I reemphasized to the patient, his wife who is with him today and his son who was on the telephone during our visit the importance of seeking out prompt medical attention when he is having chest pain or neurological concerns so that he can be evaluated.  The patient states that he basically refuses to go to the emergency room and if his family calls an ambulance he would not agree to go to the hospital.  For the time being, I have asked him to hold his Zytiga and prednisone until we can sort this out as Zytiga does have some cardiovascular risk.  There is likely no treatment for prostate cancer that does not carry some form of cardiovascular risk.  His PSA remains low at 0.014 yesterday.  He will continue relugolix for now.    Of note, some of these symptoms could also be due to his hypophosphatemia, phosphate level from yesterday was 1.5, I have sent in a prescription for K-Phos 3 times a day before meals for 10  "days.  We will hold further Zometa until this is corrected.  I have also put in an order to check his vitamin D level.  He takes calcium and vitamin D daily.  Some of his symptoms also could be due to drug withdrawal as there was some confusion and difficulty getting his last prescription for methadone therefore he was without methadone for several days, he now has his prescription and is back on his pain medication.  He has an appointment with neurosurgery tomorrow in light of his ongoing back pain, MRI of the hip recently showed multiple bony lesions largest at the L5 vertebral body and left supra-acetabular region.  If no neurosurgical intervention recommended he may benefit from spot radiation as this is where a lot of his pain is coming from.  His wife had questions today regarding his staging and extent of disease.  We reviewed previous scans.  I did clarify with her as she used the words \"cancer free\" as she thought that is what she was told after his CT scans once he completed Taxotere in February.  I explained to her that even though his scans showed he had an excellent response with no clear areas of metastasis that did not mean he was cancer free, I explained to her that when you have metastatic cancer the treatment intent is palliative in nature and to control the disease but not to cure the disease.  She stated understanding.  We will see him back in 2 weeks for follow-up to sort through this and make further plan of care, again, I have asked him to hold Zytiga and prednisone until he sees us back, he will continue on his other medications including relugolix.    -7/30/2021 neurosurgery PA consultation.  MRI with and without contrast L5 ordered.    -8/3/2021 neurosurgery follow-up showed known sclerotic disease with new L5 abnormality without compression deformity or instability.  MRI brain negative for metastasis.    -8/4/2021 office visit Dr. Fco Quintanilla radiation oncology coordinating with Dr. North " neurosurgery for palliative radiation for MRI evidence of L5 and left supra acetabular painful lesions.  States that the patient's disease which is not secreting PSA is experiencing some progression and would benefit from 20 Gy in five fractions and other lesion 30 Gy in ten fractions. Patient offered to go to [place] for radiation but desired treatment in [place].    -8/10/2021 [facility] medical oncology follow-up visit: No chest pains at this junction.  Reviewed MRI brain and other MRIs reviewed by Dr. North and Dwight.  Due for EGD on 19th.  We will repeat his phosphorus along with his other labs continue Relugolix, Zytiga, prednisone, and he will continue radiation palliatively to the painful bony lesions with Dr. Quintainlla/Can.  He will see my nurse practitioner back in a few weeks to see how he is tolerating this and to follow-up on the phosphorus off of Zometa and she will order repeat CT chest abdomen pelvis with contrast and total body bone scan the end of September.I will see him back after that.    -9/8/2021 Zometa resumed.  PSA <0.10    -10/5/2021 [facility] medical oncology follow-up visit: followed-up with radiation oncology 9/21/2021.  Status post symptomatic L5 radiation 5 fractions 20 Maxwell completed 8/16/2021 with improvement in right hip pain.  9/2/2021 PSA less than 0.1.  9/29/2021 total body bone scan shows increased uptake left iliac bone slightly superior group of the left acetabulum with no additional foci of suspicious abnormality is unlikely treatment related with no new sites of active osseous metastasis.  CT chest abdomen pelvis with contrast shows stable borderline enlarged left periaortic nodes and dating sclerotic bone lesions.Continue Zytiga, prednisone, Relugolix, Zometa, repeat CT chest abdomen pelvis with contrast and total body bone scan the end of the year.  We will follow PSA serially.    -11/17/2021 [facility] clinic follow-up:  Mr. Lugo overall is doing well.  He is  tolerating therapy with Zytiga, prednisone and Relugolix along with Zometa which is given every 6 weeks.  PSA remains low at <0.1.  Has occasional low phosphorus, currently low at 1.7.  Serum calcium is normal at 8.9, normal creatinine 0.79 and normal CBC other than for platelets of 124.  I will hold Zometa for 1 month, I did send in a prescription for phosphorus replacement today.  He takes calcium and vitamin D.  I will see him back in 1 month for follow-up.  We will repeat restaging scans after that visit.    -12/15/2021 [facility] Oncology clinic follow-up: Mr. Lugo continues to do well on therapy with Zytiga, prednisone and Relugolix, his PSA remains low at <0.1.  He is continuing to have left lower back pain, he is working with palliative care and they have referred him also to pain management.  Pain management has talked to him about putting in a pain pump however he is leery of this, I told him that this was a safe and effective pain management technique and to certainly give consideration to their recommendations.  His phosphorus is improving however is still a little low, I will hold off on Zometa until we see him back in 1 month, we may end up only giving it every 12 weeks.  We will repeat restaging scans with CT chest, abdomen and pelvis along with total body bone scan prior to return and I have ordered those today.      -1/5/2022 CT chest abdomen pelvis with contrast [facility] showing stable left periaortic adenopathy and unchanged sclerotic thoracic, lumbar spine and pelvis lesions with no new or progressive disease in the chest abdomen or pelvis.  Total body bone scan shows no significant change and no new suspicious foci.  Stable posterior right acetabulum and left superior acetabulum and degenerative changes in the cervical spine, shoulders, acromioclavicular joints, sternoclavicular joints, elbows, wrists, hands, thoracic spine, lumbosacral spine, sacroiliac joints, hips, knees, ankles,  feet.    -1/11/2022 [facility] medical oncology hematology follow-up visit: I reviewed images and reports from his 1/5/2022 scans.  No active disease and PSA immmeasurably low on Zytiga, prednisone, Relugolix.  However, he has struggled with hypophosphatemia and is significantly fatigued with this.  Given that the bones are doing well and given that his phosphorus continues to remain consistently low at 2.2 in mid December, 1.7 mid November and 2.5 mid-September and as low as 1.5 back to July and the likelihood that this is related to his Zometa, given that his bones are stable overall, he will increase from 1200 mg up to 1600 mg of calcium and phosphate a day and we will hold his Zometa for the foreseeable future.  He will see my nurse practitioner back in a month to continue to monitor his phosphorus along with his calcium and other labs and will repeat his scans again in 3 months.  In addition, given his fatigue we will check his ACTH and cortisol though he is taking his prednisone with his Zytiga.  Though his electrolytes are normal, I will keep an eye on the cortisol and ACTH and have these labs not only drawn today but again just prior to return to my nurse practitioner on February 10.    -2/10/2022 [facility] Oncology clinic follow-up: Mr. Lugo overall is stable, no change in his health this past month.  I have reviewed his labs from 2/8/2022 and they are unremarkable, his phosphorus is now normal at 3.2. We are continuing to hold his Zometa, he last received Zometa on 10/5/2021.  He continues therapy with Zytiga, prednisone and Relugolix.  Dr. Rashid had ordered cortisol level and ACTH which are low, currently his ACTH is 2.8 and cortisol 3.08 however these are both done in the face of ongoing prednisone that he takes with his Zytiga.  We will get him to endocrinology for further evaluation for possible adrenal insufficiency but will not interrupt his treatment in the interim.  He will need restaging scans again in  April for a 3-month follow-up.  He will continue to follow with palliative care and pain management for his cancer related pain.  His PSA remains immeasurably low at <0.1 on 2/8/22.    -2/15/2022 went to emergency room with weakness, decreased appetite, myalgias in the setting of known COVID-19 testing positive a few days prior to this visit.  Phosphorus had been low in the past but was normal in the emergency room with unremarkable CBC and CMP.  Chest x-ray unremarkable saturating well on room air mildly hypertensive with dry mucosa.  Given 500 cc crystalloid.  Covid test positive.  Mild thrombocytopenia 136,000 and otherwise unremarkable.  Discharged home.    -3/3/2022 data:  PSA less than 0.1  CMP unremarkable  Cortisol 3.96 normal  ACTH 2.8 lower limit of normal 7.2  Phosphorus normal 2.6    -3/8/2022 [facility] medical oncology follow-up: Suspect big chunk of his fatigue was Covid.  Another part of the fatigue likely related to lack of testosterone.  Not hypophosphatemic off of Zometa.  ACTH running low while on prednisone not surprising but with normal cortisol and I doubt adrenal insufficient which is why he is on prednisone to avoid such while taking Zytiga.  Overall doing fairly well and with immeasurably low PSA, I will have labs done on him early April, see my nurse practitioner early May, and she will order repeat bone scan and CTs the end of May and I will see him back in June.  We will continue to hold the Zometa unless bone lesions progress given symptomatic prior hypophosphatemia on Zometa.  He will keep his appointment April 28 with Dr. Mir Duffy just to be sure that my take on his pituitary/adrenal axis assessment is accurate.    -4/28/2022 endocrinology consult Dr. Mir Duffy.  With low ACTH and cortisol on prednisone with Zytiga, the adequacy of prednisone cannot be assessed by measuring ACTH or cortisol but is assessed by weight changes, blood pressure, blood sugar, potassium, overall sense of how  the patient is doing.  Primary adrenal insufficiency with low ACTH and low cortisol would be typical for the situation as his blood sugar tends to run a little high and potassium a little low, he did not think increasing prednisone was necessary.  He put him on some potassium.  No follow-up scheduled, will see as needed.    -5/4/2022 [facility] Oncology clinic follow-up: Mr. Lugo continues on therapy with Zytiga and prednisone along with Relugolix.  His Zometa has been on hold due to previous hypophosphatemia.  There is some concern about potential adrenal insufficiency. He saw endocrinology, Dr. Mir Duffy on 4/28/2022.  I did review his office note and he stated that the low ACTH and low serum cortisol would be typical for Mr. Lugo's situation.  He further stated that the adequacy of prednisone dose cannot be assessed by measuring ACTH or cortisol but is rather assessed by the overall just altered how the patient is feeling-weight change, blood pressure, blood sugar, potassium, overall sense of wellbeing.  His potassium had been running a little low therefore they put him on potassium 10 mill equivalents daily. Of note, I do not see a future follow-up scheduled with endocrinology.  He is having night sweats, I am not sure if this is related.  His glucose was normal this morning at 107.  His weight is stable.  He will continue current treatment for his prostate cancer unchanged, we will continue to hold his Zometa.  Current phosphorus and magnesium are normal.  CBC and CMP from today are unremarkable, cortisol is normal.  PSA and ACTH are pending  In regards to his night sweats, he had taken clonidine previously 0.1 mg twice daily as needed, I did send in a short supply again to try and see if this helps.  He also is having indigestion despite being on omeprazole twice daily, I sent a prescription for Pepcid.  He had an EGD August 2021.  We will repeat restaging scans prior to return and I have ordered those today.      -5/24/2022 CT chest abdomen pelvis with contrast[place] negative.  Bone scan shows stable bone metastases.    - 5/25/2022 PSA less than 0.1, platelets 130,000, otherwise unremarkable CBC and CMP.  A.m. cortisol running low 1.2 with phosphorus low 2.3.    -5/31/2022 [facility] medical oncology follow-up: On Zytiga, prednisone, Relugolix, PSA remaining immeasurably low with stable bone scan and no visceral/paulino metastases.  Phosphorus still running low.  I have discontinued his potassium chloride and started potassium phosphate 500 mg 4 times a day.  Unless PSA rising, we will plan on repeating CTs and bone scan in 6 months and will follow with my nurse practitioner in the interim and if symptoms dictate or labs, will get back to Dr. Duffy for management of potential adrenal insufficiency but presently we will just see how he does with potassium phosphate and hopeful improvement in the phosphorus level with time.  We will continue to follow with palliative care for pain management    -7/6/2022 [facility] Oncology clinic follow-up: Mr. Lugo overall is doing well but continues to be troubled with fatigue and hot flashes.  For the most part he states he is able to do the things he wants to do, he continues to work but does have to take more rest periods.  I had a long discussion with him and his wife after reviewing his medications with them as they wanted to see if there was anything he could stop or adjust.  I discussed with him the need to continue on treatment for his prostate cancer even though his PSA remains immeasurably low and his scans look good but that if his medication is stopped the cancer would progress and over time it still has the potential to progress on therapy but would continue it as long as possible to keep his disease under control.  I explained this is like treating someone with hypertension, you do not stop the antihypertensive just because the blood pressure normalizes.  They stated  understanding.  There is no dose adjustment of Zytiga or prednisone that would minimize his symptoms, he is on the current standard recommended therapy.  Discussed with him that sometimes during times of stress we may need to increase his prednisone dose but for now would not change anything.  His phosphorus level is normal, we continue to hold his Zometa.  I did give him the option of holding his phosphorus for the next month and we will see what happens, if it drops we will start him back on it but may be at a lower dose rather than 4 times a day maybe twice a day.  For now he will continue therapy unchanged.  We will continue monitoring labs monthly.  No PSA was drawn this month but that is okay as his PSA has been running immeasurably low at <0.1, we will recheck next month with lab draw.  We will stop checking his ACTH and cortisol level every month, if he develops worsening symptoms I will repeat those.  He has not gotten a follow-up with endocrinology as of yet.    -8/3/2022 [facility] Oncology clinic follow-up:  Mr. Lugo is doing well as far as his prostate cancer goes with continued immeasurably low PSA of <0.1.  He continues on therapy with Zytiga and prednisone along with Relugolix.  His phosphorus is stable at 2.7 which was just slightly low by our reference range however was 2.71-month ago also which was normal on another labs reference range.  He has not taken his phosphorus for this past month as he thought it was making him more fatigued.  He really can tell no difference whether he was taking it or not.  I have asked him to try to go back on phosphorus twice a day rather than 4 times a day and see if this is tolerated and if we can keep his phosphorus level from dropping.  His biggest complaint today is flareup of his arthralgias and back pain.  He is following with pain management, Dr. Danny Avalos but is requesting a referral back to palliative medicine as he feels that no one is currently listening  to him and they are just prescribing the same medications that are not effective according to his report.  He does have a follow-up with Dr. Avalos on 8/12/2022 but due to his current pain is not able to work until after he is seen and hopefully can get some better relief.  I have given him a work release until 15 August.  I have also put in a referral back to palliative care.  Also encouraged him to work with Dr. Avalos for help in resolving his issue.  Apparently they have recommended some type of a pump however he has been hesitant to that but likely would be helpful.  We will continue therapy unchanged.  We will continue monthly labs including phosphorus, we are not currently checking ACTH and cortisol monthly as Dr. Mir Duffy previously stated in his assessment on 4/28/2022 that the adequacy of prednisone cannot be assessed by measuring ACTH or cortisol but would be assessed by symptoms, see note from 4/28/2022.  He made no further follow-up appointments.  Fatigue is not worsening currently.  His glucose and potassium are  Normal.    -10/13/2022 [facility] oncology follow-up: Mr. Lugo is doing well.  He is tolerating treatment with Zytiga and prednisone along with Relugolix without any unusual side effects.  His PSA has remained immeasurably low at <0.1.  His phosphorus was slightly low at last visit.  He had been instructed to go back on phosphorus twice a day but he misunderstood and has not been taking it.  We will check labs today.  I will follow-up with results and notify him if he needs to restart the phosphorus.  His pain is better controlled since reestablishing with [facility].  We will repeat scans prior to next follow-up.  I have ordered CT chest abdomen pelvis along with bone scan.  We will see him back in 1 month.    -10/13/2022 PSA less than 0.014.With unremarkable CBC and CMP.  Phosphorus still a little low 2.3.    -11/4/2022 CT abdomen pelvis chest showed no evidence of  disease progression with stable sclerotic bone lesions.  Bone scan stable right greater than left acetabular metastasis.  Stable bone metastases.    -11/8/2022 [facility] oncology telemedicine follow-up: Patient states that he feels achy all over with low-grade temps and a cough mildly productive.  Has an appointment later today to see his primary care for testing for flu and COVID.  Suggested that if he were positive for COVID that he get Paxlovid and that if he were positive for flu that he get Tamiflu and to discuss this with his primary care.  From the prostate cancer side, his PSA is immeasurably low with stable bone metastases and no evidence of progression.  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  We will continue the Relugolix, Zytiga, prednisone and he will see my nurse practitioner 12/7/2022.  Would repeat CTs and bone scan in May.     Prostate cancer metastatic to bone (HCC)   8/30/2020 -  Other Event    PSA 10.8     9/15/2020 Initial Diagnosis    Prostate cancer metastatic to bone (CMS/HCC)     9/15/2020 Biopsy    Prostate needle core biopsy     9/24/2020 Imaging    Total body bone scan: 4 abnormal areas of increased activity consistent with osteoblastic metastasis, abnormal foci of activity seen in the T12 vertebral body, L1 vertebral body, roof of the left acetabulum, and acetabulum medially on the right.  CT chest: Stable sclerotic metastatic lesions within the T12 and L1 vertebrae.  No other evidence of metastatic disease to the chest.  Stable mild splenomegaly and subcentimeter periaortic retroperitoneal lymph nodes.  CT abdomen and pelvis: Diffuse bladder wall thickening with mild perivesicular fat stranding.  Interval development of several sclerotic lesions in the spine and left iliac bone.     10/13/2020 Cancer Staged    Staging form: Bone - Appendicular Skeleton, Trunk, Skull, And Facial Bones, AJCC 8th Edition  - Clinical: Stage IVB (cT3, cN0, cM1b,  G3) - Signed by Ishmael Rashid MD on 10/13/2020     11/3/2020 - 10/5/2021 Chemotherapy    OP SUPPORTIVE Zoledronic Acid Q42d     11/3/2020 - 2/18/2021 Chemotherapy    OP PROSTATE DOCEtaxel       1/4/2021 Imaging    CT chest abdomen pelvis with contrast and whole-body bone scan shows improving less metabolically active bone metastases.  Stable sclerotic T12, L1, and L2 vertebral bodies and bilateral pelvic bones on bone windows with stable subcentimeter para-aortic lymph nodes and no definite progression in the chest abdomen or pelvis.  PSA down to 0.275 after 3 courses of Taxotere.     3/4/2021 Imaging    CT chest, abdomen and pelvis stable, no evidence of disease progression. Total body bone scan with decreased/near resolution of abnormal increased radiotracer uptake associated with the known sclerotic lesions in the thoracolumbar spine and bony pelvis.  No evidence of new osteoblastic metastases.     3/4/2021 Imaging    CT chest abdomen pelvis with contrast is negative save for stable sclerotic bone lesions and the bone scan shows near resolution of prior bony abnormalities associated with the known sclerotic lesions in the thoracolumbar spine and bony pelvis.  2/17/2021 PSA down to 0.1 from 1.37 October 2020.     3/9/2021 - 3/9/2021 Chemotherapy    OP SUPPORTIVE PROSTATE Relugolix     3/9/2021 -  Chemotherapy    OP PROSTATE Abiraterone / PredniSONE       3/10/2021 -  Chemotherapy    OP PROSTATE Abiraterone / PredniSONE     8/10/2021 - 8/16/2021 Radiation    Radiation OncologyTreatment Course:  Naveen Lugo received 2000 cGy in 5 fractions to L4-sacrum, left acetabulum and right pelvis via External Beam Radiation - EBRT.     11/16/2021 -  Chemotherapy    OP CENTRAL VENOUS ACCESS DEVICE ACCESS, CARE, AND MAINTENANCE (CVAD)     1/5/2022 Imaging    -1/5/2022 CT chest abdomen pelvis with contrast [place] [facility] showing stable left periaortic adenopathy and unchanged sclerotic thoracic, lumbar spine and pelvis  lesions with no new or progressive disease in the chest abdomen or pelvis.  Total body bone scan shows no significant change and no new suspicious foci.  Stable posterior right acetabulum and left superior acetabulum and degenerative changes in the cervical spine, shoulders, acromioclavicular joints, sternoclavicular joints, elbows, wrists, hands, thoracic spine, lumbosacral spine, sacroiliac joints, hips, knees, ankles, feet.     5/24/2022 Imaging    CT chest abdomen pelvis with contrast[place] negative.  Bone scan shows stable bone metastases.     11/4/2022 Imaging    CT abdomen pelvis chest showed no evidence of disease progression with stable sclerotic bone lesions.  Bone scan stable right greater than left acetabular metastasis.  Stable bone metastases.         HISTORY OF PRESENT ILLNESS:  The patient is a 67 y.o. male, here for follow up on management of metastatic prostate cancer.  Mr. Lugo reports that he has had worsening fatigue over the last month or so, he states that he often goes to bed by 9:00 at night and will not get up until 9:00 the next day, he has low energy also during the day.  He has mild muscle aches.  He was started on a statin last month and he did stop taking it to see if this would help with the muscle aches but it did not, he did resume his statin.  Otherwise he has ongoing chronic lower back pain, no new pain.      Past Medical History:   Diagnosis Date   • Bone cancer (HCC)    • History of radiation therapy 08/16/2021    L4 through sacrum, left acetabulum, right pelvis   • Nephrolithiasis    • Opioid use disorder, mild, on maintenance therapy (HCC)    • Prostate cancer (HCC)      Past Surgical History:   Procedure Laterality Date   • CHOLECYSTECTOMY     • CORONARY STENT PLACEMENT  206 & 2011   • HERNIA REPAIR  1986   • THORACIC DISCECTOMY  1994       Allergies   Allergen Reactions   • Cymbalta [Duloxetine Hcl] Dizziness   • Gabapentin Nausea Only   • Lyrica [Pregabalin] Nausea And  "Vomiting and Dizziness       Family History and Social History reviewed and changed as necessary    REVIEW OF SYSTEM:   Positive for worsening fatigue and muscle aches    PHYSICAL EXAM:  Well-developed, well-nourished male in no distress  Lungs clear to auscultation bilaterally, respirations even and unlabored  Heart regular rate and rhythm  No lower extremity weakness, gait is steady, no edema    Vitals:    01/25/23 1024   BP: 141/74   Pulse: 93   Resp: 20   Temp: 97.7 °F (36.5 °C)   SpO2: 96%   Weight: 87.5 kg (193 lb)   Height: 175.3 cm (69\")     Vitals:    01/25/23 1024   PainSc:   6   PainLoc: Back  Comment: back and hips          ECOG score: 1           Lab Results   Component Value Date    HGB 14.0 01/24/2023    HCT 43.5 01/24/2023    MCV 83 01/24/2023     01/24/2023    WBC 6.2 01/24/2023    NEUTROABS 3.9 01/24/2023    LYMPHSABS 1.6 01/24/2023    MONOSABS 0.5 01/24/2023    EOSABS 0.2 01/24/2023    BASOSABS 0.1 01/24/2023       Lab Results   Component Value Date    GLUCOSE 112 (H) 01/24/2023    BUN 10 01/24/2023    CREATININE 0.76 01/24/2023     01/24/2023    K 3.5 01/24/2023     01/24/2023    CO2 25 01/24/2023    CALCIUM 9.3 01/24/2023    PROTEINTOT 7.0 02/14/2022    ALBUMIN 4.4 01/24/2023    BILITOT <0.2 01/24/2023    ALKPHOS 97 01/24/2023    AST 13 01/24/2023    ALT 5 01/24/2023     Lab Results   Component Value Date    PSA <0.1 01/24/2023    PSA <0.014 10/13/2022    PSA <0.1 09/01/2022    PSA <0.1 08/02/2022    PSA <0.1 08/02/2022    PSA <0.1 05/25/2022             ASSESSMENT & PLAN:  1.  Stage IVb grade group 4 metastatic prostate cancer with sclerotic bone lesions on CT and bone scan and history of prostatic hypertrophy.  Good response to Taxotere ending 2/18/2021 Relugolix, followed by Zytiga prednisone with L4/sacrum, left acetabulum, and right pelvis external beam radiation August 2021.  Hypophosphatemia on Zometa.  Zometa held as of October 2021.  2.  Kidney stone  3.  Polyps  4.  " Probable drug-induced hypophosphatemia from Zometa, drug stopped after 10/5/2021 dose  5.  Cancer related pain  6.  Fatigue  7.  Covid 2/2022    -9/30/2020 office note from Dr. Ronen Reynaga indicates patient with PSA 10.8.  Underwent TRUS prostate biopsy 9/15/2020.  Adenocarcinoma the prostate [place] 4+4 = 8 in 1 out of 12 cores and 4+3 = 7 and 10 out of 12 cores and 3+4 = 7 in 1 out of 12 cores.  Perineural invasion.  Extraprostatic extension into the fat seen on 2 biopsies of the right lateral mid and right apex.  9/24/2020 CT chest and whole-body bone scan showed T12, L1, left acetabular, right medial acetabular metastases with no lung metastases.  He started androgen deprivation therapy with Casodex with plans for Lupron to start in 1 to 2 weeks for clinical T3 N0 M1 metastatic prostate cancer.  Review of official report from [facility] 9/24/2020 bone scan mentions T12, L1, roof of left acetabulum and medial right acetabular osteoblastic metastases.  CT chest with contrast that same day showed mild splenomegaly 14.2 cm with stable periaortic nodes compared to CT December 2015 with no lung metastases.  Sclerotic abnormality within the T12 vertebral body, L1 vertebral body extending into the pedicle unchanged.  8/28/2020 CT abdomen pelvis report from [facility] reviewed and mentions normal spleen size.  Punctate nonobstructing kidney stone, no paulino enlargement, diffuse bladder wall thickening with mild perivesicular fat stranding, 2.1 cm sclerotic left iliac bone above the left acetabulum new compared to 2015 as well as 1.5 cm faintly sclerotic focus in the right posterior acetabulum stable compared to prior CT 2015 as well as a 1.6 cm T12 and inferior vertebral body sclerotic lesion and a 1.9 cm left posterior lateral L1 vertebral body abnormality extending to the pedicle.  -10/13/2020 initial [facility] medical oncology consultation: With 1 Sarika score 8, 4+4 lesion that would make him a  grade group 4 with stage IVb disease given radiographic evidence of sclerotic bone metastasis on bone scan and CT.  Needs genetic testing given metastatic prostate cancer and this is also important as, were he to have mismatch repair mutation then we would have options for PDL 1 inhibitors and were he BRCA mutated would have options for PARP inhibitors in the future.  Systemic therapy for castration naïve prostate cancer or N1 disease should be with apalutamide or Abiraterone or enzalutamide all of which are category 1.  He does not have any visceral metastases.  According to his imaging he has T12, L1, left acetabular, right acetabular, and left iliac bony involvement.  That means he would have 4 or more bone metastases with at least one metastasis (i.e. the acetabular lesions bilaterally) beyond the pelvis/vertebral, for which this would be considered high-volume disease for which 6 cycles of docetaxel 75 mg/m² x 6 cycles followed by Zytiga and prednisone would be reasonable along with Zometa every 6 weeks for bone stabilization.  He will do chemo preparation visit with my nurse practitioner prior to start chemotherapy with Taxotere and Zometa in 2 weeks and he is already received Casodex in preparation for Lupron shot he received on 10/12/2020 with Dr. Reynaga.  We will get him to Dr. Alexander Gomez who has done his polypectomies in the past for port placement and we will get genetic counseling started.  Following the 6 courses of Taxotere per the [place] trial criteria, I would then give him an indefinite Zytiga and prednisone and Zometa until progression or toxicity dictates.    -12/16/2019 COVID-19 antigen test negative    -12/29/2020 PSA 0.275 with absolute neutrophil count 700 and creatinine 0.76 with normal liver enzymes and electrolytes.  Normal magnesium and phosphorus and urine drug screen positive for buprenorphine and opiates follow-up with palliative care.    -1/4/2021 CT chest abdomen pelvis with  contrast and whole-body bone scan shows improving less metabolically active bone metastases.  Stable sclerotic T12, L1, and L2 vertebral bodies and bilateral pelvic bones on bone windows with stable subcentimeter para-aortic lymph nodes and no definite progression in the chest abdomen or pelvis.    -1/5/2021 [facility] follow-up visit: I reviewed the images and reports thereof of the above CT and bone scan which shows good response to Taxotere x3 courses with a PSA down to 0.275 from a baseline of 10.  Get another 3 courses of Taxotere, continue his Xgeva, and then following that we will switch to Zytiga and prednisone.  He is working with palliative care on his bone pain but on his current dose of fentanyl patch he says his pain is still not adequately controlled he will contact them.  Dexamethasone prescription was refilled.  We will see my nurse practitioner back in 3 weeks for course #5 of 6 total courses and then we will reimage with CT chest abdomen pelvis and bone scan before going to the Zytiga prednisone.    -3/4/2021 CT chest abdomen pelvis with contrast is negative save for stable sclerotic bone lesions and the bone scan shows near resolution of prior bony abnormalities associated with the known sclerotic lesions in the thoracolumbar spine and bony pelvis.    2/17/2021 PSA down to 0.1 from 1.37 October 2020.  3/9/2021 PSA 0.1    -5/26/2021 CT chest abdomen pelvis with contrast shows stable to slightly underlying increase low-density left para-aortic node.  Bone lesions stable.  Whole-body bone scan stable to decrease activity of known thoracolumbar and bony pelvic involvement.  PSA less than 0.1  , AST 74, bilirubin 0.5.       -6/1/2021 [facility] follow-up visit: I reviewed the above data with him and images thereof.  Bones are stable.  No significant adenopathy.  PSA immeasurably low.     upper limit of normal 69 and AST 74 upper limit of normal 46.  Hence,  neither is more than double the upper limit of normal with no ascites and no encephalopathy and normal albumin and bilirubin, despite the elevation of liver enzymes, he has child Abrams score A.  Package insert for Abiraterone says that for ALT and/or AST greater than 5 times upper limit of normal or total bilirubin greater than 3 times the upper limit of normal to withhold treatment until liver function tests returned to baseline or ALT and AST less than or equal to 2.5 times the upper limit of normal and total bilirubin less than or equal to 1.5 times the upper limit of normal and then reinitiate at 750 mg daily dose of Zytiga.  For recurrent hepatotoxicity on 750 mg daily Zytiga, withhold treatment until liver functions returned to baseline or ALT and AST less than 3-2.5 times upper limit of normal and total bilirubin less than or equal to 1.5 times the upper limit of normal then reinitiate at 500 mg daily Zytiga dose.  If has recurrent hepatotoxicity on 500 mg dose, then discontinue treatment.  If has ALT greater than 3 times the upper limit of normal along with total bilirubin greater than 2 times the upper limit of normal in the absence of other contributing causes or biliary obstruction, permanently discontinue Zytiga.  Based on these recommendations, I would continue current doses but we will watch with serial labs monthly and repeat his imaging again in 3 months but clinically he is doing wonderfully with excellent response to Taxotere and now on Zytiga prednisone plus Zometa along with Relugolix.    -6/23/2020 for [facility] oncology clinic follow-up: Having persistent and worsening pain above his left hip.  I will get an MRI of the left hip for further evaluation.  Otherwise we will continue therapy unchanged with Zytiga, prednisone and Zometa along with Relugolix.    -7/28/2021 [facility] oncology clinic follow-up: Patient with multiple somatic complaints including episodes of confusion, dizziness, decreased  memory, agitation.  He reports ongoing episodes with difficulty swallowing.  Also most concerning for episodes of angina and chest pain for which he basically refused to seek emergency medical attention.  He has a history of coronary artery disease and stent placement.  He has not followed up with cardiology for many years.  I will get an MRI of the brain in light of his confusion, dizziness and agitation.  I will get him to gastroenterology for EGD in light of his dysphagia.  I have also put in a referral for cardiology.  I reemphasized to the patient, his wife who is with him today and his son who was on the telephone during our visit the importance of seeking out prompt medical attention when he is having chest pain or neurological concerns so that he can be evaluated.  The patient states that he basically refuses to go to the emergency room and if his family calls an ambulance he would not agree to go to the hospital.  For the time being, I have asked him to hold his Zytiga and prednisone until we can sort this out as Zytiga does have some cardiovascular risk.  There is likely no treatment for prostate cancer that does not carry some form of cardiovascular risk.  His PSA remains low at 0.014 yesterday.  He will continue relugolix for now.    Of note, some of these symptoms could also be due to his hypophosphatemia, phosphate level from yesterday was 1.5, I have sent in a prescription for K-Phos 3 times a day before meals for 10 days.  We will hold further Zometa until this is corrected.  I have also put in an order to check his vitamin D level.  He takes calcium and vitamin D daily.  Some of his symptoms also could be due to drug withdrawal as there was some confusion and difficulty getting his last prescription for methadone therefore he was without methadone for several days, he now has his prescription and is back on his pain medication.  He has an appointment with neurosurgery tomorrow in light of his  "ongoing back pain, MRI of the hip recently showed multiple bony lesions largest at the L5 vertebral body and left supra-acetabular region.  If no neurosurgical intervention recommended he may benefit from spot radiation as this is where a lot of his pain is coming from.  His wife had questions today regarding his staging and extent of disease.  We reviewed previous scans.  I did clarify with her as she used the words \"cancer free\" as she thought that is what she was told after his CT scans once he completed Taxotere in February.  I explained to her that even though his scans showed he had an excellent response with no clear areas of metastasis that did not mean he was cancer free, I explained to her that when you have metastatic cancer the treatment intent is palliative in nature and to control the disease but not to cure the disease.  She stated understanding.  We will see him back in 2 weeks for follow-up to sort through this and make further plan of care, again, I have asked him to hold Zytiga and prednisone until he sees us back, he will continue on his other medications including relugolix.    -7/30/2021 neurosurgery PA consultation.  MRI with and without contrast L5 ordered.    -8/3/2021 neurosurgery follow-up showed known sclerotic disease with new L5 abnormality without compression deformity or instability.  MRI brain negative for metastasis.    -8/4/2021 office visit Dr. Fco Quintanilla radiation oncology coordinating with Dr. Can forde for palliative radiation for MRI evidence of L5 and left supra acetabular painful lesions.  States that the patient's disease which is not secreting PSA is experiencing some progression and would benefit from 20 Gy in five fractions and other lesion 30 Gy in ten fractions. Patient offered to go to [place] for radiation but desired treatment in [place].    -8/10/2021 [facility] medical oncology follow-up visit: No chest pains at this junction.  Reviewed MRI brain and " other MRIs reviewed by Dr. North and Dwight.  Due for EGD on 19th.  We will repeat his phosphorus along with his other labs continue Relugolix, Zytiga, prednisone, and he will continue radiation palliatively to the painful bony lesions with Dr. Quintanilla/Can.  He will see my nurse practitioner back in a few weeks to see how he is tolerating this and to follow-up on the phosphorus off of Zometa and she will order repeat CT chest abdomen pelvis with contrast and total body bone scan the end of September.I will see him back after that.    -9/8/2021 Zometa resumed.  PSA <0.10    -10/5/2021 [facility] medical oncology follow-up visit: followed-up with radiation oncology 9/21/2021.  Status post symptomatic L5 radiation 5 fractions 20 [place] completed 8/16/2021 with improvement in right hip pain.  9/2/2021 PSA less than 0.1.  9/29/2021 total body bone scan shows increased uptake left iliac bone slightly superior group of the left acetabulum with no additional foci of suspicious abnormality is unlikely treatment related with no new sites of active osseous metastasis.  CT chest abdomen pelvis with contrast shows stable borderline enlarged left periaortic nodes and dating sclerotic bone lesions.Continue Zytiga, prednisone, Relugolix, Zometa, repeat CT chest abdomen pelvis with contrast and total body bone scan the end of the year.  We will follow PSA serially.    -11/17/2021 [facility] follow-up:  Mr. Lugo overall is doing well.  He is tolerating therapy with Zytiga, prednisone and Relugolix along with Zometa which is given every 6 weeks.  PSA remains low at <0.1.  Has occasional low phosphorus, currently low at 1.7.  Serum calcium is normal at 8.9, normal creatinine 0.79 and normal CBC other than for platelets of 124.  I will hold Zometa for 1 month, I did send in a prescription for phosphorus replacement today.  He takes calcium and vitamin D.  I will see him back in 1 month for follow-up.  We will repeat restaging  scans after that visit.    -12/15/2021 [facility] clinic follow-up: Mr. Lugo continues to do well on therapy with Zytiga, prednisone and Relugolix, his PSA remains low at <0.1.  He is continuing to have left lower back pain, he is working with palliative care and they have referred him also to pain management.  Pain management has talked to him about putting in a pain pump however he is leery of this, I told him that this was a safe and effective pain management technique and to certainly give consideration to their recommendations.  His phosphorus is improving however is still a little low, I will hold off on Zometa until we see him back in 1 month, we may end up only giving it every 12 weeks.  We will repeat restaging scans with CT chest, abdomen and pelvis along with total body bone scan prior to return and I have ordered those today.      -1/5/2022 CT chest abdomen pelvis with contrast [facility] showing stable left periaortic adenopathy and unchanged sclerotic thoracic, lumbar spine and pelvis lesions with no new or progressive disease in the chest abdomen or pelvis.  Total body bone scan shows no significant change and no new suspicious foci.  Stable posterior right acetabulum and left superior acetabulum and degenerative changes in the cervical spine, shoulders, acromioclavicular joints, sternoclavicular joints, elbows, wrists, hands, thoracic spine, lumbosacral spine, sacroiliac joints, hips, knees, ankles, feet.    -1/11/2022 [facility] medical oncology hematology follow-up visit: I reviewed images and reports from his 1/5/2022 scans.  No active disease and PSA immmeasurably low on Zytiga, prednisone, Relugolix.  However, he has struggled with hypophosphatemia and is significantly fatigued with this.  Given that the bones are doing well and given that his phosphorus continues to remain consistently low at 2.2 in mid December, 1.7 mid November and 2.5 mid-September and as low as 1.5 back to July  and the likelihood that this is related to his Zometa, given that his bones are stable overall, he will increase from 1200 mg up to 1600 mg of calcium and phosphate a day and we will hold his Zometa for the foreseeable future.  He will see my nurse practitioner back in a month to continue to monitor his phosphorus along with his calcium and other labs and will repeat his scans again in 3 months.  In addition, given his fatigue we will check his ACTH and cortisol though he is taking his prednisone with his Zytiga.  Though his electrolytes are normal, I will keep an eye on the cortisol and ACTH and have these labs not only drawn today but again just prior to return to my nurse practitioner on February 10.    -2/10/2022 [facility] follow-up: Mr. Lugo overall is stable, no change in his health this past month.  I have reviewed his labs from 2/8/2022 and they are unremarkable, his phosphorus is now normal at 3.2. We are continuing to hold his Zometa, he last received Zometa on 10/5/2021.  He continues therapy with Zytiga, prednisone and Relugolix.  Dr. Rashid had ordered cortisol level and ACTH which are low, currently his ACTH is 2.8 and cortisol 3.08 however these are both done in the face of ongoing prednisone that he takes with his Zytiga.  We will get him to endocrinology for further evaluation for possible adrenal insufficiency but will not interrupt his treatment in the interim.  He will need restaging scans again in April for a 3-month follow-up.  He will continue to follow with palliative care and pain management for his cancer related pain.  His PSA remains immeasurably low at <0.1 on 2/8/22.    -2/15/2022 went to emergency room with weakness, decreased appetite, myalgias in the setting of known COVID-19 testing positive a few days prior to this visit.  Phosphorus had been low in the past but was normal in the emergency room with unremarkable CBC and CMP.  Chest x-ray unremarkable saturating well on  room air mildly hypertensive with dry mucosa.  Given 500 cc crystalloid.  Covid test positive.  Mild thrombocytopenia 136,000 and otherwise unremarkable.  Discharged home.    -3/3/2022 data:  PSA less than 0.1  CMP unremarkable  Cortisol 3.96 normal  ACTH 2.8 lower limit of normal 7.2  Phosphorus normal 2.6    -3/8/2022 [facility] follow-up: Suspect big chunk of his fatigue was Covid.  Another part of the fatigue likely related to lack of testosterone.  Not hypophosphatemic off of Zometa.  ACTH running low while on prednisone not surprising but with normal cortisol and I doubt adrenal insufficient which is why he is on prednisone to avoid such while taking Zytiga.  Overall doing fairly well and with immeasurably low PSA, I will have labs done on him early April, see my nurse practitioner early May, and she will order repeat bone scan and CTs the end of May and I will see him back in June.  We will continue to hold the Zometa unless bone lesions progress given symptomatic prior hypophosphatemia on Zometa.  He will keep his appointment April 28 with Dr. Mir Duffy just to be sure that my take on his pituitary/adrenal axis assessment is accurate.    -4/28/2022 endocrinology consult Dr. Mir Duffy.  With low ACTH and cortisol on prednisone with Zytiga, the adequacy of prednisone cannot be assessed by measuring ACTH or cortisol but is assessed by weight changes, blood pressure, blood sugar, potassium, overall sense of how the patient is doing.  Primary adrenal insufficiency with low ACTH and low cortisol would be typical for the situation as his blood sugar tends to run a little high and potassium a little low, he did not think increasing prednisone was necessary.  He put him on some potassium.  No follow-up scheduled, will see as needed.    -5/4/2022 [facility] follow-up: Mr. Lugo continues on therapy with Zytiga and prednisone along with Relugolix.  His Zometa has been on hold due to previous  hypophosphatemia.  There is some concern about potential adrenal insufficiency. He saw endocrinology, Dr. Mir Duffy on 4/28/2022.  I did review his office note and he stated that the low ACTH and low serum cortisol would be typical for Mr. Lugo's situation.  He further stated that the adequacy of prednisone dose cannot be assessed by measuring ACTH or cortisol but is rather assessed by the overall just altered how the patient is feeling-weight change, blood pressure, blood sugar, potassium, overall sense of wellbeing.  His potassium had been running a little low therefore they put him on potassium 10 mill equivalents daily. Of note, I do not see a future follow-up scheduled with endocrinology.  He is having night sweats, I am not sure if this is related.  His glucose was normal this morning at 107.  His weight is stable.  He will continue current treatment for his prostate cancer unchanged, we will continue to hold his Zometa.  Current phosphorus and magnesium are normal.  CBC and CMP from today are unremarkable, cortisol is normal.  PSA and ACTH are pending  In regards to his night sweats, he had taken clonidine previously 0.1 mg twice daily as needed, I did send in a short supply again to try and see if this helps.  He also is having indigestion despite being on omeprazole twice daily, I sent a prescription for Pepcid.  He had an EGD August 2021.  We will repeat restaging scans prior to return and I have ordered those today.     -5/24/2022 CT chest abdomen pelvis with contrast[place]fort negative.  Bone scan shows stable bone metastases.    - 5/25/2022 PSA less than 0.1, platelets 130,000, otherwise unremarkable CBC and CMP.  A.m. cortisol running low 1.2 with phosphorus low 2.3.    -5/31/2022 [facility] medical oncology follow-up: On Zytiga, prednisone, Relugolix, PSA remaining immeasurably low with stable bone scan and no visceral/paulino metastases.  Phosphorus still running low.  I have discontinued his potassium  chloride and started potassium phosphate 500 mg 4 times a day.  Unless PSA rising, we will plan on repeating CTs and bone scan in 6 months and will follow with my nurse practitioner in the interim and if symptoms dictate or labs, will get back to Dr. Duffy for management of potential adrenal insufficiency but presently we will just see how he does with potassium phosphate and hopeful improvement in the phosphorus level with time.  We will continue to follow with palliative care for pain management    -7/6/2022 [facility] clinic follow-up: Mr. Lugo overall is doing well but continues to be troubled with fatigue and hot flashes.  For the most part he states he is able to do the things he wants to do, he continues to work but does have to take more rest periods.  I had a long discussion with him and his wife after reviewing his medications with them as they wanted to see if there was anything he could stop or adjust.  I discussed with him the need to continue on treatment for his prostate cancer even though his PSA remains immeasurably low and his scans look good but that if his medication is stopped the cancer would progress and over time it still has the potential to progress on therapy but would continue it as long as possible to keep his disease under control.  I explained this is like treating someone with hypertension, you do not stop the antihypertensive just because the blood pressure normalizes.  They stated understanding.  There is no dose adjustment of Zytiga or prednisone that would minimize his symptoms, he is on the current standard recommended therapy.  Discussed with him that sometimes during times of stress we may need to increase his prednisone dose but for now would not change anything.  His phosphorus level is normal, we continue to hold his Zometa.  I did give him the option of holding his phosphorus for the next month and we will see what happens, if it drops we will start him back on it but  may be at a lower dose rather than 4 times a day maybe twice a day.  For now he will continue therapy unchanged.  We will continue monitoring labs monthly.  No PSA was drawn this month but that is okay as his PSA has been running immeasurably low at <0.1, we will recheck next month with lab draw.  We will stop checking his ACTH and cortisol level every month, if he develops worsening symptoms I will repeat those.  He has not gotten a follow-up with endocrinology as of yet.    -8/3/2022 [facility] clinic follow-up:  Mr. Lugo is doing well as far as his prostate cancer goes with continued immeasurably low PSA of <0.1.  He continues on therapy with Zytiga and prednisone along with Relugolix.  His phosphorus is stable at 2.7 which was just slightly low by our reference range however was 2.71-month ago also which was normal on another labs reference range.  He has not taken his phosphorus for this past month as he thought it was making him more fatigued.  He really can tell no difference whether he was taking it or not.  I have asked him to try to go back on phosphorus twice a day rather than 4 times a day and see if this is tolerated and if we can keep his phosphorus level from dropping.  His biggest complaint today is flareup of his arthralgias and back pain.  He is following with pain management, Dr. Danny Avalos but is requesting a referral back to palliative medicine as he feels that no one is currently listening to him and they are just prescribing the same medications that are not effective according to his report.  He does have a follow-up with Dr. Avalos on 8/12/2022 but due to his current pain is not able to work until after he is seen and hopefully can get some better relief.  I have given him a work release until 15 August.  I have also put in a referral back to palliative care.  Also encouraged him to work with Dr. Avalos for help in resolving his issue.  Apparently they have recommended some type of a  pump however he has been hesitant to that but likely would be helpful.  We will continue therapy unchanged.  We will continue monthly labs including phosphorus, we are not currently checking ACTH and cortisol monthly as Dr. Mir Duffy previously stated in his assessment on 4/28/2022 that the adequacy of prednisone cannot be assessed by measuring ACTH or cortisol but would be assessed by symptoms, see note from 4/28/2022.  He made no further follow-up appointments.  Fatigue is not worsening currently.  His glucose and potassium are  Normal.    -10/13/2022 [facility] medical oncology follow-up: Mr. Lugo is doing well.  He is tolerating treatment with Zytiga and prednisone along with Relugolix without any unusual side effects.  His PSA has remained immeasurably low at <0.1.  His phosphorus was slightly low at last visit.  He had been instructed to go back on phosphorus twice a day but he misunderstood and has not been taking it.  We will check labs today.  I will follow-up with results and notify him if he needs to restart the phosphorus.  His pain is better controlled since reestablishing with Anna Ayers, palliative care.  We will repeat scans prior to next follow-up.  I have ordered CT chest abdomen pelvis along with bone scan.  We will see him back in 1 month.    -10/13/2022 PSA less than 0.014.With unremarkable CBC and CMP.  Phosphorus still a little low 2.3.    -11/4/2022 CT abdomen pelvis chest showed no evidence of disease progression with stable sclerotic bone lesions.  Bone scan stable right greater than left acetabular metastasis.  Stable bone metastases.    -11/8/2022 [facility] medical oncology telemedicine follow-up: Patient states that he feels achy all over with low-grade temps and a cough mildly productive.  Has an appointment later today to see his primary care for testing for flu and COVID.  Suggested that if he were positive for COVID that he get Paxlovid and that if he were positive for flu that he  get Tamiflu and to discuss this with his primary care.  From the prostate cancer side, his PSA is immeasurably low with stable bone metastases and no evidence of progression.  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  We will continue the Relugolix, Zytiga, prednisone and he will see my nurse practitioner 12/7/2022.  Would repeat CTs and bone scan in May.    -1/25/2023 [facility] Oncology medicine follow-up: Mr. Lugo is having worsening fatigue and muscle aches.  He does have adrenal insufficiency on Zytiga and prednisone for his prostate cancer, he saw endocrinology in light of low ACTH back in April 2022.  At that time there was no recommendation other than for monitoring him for his overall wellbeing and labs.  He denies any fevers or chills.  His labs as shown above are unremarkable, his CBC and CMP are normal other than for glucose of 112, PSA remains low at <0.1.  He has no new pain or any symptoms concerning for progression of his prostate cancer.  I will get him back to endocrinology to see if they feel any dose adjustment of his prednisone would be helpful in his symptoms.  Currently he is on 5 mg a day with his Zytiga.  I did not repeat his ACTH or cortisol as they would be expected to be low in this situation.  His potassium is normal, he has had no weight loss or change in his appetite.  Blood sugar is reasonable.  His only complaint currently is worsening fatigue and muscle aches.  He will continue treatment unchanged with Zytiga, prednisone and relugolix.  I will see him back in 1 month for follow-up.  We will repeat his restaging scans in May.    I spent 30 minutes caring for Naveen on this date of service. This time includes time spent by me in the following activities: preparing for the visit, reviewing tests, obtaining and/or reviewing a separately obtained history, performing a medically appropriate examination and/or evaluation, referring and communicating with  other health care professionals and documenting information in the medical record.     Abena Velasquez, APRN    01/25/2023

## 2023-01-26 DIAGNOSIS — G89.3 PAIN, CANCER: ICD-10-CM

## 2023-01-26 RX ORDER — OXYCODONE 9 MG/1
9 CAPSULE, EXTENDED RELEASE ORAL EVERY 12 HOURS
Qty: 60 EACH | Refills: 0 | Status: SHIPPED | OUTPATIENT
Start: 2023-02-02 | End: 2023-02-28 | Stop reason: SDUPTHER

## 2023-01-26 RX ORDER — HYDROMORPHONE HYDROCHLORIDE 8 MG/1
8 TABLET ORAL EVERY 4 HOURS PRN
Qty: 180 TABLET | Refills: 0 | Status: SHIPPED | OUTPATIENT
Start: 2023-02-02 | End: 2023-02-28 | Stop reason: SDUPTHER

## 2023-01-26 NOTE — TELEPHONE ENCOUNTER
I have reviewed patient's AGUSTIN report prior to prescribing Schedule II, III, and IV medications. Request # [id number]. Next refills for Xtampza 9 mg BID #60 and hydromorphone 8 mg q4h PRN #[id number] were sent to the pharmacy. The patient is scheduled to follow-up on 4/3/23.

## 2023-02-13 ENCOUNTER — SPECIALTY PHARMACY (OUTPATIENT)
Dept: ONCOLOGY | Facility: HOSPITAL | Age: 68
End: 2023-02-13
Payer: MEDICARE

## 2023-02-13 NOTE — PROGRESS NOTES
Specialty Pharmacy Refill Coordination Note     Naveen is a 67 y.o. male contacted today regarding refills of  abiraterone 1000mg PO QD and relugolix 120mg PO QD specialty medication(s).    Specialty medication(s) and dose(s) confirmed: yes    Refill Questions    Flowsheet Row Most Recent Value   Changes to allergies? No   Changes to medications? No   New conditions since last clinic visit No   Unplanned office visit, urgent care, ED, or hospital admission in the last 4 weeks  No   How does patient/caregiver feel medication is working? Good   Financial problems or insurance changes  No   Since the previous refill, were any specialty medication doses or scheduled injections missed or delayed?  No   Does this patient require a clinical escalation to a pharmacist? No          Delivery Questions    Flowsheet Row Most Recent Value   Delivery method [employer]   Delivery address correct? Yes   Delivery phone number [phone]   Preferred delivery time? Anytime   Number of medications in delivery 2   Medication being filled and delivered abiraterone and relugolix   Doses left of specialty medications 7 days left of each   Is there any medication that is due not being filled? No   Supplies needed? No supplies needed   Cooler needed? No   Do any medications need mixed or dated? No   Additional comments no copay   Questions or concerns for the pharmacist? No   Explain any questions or concerns for the pharmacist n/a   Are any medications first time fills? No            Medication Adherence    Adherence tools used: watch   Other adherence tool: Clock - takes at same time each day, 7:45am   Support network for adherence: family member          Follow-up: 28 day(s)     Sushma Muro, Pharmacy Technician  Specialty Pharmacy Technician

## 2023-02-21 ENCOUNTER — INFUSION (OUTPATIENT)
Dept: ONCOLOGY | Facility: HOSPITAL | Age: 68
End: 2023-02-21
Payer: MEDICARE

## 2023-02-21 VITALS
DIASTOLIC BLOOD PRESSURE: 62 MMHG | WEIGHT: 197 LBS | TEMPERATURE: 97.1 F | BODY MASS INDEX: 29.09 KG/M2 | HEART RATE: 98 BPM | SYSTOLIC BLOOD PRESSURE: 140 MMHG | RESPIRATION RATE: 17 BRPM

## 2023-02-21 DIAGNOSIS — Z45.2 ENCOUNTER FOR CARE RELATED TO PORT-A-CATH: Primary | ICD-10-CM

## 2023-02-21 DIAGNOSIS — C61 PROSTATE CANCER METASTATIC TO BONE: ICD-10-CM

## 2023-02-21 DIAGNOSIS — C79.51 PROSTATE CANCER METASTATIC TO BONE: ICD-10-CM

## 2023-02-21 PROCEDURE — 25010000002 HEPARIN LOCK FLUSH PER 10 UNITS: Performed by: INTERNAL MEDICINE

## 2023-02-21 PROCEDURE — 36591 DRAW BLOOD OFF VENOUS DEVICE: CPT

## 2023-02-21 RX ORDER — HEPARIN SODIUM (PORCINE) LOCK FLUSH IV SOLN 100 UNIT/ML 100 UNIT/ML
500 SOLUTION INTRAVENOUS AS NEEDED
Status: DISCONTINUED | OUTPATIENT
Start: 2023-02-21 | End: 2023-02-21 | Stop reason: HOSPADM

## 2023-02-21 RX ADMIN — HEPARIN 500 UNITS: 100 SYRINGE at 13:35

## 2023-02-22 ENCOUNTER — OFFICE VISIT (OUTPATIENT)
Dept: ENDOCRINOLOGY | Facility: CLINIC | Age: 68
End: 2023-02-22
Payer: MEDICARE

## 2023-02-22 VITALS
HEART RATE: 85 BPM | OXYGEN SATURATION: 99 % | DIASTOLIC BLOOD PRESSURE: 80 MMHG | WEIGHT: 195 LBS | SYSTOLIC BLOOD PRESSURE: 136 MMHG | HEIGHT: 69 IN | BODY MASS INDEX: 28.88 KG/M2

## 2023-02-22 DIAGNOSIS — R53.83 OTHER FATIGUE: ICD-10-CM

## 2023-02-22 DIAGNOSIS — R23.2 HOT FLASHES: ICD-10-CM

## 2023-02-22 DIAGNOSIS — E27.40 ADRENAL INSUFFICIENCY: ICD-10-CM

## 2023-02-22 DIAGNOSIS — R35.89 POLYURIA: ICD-10-CM

## 2023-02-22 DIAGNOSIS — R73.9 HYPERGLYCEMIA: Primary | ICD-10-CM

## 2023-02-22 LAB
ALBUMIN SERPL-MCNC: 4.4 G/DL (ref 3.8–4.8)
ALBUMIN/GLOB SERPL: 1.8 {RATIO} (ref 1.2–2.2)
ALP SERPL-CCNC: 90 IU/L (ref 44–121)
ALT SERPL-CCNC: 7 IU/L (ref 0–44)
AST SERPL-CCNC: 11 IU/L (ref 0–40)
BASOPHILS # BLD AUTO: 0 X10E3/UL (ref 0–0.2)
BASOPHILS NFR BLD AUTO: 0 %
BILIRUB SERPL-MCNC: 0.2 MG/DL (ref 0–1.2)
BUN SERPL-MCNC: 12 MG/DL (ref 8–27)
BUN/CREAT SERPL: 14 (ref 10–24)
CALCIUM SERPL-MCNC: 8.9 MG/DL (ref 8.6–10.2)
CHLORIDE SERPL-SCNC: 100 MMOL/L (ref 96–106)
CO2 SERPL-SCNC: 22 MMOL/L (ref 20–29)
CREAT SERPL-MCNC: 0.88 MG/DL (ref 0.76–1.27)
EGFRCR SERPLBLD CKD-EPI 2021: 94 ML/MIN/1.73
EOSINOPHIL # BLD AUTO: 0.1 X10E3/UL (ref 0–0.4)
EOSINOPHIL NFR BLD AUTO: 1 %
ERYTHROCYTE [DISTWIDTH] IN BLOOD BY AUTOMATED COUNT: 13.5 % (ref 11.6–15.4)
EXPIRATION DATE: NORMAL
GLOBULIN SER CALC-MCNC: 2.5 G/DL (ref 1.5–4.5)
GLUCOSE SERPL-MCNC: 156 MG/DL (ref 70–99)
HBA1C MFR BLD: 5.7 %
HCT VFR BLD AUTO: 36.1 % (ref 37.5–51)
HGB BLD-MCNC: 12 G/DL (ref 13–17.7)
IMM GRANULOCYTES # BLD AUTO: 0 X10E3/UL (ref 0–0.1)
IMM GRANULOCYTES NFR BLD AUTO: 0 %
LYMPHOCYTES # BLD AUTO: 1 X10E3/UL (ref 0.7–3.1)
LYMPHOCYTES NFR BLD AUTO: 14 %
Lab: NORMAL
MCH RBC QN AUTO: 26.3 PG (ref 26.6–33)
MCHC RBC AUTO-ENTMCNC: 33.2 G/DL (ref 31.5–35.7)
MCV RBC AUTO: 79 FL (ref 79–97)
MONOCYTES # BLD AUTO: 0.4 X10E3/UL (ref 0.1–0.9)
MONOCYTES NFR BLD AUTO: 5 %
NEUTROPHILS # BLD AUTO: 5.9 X10E3/UL (ref 1.4–7)
NEUTROPHILS NFR BLD AUTO: 80 %
PHOSPHATE SERPL-MCNC: 2.7 MG/DL (ref 2.8–4.1)
PLATELET # BLD AUTO: 182 X10E3/UL (ref 150–450)
POTASSIUM SERPL-SCNC: 4.1 MMOL/L (ref 3.5–5.2)
PROT SERPL-MCNC: 6.9 G/DL (ref 6–8.5)
PSA SERPL-MCNC: <0.1 NG/ML (ref 0–4)
RBC # BLD AUTO: 4.57 X10E6/UL (ref 4.14–5.8)
SODIUM SERPL-SCNC: 136 MMOL/L (ref 134–144)
WBC # BLD AUTO: 7.4 X10E3/UL (ref 3.4–10.8)

## 2023-02-22 PROCEDURE — 99214 OFFICE O/P EST MOD 30 MIN: CPT | Performed by: INTERNAL MEDICINE

## 2023-02-22 PROCEDURE — 36415 COLL VENOUS BLD VENIPUNCTURE: CPT | Performed by: INTERNAL MEDICINE

## 2023-02-22 PROCEDURE — 81003 URINALYSIS AUTO W/O SCOPE: CPT | Performed by: INTERNAL MEDICINE

## 2023-02-22 PROCEDURE — 83036 HEMOGLOBIN GLYCOSYLATED A1C: CPT | Performed by: INTERNAL MEDICINE

## 2023-02-22 PROCEDURE — 84439 ASSAY OF FREE THYROXINE: CPT | Performed by: INTERNAL MEDICINE

## 2023-02-22 PROCEDURE — 84443 ASSAY THYROID STIM HORMONE: CPT | Performed by: INTERNAL MEDICINE

## 2023-02-22 PROCEDURE — 3044F HG A1C LEVEL LT 7.0%: CPT | Performed by: INTERNAL MEDICINE

## 2023-02-22 NOTE — ASSESSMENT & PLAN NOTE
On prednisone but depending upon the results of the rest of the work up, might need a higher dose

## 2023-02-22 NOTE — ASSESSMENT & PLAN NOTE
He describes polyuria . Not frequency.  Conceivably, he could have DI since he does not have DM.   will check u/a to see what his spec grav is.   Might warrant a therapeutic trial of DDAVP

## 2023-02-22 NOTE — ASSESSMENT & PLAN NOTE
Probably multifactorial- low testosterone, up all night peeing, stage 4 cancer, medications, adrenal deficiency.

## 2023-02-22 NOTE — PROGRESS NOTES
"     Office Note      Date: 2023  Patient Name: Naveen Lugo  MRN: [id number]  : 1955    Chief Complaint   Patient presents with   • Adrenal Problem       History of Present Illness:   Naveen Lugo is a 67 y.o. male who presents for Adrenal Problem  .   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  He is most bothered by fatigue. Excessive sweating with hot flashes and then he gets cold  and excessive urination. When he pees, he makes a lot of urine. And he goes frequently    Labs showed bg of 156 yesterday.      Weight has been stable  Some blurred vision.      Subjective          Review of Systems:   Review of Systems   Constitutional: Positive for diaphoresis and fever.   Endocrine: Positive for heat intolerance, polydipsia and polyuria.       The following portions of the patient's history were reviewed and updated as appropriate: allergies, current medications, past family history, past medical history, past social history, past surgical history and problem list.    Objective     Visit Vitals  /80 (BP Location: Left arm, Patient Position: Sitting, Cuff Size: Adult)   Pulse 85   Ht 175.3 cm (69\")   Wt 88.5 kg (195 lb)   SpO2 99%   BMI 28.80 kg/m²           Physical Exam:  Physical Exam  Vitals reviewed.   Constitutional:       Appearance: Normal appearance.   HENT:      Head: Normocephalic and atraumatic.   Cardiovascular:      Rate and Rhythm: Normal rate.   Pulmonary:      Effort: Pulmonary effort is normal.   Neurological:      Mental Status: He is alert.   Psychiatric:         Mood and Affect: Mood normal.         Behavior: Behavior normal.         Thought Content: Thought content normal.         Judgment: Judgment normal.         Assessment / Plan      Assessment & Plan:  Problem List Items Addressed This Visit        Other    Fatigue    Current Assessment & Plan     " Probably multifactorial- low testosterone, up all night peeing, stage 4 cancer, medications, adrenal deficiency.          Relevant Orders    TSH    T4, Free    Adrenal insufficiency (HCC)    Current Assessment & Plan     On prednisone but depending upon the results of the rest of the work up, might need a higher dose          Hyperglycemia - Primary    Current Assessment & Plan     He has polyuria and polydipsia and has had some high blood sugars. I did an a1c-it was normal.          Relevant Orders    POC Glycosylated Hemoglobin (Hb A1C) (Completed)    Polyuria    Current Assessment & Plan     He describes polyuria . Not frequency.  Conceivably, he could have DI since he does not have DM.   will check u/a to see what his spec grav is.   Might warrant a therapeutic trial of DDAVP         Relevant Orders    Urinalysis without microscopic (no culture) - Urine, Clean Catch    Hot flashes    Current Assessment & Plan     Due to low T.   Not much we can do about them.  Perhaps venlaxifene?             Mir Duffy MD   02/22/2023

## 2023-02-23 LAB
BILIRUB UR QL STRIP: NEGATIVE
CLARITY UR: ABNORMAL
COLOR UR: YELLOW
GLUCOSE UR STRIP-MCNC: NEGATIVE MG/DL
HGB UR QL STRIP.AUTO: NEGATIVE
KETONES UR QL STRIP: NEGATIVE
LEUKOCYTE ESTERASE UR QL STRIP.AUTO: NEGATIVE
NITRITE UR QL STRIP: NEGATIVE
PH UR STRIP.AUTO: 6.5 [PH] (ref 5–8)
PROT UR QL STRIP: NEGATIVE
SP GR UR STRIP: 1.02 (ref 1–1.03)
T4 FREE SERPL-MCNC: 1 NG/DL (ref 0.93–1.7)
TSH SERPL DL<=0.05 MIU/L-ACNC: 1.69 UIU/ML (ref 0.27–4.2)
UROBILINOGEN UR QL STRIP: ABNORMAL

## 2023-02-24 RX ORDER — ATORVASTATIN CALCIUM 20 MG/1
20 TABLET, FILM COATED ORAL
Qty: 30 TABLET | Refills: 0 | Status: SHIPPED | OUTPATIENT
Start: 2023-02-24

## 2023-02-28 DIAGNOSIS — G89.3 PAIN, CANCER: ICD-10-CM

## 2023-02-28 RX ORDER — OXYCODONE 9 MG/1
9 CAPSULE, EXTENDED RELEASE ORAL EVERY 12 HOURS
Qty: 60 EACH | Refills: 0 | Status: SHIPPED | OUTPATIENT
Start: 2023-03-04 | End: 2023-03-03

## 2023-02-28 RX ORDER — HYDROMORPHONE HYDROCHLORIDE 8 MG/1
8 TABLET ORAL EVERY 4 HOURS PRN
Qty: 180 TABLET | Refills: 0 | Status: SHIPPED | OUTPATIENT
Start: 2023-03-04 | End: 2023-03-03

## 2023-02-28 NOTE — TELEPHONE ENCOUNTER
Patient was called back. He is needing a refill of his Hydromorphone 8mg and his Xtampza 9mg. Patient wants pharmacy changed to [facility] #:[id number]     Medication requested: Hydromorphone 8mg and Xtampza 9mg    Last fill date: 02/02/2023    Last appointment: 01/03/2023    Next appointment: 04/03/2023

## 2023-02-28 NOTE — TELEPHONE ENCOUNTER
PATIENT CALLED AND REQUESTED FOR REFILL ON OXYMORPHINE AND EXTRIVA (?). PLEASE CONTACT PATIENT FOR CLARIFICATION ON THE SCRIPTS. PATIENT ALSO WANTED TO SWITCH PHARMACIES FROM [facility] TO [facility] AS THEY HAVE BEEN CHARGING HIM MORE FOR SCRIPTS. PLEASE ADVISE.

## 2023-03-01 ENCOUNTER — OFFICE VISIT (OUTPATIENT)
Dept: ONCOLOGY | Facility: CLINIC | Age: 68
End: 2023-03-01
Payer: MEDICARE

## 2023-03-01 VITALS
HEART RATE: 85 BPM | BODY MASS INDEX: 29.03 KG/M2 | RESPIRATION RATE: 18 BRPM | WEIGHT: 196 LBS | HEIGHT: 69 IN | DIASTOLIC BLOOD PRESSURE: 72 MMHG | OXYGEN SATURATION: 94 % | TEMPERATURE: 97.7 F | SYSTOLIC BLOOD PRESSURE: 145 MMHG

## 2023-03-01 DIAGNOSIS — C79.51 PROSTATE CANCER METASTATIC TO BONE: Primary | ICD-10-CM

## 2023-03-01 DIAGNOSIS — C61 PROSTATE CANCER METASTATIC TO BONE: Primary | ICD-10-CM

## 2023-03-01 PROCEDURE — 99214 OFFICE O/P EST MOD 30 MIN: CPT | Performed by: NURSE PRACTITIONER

## 2023-03-01 RX ORDER — VENLAFAXINE HYDROCHLORIDE 37.5 MG/1
37.5 CAPSULE, EXTENDED RELEASE ORAL DAILY
Qty: 30 CAPSULE | Refills: 0 | Status: SHIPPED | OUTPATIENT
Start: 2023-03-01

## 2023-03-01 NOTE — PROGRESS NOTES
CHIEF COMPLAINT: 1.  Fatigue   2.  Muscle aches   3.  Hot flashes    Problem List:  Oncology/Hematology History Overview Note   1.  Stage IVb grade group 4 metastatic prostate cancer with sclerotic bone lesions on CT and bone scan and history of prostatic hypertrophy.  Good response to Taxotere ending 2/18/2021 Relugolix, followed by Zytiga prednisone with L4/sacrum, left acetabulum, and right pelvis external beam radiation August 2021.  Hypophosphatemia on Zometa.  Zometa held as of October 2021.  2.  Kidney stone  3.  Polyps  4.  Probable drug-induced hypophosphatemia from Zometa, drug stopped after 10/5/2021 dose  5.  Cancer related pain  6.  Fatigue  7.  Covid 2/2022    -9/30/2020 office note from Dr. Ronen Reynaga indicates patient with PSA 10.8.  Underwent TRUS prostate biopsy 9/15/2020.  Adenocarcinoma the prostate [place] 4+4 = 8 in 1 out of 12 cores and 4+3 = 7 and 10 out of 12 cores and 3+4 = 7 in 1 out of 12 cores.  Perineural invasion.  Extraprostatic extension into the fat seen on 2 biopsies of the right lateral mid and right apex.  9/24/2020 CT chest and whole-body bone scan showed T12, L1, left acetabular, right medial acetabular metastases with no lung metastases.  He started androgen deprivation therapy with Casodex with plans for Lupron to start in 1 to 2 weeks for clinical T3 N0 M1 metastatic prostate cancer.  Review of official report from [facility] 9/24/2020 bone scan mentions T12, L1, roof of left acetabulum and medial right acetabular osteoblastic metastases.  CT chest with contrast that same day showed mild splenomegaly 14.2 cm with stable periaortic nodes compared to CT December 2015 with no lung metastases.  Sclerotic abnormality within the T12 vertebral body, L1 vertebral body extending into the pedicle unchanged.  8/28/2020 CT abdomen pelvis report from [facility] reviewed and mentions normal spleen size.  Punctate nonobstructing kidney stone, no paulino enlargement,  diffuse bladder wall thickening with mild perivesicular fat stranding, 2.1 cm sclerotic left iliac bone above the left acetabulum new compared to 2015 as well as 1.5 cm faintly sclerotic focus in the right posterior acetabulum stable compared to prior CT 2015 as well as a 1.6 cm T12 and inferior vertebral body sclerotic lesion and a 1.9 cm left posterior lateral L1 vertebral body abnormality extending to the pedicle.  -10/13/2020 initial [facility] medical oncology consultation: With 1 [place] score 8, 4+4 lesion that would make him a grade group 4 with stage IVb disease given radiographic evidence of sclerotic bone metastasis on bone scan and CT.  Needs genetic testing given metastatic prostate cancer and this is also important as, were he to have mismatch repair mutation then we would have options for PDL 1 inhibitors and were he BRCA mutated would have options for PARP inhibitors in the future.  Systemic therapy for castration naïve prostate cancer or N1 disease should be with apalutamide or Abiraterone or enzalutamide all of which are category 1.  He does not have any visceral metastases.  According to his imaging he has T12, L1, left acetabular, right acetabular, and left iliac bony involvement.  That means he would have 4 or more bone metastases with at least one metastasis (i.e. the acetabular lesions bilaterally) beyond the pelvis/vertebral, for which this would be considered high-volume disease for which 6 cycles of docetaxel 75 mg/m² x 6 cycles followed by Zytiga and prednisone would be reasonable along with Zometa every 6 weeks for bone stabilization.  He will do chemo preparation visit with my nurse practitioner prior to start chemotherapy with Taxotere and Zometa in 2 weeks and he is already received Casodex in preparation for Lupron shot he received on 10/12/2020 with Dr. Reynaga.  We will get him to Dr. Alexander Gomez who has done his polypectomies in the past for port placement and we will get genetic  counseling started.  Following the 6 courses of Taxotere per the [place] trial criteria, I would then give him an indefinite Zytiga and prednisone and Zometa until progression or toxicity dictates.    -12/16/2019 COVID-19 antigen test negative    -12/29/2020 PSA 0.275 with absolute neutrophil count 700 and creatinine 0.76 with normal liver enzymes and electrolytes.  Normal magnesium and phosphorus and urine drug screen positive for buprenorphine and opiates follow-up with palliative care.    -1/4/2021 CT chest abdomen pelvis with contrast and whole-body bone scan shows improving less metabolically active bone metastases.  Stable sclerotic T12, L1, and L2 vertebral bodies and bilateral pelvic bones on bone windows with stable subcentimeter para-aortic lymph nodes and no definite progression in the chest abdomen or pelvis.    -1/5/2021 [facility] medical oncology follow-up visit: I reviewed the images and reports thereof of the above CT and bone scan which shows good response to Taxotere x3 courses with a PSA down to 0.275 from a baseline of 10.  Get another 3 courses of Taxotere, continue his Xgeva, and then following that we will switch to Zytiga and prednisone.  He is working with palliative care on his bone pain but on his current dose of fentanyl patch he says his pain is still not adequately controlled he will contact them.  Dexamethasone prescription was refilled.  We will see my nurse practitioner back in 3 weeks for course #5 of 6 total courses and then we will reimage with CT chest abdomen pelvis and bone scan before going to the Zytiga prednisone.    -3/4/2021 CT chest abdomen pelvis with contrast is negative save for stable sclerotic bone lesions and the bone scan shows near resolution of prior bony abnormalities associated with the known sclerotic lesions in the thoracolumbar spine and bony pelvis.    2/17/2021 PSA down to 0.1 from 1.37 October 2020.  3/9/2021 PSA 0.1    -5/26/2021 CT chest abdomen pelvis  with contrast shows stable to slightly underlying increase low-density left para-aortic node.  Bone lesions stable.  Whole-body bone scan stable to decrease activity of known thoracolumbar and bony pelvic involvement.  PSA less than 0.1  , AST 74, bilirubin 0.5.       -6/1/2021 [facility] medical oncology follow-up visit: I reviewed the above data with him and images thereof.  Bones are stable.  No significant adenopathy.  PSA immeasurably low.     upper limit of normal 69 and AST 74 upper limit of normal 46.  Hence, neither is more than double the upper limit of normal with no ascites and no encephalopathy and normal albumin and bilirubin, despite the elevation of liver enzymes, he has child Abrams score A.  Package insert for Abiraterone says that for ALT and/or AST greater than 5 times upper limit of normal or total bilirubin greater than 3 times the upper limit of normal to withhold treatment until liver function tests returned to baseline or ALT and AST less than or equal to 2.5 times the upper limit of normal and total bilirubin less than or equal to 1.5 times the upper limit of normal and then reinitiate at 750 mg daily dose of Zytiga.  For recurrent hepatotoxicity on 750 mg daily Zytiga, withhold treatment until liver functions returned to baseline or ALT and AST less than 3-2.5 times upper limit of normal and total bilirubin less than or equal to 1.5 times the upper limit of normal then reinitiate at 500 mg daily Zytiga dose.  If has recurrent hepatotoxicity on 500 mg dose, then discontinue treatment.  If has ALT greater than 3 times the upper limit of normal along with total bilirubin greater than 2 times the upper limit of normal in the absence of other contributing causes or biliary obstruction, permanently discontinue Zytiga.  Based on these recommendations, I would continue current doses but we will watch with serial labs monthly and repeat his imaging again in 3 months but clinically he is  doing wonderfully with excellent response to Taxotere and now on Zytiga prednisone plus Zometa along with Relugolix.    -6/23/2020 for [facility] oncology clinic follow-up: Having persistent and worsening pain above his left hip.  I will get an MRI of the left hip for further evaluation.  Otherwise we will continue therapy unchanged with Zytiga, prednisone and Zometa along with Relugolix.    -7/28/2021 [facility] oncology clinic follow-up: Patient with multiple somatic complaints including episodes of confusion, dizziness, decreased memory, agitation.  He reports ongoing episodes with difficulty swallowing.  Also most concerning for episodes of angina and chest pain for which he basically refused to seek emergency medical attention.  He has a history of coronary artery disease and stent placement.  He has not followed up with cardiology for many years.  I will get an MRI of the brain in light of his confusion, dizziness and agitation.  I will get him to gastroenterology for EGD in light of his dysphagia.  I have also put in a referral for cardiology.  I reemphasized to the patient, his wife who is with him today and his son who was on the telephone during our visit the importance of seeking out prompt medical attention when he is having chest pain or neurological concerns so that he can be evaluated.  The patient states that he basically refuses to go to the emergency room and if his family calls an ambulance he would not agree to go to the hospital.  For the time being, I have asked him to hold his Zytiga and prednisone until we can sort this out as Zytiga does have some cardiovascular risk.  There is likely no treatment for prostate cancer that does not carry some form of cardiovascular risk.  His PSA remains low at 0.014 yesterday.  He will continue relugolix for now.    Of note, some of these symptoms could also be due to his hypophosphatemia, phosphate level from yesterday was 1.5, I have sent in a prescription for  "K-Phos 3 times a day before meals for 10 days.  We will hold further Zometa until this is corrected.  I have also put in an order to check his vitamin D level.  He takes calcium and vitamin D daily.  Some of his symptoms also could be due to drug withdrawal as there was some confusion and difficulty getting his last prescription for methadone therefore he was without methadone for several days, he now has his prescription and is back on his pain medication.  He has an appointment with neurosurgery tomorrow in light of his ongoing back pain, MRI of the hip recently showed multiple bony lesions largest at the L5 vertebral body and left supra-acetabular region.  If no neurosurgical intervention recommended he may benefit from spot radiation as this is where a lot of his pain is coming from.  His wife had questions today regarding his staging and extent of disease.  We reviewed previous scans.  I did clarify with her as she used the words \"cancer free\" as she thought that is what she was told after his CT scans once he completed Taxotere in February.  I explained to her that even though his scans showed he had an excellent response with no clear areas of metastasis that did not mean he was cancer free, I explained to her that when you have metastatic cancer the treatment intent is palliative in nature and to control the disease but not to cure the disease.  She stated understanding.  We will see him back in 2 weeks for follow-up to sort through this and make further plan of care, again, I have asked him to hold Zytiga and prednisone until he sees us back, he will continue on his other medications including relugolix.    -7/30/2021 neurosurgery PA consultation.  MRI with and without contrast L5 ordered.    -8/3/2021 neurosurgery follow-up showed known sclerotic disease with new L5 abnormality without compression deformity or instability.  MRI brain negative for metastasis.    -8/4/2021 office visit Dr. Fco Quintanilla " radiation oncology coordinating with Dr. North neurosurgery for palliative radiation for MRI evidence of L5 and left supra acetabular painful lesions.  States that the patient's disease which is not secreting PSA is experiencing some progression and would benefit from 20 Gy in five fractions and other lesion 30 Gy in ten fractions. Patient offered to go to [place] for radiation but desired treatment in [place].    -8/10/2021 [facility] medical oncology follow-up visit: No chest pains at this junction.  Reviewed MRI brain and other MRIs reviewed by Dr. North and Dwight.  Due for EGD on 19th.  We will repeat his phosphorus along with his other labs continue Relugolix, Zytiga, prednisone, and he will continue radiation palliatively to the painful bony lesions with Dr. Quintanilla/Can.  He will see my nurse practitioner back in a few weeks to see how he is tolerating this and to follow-up on the phosphorus off of Zometa and she will order repeat CT chest abdomen pelvis with contrast and total body bone scan the end of September.I will see him back after that.    -9/8/2021 Zometa resumed.  PSA <0.10    -10/5/2021 [facility] medical oncology follow-up visit: followed-up with radiation oncology 9/21/2021.  Status post symptomatic L5 radiation 5 fractions 20 [place] completed 8/16/2021 with improvement in right hip pain.  9/2/2021 PSA less than 0.1.  9/29/2021 total body bone scan shows increased uptake left iliac bone slightly superior group of the left acetabulum with no additional foci of suspicious abnormality is unlikely treatment related with no new sites of active osseous metastasis.  CT chest abdomen pelvis with contrast shows stable borderline enlarged left periaortic nodes and dating sclerotic bone lesions.Continue Zytiga, prednisone, Relugolix, Zometa, repeat CT chest abdomen pelvis with contrast and total body bone scan the end of the year.  We will follow PSA serially.    -11/17/2021 [facility] clinic  follow-up:  Mr. Lugo overall is doing well.  He is tolerating therapy with Zytiga, prednisone and Relugolix along with Zometa which is given every 6 weeks.  PSA remains low at <0.1.  Has occasional low phosphorus, currently low at 1.7.  Serum calcium is normal at 8.9, normal creatinine 0.79 and normal CBC other than for platelets of 124.  I will hold Zometa for 1 month, I did send in a prescription for phosphorus replacement today.  He takes calcium and vitamin D.  I will see him back in 1 month for follow-up.  We will repeat restaging scans after that visit.    -12/15/2021 [facility] [place] Oncology clinic follow-up: Mr. Lugo continues to do well on therapy with Zytiga, prednisone and Relugolix, his PSA remains low at <0.1.  He is continuing to have left lower back pain, he is working with palliative care and they have referred him also to pain management.  Pain management has talked to him about putting in a pain pump however he is leery of this, I told him that this was a safe and effective pain management technique and to certainly give consideration to their recommendations.  His phosphorus is improving however is still a little low, I will hold off on Zometa until we see him back in 1 month, we may end up only giving it every 12 weeks.  We will repeat restaging scans with CT chest, abdomen and pelvis along with total body bone scan prior to return and I have ordered those today.      -1/5/2022 CT chest abdomen pelvis with contrast [facility] showing stable left periaortic adenopathy and unchanged sclerotic thoracic, lumbar spine and pelvis lesions with no new or progressive disease in the chest abdomen or pelvis.  Total body bone scan shows no significant change and no new suspicious foci.  Stable posterior right acetabulum and left superior acetabulum and degenerative changes in the cervical spine, shoulders, acromioclavicular joints, sternoclavicular joints, elbows, wrists, hands, thoracic spine, lumbosacral  spine, sacroiliac joints, hips, knees, ankles, feet.    -1/11/2022 [facility] hemat[facility] follow-up visit: I reviewed images and reports from his 1/5/2022 scans.  No active disease and PSA immmeasurably low on Zytiga, prednisone, Relugolix.  However, he has struggled with hypophosphatemia and is significantly fatigued with this.  Given that the bones are doing well and given that his phosphorus continues to remain consistently low at 2.2 in mid December, 1.7 mid November and 2.5 mid-September and as low as 1.5 back to July and the likelihood that this is related to his Zometa, given that his bones are stable overall, he will increase from 1200 mg up to 1600 mg of calcium and phosphate a day and we will hold his Zometa for the foreseeable future.  He will see my nurse practitioner back in a month to continue to monitor his phosphorus along with his calcium and other labs and will repeat his scans again in 3 months.  In addition, given his fatigue we will check his ACTH and cortisol though he is taking his prednisone with his Zytiga.  Though his electrolytes are normal, I will keep an eye on the cortisol and ACTH and have these labs not only drawn today but again just prior to return to my nurse practitioner on February 10.    -2/10/2022 [facility] follow-up: Mr. Lugo overall is stable, no change in his health this past month.  I have reviewed his labs from 2/8/2022 and they are unremarkable, his phosphorus is now normal at 3.2. We are continuing to hold his Zometa, he last received Zometa on 10/5/2021.  He continues therapy with Zytiga, prednisone and Relugolix.  Dr. Rashid had ordered cortisol level and ACTH which are low, currently his ACTH is 2.8 and cortisol 3.08 however these are both done in the face of ongoing prednisone that he takes with his Zytiga.  We will get him to endocrinology for further evaluation for possible adrenal insufficiency but will not interrupt his treatment in the  interim.  He will need restaging scans again in April for a 3-month follow-up.  He will continue to follow with palliative care and pain management for his cancer related pain.  His PSA remains immeasurably low at <0.1 on 2/8/22.    -2/15/2022 went to emergency room with weakness, decreased appetite, myalgias in the setting of known COVID-19 testing positive a few days prior to this visit.  Phosphorus had been low in the past but was normal in the emergency room with unremarkable CBC and CMP.  Chest x-ray unremarkable saturating well on room air mildly hypertensive with dry mucosa.  Given 500 cc crystalloid.  Covid test positive.  Mild thrombocytopenia 136,000 and otherwise unremarkable.  Discharged home.    -3/3/2022 data:  PSA less than 0.1  CMP unremarkable  Cortisol 3.96 normal  ACTH 2.8 lower limit of normal 7.2  Phosphorus normal 2.6    -3/8/2022 [facility] medical oncology follow-up: Suspect big chunk of his fatigue was Covid.  Another part of the fatigue likely related to lack of testosterone.  Not hypophosphatemic off of Zometa.  ACTH running low while on prednisone not surprising but with normal cortisol and I doubt adrenal insufficient which is why he is on prednisone to avoid such while taking Zytiga.  Overall doing fairly well and with immeasurably low PSA, I will have labs done on him early April, see my nurse practitioner early May, and she will order repeat bone scan and CTs the end of May and I will see him back in June.  We will continue to hold the Zometa unless bone lesions progress given symptomatic prior hypophosphatemia on Zometa.  He will keep his appointment April 28 with Dr. Mir Duffy just to be sure that my take on his pituitary/adrenal axis assessment is accurate.    -4/28/2022 endocrinology consult Dr. Mir Duffy.  With low ACTH and cortisol on prednisone with Zytiga, the adequacy of prednisone cannot be assessed by measuring ACTH or cortisol but is assessed by weight changes, blood  pressure, blood sugar, potassium, overall sense of how the patient is doing.  Primary adrenal insufficiency with low ACTH and low cortisol would be typical for the situation as his blood sugar tends to run a little high and potassium a little low, he did not think increasing prednisone was necessary.  He put him on some potassium.  No follow-up scheduled, will see as needed.    -5/4/2022 [facility] On[facility] clinic follow-up: Mr. Lugo continues on therapy with Zytiga and prednisone along with Relugolix.  His Zometa has been on hold due to previous hypophosphatemia.  There is some concern about potential adrenal insufficiency. He saw endocrinology, Dr. Mir Duffy on 4/28/2022.  I did review his office note and he stated that the low ACTH and low serum cortisol would be typical for Mr. Lugo's situation.  He further stated that the adequacy of prednisone dose cannot be assessed by measuring ACTH or cortisol but is rather assessed by the overall just altered how the patient is feeling-weight change, blood pressure, blood sugar, potassium, overall sense of wellbeing.  His potassium had been running a little low therefore they put him on potassium 10 mill equivalents daily. Of note, I do not see a future follow-up scheduled with endocrinology.  He is having night sweats, I am not sure if this is related.  His glucose was normal this morning at 107.  His weight is stable.  He will continue current treatment for his prostate cancer unchanged, we will continue to hold his Zometa.  Current phosphorus and magnesium are normal.  CBC and CMP from today are unremarkable, cortisol is normal.  PSA and ACTH are pending  In regards to his night sweats, he had taken clonidine previously 0.1 mg twice daily as needed, I did send in a short supply again to try and see if this helps.  He also is having indigestion despite being on omeprazole twice daily, I sent a prescription for Pepcid.  He had an EGD August 2021.  We will repeat restaging  scans prior to return and I have ordered those today.     -5/24/2022 CT chest abdomen pelvis with contrast[place] negative.  Bone scan shows stable bone metastases.    - 5/25/2022 PSA less than 0.1, platelets 130,000, otherwise unremarkable CBC and CMP.  A.m. cortisol running low 1.2 with phosphorus low 2.3.    -5/31/2022 [facility] medical oncology follow-up: On Zytiga, prednisone, Relugolix, PSA remaining immeasurably low with stable bone scan and no visceral/paulino metastases.  Phosphorus still running low.  I have discontinued his potassium chloride and started potassium phosphate 500 mg 4 times a day.  Unless PSA rising, we will plan on repeating CTs and bone scan in 6 months and will follow with my nurse practitioner in the interim and if symptoms dictate or labs, will get back to Dr. Duffy for management of potential adrenal insufficiency but presently we will just see how he does with potassium phosphate and hopeful improvement in the phosphorus level with time.  We will continue to follow with palliative care for pain management    -7/6/2022 [facility] Oncology clinic follow-up: Mr. Lugo overall is doing well but continues to be troubled with fatigue and hot flashes.  For the most part he states he is able to do the things he wants to do, he continues to work but does have to take more rest periods.  I had a long discussion with him and his wife after reviewing his medications with them as they wanted to see if there was anything he could stop or adjust.  I discussed with him the need to continue on treatment for his prostate cancer even though his PSA remains immeasurably low and his scans look good but that if his medication is stopped the cancer would progress and over time it still has the potential to progress on therapy but would continue it as long as possible to keep his disease under control.  I explained this is like treating someone with hypertension, you do not stop the antihypertensive just because  the blood pressure normalizes.  They stated understanding.  There is no dose adjustment of Zytiga or prednisone that would minimize his symptoms, he is on the current standard recommended therapy.  Discussed with him that sometimes during times of stress we may need to increase his prednisone dose but for now would not change anything.  His phosphorus level is normal, we continue to hold his Zometa.  I did give him the option of holding his phosphorus for the next month and we will see what happens, if it drops we will start him back on it but may be at a lower dose rather than 4 times a day maybe twice a day.  For now he will continue therapy unchanged.  We will continue monitoring labs monthly.  No PSA was drawn this month but that is okay as his PSA has been running immeasurably low at <0.1, we will recheck next month with lab draw.  We will stop checking his ACTH and cortisol level every month, if he develops worsening symptoms I will repeat those.  He has not gotten a follow-up with endocrinology as of yet.    -8/3/2022 [facility] Oncology clinic follow-up:  Mr. Lugo is doing well as far as his prostate cancer goes with continued immeasurably low PSA of <0.1.  He continues on therapy with Zytiga and prednisone along with Relugolix.  His phosphorus is stable at 2.7 which was just slightly low by our reference range however was 2.71-month ago also which was normal on another labs reference range.  He has not taken his phosphorus for this past month as he thought it was making him more fatigued.  He really can tell no difference whether he was taking it or not.  I have asked him to try to go back on phosphorus twice a day rather than 4 times a day and see if this is tolerated and if we can keep his phosphorus level from dropping.  His biggest complaint today is flareup of his arthralgias and back pain.  He is following with pain management, Dr. Danny Avalos but is requesting a referral back to palliative medicine as  he feels that no one is currently listening to him and they are just prescribing the same medications that are not effective according to his report.  He does have a follow-up with Dr. Avalos on 8/12/2022 but due to his current pain is not able to work until after he is seen and hopefully can get some better relief.  I have given him a work release until 15 August.  I have also put in a referral back to palliative care.  Also encouraged him to work with Dr. Avalos for help in resolving his issue.  Apparently they have recommended some type of a pump however he has been hesitant to that but likely would be helpful.  We will continue therapy unchanged.  We will continue monthly labs including phosphorus, we are not currently checking ACTH and cortisol monthly as Dr. Mir Duffy previously stated in his assessment on 4/28/2022 that the adequacy of prednisone cannot be assessed by measuring ACTH or cortisol but would be assessed by symptoms, see note from 4/28/2022.  He made no further follow-up appointments.  Fatigue is not worsening currently.  His glucose and potassium are  Normal.    -10/13/2022 [facility] oncology follow-up: Mr. Lugo is doing well.  He is tolerating treatment with Zytiga and prednisone along with Relugolix without any unusual side effects.  His PSA has remained immeasurably low at <0.1.  His phosphorus was slightly low at last visit.  He had been instructed to go back on phosphorus twice a day but he misunderstood and has not been taking it.  We will check labs today.  I will follow-up with results and notify him if he needs to restart the phosphorus.  His pain is better controlled since reestablishing with [facility].  We will repeat scans prior to next follow-up.  I have ordered CT chest abdomen pelvis along with bone scan.  We will see him back in 1 month.    -10/13/2022 PSA less than 0.014.With unremarkable CBC and CMP.  Phosphorus still a little low 2.3.    -11/4/2022 CT  abdomen pelvis chest showed no evidence of disease progression with stable sclerotic bone lesions.  Bone scan stable right greater than left acetabular metastasis.  Stable bone metastases.    -11/8/2022 [facility] medical oncology telemedicine follow-up: Patient states that he feels achy all over with low-grade temps and a cough mildly productive.  Has an appointment later today to see his primary care for testing for flu and COVID.  Suggested that if he were positive for COVID that he get Paxlovid and that if he were positive for flu that he get Tamiflu and to discuss this with his primary care.  From the prostate cancer side, his PSA is immeasurably low with stable bone metastases and no evidence of progression.  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  We will continue the Relugolix, Zytiga, prednisone and he will see my nurse practitioner 12/7/2022.  Would repeat CTs and bone scan in May.    -1/25/2023 [facility] Oncology medicine follow-up: Mr. Lugo is having worsening fatigue and muscle aches.  He does have adrenal insufficiency on Zytiga and prednisone for his prostate cancer, he saw endocrinology in light of low ACTH back in April 2022.  At that time there was no recommendation other than for monitoring him for his overall wellbeing and labs.  He denies any fevers or chills.  His labs as shown above are unremarkable, his CBC and CMP are normal other than for glucose of 112, PSA remains low at <0.1.  He has no new pain or any symptoms concerning for progression of his prostate cancer.  I will get him back to endocrinology to see if they feel any dose adjustment of his prednisone would be helpful in his symptoms.  Currently he is on 5 mg a day with his Zytiga.  I did not repeat his ACTH or cortisol as they would be expected to be low in this situation.  His potassium is normal, he has had no weight loss or change in his appetite.  Blood sugar is reasonable.  His only complaint  currently is worsening fatigue and muscle aches.  He will continue treatment unchanged with Zytiga, prednisone and relugolix.  I will see him back in 1 month for follow-up.  We will repeat his restaging scans in May.     Prostate cancer metastatic to bone (HCC)   8/30/2020 -  Other Event    PSA 10.8     9/15/2020 Initial Diagnosis    Prostate cancer metastatic to bone (CMS/HCC)     9/15/2020 Biopsy    Prostate needle core biopsy     9/24/2020 Imaging    Total body bone scan: 4 abnormal areas of increased activity consistent with osteoblastic metastasis, abnormal foci of activity seen in the T12 vertebral body, L1 vertebral body, roof of the left acetabulum, and acetabulum medially on the right.  CT chest: Stable sclerotic metastatic lesions within the T12 and L1 vertebrae.  No other evidence of metastatic disease to the chest.  Stable mild splenomegaly and subcentimeter periaortic retroperitoneal lymph nodes.  CT abdomen and pelvis: Diffuse bladder wall thickening with mild perivesicular fat stranding.  Interval development of several sclerotic lesions in the spine and left iliac bone.     10/13/2020 Cancer Staged    Staging form: Bone - Appendicular Skeleton, Trunk, Skull, And Facial Bones, AJCC 8th Edition  - Clinical: Stage IVB (cT3, cN0, cM1b, G3) - Signed by Ishmael Rashid MD on 10/13/2020     11/3/2020 - 10/5/2021 Chemotherapy    OP SUPPORTIVE Zoledronic Acid Q42d     11/3/2020 - 2/18/2021 Chemotherapy    OP PROSTATE DOCEtaxel       1/4/2021 Imaging    CT chest abdomen pelvis with contrast and whole-body bone scan shows improving less metabolically active bone metastases.  Stable sclerotic T12, L1, and L2 vertebral bodies and bilateral pelvic bones on bone windows with stable subcentimeter para-aortic lymph nodes and no definite progression in the chest abdomen or pelvis.  PSA down to 0.275 after 3 courses of Taxotere.     3/4/2021 Imaging    CT chest, abdomen and pelvis stable, no evidence of disease  progression. Total body bone scan with decreased/near resolution of abnormal increased radiotracer uptake associated with the known sclerotic lesions in the thoracolumbar spine and bony pelvis.  No evidence of new osteoblastic metastases.     3/4/2021 Imaging    CT chest abdomen pelvis with contrast is negative save for stable sclerotic bone lesions and the bone scan shows near resolution of prior bony abnormalities associated with the known sclerotic lesions in the thoracolumbar spine and bony pelvis.  2/17/2021 PSA down to 0.1 from 1.37 October 2020.     3/9/2021 - 3/9/2021 Chemotherapy    OP SUPPORTIVE PROSTATE Relugolix     3/9/2021 -  Chemotherapy    OP PROSTATE Abiraterone / PredniSONE       3/10/2021 -  Chemotherapy    OP PROSTATE Abiraterone / PredniSONE     8/10/2021 - 8/16/2021 Radiation    Radiation OncologyTreatment Course:  Naveen Lugo received 2000 cGy in 5 fractions to L4-sacrum, left acetabulum and right pelvis via External Beam Radiation - EBRT.     11/16/2021 -  Chemotherapy    OP CENTRAL VENOUS ACCESS DEVICE ACCESS, CARE, AND MAINTENANCE (CVAD)     1/5/2022 Imaging    -1/5/2022 CT chest abdomen pelvis with contrast [place] [facility] showing stable left periaortic adenopathy and unchanged sclerotic thoracic, lumbar spine and pelvis lesions with no new or progressive disease in the chest abdomen or pelvis.  Total body bone scan shows no significant change and no new suspicious foci.  Stable posterior right acetabulum and left superior acetabulum and degenerative changes in the cervical spine, shoulders, acromioclavicular joints, sternoclavicular joints, elbows, wrists, hands, thoracic spine, lumbosacral spine, sacroiliac joints, hips, knees, ankles, feet.     5/24/2022 Imaging    CT chest abdomen pelvis with contrastFrankfort negative.  Bone scan shows stable bone metastases.     11/4/2022 Imaging    CT abdomen pelvis chest showed no evidence of disease progression with stable sclerotic bone  "lesions.  Bone scan stable right greater than left acetabular metastasis.  Stable bone metastases.         HISTORY OF PRESENT ILLNESS:  The patient is a 67 y.o. male, here for follow up on management of metastatic prostate cancer.  Mr. Lugo continues to report ongoing significant fatigue, muscle aches and hot flashes.  He did see Dr. Duffy recently and he increased his prednisone to 7.5 mg daily however Mr. Lugo states that this has not helped.  His pain is under good control with his current regimen under the direction of palliative care.  He has no new pain.  PSA remains low.      Past Medical History:   Diagnosis Date   • Bone cancer (HCC)    • History of radiation therapy 08/16/2021    L4 through sacrum, left acetabulum, right pelvis   • Nephrolithiasis    • Opioid use disorder, mild, on maintenance therapy (HCC)    • Prostate cancer (HCC)      Past Surgical History:   Procedure Laterality Date   • CHOLECYSTECTOMY     • CORONARY STENT PLACEMENT  206 & 2011   • HERNIA REPAIR  1986   • THORACIC DISCECTOMY  1994       Allergies   Allergen Reactions   • Cymbalta [Duloxetine Hcl] Dizziness   • Gabapentin Nausea Only   • Lyrica [Pregabalin] Nausea And Vomiting and Dizziness       Family History and Social History reviewed and changed as necessary    REVIEW OF SYSTEM:   Positive for ongoing fatigue and muscle aches  Positive for hot flashes    PHYSICAL EXAM:  Well-developed, well-nourished male in no distress  Lungs clear to auscultation bilaterally, respirations even and unlabored  Heart regular rate and rhythm  No lower extremity weakness, gait is steady, no edema    Vitals:    03/01/23 1022   BP: 145/72   Pulse: 85   Resp: 18   Temp: 97.7 °F (36.5 °C)   SpO2: 94%   Weight: 88.9 kg (196 lb)   Height: 175.3 cm (69\")     Vitals:    03/01/23 1022   PainSc: 0-No pain  Comment: Achy all over. Increased weakness x3 wks          ECOG score: 1           Lab Results   Component Value Date    HGB 12.0 (L) 02/21/2023    HCT " 36.1 (L) 02/21/2023    MCV 79 02/21/2023     02/21/2023    WBC 7.4 02/21/2023    NEUTROABS 5.9 02/21/2023    LYMPHSABS 1.0 02/21/2023    MONOSABS 0.4 02/21/2023    EOSABS 0.1 02/21/2023    BASOSABS 0.0 02/21/2023       Lab Results   Component Value Date    GLUCOSE 156 (H) 02/21/2023    BUN 12 02/21/2023    CREATININE 0.88 02/21/2023     02/21/2023    K 4.1 02/21/2023     02/21/2023    CO2 22 02/21/2023    CALCIUM 8.9 02/21/2023    PROTEINTOT 7.0 02/14/2022    ALBUMIN 4.4 02/21/2023    BILITOT 0.2 02/21/2023    ALKPHOS 90 02/21/2023    AST 11 02/21/2023    ALT 7 02/21/2023     Lab Results   Component Value Date    PSA <0.1 02/21/2023    PSA <0.1 01/24/2023    PSA <0.014 10/13/2022    PSA <0.1 09/01/2022    PSA <0.1 08/02/2022    PSA <0.1 08/02/2022             ASSESSMENT & PLAN:  1.  Stage IVb grade group 4 metastatic prostate cancer with sclerotic bone lesions on CT and bone scan and history of prostatic hypertrophy.  Good response to Taxotere ending 2/18/2021 Relugolix, followed by Zytiga prednisone with L4/sacrum, left acetabulum, and right pelvis external beam radiation August 2021.  Hypophosphatemia on Zometa.  Zometa held as of October 2021.  2.  Kidney stone  3.  Polyps  4.  Probable drug-induced hypophosphatemia from Zometa, drug stopped after 10/5/2021 dose  5.  Cancer related pain  6.  Fatigue and muscle aches  7.  Covid 2/2022    -9/30/2020 office note from Dr. Ronen Reynaga indicates patient with PSA 10.8.  Underwent TRUS prostate biopsy 9/15/2020.  Adenocarcinoma the prostate Lena 4+4 = 8 in 1 out of 12 cores and 4+3 = 7 and 10 out of 12 cores and 3+4 = 7 in 1 out of 12 cores.  Perineural invasion.  Extraprostatic extension into the fat seen on 2 biopsies of the right lateral mid and right apex.  9/24/2020 CT chest and whole-body bone scan showed T12, L1, left acetabular, right medial acetabular metastases with no lung metastases.  He started androgen deprivation therapy with  Casodex with plans for Lupron to start in 1 to 2 weeks for clinical T3 N0 M1 metastatic prostate cancer.  Review of official report from [facility] 9/24/2020 bone scan mentions T12, L1, roof of left acetabulum and medial right acetabular osteoblastic metastases.  CT chest with contrast that same day showed mild splenomegaly 14.2 cm with stable periaortic nodes compared to CT December 2015 with no lung metastases.  Sclerotic abnormality within the T12 vertebral body, L1 vertebral body extending into the pedicle unchanged.  8/28/2020 CT abdomen pelvis report from [facility] reviewed and mentions normal spleen size.  Punctate nonobstructing kidney stone, no paulino enlargement, diffuse bladder wall thickening with mild perivesicular fat stranding, 2.1 cm sclerotic left iliac bone above the left acetabulum new compared to 2015 as well as 1.5 cm faintly sclerotic focus in the right posterior acetabulum stable compared to prior CT 2015 as well as a 1.6 cm T12 and inferior vertebral body sclerotic lesion and a 1.9 cm left posterior lateral L1 vertebral body abnormality extending to the pedicle.  -10/13/2020 initial [facility] medical oncology consultation: With 1 Sarika score 8, 4+4 lesion that would make him a grade group 4 with stage IVb disease given radiographic evidence of sclerotic bone metastasis on bone scan and CT.  Needs genetic testing given metastatic prostate cancer and this is also important as, were he to have mismatch repair mutation then we would have options for PDL 1 inhibitors and were he BRCA mutated would have options for PARP inhibitors in the future.  Systemic therapy for castration naïve prostate cancer or N1 disease should be with apalutamide or Abiraterone or enzalutamide all of which are category 1.  He does not have any visceral metastases.  According to his imaging he has T12, L1, left acetabular, right acetabular, and left iliac bony involvement.  That means he would have 4 or  more bone metastases with at least one metastasis (i.e. the acetabular lesions bilaterally) beyond the pelvis/vertebral, for which this would be considered high-volume disease for which 6 cycles of docetaxel 75 mg/m² x 6 cycles followed by Zytiga and prednisone would be reasonable along with Zometa every 6 weeks for bone stabilization.  He will do chemo preparation visit with my nurse practitioner prior to start chemotherapy with Taxotere and Zometa in 2 weeks and he is already received Casodex in preparation for Lupron shot he received on 10/12/2020 with Dr. Reynaga.  We will get him to Dr. Alexander Gomez who has done his polypectomies in the past for port placement and we will get genetic counseling started.  Following the 6 courses of Taxotere per the [place] trial criteria, I would then give him an indefinite Zytiga and prednisone and Zometa until progression or toxicity dictates.    -12/16/2019 COVID-19 antigen test negative    -12/29/2020 PSA 0.275 with absolute neutrophil count 700 and creatinine 0.76 with normal liver enzymes and electrolytes.  Normal magnesium and phosphorus and urine drug screen positive for buprenorphine and opiates follow-up with palliative care.    -1/4/2021 CT chest abdomen pelvis with contrast and whole-body bone scan shows improving less metabolically active bone metastases.  Stable sclerotic T12, L1, and L2 vertebral bodies and bilateral pelvic bones on bone windows with stable subcentimeter para-aortic lymph nodes and no definite progression in the chest abdomen or pelvis.    -1/5/2021 [facility] medical oncology follow-up visit: I reviewed the images and reports thereof of the above CT and bone scan which shows good response to Taxotere x3 courses with a PSA down to 0.275 from a baseline of 10.  Get another 3 courses of Taxotere, continue his Xgeva, and then following that we will switch to Zytiga and prednisone.  He is working with palliative care on his bone pain but on his current  dose of fentanyl patch he says his pain is still not adequately controlled he will contact them.  Dexamethasone prescription was refilled.  We will see my nurse practitioner back in 3 weeks for course #5 of 6 total courses and then we will reimage with CT chest abdomen pelvis and bone scan before going to the Zytiga prednisone.    -3/4/2021 CT chest abdomen pelvis with contrast is negative save for stable sclerotic bone lesions and the bone scan shows near resolution of prior bony abnormalities associated with the known sclerotic lesions in the thoracolumbar spine and bony pelvis.    2/17/2021 PSA down to 0.1 from 1.37 October 2020.  3/9/2021 PSA 0.1    -5/26/2021 CT chest abdomen pelvis with contrast shows stable to slightly underlying increase low-density left para-aortic node.  Bone lesions stable.  Whole-body bone scan stable to decrease activity of known thoracolumbar and bony pelvic involvement.  PSA less than 0.1  , AST 74, bilirubin 0.5.       -6/1/2021 [facility] medical oncology follow-up visit: I reviewed the above data with him and images thereof.  Bones are stable.  No significant adenopathy.  PSA immeasurably low.     upper limit of normal 69 and AST 74 upper limit of normal 46.  Hence, neither is more than double the upper limit of normal with no ascites and no encephalopathy and normal albumin and bilirubin, despite the elevation of liver enzymes, he has child Abrams score A.  Package insert for Abiraterone says that for ALT and/or AST greater than 5 times upper limit of normal or total bilirubin greater than 3 times the upper limit of normal to withhold treatment until liver function tests returned to baseline or ALT and AST less than or equal to 2.5 times the upper limit of normal and total bilirubin less than or equal to 1.5 times the upper limit of normal and then reinitiate at 750 mg daily dose of Zytiga.  For recurrent hepatotoxicity on 750 mg daily Zytiga, withhold treatment until  liver functions returned to baseline or ALT and AST less than 3-2.5 times upper limit of normal and total bilirubin less than or equal to 1.5 times the upper limit of normal then reinitiate at 500 mg daily Zytiga dose.  If has recurrent hepatotoxicity on 500 mg dose, then discontinue treatment.  If has ALT greater than 3 times the upper limit of normal along with total bilirubin greater than 2 times the upper limit of normal in the absence of other contributing causes or biliary obstruction, permanently discontinue Zytiga.  Based on these recommendations, I would continue current doses but we will watch with serial labs monthly and repeat his imaging again in 3 months but clinically he is doing wonderfully with excellent response to Taxotere and now on Zytiga prednisone plus Zometa along with Relugolix.    -6/23/2020 for [facility] oncology clinic follow-up: Having persistent and worsening pain above his left hip.  I will get an MRI of the left hip for further evaluation.  Otherwise we will continue therapy unchanged with Zytiga, prednisone and Zometa along with Relugolix.    -7/28/2021 [facility] oncology clinic follow-up: Patient with multiple somatic complaints including episodes of confusion, dizziness, decreased memory, agitation.  He reports ongoing episodes with difficulty swallowing.  Also most concerning for episodes of angina and chest pain for which he basically refused to seek emergency medical attention.  He has a history of coronary artery disease and stent placement.  He has not followed up with cardiology for many years.  I will get an MRI of the brain in light of his confusion, dizziness and agitation.  I will get him to gastroenterology for EGD in light of his dysphagia.  I have also put in a referral for cardiology.  I reemphasized to the patient, his wife who is with him today and his son who was on the telephone during our visit the importance of seeking out prompt medical attention when he is having  "chest pain or neurological concerns so that he can be evaluated.  The patient states that he basically refuses to go to the emergency room and if his family calls an ambulance he would not agree to go to the hospital.  For the time being, I have asked him to hold his Zytiga and prednisone until we can sort this out as Zytiga does have some cardiovascular risk.  There is likely no treatment for prostate cancer that does not carry some form of cardiovascular risk.  His PSA remains low at 0.014 yesterday.  He will continue relugolix for now.    Of note, some of these symptoms could also be due to his hypophosphatemia, phosphate level from yesterday was 1.5, I have sent in a prescription for K-Phos 3 times a day before meals for 10 days.  We will hold further Zometa until this is corrected.  I have also put in an order to check his vitamin D level.  He takes calcium and vitamin D daily.  Some of his symptoms also could be due to drug withdrawal as there was some confusion and difficulty getting his last prescription for methadone therefore he was without methadone for several days, he now has his prescription and is back on his pain medication.  He has an appointment with neurosurgery tomorrow in light of his ongoing back pain, MRI of the hip recently showed multiple bony lesions largest at the L5 vertebral body and left supra-acetabular region.  If no neurosurgical intervention recommended he may benefit from spot radiation as this is where a lot of his pain is coming from.  His wife had questions today regarding his staging and extent of disease.  We reviewed previous scans.  I did clarify with her as she used the words \"cancer free\" as she thought that is what she was told after his CT scans once he completed Taxotere in February.  I explained to her that even though his scans showed he had an excellent response with no clear areas of metastasis that did not mean he was cancer free, I explained to her that when you " have metastatic cancer the treatment intent is palliative in nature and to control the disease but not to cure the disease.  She stated understanding.  We will see him back in 2 weeks for follow-up to sort through this and make further plan of care, again, I have asked him to hold Zytiga and prednisone until he sees us back, he will continue on his other medications including relugolix.    -7/30/2021 neurosurgery PA consultation.  MRI with and without contrast L5 ordered.    -8/3/2021 neurosurgery follow-up showed known sclerotic disease with new L5 abnormality without compression deformity or instability.  MRI brain negative for metastasis.    -8/4/2021 office visit Dr. Fco Quintanilla radiation oncology coordinating with Dr. North neurosurgery for palliative radiation for MRI evidence of L5 and left supra acetabular painful lesions.  States that the patient's disease which is not secreting PSA is experiencing some progression and would benefit from 20 Gy in five fractions and other lesion 30 Gy in ten fractions. Patient offered to go to [place] for radiation but desired treatment in [place].    -8/10/2021 [facility]: No chest pains at this junction.  Reviewed MRI brain and other MRIs reviewed by Dr. North and Dwight.  Due for EGD on 19th.  We will repeat his phosphorus along with his other labs continue Relugolix, Zytiga, prednisone, and he will continue radiation palliatively to the painful bony lesions with Dr. Quintanilla/Can.  He will see my nurse practitioner back in a few weeks to see how he is tolerating this and to follow-up on the phosphorus off of Zometa and she will order repeat CT chest abdomen pelvis with contrast and total body bone scan the end of September.I will see him back after that.    -9/8/2021 Zometa resumed.  PSA <0.10    -10/5/2021 [facility] visit: followed-up with radiation oncology 9/21/2021.  Status post symptomatic L5 radiation 5  fractions 20 [place] completed 8/16/2021 with improvement in right hip pain.  9/2/2021 PSA less than 0.1.  9/29/2021 total body bone scan shows increased uptake left iliac bone slightly superior group of the left acetabulum with no additional foci of suspicious abnormality is unlikely treatment related with no new sites of active osseous metastasis.  CT chest abdomen pelvis with contrast shows stable borderline enlarged left periaortic nodes and dating sclerotic bone lesions.Continue Zytiga, prednisone, Relugolix, Zometa, repeat CT chest abdomen pelvis with contrast and total body bone scan the end of the year.  We will follow PSA serially.    -11/17/2021 [facility] follow-up:  Mr. Lugo overall is doing well.  He is tolerating therapy with Zytiga, prednisone and Relugolix along with Zometa which is given every 6 weeks.  PSA remains low at <0.1.  Has occasional low phosphorus, currently low at 1.7.  Serum calcium is normal at 8.9, normal creatinine 0.79 and normal CBC other than for platelets of 124.  I will hold Zometa for 1 month, I did send in a prescription for phosphorus replacement today.  He takes calcium and vitamin D.  I will see him back in 1 month for follow-up.  We will repeat restaging scans after that visit.    -12/15/2021 [facility] follow-up: Mr. Lugo continues to do well on therapy with Zytiga, prednisone and Relugolix, his PSA remains low at <0.1.  He is continuing to have left lower back pain, he is working with palliative care and they have referred him also to pain management.  Pain management has talked to him about putting in a pain pump however he is leery of this, I told him that this was a safe and effective pain management technique and to certainly give consideration to their recommendations.  His phosphorus is improving however is still a little low, I will hold off on Zometa until we see him back in 1 month, we may end up only giving it every 12 weeks.  We will  repeat restaging scans with CT chest, abdomen and pelvis along with total body bone scan prior to return and I have ordered those today.      -1/5/2022 CT chest abdomen pelvis with contrast [facility] showing stable left periaortic adenopathy and unchanged sclerotic thoracic, lumbar spine and pelvis lesions with no new or progressive disease in the chest abdomen or pelvis.  Total body bone scan shows no significant change and no new suspicious foci.  Stable posterior right acetabulum and left superior acetabulum and degenerative changes in the cervical spine, shoulders, acromioclavicular joints, sternoclavicular joints, elbows, wrists, hands, thoracic spine, lumbosacral spine, sacroiliac joints, hips, knees, ankles, feet.    -1/11/2022 [facility] oncology hematology follow-up visit: I reviewed images and reports from his 1/5/2022 scans.  No active disease and PSA immmeasurably low on Zytiga, prednisone, Relugolix.  However, he has struggled with hypophosphatemia and is significantly fatigued with this.  Given that the bones are doing well and given that his phosphorus continues to remain consistently low at 2.2 in mid December, 1.7 mid November and 2.5 mid-September and as low as 1.5 back to July and the likelihood that this is related to his Zometa, given that his bones are stable overall, he will increase from 1200 mg up to 1600 mg of calcium and phosphate a day and we will hold his Zometa for the foreseeable future.  He will see my nurse practitioner back in a month to continue to monitor his phosphorus along with his calcium and other labs and will repeat his scans again in 3 months.  In addition, given his fatigue we will check his ACTH and cortisol though he is taking his prednisone with his Zytiga.  Though his electrolytes are normal, I will keep an eye on the cortisol and ACTH and have these labs not only drawn today but again just prior to return to my nurse practitioner on February  10.    -2/10/2022 [facility] follow-up: Mr. Lugo overall is stable, no change in his health this past month.  I have reviewed his labs from 2/8/2022 and they are unremarkable, his phosphorus is now normal at 3.2. We are continuing to hold his Zometa, he last received Zometa on 10/5/2021.  He continues therapy with Zytiga, prednisone and Relugolix.  Dr. Rashid had ordered cortisol level and ACTH which are low, currently his ACTH is 2.8 and cortisol 3.08 however these are both done in the face of ongoing prednisone that he takes with his Zytiga.  We will get him to endocrinology for further evaluation for possible adrenal insufficiency but will not interrupt his treatment in the interim.  He will need restaging scans again in April for a 3-month follow-up.  He will continue to follow with palliative care and pain management for his cancer related pain.  His PSA remains immeasurably low at <0.1 on 2/8/22.    -2/15/2022 went to emergency room with weakness, decreased appetite, myalgias in the setting of known COVID-19 testing positive a few days prior to this visit.  Phosphorus had been low in the past but was normal in the emergency room with unremarkable CBC and CMP.  Chest x-ray unremarkable saturating well on room air mildly hypertensive with dry mucosa.  Given 500 cc crystalloid.  Covid test positive.  Mild thrombocytopenia 136,000 and otherwise unremarkable.  Discharged home.    -3/3/2022 data:  PSA less than 0.1  CMP unremarkable  Cortisol 3.96 normal  ACTH 2.8 lower limit of normal 7.2  Phosphorus normal 2.6    -3/8/2022 [facility] follow-up: Suspect big chunk of his fatigue was Covid.  Another part of the fatigue likely related to lack of testosterone.  Not hypophosphatemic off of Zometa.  ACTH running low while on prednisone not surprising but with normal cortisol and I doubt adrenal insufficient which is why he is on prednisone to avoid such while taking Zytiga.  Overall doing fairly  well and with immeasurably low PSA, I will have labs done on him early April, see my nurse practitioner early May, and she will order repeat bone scan and CTs the end of May and I will see him back in June.  We will continue to hold the Zometa unless bone lesions progress given symptomatic prior hypophosphatemia on Zometa.  He will keep his appointment April 28 with Dr. Mir Duffy just to be sure that my take on his pituitary/adrenal axis assessment is accurate.    -4/28/2022 endocrinology consult Dr. Mir Duffy.  With low ACTH and cortisol on prednisone with Zytiga, the adequacy of prednisone cannot be assessed by measuring ACTH or cortisol but is assessed by weight changes, blood pressure, blood sugar, potassium, overall sense of how the patient is doing.  Primary adrenal insufficiency with low ACTH and low cortisol would be typical for the situation as his blood sugar tends to run a little high and potassium a little low, he did not think increasing prednisone was necessary.  He put him on some potassium.  No follow-up scheduled, will see as needed.    -5/4/2022 [facility] follow-up: Mr. Lugo continues on therapy with Zytiga and prednisone along with Relugolix.  His Zometa has been on hold due to previous hypophosphatemia.  There is some concern about potential adrenal insufficiency. He saw endocrinology, Dr. Mir Duffy on 4/28/2022.  I did review his office note and he stated that the low ACTH and low serum cortisol would be typical for Mr. Lugo's situation.  He further stated that the adequacy of prednisone dose cannot be assessed by measuring ACTH or cortisol but is rather assessed by the overall just altered how the patient is feeling-weight change, blood pressure, blood sugar, potassium, overall sense of wellbeing.  His potassium had been running a little low therefore they put him on potassium 10 mill equivalents daily. Of note, I do not see a future follow-up scheduled with endocrinology.  He  is having night sweats, I am not sure if this is related.  His glucose was normal this morning at 107.  His weight is stable.  He will continue current treatment for his prostate cancer unchanged, we will continue to hold his Zometa.  Current phosphorus and magnesium are normal.  CBC and CMP from today are unremarkable, cortisol is normal.  PSA and ACTH are pending  In regards to his night sweats, he had taken clonidine previously 0.1 mg twice daily as needed, I did send in a short supply again to try and see if this helps.  He also is having indigestion despite being on omeprazole twice daily, I sent a prescription for Pepcid.  He had an EGD August 2021.  We will repeat restaging scans prior to return and I have ordered those today.     -5/24/2022 CT chest abdomen pelvis with contrast[place] negative.  Bone scan shows stable bone metastases.    - 5/25/2022 PSA less than 0.1, platelets 130,000, otherwise unremarkable CBC and CMP.  A.m. cortisol running low 1.2 with phosphorus low 2.3.    -5/31/2022 [facility] follow-up: On Zytiga, prednisone, Relugolix, PSA remaining immeasurably low with stable bone scan and no visceral/paulino metastases.  Phosphorus still running low.  I have discontinued his potassium chloride and started potassium phosphate 500 mg 4 times a day.  Unless PSA rising, we will plan on repeating CTs and bone scan in 6 months and will follow with my nurse practitioner in the interim and if symptoms dictate or labs, will get back to Dr. Duffy for management of potential adrenal insufficiency but presently we will just see how he does with potassium phosphate and hopeful improvement in the phosphorus level with time.  We will continue to follow with palliative care for pain management    -7/6/2022 [facility] follow-up: Mr. Lugo overall is doing well but continues to be troubled with fatigue and hot flashes.  For the most part he states he is able to do the things he wants to  do, he continues to work but does have to take more rest periods.  I had a long discussion with him and his wife after reviewing his medications with them as they wanted to see if there was anything he could stop or adjust.  I discussed with him the need to continue on treatment for his prostate cancer even though his PSA remains immeasurably low and his scans look good but that if his medication is stopped the cancer would progress and over time it still has the potential to progress on therapy but would continue it as long as possible to keep his disease under control.  I explained this is like treating someone with hypertension, you do not stop the antihypertensive just because the blood pressure normalizes.  They stated understanding.  There is no dose adjustment of Zytiga or prednisone that would minimize his symptoms, he is on the current standard recommended therapy.  Discussed with him that sometimes during times of stress we may need to increase his prednisone dose but for now would not change anything.  His phosphorus level is normal, we continue to hold his Zometa.  I did give him the option of holding his phosphorus for the next month and we will see what happens, if it drops we will start him back on it but may be at a lower dose rather than 4 times a day maybe twice a day.  For now he will continue therapy unchanged.  We will continue monitoring labs monthly.  No PSA was drawn this month but that is okay as his PSA has been running immeasurably low at <0.1, we will recheck next month with lab draw.  We will stop checking his ACTH and cortisol level every month, if he develops worsening symptoms I will repeat those.  He has not gotten a follow-up with endocrinology as of yet.    -8/3/2022 [facility] clinic follow-up:  Mr. Lugo is doing well as far as his prostate cancer goes with continued immeasurably low PSA of <0.1.  He continues on therapy with Zytiga and prednisone along with Relugolix.  His  phosphorus is stable at 2.7 which was just slightly low by our reference range however was 2.71-month ago also which was normal on another labs reference range.  He has not taken his phosphorus for this past month as he thought it was making him more fatigued.  He really can tell no difference whether he was taking it or not.  I have asked him to try to go back on phosphorus twice a day rather than 4 times a day and see if this is tolerated and if we can keep his phosphorus level from dropping.  His biggest complaint today is flareup of his arthralgias and back pain.  He is following with pain management, Dr. Danny Avalos but is requesting a referral back to palliative medicine as he feels that no one is currently listening to him and they are just prescribing the same medications that are not effective according to his report.  He does have a follow-up with Dr. Avalos on 8/12/2022 but due to his current pain is not able to work until after he is seen and hopefully can get some better relief.  I have given him a work release until 15 August.  I have also put in a referral back to palliative care.  Also encouraged him to work with Dr. Avalos for help in resolving his issue.  Apparently they have recommended some type of a pump however he has been hesitant to that but likely would be helpful.  We will continue therapy unchanged.  We will continue monthly labs including phosphorus, we are not currently checking ACTH and cortisol monthly as Dr. Mir Duffy previously stated in his assessment on 4/28/2022 that the adequacy of prednisone cannot be assessed by measuring ACTH or cortisol but would be assessed by symptoms, see note from 4/28/2022.  He made no further follow-up appointments.  Fatigue is not worsening currently.  His glucose and potassium are  Normal.    -10/13/2022 [facility] medical oncology follow-up: Mr. Lugo is doing well.  He is tolerating treatment with Zytiga and prednisone along with Relugolix without any  unusual side effects.  His PSA has remained immeasurably low at <0.1.  His phosphorus was slightly low at last visit.  He had been instructed to go back on phosphorus twice a day but he misunderstood and has not been taking it.  We will check labs today.  I will follow-up with results and notify him if he needs to restart the phosphorus.  His pain is better controlled since reestablishing with [facility].  We will repeat scans prior to next follow-up.  I have ordered CT chest abdomen pelvis along with bone scan.  We will see him back in 1 month.    -10/13/2022 PSA less than 0.014.With unremarkable CBC and CMP.  Phosphorus still a little low 2.3.    -11/4/2022 CT abdomen pelvis chest showed no evidence of disease progression with stable sclerotic bone lesions.  Bone scan stable right greater than left acetabular metastasis.  Stable bone metastases.    -11/8/2022 [facility] medical oncology telemedicine follow-up: Patient states that he feels achy all over with low-grade temps and a cough mildly productive.  Has an appointment later today to see his primary care for testing for flu and COVID.  Suggested that if he were positive for COVID that he get Paxlovid and that if he were positive for flu that he get Tamiflu and to discuss this with his primary care.  From the prostate cancer side, his PSA is immeasurably low with stable bone metastases and no evidence of progression.  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  We will continue the Relugolix, Zytiga, prednisone and he will see my nurse practitioner 12/7/2022.  Would repeat CTs and bone scan in May.    -1/25/2023 [facility] Oncology medicine follow-up: Mr. Lugo is having worsening fatigue and muscle aches.  He does have adrenal insufficiency on Zytiga and prednisone for his prostate cancer, he saw endocrinology in light of low ACTH back in April 2022.  At that time there was no recommendation other than for  monitoring him for his overall wellbeing and labs.  He denies any fevers or chills.  His labs as shown above are unremarkable, his CBC and CMP are normal other than for glucose of 112, PSA remains low at <0.1.  He has no new pain or any symptoms concerning for progression of his prostate cancer.  I will get him back to endocrinology to see if they feel any dose adjustment of his prednisone would be helpful in his symptoms.  Currently he is on 5 mg a day with his Zytiga.  I did not repeat his ACTH or cortisol as they would be expected to be low in this situation.  His potassium is normal, he has had no weight loss or change in his appetite.  Blood sugar is reasonable.  His only complaint currently is worsening fatigue and muscle aches.  He will continue treatment unchanged with Zytiga, prednisone and relugolix.  I will see him back in 1 month for follow-up.  We will repeat his restaging scans in May.    -3/1/2023 [facility] On[facility] clinic follow-up:  His PSA remains low at <0.1 on treatment with Zytiga and prednisone along with relugolix however he continues to complain of significant fatigue and muscle aches not improving with time.  He states that his quality of life is affected as he is not able to do any work activities due to the fatigue.  He did see endocrinology again and they have increased his prednisone from 5 mg daily up to 7.5 mg daily however so far this has not made any difference in how he feels.  He is supposed to get back in touch with them to let them know how he is doing and to see if there is any other adjustments needed. His labs are unremarkable with normal thyroid function, CMP is normal other than for glucose of 156.  They did check hemoglobin A1c to look for diabetes however that was normal at 5.7.  CMP is unremarkable with just very mild anemia with hemoglobin of 12 and hematocrit 36.1% which would not account for his fatigue.  He does have hot flashes that are fairly significant, I will try  venlafaxine as suggested by Dr. Duffy and I appreciate the recommendation.  We will start at 37.5 mg daily and if tolerated he can increase to 75 mg after 1-2 weeks.  I will see him back in 1 months for follow-up.  Plan to repeat scans late April/early May.  His prostate cancer seems under good control with current regimen however I am not sure how long he can tolerate this due to the side effects.    This was a level 4, moderate MDM visit with management of side effects of therapy, review of labs, management of drug therapy requiring intensive monitoring for toxicity.    Abena Velasquez, APRN    03/01/2023

## 2023-03-03 ENCOUNTER — TELEPHONE (OUTPATIENT)
Dept: ONCOLOGY | Facility: CLINIC | Age: 68
End: 2023-03-03
Payer: MEDICARE

## 2023-03-03 DIAGNOSIS — G89.3 PAIN, CANCER: ICD-10-CM

## 2023-03-03 RX ORDER — OXYCODONE 9 MG/1
9 CAPSULE, EXTENDED RELEASE ORAL EVERY 12 HOURS
Qty: 60 EACH | Refills: 0 | Status: SHIPPED | OUTPATIENT
Start: 2023-03-04 | End: 2023-04-03 | Stop reason: SDUPTHER

## 2023-03-03 RX ORDER — HYDROMORPHONE HYDROCHLORIDE 8 MG/1
8 TABLET ORAL EVERY 4 HOURS PRN
Qty: 180 TABLET | Refills: 0 | Status: SHIPPED | OUTPATIENT
Start: 2023-03-04 | End: 2023-04-03 | Stop reason: SDUPTHER

## 2023-03-03 NOTE — TELEPHONE ENCOUNTER
PATIENT CALLED AND STATED THAT THE TWO SCRIPTS CALLED INTO [facility] CAN'T BE FILLED UNTIL Monday AS THEY WON'T HAVE THOSE MEDS UNTIL Monday. PATIENT ASKED THAT THEY SCRIPTS BE SENT TO [facility] INSTEAD. PLEASE VERIFY PHARMACY WITH PATIENT AND CALL PATIENT TO INFORM STATUS. PLEASE ADVISE.

## 2023-03-03 NOTE — TELEPHONE ENCOUNTER
Patient called to report [facility] pharmacy does not have prescriptions in stock. Spoke with staff at [facility] who verified they have both the hydromorphone 8 mg tablets and oxycodone ER 9 mg tablets in stock. Will cancel prescriptions that were sent to [facility] and send new ones to [facility]. Returned patient's phone call with this information and reminded him pharmacy hours on Saturday are 9 am - 1 pm.

## 2023-03-10 ENCOUNTER — SPECIALTY PHARMACY (OUTPATIENT)
Dept: ONCOLOGY | Facility: HOSPITAL | Age: 68
End: 2023-03-10
Payer: MEDICARE

## 2023-03-10 NOTE — PROGRESS NOTES
Specialty Pharmacy Refill Coordination Note     Naveen is a 67 y.o. male contacted today regarding refills of  abiraterone 1000mg po QD and relugolix 120mg PO QD  specialty medication(s).    Processed refills to be sent out on 3/13/23 for delivery on 3/14/23 because of the weekend.    Specialty medication(s) and dose(s) confirmed: yes    Refill Questions    Flowsheet Row Most Recent Value   Changes to allergies? No   Changes to medications? No   New conditions since last clinic visit No   Unplanned office visit, urgent care, ED, or hospital admission in the last 4 weeks  No   How does patient/caregiver feel medication is working? Good   Financial problems or insurance changes  No   Since the previous refill, were any specialty medication doses or scheduled injections missed or delayed?  No   Does this patient require a clinical escalation to a pharmacist? No          Delivery Questions    Flowsheet Row Most Recent Value   Delivery method [employer]   Delivery address correct? Yes   Delivery phone number [phone]   Preferred delivery time? Anytime   Number of medications in delivery 2   Medication being filled and delivered abiraterone and relugolix   Doses left of specialty medications 10 days left   Is there any medication that is due not being filled? No   Supplies needed? No supplies needed   Cooler needed? No   Do any medications need mixed or dated? No   Additional comments no copay   Questions or concerns for the pharmacist? No   Explain any questions or concerns for the pharmacist n/a   Are any medications first time fills? No            Medication Adherence    Adherence tools used: watch   Other adherence tool: Clock - takes at same time each day, 7:45am   Support network for adherence: family member          Follow-up: 28 day(s)     Sushma Muro, Pharmacy Technician  Specialty Pharmacy Technician

## 2023-03-27 ENCOUNTER — OFFICE VISIT (OUTPATIENT)
Dept: FAMILY MEDICINE CLINIC | Facility: CLINIC | Age: 68
End: 2023-03-27
Payer: MEDICARE

## 2023-03-27 VITALS
BODY MASS INDEX: 29.77 KG/M2 | DIASTOLIC BLOOD PRESSURE: 82 MMHG | HEIGHT: 69 IN | WEIGHT: 201 LBS | SYSTOLIC BLOOD PRESSURE: 120 MMHG | OXYGEN SATURATION: 97 % | HEART RATE: 76 BPM

## 2023-03-27 DIAGNOSIS — Z00.00 MEDICARE ANNUAL WELLNESS VISIT, SUBSEQUENT: Primary | ICD-10-CM

## 2023-03-27 NOTE — PROGRESS NOTES
The ABCs of the Annual Wellness Visit  Subsequent Medicare Wellness Visit    Subjective      Naveen Lugo is a 67 y.o. male who presents for a Subsequent Medicare Wellness Visit.    The following portions of the patient's history were reviewed and   updated as appropriate: allergies, current medications, past family history, past medical history, past social history, past surgical history and problem list.    Compared to one year ago, the patient feels his physical   health is the same.    Compared to one year ago, the patient feels his mental   health is the same.    Recent Hospitalizations:  He was not admitted to the hospital during the last year.       Current Medical Providers:  Patient Care Team:  Cielo Dodd PA-C as PCP - General (Family Medicine)  Alexander Gomez MD (General Surgery)  Ishmael Rashid MD as Consulting Physician (Hematology and Oncology)  Anna Ayers PA-C as Physician Assistant (Physician Assistant)  Mir Duffy MD as Consulting Physician (Endocrinology)  Danny Avalos II, MD (Pain Medicine)    Outpatient Medications Prior to Visit   Medication Sig Dispense Refill   • abiraterone acetate (ZYTIGA) 500 MG chemo tablet Take 2 tablets by mouth Daily. 60 tablet 11   • aspirin (aspirin) 81 MG EC tablet Take 1 tablet by mouth Daily.     • atorvastatin (LIPITOR) 20 MG tablet Take 1 tablet by mouth every night at bedtime. **No refills until lipid panel/labs are completed 30 tablet 0   • calcium carbonate (OS-RONNY) 600 MG tablet Take 2 tablets by mouth Daily. Patient to take 1200 mg daily for hypocalcemia.     • cetirizine (zyrTEC) 10 MG tablet      • dexamethasone (DECADRON) 4 MG tablet Take 2 tablets oral twice a day for 3 consecutive days beginning the day before chemotherapy and continue for 6 doses. 12 tablet 5   • famotidine (PEPCID) 20 MG tablet TAKE ONE TABLET BY MOUTH EVERY DAY AS NEEDED FOR INDIGESTION OR HEARTBURN 30 tablet 3   • HYDROmorphone (DILAUDID) 8 MG tablet  Take 1 tablet by mouth Every 4 (Four) Hours As Needed for Moderate Pain for up to 30 days. 180 tablet 0   • naloxone (NARCAN) 4 MG/0.1ML nasal spray 1 spray into the nostril(s) as directed by provider As Needed (unresponsiveness).     • nitroglycerin (NITROSTAT) 0.4 MG SL tablet 1 under the tongue as needed for angina, may repeat q5mins for up three doses 25 tablet 11   • omeprazole (priLOSEC) 40 MG capsule Take 1 capsule by mouth Daily.     • ondansetron (Zofran) 4 MG tablet Take 1 tablet by mouth Every 8 (Eight) Hours As Needed for Nausea or Vomiting. 12 tablet 0   • oxyCODONE ER (Xtampza ER) 9 MG capsule extended-release 12 hour  Take 1 capsule by mouth Every 12 (Twelve) Hours for 30 days. 60 each 0   • potassium phosphate, monobasic, (K-Phos) 500 MG tablet Take 1 tablet by mouth 4 times a day. 120 tablet 11   • predniSONE (DELTASONE) 5 MG tablet Take 1 tablet by mouth Daily. 30 tablet 11   • promethazine-dextromethorphan (PROMETHAZINE-DM) 6.25-15 MG/5ML solution Take 5 mL by mouth 4 (Four) Times a Day As Needed for Cough. 150 mL 0   • ranolazine (Ranexa) 500 MG 12 hr tablet Take 1 tablet by mouth 2 (Two) Times a Day. 180 tablet 3   • relugolix (ORGOVYX) 120 MG tablet tablet Take 1 tablet by mouth Daily. 30 tablet 11   • Sennosides (Senna) 8.6 MG capsule Take 8.6 mg by mouth 3 (Three) Times a Day. 84 each 2   • Trelegy Ellipta 200-62.5-25 MCG/INH inhaler Inhale 1 puff Daily.     • venlafaxine XR (EFFEXOR-XR) 37.5 MG 24 hr capsule Take 1 capsule by mouth Daily. 30 capsule 0   • cloNIDine (CATAPRES) 0.1 MG tablet Take 1 tablet by mouth 2 (Two) Times a Day As Needed (night sweats) for up to 10 days. 20 tablet 0     Facility-Administered Medications Prior to Visit   Medication Dose Route Frequency Provider Last Rate Last Admin   • heparin injection 500 Units  500 Units Intravenous PRN Ishmael Rashid MD   500 Units at 06/02/21 0900   • heparin injection 500 Units  500 Units Intravenous PRN Ishmael Rashid MD      "      Opioid medication/s are on active medication list.  and I have evaluated his active treatment plan and pain score trends (see table).  There were no vitals filed for this visit.  I have reviewed the chart for potential of high risk medication and harmful drug interactions in the elderly.            Aspirin is on active medication list. Aspirin use is indicated based on review of current medical condition/s. Pros and cons of this therapy have been discussed today. Benefits of this medication outweigh potential harm.  Patient has been encouraged to continue taking this medication.  .      Patient Active Problem List   Diagnosis   • Prostate cancer metastatic to bone (HCC)   • Encounter for care related to Port-a-Cath   • Opioid use disorder   • Pain, cancer   • Yeast dermatitis   • Therapeutic drug monitoring   • Acute hip pain, left   • Abnormal MRI, lumbar spine   • Coronary artery disease of native artery of native heart with stable angina pectoris (HCC)   • Hyperlipidemia LDL goal <70   • Essential hypertension   • Chest pain   • Depression   • Constipation   • Fatigue   • Adrenal insufficiency (HCC)   • Chronic pain   • Malignant tumor of prostate (HCC)   • Acute URI   • Hypokalemia   • Hyperglycemia   • Polyuria   • Hot flashes   • Medicare annual wellness visit, subsequent     Advance Care Planning  Advance Directive is not on file.  ACP discussion was declined by the patient. Patient does not have an advance directive, declines further assistance.     Objective    Vitals:    03/27/23 1338   BP: 120/82   BP Location: Right arm   Pulse: 76   SpO2: 97%   Weight: 91.2 kg (201 lb)   Height: 175.3 cm (69\")     Estimated body mass index is 29.68 kg/m² as calculated from the following:    Height as of this encounter: 175.3 cm (69\").    Weight as of this encounter: 91.2 kg (201 lb).    BMI is >= 25 and <30. (Overweight) The following options were offered after discussion;: exercise counseling/recommendations and " nutrition counseling/recommendations      Does the patient have evidence of cognitive impairment?   No    Lab Results   Component Value Date    HGBA1C 5.7 02/22/2023          HEALTH RISK ASSESSMENT    Smoking Status:  Social History     Tobacco Use   Smoking Status Every Day   • Packs/day: 1.00   • Years: 50.00   • Pack years: 50.00   • Types: Cigarettes   Smokeless Tobacco Never     Alcohol Consumption:  Social History     Substance and Sexual Activity   Alcohol Use Not Currently     Fall Risk Screen:    STEADI Fall Risk Assessment was completed, and patient is at LOW risk for falls.Assessment completed on:3/27/2023    Depression Screening:  PHQ-2/PHQ-9 Depression Screening 3/27/2023   Little Interest or Pleasure in Doing Things 0-->not at all   Feeling Down, Depressed or Hopeless 0-->not at all   PHQ-9: Brief Depression Severity Measure Score 0       Health Habits and Functional and Cognitive Screening:  Functional & Cognitive Status 3/27/2023   Do you have difficulty preparing food and eating? No   Do you have difficulty bathing yourself, getting dressed or grooming yourself? No   Do you have difficulty using the toilet? No   Do you have difficulty moving around from place to place? Yes   Do you have trouble with steps or getting out of a bed or a chair? Yes   Current Diet Well Balanced Diet   Dental Exam Not up to date   Eye Exam Not up to date   Exercise (times per week) 7 times per week   Current Exercises Include Walking   Do you need help using the phone?  No   Are you deaf or do you have serious difficulty hearing?  Yes   Do you need help with transportation? Yes   Do you need help shopping? No   Do you need help preparing meals?  No   Do you need help with housework?  No   Do you need help with laundry? No   Do you need help taking your medications? No   Do you need help managing money? Yes   Do you ever drive or ride in a car without wearing a seat belt? No   Have you felt unusual stress, anger or  loneliness in the last month? No   Who do you live with? Spouse   If you need help, do you have trouble finding someone available to you? No   Have you been bothered in the last four weeks by sexual problems? No   Do you have difficulty concentrating, remembering or making decisions? No       Age-appropriate Screening Schedule:  Refer to the list below for future screening recommendations based on patient's age, sex and/or medical conditions. Orders for these recommended tests are listed in the plan section. The patient has been provided with a written plan.    Health Maintenance   Topic Date Due   • COLORECTAL CANCER SCREENING  Never done   • Pneumococcal Vaccine 65+ (1 - PCV) Never done   • ZOSTER VACCINE (1 of 2) Never done   • HEPATITIS C SCREENING  Never done   • ANNUAL WELLNESS VISIT  Never done   • COVID-19 Vaccine (3 - Moderna risk series) 05/05/2021   • LIPID PANEL  08/09/2021   • LUNG CANCER SCREENING  11/04/2023   • TDAP/TD VACCINES (2 - Td or Tdap) 03/27/2033   • INFLUENZA VACCINE  Completed   • AAA SCREEN (ONE-TIME)  Completed                CMS Preventative Services Quick Reference  Risk Factors Identified During Encounter:    Chronic Pain: patient already sees pain manamgement, has bone metastasis from stage 4 prostate cancer.   Fall Risk-High or Moderate: Discussed Fall Prevention in the home  Hearing Problem: Patient will schedule his own appt at his convenience.   Immunizations Discussed/Encouraged: Tdap, Influenza, Prevnar 20 (Pneumococcal 20-valent conjugate) and Shingrix  Polypharmacy: Medication List reviewed and Medications are appropriate for patient  Tobacco Use/Dependance Risk (use dotphrase .tobaccocessation for documentation)  Vision Screening Recommended    The above risks/problems have been discussed with the patient.  Pertinent information has been shared with the patient in the After Visit Summary.    Diagnoses and all orders for this visit:    1. Medicare annual wellness visit,  subsequent (Primary)  Assessment & Plan:  Updated annual wellness visit checklist. Immunizations discussed. Screening up-to-date. Recommend yearly dental and eye exams. Also discussed monitoring of blood pressure and lipids. We addressed patient self-assessment of health status, frailty, and physical functioning. We reviewed psychosocial risks, behavioral risks, instrumental activities of daily living, and patient health risk assessment. Patient was given a personalized prevention plan.         Other orders  -     Cancel: Pneumococcal Conjugate Vaccine 20-Valent All  -     Tdap Vaccine Greater Than or Equal To 8yo IM      Follow Up:   Next Medicare Wellness visit to be scheduled in 1 year.      An After Visit Summary and PPPS were made available to the patient.

## 2023-03-27 NOTE — ASSESSMENT & PLAN NOTE
Updated annual wellness visit checklist. Immunizations discussed. Screening up-to-date. Recommend yearly dental and eye exams. Also discussed monitoring of blood pressure and lipids. We addressed patient self-assessment of health status, frailty, and physical functioning. We reviewed psychosocial risks, behavioral risks, instrumental activities of daily living, and patient health risk assessment. Patient was given a personalized prevention plan.

## 2023-03-28 ENCOUNTER — INFUSION (OUTPATIENT)
Dept: ONCOLOGY | Facility: HOSPITAL | Age: 68
End: 2023-03-28
Payer: MEDICARE

## 2023-03-28 VITALS
TEMPERATURE: 98.1 F | SYSTOLIC BLOOD PRESSURE: 155 MMHG | DIASTOLIC BLOOD PRESSURE: 70 MMHG | HEART RATE: 84 BPM | BODY MASS INDEX: 29.62 KG/M2 | WEIGHT: 200.6 LBS | RESPIRATION RATE: 18 BRPM

## 2023-03-28 DIAGNOSIS — C79.51 PROSTATE CANCER METASTATIC TO BONE: Primary | ICD-10-CM

## 2023-03-28 DIAGNOSIS — C61 PROSTATE CANCER METASTATIC TO BONE: Primary | ICD-10-CM

## 2023-03-28 DIAGNOSIS — Z45.2 ENCOUNTER FOR CARE RELATED TO PORT-A-CATH: ICD-10-CM

## 2023-03-28 PROCEDURE — 25010000002 HEPARIN LOCK FLUSH PER 10 UNITS: Performed by: INTERNAL MEDICINE

## 2023-03-28 PROCEDURE — 36591 DRAW BLOOD OFF VENOUS DEVICE: CPT

## 2023-03-28 RX ORDER — HEPARIN SODIUM (PORCINE) LOCK FLUSH IV SOLN 100 UNIT/ML 100 UNIT/ML
500 SOLUTION INTRAVENOUS AS NEEDED
Status: DISCONTINUED | OUTPATIENT
Start: 2023-03-28 | End: 2023-03-28 | Stop reason: HOSPADM

## 2023-03-28 RX ADMIN — HEPARIN 500 UNITS: 100 SYRINGE at 10:20

## 2023-03-29 LAB
ALBUMIN SERPL-MCNC: 4.1 G/DL (ref 3.5–5.2)
ALBUMIN/GLOB SERPL: 2 G/DL
ALP SERPL-CCNC: 72 U/L (ref 39–117)
ALT SERPL-CCNC: 5 U/L (ref 1–41)
AST SERPL-CCNC: 17 U/L (ref 1–40)
BASOPHILS # BLD AUTO: 0.05 10*3/MM3 (ref 0–0.2)
BASOPHILS NFR BLD AUTO: 0.9 % (ref 0–1.5)
BILIRUB SERPL-MCNC: <0.2 MG/DL (ref 0–1.2)
BUN SERPL-MCNC: 10 MG/DL (ref 8–23)
BUN/CREAT SERPL: 12.7 (ref 7–25)
CALCIUM SERPL-MCNC: 9.1 MG/DL (ref 8.6–10.5)
CHLORIDE SERPL-SCNC: 104 MMOL/L (ref 98–107)
CO2 SERPL-SCNC: 28 MMOL/L (ref 22–29)
CREAT SERPL-MCNC: 0.79 MG/DL (ref 0.76–1.27)
EGFRCR SERPLBLD CKD-EPI 2021: 97.4 ML/MIN/1.73
EOSINOPHIL # BLD AUTO: 0.23 10*3/MM3 (ref 0–0.4)
EOSINOPHIL NFR BLD AUTO: 4 % (ref 0.3–6.2)
ERYTHROCYTE [DISTWIDTH] IN BLOOD BY AUTOMATED COUNT: 13.5 % (ref 12.3–15.4)
GLOBULIN SER CALC-MCNC: 2.1 GM/DL
GLUCOSE SERPL-MCNC: 142 MG/DL (ref 65–99)
HCT VFR BLD AUTO: 34.4 % (ref 37.5–51)
HGB BLD-MCNC: 11 G/DL (ref 13–17.7)
IMM GRANULOCYTES # BLD AUTO: 0.02 10*3/MM3 (ref 0–0.05)
IMM GRANULOCYTES NFR BLD AUTO: 0.3 % (ref 0–0.5)
LYMPHOCYTES # BLD AUTO: 1.08 10*3/MM3 (ref 0.7–3.1)
LYMPHOCYTES NFR BLD AUTO: 18.6 % (ref 19.6–45.3)
MCH RBC QN AUTO: 24.1 PG (ref 26.6–33)
MCHC RBC AUTO-ENTMCNC: 32 G/DL (ref 31.5–35.7)
MCV RBC AUTO: 75.4 FL (ref 79–97)
MONOCYTES # BLD AUTO: 0.3 10*3/MM3 (ref 0.1–0.9)
MONOCYTES NFR BLD AUTO: 5.2 % (ref 5–12)
NEUTROPHILS # BLD AUTO: 4.13 10*3/MM3 (ref 1.7–7)
NEUTROPHILS NFR BLD AUTO: 71 % (ref 42.7–76)
NRBC BLD AUTO-RTO: 0.2 /100 WBC (ref 0–0.2)
PHOSPHATE SERPL-MCNC: 2.6 MG/DL (ref 2.5–4.5)
PLATELET # BLD AUTO: 181 10*3/MM3 (ref 140–450)
POTASSIUM SERPL-SCNC: 4.1 MMOL/L (ref 3.5–5.2)
PROT SERPL-MCNC: 6.2 G/DL (ref 6–8.5)
PSA SERPL-MCNC: <0.014 NG/ML (ref 0–4)
RBC # BLD AUTO: 4.56 10*6/MM3 (ref 4.14–5.8)
SODIUM SERPL-SCNC: 139 MMOL/L (ref 136–145)
WBC # BLD AUTO: 5.81 10*3/MM3 (ref 3.4–10.8)

## 2023-03-30 ENCOUNTER — OFFICE VISIT (OUTPATIENT)
Dept: ONCOLOGY | Facility: CLINIC | Age: 68
End: 2023-03-30
Payer: MEDICARE

## 2023-03-30 ENCOUNTER — LAB (OUTPATIENT)
Dept: ONCOLOGY | Facility: HOSPITAL | Age: 68
End: 2023-03-30
Payer: MEDICARE

## 2023-03-30 VITALS
WEIGHT: 200 LBS | HEIGHT: 69 IN | SYSTOLIC BLOOD PRESSURE: 136 MMHG | HEART RATE: 91 BPM | RESPIRATION RATE: 18 BRPM | TEMPERATURE: 97.3 F | OXYGEN SATURATION: 93 % | DIASTOLIC BLOOD PRESSURE: 82 MMHG | BODY MASS INDEX: 29.62 KG/M2

## 2023-03-30 DIAGNOSIS — C61 PROSTATE CANCER METASTATIC TO BONE: ICD-10-CM

## 2023-03-30 DIAGNOSIS — C61 PROSTATE CANCER METASTATIC TO BONE: Primary | ICD-10-CM

## 2023-03-30 DIAGNOSIS — C79.51 PROSTATE CANCER METASTATIC TO BONE: Primary | ICD-10-CM

## 2023-03-30 DIAGNOSIS — Z45.2 ENCOUNTER FOR CARE RELATED TO PORT-A-CATH: Primary | ICD-10-CM

## 2023-03-30 DIAGNOSIS — D50.9 MICROCYTIC HYPOCHROMIC ANEMIA: ICD-10-CM

## 2023-03-30 DIAGNOSIS — C79.51 PROSTATE CANCER METASTATIC TO BONE: ICD-10-CM

## 2023-03-30 PROCEDURE — 36591 DRAW BLOOD OFF VENOUS DEVICE: CPT

## 2023-03-30 PROCEDURE — 25010000002 HEPARIN LOCK FLUSH PER 10 UNITS: Performed by: NURSE PRACTITIONER

## 2023-03-30 PROCEDURE — 1159F MED LIST DOCD IN RCRD: CPT | Performed by: NURSE PRACTITIONER

## 2023-03-30 PROCEDURE — 1125F AMNT PAIN NOTED PAIN PRSNT: CPT | Performed by: NURSE PRACTITIONER

## 2023-03-30 PROCEDURE — 3079F DIAST BP 80-89 MM HG: CPT | Performed by: NURSE PRACTITIONER

## 2023-03-30 PROCEDURE — 1160F RVW MEDS BY RX/DR IN RCRD: CPT | Performed by: NURSE PRACTITIONER

## 2023-03-30 PROCEDURE — 99214 OFFICE O/P EST MOD 30 MIN: CPT | Performed by: NURSE PRACTITIONER

## 2023-03-30 PROCEDURE — 3075F SYST BP GE 130 - 139MM HG: CPT | Performed by: NURSE PRACTITIONER

## 2023-03-30 RX ORDER — HEPARIN SODIUM (PORCINE) LOCK FLUSH IV SOLN 100 UNIT/ML 100 UNIT/ML
500 SOLUTION INTRAVENOUS AS NEEDED
Status: DISCONTINUED | OUTPATIENT
Start: 2023-03-30 | End: 2023-03-30 | Stop reason: HOSPADM

## 2023-03-30 RX ADMIN — HEPARIN 500 UNITS: 100 SYRINGE at 09:50

## 2023-03-30 NOTE — PROGRESS NOTES
CHIEF COMPLAINT: 1.  Fatigue   2.  Chronic back pain       Problem List:  Oncology/Hematology History Overview Note   1.  Stage IVb grade group 4 metastatic prostate cancer with sclerotic bone lesions on CT and bone scan and history of prostatic hypertrophy.  Good response to Taxotere ending 2/18/2021 Relugolix, followed by Zytiga prednisone with L4/sacrum, left acetabulum, and right pelvis external beam radiation August 2021.  Hypophosphatemia on Zometa.  Zometa held as of October 2021.  2.  Kidney stone  3.  Polyps  4.  Probable drug-induced hypophosphatemia from Zometa, drug stopped after 10/5/2021 dose  5.  Cancer related pain  6.  Fatigue  7.  Covid 2/2022    -9/30/2020 office note from Dr. Ronen Reynaga indicates patient with PSA 10.8.  Underwent TRUS prostate biopsy 9/15/2020.  Adenocarcinoma the prostate Sarika 4+4 = 8 in 1 out of 12 cores and 4+3 = 7 and 10 out of 12 cores and 3+4 = 7 in 1 out of 12 cores.  Perineural invasion.  Extraprostatic extension into the fat seen on 2 biopsies of the right lateral mid and right apex.  9/24/2020 CT chest and whole-body bone scan showed T12, L1, left acetabular, right medial acetabular metastases with no lung metastases.  He started androgen deprivation therapy with Casodex with plans for Lupron to start in 1 to 2 weeks for clinical T3 N0 M1 metastatic prostate cancer.  Review of official report from [facility] 9/24/2020 bone scan mentions T12, L1, roof of left acetabulum and medial right acetabular osteoblastic metastases.  CT chest with contrast that same day showed mild splenomegaly 14.2 cm with stable periaortic nodes compared to CT December 2015 with no lung metastases.  Sclerotic abnormality within the T12 vertebral body, L1 vertebral body extending into the pedicle unchanged.  8/28/2020 CT abdomen pelvis report from [facility] reviewed and mentions normal spleen size.  Punctate nonobstructing kidney stone, no paulino enlargement, diffuse  bladder wall thickening with mild perivesicular fat stranding, 2.1 cm sclerotic left iliac bone above the left acetabulum new compared to 2015 as well as 1.5 cm faintly sclerotic focus in the right posterior acetabulum stable compared to prior CT 2015 as well as a 1.6 cm T12 and inferior vertebral body sclerotic lesion and a 1.9 cm left posterior lateral L1 vertebral body abnormality extending to the pedicle.  -10/13/2020 initial [facility] medical oncology consultation: With 1 Albright score 8, 4+4 lesion that would make him a grade group 4 with stage IVb disease given radiographic evidence of sclerotic bone metastasis on bone scan and CT.  Needs genetic testing given metastatic prostate cancer and this is also important as, were he to have mismatch repair mutation then we would have options for PDL 1 inhibitors and were he BRCA mutated would have options for PARP inhibitors in the future.  Systemic therapy for castration naïve prostate cancer or N1 disease should be with apalutamide or Abiraterone or enzalutamide all of which are category 1.  He does not have any visceral metastases.  According to his imaging he has T12, L1, left acetabular, right acetabular, and left iliac bony involvement.  That means he would have 4 or more bone metastases with at least one metastasis (i.e. the acetabular lesions bilaterally) beyond the pelvis/vertebral, for which this would be considered high-volume disease for which 6 cycles of docetaxel 75 mg/m² x 6 cycles followed by Zytiga and prednisone would be reasonable along with Zometa every 6 weeks for bone stabilization.  He will do chemo preparation visit with my nurse practitioner prior to start chemotherapy with Taxotere and Zometa in 2 weeks and he is already received Casodex in preparation for Lupron shot he received on 10/12/2020 with Dr. Reynaga.  We will get him to Dr. Alexander Gomez who has done his polypectomies in the past for port placement and we will get genetic counseling  started.  Following the 6 courses of Taxotere per the Chaarted trial criteria, I would then give him an indefinite Zytiga and prednisone and Zometa until progression or toxicity dictates.    -12/16/2019 COVID-19 antigen test negative    -12/29/2020 PSA 0.275 with absolute neutrophil count 700 and creatinine 0.76 with normal liver enzymes and electrolytes.  Normal magnesium and phosphorus and urine drug screen positive for buprenorphine and opiates follow-up with palliative care.    -1/4/2021 CT chest abdomen pelvis with contrast and whole-body bone scan shows improving less metabolically active bone metastases.  Stable sclerotic T12, L1, and L2 vertebral bodies and bilateral pelvic bones on bone windows with stable subcentimeter para-aortic lymph nodes and no definite progression in the chest abdomen or pelvis.    -1/5/2021 [facility] medical oncology follow-up visit: I reviewed the images and reports thereof of the above CT and bone scan which shows good response to Taxotere x3 courses with a PSA down to 0.275 from a baseline of 10.  Get another 3 courses of Taxotere, continue his Xgeva, and then following that we will switch to Zytiga and prednisone.  He is working with palliative care on his bone pain but on his current dose of fentanyl patch he says his pain is still not adequately controlled he will contact them.  Dexamethasone prescription was refilled.  We will see my nurse practitioner back in 3 weeks for course #5 of 6 total courses and then we will reimage with CT chest abdomen pelvis and bone scan before going to the Zytiga prednisone.    -3/4/2021 CT chest abdomen pelvis with contrast is negative save for stable sclerotic bone lesions and the bone scan shows near resolution of prior bony abnormalities associated with the known sclerotic lesions in the thoracolumbar spine and bony pelvis.    2/17/2021 PSA down to 0.1 from 1.37 October 2020.  3/9/2021 PSA 0.1    -5/26/2021 CT chest abdomen pelvis with  contrast shows stable to slightly underlying increase low-density left para-aortic node.  Bone lesions stable.  Whole-body bone scan stable to decrease activity of known thoracolumbar and bony pelvic involvement.  PSA less than 0.1  , AST 74, bilirubin 0.5.       -6/1/2021 [facility], [place], operated by [facility] medical oncology follow-up visit: I reviewed the above data with him and images thereof.  Bones are stable.  No significant adenopathy.  PSA immeasurably low.     upper limit of normal 69 and AST 74 upper limit of normal 46.  Hence, neither is more than double the upper limit of normal with no ascites and no encephalopathy and normal albumin and bilirubin, despite the elevation of liver enzymes, he has child Abrams score A.  Package insert for Abiraterone says that for ALT and/or AST greater than 5 times upper limit of normal or total bilirubin greater than 3 times the upper limit of normal to withhold treatment until liver function tests returned to baseline or ALT and AST less than or equal to 2.5 times the upper limit of normal and total bilirubin less than or equal to 1.5 times the upper limit of normal and then reinitiate at 750 mg daily dose of Zytiga.  For recurrent hepatotoxicity on 750 mg daily Zytiga, withhold treatment until liver functions returned to baseline or ALT and AST less than 3-2.5 times upper limit of normal and total bilirubin less than or equal to 1.5 times the upper limit of normal then reinitiate at 500 mg daily Zytiga dose.  If has recurrent hepatotoxicity on 500 mg dose, then discontinue treatment.  If has ALT greater than 3 times the upper limit of normal along with total bilirubin greater than 2 times the upper limit of normal in the absence of other contributing causes or biliary obstruction, permanently discontinue Zytiga.  Based on these recommendations, I would continue current doses but we will watch with serial labs monthly and repeat his imaging again in 3 months but clinically he is doing  wonderfully with excellent response to Taxotere and now on Zytiga prednisone plus Zometa along with Relugolix.    -6/23/2020 for [facility] follow-up: Having persistent and worsening pain above his left hip.  I will get an MRI of the left hip for further evaluation.  Otherwise we will continue therapy unchanged with Zytiga, prednisone and Zometa along with Relugolix.    -7/28/2021 [facility] follow-up: Patient with multiple somatic complaints including episodes of confusion, dizziness, decreased memory, agitation.  He reports ongoing episodes with difficulty swallowing.  Also most concerning for episodes of angina and chest pain for which he basically refused to seek emergency medical attention.  He has a history of coronary artery disease and stent placement.  He has not followed up with cardiology for many years.  I will get an MRI of the brain in light of his confusion, dizziness and agitation.  I will get him to gastroenterology for EGD in light of his dysphagia.  I have also put in a referral for cardiology.  I reemphasized to the patient, his wife who is with him today and his son who was on the telephone during our visit the importance of seeking out prompt medical attention when he is having chest pain or neurological concerns so that he can be evaluated.  The patient states that he basically refuses to go to the emergency room and if his family calls an ambulance he would not agree to go to the hospital.  For the time being, I have asked him to hold his Zytiga and prednisone until we can sort this out as Zytiga does have some cardiovascular risk.  There is likely no treatment for prostate cancer that does not carry some form of cardiovascular risk.  His PSA remains low at 0.014 yesterday.  He will continue relugolix for now.    Of note, some of these symptoms could also be due to his hypophosphatemia, phosphate level from yesterday was 1.5, I have sent in a prescription for K-Phos  "3 times a day before meals for 10 days.  We will hold further Zometa until this is corrected.  I have also put in an order to check his vitamin D level.  He takes calcium and vitamin D daily.  Some of his symptoms also could be due to drug withdrawal as there was some confusion and difficulty getting his last prescription for methadone therefore he was without methadone for several days, he now has his prescription and is back on his pain medication.  He has an appointment with neurosurgery tomorrow in light of his ongoing back pain, MRI of the hip recently showed multiple bony lesions largest at the L5 vertebral body and left supra-acetabular region.  If no neurosurgical intervention recommended he may benefit from spot radiation as this is where a lot of his pain is coming from.  His wife had questions today regarding his staging and extent of disease.  We reviewed previous scans.  I did clarify with her as she used the words \"cancer free\" as she thought that is what she was told after his CT scans once he completed Taxotere in February.  I explained to her that even though his scans showed he had an excellent response with no clear areas of metastasis that did not mean he was cancer free, I explained to her that when you have metastatic cancer the treatment intent is palliative in nature and to control the disease but not to cure the disease.  She stated understanding.  We will see him back in 2 weeks for follow-up to sort through this and make further plan of care, again, I have asked him to hold Zytiga and prednisone until he sees us back, he will continue on his other medications including relugolix.    -7/30/2021 neurosurgery PA consultation.  MRI with and without contrast L5 ordered.    -8/3/2021 neurosurgery follow-up showed known sclerotic disease with new L5 abnormality without compression deformity or instability.  MRI brain negative for metastasis.    -8/4/2021 office visit Dr. Fco Quintanilla radiation " oncology coordinating with Dr. North neurosurgery for palliative radiation for MRI evidence of L5 and left supra acetabular painful lesions.  States that the patient's disease which is not secreting PSA is experiencing some progression and would benefit from 20 Gy in five fractions and other lesion 30 Gy in ten fractions. Patient offered to go to [place] for radiation but desired treatment in [place].    -8/10/2021 [facility] oncology follow-up visit: No chest pains at this junction.  Reviewed MRI brain and other MRIs reviewed by Dr. North and [place].  Due for EGD on 19th.  We will repeat his phosphorus along with his other labs continue Relugolix, Zytiga, prednisone, and he will continue radiation palliatively to the painful bony lesions with Dr. Quintanilla/Can.  He will see my nurse practitioner back in a few weeks to see how he is tolerating this and to follow-up on the phosphorus off of Zometa and she will order repeat CT chest abdomen pelvis with contrast and total body bone scan the end of September.I will see him back after that.    -9/8/2021 Zometa resumed.  PSA <0.10    -10/5/2021 [facility] medical oncology follow-up visit: followed-up with radiation oncology 9/21/2021.  Status post symptomatic L5 radiation 5 fractions 20 [place] completed 8/16/2021 with improvement in right hip pain.  9/2/2021 PSA less than 0.1.  9/29/2021 total body bone scan shows increased uptake left iliac bone slightly superior group of the left acetabulum with no additional foci of suspicious abnormality is unlikely treatment related with no new sites of active osseous metastasis.  CT chest abdomen pelvis with contrast shows stable borderline enlarged left periaortic nodes and dating sclerotic bone lesions.Continue Zytiga, prednisone, Relugolix, Zometa, repeat CT chest abdomen pelvis with contrast and total body bone scan the end of the year.  We will follow PSA serially.    -11/17/2021 [facility] follow-up:    Parish overall is doing well.  He is tolerating therapy with Zytiga, prednisone and Relugolix along with Zometa which is given every 6 weeks.  PSA remains low at <0.1.  Has occasional low phosphorus, currently low at 1.7.  Serum calcium is normal at 8.9, normal creatinine 0.79 and normal CBC other than for platelets of 124.  I will hold Zometa for 1 month, I did send in a prescription for phosphorus replacement today.  He takes calcium and vitamin D.  I will see him back in 1 month for follow-up.  We will repeat restaging scans after that visit.    -12/15/2021 [facility] Oncology clinic follow-up: Mr. Lugo continues to do well on therapy with Zytiga, prednisone and Relugolix, his PSA remains low at <0.1.  He is continuing to have left lower back pain, he is working with palliative care and they have referred him also to pain management.  Pain management has talked to him about putting in a pain pump however he is leery of this, I told him that this was a safe and effective pain management technique and to certainly give consideration to their recommendations.  His phosphorus is improving however is still a little low, I will hold off on Zometa until we see him back in 1 month, we may end up only giving it every 12 weeks.  We will repeat restaging scans with CT chest, abdomen and pelvis along with total body bone scan prior to return and I have ordered those today.      -1/5/2022 CT chest abdomen pelvis with contrast [facility] showing stable left periaortic adenopathy and unchanged sclerotic thoracic, lumbar spine and pelvis lesions with no new or progressive disease in the chest abdomen or pelvis.  Total body bone scan shows no significant change and no new suspicious foci.  Stable posterior right acetabulum and left superior acetabulum and degenerative changes in the cervical spine, shoulders, acromioclavicular joints, sternoclavicular joints, elbows, wrists, hands, thoracic spine, lumbosacral spine, sacroiliac  joints, hips, knees, ankles, feet.    -1/11/2022 [facility] medical oncology hematology follow-up visit: I reviewed images and reports from his 1/5/2022 scans.  No active disease and PSA immmeasurably low on Zytiga, prednisone, Relugolix.  However, he has struggled with hypophosphatemia and is significantly fatigued with this.  Given that the bones are doing well and given that his phosphorus continues to remain consistently low at 2.2 in mid December, 1.7 mid November and 2.5 mid-September and as low as 1.5 back to July and the likelihood that this is related to his Zometa, given that his bones are stable overall, he will increase from 1200 mg up to 1600 mg of calcium and phosphate a day and we will hold his Zometa for the foreseeable future.  He will see my nurse practitioner back in a month to continue to monitor his phosphorus along with his calcium and other labs and will repeat his scans again in 3 months.  In addition, given his fatigue we will check his ACTH and cortisol though he is taking his prednisone with his Zytiga.  Though his electrolytes are normal, I will keep an eye on the cortisol and ACTH and have these labs not only drawn today but again just prior to return to my nurse practitioner on February 10.    -2/10/2022 [facility] Oncology clinic follow-up: Mr. Lugo overall is stable, no change in his health this past month.  I have reviewed his labs from 2/8/2022 and they are unremarkable, his phosphorus is now normal at 3.2. We are continuing to hold his Zometa, he last received Zometa on 10/5/2021.  He continues therapy with Zytiga, prednisone and Relugolix.  Dr. Rashid had ordered cortisol level and ACTH which are low, currently his ACTH is 2.8 and cortisol 3.08 however these are both done in the face of ongoing prednisone that he takes with his Zytiga.  We will get him to endocrinology for further evaluation for possible adrenal insufficiency but will not interrupt his treatment in the interim.  He will  need restaging scans again in April for a 3-month follow-up.  He will continue to follow with palliative care and pain management for his cancer related pain.  His PSA remains immeasurably low at <0.1 on 2/8/22.    -2/15/2022 went to emergency room with weakness, decreased appetite, myalgias in the setting of known COVID-19 testing positive a few days prior to this visit.  Phosphorus had been low in the past but was normal in the emergency room with unremarkable CBC and CMP.  Chest x-ray unremarkable saturating well on room air mildly hypertensive with dry mucosa.  Given 500 cc crystalloid.  Covid test positive.  Mild thrombocytopenia 136,000 and otherwise unremarkable.  Discharged home.    -3/3/2022 data:  PSA less than 0.1  CMP unremarkable  Cortisol 3.96 normal  ACTH 2.8 lower limit of normal 7.2  Phosphorus normal 2.6    -3/8/2022 [facility] medical oncology follow-up: Suspect big chunk of his fatigue was Covid.  Another part of the fatigue likely related to lack of testosterone.  Not hypophosphatemic off of Zometa.  ACTH running low while on prednisone not surprising but with normal cortisol and I doubt adrenal insufficient which is why he is on prednisone to avoid such while taking Zytiga.  Overall doing fairly well and with immeasurably low PSA, I will have labs done on him early April, see my nurse practitioner early May, and she will order repeat bone scan and CTs the end of May and I will see him back in June.  We will continue to hold the Zometa unless bone lesions progress given symptomatic prior hypophosphatemia on Zometa.  He will keep his appointment April 28 with Dr. Mir Duffy just to be sure that my take on his pituitary/adrenal axis assessment is accurate.    -4/28/2022 endocrinology consult Dr. Mir Duffy.  With low ACTH and cortisol on prednisone with Zytiga, the adequacy of prednisone cannot be assessed by measuring ACTH or cortisol but is assessed by weight changes, blood pressure, blood sugar,  potassium, overall sense of how the patient is doing.  Primary adrenal insufficiency with low ACTH and low cortisol would be typical for the situation as his blood sugar tends to run a little high and potassium a little low, he did not think increasing prednisone was necessary.  He put him on some potassium.  No follow-up scheduled, will see as needed.    -5/4/2022 [facility] Oncology clinic follow-up: Mr. Lugo continues on therapy with Zytiga and prednisone along with Relugolix.  His Zometa has been on hold due to previous hypophosphatemia.  There is some concern about potential adrenal insufficiency. He saw endocrinology, Dr. Mir Duffy on 4/28/2022.  I did review his office note and he stated that the low ACTH and low serum cortisol would be typical for Mr. Lugo's situation.  He further stated that the adequacy of prednisone dose cannot be assessed by measuring ACTH or cortisol but is rather assessed by the overall just altered how the patient is feeling-weight change, blood pressure, blood sugar, potassium, overall sense of wellbeing.  His potassium had been running a little low therefore they put him on potassium 10 mill equivalents daily. Of note, I do not see a future follow-up scheduled with endocrinology.  He is having night sweats, I am not sure if this is related.  His glucose was normal this morning at 107.  His weight is stable.  He will continue current treatment for his prostate cancer unchanged, we will continue to hold his Zometa.  Current phosphorus and magnesium are normal.  CBC and CMP from today are unremarkable, cortisol is normal.  PSA and ACTH are pending  In regards to his night sweats, he had taken clonidine previously 0.1 mg twice daily as needed, I did send in a short supply again to try and see if this helps.  He also is having indigestion despite being on omeprazole twice daily, I sent a prescription for Pepcid.  He had an EGD August 2021.  We will repeat restaging scans prior to return  and I have ordered those today.     -5/24/2022 CT chest abdomen pelvis with contrast[place] negative.  Bone scan shows stable bone metastases.    - 5/25/2022 PSA less than 0.1, platelets 130,000, otherwise unremarkable CBC and CMP.  A.m. cortisol running low 1.2 with phosphorus low 2.3.    -5/31/2022 [facility] medical oncology follow-up: On Zytiga, prednisone, Relugolix, PSA remaining immeasurably low with stable bone scan and no visceral/paulino metastases.  Phosphorus still running low.  I have discontinued his potassium chloride and started potassium phosphate 500 mg 4 times a day.  Unless PSA rising, we will plan on repeating CTs and bone scan in 6 months and will follow with my nurse practitioner in the interim and if symptoms dictate or labs, will get back to Dr. Duffy for management of potential adrenal insufficiency but presently we will just see how he does with potassium phosphate and hopeful improvement in the phosphorus level with time.  We will continue to follow with palliative care for pain management    -7/6/2022 [facility] Oncology clinic follow-up: Mr. Lugo overall is doing well but continues to be troubled with fatigue and hot flashes.  For the most part he states he is able to do the things he wants to do, he continues to work but does have to take more rest periods.  I had a long discussion with him and his wife after reviewing his medications with them as they wanted to see if there was anything he could stop or adjust.  I discussed with him the need to continue on treatment for his prostate cancer even though his PSA remains immeasurably low and his scans look good but that if his medication is stopped the cancer would progress and over time it still has the potential to progress on therapy but would continue it as long as possible to keep his disease under control.  I explained this is like treating someone with hypertension, you do not stop the antihypertensive just because the blood pressure  normalizes.  They stated understanding.  There is no dose adjustment of Zytiga or prednisone that would minimize his symptoms, he is on the current standard recommended therapy.  Discussed with him that sometimes during times of stress we may need to increase his prednisone dose but for now would not change anything.  His phosphorus level is normal, we continue to hold his Zometa.  I did give him the option of holding his phosphorus for the next month and we will see what happens, if it drops we will start him back on it but may be at a lower dose rather than 4 times a day maybe twice a day.  For now he will continue therapy unchanged.  We will continue monitoring labs monthly.  No PSA was drawn this month but that is okay as his PSA has been running immeasurably low at <0.1, we will recheck next month with lab draw.  We will stop checking his ACTH and cortisol level every month, if he develops worsening symptoms I will repeat those.  He has not gotten a follow-up with endocrinology as of yet.    -8/3/2022 [facility] Oncology clinic follow-up:  Mr. Lugo is doing well as far as his prostate cancer goes with continued immeasurably low PSA of <0.1.  He continues on therapy with Zytiga and prednisone along with Relugolix.  His phosphorus is stable at 2.7 which was just slightly low by our reference range however was 2.71-month ago also which was normal on another labs reference range.  He has not taken his phosphorus for this past month as he thought it was making him more fatigued.  He really can tell no difference whether he was taking it or not.  I have asked him to try to go back on phosphorus twice a day rather than 4 times a day and see if this is tolerated and if we can keep his phosphorus level from dropping.  His biggest complaint today is flareup of his arthralgias and back pain.  He is following with pain management, Dr. Danny Avalos but is requesting a referral back to palliative medicine as he feels that no  one is currently listening to him and they are just prescribing the same medications that are not effective according to his report.  He does have a follow-up with Dr. Avalos on 8/12/2022 but due to his current pain is not able to work until after he is seen and hopefully can get some better relief.  I have given him a work release until 15 August.  I have also put in a referral back to palliative care.  Also encouraged him to work with Dr. Avalos for help in resolving his issue.  Apparently they have recommended some type of a pump however he has been hesitant to that but likely would be helpful.  We will continue therapy unchanged.  We will continue monthly labs including phosphorus, we are not currently checking ACTH and cortisol monthly as Dr. Mir Duffy previously stated in his assessment on 4/28/2022 that the adequacy of prednisone cannot be assessed by measuring ACTH or cortisol but would be assessed by symptoms, see note from 4/28/2022.  He made no further follow-up appointments.  Fatigue is not worsening currently.  His glucose and potassium are  Normal.    -10/13/2022 [facility] oncology follow-up: Mr. Lugo is doing well.  He is tolerating treatment with Zytiga and prednisone along with Relugolix without any unusual side effects.  His PSA has remained immeasurably low at <0.1.  His phosphorus was slightly low at last visit.  He had been instructed to go back on phosphorus twice a day but he misunderstood and has not been taking it.  We will check labs today.  I will follow-up with results and notify him if he needs to restart the phosphorus.  His pain is better controlled since reestablishing with [facility].  We will repeat scans prior to next follow-up.  I have ordered CT chest abdomen pelvis along with bone scan.  We will see him back in 1 month.    -10/13/2022 PSA less than 0.014.With unremarkable CBC and CMP.  Phosphorus still a little low 2.3.    -11/4/2022 CT abdomen pelvis  chest showed no evidence of disease progression with stable sclerotic bone lesions.  Bone scan stable right greater than left acetabular metastasis.  Stable bone metastases.    -11/8/2022 [facility] oncology telemedicine follow-up: Patient states that he feels achy all over with low-grade temps and a cough mildly productive.  Has an appointment later today to see his primary care for testing for flu and COVID.  Suggested that if he were positive for COVID that he get Paxlovid and that if he were positive for flu that he get Tamiflu and to discuss this with his primary care.  From the prostate cancer side, his PSA is immeasurably low with stable bone metastases and no evidence of progression.  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  We will continue the Relugolix, Zytiga, prednisone and he will see my nurse practitioner 12/7/2022.  Would repeat CTs and bone scan in May.    -1/25/2023 [facility] Oncology medicine follow-up: Mr. Lugo is having worsening fatigue and muscle aches.  He does have adrenal insufficiency on Zytiga and prednisone for his prostate cancer, he saw endocrinology in light of low ACTH back in April 2022.  At that time there was no recommendation other than for monitoring him for his overall wellbeing and labs.  He denies any fevers or chills.  His labs as shown above are unremarkable, his CBC and CMP are normal other than for glucose of 112, PSA remains low at <0.1.  He has no new pain or any symptoms concerning for progression of his prostate cancer.  I will get him back to endocrinology to see if they feel any dose adjustment of his prednisone would be helpful in his symptoms.  Currently he is on 5 mg a day with his Zytiga.  I did not repeat his ACTH or cortisol as they would be expected to be low in this situation.  His potassium is normal, he has had no weight loss or change in his appetite.  Blood sugar is reasonable.  His only complaint currently is  worsening fatigue and muscle aches.  He will continue treatment unchanged with Zytiga, prednisone and relugolix.  I will see him back in 1 month for follow-up.  We will repeat his restaging scans in May.    -3/1/2023 [facility] Oncology clinic follow-up:  His PSA remains low at <0.1 on treatment with Zytiga and prednisone along with relugolix however he continues to complain of significant fatigue and muscle aches not improving with time.  He states that his quality of life is affected as he is not able to do any work activities due to the fatigue.  He did see endocrinology again and they have increased his prednisone from 5 mg daily up to 7.5 mg daily however so far this has not made any difference in how he feels.  He is supposed to get back in touch with them to let them know how he is doing and to see if there is any other adjustments needed. His labs are unremarkable with normal thyroid function, CMP is normal other than for glucose of 156.  They did check hemoglobin A1c to look for diabetes however that was normal at 5.7.  CMP is unremarkable with just very mild anemia with hemoglobin of 12 and hematocrit 36.1% which would not account for his fatigue.  He does have hot flashes that are fairly significant, I will try venlafaxine as suggested by Dr. Duffy and I appreciate the recommendation.  We will start at 37.5 mg daily and if tolerated he can increase to 75 mg after 1-2 weeks.  I will see him back in 1 months for follow-up.  Plan to repeat scans late April/early May.  His prostate cancer seems under good control with current regimen however I am not sure how long he can tolerate this due to the side effects.     Prostate cancer metastatic to bone (HCC)   8/30/2020 -  Other Event    PSA 10.8     9/15/2020 Initial Diagnosis    Prostate cancer metastatic to bone (CMS/HCC)     9/15/2020 Biopsy    Prostate needle core biopsy     9/24/2020 Imaging    Total body bone scan: 4 abnormal areas of increased activity  consistent with osteoblastic metastasis, abnormal foci of activity seen in the T12 vertebral body, L1 vertebral body, roof of the left acetabulum, and acetabulum medially on the right.  CT chest: Stable sclerotic metastatic lesions within the T12 and L1 vertebrae.  No other evidence of metastatic disease to the chest.  Stable mild splenomegaly and subcentimeter periaortic retroperitoneal lymph nodes.  CT abdomen and pelvis: Diffuse bladder wall thickening with mild perivesicular fat stranding.  Interval development of several sclerotic lesions in the spine and left iliac bone.     10/13/2020 Cancer Staged    Staging form: Bone - Appendicular Skeleton, Trunk, Skull, And Facial Bones, AJCC 8th Edition  - Clinical: Stage IVB (cT3, cN0, cM1b, G3) - Signed by Ishmael Rashid MD on 10/13/2020     11/3/2020 - 10/5/2021 Chemotherapy    OP SUPPORTIVE Zoledronic Acid Q42d     11/3/2020 - 2/18/2021 Chemotherapy    OP PROSTATE DOCEtaxel       1/4/2021 Imaging    CT chest abdomen pelvis with contrast and whole-body bone scan shows improving less metabolically active bone metastases.  Stable sclerotic T12, L1, and L2 vertebral bodies and bilateral pelvic bones on bone windows with stable subcentimeter para-aortic lymph nodes and no definite progression in the chest abdomen or pelvis.  PSA down to 0.275 after 3 courses of Taxotere.     3/4/2021 Imaging    CT chest, abdomen and pelvis stable, no evidence of disease progression. Total body bone scan with decreased/near resolution of abnormal increased radiotracer uptake associated with the known sclerotic lesions in the thoracolumbar spine and bony pelvis.  No evidence of new osteoblastic metastases.     3/4/2021 Imaging    CT chest abdomen pelvis with contrast is negative save for stable sclerotic bone lesions and the bone scan shows near resolution of prior bony abnormalities associated with the known sclerotic lesions in the thoracolumbar spine and bony pelvis.  2/17/2021 PSA down  to 0.1 from 1.37 October 2020.     3/9/2021 - 3/9/2021 Chemotherapy    OP SUPPORTIVE PROSTATE Relugolix     3/9/2021 -  Chemotherapy    OP PROSTATE Abiraterone / PredniSONE       3/10/2021 -  Chemotherapy    OP PROSTATE Abiraterone / PredniSONE     8/10/2021 - 8/16/2021 Radiation    Radiation OncologyTreatment Course:  Naveen Lugo received 2000 cGy in 5 fractions to L4-sacrum, left acetabulum and right pelvis via External Beam Radiation - EBRT.     11/16/2021 -  Chemotherapy    OP CENTRAL VENOUS ACCESS DEVICE ACCESS, CARE, AND MAINTENANCE (CVAD)     1/5/2022 Imaging    -1/5/2022 CT chest abdomen pelvis with contrast [place] [facility] showing stable left periaortic adenopathy and unchanged sclerotic thoracic, lumbar spine and pelvis lesions with no new or progressive disease in the chest abdomen or pelvis.  Total body bone scan shows no significant change and no new suspicious foci.  Stable posterior right acetabulum and left superior acetabulum and degenerative changes in the cervical spine, shoulders, acromioclavicular joints, sternoclavicular joints, elbows, wrists, hands, thoracic spine, lumbosacral spine, sacroiliac joints, hips, knees, ankles, feet.     5/24/2022 Imaging    CT chest abdomen pelvis with contrastFrankfort negative.  Bone scan shows stable bone metastases.     11/4/2022 Imaging    CT abdomen pelvis chest showed no evidence of disease progression with stable sclerotic bone lesions.  Bone scan stable right greater than left acetabular metastasis.  Stable bone metastases.         HISTORY OF PRESENT ILLNESS:  The patient is a 67 y.o. male, here for follow up on management of metastatic prostate cancer.  Mr. Lugo continues to deal with ongoing fatigue and chronic pain.  Follows with palliative care for his pain management.  He reports that he had one day a few weeks ago where he had nausea and vomiting that came on fairly suddenly but this has not occurred since that one occasion.  He reports that  "overall his appetite is good, his weight is stable.  He denies any change in his bowel or bladder habits, denies any blood in his stool or dark tarry stools.  No abdominal pain, no significant heartburn or reflux.    Past Medical History:   Diagnosis Date   • Bone cancer (HCC)    • History of radiation therapy 08/16/2021    L4 through sacrum, left acetabulum, right pelvis   • Nephrolithiasis    • Opioid use disorder, mild, on maintenance therapy (HCC)    • Prostate cancer (HCC)      Past Surgical History:   Procedure Laterality Date   • CHOLECYSTECTOMY     • CORONARY STENT PLACEMENT  206 & 2011   • HERNIA REPAIR  1986   • THORACIC DISCECTOMY  1994       Allergies   Allergen Reactions   • Cymbalta [Duloxetine Hcl] Dizziness   • Gabapentin Nausea Only   • Lyrica [Pregabalin] Nausea And Vomiting and Dizziness       Family History and Social History reviewed and changed as necessary    REVIEW OF SYSTEM:   Positive for ongoing fatigue and muscle aches with chronic back pain  Positive for hot flashes    PHYSICAL EXAM:  Well-developed, well-nourished male in no distress.  He is becoming more cushingoid in appearance  Lungs clear to auscultation bilaterally, respirations even and unlabored  Heart regular rate and rhythm  No lower extremity weakness, gait is steady, no edema    Vitals:    03/30/23 0901   BP: 136/82   Pulse: 91   Resp: 18   Temp: 97.3 °F (36.3 °C)   SpO2: 93%   Weight: 90.7 kg (200 lb)   Height: 175.3 cm (69\")     Vitals:    03/30/23 0901   PainSc:   8   PainLoc: Back  Comment: back and hips          ECOG score: 1           Lab Results   Component Value Date    HGB 11.0 (L) 03/28/2023    HCT 34.4 (L) 03/28/2023    MCV 75.4 (L) 03/28/2023     03/28/2023    WBC 5.81 03/28/2023    NEUTROABS 4.13 03/28/2023    LYMPHSABS 1.08 03/28/2023    MONOSABS 0.30 03/28/2023    EOSABS 0.23 03/28/2023    BASOSABS 0.05 03/28/2023       Lab Results   Component Value Date    GLUCOSE 142 (H) 03/28/2023    BUN 10 03/28/2023 "    CREATININE 0.79 03/28/2023     03/28/2023    K 4.1 03/28/2023     03/28/2023    CO2 28.0 03/28/2023    CALCIUM 9.1 03/28/2023    PROTEINTOT 7.0 02/14/2022    ALBUMIN 4.1 03/28/2023    BILITOT <0.2 03/28/2023    ALKPHOS 72 03/28/2023    AST 17 03/28/2023    ALT 5 03/28/2023     Lab Results   Component Value Date    PSA <0.014 03/28/2023    PSA <0.1 02/21/2023    PSA <0.1 01/24/2023    PSA <0.014 10/13/2022    PSA <0.1 09/01/2022    PSA <0.1 08/02/2022    PSA <0.1 08/02/2022             ASSESSMENT & PLAN:  1.  Stage IVb grade group 4 metastatic prostate cancer with sclerotic bone lesions on CT and bone scan and history of prostatic hypertrophy.  Good response to Taxotere ending 2/18/2021 Relugolix, followed by Zytiga prednisone with L4/sacrum, left acetabulum, and right pelvis external beam radiation August 2021.  Hypophosphatemia on Zometa.  Zometa held as of October 2021.  2.  Kidney stone  3.  Polyps  4.  Probable drug-induced hypophosphatemia from Zometa, drug stopped after 10/5/2021 dose  5.  Cancer related pain  6.  Chronic fatigue and muscle aches and low back pain  7.  Covid 2/2022  8.  Microcytic hypochromic anemia    -9/30/2020 office note from Dr. Ronen Reynaga indicates patient with PSA 10.8.  Underwent TRUS prostate biopsy 9/15/2020.  Adenocarcinoma the prostate Sarika 4+4 = 8 in 1 out of 12 cores and 4+3 = 7 and 10 out of 12 cores and 3+4 = 7 in 1 out of 12 cores.  Perineural invasion.  Extraprostatic extension into the fat seen on 2 biopsies of the right lateral mid and right apex.  9/24/2020 CT chest and whole-body bone scan showed T12, L1, left acetabular, right medial acetabular metastases with no lung metastases.  He started androgen deprivation therapy with Casodex with plans for Lupron to start in 1 to 2 weeks for clinical T3 N0 M1 metastatic prostate cancer.  Review of official report from [facility] 9/24/2020 bone scan mentions T12, L1, roof of left acetabulum and  medial right acetabular osteoblastic metastases.  CT chest with contrast that same day showed mild splenomegaly 14.2 cm with stable periaortic nodes compared to CT December 2015 with no lung metastases.  Sclerotic abnormality within the T12 vertebral body, L1 vertebral body extending into the pedicle unchanged.  8/28/2020 CT abdomen pelvis report from [facility] reviewed and mentions normal spleen size.  Punctate nonobstructing kidney stone, no paulino enlargement, diffuse bladder wall thickening with mild perivesicular fat stranding, 2.1 cm sclerotic left iliac bone above the left acetabulum new compared to 2015 as well as 1.5 cm faintly sclerotic focus in the right posterior acetabulum stable compared to prior CT 2015 as well as a 1.6 cm T12 and inferior vertebral body sclerotic lesion and a 1.9 cm left posterior lateral L1 vertebral body abnormality extending to the pedicle.  -10/13/2020 initial [facility] [place], operated by [facility] medical oncology consultation: With 1 Sarika score 8, 4+4 lesion that would make him a grade group 4 with stage IVb disease given radiographic evidence of sclerotic bone metastasis on bone scan and CT.  Needs genetic testing given metastatic prostate cancer and this is also important as, were he to have mismatch repair mutation then we would have options for PDL 1 inhibitors and were he BRCA mutated would have options for PARP inhibitors in the future.  Systemic therapy for castration naïve prostate cancer or N1 disease should be with apalutamide or Abiraterone or enzalutamide all of which are category 1.  He does not have any visceral metastases.  According to his imaging he has T12, L1, left acetabular, right acetabular, and left iliac bony involvement.  That means he would have 4 or more bone metastases with at least one metastasis (i.e. the acetabular lesions bilaterally) beyond the pelvis/vertebral, for which this would be considered high-volume disease for which 6 cycles of docetaxel 75 mg/m² x 6  cycles followed by Zytiga and prednisone would be reasonable along with Zometa every 6 weeks for bone stabilization.  He will do chemo preparation visit with my nurse practitioner prior to start chemotherapy with Taxotere and Zometa in 2 weeks and he is already received Casodex in preparation for Lupron shot he received on 10/12/2020 with Dr. Reynaga.  We will get him to Dr. Aelxander Gomez who has done his polypectomies in the past for port placement and we will get genetic counseling started.  Following the 6 courses of Taxotere per the [place]arted trial criteria, I would then give him an indefinite Zytiga and prednisone and Zometa until progression or toxicity dictates.    -12/16/2019 COVID-19 antigen test negative    -12/29/2020 PSA 0.275 with absolute neutrophil count 700 and creatinine 0.76 with normal liver enzymes and electrolytes.  Normal magnesium and phosphorus and urine drug screen positive for buprenorphine and opiates follow-up with palliative care.    -1/4/2021 CT chest abdomen pelvis with contrast and whole-body bone scan shows improving less metabolically active bone metastases.  Stable sclerotic T12, L1, and L2 vertebral bodies and bilateral pelvic bones on bone windows with stable subcentimeter para-aortic lymph nodes and no definite progression in the chest abdomen or pelvis.    -1/5/2021 [facility] medical oncology follow-up visit: I reviewed the images and reports thereof of the above CT and bone scan which shows good response to Taxotere x3 courses with a PSA down to 0.275 from a baseline of 10.  Get another 3 courses of Taxotere, continue his Xgeva, and then following that we will switch to Zytiga and prednisone.  He is working with palliative care on his bone pain but on his current dose of fentanyl patch he says his pain is still not adequately controlled he will contact them.  Dexamethasone prescription was refilled.  We will see my nurse practitioner back in 3 weeks for course #5 of 6 total  courses and then we will reimage with CT chest abdomen pelvis and bone scan before going to the Zytiga prednisone.    -3/4/2021 CT chest abdomen pelvis with contrast is negative save for stable sclerotic bone lesions and the bone scan shows near resolution of prior bony abnormalities associated with the known sclerotic lesions in the thoracolumbar spine and bony pelvis.    2/17/2021 PSA down to 0.1 from 1.37 October 2020.  3/9/2021 PSA 0.1    -5/26/2021 CT chest abdomen pelvis with contrast shows stable to slightly underlying increase low-density left para-aortic node.  Bone lesions stable.  Whole-body bone scan stable to decrease activity of known thoracolumbar and bony pelvic involvement.  PSA less than 0.1  , AST 74, bilirubin 0.5.       -6/1/2021 [facility] medical oncology follow-up visit: I reviewed the above data with him and images thereof.  Bones are stable.  No significant adenopathy.  PSA immeasurably low.     upper limit of normal 69 and AST 74 upper limit of normal 46.  Hence, neither is more than double the upper limit of normal with no ascites and no encephalopathy and normal albumin and bilirubin, despite the elevation of liver enzymes, he has child Abrams score A.  Package insert for Abiraterone says that for ALT and/or AST greater than 5 times upper limit of normal or total bilirubin greater than 3 times the upper limit of normal to withhold treatment until liver function tests returned to baseline or ALT and AST less than or equal to 2.5 times the upper limit of normal and total bilirubin less than or equal to 1.5 times the upper limit of normal and then reinitiate at 750 mg daily dose of Zytiga.  For recurrent hepatotoxicity on 750 mg daily Zytiga, withhold treatment until liver functions returned to baseline or ALT and AST less than 3-2.5 times upper limit of normal and total bilirubin less than or equal to 1.5 times the upper limit of normal then reinitiate at 500 mg daily Zytiga dose.   If has recurrent hepatotoxicity on 500 mg dose, then discontinue treatment.  If has ALT greater than 3 times the upper limit of normal along with total bilirubin greater than 2 times the upper limit of normal in the absence of other contributing causes or biliary obstruction, permanently discontinue Zytiga.  Based on these recommendations, I would continue current doses but we will watch with serial labs monthly and repeat his imaging again in 3 months but clinically he is doing wonderfully with excellent response to Taxotere and now on Zytiga prednisone plus Zometa along with Relugolix.    -6/23/2020 for [facility] oncology clinic follow-up: Having persistent and worsening pain above his left hip.  I will get an MRI of the left hip for further evaluation.  Otherwise we will continue therapy unchanged with Zytiga, prednisone and Zometa along with Relugolix.    -7/28/2021 [facility] oncology clinic follow-up: Patient with multiple somatic complaints including episodes of confusion, dizziness, decreased memory, agitation.  He reports ongoing episodes with difficulty swallowing.  Also most concerning for episodes of angina and chest pain for which he basically refused to seek emergency medical attention.  He has a history of coronary artery disease and stent placement.  He has not followed up with cardiology for many years.  I will get an MRI of the brain in light of his confusion, dizziness and agitation.  I will get him to gastroenterology for EGD in light of his dysphagia.  I have also put in a referral for cardiology.  I reemphasized to the patient, his wife who is with him today and his son who was on the telephone during our visit the importance of seeking out prompt medical attention when he is having chest pain or neurological concerns so that he can be evaluated.  The patient states that he basically refuses to go to the emergency room and if his family calls an ambulance he would not agree to go to the hospital.   "For the time being, I have asked him to hold his Zytiga and prednisone until we can sort this out as Zytiga does have some cardiovascular risk.  There is likely no treatment for prostate cancer that does not carry some form of cardiovascular risk.  His PSA remains low at 0.014 yesterday.  He will continue relugolix for now.    Of note, some of these symptoms could also be due to his hypophosphatemia, phosphate level from yesterday was 1.5, I have sent in a prescription for K-Phos 3 times a day before meals for 10 days.  We will hold further Zometa until this is corrected.  I have also put in an order to check his vitamin D level.  He takes calcium and vitamin D daily.  Some of his symptoms also could be due to drug withdrawal as there was some confusion and difficulty getting his last prescription for methadone therefore he was without methadone for several days, he now has his prescription and is back on his pain medication.  He has an appointment with neurosurgery tomorrow in light of his ongoing back pain, MRI of the hip recently showed multiple bony lesions largest at the L5 vertebral body and left supra-acetabular region.  If no neurosurgical intervention recommended he may benefit from spot radiation as this is where a lot of his pain is coming from.  His wife had questions today regarding his staging and extent of disease.  We reviewed previous scans.  I did clarify with her as she used the words \"cancer free\" as she thought that is what she was told after his CT scans once he completed Taxotere in February.  I explained to her that even though his scans showed he had an excellent response with no clear areas of metastasis that did not mean he was cancer free, I explained to her that when you have metastatic cancer the treatment intent is palliative in nature and to control the disease but not to cure the disease.  She stated understanding.  We will see him back in 2 weeks for follow-up to sort through this " and make further plan of care, again, I have asked him to hold Zytiga and prednisone until he sees us back, he will continue on his other medications including relugolix.    -7/30/2021 neurosurgery PA consultation.  MRI with and without contrast L5 ordered.    -8/3/2021 neurosurgery follow-up showed known sclerotic disease with new L5 abnormality without compression deformity or instability.  MRI brain negative for metastasis.    -8/4/2021 office visit Dr. Fco Quintanilla radiation oncology coordinating with Dr. North neurosurgery for palliative radiation for MRI evidence of L5 and left supra acetabular painful lesions.  States that the patient's disease which is not secreting PSA is experiencing some progression and would benefit from 20 Gy in five fractions and other lesion 30 Gy in ten fractions. Patient offered to go to [place] for radiation but desired treatment in [place].    -8/10/2021 [facility]: No chest pains at this junction.  Reviewed MRI brain and other MRIs reviewed by Dr. North and [place].  Due for EGD on 19th.  We will repeat his phosphorus along with his other labs continue Relugolix, Zytiga, prednisone, and he will continue radiation palliatively to the painful bony lesions with Dr. Quintanilla/Can.  He will see my nurse practitioner back in a few weeks to see how he is tolerating this and to follow-up on the phosphorus off of Zometa and she will order repeat CT chest abdomen pelvis with contrast and total body bone scan the end of September.I will see him back after that.    -9/8/2021 Zometa resumed.  PSA <0.10    -10/5/2021 [facility] follow-[facility] visit: followed-up with radiation oncology 9/21/2021.  Status post symptomatic L5 radiation 5 fractions 20 [place] completed 8/16/2021 with improvement in right hip pain.  9/2/2021 PSA less than 0.1.  9/29/2021 total body bone scan shows increased uptake left iliac bone slightly superior group of the left acetabulum  with no additional foci of suspicious abnormality is unlikely treatment related with no new sites of active osseous metastasis.  CT chest abdomen pelvis with contrast shows stable borderline enlarged left periaortic nodes and dating sclerotic bone lesions.Continue Zytiga, prednisone, Relugolix, Zometa, repeat CT chest abdomen pelvis with contrast and total body bone scan the end of the year.  We will follow PSA serially.    -11/17/2021 [facility] Oncology clinic follow-up:  Mr. Lugo overall is doing well.  He is tolerating therapy with Zytiga, prednisone and Relugolix along with Zometa which is given every 6 weeks.  PSA remains low at <0.1.  Has occasional low phosphorus, currently low at 1.7.  Serum calcium is normal at 8.9, normal creatinine 0.79 and normal CBC other than for platelets of 124.  I will hold Zometa for 1 month, I did send in a prescription for phosphorus replacement today.  He takes calcium and vitamin D.  I will see him back in 1 month for follow-up.  We will repeat restaging scans after that visit.    -12/15/2021 [facility] Oncology clinic follow-up: Mr. Lugo continues to do well on therapy with Zytiga, prednisone and Relugolix, his PSA remains low at <0.1.  He is continuing to have left lower back pain, he is working with palliative care and they have referred him also to pain management.  Pain management has talked to him about putting in a pain pump however he is leery of this, I told him that this was a safe and effective pain management technique and to certainly give consideration to their recommendations.  His phosphorus is improving however is still a little low, I will hold off on Zometa until we see him back in 1 month, we may end up only giving it every 12 weeks.  We will repeat restaging scans with CT chest, abdomen and pelvis along with total body bone scan prior to return and I have ordered those today.      -1/5/2022 CT chest abdomen pelvis with contrast [facility] showing stable  left periaortic adenopathy and unchanged sclerotic thoracic, lumbar spine and pelvis lesions with no new or progressive disease in the chest abdomen or pelvis.  Total body bone scan shows no significant change and no new suspicious foci.  Stable posterior right acetabulum and left superior acetabulum and degenerative changes in the cervical spine, shoulders, acromioclavicular joints, sternoclavicular joints, elbows, wrists, hands, thoracic spine, lumbosacral spine, sacroiliac joints, hips, knees, ankles, feet.    -1/11/2022 [facility] medical oncology hematology follow-up visit: I reviewed images and reports from his 1/5/2022 scans.  No active disease and PSA immmeasurably low on Zytiga, prednisone, Relugolix.  However, he has struggled with hypophosphatemia and is significantly fatigued with this.  Given that the bones are doing well and given that his phosphorus continues to remain consistently low at 2.2 in mid December, 1.7 mid November and 2.5 mid-September and as low as 1.5 back to July and the likelihood that this is related to his Zometa, given that his bones are stable overall, he will increase from 1200 mg up to 1600 mg of calcium and phosphate a day and we will hold his Zometa for the foreseeable future.  He will see my nurse practitioner back in a month to continue to monitor his phosphorus along with his calcium and other labs and will repeat his scans again in 3 months.  In addition, given his fatigue we will check his ACTH and cortisol though he is taking his prednisone with his Zytiga.  Though his electrolytes are normal, I will keep an eye on the cortisol and ACTH and have these labs not only drawn today but again just prior to return to my nurse practitioner on February 10.    -2/10/2022 [facility] Oncology clinic follow-up: Mr. Lugo overall is stable, no change in his health this past month.  I have reviewed his labs from 2/8/2022 and they are unremarkable, his phosphorus is now normal at 3.2. We are  continuing to hold his Zometa, he last received Zometa on 10/5/2021.  He continues therapy with Zytiga, prednisone and Relugolix.  Dr. Rashid had ordered cortisol level and ACTH which are low, currently his ACTH is 2.8 and cortisol 3.08 however these are both done in the face of ongoing prednisone that he takes with his Zytiga.  We will get him to endocrinology for further evaluation for possible adrenal insufficiency but will not interrupt his treatment in the interim.  He will need restaging scans again in April for a 3-month follow-up.  He will continue to follow with palliative care and pain management for his cancer related pain.  His PSA remains immeasurably low at <0.1 on 2/8/22.    -2/15/2022 went to emergency room with weakness, decreased appetite, myalgias in the setting of known COVID-19 testing positive a few days prior to this visit.  Phosphorus had been low in the past but was normal in the emergency room with unremarkable CBC and CMP.  Chest x-ray unremarkable saturating well on room air mildly hypertensive with dry mucosa.  Given 500 cc crystalloid.  Covid test positive.  Mild thrombocytopenia 136,000 and otherwise unremarkable.  Discharged home.    -3/3/2022 data:  PSA less than 0.1  CMP unremarkable  Cortisol 3.96 normal  ACTH 2.8 lower limit of normal 7.2  Phosphorus normal 2.6    -3/8/2022 [facility] medical oncology follow-up: Suspect big chunk of his fatigue was Covid.  Another part of the fatigue likely related to lack of testosterone.  Not hypophosphatemic off of Zometa.  ACTH running low while on prednisone not surprising but with normal cortisol and I doubt adrenal insufficient which is why he is on prednisone to avoid such while taking Zytiga.  Overall doing fairly well and with immeasurably low PSA, I will have labs done on him early April, see my nurse practitioner early May, and she will order repeat bone scan and CTs the end of May and I will see him back in June.  We will continue to hold  the Zometa unless bone lesions progress given symptomatic prior hypophosphatemia on Zometa.  He will keep his appointment April 28 with Dr. Mir Duffy just to be sure that my take on his pituitary/adrenal axis assessment is accurate.    -4/28/2022 endocrinology consult Dr. Mir Duffy.  With low ACTH and cortisol on prednisone with Zytiga, the adequacy of prednisone cannot be assessed by measuring ACTH or cortisol but is assessed by weight changes, blood pressure, blood sugar, potassium, overall sense of how the patient is doing.  Primary adrenal insufficiency with low ACTH and low cortisol would be typical for the situation as his blood sugar tends to run a little high and potassium a little low, he did not think increasing prednisone was necessary.  He put him on some potassium.  No follow-up scheduled, will see as needed.    -5/4/2022 [facility] Oncology clinic follow-up: Mr. Lugo continues on therapy with Zytiga and prednisone along with Relugolix.  His Zometa has been on hold due to previous hypophosphatemia.  There is some concern about potential adrenal insufficiency. He saw endocrinology, Dr. Mir Duffy on 4/28/2022.  I did review his office note and he stated that the low ACTH and low serum cortisol would be typical for Mr. Lugo's situation.  He further stated that the adequacy of prednisone dose cannot be assessed by measuring ACTH or cortisol but is rather assessed by the overall just altered how the patient is feeling-weight change, blood pressure, blood sugar, potassium, overall sense of wellbeing.  His potassium had been running a little low therefore they put him on potassium 10 mill equivalents daily. Of note, I do not see a future follow-up scheduled with endocrinology.  He is having night sweats, I am not sure if this is related.  His glucose was normal this morning at 107.  His weight is stable.  He will continue current treatment for his prostate cancer unchanged, we will continue to hold his Zometa.   Current phosphorus and magnesium are normal.  CBC and CMP from today are unremarkable, cortisol is normal.  PSA and ACTH are pending  In regards to his night sweats, he had taken clonidine previously 0.1 mg twice daily as needed, I did send in a short supply again to try and see if this helps.  He also is having indigestion despite being on omeprazole twice daily, I sent a prescription for Pepcid.  He had an EGD August 2021.  We will repeat restaging scans prior to return and I have ordered those today.     -5/24/2022 CT chest abdomen pelvis with [place] negative.  Bone scan shows stable bone metastases.    - 5/25/2022 PSA less than 0.1, platelets 130,000, otherwise unremarkable CBC and CMP.  A.m. cortisol running low 1.2 with phosphorus low 2.3.    -5/31/2022 [facility] oncology follow-up: On Zytiga, prednisone, Relugolix, PSA remaining immeasurably low with stable bone scan and no visceral/paulino metastases.  Phosphorus still running low.  I have discontinued his potassium chloride and started potassium phosphate 500 mg 4 times a day.  Unless PSA rising, we will plan on repeating CTs and bone scan in 6 months and will follow with my nurse practitioner in the interim and if symptoms dictate or labs, will get back to Dr. Duffy for management of potential adrenal insufficiency but presently we will just see how he does with potassium phosphate and hopeful improvement in the phosphorus level with time.  We will continue to follow with palliative care for pain management    -7/6/2022 [facility] Oncology clinic follow-up: Mr. Lugo overall is doing well but continues to be troubled with fatigue and hot flashes.  For the most part he states he is able to do the things he wants to do, he continues to work but does have to take more rest periods.  I had a long discussion with him and his wife after reviewing his medications with them as they wanted to see if there was anything he could stop or adjust.  I  discussed with him the need to continue on treatment for his prostate cancer even though his PSA remains immeasurably low and his scans look good but that if his medication is stopped the cancer would progress and over time it still has the potential to progress on therapy but would continue it as long as possible to keep his disease under control.  I explained this is like treating someone with hypertension, you do not stop the antihypertensive just because the blood pressure normalizes.  They stated understanding.  There is no dose adjustment of Zytiga or prednisone that would minimize his symptoms, he is on the current standard recommended therapy.  Discussed with him that sometimes during times of stress we may need to increase his prednisone dose but for now would not change anything.  His phosphorus level is normal, we continue to hold his Zometa.  I did give him the option of holding his phosphorus for the next month and we will see what happens, if it drops we will start him back on it but may be at a lower dose rather than 4 times a day maybe twice a day.  For now he will continue therapy unchanged.  We will continue monitoring labs monthly.  No PSA was drawn this month but that is okay as his PSA has been running immeasurably low at <0.1, we will recheck next month with lab draw.  We will stop checking his ACTH and cortisol level every month, if he develops worsening symptoms I will repeat those.  He has not gotten a follow-up with endocrinology as of yet.    -8/3/2022 [facility] Oncology clinic follow-up:  Mr. Lugo is doing well as far as his prostate cancer goes with continued immeasurably low PSA of <0.1.  He continues on therapy with Zytiga and prednisone along with Relugolix.  His phosphorus is stable at 2.7 which was just slightly low by our reference range however was 2.71-month ago also which was normal on another labs reference range.  He has not taken his phosphorus for this past month as he thought  it was making him more fatigued.  He really can tell no difference whether he was taking it or not.  I have asked him to try to go back on phosphorus twice a day rather than 4 times a day and see if this is tolerated and if we can keep his phosphorus level from dropping.  His biggest complaint today is flareup of his arthralgias and back pain.  He is following with pain management, Dr. Danny Avalos but is requesting a referral back to palliative medicine as he feels that no one is currently listening to him and they are just prescribing the same medications that are not effective according to his report.  He does have a follow-up with Dr. Avalos on 8/12/2022 but due to his current pain is not able to work until after he is seen and hopefully can get some better relief.  I have given him a work release until 15 August.  I have also put in a referral back to palliative care.  Also encouraged him to work with Dr. Avalos for help in resolving his issue.  Apparently they have recommended some type of a pump however he has been hesitant to that but likely would be helpful.  We will continue therapy unchanged.  We will continue monthly labs including phosphorus, we are not currently checking ACTH and cortisol monthly as Dr. Mir Duffy previously stated in his assessment on 4/28/2022 that the adequacy of prednisone cannot be assessed by measuring ACTH or cortisol but would be assessed by symptoms, see note from 4/28/2022.  He made no further follow-up appointments.  Fatigue is not worsening currently.  His glucose and potassium are  Normal.    -10/13/2022 [facility] medical oncology follow-up: Mr. Lugo is doing well.  He is tolerating treatment with Zytiga and prednisone along with Relugolix without any unusual side effects.  His PSA has remained immeasurably low at <0.1.  His phosphorus was slightly low at last visit.  He had been instructed to go back on phosphorus twice a day but he misunderstood and has not been taking it.   We will check labs today.  I will follow-up with results and notify him if he needs to restart the phosphorus.  His pain is better controlled since reestablishing with [facility].  We will repeat scans prior to next follow-up.  I have ordered CT chest abdomen pelvis along with bone scan.  We will see him back in 1 month.    -10/13/2022 PSA less than 0.014.With unremarkable CBC and CMP.  Phosphorus still a little low 2.3.    -11/4/2022 CT abdomen pelvis chest showed no evidence of disease progression with stable sclerotic bone lesions.  Bone scan stable right greater than left acetabular metastasis.  Stable bone metastases.    -11/8/2022 [facility] medical oncology telemedicine follow-up: Patient states that he feels achy all over with low-grade temps and a cough mildly productive.  Has an appointment later today to see his primary care for testing for flu and COVID.  Suggested that if he were positive for COVID that he get Paxlovid and that if he were positive for flu that he get Tamiflu and to discuss this with his primary care.  From the prostate cancer side, his PSA is immeasurably low with stable bone metastases and no evidence of progression.  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  We will continue the Relugolix, Zytiga, prednisone and he will see my nurse practitioner 12/7/2022.  Would repeat CTs and bone scan in May.    -1/25/2023 [facility] Oncology medicine follow-up: Mr. Lugo is having worsening fatigue and muscle aches.  He does have adrenal insufficiency on Zytiga and prednisone for his prostate cancer, he saw endocrinology in light of low ACTH back in April 2022.  At that time there was no recommendation other than for monitoring him for his overall wellbeing and labs.  He denies any fevers or chills.  His labs as shown above are unremarkable, his CBC and CMP are normal other than for glucose of 112, PSA remains low at <0.1.  He has no new pain or  any symptoms concerning for progression of his prostate cancer.  I will get him back to endocrinology to see if they feel any dose adjustment of his prednisone would be helpful in his symptoms.  Currently he is on 5 mg a day with his Zytiga.  I did not repeat his ACTH or cortisol as they would be expected to be low in this situation.  His potassium is normal, he has had no weight loss or change in his appetite.  Blood sugar is reasonable.  His only complaint currently is worsening fatigue and muscle aches.  He will continue treatment unchanged with Zytiga, prednisone and relugolix.  I will see him back in 1 month for follow-up.  We will repeat his restaging scans in May.    -3/1/2023 [facility] Oncology clinic follow-up:  His PSA remains low at <0.1 on treatment with Zytiga and prednisone along with relugolix however he continues to complain of significant fatigue and muscle aches not improving with time.  He states that his quality of life is affected as he is not able to do any work activities due to the fatigue.  He did see endocrinology again and they have increased his prednisone from 5 mg daily up to 7.5 mg daily however so far this has not made any difference in how he feels.  He is supposed to get back in touch with them to let them know how he is doing and to see if there is any other adjustments needed. His labs are unremarkable with normal thyroid function, CMP is normal other than for glucose of 156.  They did check hemoglobin A1c to look for diabetes however that was normal at 5.7.  CMP is unremarkable with just very mild anemia with hemoglobin of 12 and hematocrit 36.1% which would not account for his fatigue.  He does have hot flashes that are fairly significant, I will try venlafaxine as suggested by Dr. Duffy and I appreciate the recommendation.  We will start at 37.5 mg daily and if tolerated he can increase to 75 mg after 1-2 weeks.  I will see him back in 1 months for follow-up.  Plan to repeat  scans late April/early May.  His prostate cancer seems under good control with current regimen however I am not sure how long he can tolerate this due to the side effects.    -3/30/2023 [facility] Oncology clinic follow-up:  Mr. Lugo continues to deal with fatigue.  He appears more cushingoid today, he continues on treatment with Zytiga, prednisone and relugolix.  Prednisone dose currently is 7.5 mg daily.  This has not made any difference in his energy level.  PSA remains immeasurably low at <0.014.  CBC shows new onset of microcytic hypochromic anemia with hemoglobin of 11, hematocrit 34.4%, MCV 75.4, MCH 24.1, WBC is normal at 5.81 with normal platelet count 181,000 and normal differential.  CMP is unremarkable, it is normal other than for glucose of 142.  We will get him to Dr. Gomez for an EGD and colonoscopy for further evaluation in regards to his new onset of anemia.  I will check a ferritin and iron profile today.  He has no associated GI symptoms otherwise except for 1 day a few weeks ago he does report that he had fairly sudden onset of vomiting but this was an isolated incident on that day and has not reoccurred since.  Continues to follow with palliative care for management of his chronic back pain.  We will repeat restaging CT chest, abdomen and pelvis along with total body bone scan prior to return and I have ordered that today.    Return to clinic after scans in about 3 weeks for follow-up    This was a level 4, moderate MDM visit with management of side effects of therapy, work-up of new problem with new onset anemia, ordering of labs and restaging scans and management of drug therapy requiring intensive monitoring for toxicity.    bAena Velasquez, APRN    03/30/2023

## 2023-03-31 ENCOUNTER — TELEPHONE (OUTPATIENT)
Dept: ONCOLOGY | Facility: CLINIC | Age: 68
End: 2023-03-31
Payer: MEDICARE

## 2023-03-31 DIAGNOSIS — E61.1 IRON DEFICIENCY: Primary | ICD-10-CM

## 2023-03-31 LAB
FERRITIN SERPL-MCNC: 12.3 NG/ML (ref 30–400)
IRON SATN MFR SERPL: 5 % (ref 20–50)
IRON SERPL-MCNC: 27 MCG/DL (ref 59–158)
TIBC SERPL-MCNC: 511 MCG/DL
UIBC SERPL-MCNC: 484 MCG/DL (ref 112–346)

## 2023-03-31 RX ORDER — FERROUS SULFATE 325(65) MG
TABLET ORAL
Qty: 30 TABLET | Refills: 11 | Status: SHIPPED | OUTPATIENT
Start: 2023-03-31

## 2023-03-31 NOTE — TELEPHONE ENCOUNTER
I called Mr. Lugo to let him know labs from yesterday show him to be iron deficient.  Ferritin 12.30, Serum iron 27, iron saturation 5%, TIBC 511, U .  He has an appointment with Dr. Gomez next week.  I will send in a prescription for Ferous sulfate 325 mg, 1 tablet twice daily along with vitamin C every other day.  He states understanding.

## 2023-04-03 ENCOUNTER — OFFICE VISIT (OUTPATIENT)
Dept: PALLIATIVE CARE | Facility: CLINIC | Age: 68
End: 2023-04-03
Payer: MEDICARE

## 2023-04-03 ENCOUNTER — LAB (OUTPATIENT)
Dept: LAB | Facility: HOSPITAL | Age: 68
End: 2023-04-03
Payer: MEDICARE

## 2023-04-03 ENCOUNTER — SPECIALTY PHARMACY (OUTPATIENT)
Dept: ONCOLOGY | Facility: HOSPITAL | Age: 68
End: 2023-04-03
Payer: MEDICARE

## 2023-04-03 VITALS
TEMPERATURE: 97.8 F | HEIGHT: 69 IN | HEART RATE: 94 BPM | DIASTOLIC BLOOD PRESSURE: 84 MMHG | RESPIRATION RATE: 18 BRPM | WEIGHT: 200 LBS | OXYGEN SATURATION: 98 % | BODY MASS INDEX: 29.62 KG/M2 | SYSTOLIC BLOOD PRESSURE: 135 MMHG

## 2023-04-03 DIAGNOSIS — C61 PROSTATE CANCER METASTATIC TO BONE: ICD-10-CM

## 2023-04-03 DIAGNOSIS — Z51.81 THERAPEUTIC DRUG MONITORING: Primary | ICD-10-CM

## 2023-04-03 DIAGNOSIS — G89.3 PAIN, CANCER: ICD-10-CM

## 2023-04-03 DIAGNOSIS — G89.3 CANCER RELATED PAIN: ICD-10-CM

## 2023-04-03 DIAGNOSIS — C79.51 PROSTATE CANCER METASTATIC TO BONE: ICD-10-CM

## 2023-04-03 PROCEDURE — 3079F DIAST BP 80-89 MM HG: CPT | Performed by: PHYSICIAN ASSISTANT

## 2023-04-03 PROCEDURE — 99214 OFFICE O/P EST MOD 30 MIN: CPT | Performed by: PHYSICIAN ASSISTANT

## 2023-04-03 PROCEDURE — 1159F MED LIST DOCD IN RCRD: CPT | Performed by: PHYSICIAN ASSISTANT

## 2023-04-03 PROCEDURE — 3075F SYST BP GE 130 - 139MM HG: CPT | Performed by: PHYSICIAN ASSISTANT

## 2023-04-03 PROCEDURE — 1160F RVW MEDS BY RX/DR IN RCRD: CPT | Performed by: PHYSICIAN ASSISTANT

## 2023-04-03 PROCEDURE — 1125F AMNT PAIN NOTED PAIN PRSNT: CPT | Performed by: PHYSICIAN ASSISTANT

## 2023-04-03 PROCEDURE — 80306 DRUG TEST PRSMV INSTRMNT: CPT | Performed by: PHYSICIAN ASSISTANT

## 2023-04-03 RX ORDER — HYDROMORPHONE HYDROCHLORIDE 8 MG/1
8 TABLET ORAL EVERY 4 HOURS PRN
Qty: 180 TABLET | Refills: 0 | Status: SHIPPED | OUTPATIENT
Start: 2023-04-03 | End: 2023-05-03

## 2023-04-03 RX ORDER — OXYCODONE 9 MG/1
9 CAPSULE, EXTENDED RELEASE ORAL EVERY 12 HOURS
Qty: 60 EACH | Refills: 0 | Status: SHIPPED | OUTPATIENT
Start: 2023-04-03 | End: 2023-05-03

## 2023-04-03 NOTE — PROGRESS NOTES
Specialty Pharmacy Refill Coordination Note     Naveen is a 67 y.o. male contacted today regarding refills of abiraterone acetate (ZYTIGA) 500 MG chemo tablet - Take 2 tablets po qd & relugolix (ORGOVYX) 120 MG tablet - Take 1 tablet po qd specialty medication(s).    Scheduled to ship on Tuesday 04/04, set to be delivered to patient on Wenesday 04/05 due to same day shipping cut off at 12pm.    Specialty medication(s) and dose(s) confirmed: yes    Refill Questions    Flowsheet Row Most Recent Value   Changes to allergies? No   Changes to medications? No   New conditions since last clinic visit No   Unplanned office visit, urgent care, ED, or hospital admission in the last 4 weeks  No   How does patient/caregiver feel medication is working? Very good   Financial problems or insurance changes  No   Since the previous refill, were any specialty medication doses or scheduled injections missed or delayed?  No   Does this patient require a clinical escalation to a pharmacist? No          Delivery Questions    Flowsheet Row Most Recent Value   Delivery method [employer]   Delivery address correct? Yes   Delivery phone number [phone]   Preferred delivery time? Anytime   Number of medications in delivery 2   Medication being filled and delivered Abiraterone & Relugolix   Doses left of specialty medications 10 days left   Is there any medication that is due not being filled? No   Supplies needed? No supplies needed   Cooler needed? No   Do any medications need mixed or dated? No   Additional comments No copay   Questions or concerns for the pharmacist? No   Explain any questions or concerns for the pharmacist N/A   Are any medications first time fills? No            Medication Adherence    Adherence tools used: watch   Other adherence tool: Clock - takes at same time each day, 7:45am   Support network for adherence: family member          Follow-up: 28 day(s)     Sen tSearns, Pharmacy Technician  Specialty Pharmacy Technician

## 2023-04-03 NOTE — PROGRESS NOTES
"     Palliative Clinic Note      Name: Naveen Lugo  Age: 67 y.o.  Sex: male  : 1955  MRN: [id number]  Date of Service: 2023   Medical Oncologist: Rachid     Subjective:    Chief Complaint: Fatigue    History of Present Illness: Naveen Lugo is a 67 y.o. male with past medical history significant for coronary artery disease, hypertension, hyperlipidemia, opioid use disorder, and metastatic prostate cancer  who presents to the palliative clinic today as a follow up for pain and symptom management.     Treatment summary: He was diagnosed with prostate adenocarcinoma metastasized to the bone in 2020. Patient completed radiation with Dr. Quintanilla in 2021. He is currently on Zytiga, prednisone, and Relugolix. PSA remains low and no active disease on imaging. Patient recently diagnosed with new onset of microcytic anemia and started on iron supplementation. He is scheduled for a EGD/colonoscopy on 23.     Pain: History of opioid use disorder. Patient completed Suboxone taper in the past and was on no pain medication prior to cancer diagnosis. History complicated by OUD and several failed therapies including methadone, MS contin and fentanyl. Patient was referred to pain specialist, Dr. BARB Avalos in 3/2022. Patient transferred care back to the palliative clinic for uncontrolled hip and back pain in 2022. Patient reports adeaquate pain control with current regimen of oxycodone ER 9 mg BID and hydromorphone 8 mg every 4 hours.      Symptoms: The patient continues to complain of fatigue. He is following with an endocrinologist. Prednisone was increased to 7.5 mg daily. He endorses a \"fair\" appetite and denies nausea or vomiting. He reports regular bowel movements on Senna. No issues with sleep. Hot flashes have improved with the addition of venlafaxine (Effexor).     Pyschosocial: Endorses strong family support. The patient's grandson broke his arm and is scheduled for surgery later today.  No personal " "history of mental illness. He denies anxiety or depression. Patient enjoys spending times outdoors.     Goals: Focused on improving pain so he can continue to work.    The following portions of the patient's history were reviewed and updated as appropriate: allergies, current medications, past family history, past medical history, past social history, past surgical history and problem list.    ORT-R: High risk   Aberrant behavior: abnormal UDS 9/7/22  Decisional capacity: Full  ECOG: (1) Restricted in physically strenuous activity, ambulatory and able to do work of light nature   Palliative Performance Scale Score: 80%     Objective:    /84   Pulse 94   Temp 97.8 °F (36.6 °C) (Temporal)   Resp 18   Ht 175.3 cm (69.02\")   Wt 90.7 kg (200 lb)   SpO2 98%   BMI 29.52 kg/m²     Constitutional: Awake, alert, normal gait, sitting up in exam chair, in no acute distress  Eyes: PERRLA, EOMS intact  HENT: NCAT, face symmetric, bilateral swelling in face noted  Neck: Supple, trachea midline  Respiratory: Nonlabored respirations  Cardiovascular: RRR, no edema observed  Gastrointestinal: Soft, no guarding  Musculoskeletal: Moves all extremities   Psychiatric: Appropriate affect, cooperative  Neurologic: Oriented x 3, Cranial Nerves grossly intact to confrontation, speech clear  Skin: Cool dry, no rashes or wounds appreciated     Medication Counts: Reviewed. See bottom of note for details. Did not bring medication to appointment  I have reviewed the patient's [place] PDMP. AGUSTIN Req #[id number] .   UDS(3M): Last 10/5/22. Results were appropriate.     Assessment & Plan:    1. Prostate cancer metastatic to bone  --He is currently on Zytiga, prednisone, and Relugolix. PSA remains low and no active disease on imaging. Patient recently diagnosed with new onset of microcytic anemia and started on iron supplementation. He is scheduled for a EGD/colonoscopy on 4/6/23.    2. Cancer related pain  --Patient is appropriate for opioid " therapy due to cancer related pain. Daily function and quality of life improved with current regimen. Continue oxycodone ER 9 mg twice daily and hydromorphone 8 mg q4h prn. Refills were sent to the pharmacy. Side effects of the medication discussed at every visit. Patient was encouraged to continue bowel regimen of daily stool softeners, prn laxatives, and diet modifications.    3. Therapeutic drug monitoring  --Patient due for regular UDS monitoring. Patient sent to the lab after office visit.     Code status: Full code  Medical interventions: Full    Return in about 3 months (around 7/3/2023) for Office Visit.    I spent 30 minutes caring for Naveen Lugo on this date of service. This time includes time spent by me in the following activities: preparing for the visit, reviewing tests, obtaining and/or reviewing a separately obtained history, performing a medically appropriate examination and/or evaluation , counseling and educating the patient/family/caregiver, ordering medications, tests, or procedures, documenting information in the medical record, independently interpreting results and communicating that information with the patient/family/caregiver, and care coordination    Anna Ayers PA-C  04/03/2023    Medication Date Filled # Filled Count Used # Days  ROSE MARIE   Xtampza ER 9 3/4/23 60 --      Hydromorphone 8 3/4/23 180 --

## 2023-04-03 NOTE — PATIENT INSTRUCTIONS
Check-out instructions:  Continue current regimen.   Scheduled to follow up in 3 months.     Medication Policy: We ask that you bring all of the medications prescribed by this clinic to every appointment. For telemedicine appointments, be prepared to give medication counts. This will assist us with managing your refill needs.      Refill Policy: You must notify us at least 3-5 business days in advance for routine refill requests. Call [phone] or send Fetchmob message to the Palliative Pool. Some prescriptions will need to be signed by the physician and will take longer to be sent to the pharmacy. Please also be aware of additional insurance prior authorization processing time required for many medications. Try to communicate with your pharmacy first to look for scripts signed in advance.     Communication: The [facility] days are Monday-Friday 8:30-4:30 PM. Call [phone] or send Fetchmob message to the Palliative Pool. You will not be routed to speak directly to the palliative provider during clinic hours, so that we may provide the best care and attention to our patients in the office. If you require immediate communication, please also consider contacting your primary care office or appropriate specialist office.

## 2023-04-18 ENCOUNTER — OFFICE VISIT (OUTPATIENT)
Dept: ONCOLOGY | Facility: CLINIC | Age: 68
End: 2023-04-18
Payer: MEDICARE

## 2023-04-18 VITALS
DIASTOLIC BLOOD PRESSURE: 92 MMHG | SYSTOLIC BLOOD PRESSURE: 157 MMHG | TEMPERATURE: 97.3 F | RESPIRATION RATE: 20 BRPM | HEART RATE: 89 BPM | BODY MASS INDEX: 29.77 KG/M2 | HEIGHT: 69 IN | WEIGHT: 201 LBS

## 2023-04-18 DIAGNOSIS — C79.51 PROSTATE CANCER METASTATIC TO BONE: Primary | ICD-10-CM

## 2023-04-18 DIAGNOSIS — C61 PROSTATE CANCER METASTATIC TO BONE: Primary | ICD-10-CM

## 2023-04-18 NOTE — PROGRESS NOTES
CHIEF COMPLAINT: General fatigue    Problem List:  Oncology/Hematology History Overview Note   1.  Stage IVb grade group 4 metastatic prostate cancer with sclerotic bone lesions on CT and bone scan and history of prostatic hypertrophy.  Good response to Taxotere ending 2/18/2021 Relugolix, followed by Zytiga prednisone with L4/sacrum, left acetabulum, and right pelvis external beam radiation August 2021.  Hypophosphatemia on Zometa.  Zometa held as of October 2021.  2.  Kidney stone  3.  Polyps  4.  Probable drug-induced hypophosphatemia from Zometa, drug stopped after 10/5/2021 dose  5.  Cancer related pain seeing palliative care  6.  Fatigue  7.  Covid 2/2022  8.  Iron deficiency anemia    -9/30/2020 office note from Dr. Ronen Reynaga indicates patient with PSA 10.8.  Underwent TRUS prostate biopsy 9/15/2020.  Adenocarcinoma the prostate Sarika 4+4 = 8 in 1 out of 12 cores and 4+3 = 7 and 10 out of 12 cores and 3+4 = 7 in 1 out of 12 cores.  Perineural invasion.  Extraprostatic extension into the fat seen on 2 biopsies of the right lateral mid and right apex.  9/24/2020 CT chest and whole-body bone scan showed T12, L1, left acetabular, right medial acetabular metastases with no lung metastases.  He started androgen deprivation therapy with Casodex with plans for Lupron to start in 1 to 2 weeks for clinical T3 N0 M1 metastatic prostate cancer.  Review of official report from [facility] 9/24/2020 bone scan mentions T12, L1, roof of left acetabulum and medial right acetabular osteoblastic metastases.  CT chest with contrast that same day showed mild splenomegaly 14.2 cm with stable periaortic nodes compared to CT December 2015 with no lung metastases.  Sclerotic abnormality within the T12 vertebral body, L1 vertebral body extending into the pedicle unchanged.  8/28/2020 CT abdomen pelvis report from [facility] reviewed and mentions normal spleen size.  Punctate nonobstructing kidney stone, no  paulino enlargement, diffuse bladder wall thickening with mild perivesicular fat stranding, 2.1 cm sclerotic left iliac bone above the left acetabulum new compared to 2015 as well as 1.5 cm faintly sclerotic focus in the right posterior acetabulum stable compared to prior CT 2015 as well as a 1.6 cm T12 and inferior vertebral body sclerotic lesion and a 1.9 cm left posterior lateral L1 vertebral body abnormality extending to the pedicle.  -10/13/2020 initial [facility] medical oncology consultation: With 1 [place] score 8, 4+4 lesion that would make him a grade group 4 with stage IVb disease given radiographic evidence of sclerotic bone metastasis on bone scan and CT.  Needs genetic testing given metastatic prostate cancer and this is also important as, were he to have mismatch repair mutation then we would have options for PDL 1 inhibitors and were he BRCA mutated would have options for PARP inhibitors in the future.  Systemic therapy for castration naïve prostate cancer or N1 disease should be with apalutamide or Abiraterone or enzalutamide all of which are category 1.  He does not have any visceral metastases.  According to his imaging he has T12, L1, left acetabular, right acetabular, and left iliac bony involvement.  That means he would have 4 or more bone metastases with at least one metastasis (i.e. the acetabular lesions bilaterally) beyond the pelvis/vertebral, for which this would be considered high-volume disease for which 6 cycles of docetaxel 75 mg/m² x 6 cycles followed by Zytiga and prednisone would be reasonable along with Zometa every 6 weeks for bone stabilization.  He will do chemo preparation visit with my nurse practitioner prior to start chemotherapy with Taxotere and Zometa in 2 weeks and he is already received Casodex in preparation for Lupron shot he received on 10/12/2020 with Dr. Reynaga.  We will get him to Dr. Alexander Gomez who has done his polypectomies in the past for port placement and we  will get genetic counseling started.  Following the 6 courses of Taxotere per the [place] trial criteria, I would then give him an indefinite Zytiga and prednisone and Zometa until progression or toxicity dictates.    -12/16/2019 COVID-19 antigen test negative    -12/29/2020 PSA 0.275 with absolute neutrophil count 700 and creatinine 0.76 with normal liver enzymes and electrolytes.  Normal magnesium and phosphorus and urine drug screen positive for buprenorphine and opiates follow-up with palliative care.    -1/4/2021 CT chest abdomen pelvis with contrast and whole-body bone scan shows improving less metabolically active bone metastases.  Stable sclerotic T12, L1, and L2 vertebral bodies and bilateral pelvic bones on bone windows with stable subcentimeter para-aortic lymph nodes and no definite progression in the chest abdomen or pelvis.    -1/5/2021 [facility] medical oncology follow-up visit: I reviewed the images and reports thereof of the above CT and bone scan which shows good response to Taxotere x3 courses with a PSA down to 0.275 from a baseline of 10.  Get another 3 courses of Taxotere, continue his Xgeva, and then following that we will switch to Zytiga and prednisone.  He is working with palliative care on his bone pain but on his current dose of fentanyl patch he says his pain is still not adequately controlled he will contact them.  Dexamethasone prescription was refilled.  We will see my nurse practitioner back in 3 weeks for course #5 of 6 total courses and then we will reimage with CT chest abdomen pelvis and bone scan before going to the Zytiga prednisone.    -3/4/2021 CT chest abdomen pelvis with contrast is negative save for stable sclerotic bone lesions and the bone scan shows near resolution of prior bony abnormalities associated with the known sclerotic lesions in the thoracolumbar spine and bony pelvis.    2/17/2021 PSA down to 0.1 from 1.37 October 2020.  3/9/2021 PSA 0.1    -5/26/2021 CT chest  abdomen pelvis with contrast shows stable to slightly underlying increase low-density left para-aortic node.  Bone lesions stable.  Whole-body bone scan stable to decrease activity of known thoracolumbar and bony pelvic involvement.  PSA less than 0.1  , AST 74, bilirubin 0.5.       -6/1/2021 [facility] medical oncology follow-up visit: I reviewed the above data with him and images thereof.  Bones are stable.  No significant adenopathy.  PSA immeasurably low.     upper limit of normal 69 and AST 74 upper limit of normal 46.  Hence, neither is more than double the upper limit of normal with no ascites and no encephalopathy and normal albumin and bilirubin, despite the elevation of liver enzymes, he has child Abrams score A.  Package insert for Abiraterone says that for ALT and/or AST greater than 5 times upper limit of normal or total bilirubin greater than 3 times the upper limit of normal to withhold treatment until liver function tests returned to baseline or ALT and AST less than or equal to 2.5 times the upper limit of normal and total bilirubin less than or equal to 1.5 times the upper limit of normal and then reinitiate at 750 mg daily dose of Zytiga.  For recurrent hepatotoxicity on 750 mg daily Zytiga, withhold treatment until liver functions returned to baseline or ALT and AST less than 3-2.5 times upper limit of normal and total bilirubin less than or equal to 1.5 times the upper limit of normal then reinitiate at 500 mg daily Zytiga dose.  If has recurrent hepatotoxicity on 500 mg dose, then discontinue treatment.  If has ALT greater than 3 times the upper limit of normal along with total bilirubin greater than 2 times the upper limit of normal in the absence of other contributing causes or biliary obstruction, permanently discontinue Zytiga.  Based on these recommendations, I would continue current doses but we will watch with serial labs monthly and repeat his imaging again in 3 months but  clinically he is doing wonderfully with excellent response to Taxotere and now on Zytiga prednisone plus Zometa along with Relugolix.    -6/23/2020 for [facility] oncology clinic follow-up: Having persistent and worsening pain above his left hip.  I will get an MRI of the left hip for further evaluation.  Otherwise we will continue therapy unchanged with Zytiga, prednisone and Zometa along with Relugolix.    -7/28/2021 [facility] oncology clinic follow-up: Patient with multiple somatic complaints including episodes of confusion, dizziness, decreased memory, agitation.  He reports ongoing episodes with difficulty swallowing.  Also most concerning for episodes of angina and chest pain for which he basically refused to seek emergency medical attention.  He has a history of coronary artery disease and stent placement.  He has not followed up with cardiology for many years.  I will get an MRI of the brain in light of his confusion, dizziness and agitation.  I will get him to gastroenterology for EGD in light of his dysphagia.  I have also put in a referral for cardiology.  I reemphasized to the patient, his wife who is with him today and his son who was on the telephone during our visit the importance of seeking out prompt medical attention when he is having chest pain or neurological concerns so that he can be evaluated.  The patient states that he basically refuses to go to the emergency room and if his family calls an ambulance he would not agree to go to the hospital.  For the time being, I have asked him to hold his Zytiga and prednisone until we can sort this out as Zytiga does have some cardiovascular risk.  There is likely no treatment for prostate cancer that does not carry some form of cardiovascular risk.  His PSA remains low at 0.014 yesterday.  He will continue relugolix for now.    Of note, some of these symptoms could also be due to his hypophosphatemia, phosphate level from yesterday was 1.5, I have sent in a  "prescription for K-Phos 3 times a day before meals for 10 days.  We will hold further Zometa until this is corrected.  I have also put in an order to check his vitamin D level.  He takes calcium and vitamin D daily.  Some of his symptoms also could be due to drug withdrawal as there was some confusion and difficulty getting his last prescription for methadone therefore he was without methadone for several days, he now has his prescription and is back on his pain medication.  He has an appointment with neurosurgery tomorrow in light of his ongoing back pain, MRI of the hip recently showed multiple bony lesions largest at the L5 vertebral body and left supra-acetabular region.  If no neurosurgical intervention recommended he may benefit from spot radiation as this is where a lot of his pain is coming from.  His wife had questions today regarding his staging and extent of disease.  We reviewed previous scans.  I did clarify with her as she used the words \"cancer free\" as she thought that is what she was told after his CT scans once he completed Taxotere in February.  I explained to her that even though his scans showed he had an excellent response with no clear areas of metastasis that did not mean he was cancer free, I explained to her that when you have metastatic cancer the treatment intent is palliative in nature and to control the disease but not to cure the disease.  She stated understanding.  We will see him back in 2 weeks for follow-up to sort through this and make further plan of care, again, I have asked him to hold Zytiga and prednisone until he sees us back, he will continue on his other medications including relugolix.    -7/30/2021 neurosurgery PA consultation.  MRI with and without contrast L5 ordered.    -8/3/2021 neurosurgery follow-up showed known sclerotic disease with new L5 abnormality without compression deformity or instability.  MRI brain negative for metastasis.    -8/4/2021 office visit " [facility] radiation oncology coordinating with Dr. North neurosurgery for palliative radiation for MRI evidence of L5 and left supra acetabular painful lesions.  States that the patient's disease which is not secreting PSA is experiencing some progression and would benefit from 20 Gy in five fractions and other lesion 30 Gy in ten fractions. Patient offered to go to [place] for radiation but desired treatment in [place].    -8/10/2021 [facility] medical oncology follow-up visit: No chest pains at this junction.  Reviewed MRI brain and other MRIs reviewed by Dr. North and [place].  Due for EGD on 19th.  We will repeat his phosphorus along with his other labs continue Relugolix, Zytiga, prednisone, and he will continue radiation palliatively to the painful bony lesions with Dr. Quintanilla/Can.  He will see my nurse practitioner back in a few weeks to see how he is tolerating this and to follow-up on the phosphorus off of Zometa and she will order repeat CT chest abdomen pelvis with contrast and total body bone scan the end of September.I will see him back after that.    -9/8/2021 Zometa resumed.  PSA <0.10    -10/5/2021 [facility] medical oncology follow-up visit: followed-up with radiation oncology 9/21/2021.  Status post symptomatic L5 radiation 5 fractions 20 [place] completed 8/16/2021 with improvement in right hip pain.  9/2/2021 PSA less than 0.1.  9/29/2021 total body bone scan shows increased uptake left iliac bone slightly superior group of the left acetabulum with no additional foci of suspicious abnormality is unlikely treatment related with no new sites of active osseous metastasis.  CT chest abdomen pelvis with contrast shows stable borderline enlarged left periaortic nodes and dating sclerotic bone lesions.Continue Zytiga, prednisone, Relugolix, Zometa, repeat CT chest abdomen pelvis with contrast and total body bone scan the end of the year.  We will follow PSA serially.    -11/17/2021 [facility]  clinic follow-up:  Mr. Lugo overall is doing well.  He is tolerating therapy with Zytiga, prednisone and Relugolix along with Zometa which is given every 6 weeks.  PSA remains low at <0.1.  Has occasional low phosphorus, currently low at 1.7.  Serum calcium is normal at 8.9, normal creatinine 0.79 and normal CBC other than for platelets of 124.  I will hold Zometa for 1 month, I did send in a prescription for phosphorus replacement today.  He takes calcium and vitamin D.  I will see him back in 1 month for follow-up.  We will repeat restaging scans after that visit.    -12/15/2021 [facility] Oncology clinic follow-up: Mr. Lugo continues to do well on therapy with Zytiga, prednisone and Relugolix, his PSA remains low at <0.1.  He is continuing to have left lower back pain, he is working with palliative care and they have referred him also to pain management.  Pain management has talked to him about putting in a pain pump however he is leery of this, I told him that this was a safe and effective pain management technique and to certainly give consideration to their recommendations.  His phosphorus is improving however is still a little low, I will hold off on Zometa until we see him back in 1 month, we may end up only giving it every 12 weeks.  We will repeat restaging scans with CT chest, abdomen and pelvis along with total body bone scan prior to return and I have ordered those today.      -1/5/2022 CT chest abdomen pelvis with contrast [facility] showing stable left periaortic adenopathy and unchanged sclerotic thoracic, lumbar spine and pelvis lesions with no new or progressive disease in the chest abdomen or pelvis.  Total body bone scan shows no significant change and no new suspicious foci.  Stable posterior right acetabulum and left superior acetabulum and degenerative changes in the cervical spine, shoulders, acromioclavicular joints, sternoclavicular joints, elbows, wrists, hands, thoracic spine,  lumbosacral spine, sacroiliac joints, hips, knees, ankles, feet.    -1/11/2022 [facility] hematology follow-up visit: I reviewed images and reports from his 1/5/2022 scans.  No active disease and PSA immmeasurably low on Zytiga, prednisone, Relugolix.  However, he has struggled with hypophosphatemia and is significantly fatigued with this.  Given that the bones are doing well and given that his phosphorus continues to remain consistently low at 2.2 in mid December, 1.7 mid November and 2.5 mid-September and as low as 1.5 back to July and the likelihood that this is related to his Zometa, given that his bones are stable overall, he will increase from 1200 mg up to 1600 mg of calcium and phosphate a day and we will hold his Zometa for the foreseeable future.  He will see my nurse practitioner back in a month to continue to monitor his phosphorus along with his calcium and other labs and will repeat his scans again in 3 months.  In addition, given his fatigue we will check his ACTH and cortisol though he is taking his prednisone with his Zytiga.  Though his electrolytes are normal, I will keep an eye on the cortisol and ACTH and have these labs not only drawn today but again just prior to return to my nurse practitioner on February 10.    -2/10/2022 [facility] clinic follow-up: Mr. Lugo overall is stable, no change in his health this past month.  I have reviewed his labs from 2/8/2022 and they are unremarkable, his phosphorus is now normal at 3.2. We are continuing to hold his Zometa, he last received Zometa on 10/5/2021.  He continues therapy with Zytiga, prednisone and Relugolix.  Dr. Rashid had ordered cortisol level and ACTH which are low, currently his ACTH is 2.8 and cortisol 3.08 however these are both done in the face of ongoing prednisone that he takes with his Zytiga.  We will get him to endocrinology for further evaluation for possible adrenal insufficiency but will not interrupt his  treatment in the interim.  He will need restaging scans again in April for a 3-month follow-up.  He will continue to follow with palliative care and pain management for his cancer related pain.  His PSA remains immeasurably low at <0.1 on 2/8/22.    -2/15/2022 went to emergency room with weakness, decreased appetite, myalgias in the setting of known COVID-19 testing positive a few days prior to this visit.  Phosphorus had been low in the past but was normal in the emergency room with unremarkable CBC and CMP.  Chest x-ray unremarkable saturating well on room air mildly hypertensive with dry mucosa.  Given 500 cc crystalloid.  Covid test positive.  Mild thrombocytopenia 136,000 and otherwise unremarkable.  Discharged home.    -3/3/2022 data:  PSA less than 0.1  CMP unremarkable  Cortisol 3.96 normal  ACTH 2.8 lower limit of normal 7.2  Phosphorus normal 2.6    -3/8/2022 [facility] medical oncology follow-up: Suspect big chunk of his fatigue was Covid.  Another part of the fatigue likely related to lack of testosterone.  Not hypophosphatemic off of Zometa.  ACTH running low while on prednisone not surprising but with normal cortisol and I doubt adrenal insufficient which is why he is on prednisone to avoid such while taking Zytiga.  Overall doing fairly well and with immeasurably low PSA, I will have labs done on him early April, see my nurse practitioner early May, and she will order repeat bone scan and CTs the end of May and I will see him back in June.  We will continue to hold the Zometa unless bone lesions progress given symptomatic prior hypophosphatemia on Zometa.  He will keep his appointment April 28 with Dr. Mir Duffy just to be sure that my take on his pituitary/adrenal axis assessment is accurate.    -4/28/2022 endocrinology consult Dr. Mir Duffy.  With low ACTH and cortisol on prednisone with Zytiga, the adequacy of prednisone cannot be assessed by measuring ACTH or cortisol but is assessed by weight  changes, blood pressure, blood sugar, potassium, overall sense of how the patient is doing.  Primary adrenal insufficiency with low ACTH and low cortisol would be typical for the situation as his blood sugar tends to run a little high and potassium a little low, he did not think increasing prednisone was necessary.  He put him on some potassium.  No follow-up scheduled, will see as needed.    -5/4/2022 [facility] Oncology clinic follow-up: Mr. Lugo continues on therapy with Zytiga and prednisone along with Relugolix.  His Zometa has been on hold due to previous hypophosphatemia.  There is some concern about potential adrenal insufficiency. He saw endocrinology, Dr. Mir Duffy on 4/28/2022.  I did review his office note and he stated that the low ACTH and low serum cortisol would be typical for Mr. Lugo's situation.  He further stated that the adequacy of prednisone dose cannot be assessed by measuring ACTH or cortisol but is rather assessed by the overall just altered how the patient is feeling-weight change, blood pressure, blood sugar, potassium, overall sense of wellbeing.  His potassium had been running a little low therefore they put him on potassium 10 mill equivalents daily. Of note, I do not see a future follow-up scheduled with endocrinology.  He is having night sweats, I am not sure if this is related.  His glucose was normal this morning at 107.  His weight is stable.  He will continue current treatment for his prostate cancer unchanged, we will continue to hold his Zometa.  Current phosphorus and magnesium are normal.  CBC and CMP from today are unremarkable, cortisol is normal.  PSA and ACTH are pending  In regards to his night sweats, he had taken clonidine previously 0.1 mg twice daily as needed, I did send in a short supply again to try and see if this helps.  He also is having indigestion despite being on omeprazole twice daily, I sent a prescription for Pepcid.  He had an EGD August 2021.  We will  repeat restaging scans prior to return and I have ordered those today.     -5/24/2022 CT chest abdomen pelvis with contrast[place] negative.  Bone scan shows stable bone metastases.    - 5/25/2022 PSA less than 0.1, platelets 130,000, otherwise unremarkable CBC and CMP.  A.m. cortisol running low 1.2 with phosphorus low 2.3.    -5/31/2022 [facility] medical oncology follow-up: On Zytiga, prednisone, Relugolix, PSA remaining immeasurably low with stable bone scan and no visceral/paulino metastases.  Phosphorus still running low.  I have discontinued his potassium chloride and started potassium phosphate 500 mg 4 times a day.  Unless PSA rising, we will plan on repeating CTs and bone scan in 6 months and will follow with my nurse practitioner in the interim and if symptoms dictate or labs, will get back to Dr. Duffy for management of potential adrenal insufficiency but presently we will just see how he does with potassium phosphate and hopeful improvement in the phosphorus level with time.  We will continue to follow with palliative care for pain management    -7/6/2022 [facility] Oncology clinic follow-up: Mr. Lugo overall is doing well but continues to be troubled with fatigue and hot flashes.  For the most part he states he is able to do the things he wants to do, he continues to work but does have to take more rest periods.  I had a long discussion with him and his wife after reviewing his medications with them as they wanted to see if there was anything he could stop or adjust.  I discussed with him the need to continue on treatment for his prostate cancer even though his PSA remains immeasurably low and his scans look good but that if his medication is stopped the cancer would progress and over time it still has the potential to progress on therapy but would continue it as long as possible to keep his disease under control.  I explained this is like treating someone with hypertension, you do not stop the  antihypertensive just because the blood pressure normalizes.  They stated understanding.  There is no dose adjustment of Zytiga or prednisone that would minimize his symptoms, he is on the current standard recommended therapy.  Discussed with him that sometimes during times of stress we may need to increase his prednisone dose but for now would not change anything.  His phosphorus level is normal, we continue to hold his Zometa.  I did give him the option of holding his phosphorus for the next month and we will see what happens, if it drops we will start him back on it but may be at a lower dose rather than 4 times a day maybe twice a day.  For now he will continue therapy unchanged.  We will continue monitoring labs monthly.  No PSA was drawn this month but that is okay as his PSA has been running immeasurably low at <0.1, we will recheck next month with lab draw.  We will stop checking his ACTH and cortisol level every month, if he develops worsening symptoms I will repeat those.  He has not gotten a follow-up with endocrinology as of yet.    -8/3/2022 [facility] Oncology clinic follow-up:  Mr. Lugo is doing well as far as his prostate cancer goes with continued immeasurably low PSA of <0.1.  He continues on therapy with Zytiga and prednisone along with Relugolix.  His phosphorus is stable at 2.7 which was just slightly low by our reference range however was 2.71-month ago also which was normal on another labs reference range.  He has not taken his phosphorus for this past month as he thought it was making him more fatigued.  He really can tell no difference whether he was taking it or not.  I have asked him to try to go back on phosphorus twice a day rather than 4 times a day and see if this is tolerated and if we can keep his phosphorus level from dropping.  His biggest complaint today is flareup of his arthralgias and back pain.  He is following with pain management, Dr. Danny Avalos but is requesting a referral  back to palliative medicine as he feels that no one is currently listening to him and they are just prescribing the same medications that are not effective according to his report.  He does have a follow-up with Dr. Avalos on 8/12/2022 but due to his current pain is not able to work until after he is seen and hopefully can get some better relief.  I have given him a work release until 15 August.  I have also put in a referral back to palliative care.  Also encouraged him to work with Dr. Avalos for help in resolving his issue.  Apparently they have recommended some type of a pump however he has been hesitant to that but likely would be helpful.  We will continue therapy unchanged.  We will continue monthly labs including phosphorus, we are not currently checking ACTH and cortisol monthly as Dr. Mir Duffy previously stated in his assessment on 4/28/2022 that the adequacy of prednisone cannot be assessed by measuring ACTH or cortisol but would be assessed by symptoms, see note from 4/28/2022.  He made no further follow-up appointments.  Fatigue is not worsening currently.  His glucose and potassium are  Normal.    -10/13/2022 [facility] medical oncology follow-up: Mr. Lugo is doing well.  He is tolerating treatment with Zytiga and prednisone along with Relugolix without any unusual side effects.  His PSA has remained immeasurably low at <0.1.  His phosphorus was slightly low at last visit.  He had been instructed to go back on phosphorus twice a day but he misunderstood and has not been taking it.  We will check labs today.  I will follow-up with results and notify him if he needs to restart the phosphorus.  His pain is better controlled since reestablishing with [facility].  We will repeat scans prior to next follow-up.  I have ordered CT chest abdomen pelvis along with bone scan.  We will see him back in 1 month.    -10/13/2022 PSA less than 0.014.With unremarkable CBC and CMP.  Phosphorus still a  little low 2.3.    -11/4/2022 CT abdomen pelvis chest showed no evidence of disease progression with stable sclerotic bone lesions.  Bone scan stable right greater than left acetabular metastasis.  Stable bone metastases.    -11/8/2022 [facility] medical oncology telemedicine follow-up: Patient states that he feels achy all over with low-grade temps and a cough mildly productive.  Has an appointment later today to see his primary care for testing for flu and COVID.  Suggested that if he were positive for COVID that he get Paxlovid and that if he were positive for flu that he get Tamiflu and to discuss this with his primary care.  From the prostate cancer side, his PSA is immeasurably low with stable bone metastases and no evidence of progression.  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  We will continue the Relugolix, Zytiga, prednisone and he will see my nurse practitioner 12/7/2022.  Would repeat CTs and bone scan in May.    -1/25/2023 [facility] Oncology medicine follow-up: Mr. Lugo is having worsening fatigue and muscle aches.  He does have adrenal insufficiency on Zytiga and prednisone for his prostate cancer, he saw endocrinology in light of low ACTH back in April 2022.  At that time there was no recommendation other than for monitoring him for his overall wellbeing and labs.  He denies any fevers or chills.  His labs as shown above are unremarkable, his CBC and CMP are normal other than for glucose of 112, PSA remains low at <0.1.  He has no new pain or any symptoms concerning for progression of his prostate cancer.  I will get him back to endocrinology to see if they feel any dose adjustment of his prednisone would be helpful in his symptoms.  Currently he is on 5 mg a day with his Zytiga.  I did not repeat his ACTH or cortisol as they would be expected to be low in this situation.  His potassium is normal, he has had no weight loss or change in his appetite.  Blood sugar is  reasonable.  His only complaint currently is worsening fatigue and muscle aches.  He will continue treatment unchanged with Zytiga, prednisone and relugolix.  I will see him back in 1 month for follow-up.  We will repeat his restaging scans in May.    -3/1/2023 [facility] Oncology clinic follow-up:  His PSA remains low at <0.1 on treatment with Zytiga and prednisone along with relugolix however he continues to complain of significant fatigue and muscle aches not improving with time.  He states that his quality of life is affected as he is not able to do any work activities due to the fatigue.  He did see endocrinology again and they have increased his prednisone from 5 mg daily up to 7.5 mg daily however so far this has not made any difference in how he feels.  He is supposed to get back in touch with them to let them know how he is doing and to see if there is any other adjustments needed. His labs are unremarkable with normal thyroid function, CMP is normal other than for glucose of 156.  They did check hemoglobin A1c to look for diabetes however that was normal at 5.7.  CMP is unremarkable with just very mild anemia with hemoglobin of 12 and hematocrit 36.1% which would not account for his fatigue.  He does have hot flashes that are fairly significant, I will try venlafaxine as suggested by Dr. Duffy and I appreciate the recommendation.  We will start at 37.5 mg daily and if tolerated he can increase to 75 mg after 1-2 weeks.  I will see him back in 1 months for follow-up.  Plan to repeat scans late April/early May.  His prostate cancer seems under good control with current regimen however I am not sure how long he can tolerate this due to the side effects.    -3/30/2023 [facility] Oncology clinic follow-up:  Mr. Lugo continues to deal with fatigue.  He appears more cushingoid today, he continues on treatment with Zytiga, prednisone and relugolix.  Prednisone dose currently is 7.5 mg daily.  This has not made any  difference in his energy level.  PSA remains immeasurably low at <0.014.  CBC shows new onset of microcytic hypochromic anemia with hemoglobin of 11, hematocrit 34.4%, MCV 75.4, MCH 24.1, WBC is normal at 5.81 with normal platelet count 181,000 and normal differential.  CMP is unremarkable, it is normal other than for glucose of 142.  We will get him to Dr. Gomez for an EGD and colonoscopy for further evaluation in regards to his new onset of anemia.  He has no associated GI symptoms otherwise except for 1 day a few weeks ago he does report that he had fairly sudden onset of vomiting but this was an isolated incident on that day and has not reoccurred since.  Continues to follow with palliative care for management of his chronic back pain.  We will repeat restaging CT chest, abdomen and pelvis along with total body bone scan prior to return and I have ordered that today.    -3/30/2023 Ferritin low 12.3 with iron low 27 and saturation low 5% with total iron binding capacity 511.  3/31/2023 ferrous sulfate 325 mg twice a day every other day with vitamin C started.    - 4/12/2023 CT chest abdomen pelvis without contrast showed no progressive disease with stable sclerotic bone lesions.  Bone scan showed single identifiable bone metastasis stable right acetabulum    -4/18/2023 [facility] [place] operated by [facility] hematology oncology follow-up: He now has microcytic iron deficiency anemia which is new and concerning.  Gastroenterologic evaluation has been ordered but not performed and is mandatory.  Continue ferrous sulfate 325 mg twice a day every other day with vitamin C to improve absorption and we will follow his CBC along with his usual labs monthly on Zytiga, Relugolix, prednisone 7.5 mg.  He follows with Dr. Duffy with adrenal insufficiency.  He will see my nurse practitioner back in a month to see what GI has found and to watch serial CBC along with his other labs including PSA.  I would repeat CT chest abdomen pelvis again in 6 months WITH  contrast along with bone scan and sooner if PSA rises.     Prostate cancer metastatic to bone   8/30/2020 -  Other Event    PSA 10.8     9/15/2020 Initial Diagnosis    Prostate cancer metastatic to bone (CMS/HCC)     9/15/2020 Biopsy    Prostate needle core biopsy     9/24/2020 Imaging    Total body bone scan: 4 abnormal areas of increased activity consistent with osteoblastic metastasis, abnormal foci of activity seen in the T12 vertebral body, L1 vertebral body, roof of the left acetabulum, and acetabulum medially on the right.  CT chest: Stable sclerotic metastatic lesions within the T12 and L1 vertebrae.  No other evidence of metastatic disease to the chest.  Stable mild splenomegaly and subcentimeter periaortic retroperitoneal lymph nodes.  CT abdomen and pelvis: Diffuse bladder wall thickening with mild perivesicular fat stranding.  Interval development of several sclerotic lesions in the spine and left iliac bone.     10/13/2020 Cancer Staged    Staging form: Bone - Appendicular Skeleton, Trunk, Skull, And Facial Bones, AJCC 8th Edition  - Clinical: Stage IVB (cT3, cN0, cM1b, G3) - Signed by Ishmael Rashid MD on 10/13/2020     11/3/2020 - 10/5/2021 Chemotherapy    OP SUPPORTIVE Zoledronic Acid Q42d     11/3/2020 - 2/18/2021 Chemotherapy    OP PROSTATE DOCEtaxel       1/4/2021 Imaging    CT chest abdomen pelvis with contrast and whole-body bone scan shows improving less metabolically active bone metastases.  Stable sclerotic T12, L1, and L2 vertebral bodies and bilateral pelvic bones on bone windows with stable subcentimeter para-aortic lymph nodes and no definite progression in the chest abdomen or pelvis.  PSA down to 0.275 after 3 courses of Taxotere.     3/4/2021 Imaging    CT chest, abdomen and pelvis stable, no evidence of disease progression. Total body bone scan with decreased/near resolution of abnormal increased radiotracer uptake associated with the known sclerotic lesions in the thoracolumbar spine  and bony pelvis.  No evidence of new osteoblastic metastases.     3/4/2021 Imaging    CT chest abdomen pelvis with contrast is negative save for stable sclerotic bone lesions and the bone scan shows near resolution of prior bony abnormalities associated with the known sclerotic lesions in the thoracolumbar spine and bony pelvis.  2/17/2021 PSA down to 0.1 from 1.37 October 2020.     3/9/2021 - 3/9/2021 Chemotherapy    OP SUPPORTIVE PROSTATE Relugolix     3/9/2021 -  Chemotherapy    OP PROSTATE Abiraterone / PredniSONE       3/10/2021 -  Chemotherapy    OP PROSTATE Abiraterone / PredniSONE     8/10/2021 - 8/16/2021 Radiation    Radiation OncologyTreatment Course:  Naveen Lugo received 2000 cGy in 5 fractions to L4-sacrum, left acetabulum and right pelvis via External Beam Radiation - EBRT.     11/16/2021 -  Chemotherapy    OP CENTRAL VENOUS ACCESS DEVICE ACCESS, CARE, AND MAINTENANCE (CVAD)     1/5/2022 Imaging    -1/5/2022 CT chest abdomen pelvis with contrast [facility] showing stable left periaortic adenopathy and unchanged sclerotic thoracic, lumbar spine and pelvis lesions with no new or progressive disease in the chest abdomen or pelvis.  Total body bone scan shows no significant change and no new suspicious foci.  Stable posterior right acetabulum and left superior acetabulum and degenerative changes in the cervical spine, shoulders, acromioclavicular joints, sternoclavicular joints, elbows, wrists, hands, thoracic spine, lumbosacral spine, sacroiliac joints, hips, knees, ankles, feet.     5/24/2022 Imaging    CT chest abdomen pelvis with contrastFrankfort negative.  Bone scan shows stable bone metastases.     11/4/2022 Imaging    CT abdomen pelvis chest showed no evidence of disease progression with stable sclerotic bone lesions.  Bone scan stable right greater than left acetabular metastasis.  Stable bone metastases.     4/12/2023 Imaging     CT chest abdomen pelvis without contrast showed no  "progressive disease with stable sclerotic bone lesions.  Bone scan showed single identifiable bone metastasis stable right acetabulum         HISTORY OF PRESENT ILLNESS:  The patient is a 67 y.o. male, here for follow up on management of prostate cancer now with iron deficiency anemia with general fatigue    Past Medical History:   Diagnosis Date   • Bone cancer    • History of radiation therapy 08/16/2021    L4 through sacrum, left acetabulum, right pelvis   • Nephrolithiasis    • Opioid use disorder, mild, on maintenance therapy (HCC)    • Prostate cancer      Past Surgical History:   Procedure Laterality Date   • CHOLECYSTECTOMY     • CORONARY STENT PLACEMENT  206 & 2011   • HERNIA REPAIR  1986   • THORACIC DISCECTOMY  1994       Allergies   Allergen Reactions   • Cymbalta [Duloxetine Hcl] Dizziness   • Gabapentin Nausea Only   • Lyrica [Pregabalin] Nausea And Vomiting and Dizziness       Family History and Social History reviewed and changed as necessary    REVIEW OF SYSTEM:   Generalized fatigue.  Chronic back pain not new    PHYSICAL EXAM:  No pallor.  No jaundice or icterus.  No respiratory distress.    Vitals:    04/18/23 0811   BP: 157/92   Pulse: 89   Resp: 20   Temp: 97.3 °F (36.3 °C)   Weight: 91.2 kg (201 lb)   Height: 175.3 cm (69\")     Vitals:    04/18/23 0811   PainSc:   8   PainLoc: Back  Comment: hip and back          ECOG score: 1           Vitals reviewed.    ECOG: (1) Restricted in Physically Strenuous Activity, Ambulatory & Able to Do Work of Light Nature    Lab Results   Component Value Date    HGB 11.0 (L) 03/28/2023    HCT 34.4 (L) 03/28/2023    MCV 75.4 (L) 03/28/2023     03/28/2023    WBC 5.81 03/28/2023    NEUTROABS 4.13 03/28/2023    LYMPHSABS 1.08 03/28/2023    MONOSABS 0.30 03/28/2023    EOSABS 0.23 03/28/2023    BASOSABS 0.05 03/28/2023       Lab Results   Component Value Date    GLUCOSE 142 (H) 03/28/2023    BUN 10 03/28/2023    CREATININE 0.79 03/28/2023     03/28/2023 "    K 4.1 03/28/2023     03/28/2023    CO2 28.0 03/28/2023    CALCIUM 9.1 03/28/2023    PROTEINTOT 7.0 02/14/2022    ALBUMIN 4.1 03/28/2023    BILITOT <0.2 03/28/2023    ALKPHOS 72 03/28/2023    AST 17 03/28/2023    ALT 5 03/28/2023             ASSESSMENT & PLAN:  1.  Stage IVb grade group 4 metastatic prostate cancer with sclerotic bone lesions on CT and bone scan and history of prostatic hypertrophy.  Good response to Taxotere ending 2/18/2021 Relugolix, followed by Zytiga prednisone with L4/sacrum, left acetabulum, and right pelvis external beam radiation August 2021.  Hypophosphatemia on Zometa.  Zometa held as of October 2021.  2.  Kidney stone  3.  Polyps  4.  Probable drug-induced hypophosphatemia from Zometa, drug stopped after 10/5/2021 dose  5.  Cancer related pain seeing palliative care  6.  Fatigue  7.  Covid 2/2022  8.  Iron deficiency anemia    -9/30/2020 office note from Dr. Ronen Reynaga indicates patient with PSA 10.8.  Underwent TRUS prostate biopsy 9/15/2020.  Adenocarcinoma the prostate Sarika 4+4 = 8 in 1 out of 12 cores and 4+3 = 7 and 10 out of 12 cores and 3+4 = 7 in 1 out of 12 cores.  Perineural invasion.  Extraprostatic extension into the fat seen on 2 biopsies of the right lateral mid and right apex.  9/24/2020 CT chest and whole-body bone scan showed T12, L1, left acetabular, right medial acetabular metastases with no lung metastases.  He started androgen deprivation therapy with Casodex with plans for Lupron to start in 1 to 2 weeks for clinical T3 N0 M1 metastatic prostate cancer.  Review of official report from [facility] 9/24/2020 bone scan mentions T12, L1, roof of left acetabulum and medial right acetabular osteoblastic metastases.  CT chest with contrast that same day showed mild splenomegaly 14.2 cm with stable periaortic nodes compared to CT December 2015 with no lung metastases.  Sclerotic abnormality within the T12 vertebral body, L1 vertebral body extending  into the pedicle unchanged.  8/28/2020 CT abdomen pelvis report from [facility] reviewed and mentions normal spleen size.  Punctate nonobstructing kidney stone, no paulino enlargement, diffuse bladder wall thickening with mild perivesicular fat stranding, 2.1 cm sclerotic left iliac bone above the left acetabulum new compared to 2015 as well as 1.5 cm faintly sclerotic focus in the right posterior acetabulum stable compared to prior CT 2015 as well as a 1.6 cm T12 and inferior vertebral body sclerotic lesion and a 1.9 cm left posterior lateral L1 vertebral body abnormality extending to the pedicle.  -10/13/2020 initial [facility] medical oncology consultation: With 1 Sarika score 8, 4+4 lesion that would make him a grade group 4 with stage IVb disease given radiographic evidence of sclerotic bone metastasis on bone scan and CT.  Needs genetic testing given metastatic prostate cancer and this is also important as, were he to have mismatch repair mutation then we would have options for PDL 1 inhibitors and were he BRCA mutated would have options for PARP inhibitors in the future.  Systemic therapy for castration naïve prostate cancer or N1 disease should be with apalutamide or Abiraterone or enzalutamide all of which are category 1.  He does not have any visceral metastases.  According to his imaging he has T12, L1, left acetabular, right acetabular, and left iliac bony involvement.  That means he would have 4 or more bone metastases with at least one metastasis (i.e. the acetabular lesions bilaterally) beyond the pelvis/vertebral, for which this would be considered high-volume disease for which 6 cycles of docetaxel 75 mg/m² x 6 cycles followed by Zytiga and prednisone would be reasonable along with Zometa every 6 weeks for bone stabilization.  He will do chemo preparation visit with my nurse practitioner prior to start chemotherapy with Taxotere and Zometa in 2 weeks and he is already received Casodex in  preparation for Lupron shot he received on 10/12/2020 with Dr. Reynaga.  We will get him to Dr. Alexander Gomez who has done his polypectomies in the past for port placement and we will get genetic counseling started.  Following the 6 courses of Taxotere per the [place] trial criteria, I would then give him an indefinite Zytiga and prednisone and Zometa until progression or toxicity dictates.    -12/16/2019 COVID-19 antigen test negative    -12/29/2020 PSA 0.275 with absolute neutrophil count 700 and creatinine 0.76 with normal liver enzymes and electrolytes.  Normal magnesium and phosphorus and urine drug screen positive for buprenorphine and opiates follow-up with palliative care.    -1/4/2021 CT chest abdomen pelvis with contrast and whole-body bone scan shows improving less metabolically active bone metastases.  Stable sclerotic T12, L1, and L2 vertebral bodies and bilateral pelvic bones on bone windows with stable subcentimeter para-aortic lymph nodes and no definite progression in the chest abdomen or pelvis.    -1/5/2021 [facility] medical oncology follow-up visit: I reviewed the images and reports thereof of the above CT and bone scan which shows good response to Taxotere x3 courses with a PSA down to 0.275 from a baseline of 10.  Get another 3 courses of Taxotere, continue his Xgeva, and then following that we will switch to Zytiga and prednisone.  He is working with palliative care on his bone pain but on his current dose of fentanyl patch he says his pain is still not adequately controlled he will contact them.  Dexamethasone prescription was refilled.  We will see my nurse practitioner back in 3 weeks for course #5 of 6 total courses and then we will reimage with CT chest abdomen pelvis and bone scan before going to the Zytiga prednisone.    -3/4/2021 CT chest abdomen pelvis with contrast is negative save for stable sclerotic bone lesions and the bone scan shows near resolution of prior bony abnormalities  associated with the known sclerotic lesions in the thoracolumbar spine and bony pelvis.    2/17/2021 PSA down to 0.1 from 1.37 October 2020.  3/9/2021 PSA 0.1    -5/26/2021 CT chest abdomen pelvis with contrast shows stable to slightly underlying increase low-density left para-aortic node.  Bone lesions stable.  Whole-body bone scan stable to decrease activity of known thoracolumbar and bony pelvic involvement.  PSA less than 0.1  , AST 74, bilirubin 0.5.       -6/1/2021 [facility] medical oncology follow-up visit: I reviewed the above data with him and images thereof.  Bones are stable.  No significant adenopathy.  PSA immeasurably low.     upper limit of normal 69 and AST 74 upper limit of normal 46.  Hence, neither is more than double the upper limit of normal with no ascites and no encephalopathy and normal albumin and bilirubin, despite the elevation of liver enzymes, he has child Abrams score A.  Package insert for Abiraterone says that for ALT and/or AST greater than 5 times upper limit of normal or total bilirubin greater than 3 times the upper limit of normal to withhold treatment until liver function tests returned to baseline or ALT and AST less than or equal to 2.5 times the upper limit of normal and total bilirubin less than or equal to 1.5 times the upper limit of normal and then reinitiate at 750 mg daily dose of Zytiga.  For recurrent hepatotoxicity on 750 mg daily Zytiga, withhold treatment until liver functions returned to baseline or ALT and AST less than 3-2.5 times upper limit of normal and total bilirubin less than or equal to 1.5 times the upper limit of normal then reinitiate at 500 mg daily Zytiga dose.  If has recurrent hepatotoxicity on 500 mg dose, then discontinue treatment.  If has ALT greater than 3 times the upper limit of normal along with total bilirubin greater than 2 times the upper limit of normal in the absence of other contributing causes or biliary obstruction,  permanently discontinue Zytiga.  Based on these recommendations, I would continue current doses but we will watch with serial labs monthly and repeat his imaging again in 3 months but clinically he is doing wonderfully with excellent response to Taxotere and now on Zytiga prednisone plus Zometa along with Relugolix.    -6/23/2020 for [facility] follow[facility]: Having persistent and worsening pain above his left hip.  I will get an MRI of the left hip for further evaluation.  Otherwise we will continue therapy unchanged with Zytiga, prednisone and Zometa along with Relugolix.    -7/28/2021 [facility] follow-[facility]: Patient with multiple somatic complaints including episodes of confusion, dizziness, decreased memory, agitation.  He reports ongoing episodes with difficulty swallowing.  Also most concerning for episodes of angina and chest pain for which he basically refused to seek emergency medical attention.  He has a history of coronary artery disease and stent placement.  He has not followed up with cardiology for many years.  I will get an MRI of the brain in light of his confusion, dizziness and agitation.  I will get him to gastroenterology for EGD in light of his dysphagia.  I have also put in a referral for cardiology.  I reemphasized to the patient, his wife who is with him today and his son who was on the telephone during our visit the importance of seeking out prompt medical attention when he is having chest pain or neurological concerns so that he can be evaluated.  The patient states that he basically refuses to go to the emergency room and if his family calls an ambulance he would not agree to go to the hospital.  For the time being, I have asked him to hold his Zytiga and prednisone until we can sort this out as Zytiga does have some cardiovascular risk.  There is likely no treatment for prostate cancer that does not carry some form of cardiovascular risk.  His PSA remains low at 0.014  "yesterday.  He will continue relugolix for now.    Of note, some of these symptoms could also be due to his hypophosphatemia, phosphate level from yesterday was 1.5, I have sent in a prescription for K-Phos 3 times a day before meals for 10 days.  We will hold further Zometa until this is corrected.  I have also put in an order to check his vitamin D level.  He takes calcium and vitamin D daily.  Some of his symptoms also could be due to drug withdrawal as there was some confusion and difficulty getting his last prescription for methadone therefore he was without methadone for several days, he now has his prescription and is back on his pain medication.  He has an appointment with neurosurgery tomorrow in light of his ongoing back pain, MRI of the hip recently showed multiple bony lesions largest at the L5 vertebral body and left supra-acetabular region.  If no neurosurgical intervention recommended he may benefit from spot radiation as this is where a lot of his pain is coming from.  His wife had questions today regarding his staging and extent of disease.  We reviewed previous scans.  I did clarify with her as she used the words \"cancer free\" as she thought that is what she was told after his CT scans once he completed Taxotere in February.  I explained to her that even though his scans showed he had an excellent response with no clear areas of metastasis that did not mean he was cancer free, I explained to her that when you have metastatic cancer the treatment intent is palliative in nature and to control the disease but not to cure the disease.  She stated understanding.  We will see him back in 2 weeks for follow-up to sort through this and make further plan of care, again, I have asked him to hold Zytiga and prednisone until he sees us back, he will continue on his other medications including relugolix.    -7/30/2021 neurosurgery PA consultation.  MRI with and without contrast L5 ordered.    -8/3/2021 " neurosurgery follow-up showed known sclerotic disease with new L5 abnormality without compression deformity or instability.  MRI brain negative for metastasis.    -8/4/2021 office visit Dr. Fco Quintanilla radiation oncology coordinating with Dr. North neurosurgery for palliative radiation for MRI evidence of L5 and left supra acetabular painful lesions.  States that the patient's disease which is not secreting PSA is experiencing some progression and would benefit from 20 Gy in five fractions and other lesion 30 Gy in ten fractions. Patient offered to go to [place] for radiation but desired treatment in [place].    -8/10/2021 [facility] onc[facility] follow-up visit: No chest pains at this junction.  Reviewed MRI brain and other MRIs reviewed by Dr. North and Dwight.  Due for EGD on 19th.  We will repeat his phosphorus along with his other labs continue Relugolix, Zytiga, prednisone, and he will continue radiation palliatively to the painful bony lesions with Dr. Quintanilla/Can.  He will see my nurse practitioner back in a few weeks to see how he is tolerating this and to follow-up on the phosphorus off of Zometa and she will order repeat CT chest abdomen pelvis with contrast and total body bone scan the end of September.I will see him back after that.    -9/8/2021 Zometa resumed.  PSA <0.10    -10/5/2021 [facility] follow-up visit: followed-up with radiation oncology 9/21/2021.  Status post symptomatic L5 radiation 5 fractions 20 [place] completed 8/16/2021 with improvement in right hip pain.  9/2/2021 PSA less than 0.1.  9/29/2021 total body bone scan shows increased uptake left iliac bone slightly superior group of the left acetabulum with no additional foci of suspicious abnormality is unlikely treatment related with no new sites of active osseous metastasis.  CT chest abdomen pelvis with contrast shows stable borderline enlarged left periaortic nodes and dating sclerotic bone lesions.Continue  Zytiga, prednisone, Relugolix, Zometa, repeat CT chest abdomen pelvis with contrast and total body bone scan the end of the year.  We will follow PSA serially.    -11/17/2021 [facility] Oncology clinic follow-up:  Mr. Lugo overall is doing well.  He is tolerating therapy with Zytiga, prednisone and Relugolix along with Zometa which is given every 6 weeks.  PSA remains low at <0.1.  Has occasional low phosphorus, currently low at 1.7.  Serum calcium is normal at 8.9, normal creatinine 0.79 and normal CBC other than for platelets of 124.  I will hold Zometa for 1 month, I did send in a prescription for phosphorus replacement today.  He takes calcium and vitamin D.  I will see him back in 1 month for follow-up.  We will repeat restaging scans after that visit.    -12/15/2021 [facility] Oncology clinic follow-up: Mr. Lugo continues to do well on therapy with Zytiga, prednisone and Relugolix, his PSA remains low at <0.1.  He is continuing to have left lower back pain, he is working with palliative care and they have referred him also to pain management.  Pain management has talked to him about putting in a pain pump however he is leery of this, I told him that this was a safe and effective pain management technique and to certainly give consideration to their recommendations.  His phosphorus is improving however is still a little low, I will hold off on Zometa until we see him back in 1 month, we may end up only giving it every 12 weeks.  We will repeat restaging scans with CT chest, abdomen and pelvis along with total body bone scan prior to return and I have ordered those today.      -1/5/2022 CT chest abdomen pelvis with contrast [facility] showing stable left periaortic adenopathy and unchanged sclerotic thoracic, lumbar spine and pelvis lesions with no new or progressive disease in the chest abdomen or pelvis.  Total body bone scan shows no significant change and no new suspicious foci.  Stable posterior right  acetabulum and left superior acetabulum and degenerative changes in the cervical spine, shoulders, acromioclavicular joints, sternoclavicular joints, elbows, wrists, hands, thoracic spine, lumbosacral spine, sacroiliac joints, hips, knees, ankles, feet.    -1/11/2022 [facility] medical oncology hematology follow-up visit: I reviewed images and reports from his 1/5/2022 scans.  No active disease and PSA immmeasurably low on Zytiga, prednisone, Relugolix.  However, he has struggled with hypophosphatemia and is significantly fatigued with this.  Given that the bones are doing well and given that his phosphorus continues to remain consistently low at 2.2 in mid December, 1.7 mid November and 2.5 mid-September and as low as 1.5 back to July and the likelihood that this is related to his Zometa, given that his bones are stable overall, he will increase from 1200 mg up to 1600 mg of calcium and phosphate a day and we will hold his Zometa for the foreseeable future.  He will see my nurse practitioner back in a month to continue to monitor his phosphorus along with his calcium and other labs and will repeat his scans again in 3 months.  In addition, given his fatigue we will check his ACTH and cortisol though he is taking his prednisone with his Zytiga.  Though his electrolytes are normal, I will keep an eye on the cortisol and ACTH and have these labs not only drawn today but again just prior to return to my nurse practitioner on February 10.    -2/10/2022 [facility] Oncology clinic follow-up: Mr. Lugo overall is stable, no change in his health this past month.  I have reviewed his labs from 2/8/2022 and they are unremarkable, his phosphorus is now normal at 3.2. We are continuing to hold his Zometa, he last received Zometa on 10/5/2021.  He continues therapy with Zytiga, prednisone and Relugolix.  Dr. Rashid had ordered cortisol level and ACTH which are low, currently his ACTH is 2.8 and cortisol 3.08 however these are both done  in the face of ongoing prednisone that he takes with his Zytiga.  We will get him to endocrinology for further evaluation for possible adrenal insufficiency but will not interrupt his treatment in the interim.  He will need restaging scans again in April for a 3-month follow-up.  He will continue to follow with palliative care and pain management for his cancer related pain.  His PSA remains immeasurably low at <0.1 on 2/8/22.    -2/15/2022 went to emergency room with weakness, decreased appetite, myalgias in the setting of known COVID-19 testing positive a few days prior to this visit.  Phosphorus had been low in the past but was normal in the emergency room with unremarkable CBC and CMP.  Chest x-ray unremarkable saturating well on room air mildly hypertensive with dry mucosa.  Given 500 cc crystalloid.  Covid test positive.  Mild thrombocytopenia 136,000 and otherwise unremarkable.  Discharged home.    -3/3/2022 data:  PSA less than 0.1  CMP unremarkable  Cortisol 3.96 normal  ACTH 2.8 lower limit of normal 7.2  Phosphorus normal 2.6    -3/8/2022 [facility] medical oncology follow-up: Suspect big chunk of his fatigue was Covid.  Another part of the fatigue likely related to lack of testosterone.  Not hypophosphatemic off of Zometa.  ACTH running low while on prednisone not surprising but with normal cortisol and I doubt adrenal insufficient which is why he is on prednisone to avoid such while taking Zytiga.  Overall doing fairly well and with immeasurably low PSA, I will have labs done on him early April, see my nurse practitioner early May, and she will order repeat bone scan and CTs the end of May and I will see him back in June.  We will continue to hold the Zometa unless bone lesions progress given symptomatic prior hypophosphatemia on Zometa.  He will keep his appointment April 28 with Dr. Mir Duffy just to be sure that my take on his pituitary/adrenal axis assessment is accurate.    -4/28/2022 endocrinology  consult Dr. Mir Duffy.  With low ACTH and cortisol on prednisone with Zytiga, the adequacy of prednisone cannot be assessed by measuring ACTH or cortisol but is assessed by weight changes, blood pressure, blood sugar, potassium, overall sense of how the patient is doing.  Primary adrenal insufficiency with low ACTH and low cortisol would be typical for the situation as his blood sugar tends to run a little high and potassium a little low, he did not think increasing prednisone was necessary.  He put him on some potassium.  No follow-up scheduled, will see as needed.    -5/4/2022 [facility] Oncology clinic follow-up: Mr. Lugo continues on therapy with Zytiga and prednisone along with Relugolix.  His Zometa has been on hold due to previous hypophosphatemia.  There is some concern about potential adrenal insufficiency. He saw endocrinology, Dr. Mir Duffy on 4/28/2022.  I did review his office note and he stated that the low ACTH and low serum cortisol would be typical for Mr. Lugo's situation.  He further stated that the adequacy of prednisone dose cannot be assessed by measuring ACTH or cortisol but is rather assessed by the overall just altered how the patient is feeling-weight change, blood pressure, blood sugar, potassium, overall sense of wellbeing.  His potassium had been running a little low therefore they put him on potassium 10 mill equivalents daily. Of note, I do not see a future follow-up scheduled with endocrinology.  He is having night sweats, I am not sure if this is related.  His glucose was normal this morning at 107.  His weight is stable.  He will continue current treatment for his prostate cancer unchanged, we will continue to hold his Zometa.  Current phosphorus and magnesium are normal.  CBC and CMP from today are unremarkable, cortisol is normal.  PSA and ACTH are pending  In regards to his night sweats, he had taken clonidine previously 0.1 mg twice daily as needed, I did send in a short supply  again to try and see if this helps.  He also is having indigestion despite being on omeprazole twice daily, I sent a prescription for Pepcid.  He had an EGD August 2021.  We will repeat restaging scans prior to return and I have ordered those today.     -5/24/2022 CT chest abdomen [place] with [place] negative.  Bone scan shows stable bone metastases.    - 5/25/2022 PSA less than 0.1, platelets 130,000, otherwise unremarkable CBC and CMP.  A.m. cortisol running low 1.2 with phosphorus low 2.3.    -5/31/2022 [facility] medical oncology follow-up: On Zytiga, prednisone, Relugolix, PSA remaining immeasurably low with stable bone scan and no visceral/paulino metastases.  Phosphorus still running low.  I have discontinued his potassium chloride and started potassium phosphate 500 mg 4 times a day.  Unless PSA rising, we will plan on repeating CTs and bone scan in 6 months and will follow with my nurse practitioner in the interim and if symptoms dictate or labs, will get back to Dr. Duffy for management of potential adrenal insufficiency but presently we will just see how he does with potassium phosphate and hopeful improvement in the phosphorus level with time.  We will continue to follow with palliative care for pain management    -7/6/2022 [facility] Oncology clinic follow-up: Mr. Lugo overall is doing well but continues to be troubled with fatigue and hot flashes.  For the most part he states he is able to do the things he wants to do, he continues to work but does have to take more rest periods.  I had a long discussion with him and his wife after reviewing his medications with them as they wanted to see if there was anything he could stop or adjust.  I discussed with him the need to continue on treatment for his prostate cancer even though his PSA remains immeasurably low and his scans look good but that if his medication is stopped the cancer would progress and over time it still has the potential to progress on  therapy but would continue it as long as possible to keep his disease under control.  I explained this is like treating someone with hypertension, you do not stop the antihypertensive just because the blood pressure normalizes.  They stated understanding.  There is no dose adjustment of Zytiga or prednisone that would minimize his symptoms, he is on the current standard recommended therapy.  Discussed with him that sometimes during times of stress we may need to increase his prednisone dose but for now would not change anything.  His phosphorus level is normal, we continue to hold his Zometa.  I did give him the option of holding his phosphorus for the next month and we will see what happens, if it drops we will start him back on it but may be at a lower dose rather than 4 times a day maybe twice a day.  For now he will continue therapy unchanged.  We will continue monitoring labs monthly.  No PSA was drawn this month but that is okay as his PSA has been running immeasurably low at <0.1, we will recheck next month with lab draw.  We will stop checking his ACTH and cortisol level every month, if he develops worsening symptoms I will repeat those.  He has not gotten a follow-up with endocrinology as of yet.    -8/3/2022 [facility] Oncology clinic follow-up:  Mr. Lugo is doing well as far as his prostate cancer goes with continued immeasurably low PSA of <0.1.  He continues on therapy with Zytiga and prednisone along with Relugolix.  His phosphorus is stable at 2.7 which was just slightly low by our reference range however was 2.71-month ago also which was normal on another labs reference range.  He has not taken his phosphorus for this past month as he thought it was making him more fatigued.  He really can tell no difference whether he was taking it or not.  I have asked him to try to go back on phosphorus twice a day rather than 4 times a day and see if this is tolerated and if we can keep his phosphorus level from  dropping.  His biggest complaint today is flareup of his arthralgias and back pain.  He is following with pain management, Dr. Danny Avalos but is requesting a referral back to palliative medicine as he feels that no one is currently listening to him and they are just prescribing the same medications that are not effective according to his report.  He does have a follow-up with Dr. Avalos on 8/12/2022 but due to his current pain is not able to work until after he is seen and hopefully can get some better relief.  I have given him a work release until 15 August.  I have also put in a referral back to palliative care.  Also encouraged him to work with Dr. Avalos for help in resolving his issue.  Apparently they have recommended some type of a pump however he has been hesitant to that but likely would be helpful.  We will continue therapy unchanged.  We will continue monthly labs including phosphorus, we are not currently checking ACTH and cortisol monthly as Dr. Mir Duffy previously stated in his assessment on 4/28/2022 that the adequacy of prednisone cannot be assessed by measuring ACTH or cortisol but would be assessed by symptoms, see note from 4/28/2022.  He made no further follow-up appointments.  Fatigue is not worsening currently.  His glucose and potassium are  Normal.    -10/13/2022 [facility] medical oncology follow-up: Mr. Lugo is doing well.  He is tolerating treatment with Zytiga and prednisone along with Relugolix without any unusual side effects.  His PSA has remained immeasurably low at <0.1.  His phosphorus was slightly low at last visit.  He had been instructed to go back on phosphorus twice a day but he misunderstood and has not been taking it.  We will check labs today.  I will follow-up with results and notify him if he needs to restart the phosphorus.  His pain is better controlled since reestablishing with [facility].  We will repeat scans prior to next follow-up.  I have  ordered CT chest abdomen pelvis along with bone scan.  We will see him back in 1 month.    -10/13/2022 PSA less than 0.014.With unremarkable CBC and CMP.  Phosphorus still a little low 2.3.    -11/4/2022 CT abdomen pelvis chest showed no evidence of disease progression with stable sclerotic bone lesions.  Bone scan stable right greater than left acetabular metastasis.  Stable bone metastases.    -11/8/2022 [facility] oncology telemedicine follow-up: Patient states that he feels achy all over with low-grade temps and a cough mildly productive.  Has an appointment later today to see his primary care for testing for flu and COVID.  Suggested that if he were positive for COVID that he get Paxlovid and that if he were positive for flu that he get Tamiflu and to discuss this with his primary care.  From the prostate cancer side, his PSA is immeasurably low with stable bone metastases and no evidence of progression.  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  We will continue the Relugolix, Zytiga, prednisone and he will see my nurse practitioner 12/7/2022.  Would repeat CTs and bone scan in May.    -1/25/2023 [facility] Oncology medicine follow-up: Mr. Lugo is having worsening fatigue and muscle aches.  He does have adrenal insufficiency on Zytiga and prednisone for his prostate cancer, he saw endocrinology in light of low ACTH back in April 2022.  At that time there was no recommendation other than for monitoring him for his overall wellbeing and labs.  He denies any fevers or chills.  His labs as shown above are unremarkable, his CBC and CMP are normal other than for glucose of 112, PSA remains low at <0.1.  He has no new pain or any symptoms concerning for progression of his prostate cancer.  I will get him back to endocrinology to see if they feel any dose adjustment of his prednisone would be helpful in his symptoms.  Currently he is on 5 mg a day with his Zytiga.  I did not  repeat his ACTH or cortisol as they would be expected to be low in this situation.  His potassium is normal, he has had no weight loss or change in his appetite.  Blood sugar is reasonable.  His only complaint currently is worsening fatigue and muscle aches.  He will continue treatment unchanged with Zytiga, prednisone and relugolix.  I will see him back in 1 month for follow-up.  We will repeat his restaging scans in May.    -3/1/2023 [facility] clinic follow-up:  His PSA remains low at <0.1 on treatment with Zytiga and prednisone along with relugolix however he continues to complain of significant fatigue and muscle aches not improving with time.  He states that his quality of life is affected as he is not able to do any work activities due to the fatigue.  He did see endocrinology again and they have increased his prednisone from 5 mg daily up to 7.5 mg daily however so far this has not made any difference in how he feels.  He is supposed to get back in touch with them to let them know how he is doing and to see if there is any other adjustments needed. His labs are unremarkable with normal thyroid function, CMP is normal other than for glucose of 156.  They did check hemoglobin A1c to look for diabetes however that was normal at 5.7.  CMP is unremarkable with just very mild anemia with hemoglobin of 12 and hematocrit 36.1% which would not account for his fatigue.  He does have hot flashes that are fairly significant, I will try venlafaxine as suggested by Dr. Duffy and I appreciate the recommendation.  We will start at 37.5 mg daily and if tolerated he can increase to 75 mg after 1-2 weeks.  I will see him back in 1 months for follow-up.  Plan to repeat scans late April/early May.  His prostate cancer seems under good control with current regimen however I am not sure how long he can tolerate this due to the side effects.    -3/30/2023 [facility] clinic follow-up:  Mr. Lugo continues to deal with  fatigue.  He appears more cushingoid today, he continues on treatment with Zytiga, prednisone and relugolix.  Prednisone dose currently is 7.5 mg daily.  This has not made any difference in his energy level.  PSA remains immeasurably low at <0.014.  CBC shows new onset of microcytic hypochromic anemia with hemoglobin of 11, hematocrit 34.4%, MCV 75.4, MCH 24.1, WBC is normal at 5.81 with normal platelet count 181,000 and normal differential.  CMP is unremarkable, it is normal other than for glucose of 142.  We will get him to Dr. Gomez for an EGD and colonoscopy for further evaluation in regards to his new onset of anemia.  He has no associated GI symptoms otherwise except for 1 day a few weeks ago he does report that he had fairly sudden onset of vomiting but this was an isolated incident on that day and has not reoccurred since.  Continues to follow with palliative care for management of his chronic back pain.  We will repeat restaging CT chest, abdomen and pelvis along with total body bone scan prior to return and I have ordered that today.    -3/30/2023 Ferritin low 12.3 with iron low 27 and saturation low 5% with total iron binding capacity 511.  3/31/2023 ferrous sulfate 325 mg twice a day every other day with vitamin C started.    - 4/12/2023 CT chest abdomen pelvis without contrast showed no progressive disease with stable sclerotic bone lesions.  Bone scan showed single identifiable bone metastasis stable right acetabulum    -4/18/2023 [facility], [place], operated by [facility] hematology oncology follow-up: He now has microcytic iron deficiency anemia which is new and concerning.  Gastroenterologic evaluation has been ordered but not performed and is mandatory.  Continue ferrous sulfate 325 mg twice a day every other day with vitamin C to improve absorption and we will follow his CBC along with his usual labs monthly on Zytiga, Relugolix, prednisone 7.5 mg.  He follows with Dr. Duffy with adrenal insufficiency.  He will see my nurse  practitioner back in a month to see what GI has found and to watch serial CBC along with his other labs including PSA.  I would repeat CT chest abdomen pelvis again in 6 months WITH contrast along with bone scan and sooner if PSA rises.    Total time of care today inclusive of time spent today prior to his arrival reviewing interval note from my nurse practitioner and interval labs and scans and images thereof as outlined above and during visit translating that information to the patient and arranging follow-up after visit according to this plan took 47 minutes of patient care time throughout the day today.  04/18/2023

## 2023-04-18 NOTE — LETTER
April 18, 2023       No Recipients    Patient: Naveen Lugo   YOB: 1955   Date of Visit: 4/18/2023       Dear Cielo Dodd PA-C    Naveen Lugo was in my office today. Below is a copy of my note.    If you have questions, please do not hesitate to call me. I look forward to following Naveen along with you.         Sincerely,        Ishmael Rashid MD        CC:   No Recipients    CHIEF COMPLAINT: General fatigue    Problem List:  Oncology/Hematology History Overview Note   1.  Stage IVb grade group 4 metastatic prostate cancer with sclerotic bone lesions on CT and bone scan and history of prostatic hypertrophy.  Good response to Taxotere ending 2/18/2021 Relugolix, followed by Zytiga prednisone with L4/sacrum, left acetabulum, and right pelvis external beam radiation August 2021.  Hypophosphatemia on Zometa.  Zometa held as of October 2021.  2.  Kidney stone  3.  Polyps  4.  Probable drug-induced hypophosphatemia from Zometa, drug stopped after 10/5/2021 dose  5.  Cancer related pain seeing palliative care  6.  Fatigue  7.  Covid 2/2022  8.  Iron deficiency anemia    -9/30/2020 office note from Dr. Ronen Reynaga indicates patient with PSA 10.8.  Underwent TRUS prostate biopsy 9/15/2020.  Adenocarcinoma the prostate [place] 4+4 = 8 in 1 out of 12 cores and 4+3 = 7 and 10 out of 12 cores and 3+4 = 7 in 1 out of 12 cores.  Perineural invasion.  Extraprostatic extension into the fat seen on 2 biopsies of the right lateral mid and right apex.  9/24/2020 CT chest and whole-body bone scan showed T12, L1, left acetabular, right medial acetabular metastases with no lung metastases.  He started androgen deprivation therapy with Casodex with plans for Lupron to start in 1 to 2 weeks for clinical T3 N0 M1 metastatic prostate cancer.  Review of official report from [facility] 9/24/2020 bone scan mentions T12, L1, roof of left acetabulum and medial right acetabular osteoblastic metastases.  CT chest with  contrast that same day showed mild splenomegaly 14.2 cm with stable periaortic nodes compared to CT December 2015 with no lung metastases.  Sclerotic abnormality within the T12 vertebral body, L1 vertebral body extending into the pedicle unchanged.  8/28/2020 CT abdomen pelvis report from [facility] reviewed and mentions normal spleen size.  Punctate nonobstructing kidney stone, no paulino enlargement, diffuse bladder wall thickening with mild perivesicular fat stranding, 2.1 cm sclerotic left iliac bone above the left acetabulum new compared to 2015 as well as 1.5 cm faintly sclerotic focus in the right posterior acetabulum stable compared to prior CT 2015 as well as a 1.6 cm T12 and inferior vertebral body sclerotic lesion and a 1.9 cm left posterior lateral L1 vertebral body abnormality extending to the pedicle.  -10/13/2020 initial [facility] medical oncology consultation: With 1 [place] score 8, 4+4 lesion that would make him a grade group 4 with stage IVb disease given radiographic evidence of sclerotic bone metastasis on bone scan and CT.  Needs genetic testing given metastatic prostate cancer and this is also important as, were he to have mismatch repair mutation then we would have options for PDL 1 inhibitors and were he BRCA mutated would have options for PARP inhibitors in the future.  Systemic therapy for castration naïve prostate cancer or N1 disease should be with apalutamide or Abiraterone or enzalutamide all of which are category 1.  He does not have any visceral metastases.  According to his imaging he has T12, L1, left acetabular, right acetabular, and left iliac bony involvement.  That means he would have 4 or more bone metastases with at least one metastasis (i.e. the acetabular lesions bilaterally) beyond the pelvis/vertebral, for which this would be considered high-volume disease for which 6 cycles of docetaxel 75 mg/m² x 6 cycles followed by Zytiga and prednisone would be reasonable along  with Zometa every 6 weeks for bone stabilization.  He will do chemo preparation visit with my nurse practitioner prior to start chemotherapy with Taxotere and Zometa in 2 weeks and he is already received Casodex in preparation for Lupron shot he received on 10/12/2020 with Dr. Reynaga.  We will get him to Dr. Alexander Gomez who has done his polypectomies in the past for port placement and we will get genetic counseling started.  Following the 6 courses of Taxotere per the [place] trial criteria, I would then give him an indefinite Zytiga and prednisone and Zometa until progression or toxicity dictates.    -12/16/2019 COVID-19 antigen test negative    -12/29/2020 PSA 0.275 with absolute neutrophil count 700 and creatinine 0.76 with normal liver enzymes and electrolytes.  Normal magnesium and phosphorus and urine drug screen positive for buprenorphine and opiates follow-up with palliative care.    -1/4/2021 CT chest abdomen pelvis with contrast and whole-body bone scan shows improving less metabolically active bone metastases.  Stable sclerotic T12, L1, and L2 vertebral bodies and bilateral pelvic bones on bone windows with stable subcentimeter para-aortic lymph nodes and no definite progression in the chest abdomen or pelvis.    -1/5/2021 [facility] medical oncology follow-up visit: I reviewed the images and reports thereof of the above CT and bone scan which shows good response to Taxotere x3 courses with a PSA down to 0.275 from a baseline of 10.  Get another 3 courses of Taxotere, continue his Xgeva, and then following that we will switch to Zytiga and prednisone.  He is working with palliative care on his bone pain but on his current dose of fentanyl patch he says his pain is still not adequately controlled he will contact them.  Dexamethasone prescription was refilled.  We will see my nurse practitioner back in 3 weeks for course #5 of 6 total courses and then we will reimage with CT chest abdomen pelvis and bone  scan before going to the Zytiga prednisone.    -3/4/2021 CT chest abdomen pelvis with contrast is negative save for stable sclerotic bone lesions and the bone scan shows near resolution of prior bony abnormalities associated with the known sclerotic lesions in the thoracolumbar spine and bony pelvis.    2/17/2021 PSA down to 0.1 from 1.37 October 2020.  3/9/2021 PSA 0.1    -5/26/2021 CT chest abdomen pelvis with contrast shows stable to slightly underlying increase low-density left para-aortic node.  Bone lesions stable.  Whole-body bone scan stable to decrease activity of known thoracolumbar and bony pelvic involvement.  PSA less than 0.1  , AST 74, bilirubin 0.5.       -6/1/2021 [facility] oncology follow-up visit: I reviewed the above data with him and images thereof.  Bones are stable.  No significant adenopathy.  PSA immeasurably low.     upper limit of normal 69 and AST 74 upper limit of normal 46.  Hence, neither is more than double the upper limit of normal with no ascites and no encephalopathy and normal albumin and bilirubin, despite the elevation of liver enzymes, he has child Abrams score A.  Package insert for Abiraterone says that for ALT and/or AST greater than 5 times upper limit of normal or total bilirubin greater than 3 times the upper limit of normal to withhold treatment until liver function tests returned to baseline or ALT and AST less than or equal to 2.5 times the upper limit of normal and total bilirubin less than or equal to 1.5 times the upper limit of normal and then reinitiate at 750 mg daily dose of Zytiga.  For recurrent hepatotoxicity on 750 mg daily Zytiga, withhold treatment until liver functions returned to baseline or ALT and AST less than 3-2.5 times upper limit of normal and total bilirubin less than or equal to 1.5 times the upper limit of normal then reinitiate at 500 mg daily Zytiga dose.  If has recurrent hepatotoxicity on 500 mg dose, then discontinue  treatment.  If has ALT greater than 3 times the upper limit of normal along with total bilirubin greater than 2 times the upper limit of normal in the absence of other contributing causes or biliary obstruction, permanently discontinue Zytiga.  Based on these recommendations, I would continue current doses but we will watch with serial labs monthly and repeat his imaging again in 3 months but clinically he is doing wonderfully with excellent response to Taxotere and now on Zytiga prednisone plus Zometa along with Relugolix.    -6/23/2020 for [facility] oncology clinic follow-up: Having persistent and worsening pain above his left hip.  I will get an MRI of the left hip for further evaluation.  Otherwise we will continue therapy unchanged with Zytiga, prednisone and Zometa along with Relugolix.    -7/28/2021 [facility] oncology clinic follow-up: Patient with multiple somatic complaints including episodes of confusion, dizziness, decreased memory, agitation.  He reports ongoing episodes with difficulty swallowing.  Also most concerning for episodes of angina and chest pain for which he basically refused to seek emergency medical attention.  He has a history of coronary artery disease and stent placement.  He has not followed up with cardiology for many years.  I will get an MRI of the brain in light of his confusion, dizziness and agitation.  I will get him to gastroenterology for EGD in light of his dysphagia.  I have also put in a referral for cardiology.  I reemphasized to the patient, his wife who is with him today and his son who was on the telephone during our visit the importance of seeking out prompt medical attention when he is having chest pain or neurological concerns so that he can be evaluated.  The patient states that he basically refuses to go to the emergency room and if his family calls an ambulance he would not agree to go to the hospital.  For the time being, I have asked him to hold his Zytiga and  "prednisone until we can sort this out as Zytiga does have some cardiovascular risk.  There is likely no treatment for prostate cancer that does not carry some form of cardiovascular risk.  His PSA remains low at 0.014 yesterday.  He will continue relugolix for now.    Of note, some of these symptoms could also be due to his hypophosphatemia, phosphate level from yesterday was 1.5, I have sent in a prescription for K-Phos 3 times a day before meals for 10 days.  We will hold further Zometa until this is corrected.  I have also put in an order to check his vitamin D level.  He takes calcium and vitamin D daily.  Some of his symptoms also could be due to drug withdrawal as there was some confusion and difficulty getting his last prescription for methadone therefore he was without methadone for several days, he now has his prescription and is back on his pain medication.  He has an appointment with neurosurgery tomorrow in light of his ongoing back pain, MRI of the hip recently showed multiple bony lesions largest at the L5 vertebral body and left supra-acetabular region.  If no neurosurgical intervention recommended he may benefit from spot radiation as this is where a lot of his pain is coming from.  His wife had questions today regarding his staging and extent of disease.  We reviewed previous scans.  I did clarify with her as she used the words \"cancer free\" as she thought that is what she was told after his CT scans once he completed Taxotere in February.  I explained to her that even though his scans showed he had an excellent response with no clear areas of metastasis that did not mean he was cancer free, I explained to her that when you have metastatic cancer the treatment intent is palliative in nature and to control the disease but not to cure the disease.  She stated understanding.  We will see him back in 2 weeks for follow-up to sort through this and make further plan of care, again, I have asked him to " hold Zytiga and prednisone until he sees us back, he will continue on his other medications including relugolix.    -7/30/2021 neurosurgery PA consultation.  MRI with and without contrast L5 ordered.    -8/3/2021 neurosurgery follow-up showed known sclerotic disease with new L5 abnormality without compression deformity or instability.  MRI brain negative for metastasis.    -8/4/2021 office visit Dr. Fco Quintanilla radiation oncology coordinating with Dr. North neurosurgery for palliative radiation for MRI evidence of L5 and left supra acetabular painful lesions.  States that the patient's disease which is not secreting PSA is experiencing some progression and would benefit from 20 Gy in five fractions and other lesion 30 Gy in ten fractions. Patient offered to go to [place] for radiation but desired treatment in [place].    -8/10/2021 [facility] medical oncology follow-up visit: No chest pains at this junction.  Reviewed MRI brain and other MRIs reviewed by Dr. North and Dw[place].  Due for EGD on 19th.  We will repeat his phosphorus along with his other labs continue Relugolix, Zytiga, prednisone, and he will continue radiation palliatively to the painful bony lesions with Dr. Quintanilla/Can.  He will see my nurse practitioner back in a few weeks to see how he is tolerating this and to follow-up on the phosphorus off of Zometa and she will order repeat CT chest abdomen pelvis with contrast and total body bone scan the end of September.I will see him back after that.    -9/8/2021 Zometa resumed.  PSA <0.10    -10/5/2021 [facility] medical oncology follow-up visit: followed-up with radiation oncology 9/21/2021.  Status post symptomatic L5 radiation 5 fractions 20 [place] completed 8/16/2021 with improvement in right hip pain.  9/2/2021 PSA less than 0.1.  9/29/2021 total body bone scan shows increased uptake left iliac bone slightly superior group of the left acetabulum with no additional foci of suspicious abnormality is  unlikely treatment related with no new sites of active osseous metastasis.  CT chest abdomen pelvis with contrast shows stable borderline enlarged left periaortic nodes and dating sclerotic bone lesions.Continue Zytiga, prednisone, Relugolix, Zometa, repeat CT chest abdomen pelvis with contrast and total body bone scan the end of the year.  We will follow PSA serially.    -11/17/2021 [facility] Oncology clinic follow-up:  Mr. Lugo overall is doing well.  He is tolerating therapy with Zytiga, prednisone and Relugolix along with Zometa which is given every 6 weeks.  PSA remains low at <0.1.  Has occasional low phosphorus, currently low at 1.7.  Serum calcium is normal at 8.9, normal creatinine 0.79 and normal CBC other than for platelets of 124.  I will hold Zometa for 1 month, I did send in a prescription for phosphorus replacement today.  He takes calcium and vitamin D.  I will see him back in 1 month for follow-up.  We will repeat restaging scans after that visit.    -12/15/2021 [facility] Oncology clinic follow-up: Mr. Lugo continues to do well on therapy with Zytiga, prednisone and Relugolix, his PSA remains low at <0.1.  He is continuing to have left lower back pain, he is working with palliative care and they have referred him also to pain management.  Pain management has talked to him about putting in a pain pump however he is leery of this, I told him that this was a safe and effective pain management technique and to certainly give consideration to their recommendations.  His phosphorus is improving however is still a little low, I will hold off on Zometa until we see him back in 1 month, we may end up only giving it every 12 weeks.  We will repeat restaging scans with CT chest, abdomen and pelvis along with total body bone scan prior to return and I have ordered those today.      -1/5/2022 CT chest abdomen pelvis with contrast [facility] showing stable left periaortic adenopathy and unchanged sclerotic  thoracic, lumbar spine and pelvis lesions with no new or progressive disease in the chest abdomen or pelvis.  Total body bone scan shows no significant change and no new suspicious foci.  Stable posterior right acetabulum and left superior acetabulum and degenerative changes in the cervical spine, shoulders, acromioclavicular joints, sternoclavicular joints, elbows, wrists, hands, thoracic spine, lumbosacral spine, sacroiliac joints, hips, knees, ankles, feet.    -1/11/2022 [facility] medical oncology hematology follow-up visit: I reviewed images and reports from his 1/5/2022 scans.  No active disease and PSA immmeasurably low on Zytiga, prednisone, Relugolix.  However, he has struggled with hypophosphatemia and is significantly fatigued with this.  Given that the bones are doing well and given that his phosphorus continues to remain consistently low at 2.2 in mid December, 1.7 mid November and 2.5 mid-September and as low as 1.5 back to July and the likelihood that this is related to his Zometa, given that his bones are stable overall, he will increase from 1200 mg up to 1600 mg of calcium and phosphate a day and we will hold his Zometa for the foreseeable future.  He will see my nurse practitioner back in a month to continue to monitor his phosphorus along with his calcium and other labs and will repeat his scans again in 3 months.  In addition, given his fatigue we will check his ACTH and cortisol though he is taking his prednisone with his Zytiga.  Though his electrolytes are normal, I will keep an eye on the cortisol and ACTH and have these labs not only drawn today but again just prior to return to my nurse practitioner on February 10.    -2/10/2022 [facility] Oncology clinic follow-up: Mr. Lugo overall is stable, no change in his health this past month.  I have reviewed his labs from 2/8/2022 and they are unremarkable, his phosphorus is now normal at 3.2. We are continuing to hold his Zometa, he last received  Zometa on 10/5/2021.  He continues therapy with Zytiga, prednisone and Relugolix.  Dr. Rashid had ordered cortisol level and ACTH which are low, currently his ACTH is 2.8 and cortisol 3.08 however these are both done in the face of ongoing prednisone that he takes with his Zytiga.  We will get him to endocrinology for further evaluation for possible adrenal insufficiency but will not interrupt his treatment in the interim.  He will need restaging scans again in April for a 3-month follow-up.  He will continue to follow with palliative care and pain management for his cancer related pain.  His PSA remains immeasurably low at <0.1 on 2/8/22.    -2/15/2022 went to emergency room with weakness, decreased appetite, myalgias in the setting of known COVID-19 testing positive a few days prior to this visit.  Phosphorus had been low in the past but was normal in the emergency room with unremarkable CBC and CMP.  Chest x-ray unremarkable saturating well on room air mildly hypertensive with dry mucosa.  Given 500 cc crystalloid.  Covid test positive.  Mild thrombocytopenia 136,000 and otherwise unremarkable.  Discharged home.    -3/3/2022 data:  PSA less than 0.1  CMP unremarkable  Cortisol 3.96 normal  ACTH 2.8 lower limit of normal 7.2  Phosphorus normal 2.6    -3/8/2022 [facility] medical oncology follow-up: Suspect big chunk of his fatigue was Covid.  Another part of the fatigue likely related to lack of testosterone.  Not hypophosphatemic off of Zometa.  ACTH running low while on prednisone not surprising but with normal cortisol and I doubt adrenal insufficient which is why he is on prednisone to avoid such while taking Zytiga.  Overall doing fairly well and with immeasurably low PSA, I will have labs done on him early April, see my nurse practitioner early May, and she will order repeat bone scan and CTs the end of May and I will see him back in June.  We will continue to hold the Zometa unless bone lesions progress given  symptomatic prior hypophosphatemia on Zometa.  He will keep his appointment April 28 with Dr. Mir Duffy just to be sure that my take on his pituitary/adrenal axis assessment is accurate.    -4/28/2022 endocrinology consult Dr. Mir Duffy.  With low ACTH and cortisol on prednisone with Zytiga, the adequacy of prednisone cannot be assessed by measuring ACTH or cortisol but is assessed by weight changes, blood pressure, blood sugar, potassium, overall sense of how the patient is doing.  Primary adrenal insufficiency with low ACTH and low cortisol would be typical for the situation as his blood sugar tends to run a little high and potassium a little low, he did not think increasing prednisone was necessary.  He put him on some potassium.  No follow-up scheduled, will see as needed.    -5/4/2022 [facility] Oncology clinic follow-up: Mr. Lugo continues on therapy with Zytiga and prednisone along with Relugolix.  His Zometa has been on hold due to previous hypophosphatemia.  There is some concern about potential adrenal insufficiency. He saw endocrinology, Dr. Mir Duffy on 4/28/2022.  I did review his office note and he stated that the low ACTH and low serum cortisol would be typical for Mr. Lugo's situation.  He further stated that the adequacy of prednisone dose cannot be assessed by measuring ACTH or cortisol but is rather assessed by the overall just altered how the patient is feeling-weight change, blood pressure, blood sugar, potassium, overall sense of wellbeing.  His potassium had been running a little low therefore they put him on potassium 10 mill equivalents daily. Of note, I do not see a future follow-up scheduled with endocrinology.  He is having night sweats, I am not sure if this is related.  His glucose was normal this morning at 107.  His weight is stable.  He will continue current treatment for his prostate cancer unchanged, we will continue to hold his Zometa.  Current phosphorus and magnesium are normal.   CBC and CMP from today are unremarkable, cortisol is normal.  PSA and ACTH are pending  In regards to his night sweats, he had taken clonidine previously 0.1 mg twice daily as needed, I did send in a short supply again to try and see if this helps.  He also is having indigestion despite being on omeprazole twice daily, I sent a prescription for Pepcid.  He had an EGD August 2021.  We will repeat restaging scans prior to return and I have ordered those today.     -5/24/2022 CT chest abdomen pelvis with contrast[place] negative.  Bone scan shows stable bone metastases.    - 5/25/2022 PSA less than 0.1, platelets 130,000, otherwise unremarkable CBC and CMP.  A.m. cortisol running low 1.2 with phosphorus low 2.3.    -5/31/2022 [facility] medical oncology follow-up: On Zytiga, prednisone, Relugolix, PSA remaining immeasurably low with stable bone scan and no visceral/paulino metastases.  Phosphorus still running low.  I have discontinued his potassium chloride and started potassium phosphate 500 mg 4 times a day.  Unless PSA rising, we will plan on repeating CTs and bone scan in 6 months and will follow with my nurse practitioner in the interim and if symptoms dictate or labs, will get back to Dr. Duffy for management of potential adrenal insufficiency but presently we will just see how he does with potassium phosphate and hopeful improvement in the phosphorus level with time.  We will continue to follow with palliative care for pain management    -7/6/2022 [facility] Oncology clinic follow-up: Mr. Lugo overall is doing well but continues to be troubled with fatigue and hot flashes.  For the most part he states he is able to do the things he wants to do, he continues to work but does have to take more rest periods.  I had a long discussion with him and his wife after reviewing his medications with them as they wanted to see if there was anything he could stop or adjust.  I discussed with him the need to continue on treatment  for his prostate cancer even though his PSA remains immeasurably low and his scans look good but that if his medication is stopped the cancer would progress and over time it still has the potential to progress on therapy but would continue it as long as possible to keep his disease under control.  I explained this is like treating someone with hypertension, you do not stop the antihypertensive just because the blood pressure normalizes.  They stated understanding.  There is no dose adjustment of Zytiga or prednisone that would minimize his symptoms, he is on the current standard recommended therapy.  Discussed with him that sometimes during times of stress we may need to increase his prednisone dose but for now would not change anything.  His phosphorus level is normal, we continue to hold his Zometa.  I did give him the option of holding his phosphorus for the next month and we will see what happens, if it drops we will start him back on it but may be at a lower dose rather than 4 times a day maybe twice a day.  For now he will continue therapy unchanged.  We will continue monitoring labs monthly.  No PSA was drawn this month but that is okay as his PSA has been running immeasurably low at <0.1, we will recheck next month with lab draw.  We will stop checking his ACTH and cortisol level every month, if he develops worsening symptoms I will repeat those.  He has not gotten a follow-up with endocrinology as of yet.    -8/3/2022 [facility] follow-up:  Mr. Lugo is doing well as far as his prostate cancer goes with continued immeasurably low PSA of <0.1.  He continues on therapy with Zytiga and prednisone along with Relugolix.  His phosphorus is stable at 2.7 which was just slightly low by our reference range however was 2.71-month ago also which was normal on another labs reference range.  He has not taken his phosphorus for this past month as he thought it was making him more fatigued.  He really can tell  no difference whether he was taking it or not.  I have asked him to try to go back on phosphorus twice a day rather than 4 times a day and see if this is tolerated and if we can keep his phosphorus level from dropping.  His biggest complaint today is flareup of his arthralgias and back pain.  He is following with pain management, Dr. Danny Avalos but is requesting a referral back to palliative medicine as he feels that no one is currently listening to him and they are just prescribing the same medications that are not effective according to his report.  He does have a follow-up with Dr. Avalos on 8/12/2022 but due to his current pain is not able to work until after he is seen and hopefully can get some better relief.  I have given him a work release until 15 August.  I have also put in a referral back to palliative care.  Also encouraged him to work with Dr. Avalos for help in resolving his issue.  Apparently they have recommended some type of a pump however he has been hesitant to that but likely would be helpful.  We will continue therapy unchanged.  We will continue monthly labs including phosphorus, we are not currently checking ACTH and cortisol monthly as Dr. Mir Duffy previously stated in his assessment on 4/28/2022 that the adequacy of prednisone cannot be assessed by measuring ACTH or cortisol but would be assessed by symptoms, see note from 4/28/2022.  He made no further follow-up appointments.  Fatigue is not worsening currently.  His glucose and potassium are  Normal.    -10/13/2022 [facility] medical oncology follow-up: Mr. Lugo is doing well.  He is tolerating treatment with Zytiga and prednisone along with Relugolix without any unusual side effects.  His PSA has remained immeasurably low at <0.1.  His phosphorus was slightly low at last visit.  He had been instructed to go back on phosphorus twice a day but he misunderstood and has not been taking it.  We will check labs today.  I will follow-up with  results and notify him if he needs to restart the phosphorus.  His pain is better controlled since reestablishing with [facility].  We will repeat scans prior to next follow-up.  I have ordered CT chest abdomen pelvis along with bone scan.  We will see him back in 1 month.    -10/13/2022 PSA less than 0.014.With unremarkable CBC and CMP.  Phosphorus still a little low 2.3.    -11/4/2022 CT abdomen pelvis chest showed no evidence of disease progression with stable sclerotic bone lesions.  Bone scan stable right greater than left acetabular metastasis.  Stable bone metastases.    -11/8/2022 [facility] medical oncology telemedicine follow-up: Patient states that he feels achy all over with low-grade temps and a cough mildly productive.  Has an appointment later today to see his primary care for testing for flu and COVID.  Suggested that if he were positive for COVID that he get Paxlovid and that if he were positive for flu that he get Tamiflu and to discuss this with his primary care.  From the prostate cancer side, his PSA is immeasurably low with stable bone metastases and no evidence of progression.  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  We will continue the Relugolix, Zytiga, prednisone and he will see my nurse practitioner 12/7/2022.  Would repeat CTs and bone scan in May.    -1/25/2023 [facility] medicine follow-up: Mr. Lugo is having worsening fatigue and muscle aches.  He does have adrenal insufficiency on Zytiga and prednisone for his prostate cancer, he saw endocrinology in light of low ACTH back in April 2022.  At that time there was no recommendation other than for monitoring him for his overall wellbeing and labs.  He denies any fevers or chills.  His labs as shown above are unremarkable, his CBC and CMP are normal other than for glucose of 112, PSA remains low at <0.1.  He has no new pain or any symptoms concerning for progression of his  prostate cancer.  I will get him back to endocrinology to see if they feel any dose adjustment of his prednisone would be helpful in his symptoms.  Currently he is on 5 mg a day with his Zytiga.  I did not repeat his ACTH or cortisol as they would be expected to be low in this situation.  His potassium is normal, he has had no weight loss or change in his appetite.  Blood sugar is reasonable.  His only complaint currently is worsening fatigue and muscle aches.  He will continue treatment unchanged with Zytiga, prednisone and relugolix.  I will see him back in 1 month for follow-up.  We will repeat his restaging scans in May.    -3/1/2023 [facility] clinic follow-up:  His PSA remains low at <0.1 on treatment with Zytiga and prednisone along with relugolix however he continues to complain of significant fatigue and muscle aches not improving with time.  He states that his quality of life is affected as he is not able to do any work activities due to the fatigue.  He did see endocrinology again and they have increased his prednisone from 5 mg daily up to 7.5 mg daily however so far this has not made any difference in how he feels.  He is supposed to get back in touch with them to let them know how he is doing and to see if there is any other adjustments needed. His labs are unremarkable with normal thyroid function, CMP is normal other than for glucose of 156.  They did check hemoglobin A1c to look for diabetes however that was normal at 5.7.  CMP is unremarkable with just very mild anemia with hemoglobin of 12 and hematocrit 36.1% which would not account for his fatigue.  He does have hot flashes that are fairly significant, I will try venlafaxine as suggested by Dr. Duffy and I appreciate the recommendation.  We will start at 37.5 mg daily and if tolerated he can increase to 75 mg after 1-2 weeks.  I will see him back in 1 months for follow-up.  Plan to repeat scans late April/early May.  His prostate cancer  seems under good control with current regimen however I am not sure how long he can tolerate this due to the side effects.    -3/30/2023 [facility] follow-up:  Mr. Lugo continues to deal with fatigue.  He appears more cushingoid today, he continues on treatment with Zytiga, prednisone and relugolix.  Prednisone dose currently is 7.5 mg daily.  This has not made any difference in his energy level.  PSA remains immeasurably low at <0.014.  CBC shows new onset of microcytic hypochromic anemia with hemoglobin of 11, hematocrit 34.4%, MCV 75.4, MCH 24.1, WBC is normal at 5.81 with normal platelet count 181,000 and normal differential.  CMP is unremarkable, it is normal other than for glucose of 142.  We will get him to Dr. Gomez for an EGD and colonoscopy for further evaluation in regards to his new onset of anemia.  He has no associated GI symptoms otherwise except for 1 day a few weeks ago he does report that he had fairly sudden onset of vomiting but this was an isolated incident on that day and has not reoccurred since.  Continues to follow with palliative care for management of his chronic back pain.  We will repeat restaging CT chest, abdomen and pelvis along with total body bone scan prior to return and I have ordered that today.    -3/30/2023 Ferritin low 12.3 with iron low 27 and saturation low 5% with total iron binding capacity 511.  3/31/2023 ferrous sulfate 325 mg twice a day every other day with vitamin C started.    - 4/12/2023 CT chest abdomen pelvis without contrast showed no progressive disease with stable sclerotic bone lesions.  Bone scan showed single identifiable bone metastasis stable right acetabulum    -4/18/2023 [facility]ology follow-up: He now has microcytic iron deficiency anemia which is new and concerning.  Gastroenterologic evaluation has been ordered but not performed and is mandatory.  Continue ferrous sulfate 325 mg twice a day every other day with vitamin C to  improve absorption and we will follow his CBC along with his usual labs monthly on Zytiga, Relugolix, prednisone 7.5 mg.  He follows with Dr. Duffy with adrenal insufficiency.  He will see my nurse practitioner back in a month to see what GI has found and to watch serial CBC along with his other labs including PSA.  I would repeat CT chest abdomen pelvis again in 6 months WITH contrast along with bone scan and sooner if PSA rises.     Prostate cancer metastatic to bone   8/30/2020 -  Other Event    PSA 10.8     9/15/2020 Initial Diagnosis    Prostate cancer metastatic to bone (CMS/HCC)     9/15/2020 Biopsy    Prostate needle core biopsy     9/24/2020 Imaging    Total body bone scan: 4 abnormal areas of increased activity consistent with osteoblastic metastasis, abnormal foci of activity seen in the T12 vertebral body, L1 vertebral body, roof of the left acetabulum, and acetabulum medially on the right.  CT chest: Stable sclerotic metastatic lesions within the T12 and L1 vertebrae.  No other evidence of metastatic disease to the chest.  Stable mild splenomegaly and subcentimeter periaortic retroperitoneal lymph nodes.  CT abdomen and pelvis: Diffuse bladder wall thickening with mild perivesicular fat stranding.  Interval development of several sclerotic lesions in the spine and left iliac bone.     10/13/2020 Cancer Staged    Staging form: Bone - Appendicular Skeleton, Trunk, Skull, And Facial Bones, AJCC 8th Edition  - Clinical: Stage IVB (cT3, cN0, cM1b, G3) - Signed by Ishmael Rashid MD on 10/13/2020     11/3/2020 - 10/5/2021 Chemotherapy    OP SUPPORTIVE Zoledronic Acid Q42d     11/3/2020 - 2/18/2021 Chemotherapy    OP PROSTATE DOCEtaxel       1/4/2021 Imaging    CT chest abdomen pelvis with contrast and whole-body bone scan shows improving less metabolically active bone metastases.  Stable sclerotic T12, L1, and L2 vertebral bodies and bilateral pelvic bones on bone windows with stable subcentimeter para-aortic  lymph nodes and no definite progression in the chest abdomen or pelvis.  PSA down to 0.275 after 3 courses of Taxotere.     3/4/2021 Imaging    CT chest, abdomen and pelvis stable, no evidence of disease progression. Total body bone scan with decreased/near resolution of abnormal increased radiotracer uptake associated with the known sclerotic lesions in the thoracolumbar spine and bony pelvis.  No evidence of new osteoblastic metastases.     3/4/2021 Imaging    CT chest abdomen pelvis with contrast is negative save for stable sclerotic bone lesions and the bone scan shows near resolution of prior bony abnormalities associated with the known sclerotic lesions in the thoracolumbar spine and bony pelvis.  2/17/2021 PSA down to 0.1 from 1.37 October 2020.     3/9/2021 - 3/9/2021 Chemotherapy    OP SUPPORTIVE PROSTATE Relugolix     3/9/2021 -  Chemotherapy    OP PROSTATE Abiraterone / PredniSONE       3/10/2021 -  Chemotherapy    OP PROSTATE Abiraterone / PredniSONE     8/10/2021 - 8/16/2021 Radiation    Radiation OncologyTreatment Course:  Naveen Lugo received 2000 cGy in 5 fractions to L4-sacrum, left acetabulum and right pelvis via External Beam Radiation - EBRT.     11/16/2021 -  Chemotherapy    OP CENTRAL VENOUS ACCESS DEVICE ACCESS, CARE, AND MAINTENANCE (CVAD)     1/5/2022 Imaging    -1/5/2022 CT chest abdomen pelvis with contrast [facility] showing stable left periaortic adenopathy and unchanged sclerotic thoracic, lumbar spine and pelvis lesions with no new or progressive disease in the chest abdomen or pelvis.  Total body bone scan shows no significant change and no new suspicious foci.  Stable posterior right acetabulum and left superior acetabulum and degenerative changes in the cervical spine, shoulders, acromioclavicular joints, sternoclavicular joints, elbows, wrists, hands, thoracic spine, lumbosacral spine, sacroiliac joints, hips, knees, ankles, feet.     5/24/2022 Imaging    CT chest abdomen  "pelvis with contrastFrankfort negative.  Bone scan shows stable bone metastases.     11/4/2022 Imaging    CT abdomen pelvis chest showed no evidence of disease progression with stable sclerotic bone lesions.  Bone scan stable right greater than left acetabular metastasis.  Stable bone metastases.     4/12/2023 Imaging     CT chest abdomen pelvis without contrast showed no progressive disease with stable sclerotic bone lesions.  Bone scan showed single identifiable bone metastasis stable right acetabulum         HISTORY OF PRESENT ILLNESS:  The patient is a 67 y.o. male, here for follow up on management of prostate cancer now with iron deficiency anemia with general fatigue    Past Medical History:   Diagnosis Date   • Bone cancer    • History of radiation therapy 08/16/2021    L4 through sacrum, left acetabulum, right pelvis   • Nephrolithiasis    • Opioid use disorder, mild, on maintenance therapy (HCC)    • Prostate cancer      Past Surgical History:   Procedure Laterality Date   • CHOLECYSTECTOMY     • CORONARY STENT PLACEMENT  206 & 2011   • HERNIA REPAIR  1986   • THORACIC DISCECTOMY  1994       Allergies   Allergen Reactions   • Cymbalta [Duloxetine Hcl] Dizziness   • Gabapentin Nausea Only   • Lyrica [Pregabalin] Nausea And Vomiting and Dizziness       Family History and Social History reviewed and changed as necessary    REVIEW OF SYSTEM:   Generalized fatigue.  Chronic back pain not new    PHYSICAL EXAM:  No pallor.  No jaundice or icterus.  No respiratory distress.    Vitals:    04/18/23 0811   BP: 157/92   Pulse: 89   Resp: 20   Temp: 97.3 °F (36.3 °C)   Weight: 91.2 kg (201 lb)   Height: 175.3 cm (69\")     Vitals:    04/18/23 0811   PainSc:   8   PainLoc: Back  Comment: hip and back          ECOG score: 1           Vitals reviewed.    ECOG: (1) Restricted in Physically Strenuous Activity, Ambulatory & Able to Do Work of Light Nature    Lab Results   Component Value Date    HGB 11.0 (L) 03/28/2023    HCT " 34.4 (L) 03/28/2023    MCV 75.4 (L) 03/28/2023     03/28/2023    WBC 5.81 03/28/2023    NEUTROABS 4.13 03/28/2023    LYMPHSABS 1.08 03/28/2023    MONOSABS 0.30 03/28/2023    EOSABS 0.23 03/28/2023    BASOSABS 0.05 03/28/2023       Lab Results   Component Value Date    GLUCOSE 142 (H) 03/28/2023    BUN 10 03/28/2023    CREATININE 0.79 03/28/2023     03/28/2023    K 4.1 03/28/2023     03/28/2023    CO2 28.0 03/28/2023    CALCIUM 9.1 03/28/2023    PROTEINTOT 7.0 02/14/2022    ALBUMIN 4.1 03/28/2023    BILITOT <0.2 03/28/2023    ALKPHOS 72 03/28/2023    AST 17 03/28/2023    ALT 5 03/28/2023            ASSESSMENT & PLAN:  1.  Stage IVb grade group 4 metastatic prostate cancer with sclerotic bone lesions on CT and bone scan and history of prostatic hypertrophy.  Good response to Taxotere ending 2/18/2021 Relugolix, followed by Zytiga prednisone with L4/sacrum, left acetabulum, and right pelvis external beam radiation August 2021.  Hypophosphatemia on Zometa.  Zometa held as of October 2021.  2.  Kidney stone  3.  Polyps  4.  Probable drug-induced hypophosphatemia from Zometa, drug stopped after 10/5/2021 dose  5.  Cancer related pain seeing palliative care  6.  Fatigue  7.  Covid 2/2022  8.  Iron deficiency anemia    -9/30/2020 office note from Dr. Ronen Reynaga indicates patient with PSA 10.8.  Underwent TRUS prostate biopsy 9/15/2020.  Adenocarcinoma the prostate [place] 4+4 = 8 in 1 out of 12 cores and 4+3 = 7 and 10 out of 12 cores and 3+4 = 7 in 1 out of 12 cores.  Perineural invasion.  Extraprostatic extension into the fat seen on 2 biopsies of the right lateral mid and right apex.  9/24/2020 CT chest and whole-body bone scan showed T12, L1, left acetabular, right medial acetabular metastases with no lung metastases.  He started androgen deprivation therapy with Casodex with plans for Lupron to start in 1 to 2 weeks for clinical T3 N0 M1 metastatic prostate cancer.  Review of official report  from [facility] 9/24/2020 bone scan mentions T12, L1, roof of left acetabulum and medial right acetabular osteoblastic metastases.  CT chest with contrast that same day showed mild splenomegaly 14.2 cm with stable periaortic nodes compared to CT December 2015 with no lung metastases.  Sclerotic abnormality within the T12 vertebral body, L1 vertebral body extending into the pedicle unchanged.  8/28/2020 CT abdomen pelvis report from [facility] reviewed and mentions normal spleen size.  Punctate nonobstructing kidney stone, no paulino enlargement, diffuse bladder wall thickening with mild perivesicular fat stranding, 2.1 cm sclerotic left iliac bone above the left acetabulum new compared to 2015 as well as 1.5 cm faintly sclerotic focus in the right posterior acetabulum stable compared to prior CT 2015 as well as a 1.6 cm T12 and inferior vertebral body sclerotic lesion and a 1.9 cm left posterior lateral L1 vertebral body abnormality extending to the pedicle.  -10/13/2020 initial [facility] medical oncology consultation: With 1 [place] score 8, 4+4 lesion that would make him a grade group 4 with stage IVb disease given radiographic evidence of sclerotic bone metastasis on bone scan and CT.  Needs genetic testing given metastatic prostate cancer and this is also important as, were he to have mismatch repair mutation then we would have options for PDL 1 inhibitors and were he BRCA mutated would have options for PARP inhibitors in the future.  Systemic therapy for castration naïve prostate cancer or N1 disease should be with apalutamide or Abiraterone or enzalutamide all of which are category 1.  He does not have any visceral metastases.  According to his imaging he has T12, L1, left acetabular, right acetabular, and left iliac bony involvement.  That means he would have 4 or more bone metastases with at least one metastasis (i.e. the acetabular lesions bilaterally) beyond the pelvis/vertebral, for which this  would be considered high-volume disease for which 6 cycles of docetaxel 75 mg/m² x 6 cycles followed by Zytiga and prednisone would be reasonable along with Zometa every 6 weeks for bone stabilization.  He will do chemo preparation visit with my nurse practitioner prior to start chemotherapy with Taxotere and Zometa in 2 weeks and he is already received Casodex in preparation for Lupron shot he received on 10/12/2020 with Dr. Reynaga.  We will get him to Dr. Alexander Gomez who has done his polypectomies in the past for port placement and we will get genetic counseling started.  Following the 6 courses of Taxotere per the [place] trial criteria, I would then give him an indefinite Zytiga and prednisone and Zometa until progression or toxicity dictates.    -12/16/2019 COVID-19 antigen test negative    -12/29/2020 PSA 0.275 with absolute neutrophil count 700 and creatinine 0.76 with normal liver enzymes and electrolytes.  Normal magnesium and phosphorus and urine drug screen positive for buprenorphine and opiates follow-up with palliative care.    -1/4/2021 CT chest abdomen pelvis with contrast and whole-body bone scan shows improving less metabolically active bone metastases.  Stable sclerotic T12, L1, and L2 vertebral bodies and bilateral pelvic bones on bone windows with stable subcentimeter para-aortic lymph nodes and no definite progression in the chest abdomen or pelvis.    -1/5/2021 [facility] medical oncology follow-up visit: I reviewed the images and reports thereof of the above CT and bone scan which shows good response to Taxotere x3 courses with a PSA down to 0.275 from a baseline of 10.  Get another 3 courses of Taxotere, continue his Xgeva, and then following that we will switch to Zytiga and prednisone.  He is working with palliative care on his bone pain but on his current dose of fentanyl patch he says his pain is still not adequately controlled he will contact them.  Dexamethasone prescription was  refilled.  We will see my nurse practitioner back in 3 weeks for course #5 of 6 total courses and then we will reimage with CT chest abdomen pelvis and bone scan before going to the Zytiga prednisone.    -3/4/2021 CT chest abdomen pelvis with contrast is negative save for stable sclerotic bone lesions and the bone scan shows near resolution of prior bony abnormalities associated with the known sclerotic lesions in the thoracolumbar spine and bony pelvis.    2/17/2021 PSA down to 0.1 from 1.37 October 2020.  3/9/2021 PSA 0.1    -5/26/2021 CT chest abdomen pelvis with contrast shows stable to slightly underlying increase low-density left para-aortic node.  Bone lesions stable.  Whole-body bone scan stable to decrease activity of known thoracolumbar and bony pelvic involvement.  PSA less than 0.1  , AST 74, bilirubin 0.5.       -6/1/2021 [facility] medical oncology follow-up visit: I reviewed the above data with him and images thereof.  Bones are stable.  No significant adenopathy.  PSA immeasurably low.     upper limit of normal 69 and AST 74 upper limit of normal 46.  Hence, neither is more than double the upper limit of normal with no ascites and no encephalopathy and normal albumin and bilirubin, despite the elevation of liver enzymes, he has child Abrams score A.  Package insert for Abiraterone says that for ALT and/or AST greater than 5 times upper limit of normal or total bilirubin greater than 3 times the upper limit of normal to withhold treatment until liver function tests returned to baseline or ALT and AST less than or equal to 2.5 times the upper limit of normal and total bilirubin less than or equal to 1.5 times the upper limit of normal and then reinitiate at 750 mg daily dose of Zytiga.  For recurrent hepatotoxicity on 750 mg daily Zytiga, withhold treatment until liver functions returned to baseline or ALT and AST less than 3-2.5 times upper limit of normal and total bilirubin less than or  equal to 1.5 times the upper limit of normal then reinitiate at 500 mg daily Zytiga dose.  If has recurrent hepatotoxicity on 500 mg dose, then discontinue treatment.  If has ALT greater than 3 times the upper limit of normal along with total bilirubin greater than 2 times the upper limit of normal in the absence of other contributing causes or biliary obstruction, permanently discontinue Zytiga.  Based on these recommendations, I would continue current doses but we will watch with serial labs monthly and repeat his imaging again in 3 months but clinically he is doing wonderfully with excellent response to Taxotere and now on Zytiga prednisone plus Zometa along with Relugolix.    -6/23/2020 for [facility] clinic follow-up: Having persistent and worsening pain above his left hip.  I will get an MRI of the left hip for further evaluation.  Otherwise we will continue therapy unchanged with Zytiga, prednisone and Zometa along with Relugolix.    -7/28/2021 [facility] oncology clinic follow-up: Patient with multiple somatic complaints including episodes of confusion, dizziness, decreased memory, agitation.  He reports ongoing episodes with difficulty swallowing.  Also most concerning for episodes of angina and chest pain for which he basically refused to seek emergency medical attention.  He has a history of coronary artery disease and stent placement.  He has not followed up with cardiology for many years.  I will get an MRI of the brain in light of his confusion, dizziness and agitation.  I will get him to gastroenterology for EGD in light of his dysphagia.  I have also put in a referral for cardiology.  I reemphasized to the patient, his wife who is with him today and his son who was on the telephone during our visit the importance of seeking out prompt medical attention when he is having chest pain or neurological concerns so that he can be evaluated.  The patient states that he basically refuses to go to the  "emergency room and if his family calls an ambulance he would not agree to go to the hospital.  For the time being, I have asked him to hold his Zytiga and prednisone until we can sort this out as Zytiga does have some cardiovascular risk.  There is likely no treatment for prostate cancer that does not carry some form of cardiovascular risk.  His PSA remains low at 0.014 yesterday.  He will continue relugolix for now.    Of note, some of these symptoms could also be due to his hypophosphatemia, phosphate level from yesterday was 1.5, I have sent in a prescription for K-Phos 3 times a day before meals for 10 days.  We will hold further Zometa until this is corrected.  I have also put in an order to check his vitamin D level.  He takes calcium and vitamin D daily.  Some of his symptoms also could be due to drug withdrawal as there was some confusion and difficulty getting his last prescription for methadone therefore he was without methadone for several days, he now has his prescription and is back on his pain medication.  He has an appointment with neurosurgery tomorrow in light of his ongoing back pain, MRI of the hip recently showed multiple bony lesions largest at the L5 vertebral body and left supra-acetabular region.  If no neurosurgical intervention recommended he may benefit from spot radiation as this is where a lot of his pain is coming from.  His wife had questions today regarding his staging and extent of disease.  We reviewed previous scans.  I did clarify with her as she used the words \"cancer free\" as she thought that is what she was told after his CT scans once he completed Taxotere in February.  I explained to her that even though his scans showed he had an excellent response with no clear areas of metastasis that did not mean he was cancer free, I explained to her that when you have metastatic cancer the treatment intent is palliative in nature and to control the disease but not to cure the disease.  " She stated understanding.  We will see him back in 2 weeks for follow-up to sort through this and make further plan of care, again, I have asked him to hold Zytiga and prednisone until he sees us back, he will continue on his other medications including relugolix.    -7/30/2021 neurosurgery PA consultation.  MRI with and without contrast L5 ordered.    -8/3/2021 neurosurgery follow-up showed known sclerotic disease with new L5 abnormality without compression deformity or instability.  MRI brain negative for metastasis.    -8/4/2021 office visit Dr. Fco Quintanilla radiation oncology coordinating with Dr. North neurosurgery for palliative radiation for MRI evidence of L5 and left supra acetabular painful lesions.  States that the patient's disease which is not secreting PSA is experiencing some progression and would benefit from 20 Gy in five fractions and other lesion 30 Gy in ten fractions. Patient offered to go to [place] for radiation but desired treatment in [place].    -8/10/2021 [facility] medical oncology follow-up visit: No chest pains at this junction.  Reviewed MRI brain and other MRIs reviewed by Dr. North and Dwight.  Due for EGD on 19th.  We will repeat his phosphorus along with his other labs continue Relugolix, Zytiga, prednisone, and he will continue radiation palliatively to the painful bony lesions with Dr. Quintanilla/Can.  He will see my nurse practitioner back in a few weeks to see how he is tolerating this and to follow-up on the phosphorus off of Zometa and she will order repeat CT chest abdomen pelvis with contrast and total body bone scan the end of September.I will see him back after that.    -9/8/2021 Zometa resumed.  PSA <0.10    -10/5/2021 [facility] medical oncology follow-up visit: followed-up with radiation oncology 9/21/2021.  Status post symptomatic L5 radiation 5 fractions 20 [place] completed 8/16/2021 with improvement in right hip pain.  9/2/2021 PSA less than 0.1.  9/29/2021 total body  bone scan shows increased uptake left iliac bone slightly superior group of the left acetabulum with no additional foci of suspicious abnormality is unlikely treatment related with no new sites of active osseous metastasis.  CT chest abdomen pelvis with contrast shows stable borderline enlarged left periaortic nodes and dating sclerotic bone lesions.Continue Zytiga, prednisone, Relugolix, Zometa, repeat CT chest abdomen pelvis with contrast and total body bone scan the end of the year.  We will follow PSA serially.    -11/17/2021 [facility] Oncology clinic follow-up:  Mr. Lugo overall is doing well.  He is tolerating therapy with Zytiga, prednisone and Relugolix along with Zometa which is given every 6 weeks.  PSA remains low at <0.1.  Has occasional low phosphorus, currently low at 1.7.  Serum calcium is normal at 8.9, normal creatinine 0.79 and normal CBC other than for platelets of 124.  I will hold Zometa for 1 month, I did send in a prescription for phosphorus replacement today.  He takes calcium and vitamin D.  I will see him back in 1 month for follow-up.  We will repeat restaging scans after that visit.    -12/15/2021 [facility] Oncology clinic follow-up: Mr. Lugo continues to do well on therapy with Zytiga, prednisone and Relugolix, his PSA remains low at <0.1.  He is continuing to have left lower back pain, he is working with palliative care and they have referred him also to pain management.  Pain management has talked to him about putting in a pain pump however he is leery of this, I told him that this was a safe and effective pain management technique and to certainly give consideration to their recommendations.  His phosphorus is improving however is still a little low, I will hold off on Zometa until we see him back in 1 month, we may end up only giving it every 12 weeks.  We will repeat restaging scans with CT chest, abdomen and pelvis along with total body bone scan prior to return and I have ordered  those today.      -1/5/2022 CT chest abdomen pelvis with contrast [facility] showing stable left periaortic adenopathy and unchanged sclerotic thoracic, lumbar spine and pelvis lesions with no new or progressive disease in the chest abdomen or pelvis.  Total body bone scan shows no significant change and no new suspicious foci.  Stable posterior right acetabulum and left superior acetabulum and degenerative changes in the cervical spine, shoulders, acromioclavicular joints, sternoclavicular joints, elbows, wrists, hands, thoracic spine, lumbosacral spine, sacroiliac joints, hips, knees, ankles, feet.    -1/11/2022 [facility] oncology hematology follow-up visit: I reviewed images and reports from his 1/5/2022 scans.  No active disease and PSA immmeasurably low on Zytiga, prednisone, Relugolix.  However, he has struggled with hypophosphatemia and is significantly fatigued with this.  Given that the bones are doing well and given that his phosphorus continues to remain consistently low at 2.2 in mid December, 1.7 mid November and 2.5 mid-September and as low as 1.5 back to July and the likelihood that this is related to his Zometa, given that his bones are stable overall, he will increase from 1200 mg up to 1600 mg of calcium and phosphate a day and we will hold his Zometa for the foreseeable future.  He will see my nurse practitioner back in a month to continue to monitor his phosphorus along with his calcium and other labs and will repeat his scans again in 3 months.  In addition, given his fatigue we will check his ACTH and cortisol though he is taking his prednisone with his Zytiga.  Though his electrolytes are normal, I will keep an eye on the cortisol and ACTH and have these labs not only drawn today but again just prior to return to my nurse practitioner on February 10.    -2/10/2022 [facility] Oncology clinic follow-up: Mr. Lugo overall is stable, no change in his health this past month.  I have  reviewed his labs from 2/8/2022 and they are unremarkable, his phosphorus is now normal at 3.2. We are continuing to hold his Zometa, he last received Zometa on 10/5/2021.  He continues therapy with Zytiga, prednisone and Relugolix.  Dr. Rashid had ordered cortisol level and ACTH which are low, currently his ACTH is 2.8 and cortisol 3.08 however these are both done in the face of ongoing prednisone that he takes with his Zytiga.  We will get him to endocrinology for further evaluation for possible adrenal insufficiency but will not interrupt his treatment in the interim.  He will need restaging scans again in April for a 3-month follow-up.  He will continue to follow with palliative care and pain management for his cancer related pain.  His PSA remains immeasurably low at <0.1 on 2/8/22.    -2/15/2022 went to emergency room with weakness, decreased appetite, myalgias in the setting of known COVID-19 testing positive a few days prior to this visit.  Phosphorus had been low in the past but was normal in the emergency room with unremarkable CBC and CMP.  Chest x-ray unremarkable saturating well on room air mildly hypertensive with dry mucosa.  Given 500 cc crystalloid.  Covid test positive.  Mild thrombocytopenia 136,000 and otherwise unremarkable.  Discharged home.    -3/3/2022 data:  PSA less than 0.1  CMP unremarkable  Cortisol 3.96 normal  ACTH 2.8 lower limit of normal 7.2  Phosphorus normal 2.6    -3/8/2022 [facility] medical oncology follow-up: Suspect big chunk of his fatigue was Covid.  Another part of the fatigue likely related to lack of testosterone.  Not hypophosphatemic off of Zometa.  ACTH running low while on prednisone not surprising but with normal cortisol and I doubt adrenal insufficient which is why he is on prednisone to avoid such while taking Zytiga.  Overall doing fairly well and with immeasurably low PSA, I will have labs done on him early April, see my nurse practitioner early May, and she will  order repeat bone scan and CTs the end of May and I will see him back in June.  We will continue to hold the Zometa unless bone lesions progress given symptomatic prior hypophosphatemia on Zometa.  He will keep his appointment April 28 with Dr. Mir Duffy just to be sure that my take on his pituitary/adrenal axis assessment is accurate.    -4/28/2022 endocrinology consult Dr. Mir Duffy.  With low ACTH and cortisol on prednisone with Zytiga, the adequacy of prednisone cannot be assessed by measuring ACTH or cortisol but is assessed by weight changes, blood pressure, blood sugar, potassium, overall sense of how the patient is doing.  Primary adrenal insufficiency with low ACTH and low cortisol would be typical for the situation as his blood sugar tends to run a little high and potassium a little low, he did not think increasing prednisone was necessary.  He put him on some potassium.  No follow-up scheduled, will see as needed.    -5/4/2022 [facility] Oncology clinic follow-up: Mr. Lugo continues on therapy with Zytiga and prednisone along with Relugolix.  His Zometa has been on hold due to previous hypophosphatemia.  There is some concern about potential adrenal insufficiency. He saw endocrinology, Dr. Mir Duffy on 4/28/2022.  I did review his office note and he stated that the low ACTH and low serum cortisol would be typical for Mr. Lugo's situation.  He further stated that the adequacy of prednisone dose cannot be assessed by measuring ACTH or cortisol but is rather assessed by the overall just altered how the patient is feeling-weight change, blood pressure, blood sugar, potassium, overall sense of wellbeing.  His potassium had been running a little low therefore they put him on potassium 10 mill equivalents daily. Of note, I do not see a future follow-up scheduled with endocrinology.  He is having night sweats, I am not sure if this is related.  His glucose was normal this morning at 107.  His weight is stable.   He will continue current treatment for his prostate cancer unchanged, we will continue to hold his Zometa.  Current phosphorus and magnesium are normal.  CBC and CMP from today are unremarkable, cortisol is normal.  PSA and ACTH are pending  In regards to his night sweats, he had taken clonidine previously 0.1 mg twice daily as needed, I did send in a short supply again to try and see if this helps.  He also is having indigestion despite being on omeprazole twice daily, I sent a prescription for Pepcid.  He had an EGD August 2021.  We will repeat restaging scans prior to return and I have ordered those today.     -5/24/2022 CT chest abdomen pelvis with contrast[place] negative.  Bone scan shows stable bone metastases.    - 5/25/2022 PSA less than 0.1, platelets 130,000, otherwise unremarkable CBC and CMP.  A.m. cortisol running low 1.2 with phosphorus low 2.3.    -5/31/2022 [facility] medical oncology follow-up: On Zytiga, prednisone, Relugolix, PSA remaining immeasurably low with stable bone scan and no visceral/paulino metastases.  Phosphorus still running low.  I have discontinued his potassium chloride and started potassium phosphate 500 mg 4 times a day.  Unless PSA rising, we will plan on repeating CTs and bone scan in 6 months and will follow with my nurse practitioner in the interim and if symptoms dictate or labs, will get back to Dr. Duffy for management of potential adrenal insufficiency but presently we will just see how he does with potassium phosphate and hopeful improvement in the phosphorus level with time.  We will continue to follow with palliative care for pain management    -7/6/2022 [facility] Oncology clinic follow-up: Mr. Lugo overall is doing well but continues to be troubled with fatigue and hot flashes.  For the most part he states he is able to do the things he wants to do, he continues to work but does have to take more rest periods.  I had a long discussion with him and his wife after  reviewing his medications with them as they wanted to see if there was anything he could stop or adjust.  I discussed with him the need to continue on treatment for his prostate cancer even though his PSA remains immeasurably low and his scans look good but that if his medication is stopped the cancer would progress and over time it still has the potential to progress on therapy but would continue it as long as possible to keep his disease under control.  I explained this is like treating someone with hypertension, you do not stop the antihypertensive just because the blood pressure normalizes.  They stated understanding.  There is no dose adjustment of Zytiga or prednisone that would minimize his symptoms, he is on the current standard recommended therapy.  Discussed with him that sometimes during times of stress we may need to increase his prednisone dose but for now would not change anything.  His phosphorus level is normal, we continue to hold his Zometa.  I did give him the option of holding his phosphorus for the next month and we will see what happens, if it drops we will start him back on it but may be at a lower dose rather than 4 times a day maybe twice a day.  For now he will continue therapy unchanged.  We will continue monitoring labs monthly.  No PSA was drawn this month but that is okay as his PSA has been running immeasurably low at <0.1, we will recheck next month with lab draw.  We will stop checking his ACTH and cortisol level every month, if he develops worsening symptoms I will repeat those.  He has not gotten a follow-up with endocrinology as of yet.    -8/3/2022 [facility] Oncology clinic follow-up:  Mr. Lugo is doing well as far as his prostate cancer goes with continued immeasurably low PSA of <0.1.  He continues on therapy with Zytiga and prednisone along with Relugolix.  His phosphorus is stable at 2.7 which was just slightly low by our reference range however was 2.71-month ago also which  was normal on another labs reference range.  He has not taken his phosphorus for this past month as he thought it was making him more fatigued.  He really can tell no difference whether he was taking it or not.  I have asked him to try to go back on phosphorus twice a day rather than 4 times a day and see if this is tolerated and if we can keep his phosphorus level from dropping.  His biggest complaint today is flareup of his arthralgias and back pain.  He is following with pain management, Dr. Danny Avalos but is requesting a referral back to palliative medicine as he feels that no one is currently listening to him and they are just prescribing the same medications that are not effective according to his report.  He does have a follow-up with Dr. Avalos on 8/12/2022 but due to his current pain is not able to work until after he is seen and hopefully can get some better relief.  I have given him a work release until 15 August.  I have also put in a referral back to palliative care.  Also encouraged him to work with Dr. Avalos for help in resolving his issue.  Apparently they have recommended some type of a pump however he has been hesitant to that but likely would be helpful.  We will continue therapy unchanged.  We will continue monthly labs including phosphorus, we are not currently checking ACTH and cortisol monthly as Dr. Mir Duffy previously stated in his assessment on 4/28/2022 that the adequacy of prednisone cannot be assessed by measuring ACTH or cortisol but would be assessed by symptoms, see note from 4/28/2022.  He made no further follow-up appointments.  Fatigue is not worsening currently.  His glucose and potassium are  Normal.    -10/13/2022 [facility] medical oncology follow-up: Mr. Lugo is doing well.  He is tolerating treatment with Zytiga and prednisone along with Relugolix without any unusual side effects.  His PSA has remained immeasurably low at <0.1.  His phosphorus was slightly low at last visit.   He had been instructed to go back on phosphorus twice a day but he misunderstood and has not been taking it.  We will check labs today.  I will follow-up with results and notify him if he needs to restart the phosphorus.  His pain is better controlled since reestablishing with Anna Ayers, palliative care.  We will repeat scans prior to next follow-up.  I have ordered CT chest abdomen pelvis along with bone scan.  We will see him back in 1 month.    -10/13/2022 PSA less than 0.014.With unremarkable CBC and CMP.  Phosphorus still a little low 2.3.    -11/4/2022 CT abdomen pelvis chest showed no evidence of disease progression with stable sclerotic bone lesions.  Bone scan stable right greater than left acetabular metastasis.  Stable bone metastases.    -11/8/2022 [facility] medical oncology telemedicine follow-up: Patient states that he feels achy all over with low-grade temps and a cough mildly productive.  Has an appointment later today to see his primary care for testing for flu and COVID.  Suggested that if he were positive for COVID that he get Paxlovid and that if he were positive for flu that he get Tamiflu and to discuss this with his primary care.  From the prostate cancer side, his PSA is immeasurably low with stable bone metastases and no evidence of progression.  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  We will continue the Relugolix, Zytiga, prednisone and he will see my nurse practitioner 12/7/2022.  Would repeat CTs and bone scan in May.    -1/25/2023 [facility] Oncology medicine follow-up: Mr. Lugo is having worsening fatigue and muscle aches.  He does have adrenal insufficiency on Zytiga and prednisone for his prostate cancer, he saw endocrinology in light of low ACTH back in April 2022.  At that time there was no recommendation other than for monitoring him for his overall wellbeing and labs.  He denies any fevers or chills.  His labs as shown above are  unremarkable, his CBC and CMP are normal other than for glucose of 112, PSA remains low at <0.1.  He has no new pain or any symptoms concerning for progression of his prostate cancer.  I will get him back to endocrinology to see if they feel any dose adjustment of his prednisone would be helpful in his symptoms.  Currently he is on 5 mg a day with his Zytiga.  I did not repeat his ACTH or cortisol as they would be expected to be low in this situation.  His potassium is normal, he has had no weight loss or change in his appetite.  Blood sugar is reasonable.  His only complaint currently is worsening fatigue and muscle aches.  He will continue treatment unchanged with Zytiga, prednisone and relugolix.  I will see him back in 1 month for follow-up.  We will repeat his restaging scans in May.    -3/1/2023 [facility] Oncology clinic follow-up:  His PSA remains low at <0.1 on treatment with Zytiga and prednisone along with relugolix however he continues to complain of significant fatigue and muscle aches not improving with time.  He states that his quality of life is affected as he is not able to do any work activities due to the fatigue.  He did see endocrinology again and they have increased his prednisone from 5 mg daily up to 7.5 mg daily however so far this has not made any difference in how he feels.  He is supposed to get back in touch with them to let them know how he is doing and to see if there is any other adjustments needed. His labs are unremarkable with normal thyroid function, CMP is normal other than for glucose of 156.  They did check hemoglobin A1c to look for diabetes however that was normal at 5.7.  CMP is unremarkable with just very mild anemia with hemoglobin of 12 and hematocrit 36.1% which would not account for his fatigue.  He does have hot flashes that are fairly significant, I will try venlafaxine as suggested by Dr. Duffy and I appreciate the recommendation.  We will start at 37.5 mg daily and if  tolerated he can increase to 75 mg after 1-2 weeks.  I will see him back in 1 months for follow-up.  Plan to repeat scans late April/early May.  His prostate cancer seems under good control with current regimen however I am not sure how long he can tolerate this due to the side effects.    -3/30/2023 [facility] Oncology clinic follow-up:  Mr. Lugo continues to deal with fatigue.  He appears more cushingoid today, he continues on treatment with Zytiga, prednisone and relugolix.  Prednisone dose currently is 7.5 mg daily.  This has not made any difference in his energy level.  PSA remains immeasurably low at <0.014.  CBC shows new onset of microcytic hypochromic anemia with hemoglobin of 11, hematocrit 34.4%, MCV 75.4, MCH 24.1, WBC is normal at 5.81 with normal platelet count 181,000 and normal differential.  CMP is unremarkable, it is normal other than for glucose of 142.  We will get him to Dr. Gomez for an EGD and colonoscopy for further evaluation in regards to his new onset of anemia.  He has no associated GI symptoms otherwise except for 1 day a few weeks ago he does report that he had fairly sudden onset of vomiting but this was an isolated incident on that day and has not reoccurred since.  Continues to follow with palliative care for management of his chronic back pain.  We will repeat restaging CT chest, abdomen and pelvis along with total body bone scan prior to return and I have ordered that today.    -3/30/2023 Ferritin low 12.3 with iron low 27 and saturation low 5% with total iron binding capacity 511.  3/31/2023 ferrous sulfate 325 mg twice a day every other day with vitamin C started.    - 4/12/2023 CT chest abdomen pelvis without contrast showed no progressive disease with stable sclerotic bone lesions.  Bone scan showed single identifiable bone metastasis stable right acetabulum    -4/18/2023 [facility] hemat[facility] oncology follow-up: He now has microcytic iron deficiency anemia which is new and  concerning.  Gastroenterologic evaluation has been ordered but not performed and is mandatory.  Continue ferrous sulfate 325 mg twice a day every other day with vitamin C to improve absorption and we will follow his CBC along with his usual labs monthly on Zytiga, Relugolix, prednisone 7.5 mg.  He follows with Dr. Duffy with adrenal insufficiency.  He will see my nurse practitioner back in a month to see what GI has found and to watch serial CBC along with his other labs including PSA.  I would repeat CT chest abdomen pelvis again in 6 months WITH contrast along with bone scan and sooner if PSA rises.    Total time of care today inclusive of time spent today prior to his arrival reviewing interval note from my nurse practitioner and interval labs and scans and images thereof as outlined above and during visit translating that information to the patient and arranging follow-up after visit according to this plan took 47 minutes of patient care time throughout the day today.  04/18/2023

## 2023-04-28 ENCOUNTER — SPECIALTY PHARMACY (OUTPATIENT)
Dept: PHARMACY | Facility: HOSPITAL | Age: 68
End: 2023-04-28
Payer: MEDICARE

## 2023-04-28 NOTE — PROGRESS NOTES
Specialty Pharmacy Refill Coordination Note     Naveen is a 67 y.o. male contacted today regarding refills of abiraterone acetate (ZYTIGA) 500 MG tablet - Take 2 tablets po qd & relugolix (ORGOVYX) 120 MG tablet - Take 1 tablet po qd specialty medication(s).      Scheduled to ship on Monday 05/01, set to be delivered to patient on Tuesday 05/02 due to the weekend.    Specialty medication(s) and dose(s) confirmed: yes    Refill Questions    Flowsheet Row Most Recent Value   Changes to allergies? No   Changes to medications? No   New conditions since last clinic visit No   Unplanned office visit, urgent care, ED, or hospital admission in the last 4 weeks  No   How does patient/caregiver feel medication is working? Good   Financial problems or insurance changes  No   If yes, describe changes in insurance or financial issues. N/A   Since the previous refill, were any specialty medication doses or scheduled injections missed or delayed?  No   If yes, please provide the amount N/A   Why were doses missed? N/A   Does this patient require a clinical escalation to a pharmacist? No          Delivery Questions    Flowsheet Row Most Recent Value   Delivery method [employer]   Delivery address correct? Yes   Delivery phone number [phone]   Preferred delivery time? Anytime   Number of medications in delivery 2   Medication being filled and delivered Abiraterone & Relugolix   Doses left of specialty medications 10 days left   Is there any medication that is due not being filled? No   Supplies needed? No supplies needed   Cooler needed? No   Do any medications need mixed or dated? No   Additional comments No copay   Questions or concerns for the pharmacist? No   Explain any questions or concerns for the pharmacist N/A   Are any medications first time fills? No            Medication Adherence    Adherence tools used: watch   Other adherence tool: Clock - takes at same time each day, 7:45am   Support network for adherence: family  member          Follow-up: 25 day(s)     Sen Stearns, Pharmacy Technician  Specialty Pharmacy Technician

## 2023-05-01 DIAGNOSIS — G89.3 PAIN, CANCER: ICD-10-CM

## 2023-05-01 DIAGNOSIS — K59.03 DRUG-INDUCED CONSTIPATION: ICD-10-CM

## 2023-05-01 RX ORDER — SENNOSIDES 8.6 MG
TABLET ORAL
Qty: 84 TABLET | Refills: 3 | Status: SHIPPED | OUTPATIENT
Start: 2023-05-01 | End: 2023-05-31

## 2023-05-01 RX ORDER — OXYCODONE 9 MG/1
9 CAPSULE, EXTENDED RELEASE ORAL EVERY 12 HOURS
Qty: 60 EACH | Refills: 0 | Status: SHIPPED | OUTPATIENT
Start: 2023-05-03 | End: 2023-06-02

## 2023-05-01 RX ORDER — HYDROMORPHONE HYDROCHLORIDE 8 MG/1
8 TABLET ORAL EVERY 4 HOURS PRN
Qty: 180 TABLET | Refills: 0 | Status: SHIPPED | OUTPATIENT
Start: 2023-05-03 | End: 2023-06-02

## 2023-05-01 NOTE — TELEPHONE ENCOUNTER
Pt requested refill on the medication listed below, added 3 refills for this medication          Medication requested: Sennosides (Senna) 8.6 MG capsule    Last fill date: 11/28/22    Last appointment: 4/3/23    Next appointment: 6/30/23

## 2023-05-01 NOTE — TELEPHONE ENCOUNTER
Pt requesting med refill of the medications listed below    [facility] #: [id number]        Medication requested:    Last fill date: HYDROmorphone (DILAUDID) 8 MG tablet    Last appointment: 4/3/23    Next appointment: 6/30/23        Medication requested: oxyCODONE ER (Xtampza ER) 9 MG capsule extended-release 12 hour     Last fill date:    Last appointment: 4/3/23    Next appointment: 6/30/23

## 2023-05-11 ENCOUNTER — SPECIALTY PHARMACY (OUTPATIENT)
Dept: ONCOLOGY | Facility: HOSPITAL | Age: 68
End: 2023-05-11
Payer: MEDICARE

## 2023-05-11 NOTE — PROGRESS NOTES
Specialty Pharmacy Patient Management Program  Oncology 6-Month Clinical Assessment       Naveen Lugo is a 67 y.o. male with prostate cancer seen today to assess adherence and side effects.    Regimen: Abiraterone 1000mg PO daily + prednisone 5mg PO daily + relugolix 120mg PO daily    Reason for Outreach: Routine medication check-in .       Problem list reviewed by Maria Esther Le, PharmD on 5/11/2023 at  1:07 PM  Medicines reviewed by Maria Esther Le, PharmD on 5/11/2023 at  1:07 PM  Allergies reviewed by Maria Esther Le, PharmD on 5/11/2023 at  1:07 PM     Goals     •  Keep Pain Under Control     •  Specialty Pharmacy General Goal (pt-stated)       Clinical goal/therapeutic target: stable or decreasing PSA and disease control                Medication Assessment:  • Medication Assessment  o Follow Up Clinical Assessment  o Medication(s) assessed: abiraterone, relugolix, prednisone  o Therapeutic appropriateness: Appropriate  o Medication tolerability: Tolerating with no to minimal ADRs  o Medication plan: Continue therapy with normal follow-up  o Quality of Life Improvement Scale: Significantly better  o Administration: Patient is taking every day at the same time  and as prescribed .   o Patient can self administer medications: yes  o Medication Follow-Up Plan: Next clinical assessment and Refill coordination  • Lab Review: The labs listed below have been reviewed. No dose adjustments are needed for the oral specialty medication(s) based on the labs.    Lab Results   Component Value Date    GLUCOSE 142 (H) 03/28/2023    CALCIUM 9.1 03/28/2023     03/28/2023    K 4.1 03/28/2023    CO2 28.0 03/28/2023     03/28/2023    BUN 10 03/28/2023    CREATININE 0.79 03/28/2023    EGFRIFAFRI 109 01/11/2022    EGFRIFNONA 98 02/14/2022    BCR 12.7 03/28/2023    ANIONGAP 10.0 02/14/2022     Lab Results   Component Value Date    WBC 5.81 03/28/2023    RBC 4.56 03/28/2023    HGB 11.0 (L) 03/28/2023    HCT 34.4 (L) 03/28/2023     MCV 75.4 (L) 03/28/2023    MCH 24.1 (L) 03/28/2023    MCHC 32.0 03/28/2023    RDW 13.5 03/28/2023    RDWSD 41.2 02/14/2022    MPV 10.5 02/14/2022     03/28/2023    NEUTRORELPCT 71.0 03/28/2023    LYMPHORELPCT 18.6 (L) 03/28/2023    MONORELPCT 5.2 03/28/2023    EOSRELPCT 4.0 03/28/2023    BASORELPCT 0.9 03/28/2023    AUTOIGPER 0.3 02/14/2022    NEUTROABS 4.13 03/28/2023    LYMPHSABS 1.08 03/28/2023    MONOSABS 0.30 03/28/2023    EOSABS 0.23 03/28/2023    BASOSABS 0.05 03/28/2023    AUTOIGNUM 0.02 02/14/2022    NRBC 0.2 03/28/2023     • Drug-drug interactions  o Completed medication reconciliation today to assess for drug interactions. Patient denies starting or stopping any medications.    o Assessed medication list for interactions  - QT prolongation: relugolix and ondansetron - patient had an EKG in 2/2023 and no issues seen  - Ranexa may increase the concentration of relugolix - patient will continue to monitor for signs/symptoms  - Abiraterone and Lipitor may increase the risk of rhabdomyolysis - discussed signs and symptoms with patient  o Advised patient to call the clinic if any new medications are started so we can assess for drug-drug interactions.  • Drug-food interactions discussed.  • Vaccines are coordinated by the patient's oncologist and primary care provider.    Allergies  Known allergies and reactions were discussed with the patient. The patient's chart has been reviewed for allergy information and updated as necessary.   Allergies   Allergen Reactions   • Cymbalta [Duloxetine Hcl] Dizziness   • Gabapentin Nausea Only   • Lyrica [Pregabalin] Nausea And Vomiting and Dizziness        Hospitalizations and Urgent Care Visits Since Last Assessment:  • Unplanned hospitalizations or inpatient admissions: 0  • ED Visits: 0  • Urgent Office Visits: 0    Adherence Assessment:  Adherence Questions  Medication(s) assessed: abiraterone, relugolix, prednisone  On average, how many doses/injections does the  patient miss per month?: 0  What are the identified reasons for non-adherence or missed doses? : no problems identfied  What is the estimated medication adherence level?: % (PDC = 95%)  Based on the patient/caregiver response and refill history, does this patient require an MTP to track adherence improvements?: no    Quality of Life Assessment:  Quality of Life Assessment  Quality of Life Improvement Scale: Significantly better  -- Quality of Life: 10/10    Financial Assessment:  Medication availability/affordability: Patient has had no issues obtaining medication from pharmacy.      All questions addressed and patient had no additional concerns. Patient has pharmacy contact information.    Maria Esther Le PharmD, [facility]  Clinical Oncology Pharmacy Specialist  Phone: [phone]      5/11/2023  13:09 EDT

## 2023-05-16 ENCOUNTER — LAB (OUTPATIENT)
Dept: ONCOLOGY | Facility: HOSPITAL | Age: 68
End: 2023-05-16
Payer: MEDICARE

## 2023-05-16 VITALS
RESPIRATION RATE: 17 BRPM | DIASTOLIC BLOOD PRESSURE: 66 MMHG | TEMPERATURE: 97.6 F | BODY MASS INDEX: 29.53 KG/M2 | WEIGHT: 200 LBS | HEART RATE: 77 BPM | SYSTOLIC BLOOD PRESSURE: 142 MMHG

## 2023-05-16 DIAGNOSIS — Z45.2 ENCOUNTER FOR CARE RELATED TO PORT-A-CATH: Primary | ICD-10-CM

## 2023-05-16 DIAGNOSIS — C79.51 PROSTATE CANCER METASTATIC TO BONE: ICD-10-CM

## 2023-05-16 DIAGNOSIS — C61 PROSTATE CANCER METASTATIC TO BONE: ICD-10-CM

## 2023-05-16 PROCEDURE — 36591 DRAW BLOOD OFF VENOUS DEVICE: CPT

## 2023-05-16 PROCEDURE — 25010000002 HEPARIN LOCK FLUSH PER 10 UNITS: Performed by: INTERNAL MEDICINE

## 2023-05-16 RX ORDER — HEPARIN SODIUM (PORCINE) LOCK FLUSH IV SOLN 100 UNIT/ML 100 UNIT/ML
500 SOLUTION INTRAVENOUS AS NEEDED
Status: DISCONTINUED | OUTPATIENT
Start: 2023-05-16 | End: 2023-05-16 | Stop reason: HOSPADM

## 2023-05-16 RX ADMIN — HEPARIN 500 UNITS: 100 SYRINGE at 09:35

## 2023-05-17 ENCOUNTER — OFFICE VISIT (OUTPATIENT)
Dept: ONCOLOGY | Facility: CLINIC | Age: 68
End: 2023-05-17
Payer: MEDICARE

## 2023-05-17 ENCOUNTER — SPECIALTY PHARMACY (OUTPATIENT)
Dept: ONCOLOGY | Facility: HOSPITAL | Age: 68
End: 2023-05-17
Payer: MEDICARE

## 2023-05-17 VITALS
SYSTOLIC BLOOD PRESSURE: 171 MMHG | WEIGHT: 200 LBS | OXYGEN SATURATION: 93 % | RESPIRATION RATE: 18 BRPM | DIASTOLIC BLOOD PRESSURE: 81 MMHG | TEMPERATURE: 97.1 F | HEART RATE: 86 BPM | BODY MASS INDEX: 29.62 KG/M2 | HEIGHT: 69 IN

## 2023-05-17 DIAGNOSIS — C79.51 PROSTATE CANCER METASTATIC TO BONE: Primary | ICD-10-CM

## 2023-05-17 DIAGNOSIS — C61 PROSTATE CANCER METASTATIC TO BONE: Primary | ICD-10-CM

## 2023-05-17 LAB
ALBUMIN SERPL-MCNC: 4.2 G/DL (ref 3.5–5.2)
ALBUMIN/GLOB SERPL: 2.1 G/DL
ALP SERPL-CCNC: 74 U/L (ref 39–117)
ALT SERPL-CCNC: 9 U/L (ref 1–41)
AST SERPL-CCNC: 14 U/L (ref 1–40)
BASOPHILS # BLD AUTO: 0.05 10*3/MM3 (ref 0–0.2)
BASOPHILS NFR BLD AUTO: 0.7 % (ref 0–1.5)
BILIRUB SERPL-MCNC: 0.2 MG/DL (ref 0–1.2)
BUN SERPL-MCNC: 9 MG/DL (ref 8–23)
BUN/CREAT SERPL: 11.4 (ref 7–25)
CALCIUM SERPL-MCNC: 9.4 MG/DL (ref 8.6–10.5)
CHLORIDE SERPL-SCNC: 107 MMOL/L (ref 98–107)
CO2 SERPL-SCNC: 27.1 MMOL/L (ref 22–29)
CREAT SERPL-MCNC: 0.79 MG/DL (ref 0.76–1.27)
EGFRCR SERPLBLD CKD-EPI 2021: 97.4 ML/MIN/1.73
EOSINOPHIL # BLD AUTO: 0.16 10*3/MM3 (ref 0–0.4)
EOSINOPHIL NFR BLD AUTO: 2.1 % (ref 0.3–6.2)
ERYTHROCYTE [DISTWIDTH] IN BLOOD BY AUTOMATED COUNT: 21 % (ref 12.3–15.4)
GLOBULIN SER CALC-MCNC: 2 GM/DL
GLUCOSE SERPL-MCNC: 106 MG/DL (ref 65–99)
HCT VFR BLD AUTO: 43.2 % (ref 37.5–51)
HGB BLD-MCNC: 14.2 G/DL (ref 13–17.7)
IMM GRANULOCYTES # BLD AUTO: 0.02 10*3/MM3 (ref 0–0.05)
IMM GRANULOCYTES NFR BLD AUTO: 0.3 % (ref 0–0.5)
LYMPHOCYTES # BLD AUTO: 1.07 10*3/MM3 (ref 0.7–3.1)
LYMPHOCYTES NFR BLD AUTO: 14.3 % (ref 19.6–45.3)
MCH RBC QN AUTO: 27 PG (ref 26.6–33)
MCHC RBC AUTO-ENTMCNC: 32.9 G/DL (ref 31.5–35.7)
MCV RBC AUTO: 82.3 FL (ref 79–97)
MONOCYTES # BLD AUTO: 0.42 10*3/MM3 (ref 0.1–0.9)
MONOCYTES NFR BLD AUTO: 5.6 % (ref 5–12)
NEUTROPHILS # BLD AUTO: 5.78 10*3/MM3 (ref 1.7–7)
NEUTROPHILS NFR BLD AUTO: 77 % (ref 42.7–76)
PHOSPHATE SERPL-MCNC: 3 MG/DL (ref 2.5–4.5)
PLATELET # BLD AUTO: 126 10*3/MM3 (ref 140–450)
POTASSIUM SERPL-SCNC: 3.6 MMOL/L (ref 3.5–5.2)
PROT SERPL-MCNC: 6.2 G/DL (ref 6–8.5)
PSA SERPL-MCNC: <0.014 NG/ML (ref 0–4)
RBC # BLD AUTO: 5.25 10*6/MM3 (ref 4.14–5.8)
SODIUM SERPL-SCNC: 143 MMOL/L (ref 136–145)
WBC # BLD AUTO: 7.77 10*3/MM3 (ref 3.4–10.8)

## 2023-05-17 PROCEDURE — 99214 OFFICE O/P EST MOD 30 MIN: CPT | Performed by: NURSE PRACTITIONER

## 2023-05-17 PROCEDURE — 3079F DIAST BP 80-89 MM HG: CPT | Performed by: NURSE PRACTITIONER

## 2023-05-17 PROCEDURE — 1125F AMNT PAIN NOTED PAIN PRSNT: CPT | Performed by: NURSE PRACTITIONER

## 2023-05-17 PROCEDURE — 1159F MED LIST DOCD IN RCRD: CPT | Performed by: NURSE PRACTITIONER

## 2023-05-17 PROCEDURE — 1160F RVW MEDS BY RX/DR IN RCRD: CPT | Performed by: NURSE PRACTITIONER

## 2023-05-17 PROCEDURE — 3077F SYST BP >= 140 MM HG: CPT | Performed by: NURSE PRACTITIONER

## 2023-05-17 NOTE — PROGRESS NOTES
CHIEF COMPLAINT:  Hip pain    Problem List:  Oncology/Hematology History Overview Note   1.  Stage IVb grade group 4 metastatic prostate cancer with sclerotic bone lesions on CT and bone scan and history of prostatic hypertrophy.  Good response to Taxotere ending 2/18/2021 Relugolix, followed by Zytiga prednisone with L4/sacrum, left acetabulum, and right pelvis external beam radiation August 2021.  Hypophosphatemia on Zometa.  Zometa held as of October 2021.  2.  Kidney stone  3.  Polyps  4.  Probable drug-induced hypophosphatemia from Zometa, drug stopped after 10/5/2021 dose  5.  Cancer related pain seeing palliative care  6.  Fatigue  7.  Covid 2/2022  8.  Iron deficiency anemia    -9/30/2020 office note from Dr. Ronen Reynaga indicates patient with PSA 10.8.  Underwent TRUS prostate biopsy 9/15/2020.  Adenocarcinoma the prostate Sarika 4+4 = 8 in 1 out of 12 cores and 4+3 = 7 and 10 out of 12 cores and 3+4 = 7 in 1 out of 12 cores.  Perineural invasion.  Extraprostatic extension into the fat seen on 2 biopsies of the right lateral mid and right apex.  9/24/2020 CT chest and whole-body bone scan showed T12, L1, left acetabular, right medial acetabular metastases with no lung metastases.  He started androgen deprivation therapy with Casodex with plans for Lupron to start in 1 to 2 weeks for clinical T3 N0 M1 metastatic prostate cancer.  Review of official report from [facility] 9/24/2020 bone scan mentions T12, L1, roof of left acetabulum and medial right acetabular osteoblastic metastases.  CT chest with contrast that same day showed mild splenomegaly 14.2 cm with stable periaortic nodes compared to CT December 2015 with no lung metastases.  Sclerotic abnormality within the T12 vertebral body, L1 vertebral body extending into the pedicle unchanged.  8/28/2020 CT abdomen pelvis report from [facility] reviewed and mentions normal spleen size.  Punctate nonobstructing kidney stone, no paulino  enlargement, diffuse bladder wall thickening with mild perivesicular fat stranding, 2.1 cm sclerotic left iliac bone above the left acetabulum new compared to 2015 as well as 1.5 cm faintly sclerotic focus in the right posterior acetabulum stable compared to prior CT 2015 as well as a 1.6 cm T12 and inferior vertebral body sclerotic lesion and a 1.9 cm left posterior lateral L1 vertebral body abnormality extending to the pedicle.  -10/13/2020 initial [facility] medical oncology consultation: With 1 Sarika score 8, 4+4 lesion that would make him a grade group 4 with stage IVb disease given radiographic evidence of sclerotic bone metastasis on bone scan and CT.  Needs genetic testing given metastatic prostate cancer and this is also important as, were he to have mismatch repair mutation then we would have options for PDL 1 inhibitors and were he BRCA mutated would have options for PARP inhibitors in the future.  Systemic therapy for castration naïve prostate cancer or N1 disease should be with apalutamide or Abiraterone or enzalutamide all of which are category 1.  He does not have any visceral metastases.  According to his imaging he has T12, L1, left acetabular, right acetabular, and left iliac bony involvement.  That means he would have 4 or more bone metastases with at least one metastasis (i.e. the acetabular lesions bilaterally) beyond the pelvis/vertebral, for which this would be considered high-volume disease for which 6 cycles of docetaxel 75 mg/m² x 6 cycles followed by Zytiga and prednisone would be reasonable along with Zometa every 6 weeks for bone stabilization.  He will do chemo preparation visit with my nurse practitioner prior to start chemotherapy with Taxotere and Zometa in 2 weeks and he is already received Casodex in preparation for Lupron shot he received on 10/12/2020 with Dr. Reynaga.  We will get him to Dr. Alexander Gomez who has done his polypectomies in the past for port placement and we will  get genetic counseling started.  Following the 6 courses of Taxotere per the [place]arted trial criteria, I would then give him an indefinite Zytiga and prednisone and Zometa until progression or toxicity dictates.    -12/16/2019 COVID-19 antigen test negative    -12/29/2020 PSA 0.275 with absolute neutrophil count 700 and creatinine 0.76 with normal liver enzymes and electrolytes.  Normal magnesium and phosphorus and urine drug screen positive for buprenorphine and opiates follow-up with palliative care.    -1/4/2021 CT chest abdomen pelvis with contrast and whole-body bone scan shows improving less metabolically active bone metastases.  Stable sclerotic T12, L1, and L2 vertebral bodies and bilateral pelvic bones on bone windows with stable subcentimeter para-aortic lymph nodes and no definite progression in the chest abdomen or pelvis.    -1/5/2021 [facility] medical oncology follow-up visit: I reviewed the images and reports thereof of the above CT and bone scan which shows good response to Taxotere x3 courses with a PSA down to 0.275 from a baseline of 10.  Get another 3 courses of Taxotere, continue his Xgeva, and then following that we will switch to Zytiga and prednisone.  He is working with palliative care on his bone pain but on his current dose of fentanyl patch he says his pain is still not adequately controlled he will contact them.  Dexamethasone prescription was refilled.  We will see my nurse practitioner back in 3 weeks for course #5 of 6 total courses and then we will reimage with CT chest abdomen pelvis and bone scan before going to the Zytiga prednisone.    -3/4/2021 CT chest abdomen pelvis with contrast is negative save for stable sclerotic bone lesions and the bone scan shows near resolution of prior bony abnormalities associated with the known sclerotic lesions in the thoracolumbar spine and bony pelvis.    2/17/2021 PSA down to 0.1 from 1.37 October 2020.  3/9/2021 PSA 0.1    -5/26/2021 CT chest  abdomen pelvis with contrast shows stable to slightly underlying increase low-density left para-aortic node.  Bone lesions stable.  Whole-body bone scan stable to decrease activity of known thoracolumbar and bony pelvic involvement.  PSA less than 0.1  , AST 74, bilirubin 0.5.       -6/1/2021 [facility] medical oncology follow-up visit: I reviewed the above data with him and images thereof.  Bones are stable.  No significant adenopathy.  PSA immeasurably low.     upper limit of normal 69 and AST 74 upper limit of normal 46.  Hence, neither is more than double the upper limit of normal with no ascites and no encephalopathy and normal albumin and bilirubin, despite the elevation of liver enzymes, he has child Abrams score A.  Package insert for Abiraterone says that for ALT and/or AST greater than 5 times upper limit of normal or total bilirubin greater than 3 times the upper limit of normal to withhold treatment until liver function tests returned to baseline or ALT and AST less than or equal to 2.5 times the upper limit of normal and total bilirubin less than or equal to 1.5 times the upper limit of normal and then reinitiate at 750 mg daily dose of Zytiga.  For recurrent hepatotoxicity on 750 mg daily Zytiga, withhold treatment until liver functions returned to baseline or ALT and AST less than 3-2.5 times upper limit of normal and total bilirubin less than or equal to 1.5 times the upper limit of normal then reinitiate at 500 mg daily Zytiga dose.  If has recurrent hepatotoxicity on 500 mg dose, then discontinue treatment.  If has ALT greater than 3 times the upper limit of normal along with total bilirubin greater than 2 times the upper limit of normal in the absence of other contributing causes or biliary obstruction, permanently discontinue Zytiga.  Based on these recommendations, I would continue current doses but we will watch with serial labs monthly and repeat his imaging again in 3 months but  clinically he is doing wonderfully with excellent response to Taxotere and now on Zytiga prednisone plus Zometa along with Relugolix.    -6/23/2020 for [facility] follow-up: Having persistent and worsening pain above his left hip.  I will get an MRI of the left hip for further evaluation.  Otherwise we will continue therapy unchanged with Zytiga, prednisone and Zometa along with Relugolix.    -7/28/2021 [facility] follow-up: Patient with multiple somatic complaints including episodes of confusion, dizziness, decreased memory, agitation.  He reports ongoing episodes with difficulty swallowing.  Also most concerning for episodes of angina and chest pain for which he basically refused to seek emergency medical attention.  He has a history of coronary artery disease and stent placement.  He has not followed up with cardiology for many years.  I will get an MRI of the brain in light of his confusion, dizziness and agitation.  I will get him to gastroenterology for EGD in light of his dysphagia.  I have also put in a referral for cardiology.  I reemphasized to the patient, his wife who is with him today and his son who was on the telephone during our visit the importance of seeking out prompt medical attention when he is having chest pain or neurological concerns so that he can be evaluated.  The patient states that he basically refuses to go to the emergency room and if his family calls an ambulance he would not agree to go to the hospital.  For the time being, I have asked him to hold his Zytiga and prednisone until we can sort this out as Zytiga does have some cardiovascular risk.  There is likely no treatment for prostate cancer that does not carry some form of cardiovascular risk.  His PSA remains low at 0.014 yesterday.  He will continue relugolix for now.    Of note, some of these symptoms could also be due to his hypophosphatemia, phosphate level from yesterday was 1.5, I have sent in a  "prescription for K-Phos 3 times a day before meals for 10 days.  We will hold further Zometa until this is corrected.  I have also put in an order to check his vitamin D level.  He takes calcium and vitamin D daily.  Some of his symptoms also could be due to drug withdrawal as there was some confusion and difficulty getting his last prescription for methadone therefore he was without methadone for several days, he now has his prescription and is back on his pain medication.  He has an appointment with neurosurgery tomorrow in light of his ongoing back pain, MRI of the hip recently showed multiple bony lesions largest at the L5 vertebral body and left supra-acetabular region.  If no neurosurgical intervention recommended he may benefit from spot radiation as this is where a lot of his pain is coming from.  His wife had questions today regarding his staging and extent of disease.  We reviewed previous scans.  I did clarify with her as she used the words \"cancer free\" as she thought that is what she was told after his CT scans once he completed Taxotere in February.  I explained to her that even though his scans showed he had an excellent response with no clear areas of metastasis that did not mean he was cancer free, I explained to her that when you have metastatic cancer the treatment intent is palliative in nature and to control the disease but not to cure the disease.  She stated understanding.  We will see him back in 2 weeks for follow-up to sort through this and make further plan of care, again, I have asked him to hold Zytiga and prednisone until he sees us back, he will continue on his other medications including relugolix.    -7/30/2021 neurosurgery PA consultation.  MRI with and without contrast L5 ordered.    -8/3/2021 neurosurgery follow-up showed known sclerotic disease with new L5 abnormality without compression deformity or instability.  MRI brain negative for metastasis.    -8/4/2021 office visit " [facility] radiation oncology coordinating with Dr. North neurosurgery for palliative radiation for MRI evidence of L5 and left supra acetabular painful lesions.  States that the patient's disease which is not secreting PSA is experiencing some progression and would benefit from 20 Gy in five fractions and other lesion 30 Gy in ten fractions. Patient offered to go to [place] for radiation but desired treatment in [place].    -8/10/2021 [facility] medical oncology follow-up visit: No chest pains at this junction.  Reviewed MRI brain and other MRIs reviewed by Dr. North and Dwight.  Due for EGD on 19th.  We will repeat his phosphorus along with his other labs continue Relugolix, Zytiga, prednisone, and he will continue radiation palliatively to the painful bony lesions with Dr. Quintanilla/Can.  He will see my nurse practitioner back in a few weeks to see how he is tolerating this and to follow-up on the phosphorus off of Zometa and she will order repeat CT chest abdomen pelvis with contrast and total body bone scan the end of September.I will see him back after that.    -9/8/2021 Zometa resumed.  PSA <0.10    -10/5/2021 [facility] medical oncology follow-up visit: followed-up with radiation oncology 9/21/2021.  Status post symptomatic L5 radiation 5 fractions 20 [place] completed 8/16/2021 with improvement in right hip pain.  9/2/2021 PSA less than 0.1.  9/29/2021 total body bone scan shows increased uptake left iliac bone slightly superior group of the left acetabulum with no additional foci of suspicious abnormality is unlikely treatment related with no new sites of active osseous metastasis.  CT chest abdomen pelvis with contrast shows stable borderline enlarged left periaortic nodes and dating sclerotic bone lesions.Continue Zytiga, prednisone, Relugolix, Zometa, repeat CT chest abdomen pelvis with contrast and total body bone scan the end of the year.  We will follow PSA serially.    -11/17/2021 [facility]  clinic follow-up:  Mr. Lugo overall is doing well.  He is tolerating therapy with Zytiga, prednisone and Relugolix along with Zometa which is given every 6 weeks.  PSA remains low at <0.1.  Has occasional low phosphorus, currently low at 1.7.  Serum calcium is normal at 8.9, normal creatinine 0.79 and normal CBC other than for platelets of 124.  I will hold Zometa for 1 month, I did send in a prescription for phosphorus replacement today.  He takes calcium and vitamin D.  I will see him back in 1 month for follow-up.  We will repeat restaging scans after that visit.    -12/15/2021 [facility] Oncology clinic follow-up: Mr. Lugo continues to do well on therapy with Zytiga, prednisone and Relugolix, his PSA remains low at <0.1.  He is continuing to have left lower back pain, he is working with palliative care and they have referred him also to pain management.  Pain management has talked to him about putting in a pain pump however he is leery of this, I told him that this was a safe and effective pain management technique and to certainly give consideration to their recommendations.  His phosphorus is improving however is still a little low, I will hold off on Zometa until we see him back in 1 month, we may end up only giving it every 12 weeks.  We will repeat restaging scans with CT chest, abdomen and pelvis along with total body bone scan prior to return and I have ordered those today.      -1/5/2022 CT chest abdomen pelvis with contrast [facility] showing stable left periaortic adenopathy and unchanged sclerotic thoracic, lumbar spine and pelvis lesions with no new or progressive disease in the chest abdomen or pelvis.  Total body bone scan shows no significant change and no new suspicious foci.  Stable posterior right acetabulum and left superior acetabulum and degenerative changes in the cervical spine, shoulders, acromioclavicular joints, sternoclavicular joints, elbows, wrists, hands, thoracic spine,  lumbosacral spine, sacroiliac joints, hips, knees, ankles, feet.    -1/11/2022 [facility] hematology follow-up visit: I reviewed images and reports from his 1/5/2022 scans.  No active disease and PSA immmeasurably low on Zytiga, prednisone, Relugolix.  However, he has struggled with hypophosphatemia and is significantly fatigued with this.  Given that the bones are doing well and given that his phosphorus continues to remain consistently low at 2.2 in mid December, 1.7 mid November and 2.5 mid-September and as low as 1.5 back to July and the likelihood that this is related to his Zometa, given that his bones are stable overall, he will increase from 1200 mg up to 1600 mg of calcium and phosphate a day and we will hold his Zometa for the foreseeable future.  He will see my nurse practitioner back in a month to continue to monitor his phosphorus along with his calcium and other labs and will repeat his scans again in 3 months.  In addition, given his fatigue we will check his ACTH and cortisol though he is taking his prednisone with his Zytiga.  Though his electrolytes are normal, I will keep an eye on the cortisol and ACTH and have these labs not only drawn today but again just prior to return to my nurse practitioner on February 10.    -2/10/2022 [facility] follow-up: Mr. Lugo overall is stable, no change in his health this past month.  I have reviewed his labs from 2/8/2022 and they are unremarkable, his phosphorus is now normal at 3.2. We are continuing to hold his Zometa, he last received Zometa on 10/5/2021.  He continues therapy with Zytiga, prednisone and Relugolix.  Dr. Rashid had ordered cortisol level and ACTH which are low, currently his ACTH is 2.8 and cortisol 3.08 however these are both done in the face of ongoing prednisone that he takes with his Zytiga.  We will get him to endocrinology for further evaluation for possible adrenal insufficiency but will not interrupt his  treatment in the interim.  He will need restaging scans again in April for a 3-month follow-up.  He will continue to follow with palliative care and pain management for his cancer related pain.  His PSA remains immeasurably low at <0.1 on 2/8/22.    -2/15/2022 went to emergency room with weakness, decreased appetite, myalgias in the setting of known COVID-19 testing positive a few days prior to this visit.  Phosphorus had been low in the past but was normal in the emergency room with unremarkable CBC and CMP.  Chest x-ray unremarkable saturating well on room air mildly hypertensive with dry mucosa.  Given 500 cc crystalloid.  Covid test positive.  Mild thrombocytopenia 136,000 and otherwise unremarkable.  Discharged home.    -3/3/2022 data:  PSA less than 0.1  CMP unremarkable  Cortisol 3.96 normal  ACTH 2.8 lower limit of normal 7.2  Phosphorus normal 2.6    -3/8/2022 [facility] medical oncology follow-up: Suspect big chunk of his fatigue was Covid.  Another part of the fatigue likely related to lack of testosterone.  Not hypophosphatemic off of Zometa.  ACTH running low while on prednisone not surprising but with normal cortisol and I doubt adrenal insufficient which is why he is on prednisone to avoid such while taking Zytiga.  Overall doing fairly well and with immeasurably low PSA, I will have labs done on him early April, see my nurse practitioner early May, and she will order repeat bone scan and CTs the end of May and I will see him back in June.  We will continue to hold the Zometa unless bone lesions progress given symptomatic prior hypophosphatemia on Zometa.  He will keep his appointment April 28 with Dr. Mir Duffy just to be sure that my take on his pituitary/adrenal axis assessment is accurate.    -4/28/2022 endocrinology consult Dr. Mir Duffy.  With low ACTH and cortisol on prednisone with Zytiga, the adequacy of prednisone cannot be assessed by measuring ACTH or cortisol but is assessed by weight  changes, blood pressure, blood sugar, potassium, overall sense of how the patient is doing.  Primary adrenal insufficiency with low ACTH and low cortisol would be typical for the situation as his blood sugar tends to run a little high and potassium a little low, he did not think increasing prednisone was necessary.  He put him on some potassium.  No follow-up scheduled, will see as needed.    -5/4/2022 [facility], [place], operated by [facility] Oncology clinic follow-up: Mr. Lugo continues on therapy with Zytiga and prednisone along with Relugolix.  His Zometa has been on hold due to previous hypophosphatemia.  There is some concern about potential adrenal insufficiency. He saw endocrinology, Dr. Mir Duffy on 4/28/2022.  I did review his office note and he stated that the low ACTH and low serum cortisol would be typical for Mr. Lugo's situation.  He further stated that the adequacy of prednisone dose cannot be assessed by measuring ACTH or cortisol but is rather assessed by the overall just altered how the patient is feeling-weight change, blood pressure, blood sugar, potassium, overall sense of wellbeing.  His potassium had been running a little low therefore they put him on potassium 10 mill equivalents daily. Of note, I do not see a future follow-up scheduled with endocrinology.  He is having night sweats, I am not sure if this is related.  His glucose was normal this morning at 107.  His weight is stable.  He will continue current treatment for his prostate cancer unchanged, we will continue to hold his Zometa.  Current phosphorus and magnesium are normal.  CBC and CMP from today are unremarkable, cortisol is normal.  PSA and ACTH are pending  In regards to his night sweats, he had taken clonidine previously 0.1 mg twice daily as needed, I did send in a short supply again to try and see if this helps.  He also is having indigestion despite being on omeprazole twice daily, I sent a prescription for Pepcid.  He had an EGD August 2021.  We will  repeat restaging scans prior to return and I have ordered those today.     -5/24/2022 CT chest abdomen pelvis with contrast[place] negative.  Bone scan shows stable bone metastases.    - 5/25/2022 PSA less than 0.1, platelets 130,000, otherwise unremarkable CBC and CMP.  A.m. cortisol running low 1.2 with phosphorus low 2.3.    -5/31/2022 [facility] medical oncology follow-up: On Zytiga, prednisone, Relugolix, PSA remaining immeasurably low with stable bone scan and no visceral/paulino metastases.  Phosphorus still running low.  I have discontinued his potassium chloride and started potassium phosphate 500 mg 4 times a day.  Unless PSA rising, we will plan on repeating CTs and bone scan in 6 months and will follow with my nurse practitioner in the interim and if symptoms dictate or labs, will get back to Dr. Duffy for management of potential adrenal insufficiency but presently we will just see how he does with potassium phosphate and hopeful improvement in the phosphorus level with time.  We will continue to follow with palliative care for pain management    -7/6/2022 [facility] Oncology clinic follow-up: Mr. Lugo overall is doing well but continues to be troubled with fatigue and hot flashes.  For the most part he states he is able to do the things he wants to do, he continues to work but does have to take more rest periods.  I had a long discussion with him and his wife after reviewing his medications with them as they wanted to see if there was anything he could stop or adjust.  I discussed with him the need to continue on treatment for his prostate cancer even though his PSA remains immeasurably low and his scans look good but that if his medication is stopped the cancer would progress and over time it still has the potential to progress on therapy but would continue it as long as possible to keep his disease under control.  I explained this is like treating someone with hypertension, you do not stop the  antihypertensive just because the blood pressure normalizes.  They stated understanding.  There is no dose adjustment of Zytiga or prednisone that would minimize his symptoms, he is on the current standard recommended therapy.  Discussed with him that sometimes during times of stress we may need to increase his prednisone dose but for now would not change anything.  His phosphorus level is normal, we continue to hold his Zometa.  I did give him the option of holding his phosphorus for the next month and we will see what happens, if it drops we will start him back on it but may be at a lower dose rather than 4 times a day maybe twice a day.  For now he will continue therapy unchanged.  We will continue monitoring labs monthly.  No PSA was drawn this month but that is okay as his PSA has been running immeasurably low at <0.1, we will recheck next month with lab draw.  We will stop checking his ACTH and cortisol level every month, if he develops worsening symptoms I will repeat those.  He has not gotten a follow-up with endocrinology as of yet.    -8/3/2022 [facility] Oncology clinic follow-up:  Mr. Lugo is doing well as far as his prostate cancer goes with continued immeasurably low PSA of <0.1.  He continues on therapy with Zytiga and prednisone along with Relugolix.  His phosphorus is stable at 2.7 which was just slightly low by our reference range however was 2.71-month ago also which was normal on another labs reference range.  He has not taken his phosphorus for this past month as he thought it was making him more fatigued.  He really can tell no difference whether he was taking it or not.  I have asked him to try to go back on phosphorus twice a day rather than 4 times a day and see if this is tolerated and if we can keep his phosphorus level from dropping.  His biggest complaint today is flareup of his arthralgias and back pain.  He is following with pain management, Dr. Danny Avalos but is requesting a referral  back to palliative medicine as he feels that no one is currently listening to him and they are just prescribing the same medications that are not effective according to his report.  He does have a follow-up with Dr. Avalos on 8/12/2022 but due to his current pain is not able to work until after he is seen and hopefully can get some better relief.  I have given him a work release until 15 August.  I have also put in a referral back to palliative care.  Also encouraged him to work with Dr. Avalos for help in resolving his issue.  Apparently they have recommended some type of a pump however he has been hesitant to that but likely would be helpful.  We will continue therapy unchanged.  We will continue monthly labs including phosphorus, we are not currently checking ACTH and cortisol monthly as Dr. Mir Duffy previously stated in his assessment on 4/28/2022 that the adequacy of prednisone cannot be assessed by measuring ACTH or cortisol but would be assessed by symptoms, see note from 4/28/2022.  He made no further follow-up appointments.  Fatigue is not worsening currently.  His glucose and potassium are  Normal.    -10/13/2022 [facility] medical oncology follow-up: Mr. Lugo is doing well.  He is tolerating treatment with Zytiga and prednisone along with Relugolix without any unusual side effects.  His PSA has remained immeasurably low at <0.1.  His phosphorus was slightly low at last visit.  He had been instructed to go back on phosphorus twice a day but he misunderstood and has not been taking it.  We will check labs today.  I will follow-up with results and notify him if he needs to restart the phosphorus.  His pain is better controlled since reestablishing with [facility].  We will repeat scans prior to next follow-up.  I have ordered CT chest abdomen pelvis along with bone scan.  We will see him back in 1 month.    -10/13/2022 PSA less than 0.014.With unremarkable CBC and CMP.  Phosphorus still a  little low 2.3.    -11/4/2022 CT abdomen pelvis chest showed no evidence of disease progression with stable sclerotic bone lesions.  Bone scan stable right greater than left acetabular metastasis.  Stable bone metastases.    -11/8/2022 [facility] medical oncology telemedicine follow-up: Patient states that he feels achy all over with low-grade temps and a cough mildly productive.  Has an appointment later today to see his primary care for testing for flu and COVID.  Suggested that if he were positive for COVID that he get Paxlovid and that if he were positive for flu that he get Tamiflu and to discuss this with his primary care.  From the prostate cancer side, his PSA is immeasurably low with stable bone metastases and no evidence of progression.  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  We will continue the Relugolix, Zytiga, prednisone and he will see my nurse practitioner 12/7/2022.  Would repeat CTs and bone scan in May.    -1/25/2023 [facility] Oncology medicine follow-up: Mr. Lugo is having worsening fatigue and muscle aches.  He does have adrenal insufficiency on Zytiga and prednisone for his prostate cancer, he saw endocrinology in light of low ACTH back in April 2022.  At that time there was no recommendation other than for monitoring him for his overall wellbeing and labs.  He denies any fevers or chills.  His labs as shown above are unremarkable, his CBC and CMP are normal other than for glucose of 112, PSA remains low at <0.1.  He has no new pain or any symptoms concerning for progression of his prostate cancer.  I will get him back to endocrinology to see if they feel any dose adjustment of his prednisone would be helpful in his symptoms.  Currently he is on 5 mg a day with his Zytiga.  I did not repeat his ACTH or cortisol as they would be expected to be low in this situation.  His potassium is normal, he has had no weight loss or change in his appetite.  Blood sugar is  reasonable.  His only complaint currently is worsening fatigue and muscle aches.  He will continue treatment unchanged with Zytiga, prednisone and relugolix.  I will see him back in 1 month for follow-up.  We will repeat his restaging scans in May.    -3/1/2023 [facility] Oncology clinic follow-up:  His PSA remains low at <0.1 on treatment with Zytiga and prednisone along with relugolix however he continues to complain of significant fatigue and muscle aches not improving with time.  He states that his quality of life is affected as he is not able to do any work activities due to the fatigue.  He did see endocrinology again and they have increased his prednisone from 5 mg daily up to 7.5 mg daily however so far this has not made any difference in how he feels.  He is supposed to get back in touch with them to let them know how he is doing and to see if there is any other adjustments needed. His labs are unremarkable with normal thyroid function, CMP is normal other than for glucose of 156.  They did check hemoglobin A1c to look for diabetes however that was normal at 5.7.  CMP is unremarkable with just very mild anemia with hemoglobin of 12 and hematocrit 36.1% which would not account for his fatigue.  He does have hot flashes that are fairly significant, I will try venlafaxine as suggested by Dr. Duffy and I appreciate the recommendation.  We will start at 37.5 mg daily and if tolerated he can increase to 75 mg after 1-2 weeks.  I will see him back in 1 months for follow-up.  Plan to repeat scans late April/early May.  His prostate cancer seems under good control with current regimen however I am not sure how long he can tolerate this due to the side effects.    -3/30/2023 [facility] Oncology clinic follow-up:  Mr. Lugo continues to deal with fatigue.  He appears more cushingoid today, he continues on treatment with Zytiga, prednisone and relugolix.  Prednisone dose currently is 7.5 mg daily.  This has not made any  difference in his energy level.  PSA remains immeasurably low at <0.014.  CBC shows new onset of microcytic hypochromic anemia with hemoglobin of 11, hematocrit 34.4%, MCV 75.4, MCH 24.1, WBC is normal at 5.81 with normal platelet count 181,000 and normal differential.  CMP is unremarkable, it is normal other than for glucose of 142.  We will get him to Dr. Gomez for an EGD and colonoscopy for further evaluation in regards to his new onset of anemia.  He has no associated GI symptoms otherwise except for 1 day a few weeks ago he does report that he had fairly sudden onset of vomiting but this was an isolated incident on that day and has not reoccurred since.  Continues to follow with palliative care for management of his chronic back pain.  We will repeat restaging CT chest, abdomen and pelvis along with total body bone scan prior to return and I have ordered that today.    -3/30/2023 Ferritin low 12.3 with iron low 27 and saturation low 5% with total iron binding capacity 511.  3/31/2023 ferrous sulfate 325 mg twice a day every other day with vitamin C started.    - 4/12/2023 CT chest abdomen pelvis without contrast showed no progressive disease with stable sclerotic bone lesions.  Bone scan showed single identifiable bone metastasis stable right acetabulum    -4/18/2023 [facility] hematology oncology follow-up: He now has microcytic iron deficiency anemia which is new and concerning.  Gastroenterologic evaluation has been ordered but not performed and is mandatory.  Continue ferrous sulfate 325 mg twice a day every other day with vitamin C to improve absorption and we will follow his CBC along with his usual labs monthly on Zytiga, Relugolix, prednisone 7.5 mg.  He follows with Dr. Duffy with adrenal insufficiency.  He will see my nurse practitioner back in a month to see what GI has found and to watch serial CBC along with his other labs including PSA.  I would repeat CT chest abdomen pelvis again in 6 months WITH  contrast along with bone scan and sooner if PSA rises.    -5/3/2023 EGD and colonoscopy with Dr. Alexander Gomez: Superficial erosions in the stomach with gastritis.  Small polyps removed, dilated internal hemorrhoids.  Pathology pending.  Recommend repeat colonoscopy in 5 years.     Prostate cancer metastatic to bone   8/30/2020 -  Other Event    PSA 10.8     9/15/2020 Initial Diagnosis    Prostate cancer metastatic to bone (CMS/HCC)     9/15/2020 Biopsy    Prostate needle core biopsy     9/24/2020 Imaging    Total body bone scan: 4 abnormal areas of increased activity consistent with osteoblastic metastasis, abnormal foci of activity seen in the T12 vertebral body, L1 vertebral body, roof of the left acetabulum, and acetabulum medially on the right.  CT chest: Stable sclerotic metastatic lesions within the T12 and L1 vertebrae.  No other evidence of metastatic disease to the chest.  Stable mild splenomegaly and subcentimeter periaortic retroperitoneal lymph nodes.  CT abdomen and pelvis: Diffuse bladder wall thickening with mild perivesicular fat stranding.  Interval development of several sclerotic lesions in the spine and left iliac bone.     10/13/2020 Cancer Staged    Staging form: Bone - Appendicular Skeleton, Trunk, Skull, And Facial Bones, AJCC 8th Edition  - Clinical: Stage IVB (cT3, cN0, cM1b, G3) - Signed by Ishmael Rashid MD on 10/13/2020     11/3/2020 - 10/5/2021 Chemotherapy    OP SUPPORTIVE Zoledronic Acid Q42d     11/3/2020 - 2/18/2021 Chemotherapy    OP PROSTATE DOCEtaxel       1/4/2021 Imaging    CT chest abdomen pelvis with contrast and whole-body bone scan shows improving less metabolically active bone metastases.  Stable sclerotic T12, L1, and L2 vertebral bodies and bilateral pelvic bones on bone windows with stable subcentimeter para-aortic lymph nodes and no definite progression in the chest abdomen or pelvis.  PSA down to 0.275 after 3 courses of Taxotere.     3/4/2021 Imaging    CT chest,  abdomen and pelvis stable, no evidence of disease progression. Total body bone scan with decreased/near resolution of abnormal increased radiotracer uptake associated with the known sclerotic lesions in the thoracolumbar spine and bony pelvis.  No evidence of new osteoblastic metastases.     3/4/2021 Imaging    CT chest abdomen pelvis with contrast is negative save for stable sclerotic bone lesions and the bone scan shows near resolution of prior bony abnormalities associated with the known sclerotic lesions in the thoracolumbar spine and bony pelvis.  2/17/2021 PSA down to 0.1 from 1.37 October 2020.     3/9/2021 - 3/9/2021 Chemotherapy    OP SUPPORTIVE PROSTATE Relugolix     3/9/2021 -  Chemotherapy    OP PROSTATE Abiraterone / PredniSONE       3/10/2021 -  Chemotherapy    OP PROSTATE Abiraterone / PredniSONE     8/10/2021 - 8/16/2021 Radiation    Radiation OncologyTreatment Course:  Naveen Lugo received 2000 cGy in 5 fractions to L4-sacrum, left acetabulum and right pelvis via External Beam Radiation - EBRT.     11/16/2021 -  Chemotherapy    OP CENTRAL VENOUS ACCESS DEVICE ACCESS, CARE, AND MAINTENANCE (CVAD)     1/5/2022 Imaging    -1/5/2022 CT chest abdomen pelvis with contrast [facility] showing stable left periaortic adenopathy and unchanged sclerotic thoracic, lumbar spine and pelvis lesions with no new or progressive disease in the chest abdomen or pelvis.  Total body bone scan shows no significant change and no new suspicious foci.  Stable posterior right acetabulum and left superior acetabulum and degenerative changes in the cervical spine, shoulders, acromioclavicular joints, sternoclavicular joints, elbows, wrists, hands, thoracic spine, lumbosacral spine, sacroiliac joints, hips, knees, ankles, feet.     5/24/2022 Imaging    CT chest abdomen pelvis with contrastFrankfort negative.  Bone scan shows stable bone metastases.     11/4/2022 Imaging    CT abdomen pelvis chest showed no evidence of  "disease progression with stable sclerotic bone lesions.  Bone scan stable right greater than left acetabular metastasis.  Stable bone metastases.     4/12/2023 Imaging     CT chest abdomen pelvis without contrast showed no progressive disease with stable sclerotic bone lesions.  Bone scan showed single identifiable bone metastasis stable right acetabulum         HISTORY OF PRESENT ILLNESS:  The patient is a 67 y.o. male, here for follow up on management of prostate cancer currently on therapy with Zytiga, prednisone and relugolix.  He was also found to be iron deficient recently, he did undergo an EGD and colonoscopy on 5/3/2023.  He is also now on oral iron twice daily every other day along with vitamin C and tolerating that with no difficulty.  Naveen reports that overall he feels well other than for ongoing bilateral hip pain.  The pain is worse when he stands for any length of time or tries to carry any weight.    Past Medical History:   Diagnosis Date   • Bone cancer    • History of radiation therapy 08/16/2021    L4 through sacrum, left acetabulum, right pelvis   • Nephrolithiasis    • Opioid use disorder, mild, on maintenance therapy (HCC)    • Prostate cancer      Past Surgical History:   Procedure Laterality Date   • CHOLECYSTECTOMY     • CORONARY STENT PLACEMENT  206 & 2011   • HERNIA REPAIR  1986   • THORACIC DISCECTOMY  1994       Allergies   Allergen Reactions   • Cymbalta [Duloxetine Hcl] Dizziness   • Gabapentin Nausea Only   • Lyrica [Pregabalin] Nausea And Vomiting and Dizziness       Family History and Social History reviewed and changed as necessary    REVIEW OF SYSTEM:   Chronic low back and hip pain    PHYSICAL EXAM:  Well-developed, well-nourished male in no distress  Heart regular rate and rhythm  Lungs clear to auscultation bilaterally    Vitals:    05/17/23 1005   BP: 171/81   Pulse: 86   Resp: 18   Temp: 97.1 °F (36.2 °C)   SpO2: 93%   Weight: 90.7 kg (200 lb)   Height: 175.3 cm (69\") "     Vitals:    05/17/23 1005   PainSc:   8   PainLoc: Back          ECOG score: 1           Vitals reviewed.  Labs reviewed    ECOG: (1) Restricted in Physically Strenuous Activity, Ambulatory & Able to Do Work of Light Nature    Lab Results   Component Value Date    HGB 14.2 05/16/2023    HCT 43.2 05/16/2023    MCV 82.3 05/16/2023     (L) 05/16/2023    WBC 7.77 05/16/2023    NEUTROABS 5.78 05/16/2023    LYMPHSABS 1.07 05/16/2023    MONOSABS 0.42 05/16/2023    EOSABS 0.16 05/16/2023    BASOSABS 0.05 05/16/2023       Lab Results   Component Value Date    GLUCOSE 106 (H) 05/16/2023    BUN 9 05/16/2023    CREATININE 0.79 05/16/2023     05/16/2023    K 3.6 05/16/2023     05/16/2023    CO2 27.1 05/16/2023    CALCIUM 9.4 05/16/2023    PROTEINTOT 7.0 02/14/2022    ALBUMIN 4.2 05/16/2023    BILITOT 0.2 05/16/2023    ALKPHOS 74 05/16/2023    AST 14 05/16/2023    ALT 9 05/16/2023     Lab Results   Component Value Date    PSA <0.014 05/16/2023    PSA <0.1 02/21/2023    PSA <0.1 01/24/2023    PSA <0.014 10/13/2022    PSA <0.1 09/01/2022    PSA <0.1 08/02/2022    PSA <0.1 08/02/2022             ASSESSMENT & PLAN:  1.  Stage IVb grade group 4 metastatic prostate cancer with sclerotic bone lesions on CT and bone scan and history of prostatic hypertrophy.  Good response to Taxotere ending 2/18/2021 Relugolix, followed by Zytiga prednisone with L4/sacrum, left acetabulum, and right pelvis external beam radiation August 2021.  Hypophosphatemia on Zometa.  Zometa held as of October 2021.  2.  Kidney stone  3.  Polyps  4.  Probable drug-induced hypophosphatemia from Zometa, drug stopped after 10/5/2021 dose  5.  Cancer related pain seeing palliative care  6.  Fatigue  7.  Covid 2/2022  8.  Iron deficiency anemia    -9/30/2020 office note from Dr. Ronen Reynaga indicates patient with PSA 10.8.  Underwent TRUS prostate biopsy 9/15/2020.  Adenocarcinoma the prostate Sarika 4+4 = 8 in 1 out of 12 cores and 4+3 = 7  and 10 out of 12 cores and 3+4 = 7 in 1 out of 12 cores.  Perineural invasion.  Extraprostatic extension into the fat seen on 2 biopsies of the right lateral mid and right apex.  9/24/2020 CT chest and whole-body bone scan showed T12, L1, left acetabular, right medial acetabular metastases with no lung metastases.  He started androgen deprivation therapy with Casodex with plans for Lupron to start in 1 to 2 weeks for clinical T3 N0 M1 metastatic prostate cancer.  Review of official report from [facility] 9/24/2020 bone scan mentions T12, L1, roof of left acetabulum and medial right acetabular osteoblastic metastases.  CT chest with contrast that same day showed mild splenomegaly 14.2 cm with stable periaortic nodes compared to CT December 2015 with no lung metastases.  Sclerotic abnormality within the T12 vertebral body, L1 vertebral body extending into the pedicle unchanged.  8/28/2020 CT abdomen pelvis report from [facility] reviewed and mentions normal spleen size.  Punctate nonobstructing kidney stone, no paulino enlargement, diffuse bladder wall thickening with mild perivesicular fat stranding, 2.1 cm sclerotic left iliac bone above the left acetabulum new compared to 2015 as well as 1.5 cm faintly sclerotic focus in the right posterior acetabulum stable compared to prior CT 2015 as well as a 1.6 cm T12 and inferior vertebral body sclerotic lesion and a 1.9 cm left posterior lateral L1 vertebral body abnormality extending to the pedicle.  -10/13/2020 initial [facility] medical oncology consultation: With 1 Sarika score 8, 4+4 lesion that would make him a grade group 4 with stage IVb disease given radiographic evidence of sclerotic bone metastasis on bone scan and CT.  Needs genetic testing given metastatic prostate cancer and this is also important as, were he to have mismatch repair mutation then we would have options for PDL 1 inhibitors and were he BRCA mutated would have options for PARP  inhibitors in the future.  Systemic therapy for castration naïve prostate cancer or N1 disease should be with apalutamide or Abiraterone or enzalutamide all of which are category 1.  He does not have any visceral metastases.  According to his imaging he has T12, L1, left acetabular, right acetabular, and left iliac bony involvement.  That means he would have 4 or more bone metastases with at least one metastasis (i.e. the acetabular lesions bilaterally) beyond the pelvis/vertebral, for which this would be considered high-volume disease for which 6 cycles of docetaxel 75 mg/m² x 6 cycles followed by Zytiga and prednisone would be reasonable along with Zometa every 6 weeks for bone stabilization.  He will do chemo preparation visit with my nurse practitioner prior to start chemotherapy with Taxotere and Zometa in 2 weeks and he is already received Casodex in preparation for Lupron shot he received on 10/12/2020 with Dr. Reynaga.  We will get him to Dr. Alexander Gomez who has done his polypectomies in the past for port placement and we will get genetic counseling started.  Following the 6 courses of Taxotere per the [place]arted trial criteria, I would then give him an indefinite Zytiga and prednisone and Zometa until progression or toxicity dictates.    -12/16/2019 COVID-19 antigen test negative    -12/29/2020 PSA 0.275 with absolute neutrophil count 700 and creatinine 0.76 with normal liver enzymes and electrolytes.  Normal magnesium and phosphorus and urine drug screen positive for buprenorphine and opiates follow-up with palliative care.    -1/4/2021 CT chest abdomen pelvis with contrast and whole-body bone scan shows improving less metabolically active bone metastases.  Stable sclerotic T12, L1, and L2 vertebral bodies and bilateral pelvic bones on bone windows with stable subcentimeter para-aortic lymph nodes and no definite progression in the chest abdomen or pelvis.    -1/5/2021 [facility] medical oncology follow-up  visit: I reviewed the images and reports thereof of the above CT and bone scan which shows good response to Taxotere x3 courses with a PSA down to 0.275 from a baseline of 10.  Get another 3 courses of Taxotere, continue his Xgeva, and then following that we will switch to Zytiga and prednisone.  He is working with palliative care on his bone pain but on his current dose of fentanyl patch he says his pain is still not adequately controlled he will contact them.  Dexamethasone prescription was refilled.  We will see my nurse practitioner back in 3 weeks for course #5 of 6 total courses and then we will reimage with CT chest abdomen pelvis and bone scan before going to the Zytiga prednisone.    -3/4/2021 CT chest abdomen pelvis with contrast is negative save for stable sclerotic bone lesions and the bone scan shows near resolution of prior bony abnormalities associated with the known sclerotic lesions in the thoracolumbar spine and bony pelvis.    2/17/2021 PSA down to 0.1 from 1.37 October 2020.  3/9/2021 PSA 0.1    -5/26/2021 CT chest abdomen pelvis with contrast shows stable to slightly underlying increase low-density left para-aortic node.  Bone lesions stable.  Whole-body bone scan stable to decrease activity of known thoracolumbar and bony pelvic involvement.  PSA less than 0.1  , AST 74, bilirubin 0.5.       -6/1/2021 [facility] medical oncology follow-up visit: I reviewed the above data with him and images thereof.  Bones are stable.  No significant adenopathy.  PSA immeasurably low.     upper limit of normal 69 and AST 74 upper limit of normal 46.  Hence, neither is more than double the upper limit of normal with no ascites and no encephalopathy and normal albumin and bilirubin, despite the elevation of liver enzymes, he has child Abrams score A.  Package insert for Abiraterone says that for ALT and/or AST greater than 5 times upper limit of normal or total bilirubin greater than 3 times the upper  limit of normal to withhold treatment until liver function tests returned to baseline or ALT and AST less than or equal to 2.5 times the upper limit of normal and total bilirubin less than or equal to 1.5 times the upper limit of normal and then reinitiate at 750 mg daily dose of Zytiga.  For recurrent hepatotoxicity on 750 mg daily Zytiga, withhold treatment until liver functions returned to baseline or ALT and AST less than 3-2.5 times upper limit of normal and total bilirubin less than or equal to 1.5 times the upper limit of normal then reinitiate at 500 mg daily Zytiga dose.  If has recurrent hepatotoxicity on 500 mg dose, then discontinue treatment.  If has ALT greater than 3 times the upper limit of normal along with total bilirubin greater than 2 times the upper limit of normal in the absence of other contributing causes or biliary obstruction, permanently discontinue Zytiga.  Based on these recommendations, I would continue current doses but we will watch with serial labs monthly and repeat his imaging again in 3 months but clinically he is doing wonderfully with excellent response to Taxotere and now on Zytiga prednisone plus Zometa along with Relugolix.    -6/23/2020 for [facility] follow-up: Having persistent and worsening pain above his left hip.  I will get an MRI of the left hip for further evaluation.  Otherwise we will continue therapy unchanged with Zytiga, prednisone and Zometa along with Relugolix.    -7/28/2021 [facility] follow-up: Patient with multiple somatic complaints including episodes of confusion, dizziness, decreased memory, agitation.  He reports ongoing episodes with difficulty swallowing.  Also most concerning for episodes of angina and chest pain for which he basically refused to seek emergency medical attention.  He has a history of coronary artery disease and stent placement.  He has not followed up with cardiology for many years.  I will get an MRI of  the brain in light of his confusion, dizziness and agitation.  I will get him to gastroenterology for EGD in light of his dysphagia.  I have also put in a referral for cardiology.  I reemphasized to the patient, his wife who is with him today and his son who was on the telephone during our visit the importance of seeking out prompt medical attention when he is having chest pain or neurological concerns so that he can be evaluated.  The patient states that he basically refuses to go to the emergency room and if his family calls an ambulance he would not agree to go to the hospital.  For the time being, I have asked him to hold his Zytiga and prednisone until we can sort this out as Zytiga does have some cardiovascular risk.  There is likely no treatment for prostate cancer that does not carry some form of cardiovascular risk.  His PSA remains low at 0.014 yesterday.  He will continue relugolix for now.    Of note, some of these symptoms could also be due to his hypophosphatemia, phosphate level from yesterday was 1.5, I have sent in a prescription for K-Phos 3 times a day before meals for 10 days.  We will hold further Zometa until this is corrected.  I have also put in an order to check his vitamin D level.  He takes calcium and vitamin D daily.  Some of his symptoms also could be due to drug withdrawal as there was some confusion and difficulty getting his last prescription for methadone therefore he was without methadone for several days, he now has his prescription and is back on his pain medication.  He has an appointment with neurosurgery tomorrow in light of his ongoing back pain, MRI of the hip recently showed multiple bony lesions largest at the L5 vertebral body and left supra-acetabular region.  If no neurosurgical intervention recommended he may benefit from spot radiation as this is where a lot of his pain is coming from.  His wife had questions today regarding his staging and extent of disease.  We  "reviewed previous scans.  I did clarify with her as she used the words \"cancer free\" as she thought that is what she was told after his CT scans once he completed Taxotere in February.  I explained to her that even though his scans showed he had an excellent response with no clear areas of metastasis that did not mean he was cancer free, I explained to her that when you have metastatic cancer the treatment intent is palliative in nature and to control the disease but not to cure the disease.  She stated understanding.  We will see him back in 2 weeks for follow-up to sort through this and make further plan of care, again, I have asked him to hold Zytiga and prednisone until he sees us back, he will continue on his other medications including relugolix.    -7/30/2021 neurosurgery PA consultation.  MRI with and without contrast L5 ordered.    -8/3/2021 neurosurgery follow-up showed known sclerotic disease with new L5 abnormality without compression deformity or instability.  MRI brain negative for metastasis.    -8/4/2021 office visit Dr. Fco Quintanilla radiation oncology coordinating with Dr. North neurosurgery for palliative radiation for MRI evidence of L5 and left supra acetabular painful lesions.  States that the patient's disease which is not secreting PSA is experiencing some progression and would benefit from 20 Gy in five fractions and other lesion 30 Gy in ten fractions. Patient offered to go to [place] for radiation but desired treatment in [place].    -8/10/2021 [facility] medical oncology follow-up visit: No chest pains at this junction.  Reviewed MRI brain and other MRIs reviewed by Dr. North and [place].  Due for EGD on 19th.  We will repeat his phosphorus along with his other labs continue Relugolix, Zytiga, prednisone, and he will continue radiation palliatively to the painful bony lesions with Dr. Quintanilla/Can.  He will see my nurse practitioner back in a few weeks to see how he is tolerating this and " to follow-up on the phosphorus off of Zometa and she will order repeat CT chest abdomen pelvis with contrast and total body bone scan the end of September.I will see him back after that.    -9/8/2021 Zometa resumed.  PSA <0.10    -10/5/2021 [facility] medical oncology follow-up visit: followed-up with radiation oncology 9/21/2021.  Status post symptomatic L5 radiation 5 fractions 20 [place] completed 8/16/2021 with improvement in right hip pain.  9/2/2021 PSA less than 0.1.  9/29/2021 total body bone scan shows increased uptake left iliac bone slightly superior group of the left acetabulum with no additional foci of suspicious abnormality is unlikely treatment related with no new sites of active osseous metastasis.  CT chest abdomen pelvis with contrast shows stable borderline enlarged left periaortic nodes and dating sclerotic bone lesions.Continue Zytiga, prednisone, Relugolix, Zometa, repeat CT chest abdomen pelvis with contrast and total body bone scan the end of the year.  We will follow PSA serially.    -11/17/2021 [facility] Oncology clinic follow-up:  Mr. Lugo overall is doing well.  He is tolerating therapy with Zytiga, prednisone and Relugolix along with Zometa which is given every 6 weeks.  PSA remains low at <0.1.  Has occasional low phosphorus, currently low at 1.7.  Serum calcium is normal at 8.9, normal creatinine 0.79 and normal CBC other than for platelets of 124.  I will hold Zometa for 1 month, I did send in a prescription for phosphorus replacement today.  He takes calcium and vitamin D.  I will see him back in 1 month for follow-up.  We will repeat restaging scans after that visit.    -12/15/2021 [facility] Oncology clinic follow-up: Mr. Lugo continues to do well on therapy with Zytiga, prednisone and Relugolix, his PSA remains low at <0.1.  He is continuing to have left lower back pain, he is working with palliative care and they have referred him also to pain management.  Pain management has talked to him  about putting in a pain pump however he is leery of this, I told him that this was a safe and effective pain management technique and to certainly give consideration to their recommendations.  His phosphorus is improving however is still a little low, I will hold off on Zometa until we see him back in 1 month, we may end up only giving it every 12 weeks.  We will repeat restaging scans with CT chest, abdomen and pelvis along with total body bone scan prior to return and I have ordered those today.      -1/5/2022 CT chest abdomen pelvis with contrast [facility] showing stable left periaortic adenopathy and unchanged sclerotic thoracic, lumbar spine and pelvis lesions with no new or progressive disease in the chest abdomen or pelvis.  Total body bone scan shows no significant change and no new suspicious foci.  Stable posterior right acetabulum and left superior acetabulum and degenerative changes in the cervical spine, shoulders, acromioclavicular joints, sternoclavicular joints, elbows, wrists, hands, thoracic spine, lumbosacral spine, sacroiliac joints, hips, knees, ankles, feet.    -1/11/2022 [facility] oncology hematology follow-up visit: I reviewed images and reports from his 1/5/2022 scans.  No active disease and PSA immmeasurably low on Zytiga, prednisone, Relugolix.  However, he has struggled with hypophosphatemia and is significantly fatigued with this.  Given that the bones are doing well and given that his phosphorus continues to remain consistently low at 2.2 in mid December, 1.7 mid November and 2.5 mid-September and as low as 1.5 back to July and the likelihood that this is related to his Zometa, given that his bones are stable overall, he will increase from 1200 mg up to 1600 mg of calcium and phosphate a day and we will hold his Zometa for the foreseeable future.  He will see my nurse practitioner back in a month to continue to monitor his phosphorus along with his calcium and other labs  and will repeat his scans again in 3 months.  In addition, given his fatigue we will check his ACTH and cortisol though he is taking his prednisone with his Zytiga.  Though his electrolytes are normal, I will keep an eye on the cortisol and ACTH and have these labs not only drawn today but again just prior to return to my nurse practitioner on February 10.    -2/10/2022 [facility] follow-up: Mr. Lugo overall is stable, no change in his health this past month.  I have reviewed his labs from 2/8/2022 and they are unremarkable, his phosphorus is now normal at 3.2. We are continuing to hold his Zometa, he last received Zometa on 10/5/2021.  He continues therapy with Zytiga, prednisone and Relugolix.  Dr. Rashid had ordered cortisol level and ACTH which are low, currently his ACTH is 2.8 and cortisol 3.08 however these are both done in the face of ongoing prednisone that he takes with his Zytiga.  We will get him to endocrinology for further evaluation for possible adrenal insufficiency but will not interrupt his treatment in the interim.  He will need restaging scans again in April for a 3-month follow-up.  He will continue to follow with palliative care and pain management for his cancer related pain.  His PSA remains immeasurably low at <0.1 on 2/8/22.    -2/15/2022 went to emergency room with weakness, decreased appetite, myalgias in the setting of known COVID-19 testing positive a few days prior to this visit.  Phosphorus had been low in the past but was normal in the emergency room with unremarkable CBC and CMP.  Chest x-ray unremarkable saturating well on room air mildly hypertensive with dry mucosa.  Given 500 cc crystalloid.  Covid test positive.  Mild thrombocytopenia 136,000 and otherwise unremarkable.  Discharged home.    -3/3/2022 data:  PSA less than 0.1  CMP unremarkable  Cortisol 3.96 normal  ACTH 2.8 lower limit of normal 7.2  Phosphorus normal 2.6    -3/8/2022 [facility] oncology  follow-up: Suspect big chunk of his fatigue was Covid.  Another part of the fatigue likely related to lack of testosterone.  Not hypophosphatemic off of Zometa.  ACTH running low while on prednisone not surprising but with normal cortisol and I doubt adrenal insufficient which is why he is on prednisone to avoid such while taking Zytiga.  Overall doing fairly well and with immeasurably low PSA, I will have labs done on him early April, see my nurse practitioner early May, and she will order repeat bone scan and CTs the end of May and I will see him back in June.  We will continue to hold the Zometa unless bone lesions progress given symptomatic prior hypophosphatemia on Zometa.  He will keep his appointment April 28 with Dr. Mir Duffy just to be sure that my take on his pituitary/adrenal axis assessment is accurate.    -4/28/2022 endocrinology consult Dr. Mir Duffy.  With low ACTH and cortisol on prednisone with Zytiga, the adequacy of prednisone cannot be assessed by measuring ACTH or cortisol but is assessed by weight changes, blood pressure, blood sugar, potassium, overall sense of how the patient is doing.  Primary adrenal insufficiency with low ACTH and low cortisol would be typical for the situation as his blood sugar tends to run a little high and potassium a little low, he did not think increasing prednisone was necessary.  He put him on some potassium.  No follow-up scheduled, will see as needed.    -5/4/2022 [facility] follow-up: Mr. Lugo continues on therapy with Zytiga and prednisone along with Relugolix.  His Zometa has been on hold due to previous hypophosphatemia.  There is some concern about potential adrenal insufficiency. He saw endocrinology, Dr. Mir Duffy on 4/28/2022.  I did review his office note and he stated that the low ACTH and low serum cortisol would be typical for Mr. Lugo's situation.  He further stated that the adequacy of prednisone dose cannot be assessed by measuring  ACTH or cortisol but is rather assessed by the overall just altered how the patient is feeling-weight change, blood pressure, blood sugar, potassium, overall sense of wellbeing.  His potassium had been running a little low therefore they put him on potassium 10 mill equivalents daily. Of note, I do not see a future follow-up scheduled with endocrinology.  He is having night sweats, I am not sure if this is related.  His glucose was normal this morning at 107.  His weight is stable.  He will continue current treatment for his prostate cancer unchanged, we will continue to hold his Zometa.  Current phosphorus and magnesium are normal.  CBC and CMP from today are unremarkable, cortisol is normal.  PSA and ACTH are pending  In regards to his night sweats, he had taken clonidine previously 0.1 mg twice daily as needed, I did send in a short supply again to try and see if this helps.  He also is having indigestion despite being on omeprazole twice daily, I sent a prescription for Pepcid.  He had an EGD August 2021.  We will repeat restaging scans prior to return and I have ordered those today.     -5/24/2022 CT chest abdomen [place] with [place] negative.  Bone scan shows stable bone metastases.    - 5/25/2022 PSA less than 0.1, platelets 130,000, otherwise unremarkable CBC and CMP.  A.m. cortisol running low 1.2 with phosphorus low 2.3.    -5/31/2022 [facility] medical oncology follow-up: On Zytiga, prednisone, Relugolix, PSA remaining immeasurably low with stable bone scan and no visceral/paulino metastases.  Phosphorus still running low.  I have discontinued his potassium chloride and started potassium phosphate 500 mg 4 times a day.  Unless PSA rising, we will plan on repeating CTs and bone scan in 6 months and will follow with my nurse practitioner in the interim and if symptoms dictate or labs, will get back to Dr. Duffy for management of potential adrenal insufficiency but presently we will just see how he does  with potassium phosphate and hopeful improvement in the phosphorus level with time.  We will continue to follow with palliative care for pain management    -7/6/2022 [facility] Oncology clinic follow-up: Mr. Lugo overall is doing well but continues to be troubled with fatigue and hot flashes.  For the most part he states he is able to do the things he wants to do, he continues to work but does have to take more rest periods.  I had a long discussion with him and his wife after reviewing his medications with them as they wanted to see if there was anything he could stop or adjust.  I discussed with him the need to continue on treatment for his prostate cancer even though his PSA remains immeasurably low and his scans look good but that if his medication is stopped the cancer would progress and over time it still has the potential to progress on therapy but would continue it as long as possible to keep his disease under control.  I explained this is like treating someone with hypertension, you do not stop the antihypertensive just because the blood pressure normalizes.  They stated understanding.  There is no dose adjustment of Zytiga or prednisone that would minimize his symptoms, he is on the current standard recommended therapy.  Discussed with him that sometimes during times of stress we may need to increase his prednisone dose but for now would not change anything.  His phosphorus level is normal, we continue to hold his Zometa.  I did give him the option of holding his phosphorus for the next month and we will see what happens, if it drops we will start him back on it but may be at a lower dose rather than 4 times a day maybe twice a day.  For now he will continue therapy unchanged.  We will continue monitoring labs monthly.  No PSA was drawn this month but that is okay as his PSA has been running immeasurably low at <0.1, we will recheck next month with lab draw.  We will stop checking his ACTH and cortisol level  every month, if he develops worsening symptoms I will repeat those.  He has not gotten a follow-up with endocrinology as of yet.    -8/3/2022 [facility] Oncology clinic follow-up:  Mr. Lugo is doing well as far as his prostate cancer goes with continued immeasurably low PSA of <0.1.  He continues on therapy with Zytiga and prednisone along with Relugolix.  His phosphorus is stable at 2.7 which was just slightly low by our reference range however was 2.71-month ago also which was normal on another labs reference range.  He has not taken his phosphorus for this past month as he thought it was making him more fatigued.  He really can tell no difference whether he was taking it or not.  I have asked him to try to go back on phosphorus twice a day rather than 4 times a day and see if this is tolerated and if we can keep his phosphorus level from dropping.  His biggest complaint today is flareup of his arthralgias and back pain.  He is following with pain management, Dr. Danny Avalos but is requesting a referral back to palliative medicine as he feels that no one is currently listening to him and they are just prescribing the same medications that are not effective according to his report.  He does have a follow-up with Dr. Avalos on 8/12/2022 but due to his current pain is not able to work until after he is seen and hopefully can get some better relief.  I have given him a work release until 15 August.  I have also put in a referral back to palliative care.  Also encouraged him to work with Dr. Avalos for help in resolving his issue.  Apparently they have recommended some type of a pump however he has been hesitant to that but likely would be helpful.  We will continue therapy unchanged.  We will continue monthly labs including phosphorus, we are not currently checking ACTH and cortisol monthly as Dr. Mir Duffy previously stated in his assessment on 4/28/2022 that the adequacy of prednisone cannot be assessed by measuring  ACTH or cortisol but would be assessed by symptoms, see note from 4/28/2022.  He made no further follow-up appointments.  Fatigue is not worsening currently.  His glucose and potassium are  Normal.    -10/13/2022 [facility] oncology follow-up: Mr. Lugo is doing well.  He is tolerating treatment with Zytiga and prednisone along with Relugolix without any unusual side effects.  His PSA has remained immeasurably low at <0.1.  His phosphorus was slightly low at last visit.  He had been instructed to go back on phosphorus twice a day but he misunderstood and has not been taking it.  We will check labs today.  I will follow-up with results and notify him if he needs to restart the phosphorus.  His pain is better controlled since reestablishing with [facility].  We will repeat scans prior to next follow-up.  I have ordered CT chest abdomen pelvis along with bone scan.  We will see him back in 1 month.    -10/13/2022 PSA less than 0.014.With unremarkable CBC and CMP.  Phosphorus still a little low 2.3.    -11/4/2022 CT abdomen pelvis chest showed no evidence of disease progression with stable sclerotic bone lesions.  Bone scan stable right greater than left acetabular metastasis.  Stable bone metastases.    -11/8/2022 [facility] oncology telemedicine follow-up: Patient states that he feels achy all over with low-grade temps and a cough mildly productive.  Has an appointment later today to see his primary care for testing for flu and COVID.  Suggested that if he were positive for COVID that he get Paxlovid and that if he were positive for flu that he get Tamiflu and to discuss this with his primary care.  From the prostate cancer side, his PSA is immeasurably low with stable bone metastases and no evidence of progression.  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  We will continue the Relugolix, Zytiga, prednisone and he will see my nurse practitioner  12/7/2022.  Would repeat CTs and bone scan in May.    -1/25/2023 [facility] follow-up: Mr. Lugo is having worsening fatigue and muscle aches.  He does have adrenal insufficiency on Zytiga and prednisone for his prostate cancer, he saw endocrinology in light of low ACTH back in April 2022.  At that time there was no recommendation other than for monitoring him for his overall wellbeing and labs.  He denies any fevers or chills.  His labs as shown above are unremarkable, his CBC and CMP are normal other than for glucose of 112, PSA remains low at <0.1.  He has no new pain or any symptoms concerning for progression of his prostate cancer.  I will get him back to endocrinology to see if they feel any dose adjustment of his prednisone would be helpful in his symptoms.  Currently he is on 5 mg a day with his Zytiga.  I did not repeat his ACTH or cortisol as they would be expected to be low in this situation.  His potassium is normal, he has had no weight loss or change in his appetite.  Blood sugar is reasonable.  His only complaint currently is worsening fatigue and muscle aches.  He will continue treatment unchanged with Zytiga, prednisone and relugolix.  I will see him back in 1 month for follow-up.  We will repeat his restaging scans in May.    -3/1/2023 [facility] follow-up:  His PSA remains low at <0.1 on treatment with Zytiga and prednisone along with relugolix however he continues to complain of significant fatigue and muscle aches not improving with time.  He states that his quality of life is affected as he is not able to do any work activities due to the fatigue.  He did see endocrinology again and they have increased his prednisone from 5 mg daily up to 7.5 mg daily however so far this has not made any difference in how he feels.  He is supposed to get back in touch with them to let them know how he is doing and to see if there is any other adjustments needed. His labs are  unremarkable with normal thyroid function, CMP is normal other than for glucose of 156.  They did check hemoglobin A1c to look for diabetes however that was normal at 5.7.  CMP is unremarkable with just very mild anemia with hemoglobin of 12 and hematocrit 36.1% which would not account for his fatigue.  He does have hot flashes that are fairly significant, I will try venlafaxine as suggested by Dr. Duffy and I appreciate the recommendation.  We will start at 37.5 mg daily and if tolerated he can increase to 75 mg after 1-2 weeks.  I will see him back in 1 months for follow-up.  Plan to repeat scans late April/early May.  His prostate cancer seems under good control with current regimen however I am not sure how long he can tolerate this due to the side effects.    -3/30/2023 [facility] follow-up:  Mr. Lugo continues to deal with fatigue.  He appears more cushingoid today, he continues on treatment with Zytiga, prednisone and relugolix.  Prednisone dose currently is 7.5 mg daily.  This has not made any difference in his energy level.  PSA remains immeasurably low at <0.014.  CBC shows new onset of microcytic hypochromic anemia with hemoglobin of 11, hematocrit 34.4%, MCV 75.4, MCH 24.1, WBC is normal at 5.81 with normal platelet count 181,000 and normal differential.  CMP is unremarkable, it is normal other than for glucose of 142.  We will get him to Dr. Gomez for an EGD and colonoscopy for further evaluation in regards to his new onset of anemia.  He has no associated GI symptoms otherwise except for 1 day a few weeks ago he does report that he had fairly sudden onset of vomiting but this was an isolated incident on that day and has not reoccurred since.  Continues to follow with palliative care for management of his chronic back pain.  We will repeat restaging CT chest, abdomen and pelvis along with total body bone scan prior to return and I have ordered that today.    -3/30/2023 Ferritin low 12.3  with iron low 27 and saturation low 5% with total iron binding capacity 511.  3/31/2023 ferrous sulfate 325 mg twice a day every other day with vitamin C started.    - 4/12/2023 CT chest abdomen pelvis without contrast showed no progressive disease with stable sclerotic bone lesions.  Bone scan showed single identifiable bone metastasis stable right acetabulum    -4/18/2023 [facility], [place], operated by [facility] hematology oncology follow-up: He now has microcytic iron deficiency anemia which is new and concerning.  Gastroenterologic evaluation has been ordered but not performed and is mandatory.  Continue ferrous sulfate 325 mg twice a day every other day with vitamin C to improve absorption and we will follow his CBC along with his usual labs monthly on Zytiga, Relugolix, prednisone 7.5 mg.  He follows with Dr. Duffy with adrenal insufficiency.  He will see my nurse practitioner back in a month to see what GI has found and to watch serial CBC along with his other labs including PSA.  I would repeat CT chest abdomen pelvis again in 6 months WITH contrast along with bone scan and sooner if PSA rises.    -5/17/2023 [facility], [place], operated by [facility] Oncology clinic follow-up:  Naveen overall is doing well on current therapy with Zytiga, prednisone and relugolix.  PSA remains immeasurably low at <0.014.  He feels his hip pain is getting worse with time.  It makes it difficult to enjoy activities with his grandchildren as it is worse when he stands for any length of time or tries to carry any weight.  I reviewed his bone scan from April which was stable, we also reviewed previous MRI of the hips from May 2021 that showed moderate bilateral hip osteoarthritis.  I offered to repeat MRI of his hips for evaluation to see if there has been any worsening of his osteoarthritis and also then referral to orthopedics for any recommended intervention however at this time he defers.  He will let me know if he changes his mind.  He also follows with palliative care for management of his  cancer related pain.  We will continue therapy unchanged.  We will repeat restaging scans in October unless symptoms dictate the need to do so sooner.  He was recently found to be iron deficient, he had an EGD and colonoscopy on 5/3/2023 with Dr. Gomez, EGD showed superficial erosions in the stomach with gastritis, he continues on omeprazole.  He had small polyps removed from the colon and recommendation to repeat colonoscopy in 5 years.  He is on oral iron along with vitamin C every other day and is tolerating that well.  CBC from yesterday shows hemoglobin now normal at 14.2 and hematocrit 43.2%, MCV normal at 27.0.    Return to clinic in 2 months for follow-up    This was a level 4, moderate MDM visit with 2 or more stable chronic illnesses, review of labs and endoscopy reports, management of drug therapy requiring intensive monitoring for toxicity.    Abena Velasquez, APRN    05/17/2023

## 2023-05-18 ENCOUNTER — TELEPHONE (OUTPATIENT)
Dept: PALLIATIVE CARE | Facility: CLINIC | Age: 68
End: 2023-05-18
Payer: MEDICARE

## 2023-05-18 DIAGNOSIS — M54.2 NECK PAIN: Primary | ICD-10-CM

## 2023-05-18 RX ORDER — CYCLOBENZAPRINE HCL 10 MG
10 TABLET ORAL 3 TIMES DAILY PRN
Qty: 45 TABLET | Refills: 0 | Status: SHIPPED | OUTPATIENT
Start: 2023-05-18 | End: 2023-06-01
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
## 2023-05-23 ENCOUNTER — SPECIALTY PHARMACY (OUTPATIENT)
Dept: ONCOLOGY | Facility: HOSPITAL | Age: 68
End: 2023-05-23
Payer: MEDICARE

## 2023-05-23 NOTE — PROGRESS NOTES
Specialty Pharmacy Refill Coordination Note     Naveen is a 67 y.o. male contacted today regarding refills of abiraterone 1000mg PO QD and  relugolix 120mg PO QD specialty medication(s).    Specialty medication(s) and dose(s) confirmed: yes    Refill Questions    Flowsheet Row Most Recent Value   Changes to allergies? No   Changes to medications? No   New conditions since last clinic visit No   Unplanned office visit, urgent care, ED, or hospital admission in the last 4 weeks  No   How does patient/caregiver feel medication is working? Good   Financial problems or insurance changes  No   Since the previous refill, were any specialty medication doses or scheduled injections missed or delayed?  No   Does this patient require a clinical escalation to a pharmacist? No          Delivery Questions    Flowsheet Row Most Recent Value   Delivery method FedEx   Delivery address correct? Yes   Delivery phone number [phone]   Preferred delivery time? Anytime   Number of medications in delivery 1   Medication being filled and delivered abiraterone and relugolix   Doses left of specialty medications 9 days left of all medications   Is there any medication that is due not being filled? No   Supplies needed? No supplies needed   Cooler needed? No   Do any medications need mixed or dated? No   Additional comments no copay   Questions or concerns for the pharmacist? No   Explain any questions or concerns for the pharmacist n/a   Are any medications first time fills? No            Medication Adherence    Adherence tools used: watch   Other adherence tool: Clock - takes at same time each day, 7:45am   Support network for adherence: family member          Follow-up: 28 day(s)     Sushma Muro, Pharmacy Technician  Specialty Pharmacy Technician

## 2023-05-30 DIAGNOSIS — G89.3 PAIN, CANCER: ICD-10-CM

## 2023-05-30 NOTE — TELEPHONE ENCOUNTER
Pt called to inform us his preferred pharmacy has closed and to send refill for his pain medication to [facility]( ) location      [facility] #:  [id number]    Medication requested: HYDROmorphone (DILAUDID) 8 MG tablet    Last fill date: 5/3/23    Last appointment: 4/3/23    Next appointment: 6/30/23      Medication requested: oxyCODONE ER (Xtampza ER) 9 MG capsule extended-release 12     Last fill date: 5/3/23

## 2023-05-31 ENCOUNTER — TELEPHONE (OUTPATIENT)
Dept: PALLIATIVE CARE | Facility: CLINIC | Age: 68
End: 2023-05-31

## 2023-05-31 RX ORDER — HYDROMORPHONE HYDROCHLORIDE 8 MG/1
8 TABLET ORAL EVERY 4 HOURS PRN
Qty: 180 TABLET | Refills: 0 | Status: SHIPPED | OUTPATIENT
Start: 2023-06-02 | End: 2023-07-02

## 2023-05-31 RX ORDER — OXYCODONE 9 MG/1
9 CAPSULE, EXTENDED RELEASE ORAL EVERY 12 HOURS
Qty: 60 EACH | Refills: 0 | Status: SHIPPED | OUTPATIENT
Start: 2023-06-02 | End: 2023-07-02

## 2023-05-31 NOTE — TELEPHONE ENCOUNTER
Contacted [facility] pharmacy [place] to verify if they have the two medications this pt need in stock. Hydromorphone (dilaudid) 8 mg and Oxycodone ER (Xtampza) 9 mg. Pharmacist states they do and will have it reach for the pt when it's due.
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
## 2023-06-13 NOTE — TELEPHONE ENCOUNTER
Caller: Benjy Lugor SHAN    Relationship: Self    Best call back number: [phone]     Requested Prescriptions:   Requested Prescriptions     Pending Prescriptions Disp Refills    Fluticasone-Umeclidin-Vilant (Trelegy Ellipta) 200-62.5-25 MCG/ACT aerosol powder        Sig: Inhale 1 puff Daily.        Pharmacy where request should be sent: [facility] [id number] [address] - [phone]  - [phone]     Last office visit with prescribing clinician: 3/27/2023   Last telemedicine visit with prescribing clinician: Visit date not found   Next office visit with prescribing clinician: 9/27/2023       Does the patient have less than a 3 day supply:  [x] Yes  [] No    Would you like a call back once the refill request has been completed: [] Yes [x] No    If the office needs to give you a call back, can they leave a voicemail: [] Yes [x] No    María Fontanez   06/13/23 16:03 EDT

## 2023-06-14 ENCOUNTER — INFUSION (OUTPATIENT)
Dept: ONCOLOGY | Facility: HOSPITAL | Age: 68
End: 2023-06-14
Payer: MEDICARE

## 2023-06-14 VITALS
SYSTOLIC BLOOD PRESSURE: 177 MMHG | HEART RATE: 78 BPM | RESPIRATION RATE: 18 BRPM | WEIGHT: 200 LBS | DIASTOLIC BLOOD PRESSURE: 90 MMHG | BODY MASS INDEX: 29.53 KG/M2 | TEMPERATURE: 98.1 F

## 2023-06-14 DIAGNOSIS — C61 PROSTATE CANCER METASTATIC TO BONE: ICD-10-CM

## 2023-06-14 DIAGNOSIS — Z45.2 ENCOUNTER FOR CARE RELATED TO PORT-A-CATH: Primary | ICD-10-CM

## 2023-06-14 DIAGNOSIS — C79.51 PROSTATE CANCER METASTATIC TO BONE: ICD-10-CM

## 2023-06-14 PROCEDURE — 25010000002 HEPARIN LOCK FLUSH PER 10 UNITS: Performed by: INTERNAL MEDICINE

## 2023-06-14 PROCEDURE — 36591 DRAW BLOOD OFF VENOUS DEVICE: CPT

## 2023-06-14 RX ORDER — HEPARIN SODIUM (PORCINE) LOCK FLUSH IV SOLN 100 UNIT/ML 100 UNIT/ML
500 SOLUTION INTRAVENOUS AS NEEDED
Status: DISCONTINUED | OUTPATIENT
Start: 2023-06-14 | End: 2023-06-14 | Stop reason: HOSPADM

## 2023-06-14 RX ADMIN — HEPARIN 500 UNITS: 100 SYRINGE at 10:00

## 2023-06-15 ENCOUNTER — SPECIALTY PHARMACY (OUTPATIENT)
Dept: ONCOLOGY | Facility: HOSPITAL | Age: 68
End: 2023-06-15
Payer: MEDICARE

## 2023-06-15 LAB
ALBUMIN SERPL-MCNC: 4.1 G/DL (ref 3.8–4.8)
ALBUMIN/GLOB SERPL: 1.8 {RATIO} (ref 1.2–2.2)
ALP SERPL-CCNC: 87 IU/L (ref 44–121)
ALT SERPL-CCNC: 8 IU/L (ref 0–44)
AST SERPL-CCNC: 16 IU/L (ref 0–40)
BASOPHILS # BLD AUTO: 0 X10E3/UL (ref 0–0.2)
BASOPHILS NFR BLD AUTO: 1 %
BILIRUB SERPL-MCNC: 0.3 MG/DL (ref 0–1.2)
BUN SERPL-MCNC: 5 MG/DL (ref 8–27)
BUN/CREAT SERPL: 6 (ref 10–24)
CALCIUM SERPL-MCNC: 8.7 MG/DL (ref 8.6–10.2)
CHLORIDE SERPL-SCNC: 102 MMOL/L (ref 96–106)
CO2 SERPL-SCNC: 21 MMOL/L (ref 20–29)
CREAT SERPL-MCNC: 0.78 MG/DL (ref 0.76–1.27)
EGFRCR SERPLBLD CKD-EPI 2021: 97 ML/MIN/1.73
EOSINOPHIL # BLD AUTO: 0.2 X10E3/UL (ref 0–0.4)
EOSINOPHIL NFR BLD AUTO: 3 %
ERYTHROCYTE [DISTWIDTH] IN BLOOD BY AUTOMATED COUNT: 18.8 % (ref 11.6–15.4)
GLOBULIN SER CALC-MCNC: 2.3 G/DL (ref 1.5–4.5)
GLUCOSE SERPL-MCNC: 120 MG/DL (ref 70–99)
HCT VFR BLD AUTO: 43.6 % (ref 37.5–51)
HGB BLD-MCNC: 14.7 G/DL (ref 13–17.7)
IMM GRANULOCYTES # BLD AUTO: 0 X10E3/UL (ref 0–0.1)
IMM GRANULOCYTES NFR BLD AUTO: 0 %
LYMPHOCYTES # BLD AUTO: 0.9 X10E3/UL (ref 0.7–3.1)
LYMPHOCYTES NFR BLD AUTO: 18 %
MCH RBC QN AUTO: 27.6 PG (ref 26.6–33)
MCHC RBC AUTO-ENTMCNC: 33.7 G/DL (ref 31.5–35.7)
MCV RBC AUTO: 82 FL (ref 79–97)
MONOCYTES # BLD AUTO: 0.3 X10E3/UL (ref 0.1–0.9)
MONOCYTES NFR BLD AUTO: 6 %
NEUTROPHILS # BLD AUTO: 3.8 X10E3/UL (ref 1.4–7)
NEUTROPHILS NFR BLD AUTO: 72 %
PHOSPHATE SERPL-MCNC: 2.2 MG/DL (ref 2.8–4.1)
PLATELET # BLD AUTO: 145 X10E3/UL (ref 150–450)
POTASSIUM SERPL-SCNC: 3.5 MMOL/L (ref 3.5–5.2)
PROT SERPL-MCNC: 6.4 G/DL (ref 6–8.5)
PSA SERPL-MCNC: <0.1 NG/ML (ref 0–4)
RBC # BLD AUTO: 5.32 X10E6/UL (ref 4.14–5.8)
SODIUM SERPL-SCNC: 141 MMOL/L (ref 134–144)
WBC # BLD AUTO: 5.2 X10E3/UL (ref 3.4–10.8)

## 2023-06-15 NOTE — PROGRESS NOTES
Specialty Pharmacy Refill Coordination Note     Naveen is a 68 y.o. male contacted today regarding refills of  abiraterone 1000mg PO QD  and relugolix 120mg PO QD specialty medication(s).      Specialty medication(s) and dose(s) confirmed: yes    Refill Questions      Flowsheet Row Most Recent Value   Changes to allergies? No   Changes to medications? No   New conditions since last clinic visit No   Unplanned office visit, urgent care, ED, or hospital admission in the last 4 weeks  No   How does patient/caregiver feel medication is working? Good   Financial problems or insurance changes  No   Since the previous refill, were any specialty medication doses or scheduled injections missed or delayed?  No   Does this patient require a clinical escalation to a pharmacist? No            Delivery Questions      Flowsheet Row Most Recent Value   Delivery method FedEx   Delivery address correct? Yes   Delivery phone number [phone]   Preferred delivery time? Anytime   Number of medications in delivery 2   Medication being filled and delivered abiraterone and relugolix   Doses left of specialty medications 7 days left   Is there any medication that is due not being filled? No   Supplies needed? No supplies needed   Cooler needed? No   Do any medications need mixed or dated? No   Additional comments no copay   Questions or concerns for the pharmacist? No   Explain any questions or concerns for the pharmacist n/a   Are any medications first time fills? No              Medication Adherence    Adherence tools used: watch   Other adherence tool: Clock - takes at same time each day, 7:45am   Support network for adherence: family member          Follow-up: 28 day(s)     Sushma Muro, Pharmacy Technician  Specialty Pharmacy Technician

## 2023-07-12 ENCOUNTER — INFUSION (OUTPATIENT)
Dept: ONCOLOGY | Facility: HOSPITAL | Age: 68
End: 2023-07-12

## 2023-07-12 DIAGNOSIS — C79.51 PROSTATE CANCER METASTATIC TO BONE: ICD-10-CM

## 2023-07-12 DIAGNOSIS — C61 PROSTATE CANCER METASTATIC TO BONE: ICD-10-CM

## 2023-07-21 LAB
ALBUMIN SERPL-MCNC: 4.2 G/DL (ref 3.5–5.2)
ALBUMIN/GLOB SERPL: 2.1 G/DL
ALP SERPL-CCNC: 75 U/L (ref 39–117)
ALT SERPL-CCNC: 8 U/L (ref 1–41)
AST SERPL-CCNC: 8 U/L (ref 1–40)
BASOPHILS # BLD AUTO: 0.01 10*3/MM3 (ref 0–0.2)
BASOPHILS NFR BLD AUTO: 0.1 % (ref 0–1.5)
BILIRUB SERPL-MCNC: 0.4 MG/DL (ref 0–1.2)
BUN SERPL-MCNC: 13 MG/DL (ref 8–23)
BUN/CREAT SERPL: 15.3 (ref 7–25)
CALCIUM SERPL-MCNC: 9.4 MG/DL (ref 8.6–10.5)
CHLORIDE SERPL-SCNC: 104 MMOL/L (ref 98–107)
CO2 SERPL-SCNC: 22.3 MMOL/L (ref 22–29)
CREAT SERPL-MCNC: 0.85 MG/DL (ref 0.76–1.27)
EGFRCR SERPLBLD CKD-EPI 2021: 94.6 ML/MIN/1.73
EOSINOPHIL # BLD AUTO: 0.02 10*3/MM3 (ref 0–0.4)
EOSINOPHIL NFR BLD AUTO: 0.2 % (ref 0.3–6.2)
ERYTHROCYTE [DISTWIDTH] IN BLOOD BY AUTOMATED COUNT: 14.4 % (ref 12.3–15.4)
GLOBULIN SER CALC-MCNC: 2 GM/DL
GLUCOSE SERPL-MCNC: 127 MG/DL (ref 65–99)
HCT VFR BLD AUTO: 47.2 % (ref 37.5–51)
HGB BLD-MCNC: 16.3 G/DL (ref 13–17.7)
IMM GRANULOCYTES # BLD AUTO: 0.09 10*3/MM3 (ref 0–0.05)
IMM GRANULOCYTES NFR BLD AUTO: 0.9 % (ref 0–0.5)
LYMPHOCYTES # BLD AUTO: 1 10*3/MM3 (ref 0.7–3.1)
LYMPHOCYTES NFR BLD AUTO: 10.2 % (ref 19.6–45.3)
MCH RBC QN AUTO: 28.6 PG (ref 26.6–33)
MCHC RBC AUTO-ENTMCNC: 34.5 G/DL (ref 31.5–35.7)
MCV RBC AUTO: 82.8 FL (ref 79–97)
MONOCYTES # BLD AUTO: 0.61 10*3/MM3 (ref 0.1–0.9)
MONOCYTES NFR BLD AUTO: 6.2 % (ref 5–12)
NEUTROPHILS # BLD AUTO: 8.12 10*3/MM3 (ref 1.7–7)
NEUTROPHILS NFR BLD AUTO: 82.4 % (ref 42.7–76)
NRBC BLD AUTO-RTO: 0 /100 WBC (ref 0–0.2)
PHOSPHATE SERPL-MCNC: 4.3 MG/DL (ref 2.5–4.5)
PLATELET # BLD AUTO: 188 10*3/MM3 (ref 140–450)
POTASSIUM SERPL-SCNC: 3.9 MMOL/L (ref 3.5–5.2)
PROT SERPL-MCNC: 6.2 G/DL (ref 6–8.5)
PSA SERPL-MCNC: <0.014 NG/ML (ref 0–4)
RBC # BLD AUTO: 5.7 10*6/MM3 (ref 4.14–5.8)
SODIUM SERPL-SCNC: 141 MMOL/L (ref 136–145)
WBC # BLD AUTO: 9.85 10*3/MM3 (ref 3.4–10.8)

## 2023-08-03 ENCOUNTER — TELEPHONE (OUTPATIENT)
Dept: ONCOLOGY | Facility: CLINIC | Age: 68
End: 2023-08-03
Payer: MEDICARE

## 2023-08-03 DIAGNOSIS — G89.3 CANCER RELATED PAIN: Primary | ICD-10-CM

## 2023-08-03 RX ORDER — OXYCODONE 9 MG/1
9 CAPSULE, EXTENDED RELEASE ORAL EVERY 12 HOURS
Qty: 60 EACH | Refills: 0 | Status: SHIPPED | OUTPATIENT
Start: 2023-08-04 | End: 2023-09-03

## 2023-08-03 RX ORDER — HYDROMORPHONE HYDROCHLORIDE 8 MG/1
8 TABLET ORAL EVERY 4 HOURS PRN
Qty: 180 TABLET | Refills: 0 | Status: SHIPPED | OUTPATIENT
Start: 2023-08-04 | End: 2023-09-03

## 2023-08-04 ENCOUNTER — SPECIALTY PHARMACY (OUTPATIENT)
Dept: ONCOLOGY | Facility: HOSPITAL | Age: 68
End: 2023-08-04
Payer: MEDICARE

## 2023-08-11 ENCOUNTER — OFFICE VISIT (OUTPATIENT)
Dept: FAMILY MEDICINE CLINIC | Facility: CLINIC | Age: 68
End: 2023-08-11
Payer: MEDICARE

## 2023-08-11 VITALS
DIASTOLIC BLOOD PRESSURE: 80 MMHG | HEART RATE: 109 BPM | WEIGHT: 199 LBS | SYSTOLIC BLOOD PRESSURE: 140 MMHG | OXYGEN SATURATION: 97 % | BODY MASS INDEX: 30.26 KG/M2

## 2023-08-11 DIAGNOSIS — J20.8 ACUTE BRONCHITIS DUE TO OTHER SPECIFIED ORGANISMS: Primary | ICD-10-CM

## 2023-08-11 DIAGNOSIS — Z72.0 TOBACCO ABUSE: ICD-10-CM

## 2023-08-11 DIAGNOSIS — J30.2 SEASONAL ALLERGIES: ICD-10-CM

## 2023-08-11 DIAGNOSIS — K21.9 CHRONIC GERD: ICD-10-CM

## 2023-08-11 PROCEDURE — 3079F DIAST BP 80-89 MM HG: CPT | Performed by: PHYSICIAN ASSISTANT

## 2023-08-11 PROCEDURE — 3077F SYST BP >= 140 MM HG: CPT | Performed by: PHYSICIAN ASSISTANT

## 2023-08-11 PROCEDURE — 99214 OFFICE O/P EST MOD 30 MIN: CPT | Performed by: PHYSICIAN ASSISTANT

## 2023-08-11 RX ORDER — CETIRIZINE HYDROCHLORIDE 10 MG/1
10 TABLET ORAL DAILY
Status: CANCELLED | OUTPATIENT
Start: 2023-08-11

## 2023-08-11 RX ORDER — BUPROPION HYDROCHLORIDE 150 MG/1
150 TABLET ORAL DAILY
Qty: 30 TABLET | Refills: 5 | Status: SHIPPED | OUTPATIENT
Start: 2023-08-11

## 2023-08-11 RX ORDER — ALBUTEROL SULFATE 90 UG/1
2 AEROSOL, METERED RESPIRATORY (INHALATION) EVERY 4 HOURS PRN
Qty: 8 G | Refills: 5 | Status: SHIPPED | OUTPATIENT
Start: 2023-08-11

## 2023-08-11 RX ORDER — OMEPRAZOLE 40 MG/1
40 CAPSULE, DELAYED RELEASE ORAL DAILY
Status: CANCELLED | OUTPATIENT
Start: 2023-08-11

## 2023-08-11 RX ORDER — DEXTROMETHORPHAN HYDROBROMIDE AND PROMETHAZINE HYDROCHLORIDE 15; 6.25 MG/5ML; MG/5ML
5 SYRUP ORAL 4 TIMES DAILY PRN
Qty: 180 ML | Refills: 1 | Status: SHIPPED | OUTPATIENT
Start: 2023-08-11

## 2023-08-11 RX ORDER — CETIRIZINE HYDROCHLORIDE 10 MG/1
10 TABLET ORAL DAILY
Qty: 90 TABLET | Refills: 3 | Status: SHIPPED | OUTPATIENT
Start: 2023-08-11

## 2023-08-11 RX ORDER — DOXYCYCLINE HYCLATE 100 MG/1
100 CAPSULE ORAL 2 TIMES DAILY
Qty: 20 CAPSULE | Refills: 0 | Status: SHIPPED | OUTPATIENT
Start: 2023-08-11

## 2023-08-11 RX ORDER — OMEPRAZOLE 40 MG/1
CAPSULE, DELAYED RELEASE ORAL
Qty: 180 CAPSULE | Refills: 1 | Status: SHIPPED | OUTPATIENT
Start: 2023-08-11

## 2023-08-12 NOTE — ASSESSMENT & PLAN NOTE
Patient prescribed doxy and promethazine DM.  Increase fluids as tolerated call if any problems arise

## 2023-08-12 NOTE — PROGRESS NOTES
"Chief Complaint  Cough (Constant cough started last week ) and Nasal Congestion    Subjective        Naveen SHAN Lugo presents to [facility]  History of Present Illness  Patient reports today secondary a cough that started last week.  Reports he now has thick yellow/green mucous.  Reports the cough is keeping him awake at night.  Reports feeling like he needs an inhaler however does no longer has an albuterol inhaler.  States he continues his daily inhaler.    Patient reports he continues to smoke daily and would like to start a medication to help with quitting.  States he took wellbutrin in the past and it worked.      Patient also reports having GERD states he needs medication refills.  Cough      Objective   Vital Signs:  /80   Pulse 109   Wt 90.3 kg (199 lb)   SpO2 97%   BMI 30.26 kg/mý   Estimated body mass index is 30.26 kg/mý as calculated from the following:    Height as of 7/20/23: 172.7 cm (68\").    Weight as of this encounter: 90.3 kg (199 lb).               Physical Exam  Vitals and nursing note reviewed.   Constitutional:       General: He is not in acute distress.     Appearance: Normal appearance.   HENT:      Head: Normocephalic.      Right Ear: Hearing normal.      Left Ear: Hearing normal.   Eyes:      Pupils: Pupils are equal, round, and reactive to light.   Cardiovascular:      Rate and Rhythm: Normal rate and regular rhythm.   Pulmonary:      Effort: Pulmonary effort is normal. No respiratory distress.   Skin:     General: Skin is warm and dry.   Neurological:      Mental Status: He is alert.   Psychiatric:         Mood and Affect: Mood normal.      Result Review :                   Assessment and Plan   Diagnoses and all orders for this visit:    1. Acute bronchitis due to other specified organisms (Primary)  Assessment & Plan:  Patient prescribed doxy and promethazine DM.  Increase fluids as tolerated call if any problems arise    Orders:  -     albuterol " sulfate  (90 Base) MCG/ACT inhaler; Inhale 2 puffs Every 4 (Four) Hours As Needed for Wheezing.  Dispense: 8 g; Refill: 5  -     doxycycline (VIBRAMYCIN) 100 MG capsule; Take 1 capsule by mouth 2 (Two) Times a Day.  Dispense: 20 capsule; Refill: 0  -     promethazine-dextromethorphan (PROMETHAZINE-DM) 6.25-15 MG/5ML syrup; Take 5 mL by mouth 4 (Four) Times a Day As Needed for Cough. Caution sedation  Dispense: 180 mL; Refill: 1    2. Tobacco abuse  Assessment & Plan:  Prescribed wellbutrin as patient requested.  Patient denies any history of seizure.      Orders:  -     buPROPion XL (Wellbutrin XL) 150 MG 24 hr tablet; Take 1 tablet by mouth Daily. For smoking cessation  Dispense: 30 tablet; Refill: 5    3. Chronic GERD  Assessment & Plan:  Refilled omeprazole    Orders:  -     omeprazole (priLOSEC) 40 MG capsule; Take 1 tab po BID for stomach  Dispense: 180 capsule; Refill: 1    4. Seasonal allergies  Assessment & Plan:  Refilled zyrtec    Orders:  -     cetirizine (zyrTEC) 10 MG tablet; Take 1 tablet by mouth Daily. For allergies  Dispense: 90 tablet; Refill: 3             Follow Up   No follow-ups on file.  Patient was given instructions and counseling regarding his condition or for health maintenance advice. Please see specific information pulled into the AVS if appropriate.

## 2023-08-17 ENCOUNTER — INFUSION (OUTPATIENT)
Dept: ONCOLOGY | Facility: HOSPITAL | Age: 68
End: 2023-08-17
Payer: MEDICARE

## 2023-08-17 VITALS
SYSTOLIC BLOOD PRESSURE: 160 MMHG | TEMPERATURE: 97.1 F | DIASTOLIC BLOOD PRESSURE: 84 MMHG | BODY MASS INDEX: 29.32 KG/M2 | RESPIRATION RATE: 20 BRPM | HEART RATE: 98 BPM | WEIGHT: 192.8 LBS

## 2023-08-17 DIAGNOSIS — Z45.2 ENCOUNTER FOR CARE RELATED TO PORT-A-CATH: ICD-10-CM

## 2023-08-17 DIAGNOSIS — C61 PROSTATE CANCER METASTATIC TO BONE: Primary | ICD-10-CM

## 2023-08-17 DIAGNOSIS — C79.51 PROSTATE CANCER METASTATIC TO BONE: Primary | ICD-10-CM

## 2023-08-17 PROCEDURE — 36591 DRAW BLOOD OFF VENOUS DEVICE: CPT

## 2023-08-17 PROCEDURE — 25010000002 HEPARIN LOCK FLUSH PER 10 UNITS: Performed by: INTERNAL MEDICINE

## 2023-08-17 RX ORDER — HEPARIN SODIUM (PORCINE) LOCK FLUSH IV SOLN 100 UNIT/ML 100 UNIT/ML
500 SOLUTION INTRAVENOUS AS NEEDED
OUTPATIENT
Start: 2023-08-17

## 2023-08-17 RX ORDER — HEPARIN SODIUM (PORCINE) LOCK FLUSH IV SOLN 100 UNIT/ML 100 UNIT/ML
500 SOLUTION INTRAVENOUS AS NEEDED
Status: DISCONTINUED | OUTPATIENT
Start: 2023-08-17 | End: 2023-08-17 | Stop reason: HOSPADM

## 2023-08-17 RX ADMIN — HEPARIN 500 UNITS: 100 SYRINGE at 11:30

## 2023-08-18 LAB
ALBUMIN SERPL-MCNC: 3.9 G/DL (ref 3.5–5.2)
ALBUMIN/GLOB SERPL: 1.8 G/DL
ALP SERPL-CCNC: 86 U/L (ref 39–117)
ALT SERPL-CCNC: 15 U/L (ref 1–41)
AST SERPL-CCNC: 16 U/L (ref 1–40)
BASOPHILS # BLD AUTO: 0.01 10*3/MM3 (ref 0–0.2)
BASOPHILS NFR BLD AUTO: 0.1 % (ref 0–1.5)
BILIRUB SERPL-MCNC: 0.5 MG/DL (ref 0–1.2)
BUN SERPL-MCNC: 8 MG/DL (ref 8–23)
BUN/CREAT SERPL: 10.4 (ref 7–25)
CALCIUM SERPL-MCNC: 9.2 MG/DL (ref 8.6–10.5)
CHLORIDE SERPL-SCNC: 100 MMOL/L (ref 98–107)
CO2 SERPL-SCNC: 27.5 MMOL/L (ref 22–29)
CREAT SERPL-MCNC: 0.77 MG/DL (ref 0.76–1.27)
EGFRCR SERPLBLD CKD-EPI 2021: 97.5 ML/MIN/1.73
EOSINOPHIL # BLD AUTO: 0.05 10*3/MM3 (ref 0–0.4)
EOSINOPHIL NFR BLD AUTO: 0.6 % (ref 0.3–6.2)
ERYTHROCYTE [DISTWIDTH] IN BLOOD BY AUTOMATED COUNT: 13.9 % (ref 12.3–15.4)
GLOBULIN SER CALC-MCNC: 2.2 GM/DL
GLUCOSE SERPL-MCNC: 138 MG/DL (ref 65–99)
HCT VFR BLD AUTO: 43.5 % (ref 37.5–51)
HGB BLD-MCNC: 14.9 G/DL (ref 13–17.7)
IMM GRANULOCYTES # BLD AUTO: 0.04 10*3/MM3 (ref 0–0.05)
IMM GRANULOCYTES NFR BLD AUTO: 0.5 % (ref 0–0.5)
LYMPHOCYTES # BLD AUTO: 1.02 10*3/MM3 (ref 0.7–3.1)
LYMPHOCYTES NFR BLD AUTO: 13.2 % (ref 19.6–45.3)
MCH RBC QN AUTO: 29.2 PG (ref 26.6–33)
MCHC RBC AUTO-ENTMCNC: 34.3 G/DL (ref 31.5–35.7)
MCV RBC AUTO: 85.3 FL (ref 79–97)
MONOCYTES # BLD AUTO: 0.33 10*3/MM3 (ref 0.1–0.9)
MONOCYTES NFR BLD AUTO: 4.3 % (ref 5–12)
NEUTROPHILS # BLD AUTO: 6.27 10*3/MM3 (ref 1.7–7)
NEUTROPHILS NFR BLD AUTO: 81.3 % (ref 42.7–76)
NRBC BLD AUTO-RTO: 0 /100 WBC (ref 0–0.2)
PHOSPHATE SERPL-MCNC: 3.2 MG/DL (ref 2.5–4.5)
PLATELET # BLD AUTO: 158 10*3/MM3 (ref 140–450)
POTASSIUM SERPL-SCNC: 3.5 MMOL/L (ref 3.5–5.2)
PROT SERPL-MCNC: 6.1 G/DL (ref 6–8.5)
PSA SERPL-MCNC: <0.014 NG/ML (ref 0–4)
RBC # BLD AUTO: 5.1 10*6/MM3 (ref 4.14–5.8)
SODIUM SERPL-SCNC: 140 MMOL/L (ref 136–145)
WBC # BLD AUTO: 7.72 10*3/MM3 (ref 3.4–10.8)

## 2023-08-29 ENCOUNTER — SPECIALTY PHARMACY (OUTPATIENT)
Dept: ONCOLOGY | Facility: HOSPITAL | Age: 68
End: 2023-08-29
Payer: MEDICARE

## 2023-08-29 NOTE — PROGRESS NOTES
Specialty Pharmacy Refill Coordination Note     Naveen is a 68 y.o. male contacted today regarding refills of  abiraterone 1000mg PO QD and relugolix 120mg PO QD specialty medication(s).      Specialty medication(s) and dose(s) confirmed: yes    Refill Questions      Flowsheet Row Most Recent Value   Changes to allergies? No   Changes to medications? No   New conditions since last clinic visit No   Unplanned office visit, urgent care, ED, or hospital admission in the last 4 weeks  No   How does patient/caregiver feel medication is working? Good   Financial problems or insurance changes  No   Since the previous refill, were any specialty medication doses or scheduled injections missed or delayed?  No   Does this patient require a clinical escalation to a pharmacist? No            Delivery Questions      Flowsheet Row Most Recent Value   Delivery method FedEx   Delivery address correct? Yes   Delivery phone number [phone]   Preferred delivery time? Anytime   Number of medications in delivery 2   Medication being filled and delivered zytiga and orgovyx   Doses left of specialty medications 5 days left   Is there any medication that is due not being filled? No   Supplies needed? No supplies needed   Cooler needed? No   Do any medications need mixed or dated? No   Additional comments no copay   Questions or concerns for the pharmacist? No   Explain any questions or concerns for the pharmacist n/a   Are any medications first time fills? No              Medication Adherence    Adherence tools used: watch   Other adherence tool: Clock - takes at same time each day, 7:45am   Support network for adherence: family member          Follow-up: 28 day(s)     Sushma Muro, Pharmacy Technician  Specialty Pharmacy Technician

## 2023-08-31 DIAGNOSIS — G89.3 CANCER RELATED PAIN: ICD-10-CM

## 2023-08-31 RX ORDER — HYDROMORPHONE HYDROCHLORIDE 8 MG/1
8 TABLET ORAL EVERY 4 HOURS PRN
Qty: 180 TABLET | Refills: 0 | Status: SHIPPED | OUTPATIENT
Start: 2023-09-03 | End: 2023-10-03

## 2023-08-31 RX ORDER — OXYCODONE 9 MG/1
9 CAPSULE, EXTENDED RELEASE ORAL EVERY 12 HOURS
Qty: 60 EACH | Refills: 0 | Status: SHIPPED | OUTPATIENT
Start: 2023-09-03 | End: 2023-10-03

## 2023-08-31 NOTE — TELEPHONE ENCOUNTER
[facility] #: [id number]    Medication requested: HYDROmorphone (DILAUDID) 8 MG tablet     Last fill date: 8/4/23    Medication requested: oxyCODONE ER (Xtampza ER) 9 MG capsule extended-release 12 hour     Last fill date: 8/4/23      Last appointment: 6/30/23    Next appointment: 10/2/23

## 2023-09-05 ENCOUNTER — TELEPHONE (OUTPATIENT)
Dept: PALLIATIVE CARE | Facility: CLINIC | Age: 68
End: 2023-09-05
Payer: MEDICARE

## 2023-09-05 DIAGNOSIS — G89.3 CANCER RELATED PAIN: ICD-10-CM

## 2023-09-05 RX ORDER — OXYCODONE 9 MG/1
9 CAPSULE, EXTENDED RELEASE ORAL EVERY 12 HOURS
Qty: 60 EACH | Refills: 0 | OUTPATIENT
Start: 2023-09-05 | End: 2023-10-05

## 2023-09-05 NOTE — TELEPHONE ENCOUNTER
Caller: Naveen Lugo    Relationship to patient: Self    Best call back number: [phone]    Patient is needing: PT CALLED STATING THAT HIS PHARMACY CALLED AND SAID THEY WON'T FILL HIS PRESCRIPTION BECAUSE IT NEEDS A PRIOR AUTH         oxyCODONE ER (Xtampza ER) 9 MG capsule extended-release 12 hour  9 mg, Every 12 Hours

## 2023-09-13 ENCOUNTER — OFFICE VISIT (OUTPATIENT)
Dept: FAMILY MEDICINE CLINIC | Facility: CLINIC | Age: 68
End: 2023-09-13
Payer: MEDICARE

## 2023-09-13 VITALS
HEIGHT: 69 IN | BODY MASS INDEX: 28.29 KG/M2 | HEART RATE: 70 BPM | DIASTOLIC BLOOD PRESSURE: 74 MMHG | OXYGEN SATURATION: 96 % | SYSTOLIC BLOOD PRESSURE: 118 MMHG | WEIGHT: 191 LBS

## 2023-09-13 DIAGNOSIS — R10.13 EPIGASTRIC ABDOMINAL PAIN: Primary | ICD-10-CM

## 2023-09-13 DIAGNOSIS — R11.2 NAUSEA AND VOMITING, UNSPECIFIED VOMITING TYPE: ICD-10-CM

## 2023-09-13 DIAGNOSIS — R42 DIZZINESS: ICD-10-CM

## 2023-09-13 PROCEDURE — 1160F RVW MEDS BY RX/DR IN RCRD: CPT | Performed by: PHYSICIAN ASSISTANT

## 2023-09-13 PROCEDURE — 1159F MED LIST DOCD IN RCRD: CPT | Performed by: PHYSICIAN ASSISTANT

## 2023-09-13 PROCEDURE — 3074F SYST BP LT 130 MM HG: CPT | Performed by: PHYSICIAN ASSISTANT

## 2023-09-13 PROCEDURE — 3078F DIAST BP <80 MM HG: CPT | Performed by: PHYSICIAN ASSISTANT

## 2023-09-13 PROCEDURE — 99213 OFFICE O/P EST LOW 20 MIN: CPT | Performed by: PHYSICIAN ASSISTANT

## 2023-09-13 RX ORDER — ONDANSETRON 4 MG/1
4 TABLET, FILM COATED ORAL EVERY 8 HOURS PRN
Qty: 30 TABLET | Refills: 1 | Status: SHIPPED | OUTPATIENT
Start: 2023-09-13

## 2023-09-13 RX ORDER — MECLIZINE HYDROCHLORIDE 25 MG/1
25 TABLET ORAL 3 TIMES DAILY PRN
Qty: 45 TABLET | Refills: 3 | Status: SHIPPED | OUTPATIENT
Start: 2023-09-13

## 2023-09-13 NOTE — PROGRESS NOTES
"Chief Complaint  Dizziness (X4 wks)    Subjective          [place] presents to [facility]  History of Present Illness  Patient is complaining of Dizziness episodes, especially riding in a car.  He is also having issues with nausea and vomiting and he has tenderness in the epigastric area.  This has caused him to not be able to eat well and he has lost some weight.  He is not taking any new medications and he does not have a gallbladder.      Objective   Vital Signs:   /74 (BP Location: Right arm)   Pulse 70   Ht 175.3 cm (69\")   Wt 86.6 kg (191 lb)   SpO2 96%   BMI 28.21 kg/m²     Body mass index is 28.21 kg/m².    Review of Systems   Constitutional:  Positive for fatigue. Negative for chills and fever.   Respiratory:  Negative for cough, shortness of breath and wheezing.    Cardiovascular:  Negative for chest pain and palpitations.   Gastrointestinal:  Positive for nausea and vomiting. Negative for diarrhea and GERD.   Neurological:  Positive for dizziness and weakness.   Psychiatric/Behavioral: Negative.       Past History:  Medical History: has a past medical history of Bone cancer, History of radiation therapy (08/16/2021), Nephrolithiasis, Opioid use disorder, mild, on maintenance therapy (HCC), and Prostate cancer.   Surgical History: has a past surgical history that includes Cholecystectomy; Coronary stent placement (206 & 2011); Thoracic discectomy (1994); and Hernia repair (1986).   Family History: family history includes Diabetes in his brother; Heart disease in his brother; Ovarian cancer in his sister; Prostate cancer in his brother.   Social History: reports that he has been smoking cigarettes. He has a 50.00 pack-year smoking history. He has never used smokeless tobacco. He reports that he does not currently use alcohol. He reports that he does not use drugs.      Current Outpatient Medications:     abiraterone acetate (ZYTIGA) 500 MG tablet, Take 2 tablets " by mouth Daily., Disp: 60 tablet, Rfl: 11    albuterol sulfate  (90 Base) MCG/ACT inhaler, Inhale 2 puffs Every 4 (Four) Hours As Needed for Wheezing., Disp: 8 g, Rfl: 5    aspirin (aspirin) 81 MG EC tablet, Take 1 tablet by mouth Daily., Disp: , Rfl:     atorvastatin (LIPITOR) 20 MG tablet, Take 1 tablet by mouth every night at bedtime. **No refills until lipid panel/labs are completed, Disp: 30 tablet, Rfl: 0    buPROPion XL (Wellbutrin XL) 150 MG 24 hr tablet, Take 1 tablet by mouth Daily. For smoking cessation, Disp: 30 tablet, Rfl: 5    calcium carbonate (OS-RONNY) 600 MG tablet, Take 2 tablets by mouth Daily. Patient to take 1200 mg daily for hypocalcemia., Disp: , Rfl:     cetirizine (zyrTEC) 10 MG tablet, Take 1 tablet by mouth Daily. For allergies, Disp: 90 tablet, Rfl: 3    ferrous sulfate 325 (65 FE) MG tablet, 325 mg p.o. twice daily every other day, take with vitamin C, Disp: 30 tablet, Rfl: 11    Fluticasone-Umeclidin-Vilant (Trelegy Ellipta) 200-62.5-25 MCG/ACT aerosol powder , Inhale 1 puff Daily., Disp: 1 each, Rfl: 5    HYDROmorphone (DILAUDID) 8 MG tablet, Take 1 tablet by mouth Every 4 (Four) Hours As Needed for Moderate Pain or Severe Pain for up to 30 days., Disp: 180 tablet, Rfl: 0    naloxone (NARCAN) 4 MG/0.1ML nasal spray, 1 spray into the nostril(s) as directed by provider As Needed (unresponsiveness)., Disp: , Rfl:     nitroglycerin (NITROSTAT) 0.4 MG SL tablet, 1 under the tongue as needed for angina, may repeat q5mins for up three doses, Disp: 25 tablet, Rfl: 11    omeprazole (priLOSEC) 40 MG capsule, Take 1 tab po BID for stomach, Disp: 180 capsule, Rfl: 1    ondansetron (Zofran) 4 MG tablet, Take 1 tablet by mouth Every 8 (Eight) Hours As Needed for Nausea or Vomiting., Disp: 30 tablet, Rfl: 1    oxyCODONE ER (Xtampza ER) 9 MG capsule extended-release 12 hour , Take 1 capsule by mouth Every 12 (Twelve) Hours for 30 days., Disp: 60 each, Rfl: 0    potassium phosphate, monobasic,  (K-Phos) 500 MG tablet, Take 1 tablet by mouth 4 times a day., Disp: 120 tablet, Rfl: 11    predniSONE (DELTASONE) 5 MG tablet, Take 1 tablet by mouth Daily., Disp: 30 tablet, Rfl: 11    ranolazine (Ranexa) 500 MG 12 hr tablet, Take 1 tablet by mouth 2 (Two) Times a Day., Disp: 180 tablet, Rfl: 3    relugolix (ORGOVYX) 120 MG tablet tablet, Take 1 tablet by mouth Daily., Disp: 30 tablet, Rfl: 11    senna (SENOKOT) 8.6 MG tablet, Take 1 tablet by mouth Daily., Disp: 90 tablet, Rfl: 3    venlafaxine XR (EFFEXOR-XR) 37.5 MG 24 hr capsule, Take 1 capsule by mouth Daily., Disp: 30 capsule, Rfl: 0    meclizine (ANTIVERT) 25 MG tablet, Take 1 tablet by mouth 3 (Three) Times a Day As Needed for Dizziness., Disp: 45 tablet, Rfl: 3  No current facility-administered medications for this visit.    Facility-Administered Medications Ordered in Other Visits:     heparin injection 500 Units, 500 Units, Intravenous, PRN, Ishmael Rashid MD, 500 Units at 06/02/21 0900    Allergies: Cymbalta [duloxetine hcl], Gabapentin, and Lyrica [pregabalin]    Physical Exam  Vitals reviewed.   Constitutional:       Appearance: Normal appearance.   Cardiovascular:      Rate and Rhythm: Normal rate and regular rhythm.      Heart sounds: Normal heart sounds.   Pulmonary:      Effort: Pulmonary effort is normal.      Breath sounds: Normal breath sounds.   Abdominal:      General: Bowel sounds are normal.      Palpations: Abdomen is soft. There is no mass.      Tenderness: There is abdominal tenderness. There is no guarding or rebound.      Hernia: No hernia is present.   Musculoskeletal:         General: Normal range of motion.   Neurological:      General: No focal deficit present.      Mental Status: He is alert and oriented to person, place, and time.      Cranial Nerves: Cranial nerves 2-12 are intact.      Sensory: Sensation is intact.      Motor: Motor function is intact.      Coordination: Coordination is intact.      Gait: Gait is intact.       Deep Tendon Reflexes: Reflexes are normal and symmetric.   Psychiatric:         Mood and Affect: Mood normal.         Behavior: Behavior normal.        Result Review :                   Assessment and Plan    Diagnoses and all orders for this visit:    1. Epigastric abdominal pain (Primary)  Assessment & Plan:  I will get some screening lab, and refer him to GI since this seems to be an ongoing issue.     Orders:  -     CBC & Differential; Future  -     Comprehensive Metabolic Panel; Future  -     Amylase; Future  -     Lipase; Future  -     Ambulatory Referral to Gastroenterology    2. Nausea and vomiting, unspecified vomiting type  Assessment & Plan:  I am going to give him some Zofran to settle his stomach.  While were waiting on the referral and labs,     Orders:  -     CBC & Differential; Future  -     Comprehensive Metabolic Panel; Future  -     Amylase; Future  -     Lipase; Future  -     Ambulatory Referral to Gastroenterology    3. Dizziness  Assessment & Plan:  This could be BPPV, he does report worsening with motion and head movement, trial of antivert as needed. I will see him back in a couple of weeks to see if any of this improves as it could all be viral in nature.        Other orders  -     ondansetron (Zofran) 4 MG tablet; Take 1 tablet by mouth Every 8 (Eight) Hours As Needed for Nausea or Vomiting.  Dispense: 30 tablet; Refill: 1  -     meclizine (ANTIVERT) 25 MG tablet; Take 1 tablet by mouth 3 (Three) Times a Day As Needed for Dizziness.  Dispense: 45 tablet; Refill: 3        Follow Up   Return in about 2 weeks (around 9/27/2023) for Recheck.  Patient was given instructions and counseling regarding his condition or for health maintenance advice. Please see specific information pulled into the AVS if appropriate.     Cielo Dodd PA-C

## 2023-09-13 NOTE — ASSESSMENT & PLAN NOTE
This could be BPPV, he does report worsening with motion and head movement, trial of antivert as needed. I will see him back in a couple of weeks to see if any of this improves as it could all be viral in nature.

## 2023-09-13 NOTE — ASSESSMENT & PLAN NOTE
I am going to give him some Zofran to settle his stomach.  While were waiting on the referral and labs,

## 2023-09-14 ENCOUNTER — INFUSION (OUTPATIENT)
Dept: ONCOLOGY | Facility: HOSPITAL | Age: 68
End: 2023-09-14
Payer: MEDICARE

## 2023-09-14 ENCOUNTER — OFFICE VISIT (OUTPATIENT)
Dept: ONCOLOGY | Facility: CLINIC | Age: 68
End: 2023-09-14
Payer: MEDICARE

## 2023-09-14 ENCOUNTER — SPECIALTY PHARMACY (OUTPATIENT)
Dept: ONCOLOGY | Facility: HOSPITAL | Age: 68
End: 2023-09-14
Payer: MEDICARE

## 2023-09-14 VITALS
HEART RATE: 107 BPM | HEIGHT: 69 IN | RESPIRATION RATE: 18 BRPM | DIASTOLIC BLOOD PRESSURE: 90 MMHG | TEMPERATURE: 98.2 F | WEIGHT: 191 LBS | BODY MASS INDEX: 28.29 KG/M2 | OXYGEN SATURATION: 94 % | SYSTOLIC BLOOD PRESSURE: 138 MMHG

## 2023-09-14 DIAGNOSIS — C61 PROSTATE CANCER METASTATIC TO BONE: Primary | ICD-10-CM

## 2023-09-14 DIAGNOSIS — C79.51 PROSTATE CANCER METASTATIC TO BONE: Primary | ICD-10-CM

## 2023-09-14 DIAGNOSIS — Z45.2 ENCOUNTER FOR CARE RELATED TO PORT-A-CATH: ICD-10-CM

## 2023-09-14 PROCEDURE — 99214 OFFICE O/P EST MOD 30 MIN: CPT | Performed by: NURSE PRACTITIONER

## 2023-09-14 PROCEDURE — 36591 DRAW BLOOD OFF VENOUS DEVICE: CPT

## 2023-09-14 PROCEDURE — 3075F SYST BP GE 130 - 139MM HG: CPT | Performed by: NURSE PRACTITIONER

## 2023-09-14 PROCEDURE — 1125F AMNT PAIN NOTED PAIN PRSNT: CPT | Performed by: NURSE PRACTITIONER

## 2023-09-14 PROCEDURE — 3080F DIAST BP >= 90 MM HG: CPT | Performed by: NURSE PRACTITIONER

## 2023-09-14 PROCEDURE — 25010000002 HEPARIN LOCK FLUSH PER 10 UNITS: Performed by: INTERNAL MEDICINE

## 2023-09-14 RX ORDER — HEPARIN SODIUM (PORCINE) LOCK FLUSH IV SOLN 100 UNIT/ML 100 UNIT/ML
500 SOLUTION INTRAVENOUS AS NEEDED
OUTPATIENT
Start: 2023-09-14

## 2023-09-14 RX ORDER — HEPARIN SODIUM (PORCINE) LOCK FLUSH IV SOLN 100 UNIT/ML 100 UNIT/ML
500 SOLUTION INTRAVENOUS AS NEEDED
Status: DISCONTINUED | OUTPATIENT
Start: 2023-09-14 | End: 2023-09-14 | Stop reason: HOSPADM

## 2023-09-14 RX ADMIN — HEPARIN 500 UNITS: 100 SYRINGE at 12:05

## 2023-09-14 NOTE — PROGRESS NOTES
CHIEF COMPLAINT:  1. Dizziness   2.  Nausea and vomiting  3.  Chronic pain in hip and back     Problem List:  Oncology/Hematology History Overview Note   1.  Stage IVb grade group 4 metastatic prostate cancer with sclerotic bone lesions on CT and bone scan and history of prostatic hypertrophy.  Good response to Taxotere ending 2/18/2021 Relugolix, followed by Zytiga prednisone with L4/sacrum, left acetabulum, and right pelvis external beam radiation August 2021.  Hypophosphatemia on Zometa.  Zometa held as of October 2021.  2.  Kidney stone  3.  Polyps  4.  Probable drug-induced hypophosphatemia from Zometa, drug stopped after 10/5/2021 dose  5.  Cancer related pain seeing palliative care  6.  Fatigue  7.  Covid 2/2022  8.  Iron deficiency anemia    -9/30/2020 office note from Dr. Ronen Reynaga indicates patient with PSA 10.8.  Underwent TRUS prostate biopsy 9/15/2020.  Adenocarcinoma the prostate [place] 4+4 = 8 in 1 out of 12 cores and 4+3 = 7 and 10 out of 12 cores and 3+4 = 7 in 1 out of 12 cores.  Perineural invasion.  Extraprostatic extension into the fat seen on 2 biopsies of the right lateral mid and right apex.  9/24/2020 CT chest and whole-body bone scan showed T12, L1, left acetabular, right medial acetabular metastases with no lung metastases.  He started androgen deprivation therapy with Casodex with plans for Lupron to start in 1 to 2 weeks for clinical T3 N0 M1 metastatic prostate cancer.  Review of official report from [facility] 9/24/2020 bone scan mentions T12, L1, roof of left acetabulum and medial right acetabular osteoblastic metastases.  CT chest with contrast that same day showed mild splenomegaly 14.2 cm with stable periaortic nodes compared to CT December 2015 with no lung metastases.  Sclerotic abnormality within the T12 vertebral body, L1 vertebral body extending into the pedicle unchanged.  8/28/2020 CT abdomen pelvis report from [facility] reviewed and mentions normal  spleen size.  Punctate nonobstructing kidney stone, no paulino enlargement, diffuse bladder wall thickening with mild perivesicular fat stranding, 2.1 cm sclerotic left iliac bone above the left acetabulum new compared to 2015 as well as 1.5 cm faintly sclerotic focus in the right posterior acetabulum stable compared to prior CT 2015 as well as a 1.6 cm T12 and inferior vertebral body sclerotic lesion and a 1.9 cm left posterior lateral L1 vertebral body abnormality extending to the pedicle.  -10/13/2020 initial [facility] medical oncology consultation: With 1 Sarika score 8, 4+4 lesion that would make him a grade group 4 with stage IVb disease given radiographic evidence of sclerotic bone metastasis on bone scan and CT.  Needs genetic testing given metastatic prostate cancer and this is also important as, were he to have mismatch repair mutation then we would have options for PDL 1 inhibitors and were he BRCA mutated would have options for PARP inhibitors in the future.  Systemic therapy for castration naïve prostate cancer or N1 disease should be with apalutamide or Abiraterone or enzalutamide all of which are category 1.  He does not have any visceral metastases.  According to his imaging he has T12, L1, left acetabular, right acetabular, and left iliac bony involvement.  That means he would have 4 or more bone metastases with at least one metastasis (i.e. the acetabular lesions bilaterally) beyond the pelvis/vertebral, for which this would be considered high-volume disease for which 6 cycles of docetaxel 75 mg/m² x 6 cycles followed by Zytiga and prednisone would be reasonable along with Zometa every 6 weeks for bone stabilization.  He will do chemo preparation visit with my nurse practitioner prior to start chemotherapy with Taxotere and Zometa in 2 weeks and he is already received Casodex in preparation for Lupron shot he received on 10/12/2020 with Dr. Reynaga.  We will get him to Dr. Alexander Gomez who has done  his polypectomies in the past for port placement and we will get genetic counseling started.  Following the 6 courses of Taxotere per the [place]arted trial criteria, I would then give him an indefinite Zytiga and prednisone and Zometa until progression or toxicity dictates.    -12/16/2019 COVID-19 antigen test negative    -12/29/2020 PSA 0.275 with absolute neutrophil count 700 and creatinine 0.76 with normal liver enzymes and electrolytes.  Normal magnesium and phosphorus and urine drug screen positive for buprenorphine and opiates follow-up with palliative care.    -1/4/2021 CT chest abdomen pelvis with contrast and whole-body bone scan shows improving less metabolically active bone metastases.  Stable sclerotic T12, L1, and L2 vertebral bodies and bilateral pelvic bones on bone windows with stable subcentimeter para-aortic lymph nodes and no definite progression in the chest abdomen or pelvis.    -1/5/2021 [facility] medical oncology follow-up visit: I reviewed the images and reports thereof of the above CT and bone scan which shows good response to Taxotere x3 courses with a PSA down to 0.275 from a baseline of 10.  Get another 3 courses of Taxotere, continue his Xgeva, and then following that we will switch to Zytiga and prednisone.  He is working with palliative care on his bone pain but on his current dose of fentanyl patch he says his pain is still not adequately controlled he will contact them.  Dexamethasone prescription was refilled.  We will see my nurse practitioner back in 3 weeks for course #5 of 6 total courses and then we will reimage with CT chest abdomen pelvis and bone scan before going to the Zytiga prednisone.    -3/4/2021 CT chest abdomen pelvis with contrast is negative save for stable sclerotic bone lesions and the bone scan shows near resolution of prior bony abnormalities associated with the known sclerotic lesions in the thoracolumbar spine and bony pelvis.    2/17/2021 PSA down to 0.1 from  1.37 October 2020.  3/9/2021 PSA 0.1    -5/26/2021 CT chest abdomen pelvis with contrast shows stable to slightly underlying increase low-density left para-aortic node.  Bone lesions stable.  Whole-body bone scan stable to decrease activity of known thoracolumbar and bony pelvic involvement.  PSA less than 0.1  , AST 74, bilirubin 0.5.       -6/1/2021 [facility] oncology follow-up visit: I reviewed the above data with him and images thereof.  Bones are stable.  No significant adenopathy.  PSA immeasurably low.     upper limit of normal 69 and AST 74 upper limit of normal 46.  Hence, neither is more than double the upper limit of normal with no ascites and no encephalopathy and normal albumin and bilirubin, despite the elevation of liver enzymes, he has child Abrams score A.  Package insert for Abiraterone says that for ALT and/or AST greater than 5 times upper limit of normal or total bilirubin greater than 3 times the upper limit of normal to withhold treatment until liver function tests returned to baseline or ALT and AST less than or equal to 2.5 times the upper limit of normal and total bilirubin less than or equal to 1.5 times the upper limit of normal and then reinitiate at 750 mg daily dose of Zytiga.  For recurrent hepatotoxicity on 750 mg daily Zytiga, withhold treatment until liver functions returned to baseline or ALT and AST less than 3-2.5 times upper limit of normal and total bilirubin less than or equal to 1.5 times the upper limit of normal then reinitiate at 500 mg daily Zytiga dose.  If has recurrent hepatotoxicity on 500 mg dose, then discontinue treatment.  If has ALT greater than 3 times the upper limit of normal along with total bilirubin greater than 2 times the upper limit of normal in the absence of other contributing causes or biliary obstruction, permanently discontinue Zytiga.  Based on these recommendations, I would continue current doses but we will watch with serial  labs monthly and repeat his imaging again in 3 months but clinically he is doing wonderfully with excellent response to Taxotere and now on Zytiga prednisone plus Zometa along with Relugolix.    -6/23/2020 for [facility] oncology clinic follow-up: Having persistent and worsening pain above his left hip.  I will get an MRI of the left hip for further evaluation.  Otherwise we will continue therapy unchanged with Zytiga, prednisone and Zometa along with Relugolix.    -7/28/2021 [facility] oncology clinic follow-up: Patient with multiple somatic complaints including episodes of confusion, dizziness, decreased memory, agitation.  He reports ongoing episodes with difficulty swallowing.  Also most concerning for episodes of angina and chest pain for which he basically refused to seek emergency medical attention.  He has a history of coronary artery disease and stent placement.  He has not followed up with cardiology for many years.  I will get an MRI of the brain in light of his confusion, dizziness and agitation.  I will get him to gastroenterology for EGD in light of his dysphagia.  I have also put in a referral for cardiology.  I reemphasized to the patient, his wife who is with him today and his son who was on the telephone during our visit the importance of seeking out prompt medical attention when he is having chest pain or neurological concerns so that he can be evaluated.  The patient states that he basically refuses to go to the emergency room and if his family calls an ambulance he would not agree to go to the hospital.  For the time being, I have asked him to hold his Zytiga and prednisone until we can sort this out as Zytiga does have some cardiovascular risk.  There is likely no treatment for prostate cancer that does not carry some form of cardiovascular risk.  His PSA remains low at 0.014 yesterday.  He will continue relugolix for now.    Of note, some of these symptoms could also be due to his hypophosphatemia,  "phosphate level from yesterday was 1.5, I have sent in a prescription for K-Phos 3 times a day before meals for 10 days.  We will hold further Zometa until this is corrected.  I have also put in an order to check his vitamin D level.  He takes calcium and vitamin D daily.  Some of his symptoms also could be due to drug withdrawal as there was some confusion and difficulty getting his last prescription for methadone therefore he was without methadone for several days, he now has his prescription and is back on his pain medication.  He has an appointment with neurosurgery tomorrow in light of his ongoing back pain, MRI of the hip recently showed multiple bony lesions largest at the L5 vertebral body and left supra-acetabular region.  If no neurosurgical intervention recommended he may benefit from spot radiation as this is where a lot of his pain is coming from.  His wife had questions today regarding his staging and extent of disease.  We reviewed previous scans.  I did clarify with her as she used the words \"cancer free\" as she thought that is what she was told after his CT scans once he completed Taxotere in February.  I explained to her that even though his scans showed he had an excellent response with no clear areas of metastasis that did not mean he was cancer free, I explained to her that when you have metastatic cancer the treatment intent is palliative in nature and to control the disease but not to cure the disease.  She stated understanding.  We will see him back in 2 weeks for follow-up to sort through this and make further plan of care, again, I have asked him to hold Zytiga and prednisone until he sees us back, he will continue on his other medications including relugolix.    -7/30/2021 neurosurgery PA consultation.  MRI with and without contrast L5 ordered.    -8/3/2021 neurosurgery follow-up showed known sclerotic disease with new L5 abnormality without compression deformity or instability.  MRI brain " negative for metastasis.    -8/4/2021 office visit Dr. Fco Quintanilla radiation oncology coordinating with Dr. North neurosurgery for palliative radiation for MRI evidence of L5 and left supra acetabular painful lesions.  States that the patient's disease which is not secreting PSA is experiencing some progression and would benefit from 20 Gy in five fractions and other lesion 30 Gy in ten fractions. Patient offered to go to [place] for radiation but desired treatment in [place].    -8/10/2021 [facility] medical oncology follow-up visit: No chest pains at this junction.  Reviewed MRI brain and other MRIs reviewed by Dr. North and Dwight.  Due for EGD on 19th.  We will repeat his phosphorus along with his other labs continue Relugolix, Zytiga, prednisone, and he will continue radiation palliatively to the painful bony lesions with Dr. Quintanilla/Can.  He will see my nurse practitioner back in a few weeks to see how he is tolerating this and to follow-up on the phosphorus off of Zometa and she will order repeat CT chest abdomen pelvis with contrast and total body bone scan the end of September.I will see him back after that.    -9/8/2021 Zometa resumed.  PSA <0.10    -10/5/2021 [facility] medical oncology follow-up visit: followed-up with radiation oncology 9/21/2021.  Status post symptomatic L5 radiation 5 fractions 20 [place] completed 8/16/2021 with improvement in right hip pain.  9/2/2021 PSA less than 0.1.  9/29/2021 total body bone scan shows increased uptake left iliac bone slightly superior group of the left acetabulum with no additional foci of suspicious abnormality is unlikely treatment related with no new sites of active osseous metastasis.  CT chest abdomen pelvis with contrast shows stable borderline enlarged left periaortic nodes and dating sclerotic bone lesions.Continue Zytiga, prednisone, Relugolix, Zometa, repeat CT chest abdomen pelvis with contrast and total body bone scan the end of the year.  We will  follow PSA serially.    -11/17/2021 [facility] Oncology clinic follow-up:  Mr. Lugo overall is doing well.  He is tolerating therapy with Zytiga, prednisone and Relugolix along with Zometa which is given every 6 weeks.  PSA remains low at <0.1.  Has occasional low phosphorus, currently low at 1.7.  Serum calcium is normal at 8.9, normal creatinine 0.79 and normal CBC other than for platelets of 124.  I will hold Zometa for 1 month, I did send in a prescription for phosphorus replacement today.  He takes calcium and vitamin D.  I will see him back in 1 month for follow-up.  We will repeat restaging scans after that visit.    -12/15/2021 [facility] Oncology clinic follow-up: Mr. Lugo continues to do well on therapy with Zytiga, prednisone and Relugolix, his PSA remains low at <0.1.  He is continuing to have left lower back pain, he is working with palliative care and they have referred him also to pain management.  Pain management has talked to him about putting in a pain pump however he is leery of this, I told him that this was a safe and effective pain management technique and to certainly give consideration to their recommendations.  His phosphorus is improving however is still a little low, I will hold off on Zometa until we see him back in 1 month, we may end up only giving it every 12 weeks.  We will repeat restaging scans with CT chest, abdomen and pelvis along with total body bone scan prior to return and I have ordered those today.      -1/5/2022 CT chest abdomen pelvis with contrast [facility] showing stable left periaortic adenopathy and unchanged sclerotic thoracic, lumbar spine and pelvis lesions with no new or progressive disease in the chest abdomen or pelvis.  Total body bone scan shows no significant change and no new suspicious foci.  Stable posterior right acetabulum and left superior acetabulum and degenerative changes in the cervical spine, shoulders, acromioclavicular joints, sternoclavicular  joints, elbows, wrists, hands, thoracic spine, lumbosacral spine, sacroiliac joints, hips, knees, ankles, feet.    -1/11/2022 [facility] medical oncology hematology follow-up visit: I reviewed images and reports from his 1/5/2022 scans.  No active disease and PSA immmeasurably low on Zytiga, prednisone, Relugolix.  However, he has struggled with hypophosphatemia and is significantly fatigued with this.  Given that the bones are doing well and given that his phosphorus continues to remain consistently low at 2.2 in mid December, 1.7 mid November and 2.5 mid-September and as low as 1.5 back to July and the likelihood that this is related to his Zometa, given that his bones are stable overall, he will increase from 1200 mg up to 1600 mg of calcium and phosphate a day and we will hold his Zometa for the foreseeable future.  He will see my nurse practitioner back in a month to continue to monitor his phosphorus along with his calcium and other labs and will repeat his scans again in 3 months.  In addition, given his fatigue we will check his ACTH and cortisol though he is taking his prednisone with his Zytiga.  Though his electrolytes are normal, I will keep an eye on the cortisol and ACTH and have these labs not only drawn today but again just prior to return to my nurse practitioner on February 10.    -2/10/2022 [facility] Oncology clinic follow-up: Mr. Lugo overall is stable, no change in his health this past month.  I have reviewed his labs from 2/8/2022 and they are unremarkable, his phosphorus is now normal at 3.2. We are continuing to hold his Zometa, he last received Zometa on 10/5/2021.  He continues therapy with Zytiga, prednisone and Relugolix.  Dr. Rashid had ordered cortisol level and ACTH which are low, currently his ACTH is 2.8 and cortisol 3.08 however these are both done in the face of ongoing prednisone that he takes with his Zytiga.  We will get him to endocrinology for further evaluation for possible adrenal  insufficiency but will not interrupt his treatment in the interim.  He will need restaging scans again in April for a 3-month follow-up.  He will continue to follow with palliative care and pain management for his cancer related pain.  His PSA remains immeasurably low at <0.1 on 2/8/22.    -2/15/2022 went to emergency room with weakness, decreased appetite, myalgias in the setting of known COVID-19 testing positive a few days prior to this visit.  Phosphorus had been low in the past but was normal in the emergency room with unremarkable CBC and CMP.  Chest x-ray unremarkable saturating well on room air mildly hypertensive with dry mucosa.  Given 500 cc crystalloid.  Covid test positive.  Mild thrombocytopenia 136,000 and otherwise unremarkable.  Discharged home.    -3/3/2022 data:  PSA less than 0.1  CMP unremarkable  Cortisol 3.96 normal  ACTH 2.8 lower limit of normal 7.2  Phosphorus normal 2.6    -3/8/2022 [facility] medical oncology follow-up: Suspect big chunk of his fatigue was Covid.  Another part of the fatigue likely related to lack of testosterone.  Not hypophosphatemic off of Zometa.  ACTH running low while on prednisone not surprising but with normal cortisol and I doubt adrenal insufficient which is why he is on prednisone to avoid such while taking Zytiga.  Overall doing fairly well and with immeasurably low PSA, I will have labs done on him early April, see my nurse practitioner early May, and she will order repeat bone scan and CTs the end of May and I will see him back in June.  We will continue to hold the Zometa unless bone lesions progress given symptomatic prior hypophosphatemia on Zometa.  He will keep his appointment April 28 with Dr. Mir Duffy just to be sure that my take on his pituitary/adrenal axis assessment is accurate.    -4/28/2022 endocrinology consult Dr. Mir Duffy.  With low ACTH and cortisol on prednisone with Zytiga, the adequacy of prednisone cannot be assessed by measuring ACTH  or cortisol but is assessed by weight changes, blood pressure, blood sugar, potassium, overall sense of how the patient is doing.  Primary adrenal insufficiency with low ACTH and low cortisol would be typical for the situation as his blood sugar tends to run a little high and potassium a little low, he did not think increasing prednisone was necessary.  He put him on some potassium.  No follow-up scheduled, will see as needed.    -5/4/2022 [facility] Oncology clinic follow-up: Mr. Lugo continues on therapy with Zytiga and prednisone along with Relugolix.  His Zometa has been on hold due to previous hypophosphatemia.  There is some concern about potential adrenal insufficiency. He saw endocrinology, Dr. Mir Duffy on 4/28/2022.  I did review his office note and he stated that the low ACTH and low serum cortisol would be typical for Mr. Lugo's situation.  He further stated that the adequacy of prednisone dose cannot be assessed by measuring ACTH or cortisol but is rather assessed by the overall just altered how the patient is feeling-weight change, blood pressure, blood sugar, potassium, overall sense of wellbeing.  His potassium had been running a little low therefore they put him on potassium 10 mill equivalents daily. Of note, I do not see a future follow-up scheduled with endocrinology.  He is having night sweats, I am not sure if this is related.  His glucose was normal this morning at 107.  His weight is stable.  He will continue current treatment for his prostate cancer unchanged, we will continue to hold his Zometa.  Current phosphorus and magnesium are normal.  CBC and CMP from today are unremarkable, cortisol is normal.  PSA and ACTH are pending  In regards to his night sweats, he had taken clonidine previously 0.1 mg twice daily as needed, I did send in a short supply again to try and see if this helps.  He also is having indigestion despite being on omeprazole twice daily, I sent a prescription for Pepcid.   He had an EGD August 2021.  We will repeat restaging scans prior to return and I have ordered those today.     -5/24/2022 CT chest abdomen pelvis with contrast[place] negative.  Bone scan shows stable bone metastases.    - 5/25/2022 PSA less than 0.1, platelets 130,000, otherwise unremarkable CBC and CMP.  A.m. cortisol running low 1.2 with phosphorus low 2.3.    -5/31/2022 [facility] medical oncology follow-up: On Zytiga, prednisone, Relugolix, PSA remaining immeasurably low with stable bone scan and no visceral/paulino metastases.  Phosphorus still running low.  I have discontinued his potassium chloride and started potassium phosphate 500 mg 4 times a day.  Unless PSA rising, we will plan on repeating CTs and bone scan in 6 months and will follow with my nurse practitioner in the interim and if symptoms dictate or labs, will get back to Dr. Duffy for management of potential adrenal insufficiency but presently we will just see how he does with potassium phosphate and hopeful improvement in the phosphorus level with time.  We will continue to follow with palliative care for pain management    -7/6/2022 [facility] Oncology clinic follow-up: Mr. Lugo overall is doing well but continues to be troubled with fatigue and hot flashes.  For the most part he states he is able to do the things he wants to do, he continues to work but does have to take more rest periods.  I had a long discussion with him and his wife after reviewing his medications with them as they wanted to see if there was anything he could stop or adjust.  I discussed with him the need to continue on treatment for his prostate cancer even though his PSA remains immeasurably low and his scans look good but that if his medication is stopped the cancer would progress and over time it still has the potential to progress on therapy but would continue it as long as possible to keep his disease under control.  I explained this is like treating someone with  hypertension, you do not stop the antihypertensive just because the blood pressure normalizes.  They stated understanding.  There is no dose adjustment of Zytiga or prednisone that would minimize his symptoms, he is on the current standard recommended therapy.  Discussed with him that sometimes during times of stress we may need to increase his prednisone dose but for now would not change anything.  His phosphorus level is normal, we continue to hold his Zometa.  I did give him the option of holding his phosphorus for the next month and we will see what happens, if it drops we will start him back on it but may be at a lower dose rather than 4 times a day maybe twice a day.  For now he will continue therapy unchanged.  We will continue monitoring labs monthly.  No PSA was drawn this month but that is okay as his PSA has been running immeasurably low at <0.1, we will recheck next month with lab draw.  We will stop checking his ACTH and cortisol level every month, if he develops worsening symptoms I will repeat those.  He has not gotten a follow-up with endocrinology as of yet.    -8/3/2022 [facility] Oncology clinic follow-up:  Mr. Lugo is doing well as far as his prostate cancer goes with continued immeasurably low PSA of <0.1.  He continues on therapy with Zytiga and prednisone along with Relugolix.  His phosphorus is stable at 2.7 which was just slightly low by our reference range however was 2.71-month ago also which was normal on another labs reference range.  He has not taken his phosphorus for this past month as he thought it was making him more fatigued.  He really can tell no difference whether he was taking it or not.  I have asked him to try to go back on phosphorus twice a day rather than 4 times a day and see if this is tolerated and if we can keep his phosphorus level from dropping.  His biggest complaint today is flareup of his arthralgias and back pain.  He is following with pain management, Dr. Delgado  Robbie but is requesting a referral back to palliative medicine as he feels that no one is currently listening to him and they are just prescribing the same medications that are not effective according to his report.  He does have a follow-up with Dr. Avalos on 8/12/2022 but due to his current pain is not able to work until after he is seen and hopefully can get some better relief.  I have given him a work release until 15 August.  I have also put in a referral back to palliative care.  Also encouraged him to work with Dr. Avalos for help in resolving his issue.  Apparently they have recommended some type of a pump however he has been hesitant to that but likely would be helpful.  We will continue therapy unchanged.  We will continue monthly labs including phosphorus, we are not currently checking ACTH and cortisol monthly as Dr. Mir Duffy previously stated in his assessment on 4/28/2022 that the adequacy of prednisone cannot be assessed by measuring ACTH or cortisol but would be assessed by symptoms, see note from 4/28/2022.  He made no further follow-up appointments.  Fatigue is not worsening currently.  His glucose and potassium are  Normal.    -10/13/2022 [facility] medical oncology follow-up: Mr. Lugo is doing well.  He is tolerating treatment with Zytiga and prednisone along with Relugolix without any unusual side effects.  His PSA has remained immeasurably low at <0.1.  His phosphorus was slightly low at last visit.  He had been instructed to go back on phosphorus twice a day but he misunderstood and has not been taking it.  We will check labs today.  I will follow-up with results and notify him if he needs to restart the phosphorus.  His pain is better controlled since reestablishing with [facility].  We will repeat scans prior to next follow-up.  I have ordered CT chest abdomen pelvis along with bone scan.  We will see him back in 1 month.    -10/13/2022 PSA less than 0.014.With unremarkable  CBC and CMP.  Phosphorus still a little low 2.3.    -11/4/2022 CT abdomen pelvis chest showed no evidence of disease progression with stable sclerotic bone lesions.  Bone scan stable right greater than left acetabular metastasis.  Stable bone metastases.    -11/8/2022 [facility] oncology telemedicine follow-up: Patient states that he feels achy all over with low-grade temps and a cough mildly productive.  Has an appointment later today to see his primary care for testing for flu and COVID.  Suggested that if he were positive for COVID that he get Paxlovid and that if he were positive for flu that he get Tamiflu and to discuss this with his primary care.  From the prostate cancer side, his PSA is immeasurably low with stable bone metastases and no evidence of progression.  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  We will continue the Relugolix, Zytiga, prednisone and he will see my nurse practitioner 12/7/2022.  Would repeat CTs and bone scan in May.    -1/25/2023 [facility] Oncology medicine follow-up: Mr. Lugo is having worsening fatigue and muscle aches.  He does have adrenal insufficiency on Zytiga and prednisone for his prostate cancer, he saw endocrinology in light of low ACTH back in April 2022.  At that time there was no recommendation other than for monitoring him for his overall wellbeing and labs.  He denies any fevers or chills.  His labs as shown above are unremarkable, his CBC and CMP are normal other than for glucose of 112, PSA remains low at <0.1.  He has no new pain or any symptoms concerning for progression of his prostate cancer.  I will get him back to endocrinology to see if they feel any dose adjustment of his prednisone would be helpful in his symptoms.  Currently he is on 5 mg a day with his Zytiga.  I did not repeat his ACTH or cortisol as they would be expected to be low in this situation.  His potassium is normal, he has had no weight loss or change  in his appetite.  Blood sugar is reasonable.  His only complaint currently is worsening fatigue and muscle aches.  He will continue treatment unchanged with Zytiga, prednisone and relugolix.  I will see him back in 1 month for follow-up.  We will repeat his restaging scans in May.    -3/1/2023 [facility] Oncology clinic follow-up:  His PSA remains low at <0.1 on treatment with Zytiga and prednisone along with relugolix however he continues to complain of significant fatigue and muscle aches not improving with time.  He states that his quality of life is affected as he is not able to do any work activities due to the fatigue.  He did see endocrinology again and they have increased his prednisone from 5 mg daily up to 7.5 mg daily however so far this has not made any difference in how he feels.  He is supposed to get back in touch with them to let them know how he is doing and to see if there is any other adjustments needed. His labs are unremarkable with normal thyroid function, CMP is normal other than for glucose of 156.  They did check hemoglobin A1c to look for diabetes however that was normal at 5.7.  CMP is unremarkable with just very mild anemia with hemoglobin of 12 and hematocrit 36.1% which would not account for his fatigue.  He does have hot flashes that are fairly significant, I will try venlafaxine as suggested by Dr. Duffy and I appreciate the recommendation.  We will start at 37.5 mg daily and if tolerated he can increase to 75 mg after 1-2 weeks.  I will see him back in 1 months for follow-up.  Plan to repeat scans late April/early May.  His prostate cancer seems under good control with current regimen however I am not sure how long he can tolerate this due to the side effects.    -3/30/2023 [facility] Oncology clinic follow-up:  Mr. Lugo continues to deal with fatigue.  He appears more cushingoid today, he continues on treatment with Zytiga, prednisone and relugolix.  Prednisone dose currently is 7.5 mg  daily.  This has not made any difference in his energy level.  PSA remains immeasurably low at <0.014.  CBC shows new onset of microcytic hypochromic anemia with hemoglobin of 11, hematocrit 34.4%, MCV 75.4, MCH 24.1, WBC is normal at 5.81 with normal platelet count 181,000 and normal differential.  CMP is unremarkable, it is normal other than for glucose of 142.  We will get him to Dr. Gomez for an EGD and colonoscopy for further evaluation in regards to his new onset of anemia.  He has no associated GI symptoms otherwise except for 1 day a few weeks ago he does report that he had fairly sudden onset of vomiting but this was an isolated incident on that day and has not reoccurred since.  Continues to follow with palliative care for management of his chronic back pain.  We will repeat restaging CT chest, abdomen and pelvis along with total body bone scan prior to return and I have ordered that today.    -3/30/2023 Ferritin low 12.3 with iron low 27 and saturation low 5% with total iron binding capacity 511.  3/31/2023 ferrous sulfate 325 mg twice a day every other day with vitamin C started.    - 4/12/2023 CT chest abdomen pelvis with contrast showed no progressive disease with stable sclerotic bone lesions.  Bone scan showed single identifiable bone metastasis stable right acetabulum    -4/18/2023 [facility] hematology oncology follow-up: He now has microcytic iron deficiency anemia which is new and concerning.  Gastroenterologic evaluation has been ordered but not performed and is mandatory.  Continue ferrous sulfate 325 mg twice a day every other day with vitamin C to improve absorption and we will follow his CBC along with his usual labs monthly on Zytiga, Relugolix, prednisone 7.5 mg.  He follows with Dr. Duffy with adrenal insufficiency.  He will see my nurse practitioner back in a month to see what GI has found and to watch serial CBC along with his other labs including PSA.  I would repeat CT chest abdomen  pelvis again in 6 months with contrast along with bone scan and sooner if PSA rises.    -5/3/2023 EGD and colonoscopy with Dr. Alexander Gomez: Superficial erosions in the stomach with gastritis.  Small polyps removed, dilated internal hemorrhoids.  Pathology pending.  Recommend repeat colonoscopy in 5 years.    -5/17/2023 [facility] Oncology clinic follow-up:  Naveen overall is doing well on current therapy with Zytiga, prednisone and relugolix.  PSA remains immeasurably low at <0.014.  He feels his hip pain is getting worse with time.  It makes it difficult to enjoy activities with his grandchildren as it is worse when he stands for any length of time or tries to carry any weight.  I reviewed his bone scan from April which was stable, we also reviewed previous MRI of the hips from May 2021 that showed moderate bilateral hip osteoarthritis.  I offered to repeat MRI of his hips for evaluation to see if there has been any worsening of his osteoarthritis and also then referral to orthopedics for any recommended intervention however at this time he defers.  He will let me know if he changes his mind.  He also follows with palliative care for management of his cancer related pain.  We will continue therapy unchanged.  We will repeat restaging scans in October unless symptoms dictate the need to do so sooner.  He was recently found to be iron deficient, he had an EGD and colonoscopy on 5/3/2023 with Dr. Gomez, EGD showed superficial erosions in the stomach with gastritis, he continues on omeprazole.  He had small polyps removed from the colon and recommendation to repeat colonoscopy in 5 years.  He is on oral iron along with vitamin C every other day and is tolerating that well.  CBC from yesterday shows hemoglobin now normal at 14.2 and hematocrit 43.2%, MCV normal at 27.0.     Prostate cancer metastatic to bone   8/30/2020 -  Other Event    PSA 10.8     9/15/2020 Initial Diagnosis    Prostate cancer metastatic to bone  (CMS/HCC)     9/15/2020 Biopsy    Prostate needle core biopsy     9/24/2020 Imaging    Total body bone scan: 4 abnormal areas of increased activity consistent with osteoblastic metastasis, abnormal foci of activity seen in the T12 vertebral body, L1 vertebral body, roof of the left acetabulum, and acetabulum medially on the right.  CT chest: Stable sclerotic metastatic lesions within the T12 and L1 vertebrae.  No other evidence of metastatic disease to the chest.  Stable mild splenomegaly and subcentimeter periaortic retroperitoneal lymph nodes.  CT abdomen and pelvis: Diffuse bladder wall thickening with mild perivesicular fat stranding.  Interval development of several sclerotic lesions in the spine and left iliac bone.     10/13/2020 Cancer Staged    Staging form: Bone - Appendicular Skeleton, Trunk, Skull, And Facial Bones, AJCC 8th Edition  - Clinical: Stage IVB (cT3, cN0, cM1b, G3) - Signed by Ishmael Rashid MD on 10/13/2020     11/3/2020 - 10/5/2021 Chemotherapy    OP SUPPORTIVE Zoledronic Acid Q42d       11/3/2020 - 2/18/2021 Chemotherapy    OP PROSTATE DOCEtaxel       1/4/2021 Imaging    CT chest abdomen pelvis with contrast and whole-body bone scan shows improving less metabolically active bone metastases.  Stable sclerotic T12, L1, and L2 vertebral bodies and bilateral pelvic bones on bone windows with stable subcentimeter para-aortic lymph nodes and no definite progression in the chest abdomen or pelvis.  PSA down to 0.275 after 3 courses of Taxotere.     3/4/2021 Imaging    CT chest, abdomen and pelvis stable, no evidence of disease progression. Total body bone scan with decreased/near resolution of abnormal increased radiotracer uptake associated with the known sclerotic lesions in the thoracolumbar spine and bony pelvis.  No evidence of new osteoblastic metastases.     3/4/2021 Imaging    CT chest abdomen pelvis with contrast is negative save for stable sclerotic bone lesions and the bone scan shows  near resolution of prior bony abnormalities associated with the known sclerotic lesions in the thoracolumbar spine and bony pelvis.  2/17/2021 PSA down to 0.1 from 1.37 October 2020.     3/9/2021 - 3/9/2021 Chemotherapy    OP SUPPORTIVE PROSTATE Relugolix       3/9/2021 -  Chemotherapy    OP PROSTATE Abiraterone / PredniSONE       3/10/2021 -  Chemotherapy    OP PROSTATE Abiraterone / PredniSONE       8/10/2021 - 8/16/2021 Radiation    Radiation OncologyTreatment Course:  Naveen Lugo received 2000 cGy in 5 fractions to L4-sacrum, left acetabulum and right pelvis via External Beam Radiation - EBRT.     11/16/2021 -  Chemotherapy    OP CENTRAL VENOUS ACCESS DEVICE ACCESS, CARE, AND MAINTENANCE (CVAD)       1/5/2022 Imaging    -1/5/2022 CT chest abdomen pelvis with contrast [place] [facility] showing stable left periaortic adenopathy and unchanged sclerotic thoracic, lumbar spine and pelvis lesions with no new or progressive disease in the chest abdomen or pelvis.  Total body bone scan shows no significant change and no new suspicious foci.  Stable posterior right acetabulum and left superior acetabulum and degenerative changes in the cervical spine, shoulders, acromioclavicular joints, sternoclavicular joints, elbows, wrists, hands, thoracic spine, lumbosacral spine, sacroiliac joints, hips, knees, ankles, feet.     5/24/2022 Imaging    CT chest abdomen pelvis with contrastFrankfort negative.  Bone scan shows stable bone metastases.     11/4/2022 Imaging    CT abdomen pelvis chest showed no evidence of disease progression with stable sclerotic bone lesions.  Bone scan stable right greater than left acetabular metastasis.  Stable bone metastases.     4/12/2023 Imaging     CT chest abdomen pelvis with contrast showed no progressive disease with stable sclerotic bone lesions.  Bone scan showed single identifiable bone metastasis stable right acetabulum         HISTORY OF PRESENT ILLNESS:  The patient is a 68 y.o. male,  here for follow up on management of prostate cancer currently on therapy with Zytiga, prednisone and relugolix.  Naveen reports that for the past few weeks he has had intermittent episodes of vomiting after he eats or even sometimes after he drinks water.  He also reports some esophageal pain when he takes his pills.  He denies any constipation, he reports that his bowel movements are regular.  Sometimes mild stomach discomfort but no ongoing pain.  The nausea seems to be fairly persistent and he reports the vomiting often comes out of nowhere.  He has had a decreased appetite, he has lost 8 pounds in the past month.  He saw his primary care provider yesterday for these concerns and has been referred to gastroenterology, he is waiting to hear about an appointment.  Continues to have back and hip pain which is not new.  He feels his pain has been exacerbated more with these other symptoms.  He also reports some dizziness, and his primary care provider has placed him on meclizine for vertigo.      Past Medical History:   Diagnosis Date    Bone cancer     History of radiation therapy 08/16/2021    L4 through sacrum, left acetabulum, right pelvis    Nephrolithiasis     Opioid use disorder, mild, on maintenance therapy (HCC)     Prostate cancer      Past Surgical History:   Procedure Laterality Date    CHOLECYSTECTOMY      CORONARY STENT PLACEMENT  206 & 2011    HERNIA REPAIR  1986    THORACIC DISCECTOMY  1994       Allergies   Allergen Reactions    Cymbalta [Duloxetine Hcl] Dizziness    Gabapentin Nausea Only    Lyrica [Pregabalin] Nausea And Vomiting and Dizziness       Family History and Social History reviewed and changed as necessary    REVIEW OF SYSTEM:   Nausea and occasional vomiting  Intermittent esophageal pain  Dizziness    PHYSICAL EXAM:  Well-developed, well-nourished appearing male in no distress   Heart regular rate and rhythm  Lungs clear to auscultation bilaterally, respirations even and  "unlabored      Vitals:    09/14/23 1122   BP: 138/90   Pulse: 107   Resp: 18   Temp: 98.2 °F (36.8 °C)   SpO2: 94%   Weight: 86.6 kg (191 lb)   Height: 175.3 cm (69\")     Vitals:    09/14/23 1122   PainSc:   8   PainLoc: Hip  Comment: hips and lower back          ECOG score: 1           Vitals reviewed.  Labs reviewed    ECOG: (1) Restricted in Physically Strenuous Activity, Ambulatory & Able to Do Work of Light Nature    Lab Results   Component Value Date    HGB 14.9 08/17/2023    HCT 43.5 08/17/2023    MCV 85.3 08/17/2023     08/17/2023    WBC 7.72 08/17/2023    NEUTROABS 6.27 08/17/2023    LYMPHSABS 1.02 08/17/2023    MONOSABS 0.33 08/17/2023    EOSABS 0.05 08/17/2023    BASOSABS 0.01 08/17/2023       Lab Results   Component Value Date    GLUCOSE 138 (H) 08/17/2023    BUN 8 08/17/2023    CREATININE 0.77 08/17/2023     08/17/2023    K 3.5 08/17/2023     08/17/2023    CO2 27.5 08/17/2023    CALCIUM 9.2 08/17/2023    PROTEINTOT 7.0 02/14/2022    ALBUMIN 3.9 08/17/2023    BILITOT 0.5 08/17/2023    ALKPHOS 86 08/17/2023    AST 16 08/17/2023    ALT 15 08/17/2023     Lab Results   Component Value Date    PSA <0.014 08/17/2023    PSA <0.014 07/20/2023    PSA <0.1 06/14/2023    PSA <0.014 05/16/2023    PSA <0.1 02/21/2023    PSA <0.1 01/24/2023             ASSESSMENT & PLAN:  1.  Stage IVb grade group 4 metastatic prostate cancer with sclerotic bone lesions on CT and bone scan and history of prostatic hypertrophy.  Good response to Taxotere ending 2/18/2021 Relugolix, followed by Zytiga prednisone with L4/sacrum, left acetabulum, and right pelvis external beam radiation August 2021.  Hypophosphatemia on Zometa.  Zometa held as of October 2021.  2.  Kidney stone  3.  Polyps  4.  Probable drug-induced hypophosphatemia from Zometa, drug stopped after 10/5/2021 dose  5.  Cancer related pain seeing palliative care  6.  Fatigue  7.  Covid 2/2022  8.  Iron deficiency anemia  -5/3/2023 EGD and colonoscopy with " Dr. Alexander Gomez: Superficial erosions in the stomach with gastritis.  Small polyps removed, dilated internal hemorrhoids.  Pathology pending.  Recommend repeat colonoscopy in 5 years.  9.  Chronic back and hip pain  10.  Nausea and vomiting with weight loss  11.  Dizziness    -9/30/2020 office note from Dr. Ronen Reynaga indicates patient with PSA 10.8.  Underwent TRUS prostate biopsy 9/15/2020.  Adenocarcinoma the prostate Sarika 4+4 = 8 in 1 out of 12 cores and 4+3 = 7 and 10 out of 12 cores and 3+4 = 7 in 1 out of 12 cores.  Perineural invasion.  Extraprostatic extension into the fat seen on 2 biopsies of the right lateral mid and right apex.  9/24/2020 CT chest and whole-body bone scan showed T12, L1, left acetabular, right medial acetabular metastases with no lung metastases.  He started androgen deprivation therapy with Casodex with plans for Lupron to start in 1 to 2 weeks for clinical T3 N0 M1 metastatic prostate cancer.  Review of official report from [facility] 9/24/2020 bone scan mentions T12, L1, roof of left acetabulum and medial right acetabular osteoblastic metastases.  CT chest with contrast that same day showed mild splenomegaly 14.2 cm with stable periaortic nodes compared to CT December 2015 with no lung metastases.  Sclerotic abnormality within the T12 vertebral body, L1 vertebral body extending into the pedicle unchanged.  8/28/2020 CT abdomen pelvis report from [facility] reviewed and mentions normal spleen size.  Punctate nonobstructing kidney stone, no paulino enlargement, diffuse bladder wall thickening with mild perivesicular fat stranding, 2.1 cm sclerotic left iliac bone above the left acetabulum new compared to 2015 as well as 1.5 cm faintly sclerotic focus in the right posterior acetabulum stable compared to prior CT 2015 as well as a 1.6 cm T12 and inferior vertebral body sclerotic lesion and a 1.9 cm left posterior lateral L1 vertebral body abnormality extending to  the pedicle.  -10/13/2020 initial [facility] medical oncology consultation: With 1 Sarika score 8, 4+4 lesion that would make him a grade group 4 with stage IVb disease given radiographic evidence of sclerotic bone metastasis on bone scan and CT.  Needs genetic testing given metastatic prostate cancer and this is also important as, were he to have mismatch repair mutation then we would have options for PDL 1 inhibitors and were he BRCA mutated would have options for PARP inhibitors in the future.  Systemic therapy for castration naïve prostate cancer or N1 disease should be with apalutamide or Abiraterone or enzalutamide all of which are category 1.  He does not have any visceral metastases.  According to his imaging he has T12, L1, left acetabular, right acetabular, and left iliac bony involvement.  That means he would have 4 or more bone metastases with at least one metastasis (i.e. the acetabular lesions bilaterally) beyond the pelvis/vertebral, for which this would be considered high-volume disease for which 6 cycles of docetaxel 75 mg/m² x 6 cycles followed by Zytiga and prednisone would be reasonable along with Zometa every 6 weeks for bone stabilization.  He will do chemo preparation visit with my nurse practitioner prior to start chemotherapy with Taxotere and Zometa in 2 weeks and he is already received Casodex in preparation for Lupron shot he received on 10/12/2020 with Dr. Reynaga.  We will get him to Dr. Alexander Gomez who has done his polypectomies in the past for port placement and we will get genetic counseling started.  Following the 6 courses of Taxotere per the [place] trial criteria, I would then give him an indefinite Zytiga and prednisone and Zometa until progression or toxicity dictates.    -12/16/2019 COVID-19 antigen test negative    -12/29/2020 PSA 0.275 with absolute neutrophil count 700 and creatinine 0.76 with normal liver enzymes and electrolytes.  Normal magnesium and phosphorus and  urine drug screen positive for buprenorphine and opiates follow-up with palliative care.    -1/4/2021 CT chest abdomen pelvis with contrast and whole-body bone scan shows improving less metabolically active bone metastases.  Stable sclerotic T12, L1, and L2 vertebral bodies and bilateral pelvic bones on bone windows with stable subcentimeter para-aortic lymph nodes and no definite progression in the chest abdomen or pelvis.    -1/5/2021 [facility] [place], operated by [facility] medical oncology follow-up visit: I reviewed the images and reports thereof of the above CT and bone scan which shows good response to Taxotere x3 courses with a PSA down to 0.275 from a baseline of 10.  Get another 3 courses of Taxotere, continue his Xgeva, and then following that we will switch to Zytiga and prednisone.  He is working with palliative care on his bone pain but on his current dose of fentanyl patch he says his pain is still not adequately controlled he will contact them.  Dexamethasone prescription was refilled.  We will see my nurse practitioner back in 3 weeks for course #5 of 6 total courses and then we will reimage with CT chest abdomen pelvis and bone scan before going to the Zytiga prednisone.    -3/4/2021 CT chest abdomen pelvis with contrast is negative save for stable sclerotic bone lesions and the bone scan shows near resolution of prior bony abnormalities associated with the known sclerotic lesions in the thoracolumbar spine and bony pelvis.    2/17/2021 PSA down to 0.1 from 1.37 October 2020.  3/9/2021 PSA 0.1    -5/26/2021 CT chest abdomen pelvis with contrast shows stable to slightly underlying increase low-density left para-aortic node.  Bone lesions stable.  Whole-body bone scan stable to decrease activity of known thoracolumbar and bony pelvic involvement.  PSA less than 0.1  , AST 74, bilirubin 0.5.       -6/1/2021 [facility], [place], operated by [facility] medical oncology follow-up visit: I reviewed the above data with him and images thereof.  Bones are stable.  No  significant adenopathy.  PSA immeasurably low.     upper limit of normal 69 and AST 74 upper limit of normal 46.  Hence, neither is more than double the upper limit of normal with no ascites and no encephalopathy and normal albumin and bilirubin, despite the elevation of liver enzymes, he has child Abrams score A.  Package insert for Abiraterone says that for ALT and/or AST greater than 5 times upper limit of normal or total bilirubin greater than 3 times the upper limit of normal to withhold treatment until liver function tests returned to baseline or ALT and AST less than or equal to 2.5 times the upper limit of normal and total bilirubin less than or equal to 1.5 times the upper limit of normal and then reinitiate at 750 mg daily dose of Zytiga.  For recurrent hepatotoxicity on 750 mg daily Zytiga, withhold treatment until liver functions returned to baseline or ALT and AST less than 3-2.5 times upper limit of normal and total bilirubin less than or equal to 1.5 times the upper limit of normal then reinitiate at 500 mg daily Zytiga dose.  If has recurrent hepatotoxicity on 500 mg dose, then discontinue treatment.  If has ALT greater than 3 times the upper limit of normal along with total bilirubin greater than 2 times the upper limit of normal in the absence of other contributing causes or biliary obstruction, permanently discontinue Zytiga.  Based on these recommendations, I would continue current doses but we will watch with serial labs monthly and repeat his imaging again in 3 months but clinically he is doing wonderfully with excellent response to Taxotere and now on Zytiga prednisone plus Zometa along with Relugolix.    -6/23/2020 for [facility] follow-up: Having persistent and worsening pain above his left hip.  I will get an MRI of the left hip for further evaluation.  Otherwise we will continue therapy unchanged with Zytiga, prednisone and Zometa along with Relugolix.    -7/28/2021  [facility] oncology clinic follow-up: Patient with multiple somatic complaints including episodes of confusion, dizziness, decreased memory, agitation.  He reports ongoing episodes with difficulty swallowing.  Also most concerning for episodes of angina and chest pain for which he basically refused to seek emergency medical attention.  He has a history of coronary artery disease and stent placement.  He has not followed up with cardiology for many years.  I will get an MRI of the brain in light of his confusion, dizziness and agitation.  I will get him to gastroenterology for EGD in light of his dysphagia.  I have also put in a referral for cardiology.  I reemphasized to the patient, his wife who is with him today and his son who was on the telephone during our visit the importance of seeking out prompt medical attention when he is having chest pain or neurological concerns so that he can be evaluated.  The patient states that he basically refuses to go to the emergency room and if his family calls an ambulance he would not agree to go to the hospital.  For the time being, I have asked him to hold his Zytiga and prednisone until we can sort this out as Zytiga does have some cardiovascular risk.  There is likely no treatment for prostate cancer that does not carry some form of cardiovascular risk.  His PSA remains low at 0.014 yesterday.  He will continue relugolix for now.    Of note, some of these symptoms could also be due to his hypophosphatemia, phosphate level from yesterday was 1.5, I have sent in a prescription for K-Phos 3 times a day before meals for 10 days.  We will hold further Zometa until this is corrected.  I have also put in an order to check his vitamin D level.  He takes calcium and vitamin D daily.  Some of his symptoms also could be due to drug withdrawal as there was some confusion and difficulty getting his last prescription for methadone therefore he was without methadone for several days, he now  "has his prescription and is back on his pain medication.  He has an appointment with neurosurgery tomorrow in light of his ongoing back pain, MRI of the hip recently showed multiple bony lesions largest at the L5 vertebral body and left supra-acetabular region.  If no neurosurgical intervention recommended he may benefit from spot radiation as this is where a lot of his pain is coming from.  His wife had questions today regarding his staging and extent of disease.  We reviewed previous scans.  I did clarify with her as she used the words \"cancer free\" as she thought that is what she was told after his CT scans once he completed Taxotere in February.  I explained to her that even though his scans showed he had an excellent response with no clear areas of metastasis that did not mean he was cancer free, I explained to her that when you have metastatic cancer the treatment intent is palliative in nature and to control the disease but not to cure the disease.  She stated understanding.  We will see him back in 2 weeks for follow-up to sort through this and make further plan of care, again, I have asked him to hold Zytiga and prednisone until he sees us back, he will continue on his other medications including relugolix.    -7/30/2021 neurosurgery PA consultation.  MRI with and without contrast L5 ordered.    -8/3/2021 neurosurgery follow-up showed known sclerotic disease with new L5 abnormality without compression deformity or instability.  MRI brain negative for metastasis.    -8/4/2021 office visit Dr. Fco Quintanilla radiation oncology coordinating with Dr. Can forde for palliative radiation for MRI evidence of L5 and left supra acetabular painful lesions.  States that the patient's disease which is not secreting PSA is experiencing some progression and would benefit from 20 Gy in five fractions and other lesion 30 Gy in ten fractions. Patient offered to go to [place] for radiation but desired treatment in " [place].    -8/10/2021 [facility] medical oncology follow-up visit: No chest pains at this junction.  Reviewed MRI brain and other MRIs reviewed by Dr. North and Dwight.  Due for EGD on 19th.  We will repeat his phosphorus along with his other labs continue Relugolix, Zytiga, prednisone, and he will continue radiation palliatively to the painful bony lesions with Dr. Quintanilla/Can.  He will see my nurse practitioner back in a few weeks to see how he is tolerating this and to follow-up on the phosphorus off of Zometa and she will order repeat CT chest abdomen pelvis with contrast and total body bone scan the end of September.I will see him back after that.    -9/8/2021 Zometa resumed.  PSA <0.10    -10/5/2021 [facility] medical oncology follow-up visit: followed-up with radiation oncology 9/21/2021.  Status post symptomatic L5 radiation 5 fractions 20 [place] completed 8/16/2021 with improvement in right hip pain.  9/2/2021 PSA less than 0.1.  9/29/2021 total body bone scan shows increased uptake left iliac bone slightly superior group of the left acetabulum with no additional foci of suspicious abnormality is unlikely treatment related with no new sites of active osseous metastasis.  CT chest abdomen pelvis with contrast shows stable borderline enlarged left periaortic nodes and dating sclerotic bone lesions.Continue Zytiga, prednisone, Relugolix, Zometa, repeat CT chest abdomen pelvis with contrast and total body bone scan the end of the year.  We will follow PSA serially.    -11/17/2021 [facility] clinic follow-up:  Mr. Lugo overall is doing well.  He is tolerating therapy with Zytiga, prednisone and Relugolix along with Zometa which is given every 6 weeks.  PSA remains low at <0.1.  Has occasional low phosphorus, currently low at 1.7.  Serum calcium is normal at 8.9, normal creatinine 0.79 and normal CBC other than for platelets of 124.  I will hold Zometa for 1 month, I did send in a prescription for phosphorus  replacement today.  He takes calcium and vitamin D.  I will see him back in 1 month for follow-up.  We will repeat restaging scans after that visit.    -12/15/2021 [facility] Oncology clinic follow-up: Mr. Lugo continues to do well on therapy with Zytiga, prednisone and Relugolix, his PSA remains low at <0.1.  He is continuing to have left lower back pain, he is working with palliative care and they have referred him also to pain management.  Pain management has talked to him about putting in a pain pump however he is leery of this, I told him that this was a safe and effective pain management technique and to certainly give consideration to their recommendations.  His phosphorus is improving however is still a little low, I will hold off on Zometa until we see him back in 1 month, we may end up only giving it every 12 weeks.  We will repeat restaging scans with CT chest, abdomen and pelvis along with total body bone scan prior to return and I have ordered those today.      -1/5/2022 CT chest abdomen pelvis with contrast [facility] showing stable left periaortic adenopathy and unchanged sclerotic thoracic, lumbar spine and pelvis lesions with no new or progressive disease in the chest abdomen or pelvis.  Total body bone scan shows no significant change and no new suspicious foci.  Stable posterior right acetabulum and left superior acetabulum and degenerative changes in the cervical spine, shoulders, acromioclavicular joints, sternoclavicular joints, elbows, wrists, hands, thoracic spine, lumbosacral spine, sacroiliac joints, hips, knees, ankles, feet.    -1/11/2022 [facility] medical oncology hematology follow-up visit: I reviewed images and reports from his 1/5/2022 scans.  No active disease and PSA immmeasurably low on Zytiga, prednisone, Relugolix.  However, he has struggled with hypophosphatemia and is significantly fatigued with this.  Given that the bones are doing well and given that his phosphorus  continues to remain consistently low at 2.2 in mid December, 1.7 mid November and 2.5 mid-September and as low as 1.5 back to July and the likelihood that this is related to his Zometa, given that his bones are stable overall, he will increase from 1200 mg up to 1600 mg of calcium and phosphate a day and we will hold his Zometa for the foreseeable future.  He will see my nurse practitioner back in a month to continue to monitor his phosphorus along with his calcium and other labs and will repeat his scans again in 3 months.  In addition, given his fatigue we will check his ACTH and cortisol though he is taking his prednisone with his Zytiga.  Though his electrolytes are normal, I will keep an eye on the cortisol and ACTH and have these labs not only drawn today but again just prior to return to my nurse practitioner on February 10.    -2/10/2022 [facility], [place] Oncology clinic follow-up: Mr. Lugo overall is stable, no change in his health this past month.  I have reviewed his labs from 2/8/2022 and they are unremarkable, his phosphorus is now normal at 3.2. We are continuing to hold his Zometa, he last received Zometa on 10/5/2021.  He continues therapy with Zytiga, prednisone and Relugolix.  Dr. Rashid had ordered cortisol level and ACTH which are low, currently his ACTH is 2.8 and cortisol 3.08 however these are both done in the face of ongoing prednisone that he takes with his Zytiga.  We will get him to endocrinology for further evaluation for possible adrenal insufficiency but will not interrupt his treatment in the interim.  He will need restaging scans again in April for a 3-month follow-up.  He will continue to follow with palliative care and pain management for his cancer related pain.  His PSA remains immeasurably low at <0.1 on 2/8/22.    -2/15/2022 went to emergency room with weakness, decreased appetite, myalgias in the setting of known COVID-19 testing positive a few days prior to this visit.  Phosphorus had  been low in the past but was normal in the emergency room with unremarkable CBC and CMP.  Chest x-ray unremarkable saturating well on room air mildly hypertensive with dry mucosa.  Given 500 cc crystalloid.  Covid test positive.  Mild thrombocytopenia 136,000 and otherwise unremarkable.  Discharged home.    -3/3/2022 data:  PSA less than 0.1  CMP unremarkable  Cortisol 3.96 normal  ACTH 2.8 lower limit of normal 7.2  Phosphorus normal 2.6    -3/8/2022 [facility] medical oncology follow-up: Suspect big chunk of his fatigue was Covid.  Another part of the fatigue likely related to lack of testosterone.  Not hypophosphatemic off of Zometa.  ACTH running low while on prednisone not surprising but with normal cortisol and I doubt adrenal insufficient which is why he is on prednisone to avoid such while taking Zytiga.  Overall doing fairly well and with immeasurably low PSA, I will have labs done on him early April, see my nurse practitioner early May, and she will order repeat bone scan and CTs the end of May and I will see him back in June.  We will continue to hold the Zometa unless bone lesions progress given symptomatic prior hypophosphatemia on Zometa.  He will keep his appointment April 28 with Dr. Mir Duffy just to be sure that my take on his pituitary/adrenal axis assessment is accurate.    -4/28/2022 endocrinology consult Dr. Mir Duffy.  With low ACTH and cortisol on prednisone with Zytiga, the adequacy of prednisone cannot be assessed by measuring ACTH or cortisol but is assessed by weight changes, blood pressure, blood sugar, potassium, overall sense of how the patient is doing.  Primary adrenal insufficiency with low ACTH and low cortisol would be typical for the situation as his blood sugar tends to run a little high and potassium a little low, he did not think increasing prednisone was necessary.  He put him on some potassium.  No follow-up scheduled, will see as needed.    -5/4/2022 [facility] Oncology clinic  follow-up: Mr. Lugo continues on therapy with Zytiga and prednisone along with Relugolix.  His Zometa has been on hold due to previous hypophosphatemia.  There is some concern about potential adrenal insufficiency. He saw endocrinology, Dr. Mir Duffy on 4/28/2022.  I did review his office note and he stated that the low ACTH and low serum cortisol would be typical for Mr. Lugo's situation.  He further stated that the adequacy of prednisone dose cannot be assessed by measuring ACTH or cortisol but is rather assessed by the overall just altered how the patient is feeling-weight change, blood pressure, blood sugar, potassium, overall sense of wellbeing.  His potassium had been running a little low therefore they put him on potassium 10 mill equivalents daily. Of note, I do not see a future follow-up scheduled with endocrinology.  He is having night sweats, I am not sure if this is related.  His glucose was normal this morning at 107.  His weight is stable.  He will continue current treatment for his prostate cancer unchanged, we will continue to hold his Zometa.  Current phosphorus and magnesium are normal.  CBC and CMP from today are unremarkable, cortisol is normal.  PSA and ACTH are pending  In regards to his night sweats, he had taken clonidine previously 0.1 mg twice daily as needed, I did send in a short supply again to try and see if this helps.  He also is having indigestion despite being on omeprazole twice daily, I sent a prescription for Pepcid.  He had an EGD August 2021.  We will repeat restaging scans prior to return and I have ordered those today.     -5/24/2022 CT chest abdomen pelvis with contrast[place] negative.  Bone scan shows stable bone metastases.    - 5/25/2022 PSA less than 0.1, platelets 130,000, otherwise unremarkable CBC and CMP.  A.m. cortisol running low 1.2 with phosphorus low 2.3.    -5/31/2022 [facility] medical oncology follow-up: On Zytiga, prednisone, Relugolix, PSA remaining  immeasurably low with stable bone scan and no visceral/paulino metastases.  Phosphorus still running low.  I have discontinued his potassium chloride and started potassium phosphate 500 mg 4 times a day.  Unless PSA rising, we will plan on repeating CTs and bone scan in 6 months and will follow with my nurse practitioner in the interim and if symptoms dictate or labs, will get back to Dr. Duffy for management of potential adrenal insufficiency but presently we will just see how he does with potassium phosphate and hopeful improvement in the phosphorus level with time.  We will continue to follow with palliative care for pain management    -7/6/2022 [facility] Oncology clinic follow-up: Mr. Lugo overall is doing well but continues to be troubled with fatigue and hot flashes.  For the most part he states he is able to do the things he wants to do, he continues to work but does have to take more rest periods.  I had a long discussion with him and his wife after reviewing his medications with them as they wanted to see if there was anything he could stop or adjust.  I discussed with him the need to continue on treatment for his prostate cancer even though his PSA remains immeasurably low and his scans look good but that if his medication is stopped the cancer would progress and over time it still has the potential to progress on therapy but would continue it as long as possible to keep his disease under control.  I explained this is like treating someone with hypertension, you do not stop the antihypertensive just because the blood pressure normalizes.  They stated understanding.  There is no dose adjustment of Zytiga or prednisone that would minimize his symptoms, he is on the current standard recommended therapy.  Discussed with him that sometimes during times of stress we may need to increase his prednisone dose but for now would not change anything.  His phosphorus level is normal, we continue to hold his Zometa.  I did  give him the option of holding his phosphorus for the next month and we will see what happens, if it drops we will start him back on it but may be at a lower dose rather than 4 times a day maybe twice a day.  For now he will continue therapy unchanged.  We will continue monitoring labs monthly.  No PSA was drawn this month but that is okay as his PSA has been running immeasurably low at <0.1, we will recheck next month with lab draw.  We will stop checking his ACTH and cortisol level every month, if he develops worsening symptoms I will repeat those.  He has not gotten a follow-up with endocrinology as of yet.    -8/3/2022 [facility], [place], operated by [facility] Oncology clinic follow-up:  Mr. Lugo is doing well as far as his prostate cancer goes with continued immeasurably low PSA of <0.1.  He continues on therapy with Zytiga and prednisone along with Relugolix.  His phosphorus is stable at 2.7 which was just slightly low by our reference range however was 2.71-month ago also which was normal on another labs reference range.  He has not taken his phosphorus for this past month as he thought it was making him more fatigued.  He really can tell no difference whether he was taking it or not.  I have asked him to try to go back on phosphorus twice a day rather than 4 times a day and see if this is tolerated and if we can keep his phosphorus level from dropping.  His biggest complaint today is flareup of his arthralgias and back pain.  He is following with pain management, Dr. Danny Avalos but is requesting a referral back to palliative medicine as he feels that no one is currently listening to him and they are just prescribing the same medications that are not effective according to his report.  He does have a follow-up with Dr. Avalos on 8/12/2022 but due to his current pain is not able to work until after he is seen and hopefully can get some better relief.  I have given him a work release until 15 August.  I have also put in a referral back to  palliative care.  Also encouraged him to work with Dr. Avalos for help in resolving his issue.  Apparently they have recommended some type of a pump however he has been hesitant to that but likely would be helpful.  We will continue therapy unchanged.  We will continue monthly labs including phosphorus, we are not currently checking ACTH and cortisol monthly as Dr. Mir Duffy previously stated in his assessment on 4/28/2022 that the adequacy of prednisone cannot be assessed by measuring ACTH or cortisol but would be assessed by symptoms, see note from 4/28/2022.  He made no further follow-up appointments.  Fatigue is not worsening currently.  His glucose and potassium are  Normal.    -10/13/2022 [facility] oncology follow-up: Mr. Lugo is doing well.  He is tolerating treatment with Zytiga and prednisone along with Relugolix without any unusual side effects.  His PSA has remained immeasurably low at <0.1.  His phosphorus was slightly low at last visit.  He had been instructed to go back on phosphorus twice a day but he misunderstood and has not been taking it.  We will check labs today.  I will follow-up with results and notify him if he needs to restart the phosphorus.  His pain is better controlled since reestablishing with [facility].  We will repeat scans prior to next follow-up.  I have ordered CT chest abdomen pelvis along with bone scan.  We will see him back in 1 month.    -10/13/2022 PSA less than 0.014.With unremarkable CBC and CMP.  Phosphorus still a little low 2.3.    -11/4/2022 CT abdomen pelvis chest showed no evidence of disease progression with stable sclerotic bone lesions.  Bone scan stable right greater than left acetabular metastasis.  Stable bone metastases.    -11/8/2022 [facility] medical oncology telemedicine follow-up: Patient states that he feels achy all over with low-grade temps and a cough mildly productive.  Has an appointment later today to see his primary care  for testing for flu and COVID.  Suggested that if he were positive for COVID that he get Paxlovid and that if he were positive for flu that he get Tamiflu and to discuss this with his primary care.  From the prostate cancer side, his PSA is immeasurably low with stable bone metastases and no evidence of progression.  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  We will continue the Relugolix, Zytiga, prednisone and he will see my nurse practitioner 12/7/2022.  Would repeat CTs and bone scan in May.    -1/25/2023 [facility] On[facility] medicine follow-up: Mr. Lugo is having worsening fatigue and muscle aches.  He does have adrenal insufficiency on Zytiga and prednisone for his prostate cancer, he saw endocrinology in light of low ACTH back in April 2022.  At that time there was no recommendation other than for monitoring him for his overall wellbeing and labs.  He denies any fevers or chills.  His labs as shown above are unremarkable, his CBC and CMP are normal other than for glucose of 112, PSA remains low at <0.1.  He has no new pain or any symptoms concerning for progression of his prostate cancer.  I will get him back to endocrinology to see if they feel any dose adjustment of his prednisone would be helpful in his symptoms.  Currently he is on 5 mg a day with his Zytiga.  I did not repeat his ACTH or cortisol as they would be expected to be low in this situation.  His potassium is normal, he has had no weight loss or change in his appetite.  Blood sugar is reasonable.  His only complaint currently is worsening fatigue and muscle aches.  He will continue treatment unchanged with Zytiga, prednisone and relugolix.  I will see him back in 1 month for follow-up.  We will repeat his restaging scans in May.    -3/1/2023 [facility] follow-up:  His PSA remains low at <0.1 on treatment with Zytiga and prednisone along with relugolix however he continues to complain of significant  fatigue and muscle aches not improving with time.  He states that his quality of life is affected as he is not able to do any work activities due to the fatigue.  He did see endocrinology again and they have increased his prednisone from 5 mg daily up to 7.5 mg daily however so far this has not made any difference in how he feels.  He is supposed to get back in touch with them to let them know how he is doing and to see if there is any other adjustments needed. His labs are unremarkable with normal thyroid function, CMP is normal other than for glucose of 156.  They did check hemoglobin A1c to look for diabetes however that was normal at 5.7.  CMP is unremarkable with just very mild anemia with hemoglobin of 12 and hematocrit 36.1% which would not account for his fatigue.  He does have hot flashes that are fairly significant, I will try venlafaxine as suggested by Dr. Duffy and I appreciate the recommendation.  We will start at 37.5 mg daily and if tolerated he can increase to 75 mg after 1-2 weeks.  I will see him back in 1 months for follow-up.  Plan to repeat scans late April/early May.  His prostate cancer seems under good control with current regimen however I am not sure how long he can tolerate this due to the side effects.    -3/30/2023 [facility] follow-up:  Mr. Lugo continues to deal with fatigue.  He appears more cushingoid today, he continues on treatment with Zytiga, prednisone and relugolix.  Prednisone dose currently is 7.5 mg daily.  This has not made any difference in his energy level.  PSA remains immeasurably low at <0.014.  CBC shows new onset of microcytic hypochromic anemia with hemoglobin of 11, hematocrit 34.4%, MCV 75.4, MCH 24.1, WBC is normal at 5.81 with normal platelet count 181,000 and normal differential.  CMP is unremarkable, it is normal other than for glucose of 142.  We will get him to Dr. Gomez for an EGD and colonoscopy for further evaluation in regards to his new  onset of anemia.  He has no associated GI symptoms otherwise except for 1 day a few weeks ago he does report that he had fairly sudden onset of vomiting but this was an isolated incident on that day and has not reoccurred since.  Continues to follow with palliative care for management of his chronic back pain.  We will repeat restaging CT chest, abdomen and pelvis along with total body bone scan prior to return and I have ordered that today.    -3/30/2023 Ferritin low 12.3 with iron low 27 and saturation low 5% with total iron binding capacity 511.  3/31/2023 ferrous sulfate 325 mg twice a day every other day with vitamin C started.    - 4/12/2023 CT chest abdomen pelvis with contrast showed no progressive disease with stable sclerotic bone lesions.  Bone scan showed single identifiable bone metastasis stable right acetabulum    -4/18/2023 [facility] follow-up: He now has microcytic iron deficiency anemia which is new and concerning.  Gastroenterologic evaluation has been ordered but not performed and is mandatory.  Continue ferrous sulfate 325 mg twice a day every other day with vitamin C to improve absorption and we will follow his CBC along with his usual labs monthly on Zytiga, Relugolix, prednisone 7.5 mg.  He follows with Dr. Duffy with adrenal insufficiency.  He will see my nurse practitioner back in a month to see what GI has found and to watch serial CBC along with his other labs including PSA.  I would repeat CT chest abdomen pelvis again in 6 months with contrast along with bone scan and sooner if PSA rises.    -5/3/2023 EGD and colonoscopy with Dr. Alexander Gomez: Superficial erosions in the stomach with gastritis.  Small polyps removed, dilated internal hemorrhoids.  Pathology pending.  Recommend repeat colonoscopy in 5 years.    -5/17/2023 [facility] follow-up:  Naveen overall is doing well on current therapy with Zytiga, prednisone and relugolix.  PSA remains immeasurably low  at <0.014.  He feels his hip pain is getting worse with time.  It makes it difficult to enjoy activities with his grandchildren as it is worse when he stands for any length of time or tries to carry any weight.  I reviewed his bone scan from April which was stable, we also reviewed previous MRI of the hips from May 2021 that showed moderate bilateral hip osteoarthritis.  I offered to repeat MRI of his hips for evaluation to see if there has been any worsening of his osteoarthritis and also then referral to orthopedics for any recommended intervention however at this time he defers.  He will let me know if he changes his mind.  He also follows with palliative care for management of his cancer related pain.  We will continue therapy unchanged.  We will repeat restaging scans in October unless symptoms dictate the need to do so sooner.  He was recently found to be iron deficient, he had an EGD and colonoscopy on 5/3/2023 with Dr. Gomez, EGD showed superficial erosions in the stomach with gastritis, he continues on omeprazole.  He had small polyps removed from the colon and recommendation to repeat colonoscopy in 5 years.  He is on oral iron along with vitamin C every other day and is tolerating that well.  CBC from yesterday shows hemoglobin now normal at 14.2 and hematocrit 43.2%, MCV normal at 27.0.    -7/20/2023 [facility] follow-up: Naveen is doing well on current therapy with Zytiga, prednisone and relugolix.  PSA in June remains low at <0.1.  Continues to deal with chronic pain in his back and hip.  Recently saw Dr. Nikolai Marks and had an injection in his hip and is hoping that it will eventually help with his pain.  He has no new pain.  We will continue current therapy unchanged.  We will repeat labs while he is here today.  I will refill his iron as his pharmacy has changed. Most recent CBC in June with hemoglobin of 14.7 and hematocrit 43.6%.  We will repeat restaging scans in  October.    -9/14/2023 [facility] follow-up: Naveen is tolerating his prostate cancer treatment with Zytiga, prednisone and relugolix.  PSA remains immeasurably low at <0.014 on 8/17/2023.  Currently he is having more GI issues with nausea and intermittent vomiting that occurs often without warning.  He had an EGD and colonoscopy with Dr. Gomez on 5/3/2023, EGD showed superficial erosions in the stomach with gastritis.  He does take omeprazole 40 mg twice daily.  He saw his PCP yesterday for these symptoms and has been referred to gastroenterologist at [facility] and he is awaiting to hear about that appointment.  Hopefully he can get in fairly quickly.  I told him that he should let us know if it is going to be a while before he can be seen and I will get him back to Dr. Gomez for consideration of repeat EGD.  We will get his CBC, CMP and PSA today.  His previous noted anemia had normalized, he currently is not taking oral iron as he ran out.  I will wait and see what his labs show today and likely hold off for now depending on his lab results until his GI issues can be resolved.  We will repeat his restaging CT chest, abdomen and pelvis along with bone scan in the next few weeks and I have ordered those today.  He is taking Zofran as needed for nausea, he is also on meclizine for vertigo.  He is going to follow-up with his PCP in a few weeks regarding his GI symptoms.    Return to clinic in 1 month for follow-up    I spent 30 minutes caring for Naveen on this date of service. This time includes time spent by me in the following activities: preparing for the visit, obtaining and/or reviewing a separately obtained history, performing a medically appropriate examination and/or evaluation, ordering medications, tests, or procedures, documenting information in the medical record, and care coordination.     Abena Velasquez, DHARMESH    09/14/2023

## 2023-09-15 LAB
ALBUMIN SERPL-MCNC: 4.1 G/DL (ref 3.5–5.2)
ALBUMIN/GLOB SERPL: 1.7 G/DL
ALP SERPL-CCNC: 88 U/L (ref 39–117)
ALT SERPL-CCNC: 17 U/L (ref 1–41)
AST SERPL-CCNC: 15 U/L (ref 1–40)
BASOPHILS # BLD AUTO: 0.07 10*3/MM3 (ref 0–0.2)
BASOPHILS NFR BLD AUTO: 0.7 % (ref 0–1.5)
BILIRUB SERPL-MCNC: 0.5 MG/DL (ref 0–1.2)
BUN SERPL-MCNC: 6 MG/DL (ref 8–23)
BUN/CREAT SERPL: 8.2 (ref 7–25)
CALCIUM SERPL-MCNC: 9.6 MG/DL (ref 8.6–10.5)
CHLORIDE SERPL-SCNC: 101 MMOL/L (ref 98–107)
CO2 SERPL-SCNC: 28.8 MMOL/L (ref 22–29)
CREAT SERPL-MCNC: 0.73 MG/DL (ref 0.76–1.27)
EGFRCR SERPLBLD CKD-EPI 2021: 99.1 ML/MIN/1.73
EOSINOPHIL # BLD AUTO: 0.12 10*3/MM3 (ref 0–0.4)
EOSINOPHIL NFR BLD AUTO: 1.2 % (ref 0.3–6.2)
ERYTHROCYTE [DISTWIDTH] IN BLOOD BY AUTOMATED COUNT: 13.8 % (ref 12.3–15.4)
GLOBULIN SER CALC-MCNC: 2.4 GM/DL
GLUCOSE SERPL-MCNC: 131 MG/DL (ref 65–99)
HCT VFR BLD AUTO: 45.2 % (ref 37.5–51)
HGB BLD-MCNC: 15.4 G/DL (ref 13–17.7)
IMM GRANULOCYTES # BLD AUTO: 0.02 10*3/MM3 (ref 0–0.05)
IMM GRANULOCYTES NFR BLD AUTO: 0.2 % (ref 0–0.5)
LYMPHOCYTES # BLD AUTO: 1.32 10*3/MM3 (ref 0.7–3.1)
LYMPHOCYTES NFR BLD AUTO: 13.4 % (ref 19.6–45.3)
MCH RBC QN AUTO: 29.4 PG (ref 26.6–33)
MCHC RBC AUTO-ENTMCNC: 34.1 G/DL (ref 31.5–35.7)
MCV RBC AUTO: 86.4 FL (ref 79–97)
MONOCYTES # BLD AUTO: 0.58 10*3/MM3 (ref 0.1–0.9)
MONOCYTES NFR BLD AUTO: 5.9 % (ref 5–12)
NEUTROPHILS # BLD AUTO: 7.75 10*3/MM3 (ref 1.7–7)
NEUTROPHILS NFR BLD AUTO: 78.6 % (ref 42.7–76)
NRBC BLD AUTO-RTO: 0 /100 WBC (ref 0–0.2)
PHOSPHATE SERPL-MCNC: 2.7 MG/DL (ref 2.5–4.5)
PLATELET # BLD AUTO: 203 10*3/MM3 (ref 140–450)
POTASSIUM SERPL-SCNC: 3.1 MMOL/L (ref 3.5–5.2)
PROT SERPL-MCNC: 6.5 G/DL (ref 6–8.5)
PSA SERPL-MCNC: <0.014 NG/ML (ref 0–4)
RBC # BLD AUTO: 5.23 10*6/MM3 (ref 4.14–5.8)
SODIUM SERPL-SCNC: 141 MMOL/L (ref 136–145)
WBC # BLD AUTO: 9.86 10*3/MM3 (ref 3.4–10.8)

## 2023-09-22 ENCOUNTER — SPECIALTY PHARMACY (OUTPATIENT)
Dept: ONCOLOGY | Facility: HOSPITAL | Age: 68
End: 2023-09-22
Payer: MEDICARE

## 2023-09-22 NOTE — PROGRESS NOTES
Specialty Pharmacy Refill Coordination Note     Naveen is a 68 y.o. male contacted today regarding refills of  abiraterone 1000mg PO QD and  relugolix 120mg PO QD specialty medication(s).    Medications will be mailed out on 9/25/23 for delivery on 9/26/23.    Specialty medication(s) and dose(s) confirmed: yes    Refill Questions      Flowsheet Row Most Recent Value   Changes to allergies? No   Changes to medications? No   New conditions since last clinic visit No   Unplanned office visit, urgent care, ED, or hospital admission in the last 4 weeks  No   How does patient/caregiver feel medication is working? Good   Financial problems or insurance changes  No   Since the previous refill, were any specialty medication doses or scheduled injections missed or delayed?  No   Does this patient require a clinical escalation to a pharmacist? No            Delivery Questions      Flowsheet Row Most Recent Value   Delivery method [employer]   Delivery address correct? Yes   Delivery phone number [phone]   Preferred delivery time? Anytime   Number of medications in delivery 2   Medication being filled and delivered relugolix and abiraterone   Doses left of specialty medications 7 days left of each   Is there any medication that is due not being filled? No   Supplies needed? No supplies needed   Cooler needed? No   Do any medications need mixed or dated? No   Additional comments no copay   Questions or concerns for the pharmacist? No   Explain any questions or concerns for the pharmacist n/a   Are any medications first time fills? No              Medication Adherence    Adherence tools used: watch   Other adherence tool: Clock - takes at same time each day, 7:45am   Support network for adherence: family member          Follow-up: 30 day(s)     Sushma Muro, Pharmacy Technician  Specialty Pharmacy Technician

## 2023-09-29 ENCOUNTER — OFFICE VISIT (OUTPATIENT)
Dept: PALLIATIVE CARE | Facility: CLINIC | Age: 68
End: 2023-09-29
Payer: MEDICARE

## 2023-09-29 VITALS
OXYGEN SATURATION: 98 % | DIASTOLIC BLOOD PRESSURE: 75 MMHG | HEART RATE: 101 BPM | BODY MASS INDEX: 28.75 KG/M2 | TEMPERATURE: 97.3 F | RESPIRATION RATE: 18 BRPM | SYSTOLIC BLOOD PRESSURE: 152 MMHG | WEIGHT: 194.7 LBS

## 2023-09-29 DIAGNOSIS — C79.51 PROSTATE CANCER METASTATIC TO BONE: Primary | ICD-10-CM

## 2023-09-29 DIAGNOSIS — R42 DIZZINESS: ICD-10-CM

## 2023-09-29 DIAGNOSIS — G89.3 CANCER RELATED PAIN: ICD-10-CM

## 2023-09-29 DIAGNOSIS — T40.2X5A THERAPEUTIC OPIOID INDUCED CONSTIPATION: ICD-10-CM

## 2023-09-29 DIAGNOSIS — G89.4 CHRONIC PAIN SYNDROME: ICD-10-CM

## 2023-09-29 DIAGNOSIS — C61 PROSTATE CANCER METASTATIC TO BONE: Primary | ICD-10-CM

## 2023-09-29 DIAGNOSIS — K59.03 THERAPEUTIC OPIOID INDUCED CONSTIPATION: ICD-10-CM

## 2023-09-29 RX ORDER — DEXTROMETHORPHAN HYDROBROMIDE AND PROMETHAZINE HYDROCHLORIDE 15; 6.25 MG/5ML; MG/5ML
SYRUP ORAL
COMMUNITY

## 2023-09-29 RX ORDER — CALCIUM CARBONATE 260MG(650)
TABLET,CHEWABLE ORAL
COMMUNITY
End: 2023-09-29

## 2023-09-29 RX ORDER — SENNOSIDES A AND B 8.6 MG/1
1 TABLET, FILM COATED ORAL DAILY
Qty: 90 TABLET | Refills: 3 | Status: SHIPPED | OUTPATIENT
Start: 2023-09-29

## 2023-09-29 RX ORDER — METHOCARBAMOL 500 MG/1
1000 TABLET, FILM COATED ORAL EVERY 8 HOURS
COMMUNITY
Start: 2023-05-17

## 2023-09-29 RX ORDER — ATORVASTATIN CALCIUM 10 MG/1
TABLET, FILM COATED ORAL
COMMUNITY

## 2023-09-29 RX ORDER — CALCIUM CARBONATE 260MG(650)
600 TABLET,CHEWABLE ORAL
COMMUNITY
Start: 2023-05-03 | End: 2023-09-29

## 2023-09-29 RX ORDER — VENLAFAXINE HYDROCHLORIDE 37.5 MG/1
TABLET, EXTENDED RELEASE ORAL
COMMUNITY

## 2023-09-29 RX ORDER — MECLIZINE HYDROCHLORIDE 25 MG/1
25 TABLET ORAL 3 TIMES DAILY PRN
Qty: 45 TABLET | Refills: 3 | Status: SHIPPED | OUTPATIENT
Start: 2023-09-29

## 2023-09-29 RX ORDER — HYDROMORPHONE HYDROCHLORIDE 8 MG/1
8 TABLET ORAL EVERY 4 HOURS PRN
Qty: 180 TABLET | Refills: 0 | Status: SHIPPED | OUTPATIENT
Start: 2023-09-30 | End: 2023-10-30

## 2023-09-29 RX ORDER — OXYCODONE 9 MG/1
CAPSULE, EXTENDED RELEASE ORAL
COMMUNITY
End: 2023-09-29 | Stop reason: SDUPTHER

## 2023-09-29 RX ORDER — SENNOSIDES 8.6 MG
CAPSULE ORAL
COMMUNITY
End: 2023-09-29 | Stop reason: SDUPTHER

## 2023-09-29 RX ORDER — RANOLAZINE 500 MG/1
TABLET, EXTENDED RELEASE ORAL
COMMUNITY

## 2023-09-29 RX ORDER — TAMSULOSIN HYDROCHLORIDE 0.4 MG/1
CAPSULE ORAL
COMMUNITY

## 2023-09-29 RX ORDER — CYCLOBENZAPRINE HCL 10 MG
10 TABLET ORAL 3 TIMES DAILY PRN
Qty: 90 TABLET | Refills: 0 | Status: SHIPPED | OUTPATIENT
Start: 2023-09-29

## 2023-09-29 RX ORDER — OXYCODONE 9 MG/1
9 CAPSULE, EXTENDED RELEASE ORAL EVERY 12 HOURS
Qty: 60 EACH | Refills: 0 | Status: SHIPPED | OUTPATIENT
Start: 2023-09-30 | End: 2023-10-30

## 2023-09-29 NOTE — TELEPHONE ENCOUNTER
I have reviewed patient's AGUSTIN report prior to prescribing Schedule II, III, and IV medications. Request # [id number]. Next refills for Xtampza 9 mg BID #60 and hydromorphone 8 mg q4h PRN #180 were sent to the pharmacy. The patient is scheduled to follow-up in 3 months.

## 2023-09-29 NOTE — PROGRESS NOTES
Palliative Clinic Note      Name: Naveen Lugo  Age: 68 y.o.  Sex: male  : 1955  MRN: [id number]  Date of Service: 2023   Medical Oncologist: Dr. Rashid    Subjective:    Chief Complaint: Chronic pain, nausea, dizziness    History of Present Illness: Naveen Lugo is a 68 y.o. male with past medical history significant for coronary artery disease, hypertension, hyperlipidemia, opioid use disorder, and metastatic prostate cancer who presents to the palliative clinic today as a follow up for pain and symptom management.     Treatment summary: He was diagnosed with prostate adenocarcinoma metastasized to the bone in 2020. Patient completed radiation with Dr. Quintanilla in 2021. He is currently on Zytiga, prednisone, and Relugolix. PSA remains low and no active disease on imaging. Patient recently diagnosed with new onset of microcytic anemia and started on iron supplementation. EGD/colonoscopy on 5/3/23 showed gastritis and a few small polyps with recommendations to repeat in 5 years. Next scans are scheduled for 10/2023.      Pain: History of opioid use disorder. Patient completed Suboxone taper in the past and was off of pain medication prior to cancer diagnosis. Several failed therapies including methadone, MS contin and fentanyl. Patient was referred to pain specialist, Dr. BARB Avalos in 3/2022. Patient transferred care back to the palliative clinic for uncontrolled hip and back pain in 2022. Current regimen includes of Xtampza 9 mg BID and hydromorphone 8 mg every 4 hours. The patient complains of worsening left hip pain over several months. Patient established care with orthopedic surgeon, Dr. Marks. Steroid injections in both hips did not improve the pain. Patient unable to tolerate gabapentin, pregabalin, and duloxetine in the past.      Symptoms: The patient complains of increased nausea and vomiting. His symptoms are being managed with  Zofran 4 mg q8h PRN. He is scheduled to see his GI  specialist in October to discuss this problem. He also complains of occasional dizziness. He is managing this with meclizine 25 mg q8h PRN. Regular bowel movements with Senna daily. He states his appetite has been good. No issues with sleep. Hot flashes have improved with the addition of venlafaxine (Effexor).    Pyschosocial: The patient lives with his wife. Endorses strong family support. He enjoys spending time with his grandson.  The patient is excited about an upcoming trip to [place] with his family to celebrate his anniversary. No personal history of mental illness. He denies anxiety or depression. Patient enjoys spending times outdoors. Increased stress lately due to wife's recent heart attack and the death of his sister.      Goals: Maximize comfort, optimize function & psychosocial wellbeing, and promote advanced care planning.    The following portions of the patient's history were reviewed and updated as appropriate: allergies, current medications, past family history, past medical history, past social history, past surgical history and problem list.    ORT-R: High risk   Aberrant behavior: abnormal UDS 9/7/22  Decisional capacity: Full  ECOG: (2) Ambulatory and capable of self care, unable to carry out work activity, up and about > 50% or waking hours   Palliative Performance Scale Score: 70%     Objective:    /75   Pulse 101   Temp 97.3 °F (36.3 °C) (Temporal)   Resp 18   Wt 88.3 kg (194 lb 11.2 oz)   SpO2 98%   BMI 28.75 kg/m²     Constitutional: Awake, alert, normal gait, sitting up in exam chair, in no acute distress  Eyes: PERRLA, EOMS intact  HENT: NCAT, face symmetric  Neck: Supple, trachea midline  Respiratory: Nonlabored respirations  Cardiovascular: RRR, no edema observed  Gastrointestinal: Soft, no guarding  Musculoskeletal: Moves all extremities   Psychiatric: Appropriate affect, cooperative  Neurologic: Oriented x 3, Cranial Nerves grossly intact to confrontation, speech  clear  Skin: Cool dry, no rashes or wounds appreciated     Medication Counts: Reviewed. See bottom of note for details. Brought medication.  No overuse or misuse evident.  I have reviewed the patient's [place] PDMP. AGUSTIN Req #[id number].   UDS: Last 4/3/23. Reviewed. Appropriate.     Assessment & Plan:    1. Prostate cancer metastatic to bone  - He is currently on Zytiga, prednisone, and Relugolix. PSA remains low and no active disease on imaging.    2. Cancer related pain  - Patient is appropriate for opioid therapy due to cancer related pain. Daily function and quality of life improved with current regimen. Continue Xtampza 9 mg BID and hydromorphone 8 mg q4h PRN. Agreed to send refills with an earlier date due to upcoming vacation. Pill counts today were appropriate. Side effects of the medication discussed at every visit. Patient was encouraged to continue bowel regimen of daily stool softeners, prn laxatives, and diet modifications.    3. Chronic pain syndrome  - Start cyclobenzaprine (FLEXERIL) 10 MG tablet; Take 1 tablet by mouth 3 (Three) Times a Day As Needed for Muscle Spasms.     4. Therapeutic opioid induced constipation  - Refilled senna (SENOKOT) 8.6 MG tablet; Take 1 tablet by mouth Daily.      5. Dizziness  - Refilled meclizine (ANTIVERT) 25 MG tablet; Take 1 tablet by mouth 3 (Three) Times a Day As Needed for Dizziness.      Code status: Full code  Medical interventions: Full    Return in about 3 months (around 12/29/2023) for Office Visit.    I spent 30 minutes caring for Naveen Lugo on this date of service. This time includes time spent by me in the following activities: preparing for the visit, reviewing tests, obtaining and/or reviewing a separately obtained history, performing a medically appropriate examination and/or evaluation , counseling and educating the patient/family/caregiver, ordering medications, tests, or procedures, documenting information in the medical record, independently  interpreting results and communicating that information with the patient/family/caregiver, and care coordination    Anna Ayers PA-C  09/29/2023    Medication Date Filled # Filled Count Used # Days  ROSE MARIE   Xtampza ER 9 9/8/23 60 20 40 21 2   Hydromorphone 8 9/3/23 180 24 156 26 6

## 2023-09-29 NOTE — PATIENT INSTRUCTIONS
Check-out instructions:  Continue current regimen of Xtampza 9 mg every 12 hours and hydromorphone every 4 hours as needed for break through pain.   Start muscle relaxer with Flexeril 10 mg every 8 hours as needed.   Scheduled to follow up in 3 months.     Medication Policy: We ask that you bring all of the medications prescribed by this clinic to every appointment. For telemedicine appointments, be prepared to give medication counts. This will assist us with managing your refill needs.      Refill Policy: You must notify us at least 3-5 business days in advance for routine refill requests. Call [phone] or send [employer] message to the Palliative Pool. Some prescriptions will need to be signed by the physician and will take longer to be sent to the pharmacy. Please also be aware of additional insurance prior authorization processing time required for many medications. Try to communicate with your pharmacy first to look for scripts signed in advance.     Communication: [facility] days are Monday-Friday 8:30-4:30 PM. Call [phone] or send [employer] message to the Palliative Pool. You will not be routed to speak directly to the palliative provider during clinic hours, so that we may provide the best care and attention to our patients in the office. If you require immediate communication, please also consider contacting your primary care office or appropriate specialist office.

## 2023-10-09 ENCOUNTER — TELEPHONE (OUTPATIENT)
Dept: FAMILY MEDICINE CLINIC | Facility: CLINIC | Age: 68
End: 2023-10-09

## 2023-10-09 NOTE — TELEPHONE ENCOUNTER
Caller: Naveen Lugo    Relationship: Self    Best call back number: [phone]     What medication are you requesting: ANTIBIOTIC / COUGH MEDICATION     What are your current symptoms: STUFFY HEAD, MUCUS IN CHEST/LUNGS, COUGH    How long have you been experiencing symptoms: 3-4 DAYS     Have you had these symptoms before:    [x] Yes  [] No    Have you been treated for these symptoms before:   [x] Yes  [] No    If a prescription is needed, what is your preferred pharmacy and phone number: [facility] [id number] [place] [id number] [facility] [address] - [phone] [facility] [phone] FX     Additional notes: PATIENT WANTED TO SEE PA JAY THIS WEEK HOWEVER SHE IS BOOKED TIL 10/26/23. PATIENT IS REQUESTING MEDICATION TO HELP TREAT HIS SYMPTOMS

## 2023-10-10 ENCOUNTER — TELEMEDICINE (OUTPATIENT)
Dept: FAMILY MEDICINE CLINIC | Facility: CLINIC | Age: 68
End: 2023-10-10
Payer: MEDICARE

## 2023-10-10 VITALS — WEIGHT: 194 LBS | HEIGHT: 68 IN | BODY MASS INDEX: 29.4 KG/M2

## 2023-10-10 DIAGNOSIS — J20.8 ACUTE BRONCHITIS DUE TO OTHER SPECIFIED ORGANISMS: Primary | ICD-10-CM

## 2023-10-10 PROCEDURE — 99213 OFFICE O/P EST LOW 20 MIN: CPT | Performed by: PHYSICIAN ASSISTANT

## 2023-10-10 PROCEDURE — 1159F MED LIST DOCD IN RCRD: CPT | Performed by: PHYSICIAN ASSISTANT

## 2023-10-10 PROCEDURE — 1160F RVW MEDS BY RX/DR IN RCRD: CPT | Performed by: PHYSICIAN ASSISTANT

## 2023-10-10 RX ORDER — CEPHALEXIN 500 MG/1
500 CAPSULE ORAL 3 TIMES DAILY
Qty: 30 CAPSULE | Refills: 0 | Status: SHIPPED | OUTPATIENT
Start: 2023-10-10

## 2023-10-10 RX ORDER — DEXTROMETHORPHAN HYDROBROMIDE AND PROMETHAZINE HYDROCHLORIDE 15; 6.25 MG/5ML; MG/5ML
5 SYRUP ORAL 4 TIMES DAILY PRN
Qty: 180 ML | Refills: 0 | Status: SHIPPED | OUTPATIENT
Start: 2023-10-10

## 2023-10-10 RX ORDER — PREDNISONE 20 MG/1
TABLET ORAL
Qty: 20 TABLET | Refills: 0 | Status: SHIPPED | OUTPATIENT
Start: 2023-10-10 | End: 2023-10-20

## 2023-10-10 NOTE — PROGRESS NOTES
"Chief Complaint  Sinusitis (X4 days. Pt has been vaccinated for COVID)    Subjective  Patient was seen today through synchronous audio/video technology using [employer] technology. Verbal consent was obtained. For the purpose of this visit the provider is located at [facility].  The patient was located at home. Vitals signs were not obtained due to lack of home monitoring access. Time spent with patient was 15 minutes.        Naveen Lugo presents to [facility]  History of Present Illness  Patient reports having cough, congestion, post nasal drip, and sore throat for 4 days, he did a home COVID test and it was negative.  He has underlying lung issues and gets these \"infections\" often.      Objective   Vital Signs:   Ht 172.7 cm (68\")   Wt 88 kg (194 lb) Comment: Pt reported vitals  BMI 29.50 kg/mý     Body mass index is 29.5 kg/mý.    Review of Systems   Constitutional:  Negative for fatigue.   HENT:  Positive for congestion, postnasal drip and sore throat. Negative for ear pain, sinus pressure, swollen glands and trouble swallowing.    Eyes:  Negative for blurred vision.   Respiratory:  Positive for cough and wheezing. Negative for chest tightness and shortness of breath.    Cardiovascular:  Negative for chest pain, palpitations and leg swelling.   Gastrointestinal:  Negative for abdominal pain, constipation, diarrhea, nausea and vomiting.   Neurological:  Negative for dizziness, tremors and headache.   Psychiatric/Behavioral:  Negative for depressed mood. The patient is not nervous/anxious.        Past History:  Medical History: has a past medical history of Bone cancer, History of radiation therapy (08/16/2021), Nephrolithiasis, Opioid use disorder, mild, on maintenance therapy (HCC), and Prostate cancer.   Surgical History: has a past surgical history that includes Cholecystectomy; Coronary stent placement (206 & 2011); Thoracic discectomy (1994); and Hernia repair " (1986).   Family History: family history includes Diabetes in his brother; Heart disease in his brother; Ovarian cancer in his sister; Prostate cancer in his brother.   Social History: reports that he has been smoking cigarettes. He has a 50.00 pack-year smoking history. He has never used smokeless tobacco. He reports that he does not currently use alcohol. He reports that he does not use drugs.      Current Outpatient Medications:     abiraterone acetate (ZYTIGA) 500 MG tablet, Take 2 tablets by mouth Daily., Disp: 60 tablet, Rfl: 11    albuterol sulfate  (90 Base) MCG/ACT inhaler, Inhale 2 puffs Every 4 (Four) Hours As Needed for Wheezing., Disp: 8 g, Rfl: 5    aspirin (aspirin) 81 MG EC tablet, Take 1 tablet by mouth Daily., Disp: , Rfl:     atorvastatin (LIPITOR) 5 MG half tablet, Take  by mouth., Disp: , Rfl:     buPROPion XL (Wellbutrin XL) 150 MG 24 hr tablet, Take 1 tablet by mouth Daily. For smoking cessation, Disp: 30 tablet, Rfl: 5    cetirizine (zyrTEC) 10 MG tablet, Take 1 tablet by mouth Daily. For allergies, Disp: 90 tablet, Rfl: 3    cyclobenzaprine (FLEXERIL) 10 MG tablet, Take 1 tablet by mouth 3 (Three) Times a Day As Needed for Muscle Spasms., Disp: 90 tablet, Rfl: 0    ferrous sulfate 325 (65 FE) MG tablet, 325 mg p.o. twice daily every other day, take with vitamin C, Disp: 30 tablet, Rfl: 11    Fluticasone-Umeclidin-Vilant (Trelegy Ellipta) 200-62.5-25 MCG/ACT aerosol powder , Inhale 1 puff Daily., Disp: 1 each, Rfl: 5    HYDROmorphone (DILAUDID) 8 MG tablet, Take 1 tablet by mouth Every 4 (Four) Hours As Needed for Moderate Pain or Severe Pain for up to 30 days., Disp: 180 tablet, Rfl: 0    Lidocaine-Prilocaine &Lido HCl 2.5-2.5 & 3.88 % kit, Apply  topically to the appropriate area as directed., Disp: , Rfl:     meclizine (ANTIVERT) 25 MG tablet, Take 1 tablet by mouth 3 (Three) Times a Day As Needed for Dizziness., Disp: 45 tablet, Rfl: 3    methocarbamol (ROBAXIN) 500 MG tablet, Take  2 tablets by mouth Every 8 (Eight) Hours., Disp: , Rfl:     naloxone (NARCAN) 4 MG/0.1ML nasal spray, 1 spray into the nostril(s) as directed by provider As Needed (unresponsiveness)., Disp: , Rfl:     nitroglycerin (NITROSTAT) 0.4 MG SL tablet, 1 under the tongue as needed for angina, may repeat q5mins for up three doses, Disp: 25 tablet, Rfl: 11    omeprazole (priLOSEC) 40 MG capsule, Take 1 tab po BID for stomach, Disp: 180 capsule, Rfl: 1    ondansetron (Zofran) 4 MG tablet, Take 1 tablet by mouth Every 8 (Eight) Hours As Needed for Nausea or Vomiting., Disp: 30 tablet, Rfl: 1    oxyCODONE ER (Xtampza ER) 9 MG capsule extended-release 12 hour , Take 1 capsule by mouth Every 12 (Twelve) Hours for 30 days., Disp: 60 each, Rfl: 0    predniSONE (DELTASONE) 5 MG tablet, Take 1 tablet by mouth Daily., Disp: 30 tablet, Rfl: 11    predniSONE 1 MG tablet delayed-release, Take  by mouth., Disp: , Rfl:     ranolazine (Ranexa) 500 MG 12 hr tablet, Take 1 tablet by mouth 2 (Two) Times a Day., Disp: 180 tablet, Rfl: 3    ranolazine (RANEXA) 500 MG 12 hr tablet, Take  by mouth., Disp: , Rfl:     relugolix (ORGOVYX) 120 MG tablet tablet, Take 1 tablet by mouth Daily., Disp: 30 tablet, Rfl: 11    senna (SENOKOT) 8.6 MG tablet, Take 1 tablet by mouth Daily., Disp: 90 tablet, Rfl: 3    tamsulosin (FLOMAX) 0.4 MG capsule 24 hr capsule, Take  by mouth., Disp: , Rfl:     venlafaxine 37.5 MG tablet sustained-release 24 hour 24 hr tablet, Take  by mouth., Disp: , Rfl:     venlafaxine XR (EFFEXOR-XR) 37.5 MG 24 hr capsule, Take 1 capsule by mouth Daily., Disp: 30 capsule, Rfl: 0    cephalexin (KEFLEX) 500 MG capsule, Take 1 capsule by mouth 3 (Three) Times a Day., Disp: 30 capsule, Rfl: 0    predniSONE (DELTASONE) 20 MG tablet, Take 3 tablets by mouth Daily for 3 days, THEN 2 tablets Daily for 3 days, THEN 1 tablet Daily for 5 days., Disp: 20 tablet, Rfl: 0    promethazine-dextromethorphan (PROMETHAZINE-DM) 6.25-15 MG/5ML syrup, Take   by mouth. (Patient not taking: Reported on 10/10/2023), Disp: , Rfl:     promethazine-dextromethorphan (PROMETHAZINE-DM) 6.25-15 MG/5ML syrup, Take 5 mL by mouth 4 (Four) Times a Day As Needed for Cough., Disp: 180 mL, Rfl: 0  No current facility-administered medications for this visit.    Facility-Administered Medications Ordered in Other Visits:     heparin injection 500 Units, 500 Units, Intravenous, PRN, Ishmael Rashid MD, 500 Units at 06/02/21 0900    Allergies: Cymbalta [duloxetine hcl], Gabapentin, and Lyrica [pregabalin]    Physical Exam  Constitutional:       Appearance: Normal appearance.   Neurological:      Mental Status: He is alert and oriented to person, place, and time.   Psychiatric:         Mood and Affect: Mood normal.         Behavior: Behavior normal.          Result Review :                   Assessment and Plan    Diagnoses and all orders for this visit:    1. Acute bronchitis due to other specified organisms (Primary)  Assessment & Plan:  RX for Keflex, and I am going to put him on a steroid taper he was instructed while on the taper not to take his daily dose but to resume the daily dose once he is off the taper.  Also gave him promethazine DM for his cough, Call or return if symptoms persist or worsen.       Other orders  -     cephalexin (KEFLEX) 500 MG capsule; Take 1 capsule by mouth 3 (Three) Times a Day.  Dispense: 30 capsule; Refill: 0  -     predniSONE (DELTASONE) 20 MG tablet; Take 3 tablets by mouth Daily for 3 days, THEN 2 tablets Daily for 3 days, THEN 1 tablet Daily for 5 days.  Dispense: 20 tablet; Refill: 0  -     promethazine-dextromethorphan (PROMETHAZINE-DM) 6.25-15 MG/5ML syrup; Take 5 mL by mouth 4 (Four) Times a Day As Needed for Cough.  Dispense: 180 mL; Refill: 0        Follow Up   No follow-ups on file.  Patient was given instructions and counseling regarding his condition or for health maintenance advice. Please see specific information pulled into the AVS if  appropriate.     Cielo Dodd, PA-C

## 2023-10-10 NOTE — ASSESSMENT & PLAN NOTE
RX for Keflex, and I am going to put him on a steroid taper he was instructed while on the taper not to take his daily dose but to resume the daily dose once he is off the taper.  Also gave him promethazine DM for his cough, Call or return if symptoms persist or worsen.

## 2023-10-11 ENCOUNTER — SPECIALTY PHARMACY (OUTPATIENT)
Dept: ONCOLOGY | Facility: HOSPITAL | Age: 68
End: 2023-10-11
Payer: MEDICARE

## 2023-10-11 DIAGNOSIS — C79.51 PROSTATE CANCER METASTATIC TO BONE: ICD-10-CM

## 2023-10-11 DIAGNOSIS — C61 PROSTATE CANCER METASTATIC TO BONE: ICD-10-CM

## 2023-10-11 RX ORDER — ABIRATERONE 500 MG/1
1000 TABLET ORAL DAILY
Qty: 60 TABLET | Refills: 11 | Status: SHIPPED | OUTPATIENT
Start: 2023-10-11

## 2023-10-11 RX ORDER — PREDNISONE 5 MG/1
5 TABLET ORAL DAILY
Qty: 30 TABLET | Refills: 11 | Status: SHIPPED | OUTPATIENT
Start: 2023-10-11

## 2023-10-11 NOTE — PROGRESS NOTES
Re: Refills of Oral Specialty Medication -  Prednisone, Orgovyx (relugolix), and Zytiga (abiraterone acetate)    Drug-Drug Interactions: The current medication list was reviewed and there are no relevant drug-drug interactions with the specialty medication.  Medication Allergies: The patient has no relevant allergies as it relates to their oral specialty medication  Review of Labs/Dose Adjustments: NO DOSE CHANGE - I reviewed the most recent note and labs and the patient will continue without any dose changes.  I sent refills as described below.    Drug: Zytiga (abiraterone acetate)  Strength: 500 mg  Directions: Take 2 tablets by mouth daily  Quantity: 60  Refills: 11    Drug: Prednisone  Strength: 5 mg tablet  Directions: Take 1 tablet by mouth daily  Quantity: 30  Refills: 11    Drug: Relugolix  Strength: 120 mg tablet  Directions: Take 1 tablet by mouth daily  Quantity: 30  Refills: 11    Pharmacy prescription sent to: [facility]    Chelsea GuidryD, BCOP  Clinical Oncology Pharmacy Specialist  Phone: [phone]    10/11/2023  08:56 EDT

## 2023-10-13 ENCOUNTER — TELEPHONE (OUTPATIENT)
Dept: FAMILY MEDICINE CLINIC | Facility: CLINIC | Age: 68
End: 2023-10-13

## 2023-10-13 RX ORDER — DOXYCYCLINE 100 MG/1
100 CAPSULE ORAL 2 TIMES DAILY
Qty: 20 CAPSULE | Refills: 0 | Status: SHIPPED | OUTPATIENT
Start: 2023-10-13

## 2023-10-13 NOTE — TELEPHONE ENCOUNTER
Tried to call Pt unable to reach him or leave message. If Pt calls back ok for HUB to really message-- Jana sent him in a different antibiotic.

## 2023-10-13 NOTE — TELEPHONE ENCOUNTER
Called PT to reschedule appt. Pt also said that the KEFLEX he was given is not working and is requesting a different medication be called in. Please advise.

## 2023-10-17 ENCOUNTER — SPECIALTY PHARMACY (OUTPATIENT)
Dept: ONCOLOGY | Facility: HOSPITAL | Age: 68
End: 2023-10-17
Payer: MEDICARE

## 2023-10-17 NOTE — PROGRESS NOTES
Specialty Pharmacy Refill Coordination Note     Naveen is a 68 y.o. male contacted today regarding refills of  abiraterone 1000mg PO QD and relugolix 120mg PO QD specialty medication(s).    Specialty medication(s) and dose(s) confirmed: yes    Refill Questions      Flowsheet Row Most Recent Value   Changes to allergies? No   Changes to medications? No   New conditions since last clinic visit No   Unplanned office visit, urgent care, ED, or hospital admission in the last 4 weeks  No   How does patient/caregiver feel medication is working? Good   Financial problems or insurance changes  No   Since the previous refill, were any specialty medication doses or scheduled injections missed or delayed?  No   Does this patient require a clinical escalation to a pharmacist? No            Delivery Questions      Flowsheet Row Most Recent Value   Delivery method [employer]   Delivery address correct? Yes   Delivery phone number [phone]   Preferred delivery time? Anytime   Number of medications in delivery 3   Medication(s) being filled and delivered Abiraterone Acetate, Prednisone, Relugolix   Doses left of specialty medications 5 days of each   Is there any medication that is due not being filled? No   Supplies needed? No supplies needed   Cooler needed? No   Do any medications need mixed or dated? No   Additional comments no copay   Questions or concerns for the pharmacist? No   Explain any questions or concerns for the pharmacist n/a   Are any medications first time fills? No              Medication Adherence          Adherence tools used: watch   Other adherence tool: Clock - takes at same time each day, 7:45am   Support network for adherence: family member                Follow-up: 28 day(s)     Sushma Muro, Pharmacy Technician  Specialty Pharmacy Technician

## 2023-10-30 ENCOUNTER — SPECIALTY PHARMACY (OUTPATIENT)
Dept: ONCOLOGY | Facility: HOSPITAL | Age: 68
End: 2023-10-30
Payer: MEDICARE

## 2023-10-30 DIAGNOSIS — G89.3 CANCER RELATED PAIN: ICD-10-CM

## 2023-10-30 RX ORDER — HYDROMORPHONE HYDROCHLORIDE 8 MG/1
8 TABLET ORAL EVERY 4 HOURS PRN
Qty: 180 TABLET | Refills: 0 | Status: SHIPPED | OUTPATIENT
Start: 2023-10-30 | End: 2023-11-29

## 2023-10-30 RX ORDER — OXYCODONE 9 MG/1
9 CAPSULE, EXTENDED RELEASE ORAL EVERY 12 HOURS
Qty: 60 EACH | Refills: 0 | Status: SHIPPED | OUTPATIENT
Start: 2023-10-30 | End: 2023-11-29

## 2023-10-30 NOTE — PROGRESS NOTES
Specialty Pharmacy Patient Management Program  Oncology 6-Month Clinical Assessment       Naveen Lugo is a 68 y.o. male with prostate cancer seen today to assess adherence and side effects.    Reason for Outreach: Routine medication check-in .    Regimen: Abiraterone 1000mg PO daily + prednisone 5mg PO daily + relugolix 120mg PO daily    Specialty Pharmacy Goal   Goals Addressed Today         Specialty Pharmacy General Goal (pt-stated)       Clinical goal/therapeutic target: stable or decreasing PSA and disease control, per the recent oncology clinic notes and labs. {  PSA   Date Value Ref Range Status   09/14/2023 <0.014 0.000 - 4.000 ng/mL Final     Comment:     Results may be falsely decreased if patient taking Biotin.  Testing Method: [employer] Electrochemiluminescence  Immunoassay(ECLIA)  Values obtained with different assay methods or kits cannot  be used interchangeably.     07/20/2023 <0.014 0.000 - 4.000 ng/mL Final     Comment:     Results may be falsely decreased if patient taking Biotin.  Testing Method: [employer] Electrochemiluminescence  Immunoassay(ECLIA)  Values obtained with different assay methods or kits cannot  be used interchangeably.     06/14/2023 <0.1 0.0 - 4.0 ng/mL Final     Comment:     Roche ECLIA methodology.  According to the American U[employer] Association, Serum PSA should  decrease and remain at undetectable levels after radical  prostatectomy. The AUA defines biochemical recurrence as an initial  PSA value 0.2 ng/mL or greater followed by a subsequent confirmatory  PSA value 0.2 ng/mL or greater.  Values obtained with different assay methods or kits cannot be used  interchangeably. Results cannot be interpreted as absolute evidence  of the presence or absence of malignant disease.     05/16/2023 <0.014 0.000 - 4.000 ng/mL Final     Comment:     Results may be falsely decreased if patient taking Biotin.  Testing Method: Roche [employer]  "  {PROVIDER CHARTING PREFERENCE:446486}    Alessandro Bell is a 33 year old{ACCOMPANIED BY STATEMENT (Optional):528246}, presenting for the following health issues:  No chief complaint on file.      HPI     GERD/Heartburn  Onset/Duration: ***  Description: ***  Intensity: {.:487647}  Progression of Symptoms: {.:278204}  Accompanying Signs & Symptoms:  Does it feel like food gets stuck or trouble swallowing: {.:752185::\"No\"}  Nausea: {.:221228::\"No\"}  Vomiting (bloody?): {.:897387::\"No\"}  Abdominal Pain: {.:530467::\"No\"}  Black-Tarry stools: {.:921985::\"No\"}  Bloody stools: {.:362651::\"No\"}  History:  Previous similar episodes: {.:113526::\"No\"}  Previous ulcers: {.:697637::\"No\"}  Precipitating factors:   Caffeine use: {.:945696::\"No\"}  Alcohol use: {.:485370::\"No\"}  NSAID/Aspirin use: {.:698151::\"No\"}  Tobacco use: {.:220385::\"No\"}  Worse with {.:516990}.  Alleviating factors: {.:452801::\"None\"}  Therapies tried and outcome:             Lifestyle changes: {.:387102::\"None\"}            Medications: {REFLUX MEDS TRIED:381884::\"none\"}    {additonal problems for provider to add (Optional):716248}    Review of Systems   {ROS COMP (Optional):237605}      Objective    There were no vitals taken for this visit.  There is no height or weight on file to calculate BMI.  Physical Exam   {Exam List (Optional):247622}    {Diagnostic Test Results (Optional):138027}    {AMBULATORY ATTESTATION (Optional):656683}            " Electrochemiluminescence  Immunoassay(ECLIA)  Values obtained with different assay methods or kits cannot  be used interchangeably.     02/21/2023 <0.1 0.0 - 4.0 ng/mL Final     Comment:     Roche ECLIA methodology.  According to the American Urological Association, Serum PSA should  decrease and remain at undetectable levels after radical  prostatectomy. The AUA defines biochemical recurrence as an initial  PSA value 0.2 ng/mL or greater followed by a subsequent confirmatory  PSA value 0.2 ng/mL or greater.  Values obtained with different assay methods or kits cannot be used  interchangeably. Results cannot be interpreted as absolute evidence  of the presence or absence of malignant disease.     01/24/2023 <0.1 0.0 - 4.0 ng/mL Final     Comment:     **Verified by repeat analysis**  Roche ECLIA methodology.  According to the American Urological Association, Serum PSA should  decrease and remain at undetectable levels after radical  prostatectomy. The AUA defines biochemical recurrence as an initial  PSA value 0.2 ng/mL or greater followed by a subsequent confirmatory  PSA value 0.2 ng/mL or greater.  Values obtained with different assay methods or kits cannot be used  interchangeably. Results cannot be interpreted as absolute evidence  of the presence or absence of malignant disease.   10/13/2022 <0.014 0.000 - 4.000 ng/mL Final     Comment:     Results may be falsely decreased if patient taking Biotin.   09/01/2022 <0.1 0.0 - 4.0 ng/mL Final     Comment:     Roche ECLIA methodology.  According to the American Urological Association, Serum PSA should  decrease and remain at undetectable levels after radical  prostatectomy. The AUA defines biochemical recurrence as an initial  PSA value 0.2 ng/mL or greater followed by a subsequent confirmatory  PSA value 0.2 ng/mL or greater.  Values obtained with different assay methods or kits cannot be used  interchangeably. Results cannot be interpreted as absolute evidence  of  the presence or absence of malignant disease.     08/02/2022 <0.1 0.0 - 4.0 ng/mL Final     Comment:     **Verified by repeat analysis**  Roche ECLIA methodology.  According to the American Urological Association, Serum PSA should  decrease and remain at undetectable levels after radical  prostatectomy. The AUA defines biochemical recurrence as an initial  PSA value 0.2 ng/mL or greater followed by a subsequent confirmatory  PSA value 0.2 ng/mL or greater.  Values obtained with different assay methods or kits cannot be used  interchangeably. Results cannot be interpreted as absolute evidence  of the presence or absence of malignant disease.     08/02/2022 <0.1 0.0 - 4.0 ng/mL Final     Comment:     **Verified by repeat analysis**  Roche ECLIA methodology.  According to the American Urological Association, Serum PSA should  decrease and remain at undetectable levels after radical  prostatectomy. The AUA defines biochemical recurrence as an initial  PSA value 0.2 ng/mL or greater followed by a subsequent confirmatory  PSA value 0.2 ng/mL or greater.  Values obtained with different assay methods or kits cannot be used  interchangeably. Results cannot be interpreted as absolute evidence  of the presence or absence of malignant disease.     04/20/2021 0.026 0.000 - 4.000 ng/mL Final   12/29/2020 0.275 0.000 - 4.000 ng/mL Final   ]                   Problem List   Problem list reviewed by Maria Esther Le, PharmD on 10/30/2023 at  2:42 PM  Patient Active Problem List   Diagnosis Code    Prostate cancer metastatic to bone C61, C79.51    Encounter for care related to Port-a-Cath Z45.2    Opioid use disorder F11.90    Pain, cancer G89.3    Yeast dermatitis B37.2    Therapeutic drug monitoring Z51.81    Acute hip pain, left M25.552    Abnormal MRI, lumbar spine R93.7    Coronary artery disease of native artery of native heart with stable angina pectoris I25.118    Hyperlipidemia LDL goal <70 E78.5    Essential hypertension I10     Chest pain R07.9    Depression F32.A    Constipation K59.00    Fatigue R53.83    Adrenal insufficiency E27.40    Chronic pain G89.29    Acute URI J06.9    Hypokalemia E87.6    Hyperglycemia R73.9    Polyuria R35.89    Hot flashes R23.2    Medicare annual wellness visit, subsequent Z00.00    Iron deficiency E61.1    Tobacco abuse Z72.0    Acute bronchitis due to other specified organisms J20.8    Chronic GERD K21.9    Seasonal allergies J30.2    Epigastric abdominal pain R10.13    Nausea and vomiting R11.2    Dizziness R42       Medication Assessment for Specialty Medication(s):  Medication Assessment  Follow Up Clinical Assessment  Linked Medication(s) Assessed: Abiraterone Acetate, Prednisone, Relugolix  Therapeutic appropriateness: Appropriate  Medication tolerability: Tolerating with no to minimal ADRs (Patient states the nausea/vomiting is much improved now.  He has occassional nausea but Zofran helps. He is also taking omperazole 40mg BID.)  Medication plan: Continue therapy with normal follow-up  Quality of Life Improvement Scale: Significantly better  Administration: as prescribed .   Patient can self administer medications: yes  Medication Follow-Up Plan: Next clinical assessment and Refill coordination  Lab Review: The labs listed below have been reviewed. No dose adjustments are needed for the oral specialty medication(s) based on the labs.    Lab Results   Component Value Date    GLUCOSE 131 (H) 09/14/2023    CALCIUM 9.6 09/14/2023     09/14/2023    K 3.1 (L) 09/14/2023    CO2 28.8 09/14/2023     09/14/2023    BUN 6 (L) 09/14/2023    CREATININE 0.73 (L) 09/14/2023    EGFRIFAFRI 109 01/11/2022    EGFRIFNONA 98 02/14/2022    BCR 8.2 09/14/2023    ANIONGAP 10.0 02/14/2022     Lab Results   Component Value Date    WBC 9.86 09/14/2023    RBC 5.23 09/14/2023    HGB 15.4 09/14/2023    HCT 45.2 09/14/2023    MCV 86.4 09/14/2023    MCH 29.4 09/14/2023    MCHC 34.1 09/14/2023    RDW 13.8 09/14/2023     RDWSD 41.2 02/14/2022    MPV 10.5 02/14/2022     09/14/2023    NEUTRORELPCT 78.6 (H) 09/14/2023    LYMPHORELPCT 13.4 (L) 09/14/2023    MONORELPCT 5.9 09/14/2023    EOSRELPCT 1.2 09/14/2023    BASORELPCT 0.7 09/14/2023    AUTOIGPER 0.3 02/14/2022    NEUTROABS 7.75 (H) 09/14/2023    LYMPHSABS 1.32 09/14/2023    MONOSABS 0.58 09/14/2023    EOSABS 0.12 09/14/2023    BASOSABS 0.07 09/14/2023    AUTOIGNUM 0.02 02/14/2022    NRBC 0.0 09/14/2023     Drug-drug interactions  Completed medication reconciliation today to assess for drug interactions. Patient has had the following changes to medication list: completed treatment with his antibiotics; patient's chart has been updated to reflect changes.   Assessed medication list for interactions,  abiraterone may increase concentration of Flomax (patient is monitoring for hypotension and orthostasis);   Advised patient to call the clinic if any new medications are started so we can assess for drug-drug interactions.  Drug-food interactions discussed: eating grapefruit and drinking grapefruit juice  Vaccines are coordinated by the patient's oncologist and primary care provider.    Medications   Medicines reviewed by Maria Esther Le, PharmD on 10/30/2023 at  2:42 PM  Prior to Admission medications    Medication Sig Start Date End Date Taking? Authorizing Provider   abiraterone acetate (ZYTIGA) 500 MG tablet Take 2 tablets by mouth Daily. 10/11/23   Ishmael Rashid MD   albuterol sulfate  (90 Base) MCG/ACT inhaler Inhale 2 puffs Every 4 (Four) Hours As Needed for Wheezing. 8/11/23   Leonor Leung PA-C   aspirin (aspirin) 81 MG EC tablet Take 1 tablet by mouth Daily. 8/11/21   Joni Kahn MD   atorvastatin (LIPITOR) 5 MG half tablet Take  by mouth.    Provider, MD Jared   buPROPion XL (Wellbutrin XL) 150 MG 24 hr tablet Take 1 tablet by mouth Daily. For smoking cessation 8/11/23   Leonor Leung PA-C   cetirizine (zyrTEC) 10 MG tablet Take 1 tablet by  mouth Daily. For allergies 8/11/23   Leonor Leung PA-C   cyclobenzaprine (FLEXERIL) 10 MG tablet Take 1 tablet by mouth 3 (Three) Times a Day As Needed for Muscle Spasms. 9/29/23   Anna Ayers PA-C   ferrous sulfate 325 (65 FE) MG tablet 325 mg p.o. twice daily every other day, take with vitamin C 3/31/23   Abena Velasquez APRN   Fluticasone-Umeclidin-Vilant (Trelegy Ellipta) 200-62.5-25 MCG/ACT aerosol powder  Inhale 1 puff Daily. 6/13/23   Cielo Dodd PA-C   HYDROmorphone (DILAUDID) 8 MG tablet Take 1 tablet by mouth Every 4 (Four) Hours As Needed for Moderate Pain or Severe Pain for up to 30 days. 10/30/23 11/29/23  Vito Easley,    Lidocaine-Prilocaine &Lido HCl 2.5-2.5 & 3.88 % kit Apply  topically to the appropriate area as directed.    Provider, MD Jared   meclizine (ANTIVERT) 25 MG tablet Take 1 tablet by mouth 3 (Three) Times a Day As Needed for Dizziness. 9/29/23   Anna Ayers PA-C   methocarbamol (ROBAXIN) 500 MG tablet Take 2 tablets by mouth Every 8 (Eight) Hours. 5/17/23   ProviderJared MD   naloxone (NARCAN) 4 MG/0.1ML nasal spray 1 spray into the nostril(s) as directed by provider As Needed (unresponsiveness). 12/29/20   Mely Cary MD   nitroglycerin (NITROSTAT) 0.4 MG SL tablet 1 under the tongue as needed for angina, may repeat q5mins for up three doses 8/11/21   Joni Kahn MD   omeprazole (priLOSEC) 40 MG capsule Take 1 tab po BID for stomach 8/11/23   Leonor Leung PA-C   ondansetron (Zofran) 4 MG tablet Take 1 tablet by mouth Every 8 (Eight) Hours As Needed for Nausea or Vomiting. 9/13/23   Cielo Dodd PA-C   oxyCODONE ER (Xtampza ER) 9 MG capsule extended-release 12 hour  Take 1 capsule by mouth Every 12 (Twelve) Hours for 30 days. 10/30/23 11/29/23  Vito Easley,    predniSONE (DELTASONE) 20 MG tablet Take 3 tablets by mouth Daily for 3 days, THEN 2 tablets Daily for 3 days, THEN 1 tablet Daily for 5 days. 10/10/23 10/20/23   Cielo Dodd PA-C   predniSONE (DELTASONE) 5 MG tablet Take 1 tablet by mouth Daily. 10/11/23   Ishmael Rashid MD   predniSONE 1 MG tablet delayed-release Take  by mouth.    Jared Abrams MD   promethazine-dextromethorphan (PROMETHAZINE-DM) 6.25-15 MG/5ML syrup Take  by mouth.  Patient not taking: Reported on 10/10/2023    Jared Abrams MD   promethazine-dextromethorphan (PROMETHAZINE-DM) 6.25-15 MG/5ML syrup Take 5 mL by mouth 4 (Four) Times a Day As Needed for Cough. 10/10/23   Cielo Dodd PA-C   ranolazine (Ranexa) 500 MG 12 hr tablet Take 1 tablet by mouth 2 (Two) Times a Day. 8/23/21   Joni Kahn MD   ranolazine (RANEXA) 500 MG 12 hr tablet Take  by mouth.    Jraed Abrams MD   relugolix (ORGOVYX) 120 MG tablet tablet Take 1 tablet by mouth Daily. 10/11/23   Ishmael Rashid MD   senna (SENOKOT) 8.6 MG tablet Take 1 tablet by mouth Daily. 9/29/23   Anna Ayers PA-C   tamsulosin (FLOMAX) 0.4 MG capsule 24 hr capsule Take  by mouth.    Jared Abrams MD   venlafaxine 37.5 MG tablet sustained-release 24 hour 24 hr tablet Take  by mouth.    Jared Abrams MD   venlafaxine XR (EFFEXOR-XR) 37.5 MG 24 hr capsule Take 1 capsule by mouth Daily. 3/1/23   Abena Velasquez APRN   abiraterone acetate (ZYTIGA) 500 MG tablet Take 2 tablets by mouth Daily. 12/22/22 10/11/23  Ishmael Rashid MD   cephalexin (KEFLEX) 500 MG capsule Take 1 capsule by mouth 3 (Three) Times a Day. 10/10/23 10/30/23  Cielo Dodd PA-C   doxycycline (MONODOX) 100 MG capsule Take 1 capsule by mouth 2 (Two) Times a Day. 10/13/23 10/30/23  Cielo Dodd PA-C   HYDROmorphone (DILAUDID) 8 MG tablet Take 1 tablet by mouth Every 4 (Four) Hours As Needed for Moderate Pain or Severe Pain for up to 30 days. 9/30/23 10/30/23  Vito Easley DO   oxyCODONE ER (Xtampza ER) 9 MG capsule extended-release 12 hour  Take 1 capsule by mouth Every 12 (Twelve) Hours for 30 days. 9/30/23 10/30/23  Kaushal,  Vito MOLINA DO   predniSONE (DELTASONE) 5 MG tablet Take 1 tablet by mouth Daily. 12/22/22 10/11/23  Ishmael Rashid MD   relugolix (ORGOVYX) 120 MG tablet tablet Take 1 tablet by mouth Daily. 12/22/22 10/11/23  Ishmael Rashid MD       Allergies  Known allergies and reactions were discussed with the patient. The patient's chart has been reviewed for allergy information and updated as necessary.   Allergies   Allergen Reactions    Cymbalta [Duloxetine Hcl] Dizziness    Gabapentin Nausea Only    Lyrica [Pregabalin] Nausea And Vomiting and Dizziness         Allergies reviewed by Maria Esther Le PharmD on 10/30/2023 at  2:41 PM    Hospitalizations and Urgent Care Visits Since Last Assessment:  Unplanned hospitalizations or inpatient admissions: 0  ED Visits: 0  Urgent Office Visits: 0    Adherence Assessment:  Adherence Questions  Linked Medication(s) Assessed: Abiraterone Acetate, Prednisone, Relugolix  On average, how many doses/injections does the patient miss per month?: 0  What are the identified reasons for non-adherence or missed doses? : no problems identfied  What is the estimated medication adherence level?: % (PDC of 100%)  Based on the patient/caregiver response and refill history, does this patient require an MTP to track adherence improvements?: no    Quality of Life Assessment:  Quality of Life Assessment  Quality of Life Improvement Scale: Significantly better  -- Quality of Life: 10/10    Financial Assessment:  Medication availability/affordability: Patient has had no issues obtaining medication from pharmacy.    Attestation:  I attest the patient was actively involved in and has agreed to the above plan of care. If the prescribed therapy is at any point deemed not appropriate based on the current or future assessments, a consultation will be initiated with the patient's specialty care provider to determine the best course of action. The revised plan of therapy will be documented along with any required  assessments and/or additional patient education provided.       All questions addressed and patient had no additional concerns. Patient has pharmacy contact information.    Maria Esther Le PharmD, [facility]  Clinical Oncology Pharmacy Specialist  Phone: [phone]    10/30/2023  14:44 EDT

## 2023-10-30 NOTE — TELEPHONE ENCOUNTER
AGUSTIN #: [id number]    Medication requested: HYDROmorphone (DILAUDID) 8 MG tablet     Last fill date: 9/30/23      Medication requested: oxyCODONE ER (Xtampza ER) 9 MG capsule extended-release 12 hour     Last fill date: 9/30/23    Last appointment: 9/29/23    Next appointment: 1/4/23  
no concerns

## 2023-10-31 ENCOUNTER — OFFICE VISIT (OUTPATIENT)
Dept: FAMILY MEDICINE CLINIC | Facility: CLINIC | Age: 68
End: 2023-10-31
Payer: MEDICARE

## 2023-10-31 VITALS
HEART RATE: 106 BPM | SYSTOLIC BLOOD PRESSURE: 120 MMHG | TEMPERATURE: 97.8 F | HEIGHT: 68 IN | OXYGEN SATURATION: 96 % | BODY MASS INDEX: 29.63 KG/M2 | WEIGHT: 195.5 LBS | DIASTOLIC BLOOD PRESSURE: 70 MMHG

## 2023-10-31 DIAGNOSIS — J30.2 SEASONAL ALLERGIES: ICD-10-CM

## 2023-10-31 DIAGNOSIS — J06.9 ACUTE URI: Primary | ICD-10-CM

## 2023-10-31 PROCEDURE — 3078F DIAST BP <80 MM HG: CPT | Performed by: PHYSICIAN ASSISTANT

## 2023-10-31 PROCEDURE — 1159F MED LIST DOCD IN RCRD: CPT | Performed by: PHYSICIAN ASSISTANT

## 2023-10-31 PROCEDURE — 1160F RVW MEDS BY RX/DR IN RCRD: CPT | Performed by: PHYSICIAN ASSISTANT

## 2023-10-31 PROCEDURE — 3074F SYST BP LT 130 MM HG: CPT | Performed by: PHYSICIAN ASSISTANT

## 2023-10-31 PROCEDURE — 99213 OFFICE O/P EST LOW 20 MIN: CPT | Performed by: PHYSICIAN ASSISTANT

## 2023-10-31 RX ORDER — TRIAMCINOLONE ACETONIDE 40 MG/ML
80 INJECTION, SUSPENSION INTRA-ARTICULAR; INTRAMUSCULAR ONCE
Status: SHIPPED | OUTPATIENT
Start: 2023-10-31

## 2023-10-31 RX ORDER — DOXYCYCLINE 100 MG/1
100 CAPSULE ORAL 2 TIMES DAILY
Qty: 20 CAPSULE | Refills: 0 | Status: SHIPPED | OUTPATIENT
Start: 2023-10-31

## 2023-10-31 RX ORDER — DEXTROMETHORPHAN HYDROBROMIDE AND PROMETHAZINE HYDROCHLORIDE 15; 6.25 MG/5ML; MG/5ML
5 SYRUP ORAL 4 TIMES DAILY PRN
Qty: 180 ML | Refills: 0 | Status: SHIPPED | OUTPATIENT
Start: 2023-10-31

## 2023-10-31 RX ORDER — CETIRIZINE HYDROCHLORIDE 10 MG/1
10 TABLET ORAL DAILY
Qty: 90 TABLET | Refills: 3 | Status: SHIPPED | OUTPATIENT
Start: 2023-10-31

## 2023-10-31 NOTE — ASSESSMENT & PLAN NOTE
Likely bronchitis, I am going to give him a steroid shot today and then RX for Doxycycline and promethazine DM, Call or return if symptoms persist or worsen.

## 2023-10-31 NOTE — PROGRESS NOTES
"Chief Complaint  Cough (Continues to cough up yellow stuff)    Subjective          Naveen Lugo presents to [facility]  Cough  Associated symptoms include postnasal drip and wheezing. Pertinent negatives include no ear pain, sore throat or shortness of breath.     patient is being seen today for cough productive of yellow sputum, some mild wheezing and sinus drainage.      Objective   Vital Signs:   /70 (BP Location: Right arm, Patient Position: Sitting)   Pulse 106   Temp 97.8 °F (36.6 °C)   Ht 172.7 cm (68\")   Wt 88.7 kg (195 lb 8 oz)   SpO2 96%   BMI 29.73 kg/m²     Body mass index is 29.73 kg/m².    Review of Systems   HENT:  Positive for congestion and postnasal drip. Negative for ear pain, sinus pressure, sneezing and sore throat.    Respiratory:  Positive for cough and wheezing. Negative for chest tightness and shortness of breath.        Past History:  Medical History: has a past medical history of Bone cancer, History of radiation therapy (08/16/2021), Nephrolithiasis, Opioid use disorder, mild, on maintenance therapy (HCC), and Prostate cancer.   Surgical History: has a past surgical history that includes Cholecystectomy; Coronary stent placement (206 & 2011); Thoracic discectomy (1994); and Hernia repair (1986).   Family History: family history includes Diabetes in his brother; Heart disease in his brother; Ovarian cancer in his sister; Prostate cancer in his brother.   Social History: reports that he has been smoking cigarettes. He has a 50.00 pack-year smoking history. He has never used smokeless tobacco. He reports that he does not currently use alcohol. He reports that he does not use drugs.      Current Outpatient Medications:     abiraterone acetate (ZYTIGA) 500 MG tablet, Take 2 tablets by mouth Daily., Disp: 60 tablet, Rfl: 11    albuterol sulfate  (90 Base) MCG/ACT inhaler, Inhale 2 puffs Every 4 (Four) Hours As Needed for Wheezing., Disp: 8 g, " Rfl: 5    aspirin (aspirin) 81 MG EC tablet, Take 1 tablet by mouth Daily., Disp: , Rfl:     atorvastatin (LIPITOR) 5 MG half tablet, Take  by mouth., Disp: , Rfl:     buPROPion XL (Wellbutrin XL) 150 MG 24 hr tablet, Take 1 tablet by mouth Daily. For smoking cessation, Disp: 30 tablet, Rfl: 5    cetirizine (zyrTEC) 10 MG tablet, Take 1 tablet by mouth Daily. For allergies, Disp: 90 tablet, Rfl: 3    cyclobenzaprine (FLEXERIL) 10 MG tablet, Take 1 tablet by mouth 3 (Three) Times a Day As Needed for Muscle Spasms., Disp: 90 tablet, Rfl: 0    ferrous sulfate 325 (65 FE) MG tablet, 325 mg p.o. twice daily every other day, take with vitamin C, Disp: 30 tablet, Rfl: 11    Fluticasone-Umeclidin-Vilant (Trelegy Ellipta) 200-62.5-25 MCG/ACT aerosol powder , Inhale 1 puff Daily., Disp: 1 each, Rfl: 5    HYDROmorphone (DILAUDID) 8 MG tablet, Take 1 tablet by mouth Every 4 (Four) Hours As Needed for Moderate Pain or Severe Pain for up to 30 days., Disp: 180 tablet, Rfl: 0    Lidocaine-Prilocaine &Lido HCl 2.5-2.5 & 3.88 % kit, Apply  topically to the appropriate area as directed., Disp: , Rfl:     meclizine (ANTIVERT) 25 MG tablet, Take 1 tablet by mouth 3 (Three) Times a Day As Needed for Dizziness., Disp: 45 tablet, Rfl: 3    methocarbamol (ROBAXIN) 500 MG tablet, Take 2 tablets by mouth Every 8 (Eight) Hours., Disp: , Rfl:     naloxone (NARCAN) 4 MG/0.1ML nasal spray, 1 spray into the nostril(s) as directed by provider As Needed (unresponsiveness)., Disp: , Rfl:     nitroglycerin (NITROSTAT) 0.4 MG SL tablet, 1 under the tongue as needed for angina, may repeat q5mins for up three doses, Disp: 25 tablet, Rfl: 11    omeprazole (priLOSEC) 40 MG capsule, Take 1 tab po BID for stomach, Disp: 180 capsule, Rfl: 1    ondansetron (Zofran) 4 MG tablet, Take 1 tablet by mouth Every 8 (Eight) Hours As Needed for Nausea or Vomiting., Disp: 30 tablet, Rfl: 1    oxyCODONE ER (Xtampza ER) 9 MG capsule extended-release 12 hour , Take 1  capsule by mouth Every 12 (Twelve) Hours for 30 days., Disp: 60 each, Rfl: 0    predniSONE (DELTASONE) 5 MG tablet, Take 1 tablet by mouth Daily., Disp: 30 tablet, Rfl: 11    predniSONE 1 MG tablet delayed-release, Take  by mouth., Disp: , Rfl:     promethazine-dextromethorphan (PROMETHAZINE-DM) 6.25-15 MG/5ML syrup, Take 5 mL by mouth 4 (Four) Times a Day As Needed for Cough., Disp: 180 mL, Rfl: 0    ranolazine (Ranexa) 500 MG 12 hr tablet, Take 1 tablet by mouth 2 (Two) Times a Day., Disp: 180 tablet, Rfl: 3    ranolazine (RANEXA) 500 MG 12 hr tablet, Take  by mouth., Disp: , Rfl:     relugolix (ORGOVYX) 120 MG tablet tablet, Take 1 tablet by mouth Daily., Disp: 30 tablet, Rfl: 11    senna (SENOKOT) 8.6 MG tablet, Take 1 tablet by mouth Daily., Disp: 90 tablet, Rfl: 3    tamsulosin (FLOMAX) 0.4 MG capsule 24 hr capsule, Take  by mouth., Disp: , Rfl:     venlafaxine 37.5 MG tablet sustained-release 24 hour 24 hr tablet, Take  by mouth., Disp: , Rfl:     venlafaxine XR (EFFEXOR-XR) 37.5 MG 24 hr capsule, Take 1 capsule by mouth Daily., Disp: 30 capsule, Rfl: 0    doxycycline (MONODOX) 100 MG capsule, Take 1 capsule by mouth 2 (Two) Times a Day., Disp: 20 capsule, Rfl: 0    Current Facility-Administered Medications:     triamcinolone acetonide (KENALOG-40) injection 80 mg, 80 mg, Intramuscular, Once, Ju, MARK Buck    Facility-Administered Medications Ordered in Other Visits:     heparin injection 500 Units, 500 Units, Intravenous, PRN, Ishmael Rashid MD, 500 Units at 06/02/21 0900    Allergies: Cymbalta [duloxetine hcl], Gabapentin, and Lyrica [pregabalin]    Physical Exam  Constitutional:       Appearance: Normal appearance.   HENT:      Right Ear: Tympanic membrane, ear canal and external ear normal.      Left Ear: Tympanic membrane, ear canal and external ear normal.      Nose:      Right Sinus: Maxillary sinus tenderness present.      Left Sinus: Maxillary sinus tenderness present.   Cardiovascular:       Rate and Rhythm: Normal rate and regular rhythm.      Heart sounds: Normal heart sounds. No murmur heard.  Pulmonary:      Effort: No respiratory distress.      Breath sounds: Wheezing and rhonchi present.   Neurological:      Mental Status: He is alert and oriented to person, place, and time.   Psychiatric:         Behavior: Behavior normal.          Result Review :                   Assessment and Plan    Diagnoses and all orders for this visit:    1. Acute URI (Primary)  Assessment & Plan:  Likely bronchitis, I am going to give him a steroid shot today and then RX for Doxycycline and promethazine DM, Call or return if symptoms persist or worsen.     Orders:  -     triamcinolone acetonide (KENALOG-40) injection 80 mg    2. Seasonal allergies  Assessment & Plan:  Rx for Zyrtec.      Orders:  -     cetirizine (zyrTEC) 10 MG tablet; Take 1 tablet by mouth Daily. For allergies  Dispense: 90 tablet; Refill: 3    Other orders  -     promethazine-dextromethorphan (PROMETHAZINE-DM) 6.25-15 MG/5ML syrup; Take 5 mL by mouth 4 (Four) Times a Day As Needed for Cough.  Dispense: 180 mL; Refill: 0  -     doxycycline (MONODOX) 100 MG capsule; Take 1 capsule by mouth 2 (Two) Times a Day.  Dispense: 20 capsule; Refill: 0        Follow Up   Return if symptoms worsen or fail to improve.  Patient was given instructions and counseling regarding his condition or for health maintenance advice. Please see specific information pulled into the AVS if appropriate.     Cielo Dodd PA-C

## 2023-11-09 ENCOUNTER — OFFICE VISIT (OUTPATIENT)
Dept: ONCOLOGY | Facility: CLINIC | Age: 68
End: 2023-11-09
Payer: MEDICARE

## 2023-11-09 ENCOUNTER — INFUSION (OUTPATIENT)
Dept: ONCOLOGY | Facility: HOSPITAL | Age: 68
End: 2023-11-09
Payer: MEDICARE

## 2023-11-09 ENCOUNTER — SPECIALTY PHARMACY (OUTPATIENT)
Dept: ONCOLOGY | Facility: HOSPITAL | Age: 68
End: 2023-11-09
Payer: MEDICARE

## 2023-11-09 VITALS
RESPIRATION RATE: 18 BRPM | HEIGHT: 68 IN | DIASTOLIC BLOOD PRESSURE: 87 MMHG | WEIGHT: 193 LBS | SYSTOLIC BLOOD PRESSURE: 170 MMHG | BODY MASS INDEX: 29.25 KG/M2 | TEMPERATURE: 98.4 F | HEART RATE: 91 BPM | OXYGEN SATURATION: 98 %

## 2023-11-09 DIAGNOSIS — J06.9 UPPER RESPIRATORY TRACT INFECTION, UNSPECIFIED TYPE: ICD-10-CM

## 2023-11-09 DIAGNOSIS — C61 PROSTATE CANCER METASTATIC TO BONE: Primary | ICD-10-CM

## 2023-11-09 DIAGNOSIS — C79.51 PROSTATE CANCER METASTATIC TO BONE: Primary | ICD-10-CM

## 2023-11-09 DIAGNOSIS — E61.1 IRON DEFICIENCY: ICD-10-CM

## 2023-11-09 DIAGNOSIS — R11.2 NAUSEA AND VOMITING, UNSPECIFIED VOMITING TYPE: ICD-10-CM

## 2023-11-09 DIAGNOSIS — R10.13 EPIGASTRIC ABDOMINAL PAIN: ICD-10-CM

## 2023-11-09 DIAGNOSIS — Z45.2 ENCOUNTER FOR CARE RELATED TO PORT-A-CATH: ICD-10-CM

## 2023-11-09 PROCEDURE — 3079F DIAST BP 80-89 MM HG: CPT | Performed by: NURSE PRACTITIONER

## 2023-11-09 PROCEDURE — 1160F RVW MEDS BY RX/DR IN RCRD: CPT | Performed by: NURSE PRACTITIONER

## 2023-11-09 PROCEDURE — 1159F MED LIST DOCD IN RCRD: CPT | Performed by: NURSE PRACTITIONER

## 2023-11-09 PROCEDURE — 25010000002 HEPARIN LOCK FLUSH PER 10 UNITS: Performed by: INTERNAL MEDICINE

## 2023-11-09 PROCEDURE — 1125F AMNT PAIN NOTED PAIN PRSNT: CPT | Performed by: NURSE PRACTITIONER

## 2023-11-09 PROCEDURE — 99214 OFFICE O/P EST MOD 30 MIN: CPT | Performed by: NURSE PRACTITIONER

## 2023-11-09 PROCEDURE — 3077F SYST BP >= 140 MM HG: CPT | Performed by: NURSE PRACTITIONER

## 2023-11-09 PROCEDURE — 36591 DRAW BLOOD OFF VENOUS DEVICE: CPT

## 2023-11-09 RX ORDER — HEPARIN SODIUM (PORCINE) LOCK FLUSH IV SOLN 100 UNIT/ML 100 UNIT/ML
500 SOLUTION INTRAVENOUS AS NEEDED
Status: DISCONTINUED | OUTPATIENT
Start: 2023-11-09 | End: 2023-11-09 | Stop reason: HOSPADM

## 2023-11-09 RX ORDER — DOXYCYCLINE 100 MG/1
100 CAPSULE ORAL 2 TIMES DAILY
Qty: 14 CAPSULE | Refills: 0 | Status: SHIPPED | OUTPATIENT
Start: 2023-11-09 | End: 2023-11-16

## 2023-11-09 RX ORDER — FERROUS SULFATE 325(65) MG
TABLET ORAL
Qty: 30 TABLET | Refills: 11 | Status: SHIPPED | OUTPATIENT
Start: 2023-11-09

## 2023-11-09 RX ORDER — HEPARIN SODIUM (PORCINE) LOCK FLUSH IV SOLN 100 UNIT/ML 100 UNIT/ML
500 SOLUTION INTRAVENOUS AS NEEDED
OUTPATIENT
Start: 2023-11-09

## 2023-11-09 RX ADMIN — HEPARIN 500 UNITS: 100 SYRINGE at 10:25

## 2023-11-09 NOTE — PROGRESS NOTES
CHIEF COMPLAINT:  1.  Chronic pain in hip and back    2.  Sinus infection    Problem List:  Oncology/Hematology History Overview Note   1.  Stage IVb grade group 4 metastatic prostate cancer with sclerotic bone lesions on CT and bone scan and history of prostatic hypertrophy.  Good response to Taxotere ending 2/18/2021 Relugolix, followed by Zytiga prednisone with L4/sacrum, left acetabulum, and right pelvis external beam radiation August 2021.  Hypophosphatemia on Zometa.  Zometa held as of October 2021.  2.  Kidney stone  3.  Polyps  4.  Probable drug-induced hypophosphatemia from Zometa, drug stopped after 10/5/2021 dose  5.  Cancer related pain seeing palliative care  6.  Fatigue  7.  Covid 2/2022  8.  Iron deficiency anemia  -5/3/2023 EGD and colonoscopy with Dr. Alexander Gomez: Superficial erosions in the stomach with gastritis.  Small polyps removed, dilated internal hemorrhoids.  Pathology pending.  Recommend repeat colonoscopy in 5 years.    -9/30/2020 office note from Dr. Ronen Reynaga indicates patient with PSA 10.8.  Underwent TRUS prostate biopsy 9/15/2020.  Adenocarcinoma the prostate [place] 4+4 = 8 in 1 out of 12 cores and 4+3 = 7 and 10 out of 12 cores and 3+4 = 7 in 1 out of 12 cores.  Perineural invasion.  Extraprostatic extension into the fat seen on 2 biopsies of the right lateral mid and right apex.  9/24/2020 CT chest and whole-body bone scan showed T12, L1, left acetabular, right medial acetabular metastases with no lung metastases.  He started androgen deprivation therapy with Casodex with plans for Lupron to start in 1 to 2 weeks for clinical T3 N0 M1 metastatic prostate cancer.  Review of official report from [facility] 9/24/2020 bone scan mentions T12, L1, roof of left acetabulum and medial right acetabular osteoblastic metastases.  CT chest with contrast that same day showed mild splenomegaly 14.2 cm with stable periaortic nodes compared to CT December 2015 with no lung  metastases.  Sclerotic abnormality within the T12 vertebral body, L1 vertebral body extending into the pedicle unchanged.  8/28/2020 CT abdomen pelvis report from [facility] reviewed and mentions normal spleen size.  Punctate nonobstructing kidney stone, no paulino enlargement, diffuse bladder wall thickening with mild perivesicular fat stranding, 2.1 cm sclerotic left iliac bone above the left acetabulum new compared to 2015 as well as 1.5 cm faintly sclerotic focus in the right posterior acetabulum stable compared to prior CT 2015 as well as a 1.6 cm T12 and inferior vertebral body sclerotic lesion and a 1.9 cm left posterior lateral L1 vertebral body abnormality extending to the pedicle.  -10/13/2020 initial [facility] medical oncology consultation: With 1 Sarika score 8, 4+4 lesion that would make him a grade group 4 with stage IVb disease given radiographic evidence of sclerotic bone metastasis on bone scan and CT.  Needs genetic testing given metastatic prostate cancer and this is also important as, were he to have mismatch repair mutation then we would have options for PDL 1 inhibitors and were he BRCA mutated would have options for PARP inhibitors in the future.  Systemic therapy for castration naïve prostate cancer or N1 disease should be with apalutamide or Abiraterone or enzalutamide all of which are category 1.  He does not have any visceral metastases.  According to his imaging he has T12, L1, left acetabular, right acetabular, and left iliac bony involvement.  That means he would have 4 or more bone metastases with at least one metastasis (i.e. the acetabular lesions bilaterally) beyond the pelvis/vertebral, for which this would be considered high-volume disease for which 6 cycles of docetaxel 75 mg/m² x 6 cycles followed by Zytiga and prednisone would be reasonable along with Zometa every 6 weeks for bone stabilization.  He will do chemo preparation visit with my nurse practitioner prior to start  chemotherapy with Taxotere and Zometa in 2 weeks and he is already received Casodex in preparation for Lupron shot he received on 10/12/2020 with Dr. Reynaga.  We will get him to Dr. Alexander Gomez who has done his polypectomies in the past for port placement and we will get genetic counseling started.  Following the 6 courses of Taxotere per the [place] trial criteria, I would then give him an indefinite Zytiga and prednisone and Zometa until progression or toxicity dictates.    -12/16/2019 COVID-19 antigen test negative    -12/29/2020 PSA 0.275 with absolute neutrophil count 700 and creatinine 0.76 with normal liver enzymes and electrolytes.  Normal magnesium and phosphorus and urine drug screen positive for buprenorphine and opiates follow-up with palliative care.    -1/4/2021 CT chest abdomen pelvis with contrast and whole-body bone scan shows improving less metabolically active bone metastases.  Stable sclerotic T12, L1, and L2 vertebral bodies and bilateral pelvic bones on bone windows with stable subcentimeter para-aortic lymph nodes and no definite progression in the chest abdomen or pelvis.    -1/5/2021 [facility] medical oncology follow-up visit: I reviewed the images and reports thereof of the above CT and bone scan which shows good response to Taxotere x3 courses with a PSA down to 0.275 from a baseline of 10.  Get another 3 courses of Taxotere, continue his Xgeva, and then following that we will switch to Zytiga and prednisone.  He is working with palliative care on his bone pain but on his current dose of fentanyl patch he says his pain is still not adequately controlled he will contact them.  Dexamethasone prescription was refilled.  We will see my nurse practitioner back in 3 weeks for course #5 of 6 total courses and then we will reimage with CT chest abdomen pelvis and bone scan before going to the Zytiga prednisone.    -3/4/2021 CT chest abdomen pelvis with contrast is negative save for stable  sclerotic bone lesions and the bone scan shows near resolution of prior bony abnormalities associated with the known sclerotic lesions in the thoracolumbar spine and bony pelvis.    2/17/2021 PSA down to 0.1 from 1.37 October 2020.  3/9/2021 PSA 0.1    -5/26/2021 CT chest abdomen pelvis with contrast shows stable to slightly underlying increase low-density left para-aortic node.  Bone lesions stable.  Whole-body bone scan stable to decrease activity of known thoracolumbar and bony pelvic involvement.  PSA less than 0.1  , AST 74, bilirubin 0.5.       -6/1/2021 [facility] oncology follow-up visit: I reviewed the above data with him and images thereof.  Bones are stable.  No significant adenopathy.  PSA immeasurably low.     upper limit of normal 69 and AST 74 upper limit of normal 46.  Hence, neither is more than double the upper limit of normal with no ascites and no encephalopathy and normal albumin and bilirubin, despite the elevation of liver enzymes, he has child Abrams score A.  Package insert for Abiraterone says that for ALT and/or AST greater than 5 times upper limit of normal or total bilirubin greater than 3 times the upper limit of normal to withhold treatment until liver function tests returned to baseline or ALT and AST less than or equal to 2.5 times the upper limit of normal and total bilirubin less than or equal to 1.5 times the upper limit of normal and then reinitiate at 750 mg daily dose of Zytiga.  For recurrent hepatotoxicity on 750 mg daily Zytiga, withhold treatment until liver functions returned to baseline or ALT and AST less than 3-2.5 times upper limit of normal and total bilirubin less than or equal to 1.5 times the upper limit of normal then reinitiate at 500 mg daily Zytiga dose.  If has recurrent hepatotoxicity on 500 mg dose, then discontinue treatment.  If has ALT greater than 3 times the upper limit of normal along with total bilirubin greater than 2 times the upper  limit of normal in the absence of other contributing causes or biliary obstruction, permanently discontinue Zytiga.  Based on these recommendations, I would continue current doses but we will watch with serial labs monthly and repeat his imaging again in 3 months but clinically he is doing wonderfully with excellent response to Taxotere and now on Zytiga prednisone plus Zometa along with Relugolix.    -6/23/2020 for [facility] oncology clinic follow-up: Having persistent and worsening pain above his left hip.  I will get an MRI of the left hip for further evaluation.  Otherwise we will continue therapy unchanged with Zytiga, prednisone and Zometa along with Relugolix.    -7/28/2021 [facility] oncology clinic follow-up: Patient with multiple somatic complaints including episodes of confusion, dizziness, decreased memory, agitation.  He reports ongoing episodes with difficulty swallowing.  Also most concerning for episodes of angina and chest pain for which he basically refused to seek emergency medical attention.  He has a history of coronary artery disease and stent placement.  He has not followed up with cardiology for many years.  I will get an MRI of the brain in light of his confusion, dizziness and agitation.  I will get him to gastroenterology for EGD in light of his dysphagia.  I have also put in a referral for cardiology.  I reemphasized to the patient, his wife who is with him today and his son who was on the telephone during our visit the importance of seeking out prompt medical attention when he is having chest pain or neurological concerns so that he can be evaluated.  The patient states that he basically refuses to go to the emergency room and if his family calls an ambulance he would not agree to go to the hospital.  For the time being, I have asked him to hold his Zytiga and prednisone until we can sort this out as Zytiga does have some cardiovascular risk.  There is likely no treatment for prostate cancer  "that does not carry some form of cardiovascular risk.  His PSA remains low at 0.014 yesterday.  He will continue relugolix for now.    Of note, some of these symptoms could also be due to his hypophosphatemia, phosphate level from yesterday was 1.5, I have sent in a prescription for K-Phos 3 times a day before meals for 10 days.  We will hold further Zometa until this is corrected.  I have also put in an order to check his vitamin D level.  He takes calcium and vitamin D daily.  Some of his symptoms also could be due to drug withdrawal as there was some confusion and difficulty getting his last prescription for methadone therefore he was without methadone for several days, he now has his prescription and is back on his pain medication.  He has an appointment with neurosurgery tomorrow in light of his ongoing back pain, MRI of the hip recently showed multiple bony lesions largest at the L5 vertebral body and left supra-acetabular region.  If no neurosurgical intervention recommended he may benefit from spot radiation as this is where a lot of his pain is coming from.  His wife had questions today regarding his staging and extent of disease.  We reviewed previous scans.  I did clarify with her as she used the words \"cancer free\" as she thought that is what she was told after his CT scans once he completed Taxotere in February.  I explained to her that even though his scans showed he had an excellent response with no clear areas of metastasis that did not mean he was cancer free, I explained to her that when you have metastatic cancer the treatment intent is palliative in nature and to control the disease but not to cure the disease.  She stated understanding.  We will see him back in 2 weeks for follow-up to sort through this and make further plan of care, again, I have asked him to hold Zytiga and prednisone until he sees us back, he will continue on his other medications including relugolix.    -7/30/2021 " neurosurgery PA consultation.  MRI with and without contrast L5 ordered.    -8/3/2021 neurosurgery follow-up showed known sclerotic disease with new L5 abnormality without compression deformity or instability.  MRI brain negative for metastasis.    -8/4/2021 office visit Dr. Fco Quintanilla radiation oncology coordinating with Dr. North neurosurgery for palliative radiation for MRI evidence of L5 and left supra acetabular painful lesions.  States that the patient's disease which is not secreting PSA is experiencing some progression and would benefit from 20 Gy in five fractions and other lesion 30 Gy in ten fractions. Patient offered to go to [place] for radiation but desired treatment in [place].    -8/10/2021 [facility] medical oncology follow-up visit: No chest pains at this junction.  Reviewed MRI brain and other MRIs reviewed by Dr. North and Dwight.  Due for EGD on 19th.  We will repeat his phosphorus along with his other labs continue Relugolix, Zytiga, prednisone, and he will continue radiation palliatively to the painful bony lesions with Dr. Quintanilla/Can.  He will see my nurse practitioner back in a few weeks to see how he is tolerating this and to follow-up on the phosphorus off of Zometa and she will order repeat CT chest abdomen pelvis with contrast and total body bone scan the end of September.I will see him back after that.    -9/8/2021 Zometa resumed.  PSA <0.10    -10/5/2021 [facility] medical oncology follow-up visit: followed-up with radiation oncology 9/21/2021.  Status post symptomatic L5 radiation 5 fractions 20 [place] completed 8/16/2021 with improvement in right hip pain.  9/2/2021 PSA less than 0.1.  9/29/2021 total body bone scan shows increased uptake left iliac bone slightly superior group of the left acetabulum with no additional foci of suspicious abnormality is unlikely treatment related with no new sites of active osseous metastasis.  CT chest abdomen pelvis with contrast shows stable  borderline enlarged left periaortic nodes and dating sclerotic bone lesions.Continue Zytiga, prednisone, Relugolix, Zometa, repeat CT chest abdomen pelvis with contrast and total body bone scan the end of the year.  We will follow PSA serially.    -11/17/2021 [facility] Oncology clinic follow-up:  Mr. Lugo overall is doing well.  He is tolerating therapy with Zytiga, prednisone and Relugolix along with Zometa which is given every 6 weeks.  PSA remains low at <0.1.  Has occasional low phosphorus, currently low at 1.7.  Serum calcium is normal at 8.9, normal creatinine 0.79 and normal CBC other than for platelets of 124.  I will hold Zometa for 1 month, I did send in a prescription for phosphorus replacement today.  He takes calcium and vitamin D.  I will see him back in 1 month for follow-up.  We will repeat restaging scans after that visit.    -12/15/2021 [facility] Oncology clinic follow-up: Mr. Lugo continues to do well on therapy with Zytiga, prednisone and Relugolix, his PSA remains low at <0.1.  He is continuing to have left lower back pain, he is working with palliative care and they have referred him also to pain management.  Pain management has talked to him about putting in a pain pump however he is leery of this, I told him that this was a safe and effective pain management technique and to certainly give consideration to their recommendations.  His phosphorus is improving however is still a little low, I will hold off on Zometa until we see him back in 1 month, we may end up only giving it every 12 weeks.  We will repeat restaging scans with CT chest, abdomen and pelvis along with total body bone scan prior to return and I have ordered those today.      -1/5/2022 CT chest abdomen pelvis with contrast [facility] showing stable left periaortic adenopathy and unchanged sclerotic thoracic, lumbar spine and pelvis lesions with no new or progressive disease in the chest abdomen or pelvis.  Total body bone  scan shows no significant change and no new suspicious foci.  Stable posterior right acetabulum and left superior acetabulum and degenerative changes in the cervical spine, shoulders, acromioclavicular joints, sternoclavicular joints, elbows, wrists, hands, thoracic spine, lumbosacral spine, sacroiliac joints, hips, knees, ankles, feet.    -1/11/2022 [facility] medical oncology hematology follow-up visit: I reviewed images and reports from his 1/5/2022 scans.  No active disease and PSA immmeasurably low on Zytiga, prednisone, Relugolix.  However, he has struggled with hypophosphatemia and is significantly fatigued with this.  Given that the bones are doing well and given that his phosphorus continues to remain consistently low at 2.2 in mid December, 1.7 mid November and 2.5 mid-September and as low as 1.5 back to July and the likelihood that this is related to his Zometa, given that his bones are stable overall, he will increase from 1200 mg up to 1600 mg of calcium and phosphate a day and we will hold his Zometa for the foreseeable future.  He will see my nurse practitioner back in a month to continue to monitor his phosphorus along with his calcium and other labs and will repeat his scans again in 3 months.  In addition, given his fatigue we will check his ACTH and cortisol though he is taking his prednisone with his Zytiga.  Though his electrolytes are normal, I will keep an eye on the cortisol and ACTH and have these labs not only drawn today but again just prior to return to my nurse practitioner on February 10.    -2/10/2022 [facility] Oncology clinic follow-up: Mr. Lugo overall is stable, no change in his health this past month.  I have reviewed his labs from 2/8/2022 and they are unremarkable, his phosphorus is now normal at 3.2. We are continuing to hold his Zometa, he last received Zometa on 10/5/2021.  He continues therapy with Zytiga, prednisone and Relugolix.  Dr. Rashid had ordered cortisol level and ACTH  which are low, currently his ACTH is 2.8 and cortisol 3.08 however these are both done in the face of ongoing prednisone that he takes with his Zytiga.  We will get him to endocrinology for further evaluation for possible adrenal insufficiency but will not interrupt his treatment in the interim.  He will need restaging scans again in April for a 3-month follow-up.  He will continue to follow with palliative care and pain management for his cancer related pain.  His PSA remains immeasurably low at <0.1 on 2/8/22.    -2/15/2022 went to emergency room with weakness, decreased appetite, myalgias in the setting of known COVID-19 testing positive a few days prior to this visit.  Phosphorus had been low in the past but was normal in the emergency room with unremarkable CBC and CMP.  Chest x-ray unremarkable saturating well on room air mildly hypertensive with dry mucosa.  Given 500 cc crystalloid.  Covid test positive.  Mild thrombocytopenia 136,000 and otherwise unremarkable.  Discharged home.    -3/3/2022 data:  PSA less than 0.1  CMP unremarkable  Cortisol 3.96 normal  ACTH 2.8 lower limit of normal 7.2  Phosphorus normal 2.6    -3/8/2022 [facility] medical oncology follow-up: Suspect big chunk of his fatigue was Covid.  Another part of the fatigue likely related to lack of testosterone.  Not hypophosphatemic off of Zometa.  ACTH running low while on prednisone not surprising but with normal cortisol and I doubt adrenal insufficient which is why he is on prednisone to avoid such while taking Zytiga.  Overall doing fairly well and with immeasurably low PSA, I will have labs done on him early April, see my nurse practitioner early May, and she will order repeat bone scan and CTs the end of May and I will see him back in June.  We will continue to hold the Zometa unless bone lesions progress given symptomatic prior hypophosphatemia on Zometa.  He will keep his appointment April 28 with Dr. Mir Duffy just to be sure that my  take on his pituitary/adrenal axis assessment is accurate.    -4/28/2022 endocrinology consult Dr. Mir Duffy.  With low ACTH and cortisol on prednisone with Zytiga, the adequacy of prednisone cannot be assessed by measuring ACTH or cortisol but is assessed by weight changes, blood pressure, blood sugar, potassium, overall sense of how the patient is doing.  Primary adrenal insufficiency with low ACTH and low cortisol would be typical for the situation as his blood sugar tends to run a little high and potassium a little low, he did not think increasing prednisone was necessary.  He put him on some potassium.  No follow-up scheduled, will see as needed.    -5/4/2022 [facility] Oncology clinic follow-up: Mr. Lugo continues on therapy with Zytiga and prednisone along with Relugolix.  His Zometa has been on hold due to previous hypophosphatemia.  There is some concern about potential adrenal insufficiency. He saw endocrinology, Dr. Mir Duffy on 4/28/2022.  I did review his office note and he stated that the low ACTH and low serum cortisol would be typical for Mr. Lugo's situation.  He further stated that the adequacy of prednisone dose cannot be assessed by measuring ACTH or cortisol but is rather assessed by the overall just altered how the patient is feeling-weight change, blood pressure, blood sugar, potassium, overall sense of wellbeing.  His potassium had been running a little low therefore they put him on potassium 10 mill equivalents daily. Of note, I do not see a future follow-up scheduled with endocrinology.  He is having night sweats, I am not sure if this is related.  His glucose was normal this morning at 107.  His weight is stable.  He will continue current treatment for his prostate cancer unchanged, we will continue to hold his Zometa.  Current phosphorus and magnesium are normal.  CBC and CMP from today are unremarkable, cortisol is normal.  PSA and ACTH are pending  In regards to his night sweats, he had  taken clonidine previously 0.1 mg twice daily as needed, I did send in a short supply again to try and see if this helps.  He also is having indigestion despite being on omeprazole twice daily, I sent a prescription for Pepcid.  He had an EGD August 2021.  We will repeat restaging scans prior to return and I have ordered those today.     -5/24/2022 CT chest abdomen pelvis with contrast[place] negative.  Bone scan shows stable bone metastases.    - 5/25/2022 PSA less than 0.1, platelets 130,000, otherwise unremarkable CBC and CMP.  A.m. cortisol running low 1.2 with phosphorus low 2.3.    -5/31/2022 [facility] medical oncology follow-up: On Zytiga, prednisone, Relugolix, PSA remaining immeasurably low with stable bone scan and no visceral/paulino metastases.  Phosphorus still running low.  I have discontinued his potassium chloride and started potassium phosphate 500 mg 4 times a day.  Unless PSA rising, we will plan on repeating CTs and bone scan in 6 months and will follow with my nurse practitioner in the interim and if symptoms dictate or labs, will get back to Dr. Duffy for management of potential adrenal insufficiency but presently we will just see how he does with potassium phosphate and hopeful improvement in the phosphorus level with time.  We will continue to follow with palliative care for pain management    -7/6/2022 [facility] Oncology clinic follow-up: Mr. Lugo overall is doing well but continues to be troubled with fatigue and hot flashes.  For the most part he states he is able to do the things he wants to do, he continues to work but does have to take more rest periods.  I had a long discussion with him and his wife after reviewing his medications with them as they wanted to see if there was anything he could stop or adjust.  I discussed with him the need to continue on treatment for his prostate cancer even though his PSA remains immeasurably low and his scans look good but that if his medication is  stopped the cancer would progress and over time it still has the potential to progress on therapy but would continue it as long as possible to keep his disease under control.  I explained this is like treating someone with hypertension, you do not stop the antihypertensive just because the blood pressure normalizes.  They stated understanding.  There is no dose adjustment of Zytiga or prednisone that would minimize his symptoms, he is on the current standard recommended therapy.  Discussed with him that sometimes during times of stress we may need to increase his prednisone dose but for now would not change anything.  His phosphorus level is normal, we continue to hold his Zometa.  I did give him the option of holding his phosphorus for the next month and we will see what happens, if it drops we will start him back on it but may be at a lower dose rather than 4 times a day maybe twice a day.  For now he will continue therapy unchanged.  We will continue monitoring labs monthly.  No PSA was drawn this month but that is okay as his PSA has been running immeasurably low at <0.1, we will recheck next month with lab draw.  We will stop checking his ACTH and cortisol level every month, if he develops worsening symptoms I will repeat those.  He has not gotten a follow-up with endocrinology as of yet.    -8/3/2022 [facility] follow-up:  Mr. Lugo is doing well as far as his prostate cancer goes with continued immeasurably low PSA of <0.1.  He continues on therapy with Zytiga and prednisone along with Relugolix.  His phosphorus is stable at 2.7 which was just slightly low by our reference range however was 2.71-month ago also which was normal on another labs reference range.  He has not taken his phosphorus for this past month as he thought it was making him more fatigued.  He really can tell no difference whether he was taking it or not.  I have asked him to try to go back on phosphorus twice a day rather than 4  times a day and see if this is tolerated and if we can keep his phosphorus level from dropping.  His biggest complaint today is flareup of his arthralgias and back pain.  He is following with pain management, Dr. Danny Avalos but is requesting a referral back to palliative medicine as he feels that no one is currently listening to him and they are just prescribing the same medications that are not effective according to his report.  He does have a follow-up with Dr. Avalos on 8/12/2022 but due to his current pain is not able to work until after he is seen and hopefully can get some better relief.  I have given him a work release until 15 August.  I have also put in a referral back to palliative care.  Also encouraged him to work with Dr. Avalos for help in resolving his issue.  Apparently they have recommended some type of a pump however he has been hesitant to that but likely would be helpful.  We will continue therapy unchanged.  We will continue monthly labs including phosphorus, we are not currently checking ACTH and cortisol monthly as Dr. Mir Duffy previously stated in his assessment on 4/28/2022 that the adequacy of prednisone cannot be assessed by measuring ACTH or cortisol but would be assessed by symptoms, see note from 4/28/2022.  He made no further follow-up appointments.  Fatigue is not worsening currently.  His glucose and potassium are  Normal.    -10/13/2022 [facility] medical oncology follow-up: Mr. Lugo is doing well.  He is tolerating treatment with Zytiga and prednisone along with Relugolix without any unusual side effects.  His PSA has remained immeasurably low at <0.1.  His phosphorus was slightly low at last visit.  He had been instructed to go back on phosphorus twice a day but he misunderstood and has not been taking it.  We will check labs today.  I will follow-up with results and notify him if he needs to restart the phosphorus.  His pain is better controlled since reestablishing with Anna  Andria, palliative care.  We will repeat scans prior to next follow-up.  I have ordered CT chest abdomen pelvis along with bone scan.  We will see him back in 1 month.    -10/13/2022 PSA less than 0.014.With unremarkable CBC and CMP.  Phosphorus still a little low 2.3.    -11/4/2022 CT abdomen pelvis chest showed no evidence of disease progression with stable sclerotic bone lesions.  Bone scan stable right greater than left acetabular metastasis.  Stable bone metastases.    -11/8/2022 [facility], [place] medical oncology telemedicine follow-up: Patient states that he feels achy all over with low-grade temps and a cough mildly productive.  Has an appointment later today to see his primary care for testing for flu and COVID.  Suggested that if he were positive for COVID that he get Paxlovid and that if he were positive for flu that he get Tamiflu and to discuss this with his primary care.  From the prostate cancer side, his PSA is immeasurably low with stable bone metastases and no evidence of progression.  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  We will continue the Relugolix, Zytiga, prednisone and he will see my nurse practitioner 12/7/2022.  Would repeat CTs and bone scan in May.    -1/25/2023 [facility] [place] Oncology medicine follow-up: Mr. Lugo is having worsening fatigue and muscle aches.  He does have adrenal insufficiency on Zytiga and prednisone for his prostate cancer, he saw endocrinology in light of low ACTH back in April 2022.  At that time there was no recommendation other than for monitoring him for his overall wellbeing and labs.  He denies any fevers or chills.  His labs as shown above are unremarkable, his CBC and CMP are normal other than for glucose of 112, PSA remains low at <0.1.  He has no new pain or any symptoms concerning for progression of his prostate cancer.  I will get him back to endocrinology to see if they feel any dose adjustment of his prednisone would be  helpful in his symptoms.  Currently he is on 5 mg a day with his Zytiga.  I did not repeat his ACTH or cortisol as they would be expected to be low in this situation.  His potassium is normal, he has had no weight loss or change in his appetite.  Blood sugar is reasonable.  His only complaint currently is worsening fatigue and muscle aches.  He will continue treatment unchanged with Zytiga, prednisone and relugolix.  I will see him back in 1 month for follow-up.  We will repeat his restaging scans in May.    -3/1/2023 [facility] Oncology clinic follow-up:  His PSA remains low at <0.1 on treatment with Zytiga and prednisone along with relugolix however he continues to complain of significant fatigue and muscle aches not improving with time.  He states that his quality of life is affected as he is not able to do any work activities due to the fatigue.  He did see endocrinology again and they have increased his prednisone from 5 mg daily up to 7.5 mg daily however so far this has not made any difference in how he feels.  He is supposed to get back in touch with them to let them know how he is doing and to see if there is any other adjustments needed. His labs are unremarkable with normal thyroid function, CMP is normal other than for glucose of 156.  They did check hemoglobin A1c to look for diabetes however that was normal at 5.7.  CMP is unremarkable with just very mild anemia with hemoglobin of 12 and hematocrit 36.1% which would not account for his fatigue.  He does have hot flashes that are fairly significant, I will try venlafaxine as suggested by Dr. Duffy and I appreciate the recommendation.  We will start at 37.5 mg daily and if tolerated he can increase to 75 mg after 1-2 weeks.  I will see him back in 1 months for follow-up.  Plan to repeat scans late April/early May.  His prostate cancer seems under good control with current regimen however I am not sure how long he can tolerate this due to the side  effects.    -3/30/2023 [facility] follow-up:  Mr. Lugo continues to deal with fatigue.  He appears more cushingoid today, he continues on treatment with Zytiga, prednisone and relugolix.  Prednisone dose currently is 7.5 mg daily.  This has not made any difference in his energy level.  PSA remains immeasurably low at <0.014.  CBC shows new onset of microcytic hypochromic anemia with hemoglobin of 11, hematocrit 34.4%, MCV 75.4, MCH 24.1, WBC is normal at 5.81 with normal platelet count 181,000 and normal differential.  CMP is unremarkable, it is normal other than for glucose of 142.  We will get him to Dr. Gomez for an EGD and colonoscopy for further evaluation in regards to his new onset of anemia.  He has no associated GI symptoms otherwise except for 1 day a few weeks ago he does report that he had fairly sudden onset of vomiting but this was an isolated incident on that day and has not reoccurred since.  Continues to follow with palliative care for management of his chronic back pain.  We will repeat restaging CT chest, abdomen and pelvis along with total body bone scan prior to return and I have ordered that today.    -3/30/2023 Ferritin low 12.3 with iron low 27 and saturation low 5% with total iron binding capacity 511.  3/31/2023 ferrous sulfate 325 mg twice a day every other day with vitamin C started.    - 4/12/2023 CT chest abdomen pelvis with contrast showed no progressive disease with stable sclerotic bone lesions.  Bone scan showed single identifiable bone metastasis stable right acetabulum    -4/18/2023 [facility] oncology follow-up: He now has microcytic iron deficiency anemia which is new and concerning.  Gastroenterologic evaluation has been ordered but not performed and is mandatory.  Continue ferrous sulfate 325 mg twice a day every other day with vitamin C to improve absorption and we will follow his CBC along with his usual labs monthly on Zytiga, Relugolix, prednisone 7.5  mg.  He follows with Dr. Duffy with adrenal insufficiency.  He will see my nurse practitioner back in a month to see what GI has found and to watch serial CBC along with his other labs including PSA.  I would repeat CT chest abdomen pelvis again in 6 months with contrast along with bone scan and sooner if PSA rises.    -5/3/2023 EGD and colonoscopy with Dr. Alexander Gomez: Superficial erosions in the stomach with gastritis.  Small polyps removed, dilated internal hemorrhoids.  Pathology pending.  Recommend repeat colonoscopy in 5 years.    -5/17/2023 [facility], [place], operated by [facility] On[facility] clinic follow-up:  Naveen overall is doing well on current therapy with Zytiga, prednisone and relugolix.  PSA remains immeasurably low at <0.014.  He feels his hip pain is getting worse with time.  It makes it difficult to enjoy activities with his grandchildren as it is worse when he stands for any length of time or tries to carry any weight.  I reviewed his bone scan from April which was stable, we also reviewed previous MRI of the hips from May 2021 that showed moderate bilateral hip osteoarthritis.  I offered to repeat MRI of his hips for evaluation to see if there has been any worsening of his osteoarthritis and also then referral to orthopedics for any recommended intervention however at this time he defers.  He will let me know if he changes his mind.  He also follows with palliative care for management of his cancer related pain.  We will continue therapy unchanged.  We will repeat restaging scans in October unless symptoms dictate the need to do so sooner.  He was recently found to be iron deficient, he had an EGD and colonoscopy on 5/3/2023 with Dr. Gomez, EGD showed superficial erosions in the stomach with gastritis, he continues on omeprazole.  He had small polyps removed from the colon and recommendation to repeat colonoscopy in 5 years.  He is on oral iron along with vitamin C every other day and is tolerating that well.  CBC from yesterday  shows hemoglobin now normal at 14.2 and hematocrit 43.2%, MCV normal at 27.0.    -7/20/2023 [facility] Oncology clinic follow-up: Naveen is doing well on current therapy with Zytiga, prednisone and relugolix.  PSA in June remains low at <0.1.  Continues to deal with chronic pain in his back and hip.  Recently saw Dr. Nikolai Marks and had an injection in his hip and is hoping that it will eventually help with his pain.  He has no new pain.  We will continue current therapy unchanged.  We will repeat labs while he is here today.  I will refill his iron as his pharmacy has changed. Most recent CBC in June with hemoglobin of 14.7 and hematocrit 43.6%.  We will repeat restaging scans in October.    -9/14/2023 [facility] Oncology clinic follow-up: Naveen is tolerating his prostate cancer treatment with Zytiga, prednisone and relugolix.  PSA remains immeasurably low at <0.014 on 8/17/2023.  Currently he is having more GI issues with nausea and intermittent vomiting that occurs often without warning.  He had an EGD and colonoscopy with Dr. Gomez on 5/3/2023, EGD showed superficial erosions in the stomach with gastritis.  He does take omeprazole 40 mg twice daily.  He saw his PCP yesterday for these symptoms and has been referred to gastroenterologist at [facility] and he is awaiting to hear about that appointment.  Hopefully he can get in fairly quickly.  I told him that he should let us know if it is going to be a while before he can be seen and I will get him back to Dr. Gomez for consideration of repeat EGD.  We will get his CBC, CMP and PSA today.  His previous noted anemia had normalized, he currently is not taking oral iron as he ran out.  I will wait and see what his labs show today and likely hold off for now depending on his lab results until his GI issues can be resolved.  We will repeat his restaging CT chest, abdomen and pelvis along with bone scan in the next few weeks and I have ordered those today.  He is taking  Zofran as needed for nausea, he is also on meclizine for vertigo.  He is going to follow-up with his PCP in a few weeks regarding his GI symptoms.    -9/14/2023 PSA less than 0.014 with unremarkable CBC and CMP.    -9/28/2023 total body bone scan stable with uptake in right acetabulum, no new areas.  CT chest, abdomen and pelvis show no evidence of disease progression.     Prostate cancer metastatic to bone   8/30/2020 -  Other Event    PSA 10.8     9/15/2020 Initial Diagnosis    Prostate cancer metastatic to bone (CMS/HCC)     9/15/2020 Biopsy    Prostate needle core biopsy     9/24/2020 Imaging    Total body bone scan: 4 abnormal areas of increased activity consistent with osteoblastic metastasis, abnormal foci of activity seen in the T12 vertebral body, L1 vertebral body, roof of the left acetabulum, and acetabulum medially on the right.  CT chest: Stable sclerotic metastatic lesions within the T12 and L1 vertebrae.  No other evidence of metastatic disease to the chest.  Stable mild splenomegaly and subcentimeter periaortic retroperitoneal lymph nodes.  CT abdomen and pelvis: Diffuse bladder wall thickening with mild perivesicular fat stranding.  Interval development of several sclerotic lesions in the spine and left iliac bone.     10/13/2020 Cancer Staged    Staging form: Bone - Appendicular Skeleton, Trunk, Skull, And Facial Bones, AJCC 8th Edition  - Clinical: Stage IVB (cT3, cN0, cM1b, G3) - Signed by Ishmael Rashid MD on 10/13/2020     11/3/2020 - 10/5/2021 Chemotherapy    OP SUPPORTIVE Zoledronic Acid Q42d     11/3/2020 - 2/18/2021 Chemotherapy    OP PROSTATE DOCEtaxel       1/4/2021 Imaging    CT chest abdomen pelvis with contrast and whole-body bone scan shows improving less metabolically active bone metastases.  Stable sclerotic T12, L1, and L2 vertebral bodies and bilateral pelvic bones on bone windows with stable subcentimeter para-aortic lymph nodes and no definite progression in the chest abdomen or  pelvis.  PSA down to 0.275 after 3 courses of Taxotere.     3/4/2021 Imaging    CT chest, abdomen and pelvis stable, no evidence of disease progression. Total body bone scan with decreased/near resolution of abnormal increased radiotracer uptake associated with the known sclerotic lesions in the thoracolumbar spine and bony pelvis.  No evidence of new osteoblastic metastases.     3/4/2021 Imaging    CT chest abdomen pelvis with contrast is negative save for stable sclerotic bone lesions and the bone scan shows near resolution of prior bony abnormalities associated with the known sclerotic lesions in the thoracolumbar spine and bony pelvis.  2/17/2021 PSA down to 0.1 from 1.37 October 2020.     3/9/2021 - 3/9/2021 Chemotherapy    OP SUPPORTIVE PROSTATE Relugolix     3/9/2021 -  Chemotherapy    OP PROSTATE Abiraterone / PredniSONE       3/10/2021 -  Chemotherapy    OP PROSTATE Abiraterone / PredniSONE     8/10/2021 - 8/16/2021 Radiation    Radiation OncologyTreatment Course:  Naveen Lugo received 2000 cGy in 5 fractions to L4-sacrum, left acetabulum and right pelvis via External Beam Radiation - EBRT.     11/16/2021 -  Chemotherapy    OP CENTRAL VENOUS ACCESS DEVICE ACCESS, CARE, AND MAINTENANCE (CVAD)     1/5/2022 Imaging    -1/5/2022 CT chest abdomen pelvis with contrast [place] [facility] showing stable left periaortic adenopathy and unchanged sclerotic thoracic, lumbar spine and pelvis lesions with no new or progressive disease in the chest abdomen or pelvis.  Total body bone scan shows no significant change and no new suspicious foci.  Stable posterior right acetabulum and left superior acetabulum and degenerative changes in the cervical spine, shoulders, acromioclavicular joints, sternoclavicular joints, elbows, wrists, hands, thoracic spine, lumbosacral spine, sacroiliac joints, hips, knees, ankles, feet.     5/24/2022 Imaging    CT chest abdomen pelvis with contrastFrankfort negative.  Bone scan shows stable  bone metastases.     11/4/2022 Imaging    CT abdomen pelvis chest showed no evidence of disease progression with stable sclerotic bone lesions.  Bone scan stable right greater than left acetabular metastasis.  Stable bone metastases.     4/12/2023 Imaging     CT chest abdomen pelvis with contrast showed no progressive disease with stable sclerotic bone lesions.  Bone scan showed single identifiable bone metastasis stable right acetabulum     9/29/2023 Imaging    total body bone scan compared to April showed a single active bone metastasis with multiple and active bone metastases overall stable.  CT chest abdomen and pelvis compared to May and April shows no new bony progression with stable sclerotic bone lesions.         HISTORY OF PRESENT ILLNESS:  The patient is a 68 y.o. male, here for follow up on management of prostate cancer currently on therapy with Zytiga, prednisone and relugolix.  Naveen reports that he is getting over a sinus infection, coming to the end of his doxycycline that was prescribed by his PCP, requesting to lengthen the treatment as he states he is still having symptoms.  No fevers.  Continues to have sinus drainage and cough.  Continues to report chronic hip and low back pain.  Has a follow-up with Dr. Marks next week.  He had restaging scans in October that he reports Dr. Rashid called and went over with him and was told these looked good with no new findings.  He was to have seen Dr. Rashid in October for follow-up however that appointment was canceled due to his sinus infection and upper respiratory concerns.      Past Medical History:   Diagnosis Date    Bone cancer     History of radiation therapy 08/16/2021    L4 through sacrum, left acetabulum, right pelvis    Nephrolithiasis     Opioid use disorder, mild, on maintenance therapy (HCC)     Prostate cancer      Past Surgical History:   Procedure Laterality Date    CHOLECYSTECTOMY      CORONARY STENT PLACEMENT  206 & 2011    HERNIA REPAIR   "1986    THORACIC DISCECTOMY  1994       Allergies   Allergen Reactions    Cymbalta [Duloxetine Hcl] Dizziness    Gabapentin Nausea Only    Lyrica [Pregabalin] Nausea And Vomiting and Dizziness       Family History and Social History reviewed and changed as necessary    REVIEW OF SYSTEM:   Sinus congestion and drainage  Cough    PHYSICAL EXAM:  Well-developed, well-nourished male in no distress  Respirations regular nonlabored, lungs clear to auscultation bilaterally  Heart regular rate and rhythm    Vitals:    11/09/23 0924   BP: 170/87   Pulse: 91   Resp: 18   Temp: 98.4 °F (36.9 °C)   SpO2: 98%   Weight: 87.5 kg (193 lb)   Height: 172.7 cm (68\")     Vitals:    11/09/23 0924   PainSc:   8   PainLoc: Hip          ECOG score: 1           Vitals reviewed.  Labs reviewed along with CT results from 9/28/2023.    ECOG: (1) Restricted in Physically Strenuous Activity, Ambulatory & Able to Do Work of Light Nature    Lab Results   Component Value Date    HGB 15.4 09/14/2023    HCT 45.2 09/14/2023    MCV 86.4 09/14/2023     09/14/2023    WBC 9.86 09/14/2023    NEUTROABS 7.75 (H) 09/14/2023    LYMPHSABS 1.32 09/14/2023    MONOSABS 0.58 09/14/2023    EOSABS 0.12 09/14/2023    BASOSABS 0.07 09/14/2023       Lab Results   Component Value Date    GLUCOSE 131 (H) 09/14/2023    BUN 6 (L) 09/14/2023    CREATININE 0.73 (L) 09/14/2023     09/14/2023    K 3.1 (L) 09/14/2023     09/14/2023    CO2 28.8 09/14/2023    CALCIUM 9.6 09/14/2023    PROTEINTOT 7.0 02/14/2022    ALBUMIN 4.1 09/14/2023    BILITOT 0.5 09/14/2023    ALKPHOS 88 09/14/2023    AST 15 09/14/2023    ALT 17 09/14/2023     Lab Results   Component Value Date    PSA <0.014 09/14/2023    PSA <0.014 07/20/2023    PSA <0.1 06/14/2023    PSA <0.014 05/16/2023    PSA <0.1 02/21/2023    PSA <0.1 01/24/2023             ASSESSMENT & PLAN:  1.  Stage IVb grade group 4 metastatic prostate cancer with sclerotic bone lesions on CT and bone scan and history of " prostatic hypertrophy.  Good response to Taxotere ending 2/18/2021 Relugolix, followed by Zytiga prednisone with L4/sacrum, left acetabulum, and right pelvis external beam radiation August 2021.  Hypophosphatemia on Zometa.  Zometa held as of October 2021.  2.  Kidney stone  3.  Polyps  4.  Probable drug-induced hypophosphatemia from Zometa, drug stopped after 10/5/2021 dose  5.  Cancer related pain seeing palliative care  6.  Fatigue  7.  Covid 2/2022  8.  Iron deficiency anemia  -5/3/2023 EGD and colonoscopy with Dr. Alexander Gomez: Superficial erosions in the stomach with gastritis.  Small polyps removed, dilated internal hemorrhoids.  Pathology pending.  Recommend repeat colonoscopy in 5 years.  9.  Chronic back and hip pain  10.  Nausea and vomiting with weight loss  11.  Dizziness    -9/30/2020 office note from Dr. Ronen Reynaga indicates patient with PSA 10.8.  Underwent TRUS prostate biopsy 9/15/2020.  Adenocarcinoma the prostate [place] 4+4 = 8 in 1 out of 12 cores and 4+3 = 7 and 10 out of 12 cores and 3+4 = 7 in 1 out of 12 cores.  Perineural invasion.  Extraprostatic extension into the fat seen on 2 biopsies of the right lateral mid and right apex.  9/24/2020 CT chest and whole-body bone scan showed T12, L1, left acetabular, right medial acetabular metastases with no lung metastases.  He started androgen deprivation therapy with Casodex with plans for Lupron to start in 1 to 2 weeks for clinical T3 N0 M1 metastatic prostate cancer.  Review of [facility] 9/24/2020 bone scan mentions T12, L1, roof of left acetabulum and medial right acetabular osteoblastic metastases.  CT chest with contrast that same day showed mild splenomegaly 14.2 cm with stable periaortic nodes compared to CT December 2015 with no lung metastases.  Sclerotic abnormality within the T12 vertebral body, L1 vertebral body extending into the pedicle unchanged.  8/28/2020 CT abdomen pelvis report from [place]  regional reviewed and mentions normal spleen size.  Punctate nonobstructing kidney stone, no paulino enlargement, diffuse bladder wall thickening with mild perivesicular fat stranding, 2.1 cm sclerotic left iliac bone above the left acetabulum new compared to 2015 as well as 1.5 cm faintly sclerotic focus in the right posterior acetabulum stable compared to prior CT 2015 as well as a 1.6 cm T12 and inferior vertebral body sclerotic lesion and a 1.9 cm left posterior lateral L1 vertebral body abnormality extending to the pedicle.  -10/13/2020 initial [facility] medical oncology consultation: With 1 Thayer score 8, 4+4 lesion that would make him a grade group 4 with stage IVb disease given radiographic evidence of sclerotic bone metastasis on bone scan and CT.  Needs genetic testing given metastatic prostate cancer and this is also important as, were he to have mismatch repair mutation then we would have options for PDL 1 inhibitors and were he BRCA mutated would have options for PARP inhibitors in the future.  Systemic therapy for castration naïve prostate cancer or N1 disease should be with apalutamide or Abiraterone or enzalutamide all of which are category 1.  He does not have any visceral metastases.  According to his imaging he has T12, L1, left acetabular, right acetabular, and left iliac bony involvement.  That means he would have 4 or more bone metastases with at least one metastasis (i.e. the acetabular lesions bilaterally) beyond the pelvis/vertebral, for which this would be considered high-volume disease for which 6 cycles of docetaxel 75 mg/m² x 6 cycles followed by Zytiga and prednisone would be reasonable along with Zometa every 6 weeks for bone stabilization.  He will do chemo preparation visit with my nurse practitioner prior to start chemotherapy with Taxotere and Zometa in 2 weeks and he is already received Casodex in preparation for Lupron shot he received on 10/12/2020 with Dr. Reynaga.  We will get  him to Dr. Alexander Gomez who has done his polypectomies in the past for port placement and we will get genetic counseling started.  Following the 6 courses of Taxotere per the [place]arted trial criteria, I would then give him an indefinite Zytiga and prednisone and Zometa until progression or toxicity dictates.    -12/16/2019 COVID-19 antigen test negative    -12/29/2020 PSA 0.275 with absolute neutrophil count 700 and creatinine 0.76 with normal liver enzymes and electrolytes.  Normal magnesium and phosphorus and urine drug screen positive for buprenorphine and opiates follow-up with palliative care.    -1/4/2021 CT chest abdomen pelvis with contrast and whole-body bone scan shows improving less metabolically active bone metastases.  Stable sclerotic T12, L1, and L2 vertebral bodies and bilateral pelvic bones on bone windows with stable subcentimeter para-aortic lymph nodes and no definite progression in the chest abdomen or pelvis.    -1/5/2021 [facility] medical oncology follow-up visit: I reviewed the images and reports thereof of the above CT and bone scan which shows good response to Taxotere x3 courses with a PSA down to 0.275 from a baseline of 10.  Get another 3 courses of Taxotere, continue his Xgeva, and then following that we will switch to Zytiga and prednisone.  He is working with palliative care on his bone pain but on his current dose of fentanyl patch he says his pain is still not adequately controlled he will contact them.  Dexamethasone prescription was refilled.  We will see my nurse practitioner back in 3 weeks for course #5 of 6 total courses and then we will reimage with CT chest abdomen pelvis and bone scan before going to the Zytiga prednisone.    -3/4/2021 CT chest abdomen pelvis with contrast is negative save for stable sclerotic bone lesions and the bone scan shows near resolution of prior bony abnormalities associated with the known sclerotic lesions in the thoracolumbar spine and bony pelvis.     2/17/2021 PSA down to 0.1 from 1.37 October 2020.  3/9/2021 PSA 0.1    -5/26/2021 CT chest abdomen pelvis with contrast shows stable to slightly underlying increase low-density left para-aortic node.  Bone lesions stable.  Whole-body bone scan stable to decrease activity of known thoracolumbar and bony pelvic involvement.  PSA less than 0.1  , AST 74, bilirubin 0.5.       -6/1/2021 [facility] oncology follow-up visit: I reviewed the above data with him and images thereof.  Bones are stable.  No significant adenopathy.  PSA immeasurably low.     upper limit of normal 69 and AST 74 upper limit of normal 46.  Hence, neither is more than double the upper limit of normal with no ascites and no encephalopathy and normal albumin and bilirubin, despite the elevation of liver enzymes, he has child Abrams score A.  Package insert for Abiraterone says that for ALT and/or AST greater than 5 times upper limit of normal or total bilirubin greater than 3 times the upper limit of normal to withhold treatment until liver function tests returned to baseline or ALT and AST less than or equal to 2.5 times the upper limit of normal and total bilirubin less than or equal to 1.5 times the upper limit of normal and then reinitiate at 750 mg daily dose of Zytiga.  For recurrent hepatotoxicity on 750 mg daily Zytiga, withhold treatment until liver functions returned to baseline or ALT and AST less than 3-2.5 times upper limit of normal and total bilirubin less than or equal to 1.5 times the upper limit of normal then reinitiate at 500 mg daily Zytiga dose.  If has recurrent hepatotoxicity on 500 mg dose, then discontinue treatment.  If has ALT greater than 3 times the upper limit of normal along with total bilirubin greater than 2 times the upper limit of normal in the absence of other contributing causes or biliary obstruction, permanently discontinue Zytiga.  Based on these recommendations, I would continue current doses  but we will watch with serial labs monthly and repeat his imaging again in 3 months but clinically he is doing wonderfully with excellent response to Taxotere and now on Zytiga prednisone plus Zometa along with Relugolix.    -6/23/2020 for [facility] follow-up: Having persistent and worsening pain above his left hip.  I will get an MRI of the left hip for further evaluation.  Otherwise we will continue therapy unchanged with Zytiga, prednisone and Zometa along with Relugolix.    -7/28/2021 [facility] clinic follow-up: Patient with multiple somatic complaints including episodes of confusion, dizziness, decreased memory, agitation.  He reports ongoing episodes with difficulty swallowing.  Also most concerning for episodes of angina and chest pain for which he basically refused to seek emergency medical attention.  He has a history of coronary artery disease and stent placement.  He has not followed up with cardiology for many years.  I will get an MRI of the brain in light of his confusion, dizziness and agitation.  I will get him to gastroenterology for EGD in light of his dysphagia.  I have also put in a referral for cardiology.  I reemphasized to the patient, his wife who is with him today and his son who was on the telephone during our visit the importance of seeking out prompt medical attention when he is having chest pain or neurological concerns so that he can be evaluated.  The patient states that he basically refuses to go to the emergency room and if his family calls an ambulance he would not agree to go to the hospital.  For the time being, I have asked him to hold his Zytiga and prednisone until we can sort this out as Zytiga does have some cardiovascular risk.  There is likely no treatment for prostate cancer that does not carry some form of cardiovascular risk.  His PSA remains low at 0.014 yesterday.  He will continue relugolix for now.    Of note, some of these symptoms could also be  "due to his hypophosphatemia, phosphate level from yesterday was 1.5, I have sent in a prescription for K-Phos 3 times a day before meals for 10 days.  We will hold further Zometa until this is corrected.  I have also put in an order to check his vitamin D level.  He takes calcium and vitamin D daily.  Some of his symptoms also could be due to drug withdrawal as there was some confusion and difficulty getting his last prescription for methadone therefore he was without methadone for several days, he now has his prescription and is back on his pain medication.  He has an appointment with neurosurgery tomorrow in light of his ongoing back pain, MRI of the hip recently showed multiple bony lesions largest at the L5 vertebral body and left supra-acetabular region.  If no neurosurgical intervention recommended he may benefit from spot radiation as this is where a lot of his pain is coming from.  His wife had questions today regarding his staging and extent of disease.  We reviewed previous scans.  I did clarify with her as she used the words \"cancer free\" as she thought that is what she was told after his CT scans once he completed Taxotere in February.  I explained to her that even though his scans showed he had an excellent response with no clear areas of metastasis that did not mean he was cancer free, I explained to her that when you have metastatic cancer the treatment intent is palliative in nature and to control the disease but not to cure the disease.  She stated understanding.  We will see him back in 2 weeks for follow-up to sort through this and make further plan of care, again, I have asked him to hold Zytiga and prednisone until he sees us back, he will continue on his other medications including relugolix.    -7/30/2021 neurosurgery PA consultation.  MRI with and without contrast L5 ordered.    -8/3/2021 neurosurgery follow-up showed known sclerotic disease with new L5 abnormality without compression " deformity or instability.  MRI brain negative for metastasis.    -8/4/2021 office visit Dr. Fco Quintanilla radiation oncology coordinating with Dr. North neurosurgery for palliative radiation for MRI evidence of L5 and left supra acetabular painful lesions.  States that the patient's disease which is not secreting PSA is experiencing some progression and would benefit from 20 Gy in five fractions and other lesion 30 Gy in ten fractions. Patient offered to go to [place] for radiation but desired treatment in [place].    -8/10/2021 [facility] oncology follow-up visit: No chest pains at this junction.  Reviewed MRI brain and other MRIs reviewed by Dr. North and Dwight.  Due for EGD on 19th.  We will repeat his phosphorus along with his other labs continue Relugolix, Zytiga, prednisone, and he will continue radiation palliatively to the painful bony lesions with Dr. Quintanilla/Can.  He will see my nurse practitioner back in a few weeks to see how he is tolerating this and to follow-up on the phosphorus off of Zometa and she will order repeat CT chest abdomen pelvis with contrast and total body bone scan the end of September.I will see him back after that.    -9/8/2021 Zometa resumed.  PSA <0.10    -10/5/2021 [facility] medical oncology follow-up visit: followed-up with radiation oncology 9/21/2021.  Status post symptomatic L5 radiation 5 fractions 20 [place] completed 8/16/2021 with improvement in right hip pain.  9/2/2021 PSA less than 0.1.  9/29/2021 total body bone scan shows increased uptake left iliac bone slightly superior group of the left acetabulum with no additional foci of suspicious abnormality is unlikely treatment related with no new sites of active osseous metastasis.  CT chest abdomen pelvis with contrast shows stable borderline enlarged left periaortic nodes and dating sclerotic bone lesions.Continue Zytiga, prednisone, Relugolix, Zometa, repeat CT chest abdomen pelvis with contrast and total body bone  scan the end of the year.  We will follow PSA serially.    -11/17/2021 [facility] Oncology clinic follow-up:  Mr. Lugo overall is doing well.  He is tolerating therapy with Zytiga, prednisone and Relugolix along with Zometa which is given every 6 weeks.  PSA remains low at <0.1.  Has occasional low phosphorus, currently low at 1.7.  Serum calcium is normal at 8.9, normal creatinine 0.79 and normal CBC other than for platelets of 124.  I will hold Zometa for 1 month, I did send in a prescription for phosphorus replacement today.  He takes calcium and vitamin D.  I will see him back in 1 month for follow-up.  We will repeat restaging scans after that visit.    -12/15/2021 [facility] Oncology clinic follow-up: Mr. Lugo continues to do well on therapy with Zytiga, prednisone and Relugolix, his PSA remains low at <0.1.  He is continuing to have left lower back pain, he is working with palliative care and they have referred him also to pain management.  Pain management has talked to him about putting in a pain pump however he is leery of this, I told him that this was a safe and effective pain management technique and to certainly give consideration to their recommendations.  His phosphorus is improving however is still a little low, I will hold off on Zometa until we see him back in 1 month, we may end up only giving it every 12 weeks.  We will repeat restaging scans with CT chest, abdomen and pelvis along with total body bone scan prior to return and I have ordered those today.      -1/5/2022 CT chest abdomen pelvis with contrast [facility] showing stable left periaortic adenopathy and unchanged sclerotic thoracic, lumbar spine and pelvis lesions with no new or progressive disease in the chest abdomen or pelvis.  Total body bone scan shows no significant change and no new suspicious foci.  Stable posterior right acetabulum and left superior acetabulum and degenerative changes in the cervical spine, shoulders,  acromioclavicular joints, sternoclavicular joints, elbows, wrists, hands, thoracic spine, lumbosacral spine, sacroiliac joints, hips, knees, ankles, feet.    -1/11/2022 [facility] medical oncology hematology follow-up visit: I reviewed images and reports from his 1/5/2022 scans.  No active disease and PSA immmeasurably low on Zytiga, prednisone, Relugolix.  However, he has struggled with hypophosphatemia and is significantly fatigued with this.  Given that the bones are doing well and given that his phosphorus continues to remain consistently low at 2.2 in mid December, 1.7 mid November and 2.5 mid-September and as low as 1.5 back to July and the likelihood that this is related to his Zometa, given that his bones are stable overall, he will increase from 1200 mg up to 1600 mg of calcium and phosphate a day and we will hold his Zometa for the foreseeable future.  He will see my nurse practitioner back in a month to continue to monitor his phosphorus along with his calcium and other labs and will repeat his scans again in 3 months.  In addition, given his fatigue we will check his ACTH and cortisol though he is taking his prednisone with his Zytiga.  Though his electrolytes are normal, I will keep an eye on the cortisol and ACTH and have these labs not only drawn today but again just prior to return to my nurse practitioner on February 10.    -2/10/2022 [facility] Oncology clinic follow-up: Mr. Lugo overall is stable, no change in his health this past month.  I have reviewed his labs from 2/8/2022 and they are unremarkable, his phosphorus is now normal at 3.2. We are continuing to hold his Zometa, he last received Zometa on 10/5/2021.  He continues therapy with Zytiga, prednisone and Relugolix.  Dr. Rashid had ordered cortisol level and ACTH which are low, currently his ACTH is 2.8 and cortisol 3.08 however these are both done in the face of ongoing prednisone that he takes with his Zytiga.  We will get him to endocrinology  for further evaluation for possible adrenal insufficiency but will not interrupt his treatment in the interim.  He will need restaging scans again in April for a 3-month follow-up.  He will continue to follow with palliative care and pain management for his cancer related pain.  His PSA remains immeasurably low at <0.1 on 2/8/22.    -2/15/2022 went to emergency room with weakness, decreased appetite, myalgias in the setting of known COVID-19 testing positive a few days prior to this visit.  Phosphorus had been low in the past but was normal in the emergency room with unremarkable CBC and CMP.  Chest x-ray unremarkable saturating well on room air mildly hypertensive with dry mucosa.  Given 500 cc crystalloid.  Covid test positive.  Mild thrombocytopenia 136,000 and otherwise unremarkable.  Discharged home.    -3/3/2022 data:  PSA less than 0.1  CMP unremarkable  Cortisol 3.96 normal  ACTH 2.8 lower limit of normal 7.2  Phosphorus normal 2.6    -3/8/2022 [facility] medical oncology follow-up: Suspect big chunk of his fatigue was Covid.  Another part of the fatigue likely related to lack of testosterone.  Not hypophosphatemic off of Zometa.  ACTH running low while on prednisone not surprising but with normal cortisol and I doubt adrenal insufficient which is why he is on prednisone to avoid such while taking Zytiga.  Overall doing fairly well and with immeasurably low PSA, I will have labs done on him early April, see my nurse practitioner early May, and she will order repeat bone scan and CTs the end of May and I will see him back in June.  We will continue to hold the Zometa unless bone lesions progress given symptomatic prior hypophosphatemia on Zometa.  He will keep his appointment April 28 with Dr. Mir Duffy just to be sure that my take on his pituitary/adrenal axis assessment is accurate.    -4/28/2022 endocrinology consult Dr. Mir Duffy.  With low ACTH and cortisol on prednisone with Zytiga, the adequacy of  prednisone cannot be assessed by measuring ACTH or cortisol but is assessed by weight changes, blood pressure, blood sugar, potassium, overall sense of how the patient is doing.  Primary adrenal insufficiency with low ACTH and low cortisol would be typical for the situation as his blood sugar tends to run a little high and potassium a little low, he did not think increasing prednisone was necessary.  He put him on some potassium.  No follow-up scheduled, will see as needed.    -5/4/2022 [facility] Oncology clinic follow-up: Mr. Lugo continues on therapy with Zytiga and prednisone along with Relugolix.  His Zometa has been on hold due to previous hypophosphatemia.  There is some concern about potential adrenal insufficiency. He saw endocrinology, Dr. Mir Duffy on 4/28/2022.  I did review his office note and he stated that the low ACTH and low serum cortisol would be typical for Mr. Lugo's situation.  He further stated that the adequacy of prednisone dose cannot be assessed by measuring ACTH or cortisol but is rather assessed by the overall just altered how the patient is feeling-weight change, blood pressure, blood sugar, potassium, overall sense of wellbeing.  His potassium had been running a little low therefore they put him on potassium 10 mill equivalents daily. Of note, I do not see a future follow-up scheduled with endocrinology.  He is having night sweats, I am not sure if this is related.  His glucose was normal this morning at 107.  His weight is stable.  He will continue current treatment for his prostate cancer unchanged, we will continue to hold his Zometa.  Current phosphorus and magnesium are normal.  CBC and CMP from today are unremarkable, cortisol is normal.  PSA and ACTH are pending  In regards to his night sweats, he had taken clonidine previously 0.1 mg twice daily as needed, I did send in a short supply again to try and see if this helps.  He also is having indigestion despite being on omeprazole  twice daily, I sent a prescription for Pepcid.  He had an EGD August 2021.  We will repeat restaging scans prior to return and I have ordered those today.     -5/24/2022 CT chest abdomen pelvis with contrast[place] negative.  Bone scan shows stable bone metastases.    - 5/25/2022 PSA less than 0.1, platelets 130,000, otherwise unremarkable CBC and CMP.  A.m. cortisol running low 1.2 with phosphorus low 2.3.    -5/31/2022 [facility] medical oncology follow-up: On Zytiga, prednisone, Relugolix, PSA remaining immeasurably low with stable bone scan and no visceral/paulino metastases.  Phosphorus still running low.  I have discontinued his potassium chloride and started potassium phosphate 500 mg 4 times a day.  Unless PSA rising, we will plan on repeating CTs and bone scan in 6 months and will follow with my nurse practitioner in the interim and if symptoms dictate or labs, will get back to Dr. Duffy for management of potential adrenal insufficiency but presently we will just see how he does with potassium phosphate and hopeful improvement in the phosphorus level with time.  We will continue to follow with palliative care for pain management    -7/6/2022 [facility] Oncology clinic follow-up: Mr. Lugo overall is doing well but continues to be troubled with fatigue and hot flashes.  For the most part he states he is able to do the things he wants to do, he continues to work but does have to take more rest periods.  I had a long discussion with him and his wife after reviewing his medications with them as they wanted to see if there was anything he could stop or adjust.  I discussed with him the need to continue on treatment for his prostate cancer even though his PSA remains immeasurably low and his scans look good but that if his medication is stopped the cancer would progress and over time it still has the potential to progress on therapy but would continue it as long as possible to keep his disease under control.  I  explained this is like treating someone with hypertension, you do not stop the antihypertensive just because the blood pressure normalizes.  They stated understanding.  There is no dose adjustment of Zytiga or prednisone that would minimize his symptoms, he is on the current standard recommended therapy.  Discussed with him that sometimes during times of stress we may need to increase his prednisone dose but for now would not change anything.  His phosphorus level is normal, we continue to hold his Zometa.  I did give him the option of holding his phosphorus for the next month and we will see what happens, if it drops we will start him back on it but may be at a lower dose rather than 4 times a day maybe twice a day.  For now he will continue therapy unchanged.  We will continue monitoring labs monthly.  No PSA was drawn this month but that is okay as his PSA has been running immeasurably low at <0.1, we will recheck next month with lab draw.  We will stop checking his ACTH and cortisol level every month, if he develops worsening symptoms I will repeat those.  He has not gotten a follow-up with endocrinology as of yet.    -8/3/2022 [facility] Oncology clinic follow-up:  Mr. Lugo is doing well as far as his prostate cancer goes with continued immeasurably low PSA of <0.1.  He continues on therapy with Zytiga and prednisone along with Relugolix.  His phosphorus is stable at 2.7 which was just slightly low by our reference range however was 2.71-month ago also which was normal on another labs reference range.  He has not taken his phosphorus for this past month as he thought it was making him more fatigued.  He really can tell no difference whether he was taking it or not.  I have asked him to try to go back on phosphorus twice a day rather than 4 times a day and see if this is tolerated and if we can keep his phosphorus level from dropping.  His biggest complaint today is flareup of his arthralgias and back pain.  He is  following with pain management, Dr. Danny Avalos but is requesting a referral back to palliative medicine as he feels that no one is currently listening to him and they are just prescribing the same medications that are not effective according to his report.  He does have a follow-up with Dr. Avalos on 8/12/2022 but due to his current pain is not able to work until after he is seen and hopefully can get some better relief.  I have given him a work release until 15 August.  I have also put in a referral back to palliative care.  Also encouraged him to work with Dr. Avalos for help in resolving his issue.  Apparently they have recommended some type of a pump however he has been hesitant to that but likely would be helpful.  We will continue therapy unchanged.  We will continue monthly labs including phosphorus, we are not currently checking ACTH and cortisol monthly as Dr. Mir Duffy previously stated in his assessment on 4/28/2022 that the adequacy of prednisone cannot be assessed by measuring ACTH or cortisol but would be assessed by symptoms, see note from 4/28/2022.  He made no further follow-up appointments.  Fatigue is not worsening currently.  His glucose and potassium are  Normal.    -10/13/2022 [facility] medical oncology follow-up: Mr. Lugo is doing well.  He is tolerating treatment with Zytiga and prednisone along with Relugolix without any unusual side effects.  His PSA has remained immeasurably low at <0.1.  His phosphorus was slightly low at last visit.  He had been instructed to go back on phosphorus twice a day but he misunderstood and has not been taking it.  We will check labs today.  I will follow-up with results and notify him if he needs to restart the phosphorus.  His pain is better controlled since reestablishing with [facility].  We will repeat scans prior to next follow-up.  I have ordered CT chest abdomen pelvis along with bone scan.  We will see him back in 1  month.    -10/13/2022 PSA less than 0.014.With unremarkable CBC and CMP.  Phosphorus still a little low 2.3.    -11/4/2022 CT abdomen pelvis chest showed no evidence of disease progression with stable sclerotic bone lesions.  Bone scan stable right greater than left acetabular metastasis.  Stable bone metastases.    -11/8/2022 [facility] oncology telemedicine follow-up: Patient states that he feels achy all over with low-grade temps and a cough mildly productive.  Has an appointment later today to see his primary care for testing for flu and COVID.  Suggested that if he were positive for COVID that he get Paxlovid and that if he were positive for flu that he get Tamiflu and to discuss this with his primary care.  From the prostate cancer side, his PSA is immeasurably low with stable bone metastases and no evidence of progression.  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  We will continue the Relugolix, Zytiga, prednisone and he will see my nurse practitioner 12/7/2022.  Would repeat CTs and bone scan in May.    -1/25/2023 [facility] Oncology medicine follow-up: Mr. Lugo is having worsening fatigue and muscle aches.  He does have adrenal insufficiency on Zytiga and prednisone for his prostate cancer, he saw endocrinology in light of low ACTH back in April 2022.  At that time there was no recommendation other than for monitoring him for his overall wellbeing and labs.  He denies any fevers or chills.  His labs as shown above are unremarkable, his CBC and CMP are normal other than for glucose of 112, PSA remains low at <0.1.  He has no new pain or any symptoms concerning for progression of his prostate cancer.  I will get him back to endocrinology to see if they feel any dose adjustment of his prednisone would be helpful in his symptoms.  Currently he is on 5 mg a day with his Zytiga.  I did not repeat his ACTH or cortisol as they would be expected to be low in this situation.   His potassium is normal, he has had no weight loss or change in his appetite.  Blood sugar is reasonable.  His only complaint currently is worsening fatigue and muscle aches.  He will continue treatment unchanged with Zytiga, prednisone and relugolix.  I will see him back in 1 month for follow-up.  We will repeat his restaging scans in May.    -3/1/2023 [facility] Oncology clinic follow-up:  His PSA remains low at <0.1 on treatment with Zytiga and prednisone along with relugolix however he continues to complain of significant fatigue and muscle aches not improving with time.  He states that his quality of life is affected as he is not able to do any work activities due to the fatigue.  He did see endocrinology again and they have increased his prednisone from 5 mg daily up to 7.5 mg daily however so far this has not made any difference in how he feels.  He is supposed to get back in touch with them to let them know how he is doing and to see if there is any other adjustments needed. His labs are unremarkable with normal thyroid function, CMP is normal other than for glucose of 156.  They did check hemoglobin A1c to look for diabetes however that was normal at 5.7.  CMP is unremarkable with just very mild anemia with hemoglobin of 12 and hematocrit 36.1% which would not account for his fatigue.  He does have hot flashes that are fairly significant, I will try venlafaxine as suggested by Dr. Duffy and I appreciate the recommendation.  We will start at 37.5 mg daily and if tolerated he can increase to 75 mg after 1-2 weeks.  I will see him back in 1 months for follow-up.  Plan to repeat scans late April/early May.  His prostate cancer seems under good control with current regimen however I am not sure how long he can tolerate this due to the side effects.    -3/30/2023 [facility] Oncology clinic follow-up:  Mr. Lugo continues to deal with fatigue.  He appears more cushingoid today, he continues on treatment with Zytiga,  prednisone and relugolix.  Prednisone dose currently is 7.5 mg daily.  This has not made any difference in his energy level.  PSA remains immeasurably low at <0.014.  CBC shows new onset of microcytic hypochromic anemia with hemoglobin of 11, hematocrit 34.4%, MCV 75.4, MCH 24.1, WBC is normal at 5.81 with normal platelet count 181,000 and normal differential.  CMP is unremarkable, it is normal other than for glucose of 142.  We will get him to Dr. Gomez for an EGD and colonoscopy for further evaluation in regards to his new onset of anemia.  He has no associated GI symptoms otherwise except for 1 day a few weeks ago he does report that he had fairly sudden onset of vomiting but this was an isolated incident on that day and has not reoccurred since.  Continues to follow with palliative care for management of his chronic back pain.  We will repeat restaging CT chest, abdomen and pelvis along with total body bone scan prior to return and I have ordered that today.    -3/30/2023 Ferritin low 12.3 with iron low 27 and saturation low 5% with total iron binding capacity 511.  3/31/2023 ferrous sulfate 325 mg twice a day every other day with vitamin C started.    - 4/12/2023 CT chest abdomen pelvis with contrast showed no progressive disease with stable sclerotic bone lesions.  Bone scan showed single identifiable bone metastasis stable right acetabulum    -4/18/2023 [facility] hematology oncology follow-up: He now has microcytic iron deficiency anemia which is new and concerning.  Gastroenterologic evaluation has been ordered but not performed and is mandatory.  Continue ferrous sulfate 325 mg twice a day every other day with vitamin C to improve absorption and we will follow his CBC along with his usual labs monthly on Zytiga, Relugolix, prednisone 7.5 mg.  He follows with Dr. Duffy with adrenal insufficiency.  He will see my nurse practitioner back in a month to see what GI has found and to watch serial CBC along with his  other labs including PSA.  I would repeat CT chest abdomen pelvis again in 6 months with contrast along with bone scan and sooner if PSA rises.    -5/3/2023 EGD and colonoscopy with Dr. Alexander Gomez: Superficial erosions in the stomach with gastritis.  Small polyps removed, dilated internal hemorrhoids.  Pathology pending.  Recommend repeat colonoscopy in 5 years.    -5/17/[facility]:  Naveen overall is doing well on current therapy with Zytiga, prednisone and relugolix.  PSA remains immeasurably low at <0.014.  He feels his hip pain is getting worse with time.  It makes it difficult to enjoy activities with his grandchildren as it is worse when he stands for any length of time or tries to carry any weight.  I reviewed his bone scan from April which was stable, we also reviewed previous MRI of the hips from May 2021 that showed moderate bilateral hip osteoarthritis.  I offered to repeat MRI of his hips for evaluation to see if there has been any worsening of his osteoarthritis and also then referral to orthopedics for any recommended intervention however at this time he defers.  He will let me know if he changes his mind.  He also follows with palliative care for management of his cancer related pain.  We will continue therapy unchanged.  We will repeat restaging scans in October unless symptoms dictate the need to do so sooner.  He was recently found to be iron deficient, he had an EGD and colonoscopy on 5/3/2023 with Dr. Gomez, EGD showed superficial erosions in the stomach with gastritis, he continues on omeprazole.  He had small polyps removed from the colon and recommendation to repeat colonoscopy in 5 years.  He is on oral iron along with vitamin C every other day and is tolerating that well.  CBC from yesterday shows hemoglobin now normal at 14.2 and hematocrit 43.2%, MCV normal at 27.0.    -7/20/2023 [facility]: Naveen is doing well on current therapy with  Zytiga, prednisone and relugolix.  PSA in June remains low at <0.1.  Continues to deal with chronic pain in his back and hip.  Recently saw Dr. Nikolai Marks and had an injection in his hip and is hoping that it will eventually help with his pain.  He has no new pain.  We will continue current therapy unchanged.  We will repeat labs while he is here today.  I will refill his iron as his pharmacy has changed. Most recent CBC in June with hemoglobin of 14.7 and hematocrit 43.6%.  We will repeat restaging scans in October.    -9/14/2023 [facility] Oncology clinic follow-up: Naveen is tolerating his prostate cancer treatment with Zytiga, prednisone and relugolix.  PSA remains immeasurably low at <0.014 on 8/17/2023.  Currently he is having more GI issues with nausea and intermittent vomiting that occurs often without warning.  He had an EGD and colonoscopy with Dr. Gomez on 5/3/2023, EGD showed superficial erosions in the stomach with gastritis.  He does take omeprazole 40 mg twice daily.  He saw his PCP yesterday for these symptoms and has been referred to gastroenterologist at [facility] and he is awaiting to hear about that appointment.  Hopefully he can get in fairly quickly.  I told him that he should let us know if it is going to be a while before he can be seen and I will get him back to Dr. Gomez for consideration of repeat EGD.  We will get his CBC, CMP and PSA today.  His previous noted anemia had normalized, he currently is not taking oral iron as he ran out.  I will wait and see what his labs show today and likely hold off for now depending on his lab results until his GI issues can be resolved.  We will repeat his restaging CT chest, abdomen and pelvis along with bone scan in the next few weeks and I have ordered those today.  He is taking Zofran as needed for nausea, he is also on meclizine for vertigo.  He is going to follow-up with his PCP in a few weeks regarding his GI symptoms.    -9/28/2023 total body  bone scan stable with uptake in right acetabulum, no new areas.  CT chest, abdomen and pelvis show no evidence of disease progression.  -11/9/2023 [facility] clinic follow-up: CT reports were reviewed by Dr. Rashid last month and the patient reports he was called by Dr. Rashid as he could not make his appointment due to concerns over upper respiratory infection, CT scan showed no evidence of disease progression.  PSA has remained low, we are repeating that today along with his CBC and CMP.  He continues to have sinus drainage and congestion, I will send in 7 additional days on his doxycycline that he has been taking, he will follow-up with PCP if no improvement.  He continues to complain of bilateral hip pain, has a follow-up with Dr. Marks next week.  We will continue therapy unchanged with Zytiga, prednisone and relugolix.  Would repeat restaging bone scan and CT scans late March for a 6-month follow-up.    Return to clinic in 2 months for follow-up    I spent 30 minutes caring for Naveen on this date of service. This time includes time spent by me in the following activities: preparing for the visit, reviewing tests, performing a medically appropriate examination and/or evaluation, ordering medications, tests, or procedures, referring and communicating with other health care professionals, and documenting information in the medical record.     Abena Velasquez, APRN    11/09/2023

## 2023-11-10 ENCOUNTER — SPECIALTY PHARMACY (OUTPATIENT)
Dept: ONCOLOGY | Facility: HOSPITAL | Age: 68
End: 2023-11-10
Payer: MEDICARE

## 2023-11-10 LAB
ALBUMIN SERPL-MCNC: 4.7 G/DL (ref 3.5–5.2)
ALBUMIN/GLOB SERPL: 3.9 G/DL
ALP SERPL-CCNC: 65 U/L (ref 39–117)
ALT SERPL-CCNC: 15 U/L (ref 1–41)
AST SERPL-CCNC: 13 U/L (ref 1–40)
BILIRUB SERPL-MCNC: 0.3 MG/DL (ref 0–1.2)
BUN SERPL-MCNC: 5 MG/DL (ref 8–23)
BUN/CREAT SERPL: 7.1 (ref 7–25)
CALCIUM SERPL-MCNC: 9 MG/DL (ref 8.6–10.5)
CHLORIDE SERPL-SCNC: 105 MMOL/L (ref 98–107)
CO2 SERPL-SCNC: 27.9 MMOL/L (ref 22–29)
CREAT SERPL-MCNC: 0.7 MG/DL (ref 0.76–1.27)
EGFRCR SERPLBLD CKD-EPI 2021: 100.4 ML/MIN/1.73
GLOBULIN SER CALC-MCNC: 1.2 GM/DL
GLUCOSE SERPL-MCNC: 120 MG/DL (ref 65–99)
PHOSPHATE SERPL-MCNC: 2.5 MG/DL (ref 2.5–4.5)
POTASSIUM SERPL-SCNC: 3.2 MMOL/L (ref 3.5–5.2)
PROT SERPL-MCNC: 5.9 G/DL (ref 6–8.5)
PSA SERPL-MCNC: <0.014 NG/ML (ref 0–4)
SODIUM SERPL-SCNC: 144 MMOL/L (ref 136–145)

## 2023-11-10 NOTE — PROGRESS NOTES
Specialty Pharmacy Refill Coordination Note     Naveen is a 68 y.o. male contacted today regarding refills of  Zytiga 1,000 mg PO daily and Orgovyx 120 mg PO daily specialty medication(s).    Specialty medication(s) and dose(s) confirmed: yes    Refill Questions      Flowsheet Row Most Recent Value   Changes to allergies? No   Changes to medications? No   New conditions since last clinic visit No   Unplanned office visit, urgent care, ED, or hospital admission in the last 4 weeks  No   How does patient/caregiver feel medication is working? Very good   Financial problems or insurance changes  No   Since the previous refill, were any specialty medication doses or scheduled injections missed or delayed?  No   Does this patient require a clinical escalation to a pharmacist? No            Delivery Questions      Flowsheet Row Most Recent Value   Delivery method FedEx   Delivery address correct? Yes   Delivery phone number [phone]   Preferred delivery time? Anytime   Number of medications in delivery 2   Medication(s) being filled and delivered Abiraterone Acetate, Relugolix   Doses left of specialty medications about 10 days of each   Supplies needed? No supplies needed   Cooler needed? No   Do any medications need mixed or dated? No   Additional comments verified $0 copay for both medications with patient   Questions or concerns for the pharmacist? No   Explain any questions or concerns for the pharmacist n/a   Are any medications first time fills? No              Medication Adherence          Adherence tools used: watch   Other adherence tool: Clock - takes at same time each day, 7:45am   Support network for adherence: family member                Follow-up: 30 day(s)     Nanci Dong, Pharmacy Technician  Specialty Pharmacy Technician

## 2023-11-15 ENCOUNTER — TELEPHONE (OUTPATIENT)
Dept: ONCOLOGY | Facility: CLINIC | Age: 68
End: 2023-11-15
Payer: MEDICARE

## 2023-11-15 DIAGNOSIS — G89.3 CANCER RELATED PAIN: ICD-10-CM

## 2023-11-15 NOTE — TELEPHONE ENCOUNTER
PATIENT CALLED AND STATED THAT HIS SCRIPT FOR THE DILAUDID IS ON BACK ORDER AT HIS PHARMACY, [facility]. PATIENT WAS SUPPOSE TO GET 80 PILLS AS THEY ORIGINALLY WERE NOT ABLE TO FILL HIS ENTIRE SCRIPT THE LAST TIME DUE TO THE BACK ORDER. PATIENT IS GOING TO BE DUE FOR THE REFILL TOMORROW AND WON'T BE ABLE TO GET THIS AS THE PHARMACY WON'T HAVE IT IN TIME TO FILL. PLEASE ADVISE.  
PATIENT CALLED BACK AND STATED THAT HE CALLED AROUND TO [facility] AND THEY HAVE THE DILAUDID IN STOCK IN THE AMOUNT THAT HE'S NEEDING. THE PHONE NUMBER FOR THAT PHARMACY IS P: [phone]. PLEASE ADVISE.   
Reached out to pt to get more details of his recent refill of Dilaudid.  Pt states he only received #100 of the script of #180. I did call the pharmacy to confirm medication on back order and when they'll receive the. They confirmed medication is on back order and no future delivery date. Informed pt to reach out to his other preferred pharmacy to check if they have this medication in stock. Pt will inform Palliative if they have it available to send in a script for the remaining #80 tabs he needs for his 30 day script.   
Is This A New Presentation, Or A Follow-Up?: Acne

## 2023-11-15 NOTE — TELEPHONE ENCOUNTER
Pt's preferred pharmacy had medication on back order, only received #100 out of #180. This script is for the remaining amount.    AGUSTIN #: [id number]   Medication requested: HYDROmorphone (DILAUDID) 8 MG tablet      Last fill date: 10/30/23     Last appointment: 9/29/23     Next appointment: 1/4/23

## 2023-11-16 ENCOUNTER — TELEPHONE (OUTPATIENT)
Dept: ONCOLOGY | Facility: CLINIC | Age: 68
End: 2023-11-16
Payer: MEDICARE

## 2023-11-16 RX ORDER — HYDROMORPHONE HYDROCHLORIDE 8 MG/1
8 TABLET ORAL EVERY 4 HOURS PRN
Qty: 80 TABLET | Refills: 0 | Status: SHIPPED | OUTPATIENT
Start: 2023-11-16

## 2023-11-16 NOTE — TELEPHONE ENCOUNTER
PATIENT CALLED BACK TO CHECK THE STATUS OF THE MEDICATION REFILL FROM YESTERDAY. PLEASE CONTACT PATIENT ABOUT THE STATUS OF THE SCRIPT. PLEASE ADVISE.

## 2023-11-27 ENCOUNTER — TELEPHONE (OUTPATIENT)
Dept: PALLIATIVE CARE | Facility: CLINIC | Age: 68
End: 2023-11-27
Payer: MEDICARE

## 2023-11-27 DIAGNOSIS — G89.3 CANCER RELATED PAIN: Primary | ICD-10-CM

## 2023-11-27 RX ORDER — FLUTICASONE FUROATE, UMECLIDINIUM BROMIDE AND VILANTEROL TRIFENATATE 200; 62.5; 25 UG/1; UG/1; UG/1
1 POWDER RESPIRATORY (INHALATION) DAILY
Qty: 180 EACH | Refills: 3 | Status: SHIPPED | OUTPATIENT
Start: 2023-11-27

## 2023-11-27 NOTE — TELEPHONE ENCOUNTER
I have reviewed patient's AGUSTIN report prior to prescribing Schedule II, III, and IV medications. Request # [id number]. New script sent to [facility] for Dilaudid 4 mg tablets, with directions to take two tablets every 4 hours as needed for cancer pain. Refer to telephone encounter.

## 2023-11-27 NOTE — TELEPHONE ENCOUNTER
PATIENT CALLED AND STATED THAT THE HYDROMORPHONE IS ON BACK ORDER AT EVERY PHARMACY IN [place]. PATIENT CALLED [facility] AND THEY HAVE IT IN 4 MG, IF TAKEN 2 TIMES INSTEAD OF ONCE. PATIENT CALLED TO SEE IF THE SCRIPT COULD BE WRITTEN THIS WAY AND SENT TO [facility]. PATIENT ALSO NOTED THAT THIS SCRIPT IS DUE 11/29. PLEASE ADVISE.

## 2023-11-28 RX ORDER — HYDROMORPHONE HYDROCHLORIDE 4 MG/1
8 TABLET ORAL EVERY 4 HOURS PRN
Qty: 180 TABLET | Refills: 0 | Status: SHIPPED | OUTPATIENT
Start: 2023-11-28 | End: 2023-12-26

## 2023-12-04 ENCOUNTER — TELEPHONE (OUTPATIENT)
Dept: PALLIATIVE CARE | Facility: CLINIC | Age: 68
End: 2023-12-04
Payer: MEDICARE

## 2023-12-04 DIAGNOSIS — G89.3 CANCER RELATED PAIN: Primary | ICD-10-CM

## 2023-12-04 NOTE — TELEPHONE ENCOUNTER
AGUSTIN #: [id number]    Medication requested: oxycodone ER (Xtampza) 9mg    Last fill date: 10/30/23    Last appointment: 9/29/23    Next appointment: 1/4/24

## 2023-12-04 NOTE — TELEPHONE ENCOUNTER
PATIENT CALLED AND REQUESTED A REFILL OF THE XTAMPZA ER BE CALLED IN TO [facility] IN [place]. PATIENT STATED THAT THIS SHOULD HAVE BEEN CALLED IN WITH THE OTHER MEDICATION BUT IT'S NOT AT THE PHARMACY. PLEASE ADVISE.

## 2023-12-04 NOTE — TELEPHONE ENCOUNTER
We will send this script in. Pt did not request this medication on the initial refill request, only the hydromorphone (dilaudid) was requested.

## 2023-12-05 ENCOUNTER — SPECIALTY PHARMACY (OUTPATIENT)
Dept: ONCOLOGY | Facility: HOSPITAL | Age: 68
End: 2023-12-05
Payer: MEDICARE

## 2023-12-05 NOTE — PROGRESS NOTES
Specialty Pharmacy Refill Coordination Note     Naveen is a 68 y.o. male contacted today regarding refills of  Relugolix 120mg PO QD and Abiraterone 1000mg PO QD of specialty medication(s).      Specialty medication(s) and dose(s) confirmed: yes    Refill Questions      Flowsheet Row Most Recent Value   Changes to allergies? No   Changes to medications? No   New conditions since last clinic visit No   Unplanned office visit, urgent care, ED, or hospital admission in the last 4 weeks  No   How does patient/caregiver feel medication is working? Very good   Financial problems or insurance changes  No   Since the previous refill, were any specialty medication doses or scheduled injections missed or delayed?  No   Does this patient require a clinical escalation to a pharmacist? No            Delivery Questions      Flowsheet Row Most Recent Value   Delivery method FedEx   Delivery address correct? Yes   Delivery phone number [phone]   Preferred delivery time? Anytime   Number of medications in delivery 2   Medication(s) being filled and delivered Abiraterone Acetate, Relugolix   Doses left of specialty medications 8 days left   Is there any medication that is due not being filled? No   Supplies needed? No supplies needed   Cooler needed? No   Do any medications need mixed or dated? No   Additional comments no copay   Questions or concerns for the pharmacist? No   Explain any questions or concerns for the pharmacist n/a   Are any medications first time fills? No              Medication Adherence          Adherence tools used: watch   Other adherence tool: Clock - takes at same time each day, 7:45am   Support network for adherence: family member                Follow-up: 30 day(s)     Sushma Muro, Pharmacy Technician  Specialty Pharmacy Technician

## 2023-12-07 ENCOUNTER — INFUSION (OUTPATIENT)
Dept: ONCOLOGY | Facility: HOSPITAL | Age: 68
End: 2023-12-07

## 2023-12-07 DIAGNOSIS — C61 PROSTATE CANCER METASTATIC TO BONE: ICD-10-CM

## 2023-12-07 DIAGNOSIS — C79.51 PROSTATE CANCER METASTATIC TO BONE: ICD-10-CM

## 2023-12-07 DIAGNOSIS — C61 PROSTATE CANCER METASTATIC TO BONE: Primary | ICD-10-CM

## 2023-12-07 DIAGNOSIS — C79.51 PROSTATE CANCER METASTATIC TO BONE: Primary | ICD-10-CM

## 2023-12-08 ENCOUNTER — TELEPHONE (OUTPATIENT)
Dept: PALLIATIVE CARE | Facility: CLINIC | Age: 68
End: 2023-12-08
Payer: MEDICARE

## 2023-12-08 DIAGNOSIS — G89.3 CANCER RELATED PAIN: ICD-10-CM

## 2023-12-08 RX ORDER — HYDROMORPHONE HYDROCHLORIDE 4 MG/1
8 TABLET ORAL EVERY 4 HOURS PRN
Qty: 180 TABLET | Refills: 0 | Status: SHIPPED | OUTPATIENT
Start: 2023-12-08 | End: 2023-12-23

## 2023-12-08 RX ORDER — HYDROMORPHONE HYDROCHLORIDE 4 MG/1
8 TABLET ORAL EVERY 4 HOURS PRN
Qty: 180 TABLET | Refills: 0 | Status: SHIPPED | OUTPATIENT
Start: 2023-12-27 | End: 2024-01-11

## 2023-12-08 NOTE — TELEPHONE ENCOUNTER
PATIENT CALLED AND STATED THAT THE [facility] WOULD NEED A SCRIPT WRITTEN  TABLETS FOR 15 DAY SUPPLY. PATIENT STATED THAT EVEN IF IT WAS WRITTEN THIS WAY HE WOULD BE MISSING 20 TABLETS FOR THE MONTH. PLEASE ADVISE.

## 2023-12-08 NOTE — TELEPHONE ENCOUNTER
[facility] spoke to Jemma (pharmacist) clarification on the amount dispensed on 11/29/23 and the script for the remaining amount not filled on that day. At the time we sent the script to them, they only had 4mg in stock, so [facility] adjusted the qty to make up for 8mg on pt's original script of dilaudid. Pt had been on 8 mg previously. Jemma states a new script for the missing amount will need to be sent in, the earliest it can be filled for insurance to cover would be 12/24/23. Informed her I will discuss with the provider on how to proceed.

## 2023-12-08 NOTE — TELEPHONE ENCOUNTER
PATIENT CALLED AND STATED THAT [facility] ONLY HAS 4 MG OF THE DILAUDID AVAILABLE AND WOULD NEED TO GIVE 200 TABLETS TO LAST 30 DAYS TO FILL PATIENTS SCRIPT. PATIENT STATED THAT EVERYWHERE ELSE THAT HE'S CHECKED THEY ARE OUT OF STOCK COMPLETELY. PLEASE ADVISE.

## 2023-12-08 NOTE — TELEPHONE ENCOUNTER
[facility]. Spoke to Andrade and he explained they were only able to fill #160 of the #180 script on 11/29/23. He informed me they will need a script for the remaining #20 to be filled on 12/26/23. He also mentioned insurance may not pay for it since they billed them for 27 days, pt maybe required to pay out of pocket for the remaining amount. Informed him I will discuss with [facility] on how to proceed.

## 2023-12-08 NOTE — TELEPHONE ENCOUNTER
Jemma pharmacist with [facility] was able to resolve the issue with dispensing patient's dilaudid. Patient will be required to pay out of pocket due to the refill not going through insurance. Patient should be able to  anytime.

## 2023-12-08 NOTE — TELEPHONE ENCOUNTER
Patient dispensed #160 tablets with directions to take two hydromorphone 4 mg tablets every 4 hours as needed. Next 15-day refill sent for 12/12/23 followed by 12/27/23.

## 2023-12-14 ENCOUNTER — INFUSION (OUTPATIENT)
Dept: ONCOLOGY | Facility: HOSPITAL | Age: 68
End: 2023-12-14
Payer: MEDICARE

## 2023-12-14 DIAGNOSIS — C79.51 PROSTATE CANCER METASTATIC TO BONE: ICD-10-CM

## 2023-12-14 DIAGNOSIS — C79.51 PROSTATE CANCER METASTATIC TO BONE: Primary | ICD-10-CM

## 2023-12-14 DIAGNOSIS — C61 PROSTATE CANCER METASTATIC TO BONE: Primary | ICD-10-CM

## 2023-12-14 DIAGNOSIS — Z45.2 ENCOUNTER FOR CARE RELATED TO PORT-A-CATH: ICD-10-CM

## 2023-12-14 DIAGNOSIS — C61 PROSTATE CANCER METASTATIC TO BONE: ICD-10-CM

## 2023-12-14 PROCEDURE — 36591 DRAW BLOOD OFF VENOUS DEVICE: CPT

## 2023-12-14 PROCEDURE — 25010000002 HEPARIN LOCK FLUSH PER 10 UNITS: Performed by: INTERNAL MEDICINE

## 2023-12-14 RX ORDER — HEPARIN SODIUM (PORCINE) LOCK FLUSH IV SOLN 100 UNIT/ML 100 UNIT/ML
500 SOLUTION INTRAVENOUS AS NEEDED
Status: DISCONTINUED | OUTPATIENT
Start: 2023-12-14 | End: 2023-12-14 | Stop reason: HOSPADM

## 2023-12-14 RX ORDER — HEPARIN SODIUM (PORCINE) LOCK FLUSH IV SOLN 100 UNIT/ML 100 UNIT/ML
500 SOLUTION INTRAVENOUS AS NEEDED
OUTPATIENT
Start: 2023-12-14

## 2023-12-14 RX ADMIN — HEPARIN 500 UNITS: 100 SYRINGE at 13:30

## 2023-12-14 NOTE — PROGRESS NOTES
Patient seen today for port flush and labs, CBC, CMP, PSA and Phosphorus drawn. Patient expressed concerns with difficulty urinating reports swelling of prostate. Concerns discussed with Abena BARROSO. Advised to contact primary care physician and or urologist for further evaluation. Contacted patient and spouse to explain. Patient verbalized understanding. No further concerns. TERI CAMPBELL RN

## 2023-12-15 LAB
ALBUMIN SERPL-MCNC: 4.1 G/DL (ref 3.5–5.2)
ALBUMIN/GLOB SERPL: 2 G/DL
ALP SERPL-CCNC: 74 U/L (ref 39–117)
ALT SERPL-CCNC: 9 U/L (ref 1–41)
AST SERPL-CCNC: 11 U/L (ref 1–40)
BASOPHILS # BLD AUTO: 0.04 10*3/MM3 (ref 0–0.2)
BASOPHILS NFR BLD AUTO: 0.4 % (ref 0–1.5)
BILIRUB SERPL-MCNC: 0.2 MG/DL (ref 0–1.2)
BUN SERPL-MCNC: 8 MG/DL (ref 8–23)
BUN/CREAT SERPL: 10.8 (ref 7–25)
CALCIUM SERPL-MCNC: 9.1 MG/DL (ref 8.6–10.5)
CHLORIDE SERPL-SCNC: 102 MMOL/L (ref 98–107)
CO2 SERPL-SCNC: 26.7 MMOL/L (ref 22–29)
CREAT SERPL-MCNC: 0.74 MG/DL (ref 0.76–1.27)
EGFRCR SERPLBLD CKD-EPI 2021: 98.7 ML/MIN/1.73
EOSINOPHIL # BLD AUTO: 0.06 10*3/MM3 (ref 0–0.4)
EOSINOPHIL NFR BLD AUTO: 0.7 % (ref 0.3–6.2)
ERYTHROCYTE [DISTWIDTH] IN BLOOD BY AUTOMATED COUNT: 13.8 % (ref 12.3–15.4)
GLOBULIN SER CALC-MCNC: 2.1 GM/DL
GLUCOSE SERPL-MCNC: 156 MG/DL (ref 65–99)
HCT VFR BLD AUTO: 44.3 % (ref 37.5–51)
HGB BLD-MCNC: 14.8 G/DL (ref 13–17.7)
IMM GRANULOCYTES # BLD AUTO: 0.03 10*3/MM3 (ref 0–0.05)
IMM GRANULOCYTES NFR BLD AUTO: 0.3 % (ref 0–0.5)
LYMPHOCYTES # BLD AUTO: 1.05 10*3/MM3 (ref 0.7–3.1)
LYMPHOCYTES NFR BLD AUTO: 11.8 % (ref 19.6–45.3)
MCH RBC QN AUTO: 29.2 PG (ref 26.6–33)
MCHC RBC AUTO-ENTMCNC: 33.4 G/DL (ref 31.5–35.7)
MCV RBC AUTO: 87.5 FL (ref 79–97)
MONOCYTES # BLD AUTO: 0.37 10*3/MM3 (ref 0.1–0.9)
MONOCYTES NFR BLD AUTO: 4.1 % (ref 5–12)
NEUTROPHILS # BLD AUTO: 7.38 10*3/MM3 (ref 1.7–7)
NEUTROPHILS NFR BLD AUTO: 82.7 % (ref 42.7–76)
NRBC BLD AUTO-RTO: 0 /100 WBC (ref 0–0.2)
PHOSPHATE SERPL-MCNC: 3 MG/DL (ref 2.5–4.5)
PLATELET # BLD AUTO: 167 10*3/MM3 (ref 140–450)
POTASSIUM SERPL-SCNC: 3.5 MMOL/L (ref 3.5–5.2)
PROT SERPL-MCNC: 6.2 G/DL (ref 6–8.5)
PSA SERPL-MCNC: <0.014 NG/ML (ref 0–4)
RBC # BLD AUTO: 5.06 10*6/MM3 (ref 4.14–5.8)
SODIUM SERPL-SCNC: 140 MMOL/L (ref 136–145)
WBC # BLD AUTO: 8.93 10*3/MM3 (ref 3.4–10.8)

## 2023-12-21 ENCOUNTER — TELEPHONE (OUTPATIENT)
Dept: FAMILY MEDICINE CLINIC | Facility: CLINIC | Age: 68
End: 2023-12-21
Payer: MEDICARE

## 2023-12-21 NOTE — TELEPHONE ENCOUNTER
Patient calling requesting medication for cough and congestion. I advised patient we do not typically call in medications without an appointment but did not have an appointment to offer him so he requested I ask on his behalf. States he cannot find an urgent care with an available appointment either.

## 2023-12-22 ENCOUNTER — TELEPHONE (OUTPATIENT)
Dept: FAMILY MEDICINE CLINIC | Facility: CLINIC | Age: 68
End: 2023-12-22
Payer: MEDICARE

## 2023-12-22 RX ORDER — DEXTROMETHORPHAN HYDROBROMIDE AND PROMETHAZINE HYDROCHLORIDE 15; 6.25 MG/5ML; MG/5ML
5 SYRUP ORAL 4 TIMES DAILY PRN
Qty: 180 ML | Refills: 0 | Status: SHIPPED | OUTPATIENT
Start: 2023-12-22

## 2023-12-22 NOTE — TELEPHONE ENCOUNTER
I sent in a cough medication and he can use over the counter Mucinex and Flonase for the congestion.     PT CALLED AND I GAVE HIM THIS MESSAGE

## 2023-12-29 ENCOUNTER — SPECIALTY PHARMACY (OUTPATIENT)
Dept: ONCOLOGY | Facility: HOSPITAL | Age: 68
End: 2023-12-29
Payer: MEDICARE

## 2024-01-03 NOTE — PROGRESS NOTES
Palliative Clinic Note      Name: Naveen Lugo  Age: 68 y.o.  Sex: male  : 1955  MRN: [id number]  Date of Service: 2024   Medical Oncologist: Dr. Rashid    Subjective:    Chief Complaint: Congestion     History of Present Illness: Naveen Lugo is a 68 y.o. male with past medical history significant for coronary artery disease, hypertension, hyperlipidemia, opioid use disorder, and metastatic prostate cancer who presents to the palliative clinic today as a follow up for pain and symptom management.     Treatment summary: He was diagnosed with prostate adenocarcinoma metastasized to the bone in 2020. Patient completed radiation with Dr. Quintanilla in 2021. He is currently on Zytiga, prednisone, and Relugolix. PSA remains low and no active disease on imaging. Patient recently diagnosed with new onset of microcytic anemia and started on iron supplementation. EGD/colonoscopy on 5/3/23 showed gastritis and a few small polyps with recommendations to repeat in 5 years. Repeat scans on 23 were stable with no evidence of disease progression.      Pain: History of opioid use disorder. Patient completed Suboxone taper in the past and was off of pain medication prior to cancer diagnosis. Several failed therapies including methadone, MS contin and fentanyl. Patient was referred to pain specialist, Dr. BARB Avalos in 3/2022. Patient transferred care back to the palliative clinic for uncontrolled hip and back pain in 2022. Current regimen includes of Xtampza 9 mg BID, hydromorphone 8 mg every 4 hours and flexeril 10 mg TID PRN. The patient complains of 7/10 left hip pain over several months. Patient follows with orthopedic surgeon, Dr. Marks and underwent steroid injections in both hips several months ago without significant improvement in his pain. Patient unable to tolerate gabapentin, pregabalin, and duloxetine in the past.      Symptoms: The patient complains of persistent congestion in his chest and  runny nose since a bout of pneumonia 1 week ago. The patient denies fever or chills. He denies dyspnea, chest pain or worsening cough. He is taking Mucinex and Nyquil for his symptoms. The patient requests another course of antibiotics. Regular bowel movements with Senna daily. Patient needs a refill of this today. He states his appetite has been good. No issues with sleep. Hot flashes have improved with the addition of venlafaxine (Effexor).     Pyschosocial: The patient lives with his wife. Endorses strong family support. He enjoys spending time with his grandson.  The patient is excited about an upcoming trip to [place] with his family to celebrate his anniversary. No personal history of mental illness. He denies anxiety or depression. Patient enjoys spending times outdoors. Increased stress lately due to wife's recent heart attack and the death of his sister.      Goals: Maximize comfort, optimize function & psychosocial wellbeing, and promote advanced care planning.    The following portions of the patient's history were reviewed and updated as appropriate: allergies, current medications, past family history, past medical history, past social history, past surgical history and problem list.    ORT-R: High risk   Aberrant behavior: abnormal UDS 9/7/22  Decisional capacity: Full  ECOG: (1) Restricted in physically strenuous activity, ambulatory and able to do work of light nature   Palliative Performance Scale Score: 70%     Objective:    Pulse 93   Temp 98 °F (36.7 °C) (Temporal)   Resp 18   Wt 89.1 kg (196 lb 6.4 oz)   SpO2 96%   BMI 29.86 kg/m²     Constitutional: Awake, alert, normal gait, sitting up in exam chair, in no acute distress  Eyes: PERRLA, EOMS intact  HENT: NCAT, face symmetric  Neck: Supple, trachea midline  Respiratory: Nonlabored respirations  Cardiovascular: RRR, no edema observed  Gastrointestinal: Soft, no guarding  Musculoskeletal: Moves all extremities   Psychiatric: Appropriate  affect, cooperative  Neurologic: Oriented x 3, Cranial Nerves grossly intact to confrontation, speech clear  Skin: Cool dry, no rashes or wounds appreciated     Medication Counts: Reviewed. See bottom of note for details. Brought medication.  No overuse or misuse evident.  I have reviewed the patient's [place] PDMP. AGUSTIN Req #[id number].   UDS: Last 4/3/23. Reviewed. Appropriate.     Assessment & Plan:    1. Prostate cancer metastatic to bone  - Tolerating treatment. Repeat scans and labs were stable with no evidence of disease progression.     2. Chronic pain syndrome  - Patient is appropriate for opioid therapy due to cancer related pain. Daily function and quality of life improved with pain medication. Refills for Xtampza 9 mg BID and hydromorphone 8 mg every 4 hours were sent to the pharmacy. Of note, the hydromorphone 8 mg tablets have been on back order. Per pill counts, the patient has been taking between 9-10 tablets per day and may be skipping a dose which would explain increased pain. Patient also complains of increased cost with the 4 mg tablets. Will submit prior authorization to insurance company with this temporary change. Side effects of the medication discussed at every visit. Patient was encouraged to continue bowel regimen of daily stool softeners, prn laxatives, and diet modifications.    - Refilled cyclobenzaprine (FLEXERIL) 10 MG tablet; Take 1 tablet by mouth 3 (Three) Times a Day As Needed for Muscle Spasms.  Dispense: 90 tablet; Refill: 0    3. Upper respiratory tract infection, unspecified type  - Patient recently completed course of antibiotics for pneumonia. Patient requests a second course due to persistent congestion. However, due to absence of fever, chills, SOB, chest pain etc. Suspect his persistent congestion and runny nose are viral in nature and recommend conservative management for now. Provided instructions to seek medication attention with PCP, UC or ED if he develops any new or  worsening respiratory symptoms.     - Start cetirizine (zyrTEC) 10 MG tablet; Take 1 tablet by mouth Daily. For allergies  Dispense: 90 tablet; Refill: 3    - Start fluticasone (FLONASE) 50 MCG/ACT nasal spray; 2 sprays into the nostril(s) as directed by provider Daily.  Dispense: 9.9 mL; Refill: 3    4. Therapeutic opioid induced constipation  - Refilled senna (SENOKOT) 8.6 MG tablet; Take 1 tablet by mouth Daily.      Code status: Full code  Medical interventions: Full    Return in about 4 months (around 5/4/2024) for Office Visit.    I spent 30 minutes caring for Naveen Lugo on this date of service. This time includes time spent by me in the following activities: preparing for the visit, reviewing tests, obtaining and/or reviewing a separately obtained history, performing a medically appropriate examination and/or evaluation , counseling and educating the patient/family/caregiver, ordering medications, tests, or procedures, documenting information in the medical record, independently interpreting results and communicating that information with the patient/family/caregiver, and care coordination    Anna Ayers PA-C  01/04/2024    Medication Date Filled # Filled Count Used # Days  ROSE MARIE   Hydromorphone 4 12/27/23 180 104 76 8 10   Xtampza ER 9 12/4/23 60 1 59 30 2

## 2024-01-04 ENCOUNTER — INFUSION (OUTPATIENT)
Dept: ONCOLOGY | Facility: HOSPITAL | Age: 69
End: 2024-01-04
Payer: MEDICARE

## 2024-01-04 ENCOUNTER — TELEPHONE (OUTPATIENT)
Dept: ONCOLOGY | Facility: CLINIC | Age: 69
End: 2024-01-04

## 2024-01-04 ENCOUNTER — OFFICE VISIT (OUTPATIENT)
Dept: PALLIATIVE CARE | Facility: CLINIC | Age: 69
End: 2024-01-04
Payer: MEDICARE

## 2024-01-04 ENCOUNTER — OFFICE VISIT (OUTPATIENT)
Dept: ONCOLOGY | Facility: CLINIC | Age: 69
End: 2024-01-04
Payer: MEDICARE

## 2024-01-04 VITALS
OXYGEN SATURATION: 96 % | RESPIRATION RATE: 18 BRPM | WEIGHT: 196.4 LBS | BODY MASS INDEX: 29.86 KG/M2 | HEART RATE: 93 BPM | TEMPERATURE: 98 F

## 2024-01-04 VITALS
OXYGEN SATURATION: 96 % | SYSTOLIC BLOOD PRESSURE: 152 MMHG | HEIGHT: 68 IN | HEART RATE: 93 BPM | WEIGHT: 196 LBS | BODY MASS INDEX: 29.7 KG/M2 | DIASTOLIC BLOOD PRESSURE: 90 MMHG | RESPIRATION RATE: 18 BRPM | TEMPERATURE: 98.4 F

## 2024-01-04 DIAGNOSIS — C61 PROSTATE CANCER METASTATIC TO BONE: ICD-10-CM

## 2024-01-04 DIAGNOSIS — R06.02 SHORTNESS OF BREATH: ICD-10-CM

## 2024-01-04 DIAGNOSIS — R09.89 RHONCHI: ICD-10-CM

## 2024-01-04 DIAGNOSIS — Z45.2 ENCOUNTER FOR CARE RELATED TO PORT-A-CATH: Primary | ICD-10-CM

## 2024-01-04 DIAGNOSIS — J30.2 SEASONAL ALLERGIES: ICD-10-CM

## 2024-01-04 DIAGNOSIS — R05.9 COUGH IN ADULT: ICD-10-CM

## 2024-01-04 DIAGNOSIS — C79.51 PROSTATE CANCER METASTATIC TO BONE: Primary | ICD-10-CM

## 2024-01-04 DIAGNOSIS — T40.2X5A THERAPEUTIC OPIOID INDUCED CONSTIPATION: ICD-10-CM

## 2024-01-04 DIAGNOSIS — G89.4 CHRONIC PAIN SYNDROME: ICD-10-CM

## 2024-01-04 DIAGNOSIS — C61 PROSTATE CANCER METASTATIC TO BONE: Primary | ICD-10-CM

## 2024-01-04 DIAGNOSIS — C79.51 PROSTATE CANCER METASTATIC TO BONE: ICD-10-CM

## 2024-01-04 DIAGNOSIS — G89.3 CANCER RELATED PAIN: Primary | ICD-10-CM

## 2024-01-04 DIAGNOSIS — J06.9 UPPER RESPIRATORY TRACT INFECTION, UNSPECIFIED TYPE: Primary | ICD-10-CM

## 2024-01-04 DIAGNOSIS — K59.03 THERAPEUTIC OPIOID INDUCED CONSTIPATION: ICD-10-CM

## 2024-01-04 PROCEDURE — 1125F AMNT PAIN NOTED PAIN PRSNT: CPT | Performed by: NURSE PRACTITIONER

## 2024-01-04 PROCEDURE — 36415 COLL VENOUS BLD VENIPUNCTURE: CPT

## 2024-01-04 PROCEDURE — G0463 HOSPITAL OUTPT CLINIC VISIT: HCPCS

## 2024-01-04 PROCEDURE — 3080F DIAST BP >= 90 MM HG: CPT | Performed by: NURSE PRACTITIONER

## 2024-01-04 PROCEDURE — 1159F MED LIST DOCD IN RCRD: CPT | Performed by: NURSE PRACTITIONER

## 2024-01-04 PROCEDURE — 99214 OFFICE O/P EST MOD 30 MIN: CPT | Performed by: NURSE PRACTITIONER

## 2024-01-04 PROCEDURE — 25010000002 HEPARIN LOCK FLUSH PER 10 UNITS: Performed by: INTERNAL MEDICINE

## 2024-01-04 PROCEDURE — 3077F SYST BP >= 140 MM HG: CPT | Performed by: NURSE PRACTITIONER

## 2024-01-04 PROCEDURE — 1160F RVW MEDS BY RX/DR IN RCRD: CPT | Performed by: NURSE PRACTITIONER

## 2024-01-04 PROCEDURE — 96523 IRRIG DRUG DELIVERY DEVICE: CPT

## 2024-01-04 PROCEDURE — 36591 DRAW BLOOD OFF VENOUS DEVICE: CPT

## 2024-01-04 RX ORDER — HEPARIN SODIUM (PORCINE) LOCK FLUSH IV SOLN 100 UNIT/ML 100 UNIT/ML
500 SOLUTION INTRAVENOUS AS NEEDED
Status: DISCONTINUED | OUTPATIENT
Start: 2024-01-04 | End: 2024-01-04 | Stop reason: HOSPADM

## 2024-01-04 RX ORDER — CYCLOBENZAPRINE HCL 10 MG
10 TABLET ORAL 3 TIMES DAILY PRN
Qty: 90 TABLET | Refills: 0 | Status: SHIPPED | OUTPATIENT
Start: 2024-01-04

## 2024-01-04 RX ORDER — PREDNISONE 20 MG/1
TABLET ORAL
COMMUNITY
Start: 2023-12-23

## 2024-01-04 RX ORDER — CETIRIZINE HYDROCHLORIDE 10 MG/1
10 TABLET ORAL DAILY
Qty: 90 TABLET | Refills: 3 | Status: SHIPPED | OUTPATIENT
Start: 2024-01-04

## 2024-01-04 RX ORDER — HEPARIN SODIUM (PORCINE) LOCK FLUSH IV SOLN 100 UNIT/ML 100 UNIT/ML
500 SOLUTION INTRAVENOUS AS NEEDED
OUTPATIENT
Start: 2024-01-04

## 2024-01-04 RX ORDER — FLUTICASONE PROPIONATE 50 MCG
2 SPRAY, SUSPENSION (ML) NASAL DAILY
Qty: 9.9 ML | Refills: 3 | Status: SHIPPED | OUTPATIENT
Start: 2024-01-04

## 2024-01-04 RX ORDER — HYDROMORPHONE HYDROCHLORIDE 8 MG/1
8 TABLET ORAL EVERY 4 HOURS PRN
Qty: 180 TABLET | Refills: 0 | Status: SHIPPED | OUTPATIENT
Start: 2024-01-11

## 2024-01-04 RX ORDER — SENNOSIDES A AND B 8.6 MG/1
1 TABLET, FILM COATED ORAL DAILY
Qty: 90 TABLET | Refills: 3 | Status: SHIPPED | OUTPATIENT
Start: 2024-01-04

## 2024-01-04 RX ADMIN — HEPARIN 500 UNITS: 100 SYRINGE at 11:25

## 2024-01-04 NOTE — TELEPHONE ENCOUNTER
----- Message from DHARMESH Weeks sent at 1/4/2024  3:19 PM EST -----  Regarding: call pt  Please let Mr. Lugo know I called to get reading on the chest x-ray done today, he was concerned he had pna.  Let him know the chest x-ray did not show pneumonia.  Continue over the counter cough suppressant if needed for cough/congestion.  Follow up as scheduled with his PCP Monday.    Thank you.  Abena

## 2024-01-04 NOTE — PROGRESS NOTES
CHIEF COMPLAINT:  1.  Chronic pain in hip and back    2.  Cough    Problem List:  Oncology/Hematology History Overview Note   1.  Stage IVb grade group 4 metastatic prostate cancer with sclerotic bone lesions on CT and bone scan and history of prostatic hypertrophy.  Good response to Taxotere ending 2/18/2021 Relugolix, followed by Zytiga prednisone with L4/sacrum, left acetabulum, and right pelvis external beam radiation August 2021.  Hypophosphatemia on Zometa.  Zometa held as of October 2021.  2.  Kidney stone  3.  Polyps  4.  Probable drug-induced hypophosphatemia from Zometa, drug stopped after 10/5/2021 dose  5.  Cancer related pain seeing palliative care  6.  Fatigue  7.  Covid 2/2022  8.  Iron deficiency anemia  -5/3/2023 EGD and colonoscopy with Dr. Alexander Gomez: Superficial erosions in the stomach with gastritis.  Small polyps removed, dilated internal hemorrhoids.  Pathology pending.  Recommend repeat colonoscopy in 5 years.    -9/30/2020 office note from Dr. Ronen Reynaga indicates patient with PSA 10.8.  Underwent TRUS prostate biopsy 9/15/2020.  Adenocarcinoma the prostate Sarika 4+4 = 8 in 1 out of 12 cores and 4+3 = 7 and 10 out of 12 cores and 3+4 = 7 in 1 out of 12 cores.  Perineural invasion.  Extraprostatic extension into the fat seen on 2 biopsies of the right lateral mid and right apex.  9/24/2020 CT chest and whole-body bone scan showed T12, L1, left acetabular, right medial acetabular metastases with no lung metastases.  He started androgen deprivation therapy with Casodex with plans for Lupron to start in 1 to 2 weeks for clinical T3 N0 M1 metastatic prostate cancer.  Review of official report from [facility] 9/24/2020 bone scan mentions T12, L1, roof of left acetabulum and medial right acetabular osteoblastic metastases.  CT chest with contrast that same day showed mild splenomegaly 14.2 cm with stable periaortic nodes compared to CT December 2015 with no lung metastases.   Sclerotic abnormality within the T12 vertebral body, L1 vertebral body extending into the pedicle unchanged.  8/28/2020 CT abdomen pelvis report from [facility] reviewed and mentions normal spleen size.  Punctate nonobstructing kidney stone, no paulino enlargement, diffuse bladder wall thickening with mild perivesicular fat stranding, 2.1 cm sclerotic left iliac bone above the left acetabulum new compared to 2015 as well as 1.5 cm faintly sclerotic focus in the right posterior acetabulum stable compared to prior CT 2015 as well as a 1.6 cm T12 and inferior vertebral body sclerotic lesion and a 1.9 cm left posterior lateral L1 vertebral body abnormality extending to the pedicle.  -10/13/2020 [facility] [place] [facility]: With 1 Sarika score 8, 4+4 lesion that would make him a grade group 4 with stage IVb disease given radiographic evidence of sclerotic bone metastasis on bone scan and CT.  Needs genetic testing given metastatic prostate cancer and this is also important as, were he to have mismatch repair mutation then we would have options for PDL 1 inhibitors and were he BRCA mutated would have options for PARP inhibitors in the future.  Systemic therapy for castration naïve prostate cancer or N1 disease should be with apalutamide or Abiraterone or enzalutamide all of which are category 1.  He does not have any visceral metastases.  According to his imaging he has T12, L1, left acetabular, right acetabular, and left iliac bony involvement.  That means he would have 4 or more bone metastases with at least one metastasis (i.e. the acetabular lesions bilaterally) beyond the pelvis/vertebral, for which this would be considered high-volume disease for which 6 cycles of docetaxel 75 mg/m² x 6 cycles followed by Zytiga and prednisone would be reasonable along with Zometa every 6 weeks for bone stabilization.  He will do chemo preparation visit with my nurse practitioner prior to start chemotherapy  with Taxotere and Zometa in 2 weeks and he is already received Casodex in preparation for Lupron shot he received on 10/12/2020 with Dr. Reynaga.  We will get him to Dr. Alexander Gomez who has done his polypectomies in the past for port placement and we will get genetic counseling started.  Following the 6 courses of Taxotere per the [place]ed trial criteria, I would then give him an indefinite Zytiga and prednisone and Zometa until progression or toxicity dictates.    -12/16/2019 COVID-19 antigen test negative    -12/29/2020 PSA 0.275 with absolute neutrophil count 700 and creatinine 0.76 with normal liver enzymes and electrolytes.  Normal magnesium and phosphorus and urine drug screen positive for buprenorphine and opiates follow-up with palliative care.    -1/4/2021 CT chest abdomen pelvis with contrast and whole-body bone scan shows improving less metabolically active bone metastases.  Stable sclerotic T12, L1, and L2 vertebral bodies and bilateral pelvic bones on bone windows with stable subcentimeter para-aortic lymph nodes and no definite progression in the chest abdomen or pelvis.    -1/5/2021 [facility] medical oncology follow-up visit: I reviewed the images and reports thereof of the above CT and bone scan which shows good response to Taxotere x3 courses with a PSA down to 0.275 from a baseline of 10.  Get another 3 courses of Taxotere, continue his Xgeva, and then following that we will switch to Zytiga and prednisone.  He is working with palliative care on his bone pain but on his current dose of fentanyl patch he says his pain is still not adequately controlled he will contact them.  Dexamethasone prescription was refilled.  We will see my nurse practitioner back in 3 weeks for course #5 of 6 total courses and then we will reimage with CT chest abdomen pelvis and bone scan before going to the Zytiga prednisone.    -3/4/2021 CT chest abdomen pelvis with contrast is negative save for stable sclerotic bone  lesions and the bone scan shows near resolution of prior bony abnormalities associated with the known sclerotic lesions in the thoracolumbar spine and bony pelvis.    2/17/2021 PSA down to 0.1 from 1.37 October 2020.  3/9/2021 PSA 0.1    -5/26/2021 CT chest abdomen pelvis with contrast shows stable to slightly underlying increase low-density left para-aortic node.  Bone lesions stable.  Whole-body bone scan stable to decrease activity of known thoracolumbar and bony pelvic involvement.  PSA less than 0.1  , AST 74, bilirubin 0.5.       -6/1/2021 [facility] oncology follow-up visit: I reviewed the above data with him and images thereof.  Bones are stable.  No significant adenopathy.  PSA immeasurably low.     upper limit of normal 69 and AST 74 upper limit of normal 46.  Hence, neither is more than double the upper limit of normal with no ascites and no encephalopathy and normal albumin and bilirubin, despite the elevation of liver enzymes, he has child Abrams score A.  Package insert for Abiraterone says that for ALT and/or AST greater than 5 times upper limit of normal or total bilirubin greater than 3 times the upper limit of normal to withhold treatment until liver function tests returned to baseline or ALT and AST less than or equal to 2.5 times the upper limit of normal and total bilirubin less than or equal to 1.5 times the upper limit of normal and then reinitiate at 750 mg daily dose of Zytiga.  For recurrent hepatotoxicity on 750 mg daily Zytiga, withhold treatment until liver functions returned to baseline or ALT and AST less than 3-2.5 times upper limit of normal and total bilirubin less than or equal to 1.5 times the upper limit of normal then reinitiate at 500 mg daily Zytiga dose.  If has recurrent hepatotoxicity on 500 mg dose, then discontinue treatment.  If has ALT greater than 3 times the upper limit of normal along with total bilirubin greater than 2 times the upper limit of  normal in the absence of other contributing causes or biliary obstruction, permanently discontinue Zytiga.  Based on these recommendations, I would continue current doses but we will watch with serial labs monthly and repeat his imaging again in 3 months but clinically he is doing wonderfully with excellent response to Taxotere and now on Zytiga prednisone plus Zometa along with Relugolix.    -6/23/2020 for [facility] follow-up: Having persistent and worsening pain above his left hip.  I will get an MRI of the left hip for further evaluation.  Otherwise we will continue therapy unchanged with Zytiga, prednisone and Zometa along with Relugolix.    -7/28/2021 [facility] follow-up: Patient with multiple somatic complaints including episodes of confusion, dizziness, decreased memory, agitation.  He reports ongoing episodes with difficulty swallowing.  Also most concerning for episodes of angina and chest pain for which he basically refused to seek emergency medical attention.  He has a history of coronary artery disease and stent placement.  He has not followed up with cardiology for many years.  I will get an MRI of the brain in light of his confusion, dizziness and agitation.  I will get him to gastroenterology for EGD in light of his dysphagia.  I have also put in a referral for cardiology.  I reemphasized to the patient, his wife who is with him today and his son who was on the telephone during our visit the importance of seeking out prompt medical attention when he is having chest pain or neurological concerns so that he can be evaluated.  The patient states that he basically refuses to go to the emergency room and if his family calls an ambulance he would not agree to go to the hospital.  For the time being, I have asked him to hold his Zytiga and prednisone until we can sort this out as Zytiga does have some cardiovascular risk.  There is likely no treatment for prostate cancer that does  "not carry some form of cardiovascular risk.  His PSA remains low at 0.014 yesterday.  He will continue relugolix for now.    Of note, some of these symptoms could also be due to his hypophosphatemia, phosphate level from yesterday was 1.5, I have sent in a prescription for K-Phos 3 times a day before meals for 10 days.  We will hold further Zometa until this is corrected.  I have also put in an order to check his vitamin D level.  He takes calcium and vitamin D daily.  Some of his symptoms also could be due to drug withdrawal as there was some confusion and difficulty getting his last prescription for methadone therefore he was without methadone for several days, he now has his prescription and is back on his pain medication.  He has an appointment with neurosurgery tomorrow in light of his ongoing back pain, MRI of the hip recently showed multiple bony lesions largest at the L5 vertebral body and left supra-acetabular region.  If no neurosurgical intervention recommended he may benefit from spot radiation as this is where a lot of his pain is coming from.  His wife had questions today regarding his staging and extent of disease.  We reviewed previous scans.  I did clarify with her as she used the words \"cancer free\" as she thought that is what she was told after his CT scans once he completed Taxotere in February.  I explained to her that even though his scans showed he had an excellent response with no clear areas of metastasis that did not mean he was cancer free, I explained to her that when you have metastatic cancer the treatment intent is palliative in nature and to control the disease but not to cure the disease.  She stated understanding.  We will see him back in 2 weeks for follow-up to sort through this and make further plan of care, again, I have asked him to hold Zytiga and prednisone until he sees us back, he will continue on his other medications including relugolix.    -7/30/2021 neurosurgery PA " consultation.  MRI with and without contrast L5 ordered.    -8/3/2021 neurosurgery follow-up showed known sclerotic disease with new L5 abnormality without compression deformity or instability.  MRI brain negative for metastasis.    -8/4/2021 office visit Dr. Fco Quintanilla radiation oncology coordinating with Dr. North neurosurgery for palliative radiation for MRI evidence of L5 and left supra acetabular painful lesions.  States that the patient's disease which is not secreting PSA is experiencing some progression and would benefit from 20 Gy in five fractions and other lesion 30 Gy in ten fractions. Patient offered to go to [place] for radiation but desired treatment in [place].    -8/10/2021 [facility] medical oncology follow-up visit: No chest pains at this junction.  Reviewed MRI brain and other MRIs reviewed by Dr. North and Dwight.  Due for EGD on 19th.  We will repeat his phosphorus along with his other labs continue Relugolix, Zytiga, prednisone, and he will continue radiation palliatively to the painful bony lesions with Dr. Quintanilla/Can.  He will see my nurse practitioner back in a few weeks to see how he is tolerating this and to follow-up on the phosphorus off of Zometa and she will order repeat CT chest abdomen pelvis with contrast and total body bone scan the end of September.I will see him back after that.    -9/8/2021 Zometa resumed.  PSA <0.10    -10/5/2021 [facility] medical oncology follow-up visit: followed-up with radiation oncology 9/21/2021.  Status post symptomatic L5 radiation 5 fractions 20 [place] completed 8/16/2021 with improvement in right hip pain.  9/2/2021 PSA less than 0.1.  9/29/2021 total body bone scan shows increased uptake left iliac bone slightly superior group of the left acetabulum with no additional foci of suspicious abnormality is unlikely treatment related with no new sites of active osseous metastasis.  CT chest abdomen pelvis with contrast shows stable borderline enlarged  left periaortic nodes and dating sclerotic bone lesions.Continue Zytiga, prednisone, Relugolix, Zometa, repeat CT chest abdomen pelvis with contrast and total body bone scan the end of the year.  We will follow PSA serially.    -11/17/2021 [facility] [place] Oncology clinic follow-up:  Mr. Lugo overall is doing well.  He is tolerating therapy with Zytiga, prednisone and Relugolix along with Zometa which is given every 6 weeks.  PSA remains low at <0.1.  Has occasional low phosphorus, currently low at 1.7.  Serum calcium is normal at 8.9, normal creatinine 0.79 and normal CBC other than for platelets of 124.  I will hold Zometa for 1 month, I did send in a prescription for phosphorus replacement today.  He takes calcium and vitamin D.  I will see him back in 1 month for follow-up.  We will repeat restaging scans after that visit.    -12/15/2021 [facility] [place] Oncology clinic follow-up: Mr. Lugo continues to do well on therapy with Zytiga, prednisone and Relugolix, his PSA remains low at <0.1.  He is continuing to have left lower back pain, he is working with palliative care and they have referred him also to pain management.  Pain management has talked to him about putting in a pain pump however he is leery of this, I told him that this was a safe and effective pain management technique and to certainly give consideration to their recommendations.  His phosphorus is improving however is still a little low, I will hold off on Zometa until we see him back in 1 month, we may end up only giving it every 12 weeks.  We will repeat restaging scans with CT chest, abdomen and pelvis along with total body bone scan prior to return and I have ordered those today.      -1/5/2022 CT chest abdomen pelvis with contrast [facility] showing stable left periaortic adenopathy and unchanged sclerotic thoracic, lumbar spine and pelvis lesions with no new or progressive disease in the chest abdomen or pelvis.  Total body bone scan shows no  significant change and no new suspicious foci.  Stable posterior right acetabulum and left superior acetabulum and degenerative changes in the cervical spine, shoulders, acromioclavicular joints, sternoclavicular joints, elbows, wrists, hands, thoracic spine, lumbosacral spine, sacroiliac joints, hips, knees, ankles, feet.    -1/11/2022 [facility] [place] medical oncology hematology follow-up visit: I reviewed images and reports from his 1/5/2022 scans.  No active disease and PSA immmeasurably low on Zytiga, prednisone, Relugolix.  However, he has struggled with hypophosphatemia and is significantly fatigued with this.  Given that the bones are doing well and given that his phosphorus continues to remain consistently low at 2.2 in mid December, 1.7 mid November and 2.5 mid-September and as low as 1.5 back to July and the likelihood that this is related to his Zometa, given that his bones are stable overall, he will increase from 1200 mg up to 1600 mg of calcium and phosphate a day and we will hold his Zometa for the foreseeable future.  He will see my nurse practitioner back in a month to continue to monitor his phosphorus along with his calcium and other labs and will repeat his scans again in 3 months.  In addition, given his fatigue we will check his ACTH and cortisol though he is taking his prednisone with his Zytiga.  Though his electrolytes are normal, I will keep an eye on the cortisol and ACTH and have these labs not only drawn today but again just prior to return to my nurse practitioner on February 10.    -2/10/2022 [facility] [place] Oncology clinic follow-up: Mr. Lugo overall is stable, no change in his health this past month.  I have reviewed his labs from 2/8/2022 and they are unremarkable, his phosphorus is now normal at 3.2. We are continuing to hold his Zometa, he last received Zometa on 10/5/2021.  He continues therapy with Zytiga, prednisone and Relugolix.  Dr. Rashid had ordered cortisol level and ACTH which are low,  currently his ACTH is 2.8 and cortisol 3.08 however these are both done in the face of ongoing prednisone that he takes with his Zytiga.  We will get him to endocrinology for further evaluation for possible adrenal insufficiency but will not interrupt his treatment in the interim.  He will need restaging scans again in April for a 3-month follow-up.  He will continue to follow with palliative care and pain management for his cancer related pain.  His PSA remains immeasurably low at <0.1 on 2/8/22.    -2/15/2022 went to emergency room with weakness, decreased appetite, myalgias in the setting of known COVID-19 testing positive a few days prior to this visit.  Phosphorus had been low in the past but was normal in the emergency room with unremarkable CBC and CMP.  Chest x-ray unremarkable saturating well on room air mildly hypertensive with dry mucosa.  Given 500 cc crystalloid.  Covid test positive.  Mild thrombocytopenia 136,000 and otherwise unremarkable.  Discharged home.    -3/3/2022 data:  PSA less than 0.1  CMP unremarkable  Cortisol 3.96 normal  ACTH 2.8 lower limit of normal 7.2  Phosphorus normal 2.6    -3/8/2022 [facility] follow-up: Suspect big chunk of his fatigue was Covid.  Another part of the fatigue likely related to lack of testosterone.  Not hypophosphatemic off of Zometa.  ACTH running low while on prednisone not surprising but with normal cortisol and I doubt adrenal insufficient which is why he is on prednisone to avoid such while taking Zytiga.  Overall doing fairly well and with immeasurably low PSA, I will have labs done on him early April, see my nurse practitioner early May, and she will order repeat bone scan and CTs the end of May and I will see him back in June.  We will continue to hold the Zometa unless bone lesions progress given symptomatic prior hypophosphatemia on Zometa.  He will keep his appointment April 28 with Dr. Mir Duffy just to be sure that my take on his  pituitary/adrenal axis assessment is accurate.    -4/28/2022 endocrinology consult Dr. Mir Duffy.  With low ACTH and cortisol on prednisone with Zytiga, the adequacy of prednisone cannot be assessed by measuring ACTH or cortisol but is assessed by weight changes, blood pressure, blood sugar, potassium, overall sense of how the patient is doing.  Primary adrenal insufficiency with low ACTH and low cortisol would be typical for the situation as his blood sugar tends to run a little high and potassium a little low, he did not think increasing prednisone was necessary.  He put him on some potassium.  No follow-up scheduled, will see as needed.    -5/4/2022 [facility] Oncology clinic follow-up: Mr. Lugo continues on therapy with Zytiga and prednisone along with Relugolix.  His Zometa has been on hold due to previous hypophosphatemia.  There is some concern about potential adrenal insufficiency. He saw endocrinology, Dr. Mir Duffy on 4/28/2022.  I did review his office note and he stated that the low ACTH and low serum cortisol would be typical for Mr. Lugo's situation.  He further stated that the adequacy of prednisone dose cannot be assessed by measuring ACTH or cortisol but is rather assessed by the overall just altered how the patient is feeling-weight change, blood pressure, blood sugar, potassium, overall sense of wellbeing.  His potassium had been running a little low therefore they put him on potassium 10 mill equivalents daily. Of note, I do not see a future follow-up scheduled with endocrinology.  He is having night sweats, I am not sure if this is related.  His glucose was normal this morning at 107.  His weight is stable.  He will continue current treatment for his prostate cancer unchanged, we will continue to hold his Zometa.  Current phosphorus and magnesium are normal.  CBC and CMP from today are unremarkable, cortisol is normal.  PSA and ACTH are pending  In regards to his night sweats, he had taken  clonidine previously 0.1 mg twice daily as needed, I did send in a short supply again to try and see if this helps.  He also is having indigestion despite being on omeprazole twice daily, I sent a prescription for Pepcid.  He had an EGD August 2021.  We will repeat restaging scans prior to return and I have ordered those today.     -5/24/2022 CT chest abdomen pelvis with contrast[place] negative.  Bone scan shows stable bone metastases.    - 5/25/2022 PSA less than 0.1, platelets 130,000, otherwise unremarkable CBC and CMP.  A.m. cortisol running low 1.2 with phosphorus low 2.3.    -5/31/2022 [facility] medical oncology follow-up: On Zytiga, prednisone, Relugolix, PSA remaining immeasurably low with stable bone scan and no visceral/paulino metastases.  Phosphorus still running low.  I have discontinued his potassium chloride and started potassium phosphate 500 mg 4 times a day.  Unless PSA rising, we will plan on repeating CTs and bone scan in 6 months and will follow with my nurse practitioner in the interim and if symptoms dictate or labs, will get back to Dr. Duffy for management of potential adrenal insufficiency but presently we will just see how he does with potassium phosphate and hopeful improvement in the phosphorus level with time.  We will continue to follow with palliative care for pain management    -7/6/2022 [facility] Oncology clinic follow-up: Mr. Lugo overall is doing well but continues to be troubled with fatigue and hot flashes.  For the most part he states he is able to do the things he wants to do, he continues to work but does have to take more rest periods.  I had a long discussion with him and his wife after reviewing his medications with them as they wanted to see if there was anything he could stop or adjust.  I discussed with him the need to continue on treatment for his prostate cancer even though his PSA remains immeasurably low and his scans look good but that if his medication is stopped the  cancer would progress and over time it still has the potential to progress on therapy but would continue it as long as possible to keep his disease under control.  I explained this is like treating someone with hypertension, you do not stop the antihypertensive just because the blood pressure normalizes.  They stated understanding.  There is no dose adjustment of Zytiga or prednisone that would minimize his symptoms, he is on the current standard recommended therapy.  Discussed with him that sometimes during times of stress we may need to increase his prednisone dose but for now would not change anything.  His phosphorus level is normal, we continue to hold his Zometa.  I did give him the option of holding his phosphorus for the next month and we will see what happens, if it drops we will start him back on it but may be at a lower dose rather than 4 times a day maybe twice a day.  For now he will continue therapy unchanged.  We will continue monitoring labs monthly.  No PSA was drawn this month but that is okay as his PSA has been running immeasurably low at <0.1, we will recheck next month with lab draw.  We will stop checking his ACTH and cortisol level every month, if he develops worsening symptoms I will repeat those.  He has not gotten a follow-up with endocrinology as of yet.    -8/3/2022 [facility] follow[facility]up:  Mr. Lugo is doing well as far as his prostate cancer goes with continued immeasurably low PSA of <0.1.  He continues on therapy with Zytiga and prednisone along with Relugolix.  His phosphorus is stable at 2.7 which was just slightly low by our reference range however was 2.71-month ago also which was normal on another labs reference range.  He has not taken his phosphorus for this past month as he thought it was making him more fatigued.  He really can tell no difference whether he was taking it or not.  I have asked him to try to go back on phosphorus twice a day rather than 4 times a day  and see if this is tolerated and if we can keep his phosphorus level from dropping.  His biggest complaint today is flareup of his arthralgias and back pain.  He is following with pain management, Dr. Danny Avalos but is requesting a referral back to palliative medicine as he feels that no one is currently listening to him and they are just prescribing the same medications that are not effective according to his report.  He does have a follow-up with Dr. Avalos on 8/12/2022 but due to his current pain is not able to work until after he is seen and hopefully can get some better relief.  I have given him a work release until 15 August.  I have also put in a referral back to palliative care.  Also encouraged him to work with Dr. Avalos for help in resolving his issue.  Apparently they have recommended some type of a pump however he has been hesitant to that but likely would be helpful.  We will continue therapy unchanged.  We will continue monthly labs including phosphorus, we are not currently checking ACTH and cortisol monthly as Dr. Mir Duffy previously stated in his assessment on 4/28/2022 that the adequacy of prednisone cannot be assessed by measuring ACTH or cortisol but would be assessed by symptoms, see note from 4/28/2022.  He made no further follow-up appointments.  Fatigue is not worsening currently.  His glucose and potassium are  Normal.    -10/13/2022 [facility], [place], operated by [facility] medical oncology follow-up: Mr. Lugo is doing well.  He is tolerating treatment with Zytiga and prednisone along with Relugolix without any unusual side effects.  His PSA has remained immeasurably low at <0.1.  His phosphorus was slightly low at last visit.  He had been instructed to go back on phosphorus twice a day but he misunderstood and has not been taking it.  We will check labs today.  I will follow-up with results and notify him if he needs to restart the phosphorus.  His pain is better controlled since reestablishing with [facility]  palliative care.  We will repeat scans prior to next follow-up.  I have ordered CT chest abdomen pelvis along with bone scan.  We will see him back in 1 month.    -10/13/2022 PSA less than 0.014.With unremarkable CBC and CMP.  Phosphorus still a little low 2.3.    -11/4/2022 CT abdomen pelvis chest showed no evidence of disease progression with stable sclerotic bone lesions.  Bone scan stable right greater than left acetabular metastasis.  Stable bone metastases.    -11/8/2022 [facility] medical oncology telemedicine follow-up: Patient states that he feels achy all over with low-grade temps and a cough mildly productive.  Has an appointment later today to see his primary care for testing for flu and COVID.  Suggested that if he were positive for COVID that he get Paxlovid and that if he were positive for flu that he get Tamiflu and to discuss this with his primary care.  From the prostate cancer side, his PSA is immeasurably low with stable bone metastases and no evidence of progression.  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  We will continue the Relugolix, Zytiga, prednisone and he will see my nurse practitioner 12/7/2022.  Would repeat CTs and bone scan in May.    -1/25/2023 [facility] Oncology medicine follow-up: Mr. Lugo is having worsening fatigue and muscle aches.  He does have adrenal insufficiency on Zytiga and prednisone for his prostate cancer, he saw endocrinology in light of low ACTH back in April 2022.  At that time there was no recommendation other than for monitoring him for his overall wellbeing and labs.  He denies any fevers or chills.  His labs as shown above are unremarkable, his CBC and CMP are normal other than for glucose of 112, PSA remains low at <0.1.  He has no new pain or any symptoms concerning for progression of his prostate cancer.  I will get him back to endocrinology to see if they feel any dose adjustment of his prednisone would be helpful in his  symptoms.  Currently he is on 5 mg a day with his Zytiga.  I did not repeat his ACTH or cortisol as they would be expected to be low in this situation.  His potassium is normal, he has had no weight loss or change in his appetite.  Blood sugar is reasonable.  His only complaint currently is worsening fatigue and muscle aches.  He will continue treatment unchanged with Zytiga, prednisone and relugolix.  I will see him back in 1 month for follow-up.  We will repeat his restaging scans in May.    -3/1/2023 [facility] Oncology clinic follow-up:  His PSA remains low at <0.1 on treatment with Zytiga and prednisone along with relugolix however he continues to complain of significant fatigue and muscle aches not improving with time.  He states that his quality of life is affected as he is not able to do any work activities due to the fatigue.  He did see endocrinology again and they have increased his prednisone from 5 mg daily up to 7.5 mg daily however so far this has not made any difference in how he feels.  He is supposed to get back in touch with them to let them know how he is doing and to see if there is any other adjustments needed. His labs are unremarkable with normal thyroid function, CMP is normal other than for glucose of 156.  They did check hemoglobin A1c to look for diabetes however that was normal at 5.7.  CMP is unremarkable with just very mild anemia with hemoglobin of 12 and hematocrit 36.1% which would not account for his fatigue.  He does have hot flashes that are fairly significant, I will try venlafaxine as suggested by Dr. Duffy and I appreciate the recommendation.  We will start at 37.5 mg daily and if tolerated he can increase to 75 mg after 1-2 weeks.  I will see him back in 1 months for follow-up.  Plan to repeat scans late April/early May.  His prostate cancer seems under good control with current regimen however I am not sure how long he can tolerate this due to the side effects.    -3/30/2023  [facility] Oncology clinic follow-up:  Mr. Lugo continues to deal with fatigue.  He appears more cushingoid today, he continues on treatment with Zytiga, prednisone and relugolix.  Prednisone dose currently is 7.5 mg daily.  This has not made any difference in his energy level.  PSA remains immeasurably low at <0.014.  CBC shows new onset of microcytic hypochromic anemia with hemoglobin of 11, hematocrit 34.4%, MCV 75.4, MCH 24.1, WBC is normal at 5.81 with normal platelet count 181,000 and normal differential.  CMP is unremarkable, it is normal other than for glucose of 142.  We will get him to Dr. Gomez for an EGD and colonoscopy for further evaluation in regards to his new onset of anemia.  He has no associated GI symptoms otherwise except for 1 day a few weeks ago he does report that he had fairly sudden onset of vomiting but this was an isolated incident on that day and has not reoccurred since.  Continues to follow with palliative care for management of his chronic back pain.  We will repeat restaging CT chest, abdomen and pelvis along with total body bone scan prior to return and I have ordered that today.    -3/30/2023 Ferritin low 12.3 with iron low 27 and saturation low 5% with total iron binding capacity 511.  3/31/2023 ferrous sulfate 325 mg twice a day every other day with vitamin C started.    - 4/12/2023 CT chest abdomen pelvis with contrast showed no progressive disease with stable sclerotic bone lesions.  Bone scan showed single identifiable bone metastasis stable right acetabulum    -4/18/2023 [facility] hematology oncology follow-up: He now has microcytic iron deficiency anemia which is new and concerning.  Gastroenterologic evaluation has been ordered but not performed and is mandatory.  Continue ferrous sulfate 325 mg twice a day every other day with vitamin C to improve absorption and we will follow his CBC along with his usual labs monthly on Zytiga, Relugolix, prednisone 7.5 mg.  He follows with  Dr. Duffy with adrenal insufficiency.  He will see my nurse practitioner back in a month to see what GI has found and to watch serial CBC along with his other labs including PSA.  I would repeat CT chest abdomen pelvis again in 6 months with contrast along with bone scan and sooner if PSA rises.    -5/3/2023 EGD and colonoscopy with Dr. Alexander Gomez: Superficial erosions in the stomach with gastritis.  Small polyps removed, dilated internal hemorrhoids.  Pathology pending.  Recommend repeat colonoscopy in 5 years.    -5/17/2023 [facility] Oncology clinic follow-up:  Naveen overall is doing well on current therapy with Zytiga, prednisone and relugolix.  PSA remains immeasurably low at <0.014.  He feels his hip pain is getting worse with time.  It makes it difficult to enjoy activities with his grandchildren as it is worse when he stands for any length of time or tries to carry any weight.  I reviewed his bone scan from April which was stable, we also reviewed previous MRI of the hips from May 2021 that showed moderate bilateral hip osteoarthritis.  I offered to repeat MRI of his hips for evaluation to see if there has been any worsening of his osteoarthritis and also then referral to orthopedics for any recommended intervention however at this time he defers.  He will let me know if he changes his mind.  He also follows with palliative care for management of his cancer related pain.  We will continue therapy unchanged.  We will repeat restaging scans in October unless symptoms dictate the need to do so sooner.  He was recently found to be iron deficient, he had an EGD and colonoscopy on 5/3/2023 with Dr. Gomez, EGD showed superficial erosions in the stomach with gastritis, he continues on omeprazole.  He had small polyps removed from the colon and recommendation to repeat colonoscopy in 5 years.  He is on oral iron along with vitamin C every other day and is tolerating that well.  CBC from yesterday shows hemoglobin now  normal at 14.2 and hematocrit 43.2%, MCV normal at 27.0.    -7/20/2023 [facility] Oncology clinic follow-up: Naveen is doing well on current therapy with Zytiga, prednisone and relugolix.  PSA in June remains low at <0.1.  Continues to deal with chronic pain in his back and hip.  Recently saw Dr. Nikolai Marks and had an injection in his hip and is hoping that it will eventually help with his pain.  He has no new pain.  We will continue current therapy unchanged.  We will repeat labs while he is here today.  I will refill his iron as his pharmacy has changed. Most recent CBC in June with hemoglobin of 14.7 and hematocrit 43.6%.  We will repeat restaging scans in October.    -9/14/2023 [facility] Oncology clinic follow-up: Naveen is tolerating his prostate cancer treatment with Zytiga, prednisone and relugolix.  PSA remains immeasurably low at <0.014 on 8/17/2023.  Currently he is having more GI issues with nausea and intermittent vomiting that occurs often without warning.  He had an EGD and colonoscopy with Dr. Gomez on 5/3/2023, EGD showed superficial erosions in the stomach with gastritis.  He does take omeprazole 40 mg twice daily.  He saw his PCP yesterday for these symptoms and has been referred to gastroenterologist at [facility] and he is awaiting to hear about that appointment.  Hopefully he can get in fairly quickly.  I told him that he should let us know if it is going to be a while before he can be seen and I will get him back to Dr. Gomez for consideration of repeat EGD.  We will get his CBC, CMP and PSA today.  His previous noted anemia had normalized, he currently is not taking oral iron as he ran out.  I will wait and see what his labs show today and likely hold off for now depending on his lab results until his GI issues can be resolved.  We will repeat his restaging CT chest, abdomen and pelvis along with bone scan in the next few weeks and I have ordered those today.  He is taking Zofran as needed for  nausea, he is also on meclizine for vertigo.  He is going to follow-up with his PCP in a few weeks regarding his GI symptoms.    -9/14/2023 PSA less than 0.014 with unremarkable CBC and CMP.    -9/28/2023 total body bone scan stable with uptake in right acetabulum, no new areas.  CT chest, abdomen and pelvis show no evidence of disease progression.  -11/9/2023 [facility], [place], operated by [facility] Oncology clinic follow-up: CT reports were reviewed by Dr. Rashid last month and the patient reports he was called by Dr. Rashid as he could not make his appointment due to concerns over upper respiratory infection, CT scan showed no evidence of disease progression.  PSA has remained low, we are repeating that today along with his CBC and CMP.  He continues to have sinus drainage and congestion, I will send in 7 additional days on his doxycycline that he has been taking, he will follow-up with PCP if no improvement.  He continues to complain of bilateral hip pain, has a follow-up with Dr. Marks next week.  We will continue therapy unchanged with Zytiga, prednisone and relugolix.  Would repeat restaging bone scan and CT scans late March for a 6-month follow-up.     Prostate cancer metastatic to bone   8/30/2020 -  Other Event    PSA 10.8     9/15/2020 Initial Diagnosis    Prostate cancer metastatic to bone (CMS/HCC)     9/15/2020 Biopsy    Prostate needle core biopsy     9/24/2020 Imaging    Total body bone scan: 4 abnormal areas of increased activity consistent with osteoblastic metastasis, abnormal foci of activity seen in the T12 vertebral body, L1 vertebral body, roof of the left acetabulum, and acetabulum medially on the right.  CT chest: Stable sclerotic metastatic lesions within the T12 and L1 vertebrae.  No other evidence of metastatic disease to the chest.  Stable mild splenomegaly and subcentimeter periaortic retroperitoneal lymph nodes.  CT abdomen and pelvis: Diffuse bladder wall thickening with mild perivesicular fat stranding.  Interval  development of several sclerotic lesions in the spine and left iliac bone.     10/13/2020 Cancer Staged    Staging form: Bone - Appendicular Skeleton, Trunk, Skull, And Facial Bones, AJCC 8th Edition  - Clinical: Stage IVB (cT3, cN0, cM1b, G3) - Signed by Ishmael Rashid MD on 10/13/2020     11/3/2020 - 10/5/2021 Chemotherapy    OP SUPPORTIVE Zoledronic Acid Q42d     11/3/2020 - 2/18/2021 Chemotherapy    OP PROSTATE DOCEtaxel       1/4/2021 Imaging    CT chest abdomen pelvis with contrast and whole-body bone scan shows improving less metabolically active bone metastases.  Stable sclerotic T12, L1, and L2 vertebral bodies and bilateral pelvic bones on bone windows with stable subcentimeter para-aortic lymph nodes and no definite progression in the chest abdomen or pelvis.  PSA down to 0.275 after 3 courses of Taxotere.     3/4/2021 Imaging    CT chest, abdomen and pelvis stable, no evidence of disease progression. Total body bone scan with decreased/near resolution of abnormal increased radiotracer uptake associated with the known sclerotic lesions in the thoracolumbar spine and bony pelvis.  No evidence of new osteoblastic metastases.     3/4/2021 Imaging    CT chest abdomen pelvis with contrast is negative save for stable sclerotic bone lesions and the bone scan shows near resolution of prior bony abnormalities associated with the known sclerotic lesions in the thoracolumbar spine and bony pelvis.  2/17/2021 PSA down to 0.1 from 1.37 October 2020.     3/9/2021 - 3/9/2021 Chemotherapy    OP SUPPORTIVE PROSTATE Relugolix     3/9/2021 -  Chemotherapy    OP PROSTATE Abiraterone / PredniSONE       3/10/2021 -  Chemotherapy    OP PROSTATE Abiraterone / PredniSONE     8/10/2021 - 8/16/2021 Radiation    Radiation OncologyTreatment Course:  Naveen Lguo received 2000 cGy in 5 fractions to L4-sacrum, left acetabulum and right pelvis via External Beam Radiation - EBRT.     11/16/2021 -  Chemotherapy    OP CENTRAL VENOUS ACCESS  "DEVICE ACCESS, CARE, AND MAINTENANCE (CVAD)     1/5/2022 Imaging    -1/5/2022 CT chest abdomen pelvis with contrast [facility] showing stable left periaortic adenopathy and unchanged sclerotic thoracic, lumbar spine and pelvis lesions with no new or progressive disease in the chest abdomen or pelvis.  Total body bone scan shows no significant change and no new suspicious foci.  Stable posterior right acetabulum and left superior acetabulum and degenerative changes in the cervical spine, shoulders, acromioclavicular joints, sternoclavicular joints, elbows, wrists, hands, thoracic spine, lumbosacral spine, sacroiliac joints, hips, knees, ankles, feet.     5/24/2022 Imaging    CT chest abdomen pelvis with contrastFrankfort negative.  Bone scan shows stable bone metastases.     11/4/2022 Imaging    CT abdomen pelvis chest showed no evidence of disease progression with stable sclerotic bone lesions.  Bone scan stable right greater than left acetabular metastasis.  Stable bone metastases.     4/12/2023 Imaging     CT chest abdomen pelvis with contrast showed no progressive disease with stable sclerotic bone lesions.  Bone scan showed single identifiable bone metastasis stable right acetabulum     9/29/2023 Imaging    total body bone scan compared to April showed a single active bone metastasis with multiple and active bone metastases overall stable.  CT chest abdomen and pelvis compared to May and April shows no new bony progression with stable sclerotic bone lesions.         HISTORY OF PRESENT ILLNESS:  The patient is a 68 y.o. male, here for follow up on management of prostate cancer currently on therapy with Zytiga, prednisone and relugolix.  Naveen reports that he has been dealing with upper respiratory infection.  He went to the urgent treatment center and states he was told he has \"pneumonia\".  He was given an antibiotic to take for 4 days, sounds like it was a Z-Scotty.  He states he still has a productive " "cough.  No fever today.  He is a current smoker.  He has some shortness of breath with exertion.  He is asking for additional antibiotic.  He reports that his wife also has been dealing with upper respiratory infection and she seems to be getting better.      Past Medical History:   Diagnosis Date    Bone cancer     History of radiation therapy 2021    L4 through sacrum, left acetabulum, right pelvis    Nephrolithiasis     Opioid use disorder, mild, on maintenance therapy (HCC)     Prostate cancer      Past Surgical History:   Procedure Laterality Date    CHOLECYSTECTOMY      CORONARY STENT PLACEMENT   &     HERNIA REPAIR  1986    THORACIC DISCECTOMY         Allergies   Allergen Reactions    Cymbalta [Duloxetine Hcl] Dizziness    Gabapentin Nausea Only    Lyrica [Pregabalin] Nausea And Vomiting and Dizziness       Family History and Social History reviewed and changed as necessary    REVIEW OF SYSTEM:   Some shortness of breath with exertion  Productive cough    PHYSICAL EXAM:  Well-developed, well-nourished male in no distress  Rhonchi noted bilaterally, respirations regular and nonlabored.  Oxygen saturation 96% on room air.  Heart regular rate and rhythm    Vitals:    24 1057   BP: 152/90   Pulse: 93   Resp: 18   Temp: 98.4 °F (36.9 °C)   SpO2: 96%   Weight: 88.9 kg (196 lb)   Height: 172.7 cm (68\")     Vitals:    24 1057   PainSc:   8   PainLoc: Hip  Comment: BOTH HIPS AND LOWER BACK          ECOG score: 0           Vitals reviewed.  Labs reviewed     ECO    Lab Results   Component Value Date    HGB 14.8 2023    HCT 44.3 2023    MCV 87.5 2023     2023    WBC 8.93 2023    NEUTROABS 7.38 (H) 2023    LYMPHSABS 1.05 2023    MONOSABS 0.37 2023    EOSABS 0.06 2023    BASOSABS 0.04 2023       Lab Results   Component Value Date    GLUCOSE 156 (H) 2023    BUN 8 2023    CREATININE 0.74 (L) 2023     " 12/14/2023    K 3.5 12/14/2023     12/14/2023    CO2 26.7 12/14/2023    CALCIUM 9.1 12/14/2023    PROTEINTOT 7.0 02/14/2022    ALBUMIN 4.1 12/14/2023    BILITOT 0.2 12/14/2023    ALKPHOS 74 12/14/2023    AST 11 12/14/2023    ALT 9 12/14/2023     Lab Results   Component Value Date    PSA <0.014 12/14/2023    PSA <0.014 11/09/2023    PSA <0.014 09/14/2023    PSA <0.014 07/20/2023    PSA <0.1 06/14/2023    PSA <0.014 05/16/2023             ASSESSMENT & PLAN:  1.  Stage IVb grade group 4 metastatic prostate cancer with sclerotic bone lesions on CT and bone scan and history of prostatic hypertrophy.  Good response to Taxotere ending 2/18/2021 Relugolix, followed by Zytiga prednisone with L4/sacrum, left acetabulum, and right pelvis external beam radiation August 2021.  Hypophosphatemia on Zometa.  Zometa held as of October 2021.  2.  Kidney stone  3.  Polyps  4.  Probable drug-induced hypophosphatemia from Zometa, drug stopped after 10/5/2021 dose  5.  Cancer related pain seeing palliative care  6.  Fatigue  7.  Covid 2/2022  8.  Iron deficiency anemia  -5/3/2023 EGD and colonoscopy with Dr. Alexander Gomez: Superficial erosions in the stomach with gastritis.  Small polyps removed, dilated internal hemorrhoids.  Pathology pending.  Recommend repeat colonoscopy in 5 years.  9.  Chronic back and hip pain  10.  Nausea and vomiting with weight loss  11.  Dizziness  12.  Cough with rhonchi  13.  Tobacco abuse    -9/30/2020 office note from Dr. Ronen Reynaga indicates patient with PSA 10.8.  Underwent TRUS prostate biopsy 9/15/2020.  Adenocarcinoma the prostate Sarika 4+4 = 8 in 1 out of 12 cores and 4+3 = 7 and 10 out of 12 cores and 3+4 = 7 in 1 out of 12 cores.  Perineural invasion.  Extraprostatic extension into the fat seen on 2 biopsies of the right lateral mid and right apex.  9/24/2020 CT chest and whole-body bone scan showed T12, L1, left acetabular, right medial acetabular metastases with no lung metastases.   He started androgen deprivation therapy with Casodex with plans for Lupron to start in 1 to 2 weeks for clinical T3 N0 M1 metastatic prostate cancer.  Review of official report from [facility] 9/24/2020 bone scan mentions T12, L1, roof of left acetabulum and medial right acetabular osteoblastic metastases.  CT chest with contrast that same day showed mild splenomegaly 14.2 cm with stable periaortic nodes compared to CT December 2015 with no lung metastases.  Sclerotic abnormality within the T12 vertebral body, L1 vertebral body extending into the pedicle unchanged.  8/28/2020 CT abdomen pelvis report from [facility] reviewed and mentions normal spleen size.  Punctate nonobstructing kidney stone, no paulino enlargement, diffuse bladder wall thickening with mild perivesicular fat stranding, 2.1 cm sclerotic left iliac bone above the left acetabulum new compared to 2015 as well as 1.5 cm faintly sclerotic focus in the right posterior acetabulum stable compared to prior CT 2015 as well as a 1.6 cm T12 and inferior vertebral body sclerotic lesion and a 1.9 cm left posterior lateral L1 vertebral body abnormality extending to the pedicle.  -10/13/2020 initial [facility] medical oncology consultation: With 1 Taylor score 8, 4+4 lesion that would make him a grade group 4 with stage IVb disease given radiographic evidence of sclerotic bone metastasis on bone scan and CT.  Needs genetic testing given metastatic prostate cancer and this is also important as, were he to have mismatch repair mutation then we would have options for PDL 1 inhibitors and were he BRCA mutated would have options for PARP inhibitors in the future.  Systemic therapy for castration naïve prostate cancer or N1 disease should be with apalutamide or Abiraterone or enzalutamide all of which are category 1.  He does not have any visceral metastases.  According to his imaging he has T12, L1, left acetabular, right acetabular, and left iliac bony  involvement.  That means he would have 4 or more bone metastases with at least one metastasis (i.e. the acetabular lesions bilaterally) beyond the pelvis/vertebral, for which this would be considered high-volume disease for which 6 cycles of docetaxel 75 mg/m² x 6 cycles followed by Zytiga and prednisone would be reasonable along with Zometa every 6 weeks for bone stabilization.  He will do chemo preparation visit with my nurse practitioner prior to start chemotherapy with Taxotere and Zometa in 2 weeks and he is already received Casodex in preparation for Lupron shot he received on 10/12/2020 with Dr. Reynaga.  We will get him to Dr. Alexander Gomez who has done his polypectomies in the past for port placement and we will get genetic counseling started.  Following the 6 courses of Taxotere per the Chaarted trial criteria, I would then give him an indefinite Zytiga and prednisone and Zometa until progression or toxicity dictates.    -12/16/2019 COVID-19 antigen test negative    -12/29/2020 PSA 0.275 with absolute neutrophil count 700 and creatinine 0.76 with normal liver enzymes and electrolytes.  Normal magnesium and phosphorus and urine drug screen positive for buprenorphine and opiates follow-up with palliative care.    -1/4/2021 CT chest abdomen pelvis with contrast and whole-body bone scan shows improving less metabolically active bone metastases.  Stable sclerotic T12, L1, and L2 vertebral bodies and bilateral pelvic bones on bone windows with stable subcentimeter para-aortic lymph nodes and no definite progression in the chest abdomen or pelvis.    -1/5/2021 [facility] medical oncology follow-up visit: I reviewed the images and reports thereof of the above CT and bone scan which shows good response to Taxotere x3 courses with a PSA down to 0.275 from a baseline of 10.  Get another 3 courses of Taxotere, continue his Xgeva, and then following that we will switch to Zytiga and prednisone.  He is working with  palliative care on his bone pain but on his current dose of fentanyl patch he says his pain is still not adequately controlled he will contact them.  Dexamethasone prescription was refilled.  We will see my nurse practitioner back in 3 weeks for course #5 of 6 total courses and then we will reimage with CT chest abdomen pelvis and bone scan before going to the Zytiga prednisone.    -3/4/2021 CT chest abdomen pelvis with contrast is negative save for stable sclerotic bone lesions and the bone scan shows near resolution of prior bony abnormalities associated with the known sclerotic lesions in the thoracolumbar spine and bony pelvis.    2/17/2021 PSA down to 0.1 from 1.37 October 2020.  3/9/2021 PSA 0.1    -5/26/2021 CT chest abdomen pelvis with contrast shows stable to slightly underlying increase low-density left para-aortic node.  Bone lesions stable.  Whole-body bone scan stable to decrease activity of known thoracolumbar and bony pelvic involvement.  PSA less than 0.1  , AST 74, bilirubin 0.5.       -6/1/2021 [facility] medical oncology follow-up visit: I reviewed the above data with him and images thereof.  Bones are stable.  No significant adenopathy.  PSA immeasurably low.     upper limit of normal 69 and AST 74 upper limit of normal 46.  Hence, neither is more than double the upper limit of normal with no ascites and no encephalopathy and normal albumin and bilirubin, despite the elevation of liver enzymes, he has child Abrams score A.  Package insert for Abiraterone says that for ALT and/or AST greater than 5 times upper limit of normal or total bilirubin greater than 3 times the upper limit of normal to withhold treatment until liver function tests returned to baseline or ALT and AST less than or equal to 2.5 times the upper limit of normal and total bilirubin less than or equal to 1.5 times the upper limit of normal and then reinitiate at 750 mg daily dose of Zytiga.  For recurrent hepatotoxicity  on 750 mg daily Zytiga, withhold treatment until liver functions returned to baseline or ALT and AST less than 3-2.5 times upper limit of normal and total bilirubin less than or equal to 1.5 times the upper limit of normal then reinitiate at 500 mg daily Zytiga dose.  If has recurrent hepatotoxicity on 500 mg dose, then discontinue treatment.  If has ALT greater than 3 times the upper limit of normal along with total bilirubin greater than 2 times the upper limit of normal in the absence of other contributing causes or biliary obstruction, permanently discontinue Zytiga.  Based on these recommendations, I would continue current doses but we will watch with serial labs monthly and repeat his imaging again in 3 months but clinically he is doing wonderfully with excellent response to Taxotere and now on Zytiga prednisone plus Zometa along with Relugolix.    -6/23/2020 for [facility] clinic follow-up: Having persistent and worsening pain above his left hip.  I will get an MRI of the left hip for further evaluation.  Otherwise we will continue therapy unchanged with Zytiga, prednisone and Zometa along with Relugolix.    -7/28/2021 [facility] follow-up: Patient with multiple somatic complaints including episodes of confusion, dizziness, decreased memory, agitation.  He reports ongoing episodes with difficulty swallowing.  Also most concerning for episodes of angina and chest pain for which he basically refused to seek emergency medical attention.  He has a history of coronary artery disease and stent placement.  He has not followed up with cardiology for many years.  I will get an MRI of the brain in light of his confusion, dizziness and agitation.  I will get him to gastroenterology for EGD in light of his dysphagia.  I have also put in a referral for cardiology.  I reemphasized to the patient, his wife who is with him today and his son who was on the telephone during our visit the importance of  "seeking out prompt medical attention when he is having chest pain or neurological concerns so that he can be evaluated.  The patient states that he basically refuses to go to the emergency room and if his family calls an ambulance he would not agree to go to the hospital.  For the time being, I have asked him to hold his Zytiga and prednisone until we can sort this out as Zytiga does have some cardiovascular risk.  There is likely no treatment for prostate cancer that does not carry some form of cardiovascular risk.  His PSA remains low at 0.014 yesterday.  He will continue relugolix for now.    Of note, some of these symptoms could also be due to his hypophosphatemia, phosphate level from yesterday was 1.5, I have sent in a prescription for K-Phos 3 times a day before meals for 10 days.  We will hold further Zometa until this is corrected.  I have also put in an order to check his vitamin D level.  He takes calcium and vitamin D daily.  Some of his symptoms also could be due to drug withdrawal as there was some confusion and difficulty getting his last prescription for methadone therefore he was without methadone for several days, he now has his prescription and is back on his pain medication.  He has an appointment with neurosurgery tomorrow in light of his ongoing back pain, MRI of the hip recently showed multiple bony lesions largest at the L5 vertebral body and left supra-acetabular region.  If no neurosurgical intervention recommended he may benefit from spot radiation as this is where a lot of his pain is coming from.  His wife had questions today regarding his staging and extent of disease.  We reviewed previous scans.  I did clarify with her as she used the words \"cancer free\" as she thought that is what she was told after his CT scans once he completed Taxotere in February.  I explained to her that even though his scans showed he had an excellent response with no clear areas of metastasis that did not mean " he was cancer free, I explained to her that when you have metastatic cancer the treatment intent is palliative in nature and to control the disease but not to cure the disease.  She stated understanding.  We will see him back in 2 weeks for follow-up to sort through this and make further plan of care, again, I have asked him to hold Zytiga and prednisone until he sees us back, he will continue on his other medications including relugolix.    -7/30/2021 neurosurgery PA consultation.  MRI with and without contrast L5 ordered.    -8/3/2021 neurosurgery follow-up showed known sclerotic disease with new L5 abnormality without compression deformity or instability.  MRI brain negative for metastasis.    -8/4/2021 office visit Dr. Fco Quintanilla radiation oncology coordinating with Dr. North neurosurgery for palliative radiation for MRI evidence of L5 and left supra acetabular painful lesions.  States that the patient's disease which is not secreting PSA is experiencing some progression and would benefit from 20 Gy in five fractions and other lesion 30 Gy in ten fractions. Patient offered to go to [place] for radiation but desired treatment in [place].    -8/10/2021 [facility] medical oncology follow-up visit: No chest pains at this junction.  Reviewed MRI brain and other MRIs reviewed by Dr. North and Dwight.  Due for EGD on 19th.  We will repeat his phosphorus along with his other labs continue Relugolix, Zytiga, prednisone, and he will continue radiation palliatively to the painful bony lesions with Dr. Quintanilla/Can.  He will see my nurse practitioner back in a few weeks to see how he is tolerating this and to follow-up on the phosphorus off of Zometa and she will order repeat CT chest abdomen pelvis with contrast and total body bone scan the end of September.I will see him back after that.    -9/8/2021 Zometa resumed.  PSA <0.10    -10/5/2021 [facility] medical oncology follow-up visit: followed-up with radiation oncology  9/21/2021.  Status post symptomatic L5 radiation 5 fractions 20 [place] completed 8/16/2021 with improvement in right hip pain.  9/2/2021 PSA less than 0.1.  9/29/2021 total body bone scan shows increased uptake left iliac bone slightly superior group of the left acetabulum with no additional foci of suspicious abnormality is unlikely treatment related with no new sites of active osseous metastasis.  CT chest abdomen pelvis with contrast shows stable borderline enlarged left periaortic nodes and dating sclerotic bone lesions.Continue Zytiga, prednisone, Relugolix, Zometa, repeat CT chest abdomen pelvis with contrast and total body bone scan the end of the year.  We will follow PSA serially.    -11/17/2021 [facility] follow-up:  Mr. Lugo overall is doing well.  He is tolerating therapy with Zytiga, prednisone and Relugolix along with Zometa which is given every 6 weeks.  PSA remains low at <0.1.  Has occasional low phosphorus, currently low at 1.7.  Serum calcium is normal at 8.9, normal creatinine 0.79 and normal CBC other than for platelets of 124.  I will hold Zometa for 1 month, I did send in a prescription for phosphorus replacement today.  He takes calcium and vitamin D.  I will see him back in 1 month for follow-up.  We will repeat restaging scans after that visit.    -12/15/2021 [facility] follow-up: Mr. Lugo continues to do well on therapy with Zytiga, prednisone and Relugolix, his PSA remains low at <0.1.  He is continuing to have left lower back pain, he is working with palliative care and they have referred him also to pain management.  Pain management has talked to him about putting in a pain pump however he is leery of this, I told him that this was a safe and effective pain management technique and to certainly give consideration to their recommendations.  His phosphorus is improving however is still a little low, I will hold off on Zometa until we see him back in 1 month, we may  end up only giving it every 12 weeks.  We will repeat restaging scans with CT chest, abdomen and pelvis along with total body bone scan prior to return and I have ordered those today.      -1/5/2022 CT chest abdomen pelvis with contrast [facility] showing stable left periaortic adenopathy and unchanged sclerotic thoracic, lumbar spine and pelvis lesions with no new or progressive disease in the chest abdomen or pelvis.  Total body bone scan shows no significant change and no new suspicious foci.  Stable posterior right acetabulum and left superior acetabulum and degenerative changes in the cervical spine, shoulders, acromioclavicular joints, sternoclavicular joints, elbows, wrists, hands, thoracic spine, lumbosacral spine, sacroiliac joints, hips, knees, ankles, feet.    -1/11/2022 [facility] oncology hematology follow-up visit: I reviewed images and reports from his 1/5/2022 scans.  No active disease and PSA immmeasurably low on Zytiga, prednisone, Relugolix.  However, he has struggled with hypophosphatemia and is significantly fatigued with this.  Given that the bones are doing well and given that his phosphorus continues to remain consistently low at 2.2 in mid December, 1.7 mid November and 2.5 mid-September and as low as 1.5 back to July and the likelihood that this is related to his Zometa, given that his bones are stable overall, he will increase from 1200 mg up to 1600 mg of calcium and phosphate a day and we will hold his Zometa for the foreseeable future.  He will see my nurse practitioner back in a month to continue to monitor his phosphorus along with his calcium and other labs and will repeat his scans again in 3 months.  In addition, given his fatigue we will check his ACTH and cortisol though he is taking his prednisone with his Zytiga.  Though his electrolytes are normal, I will keep an eye on the cortisol and ACTH and have these labs not only drawn today but again just prior to return  to my nurse practitioner on February 10.    -2/10/2022 [facility] follow-up: Mr. Lugo overall is stable, no change in his health this past month.  I have reviewed his labs from 2/8/2022 and they are unremarkable, his phosphorus is now normal at 3.2. We are continuing to hold his Zometa, he last received Zometa on 10/5/2021.  He continues therapy with Zytiga, prednisone and Relugolix.  Dr. Rashid had ordered cortisol level and ACTH which are low, currently his ACTH is 2.8 and cortisol 3.08 however these are both done in the face of ongoing prednisone that he takes with his Zytiga.  We will get him to endocrinology for further evaluation for possible adrenal insufficiency but will not interrupt his treatment in the interim.  He will need restaging scans again in April for a 3-month follow-up.  He will continue to follow with palliative care and pain management for his cancer related pain.  His PSA remains immeasurably low at <0.1 on 2/8/22.    -2/15/2022 went to emergency room with weakness, decreased appetite, myalgias in the setting of known COVID-19 testing positive a few days prior to this visit.  Phosphorus had been low in the past but was normal in the emergency room with unremarkable CBC and CMP.  Chest x-ray unremarkable saturating well on room air mildly hypertensive with dry mucosa.  Given 500 cc crystalloid.  Covid test positive.  Mild thrombocytopenia 136,000 and otherwise unremarkable.  Discharged home.    -3/3/2022 data:  PSA less than 0.1  CMP unremarkable  Cortisol 3.96 normal  ACTH 2.8 lower limit of normal 7.2  Phosphorus normal 2.6    -3/8/2022 [facility] medical oncology follow-up: Suspect big chunk of his fatigue was Covid.  Another part of the fatigue likely related to lack of testosterone.  Not hypophosphatemic off of Zometa.  ACTH running low while on prednisone not surprising but with normal cortisol and I doubt adrenal insufficient which is why he is on prednisone to avoid such while  taking Zytiga.  Overall doing fairly well and with immeasurably low PSA, I will have labs done on him early April, see my nurse practitioner early May, and she will order repeat bone scan and CTs the end of May and I will see him back in June.  We will continue to hold the Zometa unless bone lesions progress given symptomatic prior hypophosphatemia on Zometa.  He will keep his appointment April 28 with Dr. Mir Duffy just to be sure that my take on his pituitary/adrenal axis assessment is accurate.    -4/28/2022 endocrinology consult Dr. Mir Duffy.  With low ACTH and cortisol on prednisone with Zytiga, the adequacy of prednisone cannot be assessed by measuring ACTH or cortisol but is assessed by weight changes, blood pressure, blood sugar, potassium, overall sense of how the patient is doing.  Primary adrenal insufficiency with low ACTH and low cortisol would be typical for the situation as his blood sugar tends to run a little high and potassium a little low, he did not think increasing prednisone was necessary.  He put him on some potassium.  No follow-up scheduled, will see as needed.    -5/4/2022 [facility] follow-up: Mr. Lugo continues on therapy with Zytiga and prednisone along with Relugolix.  His Zometa has been on hold due to previous hypophosphatemia.  There is some concern about potential adrenal insufficiency. He saw endocrinology, Dr. Mir Duffy on 4/28/2022.  I did review his office note and he stated that the low ACTH and low serum cortisol would be typical for Mr. Lugo's situation.  He further stated that the adequacy of prednisone dose cannot be assessed by measuring ACTH or cortisol but is rather assessed by the overall just altered how the patient is feeling-weight change, blood pressure, blood sugar, potassium, overall sense of wellbeing.  His potassium had been running a little low therefore they put him on potassium 10 mill equivalents daily. Of note, I do not see a future  follow-up scheduled with endocrinology.  He is having night sweats, I am not sure if this is related.  His glucose was normal this morning at 107.  His weight is stable.  He will continue current treatment for his prostate cancer unchanged, we will continue to hold his Zometa.  Current phosphorus and magnesium are normal.  CBC and CMP from today are unremarkable, cortisol is normal.  PSA and ACTH are pending  In regards to his night sweats, he had taken clonidine previously 0.1 mg twice daily as needed, I did send in a short supply again to try and see if this helps.  He also is having indigestion despite being on omeprazole twice daily, I sent a prescription for Pepcid.  He had an EGD August 2021.  We will repeat restaging scans prior to return and I have ordered those today.     -5/24/2022 CT chest abdomen pelvis with contrast[place] negative.  Bone scan shows stable bone metastases.    - 5/25/2022 PSA less than 0.1, platelets 130,000, otherwise unremarkable CBC and CMP.  A.m. cortisol running low 1.2 with phosphorus low 2.3.    -5/31/2022 [facility] medical oncology follow-up: On Zytiga, prednisone, Relugolix, PSA remaining immeasurably low with stable bone scan and no visceral/paulino metastases.  Phosphorus still running low.  I have discontinued his potassium chloride and started potassium phosphate 500 mg 4 times a day.  Unless PSA rising, we will plan on repeating CTs and bone scan in 6 months and will follow with my nurse practitioner in the interim and if symptoms dictate or labs, will get back to Dr. Duffy for management of potential adrenal insufficiency but presently we will just see how he does with potassium phosphate and hopeful improvement in the phosphorus level with time.  We will continue to follow with palliative care for pain management    -7/6/2022 [facility] follow-up: Mr. Lugo overall is doing well but continues to be troubled with fatigue and hot flashes.  For the most part he  states he is able to do the things he wants to do, he continues to work but does have to take more rest periods.  I had a long discussion with him and his wife after reviewing his medications with them as they wanted to see if there was anything he could stop or adjust.  I discussed with him the need to continue on treatment for his prostate cancer even though his PSA remains immeasurably low and his scans look good but that if his medication is stopped the cancer would progress and over time it still has the potential to progress on therapy but would continue it as long as possible to keep his disease under control.  I explained this is like treating someone with hypertension, you do not stop the antihypertensive just because the blood pressure normalizes.  They stated understanding.  There is no dose adjustment of Zytiga or prednisone that would minimize his symptoms, he is on the current standard recommended therapy.  Discussed with him that sometimes during times of stress we may need to increase his prednisone dose but for now would not change anything.  His phosphorus level is normal, we continue to hold his Zometa.  I did give him the option of holding his phosphorus for the next month and we will see what happens, if it drops we will start him back on it but may be at a lower dose rather than 4 times a day maybe twice a day.  For now he will continue therapy unchanged.  We will continue monitoring labs monthly.  No PSA was drawn this month but that is okay as his PSA has been running immeasurably low at <0.1, we will recheck next month with lab draw.  We will stop checking his ACTH and cortisol level every month, if he develops worsening symptoms I will repeat those.  He has not gotten a follow-up with endocrinology as of yet.    -8/3/2022 [facility] On[facility] clinic follow-up:  Mr. Lugo is doing well as far as his prostate cancer goes with continued immeasurably low PSA of <0.1.  He continues on therapy with  Zytiga and prednisone along with Relugolix.  His phosphorus is stable at 2.7 which was just slightly low by our reference range however was 2.71-month ago also which was normal on another labs reference range.  He has not taken his phosphorus for this past month as he thought it was making him more fatigued.  He really can tell no difference whether he was taking it or not.  I have asked him to try to go back on phosphorus twice a day rather than 4 times a day and see if this is tolerated and if we can keep his phosphorus level from dropping.  His biggest complaint today is flareup of his arthralgias and back pain.  He is following with pain management, Dr. Danny Avalos but is requesting a referral back to palliative medicine as he feels that no one is currently listening to him and they are just prescribing the same medications that are not effective according to his report.  He does have a follow-up with Dr. Avalos on 8/12/2022 but due to his current pain is not able to work until after he is seen and hopefully can get some better relief.  I have given him a work release until 15 August.  I have also put in a referral back to palliative care.  Also encouraged him to work with Dr. Avalos for help in resolving his issue.  Apparently they have recommended some type of a pump however he has been hesitant to that but likely would be helpful.  We will continue therapy unchanged.  We will continue monthly labs including phosphorus, we are not currently checking ACTH and cortisol monthly as Dr. Mir Duffy previously stated in his assessment on 4/28/2022 that the adequacy of prednisone cannot be assessed by measuring ACTH or cortisol but would be assessed by symptoms, see note from 4/28/2022.  He made no further follow-up appointments.  Fatigue is not worsening currently.  His glucose and potassium are  Normal.    -10/13/2022 [facility] medical oncology follow-up: Mr. Lugo is doing well.  He is tolerating treatment with Zytiga  and prednisone along with Relugolix without any unusual side effects.  His PSA has remained immeasurably low at <0.1.  His phosphorus was slightly low at last visit.  He had been instructed to go back on phosphorus twice a day but he misunderstood and has not been taking it.  We will check labs today.  I will follow-up with results and notify him if he needs to restart the phosphorus.  His pain is better controlled since reestablishing with [facility].  We will repeat scans prior to next follow-up.  I have ordered CT chest abdomen pelvis along with bone scan.  We will see him back in 1 month.    -10/13/2022 PSA less than 0.014.With unremarkable CBC and CMP.  Phosphorus still a little low 2.3.    -11/4/2022 CT abdomen pelvis chest showed no evidence of disease progression with stable sclerotic bone lesions.  Bone scan stable right greater than left acetabular metastasis.  Stable bone metastases.    -11/8/2022 [facility] medical oncology telemedicine follow-up: Patient states that he feels achy all over with low-grade temps and a cough mildly productive.  Has an appointment later today to see his primary care for testing for flu and COVID.  Suggested that if he were positive for COVID that he get Paxlovid and that if he were positive for flu that he get Tamiflu and to discuss this with his primary care.  From the prostate cancer side, his PSA is immeasurably low with stable bone metastases and no evidence of progression.  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  We will continue the Relugolix, Zytiga, prednisone and he will see my nurse practitioner 12/7/2022.  Would repeat CTs and bone scan in May.    -1/25/2023 [facility] Oncology medicine follow-up: Mr. Lugo is having worsening fatigue and muscle aches.  He does have adrenal insufficiency on Zytiga and prednisone for his prostate cancer, he saw endocrinology in light of low ACTH back in April 2022.  At that time  there was no recommendation other than for monitoring him for his overall wellbeing and labs.  He denies any fevers or chills.  His labs as shown above are unremarkable, his CBC and CMP are normal other than for glucose of 112, PSA remains low at <0.1.  He has no new pain or any symptoms concerning for progression of his prostate cancer.  I will get him back to endocrinology to see if they feel any dose adjustment of his prednisone would be helpful in his symptoms.  Currently he is on 5 mg a day with his Zytiga.  I did not repeat his ACTH or cortisol as they would be expected to be low in this situation.  His potassium is normal, he has had no weight loss or change in his appetite.  Blood sugar is reasonable.  His only complaint currently is worsening fatigue and muscle aches.  He will continue treatment unchanged with Zytiga, prednisone and relugolix.  I will see him back in 1 month for follow-up.  We will repeat his restaging scans in May.    -3/1/2023 [facility] Oncology clinic follow-up:  His PSA remains low at <0.1 on treatment with Zytiga and prednisone along with relugolix however he continues to complain of significant fatigue and muscle aches not improving with time.  He states that his quality of life is affected as he is not able to do any work activities due to the fatigue.  He did see endocrinology again and they have increased his prednisone from 5 mg daily up to 7.5 mg daily however so far this has not made any difference in how he feels.  He is supposed to get back in touch with them to let them know how he is doing and to see if there is any other adjustments needed. His labs are unremarkable with normal thyroid function, CMP is normal other than for glucose of 156.  They did check hemoglobin A1c to look for diabetes however that was normal at 5.7.  CMP is unremarkable with just very mild anemia with hemoglobin of 12 and hematocrit 36.1% which would not account for his fatigue.  He does have hot  flashes that are fairly significant, I will try venlafaxine as suggested by Dr. Duffy and I appreciate the recommendation.  We will start at 37.5 mg daily and if tolerated he can increase to 75 mg after 1-2 weeks.  I will see him back in 1 months for follow-up.  Plan to repeat scans late April/early May.  His prostate cancer seems under good control with current regimen however I am not sure how long he can tolerate this due to the side effects.    -3/30/2023 [facility] Oncology clinic follow-up:  Mr. Lugo continues to deal with fatigue.  He appears more cushingoid today, he continues on treatment with Zytiga, prednisone and relugolix.  Prednisone dose currently is 7.5 mg daily.  This has not made any difference in his energy level.  PSA remains immeasurably low at <0.014.  CBC shows new onset of microcytic hypochromic anemia with hemoglobin of 11, hematocrit 34.4%, MCV 75.4, MCH 24.1, WBC is normal at 5.81 with normal platelet count 181,000 and normal differential.  CMP is unremarkable, it is normal other than for glucose of 142.  We will get him to Dr. Gomez for an EGD and colonoscopy for further evaluation in regards to his new onset of anemia.  He has no associated GI symptoms otherwise except for 1 day a few weeks ago he does report that he had fairly sudden onset of vomiting but this was an isolated incident on that day and has not reoccurred since.  Continues to follow with palliative care for management of his chronic back pain.  We will repeat restaging CT chest, abdomen and pelvis along with total body bone scan prior to return and I have ordered that today.    -3/30/2023 Ferritin low 12.3 with iron low 27 and saturation low 5% with total iron binding capacity 511.  3/31/2023 ferrous sulfate 325 mg twice a day every other day with vitamin C started.    - 4/12/2023 CT chest abdomen pelvis with contrast showed no progressive disease with stable sclerotic bone lesions.  Bone scan showed single identifiable bone  metastasis stable right acetabulum    -4/18/2023 [facility] hematology oncology follow-up: He now has microcytic iron deficiency anemia which is new and concerning.  Gastroenterologic evaluation has been ordered but not performed and is mandatory.  Continue ferrous sulfate 325 mg twice a day every other day with vitamin C to improve absorption and we will follow his CBC along with his usual labs monthly on Zytiga, Relugolix, prednisone 7.5 mg.  He follows with Dr. Duffy with adrenal insufficiency.  He will see my nurse practitioner back in a month to see what GI has found and to watch serial CBC along with his other labs including PSA.  I would repeat CT chest abdomen pelvis again in 6 months with contrast along with bone scan and sooner if PSA rises.    -5/3/2023 EGD and colonoscopy with Dr. Alexander Gomez: Superficial erosions in the stomach with gastritis.  Small polyps removed, dilated internal hemorrhoids.  Pathology pending.  Recommend repeat colonoscopy in 5 years.    -5/17/2023 [facility] Oncology clinic follow-up:  Naveen overall is doing well on current therapy with Zytiga, prednisone and relugolix.  PSA remains immeasurably low at <0.014.  He feels his hip pain is getting worse with time.  It makes it difficult to enjoy activities with his grandchildren as it is worse when he stands for any length of time or tries to carry any weight.  I reviewed his bone scan from April which was stable, we also reviewed previous MRI of the hips from May 2021 that showed moderate bilateral hip osteoarthritis.  I offered to repeat MRI of his hips for evaluation to see if there has been any worsening of his osteoarthritis and also then referral to orthopedics for any recommended intervention however at this time he defers.  He will let me know if he changes his mind.  He also follows with palliative care for management of his cancer related pain.  We will continue therapy unchanged.  We will repeat restaging scans in October unless  symptoms dictate the need to do so sooner.  He was recently found to be iron deficient, he had an EGD and colonoscopy on 5/3/2023 with Dr. Gomez, EGD showed superficial erosions in the stomach with gastritis, he continues on omeprazole.  He had small polyps removed from the colon and recommendation to repeat colonoscopy in 5 years.  He is on oral iron along with vitamin C every other day and is tolerating that well.  CBC from yesterday shows hemoglobin now normal at 14.2 and hematocrit 43.2%, MCV normal at 27.0.    -7/20/2023 [facility] Oncology clinic follow-up: Naveen is doing well on current therapy with Zytiga, prednisone and relugolix.  PSA in June remains low at <0.1.  Continues to deal with chronic pain in his back and hip.  Recently saw Dr. Nikolai Marks and had an injection in his hip and is hoping that it will eventually help with his pain.  He has no new pain.  We will continue current therapy unchanged.  We will repeat labs while he is here today.  I will refill his iron as his pharmacy has changed. Most recent CBC in June with hemoglobin of 14.7 and hematocrit 43.6%.  We will repeat restaging scans in October.    -9/14/2023 [facility] Oncology clinic follow-up: Naveen is tolerating his prostate cancer treatment with Zytiga, prednisone and relugolix.  PSA remains immeasurably low at <0.014 on 8/17/2023.  Currently he is having more GI issues with nausea and intermittent vomiting that occurs often without warning.  He had an EGD and colonoscopy with Dr. Gomez on 5/3/2023, EGD showed superficial erosions in the stomach with gastritis.  He does take omeprazole 40 mg twice daily.  He saw his PCP yesterday for these symptoms and has been referred to gastroenterologist at [facility] and he is awaiting to hear about that appointment.  Hopefully he can get in fairly quickly.  I told him that he should let us know if it is going to be a while before he can be seen and I will get him back to Dr. Gomez for consideration of  repeat EGD.  We will get his CBC, CMP and PSA today.  His previous noted anemia had normalized, he currently is not taking oral iron as he ran out.  I will wait and see what his labs show today and likely hold off for now depending on his lab results until his GI issues can be resolved.  We will repeat his restaging CT chest, abdomen and pelvis along with bone scan in the next few weeks and I have ordered those today.  He is taking Zofran as needed for nausea, he is also on meclizine for vertigo.  He is going to follow-up with his PCP in a few weeks regarding his GI symptoms.    -9/28/2023 total body bone scan stable with uptake in right acetabulum, no new areas.  CT chest, abdomen and pelvis show no evidence of disease progression.  -11/9/2023 [facility] clinic follow-up: CT reports were reviewed by Dr. Rashid last month and the patient reports he was called by Dr. Rashid as he could not make his appointment due to concerns over upper respiratory infection, CT scan showed no evidence of disease progression.  PSA has remained low, we are repeating that today along with his CBC and CMP.  He continues to have sinus drainage and congestion, I will send in 7 additional days on his doxycycline that he has been taking, he will follow-up with PCP if no improvement.  He continues to complain of bilateral hip pain, has a follow-up with Dr. Marks next week.  We will continue therapy unchanged with Zytiga, prednisone and relugolix.  Would repeat restaging bone scan and CT scans late March for a 6-month follow-up.    -1/4/2024 [facility] clinic follow-up: Overall continues to do well on current therapy with Zytiga, prednisone and Relugolix.  Currently recovering from upper respiratory infection for which he saw urgent treatment center recently and was given an antibiotic for 4 days, likely a Z-Scotty.  He is afebrile today.  Oxygen saturation on room air is 96%.  I will get a chest x-ray for further evaluation.  He  asked for an additional antibiotic however since he is afebrile I will defer at this time and wait for results of his chest x-ray.  He also is following up with his primary care provider and has an appointment on Monday.  His PSA continues to be immeasurably low at <0.014.  We will repeat labs today and again in 1 month when I see him back.  He continues to follow with palliative care for his chronic pain.  He will continue his prostate cancer treatment unchanged.  I did discuss with him the importance of smoking cessation as I feel this most assuredly plays a role in his respiratory issues.    Return to clinic in 1 month for follow-up    I spent 30 minutes caring for [place] on this date of service. This time includes time spent by me in the following activities: preparing for the visit, reviewing tests, performing a medically appropriate examination and/or evaluation, counseling and educating the patient/family/caregiver, ordering medications, tests, or procedures, and documenting information in the medical record.     Abena Velasquez, APRN    01/04/2024

## 2024-01-04 NOTE — TELEPHONE ENCOUNTER
I have reviewed patient's AGUSTIN report prior to prescribing Schedule II, III, and IV medications. Request # [id number]. Next refills for Xtampza Er 8 mg BID #60 and hydromorphone 8 mg q4h PRN were sent to the pharmacy. The patient is scheduled to follow-up in 4 months.

## 2024-01-04 NOTE — PATIENT INSTRUCTIONS
Check-out instructions:  Continue Xtampza ER 9 mg BID, hydromorphone 8 mg every 4 hours as needed for break through pain and flexeril 10 mg three times daily as needed.   Start Zyrtec daily and Flonase daily.   Continue Senna for constipation.  Scheduled to follow up in 4 months.     Medication Policy: We ask that you bring all of the medications prescribed by this clinic to every appointment. For telemedicine appointments, be prepared to give medication counts. This will assist us with managing your refill needs.      Refill Policy: You must notify us at least 3-5 business days in advance for routine refill requests. Call [phone] or send [email or web address] message to the Palliative Pool. Some prescriptions will need to be signed by the physician and will take longer to be sent to the pharmacy. Please also be aware of additional insurance prior authorization processing time required for many medications. Try to communicate with your pharmacy first to look for scripts signed in advance.     Communication: The [facility]iative [facility] days are Monday-Friday 8:30-4:30 PM. Call [phone] or send [email or web address] message to the Palliative Pool. You will not be routed to speak directly to the palliative provider during clinic hours, so that we may provide the best care and attention to our patients in the office. If you require immediate communication, please also consider contacting your primary care office or appropriate specialist office.

## 2024-01-05 ENCOUNTER — TELEPHONE (OUTPATIENT)
Dept: ONCOLOGY | Facility: CLINIC | Age: 69
End: 2024-01-05
Payer: MEDICARE

## 2024-01-05 DIAGNOSIS — E87.6 HYPOKALEMIA: Primary | ICD-10-CM

## 2024-01-05 LAB
ALBUMIN SERPL-MCNC: 3.5 G/DL (ref 3.9–4.9)
ALBUMIN/GLOB SERPL: 1.6 {RATIO} (ref 1.2–2.2)
ALP SERPL-CCNC: 76 IU/L (ref 44–121)
ALT SERPL-CCNC: 10 IU/L (ref 0–44)
AST SERPL-CCNC: 13 IU/L (ref 0–40)
BASOPHILS # BLD AUTO: 0 X10E3/UL (ref 0–0.2)
BASOPHILS NFR BLD AUTO: 0 %
BILIRUB SERPL-MCNC: 0.2 MG/DL (ref 0–1.2)
BUN SERPL-MCNC: 7 MG/DL (ref 8–27)
BUN/CREAT SERPL: 11 (ref 10–24)
CALCIUM SERPL-MCNC: 8.6 MG/DL (ref 8.6–10.2)
CHLORIDE SERPL-SCNC: 98 MMOL/L (ref 96–106)
CO2 SERPL-SCNC: 27 MMOL/L (ref 20–29)
CREAT SERPL-MCNC: 0.62 MG/DL (ref 0.76–1.27)
EGFRCR SERPLBLD CKD-EPI 2021: 104 ML/MIN/1.73
EOSINOPHIL # BLD AUTO: 0 X10E3/UL (ref 0–0.4)
EOSINOPHIL NFR BLD AUTO: 0 %
ERYTHROCYTE [DISTWIDTH] IN BLOOD BY AUTOMATED COUNT: 14.3 % (ref 11.6–15.4)
GLOBULIN SER CALC-MCNC: 2.2 G/DL (ref 1.5–4.5)
GLUCOSE SERPL-MCNC: 107 MG/DL (ref 70–99)
HCT VFR BLD AUTO: 39.9 % (ref 37.5–51)
HGB BLD-MCNC: 13.7 G/DL (ref 13–17.7)
IMM GRANULOCYTES # BLD AUTO: 0.1 X10E3/UL (ref 0–0.1)
IMM GRANULOCYTES NFR BLD AUTO: 1 %
LYMPHOCYTES # BLD AUTO: 1.4 X10E3/UL (ref 0.7–3.1)
LYMPHOCYTES NFR BLD AUTO: 11 %
MCH RBC QN AUTO: 29.4 PG (ref 26.6–33)
MCHC RBC AUTO-ENTMCNC: 34.3 G/DL (ref 31.5–35.7)
MCV RBC AUTO: 86 FL (ref 79–97)
MONOCYTES # BLD AUTO: 0.6 X10E3/UL (ref 0.1–0.9)
MONOCYTES NFR BLD AUTO: 5 %
NEUTROPHILS # BLD AUTO: 11.5 X10E3/UL (ref 1.4–7)
NEUTROPHILS NFR BLD AUTO: 83 %
PHOSPHATE SERPL-MCNC: 2.4 MG/DL (ref 2.8–4.1)
PLATELET # BLD AUTO: 187 X10E3/UL (ref 150–450)
POTASSIUM SERPL-SCNC: 3 MMOL/L (ref 3.5–5.2)
PROT SERPL-MCNC: 5.7 G/DL (ref 6–8.5)
PSA SERPL-MCNC: <0.1 NG/ML (ref 0–4)
RBC # BLD AUTO: 4.66 X10E6/UL (ref 4.14–5.8)
SODIUM SERPL-SCNC: 143 MMOL/L (ref 134–144)
WBC # BLD AUTO: 13.7 X10E3/UL (ref 3.4–10.8)

## 2024-01-05 RX ORDER — POTASSIUM CHLORIDE 750 MG/1
10 TABLET, FILM COATED, EXTENDED RELEASE ORAL 2 TIMES DAILY
Qty: 60 TABLET | Refills: 1 | Status: SHIPPED | OUTPATIENT
Start: 2024-01-05

## 2024-01-05 NOTE — TELEPHONE ENCOUNTER
Labs reviewed from yesterday, potassium low at 3.0, has been fluctuating past few months, 3.1 in September, 3.2 November, 3.5 December.  Not sure if it is his abiraterone, it can cause hypokalemia in 17% to 30% of patients.  Will start him on potassium replacement daily. Rx sent.

## 2024-01-05 NOTE — TELEPHONE ENCOUNTER
----- Message from DHARMESH Weeks sent at 1/5/2024 11:29 AM EST -----  Regarding: call pt  Please let Mr. Lugo know his potassium was low.  I have sent in rx for supplement.  Thank you.  Abena

## 2024-01-08 ENCOUNTER — SPECIALTY PHARMACY (OUTPATIENT)
Dept: ONCOLOGY | Facility: HOSPITAL | Age: 69
End: 2024-01-08
Payer: MEDICARE

## 2024-01-08 ENCOUNTER — OFFICE VISIT (OUTPATIENT)
Dept: FAMILY MEDICINE CLINIC | Facility: CLINIC | Age: 69
End: 2024-01-08
Payer: MEDICARE

## 2024-01-08 VITALS
BODY MASS INDEX: 29.48 KG/M2 | TEMPERATURE: 98 F | OXYGEN SATURATION: 100 % | HEART RATE: 78 BPM | WEIGHT: 194.5 LBS | DIASTOLIC BLOOD PRESSURE: 76 MMHG | SYSTOLIC BLOOD PRESSURE: 120 MMHG | HEIGHT: 68 IN

## 2024-01-08 DIAGNOSIS — J06.9 ACUTE URI: Primary | ICD-10-CM

## 2024-01-08 PROCEDURE — 3078F DIAST BP <80 MM HG: CPT | Performed by: PHYSICIAN ASSISTANT

## 2024-01-08 PROCEDURE — 1159F MED LIST DOCD IN RCRD: CPT | Performed by: PHYSICIAN ASSISTANT

## 2024-01-08 PROCEDURE — 99213 OFFICE O/P EST LOW 20 MIN: CPT | Performed by: PHYSICIAN ASSISTANT

## 2024-01-08 PROCEDURE — 1160F RVW MEDS BY RX/DR IN RCRD: CPT | Performed by: PHYSICIAN ASSISTANT

## 2024-01-08 PROCEDURE — 3074F SYST BP LT 130 MM HG: CPT | Performed by: PHYSICIAN ASSISTANT

## 2024-01-08 PROCEDURE — 96372 THER/PROPH/DIAG INJ SC/IM: CPT | Performed by: PHYSICIAN ASSISTANT

## 2024-01-08 RX ORDER — TRIAMCINOLONE ACETONIDE 40 MG/ML
80 INJECTION, SUSPENSION INTRA-ARTICULAR; INTRAMUSCULAR ONCE
Status: COMPLETED | OUTPATIENT
Start: 2024-01-08 | End: 2024-01-08

## 2024-01-08 RX ORDER — DEXTROMETHORPHAN HYDROBROMIDE AND PROMETHAZINE HYDROCHLORIDE 15; 6.25 MG/5ML; MG/5ML
5 SYRUP ORAL 4 TIMES DAILY PRN
Qty: 180 ML | Refills: 0 | Status: SHIPPED | OUTPATIENT
Start: 2024-01-08

## 2024-01-08 RX ORDER — DOXYCYCLINE 100 MG/1
100 CAPSULE ORAL 2 TIMES DAILY
Qty: 20 CAPSULE | Refills: 0 | Status: SHIPPED | OUTPATIENT
Start: 2024-01-08

## 2024-01-08 RX ADMIN — TRIAMCINOLONE ACETONIDE 80 MG: 40 INJECTION, SUSPENSION INTRA-ARTICULAR; INTRAMUSCULAR at 10:52

## 2024-01-08 NOTE — PROGRESS NOTES
"Chief Complaint  URI (Patient is here for a follow up and states he is still coughing a lot.)    Subjective          Naveen SHAN Lugo presents to [facility]    History of Present Illness patient is here today for upper respiratory symptoms specifically cough and congestion.  He has been dealing with this since before Christmas and has had at least 1 round of Augmentin early on in the course with no improvement.  His oncologist did do a chest x-ray which did not show any pneumonia and they simply recommended over-the-counter medicines and follow-up with us today.    Objective   Vital Signs:   /76 (BP Location: Right arm, Patient Position: Sitting, Cuff Size: Adult)   Pulse 78   Temp 98 °F (36.7 °C)   Ht 172.7 cm (68\")   Wt 88.2 kg (194 lb 8 oz)   SpO2 100%   BMI 29.57 kg/m²     Body mass index is 29.57 kg/m².    Review of Systems      Past History:  Medical History: has a past medical history of Bone cancer, History of radiation therapy (08/16/2021), Nephrolithiasis, Opioid use disorder, mild, on maintenance therapy (HCC), and Prostate cancer.   Surgical History: has a past surgical history that includes Cholecystectomy; Coronary stent placement (206 & 2011); Thoracic discectomy (1994); and Hernia repair (1986).   Family History: family history includes Diabetes in his brother; Heart disease in his brother; Ovarian cancer in his sister; Prostate cancer in his brother.   Social History: reports that he has been smoking cigarettes. He has a 50.00 pack-year smoking history. He has never used smokeless tobacco. He reports that he does not currently use alcohol. He reports that he does not use drugs.      Current Outpatient Medications:     abiraterone acetate (ZYTIGA) 500 MG tablet, Take 2 tablets by mouth Daily., Disp: 60 tablet, Rfl: 11    albuterol sulfate  (90 Base) MCG/ACT inhaler, Inhale 2 puffs Every 4 (Four) Hours As Needed for Wheezing., Disp: 8 g, Rfl: 5    aspirin " (aspirin) 81 MG EC tablet, Take 1 tablet by mouth Daily., Disp: , Rfl:     atorvastatin (LIPITOR) 5 MG half tablet, Take  by mouth., Disp: , Rfl:     buPROPion XL (Wellbutrin XL) 150 MG 24 hr tablet, Take 1 tablet by mouth Daily. For smoking cessation, Disp: 30 tablet, Rfl: 5    cetirizine (zyrTEC) 10 MG tablet, Take 1 tablet by mouth Daily. For allergies, Disp: 90 tablet, Rfl: 3    cyclobenzaprine (FLEXERIL) 10 MG tablet, Take 1 tablet by mouth 3 (Three) Times a Day As Needed for Muscle Spasms., Disp: 90 tablet, Rfl: 0    ferrous sulfate 325 (65 FE) MG tablet, 325 mg p.o. twice daily every other day, take with vitamin C, Disp: 30 tablet, Rfl: 11    fluticasone (FLONASE) 50 MCG/ACT nasal spray, 2 sprays into the nostril(s) as directed by provider Daily., Disp: 9.9 mL, Rfl: 3    Fluticasone-Umeclidin-Vilant (Trelegy Ellipta) 200-62.5-25 MCG/ACT aerosol powder , INHALE 1 PUFF BY MOUTH DAILY, Disp: 180 each, Rfl: 3    [START ON 1/11/2024] HYDROmorphone (Dilaudid) 8 MG tablet, Take 1 tablet by mouth Every 4 (Four) Hours As Needed for Moderate Pain or Severe Pain., Disp: 180 tablet, Rfl: 0    Lidocaine-Prilocaine &Lido HCl 2.5-2.5 & 3.88 % kit, Apply  topically to the appropriate area as directed., Disp: , Rfl:     meclizine (ANTIVERT) 25 MG tablet, Take 1 tablet by mouth 3 (Three) Times a Day As Needed for Dizziness., Disp: 45 tablet, Rfl: 3    methocarbamol (ROBAXIN) 500 MG tablet, Take 2 tablets by mouth Every 8 (Eight) Hours., Disp: , Rfl:     naloxone (NARCAN) 4 MG/0.1ML nasal spray, 1 spray into the nostril(s) as directed by provider As Needed (unresponsiveness)., Disp: , Rfl:     nitroglycerin (NITROSTAT) 0.4 MG SL tablet, 1 under the tongue as needed for angina, may repeat q5mins for up three doses, Disp: 25 tablet, Rfl: 11    omeprazole (priLOSEC) 40 MG capsule, Take 1 tab po BID for stomach, Disp: 180 capsule, Rfl: 1    ondansetron (Zofran) 4 MG tablet, Take 1 tablet by mouth Every 8 (Eight) Hours As Needed for  Nausea or Vomiting., Disp: 30 tablet, Rfl: 1    oxyCODONE ER 9 MG capsule extended-release 12 hour , Take 1 capsule by mouth Every 12 (Twelve) Hours for 30 days., Disp: 60 each, Rfl: 0    potassium chloride 10 MEQ CR tablet, Take 1 tablet by mouth 2 (Two) Times a Day., Disp: 60 tablet, Rfl: 1    predniSONE (DELTASONE) 20 MG tablet, , Disp: , Rfl:     promethazine-dextromethorphan (PROMETHAZINE-DM) 6.25-15 MG/5ML syrup, Take 5 mL by mouth 4 (Four) Times a Day As Needed for Cough., Disp: 180 mL, Rfl: 0    ranolazine (Ranexa) 500 MG 12 hr tablet, Take 1 tablet by mouth 2 (Two) Times a Day., Disp: 180 tablet, Rfl: 3    ranolazine (RANEXA) 500 MG 12 hr tablet, Take  by mouth., Disp: , Rfl:     relugolix (ORGOVYX) 120 MG tablet tablet, Take 1 tablet by mouth Daily., Disp: 30 tablet, Rfl: 11    senna (SENOKOT) 8.6 MG tablet, Take 1 tablet by mouth Daily., Disp: 90 tablet, Rfl: 3    tamsulosin (FLOMAX) 0.4 MG capsule 24 hr capsule, Take  by mouth., Disp: , Rfl:     venlafaxine 37.5 MG tablet sustained-release 24 hour 24 hr tablet, Take  by mouth., Disp: , Rfl:     venlafaxine XR (EFFEXOR-XR) 37.5 MG 24 hr capsule, Take 1 capsule by mouth Daily., Disp: 30 capsule, Rfl: 0    doxycycline (MONODOX) 100 MG capsule, Take 1 capsule by mouth 2 (Two) Times a Day., Disp: 20 capsule, Rfl: 0    Current Facility-Administered Medications:     triamcinolone acetonide (KENALOG-40) injection 80 mg, 80 mg, Intramuscular, Once, Cielo Dodd PA-C    Facility-Administered Medications Ordered in Other Visits:     heparin injection 500 Units, 500 Units, Intravenous, PRN, Ishmael Rashid MD, 500 Units at 06/02/21 0900    Allergies: Cymbalta [duloxetine hcl], Gabapentin, and Lyrica [pregabalin]    Physical Exam  Constitutional:       Appearance: Normal appearance.   HENT:      Right Ear: Tympanic membrane, ear canal and external ear normal.      Left Ear: Tympanic membrane, ear canal and external ear normal.      Nose:      Right Sinus: Maxillary  sinus tenderness present.      Left Sinus: Maxillary sinus tenderness present.   Cardiovascular:      Rate and Rhythm: Normal rate and regular rhythm.      Heart sounds: Normal heart sounds. No murmur heard.  Pulmonary:      Effort: No respiratory distress.      Breath sounds: Wheezing and rhonchi present.   Neurological:      Mental Status: He is alert and oriented to person, place, and time.   Psychiatric:         Behavior: Behavior normal.          Result Review :                   Assessment and Plan    Diagnoses and all orders for this visit:    1. Acute URI (Primary)  Assessment & Plan:  Rx for doxycycline and Promethazine DM and I did give him a steroid shot today for his wheezing and I would like for him to follow-up in 7 to 10 days for reevaluation.    Orders:  -     triamcinolone acetonide (KENALOG-40) injection 80 mg    Other orders  -     doxycycline (MONODOX) 100 MG capsule; Take 1 capsule by mouth 2 (Two) Times a Day.  Dispense: 20 capsule; Refill: 0  -     promethazine-dextromethorphan (PROMETHAZINE-DM) 6.25-15 MG/5ML syrup; Take 5 mL by mouth 4 (Four) Times a Day As Needed for Cough.  Dispense: 180 mL; Refill: 0        Follow Up   Return in about 10 days (around 1/18/2024) for Recheck.  Patient was given instructions and counseling regarding his condition or for health maintenance advice. Please see specific information pulled into the AVS if appropriate.     Cielo Dodd PA-C

## 2024-01-08 NOTE — ASSESSMENT & PLAN NOTE
Rx for doxycycline and Promethazine DM and I did give him a steroid shot today for his wheezing and I would like for him to follow-up in 7 to 10 days for reevaluation.

## 2024-01-23 ENCOUNTER — SPECIALTY PHARMACY (OUTPATIENT)
Dept: ONCOLOGY | Facility: HOSPITAL | Age: 69
End: 2024-01-23
Payer: MEDICARE

## 2024-01-23 NOTE — PROGRESS NOTES
Specialty Pharmacy Refill Coordination Note     Naveen is a 68 y.o. male contacted today regarding refills of  abiraterone 100mg PO QD and relugolix 120mg PO QD of  specialty medication(s).      Specialty medication(s) and dose(s) confirmed: yes    Refill Questions      Flowsheet Row Most Recent Value   Changes to allergies? No   Changes to medications? No   New conditions or infections since last clinic visit No   Unplanned office visit, urgent care, ED, or hospital admission in the last 4 weeks  No   How does patient/caregiver feel medication is working? Good   Financial problems or insurance changes  No   Since the previous refill, were any specialty medication doses or scheduled injections missed or delayed?  No   Does this patient require a clinical escalation to a pharmacist? No            Delivery Questions      Flowsheet Row Most Recent Value   Delivery method FedEx   Delivery address verified with patient/caregiver? Yes   Delivery address Home   Number of medications in delivery 2   Medication(s) being filled and delivered Abiraterone Acetate, Relugolix   Doses left of specialty medications 8 days left   Copay verified? Yes   Copay amount no copay   Copay form of payment No copayment ($0)              Medication Adherence          Adherence tools used: watch   Other adherence tool: Clock - takes at same time each day, 7:45am   Support network for adherence: family member                Follow-up: 30 day(s)     Sushma Muro, Pharmacy Technician  Specialty Pharmacy Technician

## 2024-02-01 ENCOUNTER — TELEPHONE (OUTPATIENT)
Dept: PALLIATIVE CARE | Facility: CLINIC | Age: 69
End: 2024-02-01
Payer: MEDICARE

## 2024-02-01 DIAGNOSIS — G89.3 CANCER RELATED PAIN: ICD-10-CM

## 2024-02-01 NOTE — TELEPHONE ENCOUNTER
PATIENT CALLED AND REQUESTED XTAMPZA ER BE SENT TO [facility]. PATIENT NOTED THAT THIS WAS TO BE REFILLED AND SENT WITH HIS OTHER SCRIPTS BUT IT WASN'T AVAILABLE FOR PICKUP WHEN HE WENT. PLEASE ADVISE.

## 2024-02-01 NOTE — TELEPHONE ENCOUNTER
AGUSTIN #: [id number]    Medication requested: oxyCODONE ER 9 MG capsule extended-release 12 hour     Last fill date: 1/4/24    Last appointment: 1/4/24    Next appointment: 5/6/24

## 2024-02-01 NOTE — TELEPHONE ENCOUNTER
Contacted patient to confirm the last dispense date on the Xtampza er 9mg. Explained I showed on the AGUSTIN it was picked up on 1/4/24 and patient stated he meant to request a refill for the month of February. Informed him I will work on this request and it will be ready for  on the due date. Pt understood

## 2024-02-02 ENCOUNTER — OFFICE VISIT (OUTPATIENT)
Dept: FAMILY MEDICINE CLINIC | Facility: CLINIC | Age: 69
End: 2024-02-02
Payer: MEDICARE

## 2024-02-02 VITALS
WEIGHT: 194.8 LBS | DIASTOLIC BLOOD PRESSURE: 70 MMHG | OXYGEN SATURATION: 100 % | HEIGHT: 68 IN | HEART RATE: 72 BPM | BODY MASS INDEX: 29.52 KG/M2 | SYSTOLIC BLOOD PRESSURE: 130 MMHG

## 2024-02-02 DIAGNOSIS — R05.2 SUBACUTE COUGH: Primary | ICD-10-CM

## 2024-02-02 DIAGNOSIS — B37.0 ORAL CANDIDIASIS: ICD-10-CM

## 2024-02-02 LAB
EXPIRATION DATE: NORMAL
FLUAV AG UPPER RESP QL IA.RAPID: NOT DETECTED
FLUBV AG UPPER RESP QL IA.RAPID: NOT DETECTED
INTERNAL CONTROL: NORMAL
Lab: NORMAL
SARS-COV-2 AG UPPER RESP QL IA.RAPID: NOT DETECTED

## 2024-02-02 PROCEDURE — 99213 OFFICE O/P EST LOW 20 MIN: CPT | Performed by: PHYSICIAN ASSISTANT

## 2024-02-02 PROCEDURE — 3078F DIAST BP <80 MM HG: CPT | Performed by: PHYSICIAN ASSISTANT

## 2024-02-02 PROCEDURE — 87428 SARSCOV & INF VIR A&B AG IA: CPT | Performed by: PHYSICIAN ASSISTANT

## 2024-02-02 PROCEDURE — 3075F SYST BP GE 130 - 139MM HG: CPT | Performed by: PHYSICIAN ASSISTANT

## 2024-02-02 RX ORDER — LEVOCETIRIZINE DIHYDROCHLORIDE 5 MG/1
5 TABLET, FILM COATED ORAL EVERY EVENING
Qty: 30 TABLET | Refills: 2 | Status: SHIPPED | OUTPATIENT
Start: 2024-02-02

## 2024-02-02 RX ORDER — FLUCONAZOLE 150 MG/1
150 TABLET ORAL DAILY
Qty: 2 TABLET | Refills: 0 | Status: CANCELLED | OUTPATIENT
Start: 2024-02-02 | End: 2024-02-04

## 2024-02-02 NOTE — PROGRESS NOTES
Office Note     Name: Naveen Lugo    : 1955     MRN: [id number]     Chief Complaint  Cough and URI    Subjective     History of Present Illness:  Naveen Lugo is a 68 y.o. male who presents today for eval of cough and congestion x 6 weeks. He had a CXR a few weeks ago, and it did not show any pneumonia.     Review of Systems:   Review of Systems   Constitutional:  Positive for chills. Negative for fatigue and fever.   HENT:  Positive for ear pain, postnasal drip, rhinorrhea, sneezing and voice change. Negative for congestion, sinus pressure and sore throat.         See HPI   Respiratory:  Positive for cough (productive), shortness of breath and wheezing.    Cardiovascular:  Negative for chest pain and palpitations.   Gastrointestinal:  Positive for nausea. Negative for diarrhea and vomiting.   Musculoskeletal:  Positive for myalgias.   Neurological:  Negative for headache.       Past Medical History:   Past Medical History:   Diagnosis Date    Bone cancer     History of radiation therapy 2021    L4 through sacrum, left acetabulum, right pelvis    Nephrolithiasis     Opioid use disorder, mild, on maintenance therapy (HCC)     Prostate cancer        Past Surgical History:   Past Surgical History:   Procedure Laterality Date    CHOLECYSTECTOMY      CORONARY STENT PLACEMENT   &     HERNIA REPAIR  1986    THORACIC DISCECTOMY  1994       Family History:   Family History   Problem Relation Age of Onset    Ovarian cancer Sister     Diabetes Brother     Heart disease Brother     Prostate cancer Brother        Social History:   Social History     Socioeconomic History    Marital status:    Tobacco Use    Smoking status: Every Day     Packs/day: 1.00     Years: 50.00     Additional pack years: 0.00     Total pack years: 50.00     Types: Cigarettes    Smokeless tobacco: Never   Vaping Use    Vaping Use: Never used   Substance and Sexual Activity    Alcohol use: Not Currently    Drug use: Never     Sexual activity: Defer       Immunizations:   Immunization History   Administered Date(s) Administered    COVID-19 (MODERNA) 1st,2nd,3rd Dose Monovalent 03/10/2021, 04/07/2021    Tdap 03/27/2023    Tetanus Immune Globulin 05/19/2012        Medications:     Current Outpatient Medications:     abiraterone acetate (ZYTIGA) 500 MG tablet, Take 2 tablets by mouth Daily., Disp: 60 tablet, Rfl: 11    albuterol sulfate  (90 Base) MCG/ACT inhaler, Inhale 2 puffs Every 4 (Four) Hours As Needed for Wheezing., Disp: 8 g, Rfl: 5    aspirin (aspirin) 81 MG EC tablet, Take 1 tablet by mouth Daily., Disp: , Rfl:     atorvastatin (LIPITOR) 5 MG half tablet, Take  by mouth., Disp: , Rfl:     buPROPion XL (Wellbutrin XL) 150 MG 24 hr tablet, Take 1 tablet by mouth Daily. For smoking cessation, Disp: 30 tablet, Rfl: 5    cyclobenzaprine (FLEXERIL) 10 MG tablet, Take 1 tablet by mouth 3 (Three) Times a Day As Needed for Muscle Spasms., Disp: 90 tablet, Rfl: 0    ferrous sulfate 325 (65 FE) MG tablet, 325 mg p.o. twice daily every other day, take with vitamin C, Disp: 30 tablet, Rfl: 11    fluticasone (FLONASE) 50 MCG/ACT nasal spray, 2 sprays into the nostril(s) as directed by provider Daily., Disp: 9.9 mL, Rfl: 3    Fluticasone-Umeclidin-Vilant (Trelegy Ellipta) 200-62.5-25 MCG/ACT aerosol powder , INHALE 1 PUFF BY MOUTH DAILY, Disp: 180 each, Rfl: 3    HYDROmorphone (DILAUDID) 4 MG tablet, Take 2 tablets by mouth Every 4 (Four) Hours As Needed for Moderate Pain., Disp: 260 tablet, Rfl: 0    HYDROmorphone (Dilaudid) 8 MG tablet, Take 1 tablet by mouth Every 4 (Four) Hours As Needed for Moderate Pain or Severe Pain for up to 30 days., Disp: 180 tablet, Rfl: 0    levocetirizine (XYZAL) 5 MG tablet, Take 1 tablet by mouth Every Evening., Disp: 30 tablet, Rfl: 2    Lidocaine-Prilocaine &Lido HCl 2.5-2.5 & 3.88 % kit, Apply  topically to the appropriate area as directed., Disp: , Rfl:     meclizine (ANTIVERT) 25 MG tablet, Take 1  tablet by mouth 3 (Three) Times a Day As Needed for Dizziness., Disp: 45 tablet, Rfl: 3    methocarbamol (ROBAXIN) 500 MG tablet, Take 2 tablets by mouth Every 8 (Eight) Hours., Disp: , Rfl:     naloxone (NARCAN) 4 MG/0.1ML nasal spray, 1 spray into the nostril(s) as directed by provider As Needed (unresponsiveness)., Disp: , Rfl:     nitroglycerin (NITROSTAT) 0.4 MG SL tablet, 1 under the tongue as needed for angina, may repeat q5mins for up three doses, Disp: 25 tablet, Rfl: 11    nystatin (MYCOSTATIN) 100,000 unit/mL suspension, Swish and swallow 5 mL 4 (Four) Times a Day., Disp: 473 mL, Rfl: 0    omeprazole (priLOSEC) 40 MG capsule, Take 1 tab po BID for stomach, Disp: 180 capsule, Rfl: 1    ondansetron (Zofran) 4 MG tablet, Take 1 tablet by mouth Every 8 (Eight) Hours As Needed for Nausea or Vomiting., Disp: 30 tablet, Rfl: 1    oxyCODONE ER 9 MG capsule extended-release 12 hour , Take 1 capsule by mouth Every 12 (Twelve) Hours for 30 days., Disp: 60 each, Rfl: 0    potassium chloride 10 MEQ CR tablet, Take 1 tablet by mouth 2 (Two) Times a Day., Disp: 60 tablet, Rfl: 1    predniSONE (DELTASONE) 5 MG tablet, Take 1 tablet by mouth Daily., Disp: 30 tablet, Rfl: 11    promethazine-dextromethorphan (PROMETHAZINE-DM) 6.25-15 MG/5ML syrup, Take 5 mL by mouth 4 (Four) Times a Day As Needed for Cough., Disp: 180 mL, Rfl: 0    ranolazine (Ranexa) 500 MG 12 hr tablet, Take 1 tablet by mouth 2 (Two) Times a Day., Disp: 180 tablet, Rfl: 3    ranolazine (RANEXA) 500 MG 12 hr tablet, Take  by mouth., Disp: , Rfl:     relugolix (ORGOVYX) 120 MG tablet tablet, Take 1 tablet by mouth Daily., Disp: 30 tablet, Rfl: 11    senna (SENOKOT) 8.6 MG tablet, Take 1 tablet by mouth Daily., Disp: 90 tablet, Rfl: 3    tamsulosin (FLOMAX) 0.4 MG capsule 24 hr capsule, Take  by mouth., Disp: , Rfl:     venlafaxine 37.5 MG tablet sustained-release 24 hour 24 hr tablet, Take  by mouth., Disp: , Rfl:     venlafaxine XR (EFFEXOR-XR) 37.5 MG 24  "hr capsule, Take 1 capsule by mouth Daily., Disp: 30 capsule, Rfl: 0    Current Facility-Administered Medications:     triamcinolone acetonide (KENALOG-40) injection 80 mg, 80 mg, Intramuscular, Once, Cielo Dodd PA-C    Facility-Administered Medications Ordered in Other Visits:     heparin injection 500 Units, 500 Units, Intravenous, PRN, Ishmael Rashid MD, 500 Units at 06/02/21 0900    Allergies:   Allergies   Allergen Reactions    Cymbalta [Duloxetine Hcl] Dizziness    Gabapentin Nausea Only    Lyrica [Pregabalin] Nausea And Vomiting and Dizziness       Objective     Vital Signs  /70 (BP Location: Right arm, Patient Position: Sitting, Cuff Size: Large Adult)   Pulse 72   Ht 172.7 cm (68\")   Wt 88.4 kg (194 lb 12.8 oz)   SpO2 100%   BMI 29.62 kg/m²   Estimated body mass index is 29.62 kg/m² as calculated from the following:    Height as of this encounter: 172.7 cm (68\").    Weight as of this encounter: 88.4 kg (194 lb 12.8 oz).    Physical Exam  Vitals and nursing note reviewed.   Constitutional:       General: He is not in acute distress.     Appearance: Normal appearance. He is not ill-appearing.   HENT:      Head: Normocephalic and atraumatic.      Mouth/Throat:      Pharynx: No oropharyngeal exudate or posterior oropharyngeal erythema.     Cardiovascular:      Rate and Rhythm: Normal rate and regular rhythm.      Pulses: Normal pulses.      Heart sounds: Normal heart sounds.   Pulmonary:      Effort: Pulmonary effort is normal. No respiratory distress.      Breath sounds: Wheezing and rhonchi present.   Musculoskeletal:      Right lower leg: No edema.      Left lower leg: No edema.   Skin:     General: Skin is warm and dry.   Neurological:      General: No focal deficit present.      Mental Status: He is alert and oriented to person, place, and time.      Motor: No weakness.      Coordination: Coordination normal.   Psychiatric:         Mood and Affect: Mood normal.         Behavior: Behavior " normal.          Results:  No results found for this or any previous visit (from the past 24 hour(s)).     Assessment and Plan     Assessment/Plan:  Diagnoses and all orders for this visit:    1. Subacute cough (Primary)  Assessment & Plan:  Cough is subacute.  I believe the oral candidiasis is a big factor, but I would also encourage him to resume allergy medication.  We will do a chest x-ray due to the length of his cough.  He is in agreement with this plan.    Orders:  -     POCT SARS-CoV-2 Antigen JON + Flu  -     XR Chest 2 View; Future  -     levocetirizine (XYZAL) 5 MG tablet; Take 1 tablet by mouth Every Evening.  Dispense: 30 tablet; Refill: 2    2. Oral candidiasis  Assessment & Plan:  His oral candidiasis is acute.  We discussed the possible causes, but I believe this is the source of his cough and throat irritation.    Orders:  -     nystatin (MYCOSTATIN) 100,000 unit/mL suspension; Swish and swallow 5 mL 4 (Four) Times a Day.  Dispense: 473 mL; Refill: 0        Follow Up  Return for scheduled f/u or sooner if sxs worsen or persist.    Arleen Webber PA-C  [facility]

## 2024-02-03 ENCOUNTER — TELEPHONE (OUTPATIENT)
Dept: FAMILY MEDICINE CLINIC | Facility: CLINIC | Age: 69
End: 2024-02-03
Payer: MEDICARE

## 2024-02-03 NOTE — TELEPHONE ENCOUNTER
Patient seen mayte alvarado on 2/2 and was supposed to receive a script for 2 pills to take for the yeast infection to go along with the mouthwash, please advise and return call.

## 2024-02-07 DIAGNOSIS — G89.3 CANCER RELATED PAIN: ICD-10-CM

## 2024-02-07 RX ORDER — HYDROMORPHONE HYDROCHLORIDE 8 MG/1
8 TABLET ORAL EVERY 4 HOURS PRN
Qty: 180 TABLET | Refills: 0 | Status: SHIPPED | OUTPATIENT
Start: 2024-02-10 | End: 2024-03-11

## 2024-02-07 NOTE — TELEPHONE ENCOUNTER
PATIENT CALLED TO REQUEST REFILL ON DILAUDID AND STATED THAT HE CAN PICK THIS UP FRIDAY AT [facility]. PLEASE ADVISE.

## 2024-02-07 NOTE — TELEPHONE ENCOUNTER
AGUSTIN #: [id number]    Medication requested: HYDROmorphone (Dilaudid) 8 MG tablet     Last fill date: 1/11/24    Last appointment: 1/4/24    Next appointment: 5/6/24

## 2024-02-12 ENCOUNTER — TELEPHONE (OUTPATIENT)
Dept: PALLIATIVE CARE | Facility: CLINIC | Age: 69
End: 2024-02-12
Payer: MEDICARE

## 2024-02-12 NOTE — TELEPHONE ENCOUNTER
Patient states states that he is doing fine, however he was wondering what other medication did you want him to take. He only received one

## 2024-02-14 DIAGNOSIS — G89.3 CANCER RELATED PAIN: Primary | ICD-10-CM

## 2024-02-15 RX ORDER — HYDROMORPHONE HYDROCHLORIDE 4 MG/1
8 TABLET ORAL EVERY 4 HOURS PRN
Qty: 260 TABLET | Refills: 0 | Status: SHIPPED | OUTPATIENT
Start: 2024-02-15

## 2024-02-19 PROBLEM — R05.2 SUBACUTE COUGH: Status: ACTIVE | Noted: 2024-02-19

## 2024-02-19 PROBLEM — B37.0 ORAL CANDIDIASIS: Status: ACTIVE | Noted: 2024-02-19

## 2024-02-20 ENCOUNTER — SPECIALTY PHARMACY (OUTPATIENT)
Dept: ONCOLOGY | Facility: HOSPITAL | Age: 69
End: 2024-02-20
Payer: MEDICARE

## 2024-02-20 NOTE — PROGRESS NOTES
Specialty Pharmacy Refill Coordination Note     Naveen is a 68 y.o. male contacted today regarding refills of Zytiga 1,00 mg PO daily and Orgovyx 120 mg PO daily specialty medication(s).    Mailing to home address on file- set for tomorrow and patient is aware.    Specialty medication(s) and dose(s) confirmed: yes    Refill Questions      Flowsheet Row Most Recent Value   Changes to allergies? No   Changes to medications? No   New conditions or infections since last clinic visit No   Unplanned office visit, urgent care, ED, or hospital admission in the last 4 weeks  No   How does patient/caregiver feel medication is working? Very good   Financial problems or insurance changes  No   Since the previous refill, were any specialty medication doses or scheduled injections missed or delayed?  No   Does this patient require a clinical escalation to a pharmacist? No            Delivery Questions      Flowsheet Row Most Recent Value   Delivery method FedEx   Delivery address verified with patient/caregiver? Yes   Delivery address Home   Number of medications in delivery 2   Medication(s) being filled and delivered Abiraterone Acetate, Relugolix   Doses left of specialty medications around 4-5 days left per pt   Copay verified? Yes   Copay amount $0 copay for EACH med   Copay form of payment No copayment ($0)              Medication Adherence          Adherence tools used: watch   Other adherence tool: Clock - takes at same time each day, 7:45am   Support network for adherence: family member                Follow-up: 30 day(s)     Nanci Dong, Pharmacy Technician  Specialty Pharmacy Technician

## 2024-02-20 NOTE — ASSESSMENT & PLAN NOTE
Cough is subacute.  I believe the oral candidiasis is a big factor, but I would also encourage him to resume allergy medication.  We will do a chest x-ray due to the length of his cough.  He is in agreement with this plan.

## 2024-02-20 NOTE — ASSESSMENT & PLAN NOTE
His oral candidiasis is acute.  We discussed the possible causes, but I believe this is the source of his cough and throat irritation.

## 2024-03-06 ENCOUNTER — TELEPHONE (OUTPATIENT)
Dept: PALLIATIVE CARE | Facility: CLINIC | Age: 69
End: 2024-03-06
Payer: MEDICARE

## 2024-03-06 DIAGNOSIS — G89.3 CANCER RELATED PAIN: Primary | ICD-10-CM

## 2024-03-06 NOTE — TELEPHONE ENCOUNTER
PATIENT CALLED AND REQUESTED A REFILL ON THE XTAMPZA 9 MG BE SENT TO [facility]. PLEASE ADVISE.

## 2024-03-06 NOTE — TELEPHONE ENCOUNTER
AGUSTIN #: [id number]      Medication requested: oxycodone ER 9MG capsule extended-release 12 hr    Last fill date: 2/3/24    Last appointment: 1/4/24    Next appointment: 5/6/24

## 2024-03-11 ENCOUNTER — TELEPHONE (OUTPATIENT)
Dept: PALLIATIVE CARE | Facility: CLINIC | Age: 69
End: 2024-03-11
Payer: MEDICARE

## 2024-03-11 DIAGNOSIS — G89.3 CANCER RELATED PAIN: ICD-10-CM

## 2024-03-11 RX ORDER — HYDROMORPHONE HYDROCHLORIDE 4 MG/1
8 TABLET ORAL EVERY 4 HOURS PRN
Qty: 360 TABLET | Refills: 0 | Status: SHIPPED | OUTPATIENT
Start: 2024-03-11 | End: 2024-03-14 | Stop reason: SDUPTHER

## 2024-03-11 NOTE — TELEPHONE ENCOUNTER
PATIENT CALLED TO REQUEST AN EMERGENCY REFILL ON PAIN MEDS UNTIL MARCH 17TH AS HE HAD THEM IN HIS TOOLBELT WITH HIM ON FRIDAY AND WHEN HE WENT TO CHECK IT TODAY THEY WERE MISSING. PATIENT STATED THAT HE KEEPS HIS MEDS IN HIS TOOLBELT IN CASE HE NEEDED THEM. PLEASE ADVISE.

## 2024-03-11 NOTE — TELEPHONE ENCOUNTER
AGUSTIN #: [id number]    Medication requested: HYDROmorphone (DILAUDID) 4 MG tablet      Last fill date: 2/10/24    Last appointment: 1/4/24    Next appointment: 5/6/24

## 2024-03-14 ENCOUNTER — TELEPHONE (OUTPATIENT)
Dept: PALLIATIVE CARE | Facility: CLINIC | Age: 69
End: 2024-03-14
Payer: MEDICARE

## 2024-03-14 ENCOUNTER — TELEPHONE (OUTPATIENT)
Dept: ONCOLOGY | Facility: CLINIC | Age: 69
End: 2024-03-14
Payer: MEDICARE

## 2024-03-14 DIAGNOSIS — G89.3 CANCER RELATED PAIN: ICD-10-CM

## 2024-03-14 DIAGNOSIS — B37.0 ORAL CANDIDIASIS: ICD-10-CM

## 2024-03-14 RX ORDER — HYDROMORPHONE HYDROCHLORIDE 4 MG/1
8 TABLET ORAL EVERY 4 HOURS PRN
Qty: 264 TABLET | Refills: 0 | Status: SHIPPED | OUTPATIENT
Start: 2024-03-14 | End: 2024-04-07

## 2024-03-14 RX ORDER — HYDROMORPHONE HYDROCHLORIDE 4 MG/1
8 TABLET ORAL EVERY 4 HOURS PRN
Qty: 360 TABLET | Refills: 0 | OUTPATIENT
Start: 2024-03-14 | End: 2024-04-13

## 2024-03-14 NOTE — TELEPHONE ENCOUNTER
PHARMACY DISPENSED 8 DAY SUPPLY ON 3/11 BECAUSE THEY DID NOT HAVE FULL SCRIPT.      THEY NEED NEW PRESCRIPTION IN ORDER TO DISPENSE MORE

## 2024-03-14 NOTE — TELEPHONE ENCOUNTER
Caller: Parish Shiloh F    Relationship: Self    Best call back number: [phone]    Requested Prescriptions:   Requested Prescriptions     Pending Prescriptions Disp Refills    nystatin (MYCOSTATIN) 100,000 unit/mL suspension 473 mL 0     Sig: Swish and swallow 5 mL 4 (Four) Times a Day.        Pharmacy where request should be sent: [facility] [id number] [address] - [phone]  - [phone]     Last office visit with prescribing clinician: 2/2/2024   Last telemedicine visit with prescribing clinician: 10/10/2023   Next office visit with prescribing clinician: 3/22/2024     Additional details provided by patient: REQUESTING REFILL; INFECTION  IN MOUTH STILL PRESENT    Does the patient have less than a 3 day supply:  [x] Yes  [] No    Would you like a call back once the refill request has been completed: [x] Yes [] No    If the office needs to give you a call back, can they leave a voicemail: [x] Yes [] No    María Glaser Rep   03/14/24 09:48 EDT

## 2024-03-14 NOTE — TELEPHONE ENCOUNTER
Patient was shorted on 3/11/24, only received #96. Confirmed with pharmacy dispense qty and date. They do have what patient needs in stock #[id number].

## 2024-03-14 NOTE — TELEPHONE ENCOUNTER
Called patient to check in since he no showed in February and has not rescheduled. Patient states he is doing well he thought he had an appt with us scheduled after his [place] [facility] [place]. Informed him that he didn't but he is agreeable to be contacted and scheduled. In basket sent to Nya, referral coordinator to get patient scheduled.

## 2024-03-14 NOTE — TELEPHONE ENCOUNTER
Confirmed with the pharmacy on the dispense qty and date. Pharmacy is requiring a new script for the remaining amount, they do have the amount in stock.

## 2024-03-15 ENCOUNTER — SPECIALTY PHARMACY (OUTPATIENT)
Dept: ONCOLOGY | Facility: HOSPITAL | Age: 69
End: 2024-03-15
Payer: MEDICARE

## 2024-03-15 NOTE — PROGRESS NOTES
Specialty Pharmacy Refill Coordination Note     Naveen is a 68 y.o. male contacted today regarding refills of Zytiga 1,000 mg PO daily and Orgovyx 120 mg PO daily specialty medication(s).    Patient aware medications will ship Monday for Tuesday delivery, 3/19. He also requested his prednisone be sent as well.    Specialty medication(s) and dose(s) confirmed: yes    Refill Questions      Flowsheet Row Most Recent Value   Changes to allergies? No   Changes to medications? No   New conditions or infections since last clinic visit No   Unplanned office visit, urgent care, ED, or hospital admission in the last 4 weeks  No   How does patient/caregiver feel medication is working? Very good   Financial problems or insurance changes  No   Since the previous refill, were any specialty medication doses or scheduled injections missed or delayed?  No   Does this patient require a clinical escalation to a pharmacist? No            Delivery Questions      Flowsheet Row Most Recent Value   Delivery method FedEx   Delivery address verified with patient/caregiver? Yes   Delivery address Home   Number of medications in delivery 3   Medication(s) being filled and delivered Abiraterone Acetate, Relugolix, Prednisone   Doses left of specialty medications around 7 days or so per pt   Copay verified? Yes   Copay amount $0 copay verified   Copay form of payment No copayment ($0)              Medication Adherence    Adherence tools used: watch   Other adherence tool: Clock - takes at same time each day, 7:45am   Support network for adherence: family member          Follow-up: 30 day(s)     Nanci Dong, Pharmacy Technician  Specialty Pharmacy Technician

## 2024-03-22 ENCOUNTER — OFFICE VISIT (OUTPATIENT)
Dept: FAMILY MEDICINE CLINIC | Facility: CLINIC | Age: 69
End: 2024-03-22
Payer: MEDICARE

## 2024-03-22 VITALS
WEIGHT: 196.2 LBS | BODY MASS INDEX: 29.73 KG/M2 | HEART RATE: 56 BPM | SYSTOLIC BLOOD PRESSURE: 120 MMHG | OXYGEN SATURATION: 93 % | DIASTOLIC BLOOD PRESSURE: 90 MMHG | HEIGHT: 68 IN

## 2024-03-22 DIAGNOSIS — J30.2 SEASONAL ALLERGIES: ICD-10-CM

## 2024-03-22 DIAGNOSIS — J06.9 ACUTE URI: ICD-10-CM

## 2024-03-22 DIAGNOSIS — K21.9 CHRONIC GERD: ICD-10-CM

## 2024-03-22 DIAGNOSIS — T40.2X5A THERAPEUTIC OPIOID INDUCED CONSTIPATION: ICD-10-CM

## 2024-03-22 DIAGNOSIS — K59.03 THERAPEUTIC OPIOID INDUCED CONSTIPATION: ICD-10-CM

## 2024-03-22 PROCEDURE — 3080F DIAST BP >= 90 MM HG: CPT | Performed by: PHYSICIAN ASSISTANT

## 2024-03-22 PROCEDURE — 3074F SYST BP LT 130 MM HG: CPT | Performed by: PHYSICIAN ASSISTANT

## 2024-03-22 PROCEDURE — 99213 OFFICE O/P EST LOW 20 MIN: CPT | Performed by: PHYSICIAN ASSISTANT

## 2024-03-22 RX ORDER — LEVOCETIRIZINE DIHYDROCHLORIDE 5 MG/1
5 TABLET, FILM COATED ORAL EVERY EVENING
Qty: 90 TABLET | Refills: 1 | Status: SHIPPED | OUTPATIENT
Start: 2024-03-22

## 2024-03-22 RX ORDER — SULFAMETHOXAZOLE AND TRIMETHOPRIM 800; 160 MG/1; MG/1
1 TABLET ORAL 2 TIMES DAILY
Qty: 20 TABLET | Refills: 0 | Status: SHIPPED | OUTPATIENT
Start: 2024-03-22

## 2024-03-22 RX ORDER — OMEPRAZOLE 40 MG/1
CAPSULE, DELAYED RELEASE ORAL
Qty: 180 CAPSULE | Refills: 1 | Status: SHIPPED | OUTPATIENT
Start: 2024-03-22

## 2024-03-22 RX ORDER — TAMSULOSIN HYDROCHLORIDE 0.4 MG/1
1 CAPSULE ORAL DAILY
Qty: 90 CAPSULE | Refills: 1 | Status: SHIPPED | OUTPATIENT
Start: 2024-03-22

## 2024-03-22 RX ORDER — AZELASTINE 1 MG/ML
2 SPRAY, METERED NASAL 2 TIMES DAILY
Qty: 30 ML | Refills: 5 | Status: SHIPPED | OUTPATIENT
Start: 2024-03-22

## 2024-03-22 RX ORDER — SENNOSIDES A AND B 8.6 MG/1
1 TABLET, FILM COATED ORAL DAILY
Qty: 90 TABLET | Refills: 1 | Status: SHIPPED | OUTPATIENT
Start: 2024-03-22

## 2024-03-22 NOTE — PROGRESS NOTES
Office Note     Name: Naveen Lugo    : 1955     MRN: [id number]     Chief Complaint  follow up on oral yeast.    Subjective     History of Present Illness:  Naveen Lugo is a 68 y.o. male who presents today for eval of congestion and sore throat.  He states that the oral yeast infection has resolved, but he still has some postnasal drip and sinus congestion.  He states that he has not been able to use his Flonase due to the recall.    Review of Systems:   Review of Systems   Constitutional:  Negative for chills, fatigue and fever.   HENT:  Positive for ear pain, postnasal drip, rhinorrhea, sneezing and voice change. Negative for congestion, sinus pressure and sore throat.         See HPI   Respiratory:  Positive for cough (productive) and wheezing. Negative for shortness of breath.    Cardiovascular:  Negative for chest pain and palpitations.   Gastrointestinal:  Negative for diarrhea, nausea and vomiting.   Musculoskeletal:  Negative for myalgias.   Neurological:  Negative for headache.       Past Medical History:   Past Medical History:   Diagnosis Date    Bone cancer     History of radiation therapy 2021    L4 through sacrum, left acetabulum, right pelvis    Nephrolithiasis     Opioid use disorder, mild, on maintenance therapy (HCC)     Prostate cancer        Past Surgical History:   Past Surgical History:   Procedure Laterality Date    CHOLECYSTECTOMY      CORONARY STENT PLACEMENT   &     HERNIA REPAIR  1986    THORACIC DISCECTOMY         Family History:   Family History   Problem Relation Age of Onset    Ovarian cancer Sister     Diabetes Brother     Heart disease Brother     Prostate cancer Brother        Social History:   Social History     Socioeconomic History    Marital status:    Tobacco Use    Smoking status: Every Day     Current packs/day: 1.00     Average packs/day: 1 pack/day for 50.0 years (50.0 ttl pk-yrs)     Types: Cigarettes    Smokeless tobacco: Never   Vaping  Use    Vaping status: Never Used   Substance and Sexual Activity    Alcohol use: Not Currently    Drug use: Never    Sexual activity: Defer       Immunizations:   Immunization History   Administered Date(s) Administered    COVID-19 (MODERNA) 1st,2nd,3rd Dose Monovalent 03/10/2021, 04/07/2021    COVID-19 F23 (PFIZER) 12YRS+ (COMIRNATY) 12/14/2023    Tdap 03/27/2023    Tetanus Immune Globulin 05/19/2012        Medications:     Current Outpatient Medications:     abiraterone acetate (ZYTIGA) 500 MG tablet, Take 2 tablets by mouth Daily., Disp: 60 tablet, Rfl: 11    albuterol sulfate  (90 Base) MCG/ACT inhaler, Inhale 2 puffs Every 4 (Four) Hours As Needed for Wheezing., Disp: 8 g, Rfl: 5    aspirin (aspirin) 81 MG EC tablet, Take 1 tablet by mouth Daily., Disp: , Rfl:     atorvastatin (LIPITOR) 5 MG half tablet, Take  by mouth., Disp: , Rfl:     buPROPion XL (Wellbutrin XL) 150 MG 24 hr tablet, Take 1 tablet by mouth Daily. For smoking cessation, Disp: 30 tablet, Rfl: 5    cyclobenzaprine (FLEXERIL) 10 MG tablet, Take 1 tablet by mouth 3 (Three) Times a Day As Needed for Muscle Spasms., Disp: 90 tablet, Rfl: 0    ferrous sulfate 325 (65 FE) MG tablet, 325 mg p.o. twice daily every other day, take with vitamin C, Disp: 30 tablet, Rfl: 11    Fluticasone-Umeclidin-Vilant (Trelegy Ellipta) 200-62.5-25 MCG/ACT aerosol powder , INHALE 1 PUFF BY MOUTH DAILY, Disp: 180 each, Rfl: 3    levocetirizine (XYZAL) 5 MG tablet, Take 1 tablet by mouth Every Evening., Disp: 90 tablet, Rfl: 1    Lidocaine-Prilocaine &Lido HCl 2.5-2.5 & 3.88 % kit, Apply  topically to the appropriate area as directed., Disp: , Rfl:     meclizine (ANTIVERT) 25 MG tablet, Take 1 tablet by mouth 3 (Three) Times a Day As Needed for Dizziness., Disp: 45 tablet, Rfl: 3    methocarbamol (ROBAXIN) 500 MG tablet, Take 2 tablets by mouth Every 8 (Eight) Hours., Disp: , Rfl:     naloxone (NARCAN) 4 MG/0.1ML nasal spray, 1 spray into the nostril(s) as directed  by provider As Needed (unresponsiveness)., Disp: , Rfl:     nitroglycerin (NITROSTAT) 0.4 MG SL tablet, 1 under the tongue as needed for angina, may repeat q5mins for up three doses, Disp: 25 tablet, Rfl: 11    nystatin (MYCOSTATIN) 100,000 unit/mL suspension, Swish and swallow 5 mL 4 (Four) Times a Day., Disp: 473 mL, Rfl: 0    omeprazole (priLOSEC) 40 MG capsule, Take 1 tab po BID for stomach, Disp: 180 capsule, Rfl: 1    ondansetron (Zofran) 4 MG tablet, Take 1 tablet by mouth Every 8 (Eight) Hours As Needed for Nausea or Vomiting., Disp: 30 tablet, Rfl: 1    potassium chloride 10 MEQ CR tablet, Take 1 tablet by mouth 2 (Two) Times a Day., Disp: 60 tablet, Rfl: 1    predniSONE (DELTASONE) 5 MG tablet, Take 1 tablet by mouth Daily., Disp: 30 tablet, Rfl: 11    promethazine-dextromethorphan (PROMETHAZINE-DM) 6.25-15 MG/5ML syrup, Take 5 mL by mouth 4 (Four) Times a Day As Needed for Cough., Disp: 180 mL, Rfl: 0    ranolazine (Ranexa) 500 MG 12 hr tablet, Take 1 tablet by mouth 2 (Two) Times a Day., Disp: 180 tablet, Rfl: 3    ranolazine (RANEXA) 500 MG 12 hr tablet, Take  by mouth., Disp: , Rfl:     senna (SENOKOT) 8.6 MG tablet, Take 1 tablet by mouth Daily., Disp: 90 tablet, Rfl: 1    tamsulosin (FLOMAX) 0.4 MG capsule 24 hr capsule, Take 1 capsule by mouth Daily., Disp: 90 capsule, Rfl: 1    venlafaxine 37.5 MG tablet sustained-release 24 hour 24 hr tablet, Take  by mouth., Disp: , Rfl:     venlafaxine XR (EFFEXOR-XR) 37.5 MG 24 hr capsule, Take 1 capsule by mouth Daily., Disp: 30 capsule, Rfl: 0    azelastine (ASTELIN) 0.1 % nasal spray, 2 sprays into the nostril(s) as directed by provider 2 (Two) Times a Day. Use in each nostril as directed, Disp: 30 mL, Rfl: 5    HYDROmorphone (DILAUDID) 4 MG tablet, Take 2 tablets by mouth Every 4 (Four) Hours As Needed for Moderate Pain for up to 15 days., Disp: 180 tablet, Rfl: 0    [START ON 4/22/2024] HYDROmorphone (DILAUDID) 4 MG tablet, Take 2 tablets by mouth Every 4  "(Four) Hours As Needed for Moderate Pain for up to 15 days., Disp: 180 tablet, Rfl: 0    [START ON 4/26/2024] nicotine (NICODERM CQ) 14 MG/24HR patch, Place 1 patch on the skin as directed by provider Daily., Disp: 30 patch, Rfl: 0    nicotine (Nicoderm CQ) 21 MG/24HR patch, Place 1 patch on the skin as directed by provider Daily., Disp: 30 patch, Rfl: 0    [START ON 5/17/2024] nicotine (NICODERM CQ) 7 MG/24HR patch, Place 1 patch on the skin as directed by provider Daily., Disp: 30 patch, Rfl: 1    oxyCODONE ER 9 MG capsule extended-release 12 hour , Take 1 capsule by mouth Every 12 (Twelve) Hours for 30 days., Disp: 60 each, Rfl: 0    relugolix (ORGOVYX) 120 MG tablet tablet, Take 1 tablet by mouth Daily., Disp: 30 tablet, Rfl: 11    sulfamethoxazole-trimethoprim (Bactrim DS) 800-160 MG per tablet, Take 1 tablet by mouth 2 (Two) Times a Day., Disp: 20 tablet, Rfl: 0    Current Facility-Administered Medications:     triamcinolone acetonide (KENALOG-40) injection 80 mg, 80 mg, Intramuscular, Once, Cielo Dodd PA-C    Facility-Administered Medications Ordered in Other Visits:     heparin injection 500 Units, 500 Units, Intravenous, PRN, Ishmael Rashid MD, 500 Units at 06/02/21 0900    Allergies:   Allergies   Allergen Reactions    Cymbalta [Duloxetine Hcl] Dizziness    Gabapentin Nausea Only    Lyrica [Pregabalin] Nausea And Vomiting and Dizziness       Objective     Vital Signs  /90 (BP Location: Right arm, Patient Position: Sitting, Cuff Size: Adult)   Pulse 56   Ht 172.7 cm (68\")   Wt 89 kg (196 lb 3.2 oz)   SpO2 93%   BMI 29.83 kg/m²   Estimated body mass index is 29.83 kg/m² as calculated from the following:    Height as of this encounter: 172.7 cm (68\").    Weight as of this encounter: 89 kg (196 lb 3.2 oz).        Physical Exam  Vitals and nursing note reviewed.   Constitutional:       General: He is not in acute distress.     Appearance: Normal appearance. He is not ill-appearing.   HENT:      " Head: Normocephalic and atraumatic.      Mouth/Throat:      Pharynx: No oropharyngeal exudate or posterior oropharyngeal erythema.   Cardiovascular:      Rate and Rhythm: Normal rate and regular rhythm.      Pulses: Normal pulses.      Heart sounds: Normal heart sounds.   Pulmonary:      Effort: Pulmonary effort is normal. No respiratory distress.      Breath sounds: Wheezing (mild) present. No rhonchi.   Musculoskeletal:      Right lower leg: No edema.      Left lower leg: No edema.   Skin:     General: Skin is warm and dry.   Neurological:      General: No focal deficit present.      Mental Status: He is alert and oriented to person, place, and time.      Motor: No weakness.      Coordination: Coordination normal.   Psychiatric:         Mood and Affect: Mood normal.         Behavior: Behavior normal.           Assessment and Plan     Assessment/Plan:  Diagnoses and all orders for this visit:    1. Acute URI  Assessment & Plan:  His symptoms did not fully resolve with previous medication.  We will try Bactrim and a different nasal spray.  I will also refill his levocetirizine, which she had not been taking.  I encouraged compliance on allergy medications during the season.    Orders:  -     sulfamethoxazole-trimethoprim (Bactrim DS) 800-160 MG per tablet; Take 1 tablet by mouth 2 (Two) Times a Day.  Dispense: 20 tablet; Refill: 0    2. Seasonal allergies  Assessment & Plan:  Allergies are chronic and not currently controlled.  I encouraged him to resume his allergy medications as previously prescribed.  Will change his fluticasone due to recall.    Orders:  -     levocetirizine (XYZAL) 5 MG tablet; Take 1 tablet by mouth Every Evening.  Dispense: 90 tablet; Refill: 1  -     azelastine (ASTELIN) 0.1 % nasal spray; 2 sprays into the nostril(s) as directed by provider 2 (Two) Times a Day. Use in each nostril as directed  Dispense: 30 mL; Refill: 5    3. Chronic GERD  Assessment & Plan:  GERD is chronic and stable on  current therapy.  He will continue medication as previously prescribed.    Orders:  -     omeprazole (priLOSEC) 40 MG capsule; Take 1 tab po BID for stomach  Dispense: 180 capsule; Refill: 1    4. Therapeutic opioid induced constipation  -     senna (SENOKOT) 8.6 MG tablet; Take 1 tablet by mouth Daily.  Dispense: 90 tablet; Refill: 1    Other orders  -     tamsulosin (FLOMAX) 0.4 MG capsule 24 hr capsule; Take 1 capsule by mouth Daily.  Dispense: 90 capsule; Refill: 1        Follow Up  Return for Medicare Wellness.    Arleen Webber PA-C  [facility]

## 2024-03-29 ENCOUNTER — TELEPHONE (OUTPATIENT)
Dept: PALLIATIVE CARE | Facility: CLINIC | Age: 69
End: 2024-03-29
Payer: MEDICARE

## 2024-03-29 DIAGNOSIS — G89.3 CANCER RELATED PAIN: ICD-10-CM

## 2024-03-29 NOTE — TELEPHONE ENCOUNTER
PATIENT CALLED AND STATED THAT HE RECEIVED A NOTICE FROM HIS INSURANCE THAT THEY WOULD NO LONGER COVER THE HYDROMORPHINE 4 MG. PLEASE ADVISE.

## 2024-03-29 NOTE — TELEPHONE ENCOUNTER
Hydromorphone 8 mg on back order, last script for patient was hydromorphone 4mg for 30days. Pharmacy had to split script on two (2) dates, 3/11/24 # 96 and 3/16/24 #264. New script will be due on 4/7/24 since the one filled on 3/16 was for 22 days only. Recommendation is starting 15 day script in the amount of #180 for the patient each month.

## 2024-03-29 NOTE — TELEPHONE ENCOUNTER
AGUSTIN #:  [id number]      Medication requested:  HYDROmorphone (DILAUDID) 4 MG tablet     Last fill date: 3/16/24 & 3/11/24     Last appointment: 1/4/24    Next appointment: 5/6/24

## 2024-03-29 NOTE — TELEPHONE ENCOUNTER
Reached out to patient's pharmacy. Unable to check without a current refill request.  Per the note from the pharmacy on 3/16/24, patient was picking up medication too soon, therefore the insurance will not cover the request. Contact patient and he states he received a letter from his insurance and it states they will not cover due to over-limit on the medication. Informed patient I will look into his concern for a resolution. Patient expressed understanding.

## 2024-04-02 ENCOUNTER — INFUSION (OUTPATIENT)
Dept: ONCOLOGY | Facility: HOSPITAL | Age: 69
End: 2024-04-02
Payer: MEDICARE

## 2024-04-02 ENCOUNTER — OFFICE VISIT (OUTPATIENT)
Dept: ONCOLOGY | Facility: CLINIC | Age: 69
End: 2024-04-02
Payer: MEDICARE

## 2024-04-02 VITALS
WEIGHT: 194 LBS | DIASTOLIC BLOOD PRESSURE: 93 MMHG | RESPIRATION RATE: 18 BRPM | BODY MASS INDEX: 29.4 KG/M2 | HEART RATE: 86 BPM | TEMPERATURE: 98.2 F | SYSTOLIC BLOOD PRESSURE: 162 MMHG | HEIGHT: 68 IN | OXYGEN SATURATION: 95 %

## 2024-04-02 DIAGNOSIS — C61 PROSTATE CANCER METASTATIC TO BONE: ICD-10-CM

## 2024-04-02 DIAGNOSIS — E27.40 ADRENAL INSUFFICIENCY: ICD-10-CM

## 2024-04-02 DIAGNOSIS — N32.0 BLADDER OUTLET OBSTRUCTION: ICD-10-CM

## 2024-04-02 DIAGNOSIS — C79.51 PROSTATE CANCER METASTATIC TO BONE: Primary | ICD-10-CM

## 2024-04-02 DIAGNOSIS — C79.51 PROSTATE CANCER METASTATIC TO BONE: ICD-10-CM

## 2024-04-02 DIAGNOSIS — C61 PROSTATE CANCER METASTATIC TO BONE: Primary | ICD-10-CM

## 2024-04-02 DIAGNOSIS — Z45.2 ENCOUNTER FOR CARE RELATED TO PORT-A-CATH: Primary | ICD-10-CM

## 2024-04-02 PROCEDURE — 36591 DRAW BLOOD OFF VENOUS DEVICE: CPT

## 2024-04-02 PROCEDURE — 1125F AMNT PAIN NOTED PAIN PRSNT: CPT | Performed by: INTERNAL MEDICINE

## 2024-04-02 PROCEDURE — 1160F RVW MEDS BY RX/DR IN RCRD: CPT | Performed by: INTERNAL MEDICINE

## 2024-04-02 PROCEDURE — 1159F MED LIST DOCD IN RCRD: CPT | Performed by: INTERNAL MEDICINE

## 2024-04-02 PROCEDURE — 3077F SYST BP >= 140 MM HG: CPT | Performed by: INTERNAL MEDICINE

## 2024-04-02 PROCEDURE — 3080F DIAST BP >= 90 MM HG: CPT | Performed by: INTERNAL MEDICINE

## 2024-04-02 PROCEDURE — 25010000002 HEPARIN LOCK FLUSH PER 10 UNITS: Performed by: INTERNAL MEDICINE

## 2024-04-02 PROCEDURE — 99214 OFFICE O/P EST MOD 30 MIN: CPT | Performed by: INTERNAL MEDICINE

## 2024-04-02 RX ORDER — HEPARIN SODIUM (PORCINE) LOCK FLUSH IV SOLN 100 UNIT/ML 100 UNIT/ML
500 SOLUTION INTRAVENOUS AS NEEDED
Status: DISCONTINUED | OUTPATIENT
Start: 2024-04-02 | End: 2024-04-02 | Stop reason: HOSPADM

## 2024-04-02 RX ORDER — HYDROMORPHONE HYDROCHLORIDE 4 MG/1
8 TABLET ORAL EVERY 4 HOURS PRN
Qty: 180 TABLET | Refills: 0 | Status: SHIPPED | OUTPATIENT
Start: 2024-04-22 | End: 2024-05-07

## 2024-04-02 RX ORDER — HYDROMORPHONE HYDROCHLORIDE 4 MG/1
8 TABLET ORAL EVERY 4 HOURS PRN
Qty: 180 TABLET | Refills: 0 | Status: SHIPPED | OUTPATIENT
Start: 2024-04-07 | End: 2024-04-22

## 2024-04-02 RX ORDER — HEPARIN SODIUM (PORCINE) LOCK FLUSH IV SOLN 100 UNIT/ML 100 UNIT/ML
500 SOLUTION INTRAVENOUS AS NEEDED
OUTPATIENT
Start: 2024-04-02

## 2024-04-02 RX ADMIN — HEPARIN 500 UNITS: 100 SYRINGE at 08:10

## 2024-04-02 NOTE — LETTER
April 2, 2024       No Recipients    Patient: Naveen Lugo   YOB: 1955   Date of Visit: 4/2/2024     Dear Cielo Dodd PA-C:       Thank you for referring Naveen Lugo to me for evaluation. Below are the relevant portions of my assessment and plan of care.    If you have questions, please do not hesitate to call me. I look forward to following Naveen along with you.         Sincerely,        Ishmael Rashid MD        CC:   No Recipients    Ishmael Rashid MD  04/02/24 0739  Sign when Signing Visit  CHIEF COMPLAINT: No new somatic complaints    Problem List:  Oncology/Hematology History Overview Note   1.  Stage IVb grade group 4 metastatic prostate cancer with sclerotic bone lesions on CT and bone scan and history of prostatic hypertrophy.  Good response to Taxotere ending 2/18/2021 Relugolix, followed by Zytiga prednisone with L4/sacrum, left acetabulum, and right pelvis external beam radiation August 2021.  Hypophosphatemia on Zometa.  Zometa held as of October 2021.  2.  Kidney stone  3.  Polyps  4.  Probable drug-induced hypophosphatemia from Zometa, drug stopped after 10/5/2021 dose  5.  Cancer related pain seeing palliative care  6.  Fatigue  7.  Covid 2/2022  8.  Iron deficiency anemia  -5/3/2023 EGD and colonoscopy with Dr. Alexander Gomez: Superficial erosions in the stomach with gastritis.  Small polyps removed, dilated internal hemorrhoids.  Pathology pending.  Recommend repeat colonoscopy in 5 years.    -9/30/2020 office note from Dr. Ronen Reynaga indicates patient with PSA 10.8.  Underwent TRUS prostate biopsy 9/15/2020.  Adenocarcinoma the prostate Sarika 4+4 = 8 in 1 out of 12 cores and 4+3 = 7 and 10 out of 12 cores and 3+4 = 7 in 1 out of 12 cores.  Perineural invasion.  Extraprostatic extension into the fat seen on 2 biopsies of the right lateral mid and right apex.  9/24/2020 CT chest and whole-body bone scan showed T12, L1, left acetabular, right medial acetabular metastases with no  lung metastases.  He started androgen deprivation therapy with Casodex with plans for Lupron to start in 1 to 2 weeks for clinical T3 N0 M1 metastatic prostate cancer.  Review of official report from [facility] 9/24/2020 bone scan mentions T12, L1, roof of left acetabulum and medial right acetabular osteoblastic metastases.  CT chest with contrast that same day showed mild splenomegaly 14.2 cm with stable periaortic nodes compared to CT December 2015 with no lung metastases.  Sclerotic abnormality within the T12 vertebral body, L1 vertebral body extending into the pedicle unchanged.  8/28/2020 CT abdomen pelvis report from [facility] reviewed and mentions normal spleen size.  Punctate nonobstructing kidney stone, no paulino enlargement, diffuse bladder wall thickening with mild perivesicular fat stranding, 2.1 cm sclerotic left iliac bone above the left acetabulum new compared to 2015 as well as 1.5 cm faintly sclerotic focus in the right posterior acetabulum stable compared to prior CT 2015 as well as a 1.6 cm T12 and inferior vertebral body sclerotic lesion and a 1.9 cm left posterior lateral L1 vertebral body abnormality extending to the pedicle.  -10/13/2020 initial [facility] medical oncology consultation: With 1 Sarika score 8, 4+4 lesion that would make him a grade group 4 with stage IVb disease given radiographic evidence of sclerotic bone metastasis on bone scan and CT.  Needs genetic testing given metastatic prostate cancer and this is also important as, were he to have mismatch repair mutation then we would have options for PDL 1 inhibitors and were he BRCA mutated would have options for PARP inhibitors in the future.  Systemic therapy for castration naïve prostate cancer or N1 disease should be with apalutamide or Abiraterone or enzalutamide all of which are category 1.  He does not have any visceral metastases.  According to his imaging he has T12, L1, left acetabular, right acetabular, and  left iliac bony involvement.  That means he would have 4 or more bone metastases with at least one metastasis (i.e. the acetabular lesions bilaterally) beyond the pelvis/vertebral, for which this would be considered high-volume disease for which 6 cycles of docetaxel 75 mg/m² x 6 cycles followed by Zytiga and prednisone would be reasonable along with Zometa every 6 weeks for bone stabilization.  He will do chemo preparation visit with my nurse practitioner prior to start chemotherapy with Taxotere and Zometa in 2 weeks and he is already received Casodex in preparation for Lupron shot he received on 10/12/2020 with Dr. Reynaga.  We will get him to Dr. Alexander Gomez who has done his polypectomies in the past for port placement and we will get genetic counseling started.  Following the 6 courses of Taxotere per the [place]arted trial criteria, I would then give him an indefinite Zytiga and prednisone and Zometa until progression or toxicity dictates.    -12/16/2019 COVID-19 antigen test negative    -12/29/2020 PSA 0.275 with absolute neutrophil count 700 and creatinine 0.76 with normal liver enzymes and electrolytes.  Normal magnesium and phosphorus and urine drug screen positive for buprenorphine and opiates follow-up with palliative care.    -1/4/2021 CT chest abdomen pelvis with contrast and whole-body bone scan shows improving less metabolically active bone metastases.  Stable sclerotic T12, L1, and L2 vertebral bodies and bilateral pelvic bones on bone windows with stable subcentimeter para-aortic lymph nodes and no definite progression in the chest abdomen or pelvis.    -1/5/2021 [facility] medical oncology follow-up visit: I reviewed the images and reports thereof of the above CT and bone scan which shows good response to Taxotere x3 courses with a PSA down to 0.275 from a baseline of 10.  Get another 3 courses of Taxotere, continue his Xgeva, and then following that we will switch to Zytiga and prednisone.  He is  working with palliative care on his bone pain but on his current dose of fentanyl patch he says his pain is still not adequately controlled he will contact them.  Dexamethasone prescription was refilled.  We will see my nurse practitioner back in 3 weeks for course #5 of 6 total courses and then we will reimage with CT chest abdomen pelvis and bone scan before going to the Zytiga prednisone.    -3/4/2021 CT chest abdomen pelvis with contrast is negative save for stable sclerotic bone lesions and the bone scan shows near resolution of prior bony abnormalities associated with the known sclerotic lesions in the thoracolumbar spine and bony pelvis.    2/17/2021 PSA down to 0.1 from 1.37 October 2020.  3/9/2021 PSA 0.1    -5/26/2021 CT chest abdomen pelvis with contrast shows stable to slightly underlying increase low-density left para-aortic node.  Bone lesions stable.  Whole-body bone scan stable to decrease activity of known thoracolumbar and bony pelvic involvement.  PSA less than 0.1  , AST 74, bilirubin 0.5.       -6/1/2021 [facility] medical oncology follow-up visit: I reviewed the above data with him and images thereof.  Bones are stable.  No significant adenopathy.  PSA immeasurably low.     upper limit of normal 69 and AST 74 upper limit of normal 46.  Hence, neither is more than double the upper limit of normal with no ascites and no encephalopathy and normal albumin and bilirubin, despite the elevation of liver enzymes, he has child Abrams score A.  Package insert for Abiraterone says that for ALT and/or AST greater than 5 times upper limit of normal or total bilirubin greater than 3 times the upper limit of normal to withhold treatment until liver function tests returned to baseline or ALT and AST less than or equal to 2.5 times the upper limit of normal and total bilirubin less than or equal to 1.5 times the upper limit of normal and then reinitiate at 750 mg daily dose of Zytiga.  For recurrent  hepatotoxicity on 750 mg daily Zytiga, withhold treatment until liver functions returned to baseline or ALT and AST less than 3-2.5 times upper limit of normal and total bilirubin less than or equal to 1.5 times the upper limit of normal then reinitiate at 500 mg daily Zytiga dose.  If has recurrent hepatotoxicity on 500 mg dose, then discontinue treatment.  If has ALT greater than 3 times the upper limit of normal along with total bilirubin greater than 2 times the upper limit of normal in the absence of other contributing causes or biliary obstruction, permanently discontinue Zytiga.  Based on these recommendations, I would continue current doses but we will watch with serial labs monthly and repeat his imaging again in 3 months but clinically he is doing wonderfully with excellent response to Taxotere and now on Zytiga prednisone plus Zometa along with Relugolix.    -6/23/2020 for [facility] clinic follow-up: Having persistent and worsening pain above his left hip.  I will get an MRI of the left hip for further evaluation.  Otherwise we will continue therapy unchanged with Zytiga, prednisone and Zometa along with Relugolix.    -7/28/2021 [facility] clinic follow-up: Patient with multiple somatic complaints including episodes of confusion, dizziness, decreased memory, agitation.  He reports ongoing episodes with difficulty swallowing.  Also most concerning for episodes of angina and chest pain for which he basically refused to seek emergency medical attention.  He has a history of coronary artery disease and stent placement.  He has not followed up with cardiology for many years.  I will get an MRI of the brain in light of his confusion, dizziness and agitation.  I will get him to gastroenterology for EGD in light of his dysphagia.  I have also put in a referral for cardiology.  I reemphasized to the patient, his wife who is with him today and his son who was on the telephone during our visit the  "importance of seeking out prompt medical attention when he is having chest pain or neurological concerns so that he can be evaluated.  The patient states that he basically refuses to go to the emergency room and if his family calls an ambulance he would not agree to go to the hospital.  For the time being, I have asked him to hold his Zytiga and prednisone until we can sort this out as Zytiga does have some cardiovascular risk.  There is likely no treatment for prostate cancer that does not carry some form of cardiovascular risk.  His PSA remains low at 0.014 yesterday.  He will continue relugolix for now.    Of note, some of these symptoms could also be due to his hypophosphatemia, phosphate level from yesterday was 1.5, I have sent in a prescription for K-Phos 3 times a day before meals for 10 days.  We will hold further Zometa until this is corrected.  I have also put in an order to check his vitamin D level.  He takes calcium and vitamin D daily.  Some of his symptoms also could be due to drug withdrawal as there was some confusion and difficulty getting his last prescription for methadone therefore he was without methadone for several days, he now has his prescription and is back on his pain medication.  He has an appointment with neurosurgery tomorrow in light of his ongoing back pain, MRI of the hip recently showed multiple bony lesions largest at the L5 vertebral body and left supra-acetabular region.  If no neurosurgical intervention recommended he may benefit from spot radiation as this is where a lot of his pain is coming from.  His wife had questions today regarding his staging and extent of disease.  We reviewed previous scans.  I did clarify with her as she used the words \"cancer free\" as she thought that is what she was told after his CT scans once he completed Taxotere in February.  I explained to her that even though his scans showed he had an excellent response with no clear areas of metastasis " that did not mean he was cancer free, I explained to her that when you have metastatic cancer the treatment intent is palliative in nature and to control the disease but not to cure the disease.  She stated understanding.  We will see him back in 2 weeks for follow-up to sort through this and make further plan of care, again, I have asked him to hold Zytiga and prednisone until he sees us back, he will continue on his other medications including relugolix.    -7/30/2021 neurosurgery PA consultation.  MRI with and without contrast L5 ordered.    -8/3/2021 neurosurgery follow-up showed known sclerotic disease with new L5 abnormality without compression deformity or instability.  MRI brain negative for metastasis.    -8/4/2021 office visit Dr. Fco Quintanilla radiation oncology coordinating with Dr. North neurosurgery for palliative radiation for MRI evidence of L5 and left supra acetabular painful lesions.  States that the patient's disease which is not secreting PSA is experiencing some progression and would benefit from 20 Gy in five fractions and other lesion 30 Gy in ten fractions. Patient offered to go to [place] for radiation but desired treatment in [place].    -8/10/2021 [facility] follow-up visit: No chest pains at this junction.  Reviewed MRI brain and other MRIs reviewed by Dr. North and [place].  Due for EGD on 19th.  We will repeat his phosphorus along with his other labs continue Relugolix, Zytiga, prednisone, and he will continue radiation palliatively to the painful bony lesions with Dr. Quintanilla/Can.  He will see my nurse practitioner back in a few weeks to see how he is tolerating this and to follow-up on the phosphorus off of Zometa and she will order repeat CT chest abdomen pelvis with contrast and total body bone scan the end of September.I will see him back after that.    -9/8/2021 Zometa resumed.  PSA <0.10    -10/5/2021 [facility] follow-up visit: followed-up with  radiation oncology 9/21/2021.  Status post symptomatic L5 radiation 5 fractions 20 [place] completed 8/16/2021 with improvement in right hip pain.  9/2/2021 PSA less than 0.1.  9/29/2021 total body bone scan shows increased uptake left iliac bone slightly superior group of the left acetabulum with no additional foci of suspicious abnormality is unlikely treatment related with no new sites of active osseous metastasis.  CT chest abdomen pelvis with contrast shows stable borderline enlarged left periaortic nodes and dating sclerotic bone lesions.Continue Zytiga, prednisone, Relugolix, Zometa, repeat CT chest abdomen pelvis with contrast and total body bone scan the end of the year.  We will follow PSA serially.    -11/17/2021 [facility] follow-up:  Mr. Lugo overall is doing well.  He is tolerating therapy with Zytiga, prednisone and Relugolix along with Zometa which is given every 6 weeks.  PSA remains low at <0.1.  Has occasional low phosphorus, currently low at 1.7.  Serum calcium is normal at 8.9, normal creatinine 0.79 and normal CBC other than for platelets of 124.  I will hold Zometa for 1 month, I did send in a prescription for phosphorus replacement today.  He takes calcium and vitamin D.  I will see him back in 1 month for follow-up.  We will repeat restaging scans after that visit.    -12/15/2021 [facility] follow-up: Mr. Lugo continues to do well on therapy with Zytiga, prednisone and Relugolix, his PSA remains low at <0.1.  He is continuing to have left lower back pain, he is working with palliative care and they have referred him also to pain management.  Pain management has talked to him about putting in a pain pump however he is leery of this, I told him that this was a safe and effective pain management technique and to certainly give consideration to their recommendations.  His phosphorus is improving however is still a little low, I will hold off on Zometa until we see him back  in 1 month, we may end up only giving it every 12 weeks.  We will repeat restaging scans with CT chest, abdomen and pelvis along with total body bone scan prior to return and I have ordered those today.      -1/5/2022 CT chest abdomen pelvis with contrast [facility] showing stable left periaortic adenopathy and unchanged sclerotic thoracic, lumbar spine and pelvis lesions with no new or progressive disease in the chest abdomen or pelvis.  Total body bone scan shows no significant change and no new suspicious foci.  Stable posterior right acetabulum and left superior acetabulum and degenerative changes in the cervical spine, shoulders, acromioclavicular joints, sternoclavicular joints, elbows, wrists, hands, thoracic spine, lumbosacral spine, sacroiliac joints, hips, knees, ankles, feet.    -1/11/2022 [facility] medical oncology hematology follow-up visit: I reviewed images and reports from his 1/5/2022 scans.  No active disease and PSA immmeasurably low on Zytiga, prednisone, Relugolix.  However, he has struggled with hypophosphatemia and is significantly fatigued with this.  Given that the bones are doing well and given that his phosphorus continues to remain consistently low at 2.2 in mid December, 1.7 mid November and 2.5 mid-September and as low as 1.5 back to July and the likelihood that this is related to his Zometa, given that his bones are stable overall, he will increase from 1200 mg up to 1600 mg of calcium and phosphate a day and we will hold his Zometa for the foreseeable future.  He will see my nurse practitioner back in a month to continue to monitor his phosphorus along with his calcium and other labs and will repeat his scans again in 3 months.  In addition, given his fatigue we will check his ACTH and cortisol though he is taking his prednisone with his Zytiga.  Though his electrolytes are normal, I will keep an eye on the cortisol and ACTH and have these labs not only drawn today but again  just prior to return to my nurse practitioner on February 10.    -2/10/2022 [facility] follow-up: Mr. Lugo overall is stable, no change in his health this past month.  I have reviewed his labs from 2/8/2022 and they are unremarkable, his phosphorus is now normal at 3.2. We are continuing to hold his Zometa, he last received Zometa on 10/5/2021.  He continues therapy with Zytiga, prednisone and Relugolix.  Dr. Rashid had ordered cortisol level and ACTH which are low, currently his ACTH is 2.8 and cortisol 3.08 however these are both done in the face of ongoing prednisone that he takes with his Zytiga.  We will get him to endocrinology for further evaluation for possible adrenal insufficiency but will not interrupt his treatment in the interim.  He will need restaging scans again in April for a 3-month follow-up.  He will continue to follow with palliative care and pain management for his cancer related pain.  His PSA remains immeasurably low at <0.1 on 2/8/22.    -2/15/2022 went to emergency room with weakness, decreased appetite, myalgias in the setting of known COVID-19 testing positive a few days prior to this visit.  Phosphorus had been low in the past but was normal in the emergency room with unremarkable CBC and CMP.  Chest x-ray unremarkable saturating well on room air mildly hypertensive with dry mucosa.  Given 500 cc crystalloid.  Covid test positive.  Mild thrombocytopenia 136,000 and otherwise unremarkable.  Discharged home.    -3/3/2022 data:  PSA less than 0.1  CMP unremarkable  Cortisol 3.96 normal  ACTH 2.8 lower limit of normal 7.2  Phosphorus normal 2.6    -3/8/2022 [facility] follow-up: Suspect big chunk of his fatigue was Covid.  Another part of the fatigue likely related to lack of testosterone.  Not hypophosphatemic off of Zometa.  ACTH running low while on prednisone not surprising but with normal cortisol and I doubt adrenal insufficient which is why he is on  prednisone to avoid such while taking Zytiga.  Overall doing fairly well and with immeasurably low PSA, I will have labs done on him early April, see my nurse practitioner early May, and she will order repeat bone scan and CTs the end of May and I will see him back in June.  We will continue to hold the Zometa unless bone lesions progress given symptomatic prior hypophosphatemia on Zometa.  He will keep his appointment April 28 with Dr. Mir Duffy just to be sure that my take on his pituitary/adrenal axis assessment is accurate.    -4/28/2022 endocrinology consult Dr. Mir Duffy.  With low ACTH and cortisol on prednisone with Zytiga, the adequacy of prednisone cannot be assessed by measuring ACTH or cortisol but is assessed by weight changes, blood pressure, blood sugar, potassium, overall sense of how the patient is doing.  Primary adrenal insufficiency with low ACTH and low cortisol would be typical for the situation as his blood sugar tends to run a little high and potassium a little low, he did not think increasing prednisone was necessary.  He put him on some potassium.  No follow-up scheduled, will see as needed.    -5/4/2022 [facility] follow-up: Mr. Lugo continues on therapy with Zytiga and prednisone along with Relugolix.  His Zometa has been on hold due to previous hypophosphatemia.  There is some concern about potential adrenal insufficiency. He saw endocrinology, Dr. Mir Duffy on 4/28/2022.  I did review his office note and he stated that the low ACTH and low serum cortisol would be typical for Mr. Lugo's situation.  He further stated that the adequacy of prednisone dose cannot be assessed by measuring ACTH or cortisol but is rather assessed by the overall just altered how the patient is feeling-weight change, blood pressure, blood sugar, potassium, overall sense of wellbeing.  His potassium had been running a little low therefore they put him on potassium 10 mill equivalents daily. Of  note, I do not see a future follow-up scheduled with endocrinology.  He is having night sweats, I am not sure if this is related.  His glucose was normal this morning at 107.  His weight is stable.  He will continue current treatment for his prostate cancer unchanged, we will continue to hold his Zometa.  Current phosphorus and magnesium are normal.  CBC and CMP from today are unremarkable, cortisol is normal.  PSA and ACTH are pending  In regards to his night sweats, he had taken clonidine previously 0.1 mg twice daily as needed, I did send in a short supply again to try and see if this helps.  He also is having indigestion despite being on omeprazole twice daily, I sent a prescription for Pepcid.  He had an EGD August 2021.  We will repeat restaging scans prior to return and I have ordered those today.     -5/24/2022 CT chest abdomen pelvis with contrast[place] negative.  Bone scan shows stable bone metastases.    - 5/25/2022 PSA less than 0.1, platelets 130,000, otherwise unremarkable CBC and CMP.  A.m. cortisol running low 1.2 with phosphorus low 2.3.    -5/31/2022 [facility]ology follow-up: On Zytiga, prednisone, Relugolix, PSA remaining immeasurably low with stable bone scan and no visceral/paulino metastases.  Phosphorus still running low.  I have discontinued his potassium chloride and started potassium phosphate 500 mg 4 times a day.  Unless PSA rising, we will plan on repeating CTs and bone scan in 6 months and will follow with my nurse practitioner in the interim and if symptoms dictate or labs, will get back to Dr. Duffy for management of potential adrenal insufficiency but presently we will just see how he does with potassium phosphate and hopeful improvement in the phosphorus level with time.  We will continue to follow with palliative care for pain management    -7/6/2022 [facility] follow-up: Mr. Lugo overall is doing well but continues to be troubled with fatigue and hot  flashes.  For the most part he states he is able to do the things he wants to do, he continues to work but does have to take more rest periods.  I had a long discussion with him and his wife after reviewing his medications with them as they wanted to see if there was anything he could stop or adjust.  I discussed with him the need to continue on treatment for his prostate cancer even though his PSA remains immeasurably low and his scans look good but that if his medication is stopped the cancer would progress and over time it still has the potential to progress on therapy but would continue it as long as possible to keep his disease under control.  I explained this is like treating someone with hypertension, you do not stop the antihypertensive just because the blood pressure normalizes.  They stated understanding.  There is no dose adjustment of Zytiga or prednisone that would minimize his symptoms, he is on the current standard recommended therapy.  Discussed with him that sometimes during times of stress we may need to increase his prednisone dose but for now would not change anything.  His phosphorus level is normal, we continue to hold his Zometa.  I did give him the option of holding his phosphorus for the next month and we will see what happens, if it drops we will start him back on it but may be at a lower dose rather than 4 times a day maybe twice a day.  For now he will continue therapy unchanged.  We will continue monitoring labs monthly.  No PSA was drawn this month but that is okay as his PSA has been running immeasurably low at <0.1, we will recheck next month with lab draw.  We will stop checking his ACTH and cortisol level every month, if he develops worsening symptoms I will repeat those.  He has not gotten a follow-up with endocrinology as of yet.    -8/3/2022 [facility] Oncology clinic follow-up:  Mr. Lugo is doing well as far as his prostate cancer goes with continued immeasurably low PSA of <0.1.   He continues on therapy with Zytiga and prednisone along with Relugolix.  His phosphorus is stable at 2.7 which was just slightly low by our reference range however was 2.71-month ago also which was normal on another labs reference range.  He has not taken his phosphorus for this past month as he thought it was making him more fatigued.  He really can tell no difference whether he was taking it or not.  I have asked him to try to go back on phosphorus twice a day rather than 4 times a day and see if this is tolerated and if we can keep his phosphorus level from dropping.  His biggest complaint today is flareup of his arthralgias and back pain.  He is following with pain management, Dr. Danny Avalos but is requesting a referral back to palliative medicine as he feels that no one is currently listening to him and they are just prescribing the same medications that are not effective according to his report.  He does have a follow-up with Dr. Avalos on 8/12/2022 but due to his current pain is not able to work until after he is seen and hopefully can get some better relief.  I have given him a work release until 15 August.  I have also put in a referral back to palliative care.  Also encouraged him to work with Dr. Avalos for help in resolving his issue.  Apparently they have recommended some type of a pump however he has been hesitant to that but likely would be helpful.  We will continue therapy unchanged.  We will continue monthly labs including phosphorus, we are not currently checking ACTH and cortisol monthly as Dr. Mir Duffy previously stated in his assessment on 4/28/2022 that the adequacy of prednisone cannot be assessed by measuring ACTH or cortisol but would be assessed by symptoms, see note from 4/28/2022.  He made no further follow-up appointments.  Fatigue is not worsening currently.  His glucose and potassium are  Normal.    -10/13/2022 [facility] follow[facility]: Mr. Lugo is doing well.  He is  tolerating treatment with Zytiga and prednisone along with Relugolix without any unusual side effects.  His PSA has remained immeasurably low at <0.1.  His phosphorus was slightly low at last visit.  He had been instructed to go back on phosphorus twice a day but he misunderstood and has not been taking it.  We will check labs today.  I will follow-up with results and notify him if he needs to restart the phosphorus.  His pain is better controlled since reestablishing with [facility].  We will repeat scans prior to next follow-up.  I have ordered CT chest abdomen pelvis along with bone scan.  We will see him back in 1 month.    -10/13/2022 PSA less than 0.014.With unremarkable CBC and CMP.  Phosphorus still a little low 2.3.    -11/4/2022 CT abdomen pelvis chest showed no evidence of disease progression with stable sclerotic bone lesions.  Bone scan stable right greater than left acetabular metastasis.  Stable bone metastases.    -11/8/2022 [facility] oncology telemedicine follow-up: Patient states that he feels achy all over with low-grade temps and a cough mildly productive.  Has an appointment later today to see his primary care for testing for flu and COVID.  Suggested that if he were positive for COVID that he get Paxlovid and that if he were positive for flu that he get Tamiflu and to discuss this with his primary care.  From the prostate cancer side, his PSA is immeasurably low with stable bone metastases and no evidence of progression.  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  We will continue the Relugolix, Zytiga, prednisone and he will see my nurse practitioner 12/7/2022.  Would repeat CTs and bone scan in May.    -1/25/2023 [facility] Oncology medicine follow-up: Mr. Lugo is having worsening fatigue and muscle aches.  He does have adrenal insufficiency on Zytiga and prednisone for his prostate cancer, he saw endocrinology in light of low ACTH  back in April 2022.  At that time there was no recommendation other than for monitoring him for his overall wellbeing and labs.  He denies any fevers or chills.  His labs as shown above are unremarkable, his CBC and CMP are normal other than for glucose of 112, PSA remains low at <0.1.  He has no new pain or any symptoms concerning for progression of his prostate cancer.  I will get him back to endocrinology to see if they feel any dose adjustment of his prednisone would be helpful in his symptoms.  Currently he is on 5 mg a day with his Zytiga.  I did not repeat his ACTH or cortisol as they would be expected to be low in this situation.  His potassium is normal, he has had no weight loss or change in his appetite.  Blood sugar is reasonable.  His only complaint currently is worsening fatigue and muscle aches.  He will continue treatment unchanged with Zytiga, prednisone and relugolix.  I will see him back in 1 month for follow-up.  We will repeat his restaging scans in May.    -3/1/2023 [facility] Oncology clinic follow-up:  His PSA remains low at <0.1 on treatment with Zytiga and prednisone along with relugolix however he continues to complain of significant fatigue and muscle aches not improving with time.  He states that his quality of life is affected as he is not able to do any work activities due to the fatigue.  He did see endocrinology again and they have increased his prednisone from 5 mg daily up to 7.5 mg daily however so far this has not made any difference in how he feels.  He is supposed to get back in touch with them to let them know how he is doing and to see if there is any other adjustments needed. His labs are unremarkable with normal thyroid function, CMP is normal other than for glucose of 156.  They did check hemoglobin A1c to look for diabetes however that was normal at 5.7.  CMP is unremarkable with just very mild anemia with hemoglobin of 12 and hematocrit 36.1% which would not account for his  fatigue.  He does have hot flashes that are fairly significant, I will try venlafaxine as suggested by Dr. Duffy and I appreciate the recommendation.  We will start at 37.5 mg daily and if tolerated he can increase to 75 mg after 1-2 weeks.  I will see him back in 1 months for follow-up.  Plan to repeat scans late April/early May.  His prostate cancer seems under good control with current regimen however I am not sure how long he can tolerate this due to the side effects.    -3/30/2023 [facility] Oncology clinic follow-up:  Mr. Lugo continues to deal with fatigue.  He appears more cushingoid today, he continues on treatment with Zytiga, prednisone and relugolix.  Prednisone dose currently is 7.5 mg daily.  This has not made any difference in his energy level.  PSA remains immeasurably low at <0.014.  CBC shows new onset of microcytic hypochromic anemia with hemoglobin of 11, hematocrit 34.4%, MCV 75.4, MCH 24.1, WBC is normal at 5.81 with normal platelet count 181,000 and normal differential.  CMP is unremarkable, it is normal other than for glucose of 142.  We will get him to Dr. Gomez for an EGD and colonoscopy for further evaluation in regards to his new onset of anemia.  He has no associated GI symptoms otherwise except for 1 day a few weeks ago he does report that he had fairly sudden onset of vomiting but this was an isolated incident on that day and has not reoccurred since.  Continues to follow with palliative care for management of his chronic back pain.  We will repeat restaging CT chest, abdomen and pelvis along with total body bone scan prior to return and I have ordered that today.    -3/30/2023 Ferritin low 12.3 with iron low 27 and saturation low 5% with total iron binding capacity 511.  3/31/2023 ferrous sulfate 325 mg twice a day every other day with vitamin C started.    - 4/12/2023 CT chest abdomen pelvis with contrast showed no progressive disease with stable sclerotic bone lesions.  Bone scan  showed single identifiable bone metastasis stable right acetabulum    -4/18/2023 [facility] hematology oncology follow-up: He now has microcytic iron deficiency anemia which is new and concerning.  Gastroenterologic evaluation has been ordered but not performed and is mandatory.  Continue ferrous sulfate 325 mg twice a day every other day with vitamin C to improve absorption and we will follow his CBC along with his usual labs monthly on Zytiga, Relugolix, prednisone 7.5 mg.  He follows with Dr. Duffy with adrenal insufficiency.  He will see my nurse practitioner back in a month to see what GI has found and to watch serial CBC along with his other labs including PSA.  I would repeat CT chest abdomen pelvis again in 6 months with contrast along with bone scan and sooner if PSA rises.    -5/3/2023 EGD and colonoscopy with Dr. Alexander Gomez: Superficial erosions in the stomach with gastritis.  Small polyps removed, dilated internal hemorrhoids.  Pathology pending.  Recommend repeat colonoscopy in 5 years.    -5/17/2023 [facility] Oncology clinic follow-up:  Naveen overall is doing well on current therapy with Zytiga, prednisone and relugolix.  PSA remains immeasurably low at <0.014.  He feels his hip pain is getting worse with time.  It makes it difficult to enjoy activities with his grandchildren as it is worse when he stands for any length of time or tries to carry any weight.  I reviewed his bone scan from April which was stable, we also reviewed previous MRI of the hips from May 2021 that showed moderate bilateral hip osteoarthritis.  I offered to repeat MRI of his hips for evaluation to see if there has been any worsening of his osteoarthritis and also then referral to orthopedics for any recommended intervention however at this time he defers.  He will let me know if he changes his mind.  He also follows with palliative care for management of his cancer related pain.  We will continue therapy unchanged.  We will repeat  restaging scans in October unless symptoms dictate the need to do so sooner.  He was recently found to be iron deficient, he had an EGD and colonoscopy on 5/3/2023 with Dr. Gomez, EGD showed superficial erosions in the stomach with gastritis, he continues on omeprazole.  He had small polyps removed from the colon and recommendation to repeat colonoscopy in 5 years.  He is on oral iron along with vitamin C every other day and is tolerating that well.  CBC from yesterday shows hemoglobin now normal at 14.2 and hematocrit 43.2%, MCV normal at 27.0.    -7/20/2023 [facility] [place] Oncology clinic follow-up: Naveen is doing well on current therapy with Zytiga, prednisone and relugolix.  PSA in June remains low at <0.1.  Continues to deal with chronic pain in his back and hip.  Recently saw Dr. Nikolai Marks and had an injection in his hip and is hoping that it will eventually help with his pain.  He has no new pain.  We will continue current therapy unchanged.  We will repeat labs while he is here today.  I will refill his iron as his pharmacy has changed. Most recent CBC in June with hemoglobin of 14.7 and hematocrit 43.6%.  We will repeat restaging scans in October.    -9/14/2023 [facility] [place] Oncology clinic follow-up: Naveen is tolerating his prostate cancer treatment with Zytiga, prednisone and relugolix.  PSA remains immeasurably low at <0.014 on 8/17/2023.  Currently he is having more GI issues with nausea and intermittent vomiting that occurs often without warning.  He had an EGD and colonoscopy with Dr. Gomez on 5/3/2023, EGD showed superficial erosions in the stomach with gastritis.  He does take omeprazole 40 mg twice daily.  He saw his PCP yesterday for these symptoms and has been referred to gastroenterologist at [facility] [place] and he is awaiting to hear about that appointment.  Hopefully he can get in fairly quickly.  I told him that he should let us know if it is going to be a while before he can be seen and I will get him back  to Dr. Gomez for consideration of repeat EGD.  We will get his CBC, CMP and PSA today.  His previous noted anemia had normalized, he currently is not taking oral iron as he ran out.  I will wait and see what his labs show today and likely hold off for now depending on his lab results until his GI issues can be resolved.  We will repeat his restaging CT chest, abdomen and pelvis along with bone scan in the next few weeks and I have ordered those today.  He is taking Zofran as needed for nausea, he is also on meclizine for vertigo.  He is going to follow-up with his PCP in a few weeks regarding his GI symptoms.    -9/14/2023 PSA less than 0.014 with unremarkable CBC and CMP.    -9/28/2023 total body bone scan stable with uptake in right acetabulum, no new areas.  CT chest, abdomen and pelvis show no evidence of disease progression.  -11/9/2023 [facility] clinic follow-up: CT reports were reviewed by Dr. Rashid last month and the patient reports he was called by Dr. Rashid as he could not make his appointment due to concerns over upper respiratory infection, CT scan showed no evidence of disease progression.  PSA has remained low, we are repeating that today along with his CBC and CMP.  He continues to have sinus drainage and congestion, I will send in 7 additional days on his doxycycline that he has been taking, he will follow-up with PCP if no improvement.  He continues to complain of bilateral hip pain, has a follow-up with Dr. Marks next week.  We will continue therapy unchanged with Zytiga, prednisone and relugolix.  Would repeat restaging bone scan and CT scans late March for a 6-month follow-up.    -1/4/24 PSA 1    - 3/28/2024 CT chest abdomen pelvis with contrast compared to May 2013 shows decreasing sclerotic bony metastases.  Bone scan shows similar relative intensity of 1 focal uptake right acetabulum correlating with CT with no new sites of uptake    -4/2/2024 [facility] follow-up:  Continued good response to Zytiga prednisone relugolix which he is tolerating.  We will check PSA and labs 6/25/2024 with follow-up with my nurse practitioner and again 3 months after that along with CTs and bone scan 6 months from now.  Not on bisphosphonates or rank ligand inhibitor due to drug-induced hypophosphatemia.  Will ask my nurse practitioner to order bone densitometry with his other scans in 6 months. He is having difficulty emptying his bladder and his prior urologist does not take his insurance so we will make appropriate referrals.     Prostate cancer metastatic to bone   8/30/2020 -  Other Event    PSA 10.8     9/15/2020 Initial Diagnosis    Prostate cancer metastatic to bone (CMS/HCC)     9/15/2020 Biopsy    Prostate needle core biopsy     9/24/2020 Imaging    Total body bone scan: 4 abnormal areas of increased activity consistent with osteoblastic metastasis, abnormal foci of activity seen in the T12 vertebral body, L1 vertebral body, roof of the left acetabulum, and acetabulum medially on the right.  CT chest: Stable sclerotic metastatic lesions within the T12 and L1 vertebrae.  No other evidence of metastatic disease to the chest.  Stable mild splenomegaly and subcentimeter periaortic retroperitoneal lymph nodes.  CT abdomen and pelvis: Diffuse bladder wall thickening with mild perivesicular fat stranding.  Interval development of several sclerotic lesions in the spine and left iliac bone.     10/13/2020 Cancer Staged    Staging form: Bone - Appendicular Skeleton, Trunk, Skull, And Facial Bones, AJCC 8th Edition  - Clinical: Stage IVB (cT3, cN0, cM1b, G3) - Signed by Ishmael Rashid MD on 10/13/2020     11/3/2020 - 10/5/2021 Chemotherapy    OP SUPPORTIVE Zoledronic Acid Q42d     11/3/2020 - 2/18/2021 Chemotherapy    OP PROSTATE DOCEtaxel       1/4/2021 Imaging    CT chest abdomen pelvis with contrast and whole-body bone scan shows improving less metabolically active bone metastases.  Stable  sclerotic T12, L1, and L2 vertebral bodies and bilateral pelvic bones on bone windows with stable subcentimeter para-aortic lymph nodes and no definite progression in the chest abdomen or pelvis.  PSA down to 0.275 after 3 courses of Taxotere.     3/4/2021 Imaging    CT chest, abdomen and pelvis stable, no evidence of disease progression. Total body bone scan with decreased/near resolution of abnormal increased radiotracer uptake associated with the known sclerotic lesions in the thoracolumbar spine and bony pelvis.  No evidence of new osteoblastic metastases.     3/4/2021 Imaging    CT chest abdomen pelvis with contrast is negative save for stable sclerotic bone lesions and the bone scan shows near resolution of prior bony abnormalities associated with the known sclerotic lesions in the thoracolumbar spine and bony pelvis.  2/17/2021 PSA down to 0.1 from 1.37 October 2020.     3/9/2021 - 3/9/2021 Chemotherapy    OP SUPPORTIVE PROSTATE Relugolix     3/9/2021 -  Chemotherapy    OP PROSTATE Abiraterone / PredniSONE       3/10/2021 -  Chemotherapy    OP PROSTATE Abiraterone / PredniSONE     8/10/2021 - 8/16/2021 Radiation    Radiation OncologyTreatment Course:  Naveen Lugo received 2000 cGy in 5 fractions to L4-sacrum, left acetabulum and right pelvis via External Beam Radiation - EBRT.     11/16/2021 -  Chemotherapy    OP CENTRAL VENOUS ACCESS DEVICE ACCESS, CARE, AND MAINTENANCE (CVAD)     1/5/2022 Imaging    -1/5/2022 CT chest abdomen pelvis with contrast [place] [facility] showing stable left periaortic adenopathy and unchanged sclerotic thoracic, lumbar spine and pelvis lesions with no new or progressive disease in the chest abdomen or pelvis.  Total body bone scan shows no significant change and no new suspicious foci.  Stable posterior right acetabulum and left superior acetabulum and degenerative changes in the cervical spine, shoulders, acromioclavicular joints, sternoclavicular joints, elbows, wrists, hands,  thoracic spine, lumbosacral spine, sacroiliac joints, hips, knees, ankles, feet.     5/24/2022 Imaging    CT chest abdomen pelvis with contrastFrankfort negative.  Bone scan shows stable bone metastases.     11/4/2022 Imaging    CT abdomen pelvis chest showed no evidence of disease progression with stable sclerotic bone lesions.  Bone scan stable right greater than left acetabular metastasis.  Stable bone metastases.     4/12/2023 Imaging     CT chest abdomen pelvis with contrast showed no progressive disease with stable sclerotic bone lesions.  Bone scan showed single identifiable bone metastasis stable right acetabulum     9/29/2023 Imaging    total body bone scan compared to April showed a single active bone metastasis with multiple and active bone metastases overall stable.  CT chest abdomen and pelvis compared to May and April shows no new bony progression with stable sclerotic bone lesions.         HISTORY OF PRESENT ILLNESS:  The patient is a 68 y.o. male, here for follow up on management of metastatic prostate cancer to the bone with good radiographic response no new somatic complaints except difficulty emptying his bladder and his prior urologist does not take his insurance.    Past Medical History:   Diagnosis Date   • Bone cancer    • History of radiation therapy 08/16/2021    L4 through sacrum, left acetabulum, right pelvis   • Nephrolithiasis    • Opioid use disorder, mild, on maintenance therapy (HCC)    • Prostate cancer      Past Surgical History:   Procedure Laterality Date   • CHOLECYSTECTOMY     • CORONARY STENT PLACEMENT  206 & 2011   • HERNIA REPAIR  1986   • THORACIC DISCECTOMY  1994       Allergies   Allergen Reactions   • Cymbalta [Duloxetine Hcl] Dizziness   • Gabapentin Nausea Only   • Lyrica [Pregabalin] Nausea And Vomiting and Dizziness       Family History and Social History reviewed and changed as necessary    REVIEW OF SYSTEM:   No new somatic complaints.  No biochemical or clinical  "evidence of adrenal insufficiency    PHYSICAL EXAM:  No jaundice icterus or pallor.  No respiratory distress.  No rashes.    Vitals:    04/02/24 0719   BP: 162/93   Pulse: 86   Resp: 18   Temp: 98.2 °F (36.8 °C)   SpO2: 95%   Weight: 88 kg (194 lb)   Height: 172.7 cm (68\")     Vitals:    04/02/24 0719   PainSc:   8   PainLoc: Back  Comment: HIPS AND BACK          ECOG score: 0           Vitals reviewed.    ECOG: (0) Fully Active - Able to Carry On All Pre-disease Performance Without Restriction    Lab Results   Component Value Date    HGB 13.7 01/04/2024    HCT 39.9 01/04/2024    MCV 86 01/04/2024     01/04/2024    WBC 13.7 (H) 01/04/2024    NEUTROABS 11.5 (H) 01/04/2024    LYMPHSABS 1.4 01/04/2024    MONOSABS 0.6 01/04/2024    EOSABS 0.0 01/04/2024    BASOSABS 0.0 01/04/2024       Lab Results   Component Value Date    GLUCOSE 107 (H) 01/04/2024    BUN 7 (L) 01/04/2024    CREATININE 0.62 (L) 01/04/2024     01/04/2024    K 3.0 (L) 01/04/2024    CL 98 01/04/2024    CO2 27 01/04/2024    CALCIUM 8.6 01/04/2024    PROTEINTOT 7.0 02/14/2022    ALBUMIN 3.5 (L) 01/04/2024    BILITOT 0.2 01/04/2024    ALKPHOS 76 01/04/2024    AST 13 01/04/2024    ALT 10 01/04/2024             ASSESSMENT & PLAN:  1.  Stage IVb grade group 4 metastatic prostate cancer with sclerotic bone lesions on CT and bone scan and history of prostatic hypertrophy.  Good response to Taxotere ending 2/18/2021 Relugolix, followed by Zytiga prednisone with L4/sacrum, left acetabulum, and right pelvis external beam radiation August 2021.  Hypophosphatemia on Zometa.  Zometa held as of October 2021.  2.  Kidney stone  3.  Polyps  4.  Probable drug-induced hypophosphatemia from Zometa, drug stopped after 10/5/2021 dose  5.  Cancer related pain seeing palliative care  6.  Fatigue  7.  Covid 2/2022  8.  Iron deficiency anemia  -5/3/2023 EGD and colonoscopy with Dr. Alexander Gomez: Superficial erosions in the stomach with gastritis.  Small polyps " removed, dilated internal hemorrhoids.  Pathology pending.  Recommend repeat colonoscopy in 5 years.    -9/30/2020 office note from Dr. Ronen Reynaga indicates patient with PSA 10.8.  Underwent TRUS prostate biopsy 9/15/2020.  Adenocarcinoma the prostate Sarika 4+4 = 8 in 1 out of 12 cores and 4+3 = 7 and 10 out of 12 cores and 3+4 = 7 in 1 out of 12 cores.  Perineural invasion.  Extraprostatic extension into the fat seen on 2 biopsies of the right lateral mid and right apex.  9/24/2020 CT chest and whole-body bone scan showed T12, L1, left acetabular, right medial acetabular metastases with no lung metastases.  He started androgen deprivation therapy with Casodex with plans for Lupron to start in 1 to 2 weeks for clinical T3 N0 M1 metastatic prostate cancer.  Review of official report from [facility] 9/24/2020 bone scan mentions T12, L1, roof of left acetabulum and medial right acetabular osteoblastic metastases.  CT chest with contrast that same day showed mild splenomegaly 14.2 cm with stable periaortic nodes compared to CT December 2015 with no lung metastases.  Sclerotic abnormality within the T12 vertebral body, L1 vertebral body extending into the pedicle unchanged.  8/28/2020 CT abdomen pelvis report from [facility] reviewed and mentions normal spleen size.  Punctate nonobstructing kidney stone, no paulino enlargement, diffuse bladder wall thickening with mild perivesicular fat stranding, 2.1 cm sclerotic left iliac bone above the left acetabulum new compared to 2015 as well as 1.5 cm faintly sclerotic focus in the right posterior acetabulum stable compared to prior CT 2015 as well as a 1.6 cm T12 and inferior vertebral body sclerotic lesion and a 1.9 cm left posterior lateral L1 vertebral body abnormality extending to the pedicle.  -10/13/2020 initial [facility] consultation: With 1 Sarika score 8, 4+4 lesion that would make him a grade group 4 with stage IVb disease given  radiographic evidence of sclerotic bone metastasis on bone scan and CT.  Needs genetic testing given metastatic prostate cancer and this is also important as, were he to have mismatch repair mutation then we would have options for PDL 1 inhibitors and were he BRCA mutated would have options for PARP inhibitors in the future.  Systemic therapy for castration naïve prostate cancer or N1 disease should be with apalutamide or Abiraterone or enzalutamide all of which are category 1.  He does not have any visceral metastases.  According to his imaging he has T12, L1, left acetabular, right acetabular, and left iliac bony involvement.  That means he would have 4 or more bone metastases with at least one metastasis (i.e. the acetabular lesions bilaterally) beyond the pelvis/vertebral, for which this would be considered high-volume disease for which 6 cycles of docetaxel 75 mg/m² x 6 cycles followed by Zytiga and prednisone would be reasonable along with Zometa every 6 weeks for bone stabilization.  He will do chemo preparation visit with my nurse practitioner prior to start chemotherapy with Taxotere and Zometa in 2 weeks and he is already received Casodex in preparation for Lupron shot he received on 10/12/2020 with Dr. Reynaga.  We will get him to Dr. Alexander Gomez who has done his polypectomies in the past for port placement and we will get genetic counseling started.  Following the 6 courses of Taxotere per the Chaarted trial criteria, I would then give him an indefinite Zytiga and prednisone and Zometa until progression or toxicity dictates.    -12/16/2019 COVID-19 antigen test negative    -12/29/2020 PSA 0.275 with absolute neutrophil count 700 and creatinine 0.76 with normal liver enzymes and electrolytes.  Normal magnesium and phosphorus and urine drug screen positive for buprenorphine and opiates follow-up with palliative care.    -1/4/2021 CT chest abdomen pelvis with contrast and whole-body bone scan shows  improving less metabolically active bone metastases.  Stable sclerotic T12, L1, and L2 vertebral bodies and bilateral pelvic bones on bone windows with stable subcentimeter para-aortic lymph nodes and no definite progression in the chest abdomen or pelvis.    -1/5/2021 [facility] medical oncology follow-up visit: I reviewed the images and reports thereof of the above CT and bone scan which shows good response to Taxotere x3 courses with a PSA down to 0.275 from a baseline of 10.  Get another 3 courses of Taxotere, continue his Xgeva, and then following that we will switch to Zytiga and prednisone.  He is working with palliative care on his bone pain but on his current dose of fentanyl patch he says his pain is still not adequately controlled he will contact them.  Dexamethasone prescription was refilled.  We will see my nurse practitioner back in 3 weeks for course #5 of 6 total courses and then we will reimage with CT chest abdomen pelvis and bone scan before going to the Zytiga prednisone.    -3/4/2021 CT chest abdomen pelvis with contrast is negative save for stable sclerotic bone lesions and the bone scan shows near resolution of prior bony abnormalities associated with the known sclerotic lesions in the thoracolumbar spine and bony pelvis.    2/17/2021 PSA down to 0.1 from 1.37 October 2020.  3/9/2021 PSA 0.1    -5/26/2021 CT chest abdomen pelvis with contrast shows stable to slightly underlying increase low-density left para-aortic node.  Bone lesions stable.  Whole-body bone scan stable to decrease activity of known thoracolumbar and bony pelvic involvement.  PSA less than 0.1  , AST 74, bilirubin 0.5.       -6/1/2021 [facility] medical oncology follow-up visit: I reviewed the above data with him and images thereof.  Bones are stable.  No significant adenopathy.  PSA immeasurably low.     upper limit of normal 69 and AST 74 upper limit of normal 46.  Hence, neither is more than double the upper limit  of normal with no ascites and no encephalopathy and normal albumin and bilirubin, despite the elevation of liver enzymes, he has child Abrams score A.  Package insert for Abiraterone says that for ALT and/or AST greater than 5 times upper limit of normal or total bilirubin greater than 3 times the upper limit of normal to withhold treatment until liver function tests returned to baseline or ALT and AST less than or equal to 2.5 times the upper limit of normal and total bilirubin less than or equal to 1.5 times the upper limit of normal and then reinitiate at 750 mg daily dose of Zytiga.  For recurrent hepatotoxicity on 750 mg daily Zytiga, withhold treatment until liver functions returned to baseline or ALT and AST less than 3-2.5 times upper limit of normal and total bilirubin less than or equal to 1.5 times the upper limit of normal then reinitiate at 500 mg daily Zytiga dose.  If has recurrent hepatotoxicity on 500 mg dose, then discontinue treatment.  If has ALT greater than 3 times the upper limit of normal along with total bilirubin greater than 2 times the upper limit of normal in the absence of other contributing causes or biliary obstruction, permanently discontinue Zytiga.  Based on these recommendations, I would continue current doses but we will watch with serial labs monthly and repeat his imaging again in 3 months but clinically he is doing wonderfully with excellent response to Taxotere and now on Zytiga prednisone plus Zometa along with Relugolix.    -6/23/2020 for [facility] clinic follow-up: Having persistent and worsening pain above his left hip.  I will get an MRI of the left hip for further evaluation.  Otherwise we will continue therapy unchanged with Zytiga, prednisone and Zometa along with Relugolix.    -7/28/2021 [facility] clinic follow-up: Patient with multiple somatic complaints including episodes of confusion, dizziness, decreased memory, agitation.  He reports ongoing episodes  with difficulty swallowing.  Also most concerning for episodes of angina and chest pain for which he basically refused to seek emergency medical attention.  He has a history of coronary artery disease and stent placement.  He has not followed up with cardiology for many years.  I will get an MRI of the brain in light of his confusion, dizziness and agitation.  I will get him to gastroenterology for EGD in light of his dysphagia.  I have also put in a referral for cardiology.  I reemphasized to the patient, his wife who is with him today and his son who was on the telephone during our visit the importance of seeking out prompt medical attention when he is having chest pain or neurological concerns so that he can be evaluated.  The patient states that he basically refuses to go to the emergency room and if his family calls an ambulance he would not agree to go to the hospital.  For the time being, I have asked him to hold his Zytiga and prednisone until we can sort this out as Zytiga does have some cardiovascular risk.  There is likely no treatment for prostate cancer that does not carry some form of cardiovascular risk.  His PSA remains low at 0.014 yesterday.  He will continue relugolix for now.    Of note, some of these symptoms could also be due to his hypophosphatemia, phosphate level from yesterday was 1.5, I have sent in a prescription for K-Phos 3 times a day before meals for 10 days.  We will hold further Zometa until this is corrected.  I have also put in an order to check his vitamin D level.  He takes calcium and vitamin D daily.  Some of his symptoms also could be due to drug withdrawal as there was some confusion and difficulty getting his last prescription for methadone therefore he was without methadone for several days, he now has his prescription and is back on his pain medication.  He has an appointment with neurosurgery tomorrow in light of his ongoing back pain, MRI of the hip recently showed  "multiple bony lesions largest at the L5 vertebral body and left supra-acetabular region.  If no neurosurgical intervention recommended he may benefit from spot radiation as this is where a lot of his pain is coming from.  His wife had questions today regarding his staging and extent of disease.  We reviewed previous scans.  I did clarify with her as she used the words \"cancer free\" as she thought that is what she was told after his CT scans once he completed Taxotere in February.  I explained to her that even though his scans showed he had an excellent response with no clear areas of metastasis that did not mean he was cancer free, I explained to her that when you have metastatic cancer the treatment intent is palliative in nature and to control the disease but not to cure the disease.  She stated understanding.  We will see him back in 2 weeks for follow-up to sort through this and make further plan of care, again, I have asked him to hold Zytiga and prednisone until he sees us back, he will continue on his other medications including relugolix.    -7/30/2021 neurosurgery PA consultation.  MRI with and without contrast L5 ordered.    -8/3/2021 neurosurgery follow-up showed known sclerotic disease with new L5 abnormality without compression deformity or instability.  MRI brain negative for metastasis.    -8/4/2021 office visit Dr. Fco Quintanilla radiation oncology coordinating with Dr. North neurosurgery for palliative radiation for MRI evidence of L5 and left supra acetabular painful lesions.  States that the patient's disease which is not secreting PSA is experiencing some progression and would benefit from 20 Gy in five fractions and other lesion 30 Gy in ten fractions. Patient offered to go to [place] for radiation but desired treatment in [place].    -8/10/2021 [facility] medical oncology follow-up visit: No chest pains at this junction.  Reviewed MRI brain and other MRIs reviewed by Dr. North and Dwight.  Due " for EGD on 19th.  We will repeat his phosphorus along with his other labs continue Relugolix, Zytiga, prednisone, and he will continue radiation palliatively to the painful bony lesions with Dr. Quintanilla/Can.  He will see my nurse practitioner back in a few weeks to see how he is tolerating this and to follow-up on the phosphorus off of Zometa and she will order repeat CT chest abdomen pelvis with contrast and total body bone scan the end of September.I will see him back after that.    -9/8/2021 Zometa resumed.  PSA <0.10    -10/5/2021 [facility] medical oncology follow-up visit: followed-up with radiation oncology 9/21/2021.  Status post symptomatic L5 radiation 5 fractions 20 [place] completed 8/16/2021 with improvement in right hip pain.  9/2/2021 PSA less than 0.1.  9/29/2021 total body bone scan shows increased uptake left iliac bone slightly superior group of the left acetabulum with no additional foci of suspicious abnormality is unlikely treatment related with no new sites of active osseous metastasis.  CT chest abdomen pelvis with contrast shows stable borderline enlarged left periaortic nodes and dating sclerotic bone lesions.Continue Zytiga, prednisone, Relugolix, Zometa, repeat CT chest abdomen pelvis with contrast and total body bone scan the end of the year.  We will follow PSA serially.    -11/17/2021 [facility] Oncology clinic follow-up:  Mr. Lugo overall is doing well.  He is tolerating therapy with Zytiga, prednisone and Relugolix along with Zometa which is given every 6 weeks.  PSA remains low at <0.1.  Has occasional low phosphorus, currently low at 1.7.  Serum calcium is normal at 8.9, normal creatinine 0.79 and normal CBC other than for platelets of 124.  I will hold Zometa for 1 month, I did send in a prescription for phosphorus replacement today.  He takes calcium and vitamin D.  I will see him back in 1 month for follow-up.  We will repeat restaging scans after that visit.    -12/15/2021 [facility]  Oncology clinic follow-up: Mr. Lugo continues to do well on therapy with Zytiga, prednisone and Relugolix, his PSA remains low at <0.1.  He is continuing to have left lower back pain, he is working with palliative care and they have referred him also to pain management.  Pain management has talked to him about putting in a pain pump however he is leery of this, I told him that this was a safe and effective pain management technique and to certainly give consideration to their recommendations.  His phosphorus is improving however is still a little low, I will hold off on Zometa until we see him back in 1 month, we may end up only giving it every 12 weeks.  We will repeat restaging scans with CT chest, abdomen and pelvis along with total body bone scan prior to return and I have ordered those today.      -1/5/2022 CT chest abdomen pelvis with contrast [facility] showing stable left periaortic adenopathy and unchanged sclerotic thoracic, lumbar spine and pelvis lesions with no new or progressive disease in the chest abdomen or pelvis.  Total body bone scan shows no significant change and no new suspicious foci.  Stable posterior right acetabulum and left superior acetabulum and degenerative changes in the cervical spine, shoulders, acromioclavicular joints, sternoclavicular joints, elbows, wrists, hands, thoracic spine, lumbosacral spine, sacroiliac joints, hips, knees, ankles, feet.    -1/11/2022 [facility] oncology hematology follow-up visit: I reviewed images and reports from his 1/5/2022 scans.  No active disease and PSA immmeasurably low on Zytiga, prednisone, Relugolix.  However, he has struggled with hypophosphatemia and is significantly fatigued with this.  Given that the bones are doing well and given that his phosphorus continues to remain consistently low at 2.2 in mid December, 1.7 mid November and 2.5 mid-September and as low as 1.5 back to July and the likelihood that this is related to his  Zometa, given that his bones are stable overall, he will increase from 1200 mg up to 1600 mg of calcium and phosphate a day and we will hold his Zometa for the foreseeable future.  He will see my nurse practitioner back in a month to continue to monitor his phosphorus along with his calcium and other labs and will repeat his scans again in 3 months.  In addition, given his fatigue we will check his ACTH and cortisol though he is taking his prednisone with his Zytiga.  Though his electrolytes are normal, I will keep an eye on the cortisol and ACTH and have these labs not only drawn today but again just prior to return to my nurse practitioner on February 10.    -2/10/2022 [facility] Oncology clinic follow-up: Mr. Lugo overall is stable, no change in his health this past month.  I have reviewed his labs from 2/8/2022 and they are unremarkable, his phosphorus is now normal at 3.2. We are continuing to hold his Zometa, he last received Zometa on 10/5/2021.  He continues therapy with Zytiga, prednisone and Relugolix.  Dr. Rashid had ordered cortisol level and ACTH which are low, currently his ACTH is 2.8 and cortisol 3.08 however these are both done in the face of ongoing prednisone that he takes with his Zytiga.  We will get him to endocrinology for further evaluation for possible adrenal insufficiency but will not interrupt his treatment in the interim.  He will need restaging scans again in April for a 3-month follow-up.  He will continue to follow with palliative care and pain management for his cancer related pain.  His PSA remains immeasurably low at <0.1 on 2/8/22.    -2/15/2022 went to emergency room with weakness, decreased appetite, myalgias in the setting of known COVID-19 testing positive a few days prior to this visit.  Phosphorus had been low in the past but was normal in the emergency room with unremarkable CBC and CMP.  Chest x-ray unremarkable saturating well on room air mildly hypertensive with dry mucosa.   Given 500 cc crystalloid.  Covid test positive.  Mild thrombocytopenia 136,000 and otherwise unremarkable.  Discharged home.    -3/3/2022 data:  PSA less than 0.1  CMP unremarkable  Cortisol 3.96 normal  ACTH 2.8 lower limit of normal 7.2  Phosphorus normal 2.6    -3/8/2022 [facility] follow-[facility]: Suspect big chunk of his fatigue was Covid.  Another part of the fatigue likely related to lack of testosterone.  Not hypophosphatemic off of Zometa.  ACTH running low while on prednisone not surprising but with normal cortisol and I doubt adrenal insufficient which is why he is on prednisone to avoid such while taking Zytiga.  Overall doing fairly well and with immeasurably low PSA, I will have labs done on him early April, see my nurse practitioner early May, and she will order repeat bone scan and CTs the end of May and I will see him back in June.  We will continue to hold the Zometa unless bone lesions progress given symptomatic prior hypophosphatemia on Zometa.  He will keep his appointment April 28 with Dr. Mir Duffy just to be sure that my take on his pituitary/adrenal axis assessment is accurate.    -4/28/2022 endocrinology consult Dr. Mir Duffy.  With low ACTH and cortisol on prednisone with Zytiga, the adequacy of prednisone cannot be assessed by measuring ACTH or cortisol but is assessed by weight changes, blood pressure, blood sugar, potassium, overall sense of how the patient is doing.  Primary adrenal insufficiency with low ACTH and low cortisol would be typical for the situation as his blood sugar tends to run a little high and potassium a little low, he did not think increasing prednisone was necessary.  He put him on some potassium.  No follow-up scheduled, will see as needed.    -5/4/2022 [facility] follow-up: Mr. Lugo continues on therapy with Zytiga and prednisone along with Relugolix.  His Zometa has been on hold due to previous hypophosphatemia.  There is some concern about  potential adrenal insufficiency. He saw endocrinology, Dr. Mir Duffy on 4/28/2022.  I did review his office note and he stated that the low ACTH and low serum cortisol would be typical for Mr. Lugo's situation.  He further stated that the adequacy of prednisone dose cannot be assessed by measuring ACTH or cortisol but is rather assessed by the overall just altered how the patient is feeling-weight change, blood pressure, blood sugar, potassium, overall sense of wellbeing.  His potassium had been running a little low therefore they put him on potassium 10 mill equivalents daily. Of note, I do not see a future follow-up scheduled with endocrinology.  He is having night sweats, I am not sure if this is related.  His glucose was normal this morning at 107.  His weight is stable.  He will continue current treatment for his prostate cancer unchanged, we will continue to hold his Zometa.  Current phosphorus and magnesium are normal.  CBC and CMP from today are unremarkable, cortisol is normal.  PSA and ACTH are pending  In regards to his night sweats, he had taken clonidine previously 0.1 mg twice daily as needed, I did send in a short supply again to try and see if this helps.  He also is having indigestion despite being on omeprazole twice daily, I sent a prescription for Pepcid.  He had an EGD August 2021.  We will repeat restaging scans prior to return and I have ordered those today.     -5/24/2022 CT chest abdomen pelvis with contrast[place] negative.  Bone scan shows stable bone metastases.    - 5/25/2022 PSA less than 0.1, platelets 130,000, otherwise unremarkable CBC and CMP.  A.m. cortisol running low 1.2 with phosphorus low 2.3.    -5/31/2022 [facility] medical oncology follow-up: On Zytiga, prednisone, Relugolix, PSA remaining immeasurably low with stable bone scan and no visceral/paulino metastases.  Phosphorus still running low.  I have discontinued his potassium chloride and started potassium phosphate 500 mg 4  times a day.  Unless PSA rising, we will plan on repeating CTs and bone scan in 6 months and will follow with my nurse practitioner in the interim and if symptoms dictate or labs, will get back to Dr. Duffy for management of potential adrenal insufficiency but presently we will just see how he does with potassium phosphate and hopeful improvement in the phosphorus level with time.  We will continue to follow with palliative care for pain management    -7/6/2022 [facility] [place] Oncology clinic follow-up: Mr. Lugo overall is doing well but continues to be troubled with fatigue and hot flashes.  For the most part he states he is able to do the things he wants to do, he continues to work but does have to take more rest periods.  I had a long discussion with him and his wife after reviewing his medications with them as they wanted to see if there was anything he could stop or adjust.  I discussed with him the need to continue on treatment for his prostate cancer even though his PSA remains immeasurably low and his scans look good but that if his medication is stopped the cancer would progress and over time it still has the potential to progress on therapy but would continue it as long as possible to keep his disease under control.  I explained this is like treating someone with hypertension, you do not stop the antihypertensive just because the blood pressure normalizes.  They stated understanding.  There is no dose adjustment of Zytiga or prednisone that would minimize his symptoms, he is on the current standard recommended therapy.  Discussed with him that sometimes during times of stress we may need to increase his prednisone dose but for now would not change anything.  His phosphorus level is normal, we continue to hold his Zometa.  I did give him the option of holding his phosphorus for the next month and we will see what happens, if it drops we will start him back on it but may be at a lower dose rather than 4 times a day  maybe twice a day.  For now he will continue therapy unchanged.  We will continue monitoring labs monthly.  No PSA was drawn this month but that is okay as his PSA has been running immeasurably low at <0.1, we will recheck next month with lab draw.  We will stop checking his ACTH and cortisol level every month, if he develops worsening symptoms I will repeat those.  He has not gotten a follow-up with endocrinology as of yet.    -8/3/2022 [facility] Oncology clinic follow-up:  Mr. Lugo is doing well as far as his prostate cancer goes with continued immeasurably low PSA of <0.1.  He continues on therapy with Zytiga and prednisone along with Relugolix.  His phosphorus is stable at 2.7 which was just slightly low by our reference range however was 2.71-month ago also which was normal on another labs reference range.  He has not taken his phosphorus for this past month as he thought it was making him more fatigued.  He really can tell no difference whether he was taking it or not.  I have asked him to try to go back on phosphorus twice a day rather than 4 times a day and see if this is tolerated and if we can keep his phosphorus level from dropping.  His biggest complaint today is flareup of his arthralgias and back pain.  He is following with pain management, Dr. Danny Avalos but is requesting a referral back to palliative medicine as he feels that no one is currently listening to him and they are just prescribing the same medications that are not effective according to his report.  He does have a follow-up with Dr. Avalos on 8/12/2022 but due to his current pain is not able to work until after he is seen and hopefully can get some better relief.  I have given him a work release until 15 August.  I have also put in a referral back to palliative care.  Also encouraged him to work with Dr. Avalos for help in resolving his issue.  Apparently they have recommended some type of a pump however he has been hesitant to that but  likely would be helpful.  We will continue therapy unchanged.  We will continue monthly labs including phosphorus, we are not currently checking ACTH and cortisol monthly as Dr. Mir Duffy previously stated in his assessment on 4/28/2022 that the adequacy of prednisone cannot be assessed by measuring ACTH or cortisol but would be assessed by symptoms, see note from 4/28/2022.  He made no further follow-up appointments.  Fatigue is not worsening currently.  His glucose and potassium are  Normal.    -10/13/2022 [facility] oncology follow-up: Mr. Lugo is doing well.  He is tolerating treatment with Zytiga and prednisone along with Relugolix without any unusual side effects.  His PSA has remained immeasurably low at <0.1.  His phosphorus was slightly low at last visit.  He had been instructed to go back on phosphorus twice a day but he misunderstood and has not been taking it.  We will check labs today.  I will follow-up with results and notify him if he needs to restart the phosphorus.  His pain is better controlled since reestablishing with [facility].  We will repeat scans prior to next follow-up.  I have ordered CT chest abdomen pelvis along with bone scan.  We will see him back in 1 month.    -10/13/2022 PSA less than 0.014.With unremarkable CBC and CMP.  Phosphorus still a little low 2.3.    -11/4/2022 CT abdomen pelvis chest showed no evidence of disease progression with stable sclerotic bone lesions.  Bone scan stable right greater than left acetabular metastasis.  Stable bone metastases.    -11/8/2022 [facility] oncology telemedicine follow-up: Patient states that he feels achy all over with low-grade temps and a cough mildly productive.  Has an appointment later today to see his primary care for testing for flu and COVID.  Suggested that if he were positive for COVID that he get Paxlovid and that if he were positive for flu that he get Tamiflu and to discuss this with his primary  care.  From the prostate cancer side, his PSA is immeasurably low with stable bone metastases and no evidence of progression.  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  We will continue the Relugolix, Zytiga, prednisone and he will see my nurse practitioner 12/7/2022.  Would repeat CTs and bone scan in May.    -1/25/2023 [facility] follow-up: Mr. Lugo is having worsening fatigue and muscle aches.  He does have adrenal insufficiency on Zytiga and prednisone for his prostate cancer, he saw endocrinology in light of low ACTH back in April 2022.  At that time there was no recommendation other than for monitoring him for his overall wellbeing and labs.  He denies any fevers or chills.  His labs as shown above are unremarkable, his CBC and CMP are normal other than for glucose of 112, PSA remains low at <0.1.  He has no new pain or any symptoms concerning for progression of his prostate cancer.  I will get him back to endocrinology to see if they feel any dose adjustment of his prednisone would be helpful in his symptoms.  Currently he is on 5 mg a day with his Zytiga.  I did not repeat his ACTH or cortisol as they would be expected to be low in this situation.  His potassium is normal, he has had no weight loss or change in his appetite.  Blood sugar is reasonable.  His only complaint currently is worsening fatigue and muscle aches.  He will continue treatment unchanged with Zytiga, prednisone and relugolix.  I will see him back in 1 month for follow-up.  We will repeat his restaging scans in May.    -3/1/2023 [facility] follow-up:  His PSA remains low at <0.1 on treatment with Zytiga and prednisone along with relugolix however he continues to complain of significant fatigue and muscle aches not improving with time.  He states that his quality of life is affected as he is not able to do any work activities due to the fatigue.  He did see endocrinology  again and they have increased his prednisone from 5 mg daily up to 7.5 mg daily however so far this has not made any difference in how he feels.  He is supposed to get back in touch with them to let them know how he is doing and to see if there is any other adjustments needed. His labs are unremarkable with normal thyroid function, CMP is normal other than for glucose of 156.  They did check hemoglobin A1c to look for diabetes however that was normal at 5.7.  CMP is unremarkable with just very mild anemia with hemoglobin of 12 and hematocrit 36.1% which would not account for his fatigue.  He does have hot flashes that are fairly significant, I will try venlafaxine as suggested by Dr. Duffy and I appreciate the recommendation.  We will start at 37.5 mg daily and if tolerated he can increase to 75 mg after 1-2 weeks.  I will see him back in 1 months for follow-up.  Plan to repeat scans late April/early May.  His prostate cancer seems under good control with current regimen however I am not sure how long he can tolerate this due to the side effects.    -3/30/2023 [facility] follow-up:  Mr. Lugo continues to deal with fatigue.  He appears more cushingoid today, he continues on treatment with Zytiga, prednisone and relugolix.  Prednisone dose currently is 7.5 mg daily.  This has not made any difference in his energy level.  PSA remains immeasurably low at <0.014.  CBC shows new onset of microcytic hypochromic anemia with hemoglobin of 11, hematocrit 34.4%, MCV 75.4, MCH 24.1, WBC is normal at 5.81 with normal platelet count 181,000 and normal differential.  CMP is unremarkable, it is normal other than for glucose of 142.  We will get him to Dr. Gomez for an EGD and colonoscopy for further evaluation in regards to his new onset of anemia.  He has no associated GI symptoms otherwise except for 1 day a few weeks ago he does report that he had fairly sudden onset of vomiting but this was an isolated incident on  that day and has not reoccurred since.  Continues to follow with palliative care for management of his chronic back pain.  We will repeat restaging CT chest, abdomen and pelvis along with total body bone scan prior to return and I have ordered that today.    -3/30/2023 Ferritin low 12.3 with iron low 27 and saturation low 5% with total iron binding capacity 511.  3/31/2023 ferrous sulfate 325 mg twice a day every other day with vitamin C started.    - 4/12/2023 CT chest abdomen pelvis with contrast showed no progressive disease with stable sclerotic bone lesions.  Bone scan showed single identifiable bone metastasis stable right acetabulum    -4/18/2023 [facility] follow-up: He now has microcytic iron deficiency anemia which is new and concerning.  Gastroenterologic evaluation has been ordered but not performed and is mandatory.  Continue ferrous sulfate 325 mg twice a day every other day with vitamin C to improve absorption and we will follow his CBC along with his usual labs monthly on Zytiga, Relugolix, prednisone 7.5 mg.  He follows with Dr. Duffy with adrenal insufficiency.  He will see my nurse practitioner back in a month to see what GI has found and to watch serial CBC along with his other labs including PSA.  I would repeat CT chest abdomen pelvis again in 6 months with contrast along with bone scan and sooner if PSA rises.    -5/3/2023 EGD and colonoscopy with Dr. Alexander Gomez: Superficial erosions in the stomach with gastritis.  Small polyps removed, dilated internal hemorrhoids.  Pathology pending.  Recommend repeat colonoscopy in 5 years.    -5/17/2023 [facility] follow-up:  Naveen overall is doing well on current therapy with Zytiga, prednisone and relugolix.  PSA remains immeasurably low at <0.014.  He feels his hip pain is getting worse with time.  It makes it difficult to enjoy activities with his grandchildren as it is worse when he stands for any length of time or tries  to carry any weight.  I reviewed his bone scan from April which was stable, we also reviewed previous MRI of the hips from May 2021 that showed moderate bilateral hip osteoarthritis.  I offered to repeat MRI of his hips for evaluation to see if there has been any worsening of his osteoarthritis and also then referral to orthopedics for any recommended intervention however at this time he defers.  He will let me know if he changes his mind.  He also follows with palliative care for management of his cancer related pain.  We will continue therapy unchanged.  We will repeat restaging scans in October unless symptoms dictate the need to do so sooner.  He was recently found to be iron deficient, he had an EGD and colonoscopy on 5/3/2023 with Dr. Gomez, EGD showed superficial erosions in the stomach with gastritis, he continues on omeprazole.  He had small polyps removed from the colon and recommendation to repeat colonoscopy in 5 years.  He is on oral iron along with vitamin C every other day and is tolerating that well.  CBC from yesterday shows hemoglobin now normal at 14.2 and hematocrit 43.2%, MCV normal at 27.0.    -7/20/2023 [facility] follow-up: Naveen is doing well on current therapy with Zytiga, prednisone and relugolix.  PSA in June remains low at <0.1.  Continues to deal with chronic pain in his back and hip.  Recently saw Dr. Nikolai Marks and had an injection in his hip and is hoping that it will eventually help with his pain.  He has no new pain.  We will continue current therapy unchanged.  We will repeat labs while he is here today.  I will refill his iron as his pharmacy has changed. Most recent CBC in June with hemoglobin of 14.7 and hematocrit 43.6%.  We will repeat restaging scans in October.    -9/14/2023 [facility] follow-up: aNveen is tolerating his prostate cancer treatment with Zytiga, prednisone and relugolix.  PSA remains immeasurably low at <0.014 on 8/17/2023.   Currently he is having more GI issues with nausea and intermittent vomiting that occurs often without warning.  He had an EGD and colonoscopy with Dr. Gomez on 5/3/2023, EGD showed superficial erosions in the stomach with gastritis.  He does take omeprazole 40 mg twice daily.  He saw his PCP yesterday for these symptoms and has been referred to gastroenterologist at [facility], [place], operated by [facility] and he is awaiting to hear about that appointment.  Hopefully he can get in fairly quickly.  I told him that he should let us know if it is going to be a while before he can be seen and I will get him back to Dr. Gomez for consideration of repeat EGD.  We will get his CBC, CMP and PSA today.  His previous noted anemia had normalized, he currently is not taking oral iron as he ran out.  I will wait and see what his labs show today and likely hold off for now depending on his lab results until his GI issues can be resolved.  We will repeat his restaging CT chest, abdomen and pelvis along with bone scan in the next few weeks and I have ordered those today.  He is taking Zofran as needed for nausea, he is also on meclizine for vertigo.  He is going to follow-up with his PCP in a few weeks regarding his GI symptoms.    -9/14/2023 PSA less than 0.014 with unremarkable CBC and CMP.    -9/28/2023 total body bone scan stable with uptake in right acetabulum, no new areas.  CT chest, abdomen and pelvis show no evidence of disease progression.  -11/9/2023 [facility], [place], operated by [facility] On[facility] clinic follow-up: CT reports were reviewed by Dr. Rashid last month and the patient reports he was called by Dr. Rashid as he could not make his appointment due to concerns over upper respiratory infection, CT scan showed no evidence of disease progression.  PSA has remained low, we are repeating that today along with his CBC and CMP.  He continues to have sinus drainage and congestion, I will send in 7 additional days on his doxycycline that he has been taking, he will follow-up with PCP  if no improvement.  He continues to complain of bilateral hip pain, has a follow-up with Dr. Marks next week.  We will continue therapy unchanged with Zytiga, prednisone and relugolix.  Would repeat restaging bone scan and CT scans late March for a 6-month follow-up.    -1/4/24 PSA 1    - 3/28/2024 CT chest abdomen pelvis with contrast compared to May 2013 shows decreasing sclerotic bony metastases.  Bone scan shows similar relative intensity of 1 focal uptake right acetabulum correlating with CT with no new sites of uptake    -4/2/2024 [facility] medical oncology follow-up: Continued good response to Zytiga prednisone relugolix which he is tolerating.  We will check PSA and labs 6/25/2024 with follow-up with my nurse practitioner and again 3 months after that along with CTs and bone scan 6 months from now.  Not on bisphosphonates or rank ligand inhibitor due to drug-induced hypophosphatemia.  Will ask my nurse practitioner to order bone densitometry with his other scans in 6 months.  He is having difficulty emptying his bladder and his prior urologist does not take his insurance so we will make appropriate referrals.    Total time of care today inclusive of time spent today prior to patient's arrival reviewing interval images and reports thereof and interval data and during visit translating that information to the patient setting him up for his monthly labs and interviewing him as to signs or symptoms of his disease and management thereof and after visit instituting this plan took 40 minutes of patient care time throughout the day today.  Ishmael Rashid MD    04/02/2024

## 2024-04-02 NOTE — PROGRESS NOTES
CHIEF COMPLAINT: No new somatic complaints    Problem List:  Oncology/Hematology History Overview Note   1.  Stage IVb grade group 4 metastatic prostate cancer with sclerotic bone lesions on CT and bone scan and history of prostatic hypertrophy.  Good response to Taxotere ending 2/18/2021 Relugolix, followed by Zytiga prednisone with L4/sacrum, left acetabulum, and right pelvis external beam radiation August 2021.  Hypophosphatemia on Zometa.  Zometa held as of October 2021.  2.  Kidney stone  3.  Polyps  4.  Probable drug-induced hypophosphatemia from Zometa, drug stopped after 10/5/2021 dose  5.  Cancer related pain seeing palliative care  6.  Fatigue  7.  Covid 2/2022  8.  Iron deficiency anemia  -5/3/2023 EGD and colonoscopy with Dr. Alexander Gomez: Superficial erosions in the stomach with gastritis.  Small polyps removed, dilated internal hemorrhoids.  Pathology pending.  Recommend repeat colonoscopy in 5 years.    -9/30/2020 office note from Dr. Ronen Reynaga indicates patient with PSA 10.8.  Underwent TRUS prostate biopsy 9/15/2020.  Adenocarcinoma the prostate Kane 4+4 = 8 in 1 out of 12 cores and 4+3 = 7 and 10 out of 12 cores and 3+4 = 7 in 1 out of 12 cores.  Perineural invasion.  Extraprostatic extension into the fat seen on 2 biopsies of the right lateral mid and right apex.  9/24/2020 CT chest and whole-body bone scan showed T12, L1, left acetabular, right medial acetabular metastases with no lung metastases.  He started androgen deprivation therapy with Casodex with plans for Lupron to start in 1 to 2 weeks for clinical T3 N0 M1 metastatic prostate cancer.  Review of official report from [facility] 9/24/2020 bone scan mentions T12, L1, roof of left acetabulum and medial right acetabular osteoblastic metastases.  CT chest with contrast that same day showed mild splenomegaly 14.2 cm with stable periaortic nodes compared to CT December 2015 with no lung metastases.  Sclerotic abnormality  within the T12 vertebral body, L1 vertebral body extending into the pedicle unchanged.  8/28/2020 CT abdomen pelvis report from [facility] reviewed and mentions normal spleen size.  Punctate nonobstructing kidney stone, no paulino enlargement, diffuse bladder wall thickening with mild perivesicular fat stranding, 2.1 cm sclerotic left iliac bone above the left acetabulum new compared to 2015 as well as 1.5 cm faintly sclerotic focus in the right posterior acetabulum stable compared to prior CT 2015 as well as a 1.6 cm T12 and inferior vertebral body sclerotic lesion and a 1.9 cm left posterior lateral L1 vertebral body abnormality extending to the pedicle.  -10/13/2020 initial [facility] medical oncology consultation: With 1 Gresham score 8, 4+4 lesion that would make him a grade group 4 with stage IVb disease given radiographic evidence of sclerotic bone metastasis on bone scan and CT.  Needs genetic testing given metastatic prostate cancer and this is also important as, were he to have mismatch repair mutation then we would have options for PDL 1 inhibitors and were he BRCA mutated would have options for PARP inhibitors in the future.  Systemic therapy for castration naïve prostate cancer or N1 disease should be with apalutamide or Abiraterone or enzalutamide all of which are category 1.  He does not have any visceral metastases.  According to his imaging he has T12, L1, left acetabular, right acetabular, and left iliac bony involvement.  That means he would have 4 or more bone metastases with at least one metastasis (i.e. the acetabular lesions bilaterally) beyond the pelvis/vertebral, for which this would be considered high-volume disease for which 6 cycles of docetaxel 75 mg/m² x 6 cycles followed by Zytiga and prednisone would be reasonable along with Zometa every 6 weeks for bone stabilization.  He will do chemo preparation visit with my nurse practitioner prior to start chemotherapy with Taxotere and  Zometa in 2 weeks and he is already received Casodex in preparation for Lupron shot he received on 10/12/2020 with Dr. Reynaga.  We will get him to Dr. Alexander Gomez who has done his polypectomies in the past for port placement and we will get genetic counseling started.  Following the 6 courses of Taxotere per the Chaarted trial criteria, I would then give him an indefinite Zytiga and prednisone and Zometa until progression or toxicity dictates.    -12/16/2019 COVID-19 antigen test negative    -12/29/2020 PSA 0.275 with absolute neutrophil count 700 and creatinine 0.76 with normal liver enzymes and electrolytes.  Normal magnesium and phosphorus and urine drug screen positive for buprenorphine and opiates follow-up with palliative care.    -1/4/2021 CT chest abdomen pelvis with contrast and whole-body bone scan shows improving less metabolically active bone metastases.  Stable sclerotic T12, L1, and L2 vertebral bodies and bilateral pelvic bones on bone windows with stable subcentimeter para-aortic lymph nodes and no definite progression in the chest abdomen or pelvis.    -1/5/2021 [facility] medical oncology follow-up visit: I reviewed the images and reports thereof of the above CT and bone scan which shows good response to Taxotere x3 courses with a PSA down to 0.275 from a baseline of 10.  Get another 3 courses of Taxotere, continue his Xgeva, and then following that we will switch to Zytiga and prednisone.  He is working with palliative care on his bone pain but on his current dose of fentanyl patch he says his pain is still not adequately controlled he will contact them.  Dexamethasone prescription was refilled.  We will see my nurse practitioner back in 3 weeks for course #5 of 6 total courses and then we will reimage with CT chest abdomen pelvis and bone scan before going to the Zytiga prednisone.    -3/4/2021 CT chest abdomen pelvis with contrast is negative save for stable sclerotic bone lesions and the bone  scan shows near resolution of prior bony abnormalities associated with the known sclerotic lesions in the thoracolumbar spine and bony pelvis.    2/17/2021 PSA down to 0.1 from 1.37 October 2020.  3/9/2021 PSA 0.1    -5/26/2021 CT chest abdomen pelvis with contrast shows stable to slightly underlying increase low-density left para-aortic node.  Bone lesions stable.  Whole-body bone scan stable to decrease activity of known thoracolumbar and bony pelvic involvement.  PSA less than 0.1  , AST 74, bilirubin 0.5.       -6/1/2021 [facility] medical oncology follow-up visit: I reviewed the above data with him and images thereof.  Bones are stable.  No significant adenopathy.  PSA immeasurably low.     upper limit of normal 69 and AST 74 upper limit of normal 46.  Hence, neither is more than double the upper limit of normal with no ascites and no encephalopathy and normal albumin and bilirubin, despite the elevation of liver enzymes, he has child Abrams score A.  Package insert for Abiraterone says that for ALT and/or AST greater than 5 times upper limit of normal or total bilirubin greater than 3 times the upper limit of normal to withhold treatment until liver function tests returned to baseline or ALT and AST less than or equal to 2.5 times the upper limit of normal and total bilirubin less than or equal to 1.5 times the upper limit of normal and then reinitiate at 750 mg daily dose of Zytiga.  For recurrent hepatotoxicity on 750 mg daily Zytiga, withhold treatment until liver functions returned to baseline or ALT and AST less than 3-2.5 times upper limit of normal and total bilirubin less than or equal to 1.5 times the upper limit of normal then reinitiate at 500 mg daily Zytiga dose.  If has recurrent hepatotoxicity on 500 mg dose, then discontinue treatment.  If has ALT greater than 3 times the upper limit of normal along with total bilirubin greater than 2 times the upper limit of normal in the absence of  other contributing causes or biliary obstruction, permanently discontinue Zytiga.  Based on these recommendations, I would continue current doses but we will watch with serial labs monthly and repeat his imaging again in 3 months but clinically he is doing wonderfully with excellent response to Taxotere and now on Zytiga prednisone plus Zometa along with Relugolix.    -6/23/2020 for [facility] clinic follow-up: Having persistent and worsening pain above his left hip.  I will get an MRI of the left hip for further evaluation.  Otherwise we will continue therapy unchanged with Zytiga, prednisone and Zometa along with Relugolix.    -7/28/2021 [facility] oncology clinic follow-up: Patient with multiple somatic complaints including episodes of confusion, dizziness, decreased memory, agitation.  He reports ongoing episodes with difficulty swallowing.  Also most concerning for episodes of angina and chest pain for which he basically refused to seek emergency medical attention.  He has a history of coronary artery disease and stent placement.  He has not followed up with cardiology for many years.  I will get an MRI of the brain in light of his confusion, dizziness and agitation.  I will get him to gastroenterology for EGD in light of his dysphagia.  I have also put in a referral for cardiology.  I reemphasized to the patient, his wife who is with him today and his son who was on the telephone during our visit the importance of seeking out prompt medical attention when he is having chest pain or neurological concerns so that he can be evaluated.  The patient states that he basically refuses to go to the emergency room and if his family calls an ambulance he would not agree to go to the hospital.  For the time being, I have asked him to hold his Zytiga and prednisone until we can sort this out as Zytiga does have some cardiovascular risk.  There is likely no treatment for prostate cancer that does not carry some form of  "cardiovascular risk.  His PSA remains low at 0.014 yesterday.  He will continue relugolix for now.    Of note, some of these symptoms could also be due to his hypophosphatemia, phosphate level from yesterday was 1.5, I have sent in a prescription for K-Phos 3 times a day before meals for 10 days.  We will hold further Zometa until this is corrected.  I have also put in an order to check his vitamin D level.  He takes calcium and vitamin D daily.  Some of his symptoms also could be due to drug withdrawal as there was some confusion and difficulty getting his last prescription for methadone therefore he was without methadone for several days, he now has his prescription and is back on his pain medication.  He has an appointment with neurosurgery tomorrow in light of his ongoing back pain, MRI of the hip recently showed multiple bony lesions largest at the L5 vertebral body and left supra-acetabular region.  If no neurosurgical intervention recommended he may benefit from spot radiation as this is where a lot of his pain is coming from.  His wife had questions today regarding his staging and extent of disease.  We reviewed previous scans.  I did clarify with her as she used the words \"cancer free\" as she thought that is what she was told after his CT scans once he completed Taxotere in February.  I explained to her that even though his scans showed he had an excellent response with no clear areas of metastasis that did not mean he was cancer free, I explained to her that when you have metastatic cancer the treatment intent is palliative in nature and to control the disease but not to cure the disease.  She stated understanding.  We will see him back in 2 weeks for follow-up to sort through this and make further plan of care, again, I have asked him to hold Zytiga and prednisone until he sees us back, he will continue on his other medications including relugolix.    -7/30/2021 neurosurgery PA consultation.  MRI with and " without contrast L5 ordered.    -8/3/2021 neurosurgery follow-up showed known sclerotic disease with new L5 abnormality without compression deformity or instability.  MRI brain negative for metastasis.    -8/4/2021 office visit Dr. Fco Quintanilla radiation oncology coordinating with Dr. North neurosurgery for palliative radiation for MRI evidence of L5 and left supra acetabular painful lesions.  States that the patient's disease which is not secreting PSA is experiencing some progression and would benefit from 20 Gy in five fractions and other lesion 30 Gy in ten fractions. Patient offered to go to [place] for radiation but desired treatment in [place].    -8/10/2021 [facility] medical oncology follow-up visit: No chest pains at this junction.  Reviewed MRI brain and other MRIs reviewed by Dr. North and Dwight.  Due for EGD on 19th.  We will repeat his phosphorus along with his other labs continue Relugolix, Zytiga, prednisone, and he will continue radiation palliatively to the painful bony lesions with Dr. Quintanilla/Can.  He will see my nurse practitioner back in a few weeks to see how he is tolerating this and to follow-up on the phosphorus off of Zometa and she will order repeat CT chest abdomen pelvis with contrast and total body bone scan the end of September.I will see him back after that.    -9/8/2021 Zometa resumed.  PSA <0.10    -10/5/2021 [facility] medical oncology follow-up visit: followed-up with radiation oncology 9/21/2021.  Status post symptomatic L5 radiation 5 fractions 20 [place] completed 8/16/2021 with improvement in right hip pain.  9/2/2021 PSA less than 0.1.  9/29/2021 total body bone scan shows increased uptake left iliac bone slightly superior group of the left acetabulum with no additional foci of suspicious abnormality is unlikely treatment related with no new sites of active osseous metastasis.  CT chest abdomen pelvis with contrast shows stable borderline enlarged left periaortic nodes and  dating sclerotic bone lesions.Continue Zytiga, prednisone, Relugolix, Zometa, repeat CT chest abdomen pelvis with contrast and total body bone scan the end of the year.  We will follow PSA serially.    -11/17/2021 [facility] follow-up:  Mr. Lugo overall is doing well.  He is tolerating therapy with Zytiga, prednisone and Relugolix along with Zometa which is given every 6 weeks.  PSA remains low at <0.1.  Has occasional low phosphorus, currently low at 1.7.  Serum calcium is normal at 8.9, normal creatinine 0.79 and normal CBC other than for platelets of 124.  I will hold Zometa for 1 month, I did send in a prescription for phosphorus replacement today.  He takes calcium and vitamin D.  I will see him back in 1 month for follow-up.  We will repeat restaging scans after that visit.    -12/15/2021 [facility] follow-up: Mr. Lugo continues to do well on therapy with Zytiga, prednisone and Relugolix, his PSA remains low at <0.1.  He is continuing to have left lower back pain, he is working with palliative care and they have referred him also to pain management.  Pain management has talked to him about putting in a pain pump however he is leery of this, I told him that this was a safe and effective pain management technique and to certainly give consideration to their recommendations.  His phosphorus is improving however is still a little low, I will hold off on Zometa until we see him back in 1 month, we may end up only giving it every 12 weeks.  We will repeat restaging scans with CT chest, abdomen and pelvis along with total body bone scan prior to return and I have ordered those today.      -1/5/2022 CT chest abdomen pelvis with contrast [facility] showing stable left periaortic adenopathy and unchanged sclerotic thoracic, lumbar spine and pelvis lesions with no new or progressive disease in the chest abdomen or pelvis.  Total body bone scan shows no significant change and no new  suspicious foci.  Stable posterior right acetabulum and left superior acetabulum and degenerative changes in the cervical spine, shoulders, acromioclavicular joints, sternoclavicular joints, elbows, wrists, hands, thoracic spine, lumbosacral spine, sacroiliac joints, hips, knees, ankles, feet.    -1/11/2022 [facility] medical oncology hematology follow-up visit: I reviewed images and reports from his 1/5/2022 scans.  No active disease and PSA immmeasurably low on Zytiga, prednisone, Relugolix.  However, he has struggled with hypophosphatemia and is significantly fatigued with this.  Given that the bones are doing well and given that his phosphorus continues to remain consistently low at 2.2 in mid December, 1.7 mid November and 2.5 mid-September and as low as 1.5 back to July and the likelihood that this is related to his Zometa, given that his bones are stable overall, he will increase from 1200 mg up to 1600 mg of calcium and phosphate a day and we will hold his Zometa for the foreseeable future.  He will see my nurse practitioner back in a month to continue to monitor his phosphorus along with his calcium and other labs and will repeat his scans again in 3 months.  In addition, given his fatigue we will check his ACTH and cortisol though he is taking his prednisone with his Zytiga.  Though his electrolytes are normal, I will keep an eye on the cortisol and ACTH and have these labs not only drawn today but again just prior to return to my nurse practitioner on February 10.    -2/10/2022 [facility] Oncology clinic follow-up: Mr. Lugo overall is stable, no change in his health this past month.  I have reviewed his labs from 2/8/2022 and they are unremarkable, his phosphorus is now normal at 3.2. We are continuing to hold his Zometa, he last received Zometa on 10/5/2021.  He continues therapy with Zytiga, prednisone and Relugolix.  Dr. Rashid had ordered cortisol level and ACTH which are low, currently his ACTH is 2.8 and  cortisol 3.08 however these are both done in the face of ongoing prednisone that he takes with his Zytiga.  We will get him to endocrinology for further evaluation for possible adrenal insufficiency but will not interrupt his treatment in the interim.  He will need restaging scans again in April for a 3-month follow-up.  He will continue to follow with palliative care and pain management for his cancer related pain.  His PSA remains immeasurably low at <0.1 on 2/8/22.    -2/15/2022 went to emergency room with weakness, decreased appetite, myalgias in the setting of known COVID-19 testing positive a few days prior to this visit.  Phosphorus had been low in the past but was normal in the emergency room with unremarkable CBC and CMP.  Chest x-ray unremarkable saturating well on room air mildly hypertensive with dry mucosa.  Given 500 cc crystalloid.  Covid test positive.  Mild thrombocytopenia 136,000 and otherwise unremarkable.  Discharged home.    -3/3/2022 data:  PSA less than 0.1  CMP unremarkable  Cortisol 3.96 normal  ACTH 2.8 lower limit of normal 7.2  Phosphorus normal 2.6    -3/8/2022 [facility] medical oncology follow-up: Suspect big chunk of his fatigue was Covid.  Another part of the fatigue likely related to lack of testosterone.  Not hypophosphatemic off of Zometa.  ACTH running low while on prednisone not surprising but with normal cortisol and I doubt adrenal insufficient which is why he is on prednisone to avoid such while taking Zytiga.  Overall doing fairly well and with immeasurably low PSA, I will have labs done on him early April, see my nurse practitioner early May, and she will order repeat bone scan and CTs the end of May and I will see him back in June.  We will continue to hold the Zometa unless bone lesions progress given symptomatic prior hypophosphatemia on Zometa.  He will keep his appointment April 28 with Dr. Mir Duffy just to be sure that my take on his pituitary/adrenal axis assessment  is accurate.    -4/28/2022 endocrinology consult Dr. Mir Duffy.  With low ACTH and cortisol on prednisone with Zytiga, the adequacy of prednisone cannot be assessed by measuring ACTH or cortisol but is assessed by weight changes, blood pressure, blood sugar, potassium, overall sense of how the patient is doing.  Primary adrenal insufficiency with low ACTH and low cortisol would be typical for the situation as his blood sugar tends to run a little high and potassium a little low, he did not think increasing prednisone was necessary.  He put him on some potassium.  No follow-up scheduled, will see as needed.    -5/4/2022 [facility] Oncology clinic follow-up: Mr. Lugo continues on therapy with Zytiga and prednisone along with Relugolix.  His Zometa has been on hold due to previous hypophosphatemia.  There is some concern about potential adrenal insufficiency. He saw endocrinology, Dr. Mir Duffy on 4/28/2022.  I did review his office note and he stated that the low ACTH and low serum cortisol would be typical for Mr. Lugo's situation.  He further stated that the adequacy of prednisone dose cannot be assessed by measuring ACTH or cortisol but is rather assessed by the overall just altered how the patient is feeling-weight change, blood pressure, blood sugar, potassium, overall sense of wellbeing.  His potassium had been running a little low therefore they put him on potassium 10 mill equivalents daily. Of note, I do not see a future follow-up scheduled with endocrinology.  He is having night sweats, I am not sure if this is related.  His glucose was normal this morning at 107.  His weight is stable.  He will continue current treatment for his prostate cancer unchanged, we will continue to hold his Zometa.  Current phosphorus and magnesium are normal.  CBC and CMP from today are unremarkable, cortisol is normal.  PSA and ACTH are pending  In regards to his night sweats, he had taken clonidine previously 0.1 mg twice daily  as needed, I did send in a short supply again to try and see if this helps.  He also is having indigestion despite being on omeprazole twice daily, I sent a prescription for Pepcid.  He had an EGD August 2021.  We will repeat restaging scans prior to return and I have ordered those today.     -5/24/2022 CT chest abdomen pelvis with contrast[place] negative.  Bone scan shows stable bone metastases.    - 5/25/2022 PSA less than 0.1, platelets 130,000, otherwise unremarkable CBC and CMP.  A.m. cortisol running low 1.2 with phosphorus low 2.3.    -5/31/2022 [facility] medical oncology follow-up: On Zytiga, prednisone, Relugolix, PSA remaining immeasurably low with stable bone scan and no visceral/paulino metastases.  Phosphorus still running low.  I have discontinued his potassium chloride and started potassium phosphate 500 mg 4 times a day.  Unless PSA rising, we will plan on repeating CTs and bone scan in 6 months and will follow with my nurse practitioner in the interim and if symptoms dictate or labs, will get back to Dr. Duffy for management of potential adrenal insufficiency but presently we will just see how he does with potassium phosphate and hopeful improvement in the phosphorus level with time.  We will continue to follow with palliative care for pain management    -7/6/2022 [facility] Oncology clinic follow-up: Mr. Lugo overall is doing well but continues to be troubled with fatigue and hot flashes.  For the most part he states he is able to do the things he wants to do, he continues to work but does have to take more rest periods.  I had a long discussion with him and his wife after reviewing his medications with them as they wanted to see if there was anything he could stop or adjust.  I discussed with him the need to continue on treatment for his prostate cancer even though his PSA remains immeasurably low and his scans look good but that if his medication is stopped the cancer would progress and over time it  still has the potential to progress on therapy but would continue it as long as possible to keep his disease under control.  I explained this is like treating someone with hypertension, you do not stop the antihypertensive just because the blood pressure normalizes.  They stated understanding.  There is no dose adjustment of Zytiga or prednisone that would minimize his symptoms, he is on the current standard recommended therapy.  Discussed with him that sometimes during times of stress we may need to increase his prednisone dose but for now would not change anything.  His phosphorus level is normal, we continue to hold his Zometa.  I did give him the option of holding his phosphorus for the next month and we will see what happens, if it drops we will start him back on it but may be at a lower dose rather than 4 times a day maybe twice a day.  For now he will continue therapy unchanged.  We will continue monitoring labs monthly.  No PSA was drawn this month but that is okay as his PSA has been running immeasurably low at <0.1, we will recheck next month with lab draw.  We will stop checking his ACTH and cortisol level every month, if he develops worsening symptoms I will repeat those.  He has not gotten a follow-up with endocrinology as of yet.    -8/3/2022 [facility] Oncology clinic follow-up:  Mr. Lugo is doing well as far as his prostate cancer goes with continued immeasurably low PSA of <0.1.  He continues on therapy with Zytiga and prednisone along with Relugolix.  His phosphorus is stable at 2.7 which was just slightly low by our reference range however was 2.71-month ago also which was normal on another labs reference range.  He has not taken his phosphorus for this past month as he thought it was making him more fatigued.  He really can tell no difference whether he was taking it or not.  I have asked him to try to go back on phosphorus twice a day rather than 4 times a day and see if this is tolerated and if we  can keep his phosphorus level from dropping.  His biggest complaint today is flareup of his arthralgias and back pain.  He is following with pain management, Dr. Danny Avalos but is requesting a referral back to palliative medicine as he feels that no one is currently listening to him and they are just prescribing the same medications that are not effective according to his report.  He does have a follow-up with Dr. Avalos on 8/12/2022 but due to his current pain is not able to work until after he is seen and hopefully can get some better relief.  I have given him a work release until 15 August.  I have also put in a referral back to palliative care.  Also encouraged him to work with Dr. Avalos for help in resolving his issue.  Apparently they have recommended some type of a pump however he has been hesitant to that but likely would be helpful.  We will continue therapy unchanged.  We will continue monthly labs including phosphorus, we are not currently checking ACTH and cortisol monthly as Dr. Mir Duffy previously stated in his assessment on 4/28/2022 that the adequacy of prednisone cannot be assessed by measuring ACTH or cortisol but would be assessed by symptoms, see note from 4/28/2022.  He made no further follow-up appointments.  Fatigue is not worsening currently.  His glucose and potassium are  Normal.    -10/13/2022 [facility] medical oncology follow-up: Mr. Lugo is doing well.  He is tolerating treatment with Zytiga and prednisone along with Relugolix without any unusual side effects.  His PSA has remained immeasurably low at <0.1.  His phosphorus was slightly low at last visit.  He had been instructed to go back on phosphorus twice a day but he misunderstood and has not been taking it.  We will check labs today.  I will follow-up with results and notify him if he needs to restart the phosphorus.  His pain is better controlled since reestablishing with [facility].  We will repeat scans  prior to next follow-up.  I have ordered CT chest abdomen pelvis along with bone scan.  We will see him back in 1 month.    -10/13/2022 PSA less than 0.014.With unremarkable CBC and CMP.  Phosphorus still a little low 2.3.    -11/4/2022 CT abdomen pelvis chest showed no evidence of disease progression with stable sclerotic bone lesions.  Bone scan stable right greater than left acetabular metastasis.  Stable bone metastases.    -11/8/2022 [facility] medical oncology telemedicine follow-up: Patient states that he feels achy all over with low-grade temps and a cough mildly productive.  Has an appointment later today to see his primary care for testing for flu and COVID.  Suggested that if he were positive for COVID that he get Paxlovid and that if he were positive for flu that he get Tamiflu and to discuss this with his primary care.  From the prostate cancer side, his PSA is immeasurably low with stable bone metastases and no evidence of progression.  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  We will continue the Relugolix, Zytiga, prednisone and he will see my nurse practitioner 12/7/2022.  Would repeat CTs and bone scan in May.    -1/25/2023 [facility] Oncology medicine follow-up: Mr. Lugo is having worsening fatigue and muscle aches.  He does have adrenal insufficiency on Zytiga and prednisone for his prostate cancer, he saw endocrinology in light of low ACTH back in April 2022.  At that time there was no recommendation other than for monitoring him for his overall wellbeing and labs.  He denies any fevers or chills.  His labs as shown above are unremarkable, his CBC and CMP are normal other than for glucose of 112, PSA remains low at <0.1.  He has no new pain or any symptoms concerning for progression of his prostate cancer.  I will get him back to endocrinology to see if they feel any dose adjustment of his prednisone would be helpful in his symptoms.  Currently he is on 5 mg a  day with his Zytiga.  I did not repeat his ACTH or cortisol as they would be expected to be low in this situation.  His potassium is normal, he has had no weight loss or change in his appetite.  Blood sugar is reasonable.  His only complaint currently is worsening fatigue and muscle aches.  He will continue treatment unchanged with Zytiga, prednisone and relugolix.  I will see him back in 1 month for follow-up.  We will repeat his restaging scans in May.    -3/1/2023 [facility] clinic follow-up:  His PSA remains low at <0.1 on treatment with Zytiga and prednisone along with relugolix however he continues to complain of significant fatigue and muscle aches not improving with time.  He states that his quality of life is affected as he is not able to do any work activities due to the fatigue.  He did see endocrinology again and they have increased his prednisone from 5 mg daily up to 7.5 mg daily however so far this has not made any difference in how he feels.  He is supposed to get back in touch with them to let them know how he is doing and to see if there is any other adjustments needed. His labs are unremarkable with normal thyroid function, CMP is normal other than for glucose of 156.  They did check hemoglobin A1c to look for diabetes however that was normal at 5.7.  CMP is unremarkable with just very mild anemia with hemoglobin of 12 and hematocrit 36.1% which would not account for his fatigue.  He does have hot flashes that are fairly significant, I will try venlafaxine as suggested by Dr. Duffy and I appreciate the recommendation.  We will start at 37.5 mg daily and if tolerated he can increase to 75 mg after 1-2 weeks.  I will see him back in 1 months for follow-up.  Plan to repeat scans late April/early May.  His prostate cancer seems under good control with current regimen however I am not sure how long he can tolerate this due to the side effects.    -3/30/2023 [facility] clinic follow-up:    Lugo continues to deal with fatigue.  He appears more cushingoid today, he continues on treatment with Zytiga, prednisone and relugolix.  Prednisone dose currently is 7.5 mg daily.  This has not made any difference in his energy level.  PSA remains immeasurably low at <0.014.  CBC shows new onset of microcytic hypochromic anemia with hemoglobin of 11, hematocrit 34.4%, MCV 75.4, MCH 24.1, WBC is normal at 5.81 with normal platelet count 181,000 and normal differential.  CMP is unremarkable, it is normal other than for glucose of 142.  We will get him to Dr. Gomez for an EGD and colonoscopy for further evaluation in regards to his new onset of anemia.  He has no associated GI symptoms otherwise except for 1 day a few weeks ago he does report that he had fairly sudden onset of vomiting but this was an isolated incident on that day and has not reoccurred since.  Continues to follow with palliative care for management of his chronic back pain.  We will repeat restaging CT chest, abdomen and pelvis along with total body bone scan prior to return and I have ordered that today.    -3/30/2023 Ferritin low 12.3 with iron low 27 and saturation low 5% with total iron binding capacity 511.  3/31/2023 ferrous sulfate 325 mg twice a day every other day with vitamin C started.    - 4/12/2023 CT chest abdomen pelvis with contrast showed no progressive disease with stable sclerotic bone lesions.  Bone scan showed single identifiable bone metastasis stable right acetabulum    -4/18/2023 [facility] hematology oncology follow-up: He now has microcytic iron deficiency anemia which is new and concerning.  Gastroenterologic evaluation has been ordered but not performed and is mandatory.  Continue ferrous sulfate 325 mg twice a day every other day with vitamin C to improve absorption and we will follow his CBC along with his usual labs monthly on Zytiga, Relugolix, prednisone 7.5 mg.  He follows with Dr. Duffy with adrenal insufficiency.  He  will see my nurse practitioner back in a month to see what GI has found and to watch serial CBC along with his other labs including PSA.  I would repeat CT chest abdomen pelvis again in 6 months with contrast along with bone scan and sooner if PSA rises.    -5/3/2023 EGD and colonoscopy with Dr. Alexander Gomez: Superficial erosions in the stomach with gastritis.  Small polyps removed, dilated internal hemorrhoids.  Pathology pending.  Recommend repeat colonoscopy in 5 years.    -5/17/2023 [facility] [place], operated by [facility] Oncology clinic follow-up:  Naveen overall is doing well on current therapy with Zytiga, prednisone and relugolix.  PSA remains immeasurably low at <0.014.  He feels his hip pain is getting worse with time.  It makes it difficult to enjoy activities with his grandchildren as it is worse when he stands for any length of time or tries to carry any weight.  I reviewed his bone scan from April which was stable, we also reviewed previous MRI of the hips from May 2021 that showed moderate bilateral hip osteoarthritis.  I offered to repeat MRI of his hips for evaluation to see if there has been any worsening of his osteoarthritis and also then referral to orthopedics for any recommended intervention however at this time he defers.  He will let me know if he changes his mind.  He also follows with palliative care for management of his cancer related pain.  We will continue therapy unchanged.  We will repeat restaging scans in October unless symptoms dictate the need to do so sooner.  He was recently found to be iron deficient, he had an EGD and colonoscopy on 5/3/2023 with Dr. Gomez, EGD showed superficial erosions in the stomach with gastritis, he continues on omeprazole.  He had small polyps removed from the colon and recommendation to repeat colonoscopy in 5 years.  He is on oral iron along with vitamin C every other day and is tolerating that well.  CBC from yesterday shows hemoglobin now normal at 14.2 and hematocrit 43.2%, MCV  normal at 27.0.    -7/20/2023 [facility] Oncology clinic follow-up: Naveen is doing well on current therapy with Zytiga, prednisone and relugolix.  PSA in June remains low at <0.1.  Continues to deal with chronic pain in his back and hip.  Recently saw Dr. Nikolai Marks and had an injection in his hip and is hoping that it will eventually help with his pain.  He has no new pain.  We will continue current therapy unchanged.  We will repeat labs while he is here today.  I will refill his iron as his pharmacy has changed. Most recent CBC in June with hemoglobin of 14.7 and hematocrit 43.6%.  We will repeat restaging scans in October.    -9/14/2023 [facility] Oncology clinic follow-up: Naveen is tolerating his prostate cancer treatment with Zytiga, prednisone and relugolix.  PSA remains immeasurably low at <0.014 on 8/17/2023.  Currently he is having more GI issues with nausea and intermittent vomiting that occurs often without warning.  He had an EGD and colonoscopy with Dr. Gomez on 5/3/2023, EGD showed superficial erosions in the stomach with gastritis.  He does take omeprazole 40 mg twice daily.  He saw his PCP yesterday for these symptoms and has been referred to gastroenterologist at [facility] and he is awaiting to hear about that appointment.  Hopefully he can get in fairly quickly.  I told him that he should let us know if it is going to be a while before he can be seen and I will get him back to Dr. Gomez for consideration of repeat EGD.  We will get his CBC, CMP and PSA today.  His previous noted anemia had normalized, he currently is not taking oral iron as he ran out.  I will wait and see what his labs show today and likely hold off for now depending on his lab results until his GI issues can be resolved.  We will repeat his restaging CT chest, abdomen and pelvis along with bone scan in the next few weeks and I have ordered those today.  He is taking Zofran as needed for nausea, he is also on meclizine for vertigo.   He is going to follow-up with his PCP in a few weeks regarding his GI symptoms.    -9/14/2023 PSA less than 0.014 with unremarkable CBC and CMP.    -9/28/2023 total body bone scan stable with uptake in right acetabulum, no new areas.  CT chest, abdomen and pelvis show no evidence of disease progression.  -11/9/2023 [facility] clinic follow-up: CT reports were reviewed by Dr. Rashid last month and the patient reports he was called by Dr. Rashid as he could not make his appointment due to concerns over upper respiratory infection, CT scan showed no evidence of disease progression.  PSA has remained low, we are repeating that today along with his CBC and CMP.  He continues to have sinus drainage and congestion, I will send in 7 additional days on his doxycycline that he has been taking, he will follow-up with PCP if no improvement.  He continues to complain of bilateral hip pain, has a follow-up with Dr. Marks next week.  We will continue therapy unchanged with Zytiga, prednisone and relugolix.  Would repeat restaging bone scan and CT scans late March for a 6-month follow-up.    -1/4/24 PSA 1    - 3/28/2024 CT chest abdomen pelvis with contrast compared to May 2013 shows decreasing sclerotic bony metastases.  Bone scan shows similar relative intensity of 1 focal uptake right acetabulum correlating with CT with no new sites of uptake    -4/2/2024 [facility] oncology follow-up: Continued good response to Zytiga prednisone relugolix which he is tolerating.  We will check PSA and labs 6/25/2024 with follow-up with my nurse practitioner and again 3 months after that along with CTs and bone scan 6 months from now.  Not on bisphosphonates or rank ligand inhibitor due to drug-induced hypophosphatemia.  Will ask my nurse practitioner to order bone densitometry with his other scans in 6 months. He is having difficulty emptying his bladder and his prior urologist does not take his insurance so we will make appropriate  referrals.     Prostate cancer metastatic to bone   8/30/2020 -  Other Event    PSA 10.8     9/15/2020 Initial Diagnosis    Prostate cancer metastatic to bone (CMS/HCC)     9/15/2020 Biopsy    Prostate needle core biopsy     9/24/2020 Imaging    Total body bone scan: 4 abnormal areas of increased activity consistent with osteoblastic metastasis, abnormal foci of activity seen in the T12 vertebral body, L1 vertebral body, roof of the left acetabulum, and acetabulum medially on the right.  CT chest: Stable sclerotic metastatic lesions within the T12 and L1 vertebrae.  No other evidence of metastatic disease to the chest.  Stable mild splenomegaly and subcentimeter periaortic retroperitoneal lymph nodes.  CT abdomen and pelvis: Diffuse bladder wall thickening with mild perivesicular fat stranding.  Interval development of several sclerotic lesions in the spine and left iliac bone.     10/13/2020 Cancer Staged    Staging form: Bone - Appendicular Skeleton, Trunk, Skull, And Facial Bones, AJCC 8th Edition  - Clinical: Stage IVB (cT3, cN0, cM1b, G3) - Signed by Ishmael Rashid MD on 10/13/2020     11/3/2020 - 10/5/2021 Chemotherapy    OP SUPPORTIVE Zoledronic Acid Q42d     11/3/2020 - 2/18/2021 Chemotherapy    OP PROSTATE DOCEtaxel       1/4/2021 Imaging    CT chest abdomen pelvis with contrast and whole-body bone scan shows improving less metabolically active bone metastases.  Stable sclerotic T12, L1, and L2 vertebral bodies and bilateral pelvic bones on bone windows with stable subcentimeter para-aortic lymph nodes and no definite progression in the chest abdomen or pelvis.  PSA down to 0.275 after 3 courses of Taxotere.     3/4/2021 Imaging    CT chest, abdomen and pelvis stable, no evidence of disease progression. Total body bone scan with decreased/near resolution of abnormal increased radiotracer uptake associated with the known sclerotic lesions in the thoracolumbar spine and bony pelvis.  No evidence of new  osteoblastic metastases.     3/4/2021 Imaging    CT chest abdomen pelvis with contrast is negative save for stable sclerotic bone lesions and the bone scan shows near resolution of prior bony abnormalities associated with the known sclerotic lesions in the thoracolumbar spine and bony pelvis.  2/17/2021 PSA down to 0.1 from 1.37 October 2020.     3/9/2021 - 3/9/2021 Chemotherapy    OP SUPPORTIVE PROSTATE Relugolix     3/9/2021 -  Chemotherapy    OP PROSTATE Abiraterone / PredniSONE       3/10/2021 -  Chemotherapy    OP PROSTATE Abiraterone / PredniSONE     8/10/2021 - 8/16/2021 Radiation    Radiation OncologyTreatment Course:  Naveen Lugo received 2000 cGy in 5 fractions to L4-sacrum, left acetabulum and right pelvis via External Beam Radiation - EBRT.     11/16/2021 -  Chemotherapy    OP CENTRAL VENOUS ACCESS DEVICE ACCESS, CARE, AND MAINTENANCE (CVAD)     1/5/2022 Imaging    -1/5/2022 CT chest abdomen pelvis with contrast [facility] showing stable left periaortic adenopathy and unchanged sclerotic thoracic, lumbar spine and pelvis lesions with no new or progressive disease in the chest abdomen or pelvis.  Total body bone scan shows no significant change and no new suspicious foci.  Stable posterior right acetabulum and left superior acetabulum and degenerative changes in the cervical spine, shoulders, acromioclavicular joints, sternoclavicular joints, elbows, wrists, hands, thoracic spine, lumbosacral spine, sacroiliac joints, hips, knees, ankles, feet.     5/24/2022 Imaging    CT chest abdomen pelvis with contrastFrankfort negative.  Bone scan shows stable bone metastases.     11/4/2022 Imaging    CT abdomen pelvis chest showed no evidence of disease progression with stable sclerotic bone lesions.  Bone scan stable right greater than left acetabular metastasis.  Stable bone metastases.     4/12/2023 Imaging     CT chest abdomen pelvis with contrast showed no progressive disease with stable sclerotic bone  "lesions.  Bone scan showed single identifiable bone metastasis stable right acetabulum     9/29/2023 Imaging    total body bone scan compared to April showed a single active bone metastasis with multiple and active bone metastases overall stable.  CT chest abdomen and pelvis compared to May and April shows no new bony progression with stable sclerotic bone lesions.         HISTORY OF PRESENT ILLNESS:  The patient is a 68 y.o. male, here for follow up on management of metastatic prostate cancer to the bone with good radiographic response no new somatic complaints except difficulty emptying his bladder and his prior urologist does not take his insurance.    Past Medical History:   Diagnosis Date    Bone cancer     History of radiation therapy 08/16/2021    L4 through sacrum, left acetabulum, right pelvis    Nephrolithiasis     Opioid use disorder, mild, on maintenance therapy (HCC)     Prostate cancer      Past Surgical History:   Procedure Laterality Date    CHOLECYSTECTOMY      CORONARY STENT PLACEMENT  206 & 2011    HERNIA REPAIR  1986    THORACIC DISCECTOMY  1994       Allergies   Allergen Reactions    Cymbalta [Duloxetine Hcl] Dizziness    Gabapentin Nausea Only    Lyrica [Pregabalin] Nausea And Vomiting and Dizziness       Family History and Social History reviewed and changed as necessary    REVIEW OF SYSTEM:   No new somatic complaints.  No biochemical or clinical evidence of adrenal insufficiency    PHYSICAL EXAM:  No jaundice icterus or pallor.  No respiratory distress.  No rashes.    Vitals:    04/02/24 0719   BP: 162/93   Pulse: 86   Resp: 18   Temp: 98.2 °F (36.8 °C)   SpO2: 95%   Weight: 88 kg (194 lb)   Height: 172.7 cm (68\")     Vitals:    04/02/24 0719   PainSc:   8   PainLoc: Back  Comment: HIPS AND BACK          ECOG score: 0           Vitals reviewed.    ECOG: (0) Fully Active - Able to Carry On All Pre-disease Performance Without Restriction    Lab Results   Component Value Date    HGB 13.7 " 01/04/2024    HCT 39.9 01/04/2024    MCV 86 01/04/2024     01/04/2024    WBC 13.7 (H) 01/04/2024    NEUTROABS 11.5 (H) 01/04/2024    LYMPHSABS 1.4 01/04/2024    MONOSABS 0.6 01/04/2024    EOSABS 0.0 01/04/2024    BASOSABS 0.0 01/04/2024       Lab Results   Component Value Date    GLUCOSE 107 (H) 01/04/2024    BUN 7 (L) 01/04/2024    CREATININE 0.62 (L) 01/04/2024     01/04/2024    K 3.0 (L) 01/04/2024    CL 98 01/04/2024    CO2 27 01/04/2024    CALCIUM 8.6 01/04/2024    PROTEINTOT 7.0 02/14/2022    ALBUMIN 3.5 (L) 01/04/2024    BILITOT 0.2 01/04/2024    ALKPHOS 76 01/04/2024    AST 13 01/04/2024    ALT 10 01/04/2024             ASSESSMENT & PLAN:  1.  Stage IVb grade group 4 metastatic prostate cancer with sclerotic bone lesions on CT and bone scan and history of prostatic hypertrophy.  Good response to Taxotere ending 2/18/2021 Relugolix, followed by Zytiga prednisone with L4/sacrum, left acetabulum, and right pelvis external beam radiation August 2021.  Hypophosphatemia on Zometa.  Zometa held as of October 2021.  2.  Kidney stone  3.  Polyps  4.  Probable drug-induced hypophosphatemia from Zometa, drug stopped after 10/5/2021 dose  5.  Cancer related pain seeing palliative care  6.  Fatigue  7.  Covid 2/2022  8.  Iron deficiency anemia  -5/3/2023 EGD and colonoscopy with Dr. Alexander Gomez: Superficial erosions in the stomach with gastritis.  Small polyps removed, dilated internal hemorrhoids.  Pathology pending.  Recommend repeat colonoscopy in 5 years.    -9/30/2020 office note from Dr. Ronen Reynaga indicates patient with PSA 10.8.  Underwent TRUS prostate biopsy 9/15/2020.  Adenocarcinoma the prostate [place] 4+4 = 8 in 1 out of 12 cores and 4+3 = 7 and 10 out of 12 cores and 3+4 = 7 in 1 out of 12 cores.  Perineural invasion.  Extraprostatic extension into the fat seen on 2 biopsies of the right lateral mid and right apex.  9/24/2020 CT chest and whole-body bone scan showed T12, L1, left  acetabular, right medial acetabular metastases with no lung metastases.  He started androgen deprivation therapy with Casodex with plans for Lupron to start in 1 to 2 weeks for clinical T3 N0 M1 metastatic prostate cancer.  Review of official report from [facility] 9/24/2020 bone scan mentions T12, L1, roof of left acetabulum and medial right acetabular osteoblastic metastases.  CT chest with contrast that same day showed mild splenomegaly 14.2 cm with stable periaortic nodes compared to CT December 2015 with no lung metastases.  Sclerotic abnormality within the T12 vertebral body, L1 vertebral body extending into the pedicle unchanged.  8/28/2020 CT abdomen pelvis report from [facility] [facility] reviewed and mentions normal spleen size.  Punctate nonobstructing kidney stone, no paulino enlargement, diffuse bladder wall thickening with mild perivesicular fat stranding, 2.1 cm sclerotic left iliac bone above the left acetabulum new compared to 2015 as well as 1.5 cm faintly sclerotic focus in the right posterior acetabulum stable compared to prior CT 2015 as well as a 1.6 cm T12 and inferior vertebral body sclerotic lesion and a 1.9 cm left posterior lateral L1 vertebral body abnormality extending to the pedicle.  -10/13/2020 initial [facility] consultation: With 1 Sarika score 8, 4+4 lesion that would make him a grade group 4 with stage IVb disease given radiographic evidence of sclerotic bone metastasis on bone scan and CT.  Needs genetic testing given metastatic prostate cancer and this is also important as, were he to have mismatch repair mutation then we would have options for PDL 1 inhibitors and were he BRCA mutated would have options for PARP inhibitors in the future.  Systemic therapy for castration naïve prostate cancer or N1 disease should be with apalutamide or Abiraterone or enzalutamide all of which are category 1.  He does not have any visceral metastases.  According to his imaging  he has T12, L1, left acetabular, right acetabular, and left iliac bony involvement.  That means he would have 4 or more bone metastases with at least one metastasis (i.e. the acetabular lesions bilaterally) beyond the pelvis/vertebral, for which this would be considered high-volume disease for which 6 cycles of docetaxel 75 mg/m² x 6 cycles followed by Zytiga and prednisone would be reasonable along with Zometa every 6 weeks for bone stabilization.  He will do chemo preparation visit with my nurse practitioner prior to start chemotherapy with Taxotere and Zometa in 2 weeks and he is already received Casodex in preparation for Lupron shot he received on 10/12/2020 with Dr. Reynaga.  We will get him to Dr. Alexander Gomez who has done his polypectomies in the past for port placement and we will get genetic counseling started.  Following the 6 courses of Taxotere per the Chaarted trial criteria, I would then give him an indefinite Zytiga and prednisone and Zometa until progression or toxicity dictates.    -12/16/2019 COVID-19 antigen test negative    -12/29/2020 PSA 0.275 with absolute neutrophil count 700 and creatinine 0.76 with normal liver enzymes and electrolytes.  Normal magnesium and phosphorus and urine drug screen positive for buprenorphine and opiates follow-up with palliative care.    -1/4/2021 CT chest abdomen pelvis with contrast and whole-body bone scan shows improving less metabolically active bone metastases.  Stable sclerotic T12, L1, and L2 vertebral bodies and bilateral pelvic bones on bone windows with stable subcentimeter para-aortic lymph nodes and no definite progression in the chest abdomen or pelvis.    -1/5/2021 [facility] medical oncology follow-up visit: I reviewed the images and reports thereof of the above CT and bone scan which shows good response to Taxotere x3 courses with a PSA down to 0.275 from a baseline of 10.  Get another 3 courses of Taxotere, continue his Xgeva, and then following  that we will switch to Zytiga and prednisone.  He is working with palliative care on his bone pain but on his current dose of fentanyl patch he says his pain is still not adequately controlled he will contact them.  Dexamethasone prescription was refilled.  We will see my nurse practitioner back in 3 weeks for course #5 of 6 total courses and then we will reimage with CT chest abdomen pelvis and bone scan before going to the Zytiga prednisone.    -3/4/2021 CT chest abdomen pelvis with contrast is negative save for stable sclerotic bone lesions and the bone scan shows near resolution of prior bony abnormalities associated with the known sclerotic lesions in the thoracolumbar spine and bony pelvis.    2/17/2021 PSA down to 0.1 from 1.37 October 2020.  3/9/2021 PSA 0.1    -5/26/2021 CT chest abdomen pelvis with contrast shows stable to slightly underlying increase low-density left para-aortic node.  Bone lesions stable.  Whole-body bone scan stable to decrease activity of known thoracolumbar and bony pelvic involvement.  PSA less than 0.1  , AST 74, bilirubin 0.5.       -6/1/2021 [facility] medical oncology follow-up visit: I reviewed the above data with him and images thereof.  Bones are stable.  No significant adenopathy.  PSA immeasurably low.     upper limit of normal 69 and AST 74 upper limit of normal 46.  Hence, neither is more than double the upper limit of normal with no ascites and no encephalopathy and normal albumin and bilirubin, despite the elevation of liver enzymes, he has child Abrams score A.  Package insert for Abiraterone says that for ALT and/or AST greater than 5 times upper limit of normal or total bilirubin greater than 3 times the upper limit of normal to withhold treatment until liver function tests returned to baseline or ALT and AST less than or equal to 2.5 times the upper limit of normal and total bilirubin less than or equal to 1.5 times the upper limit of normal and then  reinitiate at 750 mg daily dose of Zytiga.  For recurrent hepatotoxicity on 750 mg daily Zytiga, withhold treatment until liver functions returned to baseline or ALT and AST less than 3-2.5 times upper limit of normal and total bilirubin less than or equal to 1.5 times the upper limit of normal then reinitiate at 500 mg daily Zytiga dose.  If has recurrent hepatotoxicity on 500 mg dose, then discontinue treatment.  If has ALT greater than 3 times the upper limit of normal along with total bilirubin greater than 2 times the upper limit of normal in the absence of other contributing causes or biliary obstruction, permanently discontinue Zytiga.  Based on these recommendations, I would continue current doses but we will watch with serial labs monthly and repeat his imaging again in 3 months but clinically he is doing wonderfully with excellent response to Taxotere and now on Zytiga prednisone plus Zometa along with Relugolix.    -6/23/2020 for [facility] follow-up: Having persistent and worsening pain above his left hip.  I will get an MRI of the left hip for further evaluation.  Otherwise we will continue therapy unchanged with Zytiga, prednisone and Zometa along with Relugolix.    -7/28/2021 [facility] clinic follow-up: Patient with multiple somatic complaints including episodes of confusion, dizziness, decreased memory, agitation.  He reports ongoing episodes with difficulty swallowing.  Also most concerning for episodes of angina and chest pain for which he basically refused to seek emergency medical attention.  He has a history of coronary artery disease and stent placement.  He has not followed up with cardiology for many years.  I will get an MRI of the brain in light of his confusion, dizziness and agitation.  I will get him to gastroenterology for EGD in light of his dysphagia.  I have also put in a referral for cardiology.  I reemphasized to the patient, his wife who is with him today and  "his son who was on the telephone during our visit the importance of seeking out prompt medical attention when he is having chest pain or neurological concerns so that he can be evaluated.  The patient states that he basically refuses to go to the emergency room and if his family calls an ambulance he would not agree to go to the hospital.  For the time being, I have asked him to hold his Zytiga and prednisone until we can sort this out as Zytiga does have some cardiovascular risk.  There is likely no treatment for prostate cancer that does not carry some form of cardiovascular risk.  His PSA remains low at 0.014 yesterday.  He will continue relugolix for now.    Of note, some of these symptoms could also be due to his hypophosphatemia, phosphate level from yesterday was 1.5, I have sent in a prescription for K-Phos 3 times a day before meals for 10 days.  We will hold further Zometa until this is corrected.  I have also put in an order to check his vitamin D level.  He takes calcium and vitamin D daily.  Some of his symptoms also could be due to drug withdrawal as there was some confusion and difficulty getting his last prescription for methadone therefore he was without methadone for several days, he now has his prescription and is back on his pain medication.  He has an appointment with neurosurgery tomorrow in light of his ongoing back pain, MRI of the hip recently showed multiple bony lesions largest at the L5 vertebral body and left supra-acetabular region.  If no neurosurgical intervention recommended he may benefit from spot radiation as this is where a lot of his pain is coming from.  His wife had questions today regarding his staging and extent of disease.  We reviewed previous scans.  I did clarify with her as she used the words \"cancer free\" as she thought that is what she was told after his CT scans once he completed Taxotere in February.  I explained to her that even though his scans showed he had an " excellent response with no clear areas of metastasis that did not mean he was cancer free, I explained to her that when you have metastatic cancer the treatment intent is palliative in nature and to control the disease but not to cure the disease.  She stated understanding.  We will see him back in 2 weeks for follow-up to sort through this and make further plan of care, again, I have asked him to hold Zytiga and prednisone until he sees us back, he will continue on his other medications including relugolix.    -7/30/2021 neurosurgery PA consultation.  MRI with and without contrast L5 ordered.    -8/3/2021 neurosurgery follow-up showed known sclerotic disease with new L5 abnormality without compression deformity or instability.  MRI brain negative for metastasis.    -8/4/2021 office visit Dr. Fco Quintanilla radiation oncology coordinating with Dr. North neurosurgery for palliative radiation for MRI evidence of L5 and left supra acetabular painful lesions.  States that the patient's disease which is not secreting PSA is experiencing some progression and would benefit from 20 Gy in five fractions and other lesion 30 Gy in ten fractions. Patient offered to go to [place] for radiation but desired treatment in [place].    -8/10/2021 [facility] medical oncology follow-up visit: No chest pains at this junction.  Reviewed MRI brain and other MRIs reviewed by Dr. North and Dwight.  Due for EGD on 19th.  We will repeat his phosphorus along with his other labs continue Relugolix, Zytiga, prednisone, and he will continue radiation palliatively to the painful bony lesions with Dr. Quintanilla/Can.  He will see my nurse practitioner back in a few weeks to see how he is tolerating this and to follow-up on the phosphorus off of Zometa and she will order repeat CT chest abdomen pelvis with contrast and total body bone scan the end of September.I will see him back after that.    -9/8/2021 Zometa resumed.  PSA <0.10    -10/5/2021 [facility]  medical oncology follow-up visit: followed-up with radiation oncology 9/21/2021.  Status post symptomatic L5 radiation 5 fractions 20 [place] completed 8/16/2021 with improvement in right hip pain.  9/2/2021 PSA less than 0.1.  9/29/2021 total body bone scan shows increased uptake left iliac bone slightly superior group of the left acetabulum with no additional foci of suspicious abnormality is unlikely treatment related with no new sites of active osseous metastasis.  CT chest abdomen pelvis with contrast shows stable borderline enlarged left periaortic nodes and dating sclerotic bone lesions.Continue Zytiga, prednisone, Relugolix, Zometa, repeat CT chest abdomen pelvis with contrast and total body bone scan the end of the year.  We will follow PSA serially.    -11/17/2021 [facility] follow-up:  Mr. Lugo overall is doing well.  He is tolerating therapy with Zytiga, prednisone and Relugolix along with Zometa which is given every 6 weeks.  PSA remains low at <0.1.  Has occasional low phosphorus, currently low at 1.7.  Serum calcium is normal at 8.9, normal creatinine 0.79 and normal CBC other than for platelets of 124.  I will hold Zometa for 1 month, I did send in a prescription for phosphorus replacement today.  He takes calcium and vitamin D.  I will see him back in 1 month for follow-up.  We will repeat restaging scans after that visit.    -12/15/2021 [facility] follow-up: Mr. Lugo continues to do well on therapy with Zytiga, prednisone and Relugolix, his PSA remains low at <0.1.  He is continuing to have left lower back pain, he is working with palliative care and they have referred him also to pain management.  Pain management has talked to him about putting in a pain pump however he is leery of this, I told him that this was a safe and effective pain management technique and to certainly give consideration to their recommendations.  His phosphorus is improving however is still a little  low, I will hold off on Zometa until we see him back in 1 month, we may end up only giving it every 12 weeks.  We will repeat restaging scans with CT chest, abdomen and pelvis along with total body bone scan prior to return and I have ordered those today.      -1/5/2022 CT chest abdomen pelvis with contrast [facility] showing stable left periaortic adenopathy and unchanged sclerotic thoracic, lumbar spine and pelvis lesions with no new or progressive disease in the chest abdomen or pelvis.  Total body bone scan shows no significant change and no new suspicious foci.  Stable posterior right acetabulum and left superior acetabulum and degenerative changes in the cervical spine, shoulders, acromioclavicular joints, sternoclavicular joints, elbows, wrists, hands, thoracic spine, lumbosacral spine, sacroiliac joints, hips, knees, ankles, feet.    -1/11/2022 [facility] oncology hematology follow-up visit: I reviewed images and reports from his 1/5/2022 scans.  No active disease and PSA immmeasurably low on Zytiga, prednisone, Relugolix.  However, he has struggled with hypophosphatemia and is significantly fatigued with this.  Given that the bones are doing well and given that his phosphorus continues to remain consistently low at 2.2 in mid December, 1.7 mid November and 2.5 mid-September and as low as 1.5 back to July and the likelihood that this is related to his Zometa, given that his bones are stable overall, he will increase from 1200 mg up to 1600 mg of calcium and phosphate a day and we will hold his Zometa for the foreseeable future.  He will see my nurse practitioner back in a month to continue to monitor his phosphorus along with his calcium and other labs and will repeat his scans again in 3 months.  In addition, given his fatigue we will check his ACTH and cortisol though he is taking his prednisone with his Zytiga.  Though his electrolytes are normal, I will keep an eye on the cortisol and ACTH  and have these labs not only drawn today but again just prior to return to my nurse practitioner on February 10.    -2/10/2022 [facility] follow-up: Mr. Lugo overall is stable, no change in his health this past month.  I have reviewed his labs from 2/8/2022 and they are unremarkable, his phosphorus is now normal at 3.2. We are continuing to hold his Zometa, he last received Zometa on 10/5/2021.  He continues therapy with Zytiga, prednisone and Relugolix.  Dr. Rashid had ordered cortisol level and ACTH which are low, currently his ACTH is 2.8 and cortisol 3.08 however these are both done in the face of ongoing prednisone that he takes with his Zytiga.  We will get him to endocrinology for further evaluation for possible adrenal insufficiency but will not interrupt his treatment in the interim.  He will need restaging scans again in April for a 3-month follow-up.  He will continue to follow with palliative care and pain management for his cancer related pain.  His PSA remains immeasurably low at <0.1 on 2/8/22.    -2/15/2022 went to emergency room with weakness, decreased appetite, myalgias in the setting of known COVID-19 testing positive a few days prior to this visit.  Phosphorus had been low in the past but was normal in the emergency room with unremarkable CBC and CMP.  Chest x-ray unremarkable saturating well on room air mildly hypertensive with dry mucosa.  Given 500 cc crystalloid.  Covid test positive.  Mild thrombocytopenia 136,000 and otherwise unremarkable.  Discharged home.    -3/3/2022 data:  PSA less than 0.1  CMP unremarkable  Cortisol 3.96 normal  ACTH 2.8 lower limit of normal 7.2  Phosphorus normal 2.6    -3/8/2022 [facility] follow-up: Suspect big chunk of his fatigue was Covid.  Another part of the fatigue likely related to lack of testosterone.  Not hypophosphatemic off of Zometa.  ACTH running low while on prednisone not surprising but with normal cortisol and I doubt  adrenal insufficient which is why he is on prednisone to avoid such while taking Zytiga.  Overall doing fairly well and with immeasurably low PSA, I will have labs done on him early April, see my nurse practitioner early May, and she will order repeat bone scan and CTs the end of May and I will see him back in June.  We will continue to hold the Zometa unless bone lesions progress given symptomatic prior hypophosphatemia on Zometa.  He will keep his appointment April 28 with Dr. Mir Duffy just to be sure that my take on his pituitary/adrenal axis assessment is accurate.    -4/28/2022 endocrinology consult Dr. Mir Duffy.  With low ACTH and cortisol on prednisone with Zytiga, the adequacy of prednisone cannot be assessed by measuring ACTH or cortisol but is assessed by weight changes, blood pressure, blood sugar, potassium, overall sense of how the patient is doing.  Primary adrenal insufficiency with low ACTH and low cortisol would be typical for the situation as his blood sugar tends to run a little high and potassium a little low, he did not think increasing prednisone was necessary.  He put him on some potassium.  No follow-up scheduled, will see as needed.    -5/4/2022 [facility] follow-up: Mr. Lugo continues on therapy with Zytiga and prednisone along with Relugolix.  His Zometa has been on hold due to previous hypophosphatemia.  There is some concern about potential adrenal insufficiency. He saw endocrinology, Dr. Mir Duffy on 4/28/2022.  I did review his office note and he stated that the low ACTH and low serum cortisol would be typical for Mr. Lugo's situation.  He further stated that the adequacy of prednisone dose cannot be assessed by measuring ACTH or cortisol but is rather assessed by the overall just altered how the patient is feeling-weight change, blood pressure, blood sugar, potassium, overall sense of wellbeing.  His potassium had been running a little low therefore they put him on  potassium 10 mill equivalents daily. Of note, I do not see a future follow-up scheduled with endocrinology.  He is having night sweats, I am not sure if this is related.  His glucose was normal this morning at 107.  His weight is stable.  He will continue current treatment for his prostate cancer unchanged, we will continue to hold his Zometa.  Current phosphorus and magnesium are normal.  CBC and CMP from today are unremarkable, cortisol is normal.  PSA and ACTH are pending  In regards to his night sweats, he had taken clonidine previously 0.1 mg twice daily as needed, I did send in a short supply again to try and see if this helps.  He also is having indigestion despite being on omeprazole twice daily, I sent a prescription for Pepcid.  He had an EGD August 2021.  We will repeat restaging scans prior to return and I have ordered those today.     -5/24/2022 CT chest abdomen pelvis with contrast[place] negative.  Bone scan shows stable bone metastases.    - 5/25/2022 PSA less than 0.1, platelets 130,000, otherwise unremarkable CBC and CMP.  A.m. cortisol running low 1.2 with phosphorus low 2.3.    -5/31/2022 [facility] follow-up: On Zytiga, prednisone, Relugolix, PSA remaining immeasurably low with stable bone scan and no visceral/paulino metastases.  Phosphorus still running low.  I have discontinued his potassium chloride and started potassium phosphate 500 mg 4 times a day.  Unless PSA rising, we will plan on repeating CTs and bone scan in 6 months and will follow with my nurse practitioner in the interim and if symptoms dictate or labs, will get back to Dr. Duffy for management of potential adrenal insufficiency but presently we will just see how he does with potassium phosphate and hopeful improvement in the phosphorus level with time.  We will continue to follow with palliative care for pain management    -7/6/2022 [facility] follow-up: Mr. Lugo overall is doing well but continues to  be troubled with fatigue and hot flashes.  For the most part he states he is able to do the things he wants to do, he continues to work but does have to take more rest periods.  I had a long discussion with him and his wife after reviewing his medications with them as they wanted to see if there was anything he could stop or adjust.  I discussed with him the need to continue on treatment for his prostate cancer even though his PSA remains immeasurably low and his scans look good but that if his medication is stopped the cancer would progress and over time it still has the potential to progress on therapy but would continue it as long as possible to keep his disease under control.  I explained this is like treating someone with hypertension, you do not stop the antihypertensive just because the blood pressure normalizes.  They stated understanding.  There is no dose adjustment of Zytiga or prednisone that would minimize his symptoms, he is on the current standard recommended therapy.  Discussed with him that sometimes during times of stress we may need to increase his prednisone dose but for now would not change anything.  His phosphorus level is normal, we continue to hold his Zometa.  I did give him the option of holding his phosphorus for the next month and we will see what happens, if it drops we will start him back on it but may be at a lower dose rather than 4 times a day maybe twice a day.  For now he will continue therapy unchanged.  We will continue monitoring labs monthly.  No PSA was drawn this month but that is okay as his PSA has been running immeasurably low at <0.1, we will recheck next month with lab draw.  We will stop checking his ACTH and cortisol level every month, if he develops worsening symptoms I will repeat those.  He has not gotten a follow-up with endocrinology as of yet.    -8/3/2022 [facility] clinic follow-up:  Mr. Lugo is doing well as far as his prostate cancer goes with  continued immeasurably low PSA of <0.1.  He continues on therapy with Zytiga and prednisone along with Relugolix.  His phosphorus is stable at 2.7 which was just slightly low by our reference range however was 2.71-month ago also which was normal on another labs reference range.  He has not taken his phosphorus for this past month as he thought it was making him more fatigued.  He really can tell no difference whether he was taking it or not.  I have asked him to try to go back on phosphorus twice a day rather than 4 times a day and see if this is tolerated and if we can keep his phosphorus level from dropping.  His biggest complaint today is flareup of his arthralgias and back pain.  He is following with pain management, Dr. Danny Avalos but is requesting a referral back to palliative medicine as he feels that no one is currently listening to him and they are just prescribing the same medications that are not effective according to his report.  He does have a follow-up with Dr. Avalos on 8/12/2022 but due to his current pain is not able to work until after he is seen and hopefully can get some better relief.  I have given him a work release until 15 August.  I have also put in a referral back to palliative care.  Also encouraged him to work with Dr. Avalos for help in resolving his issue.  Apparently they have recommended some type of a pump however he has been hesitant to that but likely would be helpful.  We will continue therapy unchanged.  We will continue monthly labs including phosphorus, we are not currently checking ACTH and cortisol monthly as Dr. Mir Duffy previously stated in his assessment on 4/28/2022 that the adequacy of prednisone cannot be assessed by measuring ACTH or cortisol but would be assessed by symptoms, see note from 4/28/2022.  He made no further follow-up appointments.  Fatigue is not worsening currently.  His glucose and potassium are  Normal.    -10/13/2022 [facility] medical oncology  follow-up: Mr. Lugo is doing well.  He is tolerating treatment with Zytiga and prednisone along with Relugolix without any unusual side effects.  His PSA has remained immeasurably low at <0.1.  His phosphorus was slightly low at last visit.  He had been instructed to go back on phosphorus twice a day but he misunderstood and has not been taking it.  We will check labs today.  I will follow-up with results and notify him if he needs to restart the phosphorus.  His pain is better controlled since reestablishing with [facility].  We will repeat scans prior to next follow-up.  I have ordered CT chest abdomen pelvis along with bone scan.  We will see him back in 1 month.    -10/13/2022 PSA less than 0.014.With unremarkable CBC and CMP.  Phosphorus still a little low 2.3.    -11/4/2022 CT abdomen pelvis chest showed no evidence of disease progression with stable sclerotic bone lesions.  Bone scan stable right greater than left acetabular metastasis.  Stable bone metastases.    -11/8/2022 [facility] [place] medical oncology telemedicine follow-up: Patient states that he feels achy all over with low-grade temps and a cough mildly productive.  Has an appointment later today to see his primary care for testing for flu and COVID.  Suggested that if he were positive for COVID that he get Paxlovid and that if he were positive for flu that he get Tamiflu and to discuss this with his primary care.  From the prostate cancer side, his PSA is immeasurably low with stable bone metastases and no evidence of progression.  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  We will continue the Relugolix, Zytiga, prednisone and he will see my nurse practitioner 12/7/2022.  Would repeat CTs and bone scan in May.    -1/25/2023 [facility] [place] Oncology medicine follow-up: Mr. Lugo is having worsening fatigue and muscle aches.  He does have adrenal insufficiency on Zytiga and prednisone for his prostate cancer,  he saw endocrinology in light of low ACTH back in April 2022.  At that time there was no recommendation other than for monitoring him for his overall wellbeing and labs.  He denies any fevers or chills.  His labs as shown above are unremarkable, his CBC and CMP are normal other than for glucose of 112, PSA remains low at <0.1.  He has no new pain or any symptoms concerning for progression of his prostate cancer.  I will get him back to endocrinology to see if they feel any dose adjustment of his prednisone would be helpful in his symptoms.  Currently he is on 5 mg a day with his Zytiga.  I did not repeat his ACTH or cortisol as they would be expected to be low in this situation.  His potassium is normal, he has had no weight loss or change in his appetite.  Blood sugar is reasonable.  His only complaint currently is worsening fatigue and muscle aches.  He will continue treatment unchanged with Zytiga, prednisone and relugolix.  I will see him back in 1 month for follow-up.  We will repeat his restaging scans in May.    -3/1/2023 [facility] follow-up:  His PSA remains low at <0.1 on treatment with Zytiga and prednisone along with relugolix however he continues to complain of significant fatigue and muscle aches not improving with time.  He states that his quality of life is affected as he is not able to do any work activities due to the fatigue.  He did see endocrinology again and they have increased his prednisone from 5 mg daily up to 7.5 mg daily however so far this has not made any difference in how he feels.  He is supposed to get back in touch with them to let them know how he is doing and to see if there is any other adjustments needed. His labs are unremarkable with normal thyroid function, CMP is normal other than for glucose of 156.  They did check hemoglobin A1c to look for diabetes however that was normal at 5.7.  CMP is unremarkable with just very mild anemia with hemoglobin of 12 and  hematocrit 36.1% which would not account for his fatigue.  He does have hot flashes that are fairly significant, I will try venlafaxine as suggested by Dr. Duffy and I appreciate the recommendation.  We will start at 37.5 mg daily and if tolerated he can increase to 75 mg after 1-2 weeks.  I will see him back in 1 months for follow-up.  Plan to repeat scans late April/early May.  His prostate cancer seems under good control with current regimen however I am not sure how long he can tolerate this due to the side effects.    -3/30/2023 [facility] Oncology clinic follow-up:  Mr. Lugo continues to deal with fatigue.  He appears more cushingoid today, he continues on treatment with Zytiga, prednisone and relugolix.  Prednisone dose currently is 7.5 mg daily.  This has not made any difference in his energy level.  PSA remains immeasurably low at <0.014.  CBC shows new onset of microcytic hypochromic anemia with hemoglobin of 11, hematocrit 34.4%, MCV 75.4, MCH 24.1, WBC is normal at 5.81 with normal platelet count 181,000 and normal differential.  CMP is unremarkable, it is normal other than for glucose of 142.  We will get him to Dr. Gomez for an EGD and colonoscopy for further evaluation in regards to his new onset of anemia.  He has no associated GI symptoms otherwise except for 1 day a few weeks ago he does report that he had fairly sudden onset of vomiting but this was an isolated incident on that day and has not reoccurred since.  Continues to follow with palliative care for management of his chronic back pain.  We will repeat restaging CT chest, abdomen and pelvis along with total body bone scan prior to return and I have ordered that today.    -3/30/2023 Ferritin low 12.3 with iron low 27 and saturation low 5% with total iron binding capacity 511.  3/31/2023 ferrous sulfate 325 mg twice a day every other day with vitamin C started.    - 4/12/2023 CT chest abdomen pelvis with contrast showed no progressive disease  with stable sclerotic bone lesions.  Bone scan showed single identifiable bone metastasis stable right acetabulum    -4/18/2023 [facility] hematology oncology follow-up: He now has microcytic iron deficiency anemia which is new and concerning.  Gastroenterologic evaluation has been ordered but not performed and is mandatory.  Continue ferrous sulfate 325 mg twice a day every other day with vitamin C to improve absorption and we will follow his CBC along with his usual labs monthly on Zytiga, Relugolix, prednisone 7.5 mg.  He follows with Dr. Duffy with adrenal insufficiency.  He will see my nurse practitioner back in a month to see what GI has found and to watch serial CBC along with his other labs including PSA.  I would repeat CT chest abdomen pelvis again in 6 months with contrast along with bone scan and sooner if PSA rises.    -5/3/2023 EGD and colonoscopy with Dr. Alexander Gomez: Superficial erosions in the stomach with gastritis.  Small polyps removed, dilated internal hemorrhoids.  Pathology pending.  Recommend repeat colonoscopy in 5 years.    -5/17/2023 [facility] Oncology clinic follow-up:  Naveen overall is doing well on current therapy with Zytiga, prednisone and relugolix.  PSA remains immeasurably low at <0.014.  He feels his hip pain is getting worse with time.  It makes it difficult to enjoy activities with his grandchildren as it is worse when he stands for any length of time or tries to carry any weight.  I reviewed his bone scan from April which was stable, we also reviewed previous MRI of the hips from May 2021 that showed moderate bilateral hip osteoarthritis.  I offered to repeat MRI of his hips for evaluation to see if there has been any worsening of his osteoarthritis and also then referral to orthopedics for any recommended intervention however at this time he defers.  He will let me know if he changes his mind.  He also follows with palliative care for management of his cancer related pain.  We will  continue therapy unchanged.  We will repeat restaging scans in October unless symptoms dictate the need to do so sooner.  He was recently found to be iron deficient, he had an EGD and colonoscopy on 5/3/2023 with Dr. Gomez, EGD showed superficial erosions in the stomach with gastritis, he continues on omeprazole.  He had small polyps removed from the colon and recommendation to repeat colonoscopy in 5 years.  He is on oral iron along with vitamin C every other day and is tolerating that well.  CBC from yesterday shows hemoglobin now normal at 14.2 and hematocrit 43.2%, MCV normal at 27.0.    -7/20/2023 [facility] Oncology clinic follow-up: Naveen is doing well on current therapy with Zytiga, prednisone and relugolix.  PSA in June remains low at <0.1.  Continues to deal with chronic pain in his back and hip.  Recently saw Dr. Nikolai Marks and had an injection in his hip and is hoping that it will eventually help with his pain.  He has no new pain.  We will continue current therapy unchanged.  We will repeat labs while he is here today.  I will refill his iron as his pharmacy has changed. Most recent CBC in June with hemoglobin of 14.7 and hematocrit 43.6%.  We will repeat restaging scans in October.    -9/14/2023 [facility] Oncology clinic follow-up: Naveen is tolerating his prostate cancer treatment with Zytiga, prednisone and relugolix.  PSA remains immeasurably low at <0.014 on 8/17/2023.  Currently he is having more GI issues with nausea and intermittent vomiting that occurs often without warning.  He had an EGD and colonoscopy with Dr. Gomez on 5/3/2023, EGD showed superficial erosions in the stomach with gastritis.  He does take omeprazole 40 mg twice daily.  He saw his PCP yesterday for these symptoms and has been referred to gastroenterologist at [facility] and he is awaiting to hear about that appointment.  Hopefully he can get in fairly quickly.  I told him that he should let us know if it is going to be a while  before he can be seen and I will get him back to Dr. Gomez for consideration of repeat EGD.  We will get his CBC, CMP and PSA today.  His previous noted anemia had normalized, he currently is not taking oral iron as he ran out.  I will wait and see what his labs show today and likely hold off for now depending on his lab results until his GI issues can be resolved.  We will repeat his restaging CT chest, abdomen and pelvis along with bone scan in the next few weeks and I have ordered those today.  He is taking Zofran as needed for nausea, he is also on meclizine for vertigo.  He is going to follow-up with his PCP in a few weeks regarding his GI symptoms.    -9/14/2023 PSA less than 0.014 with unremarkable CBC and CMP.    -9/28/2023 total body bone scan stable with uptake in right acetabulum, no new areas.  CT chest, abdomen and pelvis show no evidence of disease progression.  -11/9/2023 [facility] follow-up: CT reports were reviewed by Dr. Rashid last month and the patient reports he was called by Dr. Rashid as he could not make his appointment due to concerns over upper respiratory infection, CT scan showed no evidence of disease progression.  PSA has remained low, we are repeating that today along with his CBC and CMP.  He continues to have sinus drainage and congestion, I will send in 7 additional days on his doxycycline that he has been taking, he will follow-up with PCP if no improvement.  He continues to complain of bilateral hip pain, has a follow-up with Dr. Marks next week.  We will continue therapy unchanged with Zytiga, prednisone and relugolix.  Would repeat restaging bone scan and CT scans late March for a 6-month follow-up.    -1/4/24 PSA 1    - 3/28/2024 CT chest abdomen pelvis with contrast compared to May 2013 shows decreasing sclerotic bony metastases.  Bone scan shows similar relative intensity of 1 focal uptake right acetabulum correlating with CT with no new sites of  uptake    -4/2/2024 [facility] medical oncology follow-up: Continued good response to Zytiga prednisone relugolix which he is tolerating.  We will check PSA and labs 6/25/2024 with follow-up with my nurse practitioner and again 3 months after that along with CTs and bone scan 6 months from now.  Not on bisphosphonates or rank ligand inhibitor due to drug-induced hypophosphatemia.  Will ask my nurse practitioner to order bone densitometry with his other scans in 6 months.  He is having difficulty emptying his bladder and his prior urologist does not take his insurance so we will make appropriate referrals.    Total time of care today inclusive of time spent today prior to patient's arrival reviewing interval images and reports thereof and interval data and during visit translating that information to the patient setting him up for his monthly labs and interviewing him as to signs or symptoms of his disease and management thereof and after visit instituting this plan took 40 minutes of patient care time throughout the day today.  Ishmael Rashid MD    04/02/2024

## 2024-04-03 LAB
ALBUMIN SERPL-MCNC: 3.8 G/DL (ref 3.5–5.2)
ALBUMIN/GLOB SERPL: 1.7 G/DL
ALP SERPL-CCNC: 71 U/L (ref 39–117)
ALT SERPL-CCNC: 6 U/L (ref 1–41)
AST SERPL-CCNC: 11 U/L (ref 1–40)
BASOPHILS # BLD AUTO: 0.07 10*3/MM3 (ref 0–0.2)
BASOPHILS NFR BLD AUTO: 0.7 % (ref 0–1.5)
BILIRUB SERPL-MCNC: 0.2 MG/DL (ref 0–1.2)
BUN SERPL-MCNC: 7 MG/DL (ref 8–23)
BUN/CREAT SERPL: 9.1 (ref 7–25)
CALCIUM SERPL-MCNC: 9.2 MG/DL (ref 8.6–10.5)
CHLORIDE SERPL-SCNC: 103 MMOL/L (ref 98–107)
CO2 SERPL-SCNC: 24.8 MMOL/L (ref 22–29)
CREAT SERPL-MCNC: 0.77 MG/DL (ref 0.76–1.27)
EGFRCR SERPLBLD CKD-EPI 2021: 97.5 ML/MIN/1.73
EOSINOPHIL # BLD AUTO: 0.15 10*3/MM3 (ref 0–0.4)
EOSINOPHIL NFR BLD AUTO: 1.5 % (ref 0.3–6.2)
ERYTHROCYTE [DISTWIDTH] IN BLOOD BY AUTOMATED COUNT: 13.1 % (ref 12.3–15.4)
GLOBULIN SER CALC-MCNC: 2.2 GM/DL
GLUCOSE SERPL-MCNC: 109 MG/DL (ref 65–99)
HCT VFR BLD AUTO: 42.8 % (ref 37.5–51)
HGB BLD-MCNC: 13.6 G/DL (ref 13–17.7)
IMM GRANULOCYTES # BLD AUTO: 0.04 10*3/MM3 (ref 0–0.05)
IMM GRANULOCYTES NFR BLD AUTO: 0.4 % (ref 0–0.5)
LYMPHOCYTES # BLD AUTO: 1.73 10*3/MM3 (ref 0.7–3.1)
LYMPHOCYTES NFR BLD AUTO: 17.2 % (ref 19.6–45.3)
MCH RBC QN AUTO: 27.1 PG (ref 26.6–33)
MCHC RBC AUTO-ENTMCNC: 31.8 G/DL (ref 31.5–35.7)
MCV RBC AUTO: 85.3 FL (ref 79–97)
MONOCYTES # BLD AUTO: 0.55 10*3/MM3 (ref 0.1–0.9)
MONOCYTES NFR BLD AUTO: 5.5 % (ref 5–12)
NEUTROPHILS # BLD AUTO: 7.5 10*3/MM3 (ref 1.7–7)
NEUTROPHILS NFR BLD AUTO: 74.7 % (ref 42.7–76)
NRBC BLD AUTO-RTO: 0 /100 WBC (ref 0–0.2)
PLATELET # BLD AUTO: 168 10*3/MM3 (ref 140–450)
POTASSIUM SERPL-SCNC: 3.4 MMOL/L (ref 3.5–5.2)
PROT SERPL-MCNC: 6 G/DL (ref 6–8.5)
PSA SERPL-MCNC: <0.014 NG/ML (ref 0–4)
RBC # BLD AUTO: 5.02 10*6/MM3 (ref 4.14–5.8)
SODIUM SERPL-SCNC: 141 MMOL/L (ref 136–145)
WBC # BLD AUTO: 10.04 10*3/MM3 (ref 3.4–10.8)

## 2024-04-05 ENCOUNTER — TELEPHONE (OUTPATIENT)
Dept: PALLIATIVE CARE | Facility: CLINIC | Age: 69
End: 2024-04-05
Payer: MEDICARE

## 2024-04-05 ENCOUNTER — TELEPHONE (OUTPATIENT)
Dept: FAMILY MEDICINE CLINIC | Facility: CLINIC | Age: 69
End: 2024-04-05
Payer: MEDICARE

## 2024-04-05 DIAGNOSIS — G89.3 CANCER RELATED PAIN: ICD-10-CM

## 2024-04-05 RX ORDER — NICOTINE 21 MG/24HR
1 PATCH, TRANSDERMAL 24 HOURS TRANSDERMAL EVERY 24 HOURS
Qty: 30 PATCH | Refills: 0 | Status: SHIPPED | OUTPATIENT
Start: 2024-04-26

## 2024-04-05 RX ORDER — NICOTINE 21 MG/24HR
1 PATCH, TRANSDERMAL 24 HOURS TRANSDERMAL EVERY 24 HOURS
Qty: 30 PATCH | Refills: 0 | Status: SHIPPED | OUTPATIENT
Start: 2024-04-05

## 2024-04-05 NOTE — TELEPHONE ENCOUNTER
I have reviewed patient's AGUSTIN report prior to prescribing Schedule II, III, and IV medications. Request # [id number]. Next refill for oxycodone ER 9 BID was sent to the pharmacy. The patient is scheduled to follow-up on 5/6/24.

## 2024-04-05 NOTE — TELEPHONE ENCOUNTER
PATIENT CALLED AND REQUESTED THE XTAMPZA ER BE CALLED IN TODAY TO HIS PHARMACY TO REFILL. PLEASE ADVISE.

## 2024-04-05 NOTE — TELEPHONE ENCOUNTER
Caller: Naveen Lugo    Relationship: Self    Best call back number: [phone]     What medication are you requesting: NICOTINE PATCHES    What are your current symptoms: SMOKING    How long have you been experiencing symptoms: A LONG TIME    Have you had these symptoms before:    [x] Yes  [] No    Have you been treated for these symptoms before:   [x] Yes  [] No    If a prescription is needed, what is your preferred pharmacy and phone number: [facility] [id number] [place] [id number] [facility] [address] - [phone] [facility] [phone] FX     Additional notes:PT WOULD LIKE TO START SMOKING CESSATION AND WANTS TO TRY THE PATCHES AGAIN.

## 2024-04-08 ENCOUNTER — TELEPHONE (OUTPATIENT)
Dept: PALLIATIVE CARE | Facility: CLINIC | Age: 69
End: 2024-04-08
Payer: MEDICARE

## 2024-04-08 PROBLEM — T40.2X5A THERAPEUTIC OPIOID INDUCED CONSTIPATION: Status: ACTIVE | Noted: 2024-04-08

## 2024-04-08 PROBLEM — K59.03 THERAPEUTIC OPIOID INDUCED CONSTIPATION: Status: ACTIVE | Noted: 2024-04-08

## 2024-04-08 NOTE — TELEPHONE ENCOUNTER
PATIENT CALLED AND STATED THAT THE HYDROMORPHONE IS ON BACK ORDER AT HIS PHARMACY AND CURRENTLY IS OUT AND SOMETHING ELSE WAS REPLACED WITH IT BUT IT'S ALSO ON BACK. PLEASE ADVISE.

## 2024-04-08 NOTE — ASSESSMENT & PLAN NOTE
His symptoms did not fully resolve with previous medication.  We will try Bactrim and a different nasal spray.  I will also refill his levocetirizine, which she had not been taking.  I encouraged compliance on allergy medications during the season.

## 2024-04-08 NOTE — ASSESSMENT & PLAN NOTE
Allergies are chronic and not currently controlled.  I encouraged him to resume his allergy medications as previously prescribed.  Will change his fluticasone due to recall.

## 2024-04-08 NOTE — ASSESSMENT & PLAN NOTE
GERD is chronic and stable on current therapy.  He will continue medication as previously prescribed.

## 2024-04-08 NOTE — TELEPHONE ENCOUNTER
PATIENT CALLED BACK AND STATED THAT HE REACHED OUT TO [facility] AND THEY HAVE 100 OF THE HYDROMORPHONE AVAILABLE. PATIENT WOULD LIKE A CALL BACK IF THIS CAN BE SENT TO [facility]. PLEASE ADVISE.

## 2024-04-09 ENCOUNTER — SPECIALTY PHARMACY (OUTPATIENT)
Dept: ONCOLOGY | Facility: HOSPITAL | Age: 69
End: 2024-04-09
Payer: MEDICARE

## 2024-04-09 DIAGNOSIS — G89.3 CANCER RELATED PAIN: ICD-10-CM

## 2024-04-09 RX ORDER — HYDROMORPHONE HYDROCHLORIDE 4 MG/1
8 TABLET ORAL EVERY 4 HOURS PRN
Qty: 95 TABLET | Refills: 0 | Status: SHIPPED | OUTPATIENT
Start: 2024-04-09

## 2024-04-09 NOTE — TELEPHONE ENCOUNTER
Confirmed with [facility], they currently have #[phone] on hand. Will send a new script

## 2024-04-09 NOTE — TELEPHONE ENCOUNTER
Preferred pharmacy doesn't have med in stock. Confirmed with [facility] and they currently have #[phone]. Sending a script to cover until they get more in stock.

## 2024-04-09 NOTE — PROGRESS NOTES
Re: Refills of Oral Specialty Medication - Orgovyx (relugolix)    Drug-Drug Interactions: The current medication list was reviewed and there are some relevant drug-drug interactions with the oral specialty medication that have previously been discussed with patient, including:  Ranexa may increase the serum concentration of Orgovyx, but patient has been tolerating both since at least 2021.   Medication Allergies: The patient has no relevant allergies as it relates to their oral specialty medication.  Review of Labs/Dose Adjustments: NO DOSE CHANGE - I reviewed the most recent note and labs and the patient will continue without any dose changes.  I sent refills as described below.    Drug: Orgovyx (relugolix)  Strength: 120 mg  Directions: Take 1 tablet by mouth daily  Quantity: 30  Refills: 11  Pharmacy prescription sent to: [facility]    Ann Cowden Mayer, PharmD, [facility]  Oncology Clinical Pharmacist  Phone [phone]    4/9/2024  14:03 EDT

## 2024-04-10 ENCOUNTER — SPECIALTY PHARMACY (OUTPATIENT)
Dept: ONCOLOGY | Facility: HOSPITAL | Age: 69
End: 2024-04-10
Payer: MEDICARE

## 2024-04-10 NOTE — PROGRESS NOTES
Specialty Pharmacy Refill Coordination Note     Naveen is a 68 y.o. male contacted today regarding refills of  abiraterone 1000mg PO QD,  relugolix 120mg PO QD and prednisone 5mg PO QD of specialty medication(s).      Specialty medication(s) and dose(s) confirmed: yes    Refill Questions      Flowsheet Row Most Recent Value   Changes to allergies? No   Changes to medications? No   New conditions or infections since last clinic visit No   Unplanned office visit, urgent care, ED, or hospital admission in the last 4 weeks  No   How does patient/caregiver feel medication is working? Very good   Financial problems or insurance changes  No   Since the previous refill, were any specialty medication doses or scheduled injections missed or delayed?  No   Does this patient require a clinical escalation to a pharmacist? No            Delivery Questions      Flowsheet Row Most Recent Value   Delivery method [employer]   Delivery address verified with patient/caregiver? Yes   Delivery address Home   Number of medications in delivery 3   Medication(s) being filled and delivered Abiraterone Acetate, Relugolix, Prednisone   Doses left of specialty medications 5 days of each   Copay verified? Yes   Copay amount $0   Copay form of payment No copayment ($0)              Medication Adherence    Adherence tools used: watch   Other adherence tool: Clock - takes at same time each day, 7:45am   Support network for adherence: family member          Follow-up: 30 day(s)     Sushma Muro, Pharmacy Technician  Specialty Pharmacy Technician

## 2024-04-22 ENCOUNTER — SPECIALTY PHARMACY (OUTPATIENT)
Dept: ONCOLOGY | Facility: HOSPITAL | Age: 69
End: 2024-04-22
Payer: MEDICARE

## 2024-04-22 NOTE — PROGRESS NOTES
Specialty Pharmacy Patient Management Program  Oncology 6-Month Clinical Assessment       Naveen Lugo is a 68 y.o. male with prostate cancer seen today to assess adherence and side effects.    Reason for Outreach: Routine medication check-in .    Regimen: Abiraterone 1000mg PO daily + relugolix 120mg PO daily + prednisone 5mg PO daily    Specialty Pharmacy Goal   Goals Addressed Today         Specialty Pharmacy General Goal (pt-stated)       Clinical goal/therapeutic target: stable or decreasing PSA and disease control, per the recent oncology clinic notes and labs. {  PSA   Date Value Ref Range Status   09/14/2023 <0.014 0.000 - 4.000 ng/mL Final     Comment:     Results may be falsely decreased if patient taking Biotin.  Testing Method: Roche [employer] Electrochemiluminescence  Immunoassay(ECLIA)  Values obtained with different assay methods or kits cannot  be used interchangeably.     07/20/2023 <0.014 0.000 - 4.000 ng/mL Final     Comment:     Results may be falsely decreased if patient taking Biotin.  Testing Method: Roche [employer] Electrochemiluminescence  Immunoassay(ECLIA)  Values obtained with different assay methods or kits cannot  be used interchangeably.     06/14/2023 <0.1 0.0 - 4.0 ng/mL Final     Comment:     Roche ECLIA methodology.  According to the American Urological Association, Serum PSA should  decrease and remain at undetectable levels after radical  prostatectomy. The AUA defines biochemical recurrence as an initial  PSA value 0.2 ng/mL or greater followed by a subsequent confirmatory  PSA value 0.2 ng/mL or greater.  Values obtained with different assay methods or kits cannot be used  interchangeably. Results cannot be interpreted as absolute evidence  of the presence or absence of malignant disease.     05/16/2023 <0.014 0.000 - 4.000 ng/mL Final     Comment:     Results may be falsely decreased if patient taking Biotin.  Testing Method: Roche [employer]  Electrochemiluminescence  Immunoassay(ECLIA)  Values obtained with different assay methods or kits cannot  be used interchangeably.     02/21/2023 <0.1 0.0 - 4.0 ng/mL Final     Comment:     Roche ECLIA methodology.  According to the American Urological Association, Serum PSA should  decrease and remain at undetectable levels after radical  prostatectomy. The AUA defines biochemical recurrence as an initial  PSA value 0.2 ng/mL or greater followed by a subsequent confirmatory  PSA value 0.2 ng/mL or greater.  Values obtained with different assay methods or kits cannot be used  interchangeably. Results cannot be interpreted as absolute evidence  of the presence or absence of malignant disease.     01/24/2023 <0.1 0.0 - 4.0 ng/mL Final     Comment:     **Verified by repeat analysis**  Roche ECLIA methodology.  According to the American Urological Association, Serum PSA should  decrease and remain at undetectable levels after radical  prostatectomy. The AUA defines biochemical recurrence as an initial  PSA value 0.2 ng/mL or greater followed by a subsequent confirmatory  PSA value 0.2 ng/mL or greater.  Values obtained with different assay methods or kits cannot be used  interchangeably. Results cannot be interpreted as absolute evidence  of the presence or absence of malignant disease.   10/13/2022 <0.014 0.000 - 4.000 ng/mL Final     Comment:     Results may be falsely decreased if patient taking Biotin.   09/01/2022 <0.1 0.0 - 4.0 ng/mL Final     Comment:     Roche ECLIA methodology.  According to the American Urological Association, Serum PSA should  decrease and remain at undetectable levels after radical  prostatectomy. The AUA defines biochemical recurrence as an initial  PSA value 0.2 ng/mL or greater followed by a subsequent confirmatory  PSA value 0.2 ng/mL or greater.  Values obtained with different assay methods or kits cannot be used  interchangeably. Results cannot be interpreted as absolute evidence  of  the presence or absence of malignant disease.     08/02/2022 <0.1 0.0 - 4.0 ng/mL Final     Comment:     **Verified by repeat analysis**  Roche ECLIA methodology.  According to the American Urological Association, Serum PSA should  decrease and remain at undetectable levels after radical  prostatectomy. The AUA defines biochemical recurrence as an initial  PSA value 0.2 ng/mL or greater followed by a subsequent confirmatory  PSA value 0.2 ng/mL or greater.  Values obtained with different assay methods or kits cannot be used  interchangeably. Results cannot be interpreted as absolute evidence  of the presence or absence of malignant disease.     08/02/2022 <0.1 0.0 - 4.0 ng/mL Final     Comment:     **Verified by repeat analysis**  Roche ECLIA methodology.  According to the American Urological Association, Serum PSA should  decrease and remain at undetectable levels after radical  prostatectomy. The AUA defines biochemical recurrence as an initial  PSA value 0.2 ng/mL or greater followed by a subsequent confirmatory  PSA value 0.2 ng/mL or greater.  Values obtained with different assay methods or kits cannot be used  interchangeably. Results cannot be interpreted as absolute evidence  of the presence or absence of malignant disease.     04/20/2021 0.026 0.000 - 4.000 ng/mL Final   12/29/2020 0.275 0.000 - 4.000 ng/mL Final     PSA          12/14/2023    13:25 1/4/2024    00:00 4/2/2024    08:05   PSA   PSA <0.014  <0.1  <0.014                    Problem List   Problem list reviewed by Maria Esther Le, PharmD on 4/22/2024 at  3:12 PM  Patient Active Problem List   Diagnosis Code    Prostate cancer metastatic to bone C61, C79.51    Encounter for care related to Port-a-Cath Z45.2    Opioid use disorder F11.90    Pain, cancer G89.3    Yeast dermatitis B37.2    Therapeutic drug monitoring Z51.81    Acute hip pain, left M25.552    Abnormal MRI, lumbar spine R93.7    Coronary artery disease of native artery of native heart  with stable angina pectoris I25.118    Hyperlipidemia LDL goal <70 E78.5    Essential hypertension I10    Chest pain R07.9    Depression F32.A    Constipation K59.00    Fatigue R53.83    Adrenal insufficiency E27.40    Chronic pain G89.29    Acute URI J06.9    Hypokalemia E87.6    Hyperglycemia R73.9    Polyuria R35.89    Hot flashes R23.2    Medicare annual wellness visit, subsequent Z00.00    Iron deficiency E61.1    Tobacco abuse Z72.0    Acute bronchitis due to other specified organisms J20.8    Chronic GERD K21.9    Seasonal allergies J30.2    Epigastric abdominal pain R10.13    Nausea and vomiting R11.2    Dizziness R42    Oral candidiasis B37.0    Subacute cough R05.2    Therapeutic opioid induced constipation K59.03, T40.2X5A       Medication Assessment for Specialty Medication(s):  Medication Assessment  Follow Up Clinical Assessment  Linked Medication(s) Assessed: Abiraterone Acetate, Prednisone, Relugolix  Therapeutic appropriateness: Appropriate  Medication tolerability: Tolerating with no to minimal ADRs  Medication plan: Continue therapy with normal follow-up  Quality of Life Improvement Scale: 10-Significantly better  Administration: Patient is taking every day at the same time  and as prescribed .   Patient can self administer medications: yes  Medication Follow-Up Plan: Next clinical assessment and Refill coordination  Lab Review: The labs listed below have been reviewed. No dose adjustments are needed for the oral specialty medication(s) based on the labs.    Lab Results   Component Value Date    GLUCOSE 109 (H) 04/02/2024    CALCIUM 9.2 04/02/2024     04/02/2024    K 3.4 (L) 04/02/2024    CO2 24.8 04/02/2024     04/02/2024    BUN 7 (L) 04/02/2024    CREATININE 0.77 04/02/2024    EGFRIFAFRI 109 01/11/2022    EGFRIFNONA 98 02/14/2022    BCR 9.1 04/02/2024    ANIONGAP 10.0 02/14/2022     Lab Results   Component Value Date    WBC 10.04 04/02/2024    RBC 5.02 04/02/2024    HGB 13.6  04/02/2024    HCT 42.8 04/02/2024    MCV 85.3 04/02/2024    MCH 27.1 04/02/2024    MCHC 31.8 04/02/2024    RDW 13.1 04/02/2024    RDWSD 41.2 02/14/2022    MPV 10.5 02/14/2022     04/02/2024    NEUTRORELPCT 74.7 04/02/2024    LYMPHORELPCT 17.2 (L) 04/02/2024    MONORELPCT 5.5 04/02/2024    EOSRELPCT 1.5 04/02/2024    BASORELPCT 0.7 04/02/2024    AUTOIGPER 0.3 02/14/2022    NEUTROABS 7.50 (H) 04/02/2024    LYMPHSABS 1.73 04/02/2024    MONOSABS 0.55 04/02/2024    EOSABS 0.15 04/02/2024    BASOSABS 0.07 04/02/2024    AUTOIGNUM 0.02 02/14/2022    NRBC 0.0 04/02/2024     Drug-drug interactions  Completed medication reconciliation today to assess for drug interactions. Patient denies starting or stopping any medications.    Assessed medication list for interactions,   Abiraterone may increase Lipitor (monitor for myopathy and rhabdomyolysis) and Flomax (monitor for hypotension and orthostatsis).  Abiraterone ma decrease Effexor.  Ranexa may increase concentration of relugolix.  Advised patient to call the clinic if any new medications are started so we can assess for drug-drug interactions.  Drug-food interactions discussed: eating grapefruit and drinking grapefruit juice  Vaccines are coordinated by the patient's oncologist and primary care provider.    Medications   Medicines reviewed by Maria Esther Le, PharmD on 4/22/2024 at  3:12 PM  Prior to Admission medications    Medication Sig Start Date End Date Taking? Authorizing Provider   abiraterone acetate (ZYTIGA) 500 MG tablet Take 2 tablets by mouth Daily. 10/11/23   Ishmael Rashid MD   albuterol sulfate  (90 Base) MCG/ACT inhaler Inhale 2 puffs Every 4 (Four) Hours As Needed for Wheezing. 8/11/23   Xochitl, Leonor, PA-C   aspirin (aspirin) 81 MG EC tablet Take 1 tablet by mouth Daily. 8/11/21   Joni Kahn MD   atorvastatin (LIPITOR) 5 MG half tablet Take  by mouth.    Provider, MD Jared   azelastine (ASTELIN) 0.1 % nasal spray 2 sprays into the  nostril(s) as directed by provider 2 (Two) Times a Day. Use in each nostril as directed 3/22/24   Arleen Webber PA-C   buPROPion XL (Wellbutrin XL) 150 MG 24 hr tablet Take 1 tablet by mouth Daily. For smoking cessation 8/11/23   Leonor Leung PA-C   cyclobenzaprine (FLEXERIL) 10 MG tablet Take 1 tablet by mouth 3 (Three) Times a Day As Needed for Muscle Spasms. 1/4/24   Anna Ayers PA-C   ferrous sulfate 325 (65 FE) MG tablet 325 mg p.o. twice daily every other day, take with vitamin C 11/9/23   Abena Velasquez APRN   Fluticasone-Umeclidin-Vilant (Trelegy Ellipta) 200-62.5-25 MCG/ACT aerosol powder  INHALE 1 PUFF BY MOUTH DAILY 11/27/23   Cielo Dodd PA-C   HYDROmorphone (DILAUDID) 4 MG tablet Take 2 tablets by mouth Every 4 (Four) Hours As Needed for Moderate Pain for up to 15 days. 4/22/24 5/7/24  Vito Easley DO   HYDROmorphone (DILAUDID) 4 MG tablet Take 2 tablets by mouth Every 4 (Four) Hours As Needed for Moderate Pain. 4/9/24   Vito Easley DO   levocetirizine (XYZAL) 5 MG tablet Take 1 tablet by mouth Every Evening. 3/22/24   Arleen Webber PA-C   Lidocaine-Prilocaine &Lido HCl 2.5-2.5 & 3.88 % kit Apply  topically to the appropriate area as directed.    Provider, MD Jared   meclizine (ANTIVERT) 25 MG tablet Take 1 tablet by mouth 3 (Three) Times a Day As Needed for Dizziness. 9/29/23   Anna Ayers PA-C   methocarbamol (ROBAXIN) 500 MG tablet Take 2 tablets by mouth Every 8 (Eight) Hours. 5/17/23   Jared Abrams MD   naloxone (NARCAN) 4 MG/0.1ML nasal spray 1 spray into the nostril(s) as directed by provider As Needed (unresponsiveness). 12/29/20   Mely Cary MD   nicotine (NICODERM CQ) 14 MG/24HR patch Place 1 patch on the skin as directed by provider Daily. 4/26/24   Cielo Dodd PA-C   nicotine (Nicoderm CQ) 21 MG/24HR patch Place 1 patch on the skin as directed by provider Daily. 4/5/24   Cielo Dodd PA-C    nicotine (NICODERM CQ) 7 MG/24HR patch Place 1 patch on the skin as directed by provider Daily. 5/17/24   Cielo Dodd PA-C   nitroglycerin (NITROSTAT) 0.4 MG SL tablet 1 under the tongue as needed for angina, may repeat q5mins for up three doses 8/11/21   Joni Kahn MD   nystatin (MYCOSTATIN) 100,000 unit/mL suspension Swish and swallow 5 mL 4 (Four) Times a Day. 3/14/24   Arleen Webber PA-C   omeprazole (priLOSEC) 40 MG capsule Take 1 tab po BID for stomach 3/22/24   Arleen Webber PA-C   ondansetron (Zofran) 4 MG tablet Take 1 tablet by mouth Every 8 (Eight) Hours As Needed for Nausea or Vomiting. 9/13/23   Cielo Dodd PA-C   oxyCODONE ER 9 MG capsule extended-release 12 hour  Take 1 capsule by mouth Every 12 (Twelve) Hours for 30 days. 4/5/24 5/5/24  Vito Easley DO   potassium chloride 10 MEQ CR tablet Take 1 tablet by mouth 2 (Two) Times a Day. 1/5/24   Abena Velasquez APRN   predniSONE (DELTASONE) 5 MG tablet Take 1 tablet by mouth Daily. 1/23/24   Ishmael Rashid MD   promethazine-dextromethorphan (PROMETHAZINE-DM) 6.25-15 MG/5ML syrup Take 5 mL by mouth 4 (Four) Times a Day As Needed for Cough. 1/8/24   Cielo Dodd PA-C   ranolazine (Ranexa) 500 MG 12 hr tablet Take 1 tablet by mouth 2 (Two) Times a Day. 8/23/21   Joni Kahn MD   ranolazine (RANEXA) 500 MG 12 hr tablet Take  by mouth.    Provider, MD Jared   relugolix (ORGOVYX) 120 MG tablet tablet Take 1 tablet by mouth Daily. 4/9/24   Ishmael Rashid MD   senna (SENOKOT) 8.6 MG tablet Take 1 tablet by mouth Daily. 3/22/24   Arleen Webber PA-C   sulfamethoxazole-trimethoprim (Bactrim DS) 800-160 MG per tablet Take 1 tablet by mouth 2 (Two) Times a Day. 3/22/24   Arleen Webber PA-C   tamsulosin (FLOMAX) 0.4 MG capsule 24 hr capsule Take 1 capsule by mouth Daily. 3/22/24   Arleen Webber PA-C   venlafaxine 37.5 MG tablet sustained-release  24 hour 24 hr tablet Take  by mouth.    Provider, MD Jared   venlafaxine XR (EFFEXOR-XR) 37.5 MG 24 hr capsule Take 1 capsule by mouth Daily. 3/1/23   Abena Velasquez APRN   HYDROmorphone (DILAUDID) 4 MG tablet Take 2 tablets by mouth Every 4 (Four) Hours As Needed for Moderate Pain for up to 22 days. 3/14/24 3/29/24  Vito Easley DO   HYDROmorphone (DILAUDID) 4 MG tablet Take 2 tablets by mouth Every 4 (Four) Hours As Needed for Moderate Pain for up to 15 days. 4/7/24 4/9/24  Vito Easley DO   oxyCODONE ER 9 MG capsule extended-release 12 hour  Take 1 capsule by mouth Every 12 (Twelve) Hours for 30 days. 3/6/24 4/5/24  Vito Easley DO   relugolix (ORGOVYX) 120 MG tablet tablet Take 1 tablet by mouth Daily. 10/11/23 4/2/24  Ishmael Rashid MD   relugolix (ORGOVYX) 120 MG tablet tablet Take 1 tablet by mouth Daily. 4/2/24 4/9/24  Ishmael Rashid MD       Allergies  Known allergies and reactions were discussed with the patient. The patient's chart has been reviewed for allergy information and updated as necessary.   Allergies   Allergen Reactions    Cymbalta [Duloxetine Hcl] Dizziness    Gabapentin Nausea Only    Lyrica [Pregabalin] Nausea And Vomiting and Dizziness         Allergies reviewed by Maria Esther Le, PharmD on 4/22/2024 at  3:12 PM    Hospitalizations and Urgent Care Visits Since Last Assessment:  Unplanned hospitalizations or inpatient admissions: 0  ED Visits: 0  Urgent Office Visits: 0    Adherence Assessment:  Adherence Questions  Linked Medication(s) Assessed: Prednisone, Abiraterone Acetate, Relugolix  On average, how many doses/injections does the patient miss per month?: 0  What are the identified reasons for non-adherence or missed doses? : no problems identified  What is the estimated medication adherence level?: %  Based on the patient/caregiver response and refill history, does this patient require an MTP to track adherence improvements?: no    Quality of Life  Assessment:  Quality of Life Assessment  Quality of Life Improvement Scale: 10-Significantly better  -- Quality of Life: 10/10    Financial Assessment:  Medication availability/affordability: Patient has had no issues obtaining medication from pharmacy.    Attestation:  I attest the patient was actively involved in and has agreed to the above plan of care. If the prescribed therapy is at any point deemed not appropriate based on the current or future assessments, a consultation will be initiated with the patient's specialty care provider to determine the best course of action. The revised plan of therapy will be documented along with any required assessments and/or additional patient education provided.       All questions addressed and patient had no additional concerns. Patient has pharmacy contact information.    Maria Esther Le PharmD, [facility]  Clinical Oncology Pharmacy Specialist  Phone: [phone]      4/22/2024  15:12 EDT

## 2024-04-29 ENCOUNTER — TELEPHONE (OUTPATIENT)
Dept: PALLIATIVE CARE | Facility: CLINIC | Age: 69
End: 2024-04-29
Payer: MEDICARE

## 2024-04-29 DIAGNOSIS — G89.3 CANCER RELATED PAIN: ICD-10-CM

## 2024-04-29 RX ORDER — HYDROMORPHONE HYDROCHLORIDE 4 MG/1
8 TABLET ORAL EVERY 4 HOURS PRN
Qty: 180 TABLET | Refills: 0 | Status: SHIPPED | OUTPATIENT
Start: 2024-05-17 | End: 2024-06-01

## 2024-04-29 RX ORDER — HYDROMORPHONE HYDROCHLORIDE 4 MG/1
8 TABLET ORAL EVERY 4 HOURS PRN
Qty: 180 TABLET | Refills: 0 | Status: SHIPPED | OUTPATIENT
Start: 2024-05-02 | End: 2024-05-06

## 2024-04-29 NOTE — TELEPHONE ENCOUNTER
AGUSTIN #: [id number]    Medication requested: hydromorphone 4 MG tablet    Last fill date: 4/17/24    Last appointment: 1/4/24    Next appointment: 5/6/24

## 2024-04-29 NOTE — TELEPHONE ENCOUNTER
Called pt to informed him he was received his full script for the month of April. His 15 day scripts for May will be called in this week. Explained to the patient he received 15 day script in the amount of #180. Script for May will be ready for  this week.

## 2024-04-29 NOTE — TELEPHONE ENCOUNTER
PATIENT CALLED AND REQUESTED THE REST OF THE SCRIPT FOR DILAUDID BE CALLED IN TO [facility]. PATIENT STATED THAT THERE SHOULD BE 85 LEFT TO CALL IN. PLEASE ADVISE.

## 2024-05-06 ENCOUNTER — SPECIALTY PHARMACY (OUTPATIENT)
Dept: ONCOLOGY | Facility: HOSPITAL | Age: 69
End: 2024-05-06
Payer: MEDICARE

## 2024-05-06 ENCOUNTER — LAB (OUTPATIENT)
Dept: LAB | Facility: HOSPITAL | Age: 69
End: 2024-05-06
Payer: MEDICARE

## 2024-05-06 ENCOUNTER — OFFICE VISIT (OUTPATIENT)
Dept: PALLIATIVE CARE | Facility: CLINIC | Age: 69
End: 2024-05-06
Payer: MEDICARE

## 2024-05-06 VITALS
OXYGEN SATURATION: 99 % | TEMPERATURE: 97.6 F | RESPIRATION RATE: 18 BRPM | HEART RATE: 95 BPM | BODY MASS INDEX: 29.65 KG/M2 | SYSTOLIC BLOOD PRESSURE: 161 MMHG | WEIGHT: 195 LBS | DIASTOLIC BLOOD PRESSURE: 79 MMHG

## 2024-05-06 DIAGNOSIS — C61 PROSTATE CANCER METASTATIC TO BONE: ICD-10-CM

## 2024-05-06 DIAGNOSIS — T40.2X5A THERAPEUTIC OPIOID INDUCED CONSTIPATION: ICD-10-CM

## 2024-05-06 DIAGNOSIS — Z51.81 THERAPEUTIC DRUG MONITORING: Primary | ICD-10-CM

## 2024-05-06 DIAGNOSIS — G89.3 CHRONIC PAIN DUE TO NEOPLASM: ICD-10-CM

## 2024-05-06 DIAGNOSIS — C79.51 PROSTATE CANCER METASTATIC TO BONE: ICD-10-CM

## 2024-05-06 DIAGNOSIS — G89.3 CHRONIC PAIN DUE TO NEOPLASM: Primary | ICD-10-CM

## 2024-05-06 DIAGNOSIS — K59.03 THERAPEUTIC OPIOID INDUCED CONSTIPATION: ICD-10-CM

## 2024-05-06 LAB
AMPHET+METHAMPHET UR QL: NEGATIVE
AMPHETAMINES UR QL: NEGATIVE
BARBITURATES UR QL SCN: NEGATIVE
BENZODIAZ UR QL SCN: NEGATIVE
BUPRENORPHINE SERPL-MCNC: NEGATIVE NG/ML
CANNABINOIDS SERPL QL: NEGATIVE
COCAINE UR QL: NEGATIVE
FENTANYL UR-MCNC: NEGATIVE NG/ML
METHADONE UR QL SCN: NEGATIVE
OPIATES UR QL: POSITIVE
OXYCODONE UR QL SCN: POSITIVE
PCP UR QL SCN: NEGATIVE
TRICYCLICS UR QL SCN: NEGATIVE

## 2024-05-06 PROCEDURE — 3078F DIAST BP <80 MM HG: CPT | Performed by: PHYSICIAN ASSISTANT

## 2024-05-06 PROCEDURE — 1125F AMNT PAIN NOTED PAIN PRSNT: CPT | Performed by: PHYSICIAN ASSISTANT

## 2024-05-06 PROCEDURE — 99213 OFFICE O/P EST LOW 20 MIN: CPT | Performed by: PHYSICIAN ASSISTANT

## 2024-05-06 PROCEDURE — 3077F SYST BP >= 140 MM HG: CPT | Performed by: PHYSICIAN ASSISTANT

## 2024-05-06 PROCEDURE — 80307 DRUG TEST PRSMV CHEM ANLYZR: CPT | Performed by: PHYSICIAN ASSISTANT

## 2024-05-06 RX ORDER — OXYCODONE 13.5 MG/1
13.5 CAPSULE, EXTENDED RELEASE ORAL EVERY 12 HOURS
Qty: 60 EACH | Refills: 0 | Status: SHIPPED | OUTPATIENT
Start: 2024-05-06

## 2024-05-06 RX ORDER — AMOXICILLIN AND CLAVULANATE POTASSIUM 875; 125 MG/1; MG/1
TABLET, FILM COATED ORAL
COMMUNITY
Start: 2024-03-27

## 2024-05-06 RX ORDER — SENNOSIDES A AND B 8.6 MG/1
1 TABLET, FILM COATED ORAL DAILY
Qty: 90 TABLET | Refills: 1 | Status: SHIPPED | OUTPATIENT
Start: 2024-05-06

## 2024-05-15 ENCOUNTER — INFUSION (OUTPATIENT)
Dept: ONCOLOGY | Facility: HOSPITAL | Age: 69
End: 2024-05-15
Payer: MEDICARE

## 2024-05-15 VITALS
RESPIRATION RATE: 17 BRPM | WEIGHT: 195.8 LBS | SYSTOLIC BLOOD PRESSURE: 140 MMHG | DIASTOLIC BLOOD PRESSURE: 71 MMHG | HEART RATE: 92 BPM | TEMPERATURE: 97.1 F | BODY MASS INDEX: 29.77 KG/M2

## 2024-05-15 DIAGNOSIS — Z45.2 ENCOUNTER FOR CARE RELATED TO PORT-A-CATH: ICD-10-CM

## 2024-05-15 DIAGNOSIS — C61 PROSTATE CANCER METASTATIC TO BONE: Primary | ICD-10-CM

## 2024-05-15 DIAGNOSIS — G89.3 CANCER RELATED PAIN: ICD-10-CM

## 2024-05-15 DIAGNOSIS — C79.51 PROSTATE CANCER METASTATIC TO BONE: Primary | ICD-10-CM

## 2024-05-15 PROCEDURE — 25010000002 HEPARIN LOCK FLUSH PER 10 UNITS: Performed by: INTERNAL MEDICINE

## 2024-05-15 PROCEDURE — 96523 IRRIG DRUG DELIVERY DEVICE: CPT

## 2024-05-15 RX ORDER — HEPARIN SODIUM (PORCINE) LOCK FLUSH IV SOLN 100 UNIT/ML 100 UNIT/ML
500 SOLUTION INTRAVENOUS AS NEEDED
Status: DISCONTINUED | OUTPATIENT
Start: 2024-05-15 | End: 2024-05-15 | Stop reason: HOSPADM

## 2024-05-15 RX ORDER — HYDROMORPHONE HYDROCHLORIDE 4 MG/1
4 TABLET ORAL EVERY 4 HOURS PRN
Qty: 90 TABLET | Refills: 0 | Status: CANCELLED | OUTPATIENT
Start: 2024-05-17 | End: 2024-06-01

## 2024-05-15 RX ORDER — HEPARIN SODIUM (PORCINE) LOCK FLUSH IV SOLN 100 UNIT/ML 100 UNIT/ML
500 SOLUTION INTRAVENOUS AS NEEDED
OUTPATIENT
Start: 2024-05-15

## 2024-05-15 RX ADMIN — HEPARIN 500 UNITS: 100 SYRINGE at 10:45

## 2024-05-15 NOTE — TELEPHONE ENCOUNTER
Pt called to state [facility] does not have this medication in stock but [facility] has 2,4, and 8 MG in stock currently. I did call to confirm.

## 2024-05-16 RX ORDER — HYDROMORPHONE HYDROCHLORIDE 8 MG/1
8 TABLET ORAL EVERY 4 HOURS PRN
Qty: 180 TABLET | Refills: 0 | Status: SHIPPED | OUTPATIENT
Start: 2024-05-16

## 2024-05-20 ENCOUNTER — TELEPHONE (OUTPATIENT)
Dept: CARDIOLOGY | Facility: CLINIC | Age: 69
End: 2024-05-20
Payer: MEDICARE

## 2024-05-20 DIAGNOSIS — I25.118 CORONARY ARTERY DISEASE OF NATIVE ARTERY OF NATIVE HEART WITH STABLE ANGINA PECTORIS: Primary | ICD-10-CM

## 2024-05-20 DIAGNOSIS — I20.89 ANGINAL EQUIVALENT: ICD-10-CM

## 2024-05-20 DIAGNOSIS — R07.9 CHEST PAIN, UNSPECIFIED TYPE: ICD-10-CM

## 2024-05-20 DIAGNOSIS — R42 DIZZINESS: ICD-10-CM

## 2024-05-20 RX ORDER — ASPIRIN 81 MG/1
81 TABLET ORAL DAILY
Qty: 90 TABLET | Refills: 3 | Status: SHIPPED | OUTPATIENT
Start: 2024-05-20

## 2024-05-20 RX ORDER — RANOLAZINE 500 MG/1
500 TABLET, EXTENDED RELEASE ORAL 2 TIMES DAILY
Qty: 180 TABLET | Refills: 3 | Status: SHIPPED | OUTPATIENT
Start: 2024-05-20

## 2024-05-20 RX ORDER — NITROGLYCERIN 0.4 MG/1
TABLET SUBLINGUAL
Qty: 25 TABLET | Refills: 11 | Status: SHIPPED | OUTPATIENT
Start: 2024-05-20

## 2024-05-20 NOTE — TELEPHONE ENCOUNTER
"Pt is having similar chest pain prior to stents in 2018. Starts on the left side radiates to right side, describes as sharp and heavy, rates 7/10, associated symptoms of SOB with rest and exertion, as well as dizziness. Started last week while mowing his yard and has occurred everyday since, but has \"gotten a lot worse\". Has been taking  mg 2 to 3 times a day because he says this \"eases up\" the pain, but comes back within 4-5 hours. He does not have active chest pain over the phone says he took  mg 2 hours ago. He took 2 Nitro Saturday night with improvement, hasn't used Nitro since. We haven't called Nitro in since , may be using  Nitro. He reports he isn't taking Ranexa 500 mg BID for approximately 6 months, because he \"didn't have anymore\". Advised to always call me for refills when he runs out, hasn't been seen since 2022. I have sent in ASA 81 mg, Ranexa 500 mg BID, and Nitro, instructed to start back Ranexa tonight. Advised against taking  mg multiple times a day. Educated on Nitro use. BP runs 140-160/70's HR 80-95. Please advise.   "

## 2024-05-21 ENCOUNTER — TELEPHONE (OUTPATIENT)
Dept: CARDIOLOGY | Facility: CLINIC | Age: 69
End: 2024-05-21
Payer: MEDICARE

## 2024-05-21 NOTE — TELEPHONE ENCOUNTER
Notified pt of plan, and PA for Ranexa was approved this morning. Pt verbalized understanding and agreeable to plan

## 2024-05-30 ENCOUNTER — SPECIALTY PHARMACY (OUTPATIENT)
Dept: ONCOLOGY | Facility: HOSPITAL | Age: 69
End: 2024-05-30
Payer: MEDICARE

## 2024-05-30 NOTE — PROGRESS NOTES
Specialty Pharmacy Refill Coordination Note     Naveen is a 69 y.o. male contacted today regarding refills of  Abiraterone 1000mg PO QD, Relugolix 120mg PO QD and Prednisone 5mg PO QD of  specialty medication(s).    Reviewed and verified with patient: YES     Specialty medication(s) and dose(s) confirmed: yes    Refill Questions      Flowsheet Row Most Recent Value   Changes to allergies? No   Changes to medications? No   New conditions or infections since last clinic visit No   Unplanned office visit, urgent care, ED, or hospital admission in the last 4 weeks  No   How does patient/caregiver feel medication is working? Good   Financial problems or insurance changes  No   Since the previous refill, were any specialty medication doses or scheduled injections missed or delayed?  No   Does this patient require a clinical escalation to a pharmacist? No            Delivery Questions      Flowsheet Row Most Recent Value   Delivery method [employer]   Delivery address verified with patient/caregiver? Yes   Delivery address Home   Number of medications in delivery 3   Medication(s) being filled and delivered Abiraterone Acetate, Prednisone, Relugolix   Doses left of specialty medications 10 days left   Copay verified? Yes   Copay amount $0   Copay form of payment No copayment ($0)              Medication Adherence    Adherence tools used: watch   Other adherence tool: Clock - takes at same time each day, 7:45am   Support network for adherence: family member          Follow-up: 30 day(s)     Sushma Muro, Pharmacy Technician  Specialty Pharmacy Technician

## 2024-05-31 ENCOUNTER — OFFICE VISIT (OUTPATIENT)
Dept: FAMILY MEDICINE CLINIC | Facility: CLINIC | Age: 69
End: 2024-05-31
Payer: MEDICARE

## 2024-05-31 VITALS
OXYGEN SATURATION: 95 % | BODY MASS INDEX: 29.55 KG/M2 | HEART RATE: 89 BPM | RESPIRATION RATE: 18 BRPM | SYSTOLIC BLOOD PRESSURE: 138 MMHG | TEMPERATURE: 97 F | WEIGHT: 195 LBS | DIASTOLIC BLOOD PRESSURE: 74 MMHG | HEIGHT: 68 IN

## 2024-05-31 DIAGNOSIS — G89.3 PAIN, CANCER: ICD-10-CM

## 2024-05-31 DIAGNOSIS — K21.9 CHRONIC GERD: ICD-10-CM

## 2024-05-31 DIAGNOSIS — J06.9 ACUTE URI: ICD-10-CM

## 2024-05-31 DIAGNOSIS — E78.5 HYPERLIPIDEMIA LDL GOAL <70: ICD-10-CM

## 2024-05-31 DIAGNOSIS — C79.51 PROSTATE CANCER METASTATIC TO BONE: ICD-10-CM

## 2024-05-31 DIAGNOSIS — Z00.00 MEDICARE ANNUAL WELLNESS VISIT, SUBSEQUENT: Primary | ICD-10-CM

## 2024-05-31 DIAGNOSIS — I25.118 CORONARY ARTERY DISEASE OF NATIVE ARTERY OF NATIVE HEART WITH STABLE ANGINA PECTORIS: ICD-10-CM

## 2024-05-31 DIAGNOSIS — I10 ESSENTIAL HYPERTENSION: ICD-10-CM

## 2024-05-31 DIAGNOSIS — C61 PROSTATE CANCER METASTATIC TO BONE: ICD-10-CM

## 2024-05-31 DIAGNOSIS — J20.8 ACUTE BRONCHITIS DUE TO OTHER SPECIFIED ORGANISMS: ICD-10-CM

## 2024-05-31 DIAGNOSIS — F32.A DEPRESSION, UNSPECIFIED DEPRESSION TYPE: ICD-10-CM

## 2024-05-31 PROBLEM — R11.2 NAUSEA AND VOMITING: Status: RESOLVED | Noted: 2023-09-13 | Resolved: 2024-05-31

## 2024-05-31 PROBLEM — E87.6 HYPOKALEMIA: Status: RESOLVED | Noted: 2022-11-10 | Resolved: 2024-05-31

## 2024-05-31 PROBLEM — R05.2 SUBACUTE COUGH: Status: RESOLVED | Noted: 2024-02-19 | Resolved: 2024-05-31

## 2024-05-31 PROBLEM — M25.552 ACUTE HIP PAIN, LEFT: Status: RESOLVED | Noted: 2021-07-02 | Resolved: 2024-05-31

## 2024-05-31 PROBLEM — B37.0 ORAL CANDIDIASIS: Status: RESOLVED | Noted: 2024-02-19 | Resolved: 2024-05-31

## 2024-05-31 PROBLEM — R10.13 EPIGASTRIC ABDOMINAL PAIN: Status: RESOLVED | Noted: 2023-09-13 | Resolved: 2024-05-31

## 2024-05-31 PROBLEM — R42 DIZZINESS: Status: RESOLVED | Noted: 2023-09-13 | Resolved: 2024-05-31

## 2024-05-31 PROBLEM — B37.2 YEAST DERMATITIS: Status: RESOLVED | Noted: 2021-02-25 | Resolved: 2024-05-31

## 2024-05-31 PROBLEM — R35.89 POLYURIA: Status: RESOLVED | Noted: 2023-02-22 | Resolved: 2024-05-31

## 2024-05-31 RX ORDER — ALBUTEROL SULFATE 90 UG/1
2 AEROSOL, METERED RESPIRATORY (INHALATION) EVERY 4 HOURS PRN
Qty: 8 G | Refills: 5 | Status: SHIPPED | OUTPATIENT
Start: 2024-05-31

## 2024-05-31 RX ORDER — DOXYCYCLINE 100 MG/1
100 CAPSULE ORAL 2 TIMES DAILY
Qty: 20 CAPSULE | Refills: 0 | Status: SHIPPED | OUTPATIENT
Start: 2024-05-31

## 2024-05-31 RX ORDER — DEXTROMETHORPHAN HYDROBROMIDE AND PROMETHAZINE HYDROCHLORIDE 15; 6.25 MG/5ML; MG/5ML
5 SYRUP ORAL 4 TIMES DAILY PRN
Qty: 240 ML | Refills: 0 | Status: SHIPPED | OUTPATIENT
Start: 2024-05-31

## 2024-05-31 NOTE — ASSESSMENT & PLAN NOTE
RX for Doxycycline, Promethazine DM and Albuterol inhaler.  Call or return if symptoms persist or worsen.

## 2024-05-31 NOTE — ASSESSMENT & PLAN NOTE
Patient's depression is a recurrent episode that is mild without psychosis. Depression is  well controlled on current medication   and stable.    Plan:   Continue current medication therapy     Followup in 6 months.

## 2024-05-31 NOTE — PROGRESS NOTES
The ABCs of the Annual Wellness Visit  Subsequent Medicare Wellness Visit    Subjective    Naveen Lugo is a 69 y.o. male who presents for a Subsequent Medicare Wellness Visit.    The following portions of the patient's history were reviewed and   updated as appropriate: allergies, current medications, past family history, past medical history, past social history, past surgical history, and problem list.    Compared to one year ago, the patient feels his physical   health is the same.    Compared to one year ago, the patient feels his mental   health is the same.    Recent Hospitalizations:  He was not admitted to the hospital during the last year.       Current Medical Providers:  Patient Care Team:  Cielo Dodd PA-C as PCP - General (Family Medicine)  Alexander Gomez MD (General Surgery)  Ishmael Rashid MD as Consulting Physician (Hematology and Oncology)  Anna Ayers PA-C as Physician Assistant (Physician Assistant)  Mir Duffy MD as Consulting Physician (Endocrinology)  Danny Avalos II, MD (Pain Medicine)  Joni Kahn MD as Consulting Physician (Cardiology)    Outpatient Medications Prior to Visit   Medication Sig Dispense Refill    abiraterone acetate (ZYTIGA) 500 MG tablet Take 2 tablets by mouth Daily. 60 tablet 11    amoxicillin-clavulanate (AUGMENTIN) 875-125 MG per tablet       aspirin 81 MG EC tablet Take 1 tablet by mouth Daily. 90 tablet 3    atorvastatin (LIPITOR) 5 MG half tablet Take  by mouth.      azelastine (ASTELIN) 0.1 % nasal spray 2 sprays into the nostril(s) as directed by provider 2 (Two) Times a Day. Use in each nostril as directed 30 mL 5    buPROPion XL (Wellbutrin XL) 150 MG 24 hr tablet Take 1 tablet by mouth Daily. For smoking cessation 30 tablet 5    cyclobenzaprine (FLEXERIL) 10 MG tablet Take 1 tablet by mouth 3 (Three) Times a Day As Needed for Muscle Spasms. 90 tablet 0    ferrous sulfate 325 (65 FE) MG tablet 325 mg p.o. twice daily every other  day, take with vitamin C 30 tablet 11    Fluticasone-Umeclidin-Vilant (Trelegy Ellipta) 200-62.5-25 MCG/ACT aerosol powder  INHALE 1 PUFF BY MOUTH DAILY 180 each 3    HYDROmorphone (Dilaudid) 8 MG tablet Take 1 tablet by mouth Every 4 (Four) Hours As Needed for Moderate or Severe Pain. 180 tablet 0    levocetirizine (XYZAL) 5 MG tablet Take 1 tablet by mouth Every Evening. 90 tablet 1    Lidocaine-Prilocaine &Lido HCl 2.5-2.5 & 3.88 % kit Apply  topically to the appropriate area as directed.      meclizine (ANTIVERT) 25 MG tablet Take 1 tablet by mouth 3 (Three) Times a Day As Needed for Dizziness. 45 tablet 3    methocarbamol (ROBAXIN) 500 MG tablet Take 2 tablets by mouth Every 8 (Eight) Hours.      nicotine (Nicoderm CQ) 21 MG/24HR patch Place 1 patch on the skin as directed by provider Daily. 30 patch 0    nitroglycerin (NITROSTAT) 0.4 MG SL tablet 1 under the tongue as needed for angina, may repeat q5mins for up three doses 25 tablet 11    nystatin (MYCOSTATIN) 100,000 unit/mL suspension Swish and swallow 5 mL 4 (Four) Times a Day. 473 mL 0    omeprazole (priLOSEC) 40 MG capsule Take 1 tab po BID for stomach 180 capsule 1    ondansetron (Zofran) 4 MG tablet Take 1 tablet by mouth Every 8 (Eight) Hours As Needed for Nausea or Vomiting. 30 tablet 1    oxyCODONE ER (Xtampza ER) 13.5 MG capsule extended-release 12 hour  Take 1 capsule by mouth Every 12 (Twelve) Hours. 60 each 0    potassium chloride 10 MEQ CR tablet Take 1 tablet by mouth 2 (Two) Times a Day. 60 tablet 1    predniSONE (DELTASONE) 5 MG tablet Take 1 tablet by mouth Daily. 30 tablet 11    ranolazine (Ranexa) 500 MG 12 hr tablet Take 1 tablet by mouth 2 (Two) Times a Day. 180 tablet 3    relugolix (ORGOVYX) 120 MG tablet tablet Take 1 tablet by mouth Daily. 30 tablet 11    senna (SENOKOT) 8.6 MG tablet Take 1 tablet by mouth Daily. 90 tablet 1    tamsulosin (FLOMAX) 0.4 MG capsule 24 hr capsule Take 1 capsule by mouth Daily. 90 capsule 1     venlafaxine 37.5 MG tablet sustained-release 24 hour 24 hr tablet Take  by mouth.      albuterol sulfate  (90 Base) MCG/ACT inhaler Inhale 2 puffs Every 4 (Four) Hours As Needed for Wheezing. 8 g 5    naloxone (NARCAN) 4 MG/0.1ML nasal spray 1 spray into the nostril(s) as directed by provider As Needed (unresponsiveness). (Patient not taking: Reported on 5/31/2024)      nicotine (NICODERM CQ) 14 MG/24HR patch Place 1 patch on the skin as directed by provider Daily. (Patient not taking: Reported on 5/31/2024) 30 patch 0    nicotine (NICODERM CQ) 7 MG/24HR patch Place 1 patch on the skin as directed by provider Daily. (Patient not taking: Reported on 5/31/2024) 30 patch 1    promethazine-dextromethorphan (PROMETHAZINE-DM) 6.25-15 MG/5ML syrup Take 5 mL by mouth 4 (Four) Times a Day As Needed for Cough. 180 mL 0    venlafaxine XR (EFFEXOR-XR) 37.5 MG 24 hr capsule Take 1 capsule by mouth Daily. 30 capsule 0     Facility-Administered Medications Prior to Visit   Medication Dose Route Frequency Provider Last Rate Last Admin    heparin injection 500 Units  500 Units Intravenous PRN Ishmael Rashid MD   500 Units at 06/02/21 0900    triamcinolone acetonide (KENALOG-40) injection 80 mg  80 mg Intramuscular Once Means, MARK Buck           Opioid medication/s are on active medication list.  and I have evaluated his active treatment plan and pain score trends (see table).  Vitals:    05/31/24 1330   PainSc:   7   PainLoc: Groin  Comment: cancer     I have reviewed the chart for potential of high risk medication and harmful drug interactions in the elderly.          Aspirin is on active medication list. Aspirin use is indicated based on review of current medical condition/s. Pros and cons of this therapy have been discussed today. Benefits of this medication outweigh potential harm.  Patient has been encouraged to continue taking this medication.  .      Patient Active Problem List   Diagnosis    Prostate cancer  "metastatic to bone    Encounter for care related to Port-a-Cath    Opioid use disorder    Pain, cancer    Therapeutic drug monitoring    Abnormal MRI, lumbar spine    Coronary artery disease of native artery of native heart with stable angina pectoris    Hyperlipidemia LDL goal <70    Essential hypertension    Chest pain    Depression    Constipation    Fatigue    Adrenal insufficiency    Chronic pain    Acute URI    Hyperglycemia    Hot flashes    Medicare annual wellness visit, subsequent    Iron deficiency    Tobacco abuse    Chronic GERD    Seasonal allergies    Therapeutic opioid induced constipation     Advance Care Planning   Advance Care Planning     Advance Directive is not on file.  ACP discussion was held with the patient during this visit. Patient does not have an advance directive, declines further assistance.     Objective    Vitals:    05/31/24 1330   BP: 138/74   BP Location: Right arm   Patient Position: Sitting   Cuff Size: Adult   Pulse: 89   Resp: 18   Temp: 97 °F (36.1 °C)   TempSrc: Infrared   SpO2: 95%   Weight: 88.5 kg (195 lb)   Height: 172.7 cm (68\")   PainSc:   7   PainLoc: Groin  Comment: cancer     Estimated body mass index is 29.65 kg/m² as calculated from the following:    Height as of this encounter: 172.7 cm (68\").    Weight as of this encounter: 88.5 kg (195 lb).    BMI is >= 25 and <30. (Overweight) The following options were offered after discussion;: exercise counseling/recommendations and nutrition counseling/recommendations      Does the patient have evidence of cognitive impairment? No          HEALTH RISK ASSESSMENT    Smoking Status:  Social History     Tobacco Use   Smoking Status Every Day    Current packs/day: 0.50    Average packs/day: 1 pack/day for 50.2 years (50.1 ttl pk-yrs)    Types: Cigarettes    Start date: 4/5/2024   Smokeless Tobacco Never     Alcohol Consumption:  Social History     Substance and Sexual Activity   Alcohol Use Not Currently     Fall Risk " Screen:    ROSA Fall Risk Assessment was completed, and patient is at LOW risk for falls.Assessment completed on:2024    Depression Screenin/31/2024     1:30 PM   PHQ-2/PHQ-9 Depression Screening   Little Interest or Pleasure in Doing Things 0-->not at all   Feeling Down, Depressed or Hopeless 0-->not at all   PHQ-9: Brief Depression Severity Measure Score 0       Health Habits and Functional and Cognitive Screenin/31/2024     1:31 PM   Functional & Cognitive Status   Do you have difficulty preparing food and eating? No   Do you have difficulty bathing yourself, getting dressed or grooming yourself? No   Do you have difficulty using the toilet? No   Do you have difficulty moving around from place to place? No   Do you have trouble with steps or getting out of a bed or a chair? No   Current Diet Well Balanced Diet   Dental Exam Not up to date   Eye Exam Not up to date   Exercise (times per week) 7 times per week   Current Exercises Include Walking   Do you need help using the phone?  No   Are you deaf or do you have serious difficulty hearing?  Yes   Do you need help to go to places out of walking distance? No   Do you need help shopping? No   Do you need help preparing meals?  No   Do you need help with housework?  No   Do you need help with laundry? No   Do you need help taking your medications? No   Do you need help managing money? No   Do you ever drive or ride in a car without wearing a seat belt? No   Have you felt unusual stress, anger or loneliness in the last month? No   Who do you live with? Spouse   If you need help, do you have trouble finding someone available to you? No   Have you been bothered in the last four weeks by sexual problems? No   Do you have difficulty concentrating, remembering or making decisions? No       Age-appropriate Screening Schedule:  Refer to the list below for future screening recommendations based on patient's age, sex and/or medical conditions. Orders  "for these recommended tests are listed in the plan section. The patient has been provided with a written plan.    Health Maintenance   Topic Date Due    LIPID PANEL  04/24/2019    HEPATITIS C SCREENING  Never done    ZOSTER VACCINE (1 of 2) 05/31/2024 (Originally 5/30/1974)    COVID-19 Vaccine (4 - 2023-24 season) 12/31/2024 (Originally 2/8/2024)    INFLUENZA VACCINE  08/01/2024    LUNG CANCER SCREENING  03/28/2025    ANNUAL WELLNESS VISIT  05/31/2025    BMI FOLLOWUP  05/31/2025    TDAP/TD VACCINES (2 - Td or Tdap) 03/27/2033    COLORECTAL CANCER SCREENING  05/03/2033    RSV Vaccine - Adults  Completed    Pneumococcal Vaccine 65+  Completed    AAA SCREEN (ONE-TIME)  Completed                  CMS Preventative Services Quick Reference  Risk Factors Identified During Encounter  Chronic Pain:  Pain is related to cancer treatment, is controlled.   Immunizations Discussed/Encouraged: Pneumococcal 23, Shingrix, COVID19, and RSV (Respiratory Syncytial Virus)  The above risks/problems have been discussed with the patient.  Pertinent information has been shared with the patient in the After Visit Summary.  An After Visit Summary and PPPS were made available to the patient.    Follow Up:   Next Medicare Wellness visit to be scheduled in 1 year.       Additional E&M Note during same encounter follows:  Patient has multiple medical problems which are significant and separately identifiable that require additional work above and beyond the Medicare Wellness Visit.      Chief Complaint  Medicare Wellness-subsequent and URI (\"Sinus\" )    Subjective        HPI  Naveen Lugo is also being seen today for Upper respiratory complaints, cough, congestion, sinus pain and pressure.  He says he took care of his grand kids and they were sick and now he has it.        Review of Systems   Constitutional: Negative.  Negative for chills and fever.   HENT:  Positive for congestion and postnasal drip.    Respiratory:  Positive for cough and " "wheezing.    Cardiovascular:  Negative for chest pain and palpitations.       Objective   Vital Signs:  /74 (BP Location: Right arm, Patient Position: Sitting, Cuff Size: Adult)   Pulse 89   Temp 97 °F (36.1 °C) (Infrared)   Resp 18   Ht 172.7 cm (68\")   Wt 88.5 kg (195 lb)   SpO2 95%   BMI 29.65 kg/m²     Physical Exam  Constitutional:       Appearance: Normal appearance.   HENT:      Right Ear: Tympanic membrane, ear canal and external ear normal.      Left Ear: Tympanic membrane, ear canal and external ear normal.   Cardiovascular:      Rate and Rhythm: Normal rate and regular rhythm.      Heart sounds: Normal heart sounds. No murmur heard.  Pulmonary:      Effort: No respiratory distress.      Breath sounds: Wheezing and rhonchi present.   Neurological:      Mental Status: He is alert and oriented to person, place, and time.   Psychiatric:         Behavior: Behavior normal.                         Assessment and Plan   Diagnoses and all orders for this visit:    1. Medicare annual wellness visit, subsequent (Primary)  Assessment & Plan:  Updated annual wellness visit checklist. Immunizations discussed. Screening up-to-date. Recommend yearly dental and eye exams. Also discussed monitoring of blood pressure and lipids. We addressed patient self-assessment of health status, frailty, and physical functioning. We reviewed psychosocial risks, behavioral risks, instrumental activities of daily living, and patient health risk assessment. Patient was given a personalized prevention plan.       2. Acute URI  Assessment & Plan:  RX for Doxycycline, Promethazine DM and Albuterol inhaler.  Call or return if symptoms persist or worsen.       3. Coronary artery disease of native artery of native heart with stable angina pectoris  Assessment & Plan:  Stable, followed by cardiology, has appt with them coming up .       4. Essential hypertension  Assessment & Plan:  Hypertension is stable and controlled  Continue " current treatment regimen.  Blood pressure will be reassessed in 6 months.      5. Hyperlipidemia LDL goal <70  Assessment & Plan:   Routine screening labs ordered. Further recommendations will depend upon those results.     Orders:  -     Comprehensive Metabolic Panel; Future  -     Lipid Panel; Future  -     Comprehensive Metabolic Panel  -     Lipid Panel    6. Chronic GERD  Assessment & Plan:  Continue present care no changes.       7. Pain, cancer  Assessment & Plan:  Managed by oncology.       8. Prostate cancer metastatic to bone  Assessment & Plan:  Followed by oncology and Urology.       9. Depression, unspecified depression type  Assessment & Plan:  Patient's depression is a recurrent episode that is mild without psychosis. Depression is  well controlled on current medication   and stable.    Plan:   Continue current medication therapy     Followup in 6 months.       10. Acute bronchitis due to other specified organisms  -     albuterol sulfate  (90 Base) MCG/ACT inhaler; Inhale 2 puffs Every 4 (Four) Hours As Needed for Wheezing.  Dispense: 8 g; Refill: 5    Other orders  -     promethazine-dextromethorphan (PROMETHAZINE-DM) 6.25-15 MG/5ML syrup; Take 5 mL by mouth 4 (Four) Times a Day As Needed for Cough.  Dispense: 240 mL; Refill: 0  -     doxycycline (MONODOX) 100 MG capsule; Take 1 capsule by mouth 2 (Two) Times a Day.  Dispense: 20 capsule; Refill: 0             Follow Up   Return in about 6 months (around 11/30/2024) for Recheck.  Patient was given instructions and counseling regarding his condition or for health maintenance advice. Please see specific information pulled into the AVS if appropriate.

## 2024-06-01 LAB
ALBUMIN SERPL-MCNC: 3.9 G/DL (ref 3.9–4.9)
ALBUMIN/GLOB SERPL: 1.6 {RATIO} (ref 1.2–2.2)
ALP SERPL-CCNC: 80 IU/L (ref 44–121)
ALT SERPL-CCNC: 10 IU/L (ref 0–44)
AST SERPL-CCNC: 19 IU/L (ref 0–40)
BILIRUB SERPL-MCNC: 0.2 MG/DL (ref 0–1.2)
BUN SERPL-MCNC: 6 MG/DL (ref 8–27)
BUN/CREAT SERPL: 7 (ref 10–24)
CALCIUM SERPL-MCNC: 9.2 MG/DL (ref 8.6–10.2)
CHLORIDE SERPL-SCNC: 101 MMOL/L (ref 96–106)
CHOLEST SERPL-MCNC: 207 MG/DL (ref 100–199)
CO2 SERPL-SCNC: 22 MMOL/L (ref 20–29)
CREAT SERPL-MCNC: 0.81 MG/DL (ref 0.76–1.27)
EGFRCR SERPLBLD CKD-EPI 2021: 95 ML/MIN/1.73
GLOBULIN SER CALC-MCNC: 2.4 G/DL (ref 1.5–4.5)
GLUCOSE SERPL-MCNC: 132 MG/DL (ref 70–99)
HDLC SERPL-MCNC: 31 MG/DL
LDLC SERPL CALC-MCNC: 140 MG/DL (ref 0–99)
POTASSIUM SERPL-SCNC: 3.7 MMOL/L (ref 3.5–5.2)
PROT SERPL-MCNC: 6.3 G/DL (ref 6–8.5)
SODIUM SERPL-SCNC: 140 MMOL/L (ref 134–144)
TRIGL SERPL-MCNC: 197 MG/DL (ref 0–149)
VLDLC SERPL CALC-MCNC: 36 MG/DL (ref 5–40)

## 2024-06-04 RX ORDER — ROSUVASTATIN CALCIUM 20 MG/1
20 TABLET, COATED ORAL DAILY
Qty: 90 TABLET | Refills: 1 | Status: SHIPPED | OUTPATIENT
Start: 2024-06-04

## 2024-06-07 DIAGNOSIS — G89.3 CHRONIC PAIN DUE TO NEOPLASM: ICD-10-CM

## 2024-06-07 RX ORDER — OXYCODONE 13.5 MG/1
13.5 CAPSULE, EXTENDED RELEASE ORAL EVERY 12 HOURS
Qty: 60 EACH | Refills: 0 | Status: SHIPPED | OUTPATIENT
Start: 2024-06-07 | End: 2024-07-07

## 2024-06-07 NOTE — TELEPHONE ENCOUNTER
AGUSTIN #: [id number]    Medication requested: oxyCODONE ER (Xtampza ER) 13.5 MG capsule extended-release 12 hour     Last fill date: 5/7/24    Last appointment: 5/6/24    Next appointment: 8/7/24

## 2024-06-10 DIAGNOSIS — G89.3 CANCER RELATED PAIN: ICD-10-CM

## 2024-06-10 RX ORDER — HYDROMORPHONE HYDROCHLORIDE 8 MG/1
8 TABLET ORAL EVERY 4 HOURS PRN
Qty: 180 TABLET | Refills: 0 | Status: SHIPPED | OUTPATIENT
Start: 2024-06-15 | End: 2024-07-15

## 2024-06-10 NOTE — TELEPHONE ENCOUNTER
AGUSTIN #: [id number]    Medication requested: HYDROmorphone (Dilaudid) 8 MG tablet     Last fill date: 5/16/24    Last appointment: 5/6/24    Next appointment: 8/7/24

## 2024-06-13 ENCOUNTER — OFFICE VISIT (OUTPATIENT)
Dept: UROLOGY | Facility: CLINIC | Age: 69
End: 2024-06-13
Payer: MEDICARE

## 2024-06-13 VITALS — BODY MASS INDEX: 29.55 KG/M2 | HEIGHT: 68 IN | WEIGHT: 195 LBS

## 2024-06-13 DIAGNOSIS — R33.9 URINARY RETENTION: ICD-10-CM

## 2024-06-13 DIAGNOSIS — C61 PROSTATE CANCER METASTATIC TO BONE: Primary | ICD-10-CM

## 2024-06-13 DIAGNOSIS — C79.51 PROSTATE CANCER METASTATIC TO BONE: Primary | ICD-10-CM

## 2024-06-13 LAB
BILIRUB BLD-MCNC: ABNORMAL MG/DL
CLARITY, POC: CLEAR
COLOR UR: YELLOW
EXPIRATION DATE: ABNORMAL
GLUCOSE UR STRIP-MCNC: NEGATIVE MG/DL
KETONES UR QL: NEGATIVE
LEUKOCYTE EST, POC: NEGATIVE
Lab: ABNORMAL
NITRITE UR-MCNC: NEGATIVE MG/ML
PH UR: 6.5 [PH] (ref 5–8)
PROT UR STRIP-MCNC: ABNORMAL MG/DL
RBC # UR STRIP: NEGATIVE /UL
SP GR UR: 1.01 (ref 1–1.03)
UROBILINOGEN UR QL: NORMAL

## 2024-06-13 RX ORDER — FINASTERIDE 5 MG/1
5 TABLET, FILM COATED ORAL DAILY
Qty: 30 TABLET | Refills: 2 | Status: SHIPPED | OUTPATIENT
Start: 2024-06-13

## 2024-06-13 NOTE — PROGRESS NOTES
Office Visit New [facility]      Patient Name: Naveen Lugo  : 1955   MRN: [id number]     Chief Complaint:    Chief Complaint   Patient presents with    Bladder Outlet Obstruction        Referring Provider: Ishmael Rashid MD    History of Present Illness: Naveen Lugo is a 69 y.o. male who presents to Urology today for LUTS.  He has metastatic prostate cancer.  He reports he has had difficulty voiding since 2019.  He has never undergone any outlet procedures.  He also reports he recently passed kidney stones.  He reports he has to push really hard and has to void 4-5 times/night.  He has intermittency.  He is taking tamsulosin but states that does not help.  He reports no current dysuria or gross hematuria.          Subjective      Review of System:   Review of Systems   Constitutional:  Negative for chills, fatigue, fever and unexpected weight change.   HENT:  Negative for congestion, rhinorrhea and sore throat.    Eyes:  Negative for visual disturbance.   Respiratory:  Negative for apnea, cough, chest tightness and shortness of breath.    Cardiovascular:  Negative for chest pain.   Gastrointestinal:  Negative for abdominal pain, constipation, diarrhea, nausea and vomiting.   Endocrine: Negative for polydipsia and polyuria.   Genitourinary:  Negative for difficulty urinating, dysuria, enuresis, flank pain, frequency, genital sores, hematuria and urgency.   Musculoskeletal:  Negative for gait problem.   Skin:  Negative for rash and wound.   Allergic/Immunologic: Negative for immunocompromised state.   Neurological:  Negative for dizziness, tremors, syncope, weakness and light-headedness.   Hematological:  Does not bruise/bleed easily.      I have reviewed the ROS documented by my clinical staff, I have updated appropriately and I agree. David Mccullough MD    Past Medical History:   Past Medical History:   Diagnosis Date    Bone cancer     History of radiation therapy 2021    L4 through sacrum, left  acetabulum, right pelvis    Nephrolithiasis     Opioid use disorder, mild, on maintenance therapy (HCC)     Prostate cancer        Past Surgical History:   Past Surgical History:   Procedure Laterality Date    CHOLECYSTECTOMY      CORONARY STENT PLACEMENT  206 & 2011    HERNIA REPAIR  1986    THORACIC DISCECTOMY  1994       Family History:   Family History   Problem Relation Age of Onset    Ovarian cancer Sister     Diabetes Brother     Heart disease Brother     Prostate cancer Brother        Social History:   Social History     Socioeconomic History    Marital status:    Tobacco Use    Smoking status: Every Day     Current packs/day: 0.50     Average packs/day: 1 pack/day for 50.2 years (50.1 ttl pk-yrs)     Types: Cigarettes     Start date: 4/5/2024    Smokeless tobacco: Never   Vaping Use    Vaping status: Never Used   Substance and Sexual Activity    Alcohol use: Not Currently    Drug use: Never    Sexual activity: Defer       Medications:     Current Outpatient Medications:     abiraterone acetate (ZYTIGA) 500 MG tablet, Take 2 tablets by mouth Daily., Disp: 60 tablet, Rfl: 11    albuterol sulfate  (90 Base) MCG/ACT inhaler, Inhale 2 puffs Every 4 (Four) Hours As Needed for Wheezing., Disp: 8 g, Rfl: 5    amoxicillin-clavulanate (AUGMENTIN) 875-125 MG per tablet, , Disp: , Rfl:     aspirin 81 MG EC tablet, Take 1 tablet by mouth Daily., Disp: 90 tablet, Rfl: 3    azelastine (ASTELIN) 0.1 % nasal spray, 2 sprays into the nostril(s) as directed by provider 2 (Two) Times a Day. Use in each nostril as directed, Disp: 30 mL, Rfl: 5    buPROPion XL (Wellbutrin XL) 150 MG 24 hr tablet, Take 1 tablet by mouth Daily. For smoking cessation, Disp: 30 tablet, Rfl: 5    cyclobenzaprine (FLEXERIL) 10 MG tablet, Take 1 tablet by mouth 3 (Three) Times a Day As Needed for Muscle Spasms., Disp: 90 tablet, Rfl: 0    doxycycline (MONODOX) 100 MG capsule, Take 1 capsule by mouth 2 (Two) Times a Day., Disp: 20 capsule,  Rfl: 0    ferrous sulfate 325 (65 FE) MG tablet, 325 mg p.o. twice daily every other day, take with vitamin C, Disp: 30 tablet, Rfl: 11    Fluticasone-Umeclidin-Vilant (Trelegy Ellipta) 200-62.5-25 MCG/ACT aerosol powder , INHALE 1 PUFF BY MOUTH DAILY, Disp: 180 each, Rfl: 3    [START ON 6/15/2024] HYDROmorphone (Dilaudid) 8 MG tablet, Take 1 tablet by mouth Every 4 (Four) Hours As Needed for Moderate Pain or Severe Pain for up to 30 days., Disp: 180 tablet, Rfl: 0    levocetirizine (XYZAL) 5 MG tablet, Take 1 tablet by mouth Every Evening., Disp: 90 tablet, Rfl: 1    Lidocaine-Prilocaine &Lido HCl 2.5-2.5 & 3.88 % kit, Apply  topically to the appropriate area as directed., Disp: , Rfl:     meclizine (ANTIVERT) 25 MG tablet, Take 1 tablet by mouth 3 (Three) Times a Day As Needed for Dizziness., Disp: 45 tablet, Rfl: 3    methocarbamol (ROBAXIN) 500 MG tablet, Take 2 tablets by mouth Every 8 (Eight) Hours., Disp: , Rfl:     naloxone (NARCAN) 4 MG/0.1ML nasal spray, 1 spray into the nostril(s) as directed by provider As Needed (unresponsiveness)., Disp: , Rfl:     nicotine (NICODERM CQ) 14 MG/24HR patch, Place 1 patch on the skin as directed by provider Daily., Disp: 30 patch, Rfl: 0    nicotine (Nicoderm CQ) 21 MG/24HR patch, Place 1 patch on the skin as directed by provider Daily., Disp: 30 patch, Rfl: 0    nicotine (NICODERM CQ) 7 MG/24HR patch, Place 1 patch on the skin as directed by provider Daily., Disp: 30 patch, Rfl: 1    nitroglycerin (NITROSTAT) 0.4 MG SL tablet, 1 under the tongue as needed for angina, may repeat q5mins for up three doses, Disp: 25 tablet, Rfl: 11    nystatin (MYCOSTATIN) 100,000 unit/mL suspension, Swish and swallow 5 mL 4 (Four) Times a Day., Disp: 473 mL, Rfl: 0    omeprazole (priLOSEC) 40 MG capsule, Take 1 tab po BID for stomach, Disp: 180 capsule, Rfl: 1    ondansetron (Zofran) 4 MG tablet, Take 1 tablet by mouth Every 8 (Eight) Hours As Needed for Nausea or Vomiting., Disp: 30  tablet, Rfl: 1    oxyCODONE ER (Xtampza ER) 13.5 MG capsule extended-release 12 hour , Take 1 capsule by mouth Every 12 (Twelve) Hours for 30 days., Disp: 60 each, Rfl: 0    potassium chloride 10 MEQ CR tablet, Take 1 tablet by mouth 2 (Two) Times a Day., Disp: 60 tablet, Rfl: 1    predniSONE (DELTASONE) 5 MG tablet, Take 1 tablet by mouth Daily., Disp: 30 tablet, Rfl: 11    promethazine-dextromethorphan (PROMETHAZINE-DM) 6.25-15 MG/5ML syrup, Take 5 mL by mouth 4 (Four) Times a Day As Needed for Cough., Disp: 240 mL, Rfl: 0    ranolazine (Ranexa) 500 MG 12 hr tablet, Take 1 tablet by mouth 2 (Two) Times a Day., Disp: 180 tablet, Rfl: 3    relugolix (ORGOVYX) 120 MG tablet tablet, Take 1 tablet by mouth Daily., Disp: 30 tablet, Rfl: 11    rosuvastatin (CRESTOR) 20 MG tablet, Take 1 tablet by mouth Daily., Disp: 90 tablet, Rfl: 1    senna (SENOKOT) 8.6 MG tablet, Take 1 tablet by mouth Daily., Disp: 90 tablet, Rfl: 1    tamsulosin (FLOMAX) 0.4 MG capsule 24 hr capsule, Take 1 capsule by mouth Daily., Disp: 90 capsule, Rfl: 1    venlafaxine 37.5 MG tablet sustained-release 24 hour 24 hr tablet, Take  by mouth., Disp: , Rfl:     finasteride (Proscar) 5 MG tablet, Take 1 tablet by mouth Daily., Disp: 30 tablet, Rfl: 2    Current Facility-Administered Medications:     triamcinolone acetonide (KENALOG-40) injection 80 mg, 80 mg, Intramuscular, Once, Ju, MARK Buck    Facility-Administered Medications Ordered in Other Visits:     heparin injection 500 Units, 500 Units, Intravenous, PRN, Ishmael Rashid MD, 500 Units at 06/02/21 0900    Allergies:   Allergies   Allergen Reactions    Cymbalta [Duloxetine Hcl] Dizziness    Gabapentin Nausea Only    Pregabalin Dizziness, Nausea And Vomiting and Hives    Duloxetine Hives       IPSS Questionnaire (AUA-7):  Over the past month…    1)  Incomplete Emptying  How often have you had a sensation of not emptying your bladder?  5 - Almost always   2)  Frequency  How often have you  "had to urinate less than every two hours? 4 - More than half the time   3)  Intermittency  How often have you found you stopped and started again several times when you urinated?  5 - Almost always   4) Urgency  How often have you found it difficult to postpone urination?  3 - About half the time   5) Weak Stream  How often have you had a weak urinary stream?  5 - Almost always   6) Straining  How often have you had to push or strain to begin urination?  4 - More than half the time   7) Nocturia  How many times did you typically get up at night to urinate?  4 - 4 times   Total Score:  30   The International Prostate Symptom Score (IPSS) is used to screen, diagnose, track symptoms of benign prostatic hyperplasia (BPH).    0-7 pts (Mild Symptoms)  / 8-19 pts (Moderate) / 20-35 (Severe)    Quality of life due to urinary symptoms:  If you were to spend the rest of your life with your urinary condition the way it is now, how would you feel about that? 3-Mixed   Urine Leakage (Incontinence) 0-No Leakage       Post void residual bladder scan:   137 mL     Objective     Physical Exam:   Vital Signs:   Vitals:    06/13/24 1441   Weight: 88.5 kg (195 lb)   Height: 172.7 cm (68\")   PainSc: 0-No pain     Body mass index is 29.65 kg/m².     Physical Exam  Vitals and nursing note reviewed.   Constitutional:       General: He is awake. He is not in acute distress.     Appearance: Normal appearance. He is well-developed.   HENT:      Head: Normocephalic and atraumatic.      Right Ear: External ear normal.      Left Ear: External ear normal.      Nose: Nose normal.   Eyes:      Conjunctiva/sclera: Conjunctivae normal.   Pulmonary:      Effort: Pulmonary effort is normal.   Abdominal:      General: There is no distension.      Palpations: Abdomen is soft. There is no mass.      Tenderness: There is no abdominal tenderness. There is no right CVA tenderness, left CVA tenderness, guarding or rebound.      Hernia: No hernia is present. " There is no hernia in the left inguinal area or right inguinal area.   Genitourinary:     Pubic Area: No rash.       Penis: Normal.       Testes: Normal.      Prostate: Normal.      Rectum: Normal. No mass or tenderness. Normal anal tone.   Musculoskeletal:      Cervical back: Normal range of motion.   Lymphadenopathy:      Lower Body: No right inguinal adenopathy. No left inguinal adenopathy.   Skin:     General: Skin is warm.   Neurological:      General: No focal deficit present.      Mental Status: He is alert and oriented to person, place, and time.   Psychiatric:         Behavior: Behavior normal. Behavior is cooperative.         Labs:   Brief Urine Lab Results  (Last result in the past 365 days)        Color   Clarity   Blood   Leuk Est   Nitrite   Protein   CREAT   Urine HCG        06/13/24 1459 Yellow   Clear   Negative   Negative   Negative   Trace                        Lab Results   Component Value Date    GLUCOSE 132 (H) 05/31/2024    CALCIUM 9.2 05/31/2024     05/31/2024    K 3.7 05/31/2024    CO2 22 05/31/2024     05/31/2024    BUN 6 (L) 05/31/2024    CREATININE 0.81 05/31/2024    EGFRIFAFRI 109 01/11/2022    EGFRIFNONA 98 02/14/2022    BCR 7 (L) 05/31/2024    ANIONGAP 10.0 02/14/2022       Lab Results   Component Value Date    WBC 10.04 04/02/2024    HGB 13.6 04/02/2024    HCT 42.8 04/02/2024    MCV 85.3 04/02/2024     04/02/2024       Lab Results   Component Value Date    PSA <0.014 04/02/2024    PSA <0.1 01/04/2024    PSA <0.014 12/14/2023        Images:   No Images in the past 120 days found..    Measures:   Tobacco:   Naveen Lugo  reports that he has been smoking cigarettes. He started smoking about 2 months ago. He has a 50.1 pack-year smoking history. He has never used smokeless tobacco. I have educated him on the risk of diseases from using tobacco products such as cancer, COPD, and heart disease.     I advised him to quit     I spent 3  minutes counseling the  patient.      Urine Incontinence: Patient reports that he is not currently experiencing any symptoms of urinary incontinence.     Assessment / Plan      Assessment/Plan:   Naveen Lugo is a 69 y.o. male who presented today for metastatic prostate cancer and bladder outlet obstruction.  I discussed bladder outlet work up and he is interested.  His LUTS is affecting his quality of life.  I will have him follow up in the next 2 weeks for cysto and trus for volume.  It is likely he will need TURP.  I discussed this with him and he watched video regarding TURP.  I will start him on finasteride in preparation for his TURP.      Diagnoses and all orders for this visit:    1. Prostate cancer metastatic to bone (Primary)  -     POC Urinalysis Dipstick, Automated    2. Urinary retention  -     finasteride (Proscar) 5 MG tablet; Take 1 tablet by mouth Daily.  Dispense: 30 tablet; Refill: 2        Patient Education:        Follow Up:   Return in about 2 weeks (around 6/27/2024) for Procedure next visit.    David Mccullough MD  [facility] [place]

## 2024-06-17 ENCOUNTER — TELEPHONE (OUTPATIENT)
Dept: UROLOGY | Facility: CLINIC | Age: 69
End: 2024-06-17
Payer: MEDICARE

## 2024-06-17 NOTE — TELEPHONE ENCOUNTER
Patient called and was wondering if he needed to stop his Asprin 81mg also before his procedure. I let him know that I'd send  a message and give him a call back.

## 2024-06-24 ENCOUNTER — SPECIALTY PHARMACY (OUTPATIENT)
Facility: HOSPITAL | Age: 69
End: 2024-06-24
Payer: MEDICARE

## 2024-06-24 NOTE — PROGRESS NOTES
Specialty Pharmacy Refill Coordination Note     Naveen is a 69 y.o. male contacted today regarding refills of  Abiraterone 1000mg PO QD and Relugolix 120mg PO QD of specialty medication(s).    Reviewed and verified with patient: YES    Specialty medication(s) and dose(s) confirmed: yes    Refill Questions      Flowsheet Row Most Recent Value   Changes to allergies? No   Changes to medications? No   New conditions or infections since last clinic visit No   Unplanned office visit, urgent care, ED, or hospital admission in the last 4 weeks  No   How does patient/caregiver feel medication is working? Good   Financial problems or insurance changes  No   Since the previous refill, were any specialty medication doses or scheduled injections missed or delayed?  No   Does this patient require a clinical escalation to a pharmacist? No            Delivery Questions      Flowsheet Row Most Recent Value   Delivery method [employer]   Delivery address verified with patient/caregiver? Yes   Delivery address Home   Number of medications in delivery 3   Medication(s) being filled and delivered Abiraterone Acetate, Prednisone, Relugolix   Doses left of specialty medications 8 days left   Copay verified? Yes   Copay amount $0   Copay form of payment No copayment ($0)              Medication Adherence    Adherence tools used: watch   Other adherence tool: Clock - takes at same time each day, 7:45am   Support network for adherence: family member          Follow-up: 30 day(s)     Sushma Muro, Pharmacy Technician  Specialty Pharmacy Technician

## 2024-06-25 ENCOUNTER — HOSPITAL ENCOUNTER (OUTPATIENT)
Dept: CARDIOLOGY | Facility: HOSPITAL | Age: 69
Discharge: HOME | End: 2024-06-25
Payer: MEDICARE

## 2024-06-25 VITALS — HEIGHT: 68 IN | WEIGHT: 195.11 LBS | BODY MASS INDEX: 29.57 KG/M2

## 2024-06-25 DIAGNOSIS — I20.89 ANGINAL EQUIVALENT: ICD-10-CM

## 2024-06-25 DIAGNOSIS — R07.9 CHEST PAIN, UNSPECIFIED TYPE: ICD-10-CM

## 2024-06-25 DIAGNOSIS — I25.118 CORONARY ARTERY DISEASE OF NATIVE ARTERY OF NATIVE HEART WITH STABLE ANGINA PECTORIS: ICD-10-CM

## 2024-06-25 DIAGNOSIS — R42 DIZZINESS: ICD-10-CM

## 2024-06-25 LAB
BH CV ECHO MEAS - AO MAX PG: 6.5 MMHG
BH CV ECHO MEAS - AO MEAN PG: 3 MMHG
BH CV ECHO MEAS - AO ROOT DIAM: 3.1 CM
BH CV ECHO MEAS - AO V2 MAX: 127 CM/SEC
BH CV ECHO MEAS - AO V2 VTI: 26.5 CM
BH CV ECHO MEAS - AVA(I,D): 2.6 CM2
BH CV ECHO MEAS - EDV(CUBED): 91.1 ML
BH CV ECHO MEAS - EDV(MOD-SP2): 86.3 ML
BH CV ECHO MEAS - EDV(MOD-SP4): 97.9 ML
BH CV ECHO MEAS - EF(MOD-BP): 66.9 %
BH CV ECHO MEAS - EF(MOD-SP2): 67.4 %
BH CV ECHO MEAS - EF(MOD-SP4): 63.8 %
BH CV ECHO MEAS - ESV(CUBED): 42.9 ML
BH CV ECHO MEAS - ESV(MOD-SP2): 28.1 ML
BH CV ECHO MEAS - ESV(MOD-SP4): 35.4 ML
BH CV ECHO MEAS - FS: 22.2 %
BH CV ECHO MEAS - IVS/LVPW: 1.22 CM
BH CV ECHO MEAS - IVSD: 1.1 CM
BH CV ECHO MEAS - LA DIMENSION: 3.6 CM
BH CV ECHO MEAS - LAT PEAK E' VEL: 8.4 CM/SEC
BH CV ECHO MEAS - LV DIASTOLIC VOL/BSA (35-75): 48.4 CM2
BH CV ECHO MEAS - LV MASS(C)D: 153.3 GRAMS
BH CV ECHO MEAS - LV MAX PG: 6.4 MMHG
BH CV ECHO MEAS - LV MEAN PG: 3 MMHG
BH CV ECHO MEAS - LV SYSTOLIC VOL/BSA (12-30): 17.5 CM2
BH CV ECHO MEAS - LV V1 MAX: 126 CM/SEC
BH CV ECHO MEAS - LV V1 VTI: 24.1 CM
BH CV ECHO MEAS - LVIDD: 4.5 CM
BH CV ECHO MEAS - LVIDS: 3.5 CM
BH CV ECHO MEAS - LVOT AREA: 2.8 CM2
BH CV ECHO MEAS - LVOT DIAM: 1.9 CM
BH CV ECHO MEAS - LVPWD: 0.9 CM
BH CV ECHO MEAS - MED PEAK E' VEL: 7.1 CM/SEC
BH CV ECHO MEAS - MV A MAX VEL: 70.7 CM/SEC
BH CV ECHO MEAS - MV DEC SLOPE: 392 CM/SEC2
BH CV ECHO MEAS - MV DEC TIME: 0.17 SEC
BH CV ECHO MEAS - MV E MAX VEL: 74.9 CM/SEC
BH CV ECHO MEAS - MV E/A: 1.06
BH CV ECHO MEAS - MV MAX PG: 3 MMHG
BH CV ECHO MEAS - MV MEAN PG: 2 MMHG
BH CV ECHO MEAS - MV P1/2T: 62.6 MSEC
BH CV ECHO MEAS - MV V2 VTI: 22.1 CM
BH CV ECHO MEAS - MVA(P1/2T): 3.5 CM2
BH CV ECHO MEAS - MVA(VTI): 3.1 CM2
BH CV ECHO MEAS - PA ACC TIME: 0.11 SEC
BH CV ECHO MEAS - PI END-D VEL: 112 CM/SEC
BH CV ECHO MEAS - RAP SYSTOLE: 8 MMHG
BH CV ECHO MEAS - RVSP: 21 MMHG
BH CV ECHO MEAS - SV(LVOT): 68.3 ML
BH CV ECHO MEAS - SV(MOD-SP2): 58.2 ML
BH CV ECHO MEAS - SV(MOD-SP4): 62.5 ML
BH CV ECHO MEAS - SVI(LVOT): 33.8 ML/M2
BH CV ECHO MEAS - SVI(MOD-SP2): 28.8 ML/M2
BH CV ECHO MEAS - SVI(MOD-SP4): 30.9 ML/M2
BH CV ECHO MEAS - TAPSE (>1.6): 1.8 CM
BH CV ECHO MEAS - TR MAX PG: 13 MMHG
BH CV ECHO MEAS - TR MAX VEL: 180 CM/SEC
BH CV ECHO MEASUREMENTS AVERAGE E/E' RATIO: 9.66
BH CV REST NUCLEAR ISOTOPE DOSE: 30.1 MCI
BH CV STRESS BP STAGE 1: NORMAL
BH CV STRESS BP STAGE 2: NORMAL
BH CV STRESS BP STAGE 3: NORMAL
BH CV STRESS BP STAGE 4: NORMAL
BH CV STRESS COMMENTS STAGE 1: NORMAL
BH CV STRESS DOSE REGADENOSON STAGE 1: 0.4
BH CV STRESS DURATION MIN STAGE 1: 1
BH CV STRESS DURATION MIN STAGE 2: 1
BH CV STRESS DURATION MIN STAGE 3: 1
BH CV STRESS DURATION MIN STAGE 4: 1
BH CV STRESS HR STAGE 1: 84
BH CV STRESS HR STAGE 2: 85
BH CV STRESS HR STAGE 3: 89
BH CV STRESS HR STAGE 4: 86
BH CV STRESS NUCLEAR ISOTOPE DOSE: 30 MCI
BH CV STRESS O2 STAGE 1: 96
BH CV STRESS O2 STAGE 2: 95
BH CV STRESS O2 STAGE 3: 96
BH CV STRESS O2 STAGE 4: 96
BH CV STRESS PROTOCOL 1: NORMAL
BH CV STRESS RECOVERY BP: NORMAL MMHG
BH CV STRESS RECOVERY HR: 68 BPM
BH CV STRESS RECOVERY O2: 93 %
BH CV STRESS STAGE 1: 1
BH CV STRESS STAGE 2: 2
BH CV STRESS STAGE 3: 3
BH CV STRESS STAGE 4: 4
BH CV VAS BP LEFT ARM: NORMAL MMHG
BH CV XLRA - RV BASE: 3.2 CM
BH CV XLRA - RV LENGTH: 7.8 CM
BH CV XLRA - RV MID: 2.6 CM
BH CV XLRA - TDI S': 12.9 CM/SEC
IVRT: 92 MS
LEFT ATRIUM VOLUME INDEX: 30.4 ML/M2
LEFT ATRIUM VOLUME: 61.4 ML
LV EF NUC BP: 70 %
MAXIMAL PREDICTED HEART RATE: 151 BPM
PERCENT MAX PREDICTED HR: 70.86 %
STRESS BASELINE BP: NORMAL MMHG
STRESS BASELINE HR: 81 BPM
STRESS O2 SAT REST: 96 %
STRESS PERCENT HR: 83 %
STRESS POST EXERCISE DUR MIN: 4 MIN
STRESS POST EXERCISE DUR SEC: 0 SEC
STRESS POST O2 SAT PEAK: 96 %
STRESS POST PEAK BP: NORMAL MMHG
STRESS POST PEAK HR: 107 BPM
STRESS TARGET HR: 128 BPM

## 2024-06-25 PROCEDURE — 93017 CV STRESS TEST TRACING ONLY: CPT

## 2024-06-25 PROCEDURE — 25010000002 REGADENOSON 0.4 MG/5ML SOLUTION: Performed by: INTERNAL MEDICINE

## 2024-06-25 PROCEDURE — 25010000002 SULFUR HEXAFLUORIDE MICROSPH 60.7-25 MG RECONSTITUTED SUSPENSION: Performed by: INTERNAL MEDICINE

## 2024-06-25 PROCEDURE — 78431 MYOCRD IMG PET RST&STRS CT: CPT | Performed by: INTERNAL MEDICINE

## 2024-06-25 PROCEDURE — A9555 RB82 RUBIDIUM: HCPCS | Performed by: INTERNAL MEDICINE

## 2024-06-25 PROCEDURE — 93306 TTE W/DOPPLER COMPLETE: CPT

## 2024-06-25 PROCEDURE — 78431 MYOCRD IMG PET RST&STRS CT: CPT

## 2024-06-25 PROCEDURE — 0 RUBIDIUM CHLORIDE: Performed by: INTERNAL MEDICINE

## 2024-06-25 PROCEDURE — 93016 CV STRESS TEST SUPVJ ONLY: CPT | Performed by: INTERNAL MEDICINE

## 2024-06-25 PROCEDURE — 93306 TTE W/DOPPLER COMPLETE: CPT | Performed by: INTERNAL MEDICINE

## 2024-06-25 PROCEDURE — 93018 CV STRESS TEST I&R ONLY: CPT | Performed by: INTERNAL MEDICINE

## 2024-06-25 RX ORDER — REGADENOSON 0.08 MG/ML
0.4 INJECTION, SOLUTION INTRAVENOUS ONCE
Status: COMPLETED | OUTPATIENT
Start: 2024-06-25 | End: 2024-06-25

## 2024-06-25 RX ADMIN — REGADENOSON 0.4 MG: 0.08 INJECTION, SOLUTION INTRAVENOUS at 12:36

## 2024-06-25 RX ADMIN — RUBIDIUM CHLORIDE RB-82 1 DOSE: 150 INJECTION, SOLUTION INTRAVENOUS at 12:31

## 2024-06-25 RX ADMIN — RUBIDIUM CHLORIDE RB-82 1 DOSE: 150 INJECTION, SOLUTION INTRAVENOUS at 12:42

## 2024-06-25 RX ADMIN — SULFUR HEXAFLUORIDE 3 ML: KIT at 13:56

## 2024-06-26 ENCOUNTER — SPECIALTY PHARMACY (OUTPATIENT)
Facility: HOSPITAL | Age: 69
End: 2024-06-26
Payer: MEDICARE

## 2024-06-26 ENCOUNTER — OFFICE VISIT (OUTPATIENT)
Dept: ONCOLOGY | Facility: CLINIC | Age: 69
End: 2024-06-26
Payer: MEDICARE

## 2024-06-26 ENCOUNTER — INFUSION (OUTPATIENT)
Dept: ONCOLOGY | Facility: HOSPITAL | Age: 69
End: 2024-06-26
Payer: MEDICARE

## 2024-06-26 VITALS
HEIGHT: 68 IN | TEMPERATURE: 97.1 F | OXYGEN SATURATION: 94 % | BODY MASS INDEX: 28.79 KG/M2 | SYSTOLIC BLOOD PRESSURE: 124 MMHG | DIASTOLIC BLOOD PRESSURE: 84 MMHG | HEART RATE: 93 BPM | RESPIRATION RATE: 18 BRPM | WEIGHT: 190 LBS

## 2024-06-26 DIAGNOSIS — C61 PROSTATE CANCER METASTATIC TO BONE: Primary | ICD-10-CM

## 2024-06-26 DIAGNOSIS — Z13.820 OSTEOPOROSIS SCREENING: ICD-10-CM

## 2024-06-26 DIAGNOSIS — K59.03 THERAPEUTIC OPIOID INDUCED CONSTIPATION: ICD-10-CM

## 2024-06-26 DIAGNOSIS — Z79.818 ANDROGEN DEPRIVATION THERAPY: ICD-10-CM

## 2024-06-26 DIAGNOSIS — C79.51 PROSTATE CANCER METASTATIC TO BONE: Primary | ICD-10-CM

## 2024-06-26 DIAGNOSIS — C61 PROSTATE CANCER METASTATIC TO BONE: ICD-10-CM

## 2024-06-26 DIAGNOSIS — T40.2X5A THERAPEUTIC OPIOID INDUCED CONSTIPATION: ICD-10-CM

## 2024-06-26 DIAGNOSIS — C79.51 PROSTATE CANCER METASTATIC TO BONE: ICD-10-CM

## 2024-06-26 DIAGNOSIS — Z45.2 ENCOUNTER FOR CARE RELATED TO PORT-A-CATH: Primary | ICD-10-CM

## 2024-06-26 PROCEDURE — 36591 DRAW BLOOD OFF VENOUS DEVICE: CPT

## 2024-06-26 PROCEDURE — 25010000002 HEPARIN LOCK FLUSH PER 10 UNITS: Performed by: INTERNAL MEDICINE

## 2024-06-26 PROCEDURE — 99214 OFFICE O/P EST MOD 30 MIN: CPT | Performed by: NURSE PRACTITIONER

## 2024-06-26 PROCEDURE — 3074F SYST BP LT 130 MM HG: CPT | Performed by: NURSE PRACTITIONER

## 2024-06-26 PROCEDURE — 1159F MED LIST DOCD IN RCRD: CPT | Performed by: NURSE PRACTITIONER

## 2024-06-26 PROCEDURE — 3079F DIAST BP 80-89 MM HG: CPT | Performed by: NURSE PRACTITIONER

## 2024-06-26 PROCEDURE — 1125F AMNT PAIN NOTED PAIN PRSNT: CPT | Performed by: NURSE PRACTITIONER

## 2024-06-26 PROCEDURE — 1160F RVW MEDS BY RX/DR IN RCRD: CPT | Performed by: NURSE PRACTITIONER

## 2024-06-26 RX ORDER — HEPARIN SODIUM (PORCINE) LOCK FLUSH IV SOLN 100 UNIT/ML 100 UNIT/ML
500 SOLUTION INTRAVENOUS AS NEEDED
Status: DISCONTINUED | OUTPATIENT
Start: 2024-06-26 | End: 2024-06-26 | Stop reason: HOSPADM

## 2024-06-26 RX ORDER — SENNOSIDES A AND B 8.6 MG/1
1 TABLET, FILM COATED ORAL DAILY
Qty: 90 TABLET | Refills: 1 | Status: SHIPPED | OUTPATIENT
Start: 2024-06-26

## 2024-06-26 RX ORDER — HEPARIN SODIUM (PORCINE) LOCK FLUSH IV SOLN 100 UNIT/ML 100 UNIT/ML
500 SOLUTION INTRAVENOUS AS NEEDED
OUTPATIENT
Start: 2024-06-26

## 2024-06-26 RX ADMIN — HEPARIN 500 UNITS: 100 SYRINGE at 14:55

## 2024-06-26 NOTE — PROGRESS NOTES
CHIEF COMPLAINT: 1.  Recent episode of chest pain while mowing his lawn   2.  Metastatic prostate cancer    Problem List:  Oncology/Hematology History Overview Note   1.  Stage IVb grade group 4 metastatic prostate cancer with sclerotic bone lesions on CT and bone scan and history of prostatic hypertrophy.  Good response to Taxotere ending 2/18/2021 Relugolix, followed by Zytiga prednisone with L4/sacrum, left acetabulum, and right pelvis external beam radiation August 2021.  Hypophosphatemia on Zometa.  Zometa held as of October 2021.  2.  Kidney stone  3.  Polyps  4.  Probable drug-induced hypophosphatemia from Zometa, drug stopped after 10/5/2021 dose  5.  Cancer related pain seeing palliative care  6.  Fatigue  7.  Covid 2/2022  8.  Iron deficiency anemia  -5/3/2023 EGD and colonoscopy with Dr. Alexander Gomez: Superficial erosions in the stomach with gastritis.  Small polyps removed, dilated internal hemorrhoids.  Pathology pending.  Recommend repeat colonoscopy in 5 years.    -9/30/2020 office note from Dr. Ronen Reynaga indicates patient with PSA 10.8.  Underwent TRUS prostate biopsy 9/15/2020.  Adenocarcinoma the prostate [facility] 4+4 = 8 in 1 out of 12 cores and 4+3 = 7 and 10 out of 12 cores and 3+4 = 7 in 1 out of 12 cores.  Perineural invasion.  Extraprostatic extension into the fat seen on 2 biopsies of the right lateral mid and right apex.  9/24/2020 CT chest and whole-body bone scan showed T12, L1, left acetabular, right medial acetabular metastases with no lung metastases.  He started androgen deprivation therapy with Casodex with plans for Lupron to start in 1 to 2 weeks for clinical T3 N0 M1 metastatic prostate cancer.  Review of official report from [facility] 9/24/2020 bone scan mentions T12, L1, roof of left acetabulum and medial right acetabular osteoblastic metastases.  CT chest with contrast that same day showed mild splenomegaly 14.2 cm with stable periaortic nodes compared to CT  December 2015 with no lung metastases.  Sclerotic abnormality within the T12 vertebral body, L1 vertebral body extending into the pedicle unchanged.  8/28/2020 CT abdomen pelvis report from [facility] reviewed and mentions normal spleen size.  Punctate nonobstructing kidney stone, no paulino enlargement, diffuse bladder wall thickening with mild perivesicular fat stranding, 2.1 cm sclerotic left iliac bone above the left acetabulum new compared to 2015 as well as 1.5 cm faintly sclerotic focus in the right posterior acetabulum stable compared to prior CT 2015 as well as a 1.6 cm T12 and inferior vertebral body sclerotic lesion and a 1.9 cm left posterior lateral L1 vertebral body abnormality extending to the pedicle.  -10/13/2020 initial [facility] medical oncology consultation: With 1 Sarika score 8, 4+4 lesion that would make him a grade group 4 with stage IVb disease given radiographic evidence of sclerotic bone metastasis on bone scan and CT.  Needs genetic testing given metastatic prostate cancer and this is also important as, were he to have mismatch repair mutation then we would have options for PDL 1 inhibitors and were he BRCA mutated would have options for PARP inhibitors in the future.  Systemic therapy for castration naïve prostate cancer or N1 disease should be with apalutamide or Abiraterone or enzalutamide all of which are category 1.  He does not have any visceral metastases.  According to his imaging he has T12, L1, left acetabular, right acetabular, and left iliac bony involvement.  That means he would have 4 or more bone metastases with at least one metastasis (i.e. the acetabular lesions bilaterally) beyond the pelvis/vertebral, for which this would be considered high-volume disease for which 6 cycles of docetaxel 75 mg/m² x 6 cycles followed by Zytiga and prednisone would be reasonable along with Zometa every 6 weeks for bone stabilization.  He will do chemo preparation visit with my nurse  practitioner prior to start chemotherapy with Taxotere and Zometa in 2 weeks and he is already received Casodex in preparation for Lupron shot he received on 10/12/2020 with Dr. Reynaga.  We will get him to Dr. Alexander Gomez who has done his polypectomies in the past for port placement and we will get genetic counseling started.  Following the 6 courses of Taxotere per the Chaarted trial criteria, I would then give him an indefinite Zytiga and prednisone and Zometa until progression or toxicity dictates.    -12/16/2019 COVID-19 antigen test negative    -12/29/2020 PSA 0.275 with absolute neutrophil count 700 and creatinine 0.76 with normal liver enzymes and electrolytes.  Normal magnesium and phosphorus and urine drug screen positive for buprenorphine and opiates follow-up with palliative care.    -1/4/2021 CT chest abdomen pelvis with contrast and whole-body bone scan shows improving less metabolically active bone metastases.  Stable sclerotic T12, L1, and L2 vertebral bodies and bilateral pelvic bones on bone windows with stable subcentimeter para-aortic lymph nodes and no definite progression in the chest abdomen or pelvis.    -1/5/2021 [facility], operated by [facility] medical oncology follow-up visit: I reviewed the images and reports thereof of the above CT and bone scan which shows good response to Taxotere x3 courses with a PSA down to 0.275 from a baseline of 10.  Get another 3 courses of Taxotere, continue his Xgeva, and then following that we will switch to Zytiga and prednisone.  He is working with palliative care on his bone pain but on his current dose of fentanyl patch he says his pain is still not adequately controlled he will contact them.  Dexamethasone prescription was refilled.  We will see my nurse practitioner back in 3 weeks for course #5 of 6 total courses and then we will reimage with CT chest abdomen pelvis and bone scan before going to the Zytiga prednisone.    -3/4/2021 CT chest abdomen pelvis with contrast is  negative save for stable sclerotic bone lesions and the bone scan shows near resolution of prior bony abnormalities associated with the known sclerotic lesions in the thoracolumbar spine and bony pelvis.    2/17/2021 PSA down to 0.1 from 1.37 October 2020.  3/9/2021 PSA 0.1    -5/26/2021 CT chest abdomen pelvis with contrast shows stable to slightly underlying increase low-density left para-aortic node.  Bone lesions stable.  Whole-body bone scan stable to decrease activity of known thoracolumbar and bony pelvic involvement.  PSA less than 0.1  , AST 74, bilirubin 0.5.       -6/1/2021 [facility] oncology follow-up visit: I reviewed the above data with him and images thereof.  Bones are stable.  No significant adenopathy.  PSA immeasurably low.     upper limit of normal 69 and AST 74 upper limit of normal 46.  Hence, neither is more than double the upper limit of normal with no ascites and no encephalopathy and normal albumin and bilirubin, despite the elevation of liver enzymes, he has child Abrams score A.  Package insert for Abiraterone says that for ALT and/or AST greater than 5 times upper limit of normal or total bilirubin greater than 3 times the upper limit of normal to withhold treatment until liver function tests returned to baseline or ALT and AST less than or equal to 2.5 times the upper limit of normal and total bilirubin less than or equal to 1.5 times the upper limit of normal and then reinitiate at 750 mg daily dose of Zytiga.  For recurrent hepatotoxicity on 750 mg daily Zytiga, withhold treatment until liver functions returned to baseline or ALT and AST less than 3-2.5 times upper limit of normal and total bilirubin less than or equal to 1.5 times the upper limit of normal then reinitiate at 500 mg daily Zytiga dose.  If has recurrent hepatotoxicity on 500 mg dose, then discontinue treatment.  If has ALT greater than 3 times the upper limit of normal along with total bilirubin  greater than 2 times the upper limit of normal in the absence of other contributing causes or biliary obstruction, permanently discontinue Zytiga.  Based on these recommendations, I would continue current doses but we will watch with serial labs monthly and repeat his imaging again in 3 months but clinically he is doing wonderfully with excellent response to Taxotere and now on Zytiga prednisone plus Zometa along with Relugolix.    -6/23/2020 for [facility] oncology clinic follow-up: Having persistent and worsening pain above his left hip.  I will get an MRI of the left hip for further evaluation.  Otherwise we will continue therapy unchanged with Zytiga, prednisone and Zometa along with Relugolix.    -7/28/2021 [facility] oncology clinic follow-up: Patient with multiple somatic complaints including episodes of confusion, dizziness, decreased memory, agitation.  He reports ongoing episodes with difficulty swallowing.  Also most concerning for episodes of angina and chest pain for which he basically refused to seek emergency medical attention.  He has a history of coronary artery disease and stent placement.  He has not followed up with cardiology for many years.  I will get an MRI of the brain in light of his confusion, dizziness and agitation.  I will get him to gastroenterology for EGD in light of his dysphagia.  I have also put in a referral for cardiology.  I reemphasized to the patient, his wife who is with him today and his son who was on the telephone during our visit the importance of seeking out prompt medical attention when he is having chest pain or neurological concerns so that he can be evaluated.  The patient states that he basically refuses to go to the emergency room and if his family calls an ambulance he would not agree to go to the hospital.  For the time being, I have asked him to hold his Zytiga and prednisone until we can sort this out as Zytiga does have some cardiovascular risk.  There is likely no  "treatment for prostate cancer that does not carry some form of cardiovascular risk.  His PSA remains low at 0.014 yesterday.  He will continue relugolix for now.    Of note, some of these symptoms could also be due to his hypophosphatemia, phosphate level from yesterday was 1.5, I have sent in a prescription for K-Phos 3 times a day before meals for 10 days.  We will hold further Zometa until this is corrected.  I have also put in an order to check his vitamin D level.  He takes calcium and vitamin D daily.  Some of his symptoms also could be due to drug withdrawal as there was some confusion and difficulty getting his last prescription for methadone therefore he was without methadone for several days, he now has his prescription and is back on his pain medication.  He has an appointment with neurosurgery tomorrow in light of his ongoing back pain, MRI of the hip recently showed multiple bony lesions largest at the L5 vertebral body and left supra-acetabular region.  If no neurosurgical intervention recommended he may benefit from spot radiation as this is where a lot of his pain is coming from.  His wife had questions today regarding his staging and extent of disease.  We reviewed previous scans.  I did clarify with her as she used the words \"cancer free\" as she thought that is what she was told after his CT scans once he completed Taxotere in February.  I explained to her that even though his scans showed he had an excellent response with no clear areas of metastasis that did not mean he was cancer free, I explained to her that when you have metastatic cancer the treatment intent is palliative in nature and to control the disease but not to cure the disease.  She stated understanding.  We will see him back in 2 weeks for follow-up to sort through this and make further plan of care, again, I have asked him to hold Zytiga and prednisone until he sees us back, he will continue on his other medications including " relugolix.    -7/30/2021 neurosurgery PA consultation.  MRI with and without contrast L5 ordered.    -8/3/2021 neurosurgery follow-up showed known sclerotic disease with new L5 abnormality without compression deformity or instability.  MRI brain negative for metastasis.    -8/4/2021 office visit Dr. Fco Quintanilla radiation oncology coordinating with Dr. North neurosurgery for palliative radiation for MRI evidence of L5 and left supra acetabular painful lesions.  States that the patient's disease which is not secreting PSA is experiencing some progression and would benefit from 20 Gy in five fractions and other lesion 30 Gy in ten fractions. Patient offered to go to [place] for radiation but desired treatment in [place].    -8/10/2021 [facility] medical oncology follow-up visit: No chest pains at this junction.  Reviewed MRI brain and other MRIs reviewed by Dr. North and Dwight.  Due for EGD on 19th.  We will repeat his phosphorus along with his other labs continue Relugolix, Zytiga, prednisone, and he will continue radiation palliatively to the painful bony lesions with Dr. Quintanilla/Can.  He will see my nurse practitioner back in a few weeks to see how he is tolerating this and to follow-up on the phosphorus off of Zometa and she will order repeat CT chest abdomen pelvis with contrast and total body bone scan the end of September.I will see him back after that.    -9/8/2021 Zometa resumed.  PSA <0.10    -10/5/2021 [facility] medical oncology follow-up visit: followed-up with radiation oncology 9/21/2021.  Status post symptomatic L5 radiation 5 fractions 20 [place] completed 8/16/2021 with improvement in right hip pain.  9/2/2021 PSA less than 0.1.  9/29/2021 total body bone scan shows increased uptake left iliac bone slightly superior group of the left acetabulum with no additional foci of suspicious abnormality is unlikely treatment related with no new sites of active osseous metastasis.  CT chest abdomen pelvis with  contrast shows stable borderline enlarged left periaortic nodes and dating sclerotic bone lesions.Continue Zytiga, prednisone, Relugolix, Zometa, repeat CT chest abdomen pelvis with contrast and total body bone scan the end of the year.  We will follow PSA serially.    -11/17/2021 [facility] Oncology clinic follow-up:  Mr. Lugo overall is doing well.  He is tolerating therapy with Zytiga, prednisone and Relugolix along with Zometa which is given every 6 weeks.  PSA remains low at <0.1.  Has occasional low phosphorus, currently low at 1.7.  Serum calcium is normal at 8.9, normal creatinine 0.79 and normal CBC other than for platelets of 124.  I will hold Zometa for 1 month, I did send in a prescription for phosphorus replacement today.  He takes calcium and vitamin D.  I will see him back in 1 month for follow-up.  We will repeat restaging scans after that visit.    -12/15/2021 [facility] Oncology clinic follow-up: Mr. Lugo continues to do well on therapy with Zytiga, prednisone and Relugolix, his PSA remains low at <0.1.  He is continuing to have left lower back pain, he is working with palliative care and they have referred him also to pain management.  Pain management has talked to him about putting in a pain pump however he is leery of this, I told him that this was a safe and effective pain management technique and to certainly give consideration to their recommendations.  His phosphorus is improving however is still a little low, I will hold off on Zometa until we see him back in 1 month, we may end up only giving it every 12 weeks.  We will repeat restaging scans with CT chest, abdomen and pelvis along with total body bone scan prior to return and I have ordered those today.      -1/5/2022 CT chest abdomen pelvis with contrast [facility] showing stable left periaortic adenopathy and unchanged sclerotic thoracic, lumbar spine and pelvis lesions with no new or progressive disease in the chest abdomen or  pelvis.  Total body bone scan shows no significant change and no new suspicious foci.  Stable posterior right acetabulum and left superior acetabulum and degenerative changes in the cervical spine, shoulders, acromioclavicular joints, sternoclavicular joints, elbows, wrists, hands, thoracic spine, lumbosacral spine, sacroiliac joints, hips, knees, ankles, feet.    -1/11/2022 [facility] medical oncology hematology follow-up visit: I reviewed images and reports from his 1/5/2022 scans.  No active disease and PSA immmeasurably low on Zytiga, prednisone, Relugolix.  However, he has struggled with hypophosphatemia and is significantly fatigued with this.  Given that the bones are doing well and given that his phosphorus continues to remain consistently low at 2.2 in mid December, 1.7 mid November and 2.5 mid-September and as low as 1.5 back to July and the likelihood that this is related to his Zometa, given that his bones are stable overall, he will increase from 1200 mg up to 1600 mg of calcium and phosphate a day and we will hold his Zometa for the foreseeable future.  He will see my nurse practitioner back in a month to continue to monitor his phosphorus along with his calcium and other labs and will repeat his scans again in 3 months.  In addition, given his fatigue we will check his ACTH and cortisol though he is taking his prednisone with his Zytiga.  Though his electrolytes are normal, I will keep an eye on the cortisol and ACTH and have these labs not only drawn today but again just prior to return to my nurse practitioner on February 10.    -2/10/2022 [facility] Oncology clinic follow-up: Mr. Lugo overall is stable, no change in his health this past month.  I have reviewed his labs from 2/8/2022 and they are unremarkable, his phosphorus is now normal at 3.2. We are continuing to hold his Zometa, he last received Zometa on 10/5/2021.  He continues therapy with Zytiga, prednisone and Relugolix.  Dr. Rashid had ordered  cortisol level and ACTH which are low, currently his ACTH is 2.8 and cortisol 3.08 however these are both done in the face of ongoing prednisone that he takes with his Zytiga.  We will get him to endocrinology for further evaluation for possible adrenal insufficiency but will not interrupt his treatment in the interim.  He will need restaging scans again in April for a 3-month follow-up.  He will continue to follow with palliative care and pain management for his cancer related pain.  His PSA remains immeasurably low at <0.1 on 2/8/22.    -2/15/2022 went to emergency room with weakness, decreased appetite, myalgias in the setting of known COVID-19 testing positive a few days prior to this visit.  Phosphorus had been low in the past but was normal in the emergency room with unremarkable CBC and CMP.  Chest x-ray unremarkable saturating well on room air mildly hypertensive with dry mucosa.  Given 500 cc crystalloid.  Covid test positive.  Mild thrombocytopenia 136,000 and otherwise unremarkable.  Discharged home.    -3/3/2022 data:  PSA less than 0.1  CMP unremarkable  Cortisol 3.96 normal  ACTH 2.8 lower limit of normal 7.2  Phosphorus normal 2.6    -3/8/2022 [facility] medical oncology follow-up: Suspect big chunk of his fatigue was Covid.  Another part of the fatigue likely related to lack of testosterone.  Not hypophosphatemic off of Zometa.  ACTH running low while on prednisone not surprising but with normal cortisol and I doubt adrenal insufficient which is why he is on prednisone to avoid such while taking Zytiga.  Overall doing fairly well and with immeasurably low PSA, I will have labs done on him early April, see my nurse practitioner early May, and she will order repeat bone scan and CTs the end of May and I will see him back in June.  We will continue to hold the Zometa unless bone lesions progress given symptomatic prior hypophosphatemia on Zometa.  He will keep his appointment April 28 with Dr. Mir Duffy  just to be sure that my take on his pituitary/adrenal axis assessment is accurate.    -4/28/2022 endocrinology consult Dr. Mir Duffy.  With low ACTH and cortisol on prednisone with Zytiga, the adequacy of prednisone cannot be assessed by measuring ACTH or cortisol but is assessed by weight changes, blood pressure, blood sugar, potassium, overall sense of how the patient is doing.  Primary adrenal insufficiency with low ACTH and low cortisol would be typical for the situation as his blood sugar tends to run a little high and potassium a little low, he did not think increasing prednisone was necessary.  He put him on some potassium.  No follow-up scheduled, will see as needed.    -5/4/2022 [facility] Oncology clinic follow-up: Mr. Lugo continues on therapy with Zytiga and prednisone along with Relugolix.  His Zometa has been on hold due to previous hypophosphatemia.  There is some concern about potential adrenal insufficiency. He saw endocrinology, Dr. Mir Duffy on 4/28/2022.  I did review his office note and he stated that the low ACTH and low serum cortisol would be typical for Mr. Lugo's situation.  He further stated that the adequacy of prednisone dose cannot be assessed by measuring ACTH or cortisol but is rather assessed by the overall just altered how the patient is feeling-weight change, blood pressure, blood sugar, potassium, overall sense of wellbeing.  His potassium had been running a little low therefore they put him on potassium 10 mill equivalents daily. Of note, I do not see a future follow-up scheduled with endocrinology.  He is having night sweats, I am not sure if this is related.  His glucose was normal this morning at 107.  His weight is stable.  He will continue current treatment for his prostate cancer unchanged, we will continue to hold his Zometa.  Current phosphorus and magnesium are normal.  CBC and CMP from today are unremarkable, cortisol is normal.  PSA and ACTH are pending  In regards to  his night sweats, he had taken clonidine previously 0.1 mg twice daily as needed, I did send in a short supply again to try and see if this helps.  He also is having indigestion despite being on omeprazole twice daily, I sent a prescription for Pepcid.  He had an EGD August 2021.  We will repeat restaging scans prior to return and I have ordered those today.     -5/24/2022 CT chest abdomen pelvis with contrast[place] negative.  Bone scan shows stable bone metastases.    - 5/25/2022 PSA less than 0.1, platelets 130,000, otherwise unremarkable CBC and CMP.  A.m. cortisol running low 1.2 with phosphorus low 2.3.    -5/31/2022 [facility] medical oncology follow-up: On Zytiga, prednisone, Relugolix, PSA remaining immeasurably low with stable bone scan and no visceral/paulino metastases.  Phosphorus still running low.  I have discontinued his potassium chloride and started potassium phosphate 500 mg 4 times a day.  Unless PSA rising, we will plan on repeating CTs and bone scan in 6 months and will follow with my nurse practitioner in the interim and if symptoms dictate or labs, will get back to Dr. Duffy for management of potential adrenal insufficiency but presently we will just see how he does with potassium phosphate and hopeful improvement in the phosphorus level with time.  We will continue to follow with palliative care for pain management    -7/6/2022 [facility] Oncology clinic follow-up: Mr. Lugo overall is doing well but continues to be troubled with fatigue and hot flashes.  For the most part he states he is able to do the things he wants to do, he continues to work but does have to take more rest periods.  I had a long discussion with him and his wife after reviewing his medications with them as they wanted to see if there was anything he could stop or adjust.  I discussed with him the need to continue on treatment for his prostate cancer even though his PSA remains immeasurably low and his scans look good but that  if his medication is stopped the cancer would progress and over time it still has the potential to progress on therapy but would continue it as long as possible to keep his disease under control.  I explained this is like treating someone with hypertension, you do not stop the antihypertensive just because the blood pressure normalizes.  They stated understanding.  There is no dose adjustment of Zytiga or prednisone that would minimize his symptoms, he is on the current standard recommended therapy.  Discussed with him that sometimes during times of stress we may need to increase his prednisone dose but for now would not change anything.  His phosphorus level is normal, we continue to hold his Zometa.  I did give him the option of holding his phosphorus for the next month and we will see what happens, if it drops we will start him back on it but may be at a lower dose rather than 4 times a day maybe twice a day.  For now he will continue therapy unchanged.  We will continue monitoring labs monthly.  No PSA was drawn this month but that is okay as his PSA has been running immeasurably low at <0.1, we will recheck next month with lab draw.  We will stop checking his ACTH and cortisol level every month, if he develops worsening symptoms I will repeat those.  He has not gotten a follow-up with endocrinology as of yet.    -8/3/2022 [facility] Oncology clinic follow-up:  Mr. Lugo is doing well as far as his prostate cancer goes with continued immeasurably low PSA of <0.1.  He continues on therapy with Zytiga and prednisone along with Relugolix.  His phosphorus is stable at 2.7 which was just slightly low by our reference range however was 2.71-month ago also which was normal on another labs reference range.  He has not taken his phosphorus for this past month as he thought it was making him more fatigued.  He really can tell no difference whether he was taking it or not.  I have asked him to try to go back on phosphorus  twice a day rather than 4 times a day and see if this is tolerated and if we can keep his phosphorus level from dropping.  His biggest complaint today is flareup of his arthralgias and back pain.  He is following with pain management, Dr. Danny Avalos but is requesting a referral back to palliative medicine as he feels that no one is currently listening to him and they are just prescribing the same medications that are not effective according to his report.  He does have a follow-up with Dr. Avalos on 8/12/2022 but due to his current pain is not able to work until after he is seen and hopefully can get some better relief.  I have given him a work release until 15 August.  I have also put in a referral back to palliative care.  Also encouraged him to work with Dr. Avalos for help in resolving his issue.  Apparently they have recommended some type of a pump however he has been hesitant to that but likely would be helpful.  We will continue therapy unchanged.  We will continue monthly labs including phosphorus, we are not currently checking ACTH and cortisol monthly as Dr. Mir Duffy previously stated in his assessment on 4/28/2022 that the adequacy of prednisone cannot be assessed by measuring ACTH or cortisol but would be assessed by symptoms, see note from 4/28/2022.  He made no further follow-up appointments.  Fatigue is not worsening currently.  His glucose and potassium are  Normal.    -10/13/2022 [facility] medical oncology follow-up: Mr. Lugo is doing well.  He is tolerating treatment with Zytiga and prednisone along with Relugolix without any unusual side effects.  His PSA has remained immeasurably low at <0.1.  His phosphorus was slightly low at last visit.  He had been instructed to go back on phosphorus twice a day but he misunderstood and has not been taking it.  We will check labs today.  I will follow-up with results and notify him if he needs to restart the phosphorus.  His pain is better controlled since  reestablishing with [facility].  We will repeat scans prior to next follow-up.  I have ordered CT chest abdomen pelvis along with bone scan.  We will see him back in 1 month.    -10/13/2022 PSA less than 0.014.With unremarkable CBC and CMP.  Phosphorus still a little low 2.3.    -11/4/2022 CT abdomen pelvis chest showed no evidence of disease progression with stable sclerotic bone lesions.  Bone scan stable right greater than left acetabular metastasis.  Stable bone metastases.    -11/8/2022 [facility] medical oncology telemedicine follow-up: Patient states that he feels achy all over with low-grade temps and a cough mildly productive.  Has an appointment later today to see his primary care for testing for flu and COVID.  Suggested that if he were positive for COVID that he get Paxlovid and that if he were positive for flu that he get Tamiflu and to discuss this with his primary care.  From the prostate cancer side, his PSA is immeasurably low with stable bone metastases and no evidence of progression.  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  We will continue the Relugolix, Zytiga, prednisone and he will see my nurse practitioner 12/7/2022.  Would repeat CTs and bone scan in May.    -1/25/2023 [facility] Oncology medicine follow-up: Mr. Lugo is having worsening fatigue and muscle aches.  He does have adrenal insufficiency on Zytiga and prednisone for his prostate cancer, he saw endocrinology in light of low ACTH back in April 2022.  At that time there was no recommendation other than for monitoring him for his overall wellbeing and labs.  He denies any fevers or chills.  His labs as shown above are unremarkable, his CBC and CMP are normal other than for glucose of 112, PSA remains low at <0.1.  He has no new pain or any symptoms concerning for progression of his prostate cancer.  I will get him back to endocrinology to see if they feel any dose adjustment of his  prednisone would be helpful in his symptoms.  Currently he is on 5 mg a day with his Zytiga.  I did not repeat his ACTH or cortisol as they would be expected to be low in this situation.  His potassium is normal, he has had no weight loss or change in his appetite.  Blood sugar is reasonable.  His only complaint currently is worsening fatigue and muscle aches.  He will continue treatment unchanged with Zytiga, prednisone and relugolix.  I will see him back in 1 month for follow-up.  We will repeat his restaging scans in May.    -3/1/2023 [facility] Oncology clinic follow-up:  His PSA remains low at <0.1 on treatment with Zytiga and prednisone along with relugolix however he continues to complain of significant fatigue and muscle aches not improving with time.  He states that his quality of life is affected as he is not able to do any work activities due to the fatigue.  He did see endocrinology again and they have increased his prednisone from 5 mg daily up to 7.5 mg daily however so far this has not made any difference in how he feels.  He is supposed to get back in touch with them to let them know how he is doing and to see if there is any other adjustments needed. His labs are unremarkable with normal thyroid function, CMP is normal other than for glucose of 156.  They did check hemoglobin A1c to look for diabetes however that was normal at 5.7.  CMP is unremarkable with just very mild anemia with hemoglobin of 12 and hematocrit 36.1% which would not account for his fatigue.  He does have hot flashes that are fairly significant, I will try venlafaxine as suggested by Dr. Duffy and I appreciate the recommendation.  We will start at 37.5 mg daily and if tolerated he can increase to 75 mg after 1-2 weeks.  I will see him back in 1 months for follow-up.  Plan to repeat scans late April/early May.  His prostate cancer seems under good control with current regimen however I am not sure how long he can tolerate this due to  the side effects.    -3/30/2023 [facility] Oncology clinic follow-up:  Mr. Lugo continues to deal with fatigue.  He appears more cushingoid today, he continues on treatment with Zytiga, prednisone and relugolix.  Prednisone dose currently is 7.5 mg daily.  This has not made any difference in his energy level.  PSA remains immeasurably low at <0.014.  CBC shows new onset of microcytic hypochromic anemia with hemoglobin of 11, hematocrit 34.4%, MCV 75.4, MCH 24.1, WBC is normal at 5.81 with normal platelet count 181,000 and normal differential.  CMP is unremarkable, it is normal other than for glucose of 142.  We will get him to Dr. Gomez for an EGD and colonoscopy for further evaluation in regards to his new onset of anemia.  He has no associated GI symptoms otherwise except for 1 day a few weeks ago he does report that he had fairly sudden onset of vomiting but this was an isolated incident on that day and has not reoccurred since.  Continues to follow with palliative care for management of his chronic back pain.  We will repeat restaging CT chest, abdomen and pelvis along with total body bone scan prior to return and I have ordered that today.    -3/30/2023 Ferritin low 12.3 with iron low 27 and saturation low 5% with total iron binding capacity 511.  3/31/2023 ferrous sulfate 325 mg twice a day every other day with vitamin C started.    - 4/12/2023 CT chest abdomen pelvis with contrast showed no progressive disease with stable sclerotic bone lesions.  Bone scan showed single identifiable bone metastasis stable right acetabulum    -4/18/2023 [facility] oncology follow-up: He now has microcytic iron deficiency anemia which is new and concerning.  Gastroenterologic evaluation has been ordered but not performed and is mandatory.  Continue ferrous sulfate 325 mg twice a day every other day with vitamin C to improve absorption and we will follow his CBC along with his usual labs monthly on Zytiga, Relugolix,  prednisone 7.5 mg.  He follows with Dr. Duffy with adrenal insufficiency.  He will see my nurse practitioner back in a month to see what GI has found and to watch serial CBC along with his other labs including PSA.  I would repeat CT chest abdomen pelvis again in 6 months with contrast along with bone scan and sooner if PSA rises.    -5/3/2023 EGD and colonoscopy with Dr. Alexander Gomez: Superficial erosions in the stomach with gastritis.  Small polyps removed, dilated internal hemorrhoids.  Pathology pending.  Recommend repeat colonoscopy in 5 years.    -5/17/2023 [facility] Oncology clinic follow-up:  Naveen overall is doing well on current therapy with Zytiga, prednisone and relugolix.  PSA remains immeasurably low at <0.014.  He feels his hip pain is getting worse with time.  It makes it difficult to enjoy activities with his grandchildren as it is worse when he stands for any length of time or tries to carry any weight.  I reviewed his bone scan from April which was stable, we also reviewed previous MRI of the hips from May 2021 that showed moderate bilateral hip osteoarthritis.  I offered to repeat MRI of his hips for evaluation to see if there has been any worsening of his osteoarthritis and also then referral to orthopedics for any recommended intervention however at this time he defers.  He will let me know if he changes his mind.  He also follows with palliative care for management of his cancer related pain.  We will continue therapy unchanged.  We will repeat restaging scans in October unless symptoms dictate the need to do so sooner.  He was recently found to be iron deficient, he had an EGD and colonoscopy on 5/3/2023 with Dr. Gomez, EGD showed superficial erosions in the stomach with gastritis, he continues on omeprazole.  He had small polyps removed from the colon and recommendation to repeat colonoscopy in 5 years.  He is on oral iron along with vitamin C every other day and is tolerating that well.  CBC  from yesterday shows hemoglobin now normal at 14.2 and hematocrit 43.2%, MCV normal at 27.0.    -7/20/2023 [facility] Oncology clinic follow-up: Naveen is doing well on current therapy with Zytiga, prednisone and relugolix.  PSA in June remains low at <0.1.  Continues to deal with chronic pain in his back and hip.  Recently saw Dr. Nikolai Marks and had an injection in his hip and is hoping that it will eventually help with his pain.  He has no new pain.  We will continue current therapy unchanged.  We will repeat labs while he is here today.  I will refill his iron as his pharmacy has changed. Most recent CBC in June with hemoglobin of 14.7 and hematocrit 43.6%.  We will repeat restaging scans in October.    -9/14/2023 [facility] Oncology clinic follow-up: Naveen is tolerating his prostate cancer treatment with Zytiga, prednisone and relugolix.  PSA remains immeasurably low at <0.014 on 8/17/2023.  Currently he is having more GI issues with nausea and intermittent vomiting that occurs often without warning.  He had an EGD and colonoscopy with Dr. Gomez on 5/3/2023, EGD showed superficial erosions in the stomach with gastritis.  He does take omeprazole 40 mg twice daily.  He saw his PCP yesterday for these symptoms and has been referred to gastroenterologist at [facility] and he is awaiting to hear about that appointment.  Hopefully he can get in fairly quickly.  I told him that he should let us know if it is going to be a while before he can be seen and I will get him back to Dr. Gomez for consideration of repeat EGD.  We will get his CBC, CMP and PSA today.  His previous noted anemia had normalized, he currently is not taking oral iron as he ran out.  I will wait and see what his labs show today and likely hold off for now depending on his lab results until his GI issues can be resolved.  We will repeat his restaging CT chest, abdomen and pelvis along with bone scan in the next few weeks and I have ordered those today.  He  is taking Zofran as needed for nausea, he is also on meclizine for vertigo.  He is going to follow-up with his PCP in a few weeks regarding his GI symptoms.    -9/14/2023 PSA less than 0.014 with unremarkable CBC and CMP.    -9/28/2023 total body bone scan stable with uptake in right acetabulum, no new areas.  CT chest, abdomen and pelvis show no evidence of disease progression.  -11/9/2023 [facility] Oncology clinic follow-up: CT reports were reviewed by Dr. Rashid last month and the patient reports he was called by Dr. Rashid as he could not make his appointment due to concerns over upper respiratory infection, CT scan showed no evidence of disease progression.  PSA has remained low, we are repeating that today along with his CBC and CMP.  He continues to have sinus drainage and congestion, I will send in 7 additional days on his doxycycline that he has been taking, he will follow-up with PCP if no improvement.  He continues to complain of bilateral hip pain, has a follow-up with Dr. Marks next week.  We will continue therapy unchanged with Zytiga, prednisone and relugolix.  Would repeat restaging bone scan and CT scans late March for a 6-month follow-up.    -1/4/24 PSA 1    - 3/28/2024 CT chest abdomen pelvis with contrast compared to May 2013 shows decreasing sclerotic bony metastases.  Bone scan shows similar relative intensity of 1 focal uptake right acetabulum correlating with CT with no new sites of uptake    -4/2/2024 [facility] medical oncology follow-up: Continued good response to Zytiga prednisone relugolix which he is tolerating.  We will check PSA and labs 6/25/2024 with follow-up with my nurse practitioner and again 3 months after that along with CTs and bone scan 6 months from now.  Not on bisphosphonates or rank ligand inhibitor due to drug-induced hypophosphatemia.  Will ask my nurse practitioner to order bone densitometry with his other scans in 6 months. He is having difficulty emptying his bladder and  his prior urologist does not take his insurance so we will make appropriate referrals.     Prostate cancer metastatic to bone   8/30/2020 -  Other Event    PSA 10.8     9/15/2020 Initial Diagnosis    Prostate cancer metastatic to bone (CMS/HCC)     9/15/2020 Biopsy    Prostate needle core biopsy     9/24/2020 Imaging    Total body bone scan: 4 abnormal areas of increased activity consistent with osteoblastic metastasis, abnormal foci of activity seen in the T12 vertebral body, L1 vertebral body, roof of the left acetabulum, and acetabulum medially on the right.  CT chest: Stable sclerotic metastatic lesions within the T12 and L1 vertebrae.  No other evidence of metastatic disease to the chest.  Stable mild splenomegaly and subcentimeter periaortic retroperitoneal lymph nodes.  CT abdomen and pelvis: Diffuse bladder wall thickening with mild perivesicular fat stranding.  Interval development of several sclerotic lesions in the spine and left iliac bone.     10/13/2020 Cancer Staged    Staging form: Bone - Appendicular Skeleton, Trunk, Skull, And Facial Bones, AJCC 8th Edition  - Clinical: Stage IVB (cT3, cN0, cM1b, G3) - Signed by Ishmael Rashid MD on 10/13/2020     11/3/2020 - 10/5/2021 Chemotherapy    OP SUPPORTIVE Zoledronic Acid Q42d     11/3/2020 - 2/18/2021 Chemotherapy    OP PROSTATE DOCEtaxel       1/4/2021 Imaging    CT chest abdomen pelvis with contrast and whole-body bone scan shows improving less metabolically active bone metastases.  Stable sclerotic T12, L1, and L2 vertebral bodies and bilateral pelvic bones on bone windows with stable subcentimeter para-aortic lymph nodes and no definite progression in the chest abdomen or pelvis.  PSA down to 0.275 after 3 courses of Taxotere.     3/4/2021 Imaging    CT chest, abdomen and pelvis stable, no evidence of disease progression. Total body bone scan with decreased/near resolution of abnormal increased radiotracer uptake associated with the known sclerotic  lesions in the thoracolumbar spine and bony pelvis.  No evidence of new osteoblastic metastases.     3/4/2021 Imaging    CT chest abdomen pelvis with contrast is negative save for stable sclerotic bone lesions and the bone scan shows near resolution of prior bony abnormalities associated with the known sclerotic lesions in the thoracolumbar spine and bony pelvis.  2/17/2021 PSA down to 0.1 from 1.37 October 2020.     3/9/2021 - 3/9/2021 Chemotherapy    OP SUPPORTIVE PROSTATE Relugolix     3/9/2021 -  Chemotherapy    OP PROSTATE Abiraterone / PredniSONE       3/10/2021 -  Chemotherapy    OP PROSTATE Abiraterone / PredniSONE     8/10/2021 - 8/16/2021 Radiation    Radiation OncologyTreatment Course:  Naveen Lugo received 2000 cGy in 5 fractions to L4-sacrum, left acetabulum and right pelvis via External Beam Radiation - EBRT.     11/16/2021 -  Chemotherapy    OP CENTRAL VENOUS ACCESS DEVICE ACCESS, CARE, AND MAINTENANCE (CVAD)     1/5/2022 Imaging    -1/5/2022 CT chest abdomen pelvis with contrast [facility] showing stable left periaortic adenopathy and unchanged sclerotic thoracic, lumbar spine and pelvis lesions with no new or progressive disease in the chest abdomen or pelvis.  Total body bone scan shows no significant change and no new suspicious foci.  Stable posterior right acetabulum and left superior acetabulum and degenerative changes in the cervical spine, shoulders, acromioclavicular joints, sternoclavicular joints, elbows, wrists, hands, thoracic spine, lumbosacral spine, sacroiliac joints, hips, knees, ankles, feet.     5/24/2022 Imaging    CT chest abdomen pelvis with contrastFrankfort negative.  Bone scan shows stable bone metastases.     11/4/2022 Imaging    CT abdomen pelvis chest showed no evidence of disease progression with stable sclerotic bone lesions.  Bone scan stable right greater than left acetabular metastasis.  Stable bone metastases.     4/12/2023 Imaging     CT chest abdomen pelvis  with contrast showed no progressive disease with stable sclerotic bone lesions.  Bone scan showed single identifiable bone metastasis stable right acetabulum     9/29/2023 Imaging    total body bone scan compared to April showed a single active bone metastasis with multiple and active bone metastases overall stable.  CT chest abdomen and pelvis compared to May and April shows no new bony progression with stable sclerotic bone lesions.         HISTORY OF PRESENT ILLNESS:  The patient is a 69 y.o. male, here for follow up on management of metastatic prostate cancer to the bone on therapy with Zytiga, prednisone and relugolix.  He had previously been on Zometa however that is currently on hold due to drug-induced hypophosphatemia.  Mr. Lugo reports that overall he had been doing well however on Saturday while he was mowing his lawn he developed some shortness of breath and chest pain.  He did contact his cardiologist and he had an echocardiogram and stress test yesterday.  He has had a few twinges of chest pain over the course of this week including some during his stress test yesterday.  He is awaiting to hear the results.  Otherwise he has no new pain, continues to have chronic back and hip pain.  Chronic constipation from opioid use, requesting a refill of his senna.  Follows with palliative care.  He saw urology recently regarding urinary hesitancy and states that they did add some new medications which have helped, he is also scheduled for what sounds like cystoscopy on Friday.      Past Medical History:   Diagnosis Date    Bone cancer     History of radiation therapy 08/16/2021    L4 through sacrum, left acetabulum, right pelvis    Nephrolithiasis     Opioid use disorder, mild, on maintenance therapy (HCC)     Prostate cancer      Past Surgical History:   Procedure Laterality Date    CHOLECYSTECTOMY      CORONARY STENT PLACEMENT  206 & 2011    HERNIA REPAIR  1986    THORACIC DISCECTOMY  1994       Allergies  "  Allergen Reactions    Cymbalta [Duloxetine Hcl] Dizziness    Gabapentin Nausea Only    Pregabalin Dizziness, Nausea And Vomiting and Hives    Duloxetine Hives       Family History and Social History reviewed and changed as necessary    REVIEW OF SYSTEM:   No new somatic complaints.    Chronic back and hip pain    PHYSICAL EXAM:  Well-developed, well-nourished appearing male in no distress  Respirations regular and unlabored, lungs clear to auscultation bilaterally  Heart regular rate and rhythm    Vitals:    24 1424   BP: 124/84   Pulse: 93   Resp: 18   Temp: 97.1 °F (36.2 °C)   SpO2: 94%   Weight: 86.2 kg (190 lb)   Height: 172.7 cm (68\")     Vitals:    24 1424   PainSc:   8   PainLoc: Back  Comment: back and legs          ECOG score: 1           Vitals reviewed.    ECO    Lab Results   Component Value Date    HGB 13.6 2024    HCT 42.8 2024    MCV 85.3 2024     2024    WBC 10.04 2024    NEUTROABS 7.50 (H) 2024    LYMPHSABS 1.73 2024    MONOSABS 0.55 2024    EOSABS 0.15 2024    BASOSABS 0.07 2024       Lab Results   Component Value Date    GLUCOSE 132 (H) 2024    BUN 6 (L) 2024    CREATININE 0.81 2024     2024    K 3.7 2024     2024    CO2 22 2024    CALCIUM 9.2 2024    PROTEINTOT 7.0 2022    ALBUMIN 3.9 2024    BILITOT 0.2 2024    ALKPHOS 80 2024    AST 19 2024    ALT 10 2024     Lab Results   Component Value Date    PSA <0.014 2024    PSA <0.1 2024    PSA <0.014 2023    PSA <0.014 2023             ASSESSMENT & PLAN:  1.  Stage IVb grade group 4 metastatic prostate cancer with sclerotic bone lesions on CT and bone scan and history of prostatic hypertrophy.  Good response to Taxotere ending 2021 Relugolix, followed by Zytiga prednisone with L4/sacrum, left acetabulum, and right pelvis external beam radiation " August 2021.  Hypophosphatemia on Zometa.  Zometa held as of October 2021.  2.  Kidney stone  3.  Polyps  4.  Probable drug-induced hypophosphatemia from Zometa, drug stopped after 10/5/2021 dose  5.  Cancer related pain seeing palliative care  6.  Fatigue  7.  Covid 2/2022  8.  Iron deficiency anemia  -5/3/2023 EGD and colonoscopy with Dr. Alexander Gomez: Superficial erosions in the stomach with gastritis.  Small polyps removed, dilated internal hemorrhoids.  Pathology pending.  Recommend repeat colonoscopy in 5 years.    -9/30/2020 office note from Dr. Ronen Reynaga indicates patient with PSA 10.8.  Underwent TRUS prostate biopsy 9/15/2020.  Adenocarcinoma the prostate [place] 4+4 = 8 in 1 out of 12 cores and 4+3 = 7 and 10 out of 12 cores and 3+4 = 7 in 1 out of 12 cores.  Perineural invasion.  Extraprostatic extension into the fat seen on 2 biopsies of the right lateral mid and right apex.  9/24/2020 CT chest and whole-body bone scan showed T12, L1, left acetabular, right medial acetabular metastases with no lung metastases.  He started androgen deprivation therapy with Casodex with plans for Lupron to start in 1 to 2 weeks for clinical T3 N0 M1 metastatic prostate cancer.  Review of official report from [facility] 9/24/2020 bone scan mentions T12, L1, roof of left acetabulum and medial right acetabular osteoblastic metastases.  CT chest with contrast that same day showed mild splenomegaly 14.2 cm with stable periaortic nodes compared to CT December 2015 with no lung metastases.  Sclerotic abnormality within the T12 vertebral body, L1 vertebral body extending into the pedicle unchanged.  8/28/2020 CT abdomen pelvis report from [facility] reviewed and mentions normal spleen size.  Punctate nonobstructing kidney stone, no paulino enlargement, diffuse bladder wall thickening with mild perivesicular fat stranding, 2.1 cm sclerotic left iliac bone above the left acetabulum new compared to 2015 as well  as 1.5 cm faintly sclerotic focus in the right posterior acetabulum stable compared to prior CT 2015 as well as a 1.6 cm T12 and inferior vertebral body sclerotic lesion and a 1.9 cm left posterior lateral L1 vertebral body abnormality extending to the pedicle.  -10/13/2020 initial [facility] medical oncology consultation: With 1 Sarika score 8, 4+4 lesion that would make him a grade group 4 with stage IVb disease given radiographic evidence of sclerotic bone metastasis on bone scan and CT.  Needs genetic testing given metastatic prostate cancer and this is also important as, were he to have mismatch repair mutation then we would have options for PDL 1 inhibitors and were he BRCA mutated would have options for PARP inhibitors in the future.  Systemic therapy for castration naïve prostate cancer or N1 disease should be with apalutamide or Abiraterone or enzalutamide all of which are category 1.  He does not have any visceral metastases.  According to his imaging he has T12, L1, left acetabular, right acetabular, and left iliac bony involvement.  That means he would have 4 or more bone metastases with at least one metastasis (i.e. the acetabular lesions bilaterally) beyond the pelvis/vertebral, for which this would be considered high-volume disease for which 6 cycles of docetaxel 75 mg/m² x 6 cycles followed by Zytiga and prednisone would be reasonable along with Zometa every 6 weeks for bone stabilization.  He will do chemo preparation visit with my nurse practitioner prior to start chemotherapy with Taxotere and Zometa in 2 weeks and he is already received Casodex in preparation for Lupron shot he received on 10/12/2020 with Dr. Reynaga.  We will get him to Dr. Alexander Gomez who has done his polypectomies in the past for port placement and we will get genetic counseling started.  Following the 6 courses of Taxotere per the Chaarted trial criteria, I would then give him an indefinite Zytiga and prednisone and Zometa  until progression or toxicity dictates.    -12/16/2019 COVID-19 antigen test negative    -12/29/2020 PSA 0.275 with absolute neutrophil count 700 and creatinine 0.76 with normal liver enzymes and electrolytes.  Normal magnesium and phosphorus and urine drug screen positive for buprenorphine and opiates follow-up with palliative care.    -1/4/2021 CT chest abdomen pelvis with contrast and whole-body bone scan shows improving less metabolically active bone metastases.  Stable sclerotic T12, L1, and L2 vertebral bodies and bilateral pelvic bones on bone windows with stable subcentimeter para-aortic lymph nodes and no definite progression in the chest abdomen or pelvis.    -1/5/2021 [facility] medical oncology follow-up visit: I reviewed the images and reports thereof of the above CT and bone scan which shows good response to Taxotere x3 courses with a PSA down to 0.275 from a baseline of 10.  Get another 3 courses of Taxotere, continue his Xgeva, and then following that we will switch to Zytiga and prednisone.  He is working with palliative care on his bone pain but on his current dose of fentanyl patch he says his pain is still not adequately controlled he will contact them.  Dexamethasone prescription was refilled.  We will see my nurse practitioner back in 3 weeks for course #5 of 6 total courses and then we will reimage with CT chest abdomen pelvis and bone scan before going to the Zytiga prednisone.    -3/4/2021 CT chest abdomen pelvis with contrast is negative save for stable sclerotic bone lesions and the bone scan shows near resolution of prior bony abnormalities associated with the known sclerotic lesions in the thoracolumbar spine and bony pelvis.    2/17/2021 PSA down to 0.1 from 1.37 October 2020.  3/9/2021 PSA 0.1    -5/26/2021 CT chest abdomen pelvis with contrast shows stable to slightly underlying increase low-density left para-aortic node.  Bone lesions stable.  Whole-body bone scan stable to decrease  activity of known thoracolumbar and bony pelvic involvement.  PSA less than 0.1  , AST 74, bilirubin 0.5.       -6/1/2021 [facility] [place] medical oncology follow-up visit: I reviewed the above data with him and images thereof.  Bones are stable.  No significant adenopathy.  PSA immeasurably low.     upper limit of normal 69 and AST 74 upper limit of normal 46.  Hence, neither is more than double the upper limit of normal with no ascites and no encephalopathy and normal albumin and bilirubin, despite the elevation of liver enzymes, he has child Abrams score A.  Package insert for Abiraterone says that for ALT and/or AST greater than 5 times upper limit of normal or total bilirubin greater than 3 times the upper limit of normal to withhold treatment until liver function tests returned to baseline or ALT and AST less than or equal to 2.5 times the upper limit of normal and total bilirubin less than or equal to 1.5 times the upper limit of normal and then reinitiate at 750 mg daily dose of Zytiga.  For recurrent hepatotoxicity on 750 mg daily Zytiga, withhold treatment until liver functions returned to baseline or ALT and AST less than 3-2.5 times upper limit of normal and total bilirubin less than or equal to 1.5 times the upper limit of normal then reinitiate at 500 mg daily Zytiga dose.  If has recurrent hepatotoxicity on 500 mg dose, then discontinue treatment.  If has ALT greater than 3 times the upper limit of normal along with total bilirubin greater than 2 times the upper limit of normal in the absence of other contributing causes or biliary obstruction, permanently discontinue Zytiga.  Based on these recommendations, I would continue current doses but we will watch with serial labs monthly and repeat his imaging again in 3 months but clinically he is doing wonderfully with excellent response to Taxotere and now on Zytiga prednisone plus Zometa along with Relugolix.    -6/23/2020 for [facility] [place] onc[facility] clinic  follow-up: Having persistent and worsening pain above his left hip.  I will get an MRI of the left hip for further evaluation.  Otherwise we will continue therapy unchanged with Zytiga, prednisone and Zometa along with Relugolix.    -7/28/2021 [facility] oncology clinic follow-up: Patient with multiple somatic complaints including episodes of confusion, dizziness, decreased memory, agitation.  He reports ongoing episodes with difficulty swallowing.  Also most concerning for episodes of angina and chest pain for which he basically refused to seek emergency medical attention.  He has a history of coronary artery disease and stent placement.  He has not followed up with cardiology for many years.  I will get an MRI of the brain in light of his confusion, dizziness and agitation.  I will get him to gastroenterology for EGD in light of his dysphagia.  I have also put in a referral for cardiology.  I reemphasized to the patient, his wife who is with him today and his son who was on the telephone during our visit the importance of seeking out prompt medical attention when he is having chest pain or neurological concerns so that he can be evaluated.  The patient states that he basically refuses to go to the emergency room and if his family calls an ambulance he would not agree to go to the hospital.  For the time being, I have asked him to hold his Zytiga and prednisone until we can sort this out as Zytiga does have some cardiovascular risk.  There is likely no treatment for prostate cancer that does not carry some form of cardiovascular risk.  His PSA remains low at 0.014 yesterday.  He will continue relugolix for now.    Of note, some of these symptoms could also be due to his hypophosphatemia, phosphate level from yesterday was 1.5, I have sent in a prescription for K-Phos 3 times a day before meals for 10 days.  We will hold further Zometa until this is corrected.  I have also put in an order to check his vitamin D level.   "He takes calcium and vitamin D daily.  Some of his symptoms also could be due to drug withdrawal as there was some confusion and difficulty getting his last prescription for methadone therefore he was without methadone for several days, he now has his prescription and is back on his pain medication.  He has an appointment with neurosurgery tomorrow in light of his ongoing back pain, MRI of the hip recently showed multiple bony lesions largest at the L5 vertebral body and left supra-acetabular region.  If no neurosurgical intervention recommended he may benefit from spot radiation as this is where a lot of his pain is coming from.  His wife had questions today regarding his staging and extent of disease.  We reviewed previous scans.  I did clarify with her as she used the words \"cancer free\" as she thought that is what she was told after his CT scans once he completed Taxotere in February.  I explained to her that even though his scans showed he had an excellent response with no clear areas of metastasis that did not mean he was cancer free, I explained to her that when you have metastatic cancer the treatment intent is palliative in nature and to control the disease but not to cure the disease.  She stated understanding.  We will see him back in 2 weeks for follow-up to sort through this and make further plan of care, again, I have asked him to hold Zytiga and prednisone until he sees us back, he will continue on his other medications including relugolix.    -7/30/2021 neurosurgery PA consultation.  MRI with and without contrast L5 ordered.    -8/3/2021 neurosurgery follow-up showed known sclerotic disease with new L5 abnormality without compression deformity or instability.  MRI brain negative for metastasis.    -8/4/2021 office visit Dr. Fco Quintanilla radiation oncology coordinating with Dr. Can forde for palliative radiation for MRI evidence of L5 and left supra acetabular painful lesions.  States that the " patient's disease which is not secreting PSA is experiencing some progression and would benefit from 20 Gy in five fractions and other lesion 30 Gy in ten fractions. Patient offered to go to [place] for radiation but desired treatment in [place].    -8/10/2021 [facility] medical oncology follow-up visit: No chest pains at this junction.  Reviewed MRI brain and other MRIs reviewed by Dr. North and Dwight.  Due for EGD on 19th.  We will repeat his phosphorus along with his other labs continue Relugolix, Zytiga, prednisone, and he will continue radiation palliatively to the painful bony lesions with Dr. Quintanilla/Can.  He will see my nurse practitioner back in a few weeks to see how he is tolerating this and to follow-up on the phosphorus off of Zometa and she will order repeat CT chest abdomen pelvis with contrast and total body bone scan the end of September.I will see him back after that.    -9/8/2021 Zometa resumed.  PSA <0.10    -10/5/2021 [facility] medical oncology follow-up visit: followed-up with radiation oncology 9/21/2021.  Status post symptomatic L5 radiation 5 fractions 20 [place] completed 8/16/2021 with improvement in right hip pain.  9/2/2021 PSA less than 0.1.  9/29/2021 total body bone scan shows increased uptake left iliac bone slightly superior group of the left acetabulum with no additional foci of suspicious abnormality is unlikely treatment related with no new sites of active osseous metastasis.  CT chest abdomen pelvis with contrast shows stable borderline enlarged left periaortic nodes and dating sclerotic bone lesions.Continue Zytiga, prednisone, Relugolix, Zometa, repeat CT chest abdomen pelvis with contrast and total body bone scan the end of the year.  We will follow PSA serially.    -11/17/2021 [facility] clinic follow-up:  Mr. Lugo overall is doing well.  He is tolerating therapy with Zytiga, prednisone and Relugolix along with Zometa which is given every 6 weeks.  PSA remains low at  <0.1.  Has occasional low phosphorus, currently low at 1.7.  Serum calcium is normal at 8.9, normal creatinine 0.79 and normal CBC other than for platelets of 124.  I will hold Zometa for 1 month, I did send in a prescription for phosphorus replacement today.  He takes calcium and vitamin D.  I will see him back in 1 month for follow-up.  We will repeat restaging scans after that visit.    -12/15/2021 [facility] Oncology clinic follow-up: Mr. Lugo continues to do well on therapy with Zytiga, prednisone and Relugolix, his PSA remains low at <0.1.  He is continuing to have left lower back pain, he is working with palliative care and they have referred him also to pain management.  Pain management has talked to him about putting in a pain pump however he is leery of this, I told him that this was a safe and effective pain management technique and to certainly give consideration to their recommendations.  His phosphorus is improving however is still a little low, I will hold off on Zometa until we see him back in 1 month, we may end up only giving it every 12 weeks.  We will repeat restaging scans with CT chest, abdomen and pelvis along with total body bone scan prior to return and I have ordered those today.      -1/5/2022 CT chest abdomen pelvis with contrast [place] [facility] showing stable left periaortic adenopathy and unchanged sclerotic thoracic, lumbar spine and pelvis lesions with no new or progressive disease in the chest abdomen or pelvis.  Total body bone scan shows no significant change and no new suspicious foci.  Stable posterior right acetabulum and left superior acetabulum and degenerative changes in the cervical spine, shoulders, acromioclavicular joints, sternoclavicular joints, elbows, wrists, hands, thoracic spine, lumbosacral spine, sacroiliac joints, hips, knees, ankles, feet.    -1/11/2022 [facility] medical oncology hematology follow-up visit: I reviewed images and reports from his 1/5/2022 scans.   No active disease and PSA immmeasurably low on Zytiga, prednisone, Relugolix.  However, he has struggled with hypophosphatemia and is significantly fatigued with this.  Given that the bones are doing well and given that his phosphorus continues to remain consistently low at 2.2 in mid December, 1.7 mid November and 2.5 mid-September and as low as 1.5 back to July and the likelihood that this is related to his Zometa, given that his bones are stable overall, he will increase from 1200 mg up to 1600 mg of calcium and phosphate a day and we will hold his Zometa for the foreseeable future.  He will see my nurse practitioner back in a month to continue to monitor his phosphorus along with his calcium and other labs and will repeat his scans again in 3 months.  In addition, given his fatigue we will check his ACTH and cortisol though he is taking his prednisone with his Zytiga.  Though his electrolytes are normal, I will keep an eye on the cortisol and ACTH and have these labs not only drawn today but again just prior to return to my nurse practitioner on February 10.    -2/10/2022 [facility] follow-up: Mr. Lugo overall is stable, no change in his health this past month.  I have reviewed his labs from 2/8/2022 and they are unremarkable, his phosphorus is now normal at 3.2. We are continuing to hold his Zometa, he last received Zometa on 10/5/2021.  He continues therapy with Zytiga, prednisone and Relugolix.  Dr. Rashid had ordered cortisol level and ACTH which are low, currently his ACTH is 2.8 and cortisol 3.08 however these are both done in the face of ongoing prednisone that he takes with his Zytiga.  We will get him to endocrinology for further evaluation for possible adrenal insufficiency but will not interrupt his treatment in the interim.  He will need restaging scans again in April for a 3-month follow-up.  He will continue to follow with palliative care and pain management for his cancer related pain.   His PSA remains immeasurably low at <0.1 on 2/8/22.    -2/15/2022 went to emergency room with weakness, decreased appetite, myalgias in the setting of known COVID-19 testing positive a few days prior to this visit.  Phosphorus had been low in the past but was normal in the emergency room with unremarkable CBC and CMP.  Chest x-ray unremarkable saturating well on room air mildly hypertensive with dry mucosa.  Given 500 cc crystalloid.  Covid test positive.  Mild thrombocytopenia 136,000 and otherwise unremarkable.  Discharged home.    -3/3/2022 data:  PSA less than 0.1  CMP unremarkable  Cortisol 3.96 normal  ACTH 2.8 lower limit of normal 7.2  Phosphorus normal 2.6    -3/8/2022 [facility] [place] by [facility] medical oncology follow-up: Suspect big chunk of his fatigue was Covid.  Another part of the fatigue likely related to lack of testosterone.  Not hypophosphatemic off of Zometa.  ACTH running low while on prednisone not surprising but with normal cortisol and I doubt adrenal insufficient which is why he is on prednisone to avoid such while taking Zytiga.  Overall doing fairly well and with immeasurably low PSA, I will have labs done on him early April, see my nurse practitioner early May, and she will order repeat bone scan and CTs the end of May and I will see him back in June.  We will continue to hold the Zometa unless bone lesions progress given symptomatic prior hypophosphatemia on Zometa.  He will keep his appointment April 28 with Dr. Mir Duffy just to be sure that my take on his pituitary/adrenal axis assessment is accurate.    -4/28/2022 endocrinology consult Dr. Mir Duffy.  With low ACTH and cortisol on prednisone with Zytiga, the adequacy of prednisone cannot be assessed by measuring ACTH or cortisol but is assessed by weight changes, blood pressure, blood sugar, potassium, overall sense of how the patient is doing.  Primary adrenal insufficiency with low ACTH and low cortisol would be typical for the situation as his  blood sugar tends to run a little high and potassium a little low, he did not think increasing prednisone was necessary.  He put him on some potassium.  No follow-up scheduled, will see as needed.    -5/4/2022 [facility] Oncology clinic follow-up: Mr. Lugo continues on therapy with Zytiga and prednisone along with Relugolix.  His Zometa has been on hold due to previous hypophosphatemia.  There is some concern about potential adrenal insufficiency. He saw endocrinology, Dr. Mir Duffy on 4/28/2022.  I did review his office note and he stated that the low ACTH and low serum cortisol would be typical for Mr. Lugo's situation.  He further stated that the adequacy of prednisone dose cannot be assessed by measuring ACTH or cortisol but is rather assessed by the overall just altered how the patient is feeling-weight change, blood pressure, blood sugar, potassium, overall sense of wellbeing.  His potassium had been running a little low therefore they put him on potassium 10 mill equivalents daily. Of note, I do not see a future follow-up scheduled with endocrinology.  He is having night sweats, I am not sure if this is related.  His glucose was normal this morning at 107.  His weight is stable.  He will continue current treatment for his prostate cancer unchanged, we will continue to hold his Zometa.  Current phosphorus and magnesium are normal.  CBC and CMP from today are unremarkable, cortisol is normal.  PSA and ACTH are pending  In regards to his night sweats, he had taken clonidine previously 0.1 mg twice daily as needed, I did send in a short supply again to try and see if this helps.  He also is having indigestion despite being on omeprazole twice daily, I sent a prescription for Pepcid.  He had an EGD August 2021.  We will repeat restaging scans prior to return and I have ordered those today.     -5/24/2022 CT chest abdomen [place] with [place] negative.  Bone scan shows stable bone metastases.    - 5/25/2022  PSA less than 0.1, platelets 130,000, otherwise unremarkable CBC and CMP.  A.m. cortisol running low 1.2 with phosphorus low 2.3.    -5/31/2022 [facility] medical oncology follow-up: On Zytiga, prednisone, Relugolix, PSA remaining immeasurably low with stable bone scan and no visceral/paulino metastases.  Phosphorus still running low.  I have discontinued his potassium chloride and started potassium phosphate 500 mg 4 times a day.  Unless PSA rising, we will plan on repeating CTs and bone scan in 6 months and will follow with my nurse practitioner in the interim and if symptoms dictate or labs, will get back to Dr. Duffy for management of potential adrenal insufficiency but presently we will just see how he does with potassium phosphate and hopeful improvement in the phosphorus level with time.  We will continue to follow with palliative care for pain management    -7/6/2022 [facility] Oncology clinic follow-up: Mr. Lugo overall is doing well but continues to be troubled with fatigue and hot flashes.  For the most part he states he is able to do the things he wants to do, he continues to work but does have to take more rest periods.  I had a long discussion with him and his wife after reviewing his medications with them as they wanted to see if there was anything he could stop or adjust.  I discussed with him the need to continue on treatment for his prostate cancer even though his PSA remains immeasurably low and his scans look good but that if his medication is stopped the cancer would progress and over time it still has the potential to progress on therapy but would continue it as long as possible to keep his disease under control.  I explained this is like treating someone with hypertension, you do not stop the antihypertensive just because the blood pressure normalizes.  They stated understanding.  There is no dose adjustment of Zytiga or prednisone that would minimize his symptoms, he is on the current standard  recommended therapy.  Discussed with him that sometimes during times of stress we may need to increase his prednisone dose but for now would not change anything.  His phosphorus level is normal, we continue to hold his Zometa.  I did give him the option of holding his phosphorus for the next month and we will see what happens, if it drops we will start him back on it but may be at a lower dose rather than 4 times a day maybe twice a day.  For now he will continue therapy unchanged.  We will continue monitoring labs monthly.  No PSA was drawn this month but that is okay as his PSA has been running immeasurably low at <0.1, we will recheck next month with lab draw.  We will stop checking his ACTH and cortisol level every month, if he develops worsening symptoms I will repeat those.  He has not gotten a follow-up with endocrinology as of yet.    -8/3/2022 [facility] Oncology clinic follow-up:  Mr. Lugo is doing well as far as his prostate cancer goes with continued immeasurably low PSA of <0.1.  He continues on therapy with Zytiga and prednisone along with Relugolix.  His phosphorus is stable at 2.7 which was just slightly low by our reference range however was 2.71-month ago also which was normal on another labs reference range.  He has not taken his phosphorus for this past month as he thought it was making him more fatigued.  He really can tell no difference whether he was taking it or not.  I have asked him to try to go back on phosphorus twice a day rather than 4 times a day and see if this is tolerated and if we can keep his phosphorus level from dropping.  His biggest complaint today is flareup of his arthralgias and back pain.  He is following with pain management, Dr. Danny Avalos but is requesting a referral back to palliative medicine as he feels that no one is currently listening to him and they are just prescribing the same medications that are not effective according to his report.  He does have a follow-up  with Dr. Avalos on 8/12/2022 but due to his current pain is not able to work until after he is seen and hopefully can get some better relief.  I have given him a work release until 15 August.  I have also put in a referral back to palliative care.  Also encouraged him to work with Dr. Avalos for help in resolving his issue.  Apparently they have recommended some type of a pump however he has been hesitant to that but likely would be helpful.  We will continue therapy unchanged.  We will continue monthly labs including phosphorus, we are not currently checking ACTH and cortisol monthly as Dr. Mir Duffy previously stated in his assessment on 4/28/2022 that the adequacy of prednisone cannot be assessed by measuring ACTH or cortisol but would be assessed by symptoms, see note from 4/28/2022.  He made no further follow-up appointments.  Fatigue is not worsening currently.  His glucose and potassium are  Normal.    -10/13/2022 [facility] medical oncology follow-up: Mr. Lugo is doing well.  He is tolerating treatment with Zytiga and prednisone along with Relugolix without any unusual side effects.  His PSA has remained immeasurably low at <0.1.  His phosphorus was slightly low at last visit.  He had been instructed to go back on phosphorus twice a day but he misunderstood and has not been taking it.  We will check labs today.  I will follow-up with results and notify him if he needs to restart the phosphorus.  His pain is better controlled since reestablishing with Anna Ayers, palliative care.  We will repeat scans prior to next follow-up.  I have ordered CT chest abdomen pelvis along with bone scan.  We will see him back in 1 month.    -10/13/2022 PSA less than 0.014.With unremarkable CBC and CMP.  Phosphorus still a little low 2.3.    -11/4/2022 CT abdomen pelvis chest showed no evidence of disease progression with stable sclerotic bone lesions.  Bone scan stable right greater than left acetabular metastasis.  Stable  bone metastases.    -11/8/2022 [facility] medical oncology telemedicine follow-up: Patient states that he feels achy all over with low-grade temps and a cough mildly productive.  Has an appointment later today to see his primary care for testing for flu and COVID.  Suggested that if he were positive for COVID that he get Paxlovid and that if he were positive for flu that he get Tamiflu and to discuss this with his primary care.  From the prostate cancer side, his PSA is immeasurably low with stable bone metastases and no evidence of progression.  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  We will continue the Relugolix, Zytiga, prednisone and he will see my nurse practitioner 12/7/2022.  Would repeat CTs and bone scan in May.    -1/25/2023 [facility] Oncology medicine follow-up: Mr. Lugo is having worsening fatigue and muscle aches.  He does have adrenal insufficiency on Zytiga and prednisone for his prostate cancer, he saw endocrinology in light of low ACTH back in April 2022.  At that time there was no recommendation other than for monitoring him for his overall wellbeing and labs.  He denies any fevers or chills.  His labs as shown above are unremarkable, his CBC and CMP are normal other than for glucose of 112, PSA remains low at <0.1.  He has no new pain or any symptoms concerning for progression of his prostate cancer.  I will get him back to endocrinology to see if they feel any dose adjustment of his prednisone would be helpful in his symptoms.  Currently he is on 5 mg a day with his Zytiga.  I did not repeat his ACTH or cortisol as they would be expected to be low in this situation.  His potassium is normal, he has had no weight loss or change in his appetite.  Blood sugar is reasonable.  His only complaint currently is worsening fatigue and muscle aches.  He will continue treatment unchanged with Zytiga, prednisone and relugolix.  I will see him back in 1 month for follow-up.   We will repeat his restaging scans in May.    -3/1/2023 [facility] Oncology clinic follow-up:  His PSA remains low at <0.1 on treatment with Zytiga and prednisone along with relugolix however he continues to complain of significant fatigue and muscle aches not improving with time.  He states that his quality of life is affected as he is not able to do any work activities due to the fatigue.  He did see endocrinology again and they have increased his prednisone from 5 mg daily up to 7.5 mg daily however so far this has not made any difference in how he feels.  He is supposed to get back in touch with them to let them know how he is doing and to see if there is any other adjustments needed. His labs are unremarkable with normal thyroid function, CMP is normal other than for glucose of 156.  They did check hemoglobin A1c to look for diabetes however that was normal at 5.7.  CMP is unremarkable with just very mild anemia with hemoglobin of 12 and hematocrit 36.1% which would not account for his fatigue.  He does have hot flashes that are fairly significant, I will try venlafaxine as suggested by Dr. Duffy and I appreciate the recommendation.  We will start at 37.5 mg daily and if tolerated he can increase to 75 mg after 1-2 weeks.  I will see him back in 1 months for follow-up.  Plan to repeat scans late April/early May.  His prostate cancer seems under good control with current regimen however I am not sure how long he can tolerate this due to the side effects.    -3/30/2023 [facility] Oncology clinic follow-up:  Mr. Lugo continues to deal with fatigue.  He appears more cushingoid today, he continues on treatment with Zytiga, prednisone and relugolix.  Prednisone dose currently is 7.5 mg daily.  This has not made any difference in his energy level.  PSA remains immeasurably low at <0.014.  CBC shows new onset of microcytic hypochromic anemia with hemoglobin of 11, hematocrit 34.4%, MCV 75.4, MCH 24.1, WBC is normal at 5.81  with normal platelet count 181,000 and normal differential.  CMP is unremarkable, it is normal other than for glucose of 142.  We will get him to Dr. Gomez for an EGD and colonoscopy for further evaluation in regards to his new onset of anemia.  He has no associated GI symptoms otherwise except for 1 day a few weeks ago he does report that he had fairly sudden onset of vomiting but this was an isolated incident on that day and has not reoccurred since.  Continues to follow with palliative care for management of his chronic back pain.  We will repeat restaging CT chest, abdomen and pelvis along with total body bone scan prior to return and I have ordered that today.    -3/30/2023 Ferritin low 12.3 with iron low 27 and saturation low 5% with total iron binding capacity 511.  3/31/2023 ferrous sulfate 325 mg twice a day every other day with vitamin C started.    - 4/12/2023 CT chest abdomen pelvis with contrast showed no progressive disease with stable sclerotic bone lesions.  Bone scan showed single identifiable bone metastasis stable right acetabulum    -4/18/2023 [facility] hematology oncology follow-up: He now has microcytic iron deficiency anemia which is new and concerning.  Gastroenterologic evaluation has been ordered but not performed and is mandatory.  Continue ferrous sulfate 325 mg twice a day every other day with vitamin C to improve absorption and we will follow his CBC along with his usual labs monthly on Zytiga, Relugolix, prednisone 7.5 mg.  He follows with Dr. Duffy with adrenal insufficiency.  He will see my nurse practitioner back in a month to see what GI has found and to watch serial CBC along with his other labs including PSA.  I would repeat CT chest abdomen pelvis again in 6 months with contrast along with bone scan and sooner if PSA rises.    -5/3/2023 EGD and colonoscopy with Dr. Alexander Gomez: Superficial erosions in the stomach with gastritis.  Small polyps removed, dilated internal  hemorrhoids.  Pathology pending.  Recommend repeat colonoscopy in 5 years.    -5/17/2023 [facility] Oncology clinic follow-up:  Naveen overall is doing well on current therapy with Zytiga, prednisone and relugolix.  PSA remains immeasurably low at <0.014.  He feels his hip pain is getting worse with time.  It makes it difficult to enjoy activities with his grandchildren as it is worse when he stands for any length of time or tries to carry any weight.  I reviewed his bone scan from April which was stable, we also reviewed previous MRI of the hips from May 2021 that showed moderate bilateral hip osteoarthritis.  I offered to repeat MRI of his hips for evaluation to see if there has been any worsening of his osteoarthritis and also then referral to orthopedics for any recommended intervention however at this time he defers.  He will let me know if he changes his mind.  He also follows with palliative care for management of his cancer related pain.  We will continue therapy unchanged.  We will repeat restaging scans in October unless symptoms dictate the need to do so sooner.  He was recently found to be iron deficient, he had an EGD and colonoscopy on 5/3/2023 with Dr. Gomez, EGD showed superficial erosions in the stomach with gastritis, he continues on omeprazole.  He had small polyps removed from the colon and recommendation to repeat colonoscopy in 5 years.  He is on oral iron along with vitamin C every other day and is tolerating that well.  CBC from yesterday shows hemoglobin now normal at 14.2 and hematocrit 43.2%, MCV normal at 27.0.    -7/20/2023 [facility] Oncology clinic follow-up: Naveen is doing well on current therapy with Zytiga, prednisone and relugolix.  PSA in June remains low at <0.1.  Continues to deal with chronic pain in his back and hip.  Recently saw Dr. Nikolai Marks and had an injection in his hip and is hoping that it will eventually help with his pain.  He has no new pain.  We will continue current  therapy unchanged.  We will repeat labs while he is here today.  I will refill his iron as his pharmacy has changed. Most recent CBC in June with hemoglobin of 14.7 and hematocrit 43.6%.  We will repeat restaging scans in October.    -9/14/2023 [facility] Oncology clinic follow-up: Naveen is tolerating his prostate cancer treatment with Zytiga, prednisone and relugolix.  PSA remains immeasurably low at <0.014 on 8/17/2023.  Currently he is having more GI issues with nausea and intermittent vomiting that occurs often without warning.  He had an EGD and colonoscopy with Dr. Gomez on 5/3/2023, EGD showed superficial erosions in the stomach with gastritis.  He does take omeprazole 40 mg twice daily.  He saw his PCP yesterday for these symptoms and has been referred to gastroenterologist at [facility] and he is awaiting to hear about that appointment.  Hopefully he can get in fairly quickly.  I told him that he should let us know if it is going to be a while before he can be seen and I will get him back to Dr. Gomez for consideration of repeat EGD.  We will get his CBC, CMP and PSA today.  His previous noted anemia had normalized, he currently is not taking oral iron as he ran out.  I will wait and see what his labs show today and likely hold off for now depending on his lab results until his GI issues can be resolved.  We will repeat his restaging CT chest, abdomen and pelvis along with bone scan in the next few weeks and I have ordered those today.  He is taking Zofran as needed for nausea, he is also on meclizine for vertigo.  He is going to follow-up with his PCP in a few weeks regarding his GI symptoms.    -9/14/2023 PSA less than 0.014 with unremarkable CBC and CMP.    -9/28/2023 total body bone scan stable with uptake in right acetabulum, no new areas.  CT chest, abdomen and pelvis show no evidence of disease progression.  -11/9/2023 [facility] Oncology clinic follow-up: CT reports were reviewed by Dr. Rashid last month and  the patient reports he was called by Dr. Rashid as he could not make his appointment due to concerns over upper respiratory infection, CT scan showed no evidence of disease progression.  PSA has remained low, we are repeating that today along with his CBC and CMP.  He continues to have sinus drainage and congestion, I will send in 7 additional days on his doxycycline that he has been taking, he will follow-up with PCP if no improvement.  He continues to complain of bilateral hip pain, has a follow-up with Dr. Marks next week.  We will continue therapy unchanged with Zytiga, prednisone and relugolix.  Would repeat restaging bone scan and CT scans late March for a 6-month follow-up.    -1/4/24 PSA 1    - 3/28/2024 CT chest abdomen pelvis with contrast compared to May 2013 shows decreasing sclerotic bony metastases.  Bone scan shows similar relative intensity of 1 focal uptake right acetabulum correlating with CT with no new sites of uptake    -4/2/2024 [facility] medical oncology follow-up: Continued good response to Zytiga prednisone relugolix which he is tolerating.  We will check PSA and labs 6/25/2024 with follow-up with my nurse practitioner and again 3 months after that along with CTs and bone scan 6 months from now.  Not on bisphosphonates or rank ligand inhibitor due to drug-induced hypophosphatemia.  Will ask my nurse practitioner to order bone densitometry with his other scans in 6 months.  He is having difficulty emptying his bladder and his prior urologist does not take his insurance so we will make appropriate referrals.    -6/26/2024 [facility] Oncology clinic follow-up: Overall continues to tolerate therapy with Zytiga, prednisone and Relugolix.  We will get labs today including PSA and as long as that is stable he will continue treatment unchanged with plans to repeat restaging CT scans and bone scans in September and I have ordered those today.  We will also obtain DEXA scan at that time to monitor for  osteoporosis on long-term use of androgen deprivation therapy.  We have been holding his Zometa due to drug-induced hypophosphatemia.  He is following with urology and has had relief of his urinary hesitancy with some medication adjustments and he is scheduled for follow-up this Friday with cystoscopy.  He recently had chest pain and shortness of breath while mowing his lawn, he did have an echocardiogram and stress test yesterday, awaiting final results and reading from Dr. Kahn.  He continues to follow with palliative care for management of his pain which is under good control.  I did refill his symptoms today for chronic constipation from opioid use.    Return to clinic in 3 months for follow-up    This was a level 4, moderate MDM visit with management of 2 stable chronic illnesses, ordering of labs, prescription drug management and management of drug therapy requiring intensive monitoring for toxicity and ordering of restaging scans.    Abena Velasquez, APRN    06/26/2024

## 2024-06-27 ENCOUNTER — TELEPHONE (OUTPATIENT)
Dept: CARDIOLOGY | Facility: CLINIC | Age: 69
End: 2024-06-27
Payer: MEDICARE

## 2024-06-27 LAB
ALBUMIN SERPL-MCNC: 3.6 G/DL (ref 3.5–5.2)
ALBUMIN/GLOB SERPL: 1.6 G/DL
ALP SERPL-CCNC: 71 U/L (ref 39–117)
ALT SERPL-CCNC: 10 U/L (ref 1–41)
AST SERPL-CCNC: 19 U/L (ref 1–40)
BASOPHILS # BLD AUTO: 0.04 10*3/MM3 (ref 0–0.2)
BASOPHILS NFR BLD AUTO: 0.5 % (ref 0–1.5)
BILIRUB SERPL-MCNC: 0.2 MG/DL (ref 0–1.2)
BUN SERPL-MCNC: 5 MG/DL (ref 8–23)
BUN/CREAT SERPL: 8.5 (ref 7–25)
CALCIUM SERPL-MCNC: 8.5 MG/DL (ref 8.6–10.5)
CHLORIDE SERPL-SCNC: 103 MMOL/L (ref 98–107)
CO2 SERPL-SCNC: 26.1 MMOL/L (ref 22–29)
CREAT SERPL-MCNC: 0.59 MG/DL (ref 0.76–1.27)
EGFRCR SERPLBLD CKD-EPI 2021: 105 ML/MIN/1.73
EOSINOPHIL # BLD AUTO: 0.08 10*3/MM3 (ref 0–0.4)
EOSINOPHIL NFR BLD AUTO: 0.9 % (ref 0.3–6.2)
ERYTHROCYTE [DISTWIDTH] IN BLOOD BY AUTOMATED COUNT: 14.5 % (ref 12.3–15.4)
GLOBULIN SER CALC-MCNC: 2.3 GM/DL
GLUCOSE SERPL-MCNC: 107 MG/DL (ref 65–99)
HCT VFR BLD AUTO: 37.6 % (ref 37.5–51)
HGB BLD-MCNC: 11.7 G/DL (ref 13–17.7)
IMM GRANULOCYTES # BLD AUTO: 0.02 10*3/MM3 (ref 0–0.05)
IMM GRANULOCYTES NFR BLD AUTO: 0.2 % (ref 0–0.5)
LYMPHOCYTES # BLD AUTO: 1.14 10*3/MM3 (ref 0.7–3.1)
LYMPHOCYTES NFR BLD AUTO: 13.4 % (ref 19.6–45.3)
MCH RBC QN AUTO: 24.8 PG (ref 26.6–33)
MCHC RBC AUTO-ENTMCNC: 31.1 G/DL (ref 31.5–35.7)
MCV RBC AUTO: 79.8 FL (ref 79–97)
MONOCYTES # BLD AUTO: 0.44 10*3/MM3 (ref 0.1–0.9)
MONOCYTES NFR BLD AUTO: 5.2 % (ref 5–12)
NEUTROPHILS # BLD AUTO: 6.8 10*3/MM3 (ref 1.7–7)
NEUTROPHILS NFR BLD AUTO: 79.8 % (ref 42.7–76)
NRBC BLD AUTO-RTO: 0 /100 WBC (ref 0–0.2)
PLATELET # BLD AUTO: 194 10*3/MM3 (ref 140–450)
POTASSIUM SERPL-SCNC: 4.2 MMOL/L (ref 3.5–5.2)
PROT SERPL-MCNC: 5.9 G/DL (ref 6–8.5)
PSA SERPL-MCNC: <0.014 NG/ML (ref 0–4)
RBC # BLD AUTO: 4.71 10*6/MM3 (ref 4.14–5.8)
SODIUM SERPL-SCNC: 139 MMOL/L (ref 136–145)
WBC # BLD AUTO: 8.52 10*3/MM3 (ref 3.4–10.8)

## 2024-06-27 NOTE — TELEPHONE ENCOUNTER
----- Message from Joni Kahn sent at 6/27/2024 12:55 PM EDT -----  Please inform the patient of their test results.  Assuring echocardiogram. Thank you.

## 2024-06-28 ENCOUNTER — TELEPHONE (OUTPATIENT)
Dept: CARDIOLOGY | Facility: CLINIC | Age: 69
End: 2024-06-28
Payer: MEDICARE

## 2024-06-28 NOTE — TELEPHONE ENCOUNTER
Spoke w/pt he said he does have chest pain at times and is agreeable to try to increase Ranexa, and proceed with LHC if symptoms persist. Pt says he will double up his Ranexa 500 mg tablets for now.

## 2024-06-28 NOTE — PROGRESS NOTES
Please inform the patient of their test results.  If patient is still having chest pain I would double Ranexa to 1000 mg twice daily if that does not control symptoms repeat cath in 2-3 weeks. Thank you.

## 2024-06-28 NOTE — TELEPHONE ENCOUNTER
----- Message from Joni Kahn sent at 6/28/2024  2:42 PM EDT -----  Please inform the patient of their test results.  If patient is still having chest pain I would double Ranexa to 1000 mg twice daily if that does not control symptoms repeat cath in 2-3 weeks. Thank you.

## 2024-07-08 DIAGNOSIS — G89.3 CHRONIC PAIN DUE TO NEOPLASM: ICD-10-CM

## 2024-07-08 DIAGNOSIS — G89.3 CANCER RELATED PAIN: ICD-10-CM

## 2024-07-08 RX ORDER — OXYCODONE 13.5 MG/1
13.5 CAPSULE, EXTENDED RELEASE ORAL EVERY 12 HOURS
Qty: 60 EACH | Refills: 0 | Status: SHIPPED | OUTPATIENT
Start: 2024-07-08 | End: 2024-08-07

## 2024-07-08 RX ORDER — HYDROMORPHONE HYDROCHLORIDE 8 MG/1
8 TABLET ORAL EVERY 4 HOURS PRN
Qty: 180 TABLET | Refills: 0 | Status: SHIPPED | OUTPATIENT
Start: 2024-07-25 | End: 2024-08-24

## 2024-07-08 NOTE — TELEPHONE ENCOUNTER
AGUSTIN #: [id number]      Medication requested: HYDROmorphone (Dilaudid) 8 MG tablet     Last fill date: 6/25/24    Medication requested: oxyCODONE ER (Xtampza ER) 13.5 MG capsule extended-release 12 hour     Last fill date: 6/7/24      Last appointment: 5/6/24    Next appointment: 8/7/24

## 2024-07-09 ENCOUNTER — PROCEDURE VISIT (OUTPATIENT)
Age: 69
End: 2024-07-09
Payer: COMMERCIAL

## 2024-07-09 VITALS
HEART RATE: 88 BPM | DIASTOLIC BLOOD PRESSURE: 78 MMHG | WEIGHT: 190 LBS | SYSTOLIC BLOOD PRESSURE: 142 MMHG | HEIGHT: 68 IN | BODY MASS INDEX: 28.79 KG/M2

## 2024-07-09 DIAGNOSIS — R33.9 URINARY RETENTION: ICD-10-CM

## 2024-07-09 DIAGNOSIS — C79.51 PROSTATE CANCER METASTATIC TO BONE: Primary | ICD-10-CM

## 2024-07-09 DIAGNOSIS — C61 PROSTATE CANCER METASTATIC TO BONE: Primary | ICD-10-CM

## 2024-07-09 LAB
BILIRUB BLD-MCNC: NEGATIVE MG/DL
CLARITY, POC: CLEAR
COLOR UR: ABNORMAL
EXPIRATION DATE: ABNORMAL
GLUCOSE UR STRIP-MCNC: NEGATIVE MG/DL
KETONES UR QL: NEGATIVE
LEUKOCYTE EST, POC: ABNORMAL
Lab: ABNORMAL
NITRITE UR-MCNC: NEGATIVE MG/ML
PH UR: 6 [PH] (ref 5–8)
PROT UR STRIP-MCNC: NEGATIVE MG/DL
RBC # UR STRIP: NEGATIVE /UL
SP GR UR: 1.01 (ref 1–1.03)
UROBILINOGEN UR QL: NORMAL

## 2024-07-09 PROCEDURE — 87086 URINE CULTURE/COLONY COUNT: CPT | Performed by: UROLOGY

## 2024-07-09 NOTE — PROGRESS NOTES
Follow Up Office Visit      Patient Name: Naveen Lugo  : 1955   MRN: [id number]     Chief Complaint:  No chief complaint on file.      Referring Provider: Cielo Dodd PA-C    History of Present Illness: Naveen Lugo is a 69 y.o. male who presents today for follow up of LUTS.  He has a history of metastatic prostate cancer.  He has been on finasteride since his last visit.  He reports he has to strain to void.      Preprocedure diagnosis  LUTS    Postprocedure diagnosis  Same    Procedure  Flexible Cystourethroscopy    Attending surgeon  David Mccullough MD    Anesthesia  2% lidocaine jelly intraurethrally    Complications  None    Indications  69 y.o. male undergoing a flexible cystoscopy for the above mentioned indications.  Informed consent was obtained.      Findings  Cystoscopic findings included one right and left ureteral orifice in the normal orthotopic position with normal bladder mucosa and no tumors, masses or stones.   The urethral urothelium was within normal limits with no visible strictures.  The prostatic urethra revealed complete lateral lobe coaptation, with no median lobe.  Very high bladder neck.  Heavily trabeculated bladder.    Procedure  The patient was placed in supine position and prepped and draped in sterile fashion with lidocaine jelly instill per the urethra for anesthesia 5 minutes prior to procedure start.  A brief timeout was performed including available nursing staff and the patient.  The 16 Fr digital flexible cystoscope was lubricated and gently advanced into the urethral meatus. The penile, bulbar, prostatic, and membranous urethra appeared widely patent without obvious stricture or mucosal abnormality. The scope was then advanced into the bladder. The bladder was completely visualized starting with the trigone. There were bilateral orthotopic ureteral orifices. The posterior wall, lateral walls, anterior wall, and dome were completely visualized WITHOUT obvious  mucosal lesions, tumors, stones.  The cystoscope was retroflexed and the bladder neck and prostate were further visualized and appeared normal.  The cystoscope was then gently withdrawn while visualizing the urethra and the procedure was then terminated.  The patient tolerated the procedure well.      TRUS Volume obtained.    NATIVIDAD: hard prostate     Total Volume: 16.57 cc  Height 26.10 mm  Width 32.65 mm Length 37.18 mm      Uroflow   Unable to void     PVR: 214 cc      Subjective      Review of System:   Review of Systems   Constitutional:  Negative for chills, fatigue, fever and unexpected weight change.   HENT:  Negative for congestion, rhinorrhea and sore throat.    Eyes:  Negative for visual disturbance.   Respiratory:  Negative for apnea, cough, chest tightness and shortness of breath.    Cardiovascular:  Negative for chest pain.   Gastrointestinal:  Negative for abdominal pain, constipation, diarrhea, nausea and vomiting.   Endocrine: Negative for polydipsia and polyuria.   Genitourinary:  Negative for difficulty urinating, dysuria, enuresis, flank pain, frequency, genital sores, hematuria and urgency.   Musculoskeletal:  Negative for gait problem.   Skin:  Negative for rash and wound.   Allergic/Immunologic: Negative for immunocompromised state.   Neurological:  Negative for dizziness, tremors, syncope, weakness and light-headedness.   Hematological:  Does not bruise/bleed easily.      I have reviewed the ROS documented by my clinical staff, I have updated appropriately and I agree. David Mccullough MD    I have reviewed and the following portions of the patient's history were updated as appropriate: past family history, past medical history, past social history, past surgical history and problem list.    Past Medical History:   Past Medical History:   Diagnosis Date    Bone cancer     History of radiation therapy 08/16/2021    L4 through sacrum, left acetabulum, right pelvis    Nephrolithiasis     Opioid use  disorder, mild, on maintenance therapy (HCC)     Prostate cancer        Past Surgical History:  Past Surgical History:   Procedure Laterality Date    CHOLECYSTECTOMY      CORONARY STENT PLACEMENT  206 & 2011    HERNIA REPAIR  1986    THORACIC DISCECTOMY  1994       Family History:   family history includes Diabetes in his brother; Heart disease in his brother; Ovarian cancer in his sister; Prostate cancer in his brother.   Otherwise pertinent FHx was reviewed and not pertinent to current issue.    Social History:    reports that he has been smoking cigarettes. He started smoking about 3 months ago. He has a 50.1 pack-year smoking history. He has never used smokeless tobacco. He reports that he does not currently use alcohol. He reports that he does not use drugs.    Medications:     Current Outpatient Medications:     abiraterone acetate (ZYTIGA) 500 MG tablet, Take 2 tablets by mouth Daily., Disp: 60 tablet, Rfl: 11    albuterol sulfate  (90 Base) MCG/ACT inhaler, Inhale 2 puffs Every 4 (Four) Hours As Needed for Wheezing., Disp: 8 g, Rfl: 5    amoxicillin-clavulanate (AUGMENTIN) 875-125 MG per tablet, , Disp: , Rfl:     aspirin 81 MG EC tablet, Take 1 tablet by mouth Daily., Disp: 90 tablet, Rfl: 3    azelastine (ASTELIN) 0.1 % nasal spray, 2 sprays into the nostril(s) as directed by provider 2 (Two) Times a Day. Use in each nostril as directed, Disp: 30 mL, Rfl: 5    buPROPion XL (Wellbutrin XL) 150 MG 24 hr tablet, Take 1 tablet by mouth Daily. For smoking cessation, Disp: 30 tablet, Rfl: 5    cyclobenzaprine (FLEXERIL) 10 MG tablet, Take 1 tablet by mouth 3 (Three) Times a Day As Needed for Muscle Spasms., Disp: 90 tablet, Rfl: 0    doxycycline (MONODOX) 100 MG capsule, Take 1 capsule by mouth 2 (Two) Times a Day., Disp: 20 capsule, Rfl: 0    ferrous sulfate 325 (65 FE) MG tablet, 325 mg p.o. twice daily every other day, take with vitamin C, Disp: 30 tablet, Rfl: 11    finasteride (Proscar) 5 MG tablet,  Take 1 tablet by mouth Daily., Disp: 30 tablet, Rfl: 2    Fluticasone-Umeclidin-Vilant (Trelegy Ellipta) 200-62.5-25 MCG/ACT aerosol powder , INHALE 1 PUFF BY MOUTH DAILY, Disp: 180 each, Rfl: 3    [START ON 7/25/2024] HYDROmorphone (Dilaudid) 8 MG tablet, Take 1 tablet by mouth Every 4 (Four) Hours As Needed for Moderate Pain or Severe Pain for up to 30 days., Disp: 180 tablet, Rfl: 0    levocetirizine (XYZAL) 5 MG tablet, Take 1 tablet by mouth Every Evening., Disp: 90 tablet, Rfl: 1    Lidocaine-Prilocaine &Lido HCl 2.5-2.5 & 3.88 % kit, Apply  topically to the appropriate area as directed., Disp: , Rfl:     meclizine (ANTIVERT) 25 MG tablet, Take 1 tablet by mouth 3 (Three) Times a Day As Needed for Dizziness., Disp: 45 tablet, Rfl: 3    methocarbamol (ROBAXIN) 500 MG tablet, Take 2 tablets by mouth Every 8 (Eight) Hours., Disp: , Rfl:     naloxone (NARCAN) 4 MG/0.1ML nasal spray, 1 spray into the nostril(s) as directed by provider As Needed (unresponsiveness)., Disp: , Rfl:     nicotine (NICODERM CQ) 14 MG/24HR patch, Place 1 patch on the skin as directed by provider Daily., Disp: 30 patch, Rfl: 0    nicotine (Nicoderm CQ) 21 MG/24HR patch, Place 1 patch on the skin as directed by provider Daily., Disp: 30 patch, Rfl: 0    nicotine (NICODERM CQ) 7 MG/24HR patch, Place 1 patch on the skin as directed by provider Daily., Disp: 30 patch, Rfl: 1    nitroglycerin (NITROSTAT) 0.4 MG SL tablet, 1 under the tongue as needed for angina, may repeat q5mins for up three doses, Disp: 25 tablet, Rfl: 11    nystatin (MYCOSTATIN) 100,000 unit/mL suspension, Swish and swallow 5 mL 4 (Four) Times a Day., Disp: 473 mL, Rfl: 0    omeprazole (priLOSEC) 40 MG capsule, Take 1 tab po BID for stomach, Disp: 180 capsule, Rfl: 1    ondansetron (Zofran) 4 MG tablet, Take 1 tablet by mouth Every 8 (Eight) Hours As Needed for Nausea or Vomiting., Disp: 30 tablet, Rfl: 1    oxyCODONE ER (Xtampza ER) 13.5 MG capsule extended-release 12 hour ,  "Take 1 capsule by mouth Every 12 (Twelve) Hours for 30 days., Disp: 60 each, Rfl: 0    potassium chloride 10 MEQ CR tablet, Take 1 tablet by mouth 2 (Two) Times a Day., Disp: 60 tablet, Rfl: 1    predniSONE (DELTASONE) 5 MG tablet, Take 1 tablet by mouth Daily., Disp: 30 tablet, Rfl: 11    promethazine-dextromethorphan (PROMETHAZINE-DM) 6.25-15 MG/5ML syrup, Take 5 mL by mouth 4 (Four) Times a Day As Needed for Cough., Disp: 240 mL, Rfl: 0    ranolazine (Ranexa) 500 MG 12 hr tablet, Take 1 tablet by mouth 2 (Two) Times a Day., Disp: 180 tablet, Rfl: 3    relugolix (ORGOVYX) 120 MG tablet tablet, Take 1 tablet by mouth Daily., Disp: 30 tablet, Rfl: 11    rosuvastatin (CRESTOR) 20 MG tablet, Take 1 tablet by mouth Daily., Disp: 90 tablet, Rfl: 1    senna (SENOKOT) 8.6 MG tablet, Take 1 tablet by mouth Daily., Disp: 90 tablet, Rfl: 1    tamsulosin (FLOMAX) 0.4 MG capsule 24 hr capsule, Take 1 capsule by mouth Daily., Disp: 90 capsule, Rfl: 1    venlafaxine 37.5 MG tablet sustained-release 24 hour 24 hr tablet, Take  by mouth., Disp: , Rfl:     Current Facility-Administered Medications:     triamcinolone acetonide (KENALOG-40) injection 80 mg, 80 mg, Intramuscular, Once, Cielo Dodd PA-C    Facility-Administered Medications Ordered in Other Visits:     heparin injection 500 Units, 500 Units, Intravenous, PRN, Ishmael Rashid MD, 500 Units at 06/02/21 0900    Allergies:   Allergies   Allergen Reactions    Cymbalta [Duloxetine Hcl] Dizziness    Gabapentin Nausea Only    Pregabalin Dizziness, Nausea And Vomiting and Hives    Duloxetine Hives         Objective     Physical Exam:   Vital Signs:   Vitals:    07/09/24 0956   BP: 142/78   BP Location: Right arm   Patient Position: Sitting   Cuff Size: Adult   Pulse: 88   SpO2: Comment: UTO   Weight: 86.2 kg (190 lb)   Height: 172.7 cm (68\")   PainSc:   7   PainLoc: Generalized     Body mass index is 28.89 kg/m².     Physical Exam  Vitals and nursing note reviewed. "   Constitutional:       General: He is awake. He is not in acute distress.     Appearance: Normal appearance. He is well-developed. He is obese.   HENT:      Head: Normocephalic and atraumatic.      Right Ear: External ear normal.      Left Ear: External ear normal.      Nose: Nose normal.   Eyes:      Conjunctiva/sclera: Conjunctivae normal.   Pulmonary:      Effort: Pulmonary effort is normal.   Abdominal:      General: There is no distension.      Palpations: Abdomen is soft. There is no mass.      Tenderness: There is no abdominal tenderness. There is no right CVA tenderness, left CVA tenderness, guarding or rebound.      Hernia: No hernia is present. There is no hernia in the left inguinal area or right inguinal area.   Genitourinary:     Pubic Area: No rash.       Penis: Normal.       Testes: Normal.      Prostate: Normal.      Rectum: Normal. No mass or tenderness. Normal anal tone.   Musculoskeletal:      Cervical back: Normal range of motion.   Lymphadenopathy:      Lower Body: No right inguinal adenopathy. No left inguinal adenopathy.   Skin:     General: Skin is warm.   Neurological:      General: No focal deficit present.      Mental Status: He is alert and oriented to person, place, and time.      Gait: Gait normal.   Psychiatric:         Behavior: Behavior normal. Behavior is cooperative.         Labs:   Brief Urine Lab Results  (Last result in the past 365 days)        Color   Clarity   Blood   Leuk Est   Nitrite   Protein   CREAT   Urine HCG        07/09/24 1001 Dark Yellow   Clear   Negative   Trace   Negative   Negative                        Lab Results   Component Value Date    GLUCOSE 107 (H) 06/26/2024    CALCIUM 8.5 (L) 06/26/2024     06/26/2024    K 4.2 06/26/2024    CO2 26.1 06/26/2024     06/26/2024    BUN 5 (L) 06/26/2024    CREATININE 0.59 (L) 06/26/2024    EGFRIFAFRI 109 01/11/2022    EGFRIFNONA 98 02/14/2022    BCR 8.5 06/26/2024    ANIONGAP 10.0 02/14/2022       Lab Results    Component Value Date    WBC 8.52 06/26/2024    HGB 11.7 (L) 06/26/2024    HCT 37.6 06/26/2024    MCV 79.8 06/26/2024     06/26/2024       Lab Results   Component Value Date    PSA <0.014 06/26/2024    PSA <0.014 04/02/2024    PSA <0.1 01/04/2024       Images:   No Images in the past 120 days found..    Measures:   Tobacco:   Naveen Lugo  reports that he has been smoking cigarettes. He started smoking about 3 months ago. He has a 50.1 pack-year smoking history. He has never used smokeless tobacco. I have educated him on the risk of diseases from using tobacco products such as cancer, COPD, and heart disease.     I advised him to quit    I spent 3  minutes counseling the patient.    Urine Incontinence: Patient reports that he is not currently experiencing any symptoms of urinary incontinence.     Assessment / Plan      Assessment/Plan:   69 y.o. male who presented today for follow up of LUTS.  He has a history of metastatic prostate cancer and is currently undergoing treatment.  He has had significant difficulty voiding.  I discussed his options with him today and given his diagnosis I would recommend transurethral resection of the prostate.  This will allow us with to evaluate pathology.  His prostate is quite small.  I also discussed [employer] PVP with them.      Diagnoses and all orders for this visit:    1. Prostate cancer metastatic to bone (Primary)  -     POC Urinalysis Dipstick, Automated  -     Uroflowmetry; Future  -     US Prostate; Future    2. Urinary retention  -     POC Urinalysis Dipstick, Automated  -     Uroflowmetry; Future  -     US Prostate; Future  -     Case Request; Standing  -     CBC (No Diff); Future  -     Basic Metabolic Panel; Future  -     Protime-INR; Future  -     ceFAZolin (ANCEF) 2,000 mg in sodium chloride 0.9 % 100 mL IVPB  -     Urinalysis With Culture If Indicated -; Future  -     Case Request    Other orders  -     Outpatient In A Bed; Standing  -     Follow Anesthesia  Guidelines / Protocol; Future  -     Follow Anesthesia Guidelines / Protocol; Standing  -     Verify NPO Status; Standing  -     Obtain Informed Consent; Standing  -     Verify / Perform Chlorhexidine Skin Prep; Standing  -     Provide NPO Instructions to Patient; Future  -     Chlorhexidine Skin Prep; Future  -     Place Sequential Compression Device; Standing  -     Maintain Sequential Compression Device; Standing         Follow Up:   Return for Follow up after surgery.    I spent approximately 30 minutes providing clinical care for this patient; including review of patient's chart and provider documentation, face to face time spent with patient in examination room (obtaining history, performing physical exam, discussing diagnosis and management options), placing orders, and completing patient documentation.     David Mccullough MD  [facility] [place]

## 2024-07-11 LAB — BACTERIA SPEC AEROBE CULT: NO GROWTH

## 2024-07-22 ENCOUNTER — SPECIALTY PHARMACY (OUTPATIENT)
Facility: HOSPITAL | Age: 69
End: 2024-07-22
Payer: MEDICARE

## 2024-07-22 DIAGNOSIS — G89.3 CANCER RELATED PAIN: ICD-10-CM

## 2024-07-22 RX ORDER — HYDROMORPHONE HYDROCHLORIDE 8 MG/1
8 TABLET ORAL EVERY 4 HOURS PRN
Qty: 180 TABLET | Refills: 0 | Status: SHIPPED | OUTPATIENT
Start: 2024-07-25 | End: 2024-08-24

## 2024-07-22 NOTE — TELEPHONE ENCOUNTER
Confirmed with [facility] having hydromorphone 8 MG in stock. Pt requesting script to be sent to this pharmacy do to [facility] in [place] being out of stock.

## 2024-07-22 NOTE — TELEPHONE ENCOUNTER
PATIENT CALLED AND STATED THAT [facility] DOESN'T HAVE THE DILAUDID IN STOCK. PATIENT STATED THAT HE SPOKE WITH [facility] AND THEY DO HAVE THE DILAUDID IN STOCK. PLEASE ADVISE.

## 2024-07-22 NOTE — PROGRESS NOTES
Specialty Pharmacy Refill Coordination Note     Naveen is a 69 y.o. male contacted today regarding refills of  abiraterone 1000mg PO QD, Relugolix 120mg PO QD and prednisone 5mg PO QD of specialty medication(s).    Reviewed and verified with patient: YES    Specialty medication(s) and dose(s) confirmed: yes    Refill Questions      Flowsheet Row Most Recent Value   Changes to allergies? No   Changes to medications? No   New conditions or infections since last clinic visit No   Unplanned office visit, urgent care, ED, or hospital admission in the last 4 weeks  No   How does patient/caregiver feel medication is working? Good   Financial problems or insurance changes  No   Since the previous refill, were any specialty medication doses or scheduled injections missed or delayed?  No   Does this patient require a clinical escalation to a pharmacist? No            Delivery Questions      Flowsheet Row Most Recent Value   Delivery method FedEx   Delivery address verified with patient/caregiver? Yes   Delivery address Home   Number of medications in delivery 3   Medication(s) being filled and delivered Abiraterone Acetate, Prednisone, Relugolix   Doses left of specialty medications 4 days left   Copay verified? Yes   Copay amount $0   Copay form of payment No copayment ($0)   Ship Date 7/22   Delivery Date 7/23   Signature Required No              Medication Adherence    Adherence tools used: watch   Other adherence tool: Clock - takes at same time each day, 7:45am   Support network for adherence: family member          Follow-up: 30 day(s)     Sushma Muro, Pharmacy Technician  Specialty Pharmacy Technician

## 2024-08-05 DIAGNOSIS — G89.3 CHRONIC PAIN DUE TO NEOPLASM: ICD-10-CM

## 2024-08-05 NOTE — TELEPHONE ENCOUNTER
AGUSTIN #: [id number]    Medication requested: oxyCODONE ER (Xtampza ER) 13.5 MG capsule extended-release 12 hour     Last fill date: 7/8/24    Last appointment: 5/6/24    Next appointment: 8/26/24

## 2024-08-06 ENCOUNTER — TELEPHONE (OUTPATIENT)
Dept: FAMILY MEDICINE CLINIC | Facility: CLINIC | Age: 69
End: 2024-08-06

## 2024-08-06 RX ORDER — OXYCODONE 13.5 MG/1
13.5 CAPSULE, EXTENDED RELEASE ORAL EVERY 12 HOURS
Qty: 60 EACH | Refills: 0 | Status: SHIPPED | OUTPATIENT
Start: 2024-08-07 | End: 2024-09-06

## 2024-08-06 NOTE — TELEPHONE ENCOUNTER
Caller: Naveen Lugo    Relationship: Self    Best call back number: [phone]     What form or medical record are you requesting: NOTE STATING WHY PATIENT NEEDS HANDICAPPED PLACARD    Who is requesting this form or medical record from you: PATIENT    How would you like to receive the form or medical records (pick-up, mail, fax):       Timeframe paperwork needed: AS SOON AS POSSIBLE

## 2024-08-07 NOTE — TELEPHONE ENCOUNTER
Pt Contacted: Returning Pts call, Pt stated he needed the Paperwork completed and signed for a handicap for parking.Pt advised I will get this ready for him and he can pick it up @ our front window after 1pm today. Pt verbalized understanding.

## 2024-08-19 ENCOUNTER — SPECIALTY PHARMACY (OUTPATIENT)
Dept: GENERAL RADIOLOGY | Facility: HOSPITAL | Age: 69
End: 2024-08-19
Payer: MEDICARE

## 2024-08-19 ENCOUNTER — SPECIALTY PHARMACY (OUTPATIENT)
Facility: HOSPITAL | Age: 69
End: 2024-08-19
Payer: MEDICARE

## 2024-08-19 DIAGNOSIS — C61 PROSTATE CANCER METASTATIC TO BONE: ICD-10-CM

## 2024-08-19 DIAGNOSIS — C79.51 PROSTATE CANCER METASTATIC TO BONE: ICD-10-CM

## 2024-08-19 RX ORDER — ABIRATERONE 500 MG/1
1000 TABLET ORAL DAILY
Qty: 60 TABLET | Refills: 11 | Status: SHIPPED | OUTPATIENT
Start: 2024-08-19

## 2024-08-19 NOTE — PROGRESS NOTES
Re: Refills of Oral Specialty Medication - Zytiga (abiraterone acetate)    Drug-Drug Interactions: The current medication list was reviewed and there are some relevant drug-drug interactions with the oral specialty medication that will be discussed during education, including:  Abiraterone may enhance myopathic (rhabdomyolysis) with Crestor  Abiraterone may increase concentration of Flomax (monitor hypotension, orthostasis)  Abiraterone may decrease concentration of venlafaxine  Medication Allergies: The patient has no relevant allergies as it relates to their oral specialty medication  Review of Labs/Dose Adjustments: NO DOSE CHANGE - I reviewed the most recent note and labs and the patient will continue without any dose changes.  I sent refills as described below.    Drug: Zytiga (abiraterone acetate)  Strength: 500 mg  Directions: Take 2 tablets by mouth daily  Quantity: 60  Refills: 11  Pharmacy prescription sent to: [facility]    Chelsea GuidryD, BCOP  Clinical Oncology Pharmacy Specialist  Phone: [phone]      8/19/2024  07:00 EDT

## 2024-08-19 NOTE — PROGRESS NOTES
Specialty Pharmacy Refill Coordination Note     Naveen is a 69 y.o. male contacted today regarding refills of  abiraterone 1000mg PO QD, relugolix 120mg PO QD and prednisone 5mg PO QD of  specialty medication(s).    Reviewed and verified with patient: YES; Medication is being shipped out on 8/20 now because of inventory issues.     Specialty medication(s) and dose(s) confirmed: yes    Refill Questions      Flowsheet Row Most Recent Value   Changes to allergies? No   Changes to medications? No   New conditions or infections since last clinic visit No   Unplanned office visit, urgent care, ED, or hospital admission in the last 4 weeks  No   How does patient/caregiver feel medication is working? Good   Financial problems or insurance changes  No   Since the previous refill, were any specialty medication doses or scheduled injections missed or delayed?  No   Does this patient require a clinical escalation to a pharmacist? No            Delivery Questions      Flowsheet Row Most Recent Value   Delivery method FedEx   Delivery address verified with patient/caregiver? Yes   Delivery address Home   Number of medications in delivery 3   Medication(s) being filled and delivered Abiraterone Acetate, Prednisone, Relugolix   Doses left of specialty medications 5 days left   Copay verified? Yes   Copay amount $0   Copay form of payment No copayment ($0)   Ship Date 8/19   Delivery Date 8/20   Signature Required No              Medication Adherence    Adherence tools used: watch   Other adherence tool: Clock - takes at same time each day, 7:45am   Support network for adherence: family member          Follow-up: 30 day(s)     Sushma Muro, Pharmacy Technician  Specialty Pharmacy Technician

## 2024-08-27 DIAGNOSIS — T40.2X5A THERAPEUTIC OPIOID INDUCED CONSTIPATION: ICD-10-CM

## 2024-08-27 DIAGNOSIS — K59.03 THERAPEUTIC OPIOID INDUCED CONSTIPATION: ICD-10-CM

## 2024-08-27 RX ORDER — SENNOSIDES A AND B 8.6 MG/1
1 TABLET, FILM COATED ORAL DAILY
Qty: 90 TABLET | Refills: 1 | Status: SHIPPED | OUTPATIENT
Start: 2024-08-27

## 2024-08-27 RX ORDER — FLUTICASONE FUROATE, UMECLIDINIUM BROMIDE AND VILANTEROL TRIFENATATE 200; 62.5; 25 UG/1; UG/1; UG/1
1 POWDER RESPIRATORY (INHALATION) DAILY
Qty: 180 EACH | Refills: 3 | Status: SHIPPED | OUTPATIENT
Start: 2024-08-27

## 2024-08-27 NOTE — TELEPHONE ENCOUNTER
Caller: Parish Naveen SHAN    Relationship: Self    Best call back number: [phone]     Requested Prescriptions:   Requested Prescriptions     Pending Prescriptions Disp Refills    Fluticasone-Umeclidin-Vilant (Trelegy Ellipta) 200-62.5-25 MCG/ACT inhaler 180 each 3     Sig: Inhale 1 puff Daily.    senna (SENOKOT) 8.6 MG tablet 90 tablet 1     Sig: Take 1 tablet by mouth Daily.        Pharmacy where request should be sent: [facility] [id number] [place] [id number] [facility] [address] - [phone] [facility] [phone] FX     Last office visit with prescribing clinician: 5/31/2024   Last telemedicine visit with prescribing clinician: Visit date not found   Next office visit with prescribing clinician: 12/9/2024     Additional details provided by patient: HAS ONE DAY REMAINING ON TRELEGY AND OUT OF SENNA - REQUESTING A 90 DAY SUPPLY WITH REFILLS     Does the patient have less than a 3 day supply:  [x] Yes  [] No    Would you like a call back once the refill request has been completed: [] Yes [x] No    If the office needs to give you a call back, can they leave a voicemail: [] Yes [x] No    María Smith Rep   08/27/24 11:46 EDT

## 2024-09-04 ENCOUNTER — OFFICE VISIT (OUTPATIENT)
Dept: PALLIATIVE CARE | Facility: CLINIC | Age: 69
End: 2024-09-04
Payer: MEDICARE

## 2024-09-04 ENCOUNTER — PRE-ADMISSION TESTING (OUTPATIENT)
Dept: PREADMISSION TESTING | Facility: HOSPITAL | Age: 69
End: 2024-09-04
Payer: MEDICARE

## 2024-09-04 VITALS
BODY MASS INDEX: 28.89 KG/M2 | HEART RATE: 94 BPM | DIASTOLIC BLOOD PRESSURE: 76 MMHG | SYSTOLIC BLOOD PRESSURE: 122 MMHG | TEMPERATURE: 97.3 F | OXYGEN SATURATION: 94 % | WEIGHT: 190 LBS | RESPIRATION RATE: 18 BRPM

## 2024-09-04 VITALS — WEIGHT: 190.04 LBS | BODY MASS INDEX: 28.15 KG/M2 | HEIGHT: 69 IN

## 2024-09-04 DIAGNOSIS — R33.9 URINARY RETENTION: ICD-10-CM

## 2024-09-04 DIAGNOSIS — T40.2X5A THERAPEUTIC OPIOID INDUCED CONSTIPATION: ICD-10-CM

## 2024-09-04 DIAGNOSIS — C61 PROSTATE CANCER METASTATIC TO BONE: Primary | ICD-10-CM

## 2024-09-04 DIAGNOSIS — G89.3 CANCER RELATED PAIN: Primary | ICD-10-CM

## 2024-09-04 DIAGNOSIS — G89.3 CHRONIC PAIN DUE TO NEOPLASM: ICD-10-CM

## 2024-09-04 DIAGNOSIS — R52 ACUTE PAIN: ICD-10-CM

## 2024-09-04 DIAGNOSIS — C79.51 PROSTATE CANCER METASTATIC TO BONE: Primary | ICD-10-CM

## 2024-09-04 DIAGNOSIS — K59.03 THERAPEUTIC OPIOID INDUCED CONSTIPATION: ICD-10-CM

## 2024-09-04 LAB
ANION GAP SERPL CALCULATED.3IONS-SCNC: 13 MMOL/L (ref 5–15)
BUN SERPL-MCNC: 6 MG/DL (ref 8–23)
BUN/CREAT SERPL: 8 (ref 7–25)
CALCIUM SPEC-SCNC: 9.4 MG/DL (ref 8.6–10.5)
CHLORIDE SERPL-SCNC: 102 MMOL/L (ref 98–107)
CO2 SERPL-SCNC: 28 MMOL/L (ref 22–29)
CREAT SERPL-MCNC: 0.75 MG/DL (ref 0.76–1.27)
DEPRECATED RDW RBC AUTO: 44.5 FL (ref 37–54)
EGFRCR SERPLBLD CKD-EPI 2021: 97.7 ML/MIN/1.73
ERYTHROCYTE [DISTWIDTH] IN BLOOD BY AUTOMATED COUNT: 15.9 % (ref 12.3–15.4)
GLUCOSE SERPL-MCNC: 114 MG/DL (ref 65–99)
HCT VFR BLD AUTO: 39.8 % (ref 37.5–51)
HGB BLD-MCNC: 12.4 G/DL (ref 13–17.7)
INR PPP: 1.01 (ref 0.89–1.12)
MCH RBC QN AUTO: 24.2 PG (ref 26.6–33)
MCHC RBC AUTO-ENTMCNC: 31.2 G/DL (ref 31.5–35.7)
MCV RBC AUTO: 77.6 FL (ref 79–97)
PLATELET # BLD AUTO: 202 10*3/MM3 (ref 140–450)
PMV BLD AUTO: 10.9 FL (ref 6–12)
POTASSIUM SERPL-SCNC: 4.2 MMOL/L (ref 3.5–5.2)
PROTHROMBIN TIME: 13.4 SECONDS (ref 12.2–14.5)
RBC # BLD AUTO: 5.13 10*6/MM3 (ref 4.14–5.8)
SODIUM SERPL-SCNC: 143 MMOL/L (ref 136–145)
WBC NRBC COR # BLD AUTO: 9.03 10*3/MM3 (ref 3.4–10.8)

## 2024-09-04 PROCEDURE — 1159F MED LIST DOCD IN RCRD: CPT | Performed by: PHYSICIAN ASSISTANT

## 2024-09-04 PROCEDURE — 99213 OFFICE O/P EST LOW 20 MIN: CPT | Performed by: PHYSICIAN ASSISTANT

## 2024-09-04 PROCEDURE — 85027 COMPLETE CBC AUTOMATED: CPT

## 2024-09-04 PROCEDURE — 85610 PROTHROMBIN TIME: CPT

## 2024-09-04 PROCEDURE — 1125F AMNT PAIN NOTED PAIN PRSNT: CPT | Performed by: PHYSICIAN ASSISTANT

## 2024-09-04 PROCEDURE — 80048 BASIC METABOLIC PNL TOTAL CA: CPT

## 2024-09-04 PROCEDURE — 3074F SYST BP LT 130 MM HG: CPT | Performed by: PHYSICIAN ASSISTANT

## 2024-09-04 PROCEDURE — 36415 COLL VENOUS BLD VENIPUNCTURE: CPT

## 2024-09-04 PROCEDURE — 93005 ELECTROCARDIOGRAM TRACING: CPT

## 2024-09-04 PROCEDURE — 1160F RVW MEDS BY RX/DR IN RCRD: CPT | Performed by: PHYSICIAN ASSISTANT

## 2024-09-04 PROCEDURE — 3078F DIAST BP <80 MM HG: CPT | Performed by: PHYSICIAN ASSISTANT

## 2024-09-04 RX ORDER — HYDROMORPHONE HYDROCHLORIDE 8 MG/1
TABLET ORAL
COMMUNITY
Start: 2024-08-22 | End: 2024-09-04 | Stop reason: SDUPTHER

## 2024-09-04 RX ORDER — SENNOSIDES A AND B 8.6 MG/1
1 TABLET, FILM COATED ORAL DAILY
Qty: 90 TABLET | Refills: 3 | Status: SHIPPED | OUTPATIENT
Start: 2024-09-04

## 2024-09-04 NOTE — TELEPHONE ENCOUNTER
I have reviewed patient's AGUSTIN report prior to prescribing Schedule II, III, and IV medications. Request # [id number]. Next refills for Xtampza ER 13.5 mg twice daily #60 and hydromorphone 8 mg every 4 hours as needed #180 were sent to the pharmacy. Of note, will send script for hydromorphone 2 days early due to patient's increased pain s/p fall. The patient is scheduled to follow-up in 3 months.

## 2024-09-04 NOTE — PROGRESS NOTES
Palliative Clinic Note      Name: Naveen Lugo  Age: 69 y.o.  Sex: male  : 1955  MRN: [id number]  Date of Service: 2024   Medical Oncologist: Dr. Rashid    Subjective:    Chief Complaint: Right-sided pain    History of Present Illness: Naveen Lugo is a 69 y.o. male with past medical history significant for coronary artery disease, hypertension, hyperlipidemia, opioid use disorder, and metastatic prostate cancer who presents to the palliative clinic today as a follow up for pain and symptom management.     Treatment summary: He was diagnosed with prostate adenocarcinoma metastasized to the bone in 2020. Patient completed radiation with Dr. Quintanilla in 2021. He is currently on Zytiga, prednisone, and Relugolix. PSA remains low and no active disease on imaging. Patient recently diagnosed with new onset of microcytic anemia and started on iron supplementation. EGD/colonoscopy on 5/3/23 showed gastritis and a few small polyps with recommendations to repeat in 5 years. Repeat scans in 3/2024 show decreasing sclerotic bony metastases.     Pain: History of opioid use disorder. Patient completed Suboxone taper in the past and was off of pain medication prior to cancer diagnosis. Several failed therapies including methadone, MS contin and fentanyl. Patient was referred to pain specialist, Dr. BARB Avalos in 3/2022. Patient transferred care back to the palliative clinic for uncontrolled hip and back pain in 2022. Current regimen includes of Xtampza 13.5 mg BID, hydromorphone 8 mg every 4 hours and flexeril 10 mg TID PRN. The patient complains of right-sided pain in his upper and lower extremity after a fall on the concrete. Patient underwent evaluation at an urgent care in [place]. Imaging was unremarkable. Pain is slowly improving. Patient follows with orthopedic surgeon, Dr. Marks for bilateral hip pain and underwent steroid injections in both hips several months ago without significant improvement in  his pain. Patient unable to tolerate gabapentin, pregabalin, and duloxetine in the past.      Symptoms: The patient endorses regular bowel movements with Senna daily. Patient needs a refill of this today. He states his appetite has been good. No nausea or vomiting. No issues with sleep. Hot flashes have improved with the addition of venlafaxine (Effexor).     Pyschosocial: The patient lives with his wife. Endorses strong family support. He enjoys spending time with his grandson.  The patient is excited about an upcoming trip to [place] with his family to celebrate his anniversary. No personal history of mental illness. He denies anxiety or depression. Patient enjoys spending times outdoors. Stable mood.      Goals: Maximize comfort, optimize function & psychosocial wellbeing, and promote advanced care planning.    The following portions of the patient's history were reviewed and updated as appropriate: allergies, current medications, past family history, past medical history, past social history, past surgical history and problem list.    ORT-R: High risk   Aberrant behavior: abnormal UDS 9/7/22  Decisional capacity: Full  ECOG: (2) Ambulatory and capable of self care, unable to carry out work activity, up and about > 50% or waking hours     Objective:    /76   Pulse 94   Temp 97.3 °F (36.3 °C) (Temporal)   Resp 18   Wt 86.2 kg (190 lb)   SpO2 94%   BMI 28.89 kg/m²     Constitutional: Awake, alert, normal gait, sitting up in exam chair, in no acute distress  Eyes: PERRLA, EOMS intact  HENT: NCAT, face symmetric  Neck: Supple, trachea midline  Respiratory: Nonlabored respirations  Cardiovascular: RRR, no edema observed  Gastrointestinal: Soft, no guarding  Musculoskeletal: Moves all extremities   Psychiatric: Appropriate affect, cooperative  Neurologic: Oriented x 3, Cranial Nerves grossly intact to confrontation, speech clear  Skin: Cool dry, no rashes or wounds appreciated     Medication Counts:  Reviewed. See bottom of note for details. Did not bring medication to appointment  I have reviewed the patient's [place] PDMP. AGUSTIN Req #[id number].   UDS: Last 5/6/24. Reviewed. Appropriate.     Assessment & Plan:    1. Prostate cancer metastatic to bone  - Repeat scans in 3/2024 show decreasing sclerotic bony metastases. Tolerating systemic therapy.    2. Chronic pain due to neoplasm  3. Acute pain  - Patient is appropriate for opioid therapy due to cancer related pain. Daily function and quality of life improved with pain medication. Refills for Xtampza ER 13.5 mg tablets twice daily and hydromorphone 8 mg every 4 hours as needed were sent to the pharmacy. Of note, sent hydromorphone refill 2 days early to account for increased pain after fall. Side effects of the medication discussed at every visit. Patient was encouraged to continue bowel regimen of daily stool softeners, prn laxatives, and diet modifications.    4. Therapeutic opioid induced constipation  - Refilled senna (SENOKOT) 8.6 MG tablet; Take 1 tablet by mouth Daily.  Dispense: 90 tablet; Refill: 3    Code status: Full code  Medical interventions: Full    Return in about 3 months (around 12/4/2024) for Office Visit.    I spent 20 minutes caring for Naveen Lugo on this date of service. This time includes time spent by me in the following activities: preparing for the visit, reviewing tests, obtaining and/or reviewing a separately obtained history, performing a medically appropriate examination and/or evaluation , counseling and educating the patient/family/caregiver, ordering medications, tests, or procedures, documenting information in the medical record, independently interpreting results and communicating that information with the patient/family/caregiver, and care coordination    Anna Ayers PA-C  09/04/2024    Medication Date Filled # Filled Count Used # Days  ROSE MARIE   Xtampza ER 13.5 8/7/24 60 -- -- -- --   Hydromorphone 8 8/22/24 180 -- -- -- --

## 2024-09-04 NOTE — PAT
An arrival time for procedure was not provided during PAT visit. If patient had any questions or concerns about their arrival time, they were instructed to contact their surgeon/physician.  Additionally, if the patient referred to an arrival time that was acquired from their my chart account, patient was encouraged to verify that time with their surgeon/physician. Arrival times are NOT provided in Pre Admission Testing Department.    Post-Surgery Information Instruction Sheet given to patient during Pre-Admission Testing Visit with verbal instructions to patient to return with PAT PASS on the day of surgery. Additionally, encouraged patient to review the information provided.    Pt has been having intermittent chest pain with activity since May 2024. Per note from Dr. Kahn on 6/28/24 (in [facility]), pt to double his Ranexa to 1000 mg twice daily. Per Dr. Kahn, if chest pain continues, pt to have repeat heart cath. In PAT today, pt stated that he has been taking Ranexa 1000 mg twice daily, but still has intermittent chest pain. Pt stated that he has not notified Dr. Kahn's office of continued chest pain. Heart cath has not been performed.     TIFFANI RN reported above to Dr. Dupree with anesthesia. Per Dr. Dupree, pt needs to have heart cath prior to procedure with Dr. Mccullough. TIFFANI RN reported above to Caroline,  for Dr. Mccullough.

## 2024-09-05 ENCOUNTER — TELEPHONE (OUTPATIENT)
Dept: CARDIOLOGY | Facility: CLINIC | Age: 69
End: 2024-09-05
Payer: MEDICARE

## 2024-09-05 DIAGNOSIS — R07.2 PRECORDIAL PAIN: ICD-10-CM

## 2024-09-05 DIAGNOSIS — I20.89 ANGINAL EQUIVALENT: ICD-10-CM

## 2024-09-05 DIAGNOSIS — R06.02 SOB (SHORTNESS OF BREATH): ICD-10-CM

## 2024-09-05 DIAGNOSIS — I25.118 CORONARY ARTERY DISEASE OF NATIVE ARTERY OF NATIVE HEART WITH STABLE ANGINA PECTORIS: Primary | ICD-10-CM

## 2024-09-05 LAB
QT INTERVAL: 410 MS
QTC INTERVAL: 467 MS

## 2024-09-05 RX ORDER — RANOLAZINE 1000 MG/1
1000 TABLET, EXTENDED RELEASE ORAL 2 TIMES DAILY
Qty: 180 TABLET | Refills: 3 | Status: SHIPPED | OUTPATIENT
Start: 2024-09-05

## 2024-09-05 RX ORDER — HYDROMORPHONE HYDROCHLORIDE 8 MG/1
8 TABLET ORAL EVERY 4 HOURS PRN
Qty: 180 TABLET | Refills: 0 | Status: SHIPPED | OUTPATIENT
Start: 2024-09-19

## 2024-09-05 RX ORDER — OXYCODONE 13.5 MG/1
13.5 CAPSULE, EXTENDED RELEASE ORAL EVERY 12 HOURS
Qty: 60 EACH | Refills: 0 | Status: SHIPPED | OUTPATIENT
Start: 2024-09-06 | End: 2024-10-06

## 2024-09-05 NOTE — TELEPHONE ENCOUNTER
Caller: Naveen Lugo    Relationship: Self    Best call back number: [phone]    What is the best time to reach you: ANYTIME     Who are you requesting to speak with (clinical staff, provider,  specific staff member): ANYONE     What was the call regarding: PATIENT REQUESTING A CALL BACK TO DISCUSS A UPCOMING PROCEDURE.

## 2024-09-05 NOTE — TELEPHONE ENCOUNTER
Pt was calling for CC for upcoming cysto with Dr. Mccullough on 9/10. However, he reports doubling Ranexa to 1000 mg BID in June helped a little with chest pain, but not much, and is also sob when he gets up to do anything.     Advised per RDS plan 6/28 if doubling Ranexa does not control symptoms repeat cath in 2 to 3 weeks. Advised we will need to proceed with LHC before we can clear him as symptoms haven't really improved and reporting sob with minimal exertion. Pt is amendable to [facility]. Instructed pt to call Dr. Mccullough's office in the morning to reschedule procedure. Pt verbalized understanding and agreeable to plan

## 2024-09-10 ENCOUNTER — TELEPHONE (OUTPATIENT)
Dept: PALLIATIVE CARE | Facility: CLINIC | Age: 69
End: 2024-09-10
Payer: MEDICARE

## 2024-09-13 ENCOUNTER — SPECIALTY PHARMACY (OUTPATIENT)
Facility: HOSPITAL | Age: 69
End: 2024-09-13
Payer: MEDICARE

## 2024-09-13 NOTE — PROGRESS NOTES
Specialty Pharmacy Refill Coordination Note     Naveen is a 69 y.o. male contacted today regarding refills of  abiraterone 1000mg PO QD, prednisone 5mg PO QD and relugolix 120mg PO QD of  specialty medication(s).    Reviewed and verified with patient: YES; ship out on 9/16/24 because of the weekend.     Specialty medication(s) and dose(s) confirmed: yes    Refill Questions      Flowsheet Row Most Recent Value   Changes to allergies? No   Changes to medications? No   New conditions or infections since last clinic visit No   Unplanned office visit, urgent care, ED, or hospital admission in the last 4 weeks  No   How does patient/caregiver feel medication is working? Good   Financial problems or insurance changes  No   Since the previous refill, were any specialty medication doses or scheduled injections missed or delayed?  No   Does this patient require a clinical escalation to a pharmacist? No            Delivery Questions      Flowsheet Row Most Recent Value   Delivery method [employer]   Delivery address verified with patient/caregiver? Yes   Delivery address Home   Number of medications in delivery 3   Medication(s) being filled and delivered Prednisone, Relugolix, Abiraterone Acetate   Doses left of specialty medications 8 days left   Copay verified? Yes   Copay amount $0   Copay form of payment No copayment ($0)   Ship Date 9/16   Delivery Date 9/17   Signature Required No              Medication Adherence    Adherence tools used: watch   Other adherence tool: Clock - takes at same time each day, 7:45am   Support network for adherence: family member          Follow-up: 30 day(s)     Sushma Muro, Pharmacy Technician  Specialty Pharmacy Technician

## 2024-09-26 PROBLEM — I20.89 ANGINAL EQUIVALENT: Status: ACTIVE | Noted: 2024-09-05

## 2024-09-26 PROBLEM — R06.02 SOB (SHORTNESS OF BREATH): Status: ACTIVE | Noted: 2024-09-05

## 2024-10-02 ENCOUNTER — INFUSION (OUTPATIENT)
Dept: ONCOLOGY | Facility: HOSPITAL | Age: 69
End: 2024-10-02
Payer: MEDICARE

## 2024-10-02 DIAGNOSIS — Z45.2 ENCOUNTER FOR CARE RELATED TO PORT-A-CATH: ICD-10-CM

## 2024-10-02 DIAGNOSIS — C61 PROSTATE CANCER METASTATIC TO BONE: Primary | ICD-10-CM

## 2024-10-02 DIAGNOSIS — C79.51 PROSTATE CANCER METASTATIC TO BONE: Primary | ICD-10-CM

## 2024-10-02 PROCEDURE — 25010000002 HEPARIN LOCK FLUSH PER 10 UNITS: Performed by: NURSE PRACTITIONER

## 2024-10-02 PROCEDURE — 36591 DRAW BLOOD OFF VENOUS DEVICE: CPT

## 2024-10-02 RX ORDER — HEPARIN SODIUM (PORCINE) LOCK FLUSH IV SOLN 100 UNIT/ML 100 UNIT/ML
500 SOLUTION INTRAVENOUS AS NEEDED
OUTPATIENT
Start: 2024-10-02

## 2024-10-02 RX ORDER — SODIUM CHLORIDE 0.9 % (FLUSH) 0.9 %
10 SYRINGE (ML) INJECTION AS NEEDED
OUTPATIENT
Start: 2024-10-02

## 2024-10-02 RX ORDER — SODIUM CHLORIDE 0.9 % (FLUSH) 0.9 %
10 SYRINGE (ML) INJECTION AS NEEDED
Status: CANCELLED | OUTPATIENT
Start: 2024-10-02

## 2024-10-02 RX ORDER — HEPARIN SODIUM (PORCINE) LOCK FLUSH IV SOLN 100 UNIT/ML 100 UNIT/ML
500 SOLUTION INTRAVENOUS AS NEEDED
Status: DISCONTINUED | OUTPATIENT
Start: 2024-10-02 | End: 2024-10-02 | Stop reason: HOSPADM

## 2024-10-02 RX ADMIN — HEPARIN 500 UNITS: 100 SYRINGE at 13:50

## 2024-10-03 DIAGNOSIS — G89.3 CHRONIC PAIN DUE TO NEOPLASM: ICD-10-CM

## 2024-10-03 LAB
ALBUMIN SERPL-MCNC: 4 G/DL (ref 3.9–4.9)
ALP SERPL-CCNC: 67 IU/L (ref 44–121)
ALT SERPL-CCNC: 8 IU/L (ref 0–44)
AST SERPL-CCNC: 14 IU/L (ref 0–40)
BASOPHILS # BLD AUTO: 0 X10E3/UL (ref 0–0.2)
BASOPHILS NFR BLD AUTO: 0 %
BILIRUB SERPL-MCNC: 0.2 MG/DL (ref 0–1.2)
BUN SERPL-MCNC: 11 MG/DL (ref 8–27)
BUN/CREAT SERPL: 15 (ref 10–24)
CALCIUM SERPL-MCNC: 8.7 MG/DL (ref 8.6–10.2)
CHLORIDE SERPL-SCNC: 99 MMOL/L (ref 96–106)
CO2 SERPL-SCNC: 21 MMOL/L (ref 20–29)
CREAT SERPL-MCNC: 0.75 MG/DL (ref 0.76–1.27)
EGFRCR SERPLBLD CKD-EPI 2021: 98 ML/MIN/1.73
EOSINOPHIL # BLD AUTO: 0.1 X10E3/UL (ref 0–0.4)
EOSINOPHIL NFR BLD AUTO: 1 %
ERYTHROCYTE [DISTWIDTH] IN BLOOD BY AUTOMATED COUNT: 16.2 % (ref 11.6–15.4)
GLOBULIN SER CALC-MCNC: 2.5 G/DL (ref 1.5–4.5)
GLUCOSE SERPL-MCNC: ABNORMAL MG/DL
HCT VFR BLD AUTO: 39.1 % (ref 37.5–51)
HGB BLD-MCNC: 12.1 G/DL (ref 13–17.7)
IMM GRANULOCYTES # BLD AUTO: 0 X10E3/UL (ref 0–0.1)
IMM GRANULOCYTES NFR BLD AUTO: 0 %
LYMPHOCYTES # BLD AUTO: 2.1 X10E3/UL (ref 0.7–3.1)
LYMPHOCYTES NFR BLD AUTO: 27 %
MCH RBC QN AUTO: 23.9 PG (ref 26.6–33)
MCHC RBC AUTO-ENTMCNC: 30.9 G/DL (ref 31.5–35.7)
MCV RBC AUTO: 77 FL (ref 79–97)
MONOCYTES # BLD AUTO: 0.7 X10E3/UL (ref 0.1–0.9)
MONOCYTES NFR BLD AUTO: 9 %
NEUTROPHILS # BLD AUTO: 4.7 X10E3/UL (ref 1.4–7)
NEUTROPHILS NFR BLD AUTO: 63 %
PLATELET # BLD AUTO: 209 X10E3/UL (ref 150–450)
POTASSIUM SERPL-SCNC: ABNORMAL MMOL/L
PROT SERPL-MCNC: 6.5 G/DL (ref 6–8.5)
PSA SERPL-MCNC: <0.1 NG/ML (ref 0–4)
RBC # BLD AUTO: 5.07 X10E6/UL (ref 4.14–5.8)
SODIUM SERPL-SCNC: 139 MMOL/L (ref 134–144)
WBC # BLD AUTO: 7.7 X10E3/UL (ref 3.4–10.8)

## 2024-10-03 NOTE — TELEPHONE ENCOUNTER
AGUSTIN #: [id number]    Medication requested: HYDROmorphone (DILAUDID) 8 MG tablet     Last fill date: 9/19/24    Medication requested: oxyCODONE ER (Xtampza ER) 13.5 MG capsule extended-release 12 hour     Last fill date: 9/10/24        Last appointment: 9/4/24    Next appointment: 12/5/24

## 2024-10-04 ENCOUNTER — PREP FOR SURGERY (OUTPATIENT)
Dept: OTHER | Facility: HOSPITAL | Age: 69
End: 2024-10-04
Payer: MEDICARE

## 2024-10-04 RX ORDER — ASPIRIN 81 MG/1
81 TABLET ORAL DAILY
OUTPATIENT
Start: 2024-10-05

## 2024-10-04 RX ORDER — ACETAMINOPHEN 325 MG/1
650 TABLET ORAL EVERY 4 HOURS PRN
OUTPATIENT
Start: 2024-10-04

## 2024-10-04 RX ORDER — SODIUM CHLORIDE 0.9 % (FLUSH) 0.9 %
10 SYRINGE (ML) INJECTION AS NEEDED
OUTPATIENT
Start: 2024-10-04

## 2024-10-04 RX ORDER — HYDROMORPHONE HYDROCHLORIDE 8 MG/1
8 TABLET ORAL EVERY 4 HOURS PRN
Qty: 180 TABLET | Refills: 0 | Status: SHIPPED | OUTPATIENT
Start: 2024-10-19 | End: 2024-11-18

## 2024-10-04 RX ORDER — OXYCODONE 13.5 MG/1
13.5 CAPSULE, EXTENDED RELEASE ORAL EVERY 12 HOURS
Qty: 60 EACH | Refills: 0 | Status: SHIPPED | OUTPATIENT
Start: 2024-10-10 | End: 2024-11-09

## 2024-10-04 RX ORDER — SODIUM CHLORIDE 0.9 % (FLUSH) 0.9 %
10 SYRINGE (ML) INJECTION EVERY 12 HOURS SCHEDULED
OUTPATIENT
Start: 2024-10-04

## 2024-10-04 RX ORDER — ASPIRIN 81 MG/1
324 TABLET, CHEWABLE ORAL ONCE
OUTPATIENT
Start: 2024-10-04 | End: 2024-10-04

## 2024-10-04 RX ORDER — SODIUM CHLORIDE 9 MG/ML
40 INJECTION, SOLUTION INTRAVENOUS AS NEEDED
OUTPATIENT
Start: 2024-10-04

## 2024-10-04 RX ORDER — NITROGLYCERIN 0.4 MG/1
0.4 TABLET SUBLINGUAL
OUTPATIENT
Start: 2024-10-04

## 2024-10-08 ENCOUNTER — OFFICE VISIT (OUTPATIENT)
Dept: ONCOLOGY | Facility: CLINIC | Age: 69
End: 2024-10-08
Payer: MEDICARE

## 2024-10-08 VITALS
DIASTOLIC BLOOD PRESSURE: 77 MMHG | HEIGHT: 69 IN | SYSTOLIC BLOOD PRESSURE: 127 MMHG | TEMPERATURE: 97.5 F | WEIGHT: 190 LBS | HEART RATE: 105 BPM | RESPIRATION RATE: 18 BRPM | BODY MASS INDEX: 28.14 KG/M2

## 2024-10-08 DIAGNOSIS — R91.8 PULMONARY INFILTRATE: ICD-10-CM

## 2024-10-08 DIAGNOSIS — C79.51 PROSTATE CANCER METASTATIC TO BONE: Primary | ICD-10-CM

## 2024-10-08 DIAGNOSIS — C61 PROSTATE CANCER METASTATIC TO BONE: Primary | ICD-10-CM

## 2024-10-08 DIAGNOSIS — D64.9 ANEMIA, UNSPECIFIED TYPE: ICD-10-CM

## 2024-10-08 NOTE — LETTER
October 8, 2024       No Recipients    Patient: Naveen Lugo   YOB: 1955   Date of Visit: 10/8/2024     Dear Cielo Dodd PA-C:       Thank you for referring Naveen Lugo to me for evaluation. Below are the relevant portions of my assessment and plan of care.    If you have questions, please do not hesitate to call me. I look forward to following Naveen along with you.         Sincerely,        Ishmael Rashid MD        CC:   No Recipients    Ishmael Rashid MD  10/08/24 0951  Sign when Signing Visit  CHIEF COMPLAINT: Progressive fatigue    Problem List:  Oncology/Hematology History Overview Note   1.  Stage IVb grade group 4 metastatic prostate cancer with sclerotic bone lesions on CT and bone scan and history of prostatic hypertrophy.  Good response to Taxotere ending 2/18/2021 Relugolix, followed by Zytiga prednisone with L4/sacrum, left acetabulum, and right pelvis external beam radiation August 2021.  Hypophosphatemia on Zometa.  Zometa held as of October 2021.  2.  Kidney stone  3.  Polyps  4.  Probable drug-induced hypophosphatemia from Zometa, drug stopped after 10/5/2021 dose  5.  Cancer related pain seeing palliative care  6.  Fatigue  7.  Covid 2/2022  8.  Iron deficiency anemia  -5/3/2023 EGD and colonoscopy with Dr. Alexander Gomez: Superficial erosions in the stomach with gastritis.  Small polyps removed, dilated internal hemorrhoids.  Pathology pending.  Recommend repeat colonoscopy in 5 years.    -9/30/2020 office note from Dr. Ronen Reynaga indicates patient with PSA 10.8.  Underwent TRUS prostate biopsy 9/15/2020.  Adenocarcinoma the prostate Stanton 4+4 = 8 in 1 out of 12 cores and 4+3 = 7 and 10 out of 12 cores and 3+4 = 7 in 1 out of 12 cores.  Perineural invasion.  Extraprostatic extension into the fat seen on 2 biopsies of the right lateral mid and right apex.  9/24/2020 CT chest and whole-body bone scan showed T12, L1, left acetabular, right medial acetabular metastases with no lung  metastases.  He started androgen deprivation therapy with Casodex with plans for Lupron to start in 1 to 2 weeks for clinical T3 N0 M1 metastatic prostate cancer.  Review of official report from [facility] 9/24/2020 bone scan mentions T12, L1, roof of left acetabulum and medial right acetabular osteoblastic metastases.  CT chest with contrast that same day showed mild splenomegaly 14.2 cm with stable periaortic nodes compared to CT December 2015 with no lung metastases.  Sclerotic abnormality within the T12 vertebral body, L1 vertebral body extending into the pedicle unchanged.  8/28/2020 CT abdomen pelvis report from [facility] reviewed and mentions normal spleen size.  Punctate nonobstructing kidney stone, no paulino enlargement, diffuse bladder wall thickening with mild perivesicular fat stranding, 2.1 cm sclerotic left iliac bone above the left acetabulum new compared to 2015 as well as 1.5 cm faintly sclerotic focus in the right posterior acetabulum stable compared to prior CT 2015 as well as a 1.6 cm T12 and inferior vertebral body sclerotic lesion and a 1.9 cm left posterior lateral L1 vertebral body abnormality extending to the pedicle.    -10/13/2020 initial [facility] medical oncology consultation: With 1 Sarika score 8, 4+4 lesion that would make him a grade group 4 with stage IVb disease given radiographic evidence of sclerotic bone metastasis on bone scan and CT.  Needs genetic testing given metastatic prostate cancer and this is also important as, were he to have mismatch repair mutation then we would have options for PDL 1 inhibitors and were he BRCA mutated would have options for PARP inhibitors in the future.  Systemic therapy for castration naïve prostate cancer or N1 disease should be with apalutamide or Abiraterone or enzalutamide all of which are category 1.  He does not have any visceral metastases.  According to his imaging he has T12, L1, left acetabular, right acetabular, and  left iliac bony involvement.  That means he would have 4 or more bone metastases with at least one metastasis (i.e. the acetabular lesions bilaterally) beyond the pelvis/vertebral, for which this would be considered high-volume disease for which 6 cycles of docetaxel 75 mg/m² x 6 cycles followed by Zytiga and prednisone would be reasonable along with Zometa every 6 weeks for bone stabilization.  He will do chemo preparation visit with my nurse practitioner prior to start chemotherapy with Taxotere and Zometa in 2 weeks and he is already received Casodex in preparation for Lupron shot he received on 10/12/2020 with Dr. Reynaga.  We will get him to Dr. Alexander Gomez who has done his polypectomies in the past for port placement and we will get genetic counseling started.  Following the 6 courses of Taxotere per the [place] trial criteria, I would then give him an indefinite Zytiga and prednisone and Zometa until progression or toxicity dictates.    10/29/2020 PSA 1.37    -12/16/2019 COVID-19 antigen test negative    -12/29/2020 PSA 0.275 with absolute neutrophil count 700 and creatinine 0.76 with normal liver enzymes and electrolytes.  Normal magnesium and phosphorus and urine drug screen positive for buprenorphine and opiates follow-up with palliative care.    -1/4/2021 CT chest abdomen pelvis with contrast and whole-body bone scan shows improving less metabolically active bone metastases.  Stable sclerotic T12, L1, and L2 vertebral bodies and bilateral pelvic bones on bone windows with stable subcentimeter para-aortic lymph nodes and no definite progression in the chest abdomen or pelvis.    -1/5/2021 [facility] medical oncology follow-up visit: I reviewed the images and reports thereof of the above CT and bone scan which shows good response to Taxotere x3 courses with a PSA down to 0.275 from a baseline of 10.  Get another 3 courses of Taxotere, continue his Xgeva, and then following that we will switch to Zytiga and  prednisone.  He is working with palliative care on his bone pain but on his current dose of fentanyl patch he says his pain is still not adequately controlled he will contact them.  Dexamethasone prescription was refilled.  We will see my nurse practitioner back in 3 weeks for course #5 of 6 total courses and then we will reimage with CT chest abdomen pelvis and bone scan before going to the Zytiga prednisone.    -3/4/2021 CT chest abdomen pelvis with contrast is negative save for stable sclerotic bone lesions and the bone scan shows near resolution of prior bony abnormalities associated with the known sclerotic lesions in the thoracolumbar spine and bony pelvis.    2/17/2021 PSA down to 0.1 from 1.37 October 2020.  3/9/2021 PSA 0.1    -5/26/2021 CT chest abdomen pelvis with contrast shows stable to slightly underlying increase low-density left para-aortic node.  Bone lesions stable.  Whole-body bone scan stable to decrease activity of known thoracolumbar and bony pelvic involvement.  PSA less than 0.1  , AST 74, bilirubin 0.5.       -6/1/2021 [facility] medical oncology follow-up visit: I reviewed the above data with him and images thereof.  Bones are stable.  No significant adenopathy.  PSA immeasurably low.     upper limit of normal 69 and AST 74 upper limit of normal 46.  Hence, neither is more than double the upper limit of normal with no ascites and no encephalopathy and normal albumin and bilirubin, despite the elevation of liver enzymes, he has child Abrams score A.  Package insert for Abiraterone says that for ALT and/or AST greater than 5 times upper limit of normal or total bilirubin greater than 3 times the upper limit of normal to withhold treatment until liver function tests returned to baseline or ALT and AST less than or equal to 2.5 times the upper limit of normal and total bilirubin less than or equal to 1.5 times the upper limit of normal and then reinitiate at 750 mg daily dose of  Zytiga.  For recurrent hepatotoxicity on 750 mg daily Zytiga, withhold treatment until liver functions returned to baseline or ALT and AST less than 3-2.5 times upper limit of normal and total bilirubin less than or equal to 1.5 times the upper limit of normal then reinitiate at 500 mg daily Zytiga dose.  If has recurrent hepatotoxicity on 500 mg dose, then discontinue treatment.  If has ALT greater than 3 times the upper limit of normal along with total bilirubin greater than 2 times the upper limit of normal in the absence of other contributing causes or biliary obstruction, permanently discontinue Zytiga.  Based on these recommendations, I would continue current doses but we will watch with serial labs monthly and repeat his imaging again in 3 months but clinically he is doing wonderfully with excellent response to Taxotere and now on Zytiga prednisone plus Zometa along with Relugolix.    -6/23/2020 for [facility] clinic follow-up: Having persistent and worsening pain above his left hip.  I will get an MRI of the left hip for further evaluation.  Otherwise we will continue therapy unchanged with Zytiga, prednisone and Zometa along with Relugolix.    -7/28/2021 [facility] clinic follow-up: Patient with multiple somatic complaints including episodes of confusion, dizziness, decreased memory, agitation.  He reports ongoing episodes with difficulty swallowing.  Also most concerning for episodes of angina and chest pain for which he basically refused to seek emergency medical attention.  He has a history of coronary artery disease and stent placement.  He has not followed up with cardiology for many years.  I will get an MRI of the brain in light of his confusion, dizziness and agitation.  I will get him to gastroenterology for EGD in light of his dysphagia.  I have also put in a referral for cardiology.  I reemphasized to the patient, his wife who is with him today and his son who was on the telephone  "during our visit the importance of seeking out prompt medical attention when he is having chest pain or neurological concerns so that he can be evaluated.  The patient states that he basically refuses to go to the emergency room and if his family calls an ambulance he would not agree to go to the hospital.  For the time being, I have asked him to hold his Zytiga and prednisone until we can sort this out as Zytiga does have some cardiovascular risk.  There is likely no treatment for prostate cancer that does not carry some form of cardiovascular risk.  His PSA remains low at 0.014 yesterday.  He will continue relugolix for now.    Of note, some of these symptoms could also be due to his hypophosphatemia, phosphate level from yesterday was 1.5, I have sent in a prescription for K-Phos 3 times a day before meals for 10 days.  We will hold further Zometa until this is corrected.  I have also put in an order to check his vitamin D level.  He takes calcium and vitamin D daily.  Some of his symptoms also could be due to drug withdrawal as there was some confusion and difficulty getting his last prescription for methadone therefore he was without methadone for several days, he now has his prescription and is back on his pain medication.  He has an appointment with neurosurgery tomorrow in light of his ongoing back pain, MRI of the hip recently showed multiple bony lesions largest at the L5 vertebral body and left supra-acetabular region.  If no neurosurgical intervention recommended he may benefit from spot radiation as this is where a lot of his pain is coming from.  His wife had questions today regarding his staging and extent of disease.  We reviewed previous scans.  I did clarify with her as she used the words \"cancer free\" as she thought that is what she was told after his CT scans once he completed Taxotere in February.  I explained to her that even though his scans showed he had an excellent response with no clear " areas of metastasis that did not mean he was cancer free, I explained to her that when you have metastatic cancer the treatment intent is palliative in nature and to control the disease but not to cure the disease.  She stated understanding.  We will see him back in 2 weeks for follow-up to sort through this and make further plan of care, again, I have asked him to hold Zytiga and prednisone until he sees us back, he will continue on his other medications including relugolix.    -7/30/2021 neurosurgery PA consultation.  MRI with and without contrast L5 ordered.    -8/3/2021 neurosurgery follow-up showed known sclerotic disease with new L5 abnormality without compression deformity or instability.  MRI brain negative for metastasis.    -8/4/2021 office visit Dr. Fco Quintanilla radiation oncology coordinating with Dr. North neurosurgery for palliative radiation for MRI evidence of L5 and left supra acetabular painful lesions.  States that the patient's disease which is not secreting PSA is experiencing some progression and would benefit from 20 Gy in five fractions and other lesion 30 Gy in ten fractions. Patient offered to go to [place] for radiation but desired treatment in [place].    -8/10/2021 [facility] medical oncology follow-up visit: No chest pains at this junction.  Reviewed MRI brain and other MRIs reviewed by Dr. North and [facility].  Due for EGD on 19th.  We will repeat his phosphorus along with his other labs continue Relugolix, Zytiga, prednisone, and he will continue radiation palliatively to the painful bony lesions with Dr. Quintanilla/Can.  He will see my nurse practitioner back in a few weeks to see how he is tolerating this and to follow-up on the phosphorus off of Zometa and she will order repeat CT chest abdomen pelvis with contrast and total body bone scan the end of September.I will see him back after that.    -9/8/2021 Zometa resumed.  PSA <0.10    -10/5/2021 [facility] medical oncology follow-up  visit: followed-up with radiation oncology 9/21/2021.  Status post symptomatic L5 radiation 5 fractions 20 [place] completed 8/16/2021 with improvement in right hip pain.  9/2/2021 PSA less than 0.1.  9/29/2021 total body bone scan shows increased uptake left iliac bone slightly superior group of the left acetabulum with no additional foci of suspicious abnormality is unlikely treatment related with no new sites of active osseous metastasis.  CT chest abdomen pelvis with contrast shows stable borderline enlarged left periaortic nodes and dating sclerotic bone lesions.Continue Zytiga, prednisone, Relugolix, Zometa, repeat CT chest abdomen pelvis with contrast and total body bone scan the end of the year.  We will follow PSA serially.    -11/17/2021 [facility] follow-up:  Mr. Lugo overall is doing well.  He is tolerating therapy with Zytiga, prednisone and Relugolix along with Zometa which is given every 6 weeks.  PSA remains low at <0.1.  Has occasional low phosphorus, currently low at 1.7.  Serum calcium is normal at 8.9, normal creatinine 0.79 and normal CBC other than for platelets of 124.  I will hold Zometa for 1 month, I did send in a prescription for phosphorus replacement today.  He takes calcium and vitamin D.  I will see him back in 1 month for follow-up.  We will repeat restaging scans after that visit.    -12/15/2021 [facility] follow-up: Mr. Lugo continues to do well on therapy with Zytiga, prednisone and Relugolix, his PSA remains low at <0.1.  He is continuing to have left lower back pain, he is working with palliative care and they have referred him also to pain management.  Pain management has talked to him about putting in a pain pump however he is leery of this, I told him that this was a safe and effective pain management technique and to certainly give consideration to their recommendations.  His phosphorus is improving however is still a little low, I will hold off on  Zometa until we see him back in 1 month, we may end up only giving it every 12 weeks.  We will repeat restaging scans with CT chest, abdomen and pelvis along with total body bone scan prior to return and I have ordered those today.      -1/5/2022 CT chest abdomen pelvis with contrast [facility] showing stable left periaortic adenopathy and unchanged sclerotic thoracic, lumbar spine and pelvis lesions with no new or progressive disease in the chest abdomen or pelvis.  Total body bone scan shows no significant change and no new suspicious foci.  Stable posterior right acetabulum and left superior acetabulum and degenerative changes in the cervical spine, shoulders, acromioclavicular joints, sternoclavicular joints, elbows, wrists, hands, thoracic spine, lumbosacral spine, sacroiliac joints, hips, knees, ankles, feet.    -1/11/2022 [facility] oncology hematology follow-up visit: I reviewed images and reports from his 1/5/2022 scans.  No active disease and PSA immmeasurably low on Zytiga, prednisone, Relugolix.  However, he has struggled with hypophosphatemia and is significantly fatigued with this.  Given that the bones are doing well and given that his phosphorus continues to remain consistently low at 2.2 in mid December, 1.7 mid November and 2.5 mid-September and as low as 1.5 back to July and the likelihood that this is related to his Zometa, given that his bones are stable overall, he will increase from 1200 mg up to 1600 mg of calcium and phosphate a day and we will hold his Zometa for the foreseeable future.  He will see my nurse practitioner back in a month to continue to monitor his phosphorus along with his calcium and other labs and will repeat his scans again in 3 months.  In addition, given his fatigue we will check his ACTH and cortisol though he is taking his prednisone with his Zytiga.  Though his electrolytes are normal, I will keep an eye on the cortisol and ACTH and have these labs not  only drawn today but again just prior to return to my nurse practitioner on February 10.    -2/10/2022 [facility] follow-up: Mr. Lugo overall is stable, no change in his health this past month.  I have reviewed his labs from 2/8/2022 and they are unremarkable, his phosphorus is now normal at 3.2. We are continuing to hold his Zometa, he last received Zometa on 10/5/2021.  He continues therapy with Zytiga, prednisone and Relugolix.  Dr. Rashid had ordered cortisol level and ACTH which are low, currently his ACTH is 2.8 and cortisol 3.08 however these are both done in the face of ongoing prednisone that he takes with his Zytiga.  We will get him to endocrinology for further evaluation for possible adrenal insufficiency but will not interrupt his treatment in the interim.  He will need restaging scans again in April for a 3-month follow-up.  He will continue to follow with palliative care and pain management for his cancer related pain.  His PSA remains immeasurably low at <0.1 on 2/8/22.    -2/15/2022 went to emergency room with weakness, decreased appetite, myalgias in the setting of known COVID-19 testing positive a few days prior to this visit.  Phosphorus had been low in the past but was normal in the emergency room with unremarkable CBC and CMP.  Chest x-ray unremarkable saturating well on room air mildly hypertensive with dry mucosa.  Given 500 cc crystalloid.  Covid test positive.  Mild thrombocytopenia 136,000 and otherwise unremarkable.  Discharged home.    -3/3/2022 data:  PSA less than 0.1  CMP unremarkable  Cortisol 3.96 normal  ACTH 2.8 lower limit of normal 7.2  Phosphorus normal 2.6    -3/8/2022 [facility] oncology follow-up: Suspect big chunk of his fatigue was Covid.  Another part of the fatigue likely related to lack of testosterone.  Not hypophosphatemic off of Zometa.  ACTH running low while on prednisone not surprising but with normal cortisol and I doubt adrenal insufficient  which is why he is on prednisone to avoid such while taking Zytiga.  Overall doing fairly well and with immeasurably low PSA, I will have labs done on him early April, see my nurse practitioner early May, and she will order repeat bone scan and CTs the end of May and I will see him back in June.  We will continue to hold the Zometa unless bone lesions progress given symptomatic prior hypophosphatemia on Zometa.  He will keep his appointment April 28 with Dr. Mir Duffy just to be sure that my take on his pituitary/adrenal axis assessment is accurate.    -4/28/2022 endocrinology consult Dr. Mir Duffy.  With low ACTH and cortisol on prednisone with Zytiga, the adequacy of prednisone cannot be assessed by measuring ACTH or cortisol but is assessed by weight changes, blood pressure, blood sugar, potassium, overall sense of how the patient is doing.  Primary adrenal insufficiency with low ACTH and low cortisol would be typical for the situation as his blood sugar tends to run a little high and potassium a little low, he did not think increasing prednisone was necessary.  He put him on some potassium.  No follow-up scheduled, will see as needed.    -5/4/2022 [facility] follow-up: Mr. Lugo continues on therapy with Zytiga and prednisone along with Relugolix.  His Zometa has been on hold due to previous hypophosphatemia.  There is some concern about potential adrenal insufficiency. He saw endocrinology, Dr. Mir Duffy on 4/28/2022.  I did review his office note and he stated that the low ACTH and low serum cortisol would be typical for Mr. Lugo's situation.  He further stated that the adequacy of prednisone dose cannot be assessed by measuring ACTH or cortisol but is rather assessed by the overall just altered how the patient is feeling-weight change, blood pressure, blood sugar, potassium, overall sense of wellbeing.  His potassium had been running a little low therefore they put him on potassium 10 mill  equivalents daily. Of note, I do not see a future follow-up scheduled with endocrinology.  He is having night sweats, I am not sure if this is related.  His glucose was normal this morning at 107.  His weight is stable.  He will continue current treatment for his prostate cancer unchanged, we will continue to hold his Zometa.  Current phosphorus and magnesium are normal.  CBC and CMP from today are unremarkable, cortisol is normal.  PSA and ACTH are pending  In regards to his night sweats, he had taken clonidine previously 0.1 mg twice daily as needed, I did send in a short supply again to try and see if this helps.  He also is having indigestion despite being on omeprazole twice daily, I sent a prescription for Pepcid.  He had an EGD August 2021.  We will repeat restaging scans prior to return and I have ordered those today.     -5/24/2022 CT chest abdomen pelvis with contrast[place] negative.  Bone scan shows stable bone metastases.    - 5/25/2022 PSA less than 0.1, platelets 130,000, otherwise unremarkable CBC and CMP.  A.m. cortisol running low 1.2 with phosphorus low 2.3.    -5/31/2022 [facility] follow-up: On Zytiga, prednisone, Relugolix, PSA remaining immeasurably low with stable bone scan and no visceral/paulino metastases.  Phosphorus still running low.  I have discontinued his potassium chloride and started potassium phosphate 500 mg 4 times a day.  Unless PSA rising, we will plan on repeating CTs and bone scan in 6 months and will follow with my nurse practitioner in the interim and if symptoms dictate or labs, will get back to Dr. Duffy for management of potential adrenal insufficiency but presently we will just see how he does with potassium phosphate and hopeful improvement in the phosphorus level with time.  We will continue to follow with palliative care for pain management    -7/6/2022 [facility] follow-up: Mr. Lugo overall is doing well but continues to be troubled with  fatigue and hot flashes.  For the most part he states he is able to do the things he wants to do, he continues to work but does have to take more rest periods.  I had a long discussion with him and his wife after reviewing his medications with them as they wanted to see if there was anything he could stop or adjust.  I discussed with him the need to continue on treatment for his prostate cancer even though his PSA remains immeasurably low and his scans look good but that if his medication is stopped the cancer would progress and over time it still has the potential to progress on therapy but would continue it as long as possible to keep his disease under control.  I explained this is like treating someone with hypertension, you do not stop the antihypertensive just because the blood pressure normalizes.  They stated understanding.  There is no dose adjustment of Zytiga or prednisone that would minimize his symptoms, he is on the current standard recommended therapy.  Discussed with him that sometimes during times of stress we may need to increase his prednisone dose but for now would not change anything.  His phosphorus level is normal, we continue to hold his Zometa.  I did give him the option of holding his phosphorus for the next month and we will see what happens, if it drops we will start him back on it but may be at a lower dose rather than 4 times a day maybe twice a day.  For now he will continue therapy unchanged.  We will continue monitoring labs monthly.  No PSA was drawn this month but that is okay as his PSA has been running immeasurably low at <0.1, we will recheck next month with lab draw.  We will stop checking his ACTH and cortisol level every month, if he develops worsening symptoms I will repeat those.  He has not gotten a follow-up with endocrinology as of yet.    -8/3/2022 [facility] Oncology clinic follow-up:  Mr. Lugo is doing well as far as his prostate cancer goes with continued immeasurably  low PSA of <0.1.  He continues on therapy with Zytiga and prednisone along with Relugolix.  His phosphorus is stable at 2.7 which was just slightly low by our reference range however was 2.71-month ago also which was normal on another labs reference range.  He has not taken his phosphorus for this past month as he thought it was making him more fatigued.  He really can tell no difference whether he was taking it or not.  I have asked him to try to go back on phosphorus twice a day rather than 4 times a day and see if this is tolerated and if we can keep his phosphorus level from dropping.  His biggest complaint today is flareup of his arthralgias and back pain.  He is following with pain management, Dr. Danny Avalos but is requesting a referral back to palliative medicine as he feels that no one is currently listening to him and they are just prescribing the same medications that are not effective according to his report.  He does have a follow-up with Dr. Avalos on 8/12/2022 but due to his current pain is not able to work until after he is seen and hopefully can get some better relief.  I have given him a work release until 15 August.  I have also put in a referral back to palliative care.  Also encouraged him to work with Dr. Avalos for help in resolving his issue.  Apparently they have recommended some type of a pump however he has been hesitant to that but likely would be helpful.  We will continue therapy unchanged.  We will continue monthly labs including phosphorus, we are not currently checking ACTH and cortisol monthly as Dr. Mir Duffy previously stated in his assessment on 4/28/2022 that the adequacy of prednisone cannot be assessed by measuring ACTH or cortisol but would be assessed by symptoms, see note from 4/28/2022.  He made no further follow-up appointments.  Fatigue is not worsening currently.  His glucose and potassium are  Normal.    -10/13/2022 [facility] medical oncology follow-up: Mr. Lugo is doing  well.  He is tolerating treatment with Zytiga and prednisone along with Relugolix without any unusual side effects.  His PSA has remained immeasurably low at <0.1.  His phosphorus was slightly low at last visit.  He had been instructed to go back on phosphorus twice a day but he misunderstood and has not been taking it.  We will check labs today.  I will follow-up with results and notify him if he needs to restart the phosphorus.  His pain is better controlled since reestablishing with [facility].  We will repeat scans prior to next follow-up.  I have ordered CT chest abdomen pelvis along with bone scan.  We will see him back in 1 month.    -10/13/2022 PSA less than 0.014.With unremarkable CBC and CMP.  Phosphorus still a little low 2.3.    -11/4/2022 CT abdomen pelvis chest showed no evidence of disease progression with stable sclerotic bone lesions.  Bone scan stable right greater than left acetabular metastasis.  Stable bone metastases.    -11/8/2022 [facility] medical oncology telemedicine follow-up: Patient states that he feels achy all over with low-grade temps and a cough mildly productive.  Has an appointment later today to see his primary care for testing for flu and COVID.  Suggested that if he were positive for COVID that he get Paxlovid and that if he were positive for flu that he get Tamiflu and to discuss this with his primary care.  From the prostate cancer side, his PSA is immeasurably low with stable bone metastases and no evidence of progression.  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  We will continue the Relugolix, Zytiga, prednisone and he will see my nurse practitioner 12/7/2022.  Would repeat CTs and bone scan in May.    -1/25/2023 [facility] Oncology medicine follow-up: Mr. Lugo is having worsening fatigue and muscle aches.  He does have adrenal insufficiency on Zytiga and prednisone for his prostate cancer, he saw endocrinology in light  of low ACTH back in April 2022.  At that time there was no recommendation other than for monitoring him for his overall wellbeing and labs.  He denies any fevers or chills.  His labs as shown above are unremarkable, his CBC and CMP are normal other than for glucose of 112, PSA remains low at <0.1.  He has no new pain or any symptoms concerning for progression of his prostate cancer.  I will get him back to endocrinology to see if they feel any dose adjustment of his prednisone would be helpful in his symptoms.  Currently he is on 5 mg a day with his Zytiga.  I did not repeat his ACTH or cortisol as they would be expected to be low in this situation.  His potassium is normal, he has had no weight loss or change in his appetite.  Blood sugar is reasonable.  His only complaint currently is worsening fatigue and muscle aches.  He will continue treatment unchanged with Zytiga, prednisone and relugolix.  I will see him back in 1 month for follow-up.  We will repeat his restaging scans in May.    -3/1/2023 [facility] Oncology clinic follow-up:  His PSA remains low at <0.1 on treatment with Zytiga and prednisone along with relugolix however he continues to complain of significant fatigue and muscle aches not improving with time.  He states that his quality of life is affected as he is not able to do any work activities due to the fatigue.  He did see endocrinology again and they have increased his prednisone from 5 mg daily up to 7.5 mg daily however so far this has not made any difference in how he feels.  He is supposed to get back in touch with them to let them know how he is doing and to see if there is any other adjustments needed. His labs are unremarkable with normal thyroid function, CMP is normal other than for glucose of 156.  They did check hemoglobin A1c to look for diabetes however that was normal at 5.7.  CMP is unremarkable with just very mild anemia with hemoglobin of 12 and hematocrit 36.1% which would not  account for his fatigue.  He does have hot flashes that are fairly significant, I will try venlafaxine as suggested by Dr. Duffy and I appreciate the recommendation.  We will start at 37.5 mg daily and if tolerated he can increase to 75 mg after 1-2 weeks.  I will see him back in 1 months for follow-up.  Plan to repeat scans late April/early May.  His prostate cancer seems under good control with current regimen however I am not sure how long he can tolerate this due to the side effects.    -3/30/2023 [facility] Oncology clinic follow-up:  Mr. Lugo continues to deal with fatigue.  He appears more cushingoid today, he continues on treatment with Zytiga, prednisone and relugolix.  Prednisone dose currently is 7.5 mg daily.  This has not made any difference in his energy level.  PSA remains immeasurably low at <0.014.  CBC shows new onset of microcytic hypochromic anemia with hemoglobin of 11, hematocrit 34.4%, MCV 75.4, MCH 24.1, WBC is normal at 5.81 with normal platelet count 181,000 and normal differential.  CMP is unremarkable, it is normal other than for glucose of 142.  We will get him to Dr. Gomez for an EGD and colonoscopy for further evaluation in regards to his new onset of anemia.  He has no associated GI symptoms otherwise except for 1 day a few weeks ago he does report that he had fairly sudden onset of vomiting but this was an isolated incident on that day and has not reoccurred since.  Continues to follow with palliative care for management of his chronic back pain.  We will repeat restaging CT chest, abdomen and pelvis along with total body bone scan prior to return and I have ordered that today.    -3/30/2023 Ferritin low 12.3 with iron low 27 and saturation low 5% with total iron binding capacity 511.  3/31/2023 ferrous sulfate 325 mg twice a day every other day with vitamin C started.    - 4/12/2023 CT chest abdomen pelvis with contrast showed no progressive disease with stable sclerotic bone lesions.   Bone scan showed single identifiable bone metastasis stable right acetabulum    -4/18/2023 [facility] hematology oncology follow-up: He now has microcytic iron deficiency anemia which is new and concerning.  Gastroenterologic evaluation has been ordered but not performed and is mandatory.  Continue ferrous sulfate 325 mg twice a day every other day with vitamin C to improve absorption and we will follow his CBC along with his usual labs monthly on Zytiga, Relugolix, prednisone 7.5 mg.  He follows with Dr. Duffy with adrenal insufficiency.  He will see my nurse practitioner back in a month to see what GI has found and to watch serial CBC along with his other labs including PSA.  I would repeat CT chest abdomen pelvis again in 6 months with contrast along with bone scan and sooner if PSA rises.    -5/3/2023 EGD and colonoscopy with Dr. Alexander Gomez: Superficial erosions in the stomach with gastritis.  Small polyps removed, dilated internal hemorrhoids.  Pathology pending.  Recommend repeat colonoscopy in 5 years.    -5/17/2023 [facility] Oncology clinic follow-up:  Naveen overall is doing well on current therapy with Zytiga, prednisone and relugolix.  PSA remains immeasurably low at <0.014.  He feels his hip pain is getting worse with time.  It makes it difficult to enjoy activities with his grandchildren as it is worse when he stands for any length of time or tries to carry any weight.  I reviewed his bone scan from April which was stable, we also reviewed previous MRI of the hips from May 2021 that showed moderate bilateral hip osteoarthritis.  I offered to repeat MRI of his hips for evaluation to see if there has been any worsening of his osteoarthritis and also then referral to orthopedics for any recommended intervention however at this time he defers.  He will let me know if he changes his mind.  He also follows with palliative care for management of his cancer related pain.  We will continue therapy unchanged.  We  will repeat restaging scans in October unless symptoms dictate the need to do so sooner.  He was recently found to be iron deficient, he had an EGD and colonoscopy on 5/3/2023 with Dr. Gomez, EGD showed superficial erosions in the stomach with gastritis, he continues on omeprazole.  He had small polyps removed from the colon and recommendation to repeat colonoscopy in 5 years.  He is on oral iron along with vitamin C every other day and is tolerating that well.  CBC from yesterday shows hemoglobin now normal at 14.2 and hematocrit 43.2%, MCV normal at 27.0.    -7/20/2023 [facility] Oncology clinic follow-up: Naveen is doing well on current therapy with Zytiga, prednisone and relugolix.  PSA in June remains low at <0.1.  Continues to deal with chronic pain in his back and hip.  Recently saw Dr. Nikolai Marks and had an injection in his hip and is hoping that it will eventually help with his pain.  He has no new pain.  We will continue current therapy unchanged.  We will repeat labs while he is here today.  I will refill his iron as his pharmacy has changed. Most recent CBC in June with hemoglobin of 14.7 and hematocrit 43.6%.  We will repeat restaging scans in October.    -9/14/2023 [facility] Oncology clinic follow-up: Naveen is tolerating his prostate cancer treatment with Zytiga, prednisone and relugolix.  PSA remains immeasurably low at <0.014 on 8/17/2023.  Currently he is having more GI issues with nausea and intermittent vomiting that occurs often without warning.  He had an EGD and colonoscopy with Dr. Gomez on 5/3/2023, EGD showed superficial erosions in the stomach with gastritis.  He does take omeprazole 40 mg twice daily.  He saw his PCP yesterday for these symptoms and has been referred to gastroenterologist at [facility] and he is awaiting to hear about that appointment.  Hopefully he can get in fairly quickly.  I told him that he should let us know if it is going to be a while before he can be seen and I will  get him back to Dr. Gomez for consideration of repeat EGD.  We will get his CBC, CMP and PSA today.  His previous noted anemia had normalized, he currently is not taking oral iron as he ran out.  I will wait and see what his labs show today and likely hold off for now depending on his lab results until his GI issues can be resolved.  We will repeat his restaging CT chest, abdomen and pelvis along with bone scan in the next few weeks and I have ordered those today.  He is taking Zofran as needed for nausea, he is also on meclizine for vertigo.  He is going to follow-up with his PCP in a few weeks regarding his GI symptoms.    -9/14/2023 PSA less than 0.014 with unremarkable CBC and CMP.    -9/28/2023 total body bone scan stable with uptake in right acetabulum, no new areas.  CT chest, abdomen and pelvis show no evidence of disease progression.  -11/9/2023 [facility] Oncology clinic follow-up: CT reports were reviewed by Dr. Rashid last month and the patient reports he was called by Dr. Rashid as he could not make his appointment due to concerns over upper respiratory infection, CT scan showed no evidence of disease progression.  PSA has remained low, we are repeating that today along with his CBC and CMP.  He continues to have sinus drainage and congestion, I will send in 7 additional days on his doxycycline that he has been taking, he will follow-up with PCP if no improvement.  He continues to complain of bilateral hip pain, has a follow-up with Dr. Marks next week.  We will continue therapy unchanged with Zytiga, prednisone and relugolix.  Would repeat restaging bone scan and CT scans late March for a 6-month follow-up.    -1/4/24 PSA 1    - 3/28/2024 CT chest abdomen pelvis with contrast compared to May 2013 shows decreasing sclerotic bony metastases.  Bone scan shows similar relative intensity of 1 focal uptake right acetabulum correlating with CT with no new sites of uptake    -4/2/2024 [facility] medical oncology  follow-up: Continued good response to Zytiga prednisone relugolix which he is tolerating.  We will check PSA and labs 6/25/2024 with follow-up with my nurse practitioner and again 3 months after that along with CTs and bone scan 6 months from now.  Not on bisphosphonates or rank ligand inhibitor due to drug-induced hypophosphatemia.  Will ask my nurse practitioner to order bone densitometry with his other scans in 6 months. He is having difficulty emptying his bladder and his prior urologist does not take his insurance so we will make appropriate referrals.    -6/26/2024 [facility] Oncology clinic follow-up: Overall continues to tolerate therapy with Zytiga, prednisone and Relugolix.  We will get labs today including PSA and as long as that is stable he will continue treatment unchanged with plans to repeat restaging CT scans and bone scans in September and I have ordered those today.  We will also obtain DEXA scan at that time to monitor for osteoporosis on long-term use of androgen deprivation therapy.  We have been holding his Zometa due to drug-induced hypophosphatemia.  He is following with urology and has had relief of his urinary hesitancy with some medication adjustments and he is scheduled for follow-up this Friday with cystoscopy.  He recently had chest pain and shortness of breath while mowing his lawn, he did have an echocardiogram and stress test yesterday, awaiting final results and reading from Dr. Kahn.  He continues to follow with palliative care for management of his pain which is under good control.  I did refill his symptoms today for chronic constipation from opioid use.    -9/16/2024 CT chest abdomen pelvis with contrast [facility] compared to May 2024 shows stable 1.6 cm well-defined nodule left periaortic region.  Stable sclerotic left supra-acetabular, right posterior acetabular left posterior acetabular lesions.  No disease progression.  Total body bone scan shows grossly stable  solitary active right posterior acetabular bone metastasis.    -10/2/24 PSA less than 0.1    -10/8/2024 [facility] oncology clinic follow-up: In order to get prostatic urethral pathway surgically open he needed cardiac clearance and is due for cardiac cath in the next week or 2 with Dr. Kahn.  I reviewed the above image results with him with no clear-cut evidence of progression and PSA remaining immeasurably low.  He does feel quite fatigued.But this is not new.  He has become microcytic with a hemoglobin of 12.1 and MCV of 77 which has trickled down 14.8 and December.  His CMP is unremarkable.  He had an EGD and colonoscopy May of last year with Dr. Gomez that showed superficial erosive gastritis with internal hemorrhoids and colon polyps.  He states he has been taking his iron but he takes it with all his other medicines.  I told him to take it every other day with vitamin C with at least an hour and a half window on either side of it free from any other ingestion of medicines.  With reference to the pulmonary infiltrate he has had a frothy nagging mildly productive cough without fevers or chills or dyspnea for the last 2 months.  Rather than throwing an antibiotic at this, I am going to get pulmonary referral for their opinion.He is due for his bone density as well and we will order that.  He has been off of Zometa for quite some time due to hypophosphatemia     Prostate cancer metastatic to bone   8/30/2020 -  Other Event    PSA 10.8     9/15/2020 Initial Diagnosis    Prostate cancer metastatic to bone (CMS/HCC)     9/15/2020 Biopsy    Prostate needle core biopsy     9/24/2020 Imaging    Total body bone scan: 4 abnormal areas of increased activity consistent with osteoblastic metastasis, abnormal foci of activity seen in the T12 vertebral body, L1 vertebral body, roof of the left acetabulum, and acetabulum medially on the right.  CT chest: Stable sclerotic metastatic lesions within the T12 and L1 vertebrae.  No  other evidence of metastatic disease to the chest.  Stable mild splenomegaly and subcentimeter periaortic retroperitoneal lymph nodes.  CT abdomen and pelvis: Diffuse bladder wall thickening with mild perivesicular fat stranding.  Interval development of several sclerotic lesions in the spine and left iliac bone.     10/13/2020 Cancer Staged    Staging form: Bone - Appendicular Skeleton, Trunk, Skull, And Facial Bones, AJCC 8th Edition  - Clinical: Stage IVB (cT3, cN0, cM1b, G3) - Signed by Ishmael Rashid MD on 10/13/2020     11/3/2020 - 10/5/2021 Chemotherapy    OP SUPPORTIVE Zoledronic Acid Q42d     11/3/2020 - 2/18/2021 Chemotherapy    OP PROSTATE DOCEtaxel       1/4/2021 Imaging    CT chest abdomen pelvis with contrast and whole-body bone scan shows improving less metabolically active bone metastases.  Stable sclerotic T12, L1, and L2 vertebral bodies and bilateral pelvic bones on bone windows with stable subcentimeter para-aortic lymph nodes and no definite progression in the chest abdomen or pelvis.  PSA down to 0.275 after 3 courses of Taxotere.     3/4/2021 Imaging    CT chest, abdomen and pelvis stable, no evidence of disease progression. Total body bone scan with decreased/near resolution of abnormal increased radiotracer uptake associated with the known sclerotic lesions in the thoracolumbar spine and bony pelvis.  No evidence of new osteoblastic metastases.     3/4/2021 Imaging    CT chest abdomen pelvis with contrast is negative save for stable sclerotic bone lesions and the bone scan shows near resolution of prior bony abnormalities associated with the known sclerotic lesions in the thoracolumbar spine and bony pelvis.  2/17/2021 PSA down to 0.1 from 1.37 October 2020.     3/9/2021 - 3/9/2021 Chemotherapy    OP SUPPORTIVE PROSTATE Relugolix     3/9/2021 -  Chemotherapy    OP PROSTATE Abiraterone / PredniSONE       3/10/2021 -  Chemotherapy    OP PROSTATE Abiraterone / PredniSONE     8/10/2021 -  8/16/2021 Radiation    Radiation OncologyTreatment Course:  Farmvilletiffanie Lugo received 2000 cGy in 5 fractions to L4-sacrum, left acetabulum and right pelvis via External Beam Radiation - EBRT.     11/16/2021 -  Chemotherapy    OP CENTRAL VENOUS ACCESS DEVICE ACCESS, CARE, AND MAINTENANCE (CVAD)     1/5/2022 Imaging    -1/5/2022 CT chest abdomen pelvis with contrast [place] [facility] showing stable left periaortic adenopathy and unchanged sclerotic thoracic, lumbar spine and pelvis lesions with no new or progressive disease in the chest abdomen or pelvis.  Total body bone scan shows no significant change and no new suspicious foci.  Stable posterior right acetabulum and left superior acetabulum and degenerative changes in the cervical spine, shoulders, acromioclavicular joints, sternoclavicular joints, elbows, wrists, hands, thoracic spine, lumbosacral spine, sacroiliac joints, hips, knees, ankles, feet.     5/24/2022 Imaging    CT chest abdomen pelvis with contrastFrankfort negative.  Bone scan shows stable bone metastases.     11/4/2022 Imaging    CT abdomen pelvis chest showed no evidence of disease progression with stable sclerotic bone lesions.  Bone scan stable right greater than left acetabular metastasis.  Stable bone metastases.     4/12/2023 Imaging     CT chest abdomen pelvis with contrast showed no progressive disease with stable sclerotic bone lesions.  Bone scan showed single identifiable bone metastasis stable right acetabulum     9/29/2023 Imaging    total body bone scan compared to April showed a single active bone metastasis with multiple and active bone metastases overall stable.  CT chest abdomen and pelvis compared to May and April shows no new bony progression with stable sclerotic bone lesions.         HISTORY OF PRESENT ILLNESS:  The patient is a 69 y.o. male, here for follow up on management of prostate cancer with progressive fatigue.    Past Medical History:   Diagnosis Date   • Arthritis    •  "Bone cancer    • Coronary artery disease    • Elevated cholesterol    • History of radiation therapy 08/16/2021    L4 through sacrum, left acetabulum, right pelvis   • Nephrolithiasis    • Opioid use disorder, mild, on maintenance therapy (HCC)    • Prostate cancer      Past Surgical History:   Procedure Laterality Date   • CHOLECYSTECTOMY     • COLONOSCOPY     • CORONARY STENT PLACEMENT  2006 & 2011   • HERNIA REPAIR  1986   • THORACIC DISCECTOMY  1994       Allergies   Allergen Reactions   • Cymbalta [Duloxetine Hcl] Hives and Dizziness   • Duloxetine Hives and Dizziness   • Gabapentin Nausea Only   • Pregabalin Dizziness, Nausea And Vomiting and Hives       Family History and Social History reviewed and changed as necessary    REVIEW OF SYSTEM:   Says he still taking his iron but progressively fatigued.    PHYSICAL EXAM:  No jaundice icterus or pallor.  No respiratory distress.  No rashes    Vitals:    10/08/24 0911   BP: 127/77   Pulse: 105   Resp: 18   Temp: 97.5 °F (36.4 °C)   Weight: 86.2 kg (190 lb)   Height: 175.3 cm (69\")     Vitals:    10/08/24 0911   PainSc:   8   PainLoc: Back          ECOG score: 0           Vitals reviewed.    ECOG: (0) Fully Active - Able to Carry On All Pre-disease Performance Without Restriction    Lab Results   Component Value Date    HGB 12.1 (L) 10/02/2024    HCT 39.1 10/02/2024    MCV 77 (L) 10/02/2024     10/02/2024    WBC 7.7 10/02/2024    NEUTROABS 4.7 10/02/2024    LYMPHSABS 2.1 10/02/2024    MONOSABS 0.7 10/02/2024    EOSABS 0.1 10/02/2024    BASOSABS 0.0 10/02/2024       Lab Results   Component Value Date    GLUCOSE CANCELED 10/02/2024    BUN 11 10/02/2024    CREATININE 0.75 (L) 10/02/2024     10/02/2024    K CANCELED 10/02/2024    CL 99 10/02/2024    CO2 21 10/02/2024    CALCIUM 8.7 10/02/2024    PROTEINTOT 7.0 02/14/2022    ALBUMIN 4.0 10/02/2024    BILITOT 0.2 10/02/2024    ALKPHOS 67 10/02/2024    AST 14 10/02/2024    ALT 8 10/02/2024         "     ASSESSMENT & PLAN:  1.  Stage IVb grade group 4 metastatic prostate cancer with sclerotic bone lesions on CT and bone scan and history of prostatic hypertrophy.  Good response to Taxotere ending 2/18/2021 Relugolix, followed by Zytiga prednisone with L4/sacrum, left acetabulum, and right pelvis external beam radiation August 2021.  Hypophosphatemia on Zometa.  Zometa held as of October 2021.  2.  Kidney stone  3.  Polyps  4.  Probable drug-induced hypophosphatemia from Zometa, drug stopped after 10/5/2021 dose  5.  Cancer related pain seeing palliative care  6.  Fatigue  7.  Covid 2/2022  8.  Iron deficiency anemia    -6/26/2024 [facility] follow-up: Overall continues to tolerate therapy with Zytiga, prednisone and Relugolix.  We will get labs today including PSA and as long as that is stable he will continue treatment unchanged with plans to repeat restaging CT scans and bone scans in September and I have ordered those today.  We will also obtain DEXA scan at that time to monitor for osteoporosis on long-term use of androgen deprivation therapy.  We have been holding his Zometa due to drug-induced hypophosphatemia.  He is following with urology and has had relief of his urinary hesitancy with some medication adjustments and he is scheduled for follow-up this Friday with cystoscopy.  He recently had chest pain and shortness of breath while mowing his lawn, he did have an echocardiogram and stress test yesterday, awaiting final results and reading from Dr. Kahn.  He continues to follow with palliative care for management of his pain which is under good control.  I did refill his symptoms today for chronic constipation from opioid use.    -9/16/2024 CT chest abdomen pelvis with contrast [place] regional compared to May 2024 shows stable 1.6 cm well-defined nodule left periaortic region.  Stable sclerotic left supra-acetabular, right posterior acetabular left posterior acetabular lesions.  No  disease progression.  Total body bone scan shows grossly stable solitary active right posterior acetabular bone metastasis.    -10/2/24 PSA less than 0.1    -10/8/2024 [facility] onc[facility] clinic follow-up: In order to get prostatic urethral pathway surgically open he needed cardiac clearance and is due for cardiac cath in the next week or 2 with Dr. Kahn.  I reviewed the above image results with him with no clear-cut evidence of progression and PSA remaining immeasurably low.  He does feel quite fatigued.But this is not new.  He has become microcytic with a hemoglobin of 12.1 and MCV of 77 which has trickled down 14.8 and December.  His CMP is unremarkable.  He had an EGD and colonoscopy May of last year with Dr. Gomez that showed superficial erosive gastritis with internal hemorrhoids and colon polyps.  He states he has been taking his iron but he takes it with all his other medicines.  I told him to take it every other day with vitamin C with at least an hour and a half window on either side of it free from any other ingestion of medicines.  With reference to the pulmonary infiltrate he has had a frothy nagging mildly productive cough without fevers or chills or dyspnea for the last 2 months.  Rather than throwing an antibiotic at this, I am going to get pulmonary referral for their opinion.  He is due for his bone density as well and we will order that.  He has been off of Zometa for quite some time due to hypophosphatemia    Total time of care today inclusive of time spent today prior to patient's arrival reviewing interval data and during visit translating that information patient putting forth a plan as outlined and after visit making referrals and adjusting prescriptions as outlined took 1 hour patient care time throughout the day today.      Ishmael Rashid MD    10/08/2024

## 2024-10-08 NOTE — PROGRESS NOTES
CHIEF COMPLAINT: Progressive fatigue    Problem List:  Oncology/Hematology History Overview Note   1.  Stage IVb grade group 4 metastatic prostate cancer with sclerotic bone lesions on CT and bone scan and history of prostatic hypertrophy.  Good response to Taxotere ending 2/18/2021 Relugolix, followed by Zytiga prednisone with L4/sacrum, left acetabulum, and right pelvis external beam radiation August 2021.  Hypophosphatemia on Zometa.  Zometa held as of October 2021.  2.  Kidney stone  3.  Polyps  4.  Probable drug-induced hypophosphatemia from Zometa, drug stopped after 10/5/2021 dose  5.  Cancer related pain seeing palliative care  6.  Fatigue  7.  Covid 2/2022  8.  Iron deficiency anemia  -5/3/2023 EGD and colonoscopy with Dr. Alexander Gomez: Superficial erosions in the stomach with gastritis.  Small polyps removed, dilated internal hemorrhoids.  Pathology pending.  Recommend repeat colonoscopy in 5 years.    -9/30/2020 office note from Dr. Ronen Reynaga indicates patient with PSA 10.8.  Underwent TRUS prostate biopsy 9/15/2020.  Adenocarcinoma the prostate Allred 4+4 = 8 in 1 out of 12 cores and 4+3 = 7 and 10 out of 12 cores and 3+4 = 7 in 1 out of 12 cores.  Perineural invasion.  Extraprostatic extension into the fat seen on 2 biopsies of the right lateral mid and right apex.  9/24/2020 CT chest and whole-body bone scan showed T12, L1, left acetabular, right medial acetabular metastases with no lung metastases.  He started androgen deprivation therapy with Casodex with plans for Lupron to start in 1 to 2 weeks for clinical T3 N0 M1 metastatic prostate cancer.  Review of official report from [facility] 9/24/2020 bone scan mentions T12, L1, roof of left acetabulum and medial right acetabular osteoblastic metastases.  CT chest with contrast that same day showed mild splenomegaly 14.2 cm with stable periaortic nodes compared to CT December 2015 with no lung metastases.  Sclerotic abnormality within the  T12 vertebral body, L1 vertebral body extending into the pedicle unchanged.  8/28/2020 CT abdomen pelvis report from [facility] reviewed and mentions normal spleen size.  Punctate nonobstructing kidney stone, no paulino enlargement, diffuse bladder wall thickening with mild perivesicular fat stranding, 2.1 cm sclerotic left iliac bone above the left acetabulum new compared to 2015 as well as 1.5 cm faintly sclerotic focus in the right posterior acetabulum stable compared to prior CT 2015 as well as a 1.6 cm T12 and inferior vertebral body sclerotic lesion and a 1.9 cm left posterior lateral L1 vertebral body abnormality extending to the pedicle.    -10/13/2020 initial [facility] medical oncology consultation: With 1 Sarika score 8, 4+4 lesion that would make him a grade group 4 with stage IVb disease given radiographic evidence of sclerotic bone metastasis on bone scan and CT.  Needs genetic testing given metastatic prostate cancer and this is also important as, were he to have mismatch repair mutation then we would have options for PDL 1 inhibitors and were he BRCA mutated would have options for PARP inhibitors in the future.  Systemic therapy for castration naïve prostate cancer or N1 disease should be with apalutamide or Abiraterone or enzalutamide all of which are category 1.  He does not have any visceral metastases.  According to his imaging he has T12, L1, left acetabular, right acetabular, and left iliac bony involvement.  That means he would have 4 or more bone metastases with at least one metastasis (i.e. the acetabular lesions bilaterally) beyond the pelvis/vertebral, for which this would be considered high-volume disease for which 6 cycles of docetaxel 75 mg/m² x 6 cycles followed by Zytiga and prednisone would be reasonable along with Zometa every 6 weeks for bone stabilization.  He will do chemo preparation visit with my nurse practitioner prior to start chemotherapy with Taxotere and Zometa in 2  weeks and he is already received Casodex in preparation for Lupron shot he received on 10/12/2020 with Dr. Reynaga.  We will get him to Dr. Alexander Gomez who has done his polypectomies in the past for port placement and we will get genetic counseling started.  Following the 6 courses of Taxotere per the Chaarted trial criteria, I would then give him an indefinite Zytiga and prednisone and Zometa until progression or toxicity dictates.    10/29/2020 PSA 1.37    -12/16/2019 COVID-19 antigen test negative    -12/29/2020 PSA 0.275 with absolute neutrophil count 700 and creatinine 0.76 with normal liver enzymes and electrolytes.  Normal magnesium and phosphorus and urine drug screen positive for buprenorphine and opiates follow-up with palliative care.    -1/4/2021 CT chest abdomen pelvis with contrast and whole-body bone scan shows improving less metabolically active bone metastases.  Stable sclerotic T12, L1, and L2 vertebral bodies and bilateral pelvic bones on bone windows with stable subcentimeter para-aortic lymph nodes and no definite progression in the chest abdomen or pelvis.    -1/5/2021 [facility] medical oncology follow-up visit: I reviewed the images and reports thereof of the above CT and bone scan which shows good response to Taxotere x3 courses with a PSA down to 0.275 from a baseline of 10.  Get another 3 courses of Taxotere, continue his Xgeva, and then following that we will switch to Zytiga and prednisone.  He is working with palliative care on his bone pain but on his current dose of fentanyl patch he says his pain is still not adequately controlled he will contact them.  Dexamethasone prescription was refilled.  We will see my nurse practitioner back in 3 weeks for course #5 of 6 total courses and then we will reimage with CT chest abdomen pelvis and bone scan before going to the Zytiga prednisone.    -3/4/2021 CT chest abdomen pelvis with contrast is negative save for stable sclerotic bone lesions and  the bone scan shows near resolution of prior bony abnormalities associated with the known sclerotic lesions in the thoracolumbar spine and bony pelvis.    2/17/2021 PSA down to 0.1 from 1.37 October 2020.  3/9/2021 PSA 0.1    -5/26/2021 CT chest abdomen pelvis with contrast shows stable to slightly underlying increase low-density left para-aortic node.  Bone lesions stable.  Whole-body bone scan stable to decrease activity of known thoracolumbar and bony pelvic involvement.  PSA less than 0.1  , AST 74, bilirubin 0.5.       -6/1/2021 [facility] oncology follow-up visit: I reviewed the above data with him and images thereof.  Bones are stable.  No significant adenopathy.  PSA immeasurably low.     upper limit of normal 69 and AST 74 upper limit of normal 46.  Hence, neither is more than double the upper limit of normal with no ascites and no encephalopathy and normal albumin and bilirubin, despite the elevation of liver enzymes, he has child Abrams score A.  Package insert for Abiraterone says that for ALT and/or AST greater than 5 times upper limit of normal or total bilirubin greater than 3 times the upper limit of normal to withhold treatment until liver function tests returned to baseline or ALT and AST less than or equal to 2.5 times the upper limit of normal and total bilirubin less than or equal to 1.5 times the upper limit of normal and then reinitiate at 750 mg daily dose of Zytiga.  For recurrent hepatotoxicity on 750 mg daily Zytiga, withhold treatment until liver functions returned to baseline or ALT and AST less than 3-2.5 times upper limit of normal and total bilirubin less than or equal to 1.5 times the upper limit of normal then reinitiate at 500 mg daily Zytiga dose.  If has recurrent hepatotoxicity on 500 mg dose, then discontinue treatment.  If has ALT greater than 3 times the upper limit of normal along with total bilirubin greater than 2 times the upper limit of normal in the  absence of other contributing causes or biliary obstruction, permanently discontinue Zytiga.  Based on these recommendations, I would continue current doses but we will watch with serial labs monthly and repeat his imaging again in 3 months but clinically he is doing wonderfully with excellent response to Taxotere and now on Zytiga prednisone plus Zometa along with Relugolix.    -6/23/2020 for [facility] oncology clinic follow-up: Having persistent and worsening pain above his left hip.  I will get an MRI of the left hip for further evaluation.  Otherwise we will continue therapy unchanged with Zytiga, prednisone and Zometa along with Relugolix.    -7/28/2021 [facility] oncology clinic follow-up: Patient with multiple somatic complaints including episodes of confusion, dizziness, decreased memory, agitation.  He reports ongoing episodes with difficulty swallowing.  Also most concerning for episodes of angina and chest pain for which he basically refused to seek emergency medical attention.  He has a history of coronary artery disease and stent placement.  He has not followed up with cardiology for many years.  I will get an MRI of the brain in light of his confusion, dizziness and agitation.  I will get him to gastroenterology for EGD in light of his dysphagia.  I have also put in a referral for cardiology.  I reemphasized to the patient, his wife who is with him today and his son who was on the telephone during our visit the importance of seeking out prompt medical attention when he is having chest pain or neurological concerns so that he can be evaluated.  The patient states that he basically refuses to go to the emergency room and if his family calls an ambulance he would not agree to go to the hospital.  For the time being, I have asked him to hold his Zytiga and prednisone until we can sort this out as Zytiga does have some cardiovascular risk.  There is likely no treatment for prostate cancer that does not carry  "some form of cardiovascular risk.  His PSA remains low at 0.014 yesterday.  He will continue relugolix for now.    Of note, some of these symptoms could also be due to his hypophosphatemia, phosphate level from yesterday was 1.5, I have sent in a prescription for K-Phos 3 times a day before meals for 10 days.  We will hold further Zometa until this is corrected.  I have also put in an order to check his vitamin D level.  He takes calcium and vitamin D daily.  Some of his symptoms also could be due to drug withdrawal as there was some confusion and difficulty getting his last prescription for methadone therefore he was without methadone for several days, he now has his prescription and is back on his pain medication.  He has an appointment with neurosurgery tomorrow in light of his ongoing back pain, MRI of the hip recently showed multiple bony lesions largest at the L5 vertebral body and left supra-acetabular region.  If no neurosurgical intervention recommended he may benefit from spot radiation as this is where a lot of his pain is coming from.  His wife had questions today regarding his staging and extent of disease.  We reviewed previous scans.  I did clarify with her as she used the words \"cancer free\" as she thought that is what she was told after his CT scans once he completed Taxotere in February.  I explained to her that even though his scans showed he had an excellent response with no clear areas of metastasis that did not mean he was cancer free, I explained to her that when you have metastatic cancer the treatment intent is palliative in nature and to control the disease but not to cure the disease.  She stated understanding.  We will see him back in 2 weeks for follow-up to sort through this and make further plan of care, again, I have asked him to hold Zytiga and prednisone until he sees us back, he will continue on his other medications including relugolix.    -7/30/2021 neurosurgery PA consultation.  " MRI with and without contrast L5 ordered.    -8/3/2021 neurosurgery follow-up showed known sclerotic disease with new L5 abnormality without compression deformity or instability.  MRI brain negative for metastasis.    -8/4/2021 office visit Dr. Fco Quintanilla radiation oncology coordinating with Dr. North neurosurgery for palliative radiation for MRI evidence of L5 and left supra acetabular painful lesions.  States that the patient's disease which is not secreting PSA is experiencing some progression and would benefit from 20 Gy in five fractions and other lesion 30 Gy in ten fractions. Patient offered to go to [place] for radiation but desired treatment in [place].    -8/10/2021 [facility] medical oncology follow-up visit: No chest pains at this junction.  Reviewed MRI brain and other MRIs reviewed by Dr. North and Dwight.  Due for EGD on 19th.  We will repeat his phosphorus along with his other labs continue Relugolix, Zytiga, prednisone, and he will continue radiation palliatively to the painful bony lesions with Dr. Quintanilla/Can.  He will see my nurse practitioner back in a few weeks to see how he is tolerating this and to follow-up on the phosphorus off of Zometa and she will order repeat CT chest abdomen pelvis with contrast and total body bone scan the end of September.I will see him back after that.    -9/8/2021 Zometa resumed.  PSA <0.10    -10/5/2021 [facility] medical oncology follow-up visit: followed-up with radiation oncology 9/21/2021.  Status post symptomatic L5 radiation 5 fractions 20 [place] completed 8/16/2021 with improvement in right hip pain.  9/2/2021 PSA less than 0.1.  9/29/2021 total body bone scan shows increased uptake left iliac bone slightly superior group of the left acetabulum with no additional foci of suspicious abnormality is unlikely treatment related with no new sites of active osseous metastasis.  CT chest abdomen pelvis with contrast shows stable borderline enlarged left periaortic  nodes and dating sclerotic bone lesions.Continue Zytiga, prednisone, Relugolix, Zometa, repeat CT chest abdomen pelvis with contrast and total body bone scan the end of the year.  We will follow PSA serially.    -11/17/2021 [facility] follow-up:  Mr. Lugo overall is doing well.  He is tolerating therapy with Zytiga, prednisone and Relugolix along with Zometa which is given every 6 weeks.  PSA remains low at <0.1.  Has occasional low phosphorus, currently low at 1.7.  Serum calcium is normal at 8.9, normal creatinine 0.79 and normal CBC other than for platelets of 124.  I will hold Zometa for 1 month, I did send in a prescription for phosphorus replacement today.  He takes calcium and vitamin D.  I will see him back in 1 month for follow-up.  We will repeat restaging scans after that visit.    -12/15/2021 [facility] follow-up: Mr. Lugo continues to do well on therapy with Zytiga, prednisone and Relugolix, his PSA remains low at <0.1.  He is continuing to have left lower back pain, he is working with palliative care and they have referred him also to pain management.  Pain management has talked to him about putting in a pain pump however he is leery of this, I told him that this was a safe and effective pain management technique and to certainly give consideration to their recommendations.  His phosphorus is improving however is still a little low, I will hold off on Zometa until we see him back in 1 month, we may end up only giving it every 12 weeks.  We will repeat restaging scans with CT chest, abdomen and pelvis along with total body bone scan prior to return and I have ordered those today.      -1/5/2022 CT chest abdomen pelvis with contrast [facility] showing stable left periaortic adenopathy and unchanged sclerotic thoracic, lumbar spine and pelvis lesions with no new or progressive disease in the chest abdomen or pelvis.  Total body bone scan shows no significant change and no  new suspicious foci.  Stable posterior right acetabulum and left superior acetabulum and degenerative changes in the cervical spine, shoulders, acromioclavicular joints, sternoclavicular joints, elbows, wrists, hands, thoracic spine, lumbosacral spine, sacroiliac joints, hips, knees, ankles, feet.    -1/11/2022 [facility] medical oncology hematology follow-up visit: I reviewed images and reports from his 1/5/2022 scans.  No active disease and PSA immmeasurably low on Zytiga, prednisone, Relugolix.  However, he has struggled with hypophosphatemia and is significantly fatigued with this.  Given that the bones are doing well and given that his phosphorus continues to remain consistently low at 2.2 in mid December, 1.7 mid November and 2.5 mid-September and as low as 1.5 back to July and the likelihood that this is related to his Zometa, given that his bones are stable overall, he will increase from 1200 mg up to 1600 mg of calcium and phosphate a day and we will hold his Zometa for the foreseeable future.  He will see my nurse practitioner back in a month to continue to monitor his phosphorus along with his calcium and other labs and will repeat his scans again in 3 months.  In addition, given his fatigue we will check his ACTH and cortisol though he is taking his prednisone with his Zytiga.  Though his electrolytes are normal, I will keep an eye on the cortisol and ACTH and have these labs not only drawn today but again just prior to return to my nurse practitioner on February 10.    -2/10/2022 [facility] Oncology clinic follow-up: Mr. Lugo overall is stable, no change in his health this past month.  I have reviewed his labs from 2/8/2022 and they are unremarkable, his phosphorus is now normal at 3.2. We are continuing to hold his Zometa, he last received Zometa on 10/5/2021.  He continues therapy with Zytiga, prednisone and Relugolix.  Dr. Rashid had ordered cortisol level and ACTH which are low, currently his ACTH is 2.8  and cortisol 3.08 however these are both done in the face of ongoing prednisone that he takes with his Zytiga.  We will get him to endocrinology for further evaluation for possible adrenal insufficiency but will not interrupt his treatment in the interim.  He will need restaging scans again in April for a 3-month follow-up.  He will continue to follow with palliative care and pain management for his cancer related pain.  His PSA remains immeasurably low at <0.1 on 2/8/22.    -2/15/2022 went to emergency room with weakness, decreased appetite, myalgias in the setting of known COVID-19 testing positive a few days prior to this visit.  Phosphorus had been low in the past but was normal in the emergency room with unremarkable CBC and CMP.  Chest x-ray unremarkable saturating well on room air mildly hypertensive with dry mucosa.  Given 500 cc crystalloid.  Covid test positive.  Mild thrombocytopenia 136,000 and otherwise unremarkable.  Discharged home.    -3/3/2022 data:  PSA less than 0.1  CMP unremarkable  Cortisol 3.96 normal  ACTH 2.8 lower limit of normal 7.2  Phosphorus normal 2.6    -3/8/2022 [facility] medical oncology follow-up: Suspect big chunk of his fatigue was Covid.  Another part of the fatigue likely related to lack of testosterone.  Not hypophosphatemic off of Zometa.  ACTH running low while on prednisone not surprising but with normal cortisol and I doubt adrenal insufficient which is why he is on prednisone to avoid such while taking Zytiga.  Overall doing fairly well and with immeasurably low PSA, I will have labs done on him early April, see my nurse practitioner early May, and she will order repeat bone scan and CTs the end of May and I will see him back in June.  We will continue to hold the Zometa unless bone lesions progress given symptomatic prior hypophosphatemia on Zometa.  He will keep his appointment April 28 with Dr. Mir Duffy just to be sure that my take on his pituitary/adrenal axis  assessment is accurate.    -4/28/2022 endocrinology consult Dr. Mir Duffy.  With low ACTH and cortisol on prednisone with Zytiga, the adequacy of prednisone cannot be assessed by measuring ACTH or cortisol but is assessed by weight changes, blood pressure, blood sugar, potassium, overall sense of how the patient is doing.  Primary adrenal insufficiency with low ACTH and low cortisol would be typical for the situation as his blood sugar tends to run a little high and potassium a little low, he did not think increasing prednisone was necessary.  He put him on some potassium.  No follow-up scheduled, will see as needed.    -5/4/2022 [facility] Oncology clinic follow-up: Mr. Lugo continues on therapy with Zytiga and prednisone along with Relugolix.  His Zometa has been on hold due to previous hypophosphatemia.  There is some concern about potential adrenal insufficiency. He saw endocrinology, Dr. Mir Duffy on 4/28/2022.  I did review his office note and he stated that the low ACTH and low serum cortisol would be typical for Mr. Lugo's situation.  He further stated that the adequacy of prednisone dose cannot be assessed by measuring ACTH or cortisol but is rather assessed by the overall just altered how the patient is feeling-weight change, blood pressure, blood sugar, potassium, overall sense of wellbeing.  His potassium had been running a little low therefore they put him on potassium 10 mill equivalents daily. Of note, I do not see a future follow-up scheduled with endocrinology.  He is having night sweats, I am not sure if this is related.  His glucose was normal this morning at 107.  His weight is stable.  He will continue current treatment for his prostate cancer unchanged, we will continue to hold his Zometa.  Current phosphorus and magnesium are normal.  CBC and CMP from today are unremarkable, cortisol is normal.  PSA and ACTH are pending  In regards to his night sweats, he had taken clonidine previously 0.1 mg  twice daily as needed, I did send in a short supply again to try and see if this helps.  He also is having indigestion despite being on omeprazole twice daily, I sent a prescription for Pepcid.  He had an EGD August 2021.  We will repeat restaging scans prior to return and I have ordered those today.     -5/24/2022 CT chest abdomen pelvis with contrast[place] negative.  Bone scan shows stable bone metastases.    - 5/25/2022 PSA less than 0.1, platelets 130,000, otherwise unremarkable CBC and CMP.  A.m. cortisol running low 1.2 with phosphorus low 2.3.    -5/31/2022 [facility] medical oncology follow-up: On Zytiga, prednisone, Relugolix, PSA remaining immeasurably low with stable bone scan and no visceral/paulino metastases.  Phosphorus still running low.  I have discontinued his potassium chloride and started potassium phosphate 500 mg 4 times a day.  Unless PSA rising, we will plan on repeating CTs and bone scan in 6 months and will follow with my nurse practitioner in the interim and if symptoms dictate or labs, will get back to Dr. Duffy for management of potential adrenal insufficiency but presently we will just see how he does with potassium phosphate and hopeful improvement in the phosphorus level with time.  We will continue to follow with palliative care for pain management    -7/6/2022 [facility] Oncology clinic follow-up: Mr. Lugo overall is doing well but continues to be troubled with fatigue and hot flashes.  For the most part he states he is able to do the things he wants to do, he continues to work but does have to take more rest periods.  I had a long discussion with him and his wife after reviewing his medications with them as they wanted to see if there was anything he could stop or adjust.  I discussed with him the need to continue on treatment for his prostate cancer even though his PSA remains immeasurably low and his scans look good but that if his medication is stopped the cancer would progress and  over time it still has the potential to progress on therapy but would continue it as long as possible to keep his disease under control.  I explained this is like treating someone with hypertension, you do not stop the antihypertensive just because the blood pressure normalizes.  They stated understanding.  There is no dose adjustment of Zytiga or prednisone that would minimize his symptoms, he is on the current standard recommended therapy.  Discussed with him that sometimes during times of stress we may need to increase his prednisone dose but for now would not change anything.  His phosphorus level is normal, we continue to hold his Zometa.  I did give him the option of holding his phosphorus for the next month and we will see what happens, if it drops we will start him back on it but may be at a lower dose rather than 4 times a day maybe twice a day.  For now he will continue therapy unchanged.  We will continue monitoring labs monthly.  No PSA was drawn this month but that is okay as his PSA has been running immeasurably low at <0.1, we will recheck next month with lab draw.  We will stop checking his ACTH and cortisol level every month, if he develops worsening symptoms I will repeat those.  He has not gotten a follow-up with endocrinology as of yet.    -8/3/2022 [facility] Oncology clinic follow-up:  Mr. Lugo is doing well as far as his prostate cancer goes with continued immeasurably low PSA of <0.1.  He continues on therapy with Zytiga and prednisone along with Relugolix.  His phosphorus is stable at 2.7 which was just slightly low by our reference range however was 2.71-month ago also which was normal on another labs reference range.  He has not taken his phosphorus for this past month as he thought it was making him more fatigued.  He really can tell no difference whether he was taking it or not.  I have asked him to try to go back on phosphorus twice a day rather than 4 times a day and see if this is  tolerated and if we can keep his phosphorus level from dropping.  His biggest complaint today is flareup of his arthralgias and back pain.  He is following with pain management, Dr. Danny Avalos but is requesting a referral back to palliative medicine as he feels that no one is currently listening to him and they are just prescribing the same medications that are not effective according to his report.  He does have a follow-up with Dr. Avalos on 8/12/2022 but due to his current pain is not able to work until after he is seen and hopefully can get some better relief.  I have given him a work release until 15 August.  I have also put in a referral back to palliative care.  Also encouraged him to work with Dr. Avalos for help in resolving his issue.  Apparently they have recommended some type of a pump however he has been hesitant to that but likely would be helpful.  We will continue therapy unchanged.  We will continue monthly labs including phosphorus, we are not currently checking ACTH and cortisol monthly as Dr. Mir Duffy previously stated in his assessment on 4/28/2022 that the adequacy of prednisone cannot be assessed by measuring ACTH or cortisol but would be assessed by symptoms, see note from 4/28/2022.  He made no further follow-up appointments.  Fatigue is not worsening currently.  His glucose and potassium are  Normal.    -10/13/2022 [facility] medical oncology follow-up: Mr. Lugo is doing well.  He is tolerating treatment with Zytiga and prednisone along with Relugolix without any unusual side effects.  His PSA has remained immeasurably low at <0.1.  His phosphorus was slightly low at last visit.  He had been instructed to go back on phosphorus twice a day but he misunderstood and has not been taking it.  We will check labs today.  I will follow-up with results and notify him if he needs to restart the phosphorus.  His pain is better controlled since reestablishing with [facility].  We  will repeat scans prior to next follow-up.  I have ordered CT chest abdomen pelvis along with bone scan.  We will see him back in 1 month.    -10/13/2022 PSA less than 0.014.With unremarkable CBC and CMP.  Phosphorus still a little low 2.3.    -11/4/2022 CT abdomen pelvis chest showed no evidence of disease progression with stable sclerotic bone lesions.  Bone scan stable right greater than left acetabular metastasis.  Stable bone metastases.    -11/8/2022 [facility] medical oncology telemedicine follow-up: Patient states that he feels achy all over with low-grade temps and a cough mildly productive.  Has an appointment later today to see his primary care for testing for flu and COVID.  Suggested that if he were positive for COVID that he get Paxlovid and that if he were positive for flu that he get Tamiflu and to discuss this with his primary care.  From the prostate cancer side, his PSA is immeasurably low with stable bone metastases and no evidence of progression.  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  We will continue the Relugolix, Zytiga, prednisone and he will see my nurse practitioner 12/7/2022.  Would repeat CTs and bone scan in May.    -1/25/2023 [facility] Oncology medicine follow-up: Mr. Lugo is having worsening fatigue and muscle aches.  He does have adrenal insufficiency on Zytiga and prednisone for his prostate cancer, he saw endocrinology in light of low ACTH back in April 2022.  At that time there was no recommendation other than for monitoring him for his overall wellbeing and labs.  He denies any fevers or chills.  His labs as shown above are unremarkable, his CBC and CMP are normal other than for glucose of 112, PSA remains low at <0.1.  He has no new pain or any symptoms concerning for progression of his prostate cancer.  I will get him back to endocrinology to see if they feel any dose adjustment of his prednisone would be helpful in his symptoms.  Currently  he is on 5 mg a day with his Zytiga.  I did not repeat his ACTH or cortisol as they would be expected to be low in this situation.  His potassium is normal, he has had no weight loss or change in his appetite.  Blood sugar is reasonable.  His only complaint currently is worsening fatigue and muscle aches.  He will continue treatment unchanged with Zytiga, prednisone and relugolix.  I will see him back in 1 month for follow-up.  We will repeat his restaging scans in May.    -3/1/2023 [facility] follow-up:  His PSA remains low at <0.1 on treatment with Zytiga and prednisone along with relugolix however he continues to complain of significant fatigue and muscle aches not improving with time.  He states that his quality of life is affected as he is not able to do any work activities due to the fatigue.  He did see endocrinology again and they have increased his prednisone from 5 mg daily up to 7.5 mg daily however so far this has not made any difference in how he feels.  He is supposed to get back in touch with them to let them know how he is doing and to see if there is any other adjustments needed. His labs are unremarkable with normal thyroid function, CMP is normal other than for glucose of 156.  They did check hemoglobin A1c to look for diabetes however that was normal at 5.7.  CMP is unremarkable with just very mild anemia with hemoglobin of 12 and hematocrit 36.1% which would not account for his fatigue.  He does have hot flashes that are fairly significant, I will try venlafaxine as suggested by Dr. Duffy and I appreciate the recommendation.  We will start at 37.5 mg daily and if tolerated he can increase to 75 mg after 1-2 weeks.  I will see him back in 1 months for follow-up.  Plan to repeat scans late April/early May.  His prostate cancer seems under good control with current regimen however I am not sure how long he can tolerate this due to the side effects.    -3/30/2023 [facility]  follow-up:  Mr. Lugo continues to deal with fatigue.  He appears more cushingoid today, he continues on treatment with Zytiga, prednisone and relugolix.  Prednisone dose currently is 7.5 mg daily.  This has not made any difference in his energy level.  PSA remains immeasurably low at <0.014.  CBC shows new onset of microcytic hypochromic anemia with hemoglobin of 11, hematocrit 34.4%, MCV 75.4, MCH 24.1, WBC is normal at 5.81 with normal platelet count 181,000 and normal differential.  CMP is unremarkable, it is normal other than for glucose of 142.  We will get him to Dr. Gomez for an EGD and colonoscopy for further evaluation in regards to his new onset of anemia.  He has no associated GI symptoms otherwise except for 1 day a few weeks ago he does report that he had fairly sudden onset of vomiting but this was an isolated incident on that day and has not reoccurred since.  Continues to follow with palliative care for management of his chronic back pain.  We will repeat restaging CT chest, abdomen and pelvis along with total body bone scan prior to return and I have ordered that today.    -3/30/2023 Ferritin low 12.3 with iron low 27 and saturation low 5% with total iron binding capacity 511.  3/31/2023 ferrous sulfate 325 mg twice a day every other day with vitamin C started.    - 4/12/2023 CT chest abdomen pelvis with contrast showed no progressive disease with stable sclerotic bone lesions.  Bone scan showed single identifiable bone metastasis stable right acetabulum    -4/18/2023 [facility] hematology oncology follow-up: He now has microcytic iron deficiency anemia which is new and concerning.  Gastroenterologic evaluation has been ordered but not performed and is mandatory.  Continue ferrous sulfate 325 mg twice a day every other day with vitamin C to improve absorption and we will follow his CBC along with his usual labs monthly on Zytiga, Relugolix, prednisone 7.5 mg.  He follows with Dr. Duffy with adrenal  insufficiency.  He will see my nurse practitioner back in a month to see what GI has found and to watch serial CBC along with his other labs including PSA.  I would repeat CT chest abdomen pelvis again in 6 months with contrast along with bone scan and sooner if PSA rises.    -5/3/2023 EGD and colonoscopy with Dr. Alexander Gomez: Superficial erosions in the stomach with gastritis.  Small polyps removed, dilated internal hemorrhoids.  Pathology pending.  Recommend repeat colonoscopy in 5 years.    -5/17/2023 [facility] Oncology clinic follow-up:  Naveen overall is doing well on current therapy with Zytiga, prednisone and relugolix.  PSA remains immeasurably low at <0.014.  He feels his hip pain is getting worse with time.  It makes it difficult to enjoy activities with his grandchildren as it is worse when he stands for any length of time or tries to carry any weight.  I reviewed his bone scan from April which was stable, we also reviewed previous MRI of the hips from May 2021 that showed moderate bilateral hip osteoarthritis.  I offered to repeat MRI of his hips for evaluation to see if there has been any worsening of his osteoarthritis and also then referral to orthopedics for any recommended intervention however at this time he defers.  He will let me know if he changes his mind.  He also follows with palliative care for management of his cancer related pain.  We will continue therapy unchanged.  We will repeat restaging scans in October unless symptoms dictate the need to do so sooner.  He was recently found to be iron deficient, he had an EGD and colonoscopy on 5/3/2023 with Dr. Gomez, EGD showed superficial erosions in the stomach with gastritis, he continues on omeprazole.  He had small polyps removed from the colon and recommendation to repeat colonoscopy in 5 years.  He is on oral iron along with vitamin C every other day and is tolerating that well.  CBC from yesterday shows hemoglobin now normal at 14.2 and  hematocrit 43.2%, MCV normal at 27.0.    -7/20/2023 [facility] Oncology clinic follow-up: Naveen is doing well on current therapy with Zytiga, prednisone and relugolix.  PSA in June remains low at <0.1.  Continues to deal with chronic pain in his back and hip.  Recently saw Dr. Nikolai Marks and had an injection in his hip and is hoping that it will eventually help with his pain.  He has no new pain.  We will continue current therapy unchanged.  We will repeat labs while he is here today.  I will refill his iron as his pharmacy has changed. Most recent CBC in June with hemoglobin of 14.7 and hematocrit 43.6%.  We will repeat restaging scans in October.    -9/14/2023 [facility] Oncology clinic follow-up: Naveen is tolerating his prostate cancer treatment with Zytiga, prednisone and relugolix.  PSA remains immeasurably low at <0.014 on 8/17/2023.  Currently he is having more GI issues with nausea and intermittent vomiting that occurs often without warning.  He had an EGD and colonoscopy with Dr. Gomez on 5/3/2023, EGD showed superficial erosions in the stomach with gastritis.  He does take omeprazole 40 mg twice daily.  He saw his PCP yesterday for these symptoms and has been referred to gastroenterologist at [facility] and he is awaiting to hear about that appointment.  Hopefully he can get in fairly quickly.  I told him that he should let us know if it is going to be a while before he can be seen and I will get him back to Dr. Gomez for consideration of repeat EGD.  We will get his CBC, CMP and PSA today.  His previous noted anemia had normalized, he currently is not taking oral iron as he ran out.  I will wait and see what his labs show today and likely hold off for now depending on his lab results until his GI issues can be resolved.  We will repeat his restaging CT chest, abdomen and pelvis along with bone scan in the next few weeks and I have ordered those today.  He is taking Zofran as needed for nausea, he is also on  meclizine for vertigo.  He is going to follow-up with his PCP in a few weeks regarding his GI symptoms.    -9/14/2023 PSA less than 0.014 with unremarkable CBC and CMP.    -9/28/2023 total body bone scan stable with uptake in right acetabulum, no new areas.  CT chest, abdomen and pelvis show no evidence of disease progression.  -11/9/2023 [facility] clinic follow-up: CT reports were reviewed by Dr. Rashid last month and the patient reports he was called by Dr. Rashid as he could not make his appointment due to concerns over upper respiratory infection, CT scan showed no evidence of disease progression.  PSA has remained low, we are repeating that today along with his CBC and CMP.  He continues to have sinus drainage and congestion, I will send in 7 additional days on his doxycycline that he has been taking, he will follow-up with PCP if no improvement.  He continues to complain of bilateral hip pain, has a follow-up with Dr. Marks next week.  We will continue therapy unchanged with Zytiga, prednisone and relugolix.  Would repeat restaging bone scan and CT scans late March for a 6-month follow-up.    -1/4/24 PSA 1    - 3/28/2024 CT chest abdomen pelvis with contrast compared to May 2013 shows decreasing sclerotic bony metastases.  Bone scan shows similar relative intensity of 1 focal uptake right acetabulum correlating with CT with no new sites of uptake    -4/2/2024 [facility] medical oncology follow-up: Continued good response to Zytiga prednisone relugolix which he is tolerating.  We will check PSA and labs 6/25/2024 with follow-up with my nurse practitioner and again 3 months after that along with CTs and bone scan 6 months from now.  Not on bisphosphonates or rank ligand inhibitor due to drug-induced hypophosphatemia.  Will ask my nurse practitioner to order bone densitometry with his other scans in 6 months. He is having difficulty emptying his bladder and his prior urologist does not take his insurance so  we will make appropriate referrals.    -6/26/2024 [facility] clinic follow-up: Overall continues to tolerate therapy with Zytiga, prednisone and Relugolix.  We will get labs today including PSA and as long as that is stable he will continue treatment unchanged with plans to repeat restaging CT scans and bone scans in September and I have ordered those today.  We will also obtain DEXA scan at that time to monitor for osteoporosis on long-term use of androgen deprivation therapy.  We have been holding his Zometa due to drug-induced hypophosphatemia.  He is following with urology and has had relief of his urinary hesitancy with some medication adjustments and he is scheduled for follow-up this Friday with cystoscopy.  He recently had chest pain and shortness of breath while mowing his lawn, he did have an echocardiogram and stress test yesterday, awaiting final results and reading from Dr. Kahn.  He continues to follow with palliative care for management of his pain which is under good control.  I did refill his symptoms today for chronic constipation from opioid use.    -9/16/2024 CT chest abdomen pelvis with contrast [place] [facility] compared to May 2024 shows stable 1.6 cm well-defined nodule left periaortic region.  Stable sclerotic left supra-acetabular, right posterior acetabular left posterior acetabular lesions.  No disease progression.  Total body bone scan shows grossly stable solitary active right posterior acetabular bone metastasis.    -10/2/24 PSA less than 0.1    -10/8/2024 [facility] oncology clinic follow-up: In order to get prostatic urethral pathway surgically open he needed cardiac clearance and is due for cardiac cath in the next week or 2 with Dr. Kahn.  I reviewed the above image results with him with no clear-cut evidence of progression and PSA remaining immeasurably low.  He does feel quite fatigued.But this is not new.  He has become microcytic with a hemoglobin of 12.1 and MCV of 77  which has trickled down 14.8 and December.  His CMP is unremarkable.  He had an EGD and colonoscopy May of last year with Dr. Gomez that showed superficial erosive gastritis with internal hemorrhoids and colon polyps.  He states he has been taking his iron but he takes it with all his other medicines.  I told him to take it every other day with vitamin C with at least an hour and a half window on either side of it free from any other ingestion of medicines.  With reference to the pulmonary infiltrate he has had a frothy nagging mildly productive cough without fevers or chills or dyspnea for the last 2 months.  Rather than throwing an antibiotic at this, I am going to get pulmonary referral for their opinion.He is due for his bone density as well and we will order that.  He has been off of Zometa for quite some time due to hypophosphatemia     Prostate cancer metastatic to bone   8/30/2020 -  Other Event    PSA 10.8     9/15/2020 Initial Diagnosis    Prostate cancer metastatic to bone (CMS/HCC)     9/15/2020 Biopsy    Prostate needle core biopsy     9/24/2020 Imaging    Total body bone scan: 4 abnormal areas of increased activity consistent with osteoblastic metastasis, abnormal foci of activity seen in the T12 vertebral body, L1 vertebral body, roof of the left acetabulum, and acetabulum medially on the right.  CT chest: Stable sclerotic metastatic lesions within the T12 and L1 vertebrae.  No other evidence of metastatic disease to the chest.  Stable mild splenomegaly and subcentimeter periaortic retroperitoneal lymph nodes.  CT abdomen and pelvis: Diffuse bladder wall thickening with mild perivesicular fat stranding.  Interval development of several sclerotic lesions in the spine and left iliac bone.     10/13/2020 Cancer Staged    Staging form: Bone - Appendicular Skeleton, Trunk, Skull, And Facial Bones, AJCC 8th Edition  - Clinical: Stage IVB (cT3, cN0, cM1b, G3) - Signed by Ishmael Rashid MD on 10/13/2020      11/3/2020 - 10/5/2021 Chemotherapy    OP SUPPORTIVE Zoledronic Acid Q42d     11/3/2020 - 2/18/2021 Chemotherapy    OP PROSTATE DOCEtaxel       1/4/2021 Imaging    CT chest abdomen pelvis with contrast and whole-body bone scan shows improving less metabolically active bone metastases.  Stable sclerotic T12, L1, and L2 vertebral bodies and bilateral pelvic bones on bone windows with stable subcentimeter para-aortic lymph nodes and no definite progression in the chest abdomen or pelvis.  PSA down to 0.275 after 3 courses of Taxotere.     3/4/2021 Imaging    CT chest, abdomen and pelvis stable, no evidence of disease progression. Total body bone scan with decreased/near resolution of abnormal increased radiotracer uptake associated with the known sclerotic lesions in the thoracolumbar spine and bony pelvis.  No evidence of new osteoblastic metastases.     3/4/2021 Imaging    CT chest abdomen pelvis with contrast is negative save for stable sclerotic bone lesions and the bone scan shows near resolution of prior bony abnormalities associated with the known sclerotic lesions in the thoracolumbar spine and bony pelvis.  2/17/2021 PSA down to 0.1 from 1.37 October 2020.     3/9/2021 - 3/9/2021 Chemotherapy    OP SUPPORTIVE PROSTATE Relugolix     3/9/2021 -  Chemotherapy    OP PROSTATE Abiraterone / PredniSONE       3/10/2021 -  Chemotherapy    OP PROSTATE Abiraterone / PredniSONE     8/10/2021 - 8/16/2021 Radiation    Radiation OncologyTreatment Course:  Naveen Lugo received 2000 cGy in 5 fractions to L4-sacrum, left acetabulum and right pelvis via External Beam Radiation - EBRT.     11/16/2021 -  Chemotherapy    OP CENTRAL VENOUS ACCESS DEVICE ACCESS, CARE, AND MAINTENANCE (CVAD)     1/5/2022 Imaging    -1/5/2022 CT chest abdomen pelvis with contrast [place] [facility] showing stable left periaortic adenopathy and unchanged sclerotic thoracic, lumbar spine and pelvis lesions with no new or progressive disease in the chest  abdomen or pelvis.  Total body bone scan shows no significant change and no new suspicious foci.  Stable posterior right acetabulum and left superior acetabulum and degenerative changes in the cervical spine, shoulders, acromioclavicular joints, sternoclavicular joints, elbows, wrists, hands, thoracic spine, lumbosacral spine, sacroiliac joints, hips, knees, ankles, feet.     5/24/2022 Imaging    CT chest abdomen pelvis with contrast[place] negative.  Bone scan shows stable bone metastases.     11/4/2022 Imaging    CT abdomen pelvis chest showed no evidence of disease progression with stable sclerotic bone lesions.  Bone scan stable right greater than left acetabular metastasis.  Stable bone metastases.     4/12/2023 Imaging     CT chest abdomen pelvis with contrast showed no progressive disease with stable sclerotic bone lesions.  Bone scan showed single identifiable bone metastasis stable right acetabulum     9/29/2023 Imaging    total body bone scan compared to April showed a single active bone metastasis with multiple and active bone metastases overall stable.  CT chest abdomen and pelvis compared to May and April shows no new bony progression with stable sclerotic bone lesions.         HISTORY OF PRESENT ILLNESS:  The patient is a 69 y.o. male, here for follow up on management of prostate cancer with progressive fatigue.    Past Medical History:   Diagnosis Date    Arthritis     Bone cancer     Coronary artery disease     Elevated cholesterol     History of radiation therapy 08/16/2021    L4 through sacrum, left acetabulum, right pelvis    Nephrolithiasis     Opioid use disorder, mild, on maintenance therapy (HCC)     Prostate cancer      Past Surgical History:   Procedure Laterality Date    CHOLECYSTECTOMY      COLONOSCOPY      CORONARY STENT PLACEMENT  2006 & 2011    HERNIA REPAIR  1986    THORACIC DISCECTOMY  1994       Allergies   Allergen Reactions    Cymbalta [Duloxetine Hcl] Hives and Dizziness     "Duloxetine Hives and Dizziness    Gabapentin Nausea Only    Pregabalin Dizziness, Nausea And Vomiting and Hives       Family History and Social History reviewed and changed as necessary    REVIEW OF SYSTEM:   Says he still taking his iron but progressively fatigued.    PHYSICAL EXAM:  No jaundice icterus or pallor.  No respiratory distress.  No rashes    Vitals:    10/08/24 0911   BP: 127/77   Pulse: 105   Resp: 18   Temp: 97.5 °F (36.4 °C)   Weight: 86.2 kg (190 lb)   Height: 175.3 cm (69\")     Vitals:    10/08/24 0911   PainSc:   8   PainLoc: Back          ECOG score: 0           Vitals reviewed.    ECOG: (0) Fully Active - Able to [facility] Pre-disease [facility] Without Restriction    Lab Results   Component Value Date    HGB 12.1 (L) 10/02/2024    HCT 39.1 10/02/2024    MCV 77 (L) 10/02/2024     10/02/2024    WBC 7.7 10/02/2024    NEUTROABS 4.7 10/02/2024    LYMPHSABS 2.1 10/02/2024    MONOSABS 0.7 10/02/2024    EOSABS 0.1 10/02/2024    BASOSABS 0.0 10/02/2024       Lab Results   Component Value Date    GLUCOSE CANCELED 10/02/2024    BUN 11 10/02/2024    CREATININE 0.75 (L) 10/02/2024     10/02/2024    K CANCELED 10/02/2024    CL 99 10/02/2024    CO2 21 10/02/2024    CALCIUM 8.7 10/02/2024    PROTEINTOT 7.0 02/14/2022    ALBUMIN 4.0 10/02/2024    BILITOT 0.2 10/02/2024    ALKPHOS 67 10/02/2024    AST 14 10/02/2024    ALT 8 10/02/2024             ASSESSMENT & PLAN:  1.  Stage IVb grade group 4 metastatic prostate cancer with sclerotic bone lesions on CT and bone scan and history of prostatic hypertrophy.  Good response to Taxotere ending 2/18/2021 Relugolix, followed by Zytiga prednisone with L4/sacrum, left acetabulum, and right pelvis external beam radiation August 2021.  Hypophosphatemia on Zometa.  Zometa held as of October 2021.  2.  Kidney stone  3.  Polyps  4.  Probable drug-induced hypophosphatemia from Zometa, drug stopped after 10/5/2021 dose  5.  Cancer related pain seeing palliative " care  6.  Fatigue  7.  Covid 2/2022  8.  Iron deficiency anemia    -6/26/2024 [facility] follow[facility]up: Overall continues to tolerate therapy with Zytiga, prednisone and Relugolix.  We will get labs today including PSA and as long as that is stable he will continue treatment unchanged with plans to repeat restaging CT scans and bone scans in September and I have ordered those today.  We will also obtain DEXA scan at that time to monitor for osteoporosis on long-term use of androgen deprivation therapy.  We have been holding his Zometa due to drug-induced hypophosphatemia.  He is following with urology and has had relief of his urinary hesitancy with some medication adjustments and he is scheduled for follow-up this Friday with cystoscopy.  He recently had chest pain and shortness of breath while mowing his lawn, he did have an echocardiogram and stress test yesterday, awaiting final results and reading from Dr. Kahn.  He continues to follow with palliative care for management of his pain which is under good control.  I did refill his symptoms today for chronic constipation from opioid use.    -9/16/2024 CT chest abdomen pelvis with contrast [place] [facility] compared to May 2024 shows stable 1.6 cm well-defined nodule left periaortic region.  Stable sclerotic left supra-acetabular, right posterior acetabular left posterior acetabular lesions.  No disease progression.  Total body bone scan shows grossly stable solitary active right posterior acetabular bone metastasis.    -10/2/24 PSA less than 0.1    -10/8/2024 [facility] follow-up: In order to get prostatic urethral pathway surgically open he needed cardiac clearance and is due for cardiac cath in the next week or 2 with Dr. Kahn.  I reviewed the above image results with him with no clear-cut evidence of progression and PSA remaining immeasurably low.  He does feel quite fatigued.But this is not new.  He has become microcytic with a  hemoglobin of 12.1 and MCV of 77 which has trickled down 14.8 and December.  His CMP is unremarkable.  He had an EGD and colonoscopy May of last year with Dr. Gomez that showed superficial erosive gastritis with internal hemorrhoids and colon polyps.  He states he has been taking his iron but he takes it with all his other medicines.  I told him to take it every other day with vitamin C with at least an hour and a half window on either side of it free from any other ingestion of medicines.  With reference to the pulmonary infiltrate he has had a frothy nagging mildly productive cough without fevers or chills or dyspnea for the last 2 months.  Rather than throwing an antibiotic at this, I am going to get pulmonary referral for their opinion.  He is due for his bone density as well and we will order that.  He has been off of Zometa for quite some time due to hypophosphatemia.  Addendum: He has never had genetic testing.  While that would not alter our plans in the near term, I will plan on genetic testing for his family sake as well as for potential treatable molecular targets in the future should treatment algorithms dictate.  I called and spoke with his wife to inform her of this plan.    Total time of care today inclusive of time spent today prior to patient's arrival reviewing interval data and during visit translating that information patient putting forth a plan as outlined and after visit making referrals and adjusting prescriptions as outlined took 1 hour patient care time throughout the day today.      Ishmael Rashid MD    10/08/2024

## 2024-10-09 ENCOUNTER — TELEPHONE (OUTPATIENT)
Dept: ONCOLOGY | Facility: CLINIC | Age: 69
End: 2024-10-09
Payer: MEDICARE

## 2024-10-09 ENCOUNTER — SPECIALTY PHARMACY (OUTPATIENT)
Facility: HOSPITAL | Age: 69
End: 2024-10-09
Payer: MEDICARE

## 2024-10-09 NOTE — TELEPHONE ENCOUNTER
Caller: Naveen Lugo    Relationship: Self    Best call back number: [phone]    What is the best time to reach you: ANYTIME    Who are you requesting to speak with (clinical staff, provider,  specific staff member): CLINICAL    What was the call regarding: PATIENT WOULD LIKE TO SPEAK WITH THE NURSE REGARDING A POSSIBLE ANTIBIOTIC. HE STATED HE HAS PLEURISY.     PLEASE CALL TO DISCUSS FURTHER.

## 2024-10-09 NOTE — TELEPHONE ENCOUNTER
Called patient back and he states that this was discussed with Dr. Rashid yesterday and he knows that Dr. Rashid wanted him to see a pulmonologist before starting an antibiotic. Patient is worried because he can't get in to see pulmonary until 11/22/24. RN informed him this would be discussed with Dr. Rashid tomorrow and he verbalized understanding.

## 2024-10-10 RX ORDER — DOXYCYCLINE 100 MG/1
100 CAPSULE ORAL 2 TIMES DAILY
Qty: 14 CAPSULE | Refills: 0 | Status: SHIPPED | OUTPATIENT
Start: 2024-10-10

## 2024-10-10 NOTE — TELEPHONE ENCOUNTER
Discussed with Dr. Rashid and he will send in doxycycline 100 mg BID x 7 days. Called patient and he verbalized understanding.

## 2024-10-11 ENCOUNTER — SPECIALTY PHARMACY (OUTPATIENT)
Facility: HOSPITAL | Age: 69
End: 2024-10-11
Payer: MEDICARE

## 2024-10-11 ENCOUNTER — SPECIALTY PHARMACY (OUTPATIENT)
Dept: ONCOLOGY | Facility: HOSPITAL | Age: 69
End: 2024-10-11
Payer: MEDICARE

## 2024-10-11 NOTE — PROGRESS NOTES
Specialty Pharmacy Refill Coordination Note     Naveen is a 69 y.o. male contacted today regarding refills of  abiraterone 1000mg po QD, prednisone 5mg PO QD and relugolix 120mg PO QD of specialty medication(s).    Reviewed and verified with patient: YES; medications will ship out on 10/14/24 because of the weekend.     Specialty medication(s) and dose(s) confirmed: yes    Refill Questions      Flowsheet Row Most Recent Value   Changes to allergies? No   Changes to medications? No   New conditions or infections since last clinic visit No   Unplanned office visit, urgent care, ED, or hospital admission in the last 4 weeks  No   How does patient/caregiver feel medication is working? Very good   Financial problems or insurance changes  No   Since the previous refill, were any specialty medication doses or scheduled injections missed or delayed?  No   Does this patient require a clinical escalation to a pharmacist? No            Delivery Questions      Flowsheet Row Most Recent Value   Delivery method UPS   Delivery address verified with patient/caregiver? Yes   Delivery address Home   Number of medications in delivery 3   Medication(s) being filled and delivered Abiraterone Acetate, Relugolix, Prednisone   Doses left of specialty medications 8 days left   Copay verified? Yes   Copay amount $0   Copay form of payment No copayment ($0)   Ship Date 10/14   Delivery Date 10/15   Signature Required No              Medication Adherence    Adherence tools used: watch   Other adherence tool: Clock - takes at same time each day, 7:45am   Support network for adherence: family member          Follow-up: 30 day(s)     Sushma Muro, Pharmacy Technician  Specialty Pharmacy Technician

## 2024-10-14 ENCOUNTER — TELEPHONE (OUTPATIENT)
Dept: CARDIOLOGY | Facility: CLINIC | Age: 69
End: 2024-10-14
Payer: MEDICARE

## 2024-10-14 NOTE — TELEPHONE ENCOUNTER
"Patient left voice mail message.  He states he was diagnosed with pneumonia.  He wants to know if Dr. Kahn would like to proceed with his procedure.  Spoke with patient.  He states he saw his cancer doctor last week who did a CXR.  He was told he had \"infection\" in his lungs and started seven days of antibiotics on Friday.  He states he can not lay flat d/t coughing.  I will discuss with Micaela Robles, procedure  and Dr. Kahn.  He verbalizes understanding.  "

## 2024-10-14 NOTE — TELEPHONE ENCOUNTER
Discussed with RDS.  Patient to wait two weeks before having cath d/t diagnosis of pneumonia.  He is to call a few days prior to cath if he can not lay flat.  Mobile phone not accepting voice mail messages.  Spoke with patient on home phone.  He is instructed of above and verbalizes understanding.

## 2024-10-15 ENCOUNTER — INFUSION (OUTPATIENT)
Dept: ONCOLOGY | Facility: HOSPITAL | Age: 69
End: 2024-10-15
Payer: MEDICARE

## 2024-10-15 ENCOUNTER — TELEPHONE (OUTPATIENT)
Dept: ONCOLOGY | Facility: CLINIC | Age: 69
End: 2024-10-15
Payer: MEDICARE

## 2024-10-15 VITALS
DIASTOLIC BLOOD PRESSURE: 68 MMHG | RESPIRATION RATE: 18 BRPM | WEIGHT: 187.4 LBS | HEART RATE: 102 BPM | BODY MASS INDEX: 27.67 KG/M2 | TEMPERATURE: 97 F | SYSTOLIC BLOOD PRESSURE: 119 MMHG

## 2024-10-15 DIAGNOSIS — C61 PROSTATE CANCER METASTATIC TO BONE: Primary | ICD-10-CM

## 2024-10-15 DIAGNOSIS — D64.9 ANEMIA, UNSPECIFIED TYPE: ICD-10-CM

## 2024-10-15 DIAGNOSIS — Z45.2 ENCOUNTER FOR CARE RELATED TO PORT-A-CATH: ICD-10-CM

## 2024-10-15 DIAGNOSIS — C79.51 PROSTATE CANCER METASTATIC TO BONE: Primary | ICD-10-CM

## 2024-10-15 PROCEDURE — 36591 DRAW BLOOD OFF VENOUS DEVICE: CPT

## 2024-10-15 PROCEDURE — 25010000002 HEPARIN LOCK FLUSH PER 10 UNITS: Performed by: NURSE PRACTITIONER

## 2024-10-15 RX ORDER — HEPARIN SODIUM (PORCINE) LOCK FLUSH IV SOLN 100 UNIT/ML 100 UNIT/ML
500 SOLUTION INTRAVENOUS AS NEEDED
OUTPATIENT
Start: 2024-10-15

## 2024-10-15 RX ORDER — HEPARIN SODIUM (PORCINE) LOCK FLUSH IV SOLN 100 UNIT/ML 100 UNIT/ML
500 SOLUTION INTRAVENOUS AS NEEDED
Status: DISCONTINUED | OUTPATIENT
Start: 2024-10-15 | End: 2024-10-15 | Stop reason: HOSPADM

## 2024-10-15 RX ORDER — SODIUM CHLORIDE 0.9 % (FLUSH) 0.9 %
10 SYRINGE (ML) INJECTION AS NEEDED
OUTPATIENT
Start: 2024-10-15

## 2024-10-15 RX ADMIN — HEPARIN 500 UNITS: 100 SYRINGE at 13:30

## 2024-10-15 NOTE — TELEPHONE ENCOUNTER
Patient in office today to have labs drawn, he states that his symptoms have not improved or worsened since being on Doxycyline, tomorrow is day 7 of a 7 day course. He inquired about getting a prescription for Augmentin. Discussed with Dr. Rashid and he would prefer to get his appt with pulmonary moved sooner if they have availability. Called and left a VM with Mechelle, the new patient coordinator for the pulmonary office, requesting a sooner appt for patient. Requested that she contact patient directly with appt.

## 2024-10-16 ENCOUNTER — TELEPHONE (OUTPATIENT)
Dept: ONCOLOGY | Facility: CLINIC | Age: 69
End: 2024-10-16
Payer: MEDICARE

## 2024-10-16 LAB
FERRITIN SERPL-MCNC: 82 NG/ML (ref 30–400)
FOLATE SERPL-MCNC: 9.8 NG/ML
IRON SATN MFR SERPL: 12 % (ref 15–55)
IRON SERPL-MCNC: 41 UG/DL (ref 38–169)
TIBC SERPL-MCNC: 343 UG/DL (ref 250–450)
UIBC SERPL-MCNC: 302 UG/DL (ref 111–343)
VIT B12 SERPL-MCNC: 280 PG/ML (ref 232–1245)

## 2024-10-16 NOTE — TELEPHONE ENCOUNTER
Caller: PEEWEE    [facility]    Best call back number: [phone], [phone]    What is the best time to reach you: ASAP    Who are you requesting to speak with (clinical staff, provider,  specific staff member): CLINICAL      What was the call regarding: PLEASE CALL [facility] BACK TO VERIFY IF THE EXTRA LAVENDAR TUBE THEY RECEIVED FOR THIS PATIENT IS NEEDED FOR ANYTHING, BEFORE THEY DISCARD IT.

## 2024-10-16 NOTE — TELEPHONE ENCOUNTER
Called and spoke with Eri, informed her we needed iron, ferritin B12 and folate. She states those were all run.

## 2024-10-22 ENCOUNTER — TELEPHONE (OUTPATIENT)
Dept: GENETICS | Facility: HOSPITAL | Age: 69
End: 2024-10-22
Payer: MEDICARE

## 2024-10-22 NOTE — TELEPHONE ENCOUNTER
Called pt to remind them of their upcoming genetic counseling appt. Pt was unable to review family history at the time.

## 2024-10-25 ENCOUNTER — TELEPHONE (OUTPATIENT)
Dept: GENETICS | Facility: HOSPITAL | Age: 69
End: 2024-10-25
Payer: MEDICARE

## 2024-10-25 NOTE — TELEPHONE ENCOUNTER
Pt had to cancel his genetic counseling appt yesterday, 10/24, due to being sick. Called pt to r/s. Pt asked if we could call back next week to r/s in hopes that he would be feeling better by then.

## 2024-10-29 ENCOUNTER — HOSPITAL ENCOUNTER (OUTPATIENT)
Facility: HOSPITAL | Age: 69
Setting detail: HOSPITAL OUTPATIENT SURGERY
Discharge: HOME | End: 2024-10-29
Attending: INTERNAL MEDICINE | Admitting: INTERNAL MEDICINE
Payer: MEDICARE

## 2024-10-29 VITALS
HEART RATE: 74 BPM | DIASTOLIC BLOOD PRESSURE: 94 MMHG | SYSTOLIC BLOOD PRESSURE: 118 MMHG | OXYGEN SATURATION: 96 % | BODY MASS INDEX: 28.08 KG/M2 | WEIGHT: 189.6 LBS | RESPIRATION RATE: 16 BRPM | HEIGHT: 69 IN | TEMPERATURE: 97 F

## 2024-10-29 DIAGNOSIS — R07.2 PRECORDIAL PAIN: ICD-10-CM

## 2024-10-29 DIAGNOSIS — R06.02 SOB (SHORTNESS OF BREATH): ICD-10-CM

## 2024-10-29 DIAGNOSIS — I20.89 ANGINAL EQUIVALENT: ICD-10-CM

## 2024-10-29 DIAGNOSIS — I25.118 CORONARY ARTERY DISEASE OF NATIVE ARTERY OF NATIVE HEART WITH STABLE ANGINA PECTORIS: ICD-10-CM

## 2024-10-29 LAB
ALBUMIN SERPL-MCNC: 3.7 G/DL (ref 3.5–5.2)
ALBUMIN/GLOB SERPL: 1.4 G/DL
ALP SERPL-CCNC: 71 U/L (ref 39–117)
ALT SERPL W P-5'-P-CCNC: 9 U/L (ref 1–41)
ANION GAP SERPL CALCULATED.3IONS-SCNC: 11 MMOL/L (ref 5–15)
AST SERPL-CCNC: 15 U/L (ref 1–40)
BILIRUB SERPL-MCNC: 0.3 MG/DL (ref 0–1.2)
BUN SERPL-MCNC: 8 MG/DL (ref 8–23)
BUN/CREAT SERPL: 10.3 (ref 7–25)
CALCIUM SPEC-SCNC: 9.2 MG/DL (ref 8.6–10.5)
CHLORIDE SERPL-SCNC: 101 MMOL/L (ref 98–107)
CHOLEST SERPL-MCNC: 247 MG/DL (ref 0–200)
CO2 SERPL-SCNC: 25 MMOL/L (ref 22–29)
CREAT SERPL-MCNC: 0.78 MG/DL (ref 0.76–1.27)
DEPRECATED RDW RBC AUTO: 57.1 FL (ref 37–54)
EGFRCR SERPLBLD CKD-EPI 2021: 96.5 ML/MIN/1.73
ERYTHROCYTE [DISTWIDTH] IN BLOOD BY AUTOMATED COUNT: 20.7 % (ref 12.3–15.4)
GLOBULIN UR ELPH-MCNC: 2.7 GM/DL
GLUCOSE SERPL-MCNC: 113 MG/DL (ref 65–99)
HBA1C MFR BLD: 5.6 % (ref 4.8–5.6)
HCT VFR BLD AUTO: 39.9 % (ref 37.5–51)
HDLC SERPL-MCNC: 36 MG/DL (ref 40–60)
HGB BLD-MCNC: 12.5 G/DL (ref 13–17.7)
LDLC SERPL CALC-MCNC: 173 MG/DL (ref 0–100)
LDLC/HDLC SERPL: 4.75 {RATIO}
MCH RBC QN AUTO: 24.6 PG (ref 26.6–33)
MCHC RBC AUTO-ENTMCNC: 31.3 G/DL (ref 31.5–35.7)
MCV RBC AUTO: 78.5 FL (ref 79–97)
PLATELET # BLD AUTO: 210 10*3/MM3 (ref 140–450)
PMV BLD AUTO: 10.4 FL (ref 6–12)
POTASSIUM SERPL-SCNC: 4.4 MMOL/L (ref 3.5–5.2)
PROT SERPL-MCNC: 6.4 G/DL (ref 6–8.5)
RBC # BLD AUTO: 5.08 10*6/MM3 (ref 4.14–5.8)
SODIUM SERPL-SCNC: 137 MMOL/L (ref 136–145)
TRIGL SERPL-MCNC: 200 MG/DL (ref 0–150)
VLDLC SERPL-MCNC: 38 MG/DL (ref 5–40)
WBC NRBC COR # BLD AUTO: 10.84 10*3/MM3 (ref 3.4–10.8)

## 2024-10-29 PROCEDURE — 80053 COMPREHEN METABOLIC PANEL: CPT | Performed by: PHYSICIAN ASSISTANT

## 2024-10-29 PROCEDURE — 25010000002 PHENYLEPHRINE 10 MG/ML SOLUTION: Performed by: INTERNAL MEDICINE

## 2024-10-29 PROCEDURE — 93458 L HRT ARTERY/VENTRICLE ANGIO: CPT | Performed by: INTERNAL MEDICINE

## 2024-10-29 PROCEDURE — 25810000003 SODIUM CHLORIDE 0.9 % SOLUTION: Performed by: INTERNAL MEDICINE

## 2024-10-29 PROCEDURE — 25010000002 HEPARIN (PORCINE) PER 1000 UNITS: Performed by: INTERNAL MEDICINE

## 2024-10-29 PROCEDURE — 25010000002 MIDAZOLAM PER 1 MG: Performed by: INTERNAL MEDICINE

## 2024-10-29 PROCEDURE — C1769 GUIDE WIRE: HCPCS | Performed by: INTERNAL MEDICINE

## 2024-10-29 PROCEDURE — 25010000002 LIDOCAINE PF 1% 1 % SOLUTION: Performed by: INTERNAL MEDICINE

## 2024-10-29 PROCEDURE — 80061 LIPID PANEL: CPT | Performed by: PHYSICIAN ASSISTANT

## 2024-10-29 PROCEDURE — 85027 COMPLETE CBC AUTOMATED: CPT | Performed by: PHYSICIAN ASSISTANT

## 2024-10-29 PROCEDURE — 25010000002 NICARDIPINE 2.5 MG/ML SOLUTION: Performed by: INTERNAL MEDICINE

## 2024-10-29 PROCEDURE — C1894 INTRO/SHEATH, NON-LASER: HCPCS | Performed by: INTERNAL MEDICINE

## 2024-10-29 PROCEDURE — 83036 HEMOGLOBIN GLYCOSYLATED A1C: CPT | Performed by: INTERNAL MEDICINE

## 2024-10-29 PROCEDURE — 25510000001 IOPAMIDOL PER 1 ML: Performed by: INTERNAL MEDICINE

## 2024-10-29 RX ORDER — ASPIRIN 81 MG/1
81 TABLET ORAL DAILY
Status: DISCONTINUED | OUTPATIENT
Start: 2024-10-30 | End: 2024-10-29 | Stop reason: HOSPADM

## 2024-10-29 RX ORDER — LIDOCAINE HYDROCHLORIDE 10 MG/ML
INJECTION, SOLUTION EPIDURAL; INFILTRATION; INTRACAUDAL; PERINEURAL
Status: DISCONTINUED | OUTPATIENT
Start: 2024-10-29 | End: 2024-10-29 | Stop reason: HOSPADM

## 2024-10-29 RX ORDER — NITROGLYCERIN 0.4 MG/1
0.4 TABLET SUBLINGUAL
Status: DISCONTINUED | OUTPATIENT
Start: 2024-10-29 | End: 2024-10-29 | Stop reason: HOSPADM

## 2024-10-29 RX ORDER — HYDROMORPHONE HYDROCHLORIDE 4 MG/1
8 TABLET ORAL EVERY 4 HOURS PRN
Status: DISCONTINUED | OUTPATIENT
Start: 2024-10-29 | End: 2024-10-29 | Stop reason: HOSPADM

## 2024-10-29 RX ORDER — ACETAMINOPHEN 325 MG/1
650 TABLET ORAL EVERY 4 HOURS PRN
Status: DISCONTINUED | OUTPATIENT
Start: 2024-10-29 | End: 2024-10-29 | Stop reason: HOSPADM

## 2024-10-29 RX ORDER — ROSUVASTATIN CALCIUM 40 MG/1
40 TABLET, COATED ORAL DAILY
Qty: 90 TABLET | Refills: 3 | Status: SHIPPED | OUTPATIENT
Start: 2024-10-29

## 2024-10-29 RX ORDER — SODIUM CHLORIDE 9 MG/ML
40 INJECTION, SOLUTION INTRAVENOUS AS NEEDED
Status: DISCONTINUED | OUTPATIENT
Start: 2024-10-29 | End: 2024-10-29 | Stop reason: HOSPADM

## 2024-10-29 RX ORDER — HEPARIN SODIUM 1000 [USP'U]/ML
INJECTION, SOLUTION INTRAVENOUS; SUBCUTANEOUS
Status: DISCONTINUED | OUTPATIENT
Start: 2024-10-29 | End: 2024-10-29 | Stop reason: HOSPADM

## 2024-10-29 RX ORDER — IOPAMIDOL 755 MG/ML
INJECTION, SOLUTION INTRAVASCULAR
Status: DISCONTINUED | OUTPATIENT
Start: 2024-10-29 | End: 2024-10-29 | Stop reason: HOSPADM

## 2024-10-29 RX ORDER — SODIUM CHLORIDE 0.9 % (FLUSH) 0.9 %
10 SYRINGE (ML) INJECTION AS NEEDED
Status: DISCONTINUED | OUTPATIENT
Start: 2024-10-29 | End: 2024-10-29 | Stop reason: HOSPADM

## 2024-10-29 RX ORDER — SODIUM CHLORIDE 0.9 % (FLUSH) 0.9 %
10 SYRINGE (ML) INJECTION EVERY 12 HOURS SCHEDULED
Status: DISCONTINUED | OUTPATIENT
Start: 2024-10-29 | End: 2024-10-29 | Stop reason: HOSPADM

## 2024-10-29 RX ORDER — NITROGLYCERIN 0.4 MG/1
0.4 TABLET SUBLINGUAL
Status: DISCONTINUED | OUTPATIENT
Start: 2024-10-29 | End: 2024-10-29

## 2024-10-29 RX ORDER — MIDAZOLAM HYDROCHLORIDE 1 MG/ML
INJECTION, SOLUTION INTRAMUSCULAR; INTRAVENOUS
Status: DISCONTINUED | OUTPATIENT
Start: 2024-10-29 | End: 2024-10-29 | Stop reason: HOSPADM

## 2024-10-29 RX ORDER — ASPIRIN 81 MG/1
324 TABLET, CHEWABLE ORAL ONCE
Status: COMPLETED | OUTPATIENT
Start: 2024-10-29 | End: 2024-10-29

## 2024-10-29 RX ORDER — PHENYLEPHRINE HYDROCHLORIDE 10 MG/ML
INJECTION INTRAVENOUS
Status: DISCONTINUED | OUTPATIENT
Start: 2024-10-29 | End: 2024-10-29 | Stop reason: HOSPADM

## 2024-10-29 RX ORDER — SODIUM CHLORIDE 9 MG/ML
3 INJECTION, SOLUTION INTRAVENOUS CONTINUOUS
Status: ACTIVE | OUTPATIENT
Start: 2024-10-29 | End: 2024-10-29

## 2024-10-29 RX ADMIN — HYDROMORPHONE HYDROCHLORIDE 8 MG: 4 TABLET ORAL at 10:14

## 2024-10-29 RX ADMIN — ASPIRIN 81 MG 324 MG: 81 TABLET ORAL at 09:07

## 2024-10-29 RX ADMIN — SODIUM CHLORIDE 3 ML/KG/HR: 9 INJECTION, SOLUTION INTRAVENOUS at 09:07

## 2024-10-29 NOTE — Clinical Note
Hemostasis started on the right radial artery. R-Band was used in achieving hemostasis. Radial compression device applied to vessel. Hemostasis achieved successfully. Closure device additional comment: 14 ml in band

## 2024-10-29 NOTE — H&P
[facility]   History and physical      Patient Care Team:  Cielo Dodd PA-C as PCP - General (Family Medicine)  Alexander Gomez MD (General Surgery)  Ishmael Rashid MD as Consulting Physician (Hematology and Oncology)  Anna Ayers PA-C as Physician Assistant (Physician Assistant)  Mir Duffy MD as Consulting Physician (Endocrinology)  Danny Avalos II, MD (Pain Medicine)  Joni Kahn MD as Consulting Physician (Cardiology)    Past Medical History:   Diagnosis Date    Arthritis     Bone cancer     Coronary artery disease     Elevated cholesterol     History of radiation therapy 08/16/2021    L4 through sacrum, left acetabulum, right pelvis    Nephrolithiasis     Opioid use disorder, mild, on maintenance therapy (HCC)     Prostate cancer        Past Surgical History:   Procedure Laterality Date    CHOLECYSTECTOMY      COLONOSCOPY      CORONARY STENT PLACEMENT  2006 & 2011    HERNIA REPAIR  1986    THORACIC DISCECTOMY  1994         Allergies   Allergen Reactions    Cymbalta [Duloxetine Hcl] Hives and Dizziness    Duloxetine Hives and Dizziness    Gabapentin Nausea Only    Pregabalin Dizziness, Nausea And Vomiting and Hives           Current Facility-Administered Medications:     acetaminophen (TYLENOL) tablet 650 mg, 650 mg, Oral, Q4H PRN, Fernierannadarya Jenny E, PA-C    aspirin chewable tablet 324 mg, 324 mg, Oral, Once **AND** [START ON 10/30/2024] aspirin EC tablet 81 mg, 81 mg, Oral, Daily, Rosa Iselanadarya Jenny E, PA-C    nitroglycerin (NITROSTAT) SL tablet 0.4 mg, 0.4 mg, Sublingual, Q5 Min PRN, Rosa Iselanadarya Jenny E, PA-C    sodium chloride 0.9 % flush 10 mL, 10 mL, Intravenous, Q12H, McGrannahan, Jenny E, PA-C    sodium chloride 0.9 % flush 10 mL, 10 mL, Intravenous, PRN, McGrannahan, Jenny E, PA-C    sodium chloride 0.9 % infusion 40 mL, 40 mL, Intravenous, PRN, McGrannahan, Jenny E, PA-C    sodium chloride 0.9 % infusion, 3 mL/kg/hr,  Intravenous, Continuous, Joni Kahn MD    Facility-Administered Medications Ordered in Other Encounters:     heparin injection 500 Units, 500 Units, Intravenous, PRN, Ishmael Rashid MD, 500 Units at 06/02/21 0900    sodium chloride, 3 mL/kg/hr        Facility-Administered Medications Prior to Admission   Medication Dose Route Frequency Provider Last Rate Last Admin    triamcinolone acetonide (KENALOG-40) injection 80 mg  80 mg Intramuscular Once Means, Cielo, PA-C         Medications Prior to Admission   Medication Sig Dispense Refill Last Dose/Taking    abiraterone acetate (ZYTIGA) 500 MG tablet Take 2 tablets by mouth Daily. 60 tablet 11     albuterol sulfate  (90 Base) MCG/ACT inhaler Inhale 2 puffs Every 4 (Four) Hours As Needed for Wheezing. 8 g 5     aspirin 81 MG EC tablet Take 1 tablet by mouth Daily. 90 tablet 3     azelastine (ASTELIN) 0.1 % nasal spray 2 sprays into the nostril(s) as directed by provider 2 (Two) Times a Day. Use in each nostril as directed 30 mL 5     buPROPion XL (Wellbutrin XL) 150 MG 24 hr tablet Take 1 tablet by mouth Daily. For smoking cessation 30 tablet 5     cyclobenzaprine (FLEXERIL) 10 MG tablet Take 1 tablet by mouth 3 (Three) Times a Day As Needed for Muscle Spasms. 90 tablet 0     doxycycline (VIBRAMYCIN) 100 MG capsule Take 1 capsule by mouth 2 (Two) Times a Day. 14 capsule 0     ferrous sulfate 325 (65 FE) MG tablet 325 mg p.o. twice daily every other day, take with vitamin C 30 tablet 11     finasteride (Proscar) 5 MG tablet Take 1 tablet by mouth Daily. 30 tablet 2     Fluticasone-Umeclidin-Vilant (Trelegy Ellipta) 200-62.5-25 MCG/ACT inhaler Inhale 1 puff Daily. 180 each 3     HYDROmorphone (DILAUDID) 8 MG tablet Take 1 tablet by mouth Every 4 (Four) Hours As Needed for Moderate Pain for up to 30 days. 180 tablet 0     levocetirizine (XYZAL) 5 MG tablet Take 1 tablet by mouth Every Evening. 90 tablet 1     meclizine (ANTIVERT) 25 MG tablet Take 1 tablet  by mouth 3 (Three) Times a Day As Needed for Dizziness. 45 tablet 3     methocarbamol (ROBAXIN) 500 MG tablet Take 2 tablets by mouth Every 8 (Eight) Hours.       naloxone (NARCAN) 4 MG/0.1ML nasal spray 1 spray into the nostril(s) as directed by provider As Needed (unresponsiveness).       nitroglycerin (NITROSTAT) 0.4 MG SL tablet 1 under the tongue as needed for angina, may repeat q5mins for up three doses 25 tablet 11     omeprazole (priLOSEC) 40 MG capsule Take 1 tab po BID for stomach 180 capsule 1     ondansetron (Zofran) 4 MG tablet Take 1 tablet by mouth Every 8 (Eight) Hours As Needed for Nausea or Vomiting. 30 tablet 1     oxyCODONE ER (Xtampza ER) 13.5 MG capsule extended-release 12 hour  Take 1 capsule by mouth Every 12 (Twelve) Hours for 30 days. 60 each 0     potassium chloride 10 MEQ CR tablet Take 1 tablet by mouth 2 (Two) Times a Day. 60 tablet 1     predniSONE (DELTASONE) 5 MG tablet Take 1 tablet by mouth Daily. 30 tablet 11     ranolazine (Ranexa) 1000 MG 12 hr tablet Take 1 tablet by mouth 2 (Two) Times a Day. 180 tablet 3     relugolix (ORGOVYX) 120 MG tablet tablet Take 1 tablet by mouth Daily. 30 tablet 11     rosuvastatin (CRESTOR) 20 MG tablet Take 1 tablet by mouth Daily. 90 tablet 1     senna (SENOKOT) 8.6 MG tablet Take 1 tablet by mouth Daily. 90 tablet 3     tamsulosin (FLOMAX) 0.4 MG capsule 24 hr capsule Take 1 capsule by mouth Daily. 90 capsule 1     venlafaxine 37.5 MG tablet sustained-release 24 hour 24 hr tablet Take  by mouth.            Subjective .   History of present illness:    Patient is a 69-year-old  male who presents today for cardiac catheterization secondary to anginal symptoms and abnormal myocardial perfusion study.  Patient has previous history of coronary disease with previous history of stents in his LAD and RCA and a known  of the OM.  Patient notes over the last few months he has been having exertional left-sided chest discomfort that is alleviated  with rest. Patient underwent MPS that was noted to be abnormal and was started on Ranexa. Notes that this has helped his symptoms some, but still has persistent symptoms despite maximum medical therapy. Due to this patient was scheduled for [facility], which he presents for today.       Social History     Socioeconomic History    Marital status:    Tobacco Use    Smoking status: Every Day     Current packs/day: 0.50     Average packs/day: 1 pack/day for 50.6 years (50.3 ttl pk-yrs)     Types: Cigarettes     Start date: 4/5/2024    Smokeless tobacco: Never   Vaping Use    Vaping status: Never Used   Substance and Sexual Activity    Alcohol use: Not Currently    Drug use: Never    Sexual activity: Defer     Family History   Problem Relation Age of Onset    Ovarian cancer Sister     Diabetes Brother     Heart disease Brother     Prostate cancer Brother          Review of Systems:  Review of Systems   Constitutional: Positive for malaise/fatigue. Negative for fever.   HENT:  Negative for nosebleeds.    Eyes:  Negative for redness and visual disturbance.   Cardiovascular:  Positive for chest pain and dyspnea on exertion. Negative for orthopnea, palpitations and paroxysmal nocturnal dyspnea.   Respiratory:  Negative for cough, snoring, sputum production and wheezing.    Hematologic/Lymphatic: Negative for bleeding problem.   Skin:  Negative for flushing, itching and rash.   Musculoskeletal:  Positive for arthritis and joint pain. Negative for falls and muscle cramps.   Gastrointestinal:  Negative for abdominal pain, diarrhea, heartburn, nausea and vomiting.   Genitourinary:  Negative for hematuria.   Neurological:  Negative for excessive daytime sleepiness, dizziness, headaches, tremors and weakness.   Psychiatric/Behavioral:  Negative for substance abuse. The patient is not nervous/anxious.               Objective   Vitals:  /82 (BP Location: Left arm, Patient Position: Lying)   Pulse 81   Temp 97 °F (36.1 °C)  "(Temporal)   Resp 18   Ht 175.3 cm (69\")   Wt 86 kg (189 lb 9.6 oz)   SpO2 97%   BMI 28.00 kg/m²    No intake or output data in the 24 hours ending 10/29/24 0856    Vitals reviewed.   Constitutional:       Appearance: Well-developed and not in distress.   Neck:      Vascular: No JVD.      Trachea: No tracheal deviation.   Pulmonary:      Effort: Pulmonary effort is normal.      Breath sounds: Diffuse Rhonchi present.   Cardiovascular:      Normal rate. Regular rhythm.      Murmurs: There is no murmur.      Comments: Right araseli's positive.   Pulses:     Intact distal pulses.   Edema:     Peripheral edema absent.   Abdominal:      General: Bowel sounds are normal.      Palpations: Abdomen is soft.      Tenderness: There is no abdominal tenderness.   Musculoskeletal:         General: No deformity. Skin:     General: Skin is warm and dry.   Neurological:      Mental Status: Alert and oriented to person, place, and time.              Results Review:  I reviewed the patient's new clinical results.            Invalid input(s): \"LABALBU\", \"PROT\"          Lab Results   Lab Value Date/Time    TROPONINT <0.010 02/14/2022 2109                       Tele:            Assessment & Plan     Progressive angina, with abnormal MPS. Symptoms despite maximum medical therapy.   CAD with history of stents and known  of OM  Hypertension  Dyslipidemia  Prostate cancer with mets.       Plan:    Will proceed to cardiac catheterization plus or minus intervention today via right radial access. This was discussed with the patient. He verbalizes understanding and wishes to proceed. Further recommendations to follow procedure.         DHARMESH Baker   Dictated utilizing Dragon dictation  The risks, benefits, and alternative options of cardiac catheterization were discussed with the patient.  The risks include death, MI, stroke, infection, vascular injury requiring surgical repair and/or blood transfusion, coronary dissection, " renal dysfunction/failure, allergic reaction, emergent CABG.  If PCI is needed there is a 1-2% risk of emergent CABG.  The patient is agreeable for cardiac catheterization, possible PCI or CABG. Plan is to proceed with cardiac catheterization and possible PCI.     Joni Kahn MD, [facility], [facility]  Interventional Cardiology  10/29/24  10:48 EDT

## 2024-10-30 ENCOUNTER — TELEPHONE (OUTPATIENT)
Dept: CARDIOLOGY | Facility: CLINIC | Age: 69
End: 2024-10-30
Payer: MEDICARE

## 2024-10-30 ENCOUNTER — TELEPHONE (OUTPATIENT)
Dept: UROLOGY | Facility: CLINIC | Age: 69
End: 2024-10-30
Payer: MEDICARE

## 2024-10-30 NOTE — TELEPHONE ENCOUNTER
Pt called to reschedule canceled procedure, so please call to reschedule! Also said he needed a refill on meds that help with the swelling of his prostate, please advise thanks

## 2024-10-30 NOTE — TELEPHONE ENCOUNTER
Patient had questions regarding his renexa and crestor, pt wanted to know if he needed to take both of these medications. Educated patient on each medication and that he should take them both. Patient verbalizes understanding.    Arleen can you make his follow up for julisa?    Thanks

## 2024-11-06 ENCOUNTER — CLINICAL SUPPORT (OUTPATIENT)
Facility: HOSPITAL | Age: 69
End: 2024-11-06
Payer: MEDICARE

## 2024-11-06 DIAGNOSIS — Z80.42 FAMILY HISTORY OF MALIGNANT NEOPLASM OF PROSTATE: ICD-10-CM

## 2024-11-06 DIAGNOSIS — G89.3 CHRONIC PAIN DUE TO NEOPLASM: ICD-10-CM

## 2024-11-06 DIAGNOSIS — Z80.41 FAMILY HISTORY OF MALIGNANT NEOPLASM OF OVARY: ICD-10-CM

## 2024-11-06 DIAGNOSIS — C61 PROSTATE CANCER: ICD-10-CM

## 2024-11-06 DIAGNOSIS — Z13.79 GENETIC TESTING: Primary | ICD-10-CM

## 2024-11-06 NOTE — TELEPHONE ENCOUNTER
AGUSTIN #: [id number]    Medication requested: oxyCODONE ER (Xtampza ER) 13.5 MG capsule extended-release 12 hour     Last fill date: 10/11/24    Last appointment: 9/4/24    Next appointment: 12/5/24

## 2024-11-07 RX ORDER — OXYCODONE 13.5 MG/1
13.5 CAPSULE, EXTENDED RELEASE ORAL EVERY 12 HOURS
Qty: 60 EACH | Refills: 0 | Status: SHIPPED | OUTPATIENT
Start: 2024-11-10 | End: 2024-12-10

## 2024-11-07 NOTE — PROGRESS NOTES
Genetic counseling was provided via telehealth.  Patient's name and date of birth was confirmed and confirmed that patient was physically located in [place] at the time of the appointment.    Naveen Lugo, a 69 y.o. male, was referred for genetic counseling due to a personal history of prostate cancer. Mr. Lugo was diagnosed with prostate cancer at age 65. He reports having approximately ten colon polyps removed on past colonoscopies. Mr. Lugo was interested in genetic testing to assess his risk for a hereditary cancer syndrome. Mr. Lugo was interested in pursuing a multi-gene panel. The CancerNext panel was ordered through [facility], which analyzes BRCA1/2 and 32 additional genes associated with an increased cancer risk.     FAMILY HISTORY:  Sister:   Ovarian cancer, dx late 60s  Brother:  Prostate cancer, dx ~ 50  Sister:   Leukemia  Brother:  Lung cancer    We do not have medical records confirming the diagnoses in Mr. Lugo's family.    RISK ASSESSMENT: Mr. Lugo's personal history of prostate cancer and family history cancer led to concern regarding a hereditary cancer syndrome.  He meets NCCN guidelines criteria for BRCA1/2 testing based on his personal history of metastatic prostate cancer, in addition to having first-degree relatives with prostate cancer and ovarian cancer. The standard approach to genetic testing is through a multigene panel.     GENETIC COUNSELING (25 minutes):  We reviewed the family history information in detail.  Cases of cancer follow three general patterns: sporadic, familial, and hereditary.  While most cancer is sporadic, some cases appear to occur in family clusters.  These cases are said to be familial and account for 10-20% of certain cancer cases.  Familial cases may be due to a combination of shared genes and environmental factors among family members.  In even fewer families (~10%), the cancer is said to be inherited, and the genes responsible for the cancer are known.       Family histories typical of hereditary cancer syndromes usually include multiple first- and second-degree relatives diagnosed with cancer types that define a syndrome.  These cases tend to be diagnosed at younger-than-expected ages and can be bilateral or multifocal.  The cancer in these families follows an autosomal dominant inheritance pattern, which indicates the likely presence of a mutation in a cancer susceptibility gene.  Children and siblings of an individual believed to carry this mutation have a 50% chance of inheriting that mutation, thereby inheriting the increased risk to develop cancer.  These mutations can be passed down from the maternal or the paternal lineage.    Based on Mr. Lugo's personal and family history, we discussed the BRCA1 and BRCA2 genes.  Mutations in these genes confer an increased risk for breast cancer, ovarian cancer, male breast cancer, prostate cancer, and pancreatic cancer. The standard approach to genetic testing is via a multigene panel.  Genes included on these panels have varying degrees of risk associated, and management and screening guidelines vary based on the specific gene.  Hereditary cancer syndromes can demonstrate incomplete penetrance and variable expression within families. There are genes that are evaluated that have been more recently described, and there may be less data regarding the risks and therefore may not have established management guidelines at this time. We reviewed that in some cases, the identification of a genetic mutation may impact treatment options for some types of cancer. We discussed the possibility of results that are unexpected based on family history. We discussed these limitations at length.  Based on Mr. Lugo's personal and family history and his desire to get more information regarding his personal risks and risks for his family, he opted to pursue testing through a panel evaluating several other genes known to increase the risk for  cancer.     GENETIC TESTING:  The risks, benefits and limitations of genetic testing and implications for clinical management following testing were reviewed. DNA test results can influence decisions regarding screening and prevention.  Genetic testing can have significant psychological implications for both individuals and families. Also discussed was the possibility of employment and insurance discrimination based on genetic test results and the federal and states laws that are in place to prevent this (TEGAN), as well as the limitations of these laws.         We discussed multigene panel testing, which would involve testing several genes associated with an increased cancer risk. The benefits and limitations of genetic testing were discussed and Mr. Lugo decided to pursue testing of the genes via the panel. The implications of a positive or negative test result were discussed.  We discussed the possibility that, in some cases, genetic test results may be ambiguous due to the identification of a genetic variant of uncertain significance (VUS). These variants may or may not be associated with an increased cancer risk. With multigene panel testing, it is not uncommon for a VUS to be identified.  If a VUS is identified, testing family members is not recommended and screening recommendations are made based on the family history.  The laboratories that perform genetic testing work to reclassify the VUS and send out an amended report if and when a VUS is reclassified.  The majority of variant findings are ultimately reclassified to a negative result. Given his personal history, a negative test result does not eliminate all cancer risk to his relatives, although the risk would not be as high as it would with positive genetic testing.     PLAN:  Genetic testing via the CancerNext panel through [facility] was ordered. We discussed the option of testing via a blood or saliva sample. Mr. Lugo opted to pursue testing via a  saliva sample and a collection kit will be mailed to him. Results are expected 2-3 weeks after the lab receives his sample and we will contact Mr. Lugo  with his results once they are received. He can contact us at [phone] with any questions in the meantime.     Genet Locke MS, [facility], [facility]  Licensed Certified Genetic Counselor

## 2024-11-08 ENCOUNTER — SPECIALTY PHARMACY (OUTPATIENT)
Facility: HOSPITAL | Age: 69
End: 2024-11-08
Payer: MEDICARE

## 2024-11-08 NOTE — PROGRESS NOTES
Specialty Pharmacy Refill Coordination Note     Naveen is a 69 y.o. male contacted today regarding refills of  Abiraterone 1000mg PO QD and Relugolix 120mg PO QD of specialty medication(s).    Reviewed and verified with patient: YES    Specialty medication(s) and dose(s) confirmed: yes    Refill Questions      Flowsheet Row Most Recent Value   Changes to allergies? No   Changes to medications? No   New conditions or infections since last clinic visit No   Unplanned office visit, urgent care, ED, or hospital admission in the last 4 weeks  No   How does patient/caregiver feel medication is working? Good   Financial problems or insurance changes  No   Since the previous refill, were any specialty medication doses or scheduled injections missed or delayed?  No   Does this patient require a clinical escalation to a pharmacist? No            Delivery Questions      Flowsheet Row Most Recent Value   Delivery method UPS   Delivery address verified with patient/caregiver? Yes   Delivery address Home   Number of medications in delivery 3   Medication(s) being filled and delivered predniSONE (DELTASONE), Relugolix (ORGOVYX), Abiraterone Acetate (ZYTIGA)   Doses left of specialty medications 8 days   Copay verified? Yes   Copay amount $0   Copay form of payment No copayment ($0)   Ship Date 11/11   Delivery Date 11/12   Signature Required No              Medication Adherence    Adherence tools used: watch   Other adherence tool: Clock - takes at same time each day, 7:45am   Support network for adherence: family member          Follow-up: 30 day(s)     Sushma Muro, Pharmacy Technician  Specialty Pharmacy Technician

## 2024-11-11 ENCOUNTER — TELEPHONE (OUTPATIENT)
Dept: CARDIOLOGY | Facility: CLINIC | Age: 69
End: 2024-11-11
Payer: MEDICARE

## 2024-11-11 NOTE — TELEPHONE ENCOUNTER
Dr. Mccullough's office called needing cardiac clearance for a green light procedure. Patient had recent cath on 10/29. Last echo and stress: 6/25/24. Please advise.

## 2024-11-12 ENCOUNTER — OFFICE VISIT (OUTPATIENT)
Dept: PULMONOLOGY | Facility: CLINIC | Age: 69
End: 2024-11-12
Payer: MEDICARE

## 2024-11-12 VITALS
BODY MASS INDEX: 28.14 KG/M2 | OXYGEN SATURATION: 97 % | HEIGHT: 69 IN | SYSTOLIC BLOOD PRESSURE: 160 MMHG | DIASTOLIC BLOOD PRESSURE: 90 MMHG | TEMPERATURE: 98.2 F | HEART RATE: 116 BPM | WEIGHT: 190 LBS

## 2024-11-12 DIAGNOSIS — Z72.0 TOBACCO ABUSE: ICD-10-CM

## 2024-11-12 DIAGNOSIS — J42 CHRONIC BRONCHITIS, UNSPECIFIED CHRONIC BRONCHITIS TYPE: Primary | ICD-10-CM

## 2024-11-12 DIAGNOSIS — J44.9 STAGE 2 MODERATE COPD BY GOLD CLASSIFICATION: ICD-10-CM

## 2024-11-12 RX ORDER — MONTELUKAST SODIUM 10 MG/1
10 TABLET ORAL NIGHTLY
Qty: 90 TABLET | Refills: 3 | Status: SHIPPED | OUTPATIENT
Start: 2024-11-12

## 2024-11-12 RX ORDER — IPRATROPIUM BROMIDE AND ALBUTEROL SULFATE 2.5; .5 MG/3ML; MG/3ML
3 SOLUTION RESPIRATORY (INHALATION) EVERY 4 HOURS PRN
Qty: 360 ML | Refills: 3 | Status: SHIPPED | OUTPATIENT
Start: 2024-11-12

## 2024-11-12 RX ORDER — IPRATROPIUM BROMIDE AND ALBUTEROL SULFATE 2.5; .5 MG/3ML; MG/3ML
3 SOLUTION RESPIRATORY (INHALATION) 4 TIMES DAILY PRN
Qty: 120 ML | Refills: 5 | Status: SHIPPED | OUTPATIENT
Start: 2024-11-12

## 2024-11-12 NOTE — TELEPHONE ENCOUNTER
Pt called stating the pharmacy needs dx code added to script for neb soln before it can be filled, this has been resent

## 2024-11-12 NOTE — PROGRESS NOTES
"[facility] Pulmonary Evaluation    Chief Complaint  Cough and Shortness of Breath    Referring Provider: Dr. Rashid    Subjective          Naveen Lugo presents to [facility] for chest congestion.    Mr. Lugo does have a history of COPD, has been followed by his primary care [facility] currently been on Trelegy 200 mcg dose for about a year now.  He has an albuterol rescue inhaler he uses on occasion.    For the last 3 to 4 months he has had worsening cough and congestion.  His cough is productive with greenish to yellow sputum.  He has been on several rounds of antibiotics, most recently doxycycline and then Augmentin in the last couple weeks.  Overall antibiotics have not helped his cough or congestion.  He still having a productive cough as well as wheezing and chest tightness all of the time.  The cough is worse when he tries to lay down at night.  He has been propping up some at night to help.    He denies any significant reflux symptoms but has had occasional episodes of dysphagia where food just gets caught in the top of his throat.    He also has been on a round of nystatin for some oral thrush with his primary care.      Objective     Vital Signs:   /90   Pulse 116   Temp 98.2 °F (36.8 °C)   Ht 175.3 cm (69.02\")   Wt 86.2 kg (190 lb)   SpO2 97% Comment: room air at rest  BMI 28.05 kg/m²       Immunization History   Administered Date(s) Administered    Arexvy (RSV, Adults 60+ yrs) 11/15/2023    COVID-19 (MODERNA) 1st,2nd,3rd Dose Monovalent 03/10/2021, 04/07/2021    COVID-19 (MODERNA) Monovalent Original Booster 12/22/2021    COVID-19 (PFIZER) 12YRS+ (COMIRNATY) 12/14/2023    Pneumococcal Conjugate 20-Valent (PCV20) 11/15/2023    Tdap 03/27/2023    Tetanus Immune Globulin 05/19/2012       Physical Exam  Vitals reviewed.   Constitutional:       Appearance: He is well-developed.   HENT:      Head: Normocephalic and atraumatic.   Eyes:      Pupils: Pupils " are equal, round, and reactive to light.   Cardiovascular:      Rate and Rhythm: Normal rate and regular rhythm.      Heart sounds: No murmur heard.  Pulmonary:      Effort: Pulmonary effort is normal. No respiratory distress.      Breath sounds: Wheezing and rhonchi present. No rales.   Abdominal:      General: Bowel sounds are normal. There is no distension.      Palpations: Abdomen is soft.   Musculoskeletal:         General: Normal range of motion.      Cervical back: Normal range of motion and neck supple.   Skin:     General: Skin is warm and dry.      Findings: No erythema.   Neurological:      Mental Status: He is alert and oriented to person, place, and time.   Psychiatric:         Behavior: Behavior normal.          Result Review :       Data reviewed : Radiologic studies CT scan of the chest from [facility] reviewed      PFTs done in the office today:  Moderate obstructive airway disease with an FEV1 of 2.17, 69% predicted.  Evidence of air trapping with early residual volume normal adjusted DLCO.                   Assessment and Plan    Problem List Items Addressed This Visit          Pulmonary and Pneumonias    Stage 2 moderate COPD by GOLD classification    Relevant Medications    ipratropium-albuterol (DUO-NEB) 0.5-2.5 mg/3 ml nebulizer    montelukast (SINGULAIR) 10 MG tablet       Tobacco    Tobacco abuse     Other Visit Diagnoses       Chronic bronchitis, unspecified chronic bronchitis type    -  Primary    Relevant Medications    ipratropium-albuterol (DUO-NEB) 0.5-2.5 mg/3 ml nebulizer    montelukast (SINGULAIR) 10 MG tablet    Other Relevant Orders    Home Nebulizer          Mr. Lugo does have COPD with chronic bronchitis.  For last few months he says some worsening cough and congestion.  He has been on multiple rounds of antibiotics and is currently on chronic prednisone at 5 mg daily with his chemo.    At this time [facility] the  budesonide helps with his congestion.  Also like to add on DuoNebs at least twice daily and up to 4 times a day.  I also started him on montelukast to help with the congestion.    He does continue to smoke.    I would like for him to follow-up in 3 to 4 weeks for a recheck.      I spent 36 minutes caring for Naveen on this date of service. This time includes time spent by me in the following activities:preparing for the visit, reviewing tests, obtaining and/or reviewing a separately obtained history, performing a medically appropriate examination and/or evaluation , counseling and educating the patient/family/caregiver, ordering medications, tests, or procedures, referring and communicating with other health care professionals , documenting information in the medical record, and independently interpreting results and communicating that information with the patient/family/caregiver    Follow Up     Return in about 4 weeks (around 12/10/2024).  Patient was given instructions and counseling regarding his condition or for health maintenance advice. Please see specific information pulled into the AVS if appropriate.     Makenzie Ramirez APRN, ACNP  [facility]  Electronically signed

## 2024-11-15 DIAGNOSIS — T40.2X5A THERAPEUTIC OPIOID INDUCED CONSTIPATION: ICD-10-CM

## 2024-11-15 DIAGNOSIS — G89.3 CHRONIC PAIN DUE TO NEOPLASM: ICD-10-CM

## 2024-11-15 DIAGNOSIS — K59.03 THERAPEUTIC OPIOID INDUCED CONSTIPATION: ICD-10-CM

## 2024-11-15 RX ORDER — SENNOSIDES A AND B 8.6 MG/1
1 TABLET, FILM COATED ORAL DAILY
Qty: 90 TABLET | Refills: 3 | Status: CANCELLED | OUTPATIENT
Start: 2024-11-15

## 2024-11-15 NOTE — TELEPHONE ENCOUNTER
PATIENT CALLED AND REQUESTED REFILL ON DILAUDID AND STOOL SOFTENERS BE SENT TO [facility]. PLEASE ADVISE.

## 2024-11-15 NOTE — TELEPHONE ENCOUNTER
Confirmed with pt's pharmacy, he has refills remaining of the Senokot. Pt should be good until Sept 2025.

## 2024-11-15 NOTE — TELEPHONE ENCOUNTER
AGUSTIN #: [id number]    Medication requested:HYDROmorphone (DILAUDID) 8 MG tablet     Last fill date: 10/19/24    Last appointment: 9/4/24    Next appointment: 12/5/24

## 2024-11-18 RX ORDER — HYDROMORPHONE HYDROCHLORIDE 8 MG/1
8 TABLET ORAL EVERY 4 HOURS PRN
Qty: 180 TABLET | Refills: 0 | Status: SHIPPED | OUTPATIENT
Start: 2024-11-18 | End: 2024-12-18

## 2024-11-18 RX ORDER — SENNOSIDES A AND B 8.6 MG/1
1 TABLET, FILM COATED ORAL DAILY
Qty: 90 TABLET | Refills: 3 | Status: SHIPPED | OUTPATIENT
Start: 2024-11-18

## 2024-11-25 ENCOUNTER — TELEPHONE (OUTPATIENT)
Dept: UROLOGY | Facility: CLINIC | Age: 69
End: 2024-11-25
Payer: MEDICARE

## 2024-11-25 ENCOUNTER — TELEPHONE (OUTPATIENT)
Dept: GENETICS | Facility: HOSPITAL | Age: 69
End: 2024-11-25
Payer: MEDICARE

## 2024-11-25 NOTE — TELEPHONE ENCOUNTER
Pt's saliva sample for genetic testing failed in the lab. Called pt to arrange new sample collection. Offered pt saliva kit or blood draw on 12/5 at coordinate with Palliative care appt ([facility]). Pt opted for blood draw on 12/5.

## 2024-11-25 NOTE — TELEPHONE ENCOUNTER
----- Message from Caroline GUTIERREZ sent at 11/25/2024 10:38 AM EST -----  Regarding: pre op appointment  Please schedule this patient to come in and discuss his greenlight procedure.  This patients surgery has been postponed and now the patient is ready to schedule his greenlight surgery with Dr. Mccullough.  His last office visit was 07/09/24.    Thank you

## 2024-12-04 ENCOUNTER — LAB (OUTPATIENT)
Dept: ONCOLOGY | Facility: HOSPITAL | Age: 69
End: 2024-12-04
Payer: MEDICARE

## 2024-12-04 VITALS
HEART RATE: 98 BPM | SYSTOLIC BLOOD PRESSURE: 157 MMHG | DIASTOLIC BLOOD PRESSURE: 83 MMHG | WEIGHT: 192 LBS | TEMPERATURE: 97.1 F | RESPIRATION RATE: 18 BRPM | BODY MASS INDEX: 28.34 KG/M2

## 2024-12-04 DIAGNOSIS — Z45.2 ENCOUNTER FOR CARE RELATED TO PORT-A-CATH: Primary | ICD-10-CM

## 2024-12-04 DIAGNOSIS — C79.51 PROSTATE CANCER METASTATIC TO BONE: ICD-10-CM

## 2024-12-04 DIAGNOSIS — C61 PROSTATE CANCER METASTATIC TO BONE: ICD-10-CM

## 2024-12-04 PROCEDURE — 25010000002 HEPARIN LOCK FLUSH PER 10 UNITS: Performed by: NURSE PRACTITIONER

## 2024-12-04 PROCEDURE — 36591 DRAW BLOOD OFF VENOUS DEVICE: CPT

## 2024-12-04 RX ORDER — HEPARIN SODIUM (PORCINE) LOCK FLUSH IV SOLN 100 UNIT/ML 100 UNIT/ML
500 SOLUTION INTRAVENOUS AS NEEDED
Status: DISCONTINUED | OUTPATIENT
Start: 2024-12-04 | End: 2024-12-04 | Stop reason: HOSPADM

## 2024-12-04 RX ORDER — SODIUM CHLORIDE 0.9 % (FLUSH) 0.9 %
10 SYRINGE (ML) INJECTION AS NEEDED
OUTPATIENT
Start: 2024-12-04

## 2024-12-04 RX ORDER — HEPARIN SODIUM (PORCINE) LOCK FLUSH IV SOLN 100 UNIT/ML 100 UNIT/ML
500 SOLUTION INTRAVENOUS AS NEEDED
OUTPATIENT
Start: 2024-12-04

## 2024-12-04 RX ADMIN — HEPARIN 500 UNITS: 100 SYRINGE at 10:10

## 2024-12-05 LAB
ALBUMIN SERPL-MCNC: 3.8 G/DL (ref 3.5–5.2)
ALBUMIN/GLOB SERPL: 1.5 G/DL
ALP SERPL-CCNC: 67 U/L (ref 39–117)
ALT SERPL-CCNC: 8 U/L (ref 1–41)
AST SERPL-CCNC: 11 U/L (ref 1–40)
BASOPHILS # BLD AUTO: 0.04 10*3/MM3 (ref 0–0.2)
BASOPHILS NFR BLD AUTO: 0.4 % (ref 0–1.5)
BILIRUB SERPL-MCNC: 0.4 MG/DL (ref 0–1.2)
BUN SERPL-MCNC: 9 MG/DL (ref 8–23)
BUN/CREAT SERPL: 11.7 (ref 7–25)
CALCIUM SERPL-MCNC: 9.2 MG/DL (ref 8.6–10.5)
CHLORIDE SERPL-SCNC: 101 MMOL/L (ref 98–107)
CO2 SERPL-SCNC: 27.1 MMOL/L (ref 22–29)
CREAT SERPL-MCNC: 0.77 MG/DL (ref 0.76–1.27)
EGFRCR SERPLBLD CKD-EPI 2021: 96.9 ML/MIN/1.73
EOSINOPHIL # BLD AUTO: 0.06 10*3/MM3 (ref 0–0.4)
EOSINOPHIL NFR BLD AUTO: 0.6 % (ref 0.3–6.2)
ERYTHROCYTE [DISTWIDTH] IN BLOOD BY AUTOMATED COUNT: 19.7 % (ref 12.3–15.4)
GLOBULIN SER CALC-MCNC: 2.6 GM/DL
GLUCOSE SERPL-MCNC: 115 MG/DL (ref 65–99)
HCT VFR BLD AUTO: 44.4 % (ref 37.5–51)
HGB BLD-MCNC: 14.4 G/DL (ref 13–17.7)
IMM GRANULOCYTES # BLD AUTO: 0.04 10*3/MM3 (ref 0–0.05)
IMM GRANULOCYTES NFR BLD AUTO: 0.4 % (ref 0–0.5)
LYMPHOCYTES # BLD AUTO: 2.29 10*3/MM3 (ref 0.7–3.1)
LYMPHOCYTES NFR BLD AUTO: 23.5 % (ref 19.6–45.3)
MCH RBC QN AUTO: 26.9 PG (ref 26.6–33)
MCHC RBC AUTO-ENTMCNC: 32.4 G/DL (ref 31.5–35.7)
MCV RBC AUTO: 83 FL (ref 79–97)
MONOCYTES # BLD AUTO: 0.48 10*3/MM3 (ref 0.1–0.9)
MONOCYTES NFR BLD AUTO: 4.9 % (ref 5–12)
NEUTROPHILS # BLD AUTO: 6.84 10*3/MM3 (ref 1.7–7)
NEUTROPHILS NFR BLD AUTO: 70.2 % (ref 42.7–76)
PLATELET # BLD AUTO: 159 10*3/MM3 (ref 140–450)
POTASSIUM SERPL-SCNC: 3.7 MMOL/L (ref 3.5–5.2)
PROT SERPL-MCNC: 6.4 G/DL (ref 6–8.5)
PSA SERPL-MCNC: <0.014 NG/ML (ref 0–4)
RBC # BLD AUTO: 5.35 10*6/MM3 (ref 4.14–5.8)
SODIUM SERPL-SCNC: 141 MMOL/L (ref 136–145)
WBC # BLD AUTO: 9.75 10*3/MM3 (ref 3.4–10.8)

## 2024-12-06 ENCOUNTER — SPECIALTY PHARMACY (OUTPATIENT)
Dept: ONCOLOGY | Facility: HOSPITAL | Age: 69
End: 2024-12-06
Payer: MEDICARE

## 2024-12-06 DIAGNOSIS — G89.3 CHRONIC PAIN DUE TO NEOPLASM: ICD-10-CM

## 2024-12-06 RX ORDER — OXYCODONE 13.5 MG/1
13.5 CAPSULE, EXTENDED RELEASE ORAL EVERY 12 HOURS
Qty: 60 EACH | Refills: 0 | Status: SHIPPED | OUTPATIENT
Start: 2024-12-10 | End: 2025-01-09

## 2024-12-06 NOTE — TELEPHONE ENCOUNTER
AGUSTIN #: [id number]    Medication requested: oxyCODONE ER (Xtampza ER) 13.5 MG capsule extended-release 12 hour     Last fill date: 11/10/24    Last appointment: 9/4/24    Next appointment: 12/16/24

## 2024-12-06 NOTE — PROGRESS NOTES
Specialty Pharmacy Refill Coordination Note     Naveen is a 69 y.o. male contacted today regarding refills of  Abiraterone 1000mg PO QD and Relugolix 120mg PO QD of specialty medication(s).    Reviewed and verified with patient: YES    Specialty medication(s) and dose(s) confirmed: yes    Refill Questions      Flowsheet Row Most Recent Value   Changes to allergies? No   Changes to medications? No   New conditions or infections since last clinic visit No   Unplanned office visit, urgent care, ED, or hospital admission in the last 4 weeks  No   How does patient/caregiver feel medication is working? Good   Financial problems or insurance changes  No   Since the previous refill, were any specialty medication doses or scheduled injections missed or delayed?  No   Does this patient require a clinical escalation to a pharmacist? No            Delivery Questions      Flowsheet Row Most Recent Value   Delivery method UPS   Delivery address verified with patient/caregiver? Yes   Delivery address Home   Number of medications in delivery 3   Medication(s) being filled and delivered predniSONE (DELTASONE), Relugolix (ORGOVYX), Abiraterone Acetate (ZYTIGA)   Doses left of specialty medications 8 days   Copay verified? No   Copay amount $   Copay form of payment No copayment ($0)   Ship Date 12/9   Delivery Date 12/10   Signature Required No              Medication Adherence    Adherence tools used: watch   Other adherence tool: Clock - takes at same time each day, 7:45am   Support network for adherence: family member          Follow-up: 30 day(s)     Susmha Muro, Pharmacy Technician  Specialty Pharmacy Technician

## 2024-12-10 ENCOUNTER — OFFICE VISIT (OUTPATIENT)
Dept: ONCOLOGY | Facility: CLINIC | Age: 69
End: 2024-12-10
Payer: MEDICARE

## 2024-12-10 ENCOUNTER — SPECIALTY PHARMACY (OUTPATIENT)
Facility: HOSPITAL | Age: 69
End: 2024-12-10
Payer: MEDICARE

## 2024-12-10 VITALS
SYSTOLIC BLOOD PRESSURE: 134 MMHG | WEIGHT: 193 LBS | HEART RATE: 124 BPM | BODY MASS INDEX: 28.58 KG/M2 | DIASTOLIC BLOOD PRESSURE: 85 MMHG | RESPIRATION RATE: 18 BRPM | HEIGHT: 69 IN | TEMPERATURE: 97.8 F

## 2024-12-10 DIAGNOSIS — C79.51 PROSTATE CANCER METASTATIC TO BONE: Primary | ICD-10-CM

## 2024-12-10 DIAGNOSIS — C61 PROSTATE CANCER METASTATIC TO BONE: Primary | ICD-10-CM

## 2024-12-10 NOTE — PROGRESS NOTES
CHIEF COMPLAINT: Chronic dyspnea not new not worsening and following with pulmonary    Problem List:  Oncology/Hematology History Overview Note   1.  Stage IVb grade group 4 metastatic prostate cancer with sclerotic bone lesions on CT and bone scan and history of prostatic hypertrophy.  Good response to Taxotere ending 2/18/2021 Relugolix, followed by Zytiga prednisone with L4/sacrum, left acetabulum, and right pelvis external beam radiation August 2021.  Hypophosphatemia on Zometa.  Zometa held as of October 2021.  2.  Kidney stone  3.  Polyps  4.  Probable drug-induced hypophosphatemia from Zometa, drug stopped after 10/5/2021 dose  5.  Cancer related pain seeing palliative care  6.  Fatigue  7.  Covid 2/2022  8.  Iron deficiency anemia  -5/3/2023 EGD and colonoscopy with Dr. Alexander Gomez: Superficial erosions in the stomach with gastritis.  Small polyps removed, dilated internal hemorrhoids.  Pathology pending.  Recommend repeat colonoscopy in 5 years.  9.  CADz    -9/30/2020 office note from Dr. Ronen Reynaga indicates patient with PSA 10.8.  Underwent TRUS prostate biopsy 9/15/2020.  Adenocarcinoma the prostate Ashby 4+4 = 8 in 1 out of 12 cores and 4+3 = 7 and 10 out of 12 cores and 3+4 = 7 in 1 out of 12 cores.  Perineural invasion.  Extraprostatic extension into the fat seen on 2 biopsies of the right lateral mid and right apex.  9/24/2020 CT chest and whole-body bone scan showed T12, L1, left acetabular, right medial acetabular metastases with no lung metastases.  He started androgen deprivation therapy with Casodex with plans for Lupron to start in 1 to 2 weeks for clinical T3 N0 M1 metastatic prostate cancer.  Review of official report from [facility] 9/24/2020 bone scan mentions T12, L1, roof of left acetabulum and medial right acetabular osteoblastic metastases.  CT chest with contrast that same day showed mild splenomegaly 14.2 cm with stable periaortic nodes compared to CT December 2015  with no lung metastases.  Sclerotic abnormality within the T12 vertebral body, L1 vertebral body extending into the pedicle unchanged.  8/28/2020 CT abdomen pelvis report from [facility] reviewed and mentions normal spleen size.  Punctate nonobstructing kidney stone, no paulino enlargement, diffuse bladder wall thickening with mild perivesicular fat stranding, 2.1 cm sclerotic left iliac bone above the left acetabulum new compared to 2015 as well as 1.5 cm faintly sclerotic focus in the right posterior acetabulum stable compared to prior CT 2015 as well as a 1.6 cm T12 and inferior vertebral body sclerotic lesion and a 1.9 cm left posterior lateral L1 vertebral body abnormality extending to the pedicle.    -10/13/2020 initial [facility] medical oncology consultation: With 1 [place] score 8, 4+4 lesion that would make him a grade group 4 with stage IVb disease given radiographic evidence of sclerotic bone metastasis on bone scan and CT.  Needs genetic testing given metastatic prostate cancer and this is also important as, were he to have mismatch repair mutation then we would have options for PDL 1 inhibitors and were he BRCA mutated would have options for PARP inhibitors in the future.  Systemic therapy for castration naïve prostate cancer or N1 disease should be with apalutamide or Abiraterone or enzalutamide all of which are category 1.  He does not have any visceral metastases.  According to his imaging he has T12, L1, left acetabular, right acetabular, and left iliac bony involvement.  That means he would have 4 or more bone metastases with at least one metastasis (i.e. the acetabular lesions bilaterally) beyond the pelvis/vertebral, for which this would be considered high-volume disease for which 6 cycles of docetaxel 75 mg/m² x 6 cycles followed by Zytiga and prednisone would be reasonable along with Zometa every 6 weeks for bone stabilization.  He will do chemo preparation visit with my nurse practitioner  prior to start chemotherapy with Taxotere and Zometa in 2 weeks and he is already received Casodex in preparation for Lupron shot he received on 10/12/2020 with Dr. Reynaga.  We will get him to Dr. Alexander Gomez who has done his polypectomies in the past for port placement and we will get genetic counseling started.  Following the 6 courses of Taxotere per the [place] trial criteria, I would then give him an indefinite Zytiga and prednisone and Zometa until progression or toxicity dictates.    10/29/2020 PSA 1.37    -12/16/2019 COVID-19 antigen test negative    -12/29/2020 PSA 0.275 with absolute neutrophil count 700 and creatinine 0.76 with normal liver enzymes and electrolytes.  Normal magnesium and phosphorus and urine drug screen positive for buprenorphine and opiates follow-up with palliative care.    -1/4/2021 CT chest abdomen pelvis with contrast and whole-body bone scan shows improving less metabolically active bone metastases.  Stable sclerotic T12, L1, and L2 vertebral bodies and bilateral pelvic bones on bone windows with stable subcentimeter para-aortic lymph nodes and no definite progression in the chest abdomen or pelvis.    -1/5/2021 [facility] medical oncology follow-up visit: I reviewed the images and reports thereof of the above CT and bone scan which shows good response to Taxotere x3 courses with a PSA down to 0.275 from a baseline of 10.  Get another 3 courses of Taxotere, continue his Xgeva, and then following that we will switch to Zytiga and prednisone.  He is working with palliative care on his bone pain but on his current dose of fentanyl patch he says his pain is still not adequately controlled he will contact them.  Dexamethasone prescription was refilled.  We will see my nurse practitioner back in 3 weeks for course #5 of 6 total courses and then we will reimage with CT chest abdomen pelvis and bone scan before going to the Zytiga prednisone.    -3/4/2021 CT chest abdomen pelvis with  contrast is negative save for stable sclerotic bone lesions and the bone scan shows near resolution of prior bony abnormalities associated with the known sclerotic lesions in the thoracolumbar spine and bony pelvis.    2/17/2021 PSA down to 0.1 from 1.37 October 2020.  3/9/2021 PSA 0.1    -5/26/2021 CT chest abdomen pelvis with contrast shows stable to slightly underlying increase low-density left para-aortic node.  Bone lesions stable.  Whole-body bone scan stable to decrease activity of known thoracolumbar and bony pelvic involvement.  PSA less than 0.1  , AST 74, bilirubin 0.5.       -6/1/2021 [facility] oncology follow-up visit: I reviewed the above data with him and images thereof.  Bones are stable.  No significant adenopathy.  PSA immeasurably low.     upper limit of normal 69 and AST 74 upper limit of normal 46.  Hence, neither is more than double the upper limit of normal with no ascites and no encephalopathy and normal albumin and bilirubin, despite the elevation of liver enzymes, he has child Abrams score A.  Package insert for Abiraterone says that for ALT and/or AST greater than 5 times upper limit of normal or total bilirubin greater than 3 times the upper limit of normal to withhold treatment until liver function tests returned to baseline or ALT and AST less than or equal to 2.5 times the upper limit of normal and total bilirubin less than or equal to 1.5 times the upper limit of normal and then reinitiate at 750 mg daily dose of Zytiga.  For recurrent hepatotoxicity on 750 mg daily Zytiga, withhold treatment until liver functions returned to baseline or ALT and AST less than 3-2.5 times upper limit of normal and total bilirubin less than or equal to 1.5 times the upper limit of normal then reinitiate at 500 mg daily Zytiga dose.  If has recurrent hepatotoxicity on 500 mg dose, then discontinue treatment.  If has ALT greater than 3 times the upper limit of normal along with total  bilirubin greater than 2 times the upper limit of normal in the absence of other contributing causes or biliary obstruction, permanently discontinue Zytiga.  Based on these recommendations, I would continue current doses but we will watch with serial labs monthly and repeat his imaging again in 3 months but clinically he is doing wonderfully with excellent response to Taxotere and now on Zytiga prednisone plus Zometa along with Relugolix.    -6/23/2020 for [facility] follow-up: Having persistent and worsening pain above his left hip.  I will get an MRI of the left hip for further evaluation.  Otherwise we will continue therapy unchanged with Zytiga, prednisone and Zometa along with Relugolix.    -7/28/2021 [facility] follow-up: Patient with multiple somatic complaints including episodes of confusion, dizziness, decreased memory, agitation.  He reports ongoing episodes with difficulty swallowing.  Also most concerning for episodes of angina and chest pain for which he basically refused to seek emergency medical attention.  He has a history of coronary artery disease and stent placement.  He has not followed up with cardiology for many years.  I will get an MRI of the brain in light of his confusion, dizziness and agitation.  I will get him to gastroenterology for EGD in light of his dysphagia.  I have also put in a referral for cardiology.  I reemphasized to the patient, his wife who is with him today and his son who was on the telephone during our visit the importance of seeking out prompt medical attention when he is having chest pain or neurological concerns so that he can be evaluated.  The patient states that he basically refuses to go to the emergency room and if his family calls an ambulance he would not agree to go to the hospital.  For the time being, I have asked him to hold his Zytiga and prednisone until we can sort this out as Zytiga does have some cardiovascular risk.  There is  "likely no treatment for prostate cancer that does not carry some form of cardiovascular risk.  His PSA remains low at 0.014 yesterday.  He will continue relugolix for now.    Of note, some of these symptoms could also be due to his hypophosphatemia, phosphate level from yesterday was 1.5, I have sent in a prescription for K-Phos 3 times a day before meals for 10 days.  We will hold further Zometa until this is corrected.  I have also put in an order to check his vitamin D level.  He takes calcium and vitamin D daily.  Some of his symptoms also could be due to drug withdrawal as there was some confusion and difficulty getting his last prescription for methadone therefore he was without methadone for several days, he now has his prescription and is back on his pain medication.  He has an appointment with neurosurgery tomorrow in light of his ongoing back pain, MRI of the hip recently showed multiple bony lesions largest at the L5 vertebral body and left supra-acetabular region.  If no neurosurgical intervention recommended he may benefit from spot radiation as this is where a lot of his pain is coming from.  His wife had questions today regarding his staging and extent of disease.  We reviewed previous scans.  I did clarify with her as she used the words \"cancer free\" as she thought that is what she was told after his CT scans once he completed Taxotere in February.  I explained to her that even though his scans showed he had an excellent response with no clear areas of metastasis that did not mean he was cancer free, I explained to her that when you have metastatic cancer the treatment intent is palliative in nature and to control the disease but not to cure the disease.  She stated understanding.  We will see him back in 2 weeks for follow-up to sort through this and make further plan of care, again, I have asked him to hold Zytiga and prednisone until he sees us back, he will continue on his other medications " including relugolix.    -7/30/2021 neurosurgery PA consultation.  MRI with and without contrast L5 ordered.    -8/3/2021 neurosurgery follow-up showed known sclerotic disease with new L5 abnormality without compression deformity or instability.  MRI brain negative for metastasis.    -8/4/2021 office visit Dr. Fco Quintanilla radiation oncology coordinating with Dr. North neurosurgery for palliative radiation for MRI evidence of L5 and left supra acetabular painful lesions.  States that the patient's disease which is not secreting PSA is experiencing some progression and would benefit from 20 Gy in five fractions and other lesion 30 Gy in ten fractions. Patient offered to go to [place] for radiation but desired treatment in [place].    -8/10/2021 [facility] medical oncology follow-up visit: No chest pains at this junction.  Reviewed MRI brain and other MRIs reviewed by Dr. North and Dwight.  Due for EGD on 19th.  We will repeat his phosphorus along with his other labs continue Relugolix, Zytiga, prednisone, and he will continue radiation palliatively to the painful bony lesions with Dr. Quintanilla/Can.  He will see my nurse practitioner back in a few weeks to see how he is tolerating this and to follow-up on the phosphorus off of Zometa and she will order repeat CT chest abdomen pelvis with contrast and total body bone scan the end of September.I will see him back after that.    -9/8/2021 Zometa resumed.  PSA <0.10    -10/5/2021 [facility] medical oncology follow-up visit: followed-up with radiation oncology 9/21/2021.  Status post symptomatic L5 radiation 5 fractions 20 [place] completed 8/16/2021 with improvement in right hip pain.  9/2/2021 PSA less than 0.1.  9/29/2021 total body bone scan shows increased uptake left iliac bone slightly superior group of the left acetabulum with no additional foci of suspicious abnormality is unlikely treatment related with no new sites of active osseous metastasis.  CT chest abdomen  pelvis with contrast shows stable borderline enlarged left periaortic nodes and dating sclerotic bone lesions.Continue Zytiga, prednisone, Relugolix, Zometa, repeat CT chest abdomen pelvis with contrast and total body bone scan the end of the year.  We will follow PSA serially.    -11/17/2021 [facility] Oncology clinic follow-up:  Mr. Lugo overall is doing well.  He is tolerating therapy with Zytiga, prednisone and Relugolix along with Zometa which is given every 6 weeks.  PSA remains low at <0.1.  Has occasional low phosphorus, currently low at 1.7.  Serum calcium is normal at 8.9, normal creatinine 0.79 and normal CBC other than for platelets of 124.  I will hold Zometa for 1 month, I did send in a prescription for phosphorus replacement today.  He takes calcium and vitamin D.  I will see him back in 1 month for follow-up.  We will repeat restaging scans after that visit.    -12/15/2021 [facility] Oncology clinic follow-up: Mr. Lugo continues to do well on therapy with Zytiga, prednisone and Relugolix, his PSA remains low at <0.1.  He is continuing to have left lower back pain, he is working with palliative care and they have referred him also to pain management.  Pain management has talked to him about putting in a pain pump however he is leery of this, I told him that this was a safe and effective pain management technique and to certainly give consideration to their recommendations.  His phosphorus is improving however is still a little low, I will hold off on Zometa until we see him back in 1 month, we may end up only giving it every 12 weeks.  We will repeat restaging scans with CT chest, abdomen and pelvis along with total body bone scan prior to return and I have ordered those today.      -1/5/2022 CT chest abdomen pelvis with contrast [facility] showing stable left periaortic adenopathy and unchanged sclerotic thoracic, lumbar spine and pelvis lesions with no new or progressive disease in the chest  abdomen or pelvis.  Total body bone scan shows no significant change and no new suspicious foci.  Stable posterior right acetabulum and left superior acetabulum and degenerative changes in the cervical spine, shoulders, acromioclavicular joints, sternoclavicular joints, elbows, wrists, hands, thoracic spine, lumbosacral spine, sacroiliac joints, hips, knees, ankles, feet.    -1/11/2022 [facility] medical oncology hematology follow-up visit: I reviewed images and reports from his 1/5/2022 scans.  No active disease and PSA immmeasurably low on Zytiga, prednisone, Relugolix.  However, he has struggled with hypophosphatemia and is significantly fatigued with this.  Given that the bones are doing well and given that his phosphorus continues to remain consistently low at 2.2 in mid December, 1.7 mid November and 2.5 mid-September and as low as 1.5 back to July and the likelihood that this is related to his Zometa, given that his bones are stable overall, he will increase from 1200 mg up to 1600 mg of calcium and phosphate a day and we will hold his Zometa for the foreseeable future.  He will see my nurse practitioner back in a month to continue to monitor his phosphorus along with his calcium and other labs and will repeat his scans again in 3 months.  In addition, given his fatigue we will check his ACTH and cortisol though he is taking his prednisone with his Zytiga.  Though his electrolytes are normal, I will keep an eye on the cortisol and ACTH and have these labs not only drawn today but again just prior to return to my nurse practitioner on February 10.    -2/10/2022 [facility] Oncology clinic follow-up: Mr. Lugo overall is stable, no change in his health this past month.  I have reviewed his labs from 2/8/2022 and they are unremarkable, his phosphorus is now normal at 3.2. We are continuing to hold his Zometa, he last received Zometa on 10/5/2021.  He continues therapy with Zytiga, prednisone and Relugolix.  Dr. Rashid  had ordered cortisol level and ACTH which are low, currently his ACTH is 2.8 and cortisol 3.08 however these are both done in the face of ongoing prednisone that he takes with his Zytiga.  We will get him to endocrinology for further evaluation for possible adrenal insufficiency but will not interrupt his treatment in the interim.  He will need restaging scans again in April for a 3-month follow-up.  He will continue to follow with palliative care and pain management for his cancer related pain.  His PSA remains immeasurably low at <0.1 on 2/8/22.    -2/15/2022 went to emergency room with weakness, decreased appetite, myalgias in the setting of known COVID-19 testing positive a few days prior to this visit.  Phosphorus had been low in the past but was normal in the emergency room with unremarkable CBC and CMP.  Chest x-ray unremarkable saturating well on room air mildly hypertensive with dry mucosa.  Given 500 cc crystalloid.  Covid test positive.  Mild thrombocytopenia 136,000 and otherwise unremarkable.  Discharged home.    -3/3/2022 data:  PSA less than 0.1  CMP unremarkable  Cortisol 3.96 normal  ACTH 2.8 lower limit of normal 7.2  Phosphorus normal 2.6    -3/8/2022 [facility] [place] medical oncology follow-up: Suspect big chunk of his fatigue was Covid.  Another part of the fatigue likely related to lack of testosterone.  Not hypophosphatemic off of Zometa.  ACTH running low while on prednisone not surprising but with normal cortisol and I doubt adrenal insufficient which is why he is on prednisone to avoid such while taking Zytiga.  Overall doing fairly well and with immeasurably low PSA, I will have labs done on him early April, see my nurse practitioner early May, and she will order repeat bone scan and CTs the end of May and I will see him back in June.  We will continue to hold the Zometa unless bone lesions progress given symptomatic prior hypophosphatemia on Zometa.  He will keep his appointment April 28 with   Mir Duffy just to be sure that my take on his pituitary/adrenal axis assessment is accurate.    -4/28/2022 endocrinology consult Dr. Mir Duffy.  With low ACTH and cortisol on prednisone with Zytiga, the adequacy of prednisone cannot be assessed by measuring ACTH or cortisol but is assessed by weight changes, blood pressure, blood sugar, potassium, overall sense of how the patient is doing.  Primary adrenal insufficiency with low ACTH and low cortisol would be typical for the situation as his blood sugar tends to run a little high and potassium a little low, he did not think increasing prednisone was necessary.  He put him on some potassium.  No follow-up scheduled, will see as needed.    -5/4/2022 [facility] [place], operated [facility] Oncology clinic follow-up: Mr. Lugo continues on therapy with Zytiga and prednisone along with Relugolix.  His Zometa has been on hold due to previous hypophosphatemia.  There is some concern about potential adrenal insufficiency. He saw endocrinology, Dr. Mir Duffy on 4/28/2022.  I did review his office note and he stated that the low ACTH and low serum cortisol would be typical for Mr. Lugo's situation.  He further stated that the adequacy of prednisone dose cannot be assessed by measuring ACTH or cortisol but is rather assessed by the overall just altered how the patient is feeling-weight change, blood pressure, blood sugar, potassium, overall sense of wellbeing.  His potassium had been running a little low therefore they put him on potassium 10 mill equivalents daily. Of note, I do not see a future follow-up scheduled with endocrinology.  He is having night sweats, I am not sure if this is related.  His glucose was normal this morning at 107.  His weight is stable.  He will continue current treatment for his prostate cancer unchanged, we will continue to hold his Zometa.  Current phosphorus and magnesium are normal.  CBC and CMP from today are unremarkable, cortisol is normal.  PSA and ACTH are pending  In  regards to his night sweats, he had taken clonidine previously 0.1 mg twice daily as needed, I did send in a short supply again to try and see if this helps.  He also is having indigestion despite being on omeprazole twice daily, I sent a prescription for Pepcid.  He had an EGD August 2021.  We will repeat restaging scans prior to return and I have ordered those today.     -5/24/2022 CT chest abdomen pelvis with contrast[place] negative.  Bone scan shows stable bone metastases.    - 5/25/2022 PSA less than 0.1, platelets 130,000, otherwise unremarkable CBC and CMP.  A.m. cortisol running low 1.2 with phosphorus low 2.3.    -5/31/2022 [facility] operated by [facility] medical oncology follow-up: On Zytiga, prednisone, Relugolix, PSA remaining immeasurably low with stable bone scan and no visceral/paulino metastases.  Phosphorus still running low.  I have discontinued his potassium chloride and started potassium phosphate 500 mg 4 times a day.  Unless PSA rising, we will plan on repeating CTs and bone scan in 6 months and will follow with my nurse practitioner in the interim and if symptoms dictate or labs, will get back to Dr. Duffy for management of potential adrenal insufficiency but presently we will just see how he does with potassium phosphate and hopeful improvement in the phosphorus level with time.  We will continue to follow with palliative care for pain management    -7/6/2022 [facility] Oncology clinic follow-up: Mr. Lugo overall is doing well but continues to be troubled with fatigue and hot flashes.  For the most part he states he is able to do the things he wants to do, he continues to work but does have to take more rest periods.  I had a long discussion with him and his wife after reviewing his medications with them as they wanted to see if there was anything he could stop or adjust.  I discussed with him the need to continue on treatment for his prostate cancer even though his PSA remains immeasurably low and his scans look good  but that if his medication is stopped the cancer would progress and over time it still has the potential to progress on therapy but would continue it as long as possible to keep his disease under control.  I explained this is like treating someone with hypertension, you do not stop the antihypertensive just because the blood pressure normalizes.  They stated understanding.  There is no dose adjustment of Zytiga or prednisone that would minimize his symptoms, he is on the current standard recommended therapy.  Discussed with him that sometimes during times of stress we may need to increase his prednisone dose but for now would not change anything.  His phosphorus level is normal, we continue to hold his Zometa.  I did give him the option of holding his phosphorus for the next month and we will see what happens, if it drops we will start him back on it but may be at a lower dose rather than 4 times a day maybe twice a day.  For now he will continue therapy unchanged.  We will continue monitoring labs monthly.  No PSA was drawn this month but that is okay as his PSA has been running immeasurably low at <0.1, we will recheck next month with lab draw.  We will stop checking his ACTH and cortisol level every month, if he develops worsening symptoms I will repeat those.  He has not gotten a follow-up with endocrinology as of yet.    -8/3/2022 [facility] follow-up:  Mr. Lugo is doing well as far as his prostate cancer goes with continued immeasurably low PSA of <0.1.  He continues on therapy with Zytiga and prednisone along with Relugolix.  His phosphorus is stable at 2.7 which was just slightly low by our reference range however was 2.71-month ago also which was normal on another labs reference range.  He has not taken his phosphorus for this past month as he thought it was making him more fatigued.  He really can tell no difference whether he was taking it or not.  I have asked him to try to go back on  phosphorus twice a day rather than 4 times a day and see if this is tolerated and if we can keep his phosphorus level from dropping.  His biggest complaint today is flareup of his arthralgias and back pain.  He is following with pain management, Dr. Danny Avalos but is requesting a referral back to palliative medicine as he feels that no one is currently listening to him and they are just prescribing the same medications that are not effective according to his report.  He does have a follow-up with Dr. Avalos on 8/12/2022 but due to his current pain is not able to work until after he is seen and hopefully can get some better relief.  I have given him a work release until 15 August.  I have also put in a referral back to palliative care.  Also encouraged him to work with Dr. Avalos for help in resolving his issue.  Apparently they have recommended some type of a pump however he has been hesitant to that but likely would be helpful.  We will continue therapy unchanged.  We will continue monthly labs including phosphorus, we are not currently checking ACTH and cortisol monthly as Dr. Mir Duffy previously stated in his assessment on 4/28/2022 that the adequacy of prednisone cannot be assessed by measuring ACTH or cortisol but would be assessed by symptoms, see note from 4/28/2022.  He made no further follow-up appointments.  Fatigue is not worsening currently.  His glucose and potassium are  Normal.    -10/13/2022 [facility] medical oncology follow-up: Mr. Lugo is doing well.  He is tolerating treatment with Zytiga and prednisone along with Relugolix without any unusual side effects.  His PSA has remained immeasurably low at <0.1.  His phosphorus was slightly low at last visit.  He had been instructed to go back on phosphorus twice a day but he misunderstood and has not been taking it.  We will check labs today.  I will follow-up with results and notify him if he needs to restart the phosphorus.  His pain is better  controlled since reestablishing with [facility].  We will repeat scans prior to next follow-up.  I have ordered CT chest abdomen pelvis along with bone scan.  We will see him back in 1 month.    -10/13/2022 PSA less than 0.014.With unremarkable CBC and CMP.  Phosphorus still a little low 2.3.    -11/4/2022 CT abdomen pelvis chest showed no evidence of disease progression with stable sclerotic bone lesions.  Bone scan stable right greater than left acetabular metastasis.  Stable bone metastases.    -11/8/2022 [facility] medical oncology telemedicine follow-up: Patient states that he feels achy all over with low-grade temps and a cough mildly productive.  Has an appointment later today to see his primary care for testing for flu and COVID.  Suggested that if he were positive for COVID that he get Paxlovid and that if he were positive for flu that he get Tamiflu and to discuss this with his primary care.  From the prostate cancer side, his PSA is immeasurably low with stable bone metastases and no evidence of progression.  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  We will continue the Relugolix, Zytiga, prednisone and he will see my nurse practitioner 12/7/2022.  Would repeat CTs and bone scan in May.    -1/25/2023 [facility] Oncology medicine follow-up: Mr. Lugo is having worsening fatigue and muscle aches.  He does have adrenal insufficiency on Zytiga and prednisone for his prostate cancer, he saw endocrinology in light of low ACTH back in April 2022.  At that time there was no recommendation other than for monitoring him for his overall wellbeing and labs.  He denies any fevers or chills.  His labs as shown above are unremarkable, his CBC and CMP are normal other than for glucose of 112, PSA remains low at <0.1.  He has no new pain or any symptoms concerning for progression of his prostate cancer.  I will get him back to endocrinology to see if they feel any dose  adjustment of his prednisone would be helpful in his symptoms.  Currently he is on 5 mg a day with his Zytiga.  I did not repeat his ACTH or cortisol as they would be expected to be low in this situation.  His potassium is normal, he has had no weight loss or change in his appetite.  Blood sugar is reasonable.  His only complaint currently is worsening fatigue and muscle aches.  He will continue treatment unchanged with Zytiga, prednisone and relugolix.  I will see him back in 1 month for follow-up.  We will repeat his restaging scans in May.    -3/1/2023 [facility] Oncology clinic follow-up:  His PSA remains low at <0.1 on treatment with Zytiga and prednisone along with relugolix however he continues to complain of significant fatigue and muscle aches not improving with time.  He states that his quality of life is affected as he is not able to do any work activities due to the fatigue.  He did see endocrinology again and they have increased his prednisone from 5 mg daily up to 7.5 mg daily however so far this has not made any difference in how he feels.  He is supposed to get back in touch with them to let them know how he is doing and to see if there is any other adjustments needed. His labs are unremarkable with normal thyroid function, CMP is normal other than for glucose of 156.  They did check hemoglobin A1c to look for diabetes however that was normal at 5.7.  CMP is unremarkable with just very mild anemia with hemoglobin of 12 and hematocrit 36.1% which would not account for his fatigue.  He does have hot flashes that are fairly significant, I will try venlafaxine as suggested by Dr. Duffy and I appreciate the recommendation.  We will start at 37.5 mg daily and if tolerated he can increase to 75 mg after 1-2 weeks.  I will see him back in 1 months for follow-up.  Plan to repeat scans late April/early May.  His prostate cancer seems under good control with current regimen however I am not sure how long he can  tolerate this due to the side effects.    -3/30/2023 [facility] Oncology clinic follow-up:  Mr. Lugo continues to deal with fatigue.  He appears more cushingoid today, he continues on treatment with Zytiga, prednisone and relugolix.  Prednisone dose currently is 7.5 mg daily.  This has not made any difference in his energy level.  PSA remains immeasurably low at <0.014.  CBC shows new onset of microcytic hypochromic anemia with hemoglobin of 11, hematocrit 34.4%, MCV 75.4, MCH 24.1, WBC is normal at 5.81 with normal platelet count 181,000 and normal differential.  CMP is unremarkable, it is normal other than for glucose of 142.  We will get him to Dr. Gomez for an EGD and colonoscopy for further evaluation in regards to his new onset of anemia.  He has no associated GI symptoms otherwise except for 1 day a few weeks ago he does report that he had fairly sudden onset of vomiting but this was an isolated incident on that day and has not reoccurred since.  Continues to follow with palliative care for management of his chronic back pain.  We will repeat restaging CT chest, abdomen and pelvis along with total body bone scan prior to return and I have ordered that today.    -3/30/2023 Ferritin low 12.3 with iron low 27 and saturation low 5% with total iron binding capacity 511.  3/31/2023 ferrous sulfate 325 mg twice a day every other day with vitamin C started.    - 4/12/2023 CT chest abdomen pelvis with contrast showed no progressive disease with stable sclerotic bone lesions.  Bone scan showed single identifiable bone metastasis stable right acetabulum    -4/18/2023 [facility] hematology oncology follow-up: He now has microcytic iron deficiency anemia which is new and concerning.  Gastroenterologic evaluation has been ordered but not performed and is mandatory.  Continue ferrous sulfate 325 mg twice a day every other day with vitamin C to improve absorption and we will follow his CBC along with his usual labs monthly on  Zytiga, Relugolix, prednisone 7.5 mg.  He follows with Dr. Duffy with adrenal insufficiency.  He will see my nurse practitioner back in a month to see what GI has found and to watch serial CBC along with his other labs including PSA.  I would repeat CT chest abdomen pelvis again in 6 months with contrast along with bone scan and sooner if PSA rises.    -5/3/2023 EGD and colonoscopy with Dr. Alexander Gomez: Superficial erosions in the stomach with gastritis.  Small polyps removed, dilated internal hemorrhoids.  Pathology pending.  Recommend repeat colonoscopy in 5 years.    -5/17/2023 [facility] Oncology clinic follow-up:  Naveen overall is doing well on current therapy with Zytiga, prednisone and relugolix.  PSA remains immeasurably low at <0.014.  He feels his hip pain is getting worse with time.  It makes it difficult to enjoy activities with his grandchildren as it is worse when he stands for any length of time or tries to carry any weight.  I reviewed his bone scan from April which was stable, we also reviewed previous MRI of the hips from May 2021 that showed moderate bilateral hip osteoarthritis.  I offered to repeat MRI of his hips for evaluation to see if there has been any worsening of his osteoarthritis and also then referral to orthopedics for any recommended intervention however at this time he defers.  He will let me know if he changes his mind.  He also follows with palliative care for management of his cancer related pain.  We will continue therapy unchanged.  We will repeat restaging scans in October unless symptoms dictate the need to do so sooner.  He was recently found to be iron deficient, he had an EGD and colonoscopy on 5/3/2023 with Dr. Gomez, EGD showed superficial erosions in the stomach with gastritis, he continues on omeprazole.  He had small polyps removed from the colon and recommendation to repeat colonoscopy in 5 years.  He is on oral iron along with vitamin C every other day and is  tolerating that well.  CBC from yesterday shows hemoglobin now normal at 14.2 and hematocrit 43.2%, MCV normal at 27.0.    -7/20/2023 [facility] Oncology clinic follow-up: Naveen is doing well on current therapy with Zytiga, prednisone and relugolix.  PSA in June remains low at <0.1.  Continues to deal with chronic pain in his back and hip.  Recently saw Dr. Nikolai Marks and had an injection in his hip and is hoping that it will eventually help with his pain.  He has no new pain.  We will continue current therapy unchanged.  We will repeat labs while he is here today.  I will refill his iron as his pharmacy has changed. Most recent CBC in June with hemoglobin of 14.7 and hematocrit 43.6%.  We will repeat restaging scans in October.    -9/14/2023 [facility] Oncology clinic follow-up: Naveen is tolerating his prostate cancer treatment with Zytiga, prednisone and relugolix.  PSA remains immeasurably low at <0.014 on 8/17/2023.  Currently he is having more GI issues with nausea and intermittent vomiting that occurs often without warning.  He had an EGD and colonoscopy with Dr. Gomez on 5/3/2023, EGD showed superficial erosions in the stomach with gastritis.  He does take omeprazole 40 mg twice daily.  He saw his PCP yesterday for these symptoms and has been referred to gastroenterologist at [facility] and he is awaiting to hear about that appointment.  Hopefully he can get in fairly quickly.  I told him that he should let us know if it is going to be a while before he can be seen and I will get him back to Dr. Gomez for consideration of repeat EGD.  We will get his CBC, CMP and PSA today.  His previous noted anemia had normalized, he currently is not taking oral iron as he ran out.  I will wait and see what his labs show today and likely hold off for now depending on his lab results until his GI issues can be resolved.  We will repeat his restaging CT chest, abdomen and pelvis along with bone scan in the next few weeks and I  have ordered those today.  He is taking Zofran as needed for nausea, he is also on meclizine for vertigo.  He is going to follow-up with his PCP in a few weeks regarding his GI symptoms.    -9/14/2023 PSA less than 0.014 with unremarkable CBC and CMP.    -9/28/2023 total body bone scan stable with uptake in right acetabulum, no new areas.  CT chest, abdomen and pelvis show no evidence of disease progression.  -11/9/2023 [facility] follow[facility]: CT reports were reviewed by Dr. Rashid last month and the patient reports he was called by Dr. Rashid as he could not make his appointment due to concerns over upper respiratory infection, CT scan showed no evidence of disease progression.  PSA has remained low, we are repeating that today along with his CBC and CMP.  He continues to have sinus drainage and congestion, I will send in 7 additional days on his doxycycline that he has been taking, he will follow-up with PCP if no improvement.  He continues to complain of bilateral hip pain, has a follow-up with Dr. Marks next week.  We will continue therapy unchanged with Zytiga, prednisone and relugolix.  Would repeat restaging bone scan and CT scans late March for a 6-month follow-up.    -1/4/24 PSA 1    - 3/28/2024 CT chest abdomen pelvis with contrast compared to May 2013 shows decreasing sclerotic bony metastases.  Bone scan shows similar relative intensity of 1 focal uptake right acetabulum correlating with CT with no new sites of uptake    -4/2/2024 [facility] follow-up: Continued good response to Zytiga prednisone relugolix which he is tolerating.  We will check PSA and labs 6/25/2024 with follow-up with my nurse practitioner and again 3 months after that along with CTs and bone scan 6 months from now.  Not on bisphosphonates or rank ligand inhibitor due to drug-induced hypophosphatemia.  Will ask my nurse practitioner to order bone densitometry with his other scans in 6 months. He is having  difficulty emptying his bladder and his prior urologist does not take his insurance so we will make appropriate referrals.    -6/26/2024 [facility] follow-up: Overall continues to tolerate therapy with Zytiga, prednisone and Relugolix.  We will get labs today including PSA and as long as that is stable he will continue treatment unchanged with plans to repeat restaging CT scans and bone scans in September and I have ordered those today.  We will also obtain DEXA scan at that time to monitor for osteoporosis on long-term use of androgen deprivation therapy.  We have been holding his Zometa due to drug-induced hypophosphatemia.  He is following with urology and has had relief of his urinary hesitancy with some medication adjustments and he is scheduled for follow-up this Friday with cystoscopy.  He recently had chest pain and shortness of breath while mowing his lawn, he did have an echocardiogram and stress test yesterday, awaiting final results and reading from Dr. Kahn.  He continues to follow with palliative care for management of his pain which is under good control.  I did refill his symptoms today for chronic constipation from opioid use.    -9/16/2024 CT chest abdomen pelvis with contrast [place] [facility] compared to May 2024 shows stable 1.6 cm well-defined nodule left periaortic region.  Stable sclerotic left supra-acetabular, right posterior acetabular left posterior acetabular lesions.  No disease progression.  Total body bone scan shows grossly stable solitary active right posterior acetabular bone metastasis.    -10/2/24 PSA less than 0.1    -10/8/2024 [facility] clinic follow-up: In order to get prostatic urethral pathway surgically open he needed cardiac clearance and is due for cardiac cath in the next week or 2 with Dr. Kahn.  I reviewed the above image results with him with no clear-cut evidence of progression and PSA remaining immeasurably low.  He does feel quite  fatigued.But this is not new.  He has become microcytic with a hemoglobin of 12.1 and MCV of 77 which has trickled down 14.8 and December.  His CMP is unremarkable.  He had an EGD and colonoscopy May of last year with Dr. Gomez that showed superficial erosive gastritis with internal hemorrhoids and colon polyps.  He states he has been taking his iron but he takes it with all his other medicines.  I told him to take it every other day with vitamin C with at least an hour and a half window on either side of it free from any other ingestion of medicines.  With reference to the pulmonary infiltrate he has had a frothy nagging mildly productive cough without fevers or chills or dyspnea for the last 2 months.  Rather than throwing an antibiotic at this, I am going to get pulmonary referral for their opinion.He is due for his bone density as well and we will order that.  He has been off of Zometa for quite some time due to hypophosphatemia  Addendum: He has never had genetic testing.  While that would not alter our plans in the near term, I will plan on genetic testing for his family sake as well as for potential treatable molecular targets in the future should treatment algorithms dictate.  I called and spoke with his wife to inform her of this plan.    - 10/29/2024 cardiac cath Dr. Kahn showing vascular disease for which she will manage medically with rosuvastatin and aspirin and antianginal therapy.    -11/6/2024 buccal mucosal genetic sampling given to patient for collection.    - 11/12/2024 pulmonology evaluation Makenzie Ramirez.  Status post multiple rounds of antibiotics on 5 mg prednisone at that junction.  Changed him to Breztri from [facility] to see if budesonide would help with congestion and adding DuoNebs along with montelukast.  Continues to smoke and we will follow-up with him in 4 weeks.    -12/4/2024 PSA immeasurably low less than 0.014.  Hemoglobin 14.4 and rising.  CBC unremarkable.  Glucose 115 otherwise  unremarkable CMP.    -12/10/2024 [facility] medical oncology follow-up: Prostate cancer under control on Relugolix Zytiga prednisone.  Continue to follow with urologist for obstructive symptoms from prostate.  Follow-up with palliative care for his ongoing bone pains.  I will rescan with bone scans and CT chest abdomen pelvis with contrast in March and see him back after that along with his labs and PSA.  Follow-up with pulmonary medicine regarding his dyspnea.     Prostate cancer metastatic to bone   8/30/2020 -  Other Event    PSA 10.8     9/15/2020 Initial Diagnosis    Prostate cancer metastatic to bone (CMS/HCC)     9/15/2020 Biopsy    Prostate needle core biopsy     9/24/2020 Imaging    Total body bone scan: 4 abnormal areas of increased activity consistent with osteoblastic metastasis, abnormal foci of activity seen in the T12 vertebral body, L1 vertebral body, roof of the left acetabulum, and acetabulum medially on the right.  CT chest: Stable sclerotic metastatic lesions within the T12 and L1 vertebrae.  No other evidence of metastatic disease to the chest.  Stable mild splenomegaly and subcentimeter periaortic retroperitoneal lymph nodes.  CT abdomen and pelvis: Diffuse bladder wall thickening with mild perivesicular fat stranding.  Interval development of several sclerotic lesions in the spine and left iliac bone.     10/13/2020 Cancer Staged    Staging form: Bone - Appendicular Skeleton, Trunk, Skull, And Facial Bones, AJCC 8th Edition  - Clinical: Stage IVB (cT3, cN0, cM1b, G3) - Signed by Ishmael Rashid MD on 10/13/2020     11/3/2020 - 10/5/2021 Chemotherapy    OP SUPPORTIVE Zoledronic Acid Q42d     11/3/2020 - 2/18/2021 Chemotherapy    OP PROSTATE DOCEtaxel       1/4/2021 Imaging    CT chest abdomen pelvis with contrast and whole-body bone scan shows improving less metabolically active bone metastases.  Stable sclerotic T12, L1, and L2 vertebral bodies and bilateral pelvic bones on bone windows with stable  subcentimeter para-aortic lymph nodes and no definite progression in the chest abdomen or pelvis.  PSA down to 0.275 after 3 courses of Taxotere.     3/4/2021 Imaging    CT chest, abdomen and pelvis stable, no evidence of disease progression. Total body bone scan with decreased/near resolution of abnormal increased radiotracer uptake associated with the known sclerotic lesions in the thoracolumbar spine and bony pelvis.  No evidence of new osteoblastic metastases.     3/4/2021 Imaging    CT chest abdomen pelvis with contrast is negative save for stable sclerotic bone lesions and the bone scan shows near resolution of prior bony abnormalities associated with the known sclerotic lesions in the thoracolumbar spine and bony pelvis.  2/17/2021 PSA down to 0.1 from 1.37 October 2020.     3/9/2021 - 3/9/2021 Chemotherapy    OP SUPPORTIVE PROSTATE Relugolix     3/9/2021 -  Chemotherapy    OP PROSTATE Abiraterone / PredniSONE       3/10/2021 -  Chemotherapy    OP PROSTATE Abiraterone / PredniSONE     8/10/2021 - 8/16/2021 Radiation    Radiation OncologyTreatment Course:  Naveen Lugo received 2000 cGy in 5 fractions to L4-sacrum, left acetabulum and right pelvis via External Beam Radiation - EBRT.     11/16/2021 -  Chemotherapy    OP CENTRAL VENOUS ACCESS DEVICE ACCESS, CARE, AND MAINTENANCE (CVAD)     1/5/2022 Imaging    -1/5/2022 CT chest abdomen pelvis with contrast [facility] showing stable left periaortic adenopathy and unchanged sclerotic thoracic, lumbar spine and pelvis lesions with no new or progressive disease in the chest abdomen or pelvis.  Total body bone scan shows no significant change and no new suspicious foci.  Stable posterior right acetabulum and left superior acetabulum and degenerative changes in the cervical spine, shoulders, acromioclavicular joints, sternoclavicular joints, elbows, wrists, hands, thoracic spine, lumbosacral spine, sacroiliac joints, hips, knees, ankles, feet.     5/24/2022  Imaging    CT chest abdomen pelvis with contrastFrankfort negative.  Bone scan shows stable bone metastases.     11/4/2022 Imaging    CT abdomen pelvis chest showed no evidence of disease progression with stable sclerotic bone lesions.  Bone scan stable right greater than left acetabular metastasis.  Stable bone metastases.     4/12/2023 Imaging     CT chest abdomen pelvis with contrast showed no progressive disease with stable sclerotic bone lesions.  Bone scan showed single identifiable bone metastasis stable right acetabulum     9/29/2023 Imaging    total body bone scan compared to April showed a single active bone metastasis with multiple and active bone metastases overall stable.  CT chest abdomen and pelvis compared to May and April shows no new bony progression with stable sclerotic bone lesions.         HISTORY OF PRESENT ILLNESS:  The patient is a 69 y.o. male, here for follow up on management of metastatic prostate cancer with excellent long-term response on Relugolix Zytiga prednisone off Zometa for hypophosphatemia    Past Medical History:   Diagnosis Date    Arthritis     Bone cancer     Coronary artery disease     Elevated cholesterol     History of radiation therapy 08/16/2021    L4 through sacrum, left acetabulum, right pelvis    Nephrolithiasis     Opioid use disorder, mild, on maintenance therapy (HCC)     Prostate cancer      Past Surgical History:   Procedure Laterality Date    CARDIAC CATHETERIZATION N/A 10/29/2024    Procedure: Left Heart Cath;  Surgeon: Joni Kahn MD;  Location: [facility] CATH INVASIVE LOCATION;  Service: Cardiovascular;  Laterality: N/A;    CHOLECYSTECTOMY      COLONOSCOPY      CORONARY STENT PLACEMENT  2006 & 2011    HERNIA REPAIR  1986    THORACIC DISCECTOMY  1994       Allergies   Allergen Reactions    Cymbalta [Duloxetine Hcl] Hives and Dizziness    Duloxetine Hives and Dizziness    Gabapentin Nausea Only    Pregabalin Dizziness, Nausea And Vomiting and Hives  "      Family History and Social History reviewed and changed as necessary    REVIEW OF SYSTEM:   General Fatigue but no specific complaints.    PHYSICAL EXAM:  Heart rate regular but rapid.  No jaundice icterus or pallor.  No respiratory distress.    Vitals:    12/10/24 0959   BP: 134/85   Pulse: (!) 124   Resp: 18   Temp: 97.8 °F (36.6 °C)   Weight: 87.5 kg (193 lb)   Height: 175.3 cm (69\")     Vitals:    12/10/24 0959   PainSc:   8   PainLoc: Back          ECOG score: 0           Vitals reviewed.    ECOG: (0) Fully Active - Able to Carry On All Pre-disease Performance Without Restriction    Lab Results   Component Value Date    HGB 14.4 12/04/2024    HCT 44.4 12/04/2024    MCV 83.0 12/04/2024     12/04/2024    WBC 9.75 12/04/2024    NEUTROABS 6.84 12/04/2024    LYMPHSABS 2.29 12/04/2024    MONOSABS 0.48 12/04/2024    EOSABS 0.06 12/04/2024    BASOSABS 0.04 12/04/2024       Lab Results   Component Value Date    GLUCOSE 115 (H) 12/04/2024    BUN 9 12/04/2024    CREATININE 0.77 12/04/2024     12/04/2024    K 3.7 12/04/2024     12/04/2024    CO2 27.1 12/04/2024    CALCIUM 9.2 12/04/2024    PROTEINTOT 6.4 10/29/2024    ALBUMIN 3.8 12/04/2024    BILITOT 0.4 12/04/2024    ALKPHOS 67 12/04/2024    AST 11 12/04/2024    ALT 8 12/04/2024             ASSESSMENT & PLAN:  1.  Stage IVb grade group 4 metastatic prostate cancer with sclerotic bone lesions on CT and bone scan and history of prostatic hypertrophy.  Good response to Taxotere ending 2/18/2021 Relugolix, followed by Zytiga prednisone with L4/sacrum, left acetabulum, and right pelvis external beam radiation August 2021.  Hypophosphatemia on Zometa.  Zometa held as of October 2021.  2.  Kidney stone  3.  Polyps  4.  Probable drug-induced hypophosphatemia from Zometa, drug stopped after 10/5/2021 dose  5.  Cancer related pain seeing palliative care  6.  Fatigue  7.  Covid 2/2022  8.  Iron deficiency anemia  -5/3/2023 EGD and colonoscopy with Dr. Munoz " Patricia: Superficial erosions in the stomach with gastritis.  Small polyps removed, dilated internal hemorrhoids.  Pathology pending.  Recommend repeat colonoscopy in 5 years.  9.  CADz    - 10/29/2024 cardiac cath Dr. Kahn showing vascular disease for which she will manage medically with rosuvastatin and aspirin and antianginal therapy.    -11/6/2024 buccal mucosal genetic sampling given to patient for collection.    - 11/12/2024 pulmonology evaluation Makenzieerik Ramirez.  Status post multiple rounds of antibiotics on 5 mg prednisone at that junction.  [facility] to see if budesonide would help with congestion and adding DuoNebs along with montelukast.  Continues to smoke and we will follow-up with him in 4 weeks.    -12/4/2024 PSA immeasurably low less than 0.014.  Hemoglobin 14.4 and rising.  CBC unremarkable.  Glucose 115 otherwise unremarkable CMP.    -12/10/2024 [facility] medical oncology follow-up: Prostate cancer under control on Relugolix Zytiga prednisone.  Continue to follow with urologist for obstructive symptoms from prostate.  Follow-up with palliative care for his ongoing bone pains.  I will rescan with bone scans and CT chest abdomen pelvis with contrast in March and see him back after that along with his labs and PSA.  Follow-up with pulmonary medicine regarding his dyspnea.  Hemoglobin rising.    Total time of care today inclusive of time spent today prior to his arrival reviewing notes from outside providers including cardiologist and pulmonologist and interval data and during visit interviewing and [place] signs or symptoms of his disease and management thereof and after visit instituting this plan took 50 minutes patient care time throughout the day today.  Ishmael Rashid MD    12/10/2024

## 2024-12-10 NOTE — LETTER
December 10, 2024     Cielo Dodd PA-C  [address] [place] [address]    Patient: Naveen Lugo   YOB: 1955   Date of Visit: 12/10/2024     Dear Cielo Dodd PA-C:       Thank you for referring Naveen Lugo to me for evaluation. Below are the relevant portions of my assessment and plan of care.    If you have questions, please do not hesitate to call me. I look forward to following Naveen along with you.         Sincerely,        Ishmael Rashid MD        CC: MD Anna Mendes PA-C Lyle Christopher Myers, MD Oliver C. James II, MD Robert D Sawyer, MD Hicks, Ishmael SILVA MD  12/10/24 1009  Sign when Signing Visit  CHIEF COMPLAINT: Chronic dyspnea not new not worsening and following with pulmonary    Problem List:  Oncology/Hematology History Overview Note   1.  Stage IVb grade group 4 metastatic prostate cancer with sclerotic bone lesions on CT and bone scan and history of prostatic hypertrophy.  Good response to Taxotere ending 2/18/2021 Relugolix, followed by Zytiga prednisone with L4/sacrum, left acetabulum, and right pelvis external beam radiation August 2021.  Hypophosphatemia on Zometa.  Zometa held as of October 2021.  2.  Kidney stone  3.  Polyps  4.  Probable drug-induced hypophosphatemia from Zometa, drug stopped after 10/5/2021 dose  5.  Cancer related pain seeing palliative care  6.  Fatigue  7.  Covid 2/2022  8.  Iron deficiency anemia  -5/3/2023 EGD and colonoscopy with Dr. Alexander Gomez: Superficial erosions in the stomach with gastritis.  Small polyps removed, dilated internal hemorrhoids.  Pathology pending.  Recommend repeat colonoscopy in 5 years.  9.  CADz    -9/30/2020 office note from Dr. Ronen Reynaga indicates patient with PSA 10.8.  Underwent TRUS prostate biopsy 9/15/2020.  Adenocarcinoma the prostate Sarika 4+4 = 8 in 1 out of 12 cores and 4+3 = 7 and 10 out of 12 cores and 3+4 = 7 in 1 out of 12 cores.  Perineural invasion.  Extraprostatic  extension into the fat seen on 2 biopsies of the right lateral mid and right apex.  9/24/2020 CT chest and whole-body bone scan showed T12, L1, left acetabular, right medial acetabular metastases with no lung metastases.  He started androgen deprivation therapy with Casodex with plans for Lupron to start in 1 to 2 weeks for clinical T3 N0 M1 metastatic prostate cancer.  Review of official report from [facility] 9/24/2020 bone scan mentions T12, L1, roof of left acetabulum and medial right acetabular osteoblastic metastases.  CT chest with contrast that same day showed mild splenomegaly 14.2 cm with stable periaortic nodes compared to CT December 2015 with no lung metastases.  Sclerotic abnormality within the T12 vertebral body, L1 vertebral body extending into the pedicle unchanged.  8/28/2020 CT abdomen pelvis report from [facility] reviewed and mentions normal spleen size.  Punctate nonobstructing kidney stone, no paulino enlargement, diffuse bladder wall thickening with mild perivesicular fat stranding, 2.1 cm sclerotic left iliac bone above the left acetabulum new compared to 2015 as well as 1.5 cm faintly sclerotic focus in the right posterior acetabulum stable compared to prior CT 2015 as well as a 1.6 cm T12 and inferior vertebral body sclerotic lesion and a 1.9 cm left posterior lateral L1 vertebral body abnormality extending to the pedicle.    -10/13/2020 initial [facility] medical oncology consultation: With 1 Sarika score 8, 4+4 lesion that would make him a grade group 4 with stage IVb disease given radiographic evidence of sclerotic bone metastasis on bone scan and CT.  Needs genetic testing given metastatic prostate cancer and this is also important as, were he to have mismatch repair mutation then we would have options for PDL 1 inhibitors and were he BRCA mutated would have options for PARP inhibitors in the future.  Systemic therapy for castration naïve prostate cancer or N1 disease  should be with apalutamide or Abiraterone or enzalutamide all of which are category 1.  He does not have any visceral metastases.  According to his imaging he has T12, L1, left acetabular, right acetabular, and left iliac bony involvement.  That means he would have 4 or more bone metastases with at least one metastasis (i.e. the acetabular lesions bilaterally) beyond the pelvis/vertebral, for which this would be considered high-volume disease for which 6 cycles of docetaxel 75 mg/m² x 6 cycles followed by Zytiga and prednisone would be reasonable along with Zometa every 6 weeks for bone stabilization.  He will do chemo preparation visit with my nurse practitioner prior to start chemotherapy with Taxotere and Zometa in 2 weeks and he is already received Casodex in preparation for Lupron shot he received on 10/12/2020 with Dr. Reynaga.  We will get him to Dr. Alexander Gomez who has done his polypectomies in the past for port placement and we will get genetic counseling started.  Following the 6 courses of Taxotere per the [place] trial criteria, I would then give him an indefinite Zytiga and prednisone and Zometa until progression or toxicity dictates.    10/29/2020 PSA 1.37    -12/16/2019 COVID-19 antigen test negative    -12/29/2020 PSA 0.275 with absolute neutrophil count 700 and creatinine 0.76 with normal liver enzymes and electrolytes.  Normal magnesium and phosphorus and urine drug screen positive for buprenorphine and opiates follow-up with palliative care.    -1/4/2021 CT chest abdomen pelvis with contrast and whole-body bone scan shows improving less metabolically active bone metastases.  Stable sclerotic T12, L1, and L2 vertebral bodies and bilateral pelvic bones on bone windows with stable subcentimeter para-aortic lymph nodes and no definite progression in the chest abdomen or pelvis.    -1/5/2021 [facility] follow-up visit: I reviewed the images and reports thereof of the above CT and bone  scan which shows good response to Taxotere x3 courses with a PSA down to 0.275 from a baseline of 10.  Get another 3 courses of Taxotere, continue his Xgeva, and then following that we will switch to Zytiga and prednisone.  He is working with palliative care on his bone pain but on his current dose of fentanyl patch he says his pain is still not adequately controlled he will contact them.  Dexamethasone prescription was refilled.  We will see my nurse practitioner back in 3 weeks for course #5 of 6 total courses and then we will reimage with CT chest abdomen pelvis and bone scan before going to the Zytiga prednisone.    -3/4/2021 CT chest abdomen pelvis with contrast is negative save for stable sclerotic bone lesions and the bone scan shows near resolution of prior bony abnormalities associated with the known sclerotic lesions in the thoracolumbar spine and bony pelvis.    2/17/2021 PSA down to 0.1 from 1.37 October 2020.  3/9/2021 PSA 0.1    -5/26/2021 CT chest abdomen pelvis with contrast shows stable to slightly underlying increase low-density left para-aortic node.  Bone lesions stable.  Whole-body bone scan stable to decrease activity of known thoracolumbar and bony pelvic involvement.  PSA less than 0.1  , AST 74, bilirubin 0.5.       -6/1/2021 [facility] medical oncology follow-up visit: I reviewed the above data with him and images thereof.  Bones are stable.  No significant adenopathy.  PSA immeasurably low.     upper limit of normal 69 and AST 74 upper limit of normal 46.  Hence, neither is more than double the upper limit of normal with no ascites and no encephalopathy and normal albumin and bilirubin, despite the elevation of liver enzymes, he has child Abrams score A.  Package insert for Abiraterone says that for ALT and/or AST greater than 5 times upper limit of normal or total bilirubin greater than 3 times the upper limit of normal to withhold treatment until liver function tests returned to  baseline or ALT and AST less than or equal to 2.5 times the upper limit of normal and total bilirubin less than or equal to 1.5 times the upper limit of normal and then reinitiate at 750 mg daily dose of Zytiga.  For recurrent hepatotoxicity on 750 mg daily Zytiga, withhold treatment until liver functions returned to baseline or ALT and AST less than 3-2.5 times upper limit of normal and total bilirubin less than or equal to 1.5 times the upper limit of normal then reinitiate at 500 mg daily Zytiga dose.  If has recurrent hepatotoxicity on 500 mg dose, then discontinue treatment.  If has ALT greater than 3 times the upper limit of normal along with total bilirubin greater than 2 times the upper limit of normal in the absence of other contributing causes or biliary obstruction, permanently discontinue Zytiga.  Based on these recommendations, I would continue current doses but we will watch with serial labs monthly and repeat his imaging again in 3 months but clinically he is doing wonderfully with excellent response to Taxotere and now on Zytiga prednisone plus Zometa along with Relugolix.    -6/23/2020 for [facility] clinic follow-up: Having persistent and worsening pain above his left hip.  I will get an MRI of the left hip for further evaluation.  Otherwise we will continue therapy unchanged with Zytiga, prednisone and Zometa along with Relugolix.    -7/28/2021 [facility] follow-up: Patient with multiple somatic complaints including episodes of confusion, dizziness, decreased memory, agitation.  He reports ongoing episodes with difficulty swallowing.  Also most concerning for episodes of angina and chest pain for which he basically refused to seek emergency medical attention.  He has a history of coronary artery disease and stent placement.  He has not followed up with cardiology for many years.  I will get an MRI of the brain in light of his confusion, dizziness and agitation.  I will get him  "to gastroenterology for EGD in light of his dysphagia.  I have also put in a referral for cardiology.  I reemphasized to the patient, his wife who is with him today and his son who was on the telephone during our visit the importance of seeking out prompt medical attention when he is having chest pain or neurological concerns so that he can be evaluated.  The patient states that he basically refuses to go to the emergency room and if his family calls an ambulance he would not agree to go to the hospital.  For the time being, I have asked him to hold his Zytiga and prednisone until we can sort this out as Zytiga does have some cardiovascular risk.  There is likely no treatment for prostate cancer that does not carry some form of cardiovascular risk.  His PSA remains low at 0.014 yesterday.  He will continue relugolix for now.    Of note, some of these symptoms could also be due to his hypophosphatemia, phosphate level from yesterday was 1.5, I have sent in a prescription for K-Phos 3 times a day before meals for 10 days.  We will hold further Zometa until this is corrected.  I have also put in an order to check his vitamin D level.  He takes calcium and vitamin D daily.  Some of his symptoms also could be due to drug withdrawal as there was some confusion and difficulty getting his last prescription for methadone therefore he was without methadone for several days, he now has his prescription and is back on his pain medication.  He has an appointment with neurosurgery tomorrow in light of his ongoing back pain, MRI of the hip recently showed multiple bony lesions largest at the L5 vertebral body and left supra-acetabular region.  If no neurosurgical intervention recommended he may benefit from spot radiation as this is where a lot of his pain is coming from.  His wife had questions today regarding his staging and extent of disease.  We reviewed previous scans.  I did clarify with her as she used the words \"cancer " "free\" as she thought that is what she was told after his CT scans once he completed Taxotere in February.  I explained to her that even though his scans showed he had an excellent response with no clear areas of metastasis that did not mean he was cancer free, I explained to her that when you have metastatic cancer the treatment intent is palliative in nature and to control the disease but not to cure the disease.  She stated understanding.  We will see him back in 2 weeks for follow-up to sort through this and make further plan of care, again, I have asked him to hold Zytiga and prednisone until he sees us back, he will continue on his other medications including relugolix.    -7/30/2021 neurosurgery PA consultation.  MRI with and without contrast L5 ordered.    -8/3/2021 neurosurgery follow-up showed known sclerotic disease with new L5 abnormality without compression deformity or instability.  MRI brain negative for metastasis.    -8/4/2021 office visit Dr. Fco Quintanilla radiation oncology coordinating with Dr. North neurosurgery for palliative radiation for MRI evidence of L5 and left supra acetabular painful lesions.  States that the patient's disease which is not secreting PSA is experiencing some progression and would benefit from 20 Gy in five fractions and other lesion 30 Gy in ten fractions. Patient offered to go to [place] for radiation but desired treatment in [place].    -8/10/2021 [facility] follow-up visit: No chest pains at this junction.  Reviewed MRI brain and other MRIs reviewed by Dr. North and [place].  Due for EGD on 19th.  We will repeat his phosphorus along with his other labs continue Relugolix, Zytiga, prednisone, and he will continue radiation palliatively to the painful bony lesions with Dr. Quintanilla/Can.  He will see my nurse practitioner back in a few weeks to see how he is tolerating this and to follow-up on the phosphorus off of Zometa and she will order repeat CT " chest abdomen pelvis with contrast and total body bone scan the end of September.I will see him back after that.    -9/8/2021 Zometa resumed.  PSA <0.10    -10/5/2021 [facility] medical oncology follow-up visit: followed-up with radiation oncology 9/21/2021.  Status post symptomatic L5 radiation 5 fractions 20 [place] completed 8/16/2021 with improvement in right hip pain.  9/2/2021 PSA less than 0.1.  9/29/2021 total body bone scan shows increased uptake left iliac bone slightly superior group of the left acetabulum with no additional foci of suspicious abnormality is unlikely treatment related with no new sites of active osseous metastasis.  CT chest abdomen pelvis with contrast shows stable borderline enlarged left periaortic nodes and dating sclerotic bone lesions.Continue Zytiga, prednisone, Relugolix, Zometa, repeat CT chest abdomen pelvis with contrast and total body bone scan the end of the year.  We will follow PSA serially.    -11/17/2021 [facility] Oncology clinic follow-up:  Mr. Lugo overall is doing well.  He is tolerating therapy with Zytiga, prednisone and Relugolix along with Zometa which is given every 6 weeks.  PSA remains low at <0.1.  Has occasional low phosphorus, currently low at 1.7.  Serum calcium is normal at 8.9, normal creatinine 0.79 and normal CBC other than for platelets of 124.  I will hold Zometa for 1 month, I did send in a prescription for phosphorus replacement today.  He takes calcium and vitamin D.  I will see him back in 1 month for follow-up.  We will repeat restaging scans after that visit.    -12/15/2021 [facility] Oncology clinic follow-up: Mr. Lugo continues to do well on therapy with Zytiga, prednisone and Relugolix, his PSA remains low at <0.1.  He is continuing to have left lower back pain, he is working with palliative care and they have referred him also to pain management.  Pain management has talked to him about putting in a pain pump however he is leery of this, I told him that  this was a safe and effective pain management technique and to certainly give consideration to their recommendations.  His phosphorus is improving however is still a little low, I will hold off on Zometa until we see him back in 1 month, we may end up only giving it every 12 weeks.  We will repeat restaging scans with CT chest, abdomen and pelvis along with total body bone scan prior to return and I have ordered those today.      -1/5/2022 CT chest abdomen pelvis with contrast [facility] showing stable left periaortic adenopathy and unchanged sclerotic thoracic, lumbar spine and pelvis lesions with no new or progressive disease in the chest abdomen or pelvis.  Total body bone scan shows no significant change and no new suspicious foci.  Stable posterior right acetabulum and left superior acetabulum and degenerative changes in the cervical spine, shoulders, acromioclavicular joints, sternoclavicular joints, elbows, wrists, hands, thoracic spine, lumbosacral spine, sacroiliac joints, hips, knees, ankles, feet.    -1/11/2022 [facility] oncology hematology follow-up visit: I reviewed images and reports from his 1/5/2022 scans.  No active disease and PSA immmeasurably low on Zytiga, prednisone, Relugolix.  However, he has struggled with hypophosphatemia and is significantly fatigued with this.  Given that the bones are doing well and given that his phosphorus continues to remain consistently low at 2.2 in mid December, 1.7 mid November and 2.5 mid-September and as low as 1.5 back to July and the likelihood that this is related to his Zometa, given that his bones are stable overall, he will increase from 1200 mg up to 1600 mg of calcium and phosphate a day and we will hold his Zometa for the foreseeable future.  He will see my nurse practitioner back in a month to continue to monitor his phosphorus along with his calcium and other labs and will repeat his scans again in 3 months.  In addition, given his  fatigue we will check his ACTH and cortisol though he is taking his prednisone with his Zytiga.  Though his electrolytes are normal, I will keep an eye on the cortisol and ACTH and have these labs not only drawn today but again just prior to return to my nurse practitioner on February 10.    -2/10/2022 [facility] follow-up: Mr. Lugo overall is stable, no change in his health this past month.  I have reviewed his labs from 2/8/2022 and they are unremarkable, his phosphorus is now normal at 3.2. We are continuing to hold his Zometa, he last received Zometa on 10/5/2021.  He continues therapy with Zytiga, prednisone and Relugolix.  Dr. Rashid had ordered cortisol level and ACTH which are low, currently his ACTH is 2.8 and cortisol 3.08 however these are both done in the face of ongoing prednisone that he takes with his Zytiga.  We will get him to endocrinology for further evaluation for possible adrenal insufficiency but will not interrupt his treatment in the interim.  He will need restaging scans again in April for a 3-month follow-up.  He will continue to follow with palliative care and pain management for his cancer related pain.  His PSA remains immeasurably low at <0.1 on 2/8/22.    -2/15/2022 went to emergency room with weakness, decreased appetite, myalgias in the setting of known COVID-19 testing positive a few days prior to this visit.  Phosphorus had been low in the past but was normal in the emergency room with unremarkable CBC and CMP.  Chest x-ray unremarkable saturating well on room air mildly hypertensive with dry mucosa.  Given 500 cc crystalloid.  Covid test positive.  Mild thrombocytopenia 136,000 and otherwise unremarkable.  Discharged home.    -3/3/2022 data:  PSA less than 0.1  CMP unremarkable  Cortisol 3.96 normal  ACTH 2.8 lower limit of normal 7.2  Phosphorus normal 2.6    -3/8/2022 [facility] oncology follow-up: Suspect big chunk of his fatigue was Covid.  Another part of the  fatigue likely related to lack of testosterone.  Not hypophosphatemic off of Zometa.  ACTH running low while on prednisone not surprising but with normal cortisol and I doubt adrenal insufficient which is why he is on prednisone to avoid such while taking Zytiga.  Overall doing fairly well and with immeasurably low PSA, I will have labs done on him early April, see my nurse practitioner early May, and she will order repeat bone scan and CTs the end of May and I will see him back in June.  We will continue to hold the Zometa unless bone lesions progress given symptomatic prior hypophosphatemia on Zometa.  He will keep his appointment April 28 with Dr. Mir Duffy just to be sure that my take on his pituitary/adrenal axis assessment is accurate.    -4/28/2022 endocrinology consult Dr. Mir Duffy.  With low ACTH and cortisol on prednisone with Zytiga, the adequacy of prednisone cannot be assessed by measuring ACTH or cortisol but is assessed by weight changes, blood pressure, blood sugar, potassium, overall sense of how the patient is doing.  Primary adrenal insufficiency with low ACTH and low cortisol would be typical for the situation as his blood sugar tends to run a little high and potassium a little low, he did not think increasing prednisone was necessary.  He put him on some potassium.  No follow-up scheduled, will see as needed.    -5/4/2022 [facility] follow-up: Mr. Lugo continues on therapy with Zytiga and prednisone along with Relugolix.  His Zometa has been on hold due to previous hypophosphatemia.  There is some concern about potential adrenal insufficiency. He saw endocrinology, Dr. Mir Duffy on 4/28/2022.  I did review his office note and he stated that the low ACTH and low serum cortisol would be typical for Mr. Lugo's situation.  He further stated that the adequacy of prednisone dose cannot be assessed by measuring ACTH or cortisol but is rather assessed by the overall just altered how the  patient is feeling-weight change, blood pressure, blood sugar, potassium, overall sense of wellbeing.  His potassium had been running a little low therefore they put him on potassium 10 mill equivalents daily. Of note, I do not see a future follow-up scheduled with endocrinology.  He is having night sweats, I am not sure if this is related.  His glucose was normal this morning at 107.  His weight is stable.  He will continue current treatment for his prostate cancer unchanged, we will continue to hold his Zometa.  Current phosphorus and magnesium are normal.  CBC and CMP from today are unremarkable, cortisol is normal.  PSA and ACTH are pending  In regards to his night sweats, he had taken clonidine previously 0.1 mg twice daily as needed, I did send in a short supply again to try and see if this helps.  He also is having indigestion despite being on omeprazole twice daily, I sent a prescription for Pepcid.  He had an EGD August 2021.  We will repeat restaging scans prior to return and I have ordered those today.     -5/24/2022 CT chest abdomen pelvis with contrast[place] negative.  Bone scan shows stable bone metastases.    - 5/25/2022 PSA less than 0.1, platelets 130,000, otherwise unremarkable CBC and CMP.  A.m. cortisol running low 1.2 with phosphorus low 2.3.    -5/31/2022 [facility] oncology follow-up: On Zytiga, prednisone, Relugolix, PSA remaining immeasurably low with stable bone scan and no visceral/paulino metastases.  Phosphorus still running low.  I have discontinued his potassium chloride and started potassium phosphate 500 mg 4 times a day.  Unless PSA rising, we will plan on repeating CTs and bone scan in 6 months and will follow with my nurse practitioner in the interim and if symptoms dictate or labs, will get back to Dr. Duffy for management of potential adrenal insufficiency but presently we will just see how he does with potassium phosphate and hopeful improvement in the phosphorus level  with time.  We will continue to follow with palliative care for pain management    -7/6/2022 [facility] Oncology clinic follow-up: Mr. Lugo overall is doing well but continues to be troubled with fatigue and hot flashes.  For the most part he states he is able to do the things he wants to do, he continues to work but does have to take more rest periods.  I had a long discussion with him and his wife after reviewing his medications with them as they wanted to see if there was anything he could stop or adjust.  I discussed with him the need to continue on treatment for his prostate cancer even though his PSA remains immeasurably low and his scans look good but that if his medication is stopped the cancer would progress and over time it still has the potential to progress on therapy but would continue it as long as possible to keep his disease under control.  I explained this is like treating someone with hypertension, you do not stop the antihypertensive just because the blood pressure normalizes.  They stated understanding.  There is no dose adjustment of Zytiga or prednisone that would minimize his symptoms, he is on the current standard recommended therapy.  Discussed with him that sometimes during times of stress we may need to increase his prednisone dose but for now would not change anything.  His phosphorus level is normal, we continue to hold his Zometa.  I did give him the option of holding his phosphorus for the next month and we will see what happens, if it drops we will start him back on it but may be at a lower dose rather than 4 times a day maybe twice a day.  For now he will continue therapy unchanged.  We will continue monitoring labs monthly.  No PSA was drawn this month but that is okay as his PSA has been running immeasurably low at <0.1, we will recheck next month with lab draw.  We will stop checking his ACTH and cortisol level every month, if he develops worsening symptoms I will repeat those.  He  has not gotten a follow-up with endocrinology as of yet.    -8/3/2022 [facility] [place] [facility] follow-up:  Mr. Lugo is doing well as far as his prostate cancer goes with continued immeasurably low PSA of <0.1.  He continues on therapy with Zytiga and prednisone along with Relugolix.  His phosphorus is stable at 2.7 which was just slightly low by our reference range however was 2.71-month ago also which was normal on another labs reference range.  He has not taken his phosphorus for this past month as he thought it was making him more fatigued.  He really can tell no difference whether he was taking it or not.  I have asked him to try to go back on phosphorus twice a day rather than 4 times a day and see if this is tolerated and if we can keep his phosphorus level from dropping.  His biggest complaint today is flareup of his arthralgias and back pain.  He is following with pain management, Dr. Danny Avalos but is requesting a referral back to palliative medicine as he feels that no one is currently listening to him and they are just prescribing the same medications that are not effective according to his report.  He does have a follow-up with Dr. Avalos on 8/12/2022 but due to his current pain is not able to work until after he is seen and hopefully can get some better relief.  I have given him a work release until 15 August.  I have also put in a referral back to palliative care.  Also encouraged him to work with Dr. Avalos for help in resolving his issue.  Apparently they have recommended some type of a pump however he has been hesitant to that but likely would be helpful.  We will continue therapy unchanged.  We will continue monthly labs including phosphorus, we are not currently checking ACTH and cortisol monthly as Dr. Mir Duffy previously stated in his assessment on 4/28/2022 that the adequacy of prednisone cannot be assessed by measuring ACTH or cortisol but would be assessed by symptoms, see note from  4/28/2022.  He made no further follow-up appointments.  Fatigue is not worsening currently.  His glucose and potassium are  Normal.    -10/13/2022 [facility] medical oncology follow-up: Mr. Lugo is doing well.  He is tolerating treatment with Zytiga and prednisone along with Relugolix without any unusual side effects.  His PSA has remained immeasurably low at <0.1.  His phosphorus was slightly low at last visit.  He had been instructed to go back on phosphorus twice a day but he misunderstood and has not been taking it.  We will check labs today.  I will follow-up with results and notify him if he needs to restart the phosphorus.  His pain is better controlled since reestablishing with [facility].  We will repeat scans prior to next follow-up.  I have ordered CT chest abdomen pelvis along with bone scan.  We will see him back in 1 month.    -10/13/2022 PSA less than 0.014.With unremarkable CBC and CMP.  Phosphorus still a little low 2.3.    -11/4/2022 CT abdomen pelvis chest showed no evidence of disease progression with stable sclerotic bone lesions.  Bone scan stable right greater than left acetabular metastasis.  Stable bone metastases.    -11/8/2022 [facility] medical oncology telemedicine follow-up: Patient states that he feels achy all over with low-grade temps and a cough mildly productive.  Has an appointment later today to see his primary care for testing for flu and COVID.  Suggested that if he were positive for COVID that he get Paxlovid and that if he were positive for flu that he get Tamiflu and to discuss this with his primary care.  From the prostate cancer side, his PSA is immeasurably low with stable bone metastases and no evidence of progression.  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  We will continue the Relugolix, Zytiga, prednisone and he will see my nurse practitioner 12/7/2022.  Would repeat CTs and bone scan in May.    -1/25/2023  [facility] medicine follow-up: Mr. Lugo is having worsening fatigue and muscle aches.  He does have adrenal insufficiency on Zytiga and prednisone for his prostate cancer, he saw endocrinology in light of low ACTH back in April 2022.  At that time there was no recommendation other than for monitoring him for his overall wellbeing and labs.  He denies any fevers or chills.  His labs as shown above are unremarkable, his CBC and CMP are normal other than for glucose of 112, PSA remains low at <0.1.  He has no new pain or any symptoms concerning for progression of his prostate cancer.  I will get him back to endocrinology to see if they feel any dose adjustment of his prednisone would be helpful in his symptoms.  Currently he is on 5 mg a day with his Zytiga.  I did not repeat his ACTH or cortisol as they would be expected to be low in this situation.  His potassium is normal, he has had no weight loss or change in his appetite.  Blood sugar is reasonable.  His only complaint currently is worsening fatigue and muscle aches.  He will continue treatment unchanged with Zytiga, prednisone and relugolix.  I will see him back in 1 month for follow-up.  We will repeat his restaging scans in May.    -3/1/2023 [facility] follow-up:  His PSA remains low at <0.1 on treatment with Zytiga and prednisone along with relugolix however he continues to complain of significant fatigue and muscle aches not improving with time.  He states that his quality of life is affected as he is not able to do any work activities due to the fatigue.  He did see endocrinology again and they have increased his prednisone from 5 mg daily up to 7.5 mg daily however so far this has not made any difference in how he feels.  He is supposed to get back in touch with them to let them know how he is doing and to see if there is any other adjustments needed. His labs are unremarkable with normal thyroid function, CMP is normal other than for  glucose of 156.  They did check hemoglobin A1c to look for diabetes however that was normal at 5.7.  CMP is unremarkable with just very mild anemia with hemoglobin of 12 and hematocrit 36.1% which would not account for his fatigue.  He does have hot flashes that are fairly significant, I will try venlafaxine as suggested by Dr. Duffy and I appreciate the recommendation.  We will start at 37.5 mg daily and if tolerated he can increase to 75 mg after 1-2 weeks.  I will see him back in 1 months for follow-up.  Plan to repeat scans late April/early May.  His prostate cancer seems under good control with current regimen however I am not sure how long he can tolerate this due to the side effects.    -3/30/2023 [facility] Oncology clinic follow-up:  Mr. Lugo continues to deal with fatigue.  He appears more cushingoid today, he continues on treatment with Zytiga, prednisone and relugolix.  Prednisone dose currently is 7.5 mg daily.  This has not made any difference in his energy level.  PSA remains immeasurably low at <0.014.  CBC shows new onset of microcytic hypochromic anemia with hemoglobin of 11, hematocrit 34.4%, MCV 75.4, MCH 24.1, WBC is normal at 5.81 with normal platelet count 181,000 and normal differential.  CMP is unremarkable, it is normal other than for glucose of 142.  We will get him to Dr. Gomez for an EGD and colonoscopy for further evaluation in regards to his new onset of anemia.  He has no associated GI symptoms otherwise except for 1 day a few weeks ago he does report that he had fairly sudden onset of vomiting but this was an isolated incident on that day and has not reoccurred since.  Continues to follow with palliative care for management of his chronic back pain.  We will repeat restaging CT chest, abdomen and pelvis along with total body bone scan prior to return and I have ordered that today.    -3/30/2023 Ferritin low 12.3 with iron low 27 and saturation low 5% with total iron binding capacity  511.  3/31/2023 ferrous sulfate 325 mg twice a day every other day with vitamin C started.    - 4/12/2023 CT chest abdomen pelvis with contrast showed no progressive disease with stable sclerotic bone lesions.  Bone scan showed single identifiable bone metastasis stable right acetabulum    -4/18/2023 [facility] hematology oncology follow-up: He now has microcytic iron deficiency anemia which is new and concerning.  Gastroenterologic evaluation has been ordered but not performed and is mandatory.  Continue ferrous sulfate 325 mg twice a day every other day with vitamin C to improve absorption and we will follow his CBC along with his usual labs monthly on Zytiga, Relugolix, prednisone 7.5 mg.  He follows with Dr. Duffy with adrenal insufficiency.  He will see my nurse practitioner back in a month to see what GI has found and to watch serial CBC along with his other labs including PSA.  I would repeat CT chest abdomen pelvis again in 6 months with contrast along with bone scan and sooner if PSA rises.    -5/3/2023 EGD and colonoscopy with Dr. Alexander Gomez: Superficial erosions in the stomach with gastritis.  Small polyps removed, dilated internal hemorrhoids.  Pathology pending.  Recommend repeat colonoscopy in 5 years.    -5/17/2023 [facility] Oncology clinic follow-up:  Naveen overall is doing well on current therapy with Zytiga, prednisone and relugolix.  PSA remains immeasurably low at <0.014.  He feels his hip pain is getting worse with time.  It makes it difficult to enjoy activities with his grandchildren as it is worse when he stands for any length of time or tries to carry any weight.  I reviewed his bone scan from April which was stable, we also reviewed previous MRI of the hips from May 2021 that showed moderate bilateral hip osteoarthritis.  I offered to repeat MRI of his hips for evaluation to see if there has been any worsening of his osteoarthritis and also then referral to orthopedics for any recommended  intervention however at this time he defers.  He will let me know if he changes his mind.  He also follows with palliative care for management of his cancer related pain.  We will continue therapy unchanged.  We will repeat restaging scans in October unless symptoms dictate the need to do so sooner.  He was recently found to be iron deficient, he had an EGD and colonoscopy on 5/3/2023 with Dr. Gomez, EGD showed superficial erosions in the stomach with gastritis, he continues on omeprazole.  He had small polyps removed from the colon and recommendation to repeat colonoscopy in 5 years.  He is on oral iron along with vitamin C every other day and is tolerating that well.  CBC from yesterday shows hemoglobin now normal at 14.2 and hematocrit 43.2%, MCV normal at 27.0.    -7/20/2023 [facility] [place] Oncology clinic follow-up: Naveen is doing well on current therapy with Zytiga, prednisone and relugolix.  PSA in June remains low at <0.1.  Continues to deal with chronic pain in his back and hip.  Recently saw Dr. Nikolai Marks and had an injection in his hip and is hoping that it will eventually help with his pain.  He has no new pain.  We will continue current therapy unchanged.  We will repeat labs while he is here today.  I will refill his iron as his pharmacy has changed. Most recent CBC in June with hemoglobin of 14.7 and hematocrit 43.6%.  We will repeat restaging scans in October.    -9/14/2023 [facility] [place] Oncology clinic follow-up: Naveen is tolerating his prostate cancer treatment with Zytiga, prednisone and relugolix.  PSA remains immeasurably low at <0.014 on 8/17/2023.  Currently he is having more GI issues with nausea and intermittent vomiting that occurs often without warning.  He had an EGD and colonoscopy with Dr. Gomez on 5/3/2023, EGD showed superficial erosions in the stomach with gastritis.  He does take omeprazole 40 mg twice daily.  He saw his PCP yesterday for these symptoms and has been referred to  gastroenterologist at [facility] and he is awaiting to hear about that appointment.  Hopefully he can get in fairly quickly.  I told him that he should let us know if it is going to be a while before he can be seen and I will get him back to Dr. Gomez for consideration of repeat EGD.  We will get his CBC, CMP and PSA today.  His previous noted anemia had normalized, he currently is not taking oral iron as he ran out.  I will wait and see what his labs show today and likely hold off for now depending on his lab results until his GI issues can be resolved.  We will repeat his restaging CT chest, abdomen and pelvis along with bone scan in the next few weeks and I have ordered those today.  He is taking Zofran as needed for nausea, he is also on meclizine for vertigo.  He is going to follow-up with his PCP in a few weeks regarding his GI symptoms.    -9/14/2023 PSA less than 0.014 with unremarkable CBC and CMP.    -9/28/2023 total body bone scan stable with uptake in right acetabulum, no new areas.  CT chest, abdomen and pelvis show no evidence of disease progression.  -11/9/2023 [facility] Oncology clinic follow-up: CT reports were reviewed by Dr. Rashid last month and the patient reports he was called by Dr. Rashid as he could not make his appointment due to concerns over upper respiratory infection, CT scan showed no evidence of disease progression.  PSA has remained low, we are repeating that today along with his CBC and CMP.  He continues to have sinus drainage and congestion, I will send in 7 additional days on his doxycycline that he has been taking, he will follow-up with PCP if no improvement.  He continues to complain of bilateral hip pain, has a follow-up with Dr. Marks next week.  We will continue therapy unchanged with Zytiga, prednisone and relugolix.  Would repeat restaging bone scan and CT scans late March for a 6-month follow-up.    -1/4/24 PSA 1    - 3/28/2024 CT chest abdomen pelvis with contrast compared  to May 2013 shows decreasing sclerotic bony metastases.  Bone scan shows similar relative intensity of 1 focal uptake right acetabulum correlating with CT with no new sites of uptake    -4/2/2024 [facility] follow-up: Continued good response to Zytiga prednisone relugolix which he is tolerating.  We will check PSA and labs 6/25/2024 with follow-up with my nurse practitioner and again 3 months after that along with CTs and bone scan 6 months from now.  Not on bisphosphonates or rank ligand inhibitor due to drug-induced hypophosphatemia.  Will ask my nurse practitioner to order bone densitometry with his other scans in 6 months. He is having difficulty emptying his bladder and his prior urologist does not take his insurance so we will make appropriate referrals.    -6/26/2024 [facility] follow-up: Overall continues to tolerate therapy with Zytiga, prednisone and Relugolix.  We will get labs today including PSA and as long as that is stable he will continue treatment unchanged with plans to repeat restaging CT scans and bone scans in September and I have ordered those today.  We will also obtain DEXA scan at that time to monitor for osteoporosis on long-term use of androgen deprivation therapy.  We have been holding his Zometa due to drug-induced hypophosphatemia.  He is following with urology and has had relief of his urinary hesitancy with some medication adjustments and he is scheduled for follow-up this Friday with cystoscopy.  He recently had chest pain and shortness of breath while mowing his lawn, he did have an echocardiogram and stress test yesterday, awaiting final results and reading from Dr. Kahn.  He continues to follow with palliative care for management of his pain which is under good control.  I did refill his symptoms today for chronic constipation from opioid use.    -9/16/2024 CT chest abdomen pelvis with contrast [place] [facility] compared to May 2024 shows stable 1.6  cm well-defined nodule left periaortic region.  Stable sclerotic left supra-acetabular, right posterior acetabular left posterior acetabular lesions.  No disease progression.  Total body bone scan shows grossly stable solitary active right posterior acetabular bone metastasis.    -10/2/24 PSA less than 0.1    -10/8/2024 [facility] [place] oncology clinic follow-up: In order to get prostatic urethral pathway surgically open he needed cardiac clearance and is due for cardiac cath in the next week or 2 with Dr. Kahn.  I reviewed the above image results with him with no clear-cut evidence of progression and PSA remaining immeasurably low.  He does feel quite fatigued.But this is not new.  He has become microcytic with a hemoglobin of 12.1 and MCV of 77 which has trickled down 14.8 and December.  His CMP is unremarkable.  He had an EGD and colonoscopy May of last year with Dr. Gomez that showed superficial erosive gastritis with internal hemorrhoids and colon polyps.  He states he has been taking his iron but he takes it with all his other medicines.  I told him to take it every other day with vitamin C with at least an hour and a half window on either side of it free from any other ingestion of medicines.  With reference to the pulmonary infiltrate he has had a frothy nagging mildly productive cough without fevers or chills or dyspnea for the last 2 months.  Rather than throwing an antibiotic at this, I am going to get pulmonary referral for their opinion.He is due for his bone density as well and we will order that.  He has been off of Zometa for quite some time due to hypophosphatemia  Addendum: He has never had genetic testing.  While that would not alter our plans in the near term, I will plan on genetic testing for his family sake as well as for potential treatable molecular targets in the future should treatment algorithms dictate.  I called and spoke with his wife to inform her of this plan.    - 10/29/2024 cardiac cath  Dr. Kahn showing vascular disease for which she will manage medically with rosuvastatin and aspirin and antianginal therapy.    -11/6/2024 buccal mucosal genetic sampling given to patient for collection.    - 11/12/2024 pulmonology evaluation Makenzie Ramirez.  Status post multiple rounds of antibiotics on 5 mg prednisone at that junction.  Changed him to Breztri from [facility] to see if budesonide would help with congestion and adding DuoNebs along with montelukast.  Continues to smoke and we will follow-up with him in 4 weeks.    -12/4/2024 PSA immeasurably low less than 0.014.  Hemoglobin 14.4 and rising.  CBC unremarkable.  Glucose 115 otherwise unremarkable CMP.    -12/10/2024 [facility] oncology follow-up: Prostate cancer under control on Relugolix Zytiga prednisone.  Continue to follow with urologist for obstructive symptoms from prostate.  Follow-up with palliative care for his ongoing bone pains.  I will rescan with bone scans and CT chest abdomen pelvis with contrast in March and see him back after that along with his labs and PSA.  Follow-up with pulmonary medicine regarding his dyspnea.     Prostate cancer metastatic to bone   8/30/2020 -  Other Event    PSA 10.8     9/15/2020 Initial Diagnosis    Prostate cancer metastatic to bone (CMS/HCC)     9/15/2020 Biopsy    Prostate needle core biopsy     9/24/2020 Imaging    Total body bone scan: 4 abnormal areas of increased activity consistent with osteoblastic metastasis, abnormal foci of activity seen in the T12 vertebral body, L1 vertebral body, roof of the left acetabulum, and acetabulum medially on the right.  CT chest: Stable sclerotic metastatic lesions within the T12 and L1 vertebrae.  No other evidence of metastatic disease to the chest.  Stable mild splenomegaly and subcentimeter periaortic retroperitoneal lymph nodes.  CT abdomen and pelvis: Diffuse bladder wall thickening with mild perivesicular fat stranding.  Interval development of several  sclerotic lesions in the spine and left iliac bone.     10/13/2020 Cancer Staged    Staging form: Bone - Appendicular Skeleton, Trunk, Skull, And Facial Bones, AJCC 8th Edition  - Clinical: Stage IVB (cT3, cN0, cM1b, G3) - Signed by Ishmael Rashid MD on 10/13/2020     11/3/2020 - 10/5/2021 Chemotherapy    OP SUPPORTIVE Zoledronic Acid Q42d     11/3/2020 - 2/18/2021 Chemotherapy    OP PROSTATE DOCEtaxel       1/4/2021 Imaging    CT chest abdomen pelvis with contrast and whole-body bone scan shows improving less metabolically active bone metastases.  Stable sclerotic T12, L1, and L2 vertebral bodies and bilateral pelvic bones on bone windows with stable subcentimeter para-aortic lymph nodes and no definite progression in the chest abdomen or pelvis.  PSA down to 0.275 after 3 courses of Taxotere.     3/4/2021 Imaging    CT chest, abdomen and pelvis stable, no evidence of disease progression. Total body bone scan with decreased/near resolution of abnormal increased radiotracer uptake associated with the known sclerotic lesions in the thoracolumbar spine and bony pelvis.  No evidence of new osteoblastic metastases.     3/4/2021 Imaging    CT chest abdomen pelvis with contrast is negative save for stable sclerotic bone lesions and the bone scan shows near resolution of prior bony abnormalities associated with the known sclerotic lesions in the thoracolumbar spine and bony pelvis.  2/17/2021 PSA down to 0.1 from 1.37 October 2020.     3/9/2021 - 3/9/2021 Chemotherapy    OP SUPPORTIVE PROSTATE Relugolix     3/9/2021 -  Chemotherapy    OP PROSTATE Abiraterone / PredniSONE       3/10/2021 -  Chemotherapy    OP PROSTATE Abiraterone / PredniSONE     8/10/2021 - 8/16/2021 Radiation    Radiation OncologyTreatment Course:  Naveen Lugo received 2000 cGy in 5 fractions to L4-sacrum, left acetabulum and right pelvis via External Beam Radiation - EBRT.     11/16/2021 -  Chemotherapy    OP CENTRAL VENOUS ACCESS DEVICE ACCESS, CARE, AND  MAINTENANCE (CVAD)     1/5/2022 Imaging    -1/5/2022 CT chest abdomen pelvis with contrast [place] [facility] showing stable left periaortic adenopathy and unchanged sclerotic thoracic, lumbar spine and pelvis lesions with no new or progressive disease in the chest abdomen or pelvis.  Total body bone scan shows no significant change and no new suspicious foci.  Stable posterior right acetabulum and left superior acetabulum and degenerative changes in the cervical spine, shoulders, acromioclavicular joints, sternoclavicular joints, elbows, wrists, hands, thoracic spine, lumbosacral spine, sacroiliac joints, hips, knees, ankles, feet.     5/24/2022 Imaging    CT chest abdomen pelvis with contrastFrankfort negative.  Bone scan shows stable bone metastases.     11/4/2022 Imaging    CT abdomen pelvis chest showed no evidence of disease progression with stable sclerotic bone lesions.  Bone scan stable right greater than left acetabular metastasis.  Stable bone metastases.     4/12/2023 Imaging     CT chest abdomen pelvis with contrast showed no progressive disease with stable sclerotic bone lesions.  Bone scan showed single identifiable bone metastasis stable right acetabulum     9/29/2023 Imaging    total body bone scan compared to April showed a single active bone metastasis with multiple and active bone metastases overall stable.  CT chest abdomen and pelvis compared to May and April shows no new bony progression with stable sclerotic bone lesions.         HISTORY OF PRESENT ILLNESS:  The patient is a 69 y.o. male, here for follow up on management of metastatic prostate cancer with excellent long-term response on Relugolix Zytiga prednisone off Zometa for hypophosphatemia    Past Medical History:   Diagnosis Date   • Arthritis    • Bone cancer    • Coronary artery disease    • Elevated cholesterol    • History of radiation therapy 08/16/2021    L4 through sacrum, left acetabulum, right pelvis   • Nephrolithiasis    •  "Opioid use disorder, mild, on maintenance therapy (HCC)    • Prostate cancer      Past Surgical History:   Procedure Laterality Date   • CARDIAC CATHETERIZATION N/A 10/29/2024    Procedure: Left Heart Cath;  Surgeon: Joni Kahn MD;  Location: [facility] CATH INVASIVE LOCATION;  Service: Cardiovascular;  Laterality: N/A;   • CHOLECYSTECTOMY     • COLONOSCOPY     • CORONARY STENT PLACEMENT  2006 & 2011   • HERNIA REPAIR  1986   • THORACIC DISCECTOMY  1994       Allergies   Allergen Reactions   • Cymbalta [Duloxetine Hcl] Hives and Dizziness   • Duloxetine Hives and Dizziness   • Gabapentin Nausea Only   • Pregabalin Dizziness, Nausea And Vomiting and Hives       Family History and Social History reviewed and changed as necessary    REVIEW OF SYSTEM:   General Fatigue but no specific complaints.    PHYSICAL EXAM:  Heart rate regular but rapid.  No jaundice icterus or pallor.  No respiratory distress.    Vitals:    12/10/24 0959   BP: 134/85   Pulse: (!) 124   Resp: 18   Temp: 97.8 °F (36.6 °C)   Weight: 87.5 kg (193 lb)   Height: 175.3 cm (69\")     Vitals:    12/10/24 0959   PainSc:   8   PainLoc: Back          ECOG score: 0           Vitals reviewed.    ECOG: (0) Fully Active - Able to Carry On All Pre-disease Performance Without Restriction    Lab Results   Component Value Date    HGB 14.4 12/04/2024    HCT 44.4 12/04/2024    MCV 83.0 12/04/2024     12/04/2024    WBC 9.75 12/04/2024    NEUTROABS 6.84 12/04/2024    LYMPHSABS 2.29 12/04/2024    MONOSABS 0.48 12/04/2024    EOSABS 0.06 12/04/2024    BASOSABS 0.04 12/04/2024       Lab Results   Component Value Date    GLUCOSE 115 (H) 12/04/2024    BUN 9 12/04/2024    CREATININE 0.77 12/04/2024     12/04/2024    K 3.7 12/04/2024     12/04/2024    CO2 27.1 12/04/2024    CALCIUM 9.2 12/04/2024    PROTEINTOT 6.4 10/29/2024    ALBUMIN 3.8 12/04/2024    BILITOT 0.4 12/04/2024    ALKPHOS 67 12/04/2024    AST 11 12/04/2024    ALT 8 12/04/2024         "     ASSESSMENT & PLAN:  1.  Stage IVb grade group 4 metastatic prostate cancer with sclerotic bone lesions on CT and bone scan and history of prostatic hypertrophy.  Good response to Taxotere ending 2/18/2021 Relugolix, followed by Zytiga prednisone with L4/sacrum, left acetabulum, and right pelvis external beam radiation August 2021.  Hypophosphatemia on Zometa.  Zometa held as of October 2021.  2.  Kidney stone  3.  Polyps  4.  Probable drug-induced hypophosphatemia from Zometa, drug stopped after 10/5/2021 dose  5.  Cancer related pain seeing palliative care  6.  Fatigue  7.  Covid 2/2022  8.  Iron deficiency anemia  -5/3/2023 EGD and colonoscopy with Dr. Alexander Gomez: Superficial erosions in the stomach with gastritis.  Small polyps removed, dilated internal hemorrhoids.  Pathology pending.  Recommend repeat colonoscopy in 5 years.  9.  CADz    - 10/29/2024 cardiac cath Dr. Kahn showing vascular disease for which she will manage medically with rosuvastatin and aspirin and antianginal therapy.    -11/6/2024 buccal mucosal genetic sampling given to patient for collection.    - 11/12/2024 pulmonology evaluation Makenzie Ramirez.  Status post multiple rounds of antibiotics on 5 mg prednisone at that junction.  Changed him to Breztri from [facility] to see if budesonide would help with congestion and adding DuoNebs along with montelukast.  Continues to smoke and we will follow-up with him in 4 weeks.    -12/4/2024 PSA immeasurably low less than 0.014.  Hemoglobin 14.4 and rising.  CBC unremarkable.  Glucose 115 otherwise unremarkable CMP.    -12/10/2024 [facility] follow-up: Prostate cancer under control on Relugolix Zytiga prednisone.  Continue to follow with urologist for obstructive symptoms from prostate.  Follow-up with palliative care for his ongoing bone pains.  I will rescan with bone scans and CT chest abdomen pelvis with contrast in March and see him back after that along with his labs and PSA.   Follow-up with pulmonary medicine regarding his dyspnea.  Hemoglobin rising.    Total time of care today inclusive of time spent today prior to his arrival reviewing notes from outside providers including cardiologist and pulmonologist and interval data and during visit interviewing and Stanley signs or symptoms of his disease and management thereof and after visit instituting this plan took 50 minutes patient care time throughout the day today.  Ishmael Rashid MD    12/10/2024

## 2024-12-16 ENCOUNTER — TELEPHONE (OUTPATIENT)
Dept: GENETICS | Facility: HOSPITAL | Age: 69
End: 2024-12-16
Payer: MEDICARE

## 2024-12-16 ENCOUNTER — OFFICE VISIT (OUTPATIENT)
Dept: PULMONOLOGY | Facility: CLINIC | Age: 69
End: 2024-12-16
Payer: MEDICARE

## 2024-12-16 VITALS
HEIGHT: 69 IN | DIASTOLIC BLOOD PRESSURE: 82 MMHG | WEIGHT: 193 LBS | HEART RATE: 127 BPM | SYSTOLIC BLOOD PRESSURE: 152 MMHG | OXYGEN SATURATION: 96 % | BODY MASS INDEX: 28.58 KG/M2 | TEMPERATURE: 97 F

## 2024-12-16 DIAGNOSIS — Z72.0 TOBACCO ABUSE: ICD-10-CM

## 2024-12-16 DIAGNOSIS — J40 BRONCHITIS: ICD-10-CM

## 2024-12-16 DIAGNOSIS — R06.02 SOB (SHORTNESS OF BREATH): ICD-10-CM

## 2024-12-16 DIAGNOSIS — G89.3 CHRONIC PAIN DUE TO NEOPLASM: ICD-10-CM

## 2024-12-16 DIAGNOSIS — J44.9 STAGE 2 MODERATE COPD BY GOLD CLASSIFICATION: Primary | ICD-10-CM

## 2024-12-16 PROCEDURE — 1159F MED LIST DOCD IN RCRD: CPT | Performed by: NURSE PRACTITIONER

## 2024-12-16 PROCEDURE — 1160F RVW MEDS BY RX/DR IN RCRD: CPT | Performed by: NURSE PRACTITIONER

## 2024-12-16 PROCEDURE — G2211 COMPLEX E/M VISIT ADD ON: HCPCS | Performed by: NURSE PRACTITIONER

## 2024-12-16 PROCEDURE — 3077F SYST BP >= 140 MM HG: CPT | Performed by: NURSE PRACTITIONER

## 2024-12-16 PROCEDURE — 3079F DIAST BP 80-89 MM HG: CPT | Performed by: NURSE PRACTITIONER

## 2024-12-16 PROCEDURE — 99214 OFFICE O/P EST MOD 30 MIN: CPT | Performed by: NURSE PRACTITIONER

## 2024-12-16 RX ORDER — HYDROMORPHONE HYDROCHLORIDE 8 MG/1
8 TABLET ORAL EVERY 4 HOURS PRN
Qty: 180 TABLET | Refills: 0 | Status: SHIPPED | OUTPATIENT
Start: 2024-12-19 | End: 2024-12-18 | Stop reason: SDUPTHER

## 2024-12-16 RX ORDER — PREDNISONE 10 MG/1
TABLET ORAL
Qty: 30 TABLET | Refills: 0 | Status: SHIPPED | OUTPATIENT
Start: 2024-12-16

## 2024-12-16 NOTE — TELEPHONE ENCOUNTER
Pt's appt with palliative care (originally scheduled for 12/16 at 1030) was rescheduled to 1/7/25 at 1030. Pt's blood draw will be performed on 1/7 at the [facility] lab to coordinate with this appt. Genet CARL, informed of this plan.

## 2024-12-16 NOTE — TELEPHONE ENCOUNTER
AGUSTIN #: [id number]    Medication requested: HYDROmorphone (DILAUDID) 8 MG tablet     Last fill date: 11/19/24    Last appointment: 9/4/24    Next appointment:

## 2024-12-16 NOTE — PROGRESS NOTES
"[facility] Pulmonary Follow up      Chief Complaint  Shortness of Breath, Cough, and Wheezing    Subjective          Naveen Lugo presents to [facility] for follow-up after his initial visit last month.  I saw him for worsening cough and shortness of breath with history of COPD.  He has been on several rounds of antibiotics and steroids last few months, with no significant benefit.    On his visit I changed his Trelegy to Breztri to see if the budesonide help with his congestion as well as adding on DuoNebs and montelukast.  We also discussed the importance of smoking cessation.  His most recent CT scan chest was done in September at [place] with no acute findings in his lungs, he follows up with Dr. Rashid for metastatic prostate cancer with bone lesions.  His next scan is not due until around March.        Since I saw him he did start on the nebulizers.  He does not think it really helped the cough much at all.  He still having a productive cough with sometimes green-colored sputum.  He is cough is much worse at night and he is not resting well.  He has also had some chills.  His wife says he is just generally not feeling well at all.  He has had a decreased appetite.  He is also more short of breath with activity.  He does feel like the Breztri may be helping a bit more than Trelegy did.          Objective     Vital Signs:   /82   Pulse (!) 127   Temp 97 °F (36.1 °C) (Infrared)   Ht 175.3 cm (69.02\")   Wt 87.5 kg (193 lb)   SpO2 96% Comment: room air at rest  BMI 28.49 kg/m²       Immunization History   Administered Date(s) Administered    Arexvy (RSV, Adults 60+ yrs) 11/15/2023    COVID-19 (MODERNA) 1st,2nd,3rd Dose Monovalent 03/10/2021, 04/07/2021    COVID-19 (MODERNA) Monovalent Original Booster 12/22/2021    COVID-19 (PFIZER) 12YRS+ (COMIRNATY) 12/14/2023    Pneumococcal Conjugate 20-Valent (PCV20) 11/15/2023    Tdap 03/27/2023    Tetanus Immune " Globulin 05/19/2012       Physical Exam  Vitals reviewed.   Constitutional:       General: He is not in acute distress.     Appearance: He is well-developed.   HENT:      Head: Normocephalic and atraumatic.   Eyes:      Pupils: Pupils are equal, round, and reactive to light.   Cardiovascular:      Rate and Rhythm: Normal rate and regular rhythm.      Heart sounds: No murmur heard.  Pulmonary:      Effort: Pulmonary effort is normal. No respiratory distress.      Breath sounds: Normal breath sounds. Wheezing and rhonchi present. No rales.   Abdominal:      General: Bowel sounds are normal. There is no distension.      Palpations: Abdomen is soft.   Musculoskeletal:         General: Normal range of motion.      Cervical back: Normal range of motion and neck supple.   Skin:     General: Skin is warm and dry.      Findings: No erythema.   Neurological:      Mental Status: He is alert and oriented to person, place, and time.   Psychiatric:         Behavior: Behavior normal.          Result Review :                         Assessment and Plan    Problem List Items Addressed This Visit          Pulmonary and Pneumonias    SOB (shortness of breath)    Stage 2 moderate COPD by GOLD classification - Primary    Relevant Medications    predniSONE (DELTASONE) 10 MG tablet       Tobacco    Tobacco abuse     Other Visit Diagnoses       Bronchitis        Relevant Orders    Respiratory Culture - Sputum, Cough    AFB Culture - Sputum, Cough          Unfortunately Mr. Lugo continues to have some chronic bronchitis symptoms.  He is wheezing with rhonchi today in the office.  He has a productive cough as well as generalized fatigue and malaise.  At this point I we will go ahead and treat him with a prolonged course of Augmentin and prednisone taper.  I would also like for him to follow-up on some sputum cultures and AFB.  We did discuss with his history and being immunosuppressed he could very likely have an atypical infection.    I  would like for him to take a probiotic while he is on the 2 weeks of Augmentin and for 2 weeks after.    Continue on his nebulizers.  Continue on his Breztri.    Follow-up with a chest x-ray and his sputum culture if no improvement in 2 weeks.      Follow Up     Return in about 2 weeks (around 12/30/2024).  Patient was given instructions and counseling regarding his condition or for health maintenance advice. Please see specific information pulled into the AVS if appropriate.       Moderate level of Medical Decision Making complexity based on 2 or more chronic stable illnesses and prescription drug management.    DHARMESH Rodas, ACNP  [facility]  Electronically signed

## 2024-12-18 ENCOUNTER — TELEPHONE (OUTPATIENT)
Dept: UROLOGY | Facility: CLINIC | Age: 69
End: 2024-12-18

## 2024-12-18 DIAGNOSIS — G89.3 CHRONIC PAIN DUE TO NEOPLASM: ICD-10-CM

## 2024-12-18 NOTE — TELEPHONE ENCOUNTER
Pt only received #100 from [employer], but [facility] has the remaining amount of #80 to dispense

## 2024-12-18 NOTE — TELEPHONE ENCOUNTER
Caller: Naveen Lugo    Relationship:  Self    Best call back number: [phone]    PATIENT CALLED REQUESTING TO CANCEL SAME DAY APPT.    Did the patient call AFTER the start time of their scheduled appointment?  []YES  [x]NO    Was the patient's appointment rescheduled? []YES  [x]NO    Any additional information: PT HAS PNEUMONIA AND WILL CALL BACK TO RESCHEDULE WHEN HE IS FEELING BETTER.

## 2024-12-19 RX ORDER — HYDROMORPHONE HYDROCHLORIDE 8 MG/1
8 TABLET ORAL EVERY 4 HOURS PRN
Qty: 80 TABLET | Refills: 0 | Status: SHIPPED | OUTPATIENT
Start: 2024-12-19 | End: 2025-01-18

## 2025-01-02 ENCOUNTER — OFFICE VISIT (OUTPATIENT)
Dept: FAMILY MEDICINE CLINIC | Facility: CLINIC | Age: 70
End: 2025-01-02
Payer: MEDICARE

## 2025-01-02 VITALS
HEIGHT: 69 IN | BODY MASS INDEX: 30.3 KG/M2 | HEART RATE: 99 BPM | DIASTOLIC BLOOD PRESSURE: 76 MMHG | OXYGEN SATURATION: 95 % | WEIGHT: 204.6 LBS | SYSTOLIC BLOOD PRESSURE: 122 MMHG

## 2025-01-02 DIAGNOSIS — J44.1 COPD WITH EXACERBATION: Primary | ICD-10-CM

## 2025-01-02 DIAGNOSIS — K21.9 CHRONIC GERD: ICD-10-CM

## 2025-01-02 DIAGNOSIS — J44.9 STAGE 2 MODERATE COPD BY GOLD CLASSIFICATION: ICD-10-CM

## 2025-01-02 DIAGNOSIS — J30.2 SEASONAL ALLERGIES: ICD-10-CM

## 2025-01-02 PROBLEM — J06.9 ACUTE URI: Status: RESOLVED | Noted: 2022-11-09 | Resolved: 2025-01-02

## 2025-01-02 PROCEDURE — 99214 OFFICE O/P EST MOD 30 MIN: CPT | Performed by: PHYSICIAN ASSISTANT

## 2025-01-02 PROCEDURE — 96372 THER/PROPH/DIAG INJ SC/IM: CPT | Performed by: PHYSICIAN ASSISTANT

## 2025-01-02 PROCEDURE — 3078F DIAST BP <80 MM HG: CPT | Performed by: PHYSICIAN ASSISTANT

## 2025-01-02 PROCEDURE — 1125F AMNT PAIN NOTED PAIN PRSNT: CPT | Performed by: PHYSICIAN ASSISTANT

## 2025-01-02 PROCEDURE — 3074F SYST BP LT 130 MM HG: CPT | Performed by: PHYSICIAN ASSISTANT

## 2025-01-02 PROCEDURE — 1160F RVW MEDS BY RX/DR IN RCRD: CPT | Performed by: PHYSICIAN ASSISTANT

## 2025-01-02 PROCEDURE — 1159F MED LIST DOCD IN RCRD: CPT | Performed by: PHYSICIAN ASSISTANT

## 2025-01-02 RX ORDER — TRIAMCINOLONE ACETONIDE 40 MG/ML
80 INJECTION, SUSPENSION INTRA-ARTICULAR; INTRAMUSCULAR ONCE
Status: COMPLETED | OUTPATIENT
Start: 2025-01-02 | End: 2025-01-02

## 2025-01-02 RX ORDER — TAMSULOSIN HYDROCHLORIDE 0.4 MG/1
1 CAPSULE ORAL DAILY
Qty: 90 CAPSULE | Refills: 1 | Status: SHIPPED | OUTPATIENT
Start: 2025-01-02

## 2025-01-02 RX ORDER — LEVOCETIRIZINE DIHYDROCHLORIDE 5 MG/1
5 TABLET, FILM COATED ORAL EVERY EVENING
Qty: 90 TABLET | Refills: 1 | Status: SHIPPED | OUTPATIENT
Start: 2025-01-02

## 2025-01-02 RX ORDER — ALBUTEROL SULFATE 90 UG/1
2 INHALANT RESPIRATORY (INHALATION) EVERY 4 HOURS PRN
Qty: 8 G | Refills: 12 | Status: SHIPPED | OUTPATIENT
Start: 2025-01-02

## 2025-01-02 RX ORDER — OMEPRAZOLE 40 MG/1
CAPSULE, DELAYED RELEASE ORAL
Qty: 180 CAPSULE | Refills: 1 | Status: SHIPPED | OUTPATIENT
Start: 2025-01-02

## 2025-01-02 RX ORDER — DEXTROMETHORPHAN HYDROBROMIDE AND PROMETHAZINE HYDROCHLORIDE 15; 6.25 MG/5ML; MG/5ML
5 SYRUP ORAL 4 TIMES DAILY PRN
Qty: 240 ML | Refills: 0 | Status: SHIPPED | OUTPATIENT
Start: 2025-01-02

## 2025-01-02 RX ORDER — AZELASTINE 1 MG/ML
2 SPRAY, METERED NASAL 2 TIMES DAILY
Qty: 30 ML | Refills: 5 | Status: SHIPPED | OUTPATIENT
Start: 2025-01-02

## 2025-01-02 RX ADMIN — TRIAMCINOLONE ACETONIDE 80 MG: 40 INJECTION, SUSPENSION INTRA-ARTICULAR; INTRAMUSCULAR at 13:55

## 2025-01-02 NOTE — ASSESSMENT & PLAN NOTE
This is manage by his pulmonologist but he requested that I refill his rescue inhaler in case he is out and about and needs it when he does not have access to his nebulizer.  Medication was refilled.  He also has follow-up with his a pulmonologist and a sputum sample is being collected.

## 2025-01-02 NOTE — PROGRESS NOTES
"Chief Complaint  COPD (Pt needs a refill on his inhaler)    Subjective          Naveentiffanie Lugo presents to [facility]    COPD  He complains of cough, shortness of breath and wheezing. Associated symptoms include rhinorrhea. Pertinent negatives include no chest pain or fever.    is here today for persistent cough productive of yellow phlegm, congestion and increasing shortness of breath.  He is apparently been recently on steroids for this from his pulmonologist he was also on antibiotics but it did not clear up with the therapy and he has been advised to collect a sputum sample for the pulmonologist.  He needs several medicines refilled today for his allergies and his reflux and his urinary retention.    Objective   Vital Signs:   /76 (BP Location: Right arm, Patient Position: Sitting)   Pulse 99   Ht 175.3 cm (69\")   Wt 92.8 kg (204 lb 9.6 oz)   SpO2 95%   BMI 30.21 kg/m²     Body mass index is 30.21 kg/m².    Review of Systems   Constitutional:  Negative for chills, fatigue and fever.   HENT:  Positive for congestion and rhinorrhea.    Eyes:  Negative for blurred vision.   Respiratory:  Positive for cough, shortness of breath and wheezing. Negative for chest tightness.    Cardiovascular:  Negative for chest pain, palpitations and leg swelling.   Gastrointestinal:  Negative for abdominal pain, constipation, diarrhea, nausea and vomiting.   Genitourinary:  Positive for difficulty urinating.   Neurological:  Negative for dizziness, tremors and headache.   Psychiatric/Behavioral:  Negative for depressed mood. The patient is not nervous/anxious.        Past History:  Medical History: has a past medical history of Arthritis, Bone cancer, Coronary artery disease, Elevated cholesterol, History of radiation therapy (08/16/2021), Nephrolithiasis, Opioid use disorder, mild, on maintenance therapy (HCC), and Prostate cancer.   Surgical History: has a past surgical history that includes " Cholecystectomy; Coronary stent placement (2006 & 2011); Thoracic discectomy (1994); Hernia repair (1986); Colonoscopy; and Cardiac catheterization (N/A, 10/29/2024).   Family History: family history includes Diabetes in his brother; Heart disease in his brother; Ovarian cancer in his sister; Prostate cancer in his brother.   Social History: reports that he has been smoking cigarettes. He started smoking about 8 months ago. He has a 50.7 pack-year smoking history. He has never used smokeless tobacco. He reports that he does not currently use alcohol. He reports that he does not use drugs.      Current Outpatient Medications:     abiraterone acetate (ZYTIGA) 500 MG tablet, Take 2 tablets by mouth Daily., Disp: 60 tablet, Rfl: 11    albuterol sulfate  (90 Base) MCG/ACT inhaler, Inhale 2 puffs Every 4 (Four) Hours As Needed for Wheezing., Disp: 8 g, Rfl: 12    aspirin 81 MG EC tablet, Take 1 tablet by mouth Daily., Disp: 90 tablet, Rfl: 3    azelastine (ASTELIN) 0.1 % nasal spray, Administer 2 sprays into the nostril(s) as directed by provider 2 (Two) Times a Day. Use in each nostril as directed, Disp: 30 mL, Rfl: 5    cyclobenzaprine (FLEXERIL) 10 MG tablet, Take 1 tablet by mouth 3 (Three) Times a Day As Needed for Muscle Spasms., Disp: 90 tablet, Rfl: 0    ferrous sulfate 325 (65 FE) MG tablet, 325 mg p.o. twice daily every other day, take with vitamin C, Disp: 30 tablet, Rfl: 11    finasteride (Proscar) 5 MG tablet, Take 1 tablet by mouth Daily., Disp: 30 tablet, Rfl: 2    Fluticasone-Umeclidin-Vilant (Trelegy Ellipta) 200-62.5-25 MCG/ACT inhaler, Inhale 1 puff Daily., Disp: 180 each, Rfl: 3    HYDROmorphone (DILAUDID) 8 MG tablet, Take 1 tablet by mouth Every 4 (Four) Hours As Needed for Moderate Pain for up to 30 days., Disp: 80 tablet, Rfl: 0    ipratropium-albuterol (DUO-NEB) 0.5-2.5 mg/3 ml nebulizer, Take 3 mL by nebulization 4 (Four) Times a Day As Needed for Wheezing., Disp: 120 mL, Rfl: 5     ipratropium-albuterol (DUO-NEB) 0.5-2.5 mg/3 ml nebulizer, Take 3 mL by nebulization Every 4 (Four) Hours As Needed for Wheezing., Disp: 360 mL, Rfl: 3    levocetirizine (XYZAL) 5 MG tablet, Take 1 tablet by mouth Every Evening., Disp: 90 tablet, Rfl: 1    montelukast (SINGULAIR) 10 MG tablet, Take 1 tablet by mouth Every Night., Disp: 90 tablet, Rfl: 3    naloxone (NARCAN) 4 MG/0.1ML nasal spray, 1 spray into the nostril(s) as directed by provider As Needed (unresponsiveness)., Disp: , Rfl:     nitroglycerin (NITROSTAT) 0.4 MG SL tablet, 1 under the tongue as needed for angina, may repeat q5mins for up three doses, Disp: 25 tablet, Rfl: 11    omeprazole (priLOSEC) 40 MG capsule, Take 1 tab po BID for stomach, Disp: 180 capsule, Rfl: 1    oxyCODONE ER (Xtampza ER) 13.5 MG capsule extended-release 12 hour , Take 1 capsule by mouth Every 12 (Twelve) Hours for 30 days., Disp: 60 each, Rfl: 0    potassium chloride 10 MEQ CR tablet, Take 1 tablet by mouth 2 (Two) Times a Day., Disp: 60 tablet, Rfl: 1    predniSONE (DELTASONE) 5 MG tablet, Take 1 tablet by mouth Daily., Disp: 30 tablet, Rfl: 11    ranolazine (Ranexa) 1000 MG 12 hr tablet, Take 1 tablet by mouth 2 (Two) Times a Day., Disp: 180 tablet, Rfl: 3    relugolix (ORGOVYX) 120 MG tablet tablet, Take 1 tablet by mouth Daily., Disp: 30 tablet, Rfl: 11    rosuvastatin (CRESTOR) 40 MG tablet, Take 1 tablet by mouth Daily., Disp: 90 tablet, Rfl: 3    senna (SENOKOT) 8.6 MG tablet, Take 1 tablet by mouth Daily., Disp: 90 tablet, Rfl: 3    tamsulosin (FLOMAX) 0.4 MG capsule 24 hr capsule, Take 1 capsule by mouth Daily., Disp: 90 capsule, Rfl: 1    promethazine-dextromethorphan (PROMETHAZINE-DM) 6.25-15 MG/5ML syrup, Take 5 mL by mouth 4 (Four) Times a Day As Needed for Cough., Disp: 240 mL, Rfl: 0  No current facility-administered medications for this visit.    Facility-Administered Medications Ordered in Other Visits:     heparin injection 500 Units, 500 Units,  Intravenous, PRN, Ishmael Rashid MD, 500 Units at 06/02/21 0900    Allergies: Cymbalta [duloxetine hcl], Duloxetine, Gabapentin, and Pregabalin    Physical Exam  Vitals reviewed.   Constitutional:       Appearance: Normal appearance.   HENT:      Nose: Congestion and rhinorrhea present.      Mouth/Throat:      Mouth: Mucous membranes are moist.   Cardiovascular:      Rate and Rhythm: Normal rate and regular rhythm.      Heart sounds: Normal heart sounds.   Pulmonary:      Effort: Pulmonary effort is normal.      Breath sounds: Wheezing and rhonchi present. No rales.   Abdominal:      General: Bowel sounds are normal.      Palpations: Abdomen is soft.   Musculoskeletal:         General: Normal range of motion.   Neurological:      General: No focal deficit present.      Mental Status: He is alert and oriented to person, place, and time.   Psychiatric:         Mood and Affect: Mood normal.          Result Review :                   Assessment and Plan    Diagnoses and all orders for this visit:    1. COPD with exacerbation (Primary)  Assessment & Plan:  He is experiencing increased wheezing and shortness of breath and I did give him a steroid shot today and some cough medicine for his cough but he needs to follow up with his pulmonologist and they are collecting a sputum sample to determine appropriate therapy he was recently on antibiotics and I did not resume antibiotics at this time.    Orders:  -     albuterol sulfate  (90 Base) MCG/ACT inhaler; Inhale 2 puffs Every 4 (Four) Hours As Needed for Wheezing.  Dispense: 8 g; Refill: 12  -     triamcinolone acetonide (KENALOG-40) injection 80 mg    2. Stage 2 moderate COPD by GOLD classification  Assessment & Plan:  This is manage by his pulmonologist but he requested that I refill his rescue inhaler in case he is out and about and needs it when he does not have access to his nebulizer.  Medication was refilled.  He also has follow-up with his a pulmonologist and  a sputum sample is being collected.      3. Seasonal allergies  Assessment & Plan:  Continue present care no changes meds refilled.    Orders:  -     azelastine (ASTELIN) 0.1 % nasal spray; Administer 2 sprays into the nostril(s) as directed by provider 2 (Two) Times a Day. Use in each nostril as directed  Dispense: 30 mL; Refill: 5  -     levocetirizine (XYZAL) 5 MG tablet; Take 1 tablet by mouth Every Evening.  Dispense: 90 tablet; Refill: 1    4. Chronic GERD  Assessment & Plan:  Continue present care no changes meds refilled.    Orders:  -     omeprazole (priLOSEC) 40 MG capsule; Take 1 tab po BID for stomach  Dispense: 180 capsule; Refill: 1    Other orders  -     tamsulosin (FLOMAX) 0.4 MG capsule 24 hr capsule; Take 1 capsule by mouth Daily.  Dispense: 90 capsule; Refill: 1  -     promethazine-dextromethorphan (PROMETHAZINE-DM) 6.25-15 MG/5ML syrup; Take 5 mL by mouth 4 (Four) Times a Day As Needed for Cough.  Dispense: 240 mL; Refill: 0        Follow Up   Return in about 6 months (around 7/2/2025) for Medicare Wellness, Recheck.  Patient was given instructions and counseling regarding his condition or for health maintenance advice. Please see specific information pulled into the AVS if appropriate.     Cielo Dodd PA-C

## 2025-01-02 NOTE — ASSESSMENT & PLAN NOTE
He is experiencing increased wheezing and shortness of breath and I did give him a steroid shot today and some cough medicine for his cough but he needs to follow up with his pulmonologist and they are collecting a sputum sample to determine appropriate therapy he was recently on antibiotics and I did not resume antibiotics at this time.

## 2025-01-10 ENCOUNTER — SPECIALTY PHARMACY (OUTPATIENT)
Facility: HOSPITAL | Age: 70
End: 2025-01-10
Payer: MEDICARE

## 2025-01-10 DIAGNOSIS — C79.51 PROSTATE CANCER METASTATIC TO BONE: ICD-10-CM

## 2025-01-10 DIAGNOSIS — C61 PROSTATE CANCER METASTATIC TO BONE: ICD-10-CM

## 2025-01-10 RX ORDER — PREDNISONE 5 MG/1
5 TABLET ORAL DAILY
Qty: 30 TABLET | Refills: 11 | Status: SHIPPED | OUTPATIENT
Start: 2025-01-10

## 2025-01-10 NOTE — PROGRESS NOTES
Specialty Pharmacy Refill Coordination Note     Naveen is a 69 y.o. male contacted today regarding refills of  abiraterone 1000mg PO QD, relugolix 120mg PO QD  and prednisone 5mg PO QD of specialty medication(s).    Reviewed and verified with patient: YES    Specialty medication(s) and dose(s) confirmed: yes    Refill Questions      Flowsheet Row Most Recent Value   Changes to allergies? No   Changes to medications? No   New conditions or infections since last clinic visit No   Unplanned office visit, urgent care, ED, or hospital admission in the last 4 weeks  No   How does patient/caregiver feel medication is working? Good   Financial problems or insurance changes  No   Since the previous refill, were any specialty medication doses or scheduled injections missed or delayed?  No   Does this patient require a clinical escalation to a pharmacist? No            Delivery Questions      Flowsheet Row Most Recent Value   Delivery method [employer]   Delivery address verified with patient/caregiver? Yes   Delivery address Home   Number of medications in delivery 3   Medication(s) being filled and delivered predniSONE (DELTASONE), Relugolix (ORGOVYX), Abiraterone Acetate (ZYTIGA)   Doses left of specialty medications 5 days   Copay verified? Yes   Copay amount $0   Copay form of payment No copayment ($0)   Ship Date 1/13   Delivery Date 1/14   Signature Required No              Medication Adherence    Adherence tools used: watch   Other adherence tool: Clock - takes at same time each day, 7:45am   Support network for adherence: family member          Follow-up: 30 day(s)     Sushma Muro, Pharmacy Technician  Specialty Pharmacy Technician

## 2025-01-13 ENCOUNTER — TELEPHONE (OUTPATIENT)
Dept: PALLIATIVE CARE | Facility: CLINIC | Age: 70
End: 2025-01-13

## 2025-01-13 ENCOUNTER — OFFICE VISIT (OUTPATIENT)
Dept: PALLIATIVE CARE | Facility: CLINIC | Age: 70
End: 2025-01-13
Payer: MEDICARE

## 2025-01-13 VITALS
SYSTOLIC BLOOD PRESSURE: 162 MMHG | RESPIRATION RATE: 16 BRPM | HEART RATE: 99 BPM | TEMPERATURE: 96.7 F | WEIGHT: 199 LBS | DIASTOLIC BLOOD PRESSURE: 88 MMHG | OXYGEN SATURATION: 94 % | BODY MASS INDEX: 29.39 KG/M2

## 2025-01-13 DIAGNOSIS — G89.3 CHRONIC PAIN DUE TO NEOPLASM: Primary | ICD-10-CM

## 2025-01-13 DIAGNOSIS — Z72.0 TOBACCO USE: Primary | ICD-10-CM

## 2025-01-13 DIAGNOSIS — Z72.0 TOBACCO USE: ICD-10-CM

## 2025-01-13 DIAGNOSIS — G89.3 CHRONIC PAIN DUE TO NEOPLASM: ICD-10-CM

## 2025-01-13 DIAGNOSIS — G89.3 CANCER RELATED PAIN: Primary | ICD-10-CM

## 2025-01-13 DIAGNOSIS — C61 PROSTATE CANCER METASTATIC TO BONE: ICD-10-CM

## 2025-01-13 DIAGNOSIS — C79.51 PROSTATE CANCER METASTATIC TO BONE: ICD-10-CM

## 2025-01-13 DIAGNOSIS — R26.89 DECREASED MOBILITY: ICD-10-CM

## 2025-01-13 PROCEDURE — 3077F SYST BP >= 140 MM HG: CPT | Performed by: PHYSICIAN ASSISTANT

## 2025-01-13 PROCEDURE — 3079F DIAST BP 80-89 MM HG: CPT | Performed by: PHYSICIAN ASSISTANT

## 2025-01-13 PROCEDURE — 99215 OFFICE O/P EST HI 40 MIN: CPT | Performed by: PHYSICIAN ASSISTANT

## 2025-01-13 PROCEDURE — 1125F AMNT PAIN NOTED PAIN PRSNT: CPT | Performed by: PHYSICIAN ASSISTANT

## 2025-01-13 RX ORDER — VARENICLINE TARTRATE 0.5 MG/1
0.5 TABLET, FILM COATED ORAL 2 TIMES DAILY
Qty: 60 TABLET | Refills: 0 | Status: SHIPPED | OUTPATIENT
Start: 2025-01-13

## 2025-01-13 NOTE — TELEPHONE ENCOUNTER
AGUSTIN #: [id number]    Medication requested: oxyCODONE ER (Xtampza ER) 13.5 MG     Last fill date: 12/10/2024    Last appointment: 1/13/25    Next appointment:

## 2025-01-13 NOTE — TELEPHONE ENCOUNTER
PATIENT STATED THAT HE WAS ABLE TO  THE XTAMPZA BUT THAT THE DILAUDID WAS NOT AT THE PHARMACY FOR . PLEASE ADVISE.

## 2025-01-13 NOTE — TELEPHONE ENCOUNTER
PATIENT CALLED AND STATED THAT HE STOPPED BY THE PHARMACY AT [facility] AND THEY INFORMED HIM THAT HE WAS SET UP WITH MAIL DELIVERY SERVICES AND WAS TOLD THAT HIS SCRIPT WOULD BE MAILED OUT TO HIM BY TOMORROW. PATIENT DOES NOT RECALL SETTING UP ANYTHING WITH THE PHARMACY. PLEASE ADVISE.

## 2025-01-13 NOTE — TELEPHONE ENCOUNTER
Pt needs script early due to taking extra from recent fall. Informed him he will need to make last the original 30 day date.

## 2025-01-13 NOTE — PROGRESS NOTES
Palliative Clinic Note      Name: Naveen Lugo  Age: 69 y.o.  Sex: male  : 1955  MRN: [id number]  Date of Service: 2025   Medical Oncologist: Dr. Rashid    Subjective:    Chief Complaint: Pain    History of Present Illness: Naveen Lugo is a 69 y.o. male with past medical history significant for coronary artery disease, hypertension, hyperlipidemia, opioid use disorder, and metastatic prostate cancer who presents to the palliative clinic today as a follow up for pain and symptom management.     Treatment summary: He was diagnosed with prostate adenocarcinoma metastasized to the bone in 2020. Patient completed radiation with Dr. Quintanilla in 2021. He is currently on Zytiga, prednisone, and Relugolix. PSA remains low and no active disease on imaging. Patient recently diagnosed with new onset of microcytic anemia and started on iron supplementation. EGD/colonoscopy on 5/3/23 showed gastritis and a few small polyps with recommendations to repeat in 5 years. Repeat scans in 3/2024 show decreasing sclerotic bony metastases.     Pain: History of opioid use disorder. Patient completed Suboxone taper in the past and was off of pain medication prior to cancer diagnosis. Several failed therapies including methadone, MS contin and fentanyl. Patient was referred to pain specialist, Dr. BARB Avalos in 3/2022. Patient transferred care back to the palliative clinic for uncontrolled hip and back pain in 2022. Current regimen includes Xtampza 13.5 mg BID, hydromorphone 8 mg every 4 hours and flexeril 10 mg TID PRN. The patient admits to another fall after slipping on ice. Patient follows with orthopedic surgeon, Dr. Marks for bilateral hip pain and underwent steroid injections in both hips several months ago without significant improvement in his pain. Patient unable to tolerate gabapentin, pregabalin, and duloxetine in the past.      Symptoms: The patient endorses regular bowel movements with Senna daily. He  states his appetite has been good. No nausea or vomiting. No issues with sleep. Hot flashes have improved with the addition of venlafaxine (Effexor). Patient requests trial of Chantix to help with smoking cessation.      Pyschosocial: The patient lives with his wife. Endorses strong family support. He enjoys spending time with his grandson.  The patient is excited about an upcoming trip to [place] with his family to celebrate his anniversary. No personal history of mental illness. He denies anxiety or depression. Patient enjoys spending times outdoors. Stable mood.      Goals: Maximize comfort, optimize function & psychosocial wellbeing, and promote advanced care planning.    The following portions of the patient's history were reviewed and updated as appropriate: allergies, current medications, past family history, past medical history, past social history, past surgical history and problem list.    ORT-R: High risk   Aberrant behavior: abnormal UDS 9/7/22  Decisional capacity: Full  ECOG: (2) Ambulatory and capable of self care, unable to carry out work activity, up and about > 50% or waking hours     Objective:    /88   Pulse 99   Temp 96.7 °F (35.9 °C) (Temporal)   Resp 16   Wt 90.3 kg (199 lb)   SpO2 94%   BMI 29.39 kg/m²     Constitutional: Awake, alert, normal gait, sitting up in exam chair, in no acute distress  Eyes: PERRLA, EOMS intact  HENT: NCAT, face symmetric  Neck: Supple, trachea midline  Respiratory: Nonlabored respirations  Cardiovascular: RRR, no edema observed  Gastrointestinal: Soft, no guarding  Musculoskeletal: Moves all extremities   Psychiatric: Appropriate affect, cooperative  Neurologic: Oriented x 3, Cranial Nerves grossly intact to confrontation, speech clear  Skin: Cool dry, no rashes or wounds appreciated     Medication Counts: Reviewed. See bottom of note for details. Brought medication.  No overuse or misuse evident.  I have reviewed the patient's [place] PDMP. AGUSTIN Araujo  #[id number].   UDS: Last 5/6/24. Reviewed. Appropriate.     Assessment & Plan:    1. Prostate cancer metastatic to bone  - Pt is currently on Zytiga, prednisone, and Relugolix. PSA remains low and no active disease on imaging.     2. Chronic pain due to neoplasm  3. Decreased mobility  - Ambulatory Referral to Physical Therapy for Evaluation & Treatment due to repeat falls.     - Patient is appropriate for opioid therapy due to cancer related pain. Daily function and quality of life improved with pain medication. Refill for Xtampza 13.5 mg BID was sent to the pharmacy. Side effects of the medication discussed at every visit. Patient was encouraged to continue bowel regimen of daily stool softeners, prn laxatives, and diet modifications.    4. Tobacco use  - Patient requests trial of Chantix to help him quit smoking. Reached out to oncology pharmacist to check for drug interactions.     Code status: Full code  Medical interventions: Full    Return in about 3 months (around 4/13/2025) for Office Visit.    I spent 40 minutes caring for Naveen Lugo on this date of service. This time includes time spent by me in the following activities: preparing for the visit, reviewing tests, obtaining and/or reviewing a separately obtained history, performing a medically appropriate examination and/or evaluation , counseling and educating the patient/family/caregiver, ordering medications, tests, or procedures, documenting information in the medical record, independently interpreting results and communicating that information with the patient/family/caregiver, and care coordination    Anna Ayers PA-C  01/13/2025    Medication Date Filled # Filled Count Used # Days  ROSE MARIE   Hydromorphone 8 1/3/25 180 14 166 25 7

## 2025-01-14 ENCOUNTER — TELEPHONE (OUTPATIENT)
Dept: GENETICS | Facility: HOSPITAL | Age: 70
End: 2025-01-14
Payer: MEDICARE

## 2025-01-14 ENCOUNTER — TELEPHONE (OUTPATIENT)
Dept: PALLIATIVE CARE | Facility: CLINIC | Age: 70
End: 2025-01-14
Payer: MEDICARE

## 2025-01-14 DIAGNOSIS — G89.3 CANCER RELATED PAIN: Primary | ICD-10-CM

## 2025-01-14 RX ORDER — HYDROMORPHONE HYDROCHLORIDE 4 MG/1
8 TABLET ORAL EVERY 4 HOURS PRN
Qty: 180 TABLET | Refills: 0 | Status: SHIPPED | OUTPATIENT
Start: 2025-01-15 | End: 2025-01-30

## 2025-01-14 RX ORDER — HYDROMORPHONE HYDROCHLORIDE 8 MG/1
8 TABLET ORAL EVERY 4 HOURS PRN
Qty: 80 TABLET | Refills: 0 | Status: SHIPPED | OUTPATIENT
Start: 2025-01-17 | End: 2025-01-14 | Stop reason: DRUGHIGH

## 2025-01-14 NOTE — TELEPHONE ENCOUNTER
Pt called to state pharmacy doesn't have dilaudid 8 mg in stock and unsure when they will have it. Called [facility]- central location and they informed me the have 4mg currently in stock. Received approval from SABA Willard to temporary change the dose so the pt will not go without. This script of dilaudid 4mg will only be for 15-days with the hopes of 8 mg becoming in stock again. Pt is requesting for the script to be sent in early due to him falling and taking more for the increased pain. Waiting on SAAB Ayers approval on the start date.

## 2025-01-14 NOTE — TELEPHONE ENCOUNTER
Returned pt's to get an update on his request. Informed him to call around to pharmacies to find the medication or find out when they'll have it in stock. Pt expressed understanding.

## 2025-01-14 NOTE — TELEPHONE ENCOUNTER
Gave pt update on his dilaudid, informed him we sent in a script for Dilaudid 4 MG, not 8MG since they do not have the 8 MG in stock. Reiterated to take as directed and this script is for 15-days only. Instructed him to contact the pharmacy to verify what they have in stock before this script runs out. Also informed him we will be going the original start date to calculate the next refill date due pt taking more than directed in the past. Pt expressed understanding.

## 2025-01-14 NOTE — TELEPHONE ENCOUNTER
PATIENT CALLED AND STATED THAT HE RECEIVED A TEXT THAT HIS MEDICATIONS ARE OUT OF STOCK FROM A SPECIALTY PHARMACY OUT OF [place], [place]. PATIENT WOULD LIKE A CALL BACK AS HE'S CONCERNED HE'LL RUN OUT OF MEDICATIONS BEFORE THEN. PLEASE ADVISE.

## 2025-01-14 NOTE — TELEPHONE ENCOUNTER
Called pt to reschedule a sample collection for his genetic testing regarding his genetic counseling appt on 11-6-24. Offered pt and in person blood draw at [facility], a mobile phlebotomy service, or to send a saliva kit to his home. Patient opted for the mobile phlebotomy service. I will contact the lab to get this coordinated.

## 2025-01-17 ENCOUNTER — PATIENT ROUNDING (BHMG ONLY) (OUTPATIENT)
Dept: PALLIATIVE CARE | Facility: CLINIC | Age: 70
End: 2025-01-17
Payer: MEDICARE

## 2025-01-28 DIAGNOSIS — G89.3 CANCER RELATED PAIN: ICD-10-CM

## 2025-01-28 RX ORDER — HYDROMORPHONE HYDROCHLORIDE 4 MG/1
8 TABLET ORAL EVERY 4 HOURS PRN
Qty: 180 TABLET | Refills: 0 | Status: SHIPPED | OUTPATIENT
Start: 2025-01-30 | End: 2025-02-14

## 2025-01-28 NOTE — TELEPHONE ENCOUNTER
Called [facility] pharmacy to confirm which dose of the Dilaudid they currently have in stock. Pharmacist states they only have 4 MG in stock, not able to order 8 MG. Sending script over for 4 MG.     AGUSTIN #: [id number]    Medication requested: HYDROmorphone (DILAUDID) 4 MG tablet     Last fill date: 1/15/25    Last appointment: 1/13/25    Next appointment: 4/14/25

## 2025-01-28 NOTE — TELEPHONE ENCOUNTER
PATIENT CALLED AND REQUESTED A REFILL ON THE DILAUDID AND WANTED TO KNOW IF THIS COULD BE SENT WHERE IT WAS LAST AVAILABLE IN [facility]. PLEASE ADVISE.

## 2025-02-07 ENCOUNTER — SPECIALTY PHARMACY (OUTPATIENT)
Facility: HOSPITAL | Age: 70
End: 2025-02-07
Payer: MEDICARE

## 2025-02-07 NOTE — PROGRESS NOTES
Specialty Pharmacy Refill Coordination Note     Naveen is a 69 y.o. male contacted today regarding refills of  abiraterone 1000mg PO QD, relugolix 120mg PO QD and prednisone 5mg PO QD of specialty medication(s).    Reviewed and verified with patient: YES    Specialty medication(s) and dose(s) confirmed: yes    Refill Questions      Flowsheet Row Most Recent Value   Changes to allergies? No   Changes to medications? No   New conditions or infections since last clinic visit No   Unplanned office visit, urgent care, ED, or hospital admission in the last 4 weeks  No   How does patient/caregiver feel medication is working? Good   Financial problems or insurance changes  No   Since the previous refill, were any specialty medication doses or scheduled injections missed or delayed?  No   Does this patient require a clinical escalation to a pharmacist? No            Delivery Questions      Flowsheet Row Most Recent Value   Delivery method UPS   Delivery address verified with patient/caregiver? Yes   Delivery address Home   Number of medications in delivery 3   Medication(s) being filled and delivered Relugolix (ORGOVYX), Abiraterone Acetate (ZYTIGA), predniSONE (DELTASONE)   Doses left of specialty medications 8 days   Copay verified? Yes   Copay amount $0   Copay form of payment No copayment ($0)   Ship Date 2/10/25   Delivery Date Selection 02/11/25   Signature Required No              Medication Adherence    Adherence tools used: watch   Other adherence tool: Clock - takes at same time each day, 7:45am   Support network for adherence: family member          Follow-up: 30 day(s)     Sushma Muro, Pharmacy Technician  Specialty Pharmacy Technician

## 2025-02-17 ENCOUNTER — TELEPHONE (OUTPATIENT)
Dept: PALLIATIVE CARE | Facility: CLINIC | Age: 70
End: 2025-02-17
Payer: MEDICARE

## 2025-02-17 DIAGNOSIS — G89.3 CANCER RELATED PAIN: Primary | ICD-10-CM

## 2025-02-17 RX ORDER — HYDROMORPHONE HYDROCHLORIDE 8 MG/1
8 TABLET ORAL EVERY 4 HOURS PRN
Qty: 180 TABLET | Refills: 0 | Status: SHIPPED | OUTPATIENT
Start: 2025-02-17 | End: 2025-03-19

## 2025-02-17 NOTE — TELEPHONE ENCOUNTER
PATIENT CALLED AND STATED THAT HE NEEDS A REFILL ON DILAUDID AND OXYCODONE [facility] PLEASE ADVISE.

## 2025-02-17 NOTE — TELEPHONE ENCOUNTER
AGUSTIN #: [id number]      Medication requested: Hydromorphone 8 MG     Last fill date: 1/15/25      Medication requested: Xtampza 13.5     Last fill date: 1/15/25      Last appointment: 1/13/25    Next appointment: 4/14/25

## 2025-02-20 ENCOUNTER — TELEPHONE (OUTPATIENT)
Dept: GENETICS | Facility: HOSPITAL | Age: 70
End: 2025-02-20
Payer: MEDICARE

## 2025-02-20 NOTE — TELEPHONE ENCOUNTER
Called pt to follow-up on scheduling a day to do a blood draw for genetic testing. Pt opted to do a blood draw on 4-14-25 when he is in [place] for his next appt.

## 2025-02-28 DIAGNOSIS — E61.1 IRON DEFICIENCY: ICD-10-CM

## 2025-02-28 RX ORDER — FERROUS SULFATE 325(65) MG
TABLET ORAL
Qty: 45 TABLET | Refills: 3 | Status: SHIPPED | OUTPATIENT
Start: 2025-02-28

## 2025-03-03 ENCOUNTER — TELEPHONE (OUTPATIENT)
Dept: ONCOLOGY | Facility: CLINIC | Age: 70
End: 2025-03-03
Payer: MEDICARE

## 2025-03-03 NOTE — TELEPHONE ENCOUNTER
Caller: [facility]    Relationship: JOSE ALBERTO    Best call back number: [phone]    Who are you requesting to speak with (clinical staff, provider,  specific staff member): CLINICAL    What was the call regarding: JOSE ALBERTO CALLING FROM [facility] IN REGARDS TO PT'S SCHEDULED TESTING FOR IN THE MORNING, THEY ARE NEEDING THE ORDERS FAXED OVER FOR BONE SCAN, CT SCAN OF THE CHEST ABDOMIN AND PELVIS.    PLEASE FAX ORDERS -[phone]

## 2025-03-04 ENCOUNTER — LAB (OUTPATIENT)
Dept: ONCOLOGY | Facility: HOSPITAL | Age: 70
End: 2025-03-04
Payer: MEDICARE

## 2025-03-04 VITALS
HEART RATE: 92 BPM | TEMPERATURE: 96.1 F | RESPIRATION RATE: 17 BRPM | DIASTOLIC BLOOD PRESSURE: 67 MMHG | WEIGHT: 189.8 LBS | SYSTOLIC BLOOD PRESSURE: 145 MMHG | BODY MASS INDEX: 28.03 KG/M2

## 2025-03-04 DIAGNOSIS — C61 PROSTATE CANCER METASTATIC TO BONE: Primary | ICD-10-CM

## 2025-03-04 DIAGNOSIS — C79.51 PROSTATE CANCER METASTATIC TO BONE: Primary | ICD-10-CM

## 2025-03-04 DIAGNOSIS — Z45.2 ENCOUNTER FOR CARE RELATED TO PORT-A-CATH: Primary | ICD-10-CM

## 2025-03-04 DIAGNOSIS — C79.51 PROSTATE CANCER METASTATIC TO BONE: ICD-10-CM

## 2025-03-04 DIAGNOSIS — C61 PROSTATE CANCER METASTATIC TO BONE: ICD-10-CM

## 2025-03-04 PROCEDURE — 25010000002 HEPARIN LOCK FLUSH PER 10 UNITS: Performed by: NURSE PRACTITIONER

## 2025-03-04 PROCEDURE — 36415 COLL VENOUS BLD VENIPUNCTURE: CPT

## 2025-03-04 RX ORDER — SODIUM CHLORIDE 0.9 % (FLUSH) 0.9 %
10 SYRINGE (ML) INJECTION AS NEEDED
OUTPATIENT
Start: 2025-03-04

## 2025-03-04 RX ORDER — HEPARIN SODIUM (PORCINE) LOCK FLUSH IV SOLN 100 UNIT/ML 100 UNIT/ML
500 SOLUTION INTRAVENOUS AS NEEDED
Status: DISCONTINUED | OUTPATIENT
Start: 2025-03-04 | End: 2025-03-04 | Stop reason: HOSPADM

## 2025-03-04 RX ORDER — HEPARIN SODIUM (PORCINE) LOCK FLUSH IV SOLN 100 UNIT/ML 100 UNIT/ML
500 SOLUTION INTRAVENOUS AS NEEDED
OUTPATIENT
Start: 2025-03-04

## 2025-03-04 RX ADMIN — HEPARIN 500 UNITS: 100 SYRINGE at 14:35

## 2025-03-05 LAB
ALBUMIN SERPL-MCNC: 3.7 G/DL (ref 3.5–5.2)
ALBUMIN/GLOB SERPL: 1.9 G/DL
ALP SERPL-CCNC: 85 U/L (ref 39–117)
ALT SERPL-CCNC: 10 U/L (ref 1–41)
AST SERPL-CCNC: 17 U/L (ref 1–40)
BASOPHILS # BLD AUTO: 0.05 10*3/MM3 (ref 0–0.2)
BASOPHILS NFR BLD AUTO: 0.6 % (ref 0–1.5)
BILIRUB SERPL-MCNC: 0.3 MG/DL (ref 0–1.2)
BUN SERPL-MCNC: 6 MG/DL (ref 8–23)
BUN/CREAT SERPL: 7.4 (ref 7–25)
CALCIUM SERPL-MCNC: 9 MG/DL (ref 8.6–10.5)
CHLORIDE SERPL-SCNC: 103 MMOL/L (ref 98–107)
CO2 SERPL-SCNC: 26 MMOL/L (ref 22–29)
CREAT SERPL-MCNC: 0.81 MG/DL (ref 0.76–1.27)
EGFRCR SERPLBLD CKD-EPI 2021: 95.4 ML/MIN/1.73
EOSINOPHIL # BLD AUTO: 0.06 10*3/MM3 (ref 0–0.4)
EOSINOPHIL NFR BLD AUTO: 0.8 % (ref 0.3–6.2)
ERYTHROCYTE [DISTWIDTH] IN BLOOD BY AUTOMATED COUNT: 14.9 % (ref 12.3–15.4)
GLOBULIN SER CALC-MCNC: 2 GM/DL
GLUCOSE SERPL-MCNC: 126 MG/DL (ref 65–99)
HCT VFR BLD AUTO: 46.8 % (ref 37.5–51)
HGB BLD-MCNC: 15.9 G/DL (ref 13–17.7)
IMM GRANULOCYTES # BLD AUTO: 0.03 10*3/MM3 (ref 0–0.05)
IMM GRANULOCYTES NFR BLD AUTO: 0.4 % (ref 0–0.5)
LYMPHOCYTES # BLD AUTO: 2.09 10*3/MM3 (ref 0.7–3.1)
LYMPHOCYTES NFR BLD AUTO: 26.8 % (ref 19.6–45.3)
MCH RBC QN AUTO: 28.6 PG (ref 26.6–33)
MCHC RBC AUTO-ENTMCNC: 34 G/DL (ref 31.5–35.7)
MCV RBC AUTO: 84.2 FL (ref 79–97)
MONOCYTES # BLD AUTO: 0.49 10*3/MM3 (ref 0.1–0.9)
MONOCYTES NFR BLD AUTO: 6.3 % (ref 5–12)
NEUTROPHILS # BLD AUTO: 5.09 10*3/MM3 (ref 1.7–7)
NEUTROPHILS NFR BLD AUTO: 65.1 % (ref 42.7–76)
NRBC BLD AUTO-RTO: 0 /100 WBC (ref 0–0.2)
PLATELET # BLD AUTO: 178 10*3/MM3 (ref 140–450)
POTASSIUM SERPL-SCNC: 3.5 MMOL/L (ref 3.5–5.2)
PROT SERPL-MCNC: 5.7 G/DL (ref 6–8.5)
PSA SERPL-MCNC: <0.014 NG/ML (ref 0–4)
RBC # BLD AUTO: 5.56 10*6/MM3 (ref 4.14–5.8)
SODIUM SERPL-SCNC: 143 MMOL/L (ref 136–145)
WBC # BLD AUTO: 7.81 10*3/MM3 (ref 3.4–10.8)

## 2025-03-07 ENCOUNTER — SPECIALTY PHARMACY (OUTPATIENT)
Dept: ONCOLOGY | Facility: HOSPITAL | Age: 70
End: 2025-03-07
Payer: MEDICARE

## 2025-03-07 ENCOUNTER — SPECIALTY PHARMACY (OUTPATIENT)
Facility: HOSPITAL | Age: 70
End: 2025-03-07
Payer: MEDICARE

## 2025-03-07 NOTE — PROGRESS NOTES
Specialty Pharmacy Patient Management Program  Refill Outreach     [place] was contacted today regarding refills of their medication(s).    Refill Questions      Flowsheet Row Most Recent Value   Changes to allergies? No   Changes to medications? No   New conditions or infections since last clinic visit No   Unplanned office visit, urgent care, ED, or hospital admission in the last 4 weeks  No   How does patient/caregiver feel medication is working? Good   Financial problems or insurance changes  No   Since the previous refill, were any specialty medication doses or scheduled injections missed or delayed?  No   Does this patient require a clinical escalation to a pharmacist? No            Delivery Questions      Flowsheet Row Most Recent Value   Delivery method UPS   Delivery address verified with patient/caregiver? Yes   Delivery address Home   Other address preferred n/a   Number of medications in delivery 3   Medication(s) being filled and delivered Relugolix (ORGOVYX), Abiraterone Acetate (ZYTIGA), predniSONE (DELTASONE)   Doses left of specialty medications 10 days left   Copay verified? Yes   Copay amount $0   Copay form of payment No copayment ($0)   Delivery Date Selection 03/11/25   Signature Required No            Medication Adherence    Adherence tools used: watch   Other adherence tool: Clock - takes at same time each day, 7:45am   Support network for adherence: family member          Follow-up: 30 day(s)     Rere Mcknight, Pharmacy Care Coordinator  3/7/2025  09:36 EST

## 2025-03-07 NOTE — PROGRESS NOTES
Re: Refills of Oral Specialty Medication - Orgovyx (relugolix)    Drug-Drug Interactions: The current medication list was reviewed and there are some relevant drug-drug interactions with the oral specialty medication that will be discussed during education, including:  Ranolazine may increase the serum concentration of relugolix. Need to separate by 6 hours  Medication Allergies: The patient has no relevant allergies as it relates to their oral specialty medication  Review of Labs/Dose Adjustments: NO DOSE CHANGE - I reviewed the most recent note and labs and the patient will continue without any dose changes.  I sent refills as described below.    Drug: Orgovyx (relugolix)  Strength: 120 mg  Directions: Take 1 tablet by mouth daily  Quantity: 30  Refills: 11  Pharmacy prescription sent to: [facility]    Maria Esther Le PharmD, [facility]  Clinical Oncology Pharmacy Specialist  Phone: [phone]      3/7/2025  09:50 EST

## 2025-03-11 ENCOUNTER — OFFICE VISIT (OUTPATIENT)
Dept: ONCOLOGY | Facility: CLINIC | Age: 70
End: 2025-03-11
Payer: MEDICARE

## 2025-03-11 VITALS
BODY MASS INDEX: 28.29 KG/M2 | SYSTOLIC BLOOD PRESSURE: 134 MMHG | WEIGHT: 191 LBS | DIASTOLIC BLOOD PRESSURE: 81 MMHG | TEMPERATURE: 97.4 F | HEIGHT: 69 IN | HEART RATE: 95 BPM | OXYGEN SATURATION: 95 % | RESPIRATION RATE: 18 BRPM

## 2025-03-11 DIAGNOSIS — C61 PROSTATE CANCER METASTATIC TO BONE: Primary | ICD-10-CM

## 2025-03-11 DIAGNOSIS — C79.51 PROSTATE CANCER METASTATIC TO BONE: Primary | ICD-10-CM

## 2025-03-11 NOTE — PROGRESS NOTES
CHIEF COMPLAINT: Metastatic prostate cancer.  No symptoms of recurrence    Problem List:  Oncology/Hematology History Overview Note   1.  Stage IVb grade group 4 metastatic prostate cancer with sclerotic bone lesions on CT and bone scan and history of prostatic hypertrophy.  Good response to Taxotere ending 2/18/2021 Relugolix, followed by Zytiga prednisone with L4/sacrum, left acetabulum, and right pelvis external beam radiation August 2021.  Hypophosphatemia on Zometa.  Zometa held as of October 2021.  2.  Kidney stone  3.  Polyps  4.  Probable drug-induced hypophosphatemia from Zometa, drug stopped after 10/5/2021 dose  5.  Cancer related pain seeing palliative care  6.  Fatigue  7.  Covid 2/2022  8.  Iron deficiency anemia  -5/3/2023 EGD and colonoscopy with Dr. Alexander Gomez: Superficial erosions in the stomach with gastritis.  Small polyps removed, dilated internal hemorrhoids.  Pathology pending.  Recommend repeat colonoscopy in 5 years.  9.  CADz    -9/30/2020 office note from Dr. Ronen Reynaga indicates patient with PSA 10.8.  Underwent TRUS prostate biopsy 9/15/2020.  Adenocarcinoma the prostate [place] 4+4 = 8 in 1 out of 12 cores and 4+3 = 7 and 10 out of 12 cores and 3+4 = 7 in 1 out of 12 cores.  Perineural invasion.  Extraprostatic extension into the fat seen on 2 biopsies of the right lateral mid and right apex.  9/24/2020 CT chest and whole-body bone scan showed T12, L1, left acetabular, right medial acetabular metastases with no lung metastases.  He started androgen deprivation therapy with Casodex with plans for Lupron to start in 1 to 2 weeks for clinical T3 N0 M1 metastatic prostate cancer.  Review of official report from [facility] 9/24/2020 bone scan mentions T12, L1, roof of left acetabulum and medial right acetabular osteoblastic metastases.  CT chest with contrast that same day showed mild splenomegaly 14.2 cm with stable periaortic nodes compared to CT December 2015 with no lung  metastases.  Sclerotic abnormality within the T12 vertebral body, L1 vertebral body extending into the pedicle unchanged.  8/28/2020 CT abdomen pelvis report from [facility] reviewed and mentions normal spleen size.  Punctate nonobstructing kidney stone, no paulino enlargement, diffuse bladder wall thickening with mild perivesicular fat stranding, 2.1 cm sclerotic left iliac bone above the left acetabulum new compared to 2015 as well as 1.5 cm faintly sclerotic focus in the right posterior acetabulum stable compared to prior CT 2015 as well as a 1.6 cm T12 and inferior vertebral body sclerotic lesion and a 1.9 cm left posterior lateral L1 vertebral body abnormality extending to the pedicle.    -10/13/2020 initial [facility] medical oncology consultation: With 1 Sarika score 8, 4+4 lesion that would make him a grade group 4 with stage IVb disease given radiographic evidence of sclerotic bone metastasis on bone scan and CT.  Needs genetic testing given metastatic prostate cancer and this is also important as, were he to have mismatch repair mutation then we would have options for PDL 1 inhibitors and were he BRCA mutated would have options for PARP inhibitors in the future.  Systemic therapy for castration naïve prostate cancer or N1 disease should be with apalutamide or Abiraterone or enzalutamide all of which are category 1.  He does not have any visceral metastases.  According to his imaging he has T12, L1, left acetabular, right acetabular, and left iliac bony involvement.  That means he would have 4 or more bone metastases with at least one metastasis (i.e. the acetabular lesions bilaterally) beyond the pelvis/vertebral, for which this would be considered high-volume disease for which 6 cycles of docetaxel 75 mg/m² x 6 cycles followed by Zytiga and prednisone would be reasonable along with Zometa every 6 weeks for bone stabilization.  He will do chemo preparation visit with my nurse practitioner prior to  start chemotherapy with Taxotere and Zometa in 2 weeks and he is already received Casodex in preparation for Lupron shot he received on 10/12/2020 with Dr. Reynaga.  We will get him to Dr. Alexander Gomez who has done his polypectomies in the past for port placement and we will get genetic counseling started.  Following the 6 courses of Taxotere per the [place] trial criteria, I would then give him an indefinite Zytiga and prednisone and Zometa until progression or toxicity dictates.    10/29/2020 PSA 1.37    -12/16/2019 COVID-19 antigen test negative    -12/29/2020 PSA 0.275 with absolute neutrophil count 700 and creatinine 0.76 with normal liver enzymes and electrolytes.  Normal magnesium and phosphorus and urine drug screen positive for buprenorphine and opiates follow-up with palliative care.    -1/4/2021 CT chest abdomen pelvis with contrast and whole-body bone scan shows improving less metabolically active bone metastases.  Stable sclerotic T12, L1, and L2 vertebral bodies and bilateral pelvic bones on bone windows with stable subcentimeter para-aortic lymph nodes and no definite progression in the chest abdomen or pelvis.    -1/5/2021 [facility] medical oncology follow-up visit: I reviewed the images and reports thereof of the above CT and bone scan which shows good response to Taxotere x3 courses with a PSA down to 0.275 from a baseline of 10.  Get another 3 courses of Taxotere, continue his Xgeva, and then following that we will switch to Zytiga and prednisone.  He is working with palliative care on his bone pain but on his current dose of fentanyl patch he says his pain is still not adequately controlled he will contact them.  Dexamethasone prescription was refilled.  We will see my nurse practitioner back in 3 weeks for course #5 of 6 total courses and then we will reimage with CT chest abdomen pelvis and bone scan before going to the Zytiga prednisone.    -3/4/2021 CT chest abdomen pelvis with contrast is  negative save for stable sclerotic bone lesions and the bone scan shows near resolution of prior bony abnormalities associated with the known sclerotic lesions in the thoracolumbar spine and bony pelvis.    2/17/2021 PSA down to 0.1 from 1.37 October 2020.  3/9/2021 PSA 0.1    -5/26/2021 CT chest abdomen pelvis with contrast shows stable to slightly underlying increase low-density left para-aortic node.  Bone lesions stable.  Whole-body bone scan stable to decrease activity of known thoracolumbar and bony pelvic involvement.  PSA less than 0.1  , AST 74, bilirubin 0.5.       -6/1/2021 [facility] oncology follow-up visit: I reviewed the above data with him and images thereof.  Bones are stable.  No significant adenopathy.  PSA immeasurably low.     upper limit of normal 69 and AST 74 upper limit of normal 46.  Hence, neither is more than double the upper limit of normal with no ascites and no encephalopathy and normal albumin and bilirubin, despite the elevation of liver enzymes, he has child Abrams score A.  Package insert for Abiraterone says that for ALT and/or AST greater than 5 times upper limit of normal or total bilirubin greater than 3 times the upper limit of normal to withhold treatment until liver function tests returned to baseline or ALT and AST less than or equal to 2.5 times the upper limit of normal and total bilirubin less than or equal to 1.5 times the upper limit of normal and then reinitiate at 750 mg daily dose of Zytiga.  For recurrent hepatotoxicity on 750 mg daily Zytiga, withhold treatment until liver functions returned to baseline or ALT and AST less than 3-2.5 times upper limit of normal and total bilirubin less than or equal to 1.5 times the upper limit of normal then reinitiate at 500 mg daily Zytiga dose.  If has recurrent hepatotoxicity on 500 mg dose, then discontinue treatment.  If has ALT greater than 3 times the upper limit of normal along with total bilirubin  greater than 2 times the upper limit of normal in the absence of other contributing causes or biliary obstruction, permanently discontinue Zytiga.  Based on these recommendations, I would continue current doses but we will watch with serial labs monthly and repeat his imaging again in 3 months but clinically he is doing wonderfully with excellent response to Taxotere and now on Zytiga prednisone plus Zometa along with Relugolix.    -6/23/2020 for [facility] oncology clinic follow-up: Having persistent and worsening pain above his left hip.  I will get an MRI of the left hip for further evaluation.  Otherwise we will continue therapy unchanged with Zytiga, prednisone and Zometa along with Relugolix.    -7/28/2021 [facility] oncology clinic follow-up: Patient with multiple somatic complaints including episodes of confusion, dizziness, decreased memory, agitation.  He reports ongoing episodes with difficulty swallowing.  Also most concerning for episodes of angina and chest pain for which he basically refused to seek emergency medical attention.  He has a history of coronary artery disease and stent placement.  He has not followed up with cardiology for many years.  I will get an MRI of the brain in light of his confusion, dizziness and agitation.  I will get him to gastroenterology for EGD in light of his dysphagia.  I have also put in a referral for cardiology.  I reemphasized to the patient, his wife who is with him today and his son who was on the telephone during our visit the importance of seeking out prompt medical attention when he is having chest pain or neurological concerns so that he can be evaluated.  The patient states that he basically refuses to go to the emergency room and if his family calls an ambulance he would not agree to go to the hospital.  For the time being, I have asked him to hold his Zytiga and prednisone until we can sort this out as Zytiga does have some cardiovascular risk.  There is likely no  "treatment for prostate cancer that does not carry some form of cardiovascular risk.  His PSA remains low at 0.014 yesterday.  He will continue relugolix for now.    Of note, some of these symptoms could also be due to his hypophosphatemia, phosphate level from yesterday was 1.5, I have sent in a prescription for K-Phos 3 times a day before meals for 10 days.  We will hold further Zometa until this is corrected.  I have also put in an order to check his vitamin D level.  He takes calcium and vitamin D daily.  Some of his symptoms also could be due to drug withdrawal as there was some confusion and difficulty getting his last prescription for methadone therefore he was without methadone for several days, he now has his prescription and is back on his pain medication.  He has an appointment with neurosurgery tomorrow in light of his ongoing back pain, MRI of the hip recently showed multiple bony lesions largest at the L5 vertebral body and left supra-acetabular region.  If no neurosurgical intervention recommended he may benefit from spot radiation as this is where a lot of his pain is coming from.  His wife had questions today regarding his staging and extent of disease.  We reviewed previous scans.  I did clarify with her as she used the words \"cancer free\" as she thought that is what she was told after his CT scans once he completed Taxotere in February.  I explained to her that even though his scans showed he had an excellent response with no clear areas of metastasis that did not mean he was cancer free, I explained to her that when you have metastatic cancer the treatment intent is palliative in nature and to control the disease but not to cure the disease.  She stated understanding.  We will see him back in 2 weeks for follow-up to sort through this and make further plan of care, again, I have asked him to hold Zytiga and prednisone until he sees us back, he will continue on his other medications including " relugolix.    -7/30/2021 neurosurgery PA consultation.  MRI with and without contrast L5 ordered.    -8/3/2021 neurosurgery follow-up showed known sclerotic disease with new L5 abnormality without compression deformity or instability.  MRI brain negative for metastasis.    -8/4/2021 office visit Dr. Fco Quintanilla radiation oncology coordinating with Dr. North neurosurgery for palliative radiation for MRI evidence of L5 and left supra acetabular painful lesions.  States that the patient's disease which is not secreting PSA is experiencing some progression and would benefit from 20 Gy in five fractions and other lesion 30 Gy in ten fractions. Patient offered to go to [place] for radiation but desired treatment in [place].    -8/10/2021 [facility] medical oncology follow-up visit: No chest pains at this junction.  Reviewed MRI brain and other MRIs reviewed by Dr. North and Dwight.  Due for EGD on 19th.  We will repeat his phosphorus along with his other labs continue Relugolix, Zytiga, prednisone, and he will continue radiation palliatively to the painful bony lesions with Dr. Quintanilla/Can.  He will see my nurse practitioner back in a few weeks to see how he is tolerating this and to follow-up on the phosphorus off of Zometa and she will order repeat CT chest abdomen pelvis with contrast and total body bone scan the end of September.I will see him back after that.    -9/8/2021 Zometa resumed.  PSA <0.10    -10/5/2021 [facility] medical oncology follow-up visit: followed-up with radiation oncology 9/21/2021.  Status post symptomatic L5 radiation 5 fractions 20 [place] completed 8/16/2021 with improvement in right hip pain.  9/2/2021 PSA less than 0.1.  9/29/2021 total body bone scan shows increased uptake left iliac bone slightly superior group of the left acetabulum with no additional foci of suspicious abnormality is unlikely treatment related with no new sites of active osseous metastasis.  CT chest abdomen pelvis with  contrast shows stable borderline enlarged left periaortic nodes and dating sclerotic bone lesions.Continue Zytiga, prednisone, Relugolix, Zometa, repeat CT chest abdomen pelvis with contrast and total body bone scan the end of the year.  We will follow PSA serially.    -11/17/2021 [facility] Oncology clinic follow-up:  Mr. Lugo overall is doing well.  He is tolerating therapy with Zytiga, prednisone and Relugolix along with Zometa which is given every 6 weeks.  PSA remains low at <0.1.  Has occasional low phosphorus, currently low at 1.7.  Serum calcium is normal at 8.9, normal creatinine 0.79 and normal CBC other than for platelets of 124.  I will hold Zometa for 1 month, I did send in a prescription for phosphorus replacement today.  He takes calcium and vitamin D.  I will see him back in 1 month for follow-up.  We will repeat restaging scans after that visit.    -12/15/2021 [facility] Oncology clinic follow-up: Mr. Lugo continues to do well on therapy with Zytiga, prednisone and Relugolix, his PSA remains low at <0.1.  He is continuing to have left lower back pain, he is working with palliative care and they have referred him also to pain management.  Pain management has talked to him about putting in a pain pump however he is leery of this, I told him that this was a safe and effective pain management technique and to certainly give consideration to their recommendations.  His phosphorus is improving however is still a little low, I will hold off on Zometa until we see him back in 1 month, we may end up only giving it every 12 weeks.  We will repeat restaging scans with CT chest, abdomen and pelvis along with total body bone scan prior to return and I have ordered those today.      -1/5/2022 CT chest abdomen pelvis with contrast [facility] showing stable left periaortic adenopathy and unchanged sclerotic thoracic, lumbar spine and pelvis lesions with no new or progressive disease in the chest abdomen or  pelvis.  Total body bone scan shows no significant change and no new suspicious foci.  Stable posterior right acetabulum and left superior acetabulum and degenerative changes in the cervical spine, shoulders, acromioclavicular joints, sternoclavicular joints, elbows, wrists, hands, thoracic spine, lumbosacral spine, sacroiliac joints, hips, knees, ankles, feet.    -1/11/2022 [facility] medical oncology hematology follow-up visit: I reviewed images and reports from his 1/5/2022 scans.  No active disease and PSA immmeasurably low on Zytiga, prednisone, Relugolix.  However, he has struggled with hypophosphatemia and is significantly fatigued with this.  Given that the bones are doing well and given that his phosphorus continues to remain consistently low at 2.2 in mid December, 1.7 mid November and 2.5 mid-September and as low as 1.5 back to July and the likelihood that this is related to his Zometa, given that his bones are stable overall, he will increase from 1200 mg up to 1600 mg of calcium and phosphate a day and we will hold his Zometa for the foreseeable future.  He will see my nurse practitioner back in a month to continue to monitor his phosphorus along with his calcium and other labs and will repeat his scans again in 3 months.  In addition, given his fatigue we will check his ACTH and cortisol though he is taking his prednisone with his Zytiga.  Though his electrolytes are normal, I will keep an eye on the cortisol and ACTH and have these labs not only drawn today but again just prior to return to my nurse practitioner on February 10.    -2/10/2022 [facility] Oncology clinic follow-up: Mr. Lugo overall is stable, no change in his health this past month.  I have reviewed his labs from 2/8/2022 and they are unremarkable, his phosphorus is now normal at 3.2. We are continuing to hold his Zometa, he last received Zometa on 10/5/2021.  He continues therapy with Zytiga, prednisone and Relugolix.  Dr. Rashid had ordered  cortisol level and ACTH which are low, currently his ACTH is 2.8 and cortisol 3.08 however these are both done in the face of ongoing prednisone that he takes with his Zytiga.  We will get him to endocrinology for further evaluation for possible adrenal insufficiency but will not interrupt his treatment in the interim.  He will need restaging scans again in April for a 3-month follow-up.  He will continue to follow with palliative care and pain management for his cancer related pain.  His PSA remains immeasurably low at <0.1 on 2/8/22.    -2/15/2022 went to emergency room with weakness, decreased appetite, myalgias in the setting of known COVID-19 testing positive a few days prior to this visit.  Phosphorus had been low in the past but was normal in the emergency room with unremarkable CBC and CMP.  Chest x-ray unremarkable saturating well on room air mildly hypertensive with dry mucosa.  Given 500 cc crystalloid.  Covid test positive.  Mild thrombocytopenia 136,000 and otherwise unremarkable.  Discharged home.    -3/3/2022 data:  PSA less than 0.1  CMP unremarkable  Cortisol 3.96 normal  ACTH 2.8 lower limit of normal 7.2  Phosphorus normal 2.6    -3/8/2022 [facility] medical oncology follow-up: Suspect big chunk of his fatigue was Covid.  Another part of the fatigue likely related to lack of testosterone.  Not hypophosphatemic off of Zometa.  ACTH running low while on prednisone not surprising but with normal cortisol and I doubt adrenal insufficient which is why he is on prednisone to avoid such while taking Zytiga.  Overall doing fairly well and with immeasurably low PSA, I will have labs done on him early April, see my nurse practitioner early May, and she will order repeat bone scan and CTs the end of May and I will see him back in June.  We will continue to hold the Zometa unless bone lesions progress given symptomatic prior hypophosphatemia on Zometa.  He will keep his appointment April 28 with Dr. Mir Duffy  just to be sure that my take on his pituitary/adrenal axis assessment is accurate.    -4/28/2022 endocrinology consult Dr. Mir Duffy.  With low ACTH and cortisol on prednisone with Zytiga, the adequacy of prednisone cannot be assessed by measuring ACTH or cortisol but is assessed by weight changes, blood pressure, blood sugar, potassium, overall sense of how the patient is doing.  Primary adrenal insufficiency with low ACTH and low cortisol would be typical for the situation as his blood sugar tends to run a little high and potassium a little low, he did not think increasing prednisone was necessary.  He put him on some potassium.  No follow-up scheduled, will see as needed.    -5/4/2022 [facility] Oncology clinic follow-up: Mr. Lugo continues on therapy with Zytiga and prednisone along with Relugolix.  His Zometa has been on hold due to previous hypophosphatemia.  There is some concern about potential adrenal insufficiency. He saw endocrinology, Dr. Mir Duffy on 4/28/2022.  I did review his office note and he stated that the low ACTH and low serum cortisol would be typical for Mr. Lugo's situation.  He further stated that the adequacy of prednisone dose cannot be assessed by measuring ACTH or cortisol but is rather assessed by the overall just altered how the patient is feeling-weight change, blood pressure, blood sugar, potassium, overall sense of wellbeing.  His potassium had been running a little low therefore they put him on potassium 10 mill equivalents daily. Of note, I do not see a future follow-up scheduled with endocrinology.  He is having night sweats, I am not sure if this is related.  His glucose was normal this morning at 107.  His weight is stable.  He will continue current treatment for his prostate cancer unchanged, we will continue to hold his Zometa.  Current phosphorus and magnesium are normal.  CBC and CMP from today are unremarkable, cortisol is normal.  PSA and ACTH are pending  In regards to  his night sweats, he had taken clonidine previously 0.1 mg twice daily as needed, I did send in a short supply again to try and see if this helps.  He also is having indigestion despite being on omeprazole twice daily, I sent a prescription for Pepcid.  He had an EGD August 2021.  We will repeat restaging scans prior to return and I have ordered those today.     -5/24/2022 CT chest abdomen pelvis with contrast[place] negative.  Bone scan shows stable bone metastases.    - 5/25/2022 PSA less than 0.1, platelets 130,000, otherwise unremarkable CBC and CMP.  A.m. cortisol running low 1.2 with phosphorus low 2.3.    -5/31/2022 [facility] medical oncology follow-up: On Zytiga, prednisone, Relugolix, PSA remaining immeasurably low with stable bone scan and no visceral/paulino metastases.  Phosphorus still running low.  I have discontinued his potassium chloride and started potassium phosphate 500 mg 4 times a day.  Unless PSA rising, we will plan on repeating CTs and bone scan in 6 months and will follow with my nurse practitioner in the interim and if symptoms dictate or labs, will get back to Dr. Duffy for management of potential adrenal insufficiency but presently we will just see how he does with potassium phosphate and hopeful improvement in the phosphorus level with time.  We will continue to follow with palliative care for pain management    -7/6/2022 [facility] Oncology clinic follow-up: Mr. Lugo overall is doing well but continues to be troubled with fatigue and hot flashes.  For the most part he states he is able to do the things he wants to do, he continues to work but does have to take more rest periods.  I had a long discussion with him and his wife after reviewing his medications with them as they wanted to see if there was anything he could stop or adjust.  I discussed with him the need to continue on treatment for his prostate cancer even though his PSA remains immeasurably low and his scans look good but that  if his medication is stopped the cancer would progress and over time it still has the potential to progress on therapy but would continue it as long as possible to keep his disease under control.  I explained this is like treating someone with hypertension, you do not stop the antihypertensive just because the blood pressure normalizes.  They stated understanding.  There is no dose adjustment of Zytiga or prednisone that would minimize his symptoms, he is on the current standard recommended therapy.  Discussed with him that sometimes during times of stress we may need to increase his prednisone dose but for now would not change anything.  His phosphorus level is normal, we continue to hold his Zometa.  I did give him the option of holding his phosphorus for the next month and we will see what happens, if it drops we will start him back on it but may be at a lower dose rather than 4 times a day maybe twice a day.  For now he will continue therapy unchanged.  We will continue monitoring labs monthly.  No PSA was drawn this month but that is okay as his PSA has been running immeasurably low at <0.1, we will recheck next month with lab draw.  We will stop checking his ACTH and cortisol level every month, if he develops worsening symptoms I will repeat those.  He has not gotten a follow-up with endocrinology as of yet.    -8/3/2022 [facility] Oncology clinic follow-up:  Mr. Lugo is doing well as far as his prostate cancer goes with continued immeasurably low PSA of <0.1.  He continues on therapy with Zytiga and prednisone along with Relugolix.  His phosphorus is stable at 2.7 which was just slightly low by our reference range however was 2.71-month ago also which was normal on another labs reference range.  He has not taken his phosphorus for this past month as he thought it was making him more fatigued.  He really can tell no difference whether he was taking it or not.  I have asked him to try to go back on phosphorus  twice a day rather than 4 times a day and see if this is tolerated and if we can keep his phosphorus level from dropping.  His biggest complaint today is flareup of his arthralgias and back pain.  He is following with pain management, Dr. Danny Avalos but is requesting a referral back to palliative medicine as he feels that no one is currently listening to him and they are just prescribing the same medications that are not effective according to his report.  He does have a follow-up with Dr. Avalos on 8/12/2022 but due to his current pain is not able to work until after he is seen and hopefully can get some better relief.  I have given him a work release until 15 August.  I have also put in a referral back to palliative care.  Also encouraged him to work with Dr. Avalos for help in resolving his issue.  Apparently they have recommended some type of a pump however he has been hesitant to that but likely would be helpful.  We will continue therapy unchanged.  We will continue monthly labs including phosphorus, we are not currently checking ACTH and cortisol monthly as Dr. Mir Duffy previously stated in his assessment on 4/28/2022 that the adequacy of prednisone cannot be assessed by measuring ACTH or cortisol but would be assessed by symptoms, see note from 4/28/2022.  He made no further follow-up appointments.  Fatigue is not worsening currently.  His glucose and potassium are  Normal.    -10/13/2022 [facility] medical oncology follow-up: Mr. Lugo is doing well.  He is tolerating treatment with Zytiga and prednisone along with Relugolix without any unusual side effects.  His PSA has remained immeasurably low at <0.1.  His phosphorus was slightly low at last visit.  He had been instructed to go back on phosphorus twice a day but he misunderstood and has not been taking it.  We will check labs today.  I will follow-up with results and notify him if he needs to restart the phosphorus.  His pain is better controlled since  reestablishing with [facility].  We will repeat scans prior to next follow-up.  I have ordered CT chest abdomen pelvis along with bone scan.  We will see him back in 1 month.    -10/13/2022 PSA less than 0.014.With unremarkable CBC and CMP.  Phosphorus still a little low 2.3.    -11/4/2022 CT abdomen pelvis chest showed no evidence of disease progression with stable sclerotic bone lesions.  Bone scan stable right greater than left acetabular metastasis.  Stable bone metastases.    -11/8/2022 [facility] medical oncology telemedicine follow-up: Patient states that he feels achy all over with low-grade temps and a cough mildly productive.  Has an appointment later today to see his primary care for testing for flu and COVID.  Suggested that if he were positive for COVID that he get Paxlovid and that if he were positive for flu that he get Tamiflu and to discuss this with his primary care.  From the prostate cancer side, his PSA is immeasurably low with stable bone metastases and no evidence of progression.  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  We will continue the Relugolix, Zytiga, prednisone and he will see my nurse practitioner 12/7/2022.  Would repeat CTs and bone scan in May.    -1/25/2023 [facility] Oncology medicine follow-up: Mr. Lugo is having worsening fatigue and muscle aches.  He does have adrenal insufficiency on Zytiga and prednisone for his prostate cancer, he saw endocrinology in light of low ACTH back in April 2022.  At that time there was no recommendation other than for monitoring him for his overall wellbeing and labs.  He denies any fevers or chills.  His labs as shown above are unremarkable, his CBC and CMP are normal other than for glucose of 112, PSA remains low at <0.1.  He has no new pain or any symptoms concerning for progression of his prostate cancer.  I will get him back to endocrinology to see if they feel any dose adjustment of his  prednisone would be helpful in his symptoms.  Currently he is on 5 mg a day with his Zytiga.  I did not repeat his ACTH or cortisol as they would be expected to be low in this situation.  His potassium is normal, he has had no weight loss or change in his appetite.  Blood sugar is reasonable.  His only complaint currently is worsening fatigue and muscle aches.  He will continue treatment unchanged with Zytiga, prednisone and relugolix.  I will see him back in 1 month for follow-up.  We will repeat his restaging scans in May.    -3/1/2023 [facility] Oncology clinic follow-up:  His PSA remains low at <0.1 on treatment with Zytiga and prednisone along with relugolix however he continues to complain of significant fatigue and muscle aches not improving with time.  He states that his quality of life is affected as he is not able to do any work activities due to the fatigue.  He did see endocrinology again and they have increased his prednisone from 5 mg daily up to 7.5 mg daily however so far this has not made any difference in how he feels.  He is supposed to get back in touch with them to let them know how he is doing and to see if there is any other adjustments needed. His labs are unremarkable with normal thyroid function, CMP is normal other than for glucose of 156.  They did check hemoglobin A1c to look for diabetes however that was normal at 5.7.  CMP is unremarkable with just very mild anemia with hemoglobin of 12 and hematocrit 36.1% which would not account for his fatigue.  He does have hot flashes that are fairly significant, I will try venlafaxine as suggested by Dr. Duffy and I appreciate the recommendation.  We will start at 37.5 mg daily and if tolerated he can increase to 75 mg after 1-2 weeks.  I will see him back in 1 months for follow-up.  Plan to repeat scans late April/early May.  His prostate cancer seems under good control with current regimen however I am not sure how long he can tolerate this due to  the side effects.    -3/30/2023 [facility] Oncology clinic follow-up:  Mr. Lugo continues to deal with fatigue.  He appears more cushingoid today, he continues on treatment with Zytiga, prednisone and relugolix.  Prednisone dose currently is 7.5 mg daily.  This has not made any difference in his energy level.  PSA remains immeasurably low at <0.014.  CBC shows new onset of microcytic hypochromic anemia with hemoglobin of 11, hematocrit 34.4%, MCV 75.4, MCH 24.1, WBC is normal at 5.81 with normal platelet count 181,000 and normal differential.  CMP is unremarkable, it is normal other than for glucose of 142.  We will get him to Dr. Gomez for an EGD and colonoscopy for further evaluation in regards to his new onset of anemia.  He has no associated GI symptoms otherwise except for 1 day a few weeks ago he does report that he had fairly sudden onset of vomiting but this was an isolated incident on that day and has not reoccurred since.  Continues to follow with palliative care for management of his chronic back pain.  We will repeat restaging CT chest, abdomen and pelvis along with total body bone scan prior to return and I have ordered that today.    -3/30/2023 Ferritin low 12.3 with iron low 27 and saturation low 5% with total iron binding capacity 511.  3/31/2023 ferrous sulfate 325 mg twice a day every other day with vitamin C started.    - 4/12/2023 CT chest abdomen pelvis with contrast showed no progressive disease with stable sclerotic bone lesions.  Bone scan showed single identifiable bone metastasis stable right acetabulum    -4/18/2023 [facility] hematology oncology follow-up: He now has microcytic iron deficiency anemia which is new and concerning.  Gastroenterologic evaluation has been ordered but not performed and is mandatory.  Continue ferrous sulfate 325 mg twice a day every other day with vitamin C to improve absorption and we will follow his CBC along with his usual labs monthly on Zytiga, Relugolix,  prednisone 7.5 mg.  He follows with Dr. Duffy with adrenal insufficiency.  He will see my nurse practitioner back in a month to see what GI has found and to watch serial CBC along with his other labs including PSA.  I would repeat CT chest abdomen pelvis again in 6 months with contrast along with bone scan and sooner if PSA rises.    -5/3/2023 EGD and colonoscopy with Dr. Alexander Gomez: Superficial erosions in the stomach with gastritis.  Small polyps removed, dilated internal hemorrhoids.  Pathology pending.  Recommend repeat colonoscopy in 5 years.    -5/17/2023 [facility] Oncology clinic follow-up:  Naveen overall is doing well on current therapy with Zytiga, prednisone and relugolix.  PSA remains immeasurably low at <0.014.  He feels his hip pain is getting worse with time.  It makes it difficult to enjoy activities with his grandchildren as it is worse when he stands for any length of time or tries to carry any weight.  I reviewed his bone scan from April which was stable, we also reviewed previous MRI of the hips from May 2021 that showed moderate bilateral hip osteoarthritis.  I offered to repeat MRI of his hips for evaluation to see if there has been any worsening of his osteoarthritis and also then referral to orthopedics for any recommended intervention however at this time he defers.  He will let me know if he changes his mind.  He also follows with palliative care for management of his cancer related pain.  We will continue therapy unchanged.  We will repeat restaging scans in October unless symptoms dictate the need to do so sooner.  He was recently found to be iron deficient, he had an EGD and colonoscopy on 5/3/2023 with Dr. Gomez, EGD showed superficial erosions in the stomach with gastritis, he continues on omeprazole.  He had small polyps removed from the colon and recommendation to repeat colonoscopy in 5 years.  He is on oral iron along with vitamin C every other day and is tolerating that well.  CBC  from yesterday shows hemoglobin now normal at 14.2 and hematocrit 43.2%, MCV normal at 27.0.    -7/20/2023 [facility] Oncology clinic follow-up: Naveen is doing well on current therapy with Zytiga, prednisone and relugolix.  PSA in June remains low at <0.1.  Continues to deal with chronic pain in his back and hip.  Recently saw Dr. Nikolai Marks and had an injection in his hip and is hoping that it will eventually help with his pain.  He has no new pain.  We will continue current therapy unchanged.  We will repeat labs while he is here today.  I will refill his iron as his pharmacy has changed. Most recent CBC in June with hemoglobin of 14.7 and hematocrit 43.6%.  We will repeat restaging scans in October.    -9/14/2023 [facility] Oncology clinic follow-up: Naveen is tolerating his prostate cancer treatment with Zytiga, prednisone and relugolix.  PSA remains immeasurably low at <0.014 on 8/17/2023.  Currently he is having more GI issues with nausea and intermittent vomiting that occurs often without warning.  He had an EGD and colonoscopy with Dr. Gomez on 5/3/2023, EGD showed superficial erosions in the stomach with gastritis.  He does take omeprazole 40 mg twice daily.  He saw his PCP yesterday for these symptoms and has been referred to gastroenterologist at [facility] and he is awaiting to hear about that appointment.  Hopefully he can get in fairly quickly.  I told him that he should let us know if it is going to be a while before he can be seen and I will get him back to Dr. Gomez for consideration of repeat EGD.  We will get his CBC, CMP and PSA today.  His previous noted anemia had normalized, he currently is not taking oral iron as he ran out.  I will wait and see what his labs show today and likely hold off for now depending on his lab results until his GI issues can be resolved.  We will repeat his restaging CT chest, abdomen and pelvis along with bone scan in the next few weeks and I have ordered those today.  He  is taking Zofran as needed for nausea, he is also on meclizine for vertigo.  He is going to follow-up with his PCP in a few weeks regarding his GI symptoms.    -9/14/2023 PSA less than 0.014 with unremarkable CBC and CMP.    -9/28/2023 total body bone scan stable with uptake in right acetabulum, no new areas.  CT chest, abdomen and pelvis show no evidence of disease progression.  -11/9/2023 [facility] follow-up: CT reports were reviewed by Dr. Rashid last month and the patient reports he was called by Dr. Rashid as he could not make his appointment due to concerns over upper respiratory infection, CT scan showed no evidence of disease progression.  PSA has remained low, we are repeating that today along with his CBC and CMP.  He continues to have sinus drainage and congestion, I will send in 7 additional days on his doxycycline that he has been taking, he will follow-up with PCP if no improvement.  He continues to complain of bilateral hip pain, has a follow-up with Dr. Marks next week.  We will continue therapy unchanged with Zytiga, prednisone and relugolix.  Would repeat restaging bone scan and CT scans late March for a 6-month follow-up.    -1/4/24 PSA 1    - 3/28/2024 CT chest abdomen pelvis with contrast compared to May 2013 shows decreasing sclerotic bony metastases.  Bone scan shows similar relative intensity of 1 focal uptake right acetabulum correlating with CT with no new sites of uptake    -4/2/2024 [facility] oncology follow-up: Continued good response to Zytiga prednisone relugolix which he is tolerating.  We will check PSA and labs 6/25/2024 with follow-up with my nurse practitioner and again 3 months after that along with CTs and bone scan 6 months from now.  Not on bisphosphonates or rank ligand inhibitor due to drug-induced hypophosphatemia.  Will ask my nurse practitioner to order bone densitometry with his other scans in 6 months. He is having difficulty emptying his bladder and  his prior urologist does not take his insurance so we will make appropriate referrals.    -6/26/2024 [facility] clinic follow-up: Overall continues to tolerate therapy with Zytiga, prednisone and Relugolix.  We will get labs today including PSA and as long as that is stable he will continue treatment unchanged with plans to repeat restaging CT scans and bone scans in September and I have ordered those today.  We will also obtain DEXA scan at that time to monitor for osteoporosis on long-term use of androgen deprivation therapy.  We have been holding his Zometa due to drug-induced hypophosphatemia.  He is following with urology and has had relief of his urinary hesitancy with some medication adjustments and he is scheduled for follow-up this Friday with cystoscopy.  He recently had chest pain and shortness of breath while mowing his lawn, he did have an echocardiogram and stress test yesterday, awaiting final results and reading from Dr. Kahn.  He continues to follow with palliative care for management of his pain which is under good control.  I did refill his symptoms today for chronic constipation from opioid use.    -9/16/2024 CT chest abdomen pelvis with contrast [place] [facility] compared to May 2024 shows stable 1.6 cm well-defined nodule left periaortic region.  Stable sclerotic left supra-acetabular, right posterior acetabular left posterior acetabular lesions.  No disease progression.  Total body bone scan shows grossly stable solitary active right posterior acetabular bone metastasis.    -10/2/24 PSA less than 0.1    -10/8/2024 [facility] clinic follow-up: In order to get prostatic urethral pathway surgically open he needed cardiac clearance and is due for cardiac cath in the next week or 2 with Dr. Kahn.  I reviewed the above image results with him with no clear-cut evidence of progression and PSA remaining immeasurably low.  He does feel quite fatigued.But this is not new.  He has become  microcytic with a hemoglobin of 12.1 and MCV of 77 which has trickled down 14.8 and December.  His CMP is unremarkable.  He had an EGD and colonoscopy May of last year with Dr. Gomez that showed superficial erosive gastritis with internal hemorrhoids and colon polyps.  He states he has been taking his iron but he takes it with all his other medicines.  I told him to take it every other day with vitamin C with at least an hour and a half window on either side of it free from any other ingestion of medicines.  With reference to the pulmonary infiltrate he has had a frothy nagging mildly productive cough without fevers or chills or dyspnea for the last 2 months.  Rather than throwing an antibiotic at this, I am going to get pulmonary referral for their opinion.He is due for his bone density as well and we will order that.  He has been off of Zometa for quite some time due to hypophosphatemia  Addendum: He has never had genetic testing.  While that would not alter our plans in the near term, I will plan on genetic testing for his family sake as well as for potential treatable molecular targets in the future should treatment algorithms dictate.  I called and spoke with his wife to inform her of this plan.    - 10/29/2024 cardiac cath Dr. Kahn showing vascular disease for which she will manage medically with rosuvastatin and aspirin and antianginal therapy.    -11/6/2024 buccal mucosal genetic sampling given to patient for collection.    - 11/12/2024 pulmonology evaluation Maken[facility] Ramirez.  Status post multiple rounds of antibiotics on 5 mg prednisone at that junction.  Changed him to Breztri from [facility] to see if budesonide would help with congestion and adding DuoNebs along with montelukast.  Continues to smoke and we will follow-up with him in 4 weeks.    -12/4/2024 PSA immeasurably low less than 0.014.  Hemoglobin 14.4 and rising.  CBC unremarkable.  Glucose 115 otherwise unremarkable CMP.    -12/10/2024 [facility]  medical oncology follow-up: Prostate cancer under control on Relugolix Zytiga prednisone.  Continue to follow with urologist for obstructive symptoms from prostate.  Follow-up with palliative care for his ongoing bone pains.  I will rescan with bone scans and CT chest abdomen pelvis with contrast in March and see him back after that along with his labs and PSA.  Follow-up with pulmonary medicine regarding his dyspnea.  Hemoglobin rising.  Asked him to touch base with Dr. Kahn regarding his tachycardia with regular rhythm.    -3/4/2025 PSA less than 0.14.Glucose 126 BUN 6 total protein 5.7 otherwise unremarkable CMP and CBC.  - 3/5/2025 CT chest abdomen pelvis with contrast [place] [facility] compared to September 2024 shows no evidence of metastatic disease with stable sclerotic bone metastases.  Total body bone scan shows indeterminant bilateral rib foci favored to represent benign etiology otherwise no significant change with a single bone metastasis.    -3/11/2025 [facility] medical oncology follow-up: With PSA immeasurably low would not make much out of indeterminant rib abnormalities on bone scan and nothing else to suggest progression.  Tolerating Relugolix Zytiga prednisone.  Will see my nurse practitioner back towards the end of May with repeat labs and plan on repeat imaging in 6 months and PSA quarterly     Prostate cancer metastatic to bone   8/30/2020 -  Other Event    PSA 10.8     9/15/2020 Initial Diagnosis    Prostate cancer metastatic to bone (CMS/HCC)     9/15/2020 Biopsy    Prostate needle core biopsy     9/24/2020 Imaging    Total body bone scan: 4 abnormal areas of increased activity consistent with osteoblastic metastasis, abnormal foci of activity seen in the T12 vertebral body, L1 vertebral body, roof of the left acetabulum, and acetabulum medially on the right.  CT chest: Stable sclerotic metastatic lesions within the T12 and L1 vertebrae.  No other evidence of metastatic disease to the chest.   Stable mild splenomegaly and subcentimeter periaortic retroperitoneal lymph nodes.  CT abdomen and pelvis: Diffuse bladder wall thickening with mild perivesicular fat stranding.  Interval development of several sclerotic lesions in the spine and left iliac bone.     10/13/2020 Cancer Staged    Staging form: Bone - Appendicular Skeleton, Trunk, Skull, And Facial Bones, AJCC 8th Edition  - Clinical: Stage IVB (cT3, cN0, cM1b, G3) - Signed by Ishmael Rashid MD on 10/13/2020     11/3/2020 - 10/5/2021 Chemotherapy    OP SUPPORTIVE Zoledronic Acid Q42d     11/3/2020 - 2/18/2021 Chemotherapy    OP PROSTATE DOCEtaxel       1/4/2021 Imaging    CT chest abdomen pelvis with contrast and whole-body bone scan shows improving less metabolically active bone metastases.  Stable sclerotic T12, L1, and L2 vertebral bodies and bilateral pelvic bones on bone windows with stable subcentimeter para-aortic lymph nodes and no definite progression in the chest abdomen or pelvis.  PSA down to 0.275 after 3 courses of Taxotere.     3/4/2021 Imaging    CT chest, abdomen and pelvis stable, no evidence of disease progression. Total body bone scan with decreased/near resolution of abnormal increased radiotracer uptake associated with the known sclerotic lesions in the thoracolumbar spine and bony pelvis.  No evidence of new osteoblastic metastases.     3/4/2021 Imaging    CT chest abdomen pelvis with contrast is negative save for stable sclerotic bone lesions and the bone scan shows near resolution of prior bony abnormalities associated with the known sclerotic lesions in the thoracolumbar spine and bony pelvis.  2/17/2021 PSA down to 0.1 from 1.37 October 2020.     3/9/2021 - 3/9/2021 Chemotherapy    OP SUPPORTIVE PROSTATE Relugolix     3/9/2021 -  Chemotherapy    OP PROSTATE Abiraterone / PredniSONE       3/10/2021 -  Chemotherapy    OP PROSTATE Abiraterone / PredniSONE     8/10/2021 - 8/16/2021 Radiation    Radiation OncologyTreatment Course:   Naveen Lugo received 2000 cGy in 5 fractions to L4-sacrum, left acetabulum and right pelvis via External Beam Radiation - EBRT.     11/16/2021 -  Chemotherapy    OP CENTRAL VENOUS ACCESS DEVICE ACCESS, CARE, AND MAINTENANCE (CVAD)     1/5/2022 Imaging    -1/5/2022 CT chest abdomen pelvis with contrast [place] [facility] showing stable left periaortic adenopathy and unchanged sclerotic thoracic, lumbar spine and pelvis lesions with no new or progressive disease in the chest abdomen or pelvis.  Total body bone scan shows no significant change and no new suspicious foci.  Stable posterior right acetabulum and left superior acetabulum and degenerative changes in the cervical spine, shoulders, acromioclavicular joints, sternoclavicular joints, elbows, wrists, hands, thoracic spine, lumbosacral spine, sacroiliac joints, hips, knees, ankles, feet.     5/24/2022 Imaging    CT chest abdomen pelvis with contrastFrankfort negative.  Bone scan shows stable bone metastases.     11/4/2022 Imaging    CT abdomen pelvis chest showed no evidence of disease progression with stable sclerotic bone lesions.  Bone scan stable right greater than left acetabular metastasis.  Stable bone metastases.     4/12/2023 Imaging     CT chest abdomen pelvis with contrast showed no progressive disease with stable sclerotic bone lesions.  Bone scan showed single identifiable bone metastasis stable right acetabulum     9/29/2023 Imaging    total body bone scan compared to April showed a single active bone metastasis with multiple and active bone metastases overall stable.  CT chest abdomen and pelvis compared to May and April shows no new bony progression with stable sclerotic bone lesions.         HISTORY OF PRESENT ILLNESS:  The patient is a 69 y.o. male, here for follow up on management of metastatic prostate cancer.  No symptoms of recurrence    Past Medical History:   Diagnosis Date    Arthritis     Bone cancer     Coronary artery disease      "Elevated cholesterol     History of radiation therapy 08/16/2021    L4 through sacrum, left acetabulum, right pelvis    Nephrolithiasis     Opioid use disorder, mild, on maintenance therapy (HCC)     Prostate cancer      Past Surgical History:   Procedure Laterality Date    CARDIAC CATHETERIZATION N/A 10/29/2024    Procedure: Left Heart Cath;  Surgeon: Joni Kahn MD;  Location: [facility] CATH INVASIVE LOCATION;  Service: Cardiovascular;  Laterality: N/A;    CHOLECYSTECTOMY      COLONOSCOPY      CORONARY STENT PLACEMENT  2006 & 2011    HERNIA REPAIR  1986    THORACIC DISCECTOMY  1994       Allergies   Allergen Reactions    Cymbalta [Duloxetine Hcl] Hives and Dizziness    Duloxetine Hives and Dizziness    Gabapentin Nausea Only    Pregabalin Dizziness, Nausea And Vomiting and Hives       Family History and Social History reviewed and changed as necessary    REVIEW OF SYSTEM:   No new somatic complaints    PHYSICAL EXAM:  Modest icterus or pallor.  No respiratory distress.    Vitals:    03/11/25 1445   BP: 134/81   Pulse: 95   Resp: 18   Temp: 97.4 °F (36.3 °C)   SpO2: 95%   Weight: 86.6 kg (191 lb)   Height: 175.3 cm (69\")     Vitals:    03/11/25 1445   PainSc: 8    PainLoc: Generalized  Comment: left side rib to the knee          ECOG score: 0           Vitals reviewed.    ECOG: (0) Fully Active - Able to Carry On All Pre-disease Performance Without Restriction    Lab Results   Component Value Date    HGB 15.9 03/04/2025    HCT 46.8 03/04/2025    MCV 84.2 03/04/2025     03/04/2025    WBC 7.81 03/04/2025    NEUTROABS 5.09 03/04/2025    LYMPHSABS 2.09 03/04/2025    MONOSABS 0.49 03/04/2025    EOSABS 0.06 03/04/2025    BASOSABS 0.05 03/04/2025       Lab Results   Component Value Date    GLUCOSE 126 (H) 03/04/2025    BUN 6 (L) 03/04/2025    CREATININE 0.81 03/04/2025     03/04/2025    K 3.5 03/04/2025     03/04/2025    CO2 26.0 03/04/2025    CALCIUM 9.0 03/04/2025    PROTEINTOT 5.7 (L) 03/04/2025 "    ALBUMIN 3.7 03/04/2025    BILITOT 0.3 03/04/2025    ALKPHOS 85 03/04/2025    AST 17 03/04/2025    ALT 10 03/04/2025             ASSESSMENT & PLAN:  1.  Stage IVb grade group 4 metastatic prostate cancer with sclerotic bone lesions on CT and bone scan and history of prostatic hypertrophy.  Good response to Taxotere ending 2/18/2021 Relugolix, followed by Zytiga prednisone with L4/sacrum, left acetabulum, and right pelvis external beam radiation August 2021.  Hypophosphatemia on Zometa.  Zometa held as of October 2021.  2.  Kidney stone  3.  Polyps  4.  Probable drug-induced hypophosphatemia from Zometa, drug stopped after 10/5/2021 dose  5.  Cancer related pain seeing palliative care  6.  Fatigue  7.  Covid 2/2022  8.  Iron deficiency anemia  -5/3/2023 EGD and colonoscopy with Dr. Alexander Gomez: Superficial erosions in the stomach with gastritis.  Small polyps removed, dilated internal hemorrhoids.  Pathology pending.  Recommend repeat colonoscopy in 5 years.  9.  CADz    -9/30/2020 office note from Dr. Ronen Reynaga indicates patient with PSA 10.8.  Underwent TRUS prostate biopsy 9/15/2020.  Adenocarcinoma the prostate [place] 4+4 = 8 in 1 out of 12 cores and 4+3 = 7 and 10 out of 12 cores and 3+4 = 7 in 1 out of 12 cores.  Perineural invasion.  Extraprostatic extension into the fat seen on 2 biopsies of the right lateral mid and right apex.  9/24/2020 CT chest and whole-body bone scan showed T12, L1, left acetabular, right medial acetabular metastases with no lung metastases.  He started androgen deprivation therapy with Casodex with plans for Lupron to start in 1 to 2 weeks for clinical T3 N0 M1 metastatic prostate cancer.  Review of official report from [facility] 9/24/2020 bone scan mentions T12, L1, roof of left acetabulum and medial right acetabular osteoblastic metastases.  CT chest with contrast that same day showed mild splenomegaly 14.2 cm with stable periaortic nodes compared to CT December  2015 with no lung metastases.  Sclerotic abnormality within the T12 vertebral body, L1 vertebral body extending into the pedicle unchanged.  8/28/2020 CT abdomen pelvis report from [facility] reviewed and mentions normal spleen size.  Punctate nonobstructing kidney stone, no paulino enlargement, diffuse bladder wall thickening with mild perivesicular fat stranding, 2.1 cm sclerotic left iliac bone above the left acetabulum new compared to 2015 as well as 1.5 cm faintly sclerotic focus in the right posterior acetabulum stable compared to prior CT 2015 as well as a 1.6 cm T12 and inferior vertebral body sclerotic lesion and a 1.9 cm left posterior lateral L1 vertebral body abnormality extending to the pedicle.    -10/13/2020 initial [facility] medical oncology consultation: With 1 Roxbury score 8, 4+4 lesion that would make him a grade group 4 with stage IVb disease given radiographic evidence of sclerotic bone metastasis on bone scan and CT.  Needs genetic testing given metastatic prostate cancer and this is also important as, were he to have mismatch repair mutation then we would have options for PDL 1 inhibitors and were he BRCA mutated would have options for PARP inhibitors in the future.  Systemic therapy for castration naïve prostate cancer or N1 disease should be with apalutamide or Abiraterone or enzalutamide all of which are category 1.  He does not have any visceral metastases.  According to his imaging he has T12, L1, left acetabular, right acetabular, and left iliac bony involvement.  That means he would have 4 or more bone metastases with at least one metastasis (i.e. the acetabular lesions bilaterally) beyond the pelvis/vertebral, for which this would be considered high-volume disease for which 6 cycles of docetaxel 75 mg/m² x 6 cycles followed by Zytiga and prednisone would be reasonable along with Zometa every 6 weeks for bone stabilization.  He will do chemo preparation visit with my nurse  practitioner prior to start chemotherapy with Taxotere and Zometa in 2 weeks and he is already received Casodex in preparation for Lupron shot he received on 10/12/2020 with Dr. Reynaga.  We will get him to Dr. Alexander Gomez who has done his polypectomies in the past for port placement and we will get genetic counseling started.  Following the 6 courses of Taxotere per the [place] trial criteria, I would then give him an indefinite Zytiga and prednisone and Zometa until progression or toxicity dictates.    10/29/2020 PSA 1.37    -12/16/2019 COVID-19 antigen test negative    -12/29/2020 PSA 0.275 with absolute neutrophil count 700 and creatinine 0.76 with normal liver enzymes and electrolytes.  Normal magnesium and phosphorus and urine drug screen positive for buprenorphine and opiates follow-up with palliative care.    -1/4/2021 CT chest abdomen pelvis with contrast and whole-body bone scan shows improving less metabolically active bone metastases.  Stable sclerotic T12, L1, and L2 vertebral bodies and bilateral pelvic bones on bone windows with stable subcentimeter para-aortic lymph nodes and no definite progression in the chest abdomen or pelvis.    -1/5/2021 [facility] medical oncology follow-up visit: I reviewed the images and reports thereof of the above CT and bone scan which shows good response to Taxotere x3 courses with a PSA down to 0.275 from a baseline of 10.  Get another 3 courses of Taxotere, continue his Xgeva, and then following that we will switch to Zytiga and prednisone.  He is working with palliative care on his bone pain but on his current dose of fentanyl patch he says his pain is still not adequately controlled he will contact them.  Dexamethasone prescription was refilled.  We will see my nurse practitioner back in 3 weeks for course #5 of 6 total courses and then we will reimage with CT chest abdomen pelvis and bone scan before going to the Zytiga prednisone.    -3/4/2021 CT chest abdomen  pelvis with contrast is negative save for stable sclerotic bone lesions and the bone scan shows near resolution of prior bony abnormalities associated with the known sclerotic lesions in the thoracolumbar spine and bony pelvis.    2/17/2021 PSA down to 0.1 from 1.37 October 2020.  3/9/2021 PSA 0.1    -5/26/2021 CT chest abdomen pelvis with contrast shows stable to slightly underlying increase low-density left para-aortic node.  Bone lesions stable.  Whole-body bone scan stable to decrease activity of known thoracolumbar and bony pelvic involvement.  PSA less than 0.1  , AST 74, bilirubin 0.5.       -6/1/2021 [facility] oncology follow-up visit: I reviewed the above data with him and images thereof.  Bones are stable.  No significant adenopathy.  PSA immeasurably low.     upper limit of normal 69 and AST 74 upper limit of normal 46.  Hence, neither is more than double the upper limit of normal with no ascites and no encephalopathy and normal albumin and bilirubin, despite the elevation of liver enzymes, he has child Abrams score A.  Package insert for Abiraterone says that for ALT and/or AST greater than 5 times upper limit of normal or total bilirubin greater than 3 times the upper limit of normal to withhold treatment until liver function tests returned to baseline or ALT and AST less than or equal to 2.5 times the upper limit of normal and total bilirubin less than or equal to 1.5 times the upper limit of normal and then reinitiate at 750 mg daily dose of Zytiga.  For recurrent hepatotoxicity on 750 mg daily Zytiga, withhold treatment until liver functions returned to baseline or ALT and AST less than 3-2.5 times upper limit of normal and total bilirubin less than or equal to 1.5 times the upper limit of normal then reinitiate at 500 mg daily Zytiga dose.  If has recurrent hepatotoxicity on 500 mg dose, then discontinue treatment.  If has ALT greater than 3 times the upper limit of normal along  with total bilirubin greater than 2 times the upper limit of normal in the absence of other contributing causes or biliary obstruction, permanently discontinue Zytiga.  Based on these recommendations, I would continue current doses but we will watch with serial labs monthly and repeat his imaging again in 3 months but clinically he is doing wonderfully with excellent response to Taxotere and now on Zytiga prednisone plus Zometa along with Relugolix.    -6/23/2020 for [facility] oncology clinic follow-up: Having persistent and worsening pain above his left hip.  I will get an MRI of the left hip for further evaluation.  Otherwise we will continue therapy unchanged with Zytiga, prednisone and Zometa along with Relugolix.    -7/28/2021 [facility] oncology clinic follow-up: Patient with multiple somatic complaints including episodes of confusion, dizziness, decreased memory, agitation.  He reports ongoing episodes with difficulty swallowing.  Also most concerning for episodes of angina and chest pain for which he basically refused to seek emergency medical attention.  He has a history of coronary artery disease and stent placement.  He has not followed up with cardiology for many years.  I will get an MRI of the brain in light of his confusion, dizziness and agitation.  I will get him to gastroenterology for EGD in light of his dysphagia.  I have also put in a referral for cardiology.  I reemphasized to the patient, his wife who is with him today and his son who was on the telephone during our visit the importance of seeking out prompt medical attention when he is having chest pain or neurological concerns so that he can be evaluated.  The patient states that he basically refuses to go to the emergency room and if his family calls an ambulance he would not agree to go to the hospital.  For the time being, I have asked him to hold his Zytiga and prednisone until we can sort this out as Zytiga does have some cardiovascular  "risk.  There is likely no treatment for prostate cancer that does not carry some form of cardiovascular risk.  His PSA remains low at 0.014 yesterday.  He will continue relugolix for now.    Of note, some of these symptoms could also be due to his hypophosphatemia, phosphate level from yesterday was 1.5, I have sent in a prescription for K-Phos 3 times a day before meals for 10 days.  We will hold further Zometa until this is corrected.  I have also put in an order to check his vitamin D level.  He takes calcium and vitamin D daily.  Some of his symptoms also could be due to drug withdrawal as there was some confusion and difficulty getting his last prescription for methadone therefore he was without methadone for several days, he now has his prescription and is back on his pain medication.  He has an appointment with neurosurgery tomorrow in light of his ongoing back pain, MRI of the hip recently showed multiple bony lesions largest at the L5 vertebral body and left supra-acetabular region.  If no neurosurgical intervention recommended he may benefit from spot radiation as this is where a lot of his pain is coming from.  His wife had questions today regarding his staging and extent of disease.  We reviewed previous scans.  I did clarify with her as she used the words \"cancer free\" as she thought that is what she was told after his CT scans once he completed Taxotere in February.  I explained to her that even though his scans showed he had an excellent response with no clear areas of metastasis that did not mean he was cancer free, I explained to her that when you have metastatic cancer the treatment intent is palliative in nature and to control the disease but not to cure the disease.  She stated understanding.  We will see him back in 2 weeks for follow-up to sort through this and make further plan of care, again, I have asked him to hold Zytiga and prednisone until he sees us back, he will continue on his other " medications including relugolix.    -7/30/2021 neurosurgery PA consultation.  MRI with and without contrast L5 ordered.    -8/3/2021 neurosurgery follow-up showed known sclerotic disease with new L5 abnormality without compression deformity or instability.  MRI brain negative for metastasis.    -8/4/2021 office visit Dr. Fco Quintanilla radiation oncology coordinating with Dr. North neurosurgery for palliative radiation for MRI evidence of L5 and left supra acetabular painful lesions.  States that the patient's disease which is not secreting PSA is experiencing some progression and would benefit from 20 Gy in five fractions and other lesion 30 Gy in ten fractions. Patient offered to go to [place] for radiation but desired treatment in [place].    -8/10/2021 [facility] oncology follow-up visit: No chest pains at this junction.  Reviewed MRI brain and other MRIs reviewed by Dr. North and Dwight.  Due for EGD on 19th.  We will repeat his phosphorus along with his other labs continue Relugolix, Zytiga, prednisone, and he will continue radiation palliatively to the painful bony lesions with Dr. Quintanilla/Can.  He will see my nurse practitioner back in a few weeks to see how he is tolerating this and to follow-up on the phosphorus off of Zometa and she will order repeat CT chest abdomen pelvis with contrast and total body bone scan the end of September.I will see him back after that.    -9/8/2021 Zometa resumed.  PSA <0.10    -10/5/2021 [facility] medical oncology follow-up visit: followed-up with radiation oncology 9/21/2021.  Status post symptomatic L5 radiation 5 fractions 20 [place] completed 8/16/2021 with improvement in right hip pain.  9/2/2021 PSA less than 0.1.  9/29/2021 total body bone scan shows increased uptake left iliac bone slightly superior group of the left acetabulum with no additional foci of suspicious abnormality is unlikely treatment related with no new sites of active osseous metastasis.  CT chest  abdomen pelvis with contrast shows stable borderline enlarged left periaortic nodes and dating sclerotic bone lesions.Continue Zytiga, prednisone, Relugolix, Zometa, repeat CT chest abdomen pelvis with contrast and total body bone scan the end of the year.  We will follow PSA serially.    -11/17/2021 [facility] Oncology clinic follow-up:  Mr. Lugo overall is doing well.  He is tolerating therapy with Zytiga, prednisone and Relugolix along with Zometa which is given every 6 weeks.  PSA remains low at <0.1.  Has occasional low phosphorus, currently low at 1.7.  Serum calcium is normal at 8.9, normal creatinine 0.79 and normal CBC other than for platelets of 124.  I will hold Zometa for 1 month, I did send in a prescription for phosphorus replacement today.  He takes calcium and vitamin D.  I will see him back in 1 month for follow-up.  We will repeat restaging scans after that visit.    -12/15/2021 [facility] Oncology clinic follow-up: Mr. Lugo continues to do well on therapy with Zytiga, prednisone and Relugolix, his PSA remains low at <0.1.  He is continuing to have left lower back pain, he is working with palliative care and they have referred him also to pain management.  Pain management has talked to him about putting in a pain pump however he is leery of this, I told him that this was a safe and effective pain management technique and to certainly give consideration to their recommendations.  His phosphorus is improving however is still a little low, I will hold off on Zometa until we see him back in 1 month, we may end up only giving it every 12 weeks.  We will repeat restaging scans with CT chest, abdomen and pelvis along with total body bone scan prior to return and I have ordered those today.      -1/5/2022 CT chest abdomen pelvis with contrast [facility] showing stable left periaortic adenopathy and unchanged sclerotic thoracic, lumbar spine and pelvis lesions with no new or progressive disease in the  chest abdomen or pelvis.  Total body bone scan shows no significant change and no new suspicious foci.  Stable posterior right acetabulum and left superior acetabulum and degenerative changes in the cervical spine, shoulders, acromioclavicular joints, sternoclavicular joints, elbows, wrists, hands, thoracic spine, lumbosacral spine, sacroiliac joints, hips, knees, ankles, feet.    -1/11/2022 [facility] medical oncology hematology follow-up visit: I reviewed images and reports from his 1/5/2022 scans.  No active disease and PSA immmeasurably low on Zytiga, prednisone, Relugolix.  However, he has struggled with hypophosphatemia and is significantly fatigued with this.  Given that the bones are doing well and given that his phosphorus continues to remain consistently low at 2.2 in mid December, 1.7 mid November and 2.5 mid-September and as low as 1.5 back to July and the likelihood that this is related to his Zometa, given that his bones are stable overall, he will increase from 1200 mg up to 1600 mg of calcium and phosphate a day and we will hold his Zometa for the foreseeable future.  He will see my nurse practitioner back in a month to continue to monitor his phosphorus along with his calcium and other labs and will repeat his scans again in 3 months.  In addition, given his fatigue we will check his ACTH and cortisol though he is taking his prednisone with his Zytiga.  Though his electrolytes are normal, I will keep an eye on the cortisol and ACTH and have these labs not only drawn today but again just prior to return to my nurse practitioner on February 10.    -2/10/2022 [facility] Oncology clinic follow-up: Mr. Lugo overall is stable, no change in his health this past month.  I have reviewed his labs from 2/8/2022 and they are unremarkable, his phosphorus is now normal at 3.2. We are continuing to hold his Zometa, he last received Zometa on 10/5/2021.  He continues therapy with Zytiga, prednisone and Relugolix.    Rachid had ordered cortisol level and ACTH which are low, currently his ACTH is 2.8 and cortisol 3.08 however these are both done in the face of ongoing prednisone that he takes with his Zytiga.  We will get him to endocrinology for further evaluation for possible adrenal insufficiency but will not interrupt his treatment in the interim.  He will need restaging scans again in April for a 3-month follow-up.  He will continue to follow with palliative care and pain management for his cancer related pain.  His PSA remains immeasurably low at <0.1 on 2/8/22.    -2/15/2022 went to emergency room with weakness, decreased appetite, myalgias in the setting of known COVID-19 testing positive a few days prior to this visit.  Phosphorus had been low in the past but was normal in the emergency room with unremarkable CBC and CMP.  Chest x-ray unremarkable saturating well on room air mildly hypertensive with dry mucosa.  Given 500 cc crystalloid.  Covid test positive.  Mild thrombocytopenia 136,000 and otherwise unremarkable.  Discharged home.    -3/3/2022 data:  PSA less than 0.1  CMP unremarkable  Cortisol 3.96 normal  ACTH 2.8 lower limit of normal 7.2  Phosphorus normal 2.6    -3/8/2022 [facility] medical oncology follow-up: Suspect big chunk of his fatigue was Covid.  Another part of the fatigue likely related to lack of testosterone.  Not hypophosphatemic off of Zometa.  ACTH running low while on prednisone not surprising but with normal cortisol and I doubt adrenal insufficient which is why he is on prednisone to avoid such while taking Zytiga.  Overall doing fairly well and with immeasurably low PSA, I will have labs done on him early April, see my nurse practitioner early May, and she will order repeat bone scan and CTs the end of May and I will see him back in June.  We will continue to hold the Zometa unless bone lesions progress given symptomatic prior hypophosphatemia on Zometa.  He will keep his appointment April 28  with Dr. Mir Duffy just to be sure that my take on his pituitary/adrenal axis assessment is accurate.    -4/28/2022 endocrinology consult Dr. Mir Duffy.  With low ACTH and cortisol on prednisone with Zytiga, the adequacy of prednisone cannot be assessed by measuring ACTH or cortisol but is assessed by weight changes, blood pressure, blood sugar, potassium, overall sense of how the patient is doing.  Primary adrenal insufficiency with low ACTH and low cortisol would be typical for the situation as his blood sugar tends to run a little high and potassium a little low, he did not think increasing prednisone was necessary.  He put him on some potassium.  No follow-up scheduled, will see as needed.    -5/4/2022 [facility] Oncology clinic follow-up: Mr. Lugo continues on therapy with Zytiga and prednisone along with Relugolix.  His Zometa has been on hold due to previous hypophosphatemia.  There is some concern about potential adrenal insufficiency. He saw endocrinology, Dr. Mir Duffy on 4/28/2022.  I did review his office note and he stated that the low ACTH and low serum cortisol would be typical for Mr. Lugo's situation.  He further stated that the adequacy of prednisone dose cannot be assessed by measuring ACTH or cortisol but is rather assessed by the overall just altered how the patient is feeling-weight change, blood pressure, blood sugar, potassium, overall sense of wellbeing.  His potassium had been running a little low therefore they put him on potassium 10 mill equivalents daily. Of note, I do not see a future follow-up scheduled with endocrinology.  He is having night sweats, I am not sure if this is related.  His glucose was normal this morning at 107.  His weight is stable.  He will continue current treatment for his prostate cancer unchanged, we will continue to hold his Zometa.  Current phosphorus and magnesium are normal.  CBC and CMP from today are unremarkable, cortisol is normal.  PSA and ACTH are  pending  In regards to his night sweats, he had taken clonidine previously 0.1 mg twice daily as needed, I did send in a short supply again to try and see if this helps.  He also is having indigestion despite being on omeprazole twice daily, I sent a prescription for Pepcid.  He had an EGD August 2021.  We will repeat restaging scans prior to return and I have ordered those today.     -5/24/2022 CT chest abdomen pelvis with contrast[place]fort negative.  Bone scan shows stable bone metastases.    - 5/25/2022 PSA less than 0.1, platelets 130,000, otherwise unremarkable CBC and CMP.  A.m. cortisol running low 1.2 with phosphorus low 2.3.    -5/31/2022 [facility] follow-up: On Zytiga, prednisone, Relugolix, PSA remaining immeasurably low with stable bone scan and no visceral/paulino metastases.  Phosphorus still running low.  I have discontinued his potassium chloride and started potassium phosphate 500 mg 4 times a day.  Unless PSA rising, we will plan on repeating CTs and bone scan in 6 months and will follow with my nurse practitioner in the interim and if symptoms dictate or labs, will get back to Dr. Duffy for management of potential adrenal insufficiency but presently we will just see how he does with potassium phosphate and hopeful improvement in the phosphorus level with time.  We will continue to follow with palliative care for pain management    -7/6/2022 [facility] follow-up: Mr. Lugo overall is doing well but continues to be troubled with fatigue and hot flashes.  For the most part he states he is able to do the things he wants to do, he continues to work but does have to take more rest periods.  I had a long discussion with him and his wife after reviewing his medications with them as they wanted to see if there was anything he could stop or adjust.  I discussed with him the need to continue on treatment for his prostate cancer even though his PSA remains immeasurably low and his  scans look good but that if his medication is stopped the cancer would progress and over time it still has the potential to progress on therapy but would continue it as long as possible to keep his disease under control.  I explained this is like treating someone with hypertension, you do not stop the antihypertensive just because the blood pressure normalizes.  They stated understanding.  There is no dose adjustment of Zytiga or prednisone that would minimize his symptoms, he is on the current standard recommended therapy.  Discussed with him that sometimes during times of stress we may need to increase his prednisone dose but for now would not change anything.  His phosphorus level is normal, we continue to hold his Zometa.  I did give him the option of holding his phosphorus for the next month and we will see what happens, if it drops we will start him back on it but may be at a lower dose rather than 4 times a day maybe twice a day.  For now he will continue therapy unchanged.  We will continue monitoring labs monthly.  No PSA was drawn this month but that is okay as his PSA has been running immeasurably low at <0.1, we will recheck next month with lab draw.  We will stop checking his ACTH and cortisol level every month, if he develops worsening symptoms I will repeat those.  He has not gotten a follow-up with endocrinology as of yet.    -8/3/2022 [facility] follow-up:  Mr. Lugo is doing well as far as his prostate cancer goes with continued immeasurably low PSA of <0.1.  He continues on therapy with Zytiga and prednisone along with Relugolix.  His phosphorus is stable at 2.7 which was just slightly low by our reference range however was 2.71-month ago also which was normal on another labs reference range.  He has not taken his phosphorus for this past month as he thought it was making him more fatigued.  He really can tell no difference whether he was taking it or not.  I have asked him to try to  go back on phosphorus twice a day rather than 4 times a day and see if this is tolerated and if we can keep his phosphorus level from dropping.  His biggest complaint today is flareup of his arthralgias and back pain.  He is following with pain management, Dr. Danny Avalos but is requesting a referral back to palliative medicine as he feels that no one is currently listening to him and they are just prescribing the same medications that are not effective according to his report.  He does have a follow-up with Dr. Avalos on 8/12/2022 but due to his current pain is not able to work until after he is seen and hopefully can get some better relief.  I have given him a work release until 15 August.  I have also put in a referral back to palliative care.  Also encouraged him to work with Dr. Avalos for help in resolving his issue.  Apparently they have recommended some type of a pump however he has been hesitant to that but likely would be helpful.  We will continue therapy unchanged.  We will continue monthly labs including phosphorus, we are not currently checking ACTH and cortisol monthly as Dr. Mir Duffy previously stated in his assessment on 4/28/2022 that the adequacy of prednisone cannot be assessed by measuring ACTH or cortisol but would be assessed by symptoms, see note from 4/28/2022.  He made no further follow-up appointments.  Fatigue is not worsening currently.  His glucose and potassium are  Normal.    -10/13/2022 [facility] [place] medical oncology follow-up: Mr. Lugo is doing well.  He is tolerating treatment with Zytiga and prednisone along with Relugolix without any unusual side effects.  His PSA has remained immeasurably low at <0.1.  His phosphorus was slightly low at last visit.  He had been instructed to go back on phosphorus twice a day but he misunderstood and has not been taking it.  We will check labs today.  I will follow-up with results and notify him if he needs to restart the phosphorus.  His pain is  better controlled since reestablishing with Anna Ayers, palliative care.  We will repeat scans prior to next follow-up.  I have ordered CT chest abdomen pelvis along with bone scan.  We will see him back in 1 month.    -10/13/2022 PSA less than 0.014.With unremarkable CBC and CMP.  Phosphorus still a little low 2.3.    -11/4/2022 CT abdomen pelvis chest showed no evidence of disease progression with stable sclerotic bone lesions.  Bone scan stable right greater than left acetabular metastasis.  Stable bone metastases.    -11/8/2022 [facility] medical oncology telemedicine follow-up: Patient states that he feels achy all over with low-grade temps and a cough mildly productive.  Has an appointment later today to see his primary care for testing for flu and COVID.  Suggested that if he were positive for COVID that he get Paxlovid and that if he were positive for flu that he get Tamiflu and to discuss this with his primary care.  From the prostate cancer side, his PSA is immeasurably low with stable bone metastases and no evidence of progression.  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  We will continue the Relugolix, Zytiga, prednisone and he will see my nurse practitioner 12/7/2022.  Would repeat CTs and bone scan in May.    -1/25/2023 [facility] Oncology medicine follow-up: Mr. Lugo is having worsening fatigue and muscle aches.  He does have adrenal insufficiency on Zytiga and prednisone for his prostate cancer, he saw endocrinology in light of low ACTH back in April 2022.  At that time there was no recommendation other than for monitoring him for his overall wellbeing and labs.  He denies any fevers or chills.  His labs as shown above are unremarkable, his CBC and CMP are normal other than for glucose of 112, PSA remains low at <0.1.  He has no new pain or any symptoms concerning for progression of his prostate cancer.  I will get him back to endocrinology to see if they feel  any dose adjustment of his prednisone would be helpful in his symptoms.  Currently he is on 5 mg a day with his Zytiga.  I did not repeat his ACTH or cortisol as they would be expected to be low in this situation.  His potassium is normal, he has had no weight loss or change in his appetite.  Blood sugar is reasonable.  His only complaint currently is worsening fatigue and muscle aches.  He will continue treatment unchanged with Zytiga, prednisone and relugolix.  I will see him back in 1 month for follow-up.  We will repeat his restaging scans in May.    -3/1/2023 [facility] clinic follow-up:  His PSA remains low at <0.1 on treatment with Zytiga and prednisone along with relugolix however he continues to complain of significant fatigue and muscle aches not improving with time.  He states that his quality of life is affected as he is not able to do any work activities due to the fatigue.  He did see endocrinology again and they have increased his prednisone from 5 mg daily up to 7.5 mg daily however so far this has not made any difference in how he feels.  He is supposed to get back in touch with them to let them know how he is doing and to see if there is any other adjustments needed. His labs are unremarkable with normal thyroid function, CMP is normal other than for glucose of 156.  They did check hemoglobin A1c to look for diabetes however that was normal at 5.7.  CMP is unremarkable with just very mild anemia with hemoglobin of 12 and hematocrit 36.1% which would not account for his fatigue.  He does have hot flashes that are fairly significant, I will try venlafaxine as suggested by Dr. Duffy and I appreciate the recommendation.  We will start at 37.5 mg daily and if tolerated he can increase to 75 mg after 1-2 weeks.  I will see him back in 1 months for follow-up.  Plan to repeat scans late April/early May.  His prostate cancer seems under good control with current regimen however I am not sure how long  he can tolerate this due to the side effects.    -3/30/2023 [facility] follow-up:  Mr. Lugo continues to deal with fatigue.  He appears more cushingoid today, he continues on treatment with Zytiga, prednisone and relugolix.  Prednisone dose currently is 7.5 mg daily.  This has not made any difference in his energy level.  PSA remains immeasurably low at <0.014.  CBC shows new onset of microcytic hypochromic anemia with hemoglobin of 11, hematocrit 34.4%, MCV 75.4, MCH 24.1, WBC is normal at 5.81 with normal platelet count 181,000 and normal differential.  CMP is unremarkable, it is normal other than for glucose of 142.  We will get him to Dr. Gomez for an EGD and colonoscopy for further evaluation in regards to his new onset of anemia.  He has no associated GI symptoms otherwise except for 1 day a few weeks ago he does report that he had fairly sudden onset of vomiting but this was an isolated incident on that day and has not reoccurred since.  Continues to follow with palliative care for management of his chronic back pain.  We will repeat restaging CT chest, abdomen and pelvis along with total body bone scan prior to return and I have ordered that today.    -3/30/2023 Ferritin low 12.3 with iron low 27 and saturation low 5% with total iron binding capacity 511.  3/31/2023 ferrous sulfate 325 mg twice a day every other day with vitamin C started.    - 4/12/2023 CT chest abdomen pelvis with contrast showed no progressive disease with stable sclerotic bone lesions.  Bone scan showed single identifiable bone metastasis stable right acetabulum    -4/18/2023 [facility] oncology follow-up: He now has microcytic iron deficiency anemia which is new and concerning.  Gastroenterologic evaluation has been ordered but not performed and is mandatory.  Continue ferrous sulfate 325 mg twice a day every other day with vitamin C to improve absorption and we will follow his CBC along with his usual labs monthly  on Zytiga, Relugolix, prednisone 7.5 mg.  He follows with Dr. Duffy with adrenal insufficiency.  He will see my nurse practitioner back in a month to see what GI has found and to watch serial CBC along with his other labs including PSA.  I would repeat CT chest abdomen pelvis again in 6 months with contrast along with bone scan and sooner if PSA rises.    -5/3/2023 EGD and colonoscopy with Dr. Alexander Gomez: Superficial erosions in the stomach with gastritis.  Small polyps removed, dilated internal hemorrhoids.  Pathology pending.  Recommend repeat colonoscopy in 5 years.    -5/17/2023 [facility] clinic follow-up:  Naveen overall is doing well on current therapy with Zytiga, prednisone and relugolix.  PSA remains immeasurably low at <0.014.  He feels his hip pain is getting worse with time.  It makes it difficult to enjoy activities with his grandchildren as it is worse when he stands for any length of time or tries to carry any weight.  I reviewed his bone scan from April which was stable, we also reviewed previous MRI of the hips from May 2021 that showed moderate bilateral hip osteoarthritis.  I offered to repeat MRI of his hips for evaluation to see if there has been any worsening of his osteoarthritis and also then referral to orthopedics for any recommended intervention however at this time he defers.  He will let me know if he changes his mind.  He also follows with palliative care for management of his cancer related pain.  We will continue therapy unchanged.  We will repeat restaging scans in October unless symptoms dictate the need to do so sooner.  He was recently found to be iron deficient, he had an EGD and colonoscopy on 5/3/2023 with Dr. Gomez, EGD showed superficial erosions in the stomach with gastritis, he continues on omeprazole.  He had small polyps removed from the colon and recommendation to repeat colonoscopy in 5 years.  He is on oral iron along with vitamin C every other day and is  tolerating that well.  CBC from yesterday shows hemoglobin now normal at 14.2 and hematocrit 43.2%, MCV normal at 27.0.    -7/20/2023 [facility] clinic follow-up: Naveen is doing well on current therapy with Zytiga, prednisone and relugolix.  PSA in June remains low at <0.1.  Continues to deal with chronic pain in his back and hip.  Recently saw Dr. Nikolai Marks and had an injection in his hip and is hoping that it will eventually help with his pain.  He has no new pain.  We will continue current therapy unchanged.  We will repeat labs while he is here today.  I will refill his iron as his pharmacy has changed. Most recent CBC in June with hemoglobin of 14.7 and hematocrit 43.6%.  We will repeat restaging scans in October.    -9/14/2023 [facility] Oncology clinic follow-up: Naveen is tolerating his prostate cancer treatment with Zytiga, prednisone and relugolix.  PSA remains immeasurably low at <0.014 on 8/17/2023.  Currently he is having more GI issues with nausea and intermittent vomiting that occurs often without warning.  He had an EGD and colonoscopy with Dr. Gomez on 5/3/2023, EGD showed superficial erosions in the stomach with gastritis.  He does take omeprazole 40 mg twice daily.  He saw his PCP yesterday for these symptoms and has been referred to gastroenterologist at [facility] and he is awaiting to hear about that appointment.  Hopefully he can get in fairly quickly.  I told him that he should let us know if it is going to be a while before he can be seen and I will get him back to Dr. Gomez for consideration of repeat EGD.  We will get his CBC, CMP and PSA today.  His previous noted anemia had normalized, he currently is not taking oral iron as he ran out.  I will wait and see what his labs show today and likely hold off for now depending on his lab results until his GI issues can be resolved.  We will repeat his restaging CT chest, abdomen and pelvis along with bone scan in the next few weeks and I  have ordered those today.  He is taking Zofran as needed for nausea, he is also on meclizine for vertigo.  He is going to follow-up with his PCP in a few weeks regarding his GI symptoms.    -9/14/2023 PSA less than 0.014 with unremarkable CBC and CMP.    -9/28/2023 total body bone scan stable with uptake in right acetabulum, no new areas.  CT chest, abdomen and pelvis show no evidence of disease progression.  -11/9/2023 [facility] follow-up: CT reports were reviewed by Dr. Rashid last month and the patient reports he was called by Dr. Rashid as he could not make his appointment due to concerns over upper respiratory infection, CT scan showed no evidence of disease progression.  PSA has remained low, we are repeating that today along with his CBC and CMP.  He continues to have sinus drainage and congestion, I will send in 7 additional days on his doxycycline that he has been taking, he will follow-up with PCP if no improvement.  He continues to complain of bilateral hip pain, has a follow-up with Dr. Marks next week.  We will continue therapy unchanged with Zytiga, prednisone and relugolix.  Would repeat restaging bone scan and CT scans late March for a 6-month follow-up.    -1/4/24 PSA 1    - 3/28/2024 CT chest abdomen pelvis with contrast compared to May 2013 shows decreasing sclerotic bony metastases.  Bone scan shows similar relative intensity of 1 focal uptake right acetabulum correlating with CT with no new sites of uptake    -4/2/2024 [facility] follow-up: Continued good response to Zytiga prednisone relugolix which he is tolerating.  We will check PSA and labs 6/25/2024 with follow-up with my nurse practitioner and again 3 months after that along with CTs and bone scan 6 months from now.  Not on bisphosphonates or rank ligand inhibitor due to drug-induced hypophosphatemia.  Will ask my nurse practitioner to order bone densitometry with his other scans in 6 months. He is having  difficulty emptying his bladder and his prior urologist does not take his insurance so we will make appropriate referrals.    -6/26/2024 [facility] follow-up: Overall continues to tolerate therapy with Zytiga, prednisone and Relugolix.  We will get labs today including PSA and as long as that is stable he will continue treatment unchanged with plans to repeat restaging CT scans and bone scans in September and I have ordered those today.  We will also obtain DEXA scan at that time to monitor for osteoporosis on long-term use of androgen deprivation therapy.  We have been holding his Zometa due to drug-induced hypophosphatemia.  He is following with urology and has had relief of his urinary hesitancy with some medication adjustments and he is scheduled for follow-up this Friday with cystoscopy.  He recently had chest pain and shortness of breath while mowing his lawn, he did have an echocardiogram and stress test yesterday, awaiting final results and reading from Dr. Kahn.  He continues to follow with palliative care for management of his pain which is under good control.  I did refill his symptoms today for chronic constipation from opioid use.    -9/16/2024 CT chest abdomen pelvis with contrast [place] [facility] compared to May 2024 shows stable 1.6 cm well-defined nodule left periaortic region.  Stable sclerotic left supra-acetabular, right posterior acetabular left posterior acetabular lesions.  No disease progression.  Total body bone scan shows grossly stable solitary active right posterior acetabular bone metastasis.    -10/2/24 PSA less than 0.1    -10/8/2024 [facility] follow-up: In order to get prostatic urethral pathway surgically open he needed cardiac clearance and is due for cardiac cath in the next week or 2 with Dr. Kahn.  I reviewed the above image results with him with no clear-cut evidence of progression and PSA remaining immeasurably low.  He does feel quite  fatigued.But this is not new.  He has become microcytic with a hemoglobin of 12.1 and MCV of 77 which has trickled down 14.8 and December.  His CMP is unremarkable.  He had an EGD and colonoscopy May of last year with Dr. Gomez that showed superficial erosive gastritis with internal hemorrhoids and colon polyps.  He states he has been taking his iron but he takes it with all his other medicines.  I told him to take it every other day with vitamin C with at least an hour and a half window on either side of it free from any other ingestion of medicines.  With reference to the pulmonary infiltrate he has had a frothy nagging mildly productive cough without fevers or chills or dyspnea for the last 2 months.  Rather than throwing an antibiotic at this, I am going to get pulmonary referral for their opinion.He is due for his bone density as well and we will order that.  He has been off of Zometa for quite some time due to hypophosphatemia  Addendum: He has never had genetic testing.  While that would not alter our plans in the near term, I will plan on genetic testing for his family sake as well as for potential treatable molecular targets in the future should treatment algorithms dictate.  I called and spoke with his wife to inform her of this plan.    - 10/29/2024 cardiac cath Dr. Kahn showing vascular disease for which she will manage medically with rosuvastatin and aspirin and antianginal therapy.    -11/6/2024 buccal mucosal genetic sampling given to patient for collection.    - 11/12/2024 pulmonology evaluation Makenzie Ramirez.  Status post multiple rounds of antibiotics on 5 mg prednisone at that junction.  Changed him to Breztri from [facility] to see if budesonide would help with congestion and adding DuoNebs along with montelukast.  Continues to smoke and we will follow-up with him in 4 weeks.    -12/4/2024 PSA immeasurably low less than 0.014.  Hemoglobin 14.4 and rising.  CBC unremarkable.  Glucose 115 otherwise  unremarkable CMP.    -12/10/2024 [facility] medical oncology follow-up: Prostate cancer under control on Relugolix Zytiga prednisone.  Continue to follow with urologist for obstructive symptoms from prostate.  Follow-up with palliative care for his ongoing bone pains.  I will rescan with bone scans and CT chest abdomen pelvis with contrast in March and see him back after that along with his labs and PSA.  Follow-up with pulmonary medicine regarding his dyspnea.  Hemoglobin rising.  Asked him to touch base with Dr. Kahn regarding his tachycardia with regular rhythm.    -3/4/2025 PSA less than 0.14.Glucose 126 BUN 6 total protein 5.7 otherwise unremarkable CMP and CBC.  - 3/5/2025 CT chest abdomen pelvis with contrast [place] [facility] compared to September 2024 shows no evidence of metastatic disease with stable sclerotic bone metastases.  Total body bone scan shows indeterminant bilateral rib foci favored to represent benign etiology otherwise no significant change with a single bone metastasis.    -3/11/2025 [facility] medical oncology follow-up: With PSA immeasurably low would not make much out of indeterminant rib abnormalities on bone scan and nothing else to suggest progression.  Tolerating Relugolix Zytiga prednisone.  Will see my nurse practitioner back towards the end of May with repeat labs and plan on repeat imaging in 6 months and PSA quarterly.Patient opted to do blood draw test for genetics which he will do on 4/14/2025.    Total time of care today inclusive of time spent today prior to patient's arrival reviewing interval data and images and reports thereof and during visit translating patient putting forth a plan as outlined above and after visit instituting this plan took 46 minutes patient care time throughout the day today.  Ishmael Rashid MD    03/11/2025

## 2025-03-11 NOTE — LETTER
March 13, 2025     Cielo Dodd PA-C  [address] [place] [address]    Patient: Naveen Lugo   YOB: 1955   Date of Visit: 3/11/2025     Dear Cielo Dodd PA-C:       Thank you for referring Naveen Lugo to me for evaluation. Below are the relevant portions of my assessment and plan of care.    If you have questions, please do not hesitate to call me. I look forward to following Naveen along with you.         Sincerely,        Ishmael Rashid MD        CC: MD Anna Mendes PA-C Lyle Christopher Myers, MD Oliver C. James II, MD Robert D Sawyer, MD Hicks, Ishmael SILVA MD  03/11/25 1453  Sign when Signing Visit  CHIEF COMPLAINT: Metastatic prostate cancer.  No symptoms of recurrence    Problem List:  Oncology/Hematology History Overview Note   1.  Stage IVb grade group 4 metastatic prostate cancer with sclerotic bone lesions on CT and bone scan and history of prostatic hypertrophy.  Good response to Taxotere ending 2/18/2021 Relugolix, followed by Zytiga prednisone with L4/sacrum, left acetabulum, and right pelvis external beam radiation August 2021.  Hypophosphatemia on Zometa.  Zometa held as of October 2021.  2.  Kidney stone  3.  Polyps  4.  Probable drug-induced hypophosphatemia from Zometa, drug stopped after 10/5/2021 dose  5.  Cancer related pain seeing palliative care  6.  Fatigue  7.  Covid 2/2022  8.  Iron deficiency anemia  -5/3/2023 EGD and colonoscopy with Dr. Alexander Gomez: Superficial erosions in the stomach with gastritis.  Small polyps removed, dilated internal hemorrhoids.  Pathology pending.  Recommend repeat colonoscopy in 5 years.  9.  CADz    -9/30/2020 office note from Dr. Ronen Reynaga indicates patient with PSA 10.8.  Underwent TRUS prostate biopsy 9/15/2020.  Adenocarcinoma the prostate [place] 4+4 = 8 in 1 out of 12 cores and 4+3 = 7 and 10 out of 12 cores and 3+4 = 7 in 1 out of 12 cores.  Perineural invasion.  Extraprostatic extension into the  fat seen on 2 biopsies of the right lateral mid and right apex.  9/24/2020 CT chest and whole-body bone scan showed T12, L1, left acetabular, right medial acetabular metastases with no lung metastases.  He started androgen deprivation therapy with Casodex with plans for Lupron to start in 1 to 2 weeks for clinical T3 N0 M1 metastatic prostate cancer.  Review of official report from [facility] 9/24/2020 bone scan mentions T12, L1, roof of left acetabulum and medial right acetabular osteoblastic metastases.  CT chest with contrast that same day showed mild splenomegaly 14.2 cm with stable periaortic nodes compared to CT December 2015 with no lung metastases.  Sclerotic abnormality within the T12 vertebral body, L1 vertebral body extending into the pedicle unchanged.  8/28/2020 CT abdomen pelvis report from [facility] reviewed and mentions normal spleen size.  Punctate nonobstructing kidney stone, no paulino enlargement, diffuse bladder wall thickening with mild perivesicular fat stranding, 2.1 cm sclerotic left iliac bone above the left acetabulum new compared to 2015 as well as 1.5 cm faintly sclerotic focus in the right posterior acetabulum stable compared to prior CT 2015 as well as a 1.6 cm T12 and inferior vertebral body sclerotic lesion and a 1.9 cm left posterior lateral L1 vertebral body abnormality extending to the pedicle.    -10/13/2020 initial [facility] medical oncology consultation: With 1 Sarika score 8, 4+4 lesion that would make him a grade group 4 with stage IVb disease given radiographic evidence of sclerotic bone metastasis on bone scan and CT.  Needs genetic testing given metastatic prostate cancer and this is also important as, were he to have mismatch repair mutation then we would have options for PDL 1 inhibitors and were he BRCA mutated would have options for PARP inhibitors in the future.  Systemic therapy for castration naïve prostate cancer or N1 disease should be with  apalutamide or Abiraterone or enzalutamide all of which are category 1.  He does not have any visceral metastases.  According to his imaging he has T12, L1, left acetabular, right acetabular, and left iliac bony involvement.  That means he would have 4 or more bone metastases with at least one metastasis (i.e. the acetabular lesions bilaterally) beyond the pelvis/vertebral, for which this would be considered high-volume disease for which 6 cycles of docetaxel 75 mg/m² x 6 cycles followed by Zytiga and prednisone would be reasonable along with Zometa every 6 weeks for bone stabilization.  He will do chemo preparation visit with my nurse practitioner prior to start chemotherapy with Taxotere and Zometa in 2 weeks and he is already received Casodex in preparation for Lupron shot he received on 10/12/2020 with Dr. Reynaga.  We will get him to Dr. Alexander Gomez who has done his polypectomies in the past for port placement and we will get genetic counseling started.  Following the 6 courses of Taxotere per the [place] trial criteria, I would then give him an indefinite Zytiga and prednisone and Zometa until progression or toxicity dictates.    10/29/2020 PSA 1.37    -12/16/2019 COVID-19 antigen test negative    -12/29/2020 PSA 0.275 with absolute neutrophil count 700 and creatinine 0.76 with normal liver enzymes and electrolytes.  Normal magnesium and phosphorus and urine drug screen positive for buprenorphine and opiates follow-up with palliative care.    -1/4/2021 CT chest abdomen pelvis with contrast and whole-body bone scan shows improving less metabolically active bone metastases.  Stable sclerotic T12, L1, and L2 vertebral bodies and bilateral pelvic bones on bone windows with stable subcentimeter para-aortic lymph nodes and no definite progression in the chest abdomen or pelvis.    -1/5/2021 [facility] medical oncology follow-up visit: I reviewed the images and reports thereof of the above CT and bone scan which  shows good response to Taxotere x3 courses with a PSA down to 0.275 from a baseline of 10.  Get another 3 courses of Taxotere, continue his Xgeva, and then following that we will switch to Zytiga and prednisone.  He is working with palliative care on his bone pain but on his current dose of fentanyl patch he says his pain is still not adequately controlled he will contact them.  Dexamethasone prescription was refilled.  We will see my nurse practitioner back in 3 weeks for course #5 of 6 total courses and then we will reimage with CT chest abdomen pelvis and bone scan before going to the Zytiga prednisone.    -3/4/2021 CT chest abdomen pelvis with contrast is negative save for stable sclerotic bone lesions and the bone scan shows near resolution of prior bony abnormalities associated with the known sclerotic lesions in the thoracolumbar spine and bony pelvis.    2/17/2021 PSA down to 0.1 from 1.37 October 2020.  3/9/2021 PSA 0.1    -5/26/2021 CT chest abdomen pelvis with contrast shows stable to slightly underlying increase low-density left para-aortic node.  Bone lesions stable.  Whole-body bone scan stable to decrease activity of known thoracolumbar and bony pelvic involvement.  PSA less than 0.1  , AST 74, bilirubin 0.5.       -6/1/2021 [facility] medical oncology follow-up visit: I reviewed the above data with him and images thereof.  Bones are stable.  No significant adenopathy.  PSA immeasurably low.     upper limit of normal 69 and AST 74 upper limit of normal 46.  Hence, neither is more than double the upper limit of normal with no ascites and no encephalopathy and normal albumin and bilirubin, despite the elevation of liver enzymes, he has child Abrams score A.  Package insert for Abiraterone says that for ALT and/or AST greater than 5 times upper limit of normal or total bilirubin greater than 3 times the upper limit of normal to withhold treatment until liver function tests returned to baseline  or ALT and AST less than or equal to 2.5 times the upper limit of normal and total bilirubin less than or equal to 1.5 times the upper limit of normal and then reinitiate at 750 mg daily dose of Zytiga.  For recurrent hepatotoxicity on 750 mg daily Zytiga, withhold treatment until liver functions returned to baseline or ALT and AST less than 3-2.5 times upper limit of normal and total bilirubin less than or equal to 1.5 times the upper limit of normal then reinitiate at 500 mg daily Zytiga dose.  If has recurrent hepatotoxicity on 500 mg dose, then discontinue treatment.  If has ALT greater than 3 times the upper limit of normal along with total bilirubin greater than 2 times the upper limit of normal in the absence of other contributing causes or biliary obstruction, permanently discontinue Zytiga.  Based on these recommendations, I would continue current doses but we will watch with serial labs monthly and repeat his imaging again in 3 months but clinically he is doing wonderfully with excellent response to Taxotere and now on Zytiga prednisone plus Zometa along with Relugolix.    -6/23/2020 for [facility] oncology clinic follow-up: Having persistent and worsening pain above his left hip.  I will get an MRI of the left hip for further evaluation.  Otherwise we will continue therapy unchanged with Zytiga, prednisone and Zometa along with Relugolix.    -7/28/2021 [facility] oncology clinic follow-up: Patient with multiple somatic complaints including episodes of confusion, dizziness, decreased memory, agitation.  He reports ongoing episodes with difficulty swallowing.  Also most concerning for episodes of angina and chest pain for which he basically refused to seek emergency medical attention.  He has a history of coronary artery disease and stent placement.  He has not followed up with cardiology for many years.  I will get an MRI of the brain in light of his confusion, dizziness and agitation.  I will get him to  "gastroenterology for EGD in light of his dysphagia.  I have also put in a referral for cardiology.  I reemphasized to the patient, his wife who is with him today and his son who was on the telephone during our visit the importance of seeking out prompt medical attention when he is having chest pain or neurological concerns so that he can be evaluated.  The patient states that he basically refuses to go to the emergency room and if his family calls an ambulance he would not agree to go to the hospital.  For the time being, I have asked him to hold his Zytiga and prednisone until we can sort this out as Zytiga does have some cardiovascular risk.  There is likely no treatment for prostate cancer that does not carry some form of cardiovascular risk.  His PSA remains low at 0.014 yesterday.  He will continue relugolix for now.    Of note, some of these symptoms could also be due to his hypophosphatemia, phosphate level from yesterday was 1.5, I have sent in a prescription for K-Phos 3 times a day before meals for 10 days.  We will hold further Zometa until this is corrected.  I have also put in an order to check his vitamin D level.  He takes calcium and vitamin D daily.  Some of his symptoms also could be due to drug withdrawal as there was some confusion and difficulty getting his last prescription for methadone therefore he was without methadone for several days, he now has his prescription and is back on his pain medication.  He has an appointment with neurosurgery tomorrow in light of his ongoing back pain, MRI of the hip recently showed multiple bony lesions largest at the L5 vertebral body and left supra-acetabular region.  If no neurosurgical intervention recommended he may benefit from spot radiation as this is where a lot of his pain is coming from.  His wife had questions today regarding his staging and extent of disease.  We reviewed previous scans.  I did clarify with her as she used the words \"cancer free\" " as she thought that is what she was told after his CT scans once he completed Taxotere in February.  I explained to her that even though his scans showed he had an excellent response with no clear areas of metastasis that did not mean he was cancer free, I explained to her that when you have metastatic cancer the treatment intent is palliative in nature and to control the disease but not to cure the disease.  She stated understanding.  We will see him back in 2 weeks for follow-up to sort through this and make further plan of care, again, I have asked him to hold Zytiga and prednisone until he sees us back, he will continue on his other medications including relugolix.    -7/30/2021 neurosurgery PA consultation.  MRI with and without contrast L5 ordered.    -8/3/2021 neurosurgery follow-up showed known sclerotic disease with new L5 abnormality without compression deformity or instability.  MRI brain negative for metastasis.    -8/4/2021 office visit Dr. Fco Quintanilla radiation oncology coordinating with Dr. North neurosurgery for palliative radiation for MRI evidence of L5 and left supra acetabular painful lesions.  States that the patient's disease which is not secreting PSA is experiencing some progression and would benefit from 20 Gy in five fractions and other lesion 30 Gy in ten fractions. Patient offered to go to [place] for radiation but desired treatment in [place].    -8/10/2021 [facility] medical oncology follow-up visit: No chest pains at this junction.  Reviewed MRI brain and other MRIs reviewed by Dr. North and [place].  Due for EGD on 19th.  We will repeat his phosphorus along with his other labs continue Relugolix, Zytiga, prednisone, and he will continue radiation palliatively to the painful bony lesions with Dr. Quintanilla/Can.  He will see my nurse practitioner back in a few weeks to see how he is tolerating this and to follow-up on the phosphorus off of Zometa and she will order repeat CT chest  abdomen pelvis with contrast and total body bone scan the end of September.I will see him back after that.    -9/8/2021 Zometa resumed.  PSA <0.10    -10/5/2021 [facility] medical oncology follow-up visit: followed-up with radiation oncology 9/21/2021.  Status post symptomatic L5 radiation 5 fractions 20 [place] completed 8/16/2021 with improvement in right hip pain.  9/2/2021 PSA less than 0.1.  9/29/2021 total body bone scan shows increased uptake left iliac bone slightly superior group of the left acetabulum with no additional foci of suspicious abnormality is unlikely treatment related with no new sites of active osseous metastasis.  CT chest abdomen pelvis with contrast shows stable borderline enlarged left periaortic nodes and dating sclerotic bone lesions.Continue Zytiga, prednisone, Relugolix, Zometa, repeat CT chest abdomen pelvis with contrast and total body bone scan the end of the year.  We will follow PSA serially.    -11/17/2021 [facility] Oncology clinic follow-up:  Mr. Lugo overall is doing well.  He is tolerating therapy with Zytiga, prednisone and Relugolix along with Zometa which is given every 6 weeks.  PSA remains low at <0.1.  Has occasional low phosphorus, currently low at 1.7.  Serum calcium is normal at 8.9, normal creatinine 0.79 and normal CBC other than for platelets of 124.  I will hold Zometa for 1 month, I did send in a prescription for phosphorus replacement today.  He takes calcium and vitamin D.  I will see him back in 1 month for follow-up.  We will repeat restaging scans after that visit.    -12/15/2021 [facility] Oncology clinic follow-up: Mr. Lugo continues to do well on therapy with Zytiga, prednisone and Relugolix, his PSA remains low at <0.1.  He is continuing to have left lower back pain, he is working with palliative care and they have referred him also to pain management.  Pain management has talked to him about putting in a pain pump however he is leery of this, I told him that this  was a safe and effective pain management technique and to certainly give consideration to their recommendations.  His phosphorus is improving however is still a little low, I will hold off on Zometa until we see him back in 1 month, we may end up only giving it every 12 weeks.  We will repeat restaging scans with CT chest, abdomen and pelvis along with total body bone scan prior to return and I have ordered those today.      -1/5/2022 CT chest abdomen pelvis with contrast [place] [facility] showing stable left periaortic adenopathy and unchanged sclerotic thoracic, lumbar spine and pelvis lesions with no new or progressive disease in the chest abdomen or pelvis.  Total body bone scan shows no significant change and no new suspicious foci.  Stable posterior right acetabulum and left superior acetabulum and degenerative changes in the cervical spine, shoulders, acromioclavicular joints, sternoclavicular joints, elbows, wrists, hands, thoracic spine, lumbosacral spine, sacroiliac joints, hips, knees, ankles, feet.    -1/11/2022 [facility] oncology hematology follow-up visit: I reviewed images and reports from his 1/5/2022 scans.  No active disease and PSA immmeasurably low on Zytiga, prednisone, Relugolix.  However, he has struggled with hypophosphatemia and is significantly fatigued with this.  Given that the bones are doing well and given that his phosphorus continues to remain consistently low at 2.2 in mid December, 1.7 mid November and 2.5 mid-September and as low as 1.5 back to July and the likelihood that this is related to his Zometa, given that his bones are stable overall, he will increase from 1200 mg up to 1600 mg of calcium and phosphate a day and we will hold his Zometa for the foreseeable future.  He will see my nurse practitioner back in a month to continue to monitor his phosphorus along with his calcium and other labs and will repeat his scans again in 3 months.  In addition, given his fatigue we  will check his ACTH and cortisol though he is taking his prednisone with his Zytiga.  Though his electrolytes are normal, I will keep an eye on the cortisol and ACTH and have these labs not only drawn today but again just prior to return to my nurse practitioner on February 10.    -2/10/2022 [facility] clinic follow-up: Mr. Lugo overall is stable, no change in his health this past month.  I have reviewed his labs from 2/8/2022 and they are unremarkable, his phosphorus is now normal at 3.2. We are continuing to hold his Zometa, he last received Zometa on 10/5/2021.  He continues therapy with Zytiga, prednisone and Relugolix.  Dr. Rashid had ordered cortisol level and ACTH which are low, currently his ACTH is 2.8 and cortisol 3.08 however these are both done in the face of ongoing prednisone that he takes with his Zytiga.  We will get him to endocrinology for further evaluation for possible adrenal insufficiency but will not interrupt his treatment in the interim.  He will need restaging scans again in April for a 3-month follow-up.  He will continue to follow with palliative care and pain management for his cancer related pain.  His PSA remains immeasurably low at <0.1 on 2/8/22.    -2/15/2022 went to emergency room with weakness, decreased appetite, myalgias in the setting of known COVID-19 testing positive a few days prior to this visit.  Phosphorus had been low in the past but was normal in the emergency room with unremarkable CBC and CMP.  Chest x-ray unremarkable saturating well on room air mildly hypertensive with dry mucosa.  Given 500 cc crystalloid.  Covid test positive.  Mild thrombocytopenia 136,000 and otherwise unremarkable.  Discharged home.    -3/3/2022 data:  PSA less than 0.1  CMP unremarkable  Cortisol 3.96 normal  ACTH 2.8 lower limit of normal 7.2  Phosphorus normal 2.6    -3/8/2022 [facility] oncology follow-up: Suspect big chunk of his fatigue was Covid.  Another part of the fatigue  likely related to lack of testosterone.  Not hypophosphatemic off of Zometa.  ACTH running low while on prednisone not surprising but with normal cortisol and I doubt adrenal insufficient which is why he is on prednisone to avoid such while taking Zytiga.  Overall doing fairly well and with immeasurably low PSA, I will have labs done on him early April, see my nurse practitioner early May, and she will order repeat bone scan and CTs the end of May and I will see him back in June.  We will continue to hold the Zometa unless bone lesions progress given symptomatic prior hypophosphatemia on Zometa.  He will keep his appointment April 28 with Dr. Mir Duffy just to be sure that my take on his pituitary/adrenal axis assessment is accurate.    -4/28/2022 endocrinology consult Dr. Mir Duffy.  With low ACTH and cortisol on prednisone with Zytiga, the adequacy of prednisone cannot be assessed by measuring ACTH or cortisol but is assessed by weight changes, blood pressure, blood sugar, potassium, overall sense of how the patient is doing.  Primary adrenal insufficiency with low ACTH and low cortisol would be typical for the situation as his blood sugar tends to run a little high and potassium a little low, he did not think increasing prednisone was necessary.  He put him on some potassium.  No follow-up scheduled, will see as needed.    -5/4/2022 [facility] clinic follow-up: Mr. Lugo continues on therapy with Zytiga and prednisone along with Relugolix.  His Zometa has been on hold due to previous hypophosphatemia.  There is some concern about potential adrenal insufficiency. He saw endocrinology, Dr. Mir Duffy on 4/28/2022.  I did review his office note and he stated that the low ACTH and low serum cortisol would be typical for Mr. Lugo's situation.  He further stated that the adequacy of prednisone dose cannot be assessed by measuring ACTH or cortisol but is rather assessed by the overall just altered how the patient  is feeling-weight change, blood pressure, blood sugar, potassium, overall sense of wellbeing.  His potassium had been running a little low therefore they put him on potassium 10 mill equivalents daily. Of note, I do not see a future follow-up scheduled with endocrinology.  He is having night sweats, I am not sure if this is related.  His glucose was normal this morning at 107.  His weight is stable.  He will continue current treatment for his prostate cancer unchanged, we will continue to hold his Zometa.  Current phosphorus and magnesium are normal.  CBC and CMP from today are unremarkable, cortisol is normal.  PSA and ACTH are pending  In regards to his night sweats, he had taken clonidine previously 0.1 mg twice daily as needed, I did send in a short supply again to try and see if this helps.  He also is having indigestion despite being on omeprazole twice daily, I sent a prescription for Pepcid.  He had an EGD August 2021.  We will repeat restaging scans prior to return and I have ordered those today.     -5/24/2022 CT chest abdomen pelvis with contrast[place] negative.  Bone scan shows stable bone metastases.    - 5/25/2022 PSA less than 0.1, platelets 130,000, otherwise unremarkable CBC and CMP.  A.m. cortisol running low 1.2 with phosphorus low 2.3.    -5/31/2022 [facility] oncology follow-up: On Zytiga, prednisone, Relugolix, PSA remaining immeasurably low with stable bone scan and no visceral/paulino metastases.  Phosphorus still running low.  I have discontinued his potassium chloride and started potassium phosphate 500 mg 4 times a day.  Unless PSA rising, we will plan on repeating CTs and bone scan in 6 months and will follow with my nurse practitioner in the interim and if symptoms dictate or labs, will get back to Dr. Duffy for management of potential adrenal insufficiency but presently we will just see how he does with potassium phosphate and hopeful improvement in the phosphorus level with time.   We will continue to follow with palliative care for pain management    -7/6/2022 [facility] Oncology clinic follow-up: Mr. Lugo overall is doing well but continues to be troubled with fatigue and hot flashes.  For the most part he states he is able to do the things he wants to do, he continues to work but does have to take more rest periods.  I had a long discussion with him and his wife after reviewing his medications with them as they wanted to see if there was anything he could stop or adjust.  I discussed with him the need to continue on treatment for his prostate cancer even though his PSA remains immeasurably low and his scans look good but that if his medication is stopped the cancer would progress and over time it still has the potential to progress on therapy but would continue it as long as possible to keep his disease under control.  I explained this is like treating someone with hypertension, you do not stop the antihypertensive just because the blood pressure normalizes.  They stated understanding.  There is no dose adjustment of Zytiga or prednisone that would minimize his symptoms, he is on the current standard recommended therapy.  Discussed with him that sometimes during times of stress we may need to increase his prednisone dose but for now would not change anything.  His phosphorus level is normal, we continue to hold his Zometa.  I did give him the option of holding his phosphorus for the next month and we will see what happens, if it drops we will start him back on it but may be at a lower dose rather than 4 times a day maybe twice a day.  For now he will continue therapy unchanged.  We will continue monitoring labs monthly.  No PSA was drawn this month but that is okay as his PSA has been running immeasurably low at <0.1, we will recheck next month with lab draw.  We will stop checking his ACTH and cortisol level every month, if he develops worsening symptoms I will repeat those.  He has not  gotten a follow-up with endocrinology as of yet.    -8/3/2022 [facility] Oncology clinic follow-up:  Mr. Lugo is doing well as far as his prostate cancer goes with continued immeasurably low PSA of <0.1.  He continues on therapy with Zytiga and prednisone along with Relugolix.  His phosphorus is stable at 2.7 which was just slightly low by our reference range however was 2.71-month ago also which was normal on another labs reference range.  He has not taken his phosphorus for this past month as he thought it was making him more fatigued.  He really can tell no difference whether he was taking it or not.  I have asked him to try to go back on phosphorus twice a day rather than 4 times a day and see if this is tolerated and if we can keep his phosphorus level from dropping.  His biggest complaint today is flareup of his arthralgias and back pain.  He is following with pain management, Dr. Danny Avalos but is requesting a referral back to palliative medicine as he feels that no one is currently listening to him and they are just prescribing the same medications that are not effective according to his report.  He does have a follow-up with Dr. Avalos on 8/12/2022 but due to his current pain is not able to work until after he is seen and hopefully can get some better relief.  I have given him a work release until 15 August.  I have also put in a referral back to palliative care.  Also encouraged him to work with Dr. Avalos for help in resolving his issue.  Apparently they have recommended some type of a pump however he has been hesitant to that but likely would be helpful.  We will continue therapy unchanged.  We will continue monthly labs including phosphorus, we are not currently checking ACTH and cortisol monthly as Dr. Mir Duffy previously stated in his assessment on 4/28/2022 that the adequacy of prednisone cannot be assessed by measuring ACTH or cortisol but would be assessed by symptoms, see note from 4/28/2022.  He  made no further follow-up appointments.  Fatigue is not worsening currently.  His glucose and potassium are  Normal.    -10/13/2022 [facility] medical oncology follow-up: Mr. Lugo is doing well.  He is tolerating treatment with Zytiga and prednisone along with Relugolix without any unusual side effects.  His PSA has remained immeasurably low at <0.1.  His phosphorus was slightly low at last visit.  He had been instructed to go back on phosphorus twice a day but he misunderstood and has not been taking it.  We will check labs today.  I will follow-up with results and notify him if he needs to restart the phosphorus.  His pain is better controlled since reestablishing with [facility].  We will repeat scans prior to next follow-up.  I have ordered CT chest abdomen pelvis along with bone scan.  We will see him back in 1 month.    -10/13/2022 PSA less than 0.014.With unremarkable CBC and CMP.  Phosphorus still a little low 2.3.    -11/4/2022 CT abdomen pelvis chest showed no evidence of disease progression with stable sclerotic bone lesions.  Bone scan stable right greater than left acetabular metastasis.  Stable bone metastases.    -11/8/2022 [facility] oncology telemedicine follow-up: Patient states that he feels achy all over with low-grade temps and a cough mildly productive.  Has an appointment later today to see his primary care for testing for flu and COVID.  Suggested that if he were positive for COVID that he get Paxlovid and that if he were positive for flu that he get Tamiflu and to discuss this with his primary care.  From the prostate cancer side, his PSA is immeasurably low with stable bone metastases and no evidence of progression.  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  We will continue the Relugolix, Zytiga, prednisone and he will see my nurse practitioner 12/7/2022.  Would repeat CTs and bone scan in May.    -1/25/2023 [facility] Oncology  medicine follow-up: Mr. Lugo is having worsening fatigue and muscle aches.  He does have adrenal insufficiency on Zytiga and prednisone for his prostate cancer, he saw endocrinology in light of low ACTH back in April 2022.  At that time there was no recommendation other than for monitoring him for his overall wellbeing and labs.  He denies any fevers or chills.  His labs as shown above are unremarkable, his CBC and CMP are normal other than for glucose of 112, PSA remains low at <0.1.  He has no new pain or any symptoms concerning for progression of his prostate cancer.  I will get him back to endocrinology to see if they feel any dose adjustment of his prednisone would be helpful in his symptoms.  Currently he is on 5 mg a day with his Zytiga.  I did not repeat his ACTH or cortisol as they would be expected to be low in this situation.  His potassium is normal, he has had no weight loss or change in his appetite.  Blood sugar is reasonable.  His only complaint currently is worsening fatigue and muscle aches.  He will continue treatment unchanged with Zytiga, prednisone and relugolix.  I will see him back in 1 month for follow-up.  We will repeat his restaging scans in May.    -3/1/2023 [facility] Oncology clinic follow-up:  His PSA remains low at <0.1 on treatment with Zytiga and prednisone along with relugolix however he continues to complain of significant fatigue and muscle aches not improving with time.  He states that his quality of life is affected as he is not able to do any work activities due to the fatigue.  He did see endocrinology again and they have increased his prednisone from 5 mg daily up to 7.5 mg daily however so far this has not made any difference in how he feels.  He is supposed to get back in touch with them to let them know how he is doing and to see if there is any other adjustments needed. His labs are unremarkable with normal thyroid function, CMP is normal other than for glucose of 156.   They did check hemoglobin A1c to look for diabetes however that was normal at 5.7.  CMP is unremarkable with just very mild anemia with hemoglobin of 12 and hematocrit 36.1% which would not account for his fatigue.  He does have hot flashes that are fairly significant, I will try venlafaxine as suggested by Dr. Duffy and I appreciate the recommendation.  We will start at 37.5 mg daily and if tolerated he can increase to 75 mg after 1-2 weeks.  I will see him back in 1 months for follow-up.  Plan to repeat scans late April/early May.  His prostate cancer seems under good control with current regimen however I am not sure how long he can tolerate this due to the side effects.    -3/30/2023 [facility] Oncology clinic follow-up:  Mr. Lugo continues to deal with fatigue.  He appears more cushingoid today, he continues on treatment with Zytiga, prednisone and relugolix.  Prednisone dose currently is 7.5 mg daily.  This has not made any difference in his energy level.  PSA remains immeasurably low at <0.014.  CBC shows new onset of microcytic hypochromic anemia with hemoglobin of 11, hematocrit 34.4%, MCV 75.4, MCH 24.1, WBC is normal at 5.81 with normal platelet count 181,000 and normal differential.  CMP is unremarkable, it is normal other than for glucose of 142.  We will get him to Dr. Gomez for an EGD and colonoscopy for further evaluation in regards to his new onset of anemia.  He has no associated GI symptoms otherwise except for 1 day a few weeks ago he does report that he had fairly sudden onset of vomiting but this was an isolated incident on that day and has not reoccurred since.  Continues to follow with palliative care for management of his chronic back pain.  We will repeat restaging CT chest, abdomen and pelvis along with total body bone scan prior to return and I have ordered that today.    -3/30/2023 Ferritin low 12.3 with iron low 27 and saturation low 5% with total iron binding capacity 511.  3/31/2023  ferrous sulfate 325 mg twice a day every other day with vitamin C started.    - 4/12/2023 CT chest abdomen pelvis with contrast showed no progressive disease with stable sclerotic bone lesions.  Bone scan showed single identifiable bone metastasis stable right acetabulum    -4/18/2023 [facility] hematology oncology follow-up: He now has microcytic iron deficiency anemia which is new and concerning.  Gastroenterologic evaluation has been ordered but not performed and is mandatory.  Continue ferrous sulfate 325 mg twice a day every other day with vitamin C to improve absorption and we will follow his CBC along with his usual labs monthly on Zytiga, Relugolix, prednisone 7.5 mg.  He follows with Dr. Duffy with adrenal insufficiency.  He will see my nurse practitioner back in a month to see what GI has found and to watch serial CBC along with his other labs including PSA.  I would repeat CT chest abdomen pelvis again in 6 months with contrast along with bone scan and sooner if PSA rises.    -5/3/2023 EGD and colonoscopy with Dr. Alexander Gomez: Superficial erosions in the stomach with gastritis.  Small polyps removed, dilated internal hemorrhoids.  Pathology pending.  Recommend repeat colonoscopy in 5 years.    -5/17/2023 [facility] Oncology clinic follow-up:  Naveen overall is doing well on current therapy with Zytiga, prednisone and relugolix.  PSA remains immeasurably low at <0.014.  He feels his hip pain is getting worse with time.  It makes it difficult to enjoy activities with his grandchildren as it is worse when he stands for any length of time or tries to carry any weight.  I reviewed his bone scan from April which was stable, we also reviewed previous MRI of the hips from May 2021 that showed moderate bilateral hip osteoarthritis.  I offered to repeat MRI of his hips for evaluation to see if there has been any worsening of his osteoarthritis and also then referral to orthopedics for any recommended intervention however  at this time he defers.  He will let me know if he changes his mind.  He also follows with palliative care for management of his cancer related pain.  We will continue therapy unchanged.  We will repeat restaging scans in October unless symptoms dictate the need to do so sooner.  He was recently found to be iron deficient, he had an EGD and colonoscopy on 5/3/2023 with Dr. Gomez, EGD showed superficial erosions in the stomach with gastritis, he continues on omeprazole.  He had small polyps removed from the colon and recommendation to repeat colonoscopy in 5 years.  He is on oral iron along with vitamin C every other day and is tolerating that well.  CBC from yesterday shows hemoglobin now normal at 14.2 and hematocrit 43.2%, MCV normal at 27.0.    -7/20/2023 [facility] Oncology clinic follow-up: Naveen is doing well on current therapy with Zytiga, prednisone and relugolix.  PSA in June remains low at <0.1.  Continues to deal with chronic pain in his back and hip.  Recently saw Dr. Nikolai Marks and had an injection in his hip and is hoping that it will eventually help with his pain.  He has no new pain.  We will continue current therapy unchanged.  We will repeat labs while he is here today.  I will refill his iron as his pharmacy has changed. Most recent CBC in June with hemoglobin of 14.7 and hematocrit 43.6%.  We will repeat restaging scans in October.    -9/14/2023 [facility] Oncology clinic follow-up: Naveen is tolerating his prostate cancer treatment with Zytiga, prednisone and relugolix.  PSA remains immeasurably low at <0.014 on 8/17/2023.  Currently he is having more GI issues with nausea and intermittent vomiting that occurs often without warning.  He had an EGD and colonoscopy with Dr. Gomez on 5/3/2023, EGD showed superficial erosions in the stomach with gastritis.  He does take omeprazole 40 mg twice daily.  He saw his PCP yesterday for these symptoms and has been referred to gastroenterologist at [facility] and  he is awaiting to hear about that appointment.  Hopefully he can get in fairly quickly.  I told him that he should let us know if it is going to be a while before he can be seen and I will get him back to Dr. Gomez for consideration of repeat EGD.  We will get his CBC, CMP and PSA today.  His previous noted anemia had normalized, he currently is not taking oral iron as he ran out.  I will wait and see what his labs show today and likely hold off for now depending on his lab results until his GI issues can be resolved.  We will repeat his restaging CT chest, abdomen and pelvis along with bone scan in the next few weeks and I have ordered those today.  He is taking Zofran as needed for nausea, he is also on meclizine for vertigo.  He is going to follow-up with his PCP in a few weeks regarding his GI symptoms.    -9/14/2023 PSA less than 0.014 with unremarkable CBC and CMP.    -9/28/2023 total body bone scan stable with uptake in right acetabulum, no new areas.  CT chest, abdomen and pelvis show no evidence of disease progression.  -11/9/2023 [facility] clinic follow-up: CT reports were reviewed by Dr. Rashid last month and the patient reports he was called by Dr. Rashid as he could not make his appointment due to concerns over upper respiratory infection, CT scan showed no evidence of disease progression.  PSA has remained low, we are repeating that today along with his CBC and CMP.  He continues to have sinus drainage and congestion, I will send in 7 additional days on his doxycycline that he has been taking, he will follow-up with PCP if no improvement.  He continues to complain of bilateral hip pain, has a follow-up with Dr. Marks next week.  We will continue therapy unchanged with Zytiga, prednisone and relugolix.  Would repeat restaging bone scan and CT scans late March for a 6-month follow-up.    -1/4/24 PSA 1    - 3/28/2024 CT chest abdomen pelvis with contrast compared to May 2013 shows decreasing  sclerotic bony metastases.  Bone scan shows similar relative intensity of 1 focal uptake right acetabulum correlating with CT with no new sites of uptake    -4/2/2024 [facility] medical oncology follow-up: Continued good response to Zytiga prednisone relugolix which he is tolerating.  We will check PSA and labs 6/25/2024 with follow-up with my nurse practitioner and again 3 months after that along with CTs and bone scan 6 months from now.  Not on bisphosphonates or rank ligand inhibitor due to drug-induced hypophosphatemia.  Will ask my nurse practitioner to order bone densitometry with his other scans in 6 months. He is having difficulty emptying his bladder and his prior urologist does not take his insurance so we will make appropriate referrals.    -6/26/2024 [facility] Oncology clinic follow-up: Overall continues to tolerate therapy with Zytiga, prednisone and Relugolix.  We will get labs today including PSA and as long as that is stable he will continue treatment unchanged with plans to repeat restaging CT scans and bone scans in September and I have ordered those today.  We will also obtain DEXA scan at that time to monitor for osteoporosis on long-term use of androgen deprivation therapy.  We have been holding his Zometa due to drug-induced hypophosphatemia.  He is following with urology and has had relief of his urinary hesitancy with some medication adjustments and he is scheduled for follow-up this Friday with cystoscopy.  He recently had chest pain and shortness of breath while mowing his lawn, he did have an echocardiogram and stress test yesterday, awaiting final results and reading from Dr. Kahn.  He continues to follow with palliative care for management of his pain which is under good control.  I did refill his symptoms today for chronic constipation from opioid use.    -9/16/2024 CT chest abdomen pelvis with contrast [facility] compared to May 2024 shows stable 1.6 cm well-defined nodule left  periaortic region.  Stable sclerotic left supra-acetabular, right posterior acetabular left posterior acetabular lesions.  No disease progression.  Total body bone scan shows grossly stable solitary active right posterior acetabular bone metastasis.    -10/2/24 PSA less than 0.1    -10/8/2024 [facility] oncology clinic follow-up: In order to get prostatic urethral pathway surgically open he needed cardiac clearance and is due for cardiac cath in the next week or 2 with Dr. Kahn.  I reviewed the above image results with him with no clear-cut evidence of progression and PSA remaining immeasurably low.  He does feel quite fatigued.But this is not new.  He has become microcytic with a hemoglobin of 12.1 and MCV of 77 which has trickled down 14.8 and December.  His CMP is unremarkable.  He had an EGD and colonoscopy May of last year with Dr. Gomez that showed superficial erosive gastritis with internal hemorrhoids and colon polyps.  He states he has been taking his iron but he takes it with all his other medicines.  I told him to take it every other day with vitamin C with at least an hour and a half window on either side of it free from any other ingestion of medicines.  With reference to the pulmonary infiltrate he has had a frothy nagging mildly productive cough without fevers or chills or dyspnea for the last 2 months.  Rather than throwing an antibiotic at this, I am going to get pulmonary referral for their opinion.He is due for his bone density as well and we will order that.  He has been off of Zometa for quite some time due to hypophosphatemia  Addendum: He has never had genetic testing.  While that would not alter our plans in the near term, I will plan on genetic testing for his family sake as well as for potential treatable molecular targets in the future should treatment algorithms dictate.  I called and spoke with his wife to inform her of this plan.    - 10/29/2024 cardiac cath Dr. Kahn showing vascular  disease for which she will manage medically with rosuvastatin and aspirin and antianginal therapy.    -11/6/2024 buccal mucosal genetic sampling given to patient for collection.    - 11/12/2024 pulmonology evaluation Makenzie Ramirez.  Status post multiple rounds of antibiotics on 5 mg prednisone at that junction.  Changed him to Breztri from [facility] to see if budesonide would help with congestion and adding DuoNebs along with montelukast.  Continues to smoke and we will follow-up with him in 4 weeks.    -12/4/2024 PSA immeasurably low less than 0.014.  Hemoglobin 14.4 and rising.  CBC unremarkable.  Glucose 115 otherwise unremarkable CMP.    -12/10/2024 [facility] medical oncology follow-up: Prostate cancer under control on Relugolix Zytiga prednisone.  Continue to follow with urologist for obstructive symptoms from prostate.  Follow-up with palliative care for his ongoing bone pains.  I will rescan with bone scans and CT chest abdomen pelvis with contrast in March and see him back after that along with his labs and PSA.  Follow-up with pulmonary medicine regarding his dyspnea.  Hemoglobin rising.  Asked him to touch base with Dr. Kahn regarding his tachycardia with regular rhythm.    -3/4/2025 PSA less than 0.14.Glucose 126 BUN 6 total protein 5.7 otherwise unremarkable CMP and CBC.  - 3/5/2025 CT chest abdomen pelvis with contrast [place] [facility] compared to September 2024 shows no evidence of metastatic disease with stable sclerotic bone metastases.  Total body bone scan shows indeterminant bilateral rib foci favored to represent benign etiology otherwise no significant change with a single bone metastasis.    -3/11/2025 [facility] medical oncology follow-up: With PSA immeasurably low would not make much out of indeterminant rib abnormalities on bone scan and nothing else to suggest progression.  Tolerating Relugolix Zytiga prednisone.  Will see my nurse practitioner back towards the end of May with repeat labs and  plan on repeat imaging in 6 months and PSA quarterly     Prostate cancer metastatic to bone   8/30/2020 -  Other Event    PSA 10.8     9/15/2020 Initial Diagnosis    Prostate cancer metastatic to bone (CMS/HCC)     9/15/2020 Biopsy    Prostate needle core biopsy     9/24/2020 Imaging    Total body bone scan: 4 abnormal areas of increased activity consistent with osteoblastic metastasis, abnormal foci of activity seen in the T12 vertebral body, L1 vertebral body, roof of the left acetabulum, and acetabulum medially on the right.  CT chest: Stable sclerotic metastatic lesions within the T12 and L1 vertebrae.  No other evidence of metastatic disease to the chest.  Stable mild splenomegaly and subcentimeter periaortic retroperitoneal lymph nodes.  CT abdomen and pelvis: Diffuse bladder wall thickening with mild perivesicular fat stranding.  Interval development of several sclerotic lesions in the spine and left iliac bone.     10/13/2020 Cancer Staged    Staging form: Bone - Appendicular Skeleton, Trunk, Skull, And Facial Bones, AJCC 8th Edition  - Clinical: Stage IVB (cT3, cN0, cM1b, G3) - Signed by Ishmael Rashid MD on 10/13/2020     11/3/2020 - 10/5/2021 Chemotherapy    OP SUPPORTIVE Zoledronic Acid Q42d     11/3/2020 - 2/18/2021 Chemotherapy    OP PROSTATE DOCEtaxel       1/4/2021 Imaging    CT chest abdomen pelvis with contrast and whole-body bone scan shows improving less metabolically active bone metastases.  Stable sclerotic T12, L1, and L2 vertebral bodies and bilateral pelvic bones on bone windows with stable subcentimeter para-aortic lymph nodes and no definite progression in the chest abdomen or pelvis.  PSA down to 0.275 after 3 courses of Taxotere.     3/4/2021 Imaging    CT chest, abdomen and pelvis stable, no evidence of disease progression. Total body bone scan with decreased/near resolution of abnormal increased radiotracer uptake associated with the known sclerotic lesions in the thoracolumbar spine  and bony pelvis.  No evidence of new osteoblastic metastases.     3/4/2021 Imaging    CT chest abdomen pelvis with contrast is negative save for stable sclerotic bone lesions and the bone scan shows near resolution of prior bony abnormalities associated with the known sclerotic lesions in the thoracolumbar spine and bony pelvis.  2/17/2021 PSA down to 0.1 from 1.37 October 2020.     3/9/2021 - 3/9/2021 Chemotherapy    OP SUPPORTIVE PROSTATE Relugolix     3/9/2021 -  Chemotherapy    OP PROSTATE Abiraterone / PredniSONE       3/10/2021 -  Chemotherapy    OP PROSTATE Abiraterone / PredniSONE     8/10/2021 - 8/16/2021 Radiation    Radiation OncologyTreatment Course:  Naveen Lugo received 2000 cGy in 5 fractions to L4-sacrum, left acetabulum and right pelvis via External Beam Radiation - EBRT.     11/16/2021 -  Chemotherapy    OP CENTRAL VENOUS ACCESS DEVICE ACCESS, CARE, AND MAINTENANCE (CVAD)     1/5/2022 Imaging    -1/5/2022 CT chest abdomen pelvis with contrast [facility] showing stable left periaortic adenopathy and unchanged sclerotic thoracic, lumbar spine and pelvis lesions with no new or progressive disease in the chest abdomen or pelvis.  Total body bone scan shows no significant change and no new suspicious foci.  Stable posterior right acetabulum and left superior acetabulum and degenerative changes in the cervical spine, shoulders, acromioclavicular joints, sternoclavicular joints, elbows, wrists, hands, thoracic spine, lumbosacral spine, sacroiliac joints, hips, knees, ankles, feet.     5/24/2022 Imaging    CT chest abdomen pelvis with contrast[place] negative.  Bone scan shows stable bone metastases.     11/4/2022 Imaging    CT abdomen pelvis chest showed no evidence of disease progression with stable sclerotic bone lesions.  Bone scan stable right greater than left acetabular metastasis.  Stable bone metastases.     4/12/2023 Imaging     CT chest abdomen pelvis with contrast showed no  "progressive disease with stable sclerotic bone lesions.  Bone scan showed single identifiable bone metastasis stable right acetabulum     9/29/2023 Imaging    total body bone scan compared to April showed a single active bone metastasis with multiple and active bone metastases overall stable.  CT chest abdomen and pelvis compared to May and April shows no new bony progression with stable sclerotic bone lesions.         HISTORY OF PRESENT ILLNESS:  The patient is a 69 y.o. male, here for follow up on management of metastatic prostate cancer.  No symptoms of recurrence    Past Medical History:   Diagnosis Date   • Arthritis    • Bone cancer    • Coronary artery disease    • Elevated cholesterol    • History of radiation therapy 08/16/2021    L4 through sacrum, left acetabulum, right pelvis   • Nephrolithiasis    • Opioid use disorder, mild, on maintenance therapy (HCC)    • Prostate cancer      Past Surgical History:   Procedure Laterality Date   • CARDIAC CATHETERIZATION N/A 10/29/2024    Procedure: Left Heart Cath;  Surgeon: Joni Kahn MD;  Location: [facility] CATH INVASIVE LOCATION;  Service: Cardiovascular;  Laterality: N/A;   • CHOLECYSTECTOMY     • COLONOSCOPY     • CORONARY STENT PLACEMENT  2006 & 2011   • HERNIA REPAIR  1986   • THORACIC DISCECTOMY  1994       Allergies   Allergen Reactions   • Cymbalta [Duloxetine Hcl] Hives and Dizziness   • Duloxetine Hives and Dizziness   • Gabapentin Nausea Only   • Pregabalin Dizziness, Nausea And Vomiting and Hives       Family History and Social History reviewed and changed as necessary    REVIEW OF SYSTEM:   No new somatic complaints    PHYSICAL EXAM:  Modest icterus or pallor.  No respiratory distress.    Vitals:    03/11/25 1445   BP: 134/81   Pulse: 95   Resp: 18   Temp: 97.4 °F (36.3 °C)   SpO2: 95%   Weight: 86.6 kg (191 lb)   Height: 175.3 cm (69\")     Vitals:    03/11/25 1445   PainSc: 8    PainLoc: Generalized  Comment: left side rib to the knee      "     ECOG score: 0           Vitals reviewed.    ECOG: (0) Fully Active - Able to Carry On All Pre-disease Performance Without Restriction    Lab Results   Component Value Date    HGB 15.9 03/04/2025    HCT 46.8 03/04/2025    MCV 84.2 03/04/2025     03/04/2025    WBC 7.81 03/04/2025    NEUTROABS 5.09 03/04/2025    LYMPHSABS 2.09 03/04/2025    MONOSABS 0.49 03/04/2025    EOSABS 0.06 03/04/2025    BASOSABS 0.05 03/04/2025       Lab Results   Component Value Date    GLUCOSE 126 (H) 03/04/2025    BUN 6 (L) 03/04/2025    CREATININE 0.81 03/04/2025     03/04/2025    K 3.5 03/04/2025     03/04/2025    CO2 26.0 03/04/2025    CALCIUM 9.0 03/04/2025    PROTEINTOT 5.7 (L) 03/04/2025    ALBUMIN 3.7 03/04/2025    BILITOT 0.3 03/04/2025    ALKPHOS 85 03/04/2025    AST 17 03/04/2025    ALT 10 03/04/2025             ASSESSMENT & PLAN:  1.  Stage IVb grade group 4 metastatic prostate cancer with sclerotic bone lesions on CT and bone scan and history of prostatic hypertrophy.  Good response to Taxotere ending 2/18/2021 Relugolix, followed by Zytiga prednisone with L4/sacrum, left acetabulum, and right pelvis external beam radiation August 2021.  Hypophosphatemia on Zometa.  Zometa held as of October 2021.  2.  Kidney stone  3.  Polyps  4.  Probable drug-induced hypophosphatemia from Zometa, drug stopped after 10/5/2021 dose  5.  Cancer related pain seeing palliative care  6.  Fatigue  7.  Covid 2/2022  8.  Iron deficiency anemia  -5/3/2023 EGD and colonoscopy with Dr. Alexander Gomez: Superficial erosions in the stomach with gastritis.  Small polyps removed, dilated internal hemorrhoids.  Pathology pending.  Recommend repeat colonoscopy in 5 years.  9.  CADz    -9/30/2020 office note from Dr. Ronen Reynaga indicates patient with PSA 10.8.  Underwent TRUS prostate biopsy 9/15/2020.  Adenocarcinoma the prostate Sarika 4+4 = 8 in 1 out of 12 cores and 4+3 = 7 and 10 out of 12 cores and 3+4 = 7 in 1 out of 12 cores.   Perineural invasion.  Extraprostatic extension into the fat seen on 2 biopsies of the right lateral mid and right apex.  9/24/2020 CT chest and whole-body bone scan showed T12, L1, left acetabular, right medial acetabular metastases with no lung metastases.  He started androgen deprivation therapy with Casodex with plans for Lupron to start in 1 to 2 weeks for clinical T3 N0 M1 metastatic prostate cancer.  Review of official report from [facility] 9/24/2020 bone scan mentions T12, L1, roof of left acetabulum and medial right acetabular osteoblastic metastases.  CT chest with contrast that same day showed mild splenomegaly 14.2 cm with stable periaortic nodes compared to CT December 2015 with no lung metastases.  Sclerotic abnormality within the T12 vertebral body, L1 vertebral body extending into the pedicle unchanged.  8/28/2020 CT abdomen pelvis report from [facility] reviewed and mentions normal spleen size.  Punctate nonobstructing kidney stone, no paulino enlargement, diffuse bladder wall thickening with mild perivesicular fat stranding, 2.1 cm sclerotic left iliac bone above the left acetabulum new compared to 2015 as well as 1.5 cm faintly sclerotic focus in the right posterior acetabulum stable compared to prior CT 2015 as well as a 1.6 cm T12 and inferior vertebral body sclerotic lesion and a 1.9 cm left posterior lateral L1 vertebral body abnormality extending to the pedicle.    -10/13/2020 initial [facility] oncology consultation: With 1 Sarika score 8, 4+4 lesion that would make him a grade group 4 with stage IVb disease given radiographic evidence of sclerotic bone metastasis on bone scan and CT.  Needs genetic testing given metastatic prostate cancer and this is also important as, were he to have mismatch repair mutation then we would have options for PDL 1 inhibitors and were he BRCA mutated would have options for PARP inhibitors in the future.  Systemic therapy for castration  naïve prostate cancer or N1 disease should be with apalutamide or Abiraterone or enzalutamide all of which are category 1.  He does not have any visceral metastases.  According to his imaging he has T12, L1, left acetabular, right acetabular, and left iliac bony involvement.  That means he would have 4 or more bone metastases with at least one metastasis (i.e. the acetabular lesions bilaterally) beyond the pelvis/vertebral, for which this would be considered high-volume disease for which 6 cycles of docetaxel 75 mg/m² x 6 cycles followed by Zytiga and prednisone would be reasonable along with Zometa every 6 weeks for bone stabilization.  He will do chemo preparation visit with my nurse practitioner prior to start chemotherapy with Taxotere and Zometa in 2 weeks and he is already received Casodex in preparation for Lupron shot he received on 10/12/2020 with Dr. Reynaga.  We will get him to Dr. Alexander Gomez who has done his polypectomies in the past for port placement and we will get genetic counseling started.  Following the 6 courses of Taxotere per the [place] trial criteria, I would then give him an indefinite Zytiga and prednisone and Zometa until progression or toxicity dictates.    10/29/2020 PSA 1.37    -12/16/2019 COVID-19 antigen test negative    -12/29/2020 PSA 0.275 with absolute neutrophil count 700 and creatinine 0.76 with normal liver enzymes and electrolytes.  Normal magnesium and phosphorus and urine drug screen positive for buprenorphine and opiates follow-up with palliative care.    -1/4/2021 CT chest abdomen pelvis with contrast and whole-body bone scan shows improving less metabolically active bone metastases.  Stable sclerotic T12, L1, and L2 vertebral bodies and bilateral pelvic bones on bone windows with stable subcentimeter para-aortic lymph nodes and no definite progression in the chest abdomen or pelvis.    -1/5/2021 [facility] follow-up visit: I reviewed the images and  reports thereof of the above CT and bone scan which shows good response to Taxotere x3 courses with a PSA down to 0.275 from a baseline of 10.  Get another 3 courses of Taxotere, continue his Xgeva, and then following that we will switch to Zytiga and prednisone.  He is working with palliative care on his bone pain but on his current dose of fentanyl patch he says his pain is still not adequately controlled he will contact them.  Dexamethasone prescription was refilled.  We will see my nurse practitioner back in 3 weeks for course #5 of 6 total courses and then we will reimage with CT chest abdomen pelvis and bone scan before going to the Zytiga prednisone.    -3/4/2021 CT chest abdomen pelvis with contrast is negative save for stable sclerotic bone lesions and the bone scan shows near resolution of prior bony abnormalities associated with the known sclerotic lesions in the thoracolumbar spine and bony pelvis.    2/17/2021 PSA down to 0.1 from 1.37 October 2020.  3/9/2021 PSA 0.1    -5/26/2021 CT chest abdomen pelvis with contrast shows stable to slightly underlying increase low-density left para-aortic node.  Bone lesions stable.  Whole-body bone scan stable to decrease activity of known thoracolumbar and bony pelvic involvement.  PSA less than 0.1  , AST 74, bilirubin 0.5.       -6/1/2021 [facility] medical oncology follow-up visit: I reviewed the above data with him and images thereof.  Bones are stable.  No significant adenopathy.  PSA immeasurably low.     upper limit of normal 69 and AST 74 upper limit of normal 46.  Hence, neither is more than double the upper limit of normal with no ascites and no encephalopathy and normal albumin and bilirubin, despite the elevation of liver enzymes, he has child Abrams score A.  Package insert for Abiraterone says that for ALT and/or AST greater than 5 times upper limit of normal or total bilirubin greater than 3 times the upper limit of normal to withhold  treatment until liver function tests returned to baseline or ALT and AST less than or equal to 2.5 times the upper limit of normal and total bilirubin less than or equal to 1.5 times the upper limit of normal and then reinitiate at 750 mg daily dose of Zytiga.  For recurrent hepatotoxicity on 750 mg daily Zytiga, withhold treatment until liver functions returned to baseline or ALT and AST less than 3-2.5 times upper limit of normal and total bilirubin less than or equal to 1.5 times the upper limit of normal then reinitiate at 500 mg daily Zytiga dose.  If has recurrent hepatotoxicity on 500 mg dose, then discontinue treatment.  If has ALT greater than 3 times the upper limit of normal along with total bilirubin greater than 2 times the upper limit of normal in the absence of other contributing causes or biliary obstruction, permanently discontinue Zytiga.  Based on these recommendations, I would continue current doses but we will watch with serial labs monthly and repeat his imaging again in 3 months but clinically he is doing wonderfully with excellent response to Taxotere and now on Zytiga prednisone plus Zometa along with Relugolix.    -6/23/2020 for [facility] oncology clinic follow-up: Having persistent and worsening pain above his left hip.  I will get an MRI of the left hip for further evaluation.  Otherwise we will continue therapy unchanged with Zytiga, prednisone and Zometa along with Relugolix.    -7/28/2021 [facility] oncology clinic follow-up: Patient with multiple somatic complaints including episodes of confusion, dizziness, decreased memory, agitation.  He reports ongoing episodes with difficulty swallowing.  Also most concerning for episodes of angina and chest pain for which he basically refused to seek emergency medical attention.  He has a history of coronary artery disease and stent placement.  He has not followed up with cardiology for many years.  I will get an MRI of the brain in light of his  confusion, dizziness and agitation.  I will get him to gastroenterology for EGD in light of his dysphagia.  I have also put in a referral for cardiology.  I reemphasized to the patient, his wife who is with him today and his son who was on the telephone during our visit the importance of seeking out prompt medical attention when he is having chest pain or neurological concerns so that he can be evaluated.  The patient states that he basically refuses to go to the emergency room and if his family calls an ambulance he would not agree to go to the hospital.  For the time being, I have asked him to hold his Zytiga and prednisone until we can sort this out as Zytiga does have some cardiovascular risk.  There is likely no treatment for prostate cancer that does not carry some form of cardiovascular risk.  His PSA remains low at 0.014 yesterday.  He will continue relugolix for now.    Of note, some of these symptoms could also be due to his hypophosphatemia, phosphate level from yesterday was 1.5, I have sent in a prescription for K-Phos 3 times a day before meals for 10 days.  We will hold further Zometa until this is corrected.  I have also put in an order to check his vitamin D level.  He takes calcium and vitamin D daily.  Some of his symptoms also could be due to drug withdrawal as there was some confusion and difficulty getting his last prescription for methadone therefore he was without methadone for several days, he now has his prescription and is back on his pain medication.  He has an appointment with neurosurgery tomorrow in light of his ongoing back pain, MRI of the hip recently showed multiple bony lesions largest at the L5 vertebral body and left supra-acetabular region.  If no neurosurgical intervention recommended he may benefit from spot radiation as this is where a lot of his pain is coming from.  His wife had questions today regarding his staging and extent of disease.  We reviewed previous scans.  I  "did clarify with her as she used the words \"cancer free\" as she thought that is what she was told after his CT scans once he completed Taxotere in February.  I explained to her that even though his scans showed he had an excellent response with no clear areas of metastasis that did not mean he was cancer free, I explained to her that when you have metastatic cancer the treatment intent is palliative in nature and to control the disease but not to cure the disease.  She stated understanding.  We will see him back in 2 weeks for follow-up to sort through this and make further plan of care, again, I have asked him to hold Zytiga and prednisone until he sees us back, he will continue on his other medications including relugolix.    -7/30/2021 neurosurgery PA consultation.  MRI with and without contrast L5 ordered.    -8/3/2021 neurosurgery follow-up showed known sclerotic disease with new L5 abnormality without compression deformity or instability.  MRI brain negative for metastasis.    -8/4/2021 office visit Dr. Fco Quintanilla radiation oncology coordinating with Dr. North neurosurgery for palliative radiation for MRI evidence of L5 and left supra acetabular painful lesions.  States that the patient's disease which is not secreting PSA is experiencing some progression and would benefit from 20 Gy in five fractions and other lesion 30 Gy in ten fractions. Patient offered to go to [place] for radiation but desired treatment in [place].    -8/10/2021 [facility] medical oncology follow-up visit: No chest pains at this junction.  Reviewed MRI brain and other MRIs reviewed by Dr. North and [place].  Due for EGD on 19th.  We will repeat his phosphorus along with his other labs continue Relugolix, Zytiga, prednisone, and he will continue radiation palliatively to the painful bony lesions with Dr. Quintanilla/Can.  He will see my nurse practitioner back in a few weeks to see how he is tolerating this and to follow-up on the " phosphorus off of Zometa and she will order repeat CT chest abdomen pelvis with contrast and total body bone scan the end of September.I will see him back after that.    -9/8/2021 Zometa resumed.  PSA <0.10    -10/5/2021 [facility] medical oncology follow-up visit: followed-up with radiation oncology 9/21/2021.  Status post symptomatic L5 radiation 5 fractions 20 [place] completed 8/16/2021 with improvement in right hip pain.  9/2/2021 PSA less than 0.1.  9/29/2021 total body bone scan shows increased uptake left iliac bone slightly superior group of the left acetabulum with no additional foci of suspicious abnormality is unlikely treatment related with no new sites of active osseous metastasis.  CT chest abdomen pelvis with contrast shows stable borderline enlarged left periaortic nodes and dating sclerotic bone lesions.Continue Zytiga, prednisone, Relugolix, Zometa, repeat CT chest abdomen pelvis with contrast and total body bone scan the end of the year.  We will follow PSA serially.    -11/17/2021 [facility] Oncology clinic follow-up:  Mr. Lugo overall is doing well.  He is tolerating therapy with Zytiga, prednisone and Relugolix along with Zometa which is given every 6 weeks.  PSA remains low at <0.1.  Has occasional low phosphorus, currently low at 1.7.  Serum calcium is normal at 8.9, normal creatinine 0.79 and normal CBC other than for platelets of 124.  I will hold Zometa for 1 month, I did send in a prescription for phosphorus replacement today.  He takes calcium and vitamin D.  I will see him back in 1 month for follow-up.  We will repeat restaging scans after that visit.    -12/15/2021 [facility] Oncology clinic follow-up: Mr. Lguo continues to do well on therapy with Zytiga, prednisone and Relugolix, his PSA remains low at <0.1.  He is continuing to have left lower back pain, he is working with palliative care and they have referred him also to pain management.  Pain management has talked to him about putting in a  pain pump however he is leery of this, I told him that this was a safe and effective pain management technique and to certainly give consideration to their recommendations.  His phosphorus is improving however is still a little low, I will hold off on Zometa until we see him back in 1 month, we may end up only giving it every 12 weeks.  We will repeat restaging scans with CT chest, abdomen and pelvis along with total body bone scan prior to return and I have ordered those today.      -1/5/2022 CT chest abdomen pelvis with contrast [facility] showing stable left periaortic adenopathy and unchanged sclerotic thoracic, lumbar spine and pelvis lesions with no new or progressive disease in the chest abdomen or pelvis.  Total body bone scan shows no significant change and no new suspicious foci.  Stable posterior right acetabulum and left superior acetabulum and degenerative changes in the cervical spine, shoulders, acromioclavicular joints, sternoclavicular joints, elbows, wrists, hands, thoracic spine, lumbosacral spine, sacroiliac joints, hips, knees, ankles, feet.    -1/11/2022 [facility] oncology hematology follow-up visit: I reviewed images and reports from his 1/5/2022 scans.  No active disease and PSA immmeasurably low on Zytiga, prednisone, Relugolix.  However, he has struggled with hypophosphatemia and is significantly fatigued with this.  Given that the bones are doing well and given that his phosphorus continues to remain consistently low at 2.2 in mid December, 1.7 mid November and 2.5 mid-September and as low as 1.5 back to July and the likelihood that this is related to his Zometa, given that his bones are stable overall, he will increase from 1200 mg up to 1600 mg of calcium and phosphate a day and we will hold his Zometa for the foreseeable future.  He will see my nurse practitioner back in a month to continue to monitor his phosphorus along with his calcium and other labs and will repeat his  scans again in 3 months.  In addition, given his fatigue we will check his ACTH and cortisol though he is taking his prednisone with his Zytiga.  Though his electrolytes are normal, I will keep an eye on the cortisol and ACTH and have these labs not only drawn today but again just prior to return to my nurse practitioner on February 10.    -2/10/2022 [facility] Oncology clinic follow-up: Mr. Lugo overall is stable, no change in his health this past month.  I have reviewed his labs from 2/8/2022 and they are unremarkable, his phosphorus is now normal at 3.2. We are continuing to hold his Zometa, he last received Zometa on 10/5/2021.  He continues therapy with Zytiga, prednisone and Relugolix.  Dr. Rashid had ordered cortisol level and ACTH which are low, currently his ACTH is 2.8 and cortisol 3.08 however these are both done in the face of ongoing prednisone that he takes with his Zytiga.  We will get him to endocrinology for further evaluation for possible adrenal insufficiency but will not interrupt his treatment in the interim.  He will need restaging scans again in April for a 3-month follow-up.  He will continue to follow with palliative care and pain management for his cancer related pain.  His PSA remains immeasurably low at <0.1 on 2/8/22.    -2/15/2022 went to emergency room with weakness, decreased appetite, myalgias in the setting of known COVID-19 testing positive a few days prior to this visit.  Phosphorus had been low in the past but was normal in the emergency room with unremarkable CBC and CMP.  Chest x-ray unremarkable saturating well on room air mildly hypertensive with dry mucosa.  Given 500 cc crystalloid.  Covid test positive.  Mild thrombocytopenia 136,000 and otherwise unremarkable.  Discharged home.    -3/3/2022 data:  PSA less than 0.1  CMP unremarkable  Cortisol 3.96 normal  ACTH 2.8 lower limit of normal 7.2  Phosphorus normal 2.6    -3/8/2022 [facility] medical oncology follow-up: Suspect big  chunk of his fatigue was Covid.  Another part of the fatigue likely related to lack of testosterone.  Not hypophosphatemic off of Zometa.  ACTH running low while on prednisone not surprising but with normal cortisol and I doubt adrenal insufficient which is why he is on prednisone to avoid such while taking Zytiga.  Overall doing fairly well and with immeasurably low PSA, I will have labs done on him early April, see my nurse practitioner early May, and she will order repeat bone scan and CTs the end of May and I will see him back in June.  We will continue to hold the Zometa unless bone lesions progress given symptomatic prior hypophosphatemia on Zometa.  He will keep his appointment April 28 with Dr. Mir Duffy just to be sure that my take on his pituitary/adrenal axis assessment is accurate.    -4/28/2022 endocrinology consult Dr. Mir Duffy.  With low ACTH and cortisol on prednisone with Zytiga, the adequacy of prednisone cannot be assessed by measuring ACTH or cortisol but is assessed by weight changes, blood pressure, blood sugar, potassium, overall sense of how the patient is doing.  Primary adrenal insufficiency with low ACTH and low cortisol would be typical for the situation as his blood sugar tends to run a little high and potassium a little low, he did not think increasing prednisone was necessary.  He put him on some potassium.  No follow-up scheduled, will see as needed.    -5/4/2022 [facility] follow-up: Mr. Lugo continues on therapy with Zytiga and prednisone along with Relugolix.  His Zometa has been on hold due to previous hypophosphatemia.  There is some concern about potential adrenal insufficiency. He saw endocrinology, Dr. Mir Duffy on 4/28/2022.  I did review his office note and he stated that the low ACTH and low serum cortisol would be typical for Mr. Lugo's situation.  He further stated that the adequacy of prednisone dose cannot be assessed by measuring ACTH or cortisol but  is rather assessed by the overall just altered how the patient is feeling-weight change, blood pressure, blood sugar, potassium, overall sense of wellbeing.  His potassium had been running a little low therefore they put him on potassium 10 mill equivalents daily. Of note, I do not see a future follow-up scheduled with endocrinology.  He is having night sweats, I am not sure if this is related.  His glucose was normal this morning at 107.  His weight is stable.  He will continue current treatment for his prostate cancer unchanged, we will continue to hold his Zometa.  Current phosphorus and magnesium are normal.  CBC and CMP from today are unremarkable, cortisol is normal.  PSA and ACTH are pending  In regards to his night sweats, he had taken clonidine previously 0.1 mg twice daily as needed, I did send in a short supply again to try and see if this helps.  He also is having indigestion despite being on omeprazole twice daily, I sent a prescription for Pepcid.  He had an EGD August 2021.  We will repeat restaging scans prior to return and I have ordered those today.     -5/24/2022 CT chest abdomen pelvis with contrast[place] negative.  Bone scan shows stable bone metastases.    - 5/25/2022 PSA less than 0.1, platelets 130,000, otherwise unremarkable CBC and CMP.  A.m. cortisol running low 1.2 with phosphorus low 2.3.    -5/31/2022 [facility] medical oncology follow-up: On Zytiga, prednisone, Relugolix, PSA remaining immeasurably low with stable bone scan and no visceral/paulino metastases.  Phosphorus still running low.  I have discontinued his potassium chloride and started potassium phosphate 500 mg 4 times a day.  Unless PSA rising, we will plan on repeating CTs and bone scan in 6 months and will follow with my nurse practitioner in the interim and if symptoms dictate or labs, will get back to Dr. Duffy for management of potential adrenal insufficiency but presently we will just see how he does with potassium  phosphate and hopeful improvement in the phosphorus level with time.  We will continue to follow with palliative care for pain management    -7/6/2022 [facility] Oncology clinic follow-up: Mr. Lugo overall is doing well but continues to be troubled with fatigue and hot flashes.  For the most part he states he is able to do the things he wants to do, he continues to work but does have to take more rest periods.  I had a long discussion with him and his wife after reviewing his medications with them as they wanted to see if there was anything he could stop or adjust.  I discussed with him the need to continue on treatment for his prostate cancer even though his PSA remains immeasurably low and his scans look good but that if his medication is stopped the cancer would progress and over time it still has the potential to progress on therapy but would continue it as long as possible to keep his disease under control.  I explained this is like treating someone with hypertension, you do not stop the antihypertensive just because the blood pressure normalizes.  They stated understanding.  There is no dose adjustment of Zytiga or prednisone that would minimize his symptoms, he is on the current standard recommended therapy.  Discussed with him that sometimes during times of stress we may need to increase his prednisone dose but for now would not change anything.  His phosphorus level is normal, we continue to hold his Zometa.  I did give him the option of holding his phosphorus for the next month and we will see what happens, if it drops we will start him back on it but may be at a lower dose rather than 4 times a day maybe twice a day.  For now he will continue therapy unchanged.  We will continue monitoring labs monthly.  No PSA was drawn this month but that is okay as his PSA has been running immeasurably low at <0.1, we will recheck next month with lab draw.  We will stop checking his ACTH and cortisol level every month, if  he develops worsening symptoms I will repeat those.  He has not gotten a follow-up with endocrinology as of yet.    -8/3/2022 [facility] [place] Oncology clinic follow-up:  Mr. Lugo is doing well as far as his prostate cancer goes with continued immeasurably low PSA of <0.1.  He continues on therapy with Zytiga and prednisone along with Relugolix.  His phosphorus is stable at 2.7 which was just slightly low by our reference range however was 2.71-month ago also which was normal on another labs reference range.  He has not taken his phosphorus for this past month as he thought it was making him more fatigued.  He really can tell no difference whether he was taking it or not.  I have asked him to try to go back on phosphorus twice a day rather than 4 times a day and see if this is tolerated and if we can keep his phosphorus level from dropping.  His biggest complaint today is flareup of his arthralgias and back pain.  He is following with pain management, Dr. Danny Avalos but is requesting a referral back to palliative medicine as he feels that no one is currently listening to him and they are just prescribing the same medications that are not effective according to his report.  He does have a follow-up with Dr. Avalos on 8/12/2022 but due to his current pain is not able to work until after he is seen and hopefully can get some better relief.  I have given him a work release until 15 August.  I have also put in a referral back to palliative care.  Also encouraged him to work with Dr. Avalos for help in resolving his issue.  Apparently they have recommended some type of a pump however he has been hesitant to that but likely would be helpful.  We will continue therapy unchanged.  We will continue monthly labs including phosphorus, we are not currently checking ACTH and cortisol monthly as Dr. Mir Duffy previously stated in his assessment on 4/28/2022 that the adequacy of prednisone cannot be assessed by measuring ACTH or cortisol  but would be assessed by symptoms, see note from 4/28/2022.  He made no further follow-up appointments.  Fatigue is not worsening currently.  His glucose and potassium are  Normal.    -10/13/2022 [facility] medical oncology follow-up: Mr. Lugo is doing well.  He is tolerating treatment with Zytiga and prednisone along with Relugolix without any unusual side effects.  His PSA has remained immeasurably low at <0.1.  His phosphorus was slightly low at last visit.  He had been instructed to go back on phosphorus twice a day but he misunderstood and has not been taking it.  We will check labs today.  I will follow-up with results and notify him if he needs to restart the phosphorus.  His pain is better controlled since reestablishing with [facility].  We will repeat scans prior to next follow-up.  I have ordered CT chest abdomen pelvis along with bone scan.  We will see him back in 1 month.    -10/13/2022 PSA less than 0.014.With unremarkable CBC and CMP.  Phosphorus still a little low 2.3.    -11/4/2022 CT abdomen pelvis chest showed no evidence of disease progression with stable sclerotic bone lesions.  Bone scan stable right greater than left acetabular metastasis.  Stable bone metastases.    -11/8/2022 [facility] oncology telemedicine follow-up: Patient states that he feels achy all over with low-grade temps and a cough mildly productive.  Has an appointment later today to see his primary care for testing for flu and COVID.  Suggested that if he were positive for COVID that he get Paxlovid and that if he were positive for flu that he get Tamiflu and to discuss this with his primary care.  From the prostate cancer side, his PSA is immeasurably low with stable bone metastases and no evidence of progression.  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  We will continue the Relugolix, Zytiga, prednisone and he will see my nurse practitioner 12/7/2022.  Would  repeat CTs and bone scan in May.    -1/25/2023 [facility] medicine follow-up: Mr. Lugo is having worsening fatigue and muscle aches.  He does have adrenal insufficiency on Zytiga and prednisone for his prostate cancer, he saw endocrinology in light of low ACTH back in April 2022.  At that time there was no recommendation other than for monitoring him for his overall wellbeing and labs.  He denies any fevers or chills.  His labs as shown above are unremarkable, his CBC and CMP are normal other than for glucose of 112, PSA remains low at <0.1.  He has no new pain or any symptoms concerning for progression of his prostate cancer.  I will get him back to endocrinology to see if they feel any dose adjustment of his prednisone would be helpful in his symptoms.  Currently he is on 5 mg a day with his Zytiga.  I did not repeat his ACTH or cortisol as they would be expected to be low in this situation.  His potassium is normal, he has had no weight loss or change in his appetite.  Blood sugar is reasonable.  His only complaint currently is worsening fatigue and muscle aches.  He will continue treatment unchanged with Zytiga, prednisone and relugolix.  I will see him back in 1 month for follow-up.  We will repeat his restaging scans in May.    -3/1/2023 [facility] follow-up:  His PSA remains low at <0.1 on treatment with Zytiga and prednisone along with relugolix however he continues to complain of significant fatigue and muscle aches not improving with time.  He states that his quality of life is affected as he is not able to do any work activities due to the fatigue.  He did see endocrinology again and they have increased his prednisone from 5 mg daily up to 7.5 mg daily however so far this has not made any difference in how he feels.  He is supposed to get back in touch with them to let them know how he is doing and to see if there is any other adjustments needed. His labs are unremarkable with normal  thyroid function, CMP is normal other than for glucose of 156.  They did check hemoglobin A1c to look for diabetes however that was normal at 5.7.  CMP is unremarkable with just very mild anemia with hemoglobin of 12 and hematocrit 36.1% which would not account for his fatigue.  He does have hot flashes that are fairly significant, I will try venlafaxine as suggested by Dr. Duffy and I appreciate the recommendation.  We will start at 37.5 mg daily and if tolerated he can increase to 75 mg after 1-2 weeks.  I will see him back in 1 months for follow-up.  Plan to repeat scans late April/early May.  His prostate cancer seems under good control with current regimen however I am not sure how long he can tolerate this due to the side effects.    -3/30/2023 [facility] Oncology clinic follow-up:  Mr. Lugo continues to deal with fatigue.  He appears more cushingoid today, he continues on treatment with Zytiga, prednisone and relugolix.  Prednisone dose currently is 7.5 mg daily.  This has not made any difference in his energy level.  PSA remains immeasurably low at <0.014.  CBC shows new onset of microcytic hypochromic anemia with hemoglobin of 11, hematocrit 34.4%, MCV 75.4, MCH 24.1, WBC is normal at 5.81 with normal platelet count 181,000 and normal differential.  CMP is unremarkable, it is normal other than for glucose of 142.  We will get him to Dr. Gomez for an EGD and colonoscopy for further evaluation in regards to his new onset of anemia.  He has no associated GI symptoms otherwise except for 1 day a few weeks ago he does report that he had fairly sudden onset of vomiting but this was an isolated incident on that day and has not reoccurred since.  Continues to follow with palliative care for management of his chronic back pain.  We will repeat restaging CT chest, abdomen and pelvis along with total body bone scan prior to return and I have ordered that today.    -3/30/2023 Ferritin low 12.3 with iron low 27 and  saturation low 5% with total iron binding capacity 511.  3/31/2023 ferrous sulfate 325 mg twice a day every other day with vitamin C started.    - 4/12/2023 CT chest abdomen pelvis with contrast showed no progressive disease with stable sclerotic bone lesions.  Bone scan showed single identifiable bone metastasis stable right acetabulum    -4/18/2023 [facility] oncology follow-up: He now has microcytic iron deficiency anemia which is new and concerning.  Gastroenterologic evaluation has been ordered but not performed and is mandatory.  Continue ferrous sulfate 325 mg twice a day every other day with vitamin C to improve absorption and we will follow his CBC along with his usual labs monthly on Zytiga, Relugolix, prednisone 7.5 mg.  He follows with Dr. Duffy with adrenal insufficiency.  He will see my nurse practitioner back in a month to see what GI has found and to watch serial CBC along with his other labs including PSA.  I would repeat CT chest abdomen pelvis again in 6 months with contrast along with bone scan and sooner if PSA rises.    -5/3/2023 EGD and colonoscopy with Dr. Alexander Gomez: Superficial erosions in the stomach with gastritis.  Small polyps removed, dilated internal hemorrhoids.  Pathology pending.  Recommend repeat colonoscopy in 5 years.    -5/17/2023 [facility] clinic follow-up:  Naveen overall is doing well on current therapy with Zytiga, prednisone and relugolix.  PSA remains immeasurably low at <0.014.  He feels his hip pain is getting worse with time.  It makes it difficult to enjoy activities with his grandchildren as it is worse when he stands for any length of time or tries to carry any weight.  I reviewed his bone scan from April which was stable, we also reviewed previous MRI of the hips from May 2021 that showed moderate bilateral hip osteoarthritis.  I offered to repeat MRI of his hips for evaluation to see if there has been any worsening of his osteoarthritis and also  then referral to orthopedics for any recommended intervention however at this time he defers.  He will let me know if he changes his mind.  He also follows with palliative care for management of his cancer related pain.  We will continue therapy unchanged.  We will repeat restaging scans in October unless symptoms dictate the need to do so sooner.  He was recently found to be iron deficient, he had an EGD and colonoscopy on 5/3/2023 with Dr. Gomez, EGD showed superficial erosions in the stomach with gastritis, he continues on omeprazole.  He had small polyps removed from the colon and recommendation to repeat colonoscopy in 5 years.  He is on oral iron along with vitamin C every other day and is tolerating that well.  CBC from yesterday shows hemoglobin now normal at 14.2 and hematocrit 43.2%, MCV normal at 27.0.    -7/20/2023 [facility] Oncology clinic follow-up: Naveen is doing well on current therapy with Zytiga, prednisone and relugolix.  PSA in June remains low at <0.1.  Continues to deal with chronic pain in his back and hip.  Recently saw Dr. Nikolai Marks and had an injection in his hip and is hoping that it will eventually help with his pain.  He has no new pain.  We will continue current therapy unchanged.  We will repeat labs while he is here today.  I will refill his iron as his pharmacy has changed. Most recent CBC in June with hemoglobin of 14.7 and hematocrit 43.6%.  We will repeat restaging scans in October.    -9/14/2023 [facility] Oncology clinic follow-up: Naveen is tolerating his prostate cancer treatment with Zytiga, prednisone and relugolix.  PSA remains immeasurably low at <0.014 on 8/17/2023.  Currently he is having more GI issues with nausea and intermittent vomiting that occurs often without warning.  He had an EGD and colonoscopy with Dr. Gomez on 5/3/2023, EGD showed superficial erosions in the stomach with gastritis.  He does take omeprazole 40 mg twice daily.  He saw his PCP yesterday for  these symptoms and has been referred to gastroenterologist at [facility] and he is awaiting to hear about that appointment.  Hopefully he can get in fairly quickly.  I told him that he should let us know if it is going to be a while before he can be seen and I will get him back to Dr. Gomez for consideration of repeat EGD.  We will get his CBC, CMP and PSA today.  His previous noted anemia had normalized, he currently is not taking oral iron as he ran out.  I will wait and see what his labs show today and likely hold off for now depending on his lab results until his GI issues can be resolved.  We will repeat his restaging CT chest, abdomen and pelvis along with bone scan in the next few weeks and I have ordered those today.  He is taking Zofran as needed for nausea, he is also on meclizine for vertigo.  He is going to follow-up with his PCP in a few weeks regarding his GI symptoms.    -9/14/2023 PSA less than 0.014 with unremarkable CBC and CMP.    -9/28/2023 total body bone scan stable with uptake in right acetabulum, no new areas.  CT chest, abdomen and pelvis show no evidence of disease progression.  -11/9/2023 [facility] Oncology clinic follow-up: CT reports were reviewed by Dr. Rashid last month and the patient reports he was called by Dr. Rashid as he could not make his appointment due to concerns over upper respiratory infection, CT scan showed no evidence of disease progression.  PSA has remained low, we are repeating that today along with his CBC and CMP.  He continues to have sinus drainage and congestion, I will send in 7 additional days on his doxycycline that he has been taking, he will follow-up with PCP if no improvement.  He continues to complain of bilateral hip pain, has a follow-up with Dr. Marks next week.  We will continue therapy unchanged with Zytiga, prednisone and relugolix.  Would repeat restaging bone scan and CT scans late March for a 6-month follow-up.    -1/4/24 PSA 1    - 3/28/2024 CT  chest abdomen pelvis with contrast compared to May 2013 shows decreasing sclerotic bony metastases.  Bone scan shows similar relative intensity of 1 focal uptake right acetabulum correlating with CT with no new sites of uptake    -4/2/2024 [facility] medical oncology follow-up: Continued good response to Zytiga prednisone relugolix which he is tolerating.  We will check PSA and labs 6/25/2024 with follow-up with my nurse practitioner and again 3 months after that along with CTs and bone scan 6 months from now.  Not on bisphosphonates or rank ligand inhibitor due to drug-induced hypophosphatemia.  Will ask my nurse practitioner to order bone densitometry with his other scans in 6 months. He is having difficulty emptying his bladder and his prior urologist does not take his insurance so we will make appropriate referrals.    -6/26/2024 [facility] Oncology clinic follow-up: Overall continues to tolerate therapy with Zytiga, prednisone and Relugolix.  We will get labs today including PSA and as long as that is stable he will continue treatment unchanged with plans to repeat restaging CT scans and bone scans in September and I have ordered those today.  We will also obtain DEXA scan at that time to monitor for osteoporosis on long-term use of androgen deprivation therapy.  We have been holding his Zometa due to drug-induced hypophosphatemia.  He is following with urology and has had relief of his urinary hesitancy with some medication adjustments and he is scheduled for follow-up this Friday with cystoscopy.  He recently had chest pain and shortness of breath while mowing his lawn, he did have an echocardiogram and stress test yesterday, awaiting final results and reading from Dr. Kahn.  He continues to follow with palliative care for management of his pain which is under good control.  I did refill his symptoms today for chronic constipation from opioid use.    -9/16/2024 CT chest abdomen pelvis with contrast [place]  regional compared to May 2024 shows stable 1.6 cm well-defined nodule left periaortic region.  Stable sclerotic left supra-acetabular, right posterior acetabular left posterior acetabular lesions.  No disease progression.  Total body bone scan shows grossly stable solitary active right posterior acetabular bone metastasis.    -10/2/24 PSA less than 0.1    -10/8/2024 [facility] oncology clinic follow-up: In order to get prostatic urethral pathway surgically open he needed cardiac clearance and is due for cardiac cath in the next week or 2 with Dr. Kahn.  I reviewed the above image results with him with no clear-cut evidence of progression and PSA remaining immeasurably low.  He does feel quite fatigued.But this is not new.  He has become microcytic with a hemoglobin of 12.1 and MCV of 77 which has trickled down 14.8 and December.  His CMP is unremarkable.  He had an EGD and colonoscopy May of last year with Dr. Gomez that showed superficial erosive gastritis with internal hemorrhoids and colon polyps.  He states he has been taking his iron but he takes it with all his other medicines.  I told him to take it every other day with vitamin C with at least an hour and a half window on either side of it free from any other ingestion of medicines.  With reference to the pulmonary infiltrate he has had a frothy nagging mildly productive cough without fevers or chills or dyspnea for the last 2 months.  Rather than throwing an antibiotic at this, I am going to get pulmonary referral for their opinion.He is due for his bone density as well and we will order that.  He has been off of Zometa for quite some time due to hypophosphatemia  Addendum: He has never had genetic testing.  While that would not alter our plans in the near term, I will plan on genetic testing for his family sake as well as for potential treatable molecular targets in the future should treatment algorithms dictate.  I called and spoke with his wife to inform  her of this plan.    - 10/29/2024 cardiac cath Dr. Kahn showing vascular disease for which she will manage medically with rosuvastatin and aspirin and antianginal therapy.    -11/6/2024 buccal mucosal genetic sampling given to patient for collection.    - 11/12/2024 pulmonology evaluation Makenzie Ramirez.  Status post multiple rounds of antibiotics on 5 mg prednisone at that junction.  [facility] to see if budesonide would help with congestion and adding DuoNebs along with montelukast.  Continues to smoke and we will follow-up with him in 4 weeks.    -12/4/2024 PSA immeasurably low less than 0.014.  Hemoglobin 14.4 and rising.  CBC unremarkable.  Glucose 115 otherwise unremarkable CMP.    -12/10/2024 [facility] medical oncology follow-up: Prostate cancer under control on Relugolix Zytiga prednisone.  Continue to follow with urologist for obstructive symptoms from prostate.  Follow-up with palliative care for his ongoing bone pains.  I will rescan with bone scans and CT chest abdomen pelvis with contrast in March and see him back after that along with his labs and PSA.  Follow-up with pulmonary medicine regarding his dyspnea.  Hemoglobin rising.  Asked him to touch base with Dr. Kahn regarding his tachycardia with regular rhythm.    -3/4/2025 PSA less than 0.14.Glucose 126 BUN 6 total protein 5.7 otherwise unremarkable CMP and CBC.  - 3/5/2025 CT chest abdomen pelvis with contrast [place] regional compared to September 2024 shows no evidence of metastatic disease with stable sclerotic bone metastases.  Total body bone scan shows indeterminant bilateral rib foci favored to represent benign etiology otherwise no significant change with a single bone metastasis.    -3/11/2025 [facility] follow-up: With PSA immeasurably low would not make much out of indeterminant rib abnormalities on bone scan and nothing else to suggest progression.  Tolerating Relugolix Zytiga prednisone.  Will  see my nurse practitioner back towards the end of May with repeat labs and plan on repeat imaging in 6 months and PSA quarterly.Patient opted to do blood draw test for genetics which he will do on 4/14/2025.    Total time of care today inclusive of time spent today prior to patient's arrival reviewing interval data and images and reports thereof and during visit translating patient putting forth a plan as outlined above and after visit instituting this plan took 46 minutes patient care time throughout the day today.  Ishmael Rahsid MD    03/11/2025

## 2025-03-14 ENCOUNTER — SPECIALTY PHARMACY (OUTPATIENT)
Dept: ONCOLOGY | Facility: HOSPITAL | Age: 70
End: 2025-03-14
Payer: MEDICARE

## 2025-03-17 DIAGNOSIS — G89.3 CANCER RELATED PAIN: ICD-10-CM

## 2025-03-17 NOTE — TELEPHONE ENCOUNTER
PATIENT CALLED AND REQUESTED REFILL ON DILAUDID AND XTAMPZA BE CALLED IN TO [facility]. PLEASE ADVISE.

## 2025-03-17 NOTE — TELEPHONE ENCOUNTER
AGUSTIN #: [id number]    Medication requested: HYDROmorphone (DILAUDID) 8 MG tablet     Last fill date: 2/18/25      Medication requested: oxyCODONE ER 13.5 MG capsule extended-release 12 hour     Last fill date: 2/17/25      Last appointment: 1/13/25    Next appointment: 4/14/25

## 2025-03-18 ENCOUNTER — TELEPHONE (OUTPATIENT)
Dept: PALLIATIVE CARE | Facility: CLINIC | Age: 70
End: 2025-03-18
Payer: MEDICARE

## 2025-03-18 RX ORDER — HYDROMORPHONE HYDROCHLORIDE 8 MG/1
8 TABLET ORAL EVERY 4 HOURS PRN
Qty: 180 TABLET | Refills: 0 | Status: SHIPPED | OUTPATIENT
Start: 2025-03-20 | End: 2025-04-19

## 2025-03-18 NOTE — TELEPHONE ENCOUNTER
PATIENT CALLED TO CHECK THE STATUS OF HIS MEDICATION REFILLS AS THE PHARMACY STATED THAT THEY HAVEN'T RECEIVED ANYTHING FROM PHYSICIAN OFFICE. PLEASE ADVISE.

## 2025-03-18 NOTE — TELEPHONE ENCOUNTER
Called pt and no answer, straight to voicemail. The refills have not been signed by the physician. Medications are not due until 3/19 and 3/20. They should be sent sometime today.

## 2025-04-03 ENCOUNTER — SPECIALTY PHARMACY (OUTPATIENT)
Facility: HOSPITAL | Age: 70
End: 2025-04-03
Payer: MEDICARE

## 2025-04-03 NOTE — PROGRESS NOTES
Specialty Pharmacy Patient Management Program  Oncology 6-Month Clinical Assessment       Naveen Lugo is a 69 y.o. male with prostate cancer seen today to assess adherence and side effects.    Reason for Outreach: Routine medication check-in .    Regimen: Abiraterone 1000mg PO daily + prednisone 5mg PO daily + relugolix 120mg PO daily    Specialty Pharmacy Goal   Goals Addressed Today         Specialty Pharmacy General Goal (pt-stated)       Clinical goal/therapeutic target: stable or decreasing PSA and disease control, per the recent oncology clinic notes and labs. {  PSA   Date Value Ref Range Status   09/14/2023 <0.014 0.000 - 4.000 ng/mL Final     Comment:     Results may be falsely decreased if patient taking Biotin.  Testing Method: Roche [employer] Electrochemiluminescence  Immunoassay(ECLIA)  Values obtained with different assay methods or kits cannot  be used interchangeably.     07/20/2023 <0.014 0.000 - 4.000 ng/mL Final     Comment:     Results may be falsely decreased if patient taking Biotin.  Testing Method: Roche [employer] Electrochemiluminescence  Immunoassay(ECLIA)  Values obtained with different assay methods or kits cannot  be used interchangeably.     06/14/2023 <0.1 0.0 - 4.0 ng/mL Final     Comment:     Roche ECLIA methodology.  According to the American Urological Association, Serum PSA should  decrease and remain at undetectable levels after radical  prostatectomy. The AUA defines biochemical recurrence as an initial  PSA value 0.2 ng/mL or greater followed by a subsequent confirmatory  PSA value 0.2 ng/mL or greater.  Values obtained with different assay methods or kits cannot be used  interchangeably. Results cannot be interpreted as absolute evidence  of the presence or absence of malignant disease.     05/16/2023 <0.014 0.000 - 4.000 ng/mL Final     Comment:     Results may be falsely decreased if patient taking Biotin.  Testing Method: Roche [employer]  Electrochemiluminescence  Immunoassay(ECLIA)  Values obtained with different assay methods or kits cannot  be used interchangeably.     02/21/2023 <0.1 0.0 - 4.0 ng/mL Final     Comment:     Roche ECLIA methodology.  According to the American Urological Association, Serum PSA should  decrease and remain at undetectable levels after radical  prostatectomy. The AUA defines biochemical recurrence as an initial  PSA value 0.2 ng/mL or greater followed by a subsequent confirmatory  PSA value 0.2 ng/mL or greater.  Values obtained with different assay methods or kits cannot be used  interchangeably. Results cannot be interpreted as absolute evidence  of the presence or absence of malignant disease.     01/24/2023 <0.1 0.0 - 4.0 ng/mL Final     Comment:     **Verified by repeat analysis**  Roche ECLIA methodology.  According to the American Urological Association, Serum PSA should  decrease and remain at undetectable levels after radical  prostatectomy. The AUA defines biochemical recurrence as an initial  PSA value 0.2 ng/mL or greater followed by a subsequent confirmatory  PSA value 0.2 ng/mL or greater.  Values obtained with different assay methods or kits cannot be used  interchangeably. Results cannot be interpreted as absolute evidence  of the presence or absence of malignant disease.   10/13/2022 <0.014 0.000 - 4.000 ng/mL Final     Comment:     Results may be falsely decreased if patient taking Biotin.   09/01/2022 <0.1 0.0 - 4.0 ng/mL Final     Comment:     Roche ECLIA methodology.  According to the American Urological Association, Serum PSA should  decrease and remain at undetectable levels after radical  prostatectomy. The AUA defines biochemical recurrence as an initial  PSA value 0.2 ng/mL or greater followed by a subsequent confirmatory  PSA value 0.2 ng/mL or greater.  Values obtained with different assay methods or kits cannot be used  interchangeably. Results cannot be interpreted as absolute evidence  of  the presence or absence of malignant disease.     08/02/2022 <0.1 0.0 - 4.0 ng/mL Final     Comment:     **Verified by repeat analysis**  Roche ECLIA methodology.  According to the American Urological Association, Serum PSA should  decrease and remain at undetectable levels after radical  prostatectomy. The AUA defines biochemical recurrence as an initial  PSA value 0.2 ng/mL or greater followed by a subsequent confirmatory  PSA value 0.2 ng/mL or greater.  Values obtained with different assay methods or kits cannot be used  interchangeably. Results cannot be interpreted as absolute evidence  of the presence or absence of malignant disease.     08/02/2022 <0.1 0.0 - 4.0 ng/mL Final     Comment:     **Verified by repeat analysis**  Roche ECLIA methodology.  According to the American Urological Association, Serum PSA should  decrease and remain at undetectable levels after radical  prostatectomy. The AUA defines biochemical recurrence as an initial  PSA value 0.2 ng/mL or greater followed by a subsequent confirmatory  PSA value 0.2 ng/mL or greater.  Values obtained with different assay methods or kits cannot be used  interchangeably. Results cannot be interpreted as absolute evidence  of the presence or absence of malignant disease.     04/20/2021 0.026 0.000 - 4.000 ng/mL Final   12/29/2020 0.275 0.000 - 4.000 ng/mL Final     PSA          12/14/2023    13:25 1/4/2024    00:00 4/2/2024    08:05   PSA   PSA <0.014  <0.1  <0.014        PSA          4/2/2024    08:05 6/26/2024    00:00 10/2/2024    13:45   PSA   PSA <0.014  <0.014  <0.1       Latest Reference Range & Units 12/04/24 00:00 03/04/25 14:35   PSA 0.000 - 4.000 ng/mL <0.014 <0.014     4/3/25: I have reviewed the most recent clinic note and labs. Dr. Rashid recommends continuing abiraterone, prednisone and relugolix at this time. The patient is tolerating the medication and is currently on track for goal.  Medication is preventing disease progression as  evidenced by imaging or provider/clinic documentation.              Problem List   Problem list reviewed by Maria Esther Le, PharmD on 4/3/2025 at 12:39 PM  Patient Active Problem List   Diagnosis Code    Prostate cancer metastatic to bone C61, C79.51    Encounter for care related to Port-a-Cath Z45.2    Opioid use disorder F11.90    Pain, cancer G89.3    Therapeutic drug monitoring Z51.81    Abnormal MRI, lumbar spine R93.7    Coronary artery disease of native artery of native heart with stable angina pectoris I25.118    Hyperlipidemia LDL goal <70 E78.5    Essential hypertension I10    Chest pain R07.9    Depression F32.A    Constipation K59.00    Fatigue R53.83    Adrenal insufficiency E27.40    Chronic pain G89.29    Hyperglycemia R73.9    Hot flashes R23.2    Medicare annual wellness visit, subsequent Z00.00    Iron deficiency E61.1    Tobacco abuse Z72.0    Chronic GERD K21.9    Seasonal allergies J30.2    Therapeutic opioid induced constipation K59.03, T40.2X5A    Urinary retention R33.9    Anginal equivalent I20.89    SOB (shortness of breath) R06.02    Stage 2 moderate COPD by GOLD classification J44.9    COPD with exacerbation J44.1       Medication Assessment for Specialty Medication(s):  Medication Assessment  Follow Up Clinical Assessment  Linked Medication(s) Assessed: Abiraterone Acetate (ZYTIGA), predniSONE (DELTASONE), Relugolix (ORGOVYX)  Therapeutic appropriateness: Appropriate  Medication tolerability: Tolerating with no to minimal ADRs  Medication plan: Continue therapy with normal follow-up  Quality of Life Improvement Scale: 10-Significantly better  Administration: Patient is taking every day at the same time  and on an empty stomach  for abiraterone and with food for prednisone  Patient can self administer medications: yes  Medication Follow-Up Plan: Next clinical assessment and Refill coordination  Lab Review: The labs listed below have been reviewed. No dose adjustments are needed for the oral  specialty medication(s) based on the labs.    Lab Results   Component Value Date    GLUCOSE 126 (H) 03/04/2025    CALCIUM 9.0 03/04/2025     03/04/2025    K 3.5 03/04/2025    CO2 26.0 03/04/2025     03/04/2025    BUN 6 (L) 03/04/2025    CREATININE 0.81 03/04/2025    EGFRIFAFRI 109 01/11/2022    EGFRIFNONA 98 02/14/2022    BCR 7.4 03/04/2025    ANIONGAP 11.0 10/29/2024     Lab Results   Component Value Date    WBC 7.81 03/04/2025    RBC 5.56 03/04/2025    HGB 15.9 03/04/2025    HCT 46.8 03/04/2025    MCV 84.2 03/04/2025    MCH 28.6 03/04/2025    MCHC 34.0 03/04/2025    RDW 14.9 03/04/2025    RDWSD 57.1 (H) 10/29/2024    MPV 10.4 10/29/2024     03/04/2025    NEUTRORELPCT 65.1 03/04/2025    LYMPHORELPCT 26.8 03/04/2025    MONORELPCT 6.3 03/04/2025    EOSRELPCT 0.8 03/04/2025    BASORELPCT 0.6 03/04/2025    AUTOIGPER 0.3 02/14/2022    NEUTROABS 5.09 03/04/2025    LYMPHSABS 2.09 03/04/2025    MONOSABS 0.49 03/04/2025    EOSABS 0.06 03/04/2025    BASOSABS 0.05 03/04/2025    AUTOIGNUM 0.02 02/14/2022    NRBC 0.0 03/04/2025     Drug-drug interactions  Completed medication reconciliation today to assess for drug interactions. Patient denies starting or stopping any medications.    Assessed medication list for interactions  Ranexa may increase the serum concentration of Relugolix  Abiraterone Acetate may enhance the myopathic (rhabdomyolysis) effect of Crestor  Abiraterone may increase the serum concentration of tamsulosin  Advised patient to call the clinic if any new medications are started so we can assess for drug-drug interactions.  Drug-food interactions discussed: eating grapefruit and drinking grapefruit juice  Vaccines are coordinated by the patient's oncologist and primary care provider.    Medications   Medicines reviewed by Maria Esther Le, PharmD on 4/3/2025 at 12:39 PM  Prior to Admission medications    Medication Sig Start Date End Date Taking? Authorizing Provider   abiraterone acetate (ZYTIGA)  500 MG tablet Take 2 tablets by mouth Daily. 8/19/24   Ishmael Rashid MD   albuterol sulfate  (90 Base) MCG/ACT inhaler Inhale 2 puffs Every 4 (Four) Hours As Needed for Wheezing. 1/2/25   Cielo Dodd PA-C   aspirin 81 MG EC tablet Take 1 tablet by mouth Daily. 5/20/24   Joni Kahn MD   azelastine (ASTELIN) 0.1 % nasal spray Administer 2 sprays into the nostril(s) as directed by provider 2 (Two) Times a Day. Use in each nostril as directed 1/2/25   Cielo Dodd PA-C   cyclobenzaprine (FLEXERIL) 10 MG tablet Take 1 tablet by mouth 3 (Three) Times a Day As Needed for Muscle Spasms. 1/4/24   Anna Ayers PA-C   FeroSul 325 (65 Fe) MG tablet TAKE 1 TABLET BY MOUTH EVERY OTHER DAY WITH VITAMIN C. 2/28/25   Abena Velasquez APRN   finasteride (Proscar) 5 MG tablet Take 1 tablet by mouth Daily. 6/13/24   David Mccullough MD   Fluticasone-Umeclidin-Vilant (Trelegy Ellipta) 200-62.5-25 MCG/ACT inhaler Inhale 1 puff Daily. 8/27/24   Cielo Dodd PA-C   HYDROmorphone (DILAUDID) 8 MG tablet Take 1 tablet by mouth Every 4 (Four) Hours As Needed for Moderate Pain for up to 30 days. 3/20/25 4/19/25  Vito Easley DO   ipratropium-albuterol (DUO-NEB) 0.5-2.5 mg/3 ml nebulizer Take 3 mL by nebulization 4 (Four) Times a Day As Needed for Wheezing. 11/12/24   Makenzie Ramirez APRN   ipratropium-albuterol (DUO-NEB) 0.5-2.5 mg/3 ml nebulizer Take 3 mL by nebulization Every 4 (Four) Hours As Needed for Wheezing. 11/12/24   Makenzie Ramirez APRN   levocetirizine (XYZAL) 5 MG tablet Take 1 tablet by mouth Every Evening. 1/2/25   Cielo Dodd PA-C   montelukast (SINGULAIR) 10 MG tablet Take 1 tablet by mouth Every Night. 11/12/24   Makenzie Ramirez APRN   naloxone (NARCAN) 4 MG/0.1ML nasal spray 1 spray into the nostril(s) as directed by provider As Needed (unresponsiveness). 12/29/20   Mely Cary MD   nitroglycerin (NITROSTAT) 0.4 MG SL tablet 1 under the tongue as needed for angina, may repeat  q5mins for up three doses 5/20/24   Joni Kahn MD   omeprazole (priLOSEC) 40 MG capsule Take 1 tab po BID for stomach 1/2/25   Cielo Dodd PA-C   oxyCODONE ER 13.5 MG capsule extended-release 12 hour  Take 1 capsule by mouth Every 12 (Twelve) Hours for 30 days. 3/19/25 4/18/25  Vito Easley,    potassium chloride 10 MEQ CR tablet Take 1 tablet by mouth 2 (Two) Times a Day. 1/5/24   Abena Velasquez APRN   predniSONE (DELTASONE) 5 MG tablet Take 1 tablet by mouth Daily. 1/10/25   Ishmael Rashid MD   promethazine-dextromethorphan (PROMETHAZINE-DM) 6.25-15 MG/5ML syrup Take 5 mL by mouth 4 (Four) Times a Day As Needed for Cough. 1/2/25   Cielo Dodd PA-C   ranolazine (Ranexa) 1000 MG 12 hr tablet Take 1 tablet by mouth 2 (Two) Times a Day. 9/5/24   Joni Kahn MD   relugolix (ORGOVYX) 120 MG tablet tablet Take 1 tablet by mouth Daily. 3/7/25   Ishmael Rashid MD   rosuvastatin (CRESTOR) 40 MG tablet Take 1 tablet by mouth Daily. 10/29/24   Joni Kahn MD   senna (SENOKOT) 8.6 MG tablet Take 1 tablet by mouth Daily. 11/18/24   Anna Ayers PA-C   tamsulosin (FLOMAX) 0.4 MG capsule 24 hr capsule Take 1 capsule by mouth Daily. 1/2/25   Cielo Dodd PA-C   varenicline (Chantix) 0.5 MG tablet Take 1 tablet by mouth 2 (Two) Times a Day. Day 1-3: Take 1 tablets daily. Day4-7: Take 1 tablets twice daily. 1/13/25   Anna Ayers PA-C   HYDROmorphone (DILAUDID) 8 MG tablet Take 1 tablet by mouth Every 4 (Four) Hours As Needed for Moderate Pain for up to 30 days. 2/17/25 3/17/25  Vito Easley, DO   oxyCODONE ER 13.5 MG capsule extended-release 12 hour  Take 1 capsule by mouth Every 12 (Twelve) Hours for 30 days. 2/17/25 3/17/25  Vito Easley, DO   relugolix (ORGOVYX) 120 MG tablet tablet Take 1 tablet by mouth Daily. 4/9/24 3/7/25  Ishmael Rashid MD       Allergies  Known allergies and reactions were discussed with the patient. The patient's chart has been reviewed for allergy  information and updated as necessary.   Allergies   Allergen Reactions    Cymbalta [Duloxetine Hcl] Hives and Dizziness    Duloxetine Hives and Dizziness    Gabapentin Nausea Only    Pregabalin Dizziness, Nausea And Vomiting and Hives         Allergies reviewed by Maria Esther Le PharmD on 4/3/2025 at 12:39 PM    Hospitalizations and Urgent Care Visits Since Last Assessment:  Unplanned hospitalizations or inpatient admissions: 0  ED Visits: 10/29: chest pain  Urgent Office Visits: 0    Adherence Assessment:  Adherence Questions  Linked Medication(s) Assessed: Abiraterone Acetate (ZYTIGA), predniSONE (DELTASONE), Relugolix (ORGOVYX)  On average, how many doses/injections does the patient miss per month?: 0  What are the identified reasons for non-adherence or missed doses? : no problems identified  What is the estimated medication adherence level?: %  Based on the patient/caregiver response and refill history, does this patient require an MTP to track adherence improvements?: no    Quality of Life Assessment:  Quality of Life Assessment  Quality of Life Improvement Scale: 10-Significantly better    Financial Assessment:  Medication availability/affordability: Patient has had no issues obtaining medication from pharmacy.    Attestation:  I attest the patient was actively involved in and has agreed to the above plan of care. If the prescribed therapy is at any point deemed not appropriate based on the current or future assessments, a consultation will be initiated with the patient's specialty care provider to determine the best course of action. The revised plan of therapy will be documented along with any required assessments and/or additional patient education provided.       All questions addressed and patient had no additional concerns. Patient has pharmacy contact information.    Maria Esther Le PharmD, [facility]  Clinical Oncology Pharmacy Specialist  Phone: [phone]      4/3/2025  12:40 EDT

## 2025-04-04 ENCOUNTER — SPECIALTY PHARMACY (OUTPATIENT)
Dept: ONCOLOGY | Facility: HOSPITAL | Age: 70
End: 2025-04-04
Payer: MEDICARE

## 2025-04-04 NOTE — PROGRESS NOTES
Specialty Pharmacy Patient Management Program  Refill Outreach     [place] was contacted today regarding refills of their medication(s).    Refill Questions      Flowsheet Row Most Recent Value   Changes to allergies? No   Changes to medications? No   New conditions or infections since last clinic visit No   Unplanned office visit, urgent care, ED, or hospital admission in the last 4 weeks  No   How does patient/caregiver feel medication is working? Good   Financial problems or insurance changes  No   Since the previous refill, were any specialty medication doses or scheduled injections missed or delayed?  No   Does this patient require a clinical escalation to a pharmacist? No            Delivery Questions      Flowsheet Row Most Recent Value   Delivery method [employer]   Delivery address verified with patient/caregiver? Yes   Delivery address Home   Other address preferred n/a   Number of medications in delivery 3   Medication(s) being filled and delivered Abiraterone Acetate (ZYTIGA), Relugolix (ORGOVYX), predniSONE (DELTASONE)   Doses left of specialty medications 10 days   Copay verified? Yes   Copay amount $0   Copay form of payment No copayment ($0)   Delivery Date Selection 04/08/25   Signature Required No   Do you consent to receive electronic handouts?  No            Medication Adherence    Adherence tools used: watch   Other adherence tool: Clock - takes at same time each day, 7:45am   Support network for adherence: family member          Follow-up: 30 day(s)     Micaela Cameron, Pharmacy Technician  4/4/2025  13:46 EDT

## 2025-04-17 DIAGNOSIS — G89.3 CANCER RELATED PAIN: ICD-10-CM

## 2025-04-17 NOTE — TELEPHONE ENCOUNTER
I have reviewed patient's AGUSTIN report prior to prescribing Schedule II, III, and IV medications. Request # [id number]. Next refills for Oxycontin 13.5 mg BID and hydromorphone 8 mg q4h PRN  were sent to the pharmacy. The patient is scheduled to follow-up in 4/2025.

## 2025-04-18 RX ORDER — HYDROMORPHONE HYDROCHLORIDE 8 MG/1
8 TABLET ORAL EVERY 4 HOURS PRN
Qty: 180 TABLET | Refills: 0 | Status: SHIPPED | OUTPATIENT
Start: 2025-04-19 | End: 2025-05-19

## 2025-04-21 ENCOUNTER — LAB (OUTPATIENT)
Dept: LAB | Facility: HOSPITAL | Age: 70
End: 2025-04-21
Payer: MEDICARE

## 2025-04-21 ENCOUNTER — CLINICAL SUPPORT (OUTPATIENT)
Dept: GENETICS | Facility: HOSPITAL | Age: 70
End: 2025-04-21
Payer: MEDICARE

## 2025-04-21 ENCOUNTER — OFFICE VISIT (OUTPATIENT)
Dept: PALLIATIVE CARE | Facility: CLINIC | Age: 70
End: 2025-04-21
Payer: MEDICARE

## 2025-04-21 VITALS
RESPIRATION RATE: 18 BRPM | HEART RATE: 91 BPM | DIASTOLIC BLOOD PRESSURE: 81 MMHG | TEMPERATURE: 97.9 F | BODY MASS INDEX: 27.9 KG/M2 | OXYGEN SATURATION: 93 % | WEIGHT: 188.9 LBS | SYSTOLIC BLOOD PRESSURE: 133 MMHG

## 2025-04-21 DIAGNOSIS — K59.03 THERAPEUTIC OPIOID INDUCED CONSTIPATION: ICD-10-CM

## 2025-04-21 DIAGNOSIS — T40.2X5A THERAPEUTIC OPIOID INDUCED CONSTIPATION: ICD-10-CM

## 2025-04-21 DIAGNOSIS — Z80.41 FAMILY HISTORY OF MALIGNANT NEOPLASM OF OVARY: ICD-10-CM

## 2025-04-21 DIAGNOSIS — Z13.79 GENETIC TESTING: Primary | ICD-10-CM

## 2025-04-21 DIAGNOSIS — G89.3 CHRONIC PAIN DUE TO NEOPLASM: ICD-10-CM

## 2025-04-21 DIAGNOSIS — Z51.81 THERAPEUTIC DRUG MONITORING: ICD-10-CM

## 2025-04-21 DIAGNOSIS — C61 PROSTATE CANCER METASTATIC TO BONE: ICD-10-CM

## 2025-04-21 DIAGNOSIS — C79.51 PROSTATE CANCER METASTATIC TO BONE: ICD-10-CM

## 2025-04-21 DIAGNOSIS — G47.00 INSOMNIA, UNSPECIFIED TYPE: ICD-10-CM

## 2025-04-21 DIAGNOSIS — C61 PROSTATE CANCER: ICD-10-CM

## 2025-04-21 DIAGNOSIS — Z80.42 FAMILY HISTORY OF MALIGNANT NEOPLASM OF PROSTATE: ICD-10-CM

## 2025-04-21 DIAGNOSIS — Z51.81 THERAPEUTIC DRUG MONITORING: Primary | ICD-10-CM

## 2025-04-21 PROCEDURE — 1159F MED LIST DOCD IN RCRD: CPT | Performed by: PHYSICIAN ASSISTANT

## 2025-04-21 PROCEDURE — 1125F AMNT PAIN NOTED PAIN PRSNT: CPT | Performed by: PHYSICIAN ASSISTANT

## 2025-04-21 PROCEDURE — 80307 DRUG TEST PRSMV CHEM ANLYZR: CPT

## 2025-04-21 PROCEDURE — 1160F RVW MEDS BY RX/DR IN RCRD: CPT | Performed by: PHYSICIAN ASSISTANT

## 2025-04-21 PROCEDURE — 3079F DIAST BP 80-89 MM HG: CPT | Performed by: PHYSICIAN ASSISTANT

## 2025-04-21 PROCEDURE — 3075F SYST BP GE 130 - 139MM HG: CPT | Performed by: PHYSICIAN ASSISTANT

## 2025-04-21 PROCEDURE — 36415 COLL VENOUS BLD VENIPUNCTURE: CPT

## 2025-04-21 PROCEDURE — 99214 OFFICE O/P EST MOD 30 MIN: CPT | Performed by: PHYSICIAN ASSISTANT

## 2025-04-21 RX ORDER — HYDROXYZINE HYDROCHLORIDE 25 MG/1
25 TABLET, FILM COATED ORAL 3 TIMES DAILY PRN
Qty: 60 TABLET | Refills: 0 | Status: SHIPPED | OUTPATIENT
Start: 2025-04-21

## 2025-04-21 RX ORDER — SENNOSIDES A AND B 8.6 MG/1
1 TABLET, FILM COATED ORAL DAILY
Qty: 90 TABLET | Refills: 3 | Status: SHIPPED | OUTPATIENT
Start: 2025-04-21

## 2025-04-21 NOTE — PROGRESS NOTES
Palliative Clinic Note      Name: Naveen Lugo  Age: 69 y.o.  Sex: male  : 1955  MRN: [id number]  Date of Service: 2025   Medical Oncologist: Dr. Rashid     Subjective:    Chief Complaint: insomnia, anxiety     History of Present Illness: Naveen Lugo is a 69 y.o. male with past medical history significant for coronary artery disease, hypertension, hyperlipidemia, opioid use disorder, and metastatic prostate cancer  who presents to the palliative clinic today as a follow up for pain and symptom management.     Treatment summary: He was diagnosed with prostate adenocarcinoma metastasized to the bone in 2020. Patient completed radiation with Dr. Quintanilla in 2021. He is currently on Zytiga, prednisone, and Relugolix. PSA remains low and no active disease on imaging. Repeat scans in 3/2025 shows no evidence of metastatic disease with stable sclerotic bone metastases.     Pain: History of opioid use disorder. Patient completed Suboxone taper in the past and was off of pain medication prior to cancer diagnosis. Several failed therapies including methadone, MS contin and fentanyl. Patient was referred to pain specialist, Dr. BARB Avalos in 3/2022. Patient transferred care back to the palliative clinic for uncontrolled hip and back pain in 2022. Current regimen includes Xtampza 13.5 mg BID, hydromorphone 8 mg every 4 hours and flexeril 10 mg TID PRN. Patient follows with orthopedic surgeon, Dr. Marks for bilateral hip pain and underwent steroid injections in both hips several months ago without significant improvement in his pain. Patient unable to tolerate gabapentin, pregabalin, and duloxetine in the past.      Symptoms: The patient complains of difficulty falling asleep. He attributes this to anxiety. No efficacy with benadryl. He has not tried melatonin. The patient endorses regular bowel movements with Senna daily. He requests a refill of this medication today. He states his appetite has been good.  No nausea or vomiting.  Hot flashes have improved with the addition of venlafaxine (Effexor).      Pyschosocial: The patient lives with his wife. His wife has been in and out of the hospital the past several months. Endorses strong family support. He enjoys spending time with his grandson. Patient enjoys spending times outdoors. He does not follow with psychiatry. The patient admits to an increase in anxiety related to his wife's decline in health.      Goals: Maximize comfort, optimize function & psychosocial wellbeing, and promote advanced care planning.    The following portions of the patient's history were reviewed and updated as appropriate: allergies, current medications, past family history, past medical history, past social history, past surgical history and problem list.    ORT-R: High risk   Aberrant behavior: abnormal UDS 9/7/22, pattern of delinquent pill counts  Decisional capacity: Full  ECOG: (1) Restricted in physically strenuous activity, ambulatory and able to do work of light nature     Objective:    /81   Pulse 91   Temp 97.9 °F (36.6 °C) (Temporal)   Resp 18   Wt 85.7 kg (188 lb 14.4 oz)   SpO2 93%   BMI 27.90 kg/m²     Constitutional: Awake, alert, normal gait, sitting up in exam chair, in no acute distress  Eyes: PERRLA, EOMS intact  HENT: NCAT, face symmetric  Neck: Supple, trachea midline  Respiratory: Nonlabored respirations  Cardiovascular: RRR, no edema observed  Gastrointestinal: Soft, no guarding  Musculoskeletal: Moves all extremities   Psychiatric: Appropriate affect, cooperative  Neurologic: Oriented x 3, Cranial Nerves grossly intact to confrontation, speech clear  Skin: Cool dry, no rashes or wounds appreciated     Medication Counts: Reviewed. See bottom of note for details. Did not bring medication to appointment Instructed patient to bring medication at next visit.  I have reviewed the patient's [place] PDMP. [facility] Req #[id number].   UDS: Last 5/6/24. Reviewed.  Appropriate.     Assessment & Plan:    1. Prostate cancer metastatic to bone  - Repeat scans in 3/2025 shows no evidence of metastatic disease with stable sclerotic bone metastases.    2. Chronic pain due to neoplasm  - Patient is appropriate for opioid therapy due to cancer related pain. Daily function and quality of life improved with pain medication. Continue Xtampza 13.5 mg BID, hydromorphone 8 mg every 4 hours and flexeril 10 mg TID PRN. Side effects of the medication discussed at every visit. Patient was encouraged to continue bowel regimen of daily stool softeners, prn laxatives, and diet modifications. Instructed patient to bring controlled medications to next appointment in 3 months per clinic policy.     3. Therapeutic drug monitoring  - Annual Urine Drug Screen completed. Results are pending.     4. Therapeutic opioid induced constipation  - Refilled senna (SENOKOT) 8.6 MG tablet; Take 1 tablet by mouth Daily.      5. Insomnia, unspecified type  - Continue sleep hygiene strategies. Consider trial of OTC melatonin. Start trial of hydrOXYzine (ATARAX) 25 MG tablet; Take 1 tablet by mouth 3 (Three) Times a Day As Needed for Anxiety (sleep). Instructed patient to increase to two tablets nightly if 1 tablet is not effective. Additionally, offered referral to psychiatry for concurrent anxiety. Patient declined.     Code status: Full code  Medical interventions: Full    Return in about 3 months (around 7/21/2025) for Office Visit.    I spent 30 minutes caring for Naveen Lugo on this date of service. This time includes time spent by me in the following activities: preparing for the visit, reviewing tests, obtaining and/or reviewing a separately obtained history, performing a medically appropriate examination and/or evaluation , counseling and educating the patient/family/caregiver, ordering medications, tests, or procedures, documenting information in the medical record, independently interpreting results and  communicating that information with the patient/family/caregiver, and care coordination    Anna Ayers PA-C  04/21/2025    Medication Date Filled # Filled Count Used # Days  RO[facility]IE   Hydromorphone 8 4/19/25 180 -- -- -- --   Xtampza 13.5 4/18/25 60 -- -- -- --

## 2025-04-21 NOTE — PROGRESS NOTES
Blood sample collected and sent to [facility] for germline genetic testing as discussed on 11/06/2024.  Results expected in 2-3 weeks and patient will be called at that time.      Genet Locke MS, [facility] [place]  Licensed Certified Genetic Counselor

## 2025-04-24 ENCOUNTER — PATIENT ROUNDING (BHMG ONLY) (OUTPATIENT)
Dept: PALLIATIVE CARE | Facility: CLINIC | Age: 70
End: 2025-04-24
Payer: MEDICARE

## 2025-05-02 ENCOUNTER — SPECIALTY PHARMACY (OUTPATIENT)
Dept: ONCOLOGY | Facility: HOSPITAL | Age: 70
End: 2025-05-02
Payer: MEDICARE

## 2025-05-02 NOTE — PROGRESS NOTES
Specialty Pharmacy Patient Management Program  Refill Outreach     [place] was contacted today regarding refills of their medication(s).    Refill Questions      Flowsheet Row Most Recent Value   Changes to allergies? No   Changes to medications? No   New conditions or infections since last clinic visit No   Unplanned office visit, urgent care, ED, or hospital admission in the last 4 weeks  No   How does patient/caregiver feel medication is working? Good   Financial problems or insurance changes  No   Since the previous refill, were any specialty medication doses or scheduled injections missed or delayed?  No   Does this patient require a clinical escalation to a pharmacist? No            Delivery Questions      Flowsheet Row Most Recent Value   Delivery method UPS   Delivery address verified with patient/caregiver? Yes   Delivery address Home   Other address preferred n/a   Number of medications in delivery 3   Medication(s) being filled and delivered Abiraterone Acetate (ZYTIGA), Relugolix (ORGOVYX), predniSONE (DELTASONE)   Doses left of specialty medications 15 tablets of Orgovyx and 15 tablets of Zytiga left   Copay verified? Yes   Copay amount $0   Copay form of payment No copayment ($0)   Delivery Date Selection 05/06/25   Signature Required No   Do you consent to receive electronic handouts?  No            Medication Adherence    Adherence tools used: watch   Other adherence tool: Clock - takes at same time each day, 7:45am   Support network for adherence: family member          Follow-up: 30 day(s)     Micaela Cameron, Pharmacy Technician  5/2/2025  09:49 EDT

## 2025-05-14 ENCOUNTER — TELEPHONE (OUTPATIENT)
Dept: GENETICS | Facility: HOSPITAL | Age: 70
End: 2025-05-14
Payer: MEDICARE

## 2025-05-14 ENCOUNTER — DOCUMENTATION (OUTPATIENT)
Dept: GENETICS | Facility: HOSPITAL | Age: 70
End: 2025-05-14
Payer: MEDICARE

## 2025-05-14 NOTE — TELEPHONE ENCOUNTER
Spoke with patient and disclosed negative genetic results. Informed patient these results would be on [facility] and sent to their

## 2025-05-15 DIAGNOSIS — G89.3 CANCER RELATED PAIN: ICD-10-CM

## 2025-05-15 RX ORDER — HYDROMORPHONE HYDROCHLORIDE 8 MG/1
8 TABLET ORAL EVERY 4 HOURS PRN
Qty: 180 TABLET | Refills: 0 | Status: SHIPPED | OUTPATIENT
Start: 2025-05-19 | End: 2025-06-18

## 2025-05-15 NOTE — TELEPHONE ENCOUNTER
I have reviewed patient's AGUSTIN report prior to prescribing Schedule II, III, and IV medications. Request # [id number]. Next refills for Xtampza 13.5 mg BID and hydromorphone 8 mg q4h PRN were sent to the pharmacy. The patient is scheduled to follow-up in 3 months.

## 2025-05-21 NOTE — PROGRESS NOTES
Naveen Lugo, a 69 y.o. male, was referred for genetic counseling due to a personal history of prostate cancer. Mr. Lugo was diagnosed with prostate cancer at age 65. He reports having approximately ten colon polyps removed on past colonoscopies. Mr. Lugo was interested in genetic testing to assess his risk for a hereditary cancer syndrome. Mr. Lugo was interested in pursuing a multi-gene panel. The CancerNext panel was ordered through [facility], which analyzes BRCA1/2 and 38 additional genes associated with an increased cancer risk. The genes on this panel include APC, KOURTNEY, AXIN2, BAP1, BARD1, BMPR1A, BRCA1, BRCA2, BRIP1, CDH1, CDKN2A, CHEK2, EPCAM, FH, FLCN, GREM1, HOXB13, MBD4, MET, MLH1, MSH2, MSH3, MSH6, MUTYH, NF1, NTHL1, PALB2, PMS2, POLD1, POLE, PTEN, RAD51C, RAD51D, RPS20, SMAD4, STK11, TP53, TSC1, TSC2, and VHL. Genetic testing was negative for pathogenic mutations in BRCA1/2 and 38 additional genes included on the CancerNext panel. These results were discussed with Mr. Lugo by telephone on 5/14/2025.    FAMILY HISTORY:  Sister:                          Ovarian cancer, dx late 60s  Brother:                       Prostate cancer, dx ~ 50  Sister:                          Leukemia  Brother:                       Lung cancer     We do not have medical records confirming the diagnoses in Mr. Lugo's family.     RISK ASSESSMENT: Mr. Lugo's personal history of prostate cancer and family history cancer led to concern regarding a hereditary cancer syndrome.  He meets NCCN guidelines criteria for BRCA1/2 testing based on his personal history of metastatic prostate cancer, in addition to having first-degree relatives with prostate cancer and ovarian cancer. The standard approach to genetic testing is through a multigene panel.      GENETIC COUNSELING:  We reviewed the family history information in detail.  Cases of cancer follow three general patterns: sporadic, familial, and hereditary.  While most cancer is  sporadic, some cases appear to occur in family clusters.  These cases are said to be familial and account for 10-20% of certain cancer cases.  Familial cases may be due to a combination of shared genes and environmental factors among family members.  In even fewer families (~10%), the cancer is said to be inherited, and the genes responsible for the cancer are known.       Family histories typical of hereditary cancer syndromes usually include multiple first- and second-degree relatives diagnosed with cancer types that define a syndrome.  These cases tend to be diagnosed at younger-than-expected ages and can be bilateral or multifocal.  The cancer in these families follows an autosomal dominant inheritance pattern, which indicates the likely presence of a mutation in a cancer susceptibility gene.  Children and siblings of an individual believed to carry this mutation have a 50% chance of inheriting that mutation, thereby inheriting the increased risk to develop cancer.  These mutations can be passed down from the maternal or the paternal lineage.     Based on Mr. Lugo's personal and family history, we discussed the BRCA1 and BRCA2 genes.  Mutations in these genes confer an increased risk for breast cancer, ovarian cancer, male breast cancer, prostate cancer, and pancreatic cancer. The standard approach to genetic testing is via a multigene panel.  Genes included on these panels have varying degrees of risk associated, and management and screening guidelines vary based on the specific gene.  Hereditary cancer syndromes can demonstrate incomplete penetrance and variable expression within families. There are genes that are evaluated that have been more recently described, and there may be less data regarding the risks and therefore may not have established management guidelines at this time. We reviewed that in some cases, the identification of a genetic mutation may impact treatment options for some types of cancer.  We discussed the possibility of results that are unexpected based on family history. We discussed these limitations at length.  Based on Mr. Lugo's personal and family history and his desire to get more information regarding his personal risks and risks for his family, he opted to pursue testing through a panel evaluating several other genes known to increase the risk for cancer.      GENETIC TESTING:  The risks, benefits and limitations of genetic testing and implications for clinical management following testing were reviewed. DNA test results can influence decisions regarding screening and prevention.  Genetic testing can have significant psychological implications for both individuals and families. Also discussed was the possibility of employment and insurance discrimination based on genetic test results and the federal and states laws that are in place to prevent this (TEGAN), as well as the limitations of these laws.         We discussed multigene panel testing, which would involve testing several genes associated with an increased cancer risk. The benefits and limitations of genetic testing were discussed and Mr. Lugo decided to pursue testing of the genes via the panel. The implications of a positive or negative test result were discussed.  We discussed the possibility that, in some cases, genetic test results may be ambiguous due to the identification of a genetic variant of uncertain significance (VUS). These variants may or may not be associated with an increased cancer risk. With multigene panel testing, it is not uncommon for a VUS to be identified.  If a VUS is identified, testing family members is not recommended and screening recommendations are made based on the family history.  The laboratories that perform genetic testing work to reclassify the VUS and send out an amended report if and when a VUS is reclassified.  The majority of variant findings are ultimately reclassified to a negative result.  Given his personal history, a negative test result does not eliminate all cancer risk to his relatives, although the risk would not be as high as it would with positive genetic testing.     TEST RESULTS:  Genetic testing was negative for pathogenic mutations by sequencing, rearrangement testing, and RNA analysis for the 40 genes on the CancerNext panel.  The negative result greatly lowers the risk of a hereditary cancer syndrome for Mr. Lugo. This assessment is based on the information provided at the time of the consultation.    PLAN:  Genetic counseling remains available to Mr. Lugo. He can contact us at [phone] with any questions.        Genet Locke MS, [facility] [place] [place]  Licensed Certified Genetic Counselor      Cc:  Ishmael Rashid MD

## 2025-05-28 ENCOUNTER — LAB (OUTPATIENT)
Dept: ONCOLOGY | Facility: HOSPITAL | Age: 70
End: 2025-05-28
Payer: MEDICARE

## 2025-05-28 ENCOUNTER — SPECIALTY PHARMACY (OUTPATIENT)
Dept: ONCOLOGY | Facility: HOSPITAL | Age: 70
End: 2025-05-28
Payer: MEDICARE

## 2025-05-28 VITALS
SYSTOLIC BLOOD PRESSURE: 144 MMHG | WEIGHT: 185.2 LBS | BODY MASS INDEX: 27.35 KG/M2 | HEART RATE: 88 BPM | RESPIRATION RATE: 18 BRPM | DIASTOLIC BLOOD PRESSURE: 81 MMHG

## 2025-05-28 DIAGNOSIS — Z45.2 ENCOUNTER FOR CARE RELATED TO PORT-A-CATH: Primary | ICD-10-CM

## 2025-05-28 DIAGNOSIS — C79.51 PROSTATE CANCER METASTATIC TO BONE: Primary | ICD-10-CM

## 2025-05-28 DIAGNOSIS — C79.51 PROSTATE CANCER METASTATIC TO BONE: ICD-10-CM

## 2025-05-28 DIAGNOSIS — C61 PROSTATE CANCER METASTATIC TO BONE: Primary | ICD-10-CM

## 2025-05-28 DIAGNOSIS — C61 PROSTATE CANCER METASTATIC TO BONE: ICD-10-CM

## 2025-05-28 PROCEDURE — 25010000002 HEPARIN LOCK FLUSH PER 10 UNITS: Performed by: NURSE PRACTITIONER

## 2025-05-28 PROCEDURE — 36591 DRAW BLOOD OFF VENOUS DEVICE: CPT

## 2025-05-28 RX ORDER — HEPARIN SODIUM (PORCINE) LOCK FLUSH IV SOLN 100 UNIT/ML 100 UNIT/ML
500 SOLUTION INTRAVENOUS AS NEEDED
Status: DISCONTINUED | OUTPATIENT
Start: 2025-05-28 | End: 2025-05-28 | Stop reason: HOSPADM

## 2025-05-28 RX ORDER — SODIUM CHLORIDE 0.9 % (FLUSH) 0.9 %
10 SYRINGE (ML) INJECTION AS NEEDED
OUTPATIENT
Start: 2025-05-28

## 2025-05-28 RX ORDER — HEPARIN SODIUM (PORCINE) LOCK FLUSH IV SOLN 100 UNIT/ML 100 UNIT/ML
500 SOLUTION INTRAVENOUS AS NEEDED
OUTPATIENT
Start: 2025-05-28

## 2025-05-28 RX ADMIN — HEPARIN 500 UNITS: 100 SYRINGE at 13:30

## 2025-05-28 NOTE — PROGRESS NOTES
Specialty Pharmacy Patient Management Program  Refill Outreach     [place] was contacted today regarding refills of their medication(s).    Refill Questions      Flowsheet Row Most Recent Value   Changes to allergies? No   Changes to medications? No   New conditions or infections since last clinic visit No   Unplanned office visit, urgent care, ED, or hospital admission in the last 4 weeks  No   How does patient/caregiver feel medication is working? Good   Financial problems or insurance changes  No   Since the previous refill, were any specialty medication doses or scheduled injections missed or delayed?  No   Does this patient require a clinical escalation to a pharmacist? No            Delivery Questions      Flowsheet Row Most Recent Value   Delivery method UPS   Delivery address verified with patient/caregiver? Yes   Delivery address Home   Other address preferred n/a   Number of medications in delivery 3   Medication(s) being filled and delivered Abiraterone Acetate (ZYTIGA), Relugolix (ORGOVYX), predniSONE (DELTASONE)   Doses left of specialty medications 10 days of Orgovyx and Zytiga left   Copay verified? Yes   Copay amount $0   Copay form of payment No copayment ($0)   Delivery Date Selection 05/30/25   Signature Required No   Do you consent to receive electronic handouts?  No            Medication Adherence    Adherence tools used: watch   Other adherence tool: Clock - takes at same time each day, 7:45am   Support network for adherence: family member          Follow-up: 30 day(s)     Micaela Cameron, Pharmacy Technician  5/28/2025  13:24 EDT

## 2025-05-29 ENCOUNTER — SPECIALTY PHARMACY (OUTPATIENT)
Dept: ONCOLOGY | Facility: HOSPITAL | Age: 70
End: 2025-05-29
Payer: MEDICARE

## 2025-05-29 ENCOUNTER — OFFICE VISIT (OUTPATIENT)
Dept: ONCOLOGY | Facility: CLINIC | Age: 70
End: 2025-05-29
Payer: MEDICARE

## 2025-05-29 VITALS
HEIGHT: 69 IN | OXYGEN SATURATION: 95 % | DIASTOLIC BLOOD PRESSURE: 70 MMHG | SYSTOLIC BLOOD PRESSURE: 121 MMHG | TEMPERATURE: 98 F | BODY MASS INDEX: 27.4 KG/M2 | RESPIRATION RATE: 18 BRPM | HEART RATE: 91 BPM | WEIGHT: 185 LBS

## 2025-05-29 DIAGNOSIS — C79.51 PROSTATE CANCER METASTATIC TO BONE: Primary | ICD-10-CM

## 2025-05-29 DIAGNOSIS — C61 PROSTATE CANCER METASTATIC TO BONE: Primary | ICD-10-CM

## 2025-05-29 LAB
ALBUMIN SERPL-MCNC: 3.4 G/DL (ref 3.5–5.2)
ALBUMIN/GLOB SERPL: 1.5 G/DL
ALP SERPL-CCNC: 83 U/L (ref 39–117)
ALT SERPL-CCNC: 9 U/L (ref 1–41)
AST SERPL-CCNC: 14 U/L (ref 1–40)
BASOPHILS # BLD AUTO: 0.06 10*3/MM3 (ref 0–0.2)
BASOPHILS NFR BLD AUTO: 0.9 % (ref 0–1.5)
BILIRUB SERPL-MCNC: 0.3 MG/DL (ref 0–1.2)
BUN SERPL-MCNC: 4 MG/DL (ref 8–23)
BUN/CREAT SERPL: 5.4 (ref 7–25)
CALCIUM SERPL-MCNC: 9 MG/DL (ref 8.6–10.5)
CHLORIDE SERPL-SCNC: 105 MMOL/L (ref 98–107)
CO2 SERPL-SCNC: 27.8 MMOL/L (ref 22–29)
CREAT SERPL-MCNC: 0.74 MG/DL (ref 0.76–1.27)
EGFRCR SERPLBLD CKD-EPI 2021: 98.1 ML/MIN/1.73
EOSINOPHIL # BLD AUTO: 0.12 10*3/MM3 (ref 0–0.4)
EOSINOPHIL NFR BLD AUTO: 1.8 % (ref 0.3–6.2)
ERYTHROCYTE [DISTWIDTH] IN BLOOD BY AUTOMATED COUNT: 13.7 % (ref 12.3–15.4)
GLOBULIN SER CALC-MCNC: 2.3 GM/DL
GLUCOSE SERPL-MCNC: 111 MG/DL (ref 65–99)
HCT VFR BLD AUTO: 46.5 % (ref 37.5–51)
HGB BLD-MCNC: 15.6 G/DL (ref 13–17.7)
IMM GRANULOCYTES # BLD AUTO: 0.02 10*3/MM3 (ref 0–0.05)
IMM GRANULOCYTES NFR BLD AUTO: 0.3 % (ref 0–0.5)
LYMPHOCYTES # BLD AUTO: 1.36 10*3/MM3 (ref 0.7–3.1)
LYMPHOCYTES NFR BLD AUTO: 20.6 % (ref 19.6–45.3)
MCH RBC QN AUTO: 29.2 PG (ref 26.6–33)
MCHC RBC AUTO-ENTMCNC: 33.5 G/DL (ref 31.5–35.7)
MCV RBC AUTO: 86.9 FL (ref 79–97)
MONOCYTES # BLD AUTO: 0.44 10*3/MM3 (ref 0.1–0.9)
MONOCYTES NFR BLD AUTO: 6.7 % (ref 5–12)
NEUTROPHILS # BLD AUTO: 4.61 10*3/MM3 (ref 1.7–7)
NEUTROPHILS NFR BLD AUTO: 69.7 % (ref 42.7–76)
NRBC BLD AUTO-RTO: 0 /100 WBC (ref 0–0.2)
PLATELET # BLD AUTO: 156 10*3/MM3 (ref 140–450)
POTASSIUM SERPL-SCNC: 4.3 MMOL/L (ref 3.5–5.2)
PROT SERPL-MCNC: 5.7 G/DL (ref 6–8.5)
PSA SERPL-MCNC: <0.014 NG/ML (ref 0–4)
RBC # BLD AUTO: 5.35 10*6/MM3 (ref 4.14–5.8)
SODIUM SERPL-SCNC: 141 MMOL/L (ref 136–145)
WBC # BLD AUTO: 6.61 10*3/MM3 (ref 3.4–10.8)

## 2025-05-29 PROCEDURE — 1160F RVW MEDS BY RX/DR IN RCRD: CPT | Performed by: NURSE PRACTITIONER

## 2025-05-29 PROCEDURE — 99214 OFFICE O/P EST MOD 30 MIN: CPT | Performed by: NURSE PRACTITIONER

## 2025-05-29 PROCEDURE — 3078F DIAST BP <80 MM HG: CPT | Performed by: NURSE PRACTITIONER

## 2025-05-29 PROCEDURE — 1125F AMNT PAIN NOTED PAIN PRSNT: CPT | Performed by: NURSE PRACTITIONER

## 2025-05-29 PROCEDURE — 3074F SYST BP LT 130 MM HG: CPT | Performed by: NURSE PRACTITIONER

## 2025-05-29 PROCEDURE — 1159F MED LIST DOCD IN RCRD: CPT | Performed by: NURSE PRACTITIONER

## 2025-05-29 NOTE — PROGRESS NOTES
CHIEF COMPLAINT: Intermittent nausea and vomiting the last week or so with decreased appetite    Problem List:  Oncology/Hematology History Overview Note   1.  Stage IVb grade group 4 metastatic prostate cancer with sclerotic bone lesions on CT and bone scan and history of prostatic hypertrophy.  Good response to Taxotere ending 2/18/2021 Relugolix, followed by Zytiga prednisone with L4/sacrum, left acetabulum, and right pelvis external beam radiation August 2021.  Hypophosphatemia on Zometa.  Zometa held as of October 2021.  2.  Kidney stone  3.  Polyps  4.  Probable drug-induced hypophosphatemia from Zometa, drug stopped after 10/5/2021 dose  5.  Cancer related pain seeing palliative care  6.  Fatigue  7.  Covid 2/2022  8.  Iron deficiency anemia  -5/3/2023 EGD and colonoscopy with Dr. Alexander Gomez: Superficial erosions in the stomach with gastritis.  Small polyps removed, dilated internal hemorrhoids.  Pathology pending.  Recommend repeat colonoscopy in 5 years.  9.  CADz    -9/30/2020 office note from Dr. Ronen Reynaga indicates patient with PSA 10.8.  Underwent TRUS prostate biopsy 9/15/2020.  Adenocarcinoma the prostate Sarika 4+4 = 8 in 1 out of 12 cores and 4+3 = 7 and 10 out of 12 cores and 3+4 = 7 in 1 out of 12 cores.  Perineural invasion.  Extraprostatic extension into the fat seen on 2 biopsies of the right lateral mid and right apex.  9/24/2020 CT chest and whole-body bone scan showed T12, L1, left acetabular, right medial acetabular metastases with no lung metastases.  He started androgen deprivation therapy with Casodex with plans for Lupron to start in 1 to 2 weeks for clinical T3 N0 M1 metastatic prostate cancer.  Review of official report from [facility] 9/24/2020 bone scan mentions T12, L1, roof of left acetabulum and medial right acetabular osteoblastic metastases.  CT chest with contrast that same day showed mild splenomegaly 14.2 cm with stable periaortic nodes compared to CT  December 2015 with no lung metastases.  Sclerotic abnormality within the T12 vertebral body, L1 vertebral body extending into the pedicle unchanged.  8/28/2020 CT abdomen pelvis report from [facility] reviewed and mentions normal spleen size.  Punctate nonobstructing kidney stone, no paulino enlargement, diffuse bladder wall thickening with mild perivesicular fat stranding, 2.1 cm sclerotic left iliac bone above the left acetabulum new compared to 2015 as well as 1.5 cm faintly sclerotic focus in the right posterior acetabulum stable compared to prior CT 2015 as well as a 1.6 cm T12 and inferior vertebral body sclerotic lesion and a 1.9 cm left posterior lateral L1 vertebral body abnormality extending to the pedicle.    -10/13/2020 initial [facility] medical oncology consultation: With 1 [place] score 8, 4+4 lesion that would make him a grade group 4 with stage IVb disease given radiographic evidence of sclerotic bone metastasis on bone scan and CT.  Needs genetic testing given metastatic prostate cancer and this is also important as, were he to have mismatch repair mutation then we would have options for PDL 1 inhibitors and were he BRCA mutated would have options for PARP inhibitors in the future.  Systemic therapy for castration naïve prostate cancer or N1 disease should be with apalutamide or Abiraterone or enzalutamide all of which are category 1.  He does not have any visceral metastases.  According to his imaging he has T12, L1, left acetabular, right acetabular, and left iliac bony involvement.  That means he would have 4 or more bone metastases with at least one metastasis (i.e. the acetabular lesions bilaterally) beyond the pelvis/vertebral, for which this would be considered high-volume disease for which 6 cycles of docetaxel 75 mg/m² x 6 cycles followed by Zytiga and prednisone would be reasonable along with Zometa every 6 weeks for bone stabilization.  He will do chemo preparation visit with my nurse  practitioner prior to start chemotherapy with Taxotere and Zometa in 2 weeks and he is already received Casodex in preparation for Lupron shot he received on 10/12/2020 with Dr. Reynaga.  We will get him to Dr. Alexander Gomez who has done his polypectomies in the past for port placement and we will get genetic counseling started.  Following the 6 courses of Taxotere per the [place] trial criteria, I would then give him an indefinite Zytiga and prednisone and Zometa until progression or toxicity dictates.    10/29/2020 PSA 1.37    -12/16/2019 COVID-19 antigen test negative    -12/29/2020 PSA 0.275 with absolute neutrophil count 700 and creatinine 0.76 with normal liver enzymes and electrolytes.  Normal magnesium and phosphorus and urine drug screen positive for buprenorphine and opiates follow-up with palliative care.    -1/4/2021 CT chest abdomen pelvis with contrast and whole-body bone scan shows improving less metabolically active bone metastases.  Stable sclerotic T12, L1, and L2 vertebral bodies and bilateral pelvic bones on bone windows with stable subcentimeter para-aortic lymph nodes and no definite progression in the chest abdomen or pelvis.    -1/5/2021 [facility] medical oncology follow-up visit: I reviewed the images and reports thereof of the above CT and bone scan which shows good response to Taxotere x3 courses with a PSA down to 0.275 from a baseline of 10.  Get another 3 courses of Taxotere, continue his Xgeva, and then following that we will switch to Zytiga and prednisone.  He is working with palliative care on his bone pain but on his current dose of fentanyl patch he says his pain is still not adequately controlled he will contact them.  Dexamethasone prescription was refilled.  We will see my nurse practitioner back in 3 weeks for course #5 of 6 total courses and then we will reimage with CT chest abdomen pelvis and bone scan before going to the Zytiga prednisone.    -3/4/2021 CT chest abdomen  pelvis with contrast is negative save for stable sclerotic bone lesions and the bone scan shows near resolution of prior bony abnormalities associated with the known sclerotic lesions in the thoracolumbar spine and bony pelvis.    2/17/2021 PSA down to 0.1 from 1.37 October 2020.  3/9/2021 PSA 0.1    -5/26/2021 CT chest abdomen pelvis with contrast shows stable to slightly underlying increase low-density left para-aortic node.  Bone lesions stable.  Whole-body bone scan stable to decrease activity of known thoracolumbar and bony pelvic involvement.  PSA less than 0.1  , AST 74, bilirubin 0.5.       -6/1/2021 [facility] oncology follow-up visit: I reviewed the above data with him and images thereof.  Bones are stable.  No significant adenopathy.  PSA immeasurably low.     upper limit of normal 69 and AST 74 upper limit of normal 46.  Hence, neither is more than double the upper limit of normal with no ascites and no encephalopathy and normal albumin and bilirubin, despite the elevation of liver enzymes, he has child Abrams score A.  Package insert for Abiraterone says that for ALT and/or AST greater than 5 times upper limit of normal or total bilirubin greater than 3 times the upper limit of normal to withhold treatment until liver function tests returned to baseline or ALT and AST less than or equal to 2.5 times the upper limit of normal and total bilirubin less than or equal to 1.5 times the upper limit of normal and then reinitiate at 750 mg daily dose of Zytiga.  For recurrent hepatotoxicity on 750 mg daily Zytiga, withhold treatment until liver functions returned to baseline or ALT and AST less than 3-2.5 times upper limit of normal and total bilirubin less than or equal to 1.5 times the upper limit of normal then reinitiate at 500 mg daily Zytiga dose.  If has recurrent hepatotoxicity on 500 mg dose, then discontinue treatment.  If has ALT greater than 3 times the upper limit of normal along  with total bilirubin greater than 2 times the upper limit of normal in the absence of other contributing causes or biliary obstruction, permanently discontinue Zytiga.  Based on these recommendations, I would continue current doses but we will watch with serial labs monthly and repeat his imaging again in 3 months but clinically he is doing wonderfully with excellent response to Taxotere and now on Zytiga prednisone plus Zometa along with Relugolix.    -6/23/2020 for [facility] follow-up: Having persistent and worsening pain above his left hip.  I will get an MRI of the left hip for further evaluation.  Otherwise we will continue therapy unchanged with Zytiga, prednisone and Zometa along with Relugolix.    -7/28/2021 [facility] follow-up: Patient with multiple somatic complaints including episodes of confusion, dizziness, decreased memory, agitation.  He reports ongoing episodes with difficulty swallowing.  Also most concerning for episodes of angina and chest pain for which he basically refused to seek emergency medical attention.  He has a history of coronary artery disease and stent placement.  He has not followed up with cardiology for many years.  I will get an MRI of the brain in light of his confusion, dizziness and agitation.  I will get him to gastroenterology for EGD in light of his dysphagia.  I have also put in a referral for cardiology.  I reemphasized to the patient, his wife who is with him today and his son who was on the telephone during our visit the importance of seeking out prompt medical attention when he is having chest pain or neurological concerns so that he can be evaluated.  The patient states that he basically refuses to go to the emergency room and if his family calls an ambulance he would not agree to go to the hospital.  For the time being, I have asked him to hold his Zytiga and prednisone until we can sort this out as Zytiga does have some cardiovascular  "risk.  There is likely no treatment for prostate cancer that does not carry some form of cardiovascular risk.  His PSA remains low at 0.014 yesterday.  He will continue relugolix for now.    Of note, some of these symptoms could also be due to his hypophosphatemia, phosphate level from yesterday was 1.5, I have sent in a prescription for K-Phos 3 times a day before meals for 10 days.  We will hold further Zometa until this is corrected.  I have also put in an order to check his vitamin D level.  He takes calcium and vitamin D daily.  Some of his symptoms also could be due to drug withdrawal as there was some confusion and difficulty getting his last prescription for methadone therefore he was without methadone for several days, he now has his prescription and is back on his pain medication.  He has an appointment with neurosurgery tomorrow in light of his ongoing back pain, MRI of the hip recently showed multiple bony lesions largest at the L5 vertebral body and left supra-acetabular region.  If no neurosurgical intervention recommended he may benefit from spot radiation as this is where a lot of his pain is coming from.  His wife had questions today regarding his staging and extent of disease.  We reviewed previous scans.  I did clarify with her as she used the words \"cancer free\" as she thought that is what she was told after his CT scans once he completed Taxotere in February.  I explained to her that even though his scans showed he had an excellent response with no clear areas of metastasis that did not mean he was cancer free, I explained to her that when you have metastatic cancer the treatment intent is palliative in nature and to control the disease but not to cure the disease.  She stated understanding.  We will see him back in 2 weeks for follow-up to sort through this and make further plan of care, again, I have asked him to hold Zytiga and prednisone until he sees us back, he will continue on his other " medications including relugolix.    -7/30/2021 neurosurgery PA consultation.  MRI with and without contrast L5 ordered.    -8/3/2021 neurosurgery follow-up showed known sclerotic disease with new L5 abnormality without compression deformity or instability.  MRI brain negative for metastasis.    -8/4/2021 office visit Dr. Fco Quintanilla radiation oncology coordinating with Dr. North neurosurgery for palliative radiation for MRI evidence of L5 and left supra acetabular painful lesions.  States that the patient's disease which is not secreting PSA is experiencing some progression and would benefit from 20 Gy in five fractions and other lesion 30 Gy in ten fractions. Patient offered to go to [place] for radiation but desired treatment in [place].    -8/10/2021 [facility] oncology follow-up visit: No chest pains at this junction.  Reviewed MRI brain and other MRIs reviewed by Dr. North and Dw[place].  Due for EGD on 19th.  We will repeat his phosphorus along with his other labs continue Relugolix, Zytiga, prednisone, and he will continue radiation palliatively to the painful bony lesions with Dr. Quintanilla/Can.  He will see my nurse practitioner back in a few weeks to see how he is tolerating this and to follow-up on the phosphorus off of Zometa and she will order repeat CT chest abdomen pelvis with contrast and total body bone scan the end of September.I will see him back after that.    -9/8/2021 Zometa resumed.  PSA <0.10    -10/5/2021 [facility] medical oncology follow-up visit: followed-up with radiation oncology 9/21/2021.  Status post symptomatic L5 radiation 5 fractions 20 [place] completed 8/16/2021 with improvement in right hip pain.  9/2/2021 PSA less than 0.1.  9/29/2021 total body bone scan shows increased uptake left iliac bone slightly superior group of the left acetabulum with no additional foci of suspicious abnormality is unlikely treatment related with no new sites of active osseous metastasis.  CT chest  abdomen pelvis with contrast shows stable borderline enlarged left periaortic nodes and dating sclerotic bone lesions.Continue Zytiga, prednisone, Relugolix, Zometa, repeat CT chest abdomen pelvis with contrast and total body bone scan the end of the year.  We will follow PSA serially.    -11/17/2021 [facility] Oncology clinic follow-up:  Mr. Lugo overall is doing well.  He is tolerating therapy with Zytiga, prednisone and Relugolix along with Zometa which is given every 6 weeks.  PSA remains low at <0.1.  Has occasional low phosphorus, currently low at 1.7.  Serum calcium is normal at 8.9, normal creatinine 0.79 and normal CBC other than for platelets of 124.  I will hold Zometa for 1 month, I did send in a prescription for phosphorus replacement today.  He takes calcium and vitamin D.  I will see him back in 1 month for follow-up.  We will repeat restaging scans after that visit.    -12/15/2021 [facility] Oncology clinic follow-up: Mr. Lugo continues to do well on therapy with Zytiga, prednisone and Relugolix, his PSA remains low at <0.1.  He is continuing to have left lower back pain, he is working with palliative care and they have referred him also to pain management.  Pain management has talked to him about putting in a pain pump however he is leery of this, I told him that this was a safe and effective pain management technique and to certainly give consideration to their recommendations.  His phosphorus is improving however is still a little low, I will hold off on Zometa until we see him back in 1 month, we may end up only giving it every 12 weeks.  We will repeat restaging scans with CT chest, abdomen and pelvis along with total body bone scan prior to return and I have ordered those today.      -1/5/2022 CT chest abdomen pelvis with contrast [facility] showing stable left periaortic adenopathy and unchanged sclerotic thoracic, lumbar spine and pelvis lesions with no new or progressive disease in the  chest abdomen or pelvis.  Total body bone scan shows no significant change and no new suspicious foci.  Stable posterior right acetabulum and left superior acetabulum and degenerative changes in the cervical spine, shoulders, acromioclavicular joints, sternoclavicular joints, elbows, wrists, hands, thoracic spine, lumbosacral spine, sacroiliac joints, hips, knees, ankles, feet.    -1/11/2022 [facility] [place] medical oncology hematology follow-up visit: I reviewed images and reports from his 1/5/2022 scans.  No active disease and PSA immmeasurably low on Zytiga, prednisone, Relugolix.  However, he has struggled with hypophosphatemia and is significantly fatigued with this.  Given that the bones are doing well and given that his phosphorus continues to remain consistently low at 2.2 in mid December, 1.7 mid November and 2.5 mid-September and as low as 1.5 back to July and the likelihood that this is related to his Zometa, given that his bones are stable overall, he will increase from 1200 mg up to 1600 mg of calcium and phosphate a day and we will hold his Zometa for the foreseeable future.  He will see my nurse practitioner back in a month to continue to monitor his phosphorus along with his calcium and other labs and will repeat his scans again in 3 months.  In addition, given his fatigue we will check his ACTH and cortisol though he is taking his prednisone with his Zytiga.  Though his electrolytes are normal, I will keep an eye on the cortisol and ACTH and have these labs not only drawn today but again just prior to return to my nurse practitioner on February 10.    -2/10/2022 [facility] [place] Oncology clinic follow-up: Mr. Lugo overall is stable, no change in his health this past month.  I have reviewed his labs from 2/8/2022 and they are unremarkable, his phosphorus is now normal at 3.2. We are continuing to hold his Zometa, he last received Zometa on 10/5/2021.  He continues therapy with Zytiga, prednisone and Relugolix.    Rachid had ordered cortisol level and ACTH which are low, currently his ACTH is 2.8 and cortisol 3.08 however these are both done in the face of ongoing prednisone that he takes with his Zytiga.  We will get him to endocrinology for further evaluation for possible adrenal insufficiency but will not interrupt his treatment in the interim.  He will need restaging scans again in April for a 3-month follow-up.  He will continue to follow with palliative care and pain management for his cancer related pain.  His PSA remains immeasurably low at <0.1 on 2/8/22.    -2/15/2022 went to emergency room with weakness, decreased appetite, myalgias in the setting of known COVID-19 testing positive a few days prior to this visit.  Phosphorus had been low in the past but was normal in the emergency room with unremarkable CBC and CMP.  Chest x-ray unremarkable saturating well on room air mildly hypertensive with dry mucosa.  Given 500 cc crystalloid.  Covid test positive.  Mild thrombocytopenia 136,000 and otherwise unremarkable.  Discharged home.    -3/3/2022 data:  PSA less than 0.1  CMP unremarkable  Cortisol 3.96 normal  ACTH 2.8 lower limit of normal 7.2  Phosphorus normal 2.6    -3/8/2022 [facility] medical oncology follow-up: Suspect big chunk of his fatigue was Covid.  Another part of the fatigue likely related to lack of testosterone.  Not hypophosphatemic off of Zometa.  ACTH running low while on prednisone not surprising but with normal cortisol and I doubt adrenal insufficient which is why he is on prednisone to avoid such while taking Zytiga.  Overall doing fairly well and with immeasurably low PSA, I will have labs done on him early April, see my nurse practitioner early May, and she will order repeat bone scan and CTs the end of May and I will see him back in June.  We will continue to hold the Zometa unless bone lesions progress given symptomatic prior hypophosphatemia on Zometa.  He will keep his appointment April 28  with Dr. Mir Duffy just to be sure that my take on his pituitary/adrenal axis assessment is accurate.    -4/28/2022 endocrinology consult Dr. Mir Duffy.  With low ACTH and cortisol on prednisone with Zytiga, the adequacy of prednisone cannot be assessed by measuring ACTH or cortisol but is assessed by weight changes, blood pressure, blood sugar, potassium, overall sense of how the patient is doing.  Primary adrenal insufficiency with low ACTH and low cortisol would be typical for the situation as his blood sugar tends to run a little high and potassium a little low, he did not think increasing prednisone was necessary.  He put him on some potassium.  No follow-up scheduled, will see as needed.    -5/4/2022 [facility] Oncology clinic follow-up: Mr. Lugo continues on therapy with Zytiga and prednisone along with Relugolix.  His Zometa has been on hold due to previous hypophosphatemia.  There is some concern about potential adrenal insufficiency. He saw endocrinology, Dr. Mir Duffy on 4/28/2022.  I did review his office note and he stated that the low ACTH and low serum cortisol would be typical for Mr. Lugo's situation.  He further stated that the adequacy of prednisone dose cannot be assessed by measuring ACTH or cortisol but is rather assessed by the overall just altered how the patient is feeling-weight change, blood pressure, blood sugar, potassium, overall sense of wellbeing.  His potassium had been running a little low therefore they put him on potassium 10 mill equivalents daily. Of note, I do not see a future follow-up scheduled with endocrinology.  He is having night sweats, I am not sure if this is related.  His glucose was normal this morning at 107.  His weight is stable.  He will continue current treatment for his prostate cancer unchanged, we will continue to hold his Zometa.  Current phosphorus and magnesium are normal.  CBC and CMP from today are unremarkable, cortisol is normal.  PSA and ACTH are  pending  In regards to his night sweats, he had taken clonidine previously 0.1 mg twice daily as needed, I did send in a short supply again to try and see if this helps.  He also is having indigestion despite being on omeprazole twice daily, I sent a prescription for Pepcid.  He had an EGD August 2021.  We will repeat restaging scans prior to return and I have ordered those today.     -5/24/2022 CT chest abdomen pelvis with contrast[place] negative.  Bone scan shows stable bone metastases.    - 5/25/2022 PSA less than 0.1, platelets 130,000, otherwise unremarkable CBC and CMP.  A.m. cortisol running low 1.2 with phosphorus low 2.3.    -5/31/2022 [facility] oncology follow-up: On Zytiga, prednisone, Relugolix, PSA remaining immeasurably low with stable bone scan and no visceral/paulino metastases.  Phosphorus still running low.  I have discontinued his potassium chloride and started potassium phosphate 500 mg 4 times a day.  Unless PSA rising, we will plan on repeating CTs and bone scan in 6 months and will follow with my nurse practitioner in the interim and if symptoms dictate or labs, will get back to Dr. Duffy for management of potential adrenal insufficiency but presently we will just see how he does with potassium phosphate and hopeful improvement in the phosphorus level with time.  We will continue to follow with palliative care for pain management    -7/6/2022 [facility] clinic follow-up: Mr. Lugo overall is doing well but continues to be troubled with fatigue and hot flashes.  For the most part he states he is able to do the things he wants to do, he continues to work but does have to take more rest periods.  I had a long discussion with him and his wife after reviewing his medications with them as they wanted to see if there was anything he could stop or adjust.  I discussed with him the need to continue on treatment for his prostate cancer even though his PSA remains immeasurably low and his  scans look good but that if his medication is stopped the cancer would progress and over time it still has the potential to progress on therapy but would continue it as long as possible to keep his disease under control.  I explained this is like treating someone with hypertension, you do not stop the antihypertensive just because the blood pressure normalizes.  They stated understanding.  There is no dose adjustment of Zytiga or prednisone that would minimize his symptoms, he is on the current standard recommended therapy.  Discussed with him that sometimes during times of stress we may need to increase his prednisone dose but for now would not change anything.  His phosphorus level is normal, we continue to hold his Zometa.  I did give him the option of holding his phosphorus for the next month and we will see what happens, if it drops we will start him back on it but may be at a lower dose rather than 4 times a day maybe twice a day.  For now he will continue therapy unchanged.  We will continue monitoring labs monthly.  No PSA was drawn this month but that is okay as his PSA has been running immeasurably low at <0.1, we will recheck next month with lab draw.  We will stop checking his ACTH and cortisol level every month, if he develops worsening symptoms I will repeat those.  He has not gotten a follow-up with endocrinology as of yet.    -8/3/2022 [facility]:  Mr. Lugo is doing well as far as his prostate cancer goes with continued immeasurably low PSA of <0.1.  He continues on therapy with Zytiga and prednisone along with Relugolix.  His phosphorus is stable at 2.7 which was just slightly low by our reference range however was 2.71-month ago also which was normal on another labs reference range.  He has not taken his phosphorus for this past month as he thought it was making him more fatigued.  He really can tell no difference whether he was taking it or not.  I have asked him to try to  go back on phosphorus twice a day rather than 4 times a day and see if this is tolerated and if we can keep his phosphorus level from dropping.  His biggest complaint today is flareup of his arthralgias and back pain.  He is following with pain management, Dr. Danny Avalos but is requesting a referral back to palliative medicine as he feels that no one is currently listening to him and they are just prescribing the same medications that are not effective according to his report.  He does have a follow-up with Dr. Avalos on 8/12/2022 but due to his current pain is not able to work until after he is seen and hopefully can get some better relief.  I have given him a work release until 15 August.  I have also put in a referral back to palliative care.  Also encouraged him to work with Dr. Avalos for help in resolving his issue.  Apparently they have recommended some type of a pump however he has been hesitant to that but likely would be helpful.  We will continue therapy unchanged.  We will continue monthly labs including phosphorus, we are not currently checking ACTH and cortisol monthly as Dr. Mir Duffy previously stated in his assessment on 4/28/2022 that the adequacy of prednisone cannot be assessed by measuring ACTH or cortisol but would be assessed by symptoms, see note from 4/28/2022.  He made no further follow-up appointments.  Fatigue is not worsening currently.  His glucose and potassium are  Normal.    -10/13/2022 [facility] medical oncology follow-up: Mr. Lugo is doing well.  He is tolerating treatment with Zytiga and prednisone along with Relugolix without any unusual side effects.  His PSA has remained immeasurably low at <0.1.  His phosphorus was slightly low at last visit.  He had been instructed to go back on phosphorus twice a day but he misunderstood and has not been taking it.  We will check labs today.  I will follow-up with results and notify him if he needs to restart the phosphorus.  His pain is  better controlled since reestablishing with Anna Ayers, palliative care.  We will repeat scans prior to next follow-up.  I have ordered CT chest abdomen pelvis along with bone scan.  We will see him back in 1 month.    -10/13/2022 PSA less than 0.014.With unremarkable CBC and CMP.  Phosphorus still a little low 2.3.    -11/4/2022 CT abdomen pelvis chest showed no evidence of disease progression with stable sclerotic bone lesions.  Bone scan stable right greater than left acetabular metastasis.  Stable bone metastases.    -11/8/2022 [facility] medical oncology telemedicine follow-up: Patient states that he feels achy all over with low-grade temps and a cough mildly productive.  Has an appointment later today to see his primary care for testing for flu and COVID.  Suggested that if he were positive for COVID that he get Paxlovid and that if he were positive for flu that he get Tamiflu and to discuss this with his primary care.  From the prostate cancer side, his PSA is immeasurably low with stable bone metastases and no evidence of progression.  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  We will continue the Relugolix, Zytiga, prednisone and he will see my nurse practitioner 12/7/2022.  Would repeat CTs and bone scan in May.    -1/25/2023 [facility] Oncology medicine follow-up: Mr. Lugo is having worsening fatigue and muscle aches.  He does have adrenal insufficiency on Zytiga and prednisone for his prostate cancer, he saw endocrinology in light of low ACTH back in April 2022.  At that time there was no recommendation other than for monitoring him for his overall wellbeing and labs.  He denies any fevers or chills.  His labs as shown above are unremarkable, his CBC and CMP are normal other than for glucose of 112, PSA remains low at <0.1.  He has no new pain or any symptoms concerning for progression of his prostate cancer.  I will get him back to endocrinology to see if they feel  any dose adjustment of his prednisone would be helpful in his symptoms.  Currently he is on 5 mg a day with his Zytiga.  I did not repeat his ACTH or cortisol as they would be expected to be low in this situation.  His potassium is normal, he has had no weight loss or change in his appetite.  Blood sugar is reasonable.  His only complaint currently is worsening fatigue and muscle aches.  He will continue treatment unchanged with Zytiga, prednisone and relugolix.  I will see him back in 1 month for follow-up.  We will repeat his restaging scans in May.    -3/1/2023 [facility] Oncology clinic follow-up:  His PSA remains low at <0.1 on treatment with Zytiga and prednisone along with relugolix however he continues to complain of significant fatigue and muscle aches not improving with time.  He states that his quality of life is affected as he is not able to do any work activities due to the fatigue.  He did see endocrinology again and they have increased his prednisone from 5 mg daily up to 7.5 mg daily however so far this has not made any difference in how he feels.  He is supposed to get back in touch with them to let them know how he is doing and to see if there is any other adjustments needed. His labs are unremarkable with normal thyroid function, CMP is normal other than for glucose of 156.  They did check hemoglobin A1c to look for diabetes however that was normal at 5.7.  CMP is unremarkable with just very mild anemia with hemoglobin of 12 and hematocrit 36.1% which would not account for his fatigue.  He does have hot flashes that are fairly significant, I will try venlafaxine as suggested by Dr. Duffy and I appreciate the recommendation.  We will start at 37.5 mg daily and if tolerated he can increase to 75 mg after 1-2 weeks.  I will see him back in 1 months for follow-up.  Plan to repeat scans late April/early May.  His prostate cancer seems under good control with current regimen however I am not sure how long  he can tolerate this due to the side effects.    -3/30/2023 [facility] follow-up:  Mr. Lugo continues to deal with fatigue.  He appears more cushingoid today, he continues on treatment with Zytiga, prednisone and relugolix.  Prednisone dose currently is 7.5 mg daily.  This has not made any difference in his energy level.  PSA remains immeasurably low at <0.014.  CBC shows new onset of microcytic hypochromic anemia with hemoglobin of 11, hematocrit 34.4%, MCV 75.4, MCH 24.1, WBC is normal at 5.81 with normal platelet count 181,000 and normal differential.  CMP is unremarkable, it is normal other than for glucose of 142.  We will get him to Dr. Gomez for an EGD and colonoscopy for further evaluation in regards to his new onset of anemia.  He has no associated GI symptoms otherwise except for 1 day a few weeks ago he does report that he had fairly sudden onset of vomiting but this was an isolated incident on that day and has not reoccurred since.  Continues to follow with palliative care for management of his chronic back pain.  We will repeat restaging CT chest, abdomen and pelvis along with total body bone scan prior to return and I have ordered that today.    -3/30/2023 Ferritin low 12.3 with iron low 27 and saturation low 5% with total iron binding capacity 511.  3/31/2023 ferrous sulfate 325 mg twice a day every other day with vitamin C started.    - 4/12/2023 CT chest abdomen pelvis with contrast showed no progressive disease with stable sclerotic bone lesions.  Bone scan showed single identifiable bone metastasis stable right acetabulum    -4/18/2023 [facility]ology follow-up: He now has microcytic iron deficiency anemia which is new and concerning.  Gastroenterologic evaluation has been ordered but not performed and is mandatory.  Continue ferrous sulfate 325 mg twice a day every other day with vitamin C to improve absorption and we will follow his CBC along with his usual labs monthly  on Zytiga, Relugolix, prednisone 7.5 mg.  He follows with Dr. Duffy with adrenal insufficiency.  He will see my nurse practitioner back in a month to see what GI has found and to watch serial CBC along with his other labs including PSA.  I would repeat CT chest abdomen pelvis again in 6 months with contrast along with bone scan and sooner if PSA rises.    -5/3/2023 EGD and colonoscopy with Dr. Alexander Gomez: Superficial erosions in the stomach with gastritis.  Small polyps removed, dilated internal hemorrhoids.  Pathology pending.  Recommend repeat colonoscopy in 5 years.    -5/17/2023 [facility] Oncology clinic follow-up:  Naveen overall is doing well on current therapy with Zytiga, prednisone and relugolix.  PSA remains immeasurably low at <0.014.  He feels his hip pain is getting worse with time.  It makes it difficult to enjoy activities with his grandchildren as it is worse when he stands for any length of time or tries to carry any weight.  I reviewed his bone scan from April which was stable, we also reviewed previous MRI of the hips from May 2021 that showed moderate bilateral hip osteoarthritis.  I offered to repeat MRI of his hips for evaluation to see if there has been any worsening of his osteoarthritis and also then referral to orthopedics for any recommended intervention however at this time he defers.  He will let me know if he changes his mind.  He also follows with palliative care for management of his cancer related pain.  We will continue therapy unchanged.  We will repeat restaging scans in October unless symptoms dictate the need to do so sooner.  He was recently found to be iron deficient, he had an EGD and colonoscopy on 5/3/2023 with Dr. Gomez, EGD showed superficial erosions in the stomach with gastritis, he continues on omeprazole.  He had small polyps removed from the colon and recommendation to repeat colonoscopy in 5 years.  He is on oral iron along with vitamin C every other day and is  tolerating that well.  CBC from yesterday shows hemoglobin now normal at 14.2 and hematocrit 43.2%, MCV normal at 27.0.    -7/20/2023 [facility] Oncology clinic follow-up: Naveen is doing well on current therapy with Zytiga, prednisone and relugolix.  PSA in June remains low at <0.1.  Continues to deal with chronic pain in his back and hip.  Recently saw Dr. Nikolai Marks and had an injection in his hip and is hoping that it will eventually help with his pain.  He has no new pain.  We will continue current therapy unchanged.  We will repeat labs while he is here today.  I will refill his iron as his pharmacy has changed. Most recent CBC in June with hemoglobin of 14.7 and hematocrit 43.6%.  We will repeat restaging scans in October.    -9/14/2023 [facility] Oncology clinic follow-up: Naveen is tolerating his prostate cancer treatment with Zytiga, prednisone and relugolix.  PSA remains immeasurably low at <0.014 on 8/17/2023.  Currently he is having more GI issues with nausea and intermittent vomiting that occurs often without warning.  He had an EGD and colonoscopy with Dr. Gomez on 5/3/2023, EGD showed superficial erosions in the stomach with gastritis.  He does take omeprazole 40 mg twice daily.  He saw his PCP yesterday for these symptoms and has been referred to gastroenterologist at [facility] and he is awaiting to hear about that appointment.  Hopefully he can get in fairly quickly.  I told him that he should let us know if it is going to be a while before he can be seen and I will get him back to Dr. Gomez for consideration of repeat EGD.  We will get his CBC, CMP and PSA today.  His previous noted anemia had normalized, he currently is not taking oral iron as he ran out.  I will wait and see what his labs show today and likely hold off for now depending on his lab results until his GI issues can be resolved.  We will repeat his restaging CT chest, abdomen and pelvis along with bone scan in the next few weeks and I  have ordered those today.  He is taking Zofran as needed for nausea, he is also on meclizine for vertigo.  He is going to follow-up with his PCP in a few weeks regarding his GI symptoms.    -9/14/2023 PSA less than 0.014 with unremarkable CBC and CMP.    -9/28/2023 total body bone scan stable with uptake in right acetabulum, no new areas.  CT chest, abdomen and pelvis show no evidence of disease progression.  -11/9/2023 [facility] follow-up: CT reports were reviewed by Dr. Rashid last month and the patient reports he was called by Dr. Rashid as he could not make his appointment due to concerns over upper respiratory infection, CT scan showed no evidence of disease progression.  PSA has remained low, we are repeating that today along with his CBC and CMP.  He continues to have sinus drainage and congestion, I will send in 7 additional days on his doxycycline that he has been taking, he will follow-up with PCP if no improvement.  He continues to complain of bilateral hip pain, has a follow-up with Dr. Marks next week.  We will continue therapy unchanged with Zytiga, prednisone and relugolix.  Would repeat restaging bone scan and CT scans late March for a 6-month follow-up.    -1/4/24 PSA 1    - 3/28/2024 CT chest abdomen pelvis with contrast compared to May 2013 shows decreasing sclerotic bony metastases.  Bone scan shows similar relative intensity of 1 focal uptake right acetabulum correlating with CT with no new sites of uptake    -4/2/2024 [facility] follow-up: Continued good response to Zytiga prednisone relugolix which he is tolerating.  We will check PSA and labs 6/25/2024 with follow-up with my nurse practitioner and again 3 months after that along with CTs and bone scan 6 months from now.  Not on bisphosphonates or rank ligand inhibitor due to drug-induced hypophosphatemia.  Will ask my nurse practitioner to order bone densitometry with his other scans in 6 months. He is having  difficulty emptying his bladder and his prior urologist does not take his insurance so we will make appropriate referrals.    -6/26/2024 [facility] follow-up: Overall continues to tolerate therapy with Zytiga, prednisone and Relugolix.  We will get labs today including PSA and as long as that is stable he will continue treatment unchanged with plans to repeat restaging CT scans and bone scans in September and I have ordered those today.  We will also obtain DEXA scan at that time to monitor for osteoporosis on long-term use of androgen deprivation therapy.  We have been holding his Zometa due to drug-induced hypophosphatemia.  He is following with urology and has had relief of his urinary hesitancy with some medication adjustments and he is scheduled for follow-up this Friday with cystoscopy.  He recently had chest pain and shortness of breath while mowing his lawn, he did have an echocardiogram and stress test yesterday, awaiting final results and reading from Dr. Kahn.  He continues to follow with palliative care for management of his pain which is under good control.  I did refill his symptoms today for chronic constipation from opioid use.    -9/16/2024 CT chest abdomen pelvis with contrast [place] [facility] compared to May 2024 shows stable 1.6 cm well-defined nodule left periaortic region.  Stable sclerotic left supra-acetabular, right posterior acetabular left posterior acetabular lesions.  No disease progression.  Total body bone scan shows grossly stable solitary active right posterior acetabular bone metastasis.    -10/2/24 PSA less than 0.1    -10/8/2024 [facility] follow-up: In order to get prostatic urethral pathway surgically open he needed cardiac clearance and is due for cardiac cath in the next week or 2 with Dr. Kahn.  I reviewed the above image results with him with no clear-cut evidence of progression and PSA remaining immeasurably low.  He does feel quite  fatigued.But this is not new.  He has become microcytic with a hemoglobin of 12.1 and MCV of 77 which has trickled down 14.8 and December.  His CMP is unremarkable.  He had an EGD and colonoscopy May of last year with Dr. Gomez that showed superficial erosive gastritis with internal hemorrhoids and colon polyps.  He states he has been taking his iron but he takes it with all his other medicines.  I told him to take it every other day with vitamin C with at least an hour and a half window on either side of it free from any other ingestion of medicines.  With reference to the pulmonary infiltrate he has had a frothy nagging mildly productive cough without fevers or chills or dyspnea for the last 2 months.  Rather than throwing an antibiotic at this, I am going to get pulmonary referral for their opinion.He is due for his bone density as well and we will order that.  He has been off of Zometa for quite some time due to hypophosphatemia  Addendum: He has never had genetic testing.  While that would not alter our plans in the near term, I will plan on genetic testing for his family sake as well as for potential treatable molecular targets in the future should treatment algorithms dictate.  I called and spoke with his wife to inform her of this plan.    - 10/29/2024 cardiac cath Dr. Kahn showing vascular disease for which she will manage medically with rosuvastatin and aspirin and antianginal therapy.    -11/6/2024 buccal mucosal genetic sampling given to patient for collection.    - 11/12/2024 pulmonology evaluation Makenzie Ramirez.  Status post multiple rounds of antibiotics on 5 mg prednisone at that junction.  Changed him to Breztri from [facility] to see if budesonide would help with congestion and adding DuoNebs along with montelukast.  Continues to smoke and we will follow-up with him in 4 weeks.    -12/4/2024 PSA immeasurably low less than 0.014.  Hemoglobin 14.4 and rising.  CBC unremarkable.  Glucose 115 otherwise  unremarkable CMP.    -12/10/2024 [facility] medical oncology follow-up: Prostate cancer under control on Relugolix Zytiga prednisone.  Continue to follow with urologist for obstructive symptoms from prostate.  Follow-up with palliative care for his ongoing bone pains.  I will rescan with bone scans and CT chest abdomen pelvis with contrast in March and see him back after that along with his labs and PSA.  Follow-up with pulmonary medicine regarding his dyspnea.  Hemoglobin rising.  Asked him to touch base with Dr. Kahn regarding his tachycardia with regular rhythm.    -3/4/2025 PSA less than 0.14.Glucose 126 BUN 6 total protein 5.7 otherwise unremarkable CMP and CBC.  - 3/5/2025 CT chest abdomen pelvis with contrast [place] [facility] compared to September 2024 shows no evidence of metastatic disease with stable sclerotic bone metastases.  Total body bone scan shows indeterminant bilateral rib foci favored to represent benign etiology otherwise no significant change with a single bone metastasis.    -3/11/2025 [facility] medical oncology follow-up: With PSA immeasurably low would not make much out of indeterminant rib abnormalities on bone scan and nothing else to suggest progression.  Tolerating Relugolix Zytiga prednisone.  Will see my nurse practitioner back towards the end of May with repeat labs and plan on repeat imaging in 6 months and PSA quarterly.Patient opted to do blood draw test for genetics which he will do on 4/14/2025.    -5/14/2025  Genetic testing was negative for pathogenic mutations in BRCA1/2 and 38 additional genes included on the CancerNext panel.   - 5/29/2025 PSA <0.014     Prostate cancer metastatic to bone   8/30/2020 -  Other Event    PSA 10.8     9/15/2020 Initial Diagnosis    Prostate cancer metastatic to bone (CMS/HCC)     9/15/2020 Biopsy    Prostate needle core biopsy     9/24/2020 Imaging    Total body bone scan: 4 abnormal areas of increased activity consistent with osteoblastic  metastasis, abnormal foci of activity seen in the T12 vertebral body, L1 vertebral body, roof of the left acetabulum, and acetabulum medially on the right.  CT chest: Stable sclerotic metastatic lesions within the T12 and L1 vertebrae.  No other evidence of metastatic disease to the chest.  Stable mild splenomegaly and subcentimeter periaortic retroperitoneal lymph nodes.  CT abdomen and pelvis: Diffuse bladder wall thickening with mild perivesicular fat stranding.  Interval development of several sclerotic lesions in the spine and left iliac bone.     10/13/2020 Cancer Staged    Staging form: Bone - Appendicular Skeleton, Trunk, Skull, And Facial Bones, AJCC 8th Edition  - Clinical: Stage IVB (cT3, cN0, cM1b, G3) - Signed by Ishmael Rashid MD on 10/13/2020     11/3/2020 - 10/5/2021 Chemotherapy    OP SUPPORTIVE Zoledronic Acid Q42d     11/3/2020 - 2/18/2021 Chemotherapy    OP PROSTATE DOCEtaxel       1/4/2021 Imaging    CT chest abdomen pelvis with contrast and whole-body bone scan shows improving less metabolically active bone metastases.  Stable sclerotic T12, L1, and L2 vertebral bodies and bilateral pelvic bones on bone windows with stable subcentimeter para-aortic lymph nodes and no definite progression in the chest abdomen or pelvis.  PSA down to 0.275 after 3 courses of Taxotere.     3/4/2021 Imaging    CT chest, abdomen and pelvis stable, no evidence of disease progression. Total body bone scan with decreased/near resolution of abnormal increased radiotracer uptake associated with the known sclerotic lesions in the thoracolumbar spine and bony pelvis.  No evidence of new osteoblastic metastases.     3/4/2021 Imaging    CT chest abdomen pelvis with contrast is negative save for stable sclerotic bone lesions and the bone scan shows near resolution of prior bony abnormalities associated with the known sclerotic lesions in the thoracolumbar spine and bony pelvis.  2/17/2021 PSA down to 0.1 from 1.37 October  2020.     3/9/2021 - 3/9/2021 Chemotherapy    OP SUPPORTIVE PROSTATE Relugolix     3/9/2021 -  Chemotherapy    OP PROSTATE Abiraterone / PredniSONE       3/10/2021 -  Chemotherapy    OP PROSTATE Abiraterone / PredniSONE     8/10/2021 - 8/16/2021 Radiation    Radiation OncologyTreatment Course:  Naveen Lugo received 2000 cGy in 5 fractions to L4-sacrum, left acetabulum and right pelvis via External Beam Radiation - EBRT.     11/16/2021 -  Chemotherapy    OP CENTRAL VENOUS ACCESS DEVICE ACCESS, CARE, AND MAINTENANCE (CVAD)     1/5/2022 Imaging    -1/5/2022 CT chest abdomen pelvis with contrast [place] [facility] showing stable left periaortic adenopathy and unchanged sclerotic thoracic, lumbar spine and pelvis lesions with no new or progressive disease in the chest abdomen or pelvis.  Total body bone scan shows no significant change and no new suspicious foci.  Stable posterior right acetabulum and left superior acetabulum and degenerative changes in the cervical spine, shoulders, acromioclavicular joints, sternoclavicular joints, elbows, wrists, hands, thoracic spine, lumbosacral spine, sacroiliac joints, hips, knees, ankles, feet.     5/24/2022 Imaging    CT chest abdomen pelvis with contrastFrankfort negative.  Bone scan shows stable bone metastases.     11/4/2022 Imaging    CT abdomen pelvis chest showed no evidence of disease progression with stable sclerotic bone lesions.  Bone scan stable right greater than left acetabular metastasis.  Stable bone metastases.     4/12/2023 Imaging     CT chest abdomen pelvis with contrast showed no progressive disease with stable sclerotic bone lesions.  Bone scan showed single identifiable bone metastasis stable right acetabulum     9/29/2023 Imaging    total body bone scan compared to April showed a single active bone metastasis with multiple and active bone metastases overall stable.  CT chest abdomen and pelvis compared to May and April shows no new bony progression with  stable sclerotic bone lesions.     5/14/2025 Genetic Testing    Genetic testing was negative for pathogenic mutations in BRCA1/2 and 38 additional genes included on the CancerNext panel.         HISTORY OF PRESENT ILLNESS:  The patient is a 69 y.o. male, here for follow up on management of metastatic prostate cancer currently on therapy with Zytiga, prednisone and Relugolix.  Mr. Lugo reports overall he has been doing fairly well since we saw him last.  He continues to have chronic back and hip pain, follows with palliative care.  Denies any new pain.  For the last week or so he has had decreased appetite and occasional nausea with vomiting.  No fevers or chills.  He denies any blood in his stool or dark tarry stool.  No abdominal pain.  He had genetic testing since we saw him last that was negative.    Past Medical History:   Diagnosis Date    Arthritis     Bone cancer     Coronary artery disease     Elevated cholesterol     History of radiation therapy 08/16/2021    L4 through sacrum, left acetabulum, right pelvis    Nephrolithiasis     Opioid use disorder, mild, on maintenance therapy (HCC)     Prostate cancer      Past Surgical History:   Procedure Laterality Date    CARDIAC CATHETERIZATION N/A 10/29/2024    Procedure: Left Heart Cath;  Surgeon: Joni Kahn MD;  Location: [facility] CATH INVASIVE LOCATION;  Service: Cardiovascular;  Laterality: N/A;    CHOLECYSTECTOMY      COLONOSCOPY      CORONARY STENT PLACEMENT  2006 & 2011    HERNIA REPAIR  1986    THORACIC DISCECTOMY  1994       Allergies   Allergen Reactions    Cymbalta [Duloxetine Hcl] Hives and Dizziness    Duloxetine Hives and Dizziness    Gabapentin Nausea Only    Pregabalin Dizziness, Nausea And Vomiting and Hives       Family History and Social History reviewed and changed as necessary    REVIEW OF SYSTEM:   Intermittent nausea with occasional vomiting recently with decreased appetite    PHYSICAL EXAM:  Well-developed, well-nourished appearing male  "in no distress  Respirations regular and unlabored, lungs clear to auscultation bilaterally      Vitals:    25 1129   BP: 121/70   Pulse: 91   Resp: 18   Temp: 98 °F (36.7 °C)   SpO2: 95%   Weight: 83.9 kg (185 lb)   Height: 175.3 cm (69\")     Vitals:    25 1129   PainSc: 8    PainLoc: Generalized     K[place] F Parish reports a pain score of 8.  Given his pain assessment as noted, treatment options were discussed and the following options were decided upon as a follow-up plan to address the patient's pain: continuation of current treatment plan for pain and follows with palliative care .         ECOG score: 1           Vitals reviewed.  Labs reviewed    ECO    Lab Results   Component Value Date    HGB 15.6 2025    HCT 46.5 2025    MCV 86.9 2025     2025    WBC 6.61 2025    NEUTROABS 4.61 2025    LYMPHSABS 1.36 2025    MONOSABS 0.44 2025    EOSABS 0.12 2025    BASOSABS 0.06 2025       Lab Results   Component Value Date    GLUCOSE 111 (H) 2025    BUN 4.0 (L) 2025    CREATININE 0.74 (L) 2025     2025    K 4.3 2025     2025    CO2 27.8 2025    CALCIUM 9.0 2025    PROTEINTOT 5.7 (L) 2025    ALBUMIN 3.4 (L) 2025    BILITOT 0.3 2025    ALKPHOS 83 2025    AST 14 2025    ALT 9 2025             ASSESSMENT & PLAN:  1.  Stage IVb grade group 4 metastatic prostate cancer with sclerotic bone lesions on CT and bone scan and history of prostatic hypertrophy.  Good response to Taxotere ending 2021 Relugolix, followed by Zytiga prednisone with L4/sacrum, left acetabulum, and right pelvis external beam radiation 2021.  Hypophosphatemia on Zometa.  Zometa held as of 2021.  2.  Kidney stone  3.  Polyps  4.  Probable drug-induced hypophosphatemia from Zometa, drug stopped after 10/5/2021 dose  5.  Cancer related pain seeing palliative care  6.  " Fatigue  7.  Covid 2/2022  8.  Iron deficiency anemia  -5/3/2023 EGD and colonoscopy with Dr. Alexander Gomez: Superficial erosions in the stomach with gastritis.  Small polyps removed, dilated internal hemorrhoids.  Pathology pending.  Recommend repeat colonoscopy in 5 years.  9.  CAD  10.  Decreased appetite with occasional nausea and vomiting    -9/30/2020 office note from Dr. Ronen Reynaga indicates patient with PSA 10.8.  Underwent TRUS prostate biopsy 9/15/2020.  Adenocarcinoma the prostate Albert 4+4 = 8 in 1 out of 12 cores and 4+3 = 7 and 10 out of 12 cores and 3+4 = 7 in 1 out of 12 cores.  Perineural invasion.  Extraprostatic extension into the fat seen on 2 biopsies of the right lateral mid and right apex.  9/24/2020 CT chest and whole-body bone scan showed T12, L1, left acetabular, right medial acetabular metastases with no lung metastases.  He started androgen deprivation therapy with Casodex with plans for Lupron to start in 1 to 2 weeks for clinical T3 N0 M1 metastatic prostate cancer.  Review of official report from [facility] 9/24/2020 bone scan mentions T12, L1, roof of left acetabulum and medial right acetabular osteoblastic metastases.  CT chest with contrast that same day showed mild splenomegaly 14.2 cm with stable periaortic nodes compared to CT December 2015 with no lung metastases.  Sclerotic abnormality within the T12 vertebral body, L1 vertebral body extending into the pedicle unchanged.  8/28/2020 CT abdomen pelvis report from [facility] reviewed and mentions normal spleen size.  Punctate nonobstructing kidney stone, no paulino enlargement, diffuse bladder wall thickening with mild perivesicular fat stranding, 2.1 cm sclerotic left iliac bone above the left acetabulum new compared to 2015 as well as 1.5 cm faintly sclerotic focus in the right posterior acetabulum stable compared to prior CT 2015 as well as a 1.6 cm T12 and inferior vertebral body sclerotic lesion and a 1.9  cm left posterior lateral L1 vertebral body abnormality extending to the pedicle.    -10/13/2020 initial [facility] medical oncology consultation: With 1 Sarika score 8, 4+4 lesion that would make him a grade group 4 with stage IVb disease given radiographic evidence of sclerotic bone metastasis on bone scan and CT.  Needs genetic testing given metastatic prostate cancer and this is also important as, were he to have mismatch repair mutation then we would have options for PDL 1 inhibitors and were he BRCA mutated would have options for PARP inhibitors in the future.  Systemic therapy for castration naïve prostate cancer or N1 disease should be with apalutamide or Abiraterone or enzalutamide all of which are category 1.  He does not have any visceral metastases.  According to his imaging he has T12, L1, left acetabular, right acetabular, and left iliac bony involvement.  That means he would have 4 or more bone metastases with at least one metastasis (i.e. the acetabular lesions bilaterally) beyond the pelvis/vertebral, for which this would be considered high-volume disease for which 6 cycles of docetaxel 75 mg/m² x 6 cycles followed by Zytiga and prednisone would be reasonable along with Zometa every 6 weeks for bone stabilization.  He will do chemo preparation visit with my nurse practitioner prior to start chemotherapy with Taxotere and Zometa in 2 weeks and he is already received Casodex in preparation for Lupron shot he received on 10/12/2020 with Dr. Reynaga.  We will get him to Dr. Alexander Gomez who has done his polypectomies in the past for port placement and we will get genetic counseling started.  Following the 6 courses of Taxotere per the [place] trial criteria, I would then give him an indefinite Zytiga and prednisone and Zometa until progression or toxicity dictates.    10/29/2020 PSA 1.37    -12/16/2019 COVID-19 antigen test negative    -12/29/2020 PSA 0.275 with absolute neutrophil count 700 and  creatinine 0.76 with normal liver enzymes and electrolytes.  Normal magnesium and phosphorus and urine drug screen positive for buprenorphine and opiates follow-up with palliative care.    -1/4/2021 CT chest abdomen pelvis with contrast and whole-body bone scan shows improving less metabolically active bone metastases.  Stable sclerotic T12, L1, and L2 vertebral bodies and bilateral pelvic bones on bone windows with stable subcentimeter para-aortic lymph nodes and no definite progression in the chest abdomen or pelvis.    -1/5/2021 [facility] oncology follow-up visit: I reviewed the images and reports thereof of the above CT and bone scan which shows good response to Taxotere x3 courses with a PSA down to 0.275 from a baseline of 10.  Get another 3 courses of Taxotere, continue his Xgeva, and then following that we will switch to Zytiga and prednisone.  He is working with palliative care on his bone pain but on his current dose of fentanyl patch he says his pain is still not adequately controlled he will contact them.  Dexamethasone prescription was refilled.  We will see my nurse practitioner back in 3 weeks for course #5 of 6 total courses and then we will reimage with CT chest abdomen pelvis and bone scan before going to the Zytiga prednisone.    -3/4/2021 CT chest abdomen pelvis with contrast is negative save for stable sclerotic bone lesions and the bone scan shows near resolution of prior bony abnormalities associated with the known sclerotic lesions in the thoracolumbar spine and bony pelvis.    2/17/2021 PSA down to 0.1 from 1.37 October 2020.  3/9/2021 PSA 0.1    -5/26/2021 CT chest abdomen pelvis with contrast shows stable to slightly underlying increase low-density left para-aortic node.  Bone lesions stable.  Whole-body bone scan stable to decrease activity of known thoracolumbar and bony pelvic involvement.  PSA less than 0.1  , AST 74, bilirubin 0.5.       -6/1/2021 [facility] medical oncology  follow-up visit: I reviewed the above data with him and images thereof.  Bones are stable.  No significant adenopathy.  PSA immeasurably low.     upper limit of normal 69 and AST 74 upper limit of normal 46.  Hence, neither is more than double the upper limit of normal with no ascites and no encephalopathy and normal albumin and bilirubin, despite the elevation of liver enzymes, he has child Abrams score A.  Package insert for Abiraterone says that for ALT and/or AST greater than 5 times upper limit of normal or total bilirubin greater than 3 times the upper limit of normal to withhold treatment until liver function tests returned to baseline or ALT and AST less than or equal to 2.5 times the upper limit of normal and total bilirubin less than or equal to 1.5 times the upper limit of normal and then reinitiate at 750 mg daily dose of Zytiga.  For recurrent hepatotoxicity on 750 mg daily Zytiga, withhold treatment until liver functions returned to baseline or ALT and AST less than 3-2.5 times upper limit of normal and total bilirubin less than or equal to 1.5 times the upper limit of normal then reinitiate at 500 mg daily Zytiga dose.  If has recurrent hepatotoxicity on 500 mg dose, then discontinue treatment.  If has ALT greater than 3 times the upper limit of normal along with total bilirubin greater than 2 times the upper limit of normal in the absence of other contributing causes or biliary obstruction, permanently discontinue Zytiga.  Based on these recommendations, I would continue current doses but we will watch with serial labs monthly and repeat his imaging again in 3 months but clinically he is doing wonderfully with excellent response to Taxotere and now on Zytiga prednisone plus Zometa along with Relugolix.    -6/23/2020 for [facility] oncology clinic follow-up: Having persistent and worsening pain above his left hip.  I will get an MRI of the left hip for further evaluation.  Otherwise we will continue  therapy unchanged with Zytiga, prednisone and Zometa along with Relugolix.    -7/28/2021 [facility] oncology clinic follow-up: Patient with multiple somatic complaints including episodes of confusion, dizziness, decreased memory, agitation.  He reports ongoing episodes with difficulty swallowing.  Also most concerning for episodes of angina and chest pain for which he basically refused to seek emergency medical attention.  He has a history of coronary artery disease and stent placement.  He has not followed up with cardiology for many years.  I will get an MRI of the brain in light of his confusion, dizziness and agitation.  I will get him to gastroenterology for EGD in light of his dysphagia.  I have also put in a referral for cardiology.  I reemphasized to the patient, his wife who is with him today and his son who was on the telephone during our visit the importance of seeking out prompt medical attention when he is having chest pain or neurological concerns so that he can be evaluated.  The patient states that he basically refuses to go to the emergency room and if his family calls an ambulance he would not agree to go to the hospital.  For the time being, I have asked him to hold his Zytiga and prednisone until we can sort this out as Zytiga does have some cardiovascular risk.  There is likely no treatment for prostate cancer that does not carry some form of cardiovascular risk.  His PSA remains low at 0.014 yesterday.  He will continue relugolix for now.    Of note, some of these symptoms could also be due to his hypophosphatemia, phosphate level from yesterday was 1.5, I have sent in a prescription for K-Phos 3 times a day before meals for 10 days.  We will hold further Zometa until this is corrected.  I have also put in an order to check his vitamin D level.  He takes calcium and vitamin D daily.  Some of his symptoms also could be due to drug withdrawal as there was some confusion and difficulty getting his  "last prescription for methadone therefore he was without methadone for several days, he now has his prescription and is back on his pain medication.  He has an appointment with neurosurgery tomorrow in light of his ongoing back pain, MRI of the hip recently showed multiple bony lesions largest at the L5 vertebral body and left supra-acetabular region.  If no neurosurgical intervention recommended he may benefit from spot radiation as this is where a lot of his pain is coming from.  His wife had questions today regarding his staging and extent of disease.  We reviewed previous scans.  I did clarify with her as she used the words \"cancer free\" as she thought that is what she was told after his CT scans once he completed Taxotere in February.  I explained to her that even though his scans showed he had an excellent response with no clear areas of metastasis that did not mean he was cancer free, I explained to her that when you have metastatic cancer the treatment intent is palliative in nature and to control the disease but not to cure the disease.  She stated understanding.  We will see him back in 2 weeks for follow-up to sort through this and make further plan of care, again, I have asked him to hold Zytiga and prednisone until he sees us back, he will continue on his other medications including relugolix.    -7/30/2021 neurosurgery PA consultation.  MRI with and without contrast L5 ordered.    -8/3/2021 neurosurgery follow-up showed known sclerotic disease with new L5 abnormality without compression deformity or instability.  MRI brain negative for metastasis.    -8/4/2021 office visit Dr. Fco Quintanilla radiation oncology coordinating with Dr. Can forde for palliative radiation for MRI evidence of L5 and left supra acetabular painful lesions.  States that the patient's disease which is not secreting PSA is experiencing some progression and would benefit from 20 Gy in five fractions and other lesion 30 Gy in " ten fractions. Patient offered to go to [place] for radiation but desired treatment in [place].    -8/10/2021 [facility] medical oncology follow-up visit: No chest pains at this junction.  Reviewed MRI brain and other MRIs reviewed by Dr. North and Dwight.  Due for EGD on 19th.  We will repeat his phosphorus along with his other labs continue Relugolix, Zytiga, prednisone, and he will continue radiation palliatively to the painful bony lesions with Dr. Quintanilla/Can.  He will see my nurse practitioner back in a few weeks to see how he is tolerating this and to follow-up on the phosphorus off of Zometa and she will order repeat CT chest abdomen pelvis with contrast and total body bone scan the end of September.I will see him back after that.    -9/8/2021 Zometa resumed.  PSA <0.10    -10/5/2021 [facility] medical oncology follow-up visit: followed-up with radiation oncology 9/21/2021.  Status post symptomatic L5 radiation 5 fractions 20 [place] completed 8/16/2021 with improvement in right hip pain.  9/2/2021 PSA less than 0.1.  9/29/2021 total body bone scan shows increased uptake left iliac bone slightly superior group of the left acetabulum with no additional foci of suspicious abnormality is unlikely treatment related with no new sites of active osseous metastasis.  CT chest abdomen pelvis with contrast shows stable borderline enlarged left periaortic nodes and dating sclerotic bone lesions.Continue Zytiga, prednisone, Relugolix, Zometa, repeat CT chest abdomen pelvis with contrast and total body bone scan the end of the year.  We will follow PSA serially.    -11/17/2021 [facility] clinic follow-up:  Mr. Lugo overall is doing well.  He is tolerating therapy with Zytiga, prednisone and Relugolix along with Zometa which is given every 6 weeks.  PSA remains low at <0.1.  Has occasional low phosphorus, currently low at 1.7.  Serum calcium is normal at 8.9, normal creatinine 0.79 and normal CBC other than for  platelets of 124.  I will hold Zometa for 1 month, I did send in a prescription for phosphorus replacement today.  He takes calcium and vitamin D.  I will see him back in 1 month for follow-up.  We will repeat restaging scans after that visit.    -12/15/2021 [facility] Oncology clinic follow-up: Mr. Lugo continues to do well on therapy with Zytiga, prednisone and Relugolix, his PSA remains low at <0.1.  He is continuing to have left lower back pain, he is working with palliative care and they have referred him also to pain management.  Pain management has talked to him about putting in a pain pump however he is leery of this, I told him that this was a safe and effective pain management technique and to certainly give consideration to their recommendations.  His phosphorus is improving however is still a little low, I will hold off on Zometa until we see him back in 1 month, we may end up only giving it every 12 weeks.  We will repeat restaging scans with CT chest, abdomen and pelvis along with total body bone scan prior to return and I have ordered those today.      -1/5/2022 CT chest abdomen pelvis with contrast [facility] showing stable left periaortic adenopathy and unchanged sclerotic thoracic, lumbar spine and pelvis lesions with no new or progressive disease in the chest abdomen or pelvis.  Total body bone scan shows no significant change and no new suspicious foci.  Stable posterior right acetabulum and left superior acetabulum and degenerative changes in the cervical spine, shoulders, acromioclavicular joints, sternoclavicular joints, elbows, wrists, hands, thoracic spine, lumbosacral spine, sacroiliac joints, hips, knees, ankles, feet.    -1/11/2022 [facility] medical oncology hematology follow-up visit: I reviewed images and reports from his 1/5/2022 scans.  No active disease and PSA immmeasurably low on Zytiga, prednisone, Relugolix.  However, he has struggled with hypophosphatemia and is significantly  fatigued with this.  Given that the bones are doing well and given that his phosphorus continues to remain consistently low at 2.2 in mid December, 1.7 mid November and 2.5 mid-September and as low as 1.5 back to July and the likelihood that this is related to his Zometa, given that his bones are stable overall, he will increase from 1200 mg up to 1600 mg of calcium and phosphate a day and we will hold his Zometa for the foreseeable future.  He will see my nurse practitioner back in a month to continue to monitor his phosphorus along with his calcium and other labs and will repeat his scans again in 3 months.  In addition, given his fatigue we will check his ACTH and cortisol though he is taking his prednisone with his Zytiga.  Though his electrolytes are normal, I will keep an eye on the cortisol and ACTH and have these labs not only drawn today but again just prior to return to my nurse practitioner on February 10.    -2/10/2022 [facility] Oncology clinic follow-up: Mr. Lugo overall is stable, no change in his health this past month.  I have reviewed his labs from 2/8/2022 and they are unremarkable, his phosphorus is now normal at 3.2. We are continuing to hold his Zometa, he last received Zometa on 10/5/2021.  He continues therapy with Zytiga, prednisone and Relugolix.  Dr. Rashid had ordered cortisol level and ACTH which are low, currently his ACTH is 2.8 and cortisol 3.08 however these are both done in the face of ongoing prednisone that he takes with his Zytiga.  We will get him to endocrinology for further evaluation for possible adrenal insufficiency but will not interrupt his treatment in the interim.  He will need restaging scans again in April for a 3-month follow-up.  He will continue to follow with palliative care and pain management for his cancer related pain.  His PSA remains immeasurably low at <0.1 on 2/8/22.    -2/15/2022 went to emergency room with weakness, decreased appetite, myalgias in the  setting of known COVID-19 testing positive a few days prior to this visit.  Phosphorus had been low in the past but was normal in the emergency room with unremarkable CBC and CMP.  Chest x-ray unremarkable saturating well on room air mildly hypertensive with dry mucosa.  Given 500 cc crystalloid.  Covid test positive.  Mild thrombocytopenia 136,000 and otherwise unremarkable.  Discharged home.    -3/3/2022 data:  PSA less than 0.1  CMP unremarkable  Cortisol 3.96 normal  ACTH 2.8 lower limit of normal 7.2  Phosphorus normal 2.6    -3/8/2022 [facility] medical oncology follow-up: Suspect big chunk of his fatigue was Covid.  Another part of the fatigue likely related to lack of testosterone.  Not hypophosphatemic off of Zometa.  ACTH running low while on prednisone not surprising but with normal cortisol and I doubt adrenal insufficient which is why he is on prednisone to avoid such while taking Zytiga.  Overall doing fairly well and with immeasurably low PSA, I will have labs done on him early April, see my nurse practitioner early May, and she will order repeat bone scan and CTs the end of May and I will see him back in June.  We will continue to hold the Zometa unless bone lesions progress given symptomatic prior hypophosphatemia on Zometa.  He will keep his appointment April 28 with Dr. Mir Duffy just to be sure that my take on his pituitary/adrenal axis assessment is accurate.    -4/28/2022 endocrinology consult Dr. Mir Duffy.  With low ACTH and cortisol on prednisone with Zytiga, the adequacy of prednisone cannot be assessed by measuring ACTH or cortisol but is assessed by weight changes, blood pressure, blood sugar, potassium, overall sense of how the patient is doing.  Primary adrenal insufficiency with low ACTH and low cortisol would be typical for the situation as his blood sugar tends to run a little high and potassium a little low, he did not think increasing prednisone was necessary.  He put him on some  potassium.  No follow-up scheduled, will see as needed.    -5/4/2022 [facility] Oncology clinic follow-up: Mr. Lugo continues on therapy with Zytiga and prednisone along with Relugolix.  His Zometa has been on hold due to previous hypophosphatemia.  There is some concern about potential adrenal insufficiency. He saw endocrinology, Dr. Mir Duffy on 4/28/2022.  I did review his office note and he stated that the low ACTH and low serum cortisol would be typical for Mr. Lugo's situation.  He further stated that the adequacy of prednisone dose cannot be assessed by measuring ACTH or cortisol but is rather assessed by the overall just altered how the patient is feeling-weight change, blood pressure, blood sugar, potassium, overall sense of wellbeing.  His potassium had been running a little low therefore they put him on potassium 10 mill equivalents daily. Of note, I do not see a future follow-up scheduled with endocrinology.  He is having night sweats, I am not sure if this is related.  His glucose was normal this morning at 107.  His weight is stable.  He will continue current treatment for his prostate cancer unchanged, we will continue to hold his Zometa.  Current phosphorus and magnesium are normal.  CBC and CMP from today are unremarkable, cortisol is normal.  PSA and ACTH are pending  In regards to his night sweats, he had taken clonidine previously 0.1 mg twice daily as needed, I did send in a short supply again to try and see if this helps.  He also is having indigestion despite being on omeprazole twice daily, I sent a prescription for Pepcid.  He had an EGD August 2021.  We will repeat restaging scans prior to return and I have ordered those today.     -5/24/2022 CT chest abdomen pelvis with contrast[place]fort negative.  Bone scan shows stable bone metastases.    - 5/25/2022 PSA less than 0.1, platelets 130,000, otherwise unremarkable CBC and CMP.  A.m. cortisol running low 1.2 with phosphorus low  2.3.    -5/31/2022 [facility] medical oncology follow-up: On Zytiga, prednisone, Relugolix, PSA remaining immeasurably low with stable bone scan and no visceral/paulino metastases.  Phosphorus still running low.  I have discontinued his potassium chloride and started potassium phosphate 500 mg 4 times a day.  Unless PSA rising, we will plan on repeating CTs and bone scan in 6 months and will follow with my nurse practitioner in the interim and if symptoms dictate or labs, will get back to Dr. Duffy for management of potential adrenal insufficiency but presently we will just see how he does with potassium phosphate and hopeful improvement in the phosphorus level with time.  We will continue to follow with palliative care for pain management    -7/6/2022 [facility] Oncology clinic follow-up: Mr. Lugo overall is doing well but continues to be troubled with fatigue and hot flashes.  For the most part he states he is able to do the things he wants to do, he continues to work but does have to take more rest periods.  I had a long discussion with him and his wife after reviewing his medications with them as they wanted to see if there was anything he could stop or adjust.  I discussed with him the need to continue on treatment for his prostate cancer even though his PSA remains immeasurably low and his scans look good but that if his medication is stopped the cancer would progress and over time it still has the potential to progress on therapy but would continue it as long as possible to keep his disease under control.  I explained this is like treating someone with hypertension, you do not stop the antihypertensive just because the blood pressure normalizes.  They stated understanding.  There is no dose adjustment of Zytiga or prednisone that would minimize his symptoms, he is on the current standard recommended therapy.  Discussed with him that sometimes during times of stress we may need to increase his prednisone dose but for  now would not change anything.  His phosphorus level is normal, we continue to hold his Zometa.  I did give him the option of holding his phosphorus for the next month and we will see what happens, if it drops we will start him back on it but may be at a lower dose rather than 4 times a day maybe twice a day.  For now he will continue therapy unchanged.  We will continue monitoring labs monthly.  No PSA was drawn this month but that is okay as his PSA has been running immeasurably low at <0.1, we will recheck next month with lab draw.  We will stop checking his ACTH and cortisol level every month, if he develops worsening symptoms I will repeat those.  He has not gotten a follow-up with endocrinology as of yet.    -8/3/2022 [facility] Oncology clinic follow-up:  Mr. Lugo is doing well as far as his prostate cancer goes with continued immeasurably low PSA of <0.1.  He continues on therapy with Zytiga and prednisone along with Relugolix.  His phosphorus is stable at 2.7 which was just slightly low by our reference range however was 2.71-month ago also which was normal on another labs reference range.  He has not taken his phosphorus for this past month as he thought it was making him more fatigued.  He really can tell no difference whether he was taking it or not.  I have asked him to try to go back on phosphorus twice a day rather than 4 times a day and see if this is tolerated and if we can keep his phosphorus level from dropping.  His biggest complaint today is flareup of his arthralgias and back pain.  He is following with pain management, Dr. Danny Avalos but is requesting a referral back to palliative medicine as he feels that no one is currently listening to him and they are just prescribing the same medications that are not effective according to his report.  He does have a follow-up with Dr. Avalos on 8/12/2022 but due to his current pain is not able to work until after he is seen and hopefully can get some  better relief.  I have given him a work release until 15 August.  I have also put in a referral back to palliative care.  Also encouraged him to work with Dr. Avalos for help in resolving his issue.  Apparently they have recommended some type of a pump however he has been hesitant to that but likely would be helpful.  We will continue therapy unchanged.  We will continue monthly labs including phosphorus, we are not currently checking ACTH and cortisol monthly as Dr. Mir Duffy previously stated in his assessment on 4/28/2022 that the adequacy of prednisone cannot be assessed by measuring ACTH or cortisol but would be assessed by symptoms, see note from 4/28/2022.  He made no further follow-up appointments.  Fatigue is not worsening currently.  His glucose and potassium are  Normal.    -10/13/2022 [facility] medical oncology follow-up: Mr. Lugo is doing well.  He is tolerating treatment with Zytiga and prednisone along with Relugolix without any unusual side effects.  His PSA has remained immeasurably low at <0.1.  His phosphorus was slightly low at last visit.  He had been instructed to go back on phosphorus twice a day but he misunderstood and has not been taking it.  We will check labs today.  I will follow-up with results and notify him if he needs to restart the phosphorus.  His pain is better controlled since reestablishing with Anna Ayers, palliative care.  We will repeat scans prior to next follow-up.  I have ordered CT chest abdomen pelvis along with bone scan.  We will see him back in 1 month.    -10/13/2022 PSA less than 0.014.With unremarkable CBC and CMP.  Phosphorus still a little low 2.3.    -11/4/2022 CT abdomen pelvis chest showed no evidence of disease progression with stable sclerotic bone lesions.  Bone scan stable right greater than left acetabular metastasis.  Stable bone metastases.    -11/8/2022 [facility] medical oncology telemedicine follow-up: Patient states that he feels achy all over with  low-grade temps and a cough mildly productive.  Has an appointment later today to see his primary care for testing for flu and COVID.  Suggested that if he were positive for COVID that he get Paxlovid and that if he were positive for flu that he get Tamiflu and to discuss this with his primary care.  From the prostate cancer side, his PSA is immeasurably low with stable bone metastases and no evidence of progression.  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  We will continue the Relugolix, Zytiga, prednisone and he will see my nurse practitioner 12/7/2022.  Would repeat CTs and bone scan in May.    -1/25/2023 [facility] follow-up: Mr. Lugo is having worsening fatigue and muscle aches.  He does have adrenal insufficiency on Zytiga and prednisone for his prostate cancer, he saw endocrinology in light of low ACTH back in April 2022.  At that time there was no recommendation other than for monitoring him for his overall wellbeing and labs.  He denies any fevers or chills.  His labs as shown above are unremarkable, his CBC and CMP are normal other than for glucose of 112, PSA remains low at <0.1.  He has no new pain or any symptoms concerning for progression of his prostate cancer.  I will get him back to endocrinology to see if they feel any dose adjustment of his prednisone would be helpful in his symptoms.  Currently he is on 5 mg a day with his Zytiga.  I did not repeat his ACTH or cortisol as they would be expected to be low in this situation.  His potassium is normal, he has had no weight loss or change in his appetite.  Blood sugar is reasonable.  His only complaint currently is worsening fatigue and muscle aches.  He will continue treatment unchanged with Zytiga, prednisone and relugolix.  I will see him back in 1 month for follow-up.  We will repeat his restaging scans in May.    -3/1/2023 [facility] follow-up:  His PSA remains low at <0.1 on  treatment with Zytiga and prednisone along with relugolix however he continues to complain of significant fatigue and muscle aches not improving with time.  He states that his quality of life is affected as he is not able to do any work activities due to the fatigue.  He did see endocrinology again and they have increased his prednisone from 5 mg daily up to 7.5 mg daily however so far this has not made any difference in how he feels.  He is supposed to get back in touch with them to let them know how he is doing and to see if there is any other adjustments needed. His labs are unremarkable with normal thyroid function, CMP is normal other than for glucose of 156.  They did check hemoglobin A1c to look for diabetes however that was normal at 5.7.  CMP is unremarkable with just very mild anemia with hemoglobin of 12 and hematocrit 36.1% which would not account for his fatigue.  He does have hot flashes that are fairly significant, I will try venlafaxine as suggested by Dr. Duffy and I appreciate the recommendation.  We will start at 37.5 mg daily and if tolerated he can increase to 75 mg after 1-2 weeks.  I will see him back in 1 months for follow-up.  Plan to repeat scans late April/early May.  His prostate cancer seems under good control with current regimen however I am not sure how long he can tolerate this due to the side effects.    -3/30/2023 [facility] follow-up:  Mr. Lugo continues to deal with fatigue.  He appears more cushingoid today, he continues on treatment with Zytiga, prednisone and relugolix.  Prednisone dose currently is 7.5 mg daily.  This has not made any difference in his energy level.  PSA remains immeasurably low at <0.014.  CBC shows new onset of microcytic hypochromic anemia with hemoglobin of 11, hematocrit 34.4%, MCV 75.4, MCH 24.1, WBC is normal at 5.81 with normal platelet count 181,000 and normal differential.  CMP is unremarkable, it is normal other than for glucose of  142.  We will get him to Dr. Gomez for an EGD and colonoscopy for further evaluation in regards to his new onset of anemia.  He has no associated GI symptoms otherwise except for 1 day a few weeks ago he does report that he had fairly sudden onset of vomiting but this was an isolated incident on that day and has not reoccurred since.  Continues to follow with palliative care for management of his chronic back pain.  We will repeat restaging CT chest, abdomen and pelvis along with total body bone scan prior to return and I have ordered that today.    -3/30/2023 Ferritin low 12.3 with iron low 27 and saturation low 5% with total iron binding capacity 511.  3/31/2023 ferrous sulfate 325 mg twice a day every other day with vitamin C started.    - 4/12/2023 CT chest abdomen pelvis with contrast showed no progressive disease with stable sclerotic bone lesions.  Bone scan showed single identifiable bone metastasis stable right acetabulum    -4/18/2023 [facility] oncology follow-up: He now has microcytic iron deficiency anemia which is new and concerning.  Gastroenterologic evaluation has been ordered but not performed and is mandatory.  Continue ferrous sulfate 325 mg twice a day every other day with vitamin C to improve absorption and we will follow his CBC along with his usual labs monthly on Zytiga, Relugolix, prednisone 7.5 mg.  He follows with Dr. Duffy with adrenal insufficiency.  He will see my nurse practitioner back in a month to see what GI has found and to watch serial CBC along with his other labs including PSA.  I would repeat CT chest abdomen pelvis again in 6 months with contrast along with bone scan and sooner if PSA rises.    -5/3/2023 EGD and colonoscopy with Dr. Alexander Gomez: Superficial erosions in the stomach with gastritis.  Small polyps removed, dilated internal hemorrhoids.  Pathology pending.  Recommend repeat colonoscopy in 5 years.    -5/17/2023 [facility] follow-up:  Naveen  overall is doing well on current therapy with Zytiga, prednisone and relugolix.  PSA remains immeasurably low at <0.014.  He feels his hip pain is getting worse with time.  It makes it difficult to enjoy activities with his grandchildren as it is worse when he stands for any length of time or tries to carry any weight.  I reviewed his bone scan from April which was stable, we also reviewed previous MRI of the hips from May 2021 that showed moderate bilateral hip osteoarthritis.  I offered to repeat MRI of his hips for evaluation to see if there has been any worsening of his osteoarthritis and also then referral to orthopedics for any recommended intervention however at this time he defers.  He will let me know if he changes his mind.  He also follows with palliative care for management of his cancer related pain.  We will continue therapy unchanged.  We will repeat restaging scans in October unless symptoms dictate the need to do so sooner.  He was recently found to be iron deficient, he had an EGD and colonoscopy on 5/3/2023 with Dr. Gomez, EGD showed superficial erosions in the stomach with gastritis, he continues on omeprazole.  He had small polyps removed from the colon and recommendation to repeat colonoscopy in 5 years.  He is on oral iron along with vitamin C every other day and is tolerating that well.  CBC from yesterday shows hemoglobin now normal at 14.2 and hematocrit 43.2%, MCV normal at 27.0.    -7/20/2023 [facility] follow-up: Naveen is doing well on current therapy with Zytiga, prednisone and relugolix.  PSA in June remains low at <0.1.  Continues to deal with chronic pain in his back and hip.  Recently saw Dr. Nikolai Marks and had an injection in his hip and is hoping that it will eventually help with his pain.  He has no new pain.  We will continue current therapy unchanged.  We will repeat labs while he is here today.  I will refill his iron as his pharmacy has changed. Most recent  CBC in June with hemoglobin of 14.7 and hematocrit 43.6%.  We will repeat restaging scans in October.    -9/14/2023 [facility] Oncology clinic follow-up: Naveen is tolerating his prostate cancer treatment with Zytiga, prednisone and relugolix.  PSA remains immeasurably low at <0.014 on 8/17/2023.  Currently he is having more GI issues with nausea and intermittent vomiting that occurs often without warning.  He had an EGD and colonoscopy with Dr. Gomez on 5/3/2023, EGD showed superficial erosions in the stomach with gastritis.  He does take omeprazole 40 mg twice daily.  He saw his PCP yesterday for these symptoms and has been referred to gastroenterologist at [facility] and he is awaiting to hear about that appointment.  Hopefully he can get in fairly quickly.  I told him that he should let us know if it is going to be a while before he can be seen and I will get him back to Dr. Gomez for consideration of repeat EGD.  We will get his CBC, CMP and PSA today.  His previous noted anemia had normalized, he currently is not taking oral iron as he ran out.  I will wait and see what his labs show today and likely hold off for now depending on his lab results until his GI issues can be resolved.  We will repeat his restaging CT chest, abdomen and pelvis along with bone scan in the next few weeks and I have ordered those today.  He is taking Zofran as needed for nausea, he is also on meclizine for vertigo.  He is going to follow-up with his PCP in a few weeks regarding his GI symptoms.    -9/14/2023 PSA less than 0.014 with unremarkable CBC and CMP.    -9/28/2023 total body bone scan stable with uptake in right acetabulum, no new areas.  CT chest, abdomen and pelvis show no evidence of disease progression.  -11/9/2023 [facility] Oncology clinic follow-up: CT reports were reviewed by Dr. Rashid last month and the patient reports he was called by Dr. Rashid as he could not make his appointment due to concerns over upper respiratory  infection, CT scan showed no evidence of disease progression.  PSA has remained low, we are repeating that today along with his CBC and CMP.  He continues to have sinus drainage and congestion, I will send in 7 additional days on his doxycycline that he has been taking, he will follow-up with PCP if no improvement.  He continues to complain of bilateral hip pain, has a follow-up with Dr. Marks next week.  We will continue therapy unchanged with Zytiga, prednisone and relugolix.  Would repeat restaging bone scan and CT scans late March for a 6-month follow-up.    -1/4/24 PSA 1    - 3/28/2024 CT chest abdomen pelvis with contrast compared to May 2013 shows decreasing sclerotic bony metastases.  Bone scan shows similar relative intensity of 1 focal uptake right acetabulum correlating with CT with no new sites of uptake    -4/2/2024 [facility] medical oncology follow-up: Continued good response to Zytiga prednisone relugolix which he is tolerating.  We will check PSA and labs 6/25/2024 with follow-up with my nurse practitioner and again 3 months after that along with CTs and bone scan 6 months from now.  Not on bisphosphonates or rank ligand inhibitor due to drug-induced hypophosphatemia.  Will ask my nurse practitioner to order bone densitometry with his other scans in 6 months. He is having difficulty emptying his bladder and his prior urologist does not take his insurance so we will make appropriate referrals.    -6/26/2024 [facility] clinic follow-up: Overall continues to tolerate therapy with Zytiga, prednisone and Relugolix.  We will get labs today including PSA and as long as that is stable he will continue treatment unchanged with plans to repeat restaging CT scans and bone scans in September and I have ordered those today.  We will also obtain DEXA scan at that time to monitor for osteoporosis on long-term use of androgen deprivation therapy.  We have been holding his Zometa due to drug-induced  hypophosphatemia.  He is following with urology and has had relief of his urinary hesitancy with some medication adjustments and he is scheduled for follow-up this Friday with cystoscopy.  He recently had chest pain and shortness of breath while mowing his lawn, he did have an echocardiogram and stress test yesterday, awaiting final results and reading from Dr. Kahn.  He continues to follow with palliative care for management of his pain which is under good control.  I did refill his symptoms today for chronic constipation from opioid use.    -9/16/2024 CT chest abdomen pelvis with contrast [facility] compared to May 2024 shows stable 1.6 cm well-defined nodule left periaortic region.  Stable sclerotic left supra-acetabular, right posterior acetabular left posterior acetabular lesions.  No disease progression.  Total body bone scan shows grossly stable solitary active right posterior acetabular bone metastasis.    -10/2/24 PSA less than 0.1    -10/8/2024 [facility] oncology clinic follow-up: In order to get prostatic urethral pathway surgically open he needed cardiac clearance and is due for cardiac cath in the next week or 2 with Dr. Kahn.  I reviewed the above image results with him with no clear-cut evidence of progression and PSA remaining immeasurably low.  He does feel quite fatigued.But this is not new.  He has become microcytic with a hemoglobin of 12.1 and MCV of 77 which has trickled down 14.8 and December.  His CMP is unremarkable.  He had an EGD and colonoscopy May of last year with Dr. Gomez that showed superficial erosive gastritis with internal hemorrhoids and colon polyps.  He states he has been taking his iron but he takes it with all his other medicines.  I told him to take it every other day with vitamin C with at least an hour and a half window on either side of it free from any other ingestion of medicines.  With reference to the pulmonary infiltrate he has had a frothy nagging mildly  productive cough without fevers or chills or dyspnea for the last 2 months.  Rather than throwing an antibiotic at this, I am going to get pulmonary referral for their opinion.He is due for his bone density as well and we will order that.  He has been off of Zometa for quite some time due to hypophosphatemia  Addendum: He has never had genetic testing.  While that would not alter our plans in the near term, I will plan on genetic testing for his family sake as well as for potential treatable molecular targets in the future should treatment algorithms dictate.  I called and spoke with his wife to inform her of this plan.    - 10/29/2024 cardiac cath Dr. Kahn showing vascular disease for which she will manage medically with rosuvastatin and aspirin and antianginal therapy.    -11/6/2024 buccal mucosal genetic sampling given to patient for collection.    - 11/12/2024 pulmonology evaluation Makenzie Ramirez.  Status post multiple rounds of antibiotics on 5 mg prednisone at that junction.  Changed him to Breztri from [facility] to see if budesonide would help with congestion and adding DuoNebs along with montelukast.  Continues to smoke and we will follow-up with him in 4 weeks.    -12/4/2024 PSA immeasurably low less than 0.014.  Hemoglobin 14.4 and rising.  CBC unremarkable.  Glucose 115 otherwise unremarkable CMP.    -12/10/2024 [facility] medical oncology follow-up: Prostate cancer under control on Relugolix Zytiga prednisone.  Continue to follow with urologist for obstructive symptoms from prostate.  Follow-up with palliative care for his ongoing bone pains.  I will rescan with bone scans and CT chest abdomen pelvis with contrast in March and see him back after that along with his labs and PSA.  Follow-up with pulmonary medicine regarding his dyspnea.  Hemoglobin rising.  Asked him to touch base with Dr. Kahn regarding his tachycardia with regular rhythm.    -3/4/2025 PSA less than 0.14.Glucose 126 BUN 6 total protein 5.7  otherwise unremarkable CMP and CBC.  - 3/5/2025 CT chest abdomen pelvis with contrast [place] [facility] compared to September 2024 shows no evidence of metastatic disease with stable sclerotic bone metastases.  Total body bone scan shows indeterminant bilateral rib foci favored to represent benign etiology otherwise no significant change with a single bone metastasis.    -3/11/2025 [facility] medical oncology follow-up: With PSA immeasurably low would not make much out of indeterminant rib abnormalities on bone scan and nothing else to suggest progression.  Tolerating Relugolix Zytiga prednisone.  Will see my nurse practitioner back towards the end of May with repeat labs and plan on repeat imaging in 6 months and PSA quarterly.Patient opted to do blood draw test for genetics which he will do on 4/14/2025.    -5/14/2025  Genetic testing was negative for pathogenic mutations in BRCA1/2 and 38 additional genes included on the [employer] panel.   -5/28/2025 PSA <0.014  -5/29/2025 [facility] oncology clinic follow-up: Overall he continues to do well on current therapy with Zytiga, prednisone and Relugolix.  PSA remains measurably low at <0.014, his CBC and CMP are unremarkable.  Currently he has been having some episodes of nausea with occasional vomiting over the last week or so.  His last EGD was in May 2023 and he had some gastritis at that time and he thinks had dilatation.  We discussed referral back to Dr. Gomez for repeat EGD but he wants to wait to see if he gets better, could be viral illness.  He is afebrile.  He continues to follow with palliative care for management of his chronic pain.  We will continue with quarterly labs and imaging every 6 months.  He will be due for CT chest, abdomen and pelvis and total body bone scan in September and I have ordered those today.  He will reach out to us if he has no improvement in his GI symptoms or follow-up with his PCP.    I spent 31 minutes caring for [place] Varina on this date  of service. This time includes time spent by me in the following activities: preparing for the visit, reviewing tests, performing a medically appropriate examination and/or evaluation, ordering medications, tests, or procedures, documenting information in the medical record, and independently interpreting results and communicating that information with the patient/family/caregiver.     Abena Velasquez, APRN    05/29/2025

## 2025-06-17 DIAGNOSIS — G89.3 CANCER RELATED PAIN: ICD-10-CM

## 2025-06-17 NOTE — TELEPHONE ENCOUNTER
AGUSTIN #: [id number]    Medication requested: HYDROmorphone (DILAUDID) 8 MG tablet     Last fill date: 5/19/25    Medication requested: oxyCODONE ER 13.5 MG capsule extended-release 12 hour     Last fill date: 5/18/25        Last appointment: 4/21/25    Next appointment: 7/21/25

## 2025-06-18 RX ORDER — HYDROMORPHONE HYDROCHLORIDE 8 MG/1
8 TABLET ORAL EVERY 4 HOURS PRN
Qty: 180 TABLET | Refills: 0 | Status: SHIPPED | OUTPATIENT
Start: 2025-06-18 | End: 2025-07-18

## 2025-06-20 ENCOUNTER — SPECIALTY PHARMACY (OUTPATIENT)
Dept: ONCOLOGY | Facility: HOSPITAL | Age: 70
End: 2025-06-20
Payer: MEDICARE

## 2025-06-20 NOTE — PROGRESS NOTES
Specialty Pharmacy Patient Management Program  Refill Outreach     [place] was contacted today regarding refills of their medication(s).    Refill Questions      Flowsheet Row Most Recent Value   Changes to allergies? No   Changes to medications? No   New conditions or infections since last clinic visit No   Unplanned office visit, urgent care, ED, or hospital admission in the last 4 weeks  No   How does patient/caregiver feel medication is working? Good   Financial problems or insurance changes  No   Since the previous refill, were any specialty medication doses or scheduled injections missed or delayed?  No   Does this patient require a clinical escalation to a pharmacist? No            Delivery Questions      Flowsheet Row Most Recent Value   Delivery method UPS   Delivery address verified with patient/caregiver? Yes   Delivery address Home   Other address preferred n/a   Number of medications in delivery 3   Medication(s) being filled and delivered Abiraterone Acetate (ZYTIGA), predniSONE (DELTASONE), Relugolix (ORGOVYX)   Doses left of specialty medications 10 days left of Orgovyx and Zytiga   Copay verified? Yes   Copay amount $0   Copay form of payment No copayment ($0)   Delivery Date Selection 06/24/25   Signature Required No   Do you consent to receive electronic handouts?  No            Medication Adherence    Adherence tools used: watch   Other adherence tool: Clock - takes at same time each day, 7:45am   Support network for adherence: family member          Follow-up: 30 day(s)     Micaela Cameron, Pharmacy Technician  6/20/2025  09:46 EDT

## 2025-07-15 DIAGNOSIS — G89.3 CANCER RELATED PAIN: ICD-10-CM

## 2025-07-15 RX ORDER — HYDROMORPHONE HYDROCHLORIDE 8 MG/1
8 TABLET ORAL EVERY 4 HOURS PRN
Qty: 180 TABLET | Refills: 0 | Status: SHIPPED | OUTPATIENT
Start: 2025-07-18 | End: 2025-08-17

## 2025-07-15 NOTE — TELEPHONE ENCOUNTER
AGUSTIN #: [id number]      Medication requested: HYDROmorphone (DILAUDID) 8 MG tablet     Last fill date: 6/18/25    Medication requested:  oxyCODONE ER 13.5 MG capsule extended-release 12 hour     Last fill date: 6/18/25      Last appointment: 4/21/25    Next appointment: 7/21/25

## 2025-07-16 ENCOUNTER — SPECIALTY PHARMACY (OUTPATIENT)
Facility: HOSPITAL | Age: 70
End: 2025-07-16
Payer: MEDICARE

## 2025-07-16 DIAGNOSIS — C79.51 PROSTATE CANCER METASTATIC TO BONE: ICD-10-CM

## 2025-07-16 DIAGNOSIS — C61 PROSTATE CANCER METASTATIC TO BONE: ICD-10-CM

## 2025-07-16 RX ORDER — ABIRATERONE 500 MG/1
1000 TABLET ORAL DAILY
Qty: 60 TABLET | Refills: 11 | Status: SHIPPED | OUTPATIENT
Start: 2025-07-16

## 2025-07-16 NOTE — PROGRESS NOTES
Re: Refills of Oral Specialty Medication - Zytiga (abiraterone acetate)    Drug-Drug Interactions: The current medication list was reviewed and there are some relevant drug-drug interactions with the oral specialty medication that will be discussed during education, including:  Abiraterone Acetate may increase myopathic (rhabdomyolysis) effects of HMG-CoA Reductase Inhibitors (Statins).  Abiraterone may increase serum concentration of Tamsulosin  Medication Allergies: The patient has no relevant allergies as it relates to their oral specialty medication  Review of Labs/Dose Adjustments: NO DOSE CHANGE - I reviewed the most recent note and labs and the patient will continue without any dose changes.  I sent refills as described below.    Drug: Zytiga (abiraterone acetate)  Strength: 500 mg  Directions: Take 2 tablets by mouth daily  Quantity: 60  Refills: 11  Pharmacy prescription sent to: [facility]    Chelsea GuidryD, BCOP  Clinical Oncology Pharmacy Specialist  Phone: [phone]      7/16/2025  14:32 EDT

## 2025-07-18 ENCOUNTER — SPECIALTY PHARMACY (OUTPATIENT)
Dept: ONCOLOGY | Facility: HOSPITAL | Age: 70
End: 2025-07-18
Payer: MEDICARE

## 2025-07-18 NOTE — PROGRESS NOTES
Specialty Pharmacy Patient Management Program  Refill Outreach     [place] was contacted today regarding refills of their medication(s).    Refill Questions      Flowsheet Row Most Recent Value   Changes to allergies? No   Changes to medications? No   New conditions or infections since last clinic visit No   Unplanned office visit, urgent care, ED, or hospital admission in the last 4 weeks  No   How does patient/caregiver feel medication is working? Good   Financial problems or insurance changes  No   Since the previous refill, were any specialty medication doses or scheduled injections missed or delayed?  No   Does this patient require a clinical escalation to a pharmacist? No            Delivery Questions      Flowsheet Row Most Recent Value   Delivery method UPS   Delivery address verified with patient/caregiver? Yes   Delivery address Home   Other address preferred n/a   Number of medications in delivery 3   Medication(s) being filled and delivered Abiraterone Acetate (ZYTIGA), predniSONE (DELTASONE), Relugolix (ORGOVYX)   Doses left of specialty medications 10 days left of Orgovyx and Zytiga   Copay verified? Yes   Copay amount $0.00   Copay form of payment No copayment ($0)   Delivery Date Selection 07/22/25   Signature Required No   Do you consent to receive electronic handouts?  No            Medication Adherence    Adherence tools used: watch   Other adherence tool: Clock - takes at same time each day, 7:45am   Support network for adherence: family member          Follow-up: 30 day(s)     Micaela Cameron, Pharmacy Technician  7/18/2025  09:36 EDT

## 2025-08-11 ENCOUNTER — LAB (OUTPATIENT)
Dept: LAB | Facility: HOSPITAL | Age: 70
End: 2025-08-11
Payer: MEDICARE

## 2025-08-11 ENCOUNTER — OFFICE VISIT (OUTPATIENT)
Dept: PALLIATIVE CARE | Facility: CLINIC | Age: 70
End: 2025-08-11
Payer: MEDICARE

## 2025-08-11 VITALS
HEART RATE: 75 BPM | DIASTOLIC BLOOD PRESSURE: 77 MMHG | RESPIRATION RATE: 18 BRPM | OXYGEN SATURATION: 96 % | SYSTOLIC BLOOD PRESSURE: 146 MMHG | BODY MASS INDEX: 26.58 KG/M2 | TEMPERATURE: 97.1 F | WEIGHT: 180 LBS

## 2025-08-11 DIAGNOSIS — G89.3 CHRONIC PAIN DUE TO NEOPLASM: ICD-10-CM

## 2025-08-11 DIAGNOSIS — R23.2 HOT FLASHES: ICD-10-CM

## 2025-08-11 DIAGNOSIS — C61 PROSTATE CANCER METASTATIC TO BONE: ICD-10-CM

## 2025-08-11 DIAGNOSIS — C79.51 PROSTATE CANCER METASTATIC TO BONE: ICD-10-CM

## 2025-08-11 DIAGNOSIS — G89.3 CANCER RELATED PAIN: ICD-10-CM

## 2025-08-11 DIAGNOSIS — Z51.81 THERAPEUTIC DRUG MONITORING: Primary | ICD-10-CM

## 2025-08-11 PROCEDURE — 80307 DRUG TEST PRSMV CHEM ANLYZR: CPT | Performed by: PHYSICIAN ASSISTANT

## 2025-08-11 RX ORDER — HYDROMORPHONE HYDROCHLORIDE 8 MG/1
8 TABLET ORAL EVERY 4 HOURS PRN
Qty: 90 TABLET | Refills: 0 | Status: SHIPPED | OUTPATIENT
Start: 2025-08-30 | End: 2025-09-14

## 2025-08-11 RX ORDER — HYDROMORPHONE HYDROCHLORIDE 8 MG/1
8 TABLET ORAL EVERY 4 HOURS PRN
Qty: 90 TABLET | Refills: 0 | Status: SHIPPED | OUTPATIENT
Start: 2025-08-15 | End: 2025-08-13

## 2025-08-11 RX ORDER — OXYCODONE 9 MG/1
CAPSULE, EXTENDED RELEASE ORAL EVERY 12 HOURS SCHEDULED
COMMUNITY
End: 2025-08-11 | Stop reason: DRUGHIGH

## 2025-08-11 RX ORDER — METHOCARBAMOL 500 MG/1
500 TABLET, FILM COATED ORAL 4 TIMES DAILY
Qty: 120 TABLET | Refills: 0 | Status: SHIPPED | OUTPATIENT
Start: 2025-08-11

## 2025-08-11 RX ORDER — VENLAFAXINE 37.5 MG/1
37.5 TABLET ORAL DAILY
Qty: 30 TABLET | Refills: 3 | Status: SHIPPED | OUTPATIENT
Start: 2025-08-11

## 2025-08-13 ENCOUNTER — TELEPHONE (OUTPATIENT)
Dept: PALLIATIVE CARE | Facility: CLINIC | Age: 70
End: 2025-08-13
Payer: MEDICARE

## 2025-08-13 DIAGNOSIS — G89.3 CANCER RELATED PAIN: ICD-10-CM

## 2025-08-14 RX ORDER — HYDROMORPHONE HYDROCHLORIDE 8 MG/1
8 TABLET ORAL EVERY 4 HOURS PRN
Qty: 90 TABLET | Refills: 0 | Status: SHIPPED | OUTPATIENT
Start: 2025-08-15 | End: 2025-08-30

## 2025-08-15 ENCOUNTER — SPECIALTY PHARMACY (OUTPATIENT)
Dept: ONCOLOGY | Facility: HOSPITAL | Age: 70
End: 2025-08-15
Payer: MEDICARE

## (undated) DEVICE — ADULT, W/LG. BACK PAD, RADIOTRANSPARENT ELEMENT AND LEAD WIRE COMPATIBLE W/: Brand: DEFIBRILLATION ELECTRODES

## (undated) DEVICE — PK CATH CARD 10

## (undated) DEVICE — CVR PROB ULTRASND/TRANSD W/GEL 7X11IN STRL

## (undated) DEVICE — GLIDESHEATH SLENDER STAINLESS STEEL KIT: Brand: GLIDESHEATH SLENDER

## (undated) DEVICE — MODEL AT P65, P/N 701554-001KIT CONTENTS: HAND CONTROLLER, 3-WAY HIGH-PRESSURE STOPCOCK WITH ROTATING END AND PREMIUM HIGH-PRESSURE TUBING: Brand: ANGIOTOUCH® KIT

## (undated) DEVICE — TR BAND RADIAL ARTERY COMPRESSION DEVICE: Brand: TR BAND

## (undated) DEVICE — CATH DIAG EXPO .045 FL3  5F 100CM

## (undated) DEVICE — CATH DIAG EXPO M/ PK 5F FL4/FR4 PIG

## (undated) DEVICE — GW PERIPH GUIDERIGHT STD/EXCHNG/J/TIP SS 0.035IN 5X260CM

## (undated) DEVICE — MODEL BT2000 P/N 700287-012KIT CONTENTS: MANIFOLD WITH SALINE AND CONTRAST PORTS, SALINE TUBING WITH SPIKE AND HAND SYRINGE, TRANSDUCER: Brand: BT2000 AUTOMATED MANIFOLD KIT